# Patient Record
Sex: MALE | Race: WHITE | Employment: OTHER | URBAN - METROPOLITAN AREA
[De-identification: names, ages, dates, MRNs, and addresses within clinical notes are randomized per-mention and may not be internally consistent; named-entity substitution may affect disease eponyms.]

---

## 2017-01-04 ENCOUNTER — OFFICE VISIT (OUTPATIENT)
Dept: CARDIOLOGY CLINIC | Age: 82
End: 2017-01-04

## 2017-01-04 VITALS
HEIGHT: 69 IN | RESPIRATION RATE: 16 BRPM | SYSTOLIC BLOOD PRESSURE: 194 MMHG | HEART RATE: 68 BPM | OXYGEN SATURATION: 98 % | BODY MASS INDEX: 27.64 KG/M2 | DIASTOLIC BLOOD PRESSURE: 80 MMHG | WEIGHT: 186.6 LBS

## 2017-01-04 DIAGNOSIS — E11.51 TYPE 2 DIABETES MELLITUS WITH DIABETIC PERIPHERAL ANGIOPATHY WITHOUT GANGRENE, WITHOUT LONG-TERM CURRENT USE OF INSULIN (HCC): ICD-10-CM

## 2017-01-04 DIAGNOSIS — I25.10 CORONARY ARTERY DISEASE INVOLVING NATIVE CORONARY ARTERY OF NATIVE HEART WITHOUT ANGINA PECTORIS: ICD-10-CM

## 2017-01-04 DIAGNOSIS — I10 ESSENTIAL HYPERTENSION: ICD-10-CM

## 2017-01-04 DIAGNOSIS — I73.9 PVD (PERIPHERAL VASCULAR DISEASE) (HCC): Primary | ICD-10-CM

## 2017-01-04 DIAGNOSIS — I77.9 BILATERAL CAROTID ARTERY DISEASE (HCC): ICD-10-CM

## 2017-01-04 DIAGNOSIS — I73.9 CLAUDICATION IN PERIPHERAL VASCULAR DISEASE (HCC): ICD-10-CM

## 2017-01-04 NOTE — PROGRESS NOTES
HISTORY OF PRESENT ILLNESS  Roxanne Dawson is a [de-identified] y.o. male. He has diabetes, coronary and peripheral arterial disease, and chronic renal insufficiency. Years ago, he had intervention on both legs with marked improvement. More recently, he has had recurrence of angina and ankle-brachial index testing was abnormal - being 0.56 on the right and 0.62 on the left. He has heavily calcified arteries in his legs. His creatinine was 1.69 before the procedure. He had additional intravenous hydration before angiography done on 12/22/16. This showed 99% stenoses in the left popliteal and tibioperoneal trunk. Both were treated with orbital atherectomy and drug coated balloons. He has had some swelling in his left ankle since, but no more claudication. He admits to eating some new garlic butter that is probably loaded with salt, as well as soup that has salt in it. He will cut out both of these. He has some claudication in the right leg, although, it was not as severe clinically as the left, but the ankle-brachial index was worse. He was noted to have a short segment total occlusion in his distal superficial femoral artery on the right leg with heavy calcification. HPI  Patient Active Problem List   Diagnosis Code    Bradycardia R00.1    CAD (coronary artery disease) I25.10    Hypertension, essential, benign I10    Carotid arterial disease (HCC) I77.9    Hypercholesteremia E78.00    PVD (peripheral vascular disease) ([facility] I73.9    PVC's (premature ventricular contractions) I49.3    Type 2 diabetes mellitus with diabetic peripheral angiopathy without gangrene ([facility]) E11.51     Current Outpatient Prescriptions   Medication Sig Dispense Refill    metFORMIN (GLUCOPHAGE) 1,000 mg tablet Take 1 Tab by mouth two (2) times daily (with meals). 30 Tab 1    clopidogrel (PLAVIX) 75 mg tab Take 1 Tab by mouth daily for 360 days.  30 Tab 1    carvedilol (COREG) 6.25 mg tablet TAKE ONE TABLET BY MOUTH TWICE DAILY WITH MEALS 60 Tab 2    pioglitazone (ACTOS) 45 mg tablet Take 45 mg by mouth daily.  amLODIPine (NORVASC) 10 mg tablet Take  by mouth daily.  atorvastatin (LIPITOR) 20 mg tablet Take 1 Tab by mouth nightly. 30 Tab 3    cinnamon bark (CINNAMON) 500 mg cap Take 500 mg by mouth two (2) times a day.  hydrochlorothiazide (HYDRODIURIL) 25 mg tablet Take 25 mg by mouth daily.  fish oil-dha-epa (FISH OIL) 1,200-144-216 mg Cap Take  by mouth two (2) times a day.  aspirin 81 mg tablet Take  by mouth daily.  glipiZIDE (GLUCOTROL) 10 mg tablet Take 20 mg by mouth two (2) times a day. Past Medical History   Diagnosis Date    CAD (coronary artery disease)     Carotid arterial disease ([facility]     Diabetes mellitus, type 2 ([facility]     Hypercholesteremia     Hypertension     PVC's (premature ventricular contractions) 5/26/2014    PVD (peripheral vascular disease) ([facility]      Past Surgical History   Procedure Laterality Date    Hx heart catheterization      Hx coronary artery bypass graft         Review of Systems   Cardiovascular: Positive for claudication and leg swelling. All other systems reviewed and are negative. Visit Vitals    /80 (BP 1 Location: Left arm, BP Patient Position: Sitting)    Pulse 68    Resp 16    Ht 5' 9\" (1.753 m)    Wt 186 lb 9.6 oz (84.6 kg)    SpO2 98%    BMI 27.56 kg/m2       Physical Exam   Constitutional: He is oriented to person, place, and time. He appears well-nourished. HENT:   Head: Atraumatic. Eyes: Conjunctivae are normal.   Neck: Neck supple. Cardiovascular: Normal rate, regular rhythm and normal heart sounds. Exam reveals no gallop and no friction rub. No murmur heard. Pulmonary/Chest: Breath sounds normal. He has no wheezes. Abdominal: Bowel sounds are normal.   Musculoskeletal: He exhibits edema. Neurological: He is oriented to person, place, and time. Skin: Skin is dry.    Psychiatric: His behavior is normal.   Nursing note and vitals reviewed. ASSESSMENT and PLAN  His right leg is cooler than his left to examination. I suspect that the minimal swelling in the left leg may due to reperfusion and also his using new foodstuffs that have additional sodium. His blood pressure systolic is elevated today, but will not be addressed because of the dietary indiscretion. He would like to have the right leg done and this will be scheduled for next week. He will again arrive early for intravenous hydration, which actually was quite protective of his kidneys and improved his serum creatinine. He will probably need orbital atherectomy again and drug-eluting balloons. He will not need repeat blood work since it still will be current. He will stop his oral hypoglycemic medications prior to the procedure as he did before.

## 2017-01-04 NOTE — MR AVS SNAPSHOT
Visit Information Date & Time Provider Department Dept. Phone Encounter #  
 1/4/2017 10:00 AM Alana Mcfarland MD [facility] [phone] [id number] Your Appointments 4/24/2017 11:20 AM  
ESTABLISHED PATIENT with Jared Reynaga MD  
[facility] (ANAHI SCHEDULING) Appt Note: 4mo fu  
 [address] [place] [address]  
[address] [place] [address] [id number] Upcoming Health Maintenance Date Due  
 FOOT EXAM Q1 8/28/1945 EYE EXAM RETINAL OR DILATED Q1 8/28/1946 DTaP/Tdap/Td series (1 - Tdap) 8/28/1957 ZOSTER VACCINE AGE 60> 8/28/1996 GLAUCOMA SCREENING Q2Y 8/28/2001 Pneumococcal 65+ Low/Medium Risk (1 of 2 - PCV13) 8/28/2001 MEDICARE YEARLY EXAM 8/28/2001 HEMOGLOBIN A1C Q6M 4/25/2011 MICROALBUMIN Q1 8/3/2011 LIPID PANEL Q1 10/25/2011 INFLUENZA AGE 9 TO ADULT 8/1/2016 Allergies as of 1/4/2017  Review Complete On: 1/4/2017 By: Alana Mcfarland MD  
  
 Severity Noted Reaction Type Reactions Lisinopril  09/20/2016    Cough Current Immunizations  Never Reviewed No immunizations on file. Not reviewed this visit You Were Diagnosed With   
  
 Codes Comments PVD (peripheral vascular disease) ([facility]    -  Primary ICD-10-CM: I73.9 ICD-9-CM: 443.9 Claudication in peripheral vascular disease [facility]     ICD-10-CM: I73.9 ICD-9-CM: 443.9 Coronary artery disease involving native coronary artery of native heart without angina pectoris     ICD-10-CM: I25.10 ICD-9-CM: 414.01 Bilateral carotid artery disease ([facility]     ICD-10-CM: I77.9 ICD-9-CM: 447.9 Essential hypertension     ICD-10-CM: I10 
ICD-9-CM: 401.9 Type 2 diabetes mellitus with diabetic peripheral angiopathy without gangrene, without long-term current use of insulin (HCC)     ICD-10-CM: E11.51 
ICD-9-CM: 250.70, 443.81 Vitals BP Pulse Resp Height(growth percentile) Weight(growth percentile) SpO2  
 194/80 (BP 1 Location: Left arm, BP Patient Position: Sitting) 68 16 5' 9\" (1.753 m) 186 lb 9.6 oz (84.6 kg) 98% BMI Smoking Status 27.56 kg/m2 Former Smoker BMI and BSA Data Body Mass Index Body Surface Area  
 27.56 kg/m 2 2.03 m 2 Preferred Pharmacy Pharmacy Name Phone [facility] [address] [facility], [address] [place] [address],[address] [phone] Your Updated Medication List  
  
   
This list is accurate as of: 1/4/17 11:04 AM.  Always use your most recent med list. amLODIPine 10 mg tablet Commonly known as:  Wandra Tracie Take  by mouth daily. aspirin 81 mg tablet Take  by mouth daily. atorvastatin 20 mg tablet Commonly known as:  LIPITOR Take 1 Tab by mouth nightly. carvedilol 6.25 mg tablet Commonly known as:  COREG  
TAKE ONE TABLET BY MOUTH TWICE DAILY WITH MEALS  
  
 CINNAMON 500 mg Cap Generic drug:  cinnamon bark Take 500 mg by mouth two (2) times a day. clopidogrel 75 mg Tab Commonly known as:  PLAVIX Take 1 Tab by mouth daily for 360 days. FISH OIL 1,200-144-216 mg Cap Generic drug:  fish oil-dha-epa Take  by mouth two (2) times a day. glipiZIDE 10 mg tablet Commonly known as:  Brooksie Fillers Take 20 mg by mouth two (2) times a day. hydroCHLOROthiazide 25 mg tablet Commonly known as:  HYDRODIURIL Take 25 mg by mouth daily. metFORMIN 1,000 mg tablet Commonly known as:  GLUCOPHAGE Take 1 Tab by mouth two (2) times daily (with meals). pioglitazone 45 mg tablet Commonly known as:  ACTOS Take 45 mg by mouth daily. Patient Instructions Schedule for Perpherial angio on Jan 12 ,2017 at [facility] , to arrive 9am in the admissions office  for fluid loading.  Nothing to eat or drink after midnight, He is to hold Metformin the day before and hold Actos and Glipizide the morning of, he will need to have a  as well but come prepared to stay over night. Lab work current in chart. Patient Instructions History Introducing [facility]! [facility] introduces [employer] patient portal. Now you can access parts of your medical record, email your doctor's office, and request medication refills online. 1. In your internet browser, go to [email or web address] [email or web address] 2. Click on the First Time User? Click Here link in the Sign In box. You will see the New Member Sign Up page. 3. Enter your [employer] Access Code exactly as it appears below. You will not need to use this code after youve completed the sign-up process. If you do not sign up before the expiration date, you must request a new code. · [employer] Access Code: [email or web address] Expires: 3/8/2017  3:53 PM 
 
4. Enter the last four digits of your Social Security Number (xxxx) and Date of Birth (mm/dd/yyyy) as indicated and click Submit. You will be taken to the next sign-up page. 5. Create a [employer] ID. This will be your [employer] login ID and cannot be changed, so think of one that is secure and easy to remember. 6. Create a [employer] password. You can change your password at any time. 7. Enter your Password Reset Question and Answer. This can be used at a later time if you forget your password. 8. Enter your e-mail address. You will receive e-mail notification when new information is available in [address] 9. Click Sign Up. You can now view and download portions of your medical record. 10. Click the Download Summary menu link to download a portable copy of your medical information. If you have questions, please visit the Frequently Asked Questions section of the [employer] website. Remember, [employer] is NOT to be used for urgent needs. For medical emergencies, dial 911. Now available from your iPhone and Android! Please provide this summary of care documentation to your next provider. Your primary care clinician is listed as Germaine Rodriguez. If you have any questions after today's visit, please call [phone].

## 2017-01-04 NOTE — PATIENT INSTRUCTIONS
Schedule for Perpherial angio on Jan 12 ,2017 at [facility] , to arrive 9am in the admissions office  for fluid loading. Nothing to eat or drink after midnight, He is to hold Metformin the day before and hold Actos and Glipizide the morning of, he will need to have a  as well but come prepared to stay over night. Lab work current in chart.

## 2017-01-10 DIAGNOSIS — I73.9 PAD (PERIPHERAL ARTERY DISEASE) (HCC): Primary | ICD-10-CM

## 2017-01-10 RX ORDER — SODIUM CHLORIDE 9 MG/ML
150 INJECTION, SOLUTION INTRAVENOUS CONTINUOUS
Status: CANCELLED | OUTPATIENT
Start: 2017-01-19

## 2017-01-10 RX ORDER — DIPHENHYDRAMINE HYDROCHLORIDE 50 MG/ML
50 INJECTION, SOLUTION INTRAMUSCULAR; INTRAVENOUS
Status: CANCELLED | OUTPATIENT
Start: 2017-01-19

## 2017-01-17 DIAGNOSIS — I73.9 PAD (PERIPHERAL ARTERY DISEASE) (HCC): Primary | ICD-10-CM

## 2017-01-17 RX ORDER — SODIUM CHLORIDE 0.9 % (FLUSH) 0.9 %
5-10 SYRINGE (ML) INJECTION EVERY 8 HOURS
Status: CANCELLED | OUTPATIENT
Start: 2017-01-17

## 2017-01-17 RX ORDER — SODIUM CHLORIDE 0.9 % (FLUSH) 0.9 %
5-10 SYRINGE (ML) INJECTION AS NEEDED
Status: CANCELLED | OUTPATIENT
Start: 2017-01-17

## 2017-01-19 ENCOUNTER — HOSPITAL ENCOUNTER (OUTPATIENT)
Dept: CARDIAC CATH/INVASIVE PROCEDURES | Age: 82
Setting detail: OBSERVATION
Discharge: HOME | End: 2017-01-20
Attending: SPECIALIST | Admitting: SPECIALIST
Payer: MEDICARE

## 2017-01-19 DIAGNOSIS — I73.9 PAD (PERIPHERAL ARTERY DISEASE) (HCC): ICD-10-CM

## 2017-01-19 LAB
ANION GAP BLD CALC-SCNC: 7 MMOL/L (ref 5–15)
BUN SERPL-MCNC: 42 MG/DL (ref 6–20)
BUN/CREAT SERPL: 25 (ref 12–20)
CALCIUM SERPL-MCNC: 8.7 MG/DL (ref 8.5–10.1)
CHLORIDE SERPL-SCNC: 100 MMOL/L (ref 97–108)
CO2 SERPL-SCNC: 28 MMOL/L (ref 21–32)
CREAT SERPL-MCNC: 1.69 MG/DL (ref 0.7–1.3)
ERYTHROCYTE [DISTWIDTH] IN BLOOD BY AUTOMATED COUNT: 13.9 % (ref 11.5–14.5)
GLUCOSE BLD STRIP.AUTO-MCNC: 168 MG/DL (ref 65–100)
GLUCOSE BLD STRIP.AUTO-MCNC: 345 MG/DL (ref 65–100)
GLUCOSE SERPL-MCNC: 250 MG/DL (ref 65–100)
HCT VFR BLD AUTO: 34.7 % (ref 36.6–50.3)
HGB BLD-MCNC: 11.5 G/DL (ref 12.1–17)
MCH RBC QN AUTO: 28.9 PG (ref 26–34)
MCHC RBC AUTO-ENTMCNC: 33.1 G/DL (ref 30–36.5)
MCV RBC AUTO: 87.2 FL (ref 80–99)
PLATELET # BLD AUTO: 247 K/UL (ref 150–400)
POTASSIUM SERPL-SCNC: 4.5 MMOL/L (ref 3.5–5.1)
RBC # BLD AUTO: 3.98 M/UL (ref 4.1–5.7)
SERVICE CMNT-IMP: ABNORMAL
SERVICE CMNT-IMP: ABNORMAL
SODIUM SERPL-SCNC: 135 MMOL/L (ref 136–145)
WBC # BLD AUTO: 6 K/UL (ref 4.1–11.1)

## 2017-01-19 PROCEDURE — C1894 INTRO/SHEATH, NON-LASER: HCPCS

## 2017-01-19 PROCEDURE — C1769 GUIDE WIRE: HCPCS

## 2017-01-19 PROCEDURE — C1725 CATH, TRANSLUMIN NON-LASER: HCPCS

## 2017-01-19 PROCEDURE — 77030032660 HC CATH ANGI PRPH SUPP CXI COOK -C

## 2017-01-19 PROCEDURE — 77030013715 HC INFL SYS MRTM -B

## 2017-01-19 PROCEDURE — A9270 NON-COVERED ITEM OR SERVICE: HCPCS | Performed by: SPECIALIST

## 2017-01-19 PROCEDURE — 99218 HC RM OBSERVATION: CPT

## 2017-01-19 PROCEDURE — 74011636637 HC RX REV CODE- 636/637: Performed by: SPECIALIST

## 2017-01-19 PROCEDURE — 74011000250 HC RX REV CODE- 250: Performed by: SPECIALIST

## 2017-01-19 PROCEDURE — 74011250637 HC RX REV CODE- 250/637: Performed by: SPECIALIST

## 2017-01-19 PROCEDURE — 75710 ARTERY X-RAYS ARM/LEG: CPT

## 2017-01-19 PROCEDURE — C1760 CLOSURE DEV, VASC: HCPCS

## 2017-01-19 PROCEDURE — C1724 CATH, TRANS ATHEREC,ROTATION: HCPCS

## 2017-01-19 PROCEDURE — 74011250636 HC RX REV CODE- 250/636

## 2017-01-19 PROCEDURE — 36415 COLL VENOUS BLD VENIPUNCTURE: CPT | Performed by: SPECIALIST

## 2017-01-19 PROCEDURE — 74011636320 HC RX REV CODE- 636/320: Performed by: SPECIALIST

## 2017-01-19 PROCEDURE — 85027 COMPLETE CBC AUTOMATED: CPT | Performed by: SPECIALIST

## 2017-01-19 PROCEDURE — C1874 STENT, COATED/COV W/DEL SYS: HCPCS

## 2017-01-19 PROCEDURE — 82962 GLUCOSE BLOOD TEST: CPT

## 2017-01-19 PROCEDURE — 77030013744

## 2017-01-19 PROCEDURE — 80048 BASIC METABOLIC PNL TOTAL CA: CPT | Performed by: SPECIALIST

## 2017-01-19 PROCEDURE — 74011000258 HC RX REV CODE- 258: Performed by: SPECIALIST

## 2017-01-19 PROCEDURE — 74011250636 HC RX REV CODE- 250/636: Performed by: SPECIALIST

## 2017-01-19 RX ORDER — HYDRALAZINE HYDROCHLORIDE 50 MG/1
50 TABLET, FILM COATED ORAL
Status: COMPLETED | OUTPATIENT
Start: 2017-01-19 | End: 2017-01-19

## 2017-01-19 RX ORDER — SODIUM CHLORIDE 9 MG/ML
150 INJECTION, SOLUTION INTRAVENOUS CONTINUOUS
Status: DISCONTINUED | OUTPATIENT
Start: 2017-01-19 | End: 2017-01-19 | Stop reason: HOSPADM

## 2017-01-19 RX ORDER — MAGNESIUM SULFATE 100 %
4 CRYSTALS MISCELLANEOUS AS NEEDED
Status: DISCONTINUED | OUTPATIENT
Start: 2017-01-19 | End: 2017-01-20 | Stop reason: HOSPADM

## 2017-01-19 RX ORDER — ACETAMINOPHEN 325 MG/1
650 TABLET ORAL
Status: DISCONTINUED | OUTPATIENT
Start: 2017-01-19 | End: 2017-01-20 | Stop reason: HOSPADM

## 2017-01-19 RX ORDER — HYDROCODONE BITARTRATE AND ACETAMINOPHEN 7.5; 325 MG/1; MG/1
1 TABLET ORAL
Status: DISCONTINUED | OUTPATIENT
Start: 2017-01-19 | End: 2017-01-20 | Stop reason: HOSPADM

## 2017-01-19 RX ORDER — SODIUM CHLORIDE 0.9 % (FLUSH) 0.9 %
10 SYRINGE (ML) INJECTION AS NEEDED
Status: DISCONTINUED | OUTPATIENT
Start: 2017-01-19 | End: 2017-01-19

## 2017-01-19 RX ORDER — INSULIN LISPRO 100 [IU]/ML
INJECTION, SOLUTION INTRAVENOUS; SUBCUTANEOUS
Status: DISCONTINUED | OUTPATIENT
Start: 2017-01-19 | End: 2017-01-20 | Stop reason: HOSPADM

## 2017-01-19 RX ORDER — CLOPIDOGREL 300 MG/1
600 TABLET, FILM COATED ORAL AS NEEDED
Status: DISCONTINUED | OUTPATIENT
Start: 2017-01-19 | End: 2017-01-19

## 2017-01-19 RX ORDER — AMLODIPINE BESYLATE 5 MG/1
10 TABLET ORAL DAILY
Status: DISCONTINUED | OUTPATIENT
Start: 2017-01-20 | End: 2017-01-20 | Stop reason: HOSPADM

## 2017-01-19 RX ORDER — SODIUM CHLORIDE 0.9 % (FLUSH) 0.9 %
5-10 SYRINGE (ML) INJECTION AS NEEDED
Status: DISCONTINUED | OUTPATIENT
Start: 2017-01-19 | End: 2017-01-20 | Stop reason: HOSPADM

## 2017-01-19 RX ORDER — CLOPIDOGREL BISULFATE 75 MG/1
75 TABLET ORAL ONCE
Status: COMPLETED | OUTPATIENT
Start: 2017-01-19 | End: 2017-01-19

## 2017-01-19 RX ORDER — HEPARIN SODIUM 1000 [USP'U]/ML
1000-5000 INJECTION, SOLUTION INTRAVENOUS; SUBCUTANEOUS
Status: DISCONTINUED | OUTPATIENT
Start: 2017-01-19 | End: 2017-01-19

## 2017-01-19 RX ORDER — SODIUM CHLORIDE 0.9 % (FLUSH) 0.9 %
5-10 SYRINGE (ML) INJECTION EVERY 8 HOURS
Status: DISCONTINUED | OUTPATIENT
Start: 2017-01-19 | End: 2017-01-19

## 2017-01-19 RX ORDER — GUAIFENESIN 100 MG/5ML
81 LIQUID (ML) ORAL DAILY
Status: DISCONTINUED | OUTPATIENT
Start: 2017-01-20 | End: 2017-01-20 | Stop reason: HOSPADM

## 2017-01-19 RX ORDER — ATROPINE SULFATE 0.1 MG/ML
1 INJECTION INTRAVENOUS AS NEEDED
Status: DISCONTINUED | OUTPATIENT
Start: 2017-01-19 | End: 2017-01-19

## 2017-01-19 RX ORDER — DEXTROSE 50 % IN WATER (D50W) INTRAVENOUS SYRINGE
12.5-25 AS NEEDED
Status: DISCONTINUED | OUTPATIENT
Start: 2017-01-19 | End: 2017-01-20 | Stop reason: HOSPADM

## 2017-01-19 RX ORDER — SODIUM CHLORIDE 0.9 % (FLUSH) 0.9 %
5-10 SYRINGE (ML) INJECTION EVERY 8 HOURS
Status: DISCONTINUED | OUTPATIENT
Start: 2017-01-19 | End: 2017-01-20 | Stop reason: HOSPADM

## 2017-01-19 RX ORDER — SODIUM CHLORIDE 9 MG/ML
75 INJECTION, SOLUTION INTRAVENOUS CONTINUOUS
Status: DISPENSED | OUTPATIENT
Start: 2017-01-19 | End: 2017-01-20

## 2017-01-19 RX ORDER — ATORVASTATIN CALCIUM 20 MG/1
20 TABLET, FILM COATED ORAL
Status: DISCONTINUED | OUTPATIENT
Start: 2017-01-19 | End: 2017-01-20 | Stop reason: HOSPADM

## 2017-01-19 RX ORDER — HYDRALAZINE HYDROCHLORIDE 20 MG/ML
20 INJECTION INTRAMUSCULAR; INTRAVENOUS
Status: DISCONTINUED | OUTPATIENT
Start: 2017-01-19 | End: 2017-01-20 | Stop reason: HOSPADM

## 2017-01-19 RX ORDER — HYDROCHLOROTHIAZIDE 25 MG/1
25 TABLET ORAL DAILY
Status: DISCONTINUED | OUTPATIENT
Start: 2017-01-20 | End: 2017-01-20 | Stop reason: HOSPADM

## 2017-01-19 RX ORDER — FENTANYL CITRATE 50 UG/ML
25-200 INJECTION, SOLUTION INTRAMUSCULAR; INTRAVENOUS
Status: DISCONTINUED | OUTPATIENT
Start: 2017-01-19 | End: 2017-01-19

## 2017-01-19 RX ORDER — MIDAZOLAM HYDROCHLORIDE 1 MG/ML
.5-1 INJECTION, SOLUTION INTRAMUSCULAR; INTRAVENOUS
Status: DISCONTINUED | OUTPATIENT
Start: 2017-01-19 | End: 2017-01-19

## 2017-01-19 RX ORDER — DIPHENHYDRAMINE HYDROCHLORIDE 50 MG/ML
50 INJECTION, SOLUTION INTRAMUSCULAR; INTRAVENOUS
Status: DISCONTINUED | OUTPATIENT
Start: 2017-01-19 | End: 2017-01-19 | Stop reason: HOSPADM

## 2017-01-19 RX ORDER — CARVEDILOL 6.25 MG/1
6.25 TABLET ORAL 2 TIMES DAILY WITH MEALS
Status: DISCONTINUED | OUTPATIENT
Start: 2017-01-19 | End: 2017-01-20 | Stop reason: HOSPADM

## 2017-01-19 RX ORDER — HEPARIN SODIUM 200 [USP'U]/100ML
1000 INJECTION, SOLUTION INTRAVENOUS CONTINUOUS
Status: DISCONTINUED | OUTPATIENT
Start: 2017-01-19 | End: 2017-01-19

## 2017-01-19 RX ORDER — SODIUM CHLORIDE 0.9 % (FLUSH) 0.9 %
5-10 SYRINGE (ML) INJECTION AS NEEDED
Status: DISCONTINUED | OUTPATIENT
Start: 2017-01-19 | End: 2017-01-19

## 2017-01-19 RX ORDER — HYDRALAZINE HYDROCHLORIDE 20 MG/ML
10 INJECTION INTRAMUSCULAR; INTRAVENOUS ONCE
Status: COMPLETED | OUTPATIENT
Start: 2017-01-19 | End: 2017-01-19

## 2017-01-19 RX ORDER — ZOLPIDEM TARTRATE 5 MG/1
5 TABLET ORAL
Status: DISCONTINUED | OUTPATIENT
Start: 2017-01-19 | End: 2017-01-20 | Stop reason: HOSPADM

## 2017-01-19 RX ORDER — GLIPIZIDE 5 MG/1
20 TABLET ORAL
Status: DISCONTINUED | OUTPATIENT
Start: 2017-01-19 | End: 2017-01-20 | Stop reason: HOSPADM

## 2017-01-19 RX ORDER — HYDROMORPHONE HYDROCHLORIDE 1 MG/ML
1 INJECTION, SOLUTION INTRAMUSCULAR; INTRAVENOUS; SUBCUTANEOUS
Status: DISCONTINUED | OUTPATIENT
Start: 2017-01-19 | End: 2017-01-20 | Stop reason: HOSPADM

## 2017-01-19 RX ORDER — LIDOCAINE HYDROCHLORIDE 10 MG/ML
10-30 INJECTION INFILTRATION; PERINEURAL
Status: DISCONTINUED | OUTPATIENT
Start: 2017-01-19 | End: 2017-01-19

## 2017-01-19 RX ORDER — HYDRALAZINE HYDROCHLORIDE 20 MG/ML
INJECTION INTRAMUSCULAR; INTRAVENOUS
Status: COMPLETED
Start: 2017-01-19 | End: 2017-01-19

## 2017-01-19 RX ORDER — CLOPIDOGREL BISULFATE 75 MG/1
75 TABLET ORAL DAILY
Status: DISCONTINUED | OUTPATIENT
Start: 2017-01-20 | End: 2017-01-20 | Stop reason: HOSPADM

## 2017-01-19 RX ADMIN — HYDRALAZINE HYDROCHLORIDE 10 MG: 20 INJECTION INTRAMUSCULAR; INTRAVENOUS at 14:23

## 2017-01-19 RX ADMIN — HEPARIN SODIUM 2000 UNITS: 200 INJECTION, SOLUTION INTRAVENOUS at 13:44

## 2017-01-19 RX ADMIN — MIDAZOLAM HYDROCHLORIDE 2 MG: 1 INJECTION, SOLUTION INTRAMUSCULAR; INTRAVENOUS at 13:46

## 2017-01-19 RX ADMIN — SODIUM CHLORIDE 75 ML/HR: 900 INJECTION, SOLUTION INTRAVENOUS at 21:30

## 2017-01-19 RX ADMIN — INSULIN LISPRO 4 UNITS: 100 INJECTION, SOLUTION INTRAVENOUS; SUBCUTANEOUS at 21:26

## 2017-01-19 RX ADMIN — HYDRALAZINE HYDROCHLORIDE 50 MG: 50 TABLET, FILM COATED ORAL at 11:05

## 2017-01-19 RX ADMIN — IOPAMIDOL 226 ML: 612 INJECTION, SOLUTION INTRAVENOUS at 16:06

## 2017-01-19 RX ADMIN — BIVALIRUDIN 148.4 MG/HR: 250 INJECTION, POWDER, LYOPHILIZED, FOR SOLUTION INTRAVENOUS at 14:13

## 2017-01-19 RX ADMIN — FENTANYL CITRATE 25 MCG: 50 INJECTION, SOLUTION INTRAMUSCULAR; INTRAVENOUS at 14:41

## 2017-01-19 RX ADMIN — HYDRALAZINE HYDROCHLORIDE 20 MG: 20 INJECTION INTRAMUSCULAR; INTRAVENOUS at 17:06

## 2017-01-19 RX ADMIN — SODIUM CHLORIDE 75 ML/HR: 900 INJECTION, SOLUTION INTRAVENOUS at 18:16

## 2017-01-19 RX ADMIN — CLOPIDOGREL BISULFATE 75 MG: 75 TABLET, FILM COATED ORAL at 16:14

## 2017-01-19 RX ADMIN — MIDAZOLAM HYDROCHLORIDE 1 MG: 1 INJECTION, SOLUTION INTRAMUSCULAR; INTRAVENOUS at 13:56

## 2017-01-19 RX ADMIN — MIDAZOLAM HYDROCHLORIDE 1 MG: 1 INJECTION, SOLUTION INTRAMUSCULAR; INTRAVENOUS at 14:48

## 2017-01-19 RX ADMIN — IOPAMIDOL 50 ML: 755 INJECTION, SOLUTION INTRAVENOUS at 14:25

## 2017-01-19 RX ADMIN — MIDAZOLAM HYDROCHLORIDE 1 MG: 1 INJECTION, SOLUTION INTRAMUSCULAR; INTRAVENOUS at 14:41

## 2017-01-19 RX ADMIN — SODIUM CHLORIDE 150 ML/HR: 900 INJECTION, SOLUTION INTRAVENOUS at 09:40

## 2017-01-19 RX ADMIN — BIVALIRUDIN 148.4 MG/HR: 250 INJECTION, POWDER, LYOPHILIZED, FOR SOLUTION INTRAVENOUS at 15:12

## 2017-01-19 RX ADMIN — Medication 10 ML: at 21:28

## 2017-01-19 RX ADMIN — MIDAZOLAM HYDROCHLORIDE 2 MG: 1 INJECTION, SOLUTION INTRAMUSCULAR; INTRAVENOUS at 15:43

## 2017-01-19 RX ADMIN — HYDROCODONE BITARTRATE AND ACETAMINOPHEN 1 TABLET: 7.5; 325 TABLET ORAL at 17:42

## 2017-01-19 RX ADMIN — FENTANYL CITRATE 25 MCG: 50 INJECTION, SOLUTION INTRAMUSCULAR; INTRAVENOUS at 13:56

## 2017-01-19 RX ADMIN — CARVEDILOL 6.25 MG: 6.25 TABLET, FILM COATED ORAL at 17:26

## 2017-01-19 RX ADMIN — MIDAZOLAM HYDROCHLORIDE 2 MG: 1 INJECTION, SOLUTION INTRAMUSCULAR; INTRAVENOUS at 15:21

## 2017-01-19 RX ADMIN — MIDAZOLAM HYDROCHLORIDE 1 MG: 1 INJECTION, SOLUTION INTRAMUSCULAR; INTRAVENOUS at 15:51

## 2017-01-19 RX ADMIN — GLIPIZIDE 20 MG: 5 TABLET ORAL at 18:51

## 2017-01-19 RX ADMIN — FENTANYL CITRATE 50 MCG: 50 INJECTION, SOLUTION INTRAMUSCULAR; INTRAVENOUS at 13:46

## 2017-01-19 RX ADMIN — LIDOCAINE HYDROCHLORIDE 10 ML: 10 INJECTION, SOLUTION INFILTRATION; PERINEURAL at 13:54

## 2017-01-19 NOTE — IP AVS SNAPSHOT
Current Discharge Medication List  
  
Take these medications at their scheduled times Dose & Instructions Dispensing Information Comments Morning Noon Evening Bedtime  
 amLODIPine 10 mg tablet Commonly known as:  Tad Tamika Your next dose is: Today, Tomorrow Other:  ____________ Take  by mouth daily. Refills:  0  
     
   
   
   
  
 aspirin 81 mg tablet Your next dose is: Today, Tomorrow Other:  ____________ Take  by mouth daily. Refills:  0  
     
   
   
   
  
 atorvastatin 20 mg tablet Commonly known as:  LIPITOR Your next dose is: Today, Tomorrow Other:  ____________ Dose:  20 mg Take 1 Tab by mouth nightly. Quantity:  30 Tab Refills:  3 Simvastatin discontinued 9/20/16 CINNAMON 500 mg Cap Generic drug:  cinnamon bark Your next dose is: Today, Tomorrow Other:  ____________ Dose:  500 mg Take 500 mg by mouth two (2) times a day. Refills:  0  
     
   
   
   
  
 clopidogrel 75 mg Tab Commonly known as:  PLAVIX Your next dose is: Today, Tomorrow Other:  ____________ Dose:  75 mg Take 1 Tab by mouth daily for 360 days. Quantity:  30 Tab Refills:  1 FISH OIL 1,200-144-216 mg Cap Generic drug:  fish oil-dha-epa Your next dose is: Today, Tomorrow Other:  ____________ Take  by mouth two (2) times a day. Refills:  0  
     
   
   
   
  
 glipiZIDE 10 mg tablet Commonly known as:  Lauren Candi Your next dose is: Today, Tomorrow Other:  ____________ Dose:  20 mg Take 20 mg by mouth two (2) times a day. Refills:  0  
     
   
   
   
  
 hydroCHLOROthiazide 25 mg tablet Commonly known as:  HYDRODIURIL Your next dose is: Today, Tomorrow Other:  ____________ Dose:  25 mg Take 25 mg by mouth daily. Refills:  0 metFORMIN 1,000 mg tablet Commonly known as:  GLUCOPHAGE Start taking on:  1/21/2017 Your next dose is: Today, Tomorrow Other:  ____________ Dose:  1000 mg Take 1 Tab by mouth two (2) times daily (with meals). Quantity:  30 Tab Refills:  1 Restart 12/26 pioglitazone 45 mg tablet Commonly known as:  ACTOS Your next dose is: Today, Tomorrow Other:  ____________ Dose:  45 mg Take 45 mg by mouth daily. Refills:  0 Take these medications as directed Dose & Instructions Dispensing Information Comments Morning Noon Evening Bedtime  
 carvedilol 6.25 mg tablet Commonly known as:  Aruna Chappell Your next dose is: Today, Tomorrow Other:  ____________ TAKE ONE TABLET BY MOUTH TWICE DAILY WITH MEALS Quantity:  60 Tab Refills:  2 Where to Get Your Medications [id number], [address] [place] [address] Way Phone:  [phone]  
  metFORMIN 1,000 mg tablet

## 2017-01-19 NOTE — H&P
PAD with claudication, CKD creatinine 1.6, had extensive intervention on right leg using 200 cc of contrast, so far unable to urinate, will keep observation overnight. See office note for details.

## 2017-01-19 NOTE — PROGRESS NOTES
TRANSFER - OUT REPORT:    Verbal report given to Dori Chance RN(name) on [address]  being transferred to CVSU(unit) for routine progression of care       Report consisted of patients Situation, Background, Assessment and   Recommendations(SBAR). Information from the following report(s) SBAR, Procedure Summary, MAR, Recent Results and Cardiac Rhythm sinus bradycardia with 1st degree heart block was reviewed with the receiving nurse. Lines:   Peripheral IV 01/19/17 Left Arm (Active)   Site Assessment Clean, dry, & intact 1/19/2017  9:30 AM   Phlebitis Assessment 0 1/19/2017  9:30 AM   Infiltration Assessment 0 1/19/2017  9:30 AM   Dressing Status Clean, dry, & intact 1/19/2017  9:30 AM   Dressing Type Tape;Transparent 1/19/2017  9:30 AM   Hub Color/Line Status Pink; Infusing 1/19/2017  9:30 AM        Opportunity for questions and clarification was provided.       Patient transported with:   Registered Nurse

## 2017-01-19 NOTE — PROCEDURES
Cardiac Catheterization Procedure Note   Patient: Roxanne Dawson MRN: [id number]  SSN: [id number]   YOB: 1935 Age: [de-identified] y.o. Sex: male    Date of Procedure: 1/19/2017   Pre-procedure Diagnosis: PAD with Claudication  Post-procedure Diagnosis: PAD with Claudication  Procedure: Peripheral Angiogram and orbital atherectomy, DCB, coated stent to right SFA, popliteal  :  Dr. Ira Brennan MD    Assistant(s):  None  Anesthesia: Moderate Sedation   Estimated Blood Loss: Less than 10 mL   Specimens Removed: None  Findings: 100% occlusion right popliteal with heavy calcium, 70% mid SFA, 80-90% ostium right SFA with very heavy calcium. All lesions treated with orbital atherectomy, distal 2 lesions with drug-coated balloon, ostial SFA with 7 mm coated stent. Good result. Total contrast about 200 cc, so will admit for hydration overnight given CKD.   Complications: None   Implants:  None  Signed by:  Ira Brennan MD  1/19/2017  4:15 PM

## 2017-01-19 NOTE — PROGRESS NOTES
1712: TRANSFER - IN REPORT:    Verbal report received from johnson hernandez(name) on [address]  being received from cath lab(unit) for routine progression of care      Report consisted of patients Situation, Background, Assessment and   Recommendations(SBAR). Information from the following report(s) SBAR, Kardex, Procedure Summary, Intake/Output, MAR and Recent Results was reviewed with the receiving nurse. Opportunity for questions and clarification was provided. 1801: Pt arrived to unit tele placed and confirmed with monitor tech. 1930: Bedside and Verbal shift change report given to tyra ambriz rn (oncoming nurse) by juan manuel molina rn (offgoing nurse). Report included the following information SBAR, Kardex, Procedure Summary, Intake/Output, MAR and Recent Results.

## 2017-01-19 NOTE — PROGRESS NOTES
Transfer to [address] [place] [address] from Procedure Area    Verbal report given to Herminia Celeste on [address]. being transferred to Cardiac Cath Lab  for ordered procedure   Patient is post AO/Ro with intervention procedure. Patient stable upon transfer to . Report consisted of patients Situation, Background, Assessment and   Recommendations(SBAR). Information from the following report(s) SBAR, Kardex, Procedure Summary, Intake/Output, MAR and Recent Results was reviewed with the receiving nurse. Opportunity for questions and clarification was provided. Patient medicated during procedure with orders obtained and verified by Dr. Karen Mendoza. Refer to patient PROCEDURE REPORT for vital signs, assessment, status, and response during procedure.

## 2017-01-19 NOTE — PROGRESS NOTES
Dr. Dimitri Toscano at bedside and ordered for straight cath. MD aware of blood pressure and wants to see results of straight cath prior to administration of hydralazine for blood pressure.

## 2017-01-19 NOTE — PROGRESS NOTES
TRANSFER - IN REPORT:    Verbal report received from [place] [id number] [facility] on [address]  being received from procedure for routine progression of care. Report consisted of patients Situation, Background, Assessment and Recommendations(SBAR). Information from the following report(s) SBAR, Procedure Summary, MAR and Recent Results was reviewed with the receiving clinician. Opportunity for questions and clarification was provided. Assessment completed upon patients arrival to [address] [place] [address] and care assumed. Cardiac Cath Lab Recovery Arrival Note:    [address] arrived to [facility]. Patient procedure= right lower extremity angioplasty / and stent placement. Patient on cardiac monitor, non-invasive blood pressure, SPO2 monitor. On O2 @ 2 lpm via nasal cannula. IV  of 0.9% normal saline on pump at 125 ml/hr. Patient status doing well without problems. Patient is A&Ox 4. Patient reports pain in right leg / lower extremity 10/10. PROCEDURE SITE CHECK:    Procedure site:without any bleeding or hematoma, no pain/discomfort reported at procedure site, but pain in right lower extremity. No change in patient status. Continue to monitor patient and status.

## 2017-01-19 NOTE — PROGRESS NOTES
Cardiac Cath Lab Procedure Area Arrival Note:    Alea Whitlock. arrived to Cardiac Cath Lab, Procedure Area. Patient identifiers verified with NAME and DATE OF BIRTH. Procedure verified with patient. Consent forms verified. Allergies verified. Patient informed of procedure and plan of care. Questions answered with review. Patient voiced understanding of procedure and plan of care. Patient on cardiac monitor, non-invasive blood pressure, SPO2 monitor. On room air or O2 @ 2 lpm via nasal cannula. IV of normal saline on pump at 150 ml/hr. Patient status doing well without problems. Patient is A&Ox 4. Patient reports no pain. Patient medicated during procedure with orders obtained and verified by Dr. Lucas Starks. Refer to patients Cardiac Cath Lab PROCEDURE REPORT for vital signs, assessment, status, and response during procedure, printed at end of case. Printed report on chart or scanned into chart.

## 2017-01-19 NOTE — PROGRESS NOTES
Cardiac Cath Lab Recovery Arrival Note:      Dary Fitch  arrived to Cardiac Cath Lab, Recovery Area. Staff introduced to patient. Patient identifiers verified with NAME and DATE OF BIRTH. Procedure verified with patient. Consent forms reviewed and signed by patient or authorized representative and verified. Allergies verified. Patient and family oriented to department. Patient and family informed of procedure and plan of care. Questions answered with review. Patient prepped for procedure, per orders from physician, prior to arrival.    Patient on cardiac monitor, non-invasive blood pressure, SPO2 monitor. On room harrison. Patient is A&Ox 4. Patient reports no complaints. Patient in stretcher, in low position, with side rails up, call bell within reach, patient instructed to call if assistance as needed. Patient prep in: [address], [place] [address]. Family in: waiting room (wife).    Prep by: Julianna Kumar RN

## 2017-01-19 NOTE — IP AVS SNAPSHOT
2700 [facility] [address] [place] [address] 
[phone] Patient: Ken Hoover. MRN: [id number] YVA:[id number]/22/5842 You are allergic to the following Allergen Reactions Lisinopril Cough Recent Documentation Height Weight BMI Smoking Status 1.753 m 83.8 kg 27.28 kg/m2 Former Smoker Emergency Contacts Name Discharge Info Relation Home Work Mobile Thelma Montes  Spouse [3] [phone] About your hospitalization You were admitted on:  January 19, 2017 You last received care in the:  [facility] 4 CV SERVICES UNIT You were discharged on:  January 20, 2017 Unit phone number:  [phone] Why you were hospitalized Your primary diagnosis was:  Pvd (Peripheral Vascular Disease) (Hcc) Your diagnoses also included:  Urinary Retention, Ckd (Chronic Kidney Disease) Providers Seen During Your Hospitalizations Provider Role Specialty Primary office phone Karsten West MD Attending Provider Cardiology [phone] Your Primary Care Physician (PCP) Primary Care Physician Office Phone Office Fax [facility] [phone] Follow-up Information Follow up With Details Comments Contact Info Karsten West MD On 1/31/2017 at [address] [facility] 
[phone] Maira Fletcher MD On 4/24/2017 11:20AM [address] [facility] 
[phone] Hazel Fuentes MD   [address]  [address] [place] [address] 
[phone] Your Appointments Tuesday January 31, 2017  9:40 AM EST  
ESTABLISHED PATIENT with Karsten West MD  
[facility] ([facility] SCHEDULING) [address]  [address] [place]  
[phone] Current Discharge Medication List  
  
CONTINUE these medications which have NOT CHANGED Dose & Instructions Dispensing Information Comments Morning Noon Evening Bedtime  
 amLODIPine 10 mg tablet Commonly known as:  [place] Your next dose is: Today, Tomorrow Other:  _________ Take  by mouth daily. Refills:  0  
     
   
   
   
  
 aspirin 81 mg tablet Your next dose is: Today, Tomorrow Other:  _________ Take  by mouth daily. Refills:  0  
     
   
   
   
  
 atorvastatin 20 mg tablet Commonly known as:  LIPITOR Your next dose is: Today, Tomorrow Other:  _________ Dose:  20 mg Take 1 Tab by mouth nightly. Quantity:  30 Tab Refills:  3 Simvastatin discontinued 9/20/16  
    
   
   
   
  
 carvedilol 6.25 mg tablet Commonly known as:  [place] Your next dose is: Today, Tomorrow Other:  _________ TAKE ONE TABLET BY MOUTH TWICE DAILY WITH MEALS Quantity:  60 Tab Refills:  2 CINNAMON 500 mg Cap Generic drug:  cinnamon bark Your next dose is: Today, Tomorrow Other:  _________ Dose:  500 mg Take 500 mg by mouth two (2) times a day. Refills:  0  
     
   
   
   
  
 clopidogrel 75 mg Tab Commonly known as:  PLAVIX Your next dose is: Today, Tomorrow Other:  _________ Dose:  75 mg Take 1 Tab by mouth daily for 360 days. Quantity:  30 Tab Refills:  1 FISH OIL 1,200-144-216 mg Cap Generic drug:  fish oil-dha-epa Your next dose is: Today, Tomorrow Other:  _________ Take  by mouth two (2) times a day. Refills:  0  
     
   
   
   
  
 glipiZIDE 10 mg tablet Commonly known as:  [place]  Your next dose is: Today, Tomorrow Other:  _________ Dose:  20 mg Take 20 mg by mouth two (2) times a day. Refills:  0  
     
   
   
   
  
 hydroCHLOROthiazide 25 mg tablet Commonly known as:  HYDRODIURIL  
   
 Your next dose is: Today, Tomorrow Other:  _________ Dose:  25 mg Take 25 mg by mouth daily. Refills:  0  
     
   
   
   
  
 metFORMIN 1,000 mg tablet Commonly known as:  GLUCOPHAGE Start taking on:  1/21/2017 Your next dose is: Today, Tomorrow Other:  _________ Dose:  1000 mg Take 1 Tab by mouth two (2) times daily (with meals). Quantity:  30 Tab Refills:  1 Restart 12/26 pioglitazone 45 mg tablet Commonly known as:  ACTOS Your next dose is: Today, Tomorrow Other:  _________ Dose:  45 mg Take 45 mg by mouth daily. Refills:  0 Where to Get Your Medications These medications were sent to [id number] "[employer], [address] Phone:  [phone]  
  metFORMIN 1,000 mg tablet Discharge Instructions DO NOT RESUME METFORMIN UNTIL TOMORROW afternoon/evening dose. May remove dressing later today. No soaking in tub. No driving or lifting greater than 5 lbs today. Discharge Orders None eReceipts Announcement We are excited to announce that we are making your provider's discharge notes available to you in eReceipts. You will see these notes when they are completed and signed by the physician that discharged you from your recent hospital stay. If you have any questions or concerns about any information you see in eReceipts, please call the Health Information Department where you were seen or reach out to your Primary Care Provider for more information about your plan of care. Introducing [facility] & HEALTH SERVICES! [facility] introduces eReceipts patient portal. Now you can access parts of your medical record, email your doctor's office, and request medication refills online. 1. In your internet browser, go to [email or web address] 2. Click on the First Time User? Click Here link in the Sign In box. You will see the New Member Sign Up page. 3. Enter your [employer] Access Code exactly as it appears below. You will not need to use this code after youve completed the sign-up process. If you do not sign up before the expiration date, you must request a new code. · [employer] Access Code: [email or web address] Expires: 3/8/2017  3:53 PM 
 
4. Enter the last four digits of your Social Security Number (xxxx) and Date of Birth (mm/dd/yyyy) as indicated and click Submit. You will be taken to the next sign-up page. 5. Create a [employer] ID. This will be your [employer] login ID and cannot be changed, so think of one that is secure and easy to remember. 6. Create a [employer] password. You can change your password at any time. 7. Enter your Password Reset Question and Answer. This can be used at a later time if you forget your password. 8. Enter your e-mail address. You will receive e-mail notification when new information is available in [address] 9. Click Sign Up. You can now view and download portions of your medical record. 10. Click the Download Summary menu link to download a portable copy of your medical information. If you have questions, please visit the Frequently Asked Questions section of the [employer] website. Remember, [employer] is NOT to be used for urgent needs. For medical emergencies, dial 911. Now available from your iPhone and Android! General Information Please provide this summary of care documentation to your next provider. Patient Signature:  ____________________________________________________________ Date:  ____________________________________________________________  
  
Jacqlyn Newcomer Provider Signature:  ____________________________________________________________ Date:  ____________________________________________________________

## 2017-01-20 VITALS
SYSTOLIC BLOOD PRESSURE: 182 MMHG | HEART RATE: 64 BPM | BODY MASS INDEX: 27.36 KG/M2 | HEIGHT: 69 IN | DIASTOLIC BLOOD PRESSURE: 63 MMHG | WEIGHT: 184.75 LBS | OXYGEN SATURATION: 100 % | RESPIRATION RATE: 18 BRPM | TEMPERATURE: 97.9 F

## 2017-01-20 PROBLEM — R33.9 URINARY RETENTION: Status: RESOLVED | Noted: 2017-01-19 | Resolved: 2017-01-20

## 2017-01-20 PROBLEM — R33.9 URINARY RETENTION: Status: ACTIVE | Noted: 2017-01-19

## 2017-01-20 PROBLEM — N18.9 CKD (CHRONIC KIDNEY DISEASE): Status: ACTIVE | Noted: 2017-01-20

## 2017-01-20 LAB
ALBUMIN SERPL BCP-MCNC: 2.7 G/DL (ref 3.5–5)
ALBUMIN/GLOB SERPL: 0.9 {RATIO} (ref 1.1–2.2)
ALP SERPL-CCNC: 50 U/L (ref 45–117)
ALT SERPL-CCNC: 15 U/L (ref 12–78)
ANION GAP BLD CALC-SCNC: 7 MMOL/L (ref 5–15)
AST SERPL W P-5'-P-CCNC: 13 U/L (ref 15–37)
BILIRUB SERPL-MCNC: 0.4 MG/DL (ref 0.2–1)
BNP SERPL-MCNC: 225 PG/ML (ref 0–100)
BUN SERPL-MCNC: 35 MG/DL (ref 6–20)
BUN/CREAT SERPL: 21 (ref 12–20)
CALCIUM SERPL-MCNC: 8.4 MG/DL (ref 8.5–10.1)
CHLORIDE SERPL-SCNC: 101 MMOL/L (ref 97–108)
CO2 SERPL-SCNC: 26 MMOL/L (ref 21–32)
CREAT SERPL-MCNC: 1.65 MG/DL (ref 0.7–1.3)
ERYTHROCYTE [DISTWIDTH] IN BLOOD BY AUTOMATED COUNT: 14.2 % (ref 11.5–14.5)
GLOBULIN SER CALC-MCNC: 3.1 G/DL (ref 2–4)
GLUCOSE BLD STRIP.AUTO-MCNC: 243 MG/DL (ref 65–100)
GLUCOSE SERPL-MCNC: 240 MG/DL (ref 65–100)
HCT VFR BLD AUTO: 30 % (ref 36.6–50.3)
HGB BLD-MCNC: 10 G/DL (ref 12.1–17)
MCH RBC QN AUTO: 28.8 PG (ref 26–34)
MCHC RBC AUTO-ENTMCNC: 33.3 G/DL (ref 30–36.5)
MCV RBC AUTO: 86.5 FL (ref 80–99)
PLATELET # BLD AUTO: 206 K/UL (ref 150–400)
POTASSIUM SERPL-SCNC: 3.5 MMOL/L (ref 3.5–5.1)
PROT SERPL-MCNC: 5.8 G/DL (ref 6.4–8.2)
RBC # BLD AUTO: 3.47 M/UL (ref 4.1–5.7)
SERVICE CMNT-IMP: ABNORMAL
SODIUM SERPL-SCNC: 134 MMOL/L (ref 136–145)
WBC # BLD AUTO: 5.8 K/UL (ref 4.1–11.1)

## 2017-01-20 PROCEDURE — 74011250637 HC RX REV CODE- 250/637: Performed by: SPECIALIST

## 2017-01-20 PROCEDURE — 82962 GLUCOSE BLOOD TEST: CPT

## 2017-01-20 PROCEDURE — 74011250637 HC RX REV CODE- 250/637: Performed by: NURSE PRACTITIONER

## 2017-01-20 PROCEDURE — 36415 COLL VENOUS BLD VENIPUNCTURE: CPT | Performed by: SPECIALIST

## 2017-01-20 PROCEDURE — 80053 COMPREHEN METABOLIC PANEL: CPT | Performed by: SPECIALIST

## 2017-01-20 PROCEDURE — 85027 COMPLETE CBC AUTOMATED: CPT | Performed by: SPECIALIST

## 2017-01-20 PROCEDURE — 74011636637 HC RX REV CODE- 636/637: Performed by: SPECIALIST

## 2017-01-20 PROCEDURE — A9270 NON-COVERED ITEM OR SERVICE: HCPCS | Performed by: SPECIALIST

## 2017-01-20 PROCEDURE — 99218 HC RM OBSERVATION: CPT

## 2017-01-20 PROCEDURE — 83880 ASSAY OF NATRIURETIC PEPTIDE: CPT | Performed by: SPECIALIST

## 2017-01-20 RX ORDER — POTASSIUM CHLORIDE 750 MG/1
20 TABLET, FILM COATED, EXTENDED RELEASE ORAL ONCE
Status: COMPLETED | OUTPATIENT
Start: 2017-01-20 | End: 2017-01-20

## 2017-01-20 RX ORDER — METFORMIN HYDROCHLORIDE 1000 MG/1
1000 TABLET ORAL 2 TIMES DAILY WITH MEALS
Qty: 30 TAB | Refills: 1 | Status: SHIPPED | OUTPATIENT
Start: 2017-01-21 | End: 2017-06-25

## 2017-01-20 RX ADMIN — ASPIRIN 81 MG 81 MG: 81 TABLET ORAL at 08:51

## 2017-01-20 RX ADMIN — POTASSIUM CHLORIDE 20 MEQ: 750 TABLET, FILM COATED, EXTENDED RELEASE ORAL at 08:51

## 2017-01-20 RX ADMIN — GLIPIZIDE 20 MG: 5 TABLET ORAL at 06:29

## 2017-01-20 RX ADMIN — AMLODIPINE BESYLATE 10 MG: 5 TABLET ORAL at 08:51

## 2017-01-20 RX ADMIN — HYDROCHLOROTHIAZIDE 25 MG: 25 TABLET ORAL at 08:51

## 2017-01-20 RX ADMIN — INSULIN LISPRO 3 UNITS: 100 INJECTION, SOLUTION INTRAVENOUS; SUBCUTANEOUS at 06:30

## 2017-01-20 RX ADMIN — CLOPIDOGREL BISULFATE 75 MG: 75 TABLET ORAL at 08:51

## 2017-01-20 RX ADMIN — Medication 10 ML: at 06:25

## 2017-01-20 RX ADMIN — CARVEDILOL 6.25 MG: 6.25 TABLET, FILM COATED ORAL at 08:51

## 2017-01-20 NOTE — DISCHARGE SUMMARY
Cardiology Discharge Summary     Patient ID:  Cedrick Odom  [id number]  4 y.o.  1935    Admit Date: 1/19/2017    Discharge Date: 1/20/2017    Admitting Physician: Lilia Winston MD     Discharge Physician: Gissel Orozco MD    Admission Diagnoses:   cc  cc  cc  pad, ckd  cc    Discharge Diagnoses:   Principal Problem:    PVD (peripheral vascular disease) ([facility] [address] [facility] ()    Active Problems:    CKD (chronic kidney disease) (1/20/2017)        Discharge Condition: Good    Cardiology Procedures this Admission:  1/19/2017:Peripheral Angiogram and orbital atherectomy, DCB, coated stent to right SFA, popliteal     Consults: None    Hospital Course: Cedrick Wisdom is a [de-identified] y.o. male. He has PMH diabetes, coronary and peripheral arterial disease, and chronic renal insufficiency. Years ago, he had intervention on both legs with marked improvement. More recently, he has had recurrence of angina and ankle-brachial index testing was abnormal  being 0.56 on the right and 0.62 on the left. He has heavily calcified arteries in his legs. His creatinine was 1.69 before the procedure. He had additional intravenous hydration before angiography done on 12/22/16. This showed 99% stenoses in the left popliteal and tibioperoneal trunk. Both were treated with orbital atherectomy and drug coated balloons. He has had some swelling in his left ankle since, but no more claudication. He was seen in office with Dr. Charis Orozco on 1/4/2017 and admitted to eating some new garlic butter that is probably loaded with salt, as well as soup that has salt in it. He agreed to cut out both of these. He had some claudication in the right leg, although, it was not as severe clinically as the left, but the ankle-brachial index was worse. He was noted to have a short segment total occlusion in his distal superficial femoral artery on the right leg with heavy calcification.   He was admitted on 1/19/2017 after undergoing the above aforementioned procedure. Findings from the procedure included the followin% occlusion right popliteal with heavy calcium, 70% mid SFA, 80-90% ostium right SFA with very heavy calcium. All lesions treated with orbital atherectomy, distal 2 lesions with drug-coated balloon, ostial SFA with 7 mm coated stent. Good result. Total contrast about 200 cc, so will admit for hydration overnight given CKD. The patient tolerated the procedure well but experienced urinary retention post procedure and required straight catheterization. He was thus admitted overnight. By day of discharge, the patient denied any numbness or tingling in RLE, did complain of some mild pain at Rt calf site. He had sensation to RLE. He was ambulatory and mildly hypertensive. His creatinine post procedure was 1.69 and on day of discharge was 1.65. He was instructed to folow up with his primary care physician postoperatively. He will follow up with Dr. Sanjuana Howell on 17. Visit Vitals    /61 (BP 1 Location: Right arm, BP Patient Position: At rest)    Pulse 63    Temp 98.1 °F (36.7 °C)    Resp 18    Ht 5' 9\" (1.753 m)    Wt 83.8 kg (184 lb 11.9 oz)    SpO2 99%    BMI 27.28 kg/m2       Physical Exam  Abdomen: soft, non-tender. Bowel sounds present  Extremities:Rt groin dressing c/d/i- no hematoma noted. + sensation RLE. Rt foot warm to touch. Faint doppler signal Rt PT but intermittent and not biphasic. No DP pulse. Heart: regular rate and rhythm, no murmurs, clicks, rubs or gallops  Lungs: clear to auscultation bilaterally, no use of accessory muscles noted.    Neck: supple,  Neurologic: Grossly normal  Pulses: 2+ and symmetrical    Labs:   Recent Labs      17   0558  17   0945   WBC  5.8  6.0   HGB  10.0*  11.5*   HCT  30.0*  34.7*   PLT  206  247     Recent Labs      17   0558  17   0945   NA  134*  135*   K  3.5  4.5   CL  101  100   CO2  26  28   GLU  240*  250*   BUN  35*  42*   CREA  1.65*  1.69* CA  8.4*  8.7   ALB  2.7*   --    SGOT  13*   --    ALT  15   --        No results for input(s): TROIQ, CPK, CKMB in the last 72 hours. EKG: telemetry - NSR    Other Diagnostic Tests:     Disposition: home    Patient Instructions:   Current Discharge Medication List      CONTINUE these medications which have CHANGED    Details   metFORMIN (GLUCOPHAGE) 1,000 mg tablet Take 1 Tab by mouth two (2) times daily (with meals). Qty: 30 Tab, Refills: 1    Comments: Restart 12/26         CONTINUE these medications which have NOT CHANGED    Details   clopidogrel (PLAVIX) 75 mg tab Take 1 Tab by mouth daily for 360 days. Qty: 30 Tab, Refills: 1      carvedilol (COREG) 6.25 mg tablet TAKE ONE TABLET BY MOUTH TWICE DAILY WITH MEALS  Qty: 60 Tab, Refills: 2      pioglitazone (ACTOS) 45 mg tablet Take 45 mg by mouth daily. amLODIPine (NORVASC) 10 mg tablet Take  by mouth daily. atorvastatin (LIPITOR) 20 mg tablet Take 1 Tab by mouth nightly. Qty: 30 Tab, Refills: 3    Comments: Simvastatin discontinued 9/20/16      cinnamon bark (CINNAMON) 500 mg cap Take 500 mg by mouth two (2) times a day. hydrochlorothiazide (HYDRODIURIL) 25 mg tablet Take 25 mg by mouth daily. Associated Diagnoses: Hypertension      fish oil-dha-epa (FISH OIL) 1,200-144-216 mg Cap Take  by mouth two (2) times a day. Associated Diagnoses: Coronary atherosclerosis of native coronary artery      aspirin 81 mg tablet Take  by mouth daily. glipiZIDE (GLUCOTROL) 10 mg tablet Take 20 mg by mouth two (2) times a day. Reference discharge instructions provided by nursing for diet and activity. Cardiology Attending:Patient seen and examined. I agree with NP assessment and plans. Leg pain improved.     Adelina Mustafa MD 1/20/2017 10:23 AM       Follow-up with   Future Appointments  Date Time Provider Muna Arredondoi   1/31/2017 9:40 AM Adelina Mustafa  [address]   4/24/2017 11:20 AM Elvie Goldman MD [facility] SCH[facility]     Follow up with your primary care after discharge. Signed:  Kimani Bourne.  FÉLIX Johnson

## 2017-01-20 NOTE — DISCHARGE INSTRUCTIONS
DO NOT RESUME METFORMIN UNTIL TOMORROW afternoon/evening dose. May remove dressing later today. No soaking in tub. No driving or lifting greater than 5 lbs today.

## 2017-01-20 NOTE — PROGRESS NOTES
1930: Bedside and Verbal shift change report given to Price RN (oncoming nurse) by Radhika Ribeiro RN (offgoing nurse). Report included the following information SBAR, Kardex, Intake/Output, MAR and Recent Results. 0000: Bedside and Verbal shift change report given to Berto Arriaga RN (oncoming nurse) by Carole Akins RN (offgoing nurse). Report included the following information SBAR, Kardex, Intake/Output, MAR and Recent Results.

## 2017-01-20 NOTE — PROGRESS NOTES
0730: Bedside shift change report given to Rush Shipman (oncoming nurse) by Lennox Aris (offgoing nurse). Report included the following information SBAR, Kardex, MAR and Recent Results. 1025: I have reviewed discharge instructions with the patient and spouse. The patient and spouse verbalized understanding. Discharge medications reviewed with patient and spouse and appropriate educational materials and side effects teaching were provided.

## 2017-01-26 RX ORDER — CARVEDILOL 6.25 MG/1
TABLET ORAL
Qty: 60 TAB | Refills: 3 | Status: SHIPPED | OUTPATIENT
Start: 2017-01-26 | End: 2017-02-20 | Stop reason: SDUPTHER

## 2017-01-26 NOTE — TELEPHONE ENCOUNTER
Patient has 2 days of this medication left.      Pharmacy verified    30 day supply with refills     Thank you, Gifty Ware

## 2017-01-31 ENCOUNTER — OFFICE VISIT (OUTPATIENT)
Dept: CARDIOLOGY CLINIC | Age: 82
End: 2017-01-31

## 2017-01-31 VITALS
HEART RATE: 63 BPM | BODY MASS INDEX: 28.08 KG/M2 | SYSTOLIC BLOOD PRESSURE: 168 MMHG | DIASTOLIC BLOOD PRESSURE: 70 MMHG | HEIGHT: 69 IN | OXYGEN SATURATION: 97 % | WEIGHT: 189.6 LBS | RESPIRATION RATE: 16 BRPM

## 2017-01-31 DIAGNOSIS — I25.10 CORONARY ARTERY DISEASE INVOLVING NATIVE CORONARY ARTERY OF NATIVE HEART WITHOUT ANGINA PECTORIS: ICD-10-CM

## 2017-01-31 DIAGNOSIS — N18.3 CKD (CHRONIC KIDNEY DISEASE), STAGE 3 (MODERATE): ICD-10-CM

## 2017-01-31 DIAGNOSIS — E11.51 TYPE 2 DIABETES MELLITUS WITH DIABETIC PERIPHERAL ANGIOPATHY WITHOUT GANGRENE, WITHOUT LONG-TERM CURRENT USE OF INSULIN (HCC): ICD-10-CM

## 2017-01-31 DIAGNOSIS — I73.9 PVD (PERIPHERAL VASCULAR DISEASE) (HCC): Primary | ICD-10-CM

## 2017-01-31 RX ORDER — CLOPIDOGREL BISULFATE 75 MG/1
75 TABLET ORAL DAILY
Qty: 30 TAB | Refills: 3 | Status: SHIPPED | OUTPATIENT
Start: 2017-01-31 | End: 2017-06-15 | Stop reason: ALTCHOICE

## 2017-01-31 RX ORDER — VALSARTAN 320 MG/1
320 TABLET ORAL DAILY
Status: ON HOLD | COMMUNITY
End: 2017-03-18

## 2017-01-31 NOTE — PROGRESS NOTES
HISTORY OF PRESENT ILLNESS  Yelena Matta is a [de-identified] y.o. male. He has coronary and peripheral arterial disease, diabetes, hypertension and chronic kidney disease. He had interventional procedure several years ago on both legs, which were heavily calcified. He did well for at least three years, but had recurrence, and over the past several months has undergone repeat intervention with orbital atherectomy of both legs, followed by drug coated balloons and at least one drug eluting stent. Despite a creatinine of 1.69 his renal function has remained unchanged with hydration. He can now walk at least a quarter mile without any claudication at all, which is a marked improvement. He has been having swelling in his feet and his blood pressure has been elevated. He has been on Amlodipine 10 mg a day and only takes Hydrochlorothiazide despite the elevated creatinine. HPI  Patient Active Problem List   Diagnosis Code    Bradycardia R00.1    CAD (coronary artery disease) I25.10    Hypertension, essential, benign I10    Carotid arterial disease (HCC) I77.9    Hypercholesteremia E78.00    PVD (peripheral vascular disease) ([facility] [address] I73.9    PVC's (premature ventricular contractions) I49.3    Type 2 diabetes mellitus with diabetic peripheral angiopathy without gangrene ([facility]) E11.51    CKD (chronic kidney disease) N18.9     Current Outpatient Prescriptions   Medication Sig Dispense Refill    valsartan (DIOVAN) 320 mg tablet Take  by mouth daily.  clopidogrel (PLAVIX) 75 mg tab Take 1 Tab by mouth daily for 360 days. 30 Tab 3    carvedilol (COREG) 6.25 mg tablet TAKE ONE TABLET BY MOUTH TWICE DAILY WITH MEALS 60 Tab 3    metFORMIN (GLUCOPHAGE) 1,000 mg tablet Take 1 Tab by mouth two (2) times daily (with meals). 30 Tab 1    pioglitazone (ACTOS) 45 mg tablet Take 45 mg by mouth daily.  amLODIPine (NORVASC) 10 mg tablet Take  by mouth daily.       atorvastatin (LIPITOR) 20 mg tablet Take 1 Tab by mouth nightly. 30 Tab 3    cinnamon bark (CINNAMON) 500 mg cap Take 500 mg by mouth two (2) times a day.  hydrochlorothiazide (HYDRODIURIL) 25 mg tablet Take 25 mg by mouth daily.  fish oil-dha-epa (FISH OIL) 1,200-144-216 mg Cap Take  by mouth two (2) times a day.  aspirin 81 mg tablet Take  by mouth daily.  glipiZIDE (GLUCOTROL) 10 mg tablet Take 20 mg by mouth two (2) times a day. Past Medical History   Diagnosis Date    CAD (coronary artery disease)     [facility]ot[facility] arterial [facility] ([facility] [address] [facility]     Diabetes mellitus, type 2 ([facility]     Hypercholesteremia     Hypertension     PVC's (premature ventricular contractions) 5/26/2014    [facility]      Past Surgical History   Procedure Laterality Date    Hx heart catheterization      Hx coronary artery bypass graft         Review of Systems   Cardiovascular: Positive for leg swelling. All other systems reviewed and are negative. Visit Vitals    /70 (BP 1 Location: Left arm, BP Patient Position: Sitting)    Pulse 63    Resp 16    Ht 5' 9\" (1.753 m)    Wt 189 lb 9.6 oz (86 kg)    SpO2 97%    BMI 28 kg/m2       Physical Exam   Constitutional: He is oriented to person, place, and time. He appears well-nourished. HENT:   Head: Atraumatic. Eyes: EOM are normal.   Neck: Neck supple. Cardiovascular: Normal rate, regular rhythm and normal heart sounds. Exam reveals no gallop and no friction rub. No murmur heard. Pulmonary/Chest: Breath sounds normal. He has no wheezes. Abdominal: Bowel sounds are normal. There is no tenderness. Musculoskeletal: He exhibits edema. Neurological: He is oriented to person, place, and time. Skin: Skin is dry. Nursing note and vitals reviewed. ASSESSMENT and PLAN  His feet are warm to exam and his claudication is gone.   I have told him that he can continue the Plavix for a total of three months after last procedure for his drug eluting stents and then stop it, taking aspirin only. I will see him in the future on an as needed basis. I have encouraged him to follow up with his primary care physician or Dr. Karsten Reinoso or both regarding his edema and hypertension. I suspect that the edema is mostly due to the Amlodipine, but also due to his chronic kidney disease. He may benefit from a loop diuretic in place of the thiazide or from reducing the dose of the Amlodipine.

## 2017-01-31 NOTE — MR AVS SNAPSHOT
Visit Information Date & Time Provider Department Dept. Phone Encounter #  
 1/31/2017  9:40 AM Regan Mays MD [facility] Schmidt [phone] [id number] Your Appointments 4/24/2017 11:20 AM  
ESTABLISHED PATIENT with Teagan Richardson MD  
[facility] ([facility] SCHEDULING) Appt Note: 4mo fu  
 [address] [place] [address]  
[address] [address] Upcoming Health Maintenance Date Due  
 FOOT EXAM Q1 8/28/1945 EYE EXAM RETINAL OR DILATED Q1 8/28/1946 DTaP/Tdap/Td series (1 - Tdap) 8/28/1957 ZOSTER VACCINE AGE 60> 8/28/1996 GLAUCOMA SCREENING Q2Y 8/28/2001 Pneumococcal 65+ Low/Medium Risk (1 of 2 - PCV13) 8/28/2001 MEDICARE YEARLY EXAM 8/28/2001 HEMOGLOBIN A1C Q6M 4/25/2011 MICROALBUMIN Q1 8/3/2011 LIPID PANEL Q1 10/25/2011 Allergies as of 1/31/2017  Review Complete On: 1/31/2017 By: Conchita Sarabia RN Severity Noted Reaction Type Reactions Lisinopril  09/20/2016    Cough Current Immunizations  Reviewed on 1/20/2017 Name Date Influenza Vaccine 11/20/2016 Not reviewed this visit Vitals BP Pulse Resp Height(growth percentile) Weight(growth percentile) SpO2  
 168/70 (BP 1 Location: Left arm, BP Patient Position: Sitting) 63 16 5' 9\" (1.753 m) 189 lb 9.6 oz (86 kg) 97% BMI Smoking Status 28 kg/m2 Former Smoker BMI and BSA Data Body Mass Index Body Surface Area  
 28 kg/m 2 2.05 m 2 Preferred Pharmacy Pharmacy Name Phone [facility] [facility], [address] [place] [address],[facility] Floor [phone] Your Updated Medication List  
  
   
This list is accurate as of: 1/31/17 10:17 AM.  Always use your most recent med list. amLODIPine 10 mg tablet Commonly known as:  Fareed Armendariz Take  by mouth daily. aspirin 81 mg tablet Take  by mouth daily. atorvastatin 20 mg tablet Commonly known as:  LIPITOR Take 1 Tab by mouth nightly. carvedilol 6.25 mg tablet Commonly known as:  COREG  
TAKE ONE TABLET BY MOUTH TWICE DAILY WITH MEALS  
  
 CINNAMON 500 mg Cap Generic drug:  cinnamon bark Take 500 mg by mouth two (2) times a day. clopidogrel 75 mg Tab Commonly known as:  PLAVIX Take 1 Tab by mouth daily for 360 days. DIOVAN 320 mg tablet Generic drug:  valsartan Take  by mouth daily. FISH OIL 1,200-144-216 mg Cap Generic drug:  fish oil-dha-epa Take  by mouth two (2) times a day. glipiZIDE 10 mg tablet Commonly known as:  Lauren Candi Take 20 mg by mouth two (2) times a day. hydroCHLOROthiazide 25 mg tablet Commonly known as:  HYDRODIURIL Take 25 mg by mouth daily. metFORMIN 1,000 mg tablet Commonly known as:  GLUCOPHAGE Take 1 Tab by mouth two (2) times daily (with meals). pioglitazone 45 mg tablet Commonly known as:  ACTOS Take 45 mg by mouth daily. Introducing [facility] & HEALTH SERVICES! [facility] introduces Active [employer] patient portal. Now you can access parts of your medical record, email your doctor's office, and request medication refills online. 1. In your internet browser, go to [email or web address] 2. Click on the First Time User? Click Here link in the Sign In box. You will see the New Member Sign Up page. 3. Enter your Active [employer] Access Code exactly as it appears below. You will not need to use this code after youve completed the sign-up process. If you do not sign up before the expiration date, you must request a new code. · Active [employer] Access Code: [email or web address] Expires: 3/8/2017  3:53 PM 
 
4. Enter the last four digits of your Social Security Number (xxxx) and Date of Birth (mm/dd/yyyy) as indicated and click Submit. You will be taken to the next sign-up page. 5. Create a [employer] ID. This will be your [employer] login ID and cannot be changed, so think of one that is secure and easy to remember. 6. Create a [employer] password. You can change your password at any time. 7. Enter your Password Reset Question and Answer. This can be used at a later time if you forget your password. 8. Enter your e-mail address. You will receive e-mail notification when new information is available in [address] 9. Click Sign Up. You can now view and download portions of your medical record. 10. Click the Download Summary menu link to download a portable copy of your medical information. If you have questions, please visit the Frequently Asked Questions section of the [employer] website. Remember, [employer] is NOT to be used for urgent needs. For medical emergencies, dial 911. Now available from your iPhone and Android! Please provide this summary of care documentation to your next provider. Your primary care clinician is listed as Bette Russo. If you have any questions after today's visit, please call [phone].

## 2017-02-06 NOTE — PROGRESS NOTES
Thanks  Future Appointments  Date Time Provider Department Center   4/24/2017 11:20 AM Leno Officer,  [address]

## 2017-02-20 ENCOUNTER — TELEPHONE (OUTPATIENT)
Dept: CARDIOLOGY CLINIC | Age: 82
End: 2017-02-20

## 2017-02-20 RX ORDER — CARVEDILOL 6.25 MG/1
12.5 TABLET ORAL 2 TIMES DAILY WITH MEALS
Qty: 120 TAB | Refills: 0
Start: 2017-02-20 | End: 2017-03-16

## 2017-02-20 NOTE — TELEPHONE ENCOUNTER
Patient identified times 2. He reported that he had an operation on his leg in January, at that time his BP's were high. Went to Dr. Yamileth Carrasco 2 weeks ago and BP was has remained high. Dr. Yamileth Carrasco increased his Coreg to 12.5 mg twice daily. Despite the change per patient he is still running to high with a systolic range from 146-819 even after medication. Today his suystolic before medications was 215 and on recheck 93/89 with a pulse of 65. He states that he feels fine but knows the numbers are to high. He has follow up with PCP on 3/9/17. encouragaed him to go on and take his medications since he has not done so and continue to keep a blood pressure diary. Best contact is mobile number but permission given to leave a voicemail on home line.   Verbal order per Dr. Tammy Reyes

## 2017-02-21 NOTE — TELEPHONE ENCOUNTER
Does not make total sense to have such variable bp 90 to 220, so would not treat until we get more numbers  Have him keep diary and see PCP  Get labs , BMP and Hgb done too, this week

## 2017-03-01 RX ORDER — CLONIDINE HYDROCHLORIDE 0.1 MG/1
0.1 TABLET ORAL 2 TIMES DAILY
Qty: 60 TAB | Refills: 2 | Status: SHIPPED | OUTPATIENT
Start: 2017-03-01 | End: 2017-06-01 | Stop reason: SDUPTHER

## 2017-03-01 NOTE — TELEPHONE ENCOUNTER
Current Outpatient Prescriptions   Medication Sig    carvedilol (COREG) 6.25 mg tablet Take 2 Tabs by mouth two (2) times daily (with meals).  valsartan (DIOVAN) 320 mg tablet Take  by mouth daily.  clopidogrel (PLAVIX) 75 mg tab Take 1 Tab by mouth daily for 360 days.  metFORMIN (GLUCOPHAGE) 1,000 mg tablet Take 1 Tab by mouth two (2) times daily (with meals).  pioglitazone (ACTOS) 45 mg tablet Take 45 mg by mouth daily.  amLODIPine (NORVASC) 10 mg tablet Take  by mouth daily.  atorvastatin (LIPITOR) 20 mg tablet Take 1 Tab by mouth nightly.  cinnamon bark (CINNAMON) 500 mg cap Take 500 mg by mouth two (2) times a day.  hydrochlorothiazide (HYDRODIURIL) 25 mg tablet Take 25 mg by mouth daily.  fish oil-dha-epa (FISH OIL) 1,200-144-216 mg Cap Take  by mouth two (2) times a day.  aspirin 81 mg tablet Take  by mouth daily.  glipiZIDE (GLUCOTROL) 10 mg tablet Take 20 mg by mouth two (2) times a day. No current facility-administered medications for this visit.       Already on 4 meds   Lab Results   Component Value Date/Time    Creatinine 1.65 01/20/2017 05:58 AM    suspect related to renal dysfx  Start clonidine 0.1 mg bid and warn of dry mouth  Future Appointments  Date Time Provider Muna Delgado   4/24/2017 11:20 AM Yuriy Fajardo  [address]    see NP Chaz Law or PCP to fu BP in 3 weeks

## 2017-03-01 NOTE — TELEPHONE ENCOUNTER
Patient identified times 2. Informed of recommendations as listed. Will send RX to verified pharmacy on file. Understanding verbalized.   Verbal order per Dr. Dorian Jolley

## 2017-03-16 ENCOUNTER — APPOINTMENT (OUTPATIENT)
Dept: GENERAL RADIOLOGY | Age: 82
DRG: 683 | End: 2017-03-16
Attending: FAMILY MEDICINE
Payer: MEDICARE

## 2017-03-16 ENCOUNTER — APPOINTMENT (OUTPATIENT)
Dept: GENERAL RADIOLOGY | Age: 82
DRG: 683 | End: 2017-03-16
Attending: EMERGENCY MEDICINE
Payer: MEDICARE

## 2017-03-16 ENCOUNTER — HOSPITAL ENCOUNTER (INPATIENT)
Age: 82
LOS: 1 days | Discharge: HOME | DRG: 683 | End: 2017-03-18
Attending: EMERGENCY MEDICINE | Admitting: FAMILY MEDICINE
Payer: MEDICARE

## 2017-03-16 DIAGNOSIS — R65.10 SIRS (SYSTEMIC INFLAMMATORY RESPONSE SYNDROME) (HCC): ICD-10-CM

## 2017-03-16 DIAGNOSIS — R50.9 ACUTE FEBRILE ILLNESS: Primary | ICD-10-CM

## 2017-03-16 PROBLEM — E87.1 HYPONATREMIA: Status: ACTIVE | Noted: 2017-03-16

## 2017-03-16 PROBLEM — N17.9 ACUTE RENAL FAILURE (ARF) (HCC): Status: ACTIVE | Noted: 2017-03-16

## 2017-03-16 LAB
ALBUMIN SERPL BCP-MCNC: 3.1 G/DL (ref 3.5–5)
ALBUMIN/GLOB SERPL: 0.7 {RATIO} (ref 1.1–2.2)
ALP SERPL-CCNC: 58 U/L (ref 45–117)
ALT SERPL-CCNC: 28 U/L (ref 12–78)
ANION GAP BLD CALC-SCNC: 10 MMOL/L (ref 5–15)
APPEARANCE UR: CLEAR
APTT PPP: 27.5 SEC (ref 22.1–32.5)
AST SERPL W P-5'-P-CCNC: 25 U/L (ref 15–37)
ATRIAL RATE: 79 BPM
BACTERIA URNS QL MICRO: NEGATIVE /HPF
BASOPHILS # BLD AUTO: 0 K/UL (ref 0–0.1)
BASOPHILS # BLD: 0 % (ref 0–1)
BILIRUB SERPL-MCNC: 0.4 MG/DL (ref 0.2–1)
BILIRUB UR QL: NEGATIVE
BUN SERPL-MCNC: 38 MG/DL (ref 6–20)
BUN/CREAT SERPL: 14 (ref 12–20)
CALCIUM SERPL-MCNC: 8.7 MG/DL (ref 8.5–10.1)
CALCULATED P AXIS, ECG09: 50 DEGREES
CALCULATED R AXIS, ECG10: -51 DEGREES
CALCULATED T AXIS, ECG11: 20 DEGREES
CHLORIDE SERPL-SCNC: 94 MMOL/L (ref 97–108)
CO2 SERPL-SCNC: 24 MMOL/L (ref 21–32)
COLOR UR: ABNORMAL
CREAT SERPL-MCNC: 2.68 MG/DL (ref 0.7–1.3)
DIAGNOSIS, 93000: NORMAL
DIFFERENTIAL METHOD BLD: ABNORMAL
EOSINOPHIL # BLD: 0 K/UL (ref 0–0.4)
EOSINOPHIL NFR BLD: 0 % (ref 0–7)
EPITH CASTS URNS QL MICRO: ABNORMAL /LPF
ERYTHROCYTE [DISTWIDTH] IN BLOOD BY AUTOMATED COUNT: 14.5 % (ref 11.5–14.5)
FLUAV AG NPH QL IA: NEGATIVE
FLUBV AG NOSE QL IA: NEGATIVE
GLOBULIN SER CALC-MCNC: 4.2 G/DL (ref 2–4)
GLUCOSE BLD STRIP.AUTO-MCNC: 118 MG/DL (ref 65–100)
GLUCOSE BLD STRIP.AUTO-MCNC: 160 MG/DL (ref 65–100)
GLUCOSE BLD STRIP.AUTO-MCNC: 81 MG/DL (ref 65–100)
GLUCOSE SERPL-MCNC: 237 MG/DL (ref 65–100)
GLUCOSE UR STRIP.AUTO-MCNC: 500 MG/DL
HCT VFR BLD AUTO: 31.1 % (ref 36.6–50.3)
HGB BLD-MCNC: 10.4 G/DL (ref 12.1–17)
HGB UR QL STRIP: ABNORMAL
HYALINE CASTS URNS QL MICRO: ABNORMAL /LPF (ref 0–5)
INR PPP: 1.1 (ref 0.9–1.1)
KETONES UR QL STRIP.AUTO: NEGATIVE MG/DL
LACTATE SERPL-SCNC: 1.8 MMOL/L (ref 0.4–2)
LEUKOCYTE ESTERASE UR QL STRIP.AUTO: NEGATIVE
LYMPHOCYTES # BLD AUTO: 5 % (ref 12–49)
LYMPHOCYTES # BLD: 0.5 K/UL (ref 0.8–3.5)
MCH RBC QN AUTO: 28.3 PG (ref 26–34)
MCHC RBC AUTO-ENTMCNC: 33.4 G/DL (ref 30–36.5)
MCV RBC AUTO: 84.7 FL (ref 80–99)
METAMYELOCYTES NFR BLD MANUAL: 1 %
MONOCYTES # BLD: 0.6 K/UL (ref 0–1)
MONOCYTES NFR BLD AUTO: 7 % (ref 5–13)
NEUTS BAND NFR BLD MANUAL: 5 % (ref 0–6)
NEUTS SEG # BLD: 7.9 K/UL (ref 1.8–8)
NEUTS SEG NFR BLD AUTO: 82 % (ref 32–75)
NITRITE UR QL STRIP.AUTO: NEGATIVE
P-R INTERVAL, ECG05: 248 MS
PH UR STRIP: 5.5 [PH] (ref 5–8)
PLATELET # BLD AUTO: 251 K/UL (ref 150–400)
PLATELET COMMENTS,PCOM: ABNORMAL
POTASSIUM SERPL-SCNC: 3.8 MMOL/L (ref 3.5–5.1)
PROT SERPL-MCNC: 7.3 G/DL (ref 6.4–8.2)
PROT UR STRIP-MCNC: >300 MG/DL
PROTHROMBIN TIME: 11.1 SEC (ref 9–11.1)
Q-T INTERVAL, ECG07: 400 MS
QRS DURATION, ECG06: 90 MS
QTC CALCULATION (BEZET), ECG08: 458 MS
RBC # BLD AUTO: 3.67 M/UL (ref 4.1–5.7)
RBC #/AREA URNS HPF: ABNORMAL /HPF (ref 0–5)
RBC MORPH BLD: ABNORMAL
RBC MORPH BLD: ABNORMAL
SERVICE CMNT-IMP: ABNORMAL
SERVICE CMNT-IMP: ABNORMAL
SERVICE CMNT-IMP: NORMAL
SODIUM SERPL-SCNC: 128 MMOL/L (ref 136–145)
SP GR UR REFRACTOMETRY: 1.03 (ref 1–1.03)
THERAPEUTIC RANGE,PTTT: NORMAL SECS (ref 58–77)
TROPONIN I SERPL-MCNC: 0.05 NG/ML
UA: UC IF INDICATED,UAUC: ABNORMAL
UROBILINOGEN UR QL STRIP.AUTO: 0.2 EU/DL (ref 0.2–1)
VENTRICULAR RATE, ECG03: 79 BPM
WBC # BLD AUTO: 9.1 K/UL (ref 4.1–11.1)
WBC URNS QL MICRO: ABNORMAL /HPF (ref 0–4)
YEAST BUDDING URNS QL: PRESENT
YEAST URNS QL MICRO: PRESENT

## 2017-03-16 PROCEDURE — 74011250637 HC RX REV CODE- 250/637: Performed by: FAMILY MEDICINE

## 2017-03-16 PROCEDURE — 94761 N-INVAS EAR/PLS OXIMETRY MLT: CPT

## 2017-03-16 PROCEDURE — 36415 COLL VENOUS BLD VENIPUNCTURE: CPT | Performed by: EMERGENCY MEDICINE

## 2017-03-16 PROCEDURE — 97530 THERAPEUTIC ACTIVITIES: CPT

## 2017-03-16 PROCEDURE — 99285 EMERGENCY DEPT VISIT HI MDM: CPT

## 2017-03-16 PROCEDURE — G8979 MOBILITY GOAL STATUS: HCPCS

## 2017-03-16 PROCEDURE — 87040 BLOOD CULTURE FOR BACTERIA: CPT | Performed by: EMERGENCY MEDICINE

## 2017-03-16 PROCEDURE — 96368 THER/DIAG CONCURRENT INF: CPT

## 2017-03-16 PROCEDURE — 71020 XR CHEST PA LAT: CPT

## 2017-03-16 PROCEDURE — 96366 THER/PROPH/DIAG IV INF ADDON: CPT

## 2017-03-16 PROCEDURE — 97161 PT EVAL LOW COMPLEX 20 MIN: CPT

## 2017-03-16 PROCEDURE — 97602 WOUND(S) CARE NON-SELECTIVE: CPT

## 2017-03-16 PROCEDURE — 83605 ASSAY OF LACTIC ACID: CPT | Performed by: EMERGENCY MEDICINE

## 2017-03-16 PROCEDURE — 85730 THROMBOPLASTIN TIME PARTIAL: CPT | Performed by: EMERGENCY MEDICINE

## 2017-03-16 PROCEDURE — 96365 THER/PROPH/DIAG IV INF INIT: CPT

## 2017-03-16 PROCEDURE — 81001 URINALYSIS AUTO W/SCOPE: CPT | Performed by: EMERGENCY MEDICINE

## 2017-03-16 PROCEDURE — 87804 INFLUENZA ASSAY W/OPTIC: CPT | Performed by: EMERGENCY MEDICINE

## 2017-03-16 PROCEDURE — G8988 SELF CARE GOAL STATUS: HCPCS

## 2017-03-16 PROCEDURE — 74011250637 HC RX REV CODE- 250/637: Performed by: EMERGENCY MEDICINE

## 2017-03-16 PROCEDURE — 82962 GLUCOSE BLOOD TEST: CPT

## 2017-03-16 PROCEDURE — 99218 HC RM OBSERVATION: CPT

## 2017-03-16 PROCEDURE — 97165 OT EVAL LOW COMPLEX 30 MIN: CPT

## 2017-03-16 PROCEDURE — G8978 MOBILITY CURRENT STATUS: HCPCS

## 2017-03-16 PROCEDURE — 85610 PROTHROMBIN TIME: CPT | Performed by: EMERGENCY MEDICINE

## 2017-03-16 PROCEDURE — 97116 GAIT TRAINING THERAPY: CPT

## 2017-03-16 PROCEDURE — 85025 COMPLETE CBC W/AUTO DIFF WBC: CPT | Performed by: EMERGENCY MEDICINE

## 2017-03-16 PROCEDURE — G8987 SELF CARE CURRENT STATUS: HCPCS

## 2017-03-16 PROCEDURE — 74011000258 HC RX REV CODE- 258: Performed by: EMERGENCY MEDICINE

## 2017-03-16 PROCEDURE — 74000 XR ABD (KUB): CPT

## 2017-03-16 PROCEDURE — 80053 COMPREHEN METABOLIC PANEL: CPT | Performed by: EMERGENCY MEDICINE

## 2017-03-16 PROCEDURE — 84484 ASSAY OF TROPONIN QUANT: CPT | Performed by: EMERGENCY MEDICINE

## 2017-03-16 PROCEDURE — 93005 ELECTROCARDIOGRAM TRACING: CPT

## 2017-03-16 PROCEDURE — 87086 URINE CULTURE/COLONY COUNT: CPT | Performed by: EMERGENCY MEDICINE

## 2017-03-16 PROCEDURE — 74011250636 HC RX REV CODE- 250/636: Performed by: EMERGENCY MEDICINE

## 2017-03-16 PROCEDURE — 74011250636 HC RX REV CODE- 250/636: Performed by: FAMILY MEDICINE

## 2017-03-16 RX ORDER — ACETAMINOPHEN 500 MG
1000 TABLET ORAL
Status: COMPLETED | OUTPATIENT
Start: 2017-03-16 | End: 2017-03-16

## 2017-03-16 RX ORDER — HYDROMORPHONE HYDROCHLORIDE 2 MG/ML
2 INJECTION, SOLUTION INTRAMUSCULAR; INTRAVENOUS; SUBCUTANEOUS
Status: DISCONTINUED | OUTPATIENT
Start: 2017-03-16 | End: 2017-03-18 | Stop reason: HOSPADM

## 2017-03-16 RX ORDER — CLOPIDOGREL BISULFATE 75 MG/1
75 TABLET ORAL DAILY
Status: DISCONTINUED | OUTPATIENT
Start: 2017-03-17 | End: 2017-03-18 | Stop reason: HOSPADM

## 2017-03-16 RX ORDER — CARVEDILOL 12.5 MG/1
12.5 TABLET ORAL 2 TIMES DAILY
Status: DISCONTINUED | OUTPATIENT
Start: 2017-03-16 | End: 2017-03-17

## 2017-03-16 RX ORDER — ASPIRIN 81 MG/1
81 TABLET ORAL DAILY
Status: DISCONTINUED | OUTPATIENT
Start: 2017-03-17 | End: 2017-03-18 | Stop reason: HOSPADM

## 2017-03-16 RX ORDER — AMLODIPINE BESYLATE 5 MG/1
10 TABLET ORAL DAILY
Status: DISCONTINUED | OUTPATIENT
Start: 2017-03-17 | End: 2017-03-18 | Stop reason: HOSPADM

## 2017-03-16 RX ORDER — SODIUM CHLORIDE 9 MG/ML
2500 INJECTION, SOLUTION INTRAVENOUS ONCE
Status: COMPLETED | OUTPATIENT
Start: 2017-03-16 | End: 2017-03-16

## 2017-03-16 RX ORDER — NALOXONE HYDROCHLORIDE 0.4 MG/ML
0.4 INJECTION, SOLUTION INTRAMUSCULAR; INTRAVENOUS; SUBCUTANEOUS AS NEEDED
Status: DISCONTINUED | OUTPATIENT
Start: 2017-03-16 | End: 2017-03-18 | Stop reason: HOSPADM

## 2017-03-16 RX ORDER — SULFAMETHOXAZOLE AND TRIMETHOPRIM 800; 160 MG/1; MG/1
1 TABLET ORAL 2 TIMES DAILY
COMMUNITY
End: 2017-03-18

## 2017-03-16 RX ORDER — INSULIN LISPRO 100 [IU]/ML
INJECTION, SOLUTION INTRAVENOUS; SUBCUTANEOUS
Status: DISCONTINUED | OUTPATIENT
Start: 2017-03-16 | End: 2017-03-18 | Stop reason: HOSPADM

## 2017-03-16 RX ORDER — SIMVASTATIN 20 MG/1
20 TABLET, FILM COATED ORAL
COMMUNITY
End: 2019-09-20

## 2017-03-16 RX ORDER — DEXTROSE 50 % IN WATER (D50W) INTRAVENOUS SYRINGE
12.5-25 AS NEEDED
Status: DISCONTINUED | OUTPATIENT
Start: 2017-03-16 | End: 2017-03-18 | Stop reason: HOSPADM

## 2017-03-16 RX ORDER — ASPIRIN 81 MG/1
81 TABLET ORAL DAILY
COMMUNITY
End: 2017-10-23

## 2017-03-16 RX ORDER — ENOXAPARIN SODIUM 100 MG/ML
30 INJECTION SUBCUTANEOUS EVERY 24 HOURS
Status: DISCONTINUED | OUTPATIENT
Start: 2017-03-16 | End: 2017-03-18 | Stop reason: HOSPADM

## 2017-03-16 RX ORDER — AMOXICILLIN AND CLAVULANATE POTASSIUM 875; 125 MG/1; MG/1
1 TABLET, FILM COATED ORAL 2 TIMES DAILY
COMMUNITY
End: 2017-03-18

## 2017-03-16 RX ORDER — SODIUM CHLORIDE 0.9 % (FLUSH) 0.9 %
5-10 SYRINGE (ML) INJECTION AS NEEDED
Status: DISCONTINUED | OUTPATIENT
Start: 2017-03-16 | End: 2017-03-18 | Stop reason: HOSPADM

## 2017-03-16 RX ORDER — SIMVASTATIN 20 MG/1
20 TABLET, FILM COATED ORAL
Status: DISCONTINUED | OUTPATIENT
Start: 2017-03-16 | End: 2017-03-18 | Stop reason: HOSPADM

## 2017-03-16 RX ORDER — CARVEDILOL 12.5 MG/1
12.5 TABLET ORAL 2 TIMES DAILY
COMMUNITY
End: 2017-03-18

## 2017-03-16 RX ORDER — LEVOFLOXACIN 5 MG/ML
750 INJECTION, SOLUTION INTRAVENOUS
Status: COMPLETED | OUTPATIENT
Start: 2017-03-16 | End: 2017-03-16

## 2017-03-16 RX ORDER — SODIUM CHLORIDE 0.9 % (FLUSH) 0.9 %
5-10 SYRINGE (ML) INJECTION EVERY 8 HOURS
Status: DISCONTINUED | OUTPATIENT
Start: 2017-03-16 | End: 2017-03-18 | Stop reason: HOSPADM

## 2017-03-16 RX ORDER — ONDANSETRON 2 MG/ML
4 INJECTION INTRAMUSCULAR; INTRAVENOUS
Status: DISCONTINUED | OUTPATIENT
Start: 2017-03-16 | End: 2017-03-18 | Stop reason: HOSPADM

## 2017-03-16 RX ORDER — MUPIROCIN 20 MG/G
OINTMENT TOPICAL 2 TIMES DAILY
Status: DISCONTINUED | OUTPATIENT
Start: 2017-03-16 | End: 2017-03-18 | Stop reason: HOSPADM

## 2017-03-16 RX ORDER — SODIUM CHLORIDE 9 MG/ML
50 INJECTION, SOLUTION INTRAVENOUS CONTINUOUS
Status: DISCONTINUED | OUTPATIENT
Start: 2017-03-16 | End: 2017-03-18 | Stop reason: HOSPADM

## 2017-03-16 RX ORDER — CLONIDINE HYDROCHLORIDE 0.1 MG/1
0.1 TABLET ORAL 2 TIMES DAILY
Status: DISCONTINUED | OUTPATIENT
Start: 2017-03-16 | End: 2017-03-18 | Stop reason: HOSPADM

## 2017-03-16 RX ORDER — METRONIDAZOLE 250 MG/1
500 TABLET ORAL 3 TIMES DAILY
Status: DISCONTINUED | OUTPATIENT
Start: 2017-03-16 | End: 2017-03-17

## 2017-03-16 RX ORDER — MUPIROCIN 20 MG/G
OINTMENT TOPICAL 2 TIMES DAILY
COMMUNITY
End: 2017-06-01

## 2017-03-16 RX ORDER — ACETAMINOPHEN 325 MG/1
650 TABLET ORAL
Status: DISCONTINUED | OUTPATIENT
Start: 2017-03-16 | End: 2017-03-18 | Stop reason: HOSPADM

## 2017-03-16 RX ORDER — PROMETHAZINE HYDROCHLORIDE AND CODEINE PHOSPHATE 6.25; 1 MG/5ML; MG/5ML
5 SOLUTION ORAL
COMMUNITY
End: 2019-05-01

## 2017-03-16 RX ORDER — HYDRALAZINE HYDROCHLORIDE 20 MG/ML
10 INJECTION INTRAMUSCULAR; INTRAVENOUS
Status: DISCONTINUED | OUTPATIENT
Start: 2017-03-16 | End: 2017-03-18 | Stop reason: HOSPADM

## 2017-03-16 RX ORDER — MAGNESIUM SULFATE 100 %
4 CRYSTALS MISCELLANEOUS AS NEEDED
Status: DISCONTINUED | OUTPATIENT
Start: 2017-03-16 | End: 2017-03-18 | Stop reason: HOSPADM

## 2017-03-16 RX ORDER — HYDROCODONE BITARTRATE AND ACETAMINOPHEN 5; 325 MG/1; MG/1
1 TABLET ORAL
Status: DISCONTINUED | OUTPATIENT
Start: 2017-03-16 | End: 2017-03-18 | Stop reason: HOSPADM

## 2017-03-16 RX ADMIN — HYDROCODONE BITARTRATE AND ACETAMINOPHEN 1 TABLET: 5; 325 TABLET ORAL at 15:41

## 2017-03-16 RX ADMIN — Medication 10 ML: at 21:03

## 2017-03-16 RX ADMIN — SODIUM CHLORIDE 2500 ML: 900 INJECTION, SOLUTION INTRAVENOUS at 05:44

## 2017-03-16 RX ADMIN — Medication 10 ML: at 13:45

## 2017-03-16 RX ADMIN — LEVOFLOXACIN 750 MG: 5 INJECTION, SOLUTION INTRAVENOUS at 06:11

## 2017-03-16 RX ADMIN — METRONIDAZOLE 500 MG: 250 TABLET ORAL at 21:03

## 2017-03-16 RX ADMIN — SODIUM CHLORIDE 50 ML/HR: 900 INJECTION, SOLUTION INTRAVENOUS at 13:45

## 2017-03-16 RX ADMIN — VANCOMYCIN HYDROCHLORIDE 125 MG: 1 INJECTION, POWDER, LYOPHILIZED, FOR SOLUTION INTRAVENOUS at 18:30

## 2017-03-16 RX ADMIN — SIMVASTATIN 20 MG: 20 TABLET, FILM COATED ORAL at 21:03

## 2017-03-16 RX ADMIN — VANCOMYCIN HYDROCHLORIDE 125 MG: 1 INJECTION, POWDER, LYOPHILIZED, FOR SOLUTION INTRAVENOUS at 12:07

## 2017-03-16 RX ADMIN — MUPIROCIN: 20 OINTMENT TOPICAL at 18:36

## 2017-03-16 RX ADMIN — HYDROCODONE BITARTRATE AND ACETAMINOPHEN 1 TABLET: 5; 325 TABLET ORAL at 21:11

## 2017-03-16 RX ADMIN — CLONIDINE HYDROCHLORIDE 0.1 MG: 0.1 TABLET ORAL at 18:30

## 2017-03-16 RX ADMIN — PIPERACILLIN SODIUM AND TAZOBACTAM SODIUM 3.38 G: 3; .375 INJECTION, POWDER, LYOPHILIZED, FOR SOLUTION INTRAVENOUS at 07:43

## 2017-03-16 RX ADMIN — ACETAMINOPHEN 1000 MG: 500 TABLET, FILM COATED ORAL at 06:10

## 2017-03-16 RX ADMIN — ENOXAPARIN SODIUM 30 MG: 30 INJECTION SUBCUTANEOUS at 13:44

## 2017-03-16 RX ADMIN — INSULIN LISPRO 2 UNITS: 100 INJECTION, SOLUTION INTRAVENOUS; SUBCUTANEOUS at 13:43

## 2017-03-16 RX ADMIN — Medication 10 ML: at 15:42

## 2017-03-16 RX ADMIN — CARVEDILOL 12.5 MG: 12.5 TABLET, FILM COATED ORAL at 18:30

## 2017-03-16 RX ADMIN — METRONIDAZOLE 500 MG: 250 TABLET ORAL at 15:42

## 2017-03-16 NOTE — PROGRESS NOTES
Primary Nurse Rashmi Farah RN and Yuridia Guzman RN performed a dual skin assessment on this patient Impairment noted- see wound doc flow sheet  Fabio score is 18    Lip /chin wound(old abscess), mike knee abrasions-skin tear, generalized bruising on arms and legs, generalized dry skin. , old sacral rash turned into red blanchable discoloration.

## 2017-03-16 NOTE — PROGRESS NOTES
Problem: Self Care Deficits Care Plan (Adult)  Goal: *Acute Goals and Plan of Care (Insert Text)  Occupational Therapy Goals  Initiated 3/16/2017  1. Patient will perform lower body ADLs with independence within 7 day(s). 2. Patient will perform upper body ADLs standing 5 mins without fatigue or LOB with independence within 7 day(s). 3. Patient will perform toilet transfer with independence within 7 day(s). 4. Patient will perform all aspects of toileting with independence within 7 day(s). 5. Patient will participate in upper extremity therapeutic exercise/activities with independence for 10 minutes within 7 day(s). 6. Patient will utilize energy conservation techniques during functional activities without cues within 7 day(s). OCCUPATIONAL THERAPY EVALUATION  Patient: Jenn Umanzor Sr. [de-identified] y.o. male)  Date: 3/16/2017  Primary Diagnosis: Acute renal failure (ARF) ([facility])        Precautions:          ASSESSMENT :  Based on the objective data described below, the patient presents with overall CGA-Mod A x1 for functional mobility, up to Mod A for lower body ADLs, and independent for upper body ADLs s/p GLF this AM when attempting to get OOB>bathroom. Patient unable to get off the floor for 45 minutes with wife's assist until EMT arrived. Patient states they got him to the bathroom and he \"sat on the floor\" again because his \"legs were too weak to hold him up\". Patient is typically very active, works 2 days/week at [employer], drives, completes all ADLs and yard work. Patient with decreased functional transfers this AM, upon standing patient swaying and unsteady, requiring HHA x1 and holding onto footboard. Patient took few side steps towards [facility] and requiring trunk support and continued HHA x1 for balance. Patient does not use AD for ambulation at baseline. Patient able to reach down to feet but minimally SOB after attempting lower body ADLs. /82 HR 86 O2 saturation 96% on RA seated EOB.  Patient disposition TBD pending ability to progress mobility for safe return home with family vs inpatient rehab? Spoke with RN and CM. Will continue to follow. Patient will benefit from skilled intervention to address the above impairments. Patients rehabilitation potential is considered to be Good  Factors which may influence rehabilitation potential include:   [ ]             None noted  [ ]             Mental ability/status  [ ]             Medical condition  [ ]             Home/family situation and support systems  [ ]             Safety awareness  [ ]             Pain tolerance/management  [ ]             Other:        PLAN :  Recommendations and Planned Interventions:  [X]               Self Care Training                  [X]        Therapeutic Activities  [X]               Functional Mobility Training    [ ]        Cognitive Retraining  [X]               Therapeutic Exercises           [X]        Endurance Activities  [X]               Balance Training                   [ ]        Neuromuscular Re-Education  [ ]               Visual/Perceptual Training     [X]   Home Safety Training  [X]               Patient Education                 [X]        Family Training/Education  [ ]               Other (comment):     Frequency/Duration: Patient will be followed by occupational therapy 5 times a week to address goals. Discharge Recommendations: To Be Determined pending progression with mobility  Further Equipment Recommendations for Discharge: TBD       SUBJECTIVE:   Patient stated I feel better than this morning, but I'm not at my peak.       OBJECTIVE DATA SUMMARY:   HISTORY:   Past Medical History:   Diagnosis Date    CAD (coronary artery disease)      [facility] [address]      Diabetes mellitus, type 2 ([facility] [address]      Hypercholesteremia      Hypertension      PVC's (premature ventricular contractions) 5/26/2014    PVD (peripheral vascular disease) ([facility] [address]       Past Surgical History:   Procedure Laterality Date    HX CORONARY ARTERY BYPASS GRAFT        HX HEART CATHETERIZATION            Prior Level of Function/Home Situation: Per patient report, lives at home with wife. Granddaughter is an ICU nurse at [address] [place] [address]. Patient is very active, drives, works 2 days/week at [employer], independent with ADLs. One prior fall tripping over garden hose. Expanded or extensive additional review of patient history:      Home Situation  Home Environment: Private residence  # Steps to Enter: 0  One/Two Story Residence: One story  Living Alone: No  Support Systems: Family member(s)  Patient Expects to be Discharged to[de-identified] Private residence  Current DME Used/Available at Home: None  [ ]  Right hand dominant   [ ]  Left hand dominant     EXAMINATION OF PERFORMANCE DEFICITS:  Cognitive/Behavioral Status:  Neurologic State: Alert  Orientation Level: Oriented X4  Cognition: Appropriate for age attention/concentration; Follows commands     Perseveration: No perseveration noted  Safety/Judgement: Fall prevention; Awareness of environment  Skin: Appears intact  Edema: None noted in BUEs  Hearing: Auditory  Auditory Impairment: Hard of hearing, bilateral  Vision/Perceptual:                           Acuity: Within Defined Limits       Range of Motion:  AROM: Within functional limits  PROM: Within functional limits                    Strength:  Strength: Within functional limits              Coordination:  Coordination: Within functional limits  Fine Motor Skills-Upper: Left Intact; Right Intact    Gross Motor Skills-Upper: Left Intact; Right Intact  Tone & Sensation:  Tone: Normal  Sensation: Intact                       Balance:  Sitting: Intact  Standing: Impaired; Without support  Standing - Static: Fair  Standing - Dynamic : Poor     Functional Mobility and Transfers for ADLs:  Bed Mobility:  Supine to Sit: Minimum assistance;Assist x1;Additional time  Sit to Supine: Moderate assistance;Assist x2; Additional time  Scooting: Contact guard assistance     Transfers:  Sit to Stand: Minimum assistance;Assist x1 (HHA)  Stand to Sit: Minimum assistance;Assist x1 (HHA)  Toilet Transfer : Minimum assistance;Assist x1 Val Campbell HHA)     ADL Assessment:  Feeding: Independent     Oral Facial Hygiene/Grooming: Independent     Bathing: Minimum assistance     Upper Body Dressing: Independent     Lower Body Dressing: Minimum assistance     Toileting: Supervision                 ADL Intervention and task modifications:     Lower Body Dressing Assistance  Pants With Elastic Waist: Moderate assistance (Simulated)  Socks: Moderate assistance  Leg Crossed Method Used: No  Position Performed: Bending forward method;Seated edge of bed           Cognitive Retraining  Safety/Judgement: Fall prevention; Awareness of environment     Functional Measure:  Barthel Index:      Bathin  Bladder: 10  Bowels: 10  Groomin  Dressin  Feeding: 10  Mobility: 5  Stairs: 0  Toilet Use: 5  Transfer (Bed to Chair and Back): 5  Total: 55         Barthel and G-code impairment scale:  Percentage of impairment CH  0% CI  1-19% CJ  20-39% CK  40-59% CL  60-79% CM  80-99% CN  100%   Barthel Score 0-100 100 99-80 79-60 59-40 20-39 1-19    0   Barthel Score 0-20 20 17-19 13-16 9-12 5-8 1-4 0      The Barthel ADL Index: Guidelines  1. The index should be used as a record of what a patient does, not as a record of what a patient could do. 2. The main aim is to establish degree of independence from any help, physical or verbal, however minor and for whatever reason. 3. The need for supervision renders the patient not independent. 4. A patient's performance should be established using the best available evidence. Asking the patient, friends/relatives and nurses are the usual sources, but direct observation and common sense are also important. However direct testing is not needed.   5. Usually the patient's performance over the preceding 24-48 hours is important, but occasionally longer periods will be relevant. 6. Middle categories imply that the patient supplies over 50 per cent of the effort. 7. Use of aids to be independent is allowed. Link Heir., Barthel, D.W. (9101). Functional evaluation: the Barthel Index. [facility] (14)2. MAINE Patel, Miles Jiang., Beulah Odell., [place], [facility] (1999). Measuring the change indisability after inpatient rehabilitation; comparison of the responsiveness of the Barthel Index and Functional Branch Measure. Journal of Neurology, Neurosurgery, and Psychiatry, 66(4), 541-440. Yaima Aponte, NAlbinaJ.A, TAVARES Snyder, & Afsaneh Alaniz M.A. (2004.) Assessment of post-stroke quality of life in cost-effectiveness studies: The usefulness of the Barthel Index and the EuroQoL-5D. Quality of Life Research, 13, 648-31            G codes: In compliance with CMSs Claims Based Outcome Reporting, the following G-code set was chosen for this patient based on their primary functional limitation being treated: The outcome measure chosen to determine the severity of the functional limitation was the Barthel Index with a score of 55/100 which was correlated with the impairment scale. · Self Care:               - CURRENT STATUS:    CK - 40%-59% impaired, limited or restricted               - GOAL STATUS:           CJ - 20%-39% impaired, limited or restricted               - D/C STATUS:                       ---------------To be determined---------------      Occupational Therapy Evaluation Charge Determination   History Examination Decision-Making   LOW Complexity : Brief history review  LOW Complexity : 1-3 performance deficits relating to physical, cognitive , or psychosocial skils that result in activity limitations and / or participation restrictions  MEDIUM Complexity : Patient may present with comorbidities that affect occupational performnce.  Miniml to moderate modification of tasks or assistance (eg, physical or verbal ) with assesment(s) is necessary to enable patient to complete evaluation       Based on the above components, the patient evaluation is determined to be of the following complexity level: LOW   Pain:  Pain Scale 1: Numeric (0 - 10)  Pain Intensity 1: 0              Activity Tolerance:   Good. Please refer to the flowsheet for vital signs taken during this treatment. After treatment:   [ ] Patient left in no apparent distress sitting up in chair  [X] Patient left in no apparent distress in bed  [X] Call bell left within reach  [X] Nursing notified  [X] Caregiver present  [ ] Bed alarm activated      COMMUNICATION/EDUCATION:   The patients plan of care was discussed with: Registered Nurse and . [X] Home safety education was provided and the patient/caregiver indicated understanding. [X] Patient/family have participated as able in goal setting and plan of care. [ ] Patient/family agree to work toward stated goals and plan of care. [ ] Patient understands intent and goals of therapy, but is neutral about his/her participation. [ ] Patient is unable to participate in goal setting and plan of care. This patients plan of care is appropriate for delegation to [facility].      Thank you for this referral.  Sabina Elena OT  Time Calculation: 20 mins

## 2017-03-16 NOTE — ED PROVIDER NOTES
HPI Comments: The patient is an 41-year-old male with a past medical history significant for diabetes, hypercholesterolemia, hypertension, coronary disease, PVCs, peripheral vascular disease status post stenting of the right and left femoral vessels who presents to the ED with a complaint of general malaise with weakness and dizziness that began this evening and progressively worsened through the night. The patient states that he get out of bed and while attempting to go to the bathroom he became very weak and slowly laid himself to the ground. He could not get back up. EMS was notified and he was transported to the ED. The patient had been on several antibiotics for diverticulitis and facial cellulitis. He denies any headache, cough, congestion, sore throat, neck pain or stiffness, headache, chest pain and shortness of breath with exertion, nausea, vomiting, diarrhea, constipation, dysuria, hematuria, extremity weakness or numbness, sick contacts, skin rash, prior history of the same. Patient is a [de-identified] y.o. male presenting with weakness. Extremity Weakness           Past Medical History:   Diagnosis Date    CAD (coronary artery disease)     [facility] [address]     Diabetes mellitus, type 2 ([address]     Hypercholesteremia     Hypertension     PVC's (premature ventricular contractions) 5/26/2014    PVD (peripheral vascular disease) ([facility] [address]        Past Surgical History:   Procedure Laterality Date    HX CORONARY ARTERY BYPASS GRAFT      HX HEART CATHETERIZATION           History reviewed. No pertinent family history. Social History     Social History    Marital status:      Spouse name: N/A    Number of children: N/A    Years of education: N/A     Occupational History    Not on file.      Social History Main Topics    Smoking status: Former Smoker     Packs/day: 3.00     Years: 20.00     Types: Cigarettes     Quit date: 1/14/1985    Smokeless tobacco: Never Used    Alcohol use No Comment: occasional beer-2 in past year    Drug use: No    Sexual activity: Not on file     Other Topics Concern    Not on file     Social History Narrative         ALLERGIES: Lisinopril    Review of Systems   Neurological: Positive for weakness. All other systems reviewed and are negative. Vitals:    03/16/17 0517 03/16/17 0535   BP: 156/56    Pulse: 78    Resp: 19    Temp: 100.1 °F (37.8 °C)    SpO2: 94%    Weight: 81.6 kg (180 lb) 84.2 kg (185 lb 10 oz)   Height: 5' 9\" (1.753 m)             Physical Exam   Nursing note and vitals reviewed. CONSTITUTIONAL: Well-appearing; well-nourished; in no apparent distress  HEAD: Normocephalic; atraumatic  EYES: PERRL; EOM intact; conjunctiva and sclera are clear bilaterally. ENT: No rhinorrhea; normal pharynx with no tonsillar hypertrophy; mucous membranes pink/moist, no erythema, no exudate. NECK: Supple; non-tender; no cervical lymphadenopathy  CARD: Normal S1, S2; no murmurs, rubs, or gallops. Regular rate and rhythm. RESP: Normal respiratory effort; breath sounds clear and equal bilaterally; no wheezes, rhonchi, or rales. ABD: Normal bowel sounds; non-distended; non-tender; no palpable organomegaly, no masses, no bruits. Back Exam: Normal inspection; no vertebral point tenderness, no CVA tenderness. Normal range of motion. EXT: Normal ROM in all four extremities; non-tender to palpation; no swelling or deformity; distal pulses are normal, no edema. SKIN: Warm; dry; pale and decreased skin turgor. NEURO:Alert and oriented x 3, coherent, YAA-XII grossly intact, sensory and motor are non-focal.        MDM  Number of Diagnoses or Management Options  Diagnosis management comments: Assessment: Febrile illness with weakness and dehydration-rule out sepsis. Plan: EKG/ chest x-ray/ lab/ IV fluid/ antipyretic/ Broad-spectrum antibiotics/ consult hospitalist/ Monitor and Reevaluate.          Amount and/or Complexity of Data Reviewed  Clinical lab tests: ordered and reviewed  Tests in the radiology section of CPT®: ordered and reviewed  Tests in the medicine section of CPT®: reviewed and ordered  Discussion of test results with the performing providers: yes  Decide to obtain previous medical records or to obtain history from someone other than the patient: yes  Obtain history from someone other than the patient: yes  Review and summarize past medical records: yes  Discuss the patient with other providers: yes  Independent visualization of images, tracings, or specimens: yes    Risk of Complications, Morbidity, and/or Mortality  Presenting problems: moderate  Diagnostic procedures: moderate  Management options: moderate    Critical Care  Total time providing critical care: 30-74 minutes    Patient Progress  Patient progress: stable    ED Course       Procedures      ED EKG interpretation:  Rhythm: normal sinus rhythm; and regular . Rate (approx.): 79; Axis: left axis deviation; P wave: prolonged; QRS interval: prolonged; ST/T wave: non-specific changes; in  Lead: Diffusely; first degree AV block; Other findings: abnormal ekg. This EKG was interpreted by Josephine Gong MD,ED Provider. XRAY INTERPRETATION (ED MD)  Chest Xray  No acute process seen. Normal heart size. No bony abnormalities. No infiltrate. Josephine Gong MD 5:42 AM    PROGRESS NOTE:  Pt has been reexamined by Josephine Gong MD all available results have been reviewed with pt and any available family. Pt understands sx, dx, and tx in ED. Care plan has been outlined and questions have been answered. Pt and any available family understands and agrees to need for admission to hospital for further tx not available in ED. Pt is ready for admission. Written by Josephine Gong MD,  06:30 AM.    Raye Pallas NOTE:  oJsephine Gong MD spoke with Dr. Jacque Goodwin of the adult hospitalist team. Discussed patient's presentation, history, physical assessment, and available diagnostic results.  He will evaluate, write orders and admit the patient to the hospital. 7:44 AM        .

## 2017-03-16 NOTE — ED NOTES
TRANSFER - OUT REPORT:    Verbal [facility] for routine progression of care       Report consisted of patients Situation, Background, Assessment and   Recommendations(SBAR). Information from the following report(s) SBAR, ED Summary, [facility] and Recent Results was reviewed with the receiving nurse. Lines:   Peripheral IV 03/16/17 Left Arm (Active)   Site Assessment Clean, dry, & intact 3/16/2017  5:20 AM   Phlebitis Assessment 0 3/16/2017  5:20 AM   Infiltration Assessment 0 3/16/2017  5:20 AM   Dressing Status Clean, dry, & intact 3/16/2017  5:20 AM   Dressing Type Transparent 3/16/2017  5:20 AM       Peripheral IV 03/16/17 Right Arm (Active)   Site Assessment Clean, dry, & intact 3/16/2017  5:45 AM   Phlebitis Assessment 0 3/16/2017  5:45 AM   Infiltration Assessment 0 3/16/2017  5:45 AM   Dressing Status Clean, dry, & intact 3/16/2017  5:45 AM   Dressing Type Transparent 3/16/2017  5:45 AM        Opportunity for questions and clarification was provided.       Patient transported with:   [place]

## 2017-03-16 NOTE — ED TRIAGE NOTES
Pt brought by EMS from home. Pt states he got out of bed this morning, had weakness in legs and had to lower himself to floor.

## 2017-03-16 NOTE — H&P
Carlos Reece MD  Please call  and page for questions. Call physician on-call through the  7pm-7am      History & Physical    Primary Care Provider: Michele Gallegos MD  Source of Information: Patient, patient wife and medical record. History of Presenting Illness:   Jj Triana is a [de-identified] y.o. male who presents with weakness this AM. He has the PMH of diabetes, coronary and peripheral arterial disease, and chronic renal insufficiency. Patient was discharged to home on 01/20/2017 after procedure for his peripheral vascular disease. The patient tolerated the procedure well but experienced urinary retention post procedure and required straight catheterization. He was thus admitted overnight. Since his discharge form the hospital, patient has been on multiple rounds of antibiotic including Augmentin, ciprofloxacin, bactrim, flagyl for abdominal gastroenteritis and facial furuncle. This morning, when patient got up to use the restroom, patient felt dizzy and weak. He could not get out of the bed due to generalized weakness. Patient said his blood sugar and blood pressure have been high recently. The patient denies any fever, chills, chest pain, cough, congestion, recent illness, palpitations, or dysuria. He denied recent travel or any sick contact. He denied any abdominal pain, nausea, vomiting, and diarrhea or constipation. Review of Systems:  A comprehensive review of systems was negative except for that written in the History of Present Illness.      Past Medical History:   Diagnosis Date    CAD (coronary artery disease)     [facility] [address]     Diabetes mellitus, type 2 ([address]     Hypercholesteremia     Hypertension     PVC's (premature ventricular contractions) 5/26/2014    PVD (peripheral vascular disease) ([facility] [address]       Past Surgical History:   Procedure Laterality Date    HX CORONARY ARTERY BYPASS GRAFT      HX HEART CATHETERIZATION       Prior to Admission medications    Medication Sig Start Date End Date Taking? Authorizing Provider   aspirin delayed-release 81 mg tablet Take 81 mg by mouth daily. Yes Historical Provider   simvastatin (ZOCOR) 20 mg tablet Take 20 mg by mouth nightly. Yes Historical Provider   carvedilol (COREG) 12.5 mg tablet Take 12.5 mg by mouth two (2) times a day. Yes Historical Provider   omega 3-dha-epa-fish oil (FISH OIL) 100-160-1,000 mg cap Take 1 Cap by mouth two (2) times a day. Yes Historical Provider   trimethoprim-sulfamethoxazole (BACTRIM DS) 160-800 mg per tablet Take 1 Tab by mouth two (2) times a day. Indications: Skin and Skin Structure Infection   Yes Historical Provider   amoxicillin-clavulanate (AUGMENTIN) 875-125 mg per tablet Take 1 Tab by mouth two (2) times a day. Indications: Skin and Skin Structure Infection   Yes Historical Provider   mupirocin (BACTROBAN) 2 % ointment Apply  to affected area two (2) times a day. Indications: MINOR BACTERIAL SKIN INFECTIONS   Yes Historical Provider   promethazine-codeine (PHENERGAN WITH CODEINE) 6.25-10 mg/5 mL syrup Take 5 mL by mouth every six (6) hours as needed for Cough. Yes Historical Provider   cloNIDine HCl (CATAPRES) 0.1 mg tablet Take 1 Tab by mouth two (2) times a day. 3/1/17  Yes James Murguia MD   valsartan (DIOVAN) 320 mg tablet Take 320 mg by mouth daily. Yes Historical Provider   clopidogrel (PLAVIX) 75 mg tab Take 1 Tab by mouth daily for 360 days. 1/31/17 1/26/18 Yes Emmy Rodriguez MD   metFORMIN (GLUCOPHAGE) 1,000 mg tablet Take 1 Tab by mouth two (2) times daily (with meals). 1/21/17  Yes Zoila Johnson NP   pioglitazone (ACTOS) 45 mg tablet Take 45 mg by mouth daily. 9/5/16  Yes Historical Provider   amLODIPine (NORVASC) 10 mg tablet Take 10 mg by mouth daily. Yes Historical Provider   cinnamon bark (CINNAMON) 500 mg cap Take 500 mg by mouth two (2) times a day.    Yes Historical Provider   hydrochlorothiazide (HYDRODIURIL) 25 mg tablet Take 25 mg by mouth daily. Yes Historical Provider   glipiZIDE (GLUCOTROL) 10 mg tablet Take 20 mg by mouth two (2) times a day. Yes Historical Provider     Allergies   Allergen Reactions    Lisinopril Cough      History reviewed. No pertinent family history. SOCIAL HISTORY:  Patient resides:  Independently X   Assisted Living    SNF    With family care       Smoking history:   None X   Former    Chronic      Alcohol history:   None X   Social    Chronic      Ambulates:   Independently X   w/cane    w/walker    w/wc    CODE STATUS:  DNR    Full X   Other      Objective:     Physical Exam:     Visit Vitals    /54 (BP 1 Location: Right arm, BP Patient Position: At rest)    Pulse 81    Temp 98.8 °F (37.1 °C)    Resp 19    Ht 5' 9\" (1.753 m)    Wt 84.2 kg (185 lb 10 oz)    SpO2 97%    BMI 27.41 kg/m2      O2 Device: Room air    General:  Alert, cooperative, no distress, appears stated age. Healing furuncle on his left loser lip/cheeck. Head:  Normocephalic, without obvious abnormality, atraumatic. Eyes:  Conjunctivae/corneas clear. PERRL, EOMs intact. Neck: Supple, symmetrical, trachea midline, no adenopathy, thyroid: no enlargement/tenderness/nodules, no carotid bruit and no JVD. Back:   Symmetric, no curvature. ROM normal. No CVA tenderness. Lungs:   Clear to auscultation bilaterally. Heart:  Regular rate and rhythm, S1, S2 normal, no murmur, click, rub or gallop. Abdomen:   Soft, non-tender. Bowel sounds hyperactive. No masses,  No organomegaly. Extremities: Extremities normal, atraumatic, no cyanosis or edema. Pulses: 2+ and symmetric all extremities. Skin: Skin color, texture, turgor normal. No rashes or lesions   Neurologic: CNII-XII intact. EKG:  normal EKG, normal sinus rhythm.       Data Review:     Recent Days:  Recent Labs      03/16/17 0514   WBC  9.1   HGB  10.4*   HCT  31.1*   PLT  251     Recent Labs      03/16/17 0514 NA  128*   K  3.8   CL  94*   CO2  24   GLU  237*   BUN  38*   CREA  2.68*   CA  8.7   ALB  3.1*   SGOT  25   ALT  28   INR  1.1     No results for input(s): PH, PCO2, PO2, HCO3, FIO2 in the last 72 hours. 24 Hour Results:  Recent Results (from the past 24 hour(s))   CBC WITH AUTOMATED DIFF    Collection Time: 03/16/17  5:14 AM   Result Value Ref Range    WBC 9.1 4.1 - 11.1 K/uL    RBC 3.67 (L) 4.10 - 5.70 M/uL    HGB 10.4 (L) 12.1 - 17.0 g/dL    HCT 31.1 (L) 36.6 - 50.3 %    MCV 84.7 80.0 - 99.0 FL    MCH 28.3 26.0 - 34.0 PG    MCHC 33.4 30.0 - 36.5 g/dL    RDW 14.5 11.5 - 14.5 %    PLATELET 048 482 - 871 K/uL    NEUTROPHILS 82 (H) 32 - 75 %    BAND NEUTROPHILS 5 0 - 6 %    LYMPHOCYTES 5 (L) 12 - 49 %    MONOCYTES 7 5 - 13 %    EOSINOPHILS 0 0 - 7 %    BASOPHILS 0 0 - 1 %    METAMYELOCYTES 1 (H) 0 %    ABS. NEUTROPHILS 7.9 1.8 - 8.0 K/UL    ABS. LYMPHOCYTES 0.5 (L) 0.8 - 3.5 K/UL    ABS. MONOCYTES 0.6 0.0 - 1.0 K/UL    ABS. EOSINOPHILS 0.0 0.0 - 0.4 K/UL    ABS. BASOPHILS 0.0 0.0 - 0.1 K/UL    DF MANUAL      RBC COMMENTS ANISOCYTOSIS  1+        RBC COMMENTS OVALOCYTES  PRESENT        PLATELET COMMENTS LARGE PLATELETS     METABOLIC PANEL, COMPREHENSIVE    Collection Time: 03/16/17  5:14 AM   Result Value Ref Range    Sodium 128 (L) 136 - 145 mmol/L    Potassium 3.8 3.5 - 5.1 mmol/L    Chloride 94 (L) 97 - 108 mmol/L    CO2 24 21 - 32 mmol/L    Anion gap 10 5 - 15 mmol/L    Glucose 237 (H) 65 - 100 mg/dL    BUN 38 (H) 6 - 20 MG/DL    Creatinine 2.68 (H) 0.70 - 1.30 MG/DL    BUN/Creatinine ratio 14 12 - 20      GFR est AA 28 (L) >60 ml/min/1.73m2    GFR est non-AA 23 (L) >60 ml/min/1.73m2    Calcium 8.7 8.5 - 10.1 MG/DL    Bilirubin, total 0.4 0.2 - 1.0 MG/DL    ALT (SGPT) 28 12 - 78 U/L    AST (SGOT) 25 15 - 37 U/L    Alk.  phosphatase 58 45 - 117 U/L    Protein, total 7.3 6.4 - 8.2 g/dL    Albumin 3.1 (L) 3.5 - 5.0 g/dL    Globulin 4.2 (H) 2.0 - 4.0 g/dL    A-G Ratio 0.7 (L) 1.1 - 2.2     TROPONIN I Collection Time: 03/16/17  5:14 AM   Result Value Ref Range    Troponin-I, Qt. 0.05 (H) <0.05 ng/mL   PROTHROMBIN TIME + INR    Collection Time: 03/16/17  5:14 AM   Result Value Ref Range    INR 1.1 0.9 - 1.1      Prothrombin time 11.1 9.0 - 11.1 sec   PTT    Collection Time: 03/16/17  5:14 AM   Result Value Ref Range    aPTT 27.5 22.1 - 32.5 sec    aPTT, therapeutic range     58.0 - 77.0 SECS   EKG, 12 LEAD, INITIAL    Collection Time: 03/16/17  5:15 AM   Result Value Ref Range    Ventricular Rate 79 BPM    Atrial Rate 79 BPM    P-R Interval 248 ms    QRS Duration 90 ms    Q-T Interval 400 ms    QTC Calculation (Bezet) 458 ms    Calculated P Axis 50 degrees    Calculated R Axis -51 degrees    Calculated T Axis 20 degrees    Diagnosis       Sinus rhythm with 1st degree AV block with premature atrial complexes  Left axis deviation  Nonspecific ST abnormality  When compared with ECG of 28-MAR-2006 17:10,  Previous ECG has undetermined rhythm, needs review  Questionable change in QRS duration     LACTIC ACID, PLASMA    Collection Time: 03/16/17  5:40 AM   Result Value Ref Range    Lactic acid 1.8 0.4 - 2.0 MMOL/L   URINALYSIS W/ REFLEX CULTURE    Collection Time: 03/16/17  5:40 AM   Result Value Ref Range    Color YELLOW/STRAW      Appearance CLEAR CLEAR      Specific gravity 1.026 1.003 - 1.030      pH (UA) 5.5 5.0 - 8.0      Protein >300 (A) NEG mg/dL    Glucose 500 (A) NEG mg/dL    Ketone NEGATIVE  NEG mg/dL    Bilirubin NEGATIVE  NEG      Blood MODERATE (A) NEG      Urobilinogen 0.2 0.2 - 1.0 EU/dL    Nitrites NEGATIVE  NEG      Leukocyte Esterase NEGATIVE  NEG      WBC 0-4 0 - 4 /hpf    RBC 0-5 0 - 5 /hpf    Epithelial cells FEW FEW /lpf    Bacteria NEGATIVE  NEG /hpf    UA:UC IF INDICATED CULTURE NOT INDICATED BY UA RESULT CNI      Hyaline cast 0-2 0 - 5 /lpf    Yeast PRESENT (A) NEG      Budding Yeast PRESENT (A) NEG     INFLUENZA A & B AG (RAPID TEST)    Collection Time: 03/16/17  5:46 AM   Result Value Ref Range    Influenza A Antigen NEGATIVE  NEG      Influenza B Antigen NEGATIVE  NEG           Imaging:     Assessment and Plan:       Fever: Unknown source so far. Because of the recent multiple back to back antibiotic, I strongly suspect C diff colitis. Patient said he had the H/O feeling of incomplete evacuation once after the hospital discharge. He had the course of flagyl. Will do flagyl and vancomycin for now. Patient bowel sound is hyperactive. Will get C diff when he has BM, will get abdominal xray. Follow cultures. UA finding possibly due to antibiotic. Patient is asymptomatic urinary wise. Will observe him today. Hyponatremia: Possibly hypovolemia. Will continue NS IVF and and repeat. KATALINA on CKD: Probably due to hypovolemia. Follow up with IVF. Diabetes mellitus: Will hold his PO medications including metformin, Will do SSI for now. Hypertension: Hold losartan and continue other BP medications, add hydralazine for PRN. See orders for other plans:   VTE prophylaxis: Lovenox  Code status: Full  Discussed plan of care with Patient/Family and Nurse   Pre-admission lived at home:   Disposition: Observation today. I am suspicious that patient might need full admission. Discharge planning: pending.            Signed By: Amparo Mckee MD     March 16, 2017

## 2017-03-16 NOTE — PROGRESS NOTES
Problem: Mobility Impaired (Adult and Pediatric)  Goal: *Acute Goals and Plan of Care (Insert Text)  Physical Therapy Goals  Initiated 3/16/2017  1. Patient will move from supine to sit and sit to supine, scoot up and down and roll side to side in bed with independence within 7 day(s). 2. Patient will transfer from bed to chair and chair to bed with modified independence using the least restrictive device within 7 day(s). 3. Patient will perform sit to stand with modified independence within 7 day(s). 4. Patient will ambulate with modified independence for 250 feet with the least restrictive device within 7 day(s). PHYSICAL THERAPY EVALUATION  Patient: Vanessa Davila Sr. [de-identified] y.o. male)  Date: 3/16/2017  Primary Diagnosis: Acute renal failure (ARF) (HCC)     Precautions:   Fall, Contact 2      ASSESSMENT :  Based on the objective data described below, the patient presents with impaired functional mobility compared to baseline as he currently requires B UE support on IV pole for balance/safety. Pt also needs minimal assist for trunk elevation from bed's surface and contact guard assist for sit <> stand transfer from EOB. Pt with grossly 4/5 strength throughout B LEs. No dysmetria observed with heel-to-shin coordination test. Upon standing, pt swaying, staggering, and unsteady, requiring stabilization from this therapist plus unilateral UE support onto footboard. Pt ambulated x 50 ft total with IV pole support. Mild path deviations noted. Increased truncal sway and forward trunk flexion also noted. Forward trunk flexion slightly improved with constant cuing. No indication of orthostatic hypotension. O2 sats above 93% at all times on room air. 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 Pending progress in therapy, recommend home with family support + HHPT safety evaluation vs. IP rehab. Will continue to follow with acute PT services.       Patient will benefit from skilled intervention to address the above impairments. Patients rehabilitation potential is considered to be Fair  Factors which may influence rehabilitation potential include:   [X]         None noted  [ ]         Mental ability/status  [ ]         Medical condition  [ ]         Home/family situation and support systems  [ ]         Safety awareness  [ ]         Pain tolerance/management  [ ]         Other:        PLAN :  Recommendations and Planned Interventions:  [X]           Bed Mobility Training             [ ]    Neuromuscular Re-Education  [X]           Transfer Training                   [ ]    Orthotic/Prosthetic Training  [X]           Gait Training                         [ ]    Modalities  [X]           Therapeutic Exercises           [ ]    Edema Management/Control  [X]           Therapeutic Activities            [X]    Patient and Family Training/Education  [ ]           Other (comment):     Frequency/Duration: Patient will be followed by physical therapy  5 times a week to address goals. Discharge Recommendations: TBD - HHPT vs. IP Rehab   Further Equipment Recommendations for Discharge: TBD - Will address equipment needs tomorrow AM       SUBJECTIVE:   Patient stated I couldn't even stand earlier today.       OBJECTIVE DATA SUMMARY:   HISTORY:    Past Medical History:   Diagnosis Date    CAD (coronary artery disease)      [facility] [address]      Diabetes mellitus, type 2 ([facility] [address]      Hypercholesteremia      Hypertension      PVC's (premature ventricular contractions) 5/26/2014    PVD (peripheral vascular disease) ([facility] [address]       Past Surgical History:   Procedure Laterality Date    HX CORONARY ARTERY BYPASS GRAFT        HX HEART CATHETERIZATION         Prior Level of Function/Home Situation: Lives with wife. Wife with severe COPD. VERY active.    Personal factors and/or comorbidities impacting plan of care:      Home Situation  Home Environment: Private residence  # Steps to Enter: 0  Wheelchair Ramp: Yes  One/Two Story Residence: One story  Living Alone: No  Support Systems: Family member(s), Spouse/Significant Other/Partner  Patient Expects to be Discharged to[de-identified] Private residence  Current DME Used/Available at Home: None     EXAMINATION/PRESENTATION/DECISION MAKING:   Critical Behavior:  Neurologic State: Alert  Orientation Level: Oriented X4  Cognition: Appropriate decision making, Decreased attention/concentration, Follows commands  Safety/Judgement: Fall prevention, Insight into deficits, Awareness of environment  Hearing: Auditory  Auditory Impairment: Hard of hearing, bilateral  Skin:  Ecchymosis/scabs noted on B knee caps  Range Of Motion:  AROM: Within functional limits  PROM: Within functional limits  Strength:    Strength: Within functional limits  Tone & Sensation:   Tone: Normal  Sensation: Intact  Coordination:  Coordination: Within functional limits  Vision:   Acuity: Within Defined Limits  Functional Mobility:  Bed Mobility:  Supine to Sit: Minimum assistance;Assist x1  Sit to Supine: Moderate assistance;Assist x1  Scooting: Contact guard assistance  Transfers:  Sit to Stand: Assist x1;Contact guard assistance  Stand to Sit: Assist x1;Contact guard assistance  Balance:   Sitting: Intact  Standing: Impaired; Without support  Standing - Static: Fair  Standing - Dynamic : Poor  Ambulation/Gait Training:  Distance (ft): 50 Feet (ft)  Assistive Device: Gait belt (B UE support on IV pole)  Ambulation - Level of Assistance: Minimal assistance;Assist x1  Gait Description (WDL): Exceptions to WDL  Gait Abnormalities: Decreased step clearance; Path deviations; Shuffling gait  Base of Support: Widened  Speed/Karla: Slow  Step Length: Right shortened;Left shortened     Therapeutic Exercises:   Aspects of Tinetti:  1. Standing with feet together - Multidirectional sway  2.  Standing with eyes closed - Near posterior LOB  3. 360 degree turn L/R - Slow, 10 sec to complete both directions; intermittently reaching out for support      Functional Measure:  Barthel Index:      Bathin  Bladder: 10  Bowels: 10  Groomin  Dressin  Feeding: 10  Mobility: 5  Stairs: 0  Toilet Use: 5  Transfer (Bed to Chair and Back): 5  Total: 55         Barthel and G-code impairment scale:  Percentage of impairment CH  0% CI  1-19% CJ  20-39% CK  40-59% CL  60-79% CM  80-99% CN  100%   Barthel Score 0-100 100 99-80 79-60 59-40 20-39 1-19    0   Barthel Score 0-20 20 17-19 13-16 9-12 5-8 1-4 0      The Barthel ADL Index: Guidelines  1. The index should be used as a record of what a patient does, not as a record of what a patient could do. 2. The main aim is to establish degree of independence from any help, physical or verbal, however minor and for whatever reason. 3. The need for supervision renders the patient not independent. 4. A patient's performance should be established using the best available evidence. Asking the patient, friends/relatives and nurses are the usual sources, but direct observation and common sense are also important. However direct testing is not needed. 5. Usually the patient's performance over the preceding 24-48 hours is important, but occasionally longer periods will be relevant. 6. Middle categories imply that the patient supplies over 50 per cent of the effort. 7. Use of aids to be independent is allowed. Harlee Cowden., Barthel, D.W. ([id number]). Functional evaluation: the Barthel Index. [facility] (14)2. MAINE Roberto, Anselmo Flynn, Ace Crawford., [place], [address] [place] [address] (). Measuring the change indisability after inpatient rehabilitation; comparison of the responsiveness of the Barthel Index and Functional Dent Measure. Journal of Neurology, Neurosurgery, and Psychiatry, 66(4), 060-499. NAHID Silva.JOSE, TAVARES Snyder, & Jean Paul Park M.A. (2004.) Assessment of post-stroke quality of life in cost-effectiveness studies:  The usefulness of the Barthel Index and the EuroQoL-5D. Quality of Life Research, 13, 346-10      G codes: In compliance with CMSs Claims Based Outcome Reporting, the following G-code set was chosen for this patient based on their primary functional limitation being treated: The outcome measure chosen to determine the severity of the functional limitation was the Barthel Index with a score of 55//100 which was correlated with the impairment scale. · Mobility - Walking and Moving Around:               - CURRENT STATUS:    CK - 40%-59% impaired, limited or restricted               - GOAL STATUS:           CJ - 20%-39% impaired, limited or restricted               - D/C STATUS:                       ---------------To be determined---------------      Physical Therapy Evaluation Charge Determination   History Examination Presentation Decision-Making   HIGH Complexity :3+ comorbidities / personal factors will impact the outcome/ POC  MEDIUM Complexity : 3 Standardized tests and measures addressing body structure, function, activity limitation and / or participation in recreation  LOW Complexity : Stable, uncomplicated  Other outcome measures Barthel Index (55/100)  MEDIUM      Based on the above components, the patient evaluation is determined to be of the following complexity level: LOW      Pain:  Pain Scale 1: Numeric (0 - 10)  Pain Intensity 1: 6  Pain Location 1: Shoulder  Pain Orientation 1: Left;Right  Pain Description 1: Aching;Constant  Pain Intervention(s) 1: Medication (see MAR)      Activity Tolerance:   Please refer to the flowsheet for vital signs taken during this treatment.   After treatment:   [ ]         Patient left in no apparent distress sitting up in chair  [X]         Patient left in no apparent distress in bed  [X]         Call bell left within reach  [X]         Nursing notified  [ ]         Caregiver present  [ ]         Bed alarm activated      COMMUNICATION/EDUCATION:   The patients plan of care was discussed with: Registered Nurse.  [X]         Fall prevention education was provided and the patient/caregiver indicated understanding. [X]         Patient/family have participated as able in goal setting and plan of care. [X]         Patient/family agree to work toward stated goals and plan of care. [ ]         Patient understands intent and goals of therapy, but is neutral about his/her participation. [ ]         Patient is unable to participate in goal setting and plan of care.      Thank you for this referral.  Ulysses Milroy PT, DPT   Time Calculation: 19 mins

## 2017-03-16 NOTE — IP AVS SNAPSHOT
[address] [facility] [address] [place] [address] 
[phone] Patient: Alexandria Dobbs. MRN: [id number] DRV:[id number] You are allergic to the following Allergen Reactions Lisinopril Cough Recent Documentation Height Weight BMI Smoking Status 1.753 m 85.5 kg 27.85 kg/m2 Former Smoker Unresulted Labs Order Current Status CULTURE, BLOOD, PAIRED Preliminary result Emergency Contacts  (Rel.) Home Phone Work Phone Mobile Phone Thelma Montes (Spouse) [phone] -- [phone] About your hospitalization You were admitted on:  March 16, 2017 You last received care in the:  [facility] You were discharged on:  March 18, 2017 Why you were hospitalized Your primary diagnosis was:  Acute Renal Failure (Arf) (Hcc) Your diagnoses also included:  Fever, Hyponatremia Providers Seen During Your Hospitalizations Provider Role Specialty Primary office phone Last Au MD Attending Provider Emergency Medicine [phone] Saad Erickson MD Attending Provider [facility] [phone] Your Primary Care Physician (PCP) Primary Care Physician Office Phone Office Fax Marge Infanteas [phone] Follow-up Information Follow up With Details Comments Contact Info Luisa Shields MD   [address]  [address] [place] [address] 
[phone] Current Discharge Medication List  
  
CONTINUE these medications which have CHANGED Dose & Instructions Dispensing Information Comments Morning Noon Evening Bedtime  
 carvedilol 25 mg tablet Commonly known as:  Juana Locket What changed:   
- medication strength 
- how much to take Your last dose was: Your next dose is:    
   
   
 Dose:  25 mg Take 1 Tab by mouth two (2) times a day. Quantity:  60 Tab Refills:  0 valsartan 320 mg tablet Commonly known as:  DIOVAN What changed:  additional instructions Your last dose was: Your next dose is:    
   
   
 Dose:  320 mg Take 1 Tab by mouth daily. Please start taking after 5 days of discharge and blood test called [facility]. Quantity:  30 Tab Refills:  0 CONTINUE these medications which have NOT CHANGED Dose & Instructions Dispensing Information Comments Morning Noon Evening Bedtime  
 amLODIPine 10 mg tablet Commonly known as:  Fan Post Your last dose was: Your next dose is:    
   
   
 Dose:  10 mg Take 10 mg by mouth daily. Refills:  0  
     
   
   
   
  
 aspirin delayed-release 81 mg tablet Your last dose was: Your next dose is:    
   
   
 Dose:  81 mg Take 81 mg by mouth daily. Refills:  0 BACTROBAN 2 % ointment Generic drug:  mupirocin Your last dose was: Your next dose is:    
   
   
 Apply  to affected area two (2) times a day. Indications: MINOR BACTERIAL SKIN INFECTIONS Refills:  0 CINNAMON 500 mg Cap Generic drug:  cinnamon bark Your last dose was: Your next dose is:    
   
   
 Dose:  500 mg Take 500 mg by mouth two (2) times a day. Refills:  0  
     
   
   
   
  
 cloNIDine HCl 0.1 mg tablet Commonly known as:  CATAPRES Your last dose was: Your next dose is:    
   
   
 Dose:  0.1 mg Take 1 Tab by mouth two (2) times a day. Quantity:  60 Tab Refills:  2  
     
   
   
   
  
 clopidogrel 75 mg Tab Commonly known as:  PLAVIX Your last dose was: Your next dose is:    
   
   
 Dose:  75 mg Take 1 Tab by mouth daily for 360 days. Quantity:  30 Tab Refills:  3 FISH -160-1,000 mg Cap Generic drug:  omega 3-dha-epa-fish oil Your last dose was: Your next dose is: Dose:  1 Cap Take 1 Cap by mouth two (2) times a day. Refills:  0  
     
   
   
   
  
 glipiZIDE 10 mg tablet Commonly known as:  Lonnie Mejoyce Your last dose was: Your next dose is:    
   
   
 Dose:  20 mg Take 20 mg by mouth two (2) times a day. Refills:  0  
     
   
   
   
  
 hydroCHLOROthiazide 25 mg tablet Commonly known as:  HYDRODIURIL Your last dose was: Your next dose is:    
   
   
 Dose:  25 mg Take 25 mg by mouth daily. Refills:  0  
     
   
   
   
  
 metFORMIN 1,000 mg tablet Commonly known as:  GLUCOPHAGE Your last dose was: Your next dose is:    
   
   
 Dose:  1000 mg Take 1 Tab by mouth two (2) times daily (with meals). Quantity:  30 Tab Refills:  1 Restart 12/26 pioglitazone 45 mg tablet Commonly known as:  ACTOS Your last dose was: Your next dose is:    
   
   
 Dose:  45 mg Take 45 mg by mouth daily. Refills:  0  
     
   
   
   
  
 promethazine-codeine 6.25-10 mg/5 mL syrup Commonly known as:  PHENERGAN with CODEINE Your last dose was: Your next dose is:    
   
   
 Dose:  5 mL Take 5 mL by mouth every six (6) hours as needed for Cough. Refills:  0  
     
   
   
   
  
 simvastatin 20 mg tablet Commonly known as:  ZOCOR Your last dose was: Your next dose is:    
   
   
 Dose:  20 mg Take 20 mg by mouth nightly. Refills:  0 STOP taking these medications AUGMENTIN 875-125 mg per tablet Generic drug:  amoxicillin-clavulanate BACTRIM -800 mg per tablet Generic drug:  trimethoprim-sulfamethoxazole Where to Get Your Medications [facility] [address] Phone:  [phone]  
  valsartan 320 mg tablet Information on where to get these meds will be given to you by the nurse or doctor. ! Ask your nurse or doctor about these medications  
  carvedilol 25 mg tablet Discharge Instructions Discharge Instructions PATIENT ID: Coni Henriquez Sr. MRN: [id number] YOB: 1935 DATE OF ADMISSION: 3/16/2017  5:05 AM   
DATE OF DISCHARGE: 3/18/2017 PRIMARY CARE PROVIDER: Marco Antonio Holly MD  
 
 
DISCHARGING PHYSICIAN: Artie Santiago MD   
To contact this individual call [phone] and ask the  to page. If unavailable ask to be transferred the Adult Hospitalist Department. DISCHARGE DIAGNOSES :Acute on chronic renal insufficiency. CONSULTATIONS: None PROCEDURES/SURGERIES: * No surgery found * PENDING TEST RESULTS:  
At the time of discharge the following test results are still pending: Culture FOLLOW UP APPOINTMENTS:  
Follow-up Information Follow up With Details Comments Contact Info Marco Antonio Holly MD   14[address] [facility] [address] [place] [address] 
[phone] ADDITIONAL CARE RECOMMENDATIONS:  
Please follow up with your primary care provider in in 5 days. Please follow up with PCP for blood test called BMP in 5 days to check the renal status and resuming Losartan if possible. DIET: Cardiac Diet and Diabetic Diet ACTIVITY: Activity as tolerated DISCHARGE MEDICATIONS: 
 See Medication Reconciliation Form · It is important that you take the medication exactly as they are prescribed. · Keep your medication in the bottles provided by the pharmacist and keep a list of the medication names, dosages, and times to be taken in your wallet. · Do not take other medications without consulting your doctor. NOTIFY YOUR PHYSICIAN FOR ANY OF THE FOLLOWING:  
Fever over 101 degrees for 24 hours.   
Chest pain, shortness of breath, fever, chills, nausea, vomiting, diarrhea, change in mentation, falling, weakness, bleeding. Severe pain or pain not relieved by medications. Or, any other signs or symptoms that you may have questions about. DISPOSITION: 
 X Home With: 
X OT X PT  HH  RN  
  
 SNF/Inpatient Rehab/LTAC Independent/assisted living Hospice Other: CDMP Checked: Yes X Signed:  
Pravin Rogers MD 
3/18/2017 
12:17 PM 
 
Discharge Orders None [employer] Announcement We are excited to announce that we are making your provider's discharge notes available to you in [employer]. You will see these notes when they are completed and signed by the physician that discharged you from your recent hospital stay. If you have any questions or concerns about any information you see in [employer], please call the Health Information Department where you were seen or reach out to your Primary Care Provider for more information about your plan of care. Introducing [facility]! [employer] introduces [employer] patient portal. Now you can access parts of your medical record, email your doctor's office, and request medication refills online. 1. In your internet browser, go to [email or web address] 2. Click on the First Time User? Click Here link in the Sign In box. You will see the New Member Sign Up page. 3. Enter your [employer] Access Code exactly as it appears below. You will not need to use this code after youve completed the sign-up process. If you do not sign up before the expiration date, you must request a new code. · [employer] Access Code: [email or web address] Expires: 6/14/2017  5:51 AM 
 
4. Enter the last four digits of your Social Security Number (xxxx) and Date of Birth (mm/dd/yyyy) as indicated and click Submit. You will be taken to the next sign-up page. 5. Create a [employer] ID. This will be your [employer] login ID and cannot be changed, so think of one that is secure and easy to remember. 6. Create a [employer] password. You can change your password at any time. 7. Enter your Password Reset Question and Answer. This can be used at a later time if you forget your password. 8. Enter your e-mail address. You will receive e-mail notification when new information is available in [address] 9. Click Sign Up. You can now view and download portions of your medical record. 10. Click the Download Summary menu link to download a portable copy of your medical information. If you have questions, please visit the Frequently Asked Questions section of the [employer] website. Remember, [employer] is NOT to be used for urgent needs. For medical emergencies, dial 911. Now available from your iPhone and Android! General Information Please provide this summary of care documentation to your next provider. Patient Signature:  ____________________________________________________________ Date:  ____________________________________________________________  
  
Aloma Snellen Provider Signature:  ____________________________________________________________ Date:  ____________________________________________________________

## 2017-03-16 NOTE — PROGRESS NOTES
Admission Medication Reconciliation:    Information obtained from: patient and wife     Significant PMH/Disease States:   Past Medical History:   Diagnosis Date    CAD (coronary artery disease)     [facility]     Diabetes mellitus, type 2 [facility]     Hypercholesteremia     Hypertension     PVC's (premature ventricular contractions) 5/26/2014    PVD (peripheral vascular disease) [facility])        Chief Complaint for this Admission:  malaise, weakness, dizziness     Allergies:  Lisinopril    Prior to Admission Medications:   Prior to Admission Medications   Prescriptions Last Dose Informant Patient Reported? Taking? amLODIPine (NORVASC) 10 mg tablet 3/15/2017  Yes Yes   Sig: Take 10 mg by mouth daily. amoxicillin-clavulanate (AUGMENTIN) 875-125 mg per tablet 3/15/2017  Yes Yes   Sig: Take 1 Tab by mouth two (2) times a day. Indications: Skin and Skin Structure Infection   aspirin delayed-release 81 mg tablet 3/15/2017  Yes Yes   Sig: Take 81 mg by mouth daily. carvedilol (COREG) 12.5 mg tablet 3/15/2017  Yes Yes   Sig: Take 12.5 mg by mouth two (2) times a day. cinnamon bark (CINNAMON) 500 mg cap 3/15/2017  Yes Yes   Sig: Take 500 mg by mouth two (2) times a day. cloNIDine HCl (CATAPRES) 0.1 mg tablet 3/15/2017  No Yes   Sig: Take 1 Tab by mouth two (2) times a day. clopidogrel (PLAVIX) 75 mg tab 3/15/2017  No Yes   Sig: Take 1 Tab by mouth daily for 360 days. glipiZIDE (GLUCOTROL) 10 mg tablet 3/15/2017  Yes Yes   Sig: Take 20 mg by mouth two (2) times a day. hydrochlorothiazide (HYDRODIURIL) 25 mg tablet 3/15/2017  Yes Yes   Sig: Take 25 mg by mouth daily. metFORMIN (GLUCOPHAGE) 1,000 mg tablet 3/15/2017  No Yes   Sig: Take 1 Tab by mouth two (2) times daily (with meals). mupirocin (BACTROBAN) 2 % ointment   Yes Yes   Sig: Apply  to affected area two (2) times a day.  Indications: MINOR BACTERIAL SKIN INFECTIONS   omega 3-dha-epa-fish oil (FISH OIL) 100-160-1,000 mg cap 3/15/2017  Yes Yes   Sig: Take 1 Cap by mouth two (2) times a day. pioglitazone (ACTOS) 45 mg tablet 3/15/2017  Yes Yes   Sig: Take 45 mg by mouth daily. promethazine-codeine (PHENERGAN WITH CODEINE) 6.25-10 mg/5 mL syrup   Yes Yes   Sig: Take 5 mL by mouth every six (6) hours as needed for Cough. simvastatin (ZOCOR) 20 mg tablet 3/15/2017  Yes Yes   Sig: Take 20 mg by mouth nightly. trimethoprim-sulfamethoxazole (BACTRIM DS) 160-800 mg per tablet 3/15/2017  Yes Yes   Sig: Take 1 Tab by mouth two (2) times a day. Indications: Skin and Skin Structure Infection   valsartan (DIOVAN) 320 mg tablet 3/15/2017  Yes Yes   Sig: Take 320 mg by mouth daily. Facility-Administered Medications: None         Comments/Recommendations: This medication history was obtained from the patient and his wife; they appear to be fair historians. They gave a list of medications to EMS but it was not given to staff at [facility]. An RX Query is available for most of his medications. Inpatient orders were reviewed and no changes are needed. Medications added: Bactrim, Augmentin (facial cellulitis, tx through 3/22/17), promethazine-codeine, simvastatin  Medications deleted: atorvastatin    Thank you for allowing me to participate in the care of this patient. Please contact the pharmacy () or the medication reconciliation pharmacy () with any questions. Carlos Matthews Pharm. D., BCPS, BCPPS

## 2017-03-17 LAB
ALBUMIN SERPL BCP-MCNC: 2.3 G/DL (ref 3.5–5)
ALBUMIN/GLOB SERPL: 0.7 {RATIO} (ref 1.1–2.2)
ALP SERPL-CCNC: 42 U/L (ref 45–117)
ALT SERPL-CCNC: 28 U/L (ref 12–78)
ANION GAP BLD CALC-SCNC: 9 MMOL/L (ref 5–15)
AST SERPL W P-5'-P-CCNC: 38 U/L (ref 15–37)
BASOPHILS # BLD AUTO: 0 K/UL (ref 0–0.1)
BASOPHILS # BLD: 1 % (ref 0–1)
BILIRUB SERPL-MCNC: 0.3 MG/DL (ref 0.2–1)
BUN SERPL-MCNC: 33 MG/DL (ref 6–20)
BUN/CREAT SERPL: 16 (ref 12–20)
CALCIUM SERPL-MCNC: 7.8 MG/DL (ref 8.5–10.1)
CHLORIDE SERPL-SCNC: 103 MMOL/L (ref 97–108)
CO2 SERPL-SCNC: 24 MMOL/L (ref 21–32)
CREAT SERPL-MCNC: 2.02 MG/DL (ref 0.7–1.3)
EOSINOPHIL # BLD: 0.1 K/UL (ref 0–0.4)
EOSINOPHIL NFR BLD: 2 % (ref 0–7)
ERYTHROCYTE [DISTWIDTH] IN BLOOD BY AUTOMATED COUNT: 14.5 % (ref 11.5–14.5)
GLOBULIN SER CALC-MCNC: 3.5 G/DL (ref 2–4)
GLUCOSE BLD STRIP.AUTO-MCNC: 117 MG/DL (ref 65–100)
GLUCOSE BLD STRIP.AUTO-MCNC: 216 MG/DL (ref 65–100)
GLUCOSE BLD STRIP.AUTO-MCNC: 226 MG/DL (ref 65–100)
GLUCOSE BLD STRIP.AUTO-MCNC: 227 MG/DL (ref 65–100)
GLUCOSE SERPL-MCNC: 108 MG/DL (ref 65–100)
HCT VFR BLD AUTO: 27.7 % (ref 36.6–50.3)
HGB BLD-MCNC: 9.2 G/DL (ref 12.1–17)
LYMPHOCYTES # BLD AUTO: 23 % (ref 12–49)
LYMPHOCYTES # BLD: 1.4 K/UL (ref 0.8–3.5)
MCH RBC QN AUTO: 28.3 PG (ref 26–34)
MCHC RBC AUTO-ENTMCNC: 33.2 G/DL (ref 30–36.5)
MCV RBC AUTO: 85.2 FL (ref 80–99)
MONOCYTES # BLD: 0.8 K/UL (ref 0–1)
MONOCYTES NFR BLD AUTO: 14 % (ref 5–13)
NEUTS SEG # BLD: 3.6 K/UL (ref 1.8–8)
NEUTS SEG NFR BLD AUTO: 60 % (ref 32–75)
PLATELET # BLD AUTO: 204 K/UL (ref 150–400)
POTASSIUM SERPL-SCNC: 3.3 MMOL/L (ref 3.5–5.1)
PROT SERPL-MCNC: 5.8 G/DL (ref 6.4–8.2)
RBC # BLD AUTO: 3.25 M/UL (ref 4.1–5.7)
SERVICE CMNT-IMP: ABNORMAL
SODIUM SERPL-SCNC: 136 MMOL/L (ref 136–145)
WBC # BLD AUTO: 6 K/UL (ref 4.1–11.1)

## 2017-03-17 PROCEDURE — 97116 GAIT TRAINING THERAPY: CPT

## 2017-03-17 PROCEDURE — 74011250636 HC RX REV CODE- 250/636: Performed by: FAMILY MEDICINE

## 2017-03-17 PROCEDURE — 74011250637 HC RX REV CODE- 250/637: Performed by: FAMILY MEDICINE

## 2017-03-17 PROCEDURE — 36415 COLL VENOUS BLD VENIPUNCTURE: CPT | Performed by: FAMILY MEDICINE

## 2017-03-17 PROCEDURE — 80053 COMPREHEN METABOLIC PANEL: CPT | Performed by: FAMILY MEDICINE

## 2017-03-17 PROCEDURE — 65270000029 HC RM PRIVATE

## 2017-03-17 PROCEDURE — 85025 COMPLETE CBC W/AUTO DIFF WBC: CPT | Performed by: FAMILY MEDICINE

## 2017-03-17 PROCEDURE — 99218 HC RM OBSERVATION: CPT

## 2017-03-17 PROCEDURE — 82962 GLUCOSE BLOOD TEST: CPT

## 2017-03-17 RX ORDER — CARVEDILOL 12.5 MG/1
25 TABLET ORAL 2 TIMES DAILY
Status: DISCONTINUED | OUTPATIENT
Start: 2017-03-17 | End: 2017-03-18 | Stop reason: HOSPADM

## 2017-03-17 RX ORDER — POTASSIUM CHLORIDE 750 MG/1
40 TABLET, FILM COATED, EXTENDED RELEASE ORAL
Status: COMPLETED | OUTPATIENT
Start: 2017-03-17 | End: 2017-03-17

## 2017-03-17 RX ORDER — CARVEDILOL 12.5 MG/1
12.5 TABLET ORAL ONCE
Status: COMPLETED | OUTPATIENT
Start: 2017-03-17 | End: 2017-03-17

## 2017-03-17 RX ADMIN — ENOXAPARIN SODIUM 30 MG: 30 INJECTION SUBCUTANEOUS at 12:00

## 2017-03-17 RX ADMIN — Medication 10 ML: at 22:29

## 2017-03-17 RX ADMIN — CARVEDILOL 12.5 MG: 12.5 TABLET, FILM COATED ORAL at 09:19

## 2017-03-17 RX ADMIN — Medication 10 ML: at 06:40

## 2017-03-17 RX ADMIN — MUPIROCIN: 20 OINTMENT TOPICAL at 09:07

## 2017-03-17 RX ADMIN — CARVEDILOL 25 MG: 12.5 TABLET, FILM COATED ORAL at 17:21

## 2017-03-17 RX ADMIN — SODIUM CHLORIDE 50 ML/HR: 900 INJECTION, SOLUTION INTRAVENOUS at 06:47

## 2017-03-17 RX ADMIN — VANCOMYCIN HYDROCHLORIDE 125 MG: 1 INJECTION, POWDER, LYOPHILIZED, FOR SOLUTION INTRAVENOUS at 06:40

## 2017-03-17 RX ADMIN — CLOPIDOGREL BISULFATE 75 MG: 75 TABLET, FILM COATED ORAL at 08:39

## 2017-03-17 RX ADMIN — CARVEDILOL 12.5 MG: 12.5 TABLET, FILM COATED ORAL at 08:39

## 2017-03-17 RX ADMIN — VANCOMYCIN HYDROCHLORIDE 125 MG: 1 INJECTION, POWDER, LYOPHILIZED, FOR SOLUTION INTRAVENOUS at 00:28

## 2017-03-17 RX ADMIN — Medication 10 ML: at 14:00

## 2017-03-17 RX ADMIN — MUPIROCIN: 20 OINTMENT TOPICAL at 17:24

## 2017-03-17 RX ADMIN — CLONIDINE HYDROCHLORIDE 0.1 MG: 0.1 TABLET ORAL at 08:39

## 2017-03-17 RX ADMIN — METRONIDAZOLE 500 MG: 250 TABLET ORAL at 08:39

## 2017-03-17 RX ADMIN — SIMVASTATIN 20 MG: 20 TABLET, FILM COATED ORAL at 21:28

## 2017-03-17 RX ADMIN — AMLODIPINE BESYLATE 10 MG: 5 TABLET ORAL at 08:39

## 2017-03-17 RX ADMIN — POTASSIUM CHLORIDE 40 MEQ: 750 TABLET, FILM COATED, EXTENDED RELEASE ORAL at 09:06

## 2017-03-17 RX ADMIN — CLONIDINE HYDROCHLORIDE 0.1 MG: 0.1 TABLET ORAL at 17:21

## 2017-03-17 RX ADMIN — ASPIRIN 81 MG: 81 TABLET, COATED ORAL at 08:39

## 2017-03-17 RX ADMIN — HYDROCODONE BITARTRATE AND ACETAMINOPHEN 1 TABLET: 5; 325 TABLET ORAL at 22:39

## 2017-03-17 RX ADMIN — VANCOMYCIN HYDROCHLORIDE 125 MG: 1 INJECTION, POWDER, LYOPHILIZED, FOR SOLUTION INTRAVENOUS at 12:01

## 2017-03-17 RX ADMIN — HYDROCODONE BITARTRATE AND ACETAMINOPHEN 1 TABLET: 5; 325 TABLET ORAL at 12:04

## 2017-03-17 RX ADMIN — INSULIN LISPRO 3 UNITS: 100 INJECTION, SOLUTION INTRAVENOUS; SUBCUTANEOUS at 11:59

## 2017-03-17 RX ADMIN — INSULIN LISPRO 3 UNITS: 100 INJECTION, SOLUTION INTRAVENOUS; SUBCUTANEOUS at 17:22

## 2017-03-17 RX ADMIN — INSULIN LISPRO 2 UNITS: 100 INJECTION, SOLUTION INTRAVENOUS; SUBCUTANEOUS at 21:27

## 2017-03-17 NOTE — PROGRESS NOTES
Bedside shift change report given to [address] [place] (oncoming nurse) by Edu Villaseñor (offgoing nurse). Report included the following information SBAR, Kardex, Intake/Output, MAR and Recent Results.

## 2017-03-17 NOTE — CONSULTS
Justification for change of Observation status to Inpatient status    This patient was originally admitted as observation status. After discussion with Attending Physician, the patient now meets for Inpatient Admission in accordance with CMS regulation Section 43 .3. Specifically, patient's stay will be over two midnights. This stay is medically necessary because acute on chronic renal failure and fever up to 101. Etiology of fever to be determined. Edouard Singletary, [address] [place] [address].  Care Management

## 2017-03-17 NOTE — PROGRESS NOTES
Problem: Mobility Impaired (Adult and Pediatric)  Goal: *Acute Goals and Plan of Care (Insert Text)  Physical Therapy Goals  Initiated 3/16/2017  1. Patient will move from supine to sit and sit to supine, scoot up and down and roll side to side in bed with independence within 7 day(s). 2. Patient will transfer from bed to chair and chair to bed with modified independence using the least restrictive device within 7 day(s). 3. Patient will perform sit to stand with modified independence within 7 day(s). 4. Patient will ambulate with modified independence for 250 feet with the least restrictive device within 7 day(s). 5. Patient will complete full Tinetti Balance Assessment in order to determine fall risk within 7 day(s). PHYSICAL THERAPY TREATMENT  Patient: Joe Goldman Sr. [de-identified] y.o. male)  Date: 3/17/2017  Diagnosis: Acute renal failure (ARF) (HCC)  Acute renal failure (ARF) (HCC) Acute renal failure (ARF) (HCC)       Precautions: Fall, Contact      ASSESSMENT:  Patient eager to get OOB and walk. His gait was improved with a walker and he was quite receptive to using one as a temporary assistance. Ordered walker for home use. He walked 150' in the halls and was able to manage getting on and off the commode and managed his own hygiene without loss of balance. HHPT would also be beneficial since they anticipate possible D/C tomorrow. Progression toward goals:  [X]    Improving appropriately and progressing toward goals  [ ]    Improving slowly and progressing toward goals  [ ]    Not making progress toward goals and plan of care will be adjusted       PLAN:  Patient continues to benefit from skilled intervention to address the above impairments. Continue treatment per established plan of care. Discharge Recommendations:  Home Health  Further Equipment Recommendations for Discharge:  rolling walker       SUBJECTIVE:   Patient stated Yefri Morrow would love to get up, I like to move.       OBJECTIVE DATA SUMMARY: Critical Behavior:  Neurologic State: Alert  Orientation Level: Oriented X4  Cognition: Follows commands  Safety/Judgement: Fall prevention, Insight into deficits, Awareness of environment  Functional Mobility Training:  Bed Mobility:     Supine to Sit: Contact guard assistance  Sit to Supine: Contact guard assistance           Transfers:  Sit to Stand: Contact guard assistance  Stand to Sit: Contact guard assistance                             Balance:  Sitting: Intact  Standing: Intact; With support  Ambulation/Gait Training:  Distance (ft): 150 Feet (ft)  Assistive Device: Gait belt;Walker, rolling  Ambulation - Level of Assistance: Contact guard assistance; Additional time; Adaptive equipment        Gait Abnormalities: Decreased step clearance; Path deviations;Trunk sway increased        Base of Support: Widened     Speed/Karla: Slow  Step Length: Left shortened;Right shortened                 Pain:  Pain Scale 1: Numeric (0 - 10)  Pain Intensity 1: 0              Activity Tolerance:   Very good  Please refer to the flowsheet for vital signs taken during this treatment.   After treatment:   [X]    Patient left in no apparent distress sitting up in chair  [ ]    Patient left in no apparent distress in bed  [X]    Call bell left within reach  [X]    Nursing notified  [ ]    Caregiver present  [ ]    Bed alarm activated      COMMUNICATION/COLLABORATION:   The patients plan of care was discussed with: Registered Nurse and Physician     Guy Klinefelter, PT   Time Calculation: 32 mins

## 2017-03-17 NOTE — PROGRESS NOTES
Hospitalist Progress Note          Yara Murray MD  Please call  and page for questions. Call physician on-call through the  7pm-7am    Daily Progress Note: 3/17/2017    Primary care provider:Garrett Vasquez MD    Date of admission: 3/16/2017  5:05 AM    Admission summery and hospital course:  [de-identified] y.o. male who presents with weakness this AM. He has the PMH of diabetes, coronary and peripheral arterial disease, and chronic renal insufficiency. Patient was discharged to home on 01/20/2017 after procedure for his peripheral vascular disease. The patient tolerated the procedure well but experienced urinary retention post procedure and required straight catheterization. He was thus admitted overnight. Since his discharge form the hospital, patient has been on multiple rounds of antibiotic including Augmentin, ciprofloxacin, bactrim, flagyl for abdominal gastroenteritis and facial furuncle. This morning, when patient got up to use the restroom, patient felt dizzy and weak. He could not get out of the bed due to generalized weakness. Patient said his blood sugar and blood pressure have been high recently. Subjective: The patient is feeling better today but he still feels weak. Patient said his PO intake is not enough and not feeling well. Assessment/Plan:   KATALINA on CKD: Getting better. Probably due to hypovolemia. Continue IVF for now and avoid nephrotoxic medication. Fever:  Low grade fever since admission. WBC not elevated. Unknown source so far. Because of the recent multiple back to back antibiotic, suspected C diff colitis, but no diarrhea so far. So we will watch him off of the antibiotic now. Follow cultures. UA finding possibly due to antibiotic. Patient is asymptomatic urinary wise. Continue to observe him today. Hyponatremia/hypokalemai: Will continue NS IVF and and repeat CMP at AM. Replaced K. Hypertension: Increased Coreg, BP better now. Hold losartan and continue other BP medications, add hydralazine for PRN. Diabetes mellitus: Will hold his PO medications including metformin, Will do SSI for now. See orders for other plans:   VTE prophylaxis: Lovenox  Code status: Full  Discussed plan of care with Patient/Family and Nurse   Pre-admission lived at home:   Disposition: Probably to home with PT/OT in one or two days. Review of Systems:   No chest pain, palpitation or shortness of breath. Review of Systems:  Symptom  Y/N  Comments   Symptom  Y/N  Comments    Fever/Chills  n    Chest Pain  n    Poor Appetite  y    Edema  n     Cough  n   Abdominal Pain  n     Sputum  n   Joint Pain  n    SOB/AGUILAR  n   Pruritis/Rash      Nausea/vomit  n   Tolerating PT/OT      Diarrhea  n   Tolerating Diet      Constipation  n   Other      Could not obtain due to:         Objective:   Physical Exam:     Visit Vitals    /45 (BP 1 Location: Right arm, BP Patient Position: At rest)    Pulse 66    Temp 98.9 °F (37.2 °C)    Resp 18    Ht 5' 9\" (1.753 m)    Wt 85.4 kg (188 lb 4.4 oz)    SpO2 99%    BMI 27.8 kg/m2      O2 Device: Room air    Temp (24hrs), Av.2 °F (37.3 °C), Min:98.9 °F (37.2 °C), Max:99.5 °F (37.5 °C)     07 -  190  In: -   Out: 200 [Urine:200]   03/15 1901 -  0700  In: 929 [P.O.:200; I.V.:729]  Out: 1550 [CXEUX:2566]      General:  Alert, cooperative, no distress, appears stated age. Lungs:   Clear to auscultation bilaterally. Heart:  Regular rate and rhythm, S1, S2 normal, no murmur. Abdomen:   Soft, non-tender. Bowel sounds normal.    Extremities: Extremities normal, atraumatic, no cyanosis or edema. Pulses: 2+ and symmetric all extremities. Skin: Skin color, texture, turgor normal. No rashes or lesions   Neurologic: CNII-XII intact.       Data Review:       Recent Days:  Recent Labs      17   0327  17   0514   WBC  6.0  9.1   HGB  9.2*  10.4*   HCT  27.7*  31.1*   PLT  204  251 Recent Labs      03/17/17   0327  03/16/17   0514   NA  136  128*   K  3.3*  3.8   CL  103  94*   CO2  24  24   GLU  108*  237*   BUN  33*  38*   CREA  2.02*  2.68*   CA  7.8*  8.7   ALB  2.3*  3.1*   SGOT  38*  25   ALT  28  28   INR   --   1.1     No results for input(s): PH, PCO2, PO2, HCO3, FIO2 in the last 72 hours. 24 Hour Results:  Recent Results (from the past 24 hour(s))   GLUCOSE, POC    Collection Time: 03/16/17  4:31 PM   Result Value Ref Range    Glucose (POC) 81 65 - 100 mg/dL    Performed by Brad Etienne    GLUCOSE, POC    Collection Time: 03/16/17  9:10 PM   Result Value Ref Range    Glucose (POC) 118 (H) 65 - 100 mg/dL    Performed by [address] [place] [address], COMPREHENSIVE    Collection Time: 03/17/17  3:27 AM   Result Value Ref Range    Sodium 136 136 - 145 mmol/L    Potassium 3.3 (L) 3.5 - 5.1 mmol/L    Chloride 103 97 - 108 mmol/L    CO2 24 21 - 32 mmol/L    Anion gap 9 5 - 15 mmol/L    Glucose 108 (H) 65 - 100 mg/dL    BUN 33 (H) 6 - 20 MG/DL    Creatinine 2.02 (H) 0.70 - 1.30 MG/DL    BUN/Creatinine ratio 16 12 - 20      GFR est AA 39 (L) >60 ml/min/1.73m2    GFR est non-AA 32 (L) >60 ml/min/1.73m2    Calcium 7.8 (L) 8.5 - 10.1 MG/DL    Bilirubin, total 0.3 0.2 - 1.0 MG/DL    ALT (SGPT) 28 12 - 78 U/L    AST (SGOT) 38 (H) 15 - 37 U/L    Alk.  phosphatase 42 (L) 45 - 117 U/L    Protein, total 5.8 (L) 6.4 - 8.2 g/dL    Albumin 2.3 (L) 3.5 - 5.0 g/dL    Globulin 3.5 2.0 - 4.0 g/dL    A-G Ratio 0.7 (L) 1.1 - 2.2     CBC WITH AUTOMATED DIFF    Collection Time: 03/17/17  3:27 AM   Result Value Ref Range    WBC 6.0 4.1 - 11.1 K/uL    RBC 3.25 (L) 4.10 - 5.70 M/uL    HGB 9.2 (L) 12.1 - 17.0 g/dL    HCT 27.7 (L) 36.6 - 50.3 %    MCV 85.2 80.0 - 99.0 FL    MCH 28.3 26.0 - 34.0 PG    MCHC 33.2 30.0 - 36.5 g/dL    RDW 14.5 11.5 - 14.5 %    PLATELET 945 746 - 307 K/uL    NEUTROPHILS 60 32 - 75 %    LYMPHOCYTES 23 12 - 49 %    MONOCYTES 14 (H) 5 - 13 %    EOSINOPHILS 2 0 - 7 %    BASOPHILS 1 0 - 1 %    ABS. NEUTROPHILS 3.6 1.8 - 8.0 K/UL    ABS. LYMPHOCYTES 1.4 0.8 - 3.5 K/UL    ABS. MONOCYTES 0.8 0.0 - 1.0 K/UL    ABS. EOSINOPHILS 0.1 0.0 - 0.4 K/UL    ABS.  BASOPHILS 0.0 0.0 - 0.1 K/UL   GLUCOSE, POC    Collection Time: 03/17/17  6:39 AM   Result Value Ref Range    Glucose (POC) 117 (H) 65 - 100 mg/dL    Performed by Simona Mahoney, POC    Collection Time: 03/17/17 11:39 AM   Result Value Ref Range    Glucose (POC) 216 (H) 65 - 100 mg/dL    Performed by Mick Chan        Problem List:  Problem List as of 3/17/2017  Date Reviewed: 1/31/2017          Codes Class Noted - Resolved    Fever ICD-10-CM: R50.9  ICD-9-CM: 780.60  3/16/2017 - Present        Hyponatremia ICD-10-CM: E87.1  ICD-9-CM: 276.1  3/16/2017 - Present        * (Principal)Acute renal failure (ARF) ([facility] ICD-10-CM: N17.9  ICD-9-CM: 584.9  3/16/2017 - Present        CKD (chronic kidney disease) ICD-10-CM: N18.9  ICD-9-CM: 585.9  1/20/2017 - Present        Type 2 diabetes mellitus with diabetic peripheral angiopathy without gangrene ([facility] ICD-10-CM: E11.51  ICD-9-CM: 250.70, 443.81  9/27/2015 - Present        PVC's (premature ventricular contractions) ICD-10-CM: I49.3  ICD-9-CM: 427.69  5/26/2014 - Present        Carotid arterial disease ([facility] ICD-10-CM: I77.9  ICD-9-CM: 447.9  Unknown - Present        Hypercholesteremia ICD-10-CM: E78.00  ICD-9-CM: 272.0  Unknown - Present        PVD (peripheral vascular disease) ([facility] ICD-10-CM: I73.9  ICD-9-CM: 443.9  Unknown - Present        Bradycardia ICD-10-CM: R00.1  ICD-9-CM: 427.89  Unknown - Present        CAD (coronary artery disease) ICD-10-CM: I25.10  ICD-9-CM: 414.00  Unknown - Present        Hypertension, essential, benign ICD-10-CM: I10  ICD-9-CM: 401.1  Unknown - Present        RESOLVED: Urinary retention ICD-10-CM: R33.9  ICD-9-CM: 788.20  1/19/2017 - 1/20/2017        RESOLVED: SOB (shortness of breath) ICD-10-CM: R06.02  ICD-9-CM: 786.05  7/15/2014 - 9/20/2016        RESOLVED: Heart palpitations ICD-10-CM: R00.2  ICD-9-CM: 785.1  7/15/2014 - 9/20/2016        RESOLVED: Atherosclerosis of native artery of extremity with intermittent claudication (HCC) ICD-10-CM: T61.483  ICD-9-CM: 440.21  2/19/2014 - 9/20/2016        RESOLVED: Other dyspnea and respiratory abnormality ICD-10-CM: R06.09, R09.89  ICD-9-CM: 786.09  Unknown - 9/20/2016        RESOLVED: Diabetes mellitus, type 2 (HCC) ICD-10-CM: E11.9  ICD-9-CM: 250.00  Unknown - 9/20/2016              Medications reviewed  Current Facility-Administered Medications   Medication Dose Route Frequency    carvedilol (COREG) tablet 25 mg  25 mg Oral BID    sodium chloride (NS) flush 5-10 mL  5-10 mL IntraVENous PRN    amLODIPine (NORVASC) tablet 10 mg  10 mg Oral DAILY    aspirin delayed-release tablet 81 mg  81 mg Oral DAILY    cloNIDine HCl (CATAPRES) tablet 0.1 mg  0.1 mg Oral BID    clopidogrel (PLAVIX) tablet 75 mg  75 mg Oral DAILY    mupirocin (BACTROBAN) 2 % ointment   Topical BID    simvastatin (ZOCOR) tablet 20 mg  20 mg Oral QHS    hydrALAZINE (APRESOLINE) 20 mg/mL injection 10 mg  10 mg IntraVENous Q6H PRN    0.9% sodium chloride infusion  50 mL/hr IntraVENous CONTINUOUS    metroNIDAZOLE (FLAGYL) tablet 500 mg  500 mg Oral TID    vancomycin 50 mg/mL oral solution (compounded) 125 mg  125 mg Oral Q6H    sodium chloride (NS) flush 5-10 mL  5-10 mL IntraVENous Q8H    sodium chloride (NS) flush 5-10 mL  5-10 mL IntraVENous PRN    ondansetron (ZOFRAN) injection 4 mg  4 mg IntraVENous Q4H PRN    acetaminophen (TYLENOL) tablet 650 mg  650 mg Oral Q4H PRN    HYDROcodone-acetaminophen (NORCO) 5-325 mg per tablet 1 Tab  1 Tab Oral Q4H PRN    HYDROmorphone (PF) (DILAUDID) injection 2 mg  2 mg IntraVENous Q4H PRN    naloxone (NARCAN) injection 0.4 mg  0.4 mg IntraVENous PRN    enoxaparin (LOVENOX) injection 30 mg  30 mg SubCUTAneous Q24H    insulin lispro (HUMALOG) injection   SubCUTAneous AC&HS    glucose chewable tablet 16 g 4 Tab Oral PRN    dextrose (D50W) injection syrg 12.5-25 g  12.5-25 g IntraVENous PRN    glucagon (GLUCAGEN) injection 1 mg  1 mg IntraMUSCular PRN       Care Plan discussed with: Patient/Family and Nurse    Total time spent with patient: 30 minutes.     Judson Griffith MD

## 2017-03-18 VITALS
HEIGHT: 69 IN | RESPIRATION RATE: 18 BRPM | TEMPERATURE: 99 F | DIASTOLIC BLOOD PRESSURE: 75 MMHG | WEIGHT: 188.6 LBS | OXYGEN SATURATION: 96 % | HEART RATE: 66 BPM | BODY MASS INDEX: 27.93 KG/M2 | SYSTOLIC BLOOD PRESSURE: 173 MMHG

## 2017-03-18 PROBLEM — E87.1 HYPONATREMIA: Status: RESOLVED | Noted: 2017-03-16 | Resolved: 2017-03-18

## 2017-03-18 PROBLEM — R50.9 FEVER: Status: RESOLVED | Noted: 2017-03-16 | Resolved: 2017-03-18

## 2017-03-18 LAB
ALBUMIN SERPL BCP-MCNC: 2.4 G/DL (ref 3.5–5)
ALBUMIN/GLOB SERPL: 0.7 {RATIO} (ref 1.1–2.2)
ALP SERPL-CCNC: 45 U/L (ref 45–117)
ALT SERPL-CCNC: 34 U/L (ref 12–78)
ANION GAP BLD CALC-SCNC: 9 MMOL/L (ref 5–15)
AST SERPL W P-5'-P-CCNC: 41 U/L (ref 15–37)
BACTERIA SPEC CULT: ABNORMAL
BASOPHILS # BLD AUTO: 0 K/UL (ref 0–0.1)
BASOPHILS # BLD: 1 % (ref 0–1)
BILIRUB SERPL-MCNC: 0.3 MG/DL (ref 0.2–1)
BUN SERPL-MCNC: 32 MG/DL (ref 6–20)
BUN/CREAT SERPL: 17 (ref 12–20)
CALCIUM SERPL-MCNC: 7.9 MG/DL (ref 8.5–10.1)
CC UR VC: ABNORMAL
CHLORIDE SERPL-SCNC: 105 MMOL/L (ref 97–108)
CO2 SERPL-SCNC: 23 MMOL/L (ref 21–32)
CREAT SERPL-MCNC: 1.86 MG/DL (ref 0.7–1.3)
EOSINOPHIL # BLD: 0.3 K/UL (ref 0–0.4)
EOSINOPHIL NFR BLD: 5 % (ref 0–7)
ERYTHROCYTE [DISTWIDTH] IN BLOOD BY AUTOMATED COUNT: 14.4 % (ref 11.5–14.5)
GLOBULIN SER CALC-MCNC: 3.5 G/DL (ref 2–4)
GLUCOSE BLD STRIP.AUTO-MCNC: 225 MG/DL (ref 65–100)
GLUCOSE BLD STRIP.AUTO-MCNC: 240 MG/DL (ref 65–100)
GLUCOSE SERPL-MCNC: 217 MG/DL (ref 65–100)
HCT VFR BLD AUTO: 28.6 % (ref 36.6–50.3)
HGB BLD-MCNC: 9.3 G/DL (ref 12.1–17)
LYMPHOCYTES # BLD AUTO: 23 % (ref 12–49)
LYMPHOCYTES # BLD: 1.4 K/UL (ref 0.8–3.5)
MAGNESIUM SERPL-MCNC: 1.8 MG/DL (ref 1.6–2.4)
MCH RBC QN AUTO: 28.2 PG (ref 26–34)
MCHC RBC AUTO-ENTMCNC: 32.5 G/DL (ref 30–36.5)
MCV RBC AUTO: 86.7 FL (ref 80–99)
MONOCYTES # BLD: 0.7 K/UL (ref 0–1)
MONOCYTES NFR BLD AUTO: 11 % (ref 5–13)
NEUTS SEG # BLD: 3.8 K/UL (ref 1.8–8)
NEUTS SEG NFR BLD AUTO: 60 % (ref 32–75)
PHOSPHATE SERPL-MCNC: 3 MG/DL (ref 2.6–4.7)
PLATELET # BLD AUTO: 215 K/UL (ref 150–400)
POTASSIUM SERPL-SCNC: 3.8 MMOL/L (ref 3.5–5.1)
PROT SERPL-MCNC: 5.9 G/DL (ref 6.4–8.2)
RBC # BLD AUTO: 3.3 M/UL (ref 4.1–5.7)
SERVICE CMNT-IMP: ABNORMAL
SODIUM SERPL-SCNC: 137 MMOL/L (ref 136–145)
TSH SERPL DL<=0.05 MIU/L-ACNC: 3.14 UIU/ML (ref 0.36–3.74)
WBC # BLD AUTO: 6.2 K/UL (ref 4.1–11.1)

## 2017-03-18 PROCEDURE — 82962 GLUCOSE BLOOD TEST: CPT

## 2017-03-18 PROCEDURE — 84100 ASSAY OF PHOSPHORUS: CPT | Performed by: FAMILY MEDICINE

## 2017-03-18 PROCEDURE — 85025 COMPLETE CBC W/AUTO DIFF WBC: CPT | Performed by: FAMILY MEDICINE

## 2017-03-18 PROCEDURE — 83735 ASSAY OF MAGNESIUM: CPT | Performed by: FAMILY MEDICINE

## 2017-03-18 PROCEDURE — 74011250636 HC RX REV CODE- 250/636: Performed by: FAMILY MEDICINE

## 2017-03-18 PROCEDURE — 80053 COMPREHEN METABOLIC PANEL: CPT | Performed by: FAMILY MEDICINE

## 2017-03-18 PROCEDURE — 74011250637 HC RX REV CODE- 250/637: Performed by: FAMILY MEDICINE

## 2017-03-18 PROCEDURE — 36415 COLL VENOUS BLD VENIPUNCTURE: CPT | Performed by: FAMILY MEDICINE

## 2017-03-18 PROCEDURE — 84443 ASSAY THYROID STIM HORMONE: CPT | Performed by: FAMILY MEDICINE

## 2017-03-18 RX ORDER — VALSARTAN 320 MG/1
320 TABLET ORAL DAILY
Qty: 30 TAB | Refills: 0 | Status: SHIPPED | OUTPATIENT
Start: 2017-03-18 | End: 2017-06-01 | Stop reason: ALTCHOICE

## 2017-03-18 RX ORDER — CARVEDILOL 25 MG/1
25 TABLET ORAL 2 TIMES DAILY
Qty: 60 TAB | Refills: 0 | Status: SHIPPED | OUTPATIENT
Start: 2017-03-18 | End: 2017-03-18

## 2017-03-18 RX ORDER — CARVEDILOL 25 MG/1
25 TABLET ORAL 2 TIMES DAILY
Qty: 60 TAB | Refills: 0 | Status: SHIPPED | OUTPATIENT
Start: 2017-03-18 | End: 2019-05-12

## 2017-03-18 RX ADMIN — INSULIN LISPRO 3 UNITS: 100 INJECTION, SOLUTION INTRAVENOUS; SUBCUTANEOUS at 07:12

## 2017-03-18 RX ADMIN — ENOXAPARIN SODIUM 30 MG: 30 INJECTION SUBCUTANEOUS at 12:23

## 2017-03-18 RX ADMIN — Medication 10 ML: at 06:12

## 2017-03-18 RX ADMIN — CARVEDILOL 25 MG: 12.5 TABLET, FILM COATED ORAL at 09:11

## 2017-03-18 RX ADMIN — MUPIROCIN: 20 OINTMENT TOPICAL at 09:15

## 2017-03-18 RX ADMIN — CLOPIDOGREL BISULFATE 75 MG: 75 TABLET, FILM COATED ORAL at 09:11

## 2017-03-18 RX ADMIN — AMLODIPINE BESYLATE 10 MG: 5 TABLET ORAL at 09:11

## 2017-03-18 RX ADMIN — HYDROCODONE BITARTRATE AND ACETAMINOPHEN 1 TABLET: 5; 325 TABLET ORAL at 06:09

## 2017-03-18 RX ADMIN — ASPIRIN 81 MG: 81 TABLET, COATED ORAL at 09:11

## 2017-03-18 RX ADMIN — SODIUM CHLORIDE 50 ML/HR: 900 INJECTION, SOLUTION INTRAVENOUS at 01:50

## 2017-03-18 RX ADMIN — CLONIDINE HYDROCHLORIDE 0.1 MG: 0.1 TABLET ORAL at 09:11

## 2017-03-18 RX ADMIN — INSULIN LISPRO 3 UNITS: 100 INJECTION, SOLUTION INTRAVENOUS; SUBCUTANEOUS at 12:23

## 2017-03-18 NOTE — PROGRESS NOTES
Bedside shift change report given to Marlo (oncoming nurse) by Elizabeth Bedolla (offgoing nurse). Report included the following information SBAR and Kardex.

## 2017-03-18 NOTE — DISCHARGE INSTRUCTIONS
Discharge Instructions       PATIENT ID: Tucker Taylor MRN: [id number]   YOB: 1935    DATE OF ADMISSION: 3/16/2017  5:05 AM    DATE OF DISCHARGE: 3/18/2017    PRIMARY CARE PROVIDER: Pablo Stacy MD       DISCHARGING PHYSICIAN: Lilliam Lay MD    To contact this individual call [phone] and ask the  to page. If unavailable ask to be transferred the Adult Hospitalist Department. DISCHARGE DIAGNOSES :Acute on chronic renal insufficiency. CONSULTATIONS: None    PROCEDURES/SURGERIES: * No surgery found *    PENDING TEST RESULTS:   At the time of discharge the following test results are still pending: Culture    FOLLOW UP APPOINTMENTS:   Follow-up Information     Follow up With Details Comments Contact Info    Pablo tSacy MD   [address] [facility] [address] [phone]             ADDITIONAL CARE RECOMMENDATIONS:   Please follow up with your primary care provider in in 5 days. Please follow up with PCP for blood test called BMP in 5 days to check the renal status and resuming Losartan if possible. DIET: Cardiac Diet and Diabetic Diet    ACTIVITY: Activity as tolerated      DISCHARGE MEDICATIONS:   See Medication Reconciliation Form    · It is important that you take the medication exactly as they are prescribed. · Keep your medication in the bottles provided by the pharmacist and keep a list of the medication names, dosages, and times to be taken in your wallet. · Do not take other medications without consulting your doctor. NOTIFY YOUR PHYSICIAN FOR ANY OF THE FOLLOWING:   Fever over 101 degrees for 24 hours. Chest pain, shortness of breath, fever, chills, nausea, vomiting, diarrhea, change in mentation, falling, weakness, bleeding. Severe pain or pain not relieved by medications. Or, any other signs or symptoms that you may have questions about.       DISPOSITION:   X Home With:  X OT X PT  HH  RN       SNF/Inpatient Rehab/LTAC Independent/assisted living    Hospice    Other:     CDMP Checked:    Yes X       Signed:   rPavin Rogers MD  3/18/2017  12:17 PM

## 2017-03-18 NOTE — PROGRESS NOTES
Bedside shift change report given to Landon Elizabeth RN (oncoming nurse) by Lisset Loza RN (offgoing nurse). Report included the following information SBAR and MAR.

## 2017-03-18 NOTE — DISCHARGE SUMMARY
Discharge Summary       PATIENT ID: Samuel Landaverde MRN: [id number]   YOB: 1935    DATE OF ADMISSION: 3/16/2017  5:05 AM    DATE OF DISCHARGE:   PRIMARY CARE PROVIDER: Mikayla Lopez MD       DISCHARGING PHYSICIAN: Dorys Marmolejo MD    To contact this individual call [phone] and ask the  to page. If unavailable ask to be transferred the Adult Hospitalist Department. CONSULTATIONS: None    PROCEDURES/SURGERIES: * No surgery found *    ADMITTING DIAGNOSES & HOSPITAL COURSE:   [de-identified] y.o. male who presents with weakness this AM. He has the PMH of diabetes, coronary and peripheral arterial disease, and chronic renal insufficiency. Patient was discharged to home on 01/20/2017 after procedure for his peripheral vascular disease. The patient tolerated the procedure well but experienced urinary retention post procedure and required straight catheterization. He was thus admitted overnight. Since his discharge form the hospital, patient has been on multiple rounds of antibiotic including Augmentin, ciprofloxacin, bactrim, flagyl for abdominal gastroenteritis and facial furuncle. This morning, when patient got up to use the restroom, patient felt dizzy and weak. He could not get out of the bed due to generalized weakness. Patient said his blood sugar and blood pressure have been high recently. 03/18: Patient said he is doing better. He was able to walk in the hallway independently. Patient said he is eating better now and drinking fluid better now. His wife is at the bedside. They would like to go home today. DISCHARGE DIAGNOSES / PLAN:      KATALNIA on CKD: Getting better. Probably due to hypovolemia. Continued IVF . Will continue to hold Losartan and check BMP in 5 days before restarting again. Will resume metformin now with close follow up with BMP.       Fever:   No fever since admission. WBC not elevated. Unknown source so far.  Because of the recent multiple back to back antibiotic, suspected C diff colitis, but no diarrhea so far. So we will watch him off of the antibiotic now. Cultures are negative. UA finding of candida is possibly due to antibiotic and patient is not symptomatic at this time. Hyponatremia/hypokalemai: Resolve. With NS IVF and electrolytes replacement. Hypertension: Increased Coreg, BP better now. Hold losartan and continue other BP medications, add hydralazine for PRN. Diabetes mellitus:   Continue home medications including metformin and follow BMP at outpatient.         See orders for other plans:   VTE prophylaxis: Lovenox  Code status: Full  Discussed plan of care with Patient/Family and Nurse   Pre-admission lived at home:   Disposition: To home today with PT/OT. PENDING TEST RESULTS:   At the time of discharge the following test results are still pending: Cultures    FOLLOW UP APPOINTMENTS:    Follow-up Information     Follow up With Details Comments Contact Info    Dominic Hedrick MD   [address] [facility] [address] [phone]                   DISCHARGE MEDICATIONS:  Current Discharge Medication List      CONTINUE these medications which have CHANGED    Details   carvedilol (COREG) 25 mg tablet Take 1 Tab by mouth two (2) times a day. Qty: 60 Tab, Refills: 0      valsartan (DIOVAN) 320 mg tablet Take 1 Tab by mouth daily. Please start taking after 5 days of discharge and blood test called [facility]. Qty: 30 Tab, Refills: 0         CONTINUE these medications which have NOT CHANGED    Details   aspirin delayed-release 81 mg tablet Take 81 mg by mouth daily. simvastatin (ZOCOR) 20 mg tablet Take 20 mg by mouth nightly. omega 3-dha-epa-fish oil (FISH OIL) 100-160-1,000 mg cap Take 1 Cap by mouth two (2) times a day. mupirocin (BACTROBAN) 2 % ointment Apply  to affected area two (2) times a day.  Indications: MINOR BACTERIAL SKIN INFECTIONS      promethazine-codeine (PHENERGAN WITH CODEINE) 6.25-10 mg/5 mL syrup Take 5 mL by mouth every six (6) hours as needed for Cough. cloNIDine HCl (CATAPRES) 0.1 mg tablet Take 1 Tab by mouth two (2) times a day. Qty: 60 Tab, Refills: 2      clopidogrel (PLAVIX) 75 mg tab Take 1 Tab by mouth daily for 360 days. Qty: 30 Tab, Refills: 3      metFORMIN (GLUCOPHAGE) 1,000 mg tablet Take 1 Tab by mouth two (2) times daily (with meals). Qty: 30 Tab, Refills: 1    Comments: Restart 12/26      pioglitazone (ACTOS) 45 mg tablet Take 45 mg by mouth daily. amLODIPine (NORVASC) 10 mg tablet Take 10 mg by mouth daily. cinnamon bark (CINNAMON) 500 mg cap Take 500 mg by mouth two (2) times a day. hydrochlorothiazide (HYDRODIURIL) 25 mg tablet Take 25 mg by mouth daily. Associated Diagnoses: Hypertension      glipiZIDE (GLUCOTROL) 10 mg tablet Take 20 mg by mouth two (2) times a day. STOP taking these medications       trimethoprim-sulfamethoxazole (BACTRIM DS) 160-800 mg per tablet Comments:   Reason for Stopping:         amoxicillin-clavulanate (AUGMENTIN) 875-125 mg per tablet Comments:   Reason for Stopping:                 NOTIFY YOUR PHYSICIAN FOR ANY OF THE FOLLOWING:   Fever over 101 degrees for 24 hours. Chest pain, shortness of breath, fever, chills, nausea, vomiting, diarrhea, change in mentation, falling, weakness, bleeding. Severe pain or pain not relieved by medications. Or, any other signs or symptoms that you may have questions about. DISPOSITION:    Home With:   OT  PT  HH  RN       Long term SNF/Inpatient Rehab    Independent/assisted living    Hospice    Other:       PATIENT CONDITION AT DISCHARGE:     Functional status    Poor     Deconditioned     Independent      Cognition     Lucid     Forgetful     Dementia      Catheters/lines (plus indication)    Myers     PICC     PEG     None      Code status     Full code     DNR      PHYSICAL EXAMINATION AT DISCHARGE:     General:  Alert, cooperative, no distress, appears stated age.    Lungs:  Clear to auscultation bilaterally. Heart:  Regular rate and rhythm, S1, S2 normal, no murmur. Abdomen:  Soft, non-tender. Bowel sounds normal.    Extremities: Extremities normal, atraumatic, no cyanosis or edema. Pulses: 2+ and symmetric all extremities.    Skin: Skin color, texture, turgor normal. No rashes or lesions   Neurologic: CNII-XII intact.             CHRONIC MEDICAL DIAGNOSES:  Problem List as of 3/18/2017  Date Reviewed: 1/31/2017          Codes Class Noted - Resolved    * (Principal)Acute renal failure (ARF) ([facility] ICD-10-CM: N17.9  ICD-9-CM: 584.9  3/16/2017 - Present        CKD (chronic kidney disease) ICD-10-CM: N18.9  ICD-9-CM: 585.9  1/20/2017 - Present        Type 2 diabetes mellitus with diabetic peripheral angiopathy without gangrene ([facility] ICD-10-CM: E11.51  ICD-9-CM: 250.70, 443.81  9/27/2015 - Present        PVC's (premature ventricular contractions) ICD-10-CM: I49.3  ICD-9-CM: 427.69  5/26/2014 - Present        Carotid arterial disease (HCC) ICD-10-CM: I77.9  ICD-9-CM: 447.9  Unknown - Present        Hypercholesteremia ICD-10-CM: E78.00  ICD-9-CM: 272.0  Unknown - Present        PVD (peripheral vascular disease) ([facility]) ICD-10-CM: I73.9  ICD-9-CM: 443.9  Unknown - Present        Bradycardia ICD-10-CM: R00.1  ICD-9-CM: 427.89  Unknown - Present        CAD (coronary artery disease) ICD-10-CM: I25.10  ICD-9-CM: 414.00  Unknown - Present        Hypertension, essential, benign ICD-10-CM: I10  ICD-9-CM: 401.1  Unknown - Present        RESOLVED: Fever ICD-10-CM: R50.9  ICD-9-CM: 780.60  3/16/2017 - 3/18/2017        RESOLVED: Hyponatremia ICD-10-CM: E87.1  ICD-9-CM: 276.1  3/16/2017 - 3/18/2017        RESOLVED: Urinary retention ICD-10-CM: R33.9  ICD-9-CM: 788.20  1/19/2017 - 1/20/2017        RESOLVED: SOB (shortness of breath) ICD-10-CM: R06.02  ICD-9-CM: 786.05  7/15/2014 - 9/20/2016        RESOLVED: Heart palpitations ICD-10-CM: R00.2  ICD-9-CM: 785.1  7/15/2014 - 9/20/2016        RESOLVED: Atherosclerosis of native artery of extremity with intermittent claudication ([facility] ICD-10-CM: V35.752  ICD-9-CM: 440.21  2/19/2014 - 9/20/2016        RESOLVED: Other dyspnea and respiratory abnormality ICD-10-CM: R06.09, R09.89  ICD-9-CM: 786.09  Unknown - 9/20/2016        RESOLVED: Diabetes mellitus, type 2 ([facility] ICD-10-CM: E11.9  ICD-9-CM: 250.00  Unknown - 9/20/2016              Greater than 35  minutes were spent with the patient on counseling and coordination of care    Signed:   Erica Waters MD  3/18/2017  1:43 PM

## 2017-03-21 LAB
BACTERIA SPEC CULT: NORMAL
SERVICE CMNT-IMP: NORMAL

## 2017-04-24 ENCOUNTER — OFFICE VISIT (OUTPATIENT)
Dept: CARDIOLOGY CLINIC | Age: 82
End: 2017-04-24

## 2017-04-24 VITALS
WEIGHT: 180.4 LBS | RESPIRATION RATE: 16 BRPM | OXYGEN SATURATION: 99 % | HEIGHT: 69 IN | BODY MASS INDEX: 26.72 KG/M2 | SYSTOLIC BLOOD PRESSURE: 138 MMHG | HEART RATE: 57 BPM | DIASTOLIC BLOOD PRESSURE: 80 MMHG

## 2017-04-24 DIAGNOSIS — E78.00 HYPERCHOLESTEREMIA: ICD-10-CM

## 2017-04-24 DIAGNOSIS — R06.02 SOB (SHORTNESS OF BREATH): Primary | ICD-10-CM

## 2017-04-24 DIAGNOSIS — I25.10 CORONARY ARTERY DISEASE INVOLVING NATIVE CORONARY ARTERY OF NATIVE HEART WITHOUT ANGINA PECTORIS: ICD-10-CM

## 2017-04-24 DIAGNOSIS — I49.3 PVC'S (PREMATURE VENTRICULAR CONTRACTIONS): ICD-10-CM

## 2017-04-24 DIAGNOSIS — I10 HYPERTENSION, ESSENTIAL, BENIGN: ICD-10-CM

## 2017-04-24 DIAGNOSIS — N18.3 CKD (CHRONIC KIDNEY DISEASE), STAGE 3 (MODERATE): ICD-10-CM

## 2017-04-24 DIAGNOSIS — I77.9 BILATERAL CAROTID ARTERY DISEASE (HCC): ICD-10-CM

## 2017-04-24 DIAGNOSIS — I73.9 PVD (PERIPHERAL VASCULAR DISEASE) (HCC): ICD-10-CM

## 2017-04-24 NOTE — PROGRESS NOTES
25 [facility]     1935       David Grace MD, [facility] [place]  Date of Visit-4/24/2017   PCP is Tyler Scott MD   [facility] [place]  [facility]  HPI:  25 [facility]. is a [de-identified] y.o. male     Follow up of CAD. Had PAD testing December  with both arteries severely diseased with resting BERNARDO of 0.56 on the right and 0.62 on the left. notes walking short distance causes severe pain in legs. Prior bare metal leg stents 2011,2014    Dec 22nd had  Peripheral Angiogram and orbital atherectomy of left popliteal and tibioperoneal trunk, followed by drug-eluting balloon both lesions. 99% stenoses both of the above locations, treated with orbital atherectomy for heavy calcium, followed by NISHA good result. Noted to have short total occlusion right popliteal.  Then Jan 19th:100% occlusion right popliteal with heavy calcium, 70% mid SFA, 80-90% ostium right SFA with very heavy calcium. All lesions treated with orbital atherectomy, distal 2 lesions with drug-coated balloon, ostial SFA with 7 mm coated stent. Good result. Total contrast about 200 cc, so will admit for hydration overnight given CKD.    Has had variable bp  Then admitted 3/16/17 with weakness with KATALINA due to hypovolemia with Losartan helf  Cr peaked at 2.68 on 3-16 then down to 1.86 on 18th  Note new anemia Hgb in mid 9s    In talking to him and his wife, he says he got an bowel infection a in mid Feb and 2 courses of antibiotics then boil like skin infection at left cheek-furuncle  Then got weak and went to hospital after a fall 3-16  There studies showed no diarrhea and fever resolved  Since hospital stay he has seen Tyler Scott MD and had labs with improvement in kidney function  Still however feels tired, if he walks, he wants to grab a chair any chance he gets  Has no claudication at one mile  No chest pain, edema or heart palpitations    Assessment/Plan:      CAD  -native  -no angina   -continue BB, ASA, and statin    PVD-Plavix   -1 block claudication with BERNARDO abnormal-now post several procedures- back to no limitation      HTN-labile- variable 220-90 , keep home diary ,now normal at 138 range  CKD- and anemia likely due to CKD chornic  -on list on Diovan, Coreg, clonidine,amlodipine,HCT-5 drugs    DM-on metformin,Actos-followed/defer to Dr Alex Tompkins   -on high potency statin  Mild to mod Carotid dz -medical therapy    Recent infection, dehydration, anemia-improved but still not to baseline    Check echo for EF, Iron panel, Hgb and repeat Cr in 6 weeks with OV          1. SOB (shortness of breath)    2. Coronary artery disease involving native coronary artery of native heart without angina pectoris    3. PVC's (premature ventricular contractions)    4. Hypercholesteremia    5. Hypertension, essential, benign    6. PVD (peripheral vascular disease) ([facility]    7. CKD (chronic kidney disease), stage 3 (moderate)    8. Bilateral carotid artery disease (HCC)       Cardiac History:   Holter monitor4/29/14=showing rate of  with frequent PACs, rare periods of short RP interval SVT up to 122 and frequent PVCs. sinus rhythm with similar findings, frequent PVCs and short RP interval tachycardia. NUKE  2/11/14,  2 minutes, 20 seconds,  EF of 55% with no ischemia. PVD= Dr. Martha Dooley atherectomy to distal SFA and distal popliteal with angioplasty to both lesions and stent to the SFA by Dr. Martha Dooley on 3/14/14. Previously had AIBs of 0.67 on the left, 0.71 on the right with the right seemingly due to distal disease. CAD/CABG off pump x3 3/2008 . =post op anemia and renal failure  CATH March 7, 2008= severe three-vessel left main coronary artery disease, 40% disease of the left main and take off of the LAD and circumflex complex, 70% stenosis of the ostial circ and 85% of the LAD.  Between the first and second marginal, the circumflex had 70% stenosis and between the third marginal and PDA there was a 70% stenosis, LAD ostial lesion RCA proximal 60% tapering, EF 60%-70%, bilaterally patent renal arteries, mild atherosclerosis of the distal abdominal aorta. Mild carotid artery disease 3-7-08 then 12 with 10-49% left, less than 10% on right  Dyslipidemia 10-25-10  TG 91 HDL 40 LDL 48  SOCIAL: Drinks no alcohol, quit smoking several years ago, works as an . Lives with his wife and has four children. Enjoys gardening, fishing and music. FAMILY HISTORY: Mother  of cancer at 76, father  of Parkinson's at 70, one brother  of cancer of the liver at 76 and one  of an infection at 64. ROS-except as noted above. . A complete cardiac and respiratory are reviewed and negative except as above ; Resp-denies wheezing  or productive cough,. Const- No unusual weight loss or fever; Neuro-no recent seizure or CVA ; GI- No BRBPR, abdom pain, bloating ; - no  hematuria   see supplement sheet, initialed and to be scanned by staff  Past Medical History:   Diagnosis Date    CAD (coronary artery disease)     [facility] ([facility]     Diabetes mellitus, type 2 ([facility]     Hypercholesteremia     Hypertension     PVC's (premature ventricular contractions) 2014    PVD (peripheral vascular disease) ([facility]       Social Hx= reports that he quit smoking about 32 years ago. His smoking use included Cigarettes. He has a 60.00 pack-year smoking history. He has never used smokeless tobacco. He reports that he does not drink alcohol or use illicit drugs. Exam and Labs:    Visit Vitals    /80 (BP 1 Location: Left arm, BP Patient Position: Sitting)    Pulse (!) 57    Resp 16    Ht 5' 9\" (1.753 m)    Wt 180 lb 6.4 oz (81.8 kg)    SpO2 99%    BMI 26.64 kg/m2      Constitutional:  NAD, comfortable  Head: NC,AT. Eyes: No scleral icterus. Neck:  Neck supple. No JVD present. Throat: moist mucous membranes. Chest: Effort normal & normal respiratory excursion . Neurological: alert, conversant and oriented . Skin: Skin is not cold. No obvious systemic rash noted. Not diaphoretic. No erythema. Psychiatric:  Grossly normal mood and affect. Behavior appears normal. Extremities:  no clubbing or cyanosis. Abdomen: non distended    Lungs:breath sounds normal. No stridor. distress, wheezes or  Rales. Heart:    normal rate, regular rhythm, normal S1, S2, 1/6 murmur at RUSB, rubs, clicks or gallops , PMI non displaced. Edema: Edema is none. Wt Readings from Last 3 Encounters:   04/24/17 180 lb 6.4 oz (81.8 kg)   03/18/17 188 lb 9.6 oz (85.5 kg)   01/31/17 189 lb 9.6 oz (86 kg)      BP Readings from Last 3 Encounters:   04/24/17 138/80   03/18/17 173/75   01/31/17 168/70        Current Outpatient Prescriptions   Medication Sig    carvedilol (COREG) 25 mg tablet Take 1 Tab by mouth two (2) times a day.  aspirin delayed-release 81 mg tablet Take 81 mg by mouth daily.  simvastatin (ZOCOR) 20 mg tablet Take 20 mg by mouth nightly.  omega 3-dha-epa-fish oil (FISH OIL) 100-160-1,000 mg cap Take 1 Cap by mouth two (2) times a day.  cloNIDine HCl (CATAPRES) 0.1 mg tablet Take 1 Tab by mouth two (2) times a day.  metFORMIN (GLUCOPHAGE) 1,000 mg tablet Take 1 Tab by mouth two (2) times daily (with meals).  pioglitazone (ACTOS) 45 mg tablet Take 45 mg by mouth daily.  amLODIPine (NORVASC) 10 mg tablet Take 10 mg by mouth daily.  cinnamon bark (CINNAMON) 500 mg cap Take 500 mg by mouth two (2) times a day.  hydrochlorothiazide (HYDRODIURIL) 25 mg tablet Take 25 mg by mouth daily.  glipiZIDE (GLUCOTROL) 10 mg tablet Take 20 mg by mouth two (2) times a day.  valsartan (DIOVAN) 320 mg tablet Take 1 Tab by mouth daily. Please start taking after 5 days of discharge and blood test called BMP.  mupirocin (BACTROBAN) 2 % ointment Apply  to affected area two (2) times a day.  Indications: MINOR BACTERIAL SKIN INFECTIONS    promethazine-codeine (PHENERGAN WITH CODEINE) 6.25-10 mg/5 mL syrup Take 5 mL by mouth every six (6) hours as needed for Cough.  clopidogrel (PLAVIX) 75 mg tab Take 1 Tab by mouth daily for 360 days. No current facility-administered medications for this visit. Impression see above.

## 2017-04-24 NOTE — MR AVS SNAPSHOT
Visit Information Date & Time Provider Department Dept. Phone Encounter #  
 4/24/2017 11:20 AM Rich Connell MD [facility] [place] [phone] [id number] Upcoming Health Maintenance Date Due  
 FOOT EXAM Q1 8/28/1945 EYE EXAM RETINAL OR DILATED Q1 8/28/1946 DTaP/Tdap/Td series (1 - Tdap) 8/28/1957 ZOSTER VACCINE AGE 60> 8/28/1996 GLAUCOMA SCREENING Q2Y 8/28/2001 Pneumococcal 65+ High/Highest Risk (1 of 2 - PCV13) 8/28/2001 MEDICARE YEARLY EXAM 8/28/2001 HEMOGLOBIN A1C Q6M 4/25/2011 MICROALBUMIN Q1 8/3/2011 LIPID PANEL Q1 10/25/2011 Allergies as of 4/24/2017  Review Complete On: 4/24/2017 By: Rich Connell MD  
  
 Severity Noted Reaction Type Reactions Lisinopril  09/20/2016    Cough Current Immunizations  Reviewed on 1/20/2017 Name Date Influenza Vaccine 11/20/2016 Not reviewed this visit You Were Diagnosed With   
  
 Codes Comments SOB (shortness of breath)    -  Primary ICD-10-CM: R06.02 
ICD-9-CM: 786.05 Coronary artery disease involving native coronary artery of native heart without angina pectoris     ICD-10-CM: I25.10 ICD-9-CM: 414.01   
 PVC's (premature ventricular contractions)     ICD-10-CM: I49.3 ICD-9-CM: 427.69 Hypercholesteremia     ICD-10-CM: E78.00 ICD-9-CM: 272.0 Hypertension, essential, benign     ICD-10-CM: I10 
ICD-9-CM: 401.1 PVD (peripheral vascular disease) ([facility]     ICD-10-CM: I73.9 ICD-9-CM: 443.9 CKD (chronic kidney disease), stage 3 (moderate)     ICD-10-CM: N18.3 ICD-9-CM: 879. 3 Bilateral carotid artery disease ([address]     ICD-10-CM: I77.9 ICD-9-CM: 396. 9 Vitals BP Pulse Resp Height(growth percentile) Weight(growth percentile) SpO2  
 138/80 (BP 1 Location: Left arm, BP Patient Position: Sitting) (!) 57 16 5' 9\" (1.753 m) 180 lb 6.4 oz (81.8 kg) 99% BMI Smoking Status 26.64 kg/m2 Former Smoker Vitals History BMI and BSA Data Body Mass Index Body Surface Area  
 26.64 kg/m 2 2 m 2 Preferred Pharmacy Pharmacy Name Phone [facility] [address] [facility], [address] [place] [address],[facility] [phone] Your Updated Medication List  
  
   
This list is accurate as of: 4/24/17 12:00 PM.  Always use your most recent med list. amLODIPine 10 mg tablet Commonly known as:  Delphina Peat Take 10 mg by mouth daily. aspirin delayed-release 81 mg tablet Take 81 mg by mouth daily. BACTROBAN 2 % ointment Generic drug:  mupirocin Apply  to affected area two (2) times a day. Indications: MINOR BACTERIAL SKIN INFECTIONS  
  
 carvedilol 25 mg tablet Commonly known as:  Cori Werner Take 1 Tab by mouth two (2) times a day. CINNAMON 500 mg Cap Generic drug:  cinnamon bark Take 500 mg by mouth two (2) times a day. cloNIDine HCl 0.1 mg tablet Commonly known as:  CATAPRES Take 1 Tab by mouth two (2) times a day. clopidogrel 75 mg Tab Commonly known as:  PLAVIX Take 1 Tab by mouth daily for 360 days. FISH -160-1,000 mg Cap Generic drug:  omega 3-dha-epa-fish oil Take 1 Cap by mouth two (2) times a day. glipiZIDE 10 mg tablet Commonly known as:  Nasreen He Take 20 mg by mouth two (2) times a day. hydroCHLOROthiazide 25 mg tablet Commonly known as:  HYDRODIURIL Take 25 mg by mouth daily. metFORMIN 1,000 mg tablet Commonly known as:  GLUCOPHAGE Take 1 Tab by mouth two (2) times daily (with meals). pioglitazone 45 mg tablet Commonly known as:  ACTOS Take 45 mg by mouth daily. promethazine-codeine 6.25-10 mg/5 mL syrup Commonly known as:  PHENERGAN with CODEINE Take 5 mL by mouth every six (6) hours as needed for Cough. simvastatin 20 mg tablet Commonly known as:  ZOCOR Take 20 mg by mouth nightly. valsartan 320 mg tablet Commonly known as:  DIOVAN  
 Take 1 Tab by mouth daily. Please start taking after 5 days of discharge and blood test called BMP. We Performed the Following CBC W/O DIFF [63284 CPT(R)] IRON PROFILE [id number] CPT(R)] METABOLIC PANEL, BASIC [41979 CPT(R)] To-Do List   
 04/24/2017 ECHO:  2D ECHO LIMTED ADULT W OR WO CONTR Patient Instructions Follow up with Dr. Carol Parson in 6 weeks with a limited echocardiogram same day Have labs drawn a couple days prior to appointment Introducing [facility]! [employer] introduces [employer] patient portal. Now you can access parts of your medical record, email your doctor's office, and request medication refills online. 1. In your internet browser, go to [email or web address] 2. Click on the First Time User? Click Here link in the Sign In box. You will see the New Member Sign Up page. 3. Enter your [employer] Access Code exactly as it appears below. You will not need to use this code after youve completed the sign-up process. If you do not sign up before the expiration date, you must request a new code. · [employer] Access Code: [email or web address] Expires: 6/14/2017  5:51 AM 
 
4. Enter the last four digits of your Social Security Number (xxxx) and Date of Birth (mm/dd/yyyy) as indicated and click Submit. You will be taken to the next sign-up page. 5. Create a [employer] ID. This will be your [employer] login ID and cannot be changed, so think of one that is secure and easy to remember. 6. Create a [employer] password. You can change your password at any time. 7. Enter your Password Reset Question and Answer. This can be used at a later time if you forget your password. 8. Enter your e-mail address. You will receive e-mail notification when new information is available in [address] 9. Click Sign Up. You can now view and download portions of your medical record.  
10. Click the Download Summary menu link to download a portable copy of your medical information. If you have questions, please visit the Frequently Asked Questions section of the [employer] website. Remember, [employer] is NOT to be used for urgent needs. For medical emergencies, dial 911. Now available from your iPhone and Android! Please provide this summary of care documentation to your next provider. Your primary care clinician is listed as William Gresham. If you have any questions after today's visit, please call [phone].

## 2017-04-24 NOTE — PATIENT INSTRUCTIONS
Follow up with Dr. Lizzeth Man in 6 weeks with a limited echocardiogram same day    Have labs drawn a couple days prior to appointment

## 2017-05-30 ENCOUNTER — HOSPITAL ENCOUNTER (OUTPATIENT)
Dept: LAB | Age: 82
Discharge: HOME | End: 2017-05-30
Payer: MEDICARE

## 2017-05-30 PROCEDURE — 80048 BASIC METABOLIC PNL TOTAL CA: CPT

## 2017-05-30 PROCEDURE — 83550 IRON BINDING TEST: CPT

## 2017-05-30 PROCEDURE — 36415 COLL VENOUS BLD VENIPUNCTURE: CPT

## 2017-05-30 PROCEDURE — 85027 COMPLETE CBC AUTOMATED: CPT

## 2017-05-31 LAB
BUN SERPL-MCNC: 44 MG/DL (ref 8–27)
BUN/CREAT SERPL: 27 (ref 10–24)
CALCIUM SERPL-MCNC: 8.7 MG/DL (ref 8.6–10.2)
CHLORIDE SERPL-SCNC: 98 MMOL/L (ref 96–106)
CO2 SERPL-SCNC: 23 MMOL/L (ref 18–29)
CREAT SERPL-MCNC: 1.63 MG/DL (ref 0.76–1.27)
ERYTHROCYTE [DISTWIDTH] IN BLOOD BY AUTOMATED COUNT: 16.1 % (ref 12.3–15.4)
GLUCOSE SERPL-MCNC: 279 MG/DL (ref 65–99)
HCT VFR BLD AUTO: 33.5 % (ref 37.5–51)
HGB BLD-MCNC: 10.4 G/DL (ref 12.6–17.7)
INTERPRETATION: NORMAL
IRON SATN MFR SERPL: 21 % (ref 15–55)
IRON SERPL-MCNC: 55 UG/DL (ref 38–169)
MCH RBC QN AUTO: 28.7 PG (ref 26.6–33)
MCHC RBC AUTO-ENTMCNC: 31 G/DL (ref 31.5–35.7)
MCV RBC AUTO: 92 FL (ref 79–97)
PLATELET # BLD AUTO: 226 X10E3/UL (ref 150–379)
POTASSIUM SERPL-SCNC: 4.4 MMOL/L (ref 3.5–5.2)
RBC # BLD AUTO: 3.63 X10E6/UL (ref 4.14–5.8)
SODIUM SERPL-SCNC: 138 MMOL/L (ref 134–144)
TIBC SERPL-MCNC: 263 UG/DL (ref 250–450)
UIBC SERPL-MCNC: 208 UG/DL (ref 111–343)
WBC # BLD AUTO: 4.3 X10E3/UL (ref 3.4–10.8)

## 2017-06-01 ENCOUNTER — CLINICAL SUPPORT (OUTPATIENT)
Dept: CARDIOLOGY CLINIC | Age: 82
End: 2017-06-01

## 2017-06-01 ENCOUNTER — OFFICE VISIT (OUTPATIENT)
Dept: CARDIOLOGY CLINIC | Age: 82
End: 2017-06-01

## 2017-06-01 VITALS
HEART RATE: 50 BPM | OXYGEN SATURATION: 98 % | WEIGHT: 183 LBS | HEIGHT: 69 IN | SYSTOLIC BLOOD PRESSURE: 160 MMHG | BODY MASS INDEX: 27.11 KG/M2 | DIASTOLIC BLOOD PRESSURE: 70 MMHG | RESPIRATION RATE: 18 BRPM

## 2017-06-01 DIAGNOSIS — E11.51 TYPE 2 DIABETES MELLITUS WITH DIABETIC PERIPHERAL ANGIOPATHY WITHOUT GANGRENE, WITHOUT LONG-TERM CURRENT USE OF INSULIN (HCC): ICD-10-CM

## 2017-06-01 DIAGNOSIS — R06.02 SOB (SHORTNESS OF BREATH): ICD-10-CM

## 2017-06-01 DIAGNOSIS — N18.3 CKD (CHRONIC KIDNEY DISEASE), STAGE 3 (MODERATE): ICD-10-CM

## 2017-06-01 DIAGNOSIS — I10 HYPERTENSION, ESSENTIAL, BENIGN: ICD-10-CM

## 2017-06-01 DIAGNOSIS — I73.9 PVD (PERIPHERAL VASCULAR DISEASE) (HCC): ICD-10-CM

## 2017-06-01 DIAGNOSIS — E78.00 HYPERCHOLESTEREMIA: ICD-10-CM

## 2017-06-01 DIAGNOSIS — I77.9 BILATERAL CAROTID ARTERY DISEASE (HCC): ICD-10-CM

## 2017-06-01 DIAGNOSIS — I49.3 PVC'S (PREMATURE VENTRICULAR CONTRACTIONS): ICD-10-CM

## 2017-06-01 DIAGNOSIS — I25.10 CORONARY ARTERY DISEASE INVOLVING NATIVE CORONARY ARTERY OF NATIVE HEART WITHOUT ANGINA PECTORIS: Primary | ICD-10-CM

## 2017-06-01 RX ORDER — CLONIDINE HYDROCHLORIDE 0.1 MG/1
0.1 TABLET ORAL 2 TIMES DAILY
Qty: 60 TAB | Refills: 5 | Status: SHIPPED | OUTPATIENT
Start: 2017-06-01 | End: 2019-05-12

## 2017-06-01 NOTE — PROGRESS NOTES
[facility]     1935       David Tilley MD, [facility] [place]  Date of Visit-6/1/2017   PCP is Mckenna Davis MD   [facility] [place]  [facility]  HPI:  Keshav Chopra. is a [de-identified] y.o. male     Follow up and echo. He is doing well. He remains active working around the house and does note some shortness of breath with this. He continues to experience bilateral LE edema that improves overnight. He was taken off Diovan in March due to kidney function. Denies chest pain, syncope or shortness of breath at rest, has no tachycardia, palpitations or sense of arrhythmia. Assessment/Plan:   The primary encounter diagnosis was Coronary artery disease involving native coronary artery of native heart without angina pectoris. Diagnoses of Hypertension, essential, benign, PVC's (premature ventricular contractions), Hypercholesteremia, PVD (peripheral vascular disease) ([address] CKD (chronic kidney disease), stage 3 (moderate), Type 2 diabetes mellitus with diabetic peripheral angiopathy without gangrene, without long-term current use of insulin ([address] and Bilateral carotid artery disease ([address] were also pertinent to this visit.      EKG- NSR  msec with first degree AV block, inverted AL T waves, left axis deviation-similar to before       Fatigue and AGUILAR  -multi factorial   -probably related to his CKD  -blood studies show his iron level was normal   -his echo today shows normal LV function with only minimal hypokinesia seen in distal anterior septal wall  -the overall EF is normal on todays limited echo    Malignant HTN  -home -180 but he has not checked for a while  -already on multiple medications with the ARB held due to CKD    CAD  -no angina  -continue betablocker, ASA, and statin    Lipids  -on high potency statin   PVD- improved post PTA  Follow up in 4 months, check BP cuff with nurse in the next few weeks   Future Appointments  Date Time Provider Muna Delgado   2017 10:20 AM Tammy Sprague  [address]           Cardiac History:   Holter monitor14=showing rate of  with frequent PACs, rare periods of short RP interval SVT up to 122 and frequent PVCs. sinus rhythm with similar findings, frequent PVCs and short RP interval tachycardia. NUKE  14,  2 minutes, 20 seconds,  EF of 55% with no ischemia. PVD= Dr. Adeline Naidu atherectomy to distal SFA and distal popliteal with angioplasty to both lesions and stent to the SFA by Dr. Adeline Naidu on 3/14/14. Previously had AIBs of 0.67 on the left, 0.71 on the right with the right seemingly due to distal disease. CAD/CABG off pump x3 3/2008 . =post op anemia and renal failure  CATH 2008= severe three-vessel left main coronary artery disease, 40% disease of the left main and take off of the LAD and circumflex complex, 70% stenosis of the ostial circ and 85% of the LAD. Between the first and second marginal, the circumflex had 70% stenosis and between the third marginal and PDA there was a 70% stenosis, LAD ostial lesion RCA proximal 60% tapering, EF 60%-70%, bilaterally patent renal arteries, mild atherosclerosis of the distal abdominal aorta. Mild carotid artery disease 3-7-08 then 12 with 10-49% left, less than 10% on right  Dyslipidemia 10-25-10  TG 91 HDL 40 LDL 48  SOCIAL: Drinks no alcohol, quit smoking several years ago, works as an . Lives with his wife and has four children. Enjoys gardening, fishing and music. FAMILY HISTORY: Mother  of cancer at 76, father  of Parkinson's at 70, one brother  of cancer of the liver at 76 and one  of an infection at 64. ROS-except as noted above. . A complete cardiac and respiratory are reviewed and negative except as above ; Resp-denies wheezing  or productive cough,.  Const- No unusual weight loss or fever; Neuro-no recent seizure or CVA ; GI- No BRBPR, abdom pain, bloating ; - no  hematuria   see supplement sheet, initialed and to be scanned by staff  Past Medical History:   Diagnosis Date    CAD (coronary artery disease)     [facility]     Diabetes mellitus, type 2 ([facility]     Hypercholesteremia     Hypertension     PVC's (premature ventricular contractions) 5/26/2014    PVD (peripheral vascular disease) ([facility]       Social Hx= reports that he quit smoking about 32 years ago. His smoking use included Cigarettes. He has a 60.00 pack-year smoking history. He has never used smokeless tobacco. He reports that he does not drink alcohol or use illicit drugs. Exam and Labs:    Visit Vitals    /70 (BP 1 Location: Right arm, BP Patient Position: Sitting)    Pulse (!) 50    Resp 18    Ht 5' 9\" (1.753 m)    Wt 183 lb (83 kg)    SpO2 98%    BMI 27.02 kg/m2      Constitutional:  NAD, comfortable  Head: NC,AT. Eyes: No scleral icterus. Neck:  Neck supple. No JVD present. Throat: moist mucous membranes. Chest: Effort normal & normal respiratory excursion . Neurological: alert, conversant and oriented . Skin: Skin is not cold. No obvious systemic rash noted. Not diaphoretic. No erythema. Psychiatric:  Grossly normal mood and affect. Behavior appears normal. Extremities:  no clubbing or cyanosis. Abdomen: non distended    Lungs:breath sounds normal. No stridor. distress, wheezes or  Rales. Heart:    normal rate, regular rhythm, frequent ectopy, normal S1, S2, no murmurs, rubs, clicks or gallops , PMI non displaced. Edema: Edema is none.      Lab Results   Component Value Date/Time    Sodium 138 05/30/2017 07:54 AM    Potassium 4.4 05/30/2017 07:54 AM    Chloride 98 05/30/2017 07:54 AM    CO2 23 05/30/2017 07:54 AM    Anion gap 9 03/18/2017 01:53 AM    Glucose 279 05/30/2017 07:54 AM    BUN 44 05/30/2017 07:54 AM    Creatinine 1.63 05/30/2017 07:54 AM    BUN/Creatinine ratio 27 05/30/2017 07:54 AM    GFR est AA 45 05/30/2017 07:54 AM    GFR est non-AA 39 05/30/2017 07:54 AM    Calcium 8.7 05/30/2017 07:54 AM      Wt Readings from Last 3 Encounters:   06/01/17 183 lb (83 kg)   04/24/17 180 lb 6.4 oz (81.8 kg)   03/18/17 188 lb 9.6 oz (85.5 kg)      BP Readings from Last 3 Encounters:   06/01/17 160/70   04/24/17 138/80   03/18/17 173/75        Current Outpatient Prescriptions   Medication Sig    carvedilol (COREG) 25 mg tablet Take 1 Tab by mouth two (2) times a day.  aspirin delayed-release 81 mg tablet Take 81 mg by mouth daily.  simvastatin (ZOCOR) 20 mg tablet Take 20 mg by mouth nightly.  omega 3-dha-epa-fish oil (FISH OIL) 100-160-1,000 mg cap Take 1 Cap by mouth two (2) times a day.  cloNIDine HCl (CATAPRES) 0.1 mg tablet Take 1 Tab by mouth two (2) times a day.  metFORMIN (GLUCOPHAGE) 1,000 mg tablet Take 1 Tab by mouth two (2) times daily (with meals).  pioglitazone (ACTOS) 45 mg tablet Take 45 mg by mouth daily.  amLODIPine (NORVASC) 10 mg tablet Take 10 mg by mouth daily.  cinnamon bark (CINNAMON) 500 mg cap Take 500 mg by mouth two (2) times a day.  hydrochlorothiazide (HYDRODIURIL) 25 mg tablet Take 25 mg by mouth daily.  glipiZIDE (GLUCOTROL) 10 mg tablet Take 20 mg by mouth two (2) times a day.  promethazine-codeine (PHENERGAN WITH CODEINE) 6.25-10 mg/5 mL syrup Take 5 mL by mouth every six (6) hours as needed for Cough.  clopidogrel (PLAVIX) 75 mg tab Take 1 Tab by mouth daily for 360 days. No current facility-administered medications for this visit. Impression see above.     Written by Daina Sanchez, as dictated by Jason Alvarenga MD.

## 2017-06-01 NOTE — PATIENT INSTRUCTIONS
Follow up with Dr. Mariana Childs in 4 months. Bring home blood pressure cuff to office visit to compare. Patient to schedule nurse visit for BP check.

## 2017-06-01 NOTE — MR AVS SNAPSHOT
Visit Information Date & Time Provider Department Dept. Phone Encounter #  
 6/1/2017 10:00 AM Zach Prescott MD [facility] [phone] [id number] Upcoming Health Maintenance Date Due  
 FOOT EXAM Q1 8/28/1945 EYE EXAM RETINAL OR DILATED Q1 8/28/1946 DTaP/Tdap/Td series (1 - Tdap) 8/28/1957 ZOSTER VACCINE AGE 60> 8/28/1996 GLAUCOMA SCREENING Q2Y 8/28/2001 Pneumococcal 65+ High/Highest Risk (1 of 2 - PCV13) 8/28/2001 MEDICARE YEARLY EXAM 8/28/2001 HEMOGLOBIN A1C Q6M 4/25/2011 MICROALBUMIN Q1 8/3/2011 LIPID PANEL Q1 10/25/2011 INFLUENZA AGE 9 TO ADULT 8/1/2017 Allergies as of 6/1/2017  Review Complete On: 6/1/2017 By: Dulce Maria GARCIA Rash, LPN Severity Noted Reaction Type Reactions Lisinopril  09/20/2016    Cough Current Immunizations  Reviewed on 1/20/2017 Name Date Influenza Vaccine 11/20/2016 Not reviewed this visit You Were Diagnosed With   
  
 Codes Comments Coronary artery disease involving native coronary artery of native heart without angina pectoris    -  Primary ICD-10-CM: I25.10 ICD-9-CM: 414.01 Hypertension, essential, benign     ICD-10-CM: I10 
ICD-9-CM: 401.1 Vitals BP Pulse Resp Height(growth percentile) Weight(growth percentile) SpO2  
 160/70 (BP 1 Location: Right arm, BP Patient Position: Sitting) (!) 50 18 5' 9\" (1.753 m) 183 lb (83 kg) 98% BMI Smoking Status 27.02 kg/m2 Former Smoker Vitals History BMI and BSA Data Body Mass Index Body Surface Area  
 27.02 kg/m 2 2.01 m 2 Preferred Pharmacy Pharmacy Name Phone [facility] [address] [facility], [address] [facility] Floor [phone] Your Updated Medication List  
  
   
This list is accurate as of: 6/1/17 10:31 AM.  Always use your most recent med list. amLODIPine 10 mg tablet Commonly known as:  Cecilia Mason Take 10 mg by mouth daily. aspirin delayed-release 81 mg tablet Take 81 mg by mouth daily. carvedilol 25 mg tablet Commonly known as:  Leidy Bosworth Take 1 Tab by mouth two (2) times a day. CINNAMON 500 mg Cap Generic drug:  cinnamon bark Take 500 mg by mouth two (2) times a day. cloNIDine HCl 0.1 mg tablet Commonly known as:  CATAPRES Take 1 Tab by mouth two (2) times a day. clopidogrel 75 mg Tab Commonly known as:  PLAVIX Take 1 Tab by mouth daily for 360 days. FISH -160-1,000 mg Cap Generic drug:  omega 3-dha-epa-fish oil Take 1 Cap by mouth two (2) times a day. glipiZIDE 10 mg tablet Commonly known as:  Marysol Candie Take 20 mg by mouth two (2) times a day. hydroCHLOROthiazide 25 mg tablet Commonly known as:  HYDRODIURIL Take 25 mg by mouth daily. metFORMIN 1,000 mg tablet Commonly known as:  GLUCOPHAGE Take 1 Tab by mouth two (2) times daily (with meals). pioglitazone 45 mg tablet Commonly known as:  ACTOS Take 45 mg by mouth daily. promethazine-codeine 6.25-10 mg/5 mL syrup Commonly known as:  PHENERGAN with CODEINE Take 5 mL by mouth every six (6) hours as needed for Cough. simvastatin 20 mg tablet Commonly known as:  ZOCOR Take 20 mg by mouth nightly. We Performed the Following AMB POC EKG ROUTINE W/ 12 LEADS, INTER & REP [30453 CPT(R)] Patient Instructions Follow up with Dr. Jaclyn Blake in 4 months. Bring home blood pressure cuff to office visit to compare. Introducing [facility]! Julianna Mathis introduces [email or web address] patient portal. Now you can access parts of your medical record, email your doctor's office, and request medication refills online. 1. In your internet browser, go to [email or web address] 2. Click on the First Time User? Click Here link in the Sign In box. You will see the New Member Sign Up page. 3. Enter your [employer] Access Code exactly as it appears below. You will not need to use this code after youve completed the sign-up process. If you do not sign up before the expiration date, you must request a new code. · [employer] Access Code: [email or web address] Expires: 6/14/2017  5:51 AM 
 
4. Enter the last four digits of your Social Security Number (xxxx) and Date of Birth (mm/dd/yyyy) as indicated and click Submit. You will be taken to the next sign-up page. 5. Create a [employer] ID. This will be your [employer] login ID and cannot be changed, so think of one that is secure and easy to remember. 6. Create a [employer] password. You can change your password at any time. 7. Enter your Password Reset Question and Answer. This can be used at a later time if you forget your password. 8. Enter your e-mail address. You will receive e-mail notification when new information is available in [address] 9. Click Sign Up. You can now view and download portions of your medical record. 10. Click the Download Summary menu link to download a portable copy of your medical information. If you have questions, please visit the Frequently Asked Questions section of the [employer] website. Remember, [employer] is NOT to be used for urgent needs. For medical emergencies, dial 911. Now available from your iPhone and Android! Please provide this summary of care documentation to your next provider. Your primary care clinician is listed as [place]. If you have any questions after today's visit, please call [phone].

## 2017-06-01 NOTE — PROGRESS NOTES
Medication list updated.   Diaovan and bactroban ointment removed from list.  Verbal order per Dr. Roe Rizzo.

## 2017-06-02 ENCOUNTER — TELEPHONE (OUTPATIENT)
Dept: CARDIOLOGY CLINIC | Age: 82
End: 2017-06-02

## 2017-06-02 NOTE — TELEPHONE ENCOUNTER
Patient needs to schedule BP check (nurse only) in 2 weeks during [address]. Needs to bring home BP cuff with him to appointment.

## 2017-06-02 NOTE — TELEPHONE ENCOUNTER
Scheduled BP check only for 6-15-17. Patient to continue to monitor BP at home. To bring cuff with him to visit.

## 2017-06-15 ENCOUNTER — OFFICE VISIT (OUTPATIENT)
Dept: CARDIOLOGY CLINIC | Age: 82
End: 2017-06-15

## 2017-06-15 VITALS
BODY MASS INDEX: 26.96 KG/M2 | HEART RATE: 64 BPM | SYSTOLIC BLOOD PRESSURE: 158 MMHG | RESPIRATION RATE: 18 BRPM | WEIGHT: 182 LBS | HEIGHT: 69 IN | DIASTOLIC BLOOD PRESSURE: 64 MMHG | OXYGEN SATURATION: 98 %

## 2017-06-15 DIAGNOSIS — I10 HYPERTENSION, ESSENTIAL, BENIGN: ICD-10-CM

## 2017-06-15 DIAGNOSIS — N18.3 CKD (CHRONIC KIDNEY DISEASE), STAGE 3 (MODERATE): ICD-10-CM

## 2017-06-15 DIAGNOSIS — I25.10 CORONARY ARTERY DISEASE INVOLVING NATIVE CORONARY ARTERY OF NATIVE HEART WITHOUT ANGINA PECTORIS: Primary | ICD-10-CM

## 2017-06-15 DIAGNOSIS — I73.9 PVD (PERIPHERAL VASCULAR DISEASE) (HCC): ICD-10-CM

## 2017-06-15 DIAGNOSIS — I49.3 PVC'S (PREMATURE VENTRICULAR CONTRACTIONS): ICD-10-CM

## 2017-06-15 DIAGNOSIS — E78.00 HYPERCHOLESTEREMIA: ICD-10-CM

## 2017-06-15 NOTE — PROGRESS NOTES
Christine Hemphill is a [de-identified] y.o. male  Chief Complaint   Patient presents with    Coronary Artery Disease    Hypertension    Chronic Kidney Disease     1. Have you been to the ER, urgent care clinic since your last visit? Hospitalized since your last visit? No    2. Have you seen or consulted any other health care providers outside of the [address] [place] [address] since your last visit? Include any pap smears or colon screening.   No

## 2017-06-15 NOTE — PROGRESS NOTES
[address] [facility]     1935       David Robertson MD, [facility]  Date of Visit-6/15/2017   PCP is Radhika Simpson MD   [facility] [place]  [facility]  HPI:  Keshav Chopra. is a [de-identified] y.o. male   follow up of coronary artery disease and peripheral vascular disease   Seen 4-24 with improvement in one block claudication after PTA  Also with  Renal insufficiency  Got antibiotics for bowel infection  He is doing well. He denies any further chest pain or edema. He does note mild SOB that he attributes to his age. He had mild edema. Denies chest pain,  syncope or shortness of breath at rest, has no tachycardia, palpitations or sense of arrhythmia. Assessment/Plan:       Since last visit edema has resolved off Actos  -echo showed normal function  -blood work showed mild anemia and mild CKD III that have all improved  -iron studies did well   -he feels well today    CAD native  -stable  No angina on beta blocker aspirin and statin    PVD-improvement post PTA  HTN-elevated, but home bp lower, may need to adjust meds, had CKD so cautious, already on coreg, clonidine , HCT and CCB, no ACE due to CKD  ? THA , needs exercise and weight loss  Pt home bp monitor  CKD III    DM, lipids, carotids   -stable      Impression: The primary encounter diagnosis was Coronary artery disease involving native coronary artery of native heart without angina pectoris. Diagnoses of PVC's (premature ventricular contractions), PVD (peripheral vascular disease) ([address] Hypercholesteremia, Hypertension, essential, benign, and CKD (chronic kidney disease), stage 3 (moderate) were also pertinent to this visit. Cardiac History:   Holter monitor4/29/14=showing rate of  with frequent PACs, rare periods of short RP interval SVT up to 122 and frequent PVCs. sinus rhythm with similar findings, frequent PVCs and short RP interval tachycardia.    NUKE  2/11/14,  2 minutes, 20 seconds,  EF of 55% with no ischemia. PVD= Dr. Larry Gary atherectomy to distal SFA and distal popliteal with angioplasty to both lesions and stent to the SFA by Dr. Larry Gary on 3/14/14. Previously had AIBs of 0.67 on the left, 0.71 on the right with the right seemingly due to distal disease. CAD/CABG off pump x3 3/2008 . =post op anemia and renal failure  CATH 2008= severe three-vessel left main coronary artery disease, 40% disease of the left main and take off of the LAD and circumflex complex, 70% stenosis of the ostial circ and 85% of the LAD. Between the first and second marginal, the circumflex had 70% stenosis and between the third marginal and PDA there was a 70% stenosis, LAD ostial lesion RCA proximal 60% tapering, EF 60%-70%, bilaterally patent renal arteries, mild atherosclerosis of the distal abdominal aorta. Mild carotid artery disease 3-7-08 then 12 with 10-49% left, less than 10% on right  Dyslipidemia 10-25-10  TG 91 HDL 40 LDL 48  SOCIAL: Drinks no alcohol, quit smoking several years ago, works as an . Lives with his wife and has four children. Enjoys gardening, fishing and music. FAMILY HISTORY: Mother  of cancer at 76, father  of Parkinson's at 70, one brother  of cancer of the liver at 76 and one  of an infection at 64. ROS-except as noted above. . A complete cardiac and respiratory are reviewed and negative except as above ; Resp-denies wheezing  or productive cough,.  Const- No unusual weight loss or fever; Neuro-no recent seizure or CVA ; GI- No BRBPR, abdom pain, bloating ; - no  hematuria   see supplement sheet, initialed and to be scanned by staff  Past Medical History:   Diagnosis Date    CAD (coronary artery disease)     Carotid arterial disease [address]     Diabetes mellitus, type 2 ([address]     Hypercholesteremia     Hypertension     PVC's (premature ventricular contractions) 2014    PVD (peripheral vascular disease) ([facility] Social Hx= reports that he quit smoking about 32 years ago. His smoking use included Cigarettes. He has a 60.00 pack-year smoking history. He has never used smokeless tobacco. He reports that he does not drink alcohol or use illicit drugs. Exam and Labs:    Visit Vitals    /64 (BP 1 Location: Right arm, BP Patient Position: Sitting)    Pulse 64    Resp 18    Ht 5' 9\" (1.753 m)    Wt 182 lb (82.6 kg)    SpO2 98%    BMI 26.88 kg/m2      Constitutional:  NAD, comfortable  Head: NC,AT. Eyes: No scleral icterus. Neck:  Neck supple. No JVD present. Throat: moist mucous membranes. Chest: Effort normal & normal respiratory excursion . Neurological: alert, conversant and oriented . Skin: Skin is not cold. No obvious systemic rash noted. Not diaphoretic. No erythema. Psychiatric:  Grossly normal mood and affect. Behavior appears normal. Extremities:  no clubbing or cyanosis. Abdomen: non distended    Lungs:breath sounds normal. No stridor. distress, wheezes or  Rales. Heart:    normal rate, regular rhythm, normal S1, S2, no murmurs, rubs, clicks or gallops , PMI non displaced. Edema: Edema is none.      Lab Results   Component Value Date/Time    Sodium 138 05/30/2017 07:54 AM    Potassium 4.4 05/30/2017 07:54 AM    Chloride 98 05/30/2017 07:54 AM    CO2 23 05/30/2017 07:54 AM    Anion gap 9 03/18/2017 01:53 AM    Glucose 279 05/30/2017 07:54 AM    BUN 44 05/30/2017 07:54 AM    Creatinine 1.63 05/30/2017 07:54 AM    BUN/Creatinine ratio 27 05/30/2017 07:54 AM    GFR est AA 45 05/30/2017 07:54 AM    GFR est non-AA 39 05/30/2017 07:54 AM    Calcium 8.7 05/30/2017 07:54 AM      Wt Readings from Last 3 Encounters:   06/15/17 182 lb (82.6 kg)   06/01/17 183 lb (83 kg)   04/24/17 180 lb 6.4 oz (81.8 kg)      BP Readings from Last 3 Encounters:   06/15/17 158/64   06/01/17 160/70   04/24/17 138/80        Current Outpatient Prescriptions   Medication Sig    cloNIDine HCl (CATAPRES) 0.1 mg tablet Take 1 Tab by mouth two (2) times a day.  carvedilol (COREG) 25 mg tablet Take 1 Tab by mouth two (2) times a day.  aspirin delayed-release 81 mg tablet Take 81 mg by mouth daily.  simvastatin (ZOCOR) 20 mg tablet Take 20 mg by mouth nightly.  omega 3-dha-epa-fish oil (FISH OIL) 100-160-1,000 mg cap Take 1 Cap by mouth two (2) times a day.  promethazine-codeine (PHENERGAN WITH CODEINE) 6.25-10 mg/5 mL syrup Take 5 mL by mouth every six (6) hours as needed for Cough.  metFORMIN (GLUCOPHAGE) 1,000 mg tablet Take 1 Tab by mouth two (2) times daily (with meals).  pioglitazone (ACTOS) 45 mg tablet Take 45 mg by mouth daily.  amLODIPine (NORVASC) 10 mg tablet Take 10 mg by mouth daily.  cinnamon bark (CINNAMON) 500 mg cap Take 500 mg by mouth two (2) times a day.  hydrochlorothiazide (HYDRODIURIL) 25 mg tablet Take 25 mg by mouth daily.  glipiZIDE (GLUCOTROL) 10 mg tablet Take 20 mg by mouth two (2) times a day.  clopidogrel (PLAVIX) 75 mg tab Take 1 Tab by mouth daily for 360 days. No current facility-administered medications for this visit. Impression see above.     Written by Tarun Amaya, as dictated by Lindsey Benites MD.

## 2017-06-15 NOTE — MR AVS SNAPSHOT
Visit Information Date & Time Provider Department Dept. Phone Encounter #  
 6/15/2017  9:20 AM Sanaz French MD [facility] [phone] [id number] Your Appointments 10/9/2017  9:20 AM  
ESTABLISHED PATIENT with Sanaz French MD  
[facility] ([facility] SCHEDULING) Appt Note: 4 mnth f/u  
 [address] [place] [address]  
[address] [address] Upcoming Health Maintenance Date Due  
 FOOT EXAM Q1 8/28/1945 EYE EXAM RETINAL OR DILATED Q1 8/28/1946 DTaP/Tdap/Td series (1 - Tdap) 8/28/1957 ZOSTER VACCINE AGE 60> 8/28/1996 GLAUCOMA SCREENING Q2Y 8/28/2001 Pneumococcal 65+ High/Highest Risk (1 of 2 - PCV13) 8/28/2001 MEDICARE YEARLY EXAM 8/28/2001 HEMOGLOBIN A1C Q6M 4/25/2011 MICROALBUMIN Q1 8/3/2011 LIPID PANEL Q1 10/25/2011 INFLUENZA AGE 9 TO ADULT 8/1/2017 Allergies as of 6/15/2017  Review Complete On: 6/15/2017 By: Sanaz French MD  
  
 Severity Noted Reaction Type Reactions Lisinopril  09/20/2016    Cough Current Immunizations  Reviewed on 1/20/2017 Name Date Influenza Vaccine 11/20/2016 Not reviewed this visit Vitals BP Pulse Resp Height(growth percentile) Weight(growth percentile) SpO2  
 158/64 (BP 1 Location: Right arm, BP Patient Position: Sitting) 64 18 5' 9\" (1.753 m) 182 lb (82.6 kg) 98% BMI Smoking Status 26.88 kg/m2 Former Smoker Vitals History BMI and BSA Data Body Mass Index Body Surface Area  
 26.88 kg/m 2 2.01 m 2 Preferred Pharmacy Pharmacy Name Phone [facility] [address] [facility], [address] [facility] Floor [phone] Your Updated Medication List  
  
   
This list is accurate as of: 6/15/17  9:51 AM.  Always use your most recent med list. amLODIPine 10 mg tablet Commonly known as:  Marybeth Jose Take 10 mg by mouth daily. aspirin delayed-release 81 mg tablet Take 81 mg by mouth daily. carvedilol 25 mg tablet Commonly known as:  Merry Schimke Take 1 Tab by mouth two (2) times a day. CINNAMON 500 mg Cap Generic drug:  cinnamon bark Take 500 mg by mouth two (2) times a day. cloNIDine HCl 0.1 mg tablet Commonly known as:  CATAPRES Take 1 Tab by mouth two (2) times a day. clopidogrel 75 mg Tab Commonly known as:  PLAVIX Take 1 Tab by mouth daily for 360 days. FISH -160-1,000 mg Cap Generic drug:  omega 3-dha-epa-fish oil Take 1 Cap by mouth two (2) times a day. glipiZIDE 10 mg tablet Commonly known as:  Daphene Osei Take 20 mg by mouth two (2) times a day. hydroCHLOROthiazide 25 mg tablet Commonly known as:  HYDRODIURIL Take 25 mg by mouth daily. metFORMIN 1,000 mg tablet Commonly known as:  GLUCOPHAGE Take 1 Tab by mouth two (2) times daily (with meals). pioglitazone 45 mg tablet Commonly known as:  ACTOS Take 45 mg by mouth daily. promethazine-codeine 6.25-10 mg/5 mL syrup Commonly known as:  PHENERGAN with CODEINE Take 5 mL by mouth every six (6) hours as needed for Cough. simvastatin 20 mg tablet Commonly known as:  ZOCOR Take 20 mg by mouth nightly. Patient Instructions Follow up with Dr. Girish Lemon in 6 months. Introducing [facility]! Shahla Wu introduces [employer] patient portal. Now you can access parts of your medical record, email your doctor's office, and request medication refills online. 1. In your internet browser, go to [email or web address] 2. Click on the First Time User? Click Here link in the Sign In box. You will see the New Member Sign Up page. 3. Enter your [employer] Access Code exactly as it appears below. You will not need to use this code after youve completed the sign-up process.  If you do not sign up before the expiration date, you must request a new code. · [employer] Access Code: [email or web address] Expires: 9/13/2017  9:51 AM 
 
4. Enter the last four digits of your Social Security Number (xxxx) and Date of Birth (mm/dd/yyyy) as indicated and click Submit. You will be taken to the next sign-up page. 5. Create a [employer] ID. This will be your [employer] login ID and cannot be changed, so think of one that is secure and easy to remember. 6. Create a [employer] password. You can change your password at any time. 7. Enter your Password Reset Question and Answer. This can be used at a later time if you forget your password. 8. Enter your e-mail address. You will receive e-mail notification when new information is available in [address] 9. Click Sign Up. You can now view and download portions of your medical record. 10. Click the Download Summary menu link to download a portable copy of your medical information. If you have questions, please visit the Frequently Asked Questions section of the [employer] website. Remember, [employer] is NOT to be used for urgent needs. For medical emergencies, dial 911. Now available from your iPhone and Android! Please provide this summary of care documentation to your next provider. Your primary care clinician is listed as Marcel Joya. If you have any questions after today's visit, please call [phone].

## 2017-06-25 ENCOUNTER — APPOINTMENT (OUTPATIENT)
Dept: GENERAL RADIOLOGY | Age: 82
DRG: 683 | End: 2017-06-25
Attending: EMERGENCY MEDICINE
Payer: MEDICARE

## 2017-06-25 ENCOUNTER — HOSPITAL ENCOUNTER (INPATIENT)
Age: 82
LOS: 2 days | Discharge: HOME | DRG: 683 | End: 2017-06-27
Attending: EMERGENCY MEDICINE | Admitting: HOSPITALIST
Payer: MEDICARE

## 2017-06-25 DIAGNOSIS — R53.1 WEAKNESS: Primary | ICD-10-CM

## 2017-06-25 DIAGNOSIS — R79.89 AZOTEMIA: ICD-10-CM

## 2017-06-25 DIAGNOSIS — R77.8 ELEVATED TROPONIN: ICD-10-CM

## 2017-06-25 PROBLEM — N17.9 AKI (ACUTE KIDNEY INJURY) (HCC): Status: ACTIVE | Noted: 2017-06-25

## 2017-06-25 PROBLEM — E87.6 HYPOKALEMIA: Status: ACTIVE | Noted: 2017-06-25

## 2017-06-25 LAB
ALBUMIN SERPL BCP-MCNC: 3.2 G/DL (ref 3.5–5)
ALBUMIN/GLOB SERPL: 0.8 {RATIO} (ref 1.1–2.2)
ALP SERPL-CCNC: 48 U/L (ref 45–117)
ALT SERPL-CCNC: 23 U/L (ref 12–78)
ANION GAP BLD CALC-SCNC: 9 MMOL/L (ref 5–15)
APPEARANCE UR: CLEAR
AST SERPL W P-5'-P-CCNC: 25 U/L (ref 15–37)
ATRIAL RATE: 76 BPM
BACTERIA URNS QL MICRO: NEGATIVE /HPF
BASOPHILS # BLD AUTO: 0 K/UL (ref 0–0.1)
BASOPHILS # BLD: 0 % (ref 0–1)
BILIRUB SERPL-MCNC: 0.6 MG/DL (ref 0.2–1)
BILIRUB UR QL: NEGATIVE
BNP SERPL-MCNC: 415 PG/ML (ref 0–100)
BUN SERPL-MCNC: 60 MG/DL (ref 6–20)
BUN/CREAT SERPL: 21 (ref 12–20)
CALCIUM SERPL-MCNC: 8.7 MG/DL (ref 8.5–10.1)
CALCULATED P AXIS, ECG09: 55 DEGREES
CALCULATED R AXIS, ECG10: -57 DEGREES
CALCULATED T AXIS, ECG11: 100 DEGREES
CHLORIDE SERPL-SCNC: 95 MMOL/L (ref 97–108)
CK MB CFR SERPL CALC: NORMAL % (ref 0–2.5)
CK MB CFR SERPL CALC: NORMAL % (ref 0–2.5)
CK MB SERPL-MCNC: <1 NG/ML (ref 5–25)
CK MB SERPL-MCNC: <1 NG/ML (ref 5–25)
CK SERPL-CCNC: 110 U/L (ref 39–308)
CK SERPL-CCNC: 159 U/L (ref 39–308)
CK SERPL-CCNC: 162 U/L (ref 39–308)
CK SERPL-CCNC: 88 U/L (ref 39–308)
CO2 SERPL-SCNC: 28 MMOL/L (ref 21–32)
COLOR UR: ABNORMAL
CREAT SERPL-MCNC: 2.85 MG/DL (ref 0.7–1.3)
D DIMER PPP FEU-MCNC: 7.53 MG/L FEU (ref 0–0.65)
DIAGNOSIS, 93000: NORMAL
EOSINOPHIL # BLD: 0.1 K/UL (ref 0–0.4)
EOSINOPHIL NFR BLD: 1 % (ref 0–7)
EPITH CASTS URNS QL MICRO: ABNORMAL /LPF
ERYTHROCYTE [DISTWIDTH] IN BLOOD BY AUTOMATED COUNT: 14.4 % (ref 11.5–14.5)
EST. AVERAGE GLUCOSE BLD GHB EST-MCNC: 212 MG/DL
GLOBULIN SER CALC-MCNC: 4 G/DL (ref 2–4)
GLUCOSE BLD STRIP.AUTO-MCNC: 100 MG/DL (ref 65–100)
GLUCOSE BLD STRIP.AUTO-MCNC: 149 MG/DL (ref 65–100)
GLUCOSE BLD STRIP.AUTO-MCNC: 201 MG/DL (ref 65–100)
GLUCOSE BLD STRIP.AUTO-MCNC: 58 MG/DL (ref 65–100)
GLUCOSE BLD STRIP.AUTO-MCNC: 71 MG/DL (ref 65–100)
GLUCOSE SERPL-MCNC: 65 MG/DL (ref 65–100)
GLUCOSE UR STRIP.AUTO-MCNC: NEGATIVE MG/DL
HBA1C MFR BLD: 9 % (ref 4.2–6.3)
HCT VFR BLD AUTO: 35 % (ref 36.6–50.3)
HGB BLD-MCNC: 12 G/DL (ref 12.1–17)
HGB UR QL STRIP: ABNORMAL
KETONES UR QL STRIP.AUTO: NEGATIVE MG/DL
LEUKOCYTE ESTERASE UR QL STRIP.AUTO: NEGATIVE
LYMPHOCYTES # BLD AUTO: 15 % (ref 12–49)
LYMPHOCYTES # BLD: 1.1 K/UL (ref 0.8–3.5)
MCH RBC QN AUTO: 29.1 PG (ref 26–34)
MCHC RBC AUTO-ENTMCNC: 34.3 G/DL (ref 30–36.5)
MCV RBC AUTO: 85 FL (ref 80–99)
MONOCYTES # BLD: 0.4 K/UL (ref 0–1)
MONOCYTES NFR BLD AUTO: 5 % (ref 5–13)
NEUTS SEG # BLD: 5.8 K/UL (ref 1.8–8)
NEUTS SEG NFR BLD AUTO: 79 % (ref 32–75)
NITRITE UR QL STRIP.AUTO: NEGATIVE
P-R INTERVAL, ECG05: 256 MS
PH UR STRIP: 5 [PH] (ref 5–8)
PLATELET # BLD AUTO: 264 K/UL (ref 150–400)
POTASSIUM SERPL-SCNC: 3.1 MMOL/L (ref 3.5–5.1)
PROT SERPL-MCNC: 7.2 G/DL (ref 6.4–8.2)
PROT UR STRIP-MCNC: 300 MG/DL
Q-T INTERVAL, ECG07: 386 MS
QRS DURATION, ECG06: 94 MS
QTC CALCULATION (BEZET), ECG08: 434 MS
RBC # BLD AUTO: 4.12 M/UL (ref 4.1–5.7)
RBC #/AREA URNS HPF: ABNORMAL /HPF (ref 0–5)
SERVICE CMNT-IMP: ABNORMAL
SERVICE CMNT-IMP: NORMAL
SERVICE CMNT-IMP: NORMAL
SODIUM SERPL-SCNC: 132 MMOL/L (ref 136–145)
SP GR UR REFRACTOMETRY: 1.02 (ref 1–1.03)
TROPONIN I SERPL-MCNC: 0.07 NG/ML
TROPONIN I SERPL-MCNC: 0.09 NG/ML
TROPONIN I SERPL-MCNC: 0.1 NG/ML
UROBILINOGEN UR QL STRIP.AUTO: 0.2 EU/DL (ref 0.2–1)
VENTRICULAR RATE, ECG03: 76 BPM
WBC # BLD AUTO: 7.4 K/UL (ref 4.1–11.1)
WBC URNS QL MICRO: ABNORMAL /HPF (ref 0–4)

## 2017-06-25 PROCEDURE — 85025 COMPLETE CBC W/AUTO DIFF WBC: CPT | Performed by: EMERGENCY MEDICINE

## 2017-06-25 PROCEDURE — 81001 URINALYSIS AUTO W/SCOPE: CPT | Performed by: EMERGENCY MEDICINE

## 2017-06-25 PROCEDURE — 82550 ASSAY OF CK (CPK): CPT | Performed by: EMERGENCY MEDICINE

## 2017-06-25 PROCEDURE — 80053 COMPREHEN METABOLIC PANEL: CPT | Performed by: EMERGENCY MEDICINE

## 2017-06-25 PROCEDURE — 85379 FIBRIN DEGRADATION QUANT: CPT | Performed by: HOSPITALIST

## 2017-06-25 PROCEDURE — 36415 COLL VENOUS BLD VENIPUNCTURE: CPT | Performed by: HOSPITALIST

## 2017-06-25 PROCEDURE — 83036 HEMOGLOBIN GLYCOSYLATED A1C: CPT | Performed by: HOSPITALIST

## 2017-06-25 PROCEDURE — 74011250637 HC RX REV CODE- 250/637: Performed by: HOSPITALIST

## 2017-06-25 PROCEDURE — 65660000000 HC RM CCU STEPDOWN

## 2017-06-25 PROCEDURE — 82553 CREATINE MB FRACTION: CPT | Performed by: HOSPITALIST

## 2017-06-25 PROCEDURE — 93005 ELECTROCARDIOGRAM TRACING: CPT

## 2017-06-25 PROCEDURE — 74011250636 HC RX REV CODE- 250/636: Performed by: HOSPITALIST

## 2017-06-25 PROCEDURE — 71010 XR CHEST PORT: CPT

## 2017-06-25 PROCEDURE — 84484 ASSAY OF TROPONIN QUANT: CPT | Performed by: EMERGENCY MEDICINE

## 2017-06-25 PROCEDURE — 99285 EMERGENCY DEPT VISIT HI MDM: CPT

## 2017-06-25 PROCEDURE — 83880 ASSAY OF NATRIURETIC PEPTIDE: CPT | Performed by: HOSPITALIST

## 2017-06-25 PROCEDURE — 82962 GLUCOSE BLOOD TEST: CPT

## 2017-06-25 PROCEDURE — 74011250636 HC RX REV CODE- 250/636: Performed by: EMERGENCY MEDICINE

## 2017-06-25 RX ORDER — NITROGLYCERIN 0.4 MG/1
0.4 TABLET SUBLINGUAL
Status: DISCONTINUED | OUTPATIENT
Start: 2017-06-25 | End: 2017-06-27 | Stop reason: HOSPADM

## 2017-06-25 RX ORDER — CLONIDINE HYDROCHLORIDE 0.1 MG/1
0.1 TABLET ORAL 2 TIMES DAILY
Status: DISCONTINUED | OUTPATIENT
Start: 2017-06-25 | End: 2017-06-27 | Stop reason: HOSPADM

## 2017-06-25 RX ORDER — POTASSIUM CHLORIDE AND SODIUM CHLORIDE 900; 300 MG/100ML; MG/100ML
INJECTION, SOLUTION INTRAVENOUS CONTINUOUS
Status: DISCONTINUED | OUTPATIENT
Start: 2017-06-25 | End: 2017-06-27

## 2017-06-25 RX ORDER — CARVEDILOL 12.5 MG/1
25 TABLET ORAL 2 TIMES DAILY WITH MEALS
Status: DISCONTINUED | OUTPATIENT
Start: 2017-06-25 | End: 2017-06-27 | Stop reason: HOSPADM

## 2017-06-25 RX ORDER — METFORMIN HYDROCHLORIDE 1000 MG/1
1000 TABLET ORAL 2 TIMES DAILY WITH MEALS
COMMUNITY
End: 2017-06-27

## 2017-06-25 RX ORDER — HEPARIN SODIUM 5000 [USP'U]/ML
5000 INJECTION, SOLUTION INTRAVENOUS; SUBCUTANEOUS EVERY 12 HOURS
Status: DISCONTINUED | OUTPATIENT
Start: 2017-06-25 | End: 2017-06-25

## 2017-06-25 RX ORDER — AMLODIPINE BESYLATE 5 MG/1
10 TABLET ORAL DAILY
Status: DISCONTINUED | OUTPATIENT
Start: 2017-06-26 | End: 2017-06-27 | Stop reason: HOSPADM

## 2017-06-25 RX ORDER — AMOXICILLIN 500 MG/1
500 CAPSULE ORAL 3 TIMES DAILY
COMMUNITY
Start: 2017-06-19 | End: 2017-06-27

## 2017-06-25 RX ORDER — POTASSIUM CHLORIDE 750 MG/1
40 TABLET, FILM COATED, EXTENDED RELEASE ORAL 2 TIMES DAILY
Status: DISCONTINUED | OUTPATIENT
Start: 2017-06-25 | End: 2017-06-27

## 2017-06-25 RX ORDER — ACETAMINOPHEN 325 MG/1
650 TABLET ORAL
Status: DISCONTINUED | OUTPATIENT
Start: 2017-06-25 | End: 2017-06-27 | Stop reason: HOSPADM

## 2017-06-25 RX ORDER — ASPIRIN 81 MG/1
81 TABLET ORAL DAILY
Status: DISCONTINUED | OUTPATIENT
Start: 2017-06-26 | End: 2017-06-27 | Stop reason: HOSPADM

## 2017-06-25 RX ORDER — SODIUM CHLORIDE 0.9 % (FLUSH) 0.9 %
5-10 SYRINGE (ML) INJECTION EVERY 8 HOURS
Status: DISCONTINUED | OUTPATIENT
Start: 2017-06-25 | End: 2017-06-27 | Stop reason: HOSPADM

## 2017-06-25 RX ORDER — SODIUM CHLORIDE 0.9 % (FLUSH) 0.9 %
5-10 SYRINGE (ML) INJECTION AS NEEDED
Status: DISCONTINUED | OUTPATIENT
Start: 2017-06-25 | End: 2017-06-27 | Stop reason: HOSPADM

## 2017-06-25 RX ORDER — AMOXICILLIN 500 MG/1
500 CAPSULE ORAL 2 TIMES DAILY
Status: DISCONTINUED | OUTPATIENT
Start: 2017-06-25 | End: 2017-06-27 | Stop reason: ALTCHOICE

## 2017-06-25 RX ORDER — DEXTROSE 50 % IN WATER (D50W) INTRAVENOUS SYRINGE
12.5-25 AS NEEDED
Status: DISCONTINUED | OUTPATIENT
Start: 2017-06-25 | End: 2017-06-25 | Stop reason: SDUPTHER

## 2017-06-25 RX ORDER — MAGNESIUM SULFATE 100 %
4 CRYSTALS MISCELLANEOUS AS NEEDED
Status: DISCONTINUED | OUTPATIENT
Start: 2017-06-25 | End: 2017-06-27 | Stop reason: HOSPADM

## 2017-06-25 RX ORDER — HEPARIN SODIUM 10000 [USP'U]/100ML
18-36 INJECTION, SOLUTION INTRAVENOUS CONTINUOUS
Status: DISCONTINUED | OUTPATIENT
Start: 2017-06-25 | End: 2017-06-26

## 2017-06-25 RX ORDER — SIMVASTATIN 20 MG/1
20 TABLET, FILM COATED ORAL
Status: DISCONTINUED | OUTPATIENT
Start: 2017-06-25 | End: 2017-06-27 | Stop reason: HOSPADM

## 2017-06-25 RX ADMIN — Medication 10 ML: at 22:16

## 2017-06-25 RX ADMIN — CARVEDILOL 25 MG: 12.5 TABLET, FILM COATED ORAL at 17:21

## 2017-06-25 RX ADMIN — SODIUM CHLORIDE AND POTASSIUM CHLORIDE: 9; 2.98 INJECTION, SOLUTION INTRAVENOUS at 16:15

## 2017-06-25 RX ADMIN — CLONIDINE HYDROCHLORIDE 0.1 MG: 0.1 TABLET ORAL at 18:33

## 2017-06-25 RX ADMIN — HEPARIN SODIUM 5000 UNITS: 5000 INJECTION, SOLUTION INTRAVENOUS; SUBCUTANEOUS at 13:23

## 2017-06-25 RX ADMIN — SIMVASTATIN 20 MG: 20 TABLET, FILM COATED ORAL at 22:03

## 2017-06-25 RX ADMIN — SODIUM CHLORIDE 1000 ML: 900 INJECTION, SOLUTION INTRAVENOUS at 11:50

## 2017-06-25 RX ADMIN — AMOXICILLIN 500 MG: 500 CAPSULE ORAL at 19:01

## 2017-06-25 RX ADMIN — Medication 10 ML: at 15:27

## 2017-06-25 RX ADMIN — POTASSIUM CHLORIDE 40 MEQ: 750 TABLET, FILM COATED, EXTENDED RELEASE ORAL at 13:23

## 2017-06-25 RX ADMIN — HEPARIN SODIUM AND DEXTROSE 18 UNITS/KG/HR: 10000; 5 INJECTION INTRAVENOUS at 22:24

## 2017-06-25 RX ADMIN — Medication 1 CAPSULE: at 17:21

## 2017-06-25 RX ADMIN — POTASSIUM CHLORIDE 40 MEQ: 750 TABLET, FILM COATED, EXTENDED RELEASE ORAL at 17:21

## 2017-06-25 NOTE — IP AVS SNAPSHOT
[facility] [address] [place] [address] 
[phone] Patient: Tawana Roper MRN: [id number] JFL:[id number] You are allergic to the following Allergen Reactions Lisinopril Cough Recent Documentation Height Weight BMI Smoking Status 1.753 m 82.4 kg 26.82 kg/m2 Former Smoker Emergency Contacts Name Discharge Info Relation Home Work Mobile Thelma Montes  Spouse [3] [phone] About your hospitalization You were admitted on:  June 25, 2017 You last received care in the:  [facility] 6S NEURO-SCI TELE You were discharged on:  June 27, 2017 Unit phone number:  [phone] Why you were hospitalized Your primary diagnosis was:  Sob (Shortness Of Breath) Your diagnoses also included:  Ckd (Chronic Kidney Disease), Hypokalemia, Generalized Weakness, Elevated Troponin, Boy (Acute Kidney Injury) (Hcc) Providers Seen During Your Hospitalizations Provider Role Specialty Primary office phone Linda Gillette MD Attending Provider Emergency Medicine [phone] Atul Grant MD Attending Provider Internal Medicine [phone] Brianne Forman MD Attending Provider Internal Medicine [phone] Soni Perkins MD Attending Provider Internal Medicine [phone] Your Primary Care Physician (PCP) Primary Care Physician Office Phone Office Fax Estela Choi [phone] Follow-up Information Follow up With Details Comments Contact Info Altagracia Garcia MD In 7 days As needed, If symptoms worsen [address] [facility] [address] [place] [address] 
[phone] Current Discharge Medication List  
  
CONTINUE these medications which have NOT CHANGED Dose & Instructions Dispensing Information Comments Morning Noon Evening Bedtime  
 amLODIPine 10 mg tablet Commonly known as:  Sherald Burkitt Your last dose was: Your next dose is:    
   
   
 Dose:  10 mg Take 10 mg by mouth daily. Refills:  0  
     
   
   
   
  
 aspirin delayed-release 81 mg tablet Your last dose was: Your next dose is:    
   
   
 Dose:  81 mg Take 81 mg by mouth daily. Refills:  0  
     
   
   
   
  
 carvedilol 25 mg tablet Commonly known as:  Hemanth Silvestrer Your last dose was: Your next dose is:    
   
   
 Dose:  25 mg Take 1 Tab by mouth two (2) times a day. Quantity:  60 Tab Refills:  0 CINNAMON 500 mg Cap Generic drug:  cinnamon bark Your last dose was: Your next dose is:    
   
   
 Dose:  500 mg Take 500 mg by mouth two (2) times a day. Refills:  0  
     
   
   
   
  
 cloNIDine HCl 0.1 mg tablet Commonly known as:  CATAPRES Your last dose was: Your next dose is:    
   
   
 Dose:  0.1 mg Take 1 Tab by mouth two (2) times a day. Quantity:  60 Tab Refills:  5 FISH -160-1,000 mg Cap Generic drug:  omega 3-dha-epa-fish oil Your last dose was: Your next dose is:    
   
   
 Dose:  1 Cap Take 1 Cap by mouth two (2) times a day. Refills:  0  
     
   
   
   
  
 glipiZIDE 10 mg tablet Commonly known as:  Tomasz Bradippel Your last dose was: Your next dose is:    
   
   
 Dose:  20 mg Take 20 mg by mouth two (2) times a day. Refills:  0  
     
   
   
   
  
 hydroCHLOROthiazide 25 mg tablet Commonly known as:  HYDRODIURIL Your last dose was: Your next dose is:    
   
   
 Dose:  25 mg Take 25 mg by mouth daily. Refills:  0  
     
   
   
   
  
 pioglitazone 45 mg tablet Commonly known as:  ACTOS Your last dose was: Your next dose is:    
   
   
 Dose:  45 mg Take 45 mg by mouth daily. Refills:  0  
     
   
   
   
  
 promethazine-codeine 6.25-10 mg/5 mL syrup Commonly known as:  PHENERGAN with CODEINE Your last dose was: Your next dose is:    
   
   
 Dose:  5 mL Take 5 mL by mouth every six (6) hours as needed for Cough. Refills:  0  
     
   
   
   
  
 simvastatin 20 mg tablet Commonly known as:  ZOCOR Your last dose was: Your next dose is:    
   
   
 Dose:  20 mg Take 20 mg by mouth nightly. Refills:  0 STOP taking these medications   
 amoxicillin 500 mg capsule Commonly known as:  AMOXIL  
   
  
 metFORMIN 1,000 mg tablet Commonly known as:  GLUCOPHAGE Discharge Instructions Discharge Instructions PATIENT ID: Jaylan Herrera Sr. MRN: [id number] YOB: 1935 DATE OF ADMISSION: 6/25/2017  9:31 AM   
DATE OF DISCHARGE: 6/27/2017 PRIMARY CARE PROVIDER: Antonio Malagon MD  
 
ATTENDING PHYSICIAN: Frederick Lennon MD 
DISCHARGING PROVIDER: Debbie Tejeda NP To contact this individual call [phone] and ask the  to page. If unavailable ask to be transferred the Adult Hospitalist Department. DISCHARGE DIAGNOSES Acute Hypoxia Weakness/Debility CONSULTATIONS: IP CONSULT TO HOSPITALIST 
IP CONSULT TO CARDIOLOGY PROCEDURES/SURGERIES: * No surgery found * PENDING TEST RESULTS:  
At the time of discharge the following test results are still pending: none FOLLOW UP APPOINTMENTS:  
Follow-up Information Follow up With Details Comments Contact Info Antonio Malagon MD In 7 days As needed, If symptoms worsen [address] [facility]  [facility] 
[phone] ADDITIONAL CARE RECOMMENDATIONS:  
 
1. Stop taking MOTRIN and all other non steroidal antiinflammatory drugs ( EXCEPT YOUR ASPIRIN), taking things like motrin, ibuprofen, advil, aleve and celebrex can worsen your kidney function and also make you at a higher risk for a GI bleed. 2. We also recommend that you stop taking Metformin because of your kidney function. Metformin can worsen an already failing kidney. 3. Follow up with your primary care provider in one week to ensure that you are doing well post hospital discharge. 4. Stop all antibiotics, you have completed the course while here in the hospital.  
 
5. Eat plenty of calories, make sure you are taking in enough iron products and protein for your iron stores. DIET: Cardiac Diet ACTIVITY: Activity as tolerated DISCHARGE MEDICATIONS: 
 See Medication Reconciliation Form · It is important that you take the medication exactly as they are prescribed. · Keep your medication in the bottles provided by the pharmacist and keep a list of the medication names, dosages, and times to be taken in your wallet. · Do not take other medications without consulting your doctor. NOTIFY YOUR PHYSICIAN FOR ANY OF THE FOLLOWING:  
Fever over 101 degrees for 24 hours. Chest pain, shortness of breath, fever, chills, nausea, vomiting, diarrhea, change in mentation, falling, weakness, bleeding. Severe pain or pain not relieved by medications. Or, any other signs or symptoms that you may have questions about. DISPOSITION: 
X  Home With: none OT  PT  [facility]  RN  
  
 SNF/Inpatient Rehab/LTAC Independent/assisted living Hospice Other: CDMP Checked: Yes X PROBLEM LIST Updated: Yes X Signed:  
Sumeet Felix NP 
6/27/2017 
2:29 PM 
 
 
 
Discharge Orders None Introducing [facility]! Trish Montenegro introduces [facility] patient portal. Now you can access parts of your medical record, email your doctor's office, and request medication refills online. 1. In your internet browser, go to [email or web address][email or web address] 2. Click on the First Time User? Click Here link in the Sign In box. You will see the New Member Sign Up page. 3. Enter your [employer] Access Code exactly as it appears below. You will not need to use this code after youve completed the sign-up process. If you do not sign up before the expiration date, you must request a new code. · [employer] Access Code: [email or web address] Expires: 9/13/2017  9:51 AM 
 
4. Enter the last four digits of your Social Security Number (xxxx) and Date of Birth (mm/dd/yyyy) as indicated and click Submit. You will be taken to the next sign-up page. 5. Create a HandInScan ID. This will be your [employer] login ID and cannot be changed, so think of one that is secure and easy to remember. 6. Create a [employer] password. You can change your password at any time. 7. Enter your Password Reset Question and Answer. This can be used at a later time if you forget your password. 8. Enter your e-mail address. You will receive e-mail notification when new information is available in [address] 9. Click Sign Up. You can now view and download portions of your medical record. 10. Click the Download Summary menu link to download a portable copy of your medical information. If you have questions, please visit the Frequently Asked Questions section of the [employer] website. Remember, [employer]InScan is NOT to be used for urgent needs. For medical emergencies, dial 911. Now available from your iPhone and Android! General Information Please provide this summary of care documentation to your next provider. Patient Signature:  ____________________________________________________________ Date:  ____________________________________________________________  
  
Steve Sanchez Provider Signature:  ____________________________________________________________ Date:  ____________________________________________________________

## 2017-06-25 NOTE — PROGRESS NOTES
Problem: Falls - Risk of  Goal: *Absence of falls  Outcome: Progressing Towards Goal  Call bell within reach; patient and family instructed to use when needing assistance out of bed.

## 2017-06-25 NOTE — IP AVS SNAPSHOT
Current Discharge Medication List  
  
CONTINUE these medications which have NOT CHANGED Dose & Instructions Dispensing Information Comments Morning Noon Evening Bedtime  
 amLODIPine 10 mg tablet Commonly known as:  Daksha Medici Your last dose was: Your next dose is:    
   
   
 Dose:  10 mg Take 10 mg by mouth daily. Refills:  0  
     
   
   
   
  
 aspirin delayed-release 81 mg tablet Your last dose was: Your next dose is:    
   
   
 Dose:  81 mg Take 81 mg by mouth daily. Refills:  0  
     
   
   
   
  
 carvedilol 25 mg tablet Commonly known as:  Almanzar Brod Your last dose was: Your next dose is:    
   
   
 Dose:  25 mg Take 1 Tab by mouth two (2) times a day. Quantity:  60 Tab Refills:  0 CINNAMON 500 mg Cap Generic drug:  cinnamon bark Your last dose was: Your next dose is:    
   
   
 Dose:  500 mg Take 500 mg by mouth two (2) times a day. Refills:  0  
     
   
   
   
  
 cloNIDine HCl 0.1 mg tablet Commonly known as:  CATAPRES Your last dose was: Your next dose is:    
   
   
 Dose:  0.1 mg Take 1 Tab by mouth two (2) times a day. Quantity:  60 Tab Refills:  5 FISH -160-1,000 mg Cap Generic drug:  omega 3-dha-epa-fish oil Your last dose was: Your next dose is:    
   
   
 Dose:  1 Cap Take 1 Cap by mouth two (2) times a day. Refills:  0  
     
   
   
   
  
 glipiZIDE 10 mg tablet Commonly known as:  Duane Glen Your last dose was: Your next dose is:    
   
   
 Dose:  20 mg Take 20 mg by mouth two (2) times a day. Refills:  0  
     
   
   
   
  
 hydroCHLOROthiazide 25 mg tablet Commonly known as:  HYDRODIURIL Your last dose was: Your next dose is:    
   
   
 Dose:  25 mg Take 25 mg by mouth daily. Refills:  0 pioglitazone 45 mg tablet Commonly known as:  ACTOS Your last dose was: Your next dose is:    
   
   
 Dose:  45 mg Take 45 mg by mouth daily. Refills:  0  
     
   
   
   
  
 promethazine-codeine 6.25-10 mg/5 mL syrup Commonly known as:  PHENERGAN with CODEINE Your last dose was: Your next dose is:    
   
   
 Dose:  5 mL Take 5 mL by mouth every six (6) hours as needed for Cough. Refills:  0  
     
   
   
   
  
 simvastatin 20 mg tablet Commonly known as:  ZOCOR Your last dose was: Your next dose is:    
   
   
 Dose:  20 mg Take 20 mg by mouth nightly. Refills:  0 STOP taking these medications   
 amoxicillin 500 mg capsule Commonly known as:  AMOXIL  
   
  
 metFORMIN 1,000 mg tablet Commonly known as:  GLUCOPHAGE

## 2017-06-25 NOTE — ED TRIAGE NOTES
Pt presents with bilateral lower extremity weakness. Pt states she wass not able to get out of bed last night to use the bathroom. Pt denies fall or injury.

## 2017-06-25 NOTE — PROGRESS NOTES
Admission Medication Reconciliation:    Information obtained from: patient's wife     Significant PMH/Disease States:   Past Medical History:   Diagnosis Date    CAD (coronary artery disease)     [facility]     Diabetes mellitus, type 2 ([facility] [facility]     Hypercholesteremia     Hypertension     PVC's (premature ventricular contractions) 5/26/2014    PVD (peripheral vascular disease) ([facility] [address] [facility]        Chief Complaint for this Admission:  SOB     Allergies:  Lisinopril    Prior to Admission Medications:   Prior to Admission Medications   Prescriptions Last Dose Informant Patient Reported? Taking? amLODIPine (NORVASC) 10 mg tablet 6/24/2017  Yes Yes   Sig: Take 10 mg by mouth daily. amoxicillin (AMOXIL) 500 mg capsule 6/24/2017  Yes Yes   Sig: Take 500 mg by mouth three (3) times daily. aspirin delayed-release 81 mg tablet 6/24/2017  Yes Yes   Sig: Take 81 mg by mouth daily. carvedilol (COREG) 25 mg tablet 6/24/2017  No Yes   Sig: Take 1 Tab by mouth two (2) times a day. cinnamon bark (CINNAMON) 500 mg cap 6/24/2017  Yes Yes   Sig: Take 500 mg by mouth two (2) times a day. cloNIDine HCl (CATAPRES) 0.1 mg tablet 6/24/2017  No Yes   Sig: Take 1 Tab by mouth two (2) times a day. glipiZIDE (GLUCOTROL) 10 mg tablet 6/24/2017  Yes Yes   Sig: Take 20 mg by mouth two (2) times a day. hydrochlorothiazide (HYDRODIURIL) 25 mg tablet 6/24/2017  Yes Yes   Sig: Take 25 mg by mouth daily. metFORMIN (GLUCOPHAGE) 1,000 mg tablet 6/24/2017  Yes Yes   Sig: Take 1,000 mg by mouth two (2) times daily (with meals). omega 3-dha-epa-fish oil (FISH OIL) 100-160-1,000 mg cap 6/24/2017  Yes Yes   Sig: Take 1 Cap by mouth two (2) times a day. pioglitazone (ACTOS) 45 mg tablet 6/24/2017  Yes Yes   Sig: Take 45 mg by mouth daily. promethazine-codeine (PHENERGAN WITH CODEINE) 6.25-10 mg/5 mL syrup 6/24/2017  Yes Yes   Sig: Take 5 mL by mouth every six (6) hours as needed for Cough.    simvastatin (ZOCOR) 20 mg tablet 6/24/2017  Yes Yes   Sig: Take 20 mg by mouth nightly. Facility-Administered Medications: None         Comments/Recommendations: This medication history was obtained from the patient's wife; (s)he appears to be a good historian and brought a list of medications to the hospital.  An RX Query is available that confirms a refill history for everything except amlodipine. Inpatient orders were reviewed and no changes are needed. Thank you for allowing me to participate in the care of this patient. Please contact the pharmacy () or the medication reconciliation pharmacy () with any questions. Staci Hartman, Pharm. D., BCPS, BCPPS

## 2017-06-25 NOTE — PROGRESS NOTES
Pharmacy Renal Dosing Protocol  Amoxicillin PTA med  Indication:  CAP  Current regimen:  500 mg tid x10 days (started 17)    Recent Labs      17   0948   WBC  7.4   CREA  2.85*   BUN  60*     Est CrCl: 20 ml/min  Temp (24hrs), Av.8 °F (37.1 °C), Min:98.4 °F (36.9 °C), Max:99.2 °F (37.3 °C)      Plan: Change to 500 mg po bid for CrCl 10-29 ml/min

## 2017-06-25 NOTE — ED PROVIDER NOTES
HPI Comments: [de-identified] y.o. male with past medical history significant for CAD, HTN, DM, carotid arterial disease, PVD, and hypercholesteremia who presents from home via EMS with chief complaint of weakness. Per wife, the pt was too weak to get himself out of bed to go to the bathroom ~9 hours ago. She also reports new urinary incontinence recently. Wife states that the pt was previously given a walker which he used for 1 week, he has been ambulating on his own, but has been \"slow\" the past 3 days. She notes Pt drove his car yesterday. The patient's wife states that he was seen by his PCP 6 days ago for cough, SOB, and subjective fever. He was dx with bronchitis at that time and was prescribed a Z-pack and amoxicillin. Pt finished his Z-pack yesterday and is still taking Amoxicillin. Pt states his cough and SOB have improved since then. Pt's wife mentions that he was seen in the ED ~3 months ago for dehydration where he was given an antidiuretic for 2 days which caused his sodium levels to significantly decrease. Per wife, the pt also had 2 episodes of hypoglycemia during the night earlier this week but he does not remember his BG levels. Pt denies current chills and SOB. There are no other acute medical concerns at this time. Social hx: lives in house with wife, quit smoking 30 years ago    PCP: Emile De La Rosa MD    Note written by Maria Dolores King, as dictated by Douglas Jansen MD 10:21 AM    The history is provided by the patient and the spouse. No  was used.         Past Medical History:   Diagnosis Date    CAD (coronary artery disease)     Carotid arterial disease ([address]     Diabetes mellitus, type 2 ([address]     Hypercholesteremia     Hypertension     PVC's (premature ventricular contractions) 5/26/2014    PVD (peripheral vascular disease) ([address]        Past Surgical History:   Procedure Laterality Date    HX CORONARY ARTERY BYPASS GRAFT      HX HEART CATHETERIZATION History reviewed. No pertinent family history. Social History     Social History    Marital status:      Spouse name: N/A    Number of children: N/A    Years of education: N/A     Occupational History    Not on file. Social History Main Topics    Smoking status: Former Smoker     Packs/day: 3.00     Years: 20.00     Types: Cigarettes     Quit date: 1/14/1985    Smokeless tobacco: Never Used    Alcohol use No      Comment: occasional beer-2 in past year    Drug use: No    Sexual activity: Not on file     Other Topics Concern    Not on file     Social History Narrative         ALLERGIES: Lisinopril    Review of Systems   Constitutional: Negative for appetite change, chills and fever. HENT: Negative for rhinorrhea, sore throat and trouble swallowing. Eyes: Negative for photophobia. Respiratory: Negative for cough and shortness of breath. Cardiovascular: Negative for chest pain and palpitations. Gastrointestinal: Negative for abdominal pain, nausea and vomiting. Genitourinary: Positive for enuresis. Negative for dysuria, frequency and hematuria. Musculoskeletal: Negative for arthralgias. Neurological: Positive for weakness. Negative for dizziness and syncope. Psychiatric/Behavioral: Negative for behavioral problems. The patient is not nervous/anxious. All other systems reviewed and are negative. Vitals:    06/25/17 0937   BP: 167/72   Pulse: 84   Resp: 18   Temp: 98.4 °F (36.9 °C)   SpO2: 96%   Weight: 83.5 kg (184 lb)   Height: 5' 9\" (1.753 m)            Physical Exam   Constitutional: He appears well-developed and well-nourished. HENT:   Head: Normocephalic and atraumatic. Mouth/Throat: Oropharynx is clear and moist.   Eyes: EOM are normal. Pupils are equal, round, and reactive to light. Neck: Normal range of motion. Neck supple. Cardiovascular: Normal rate, regular rhythm and intact distal pulses. Exam reveals no gallop and no friction rub.     Murmur heard.   Systolic murmur is present with a grade of 2/6   Occasional ectopic beats. Pulmonary/Chest: Effort normal. No respiratory distress. He has no wheezes. He has no rales. Abdominal: Soft. There is no tenderness. There is no rebound. Musculoskeletal: Normal range of motion. He exhibits no tenderness. Neurological: He is alert. No cranial nerve deficit. Motor; symmetric   Skin: No erythema. Sternotomy scar. Psychiatric: He has a normal mood and affect. His behavior is normal.   Nursing note and vitals reviewed. Note written by Maria Dolores Horn, as dictated by Natalia Molina MD 10:21 AM    [facility]  ED Course       Procedures           ED EKG interpretation:  Rhythm: normal sinus rhythm and PAC's; and regular . Rate (approx.): 75; Axis: left axis deviation; P wave: prolonged; QRS interval: normal ; ST/T wave: non-specific changes; in  Lead: ; Other findings: . This EKG was interpreted by Natalia Molina MD,ED Provider. 9:58 AM        Note: The patient was driving a car earlier last week. The patient was unable to stand or walk at 1 AM today due to weakness. His primary care doctor treated him for bronchitis/cough with azithromycin which he completed and amoxicillin which he continues to take. Patient's BUN and creatinine are elevated compared to 3 weeks ago and creatinine clearance is decreased from 40 to 20. Troponin which has been relatively normal in the past is increased at 0.1. Plan is to give patient a liter of saline over 2 hours and asked the hospitalist to admit the patient for serial enzymes and further evaluation of his weakness. Natalia Molina MD  11:38 AM    CONSULT NOTE:  11:39 AM Natalia Molina MD spoke with Dr. Violetta Lares, Consult for Hospitalist.  Discussed available diagnostic tests and clinical findings. He is in agreement with care plans as outlined. Dr. Violetta Lares will admit Pt.

## 2017-06-25 NOTE — CONSULTS
Cardiology Consultation Note     Subjective:      Boni Iyer is a [de-identified] y.o. patient who is seen for evaluation of troponin elevation  His wife and niece are with him  They said he has been weak about 3 days and today he could not get up out of bed so they brought him in  He has no chest pain or dizziness  His shortness of breaths is worse  Leg claudication absent since he has not been walking much    Patient Active Problem List   Diagnosis Code    Bradycardia R00.1    CAD (coronary artery disease) I25.10    Hypertension, essential, benign I10    Carotid arterial disease (HCC) I77.9    Hypercholesteremia E78.00    PVD (peripheral vascular disease) ([facility] I73.9    PVC's (premature ventricular contractions) I49.3    SOB (shortness of breath) R06.02    Type 2 diabetes mellitus with diabetic peripheral angiopathy without gangrene ([facility] E11.51    CKD (chronic kidney disease) N18.9    Acute renal failure (ARF) ([facility] N17.9    Hypokalemia E87.6    Generalized weakness R53.1    Elevated troponin R74.8    KATALINA (acute kidney injury) ([facility] N17.9     Current Facility-Administered Medications   Medication Dose Route Frequency Provider Last Rate Last Dose    glucose chewable tablet 16 g  4 Tab Oral PRN Ferdinand Soulier, MD        glucagon (GLUCAGEN) injection 1 mg  1 mg IntraMUSCular PRN Ferdinand Soulier, MD        potassium chloride SR (KLOR-CON 10) tablet 40 mEq  40 mEq Oral BID Laverne Fritz MD   40 mEq at 06/25/17 1323    sodium chloride (NS) flush 5-10 mL  5-10 mL IntraVENous Q8H Laverne Fritz MD        sodium chloride (NS) flush 5-10 mL  5-10 mL IntraVENous PRN Laverne Fritz MD        acetaminophen (TYLENOL) tablet 650 mg  650 mg Oral Q4H PRN Laverne Fritz MD        heparin (porcine) injection 5,000 Units  5,000 Units SubCUTAneous Q12H Surinder Fritz MD   5,000 Units at 06/25/17 1323    amoxicillin (AMOXIL) capsule 500 mg  500 mg Oral TID ONFRNRL Alana Pederson MD        cloNIDine HCl (CATAPRES) tablet 0.1 mg  0.1 mg Oral BID Laverne Parker MD        [START ON 6/26/2017] aspirin delayed-release tablet 81 mg  81 mg Oral DAILY Laverne Fritz MD        carvedilol (COREG) tablet 25 mg  25 mg Oral BID WITH MEALS Lux Tobias MD        . PHARMACY TO SUBSTITUTE PER PROTOCOL    Per Protocol NORRISRV Carole Parker MD        simvastatin (ZOCOR) tablet 20 mg  20 mg Oral QHS Laverne Fritz MD        [START ON 6/26/2017] amLODIPine (NORVASC) tablet 10 mg  10 mg Oral DAILY Laverne Fritz MD        dextrose 10 % infusion 125-250 mL  125-250 mL IntraVENous PRN [place]  Carole Parker MD        0.9% sodium chloride with KCl 40 mEq/L infusion   IntraVENous CONTINUOUS Laverne Parker MD        nitroglycerin (NITROSTAT) tablet 0.4 mg  0.4 mg SubLINGual Q5MIN PRN HYSGXAT Carole Parker MD         Current Outpatient Prescriptions   Medication Sig Dispense Refill    metFORMIN (GLUCOPHAGE) 1,000 mg tablet Take 1,000 mg by mouth two (2) times daily (with meals).  amoxicillin (AMOXIL) 500 mg capsule Take 500 mg by mouth three (3) times daily.  cloNIDine HCl (CATAPRES) 0.1 mg tablet Take 1 Tab by mouth two (2) times a day. 60 Tab 5    carvedilol (COREG) 25 mg tablet Take 1 Tab by mouth two (2) times a day. 60 Tab 0    aspirin delayed-release 81 mg tablet Take 81 mg by mouth daily.  simvastatin (ZOCOR) 20 mg tablet Take 20 mg by mouth nightly.  omega 3-dha-epa-fish oil (FISH OIL) 100-160-1,000 mg cap Take 1 Cap by mouth two (2) times a day.  promethazine-codeine (PHENERGAN WITH CODEINE) 6.25-10 mg/5 mL syrup Take 5 mL by mouth every six (6) hours as needed for Cough.  pioglitazone (ACTOS) 45 mg tablet Take 45 mg by mouth daily.  amLODIPine (NORVASC) 10 mg tablet Take 10 mg by mouth daily.  cinnamon bark (CINNAMON) 500 mg cap Take 500 mg by mouth two (2) times a day.  hydrochlorothiazide (HYDRODIURIL) 25 mg tablet Take 25 mg by mouth daily.       glipiZIDE (GLUCOTROL) 10 mg tablet Take 20 mg by mouth two (2) times a day. Allergies   Allergen Reactions    Lisinopril Cough     Past Medical History:   Diagnosis Date    CAD (coronary artery disease)     [facility]ot[facility] arterial [facility] ([facility]     [facility]     Hypercholesteremia     Hypertension     PVC's (premature ventricular contractions) 5/26/2014    PVD (peripheral vascular disease) ([facility]      Past Surgical History:   Procedure Laterality Date    HX CORONARY ARTERY BYPASS GRAFT      HX HEART CATHETERIZATION       History reviewed. No pertinent family history. Social History   Substance Use Topics    Smoking status: Former Smoker     Packs/day: 3.00     Years: 20.00     Types: Cigarettes     Quit date: 1/14/1985    Smokeless tobacco: Never Used    Alcohol use No      Comment: occasional beer-2 in past year        Review of Systems:   Constitutional: Negative for fever, chills, weight loss, + malaise/fatigue. HEENT: Negative for nosebleeds, vision changes. Respiratory: Negative for cough, hemoptysis  Cardiovascular: Negative for chest pain, palpitations, orthopnea, claudication, leg swelling, syncope, and PND. +SOB  Gastrointestinal: Negative for nausea, vomiting, diarrhea, blood in stool and melena. Genitourinary: Negative for dysuria, and hematuria. Musculoskeletal: Negative for myalgias, arthralgia. Skin: Negative for rash. Heme: Does not bleed or bruise easily. Neurological: Negative for speech change      Objective:     Visit Vitals    /64    Pulse 75    Temp 99.2 °F (37.3 °C)    Resp 21    Ht 5' 9\" (1.753 m)    Wt 184 lb (83.5 kg)    SpO2 99%    BMI 27.17 kg/m2      Physical Exam:   Constitutional: well-developed and well-nourished. No respiratory distress. Head: Normocephalic and atraumatic. Eyes: Pupils are equal, round  ENT: hearing normal  Neck: supple. No JVD present. Cardiovascular: Normal rate, regular rhythm. Exam reveals no gallop and no friction rub.  No murmur heard. Pulmonary/Chest: Effort normal and breath sounds normal. No wheezes. Abdominal: Soft, no tenderness. Musculoskeletal: no edema. Neurological: alert,oriented. Skin: Skin is warm and dry  Psychiatric: normal mood and affect. Behavior is normal. Judgment and thought content normal.      EKG: normal sinus rhythm, PAC's noted, poor R wave progression left axis deviation. Assessment/Plan:   Fatigue/weakness  Acute on chronic renal failure  Hypoglycemia  CAD with hx of CABG in 3/2008  troponin elevation 0.10, GFR <30    He does not have angina or acute ischemia/injury on ECG  Troponin level is mildly elevated in presence of worsening renal failure  This is not indicative of acute NSTEMI  He appears hypovolumic, and no pulmonary edema    3/2014 stress test without ischemia, normal LVEF  Echo 1/2017 normal LVEF, mild LVH and mild MR    I recommend serial troponin measurement, hydration, hold HCTZ, avoid nephrotoxic meds  Dr Marie Anglin will see him tomorrow  Continue with statin, coreg, aspirin    Thank you for involving me in this patient's care and please call with further concerns or questions. Ruthie Fischer M.D.   Electrophysiology/Cardiology  [facility]  [address] , [facility] [address]  [address]  [phone]

## 2017-06-26 ENCOUNTER — APPOINTMENT (OUTPATIENT)
Dept: CT IMAGING | Age: 82
DRG: 683 | End: 2017-06-26
Attending: NURSE PRACTITIONER
Payer: MEDICARE

## 2017-06-26 ENCOUNTER — APPOINTMENT (OUTPATIENT)
Dept: NUCLEAR MEDICINE | Age: 82
DRG: 683 | End: 2017-06-26
Attending: HOSPITALIST
Payer: MEDICARE

## 2017-06-26 LAB
ALBUMIN SERPL BCP-MCNC: 2.4 G/DL (ref 3.5–5)
ALBUMIN/GLOB SERPL: 0.8 {RATIO} (ref 1.1–2.2)
ALP SERPL-CCNC: 34 U/L (ref 45–117)
ALT SERPL-CCNC: 19 U/L (ref 12–78)
ANION GAP BLD CALC-SCNC: 8 MMOL/L (ref 5–15)
APTT PPP: 47.8 SEC (ref 22.1–32.5)
APTT PPP: >130 SEC (ref 22.1–32.5)
AST SERPL W P-5'-P-CCNC: 18 U/L (ref 15–37)
BILIRUB SERPL-MCNC: 0.5 MG/DL (ref 0.2–1)
BUN SERPL-MCNC: 48 MG/DL (ref 6–20)
BUN/CREAT SERPL: 23 (ref 12–20)
CALCIUM SERPL-MCNC: 7.8 MG/DL (ref 8.5–10.1)
CHLORIDE SERPL-SCNC: 105 MMOL/L (ref 97–108)
CK MB CFR SERPL CALC: 1.8 % (ref 0–2.5)
CK MB SERPL-MCNC: 1.4 NG/ML (ref 5–25)
CK SERPL-CCNC: 80 U/L (ref 39–308)
CO2 SERPL-SCNC: 26 MMOL/L (ref 21–32)
CREAT SERPL-MCNC: 2.13 MG/DL (ref 0.7–1.3)
ERYTHROCYTE [DISTWIDTH] IN BLOOD BY AUTOMATED COUNT: 14.5 % (ref 11.5–14.5)
GLOBULIN SER CALC-MCNC: 3 G/DL (ref 2–4)
GLUCOSE BLD STRIP.AUTO-MCNC: 213 MG/DL (ref 65–100)
GLUCOSE BLD STRIP.AUTO-MCNC: 262 MG/DL (ref 65–100)
GLUCOSE SERPL-MCNC: 119 MG/DL (ref 65–100)
HCT VFR BLD AUTO: 29.1 % (ref 36.6–50.3)
HGB BLD-MCNC: 9.8 G/DL (ref 12.1–17)
INR PPP: 1.1 (ref 0.9–1.1)
MCH RBC QN AUTO: 28.8 PG (ref 26–34)
MCHC RBC AUTO-ENTMCNC: 33.7 G/DL (ref 30–36.5)
MCV RBC AUTO: 85.6 FL (ref 80–99)
PLATELET # BLD AUTO: 182 K/UL (ref 150–400)
POTASSIUM SERPL-SCNC: 3.6 MMOL/L (ref 3.5–5.1)
PROT SERPL-MCNC: 5.4 G/DL (ref 6.4–8.2)
PROTHROMBIN TIME: 11.3 SEC (ref 9–11.1)
RBC # BLD AUTO: 3.4 M/UL (ref 4.1–5.7)
SERVICE CMNT-IMP: ABNORMAL
SERVICE CMNT-IMP: ABNORMAL
SODIUM SERPL-SCNC: 139 MMOL/L (ref 136–145)
THERAPEUTIC RANGE,PTTT: ABNORMAL SECS (ref 58–77)
THERAPEUTIC RANGE,PTTT: ABNORMAL SECS (ref 58–77)
TROPONIN I SERPL-MCNC: 0.06 NG/ML
WBC # BLD AUTO: 4.7 K/UL (ref 4.1–11.1)

## 2017-06-26 PROCEDURE — 74011250637 HC RX REV CODE- 250/637: Performed by: HOSPITALIST

## 2017-06-26 PROCEDURE — 82962 GLUCOSE BLOOD TEST: CPT

## 2017-06-26 PROCEDURE — 93970 EXTREMITY STUDY: CPT

## 2017-06-26 PROCEDURE — 85730 THROMBOPLASTIN TIME PARTIAL: CPT | Performed by: HOSPITALIST

## 2017-06-26 PROCEDURE — 36415 COLL VENOUS BLD VENIPUNCTURE: CPT | Performed by: HOSPITALIST

## 2017-06-26 PROCEDURE — 74011250636 HC RX REV CODE- 250/636: Performed by: HOSPITALIST

## 2017-06-26 PROCEDURE — 65660000000 HC RM CCU STEPDOWN

## 2017-06-26 PROCEDURE — 93306 TTE W/DOPPLER COMPLETE: CPT

## 2017-06-26 PROCEDURE — 80053 COMPREHEN METABOLIC PANEL: CPT | Performed by: HOSPITALIST

## 2017-06-26 PROCEDURE — 84484 ASSAY OF TROPONIN QUANT: CPT | Performed by: HOSPITALIST

## 2017-06-26 PROCEDURE — 85027 COMPLETE CBC AUTOMATED: CPT | Performed by: HOSPITALIST

## 2017-06-26 PROCEDURE — 77010033678 HC OXYGEN DAILY

## 2017-06-26 PROCEDURE — 85610 PROTHROMBIN TIME: CPT | Performed by: HOSPITALIST

## 2017-06-26 PROCEDURE — 82550 ASSAY OF CK (CPK): CPT | Performed by: HOSPITALIST

## 2017-06-26 PROCEDURE — A9558 XE133 XENON 10MCI: HCPCS

## 2017-06-26 PROCEDURE — 71250 CT THORAX DX C-: CPT

## 2017-06-26 RX ORDER — GLIPIZIDE 5 MG/1
10 TABLET ORAL
Status: DISCONTINUED | OUTPATIENT
Start: 2017-06-27 | End: 2017-06-27 | Stop reason: HOSPADM

## 2017-06-26 RX ORDER — HEPARIN SODIUM 5000 [USP'U]/ML
3300 INJECTION, SOLUTION INTRAVENOUS; SUBCUTANEOUS ONCE
Status: COMPLETED | OUTPATIENT
Start: 2017-06-26 | End: 2017-06-26

## 2017-06-26 RX ADMIN — Medication 10 ML: at 14:48

## 2017-06-26 RX ADMIN — HEPARIN SODIUM 3300 UNITS: 5000 INJECTION, SOLUTION INTRAVENOUS; SUBCUTANEOUS at 05:50

## 2017-06-26 RX ADMIN — AMOXICILLIN 500 MG: 500 CAPSULE ORAL at 08:30

## 2017-06-26 RX ADMIN — CARVEDILOL 25 MG: 12.5 TABLET, FILM COATED ORAL at 18:25

## 2017-06-26 RX ADMIN — CLONIDINE HYDROCHLORIDE 0.1 MG: 0.1 TABLET ORAL at 08:25

## 2017-06-26 RX ADMIN — Medication 1 CAPSULE: at 18:25

## 2017-06-26 RX ADMIN — Medication 10 ML: at 21:22

## 2017-06-26 RX ADMIN — CLONIDINE HYDROCHLORIDE 0.1 MG: 0.1 TABLET ORAL at 18:25

## 2017-06-26 RX ADMIN — Medication 1 CAPSULE: at 08:25

## 2017-06-26 RX ADMIN — SIMVASTATIN 20 MG: 20 TABLET, FILM COATED ORAL at 21:22

## 2017-06-26 RX ADMIN — CARVEDILOL 25 MG: 12.5 TABLET, FILM COATED ORAL at 08:25

## 2017-06-26 RX ADMIN — POTASSIUM CHLORIDE 40 MEQ: 750 TABLET, FILM COATED, EXTENDED RELEASE ORAL at 08:25

## 2017-06-26 RX ADMIN — SODIUM CHLORIDE AND POTASSIUM CHLORIDE: 9; 2.98 INJECTION, SOLUTION INTRAVENOUS at 06:17

## 2017-06-26 RX ADMIN — ASPIRIN 81 MG: 81 TABLET, COATED ORAL at 08:25

## 2017-06-26 RX ADMIN — AMLODIPINE BESYLATE 10 MG: 5 TABLET ORAL at 08:25

## 2017-06-26 RX ADMIN — Medication 10 ML: at 05:51

## 2017-06-26 RX ADMIN — POTASSIUM CHLORIDE 40 MEQ: 750 TABLET, FILM COATED, EXTENDED RELEASE ORAL at 18:25

## 2017-06-26 NOTE — PROGRESS NOTES

## 2017-06-26 NOTE — INTERDISCIPLINARY ROUNDS
IDR/SLIDR Summary          Patient: Omi Cantrell. MRN: [id number]    Age: [de-identified] y.o. YOB: 1935 Room/Bed: [facility]   Admit Diagnosis: KATALINA (acute kidney injury) ([facility]  CKD (chronic kidney disease)  Elevated troponin  Generalized weakness  Hypokalemia  Principal Diagnosis: SOB (shortness of breath)   Goals: heparin drip  Readmission: NO  Quality Measure: Not applicable  VTE Prophylaxis: Mechanical  Influenza Vaccine screening completed? YES  Pneumococcal Vaccine screening completed? YES  Mobility needs: Yes   Nutrition plan:Yes  Consults: P. T and O.T. Financial concerns:No  Escalated to CM? NO  RRAT Score: 29   Interventions:  Testing due for pt today?  YES  LOS: 1 days Expected length of stay 2 days  Discharge plan: home   PCP: Deshawn Liu MD  Transportation needs: No    Days before discharge:two or more days before discharge   Discharge disposition: Home    Signed:     Juan Sweeney RN  6/26/2017  12:58 AM

## 2017-06-26 NOTE — H&P
[facility], [address]   HISTORY AND PHYSICAL       Name:  Nehemias Del Valle   MR#:  [id number]   :  1935   Account #:  [de-identified]        Date of Adm:  2017       CHIEF COMPLAINT: Shortness of breath, weakness, onset 3 days,   worsened overnight. SOURCE OF HISTORY: The patient, his wife, review of records. HISTORY OF PRESENT ILLNESS: This is an 60-year-old white male   who came from home predominantly for complaints of shortness of   breath and weakness. The patient and his wife stated that he has been   feeling weak, has gotten more and more short-winded while he is   getting up and moving about, but last night he was so weak he was   unable to get up to go to the bathroom. While his wife was trying to get   him a urinal, he could not make it, he wet himself several times. He   was out in [place] yesterday. He was short-winded, but he   managed to drive and his grandchildren helped him loading and   unloading. He came to the emergency room and was evaluated. The   patient's recent history is significant for cough and went to his primary   doctor last Tuesday and was diagnosed with acute   bronchitis/pneumonia. He was prescribed Z-TRAY and amoxicillin. Completed a Z-TRAY, started taking the amoxicillin. His cough had   improved even before initiation of antibiotics and currently significantly   better. He denied fever or chills. He denied chest pain. He denied   dyspnea, orthopnea or paroxysmal nocturnal dyspnea. No leg edema. No headache, diplopia or blurry vision. He has urinary incontinence   recently. The patient had at one point in time was described with pain,   but he was able to move about on his own until 3 days ago. In the ED,   his initial evaluation included vital signs which are quite unremarkable. No fever. His blood work included a complete blood count and was   quite unremarkable. Urine examination was negative for UTI. Serum   chemistry, however, showed low sodium at 132, potassium 3.1 and   creatinine 2.85. His BUN and creatinine were 44 and 1.63 respectively   25 days ago. His troponin was 0.1 and his EKG showed sinus rhythm   with a first-degree AV block with PVCs, left axis deviation, nonspecific   ST and T-wave abnormalities. He had an echo done at the beginning   of the month. EF was normal at 50% to 55%. Has mild concentric   hypertrophy, mitral valve mild regurgitation, left atrium and right atrium   were normal. No comment other chambers or valves of the heart in the   report. PAST MEDICAL HISTORY: The patient's medical history is significant   for coronary artery disease. He had CABG about 10 years ago. Peripheral arterial disease. He had stenting by Dr. Morena Diaz and   diabetes type 2. He is on triple anti-diabetic, hypertension,   hyperlipidemia. Recent diagnosis of acute bronchitis. CURRENT MEDICATIONS    1. Metformin 1000 mg twice a day. 2. Phenergan p.r.n.   3. Actos 45 mg daily. 4. Hydrochlorothiazide 25 mg daily. 5. Glucotrol 20 mg twice a day. 6. Amoxicillin 500 mg 3 times daily. 7. Clonidine 0.1 twice a day. 8. Carvedilol 25 mg twice a day. 9. Aspirin 81 mg daily. 10. Zocor 20 mg nightly. 11. Omega 3 fatty acids. 12. Amlodipine 10 mg daily. ALLERGIES: LISINOPRIL. SOCIAL HISTORY: , former smoker. Alcohol: Social.    FAMILY HISTORY: Family medical history is negative. REVIEW OF SYSTEMS   The positive include shortness of breath, more on exertion, generalized   weakness, urinary incontinence. The rest of the 10-point review of   systems is reviewed and negative. PHYSICAL EXAMINATION   GENERAL: The patient was noted to be awake, but was lethargic. VITAL SIGNS: Temperature 99.2, pulse 75, blood pressure 143/64,   respirations 21, oxygen saturation 99% on room air. HEENT: Head is atraumatic. No pallor or jaundice. LUNGS: Clear air entry bilaterally.  He has crepitations on the right   posterior lung fields. CARDIOVASCULAR: No JVD was appreciated. He has a grade 3   systolic murmur on the left lower sternal border. ABDOMEN: Normoactive, soft, nontender. GENITOURINARY: Notes costovertebral angle tenderness. No   suprapubic tenderness. EXTREMITIES: No peripheral edema. No joint swelling or deformity. No erythema of the skin of extremities. CENTRAL NERVOUS SYSTEM: The patient is alert, oriented, but   lethargic. Cranial nerves 2-12 are intact. Motor examination 5/5 in all 4   extremities. Light touch sensation is intact. Labs and imaging study reviewed and discussed in the HPI. ASSESSMENT AND PLAN: This is an 81-year-old gentleman with a   history of coronary artery disease status post coronary artery bypass   graft, coronary artery disease, diabetes, hyperlipidemia, who presented   with shortness of breath and generalized weakness. During my   evaluation, he was a little bit lethargic. 1. Dyspnea, mainly exertional. The patient had slightly elevated   troponin. No clear fluid overload. Lungs are clear. He is getting over   pneumonia. My primary concern is for angina. The patient will be   admitted to a cardiac monitored unit. His current cardiac medications   will be continued. We will cycle enzymes and EKG. He has a recent   echocardiogram. Will defer if he may need another echocardiogram for   cardiologist evaluation. We will obtain a D-dimer to rule out the   possibility of venous thromboembolism. If positive, may follow with   extremity Doppler and V/Q scan of the lungs to rule out pulmonary   embolism. Supplemental oxygen and daily weights. 2. Hypoglycemia. The patient was lethargic and earlier in his blood   chemistry, his blood sugar was 65. I requested a stat Accu-Chek that   showed a blood sugar of 58. The patient was treated with oral juices. Subsequently, his blood sugar was up to 71. His oral hypoglycemics   will be on hold. Accu-Cheks and hypoglycemic treatment protocol were   ordered. 3. Acute on chronic kidney disease. Creatinine jumped from 1.6 to 2.8. The patient received a bolus of 1000 mL of normal saline. I will   continue with maintenance fluids. Will monitor creatinine. Avoid   nephrotoxic agents and renal dose adjust medications. Avoid IV dye. If   needed before rule out pulmonary embolism, we will use a V/Q scan as   discussed above. 4. Hypokalemia. This could be due to limited oral intake, ongoing use   of diuretics. Potassium replacement ordered. 5. Mild hyponatremia. Again, this could be from dehydration as   reflected by worsening renal condition. This will correct with IV fluids. 6. Coronary artery disease status post coronary artery bypass graft,   now concern for possible angina. His Carvedilol will be continued, as   well as statin. Will add nitroglycerin p.r.n. Further management per   Cardiology. 7. Coronary artery disease. The patient on aspirin and statin. 8. Diabetes. The patient on triple oral hypoglycemics that includes   Actos, Glucotrol, and metformin, all of which are on hold. Will manage   with sliding scale Humalog. However, the patient has recurrent   hypoglycemia recently and today the hypoglycemic protocol entered. The patient is able to take orally. We will check hemoglobin A1c.   9. Current recent acute bronchitis. He has completed a Z-TRAY. Will   continue with his amoxicillin prophylaxis, deep venous thrombosis,   heparin. 10. Gastrointestinal. No indication. 11. CODE STATUS IS FULL. 12. Consultation with Cardiology. 13. Diet: Diabetic diet. 14. Disposition. Admit to a cardiac monitored unit. 15. Anticipated length of stay is 3-5 days.          MD CINDY Beth / JAYESH   D:  06/25/2017   13:56   T:  06/25/2017   20:04   Job #:  [id number]

## 2017-06-26 NOTE — PROGRESS NOTES
Bedside and Verbal shift change report given to Radha (oncoming nurse) by Marquita Ellis (offgoing nurse). Report included the following information SBAR, Kardex, Intake/Output, MAR and Recent Results.

## 2017-06-26 NOTE — PROGRESS NOTES
Primary Nurse Milena Salmeron RN and Miles Buck, RN performed a dual skin assessment on this patient No impairment noted  Fabio score is 20

## 2017-06-26 NOTE — DIABETES MGMT
DTC Progress Note    Recommendations/ Comments: Chart reviewed for hyperglycemia. Noted that Glipizide has been ordered. If appropriate, please consider adding Humalog for correction, high sensitivity scale. Patient is a [de-identified] y.o. male with a history of Type 2 Diabetes on oral agents (triple therapy): glipizide (generic), metformin (generic), pioglitazone (Actos) at home. A1c:   Lab Results   Component Value Date/Time    Hemoglobin A1c 9.0 06/25/2017 03:39 PM       Recent Glucose Results:   Lab Results   Component Value Date/Time     (H) 06/26/2017 04:16 AM    GLUCPOC 213 (H) 06/26/2017 07:46 AM    GLUCPOC 149 (H) 06/25/2017 09:28 PM    GLUCPOC 201 (H) 06/25/2017 04:53 PM        Lab Results   Component Value Date/Time    Creatinine 2.13 06/26/2017 04:16 AM     Estimated Creatinine Clearance: 27.7 mL/min (based on Cr of 2.13). Active Orders   Diet    DIET DIABETIC CONSISTENT CARB Regular        PO intake: No data found. Current hospital DM medication: Glipizide to start in am - 10 mg    Will continue to follow as needed.     Thank you  Ortega Egan, MS, RN, CDE

## 2017-06-26 NOTE — PROCEDURES
[address] [place] [address]  *** FINAL REPORT ***    Name: Mere Finn  MRN: [id number]    Inpatient  : 28 Aug 1935  HIS Order #: [id number]  [id number] [facility] Visit #: [id number]  Date: 2017    TYPE OF TEST: Peripheral Venous Testing    REASON FOR TEST  PE    Right Leg:-  Deep venous thrombosis:           No  Superficial venous thrombosis:    No  Deep venous insufficiency:        Not examined  Superficial venous insufficiency: Not examined    Left Leg:-  Deep venous thrombosis:           No  Superficial venous thrombosis:    No  Deep venous insufficiency:        Not examined  Superficial venous insufficiency: Not examined      INTERPRETATION/FINDINGS  PROCEDURE:  Color duplex ultrasound imaging of lower extremity veins. FINDINGS:       Right: The common femoral, deep femoral, femoral, popliteal,  posterior tibial, peroneal, and great saphenous are patent and without   evidence of thrombus;  each is fully compressible and there is no  narrowing of the flow channel on color Doppler imaging. Phasic flow  is observed in the common femoral vein. Left:   The common femoral, deep femoral, femoral, popliteal,  posterior tibial, peroneal, and great saphenous are patent and without   evidence of thrombus;  each is fully compressible and there is no  narrowing of the flow channel on color Doppler imaging. Phasic flow  is observed in the common femoral vein. IMPRESSION:  No evidence of right or left lower extremity vein  thrombosis. ADDITIONAL COMMENTS    I have personally reviewed the data relevant to the interpretation of  this  study.     TECHNOLOGIST: ANGELIQUE Roman, RCS  Signed: 2017 11:48 AM    PHYSICIAN: Leonie Skiff, MD  Signed: 2017 07:57 AM

## 2017-06-26 NOTE — PROGRESS NOTES
Bedside and Verbal shift change report given to Susan Lucio RN (oncoming nurse) by Kunal Parker RN (offgoing nurse). Report included the following information SBAR, Kardex, Intake/Output, MAR and Recent Results.

## 2017-06-26 NOTE — PROGRESS NOTES
Hospitalist Progress Note  Darlin De La Cruz NP  Office: [phone]  Cell: [phone] 7a-5p      Date of Service:  2017  NAME:  Jason Duncan  :  1935  MRN:  [id number]      Admission Summary:   [de-identified] yom who presented from home with shortness of breath and weakness. Patient notes shortness of breath and weakness with activities. He saw his pcp last week and was dx with acute bronchitis. He was prescribed a zpack and amoxicillin. Interval history / Subjective:     Patient reports continued feeling of generalized weakness and fatigue. He denies any current shortness of breath or chest pain.       Assessment & Plan:     Dyspnea on Exertion   -seems more chronic in nature, likely from CAD  -denies chest pain, troponin mildly elevated on admission, trending down now   -d-dimer elevated, VQ scan showed low probability of PE, Dopplers negative, stop heparin gtt  - echo showed ef 50-55%, will recheck limited echo to eval for pulmonary htn  -wean oxygen to keep sats >92%  -check CT chest to eval for COPD   -check 6 minute walk     Hypokalemia  -K 3.1 on admission  -now resolved  -monitor    Acute on Chronic Kidney Disease Stage 3   -likely combination of poor oral intake with diuretic and Metformin use at home  -IVF   -recheck labs in am   -hold Metformin and HCTZ    Type 2 DM   -with hypoglycemia on admission, now hypergylcemic  -on Metformin, Actos and glipizide at home, hold Metformin due to KATALINA, stop Actos as patient reports increase swelling in his legs after being started on the medication  -Resume home Glipizide  -a1c 9.0  -SSI, accuchecks    CAD s/p bypass graft  -continue home ASA and statin     Mild Hyponatremia-POA  -resolved   -monitor    Acute Bronchitis, recently dx  -patient recently dx with pcp on Thursday   -completed Zpack   -continue Amoxil   -possibly cause of the above     Generalized Debility  -suspect from recent hospitalizations   -consult PT    Code status: Full  DVT prophylaxis: SCDs    Care Plan discussed with: Patient/Family and Nurse Dr Haylee Reece  Disposition: Home w/Family, HH PT, OT, RN and TBD     Hospital Problems  Date Reviewed: 6/25/2017          Codes Class Noted POA    Hypokalemia ICD-10-CM: E87.6  ICD-9-CM: 276.8  6/25/2017 Unknown        Generalized weakness ICD-10-CM: R53.1  ICD-9-CM: 780.79  6/25/2017 Unknown        Elevated troponin ICD-10-CM: R74.8  ICD-9-CM: 790.6  6/25/2017 Unknown        KATALINA (acute kidney injury) ([facility] [address] ICD-10-CM: N17.9  ICD-9-CM: 584.9  6/25/2017 Unknown        CKD (chronic kidney disease) ICD-10-CM: N18.9  ICD-9-CM: 585.9  1/20/2017 Unknown        * (Principal)SOB (shortness of breath) ICD-10-CM: R06.02  ICD-9-CM: 786.05  7/15/2014 Yes                Review of Systems:   A comprehensive review of systems was negative except for: Constitutional: positive for fatigue and malaise  Respiratory: positive for cough or dyspnea on exertion       Vital Signs:    Last 24hrs VS reviewed since prior progress note. Most recent are:  Visit Vitals    /59 (BP 1 Location: Left arm, BP Patient Position: At rest)    Pulse 71    Temp 98.4 °F (36.9 °C)    Resp 17    Ht 5' 9\" (1.753 m)    Wt 84.1 kg (185 lb 6.4 oz)    SpO2 99%    BMI 27.38 kg/m2         Intake/Output Summary (Last 24 hours) at 06/26/17 1316  Last data filed at 06/26/17 1205   Gross per 24 hour   Intake                0 ml   Output              950 ml   Net             -950 ml        Physical Examination:             Constitutional:  Elderly male in No acute distress, cooperative, pleasant    ENT:  Oral mucous moist, oropharynx benign. Neck supple,    Resp:  CTA bilaterally. No wheezing/rhonchi/rales. No accessory muscle use   CV:  Regular rhythm, normal rate, no murmurs, gallops, rubs    GI:  Soft, non distended, non tender.  normoactive bowel sounds, no hepatosplenomegaly     Musculoskeletal:  No edema, warm, 2+ pulses throughout    Neurologic:  Moves all extremities. AAOx3, follows commands     Psych:  Good insight, Not anxious nor agitated. Data Review:    Review and/or order of clinical lab test      Labs:     Recent Labs      06/26/17   0416  06/25/17   0948   WBC  4.7  7.4   HGB  9.8*  12.0*   HCT  29.1*  35.0*   PLT  182  264     Recent Labs      06/26/17   0416  06/25/17   0948   NA  139  132*   K  3.6  3.1*   CL  105  95*   CO2  26  28   BUN  48*  60*   CREA  2.13*  2.85*   GLU  119*  65   CA  7.8*  8.7     Recent Labs      06/26/17   0416  06/25/17   0948   SGOT  18  25   ALT  19  23   AP  34*  48   TBILI  0.5  0.6   TP  5.4*  7.2   ALB  2.4*  3.2*   GLOB  3.0  4.0     Recent Labs      06/26/17   1154  06/26/17   0416   INR   --   1.1   PTP   --   11.3*   APTT  >130.0*  47.8*      No results for input(s): FE, TIBC, PSAT, FERR in the last 72 hours. No results found for: FOL, RBCF   No results for input(s): PH, PCO2, PO2 in the last 72 hours.   Recent Labs      06/26/17   0416  06/25/17   2206  06/25/17   1539   CPK  80  88  110   CKNDX  1.8  Cannot be calulated  Cannot be calulated   TROIQ  0.06*  0.07*  0.09*     No results found for: CHOL, CHOLX, CHLST, CHOLV, HDL, LDL, LDLC, DLDLP, TGLX, TRIGL, TRIGP, CHHD, CHHDX  Lab Results   Component Value Date/Time    Glucose (POC) 213 06/26/2017 07:46 AM    Glucose (POC) 149 06/25/2017 09:28 PM    Glucose (POC) 201 06/25/2017 04:53 PM    Glucose (POC) 100 06/25/2017 01:31 PM    Glucose (POC) 71 06/25/2017 01:00 PM     Lab Results   Component Value Date/Time    Color YELLOW/STRAW 06/25/2017 11:40 AM    Appearance CLEAR 06/25/2017 11:40 AM    Specific gravity 1.018 06/25/2017 11:40 AM    pH (UA) 5.0 06/25/2017 11:40 AM    Protein 300 06/25/2017 11:40 AM    Glucose NEGATIVE  06/25/2017 11:40 AM    Ketone NEGATIVE  06/25/2017 11:40 AM    Bilirubin NEGATIVE  06/25/2017 11:40 AM    Urobilinogen 0.2 06/25/2017 11:40 AM    Nitrites NEGATIVE  06/25/2017 11:40 AM    Leukocyte Esterase NEGATIVE  06/25/2017 11:40 AM    Epithelial cells FEW 06/25/2017 11:40 AM    Bacteria NEGATIVE  06/25/2017 11:40 AM    WBC 0-4 06/25/2017 11:40 AM    RBC 0-5 06/25/2017 11:40 AM         Medications Reviewed:     Current Facility-Administered Medications   Medication Dose Route Frequency    [START ON 6/27/2017] lactobac ac& pc-s.therm-b.anim (JUDE Q/RISAQUAD)  1 Cap Oral DAILY    glucose chewable tablet 16 g  4 Tab Oral PRN    glucagon (GLUCAGEN) injection 1 mg  1 mg IntraMUSCular PRN    potassium chloride SR (KLOR-CON 10) tablet 40 mEq  40 mEq Oral BID    sodium chloride (NS) flush 5-10 mL  5-10 mL IntraVENous Q8H    sodium chloride (NS) flush 5-10 mL  5-10 mL IntraVENous PRN    acetaminophen (TYLENOL) tablet 650 mg  650 mg Oral Q4H PRN    amoxicillin (AMOXIL) capsule 500 mg  500 mg Oral BID    cloNIDine HCl (CATAPRES) tablet 0.1 mg  0.1 mg Oral BID    aspirin delayed-release tablet 81 mg  81 mg Oral DAILY    carvedilol (COREG) tablet 25 mg  25 mg Oral BID WITH MEALS    fish oil-omega-3 fatty acids 340-1,000 mg capsule 1 Cap  1 Cap Oral BID    simvastatin (ZOCOR) tablet 20 mg  20 mg Oral QHS    amLODIPine (NORVASC) tablet 10 mg  10 mg Oral DAILY    dextrose 10 % infusion 125-250 mL  125-250 mL IntraVENous PRN    0.9% sodium chloride with KCl 40 mEq/L infusion   IntraVENous CONTINUOUS    nitroglycerin (NITROSTAT) tablet 0.4 mg  0.4 mg SubLINGual Q5MIN PRN     ______________________________________________________________________  EXPECTED LENGTH OF STAY: 3d 9h  ACTUAL LENGTH OF STAY:          [address] [facility], NP

## 2017-06-26 NOTE — PROGRESS NOTES
[facility]  -Progress Note     Anabel De Anda [phone]   6/26/2017      Kamar Angel M.D. , F.A.C.C.   --------PCP:-Garrett Camarena MD   -----Subjective:   . Heather [facility] is a [de-identified] y.o. male   Says he had weakness and moderate shortness of breath  Prev antibiotics 8 days ago with BELINDA steffen  Took 9 pills for Rx -Motrin  Cr up on admit  Patient Vitals for the past 12 hrs:   Temp Pulse Resp BP SpO2   06/26/17 1500 98 °F (36.7 °C) 75 18 139/56 99 %   06/26/17 1100 98.4 °F (36.9 °C) 71 17 155/59 99 %   06/26/17 0825 - 66 - 170/52 -   06/26/17 0659 98.2 °F (36.8 °C) 60 16 170/52 97 %        Discussion/Plans/Recs  Weakness ? Cause  --seems close back to baseline    CAD  Normal EF by echo on 1st  --repeat echo pending  --statin  --beta blocker     Anemia  --hgb 9.8    CKD 3-4-acute on chronic  Cr from 1.6 to 2.8 to 2.13  --Cr 2.13  --avoid NSAIDs as much as possible    PVD-multiple PTAs  HTN-coreg, clonidine    DM,lipids, carotids-stable  Troponins low, no MI         Cardiac Studies/Hx:  Holter monitor4/29/14=showing rate of  with frequent PACs, rare periods of short RP interval SVT up to 122 and frequent PVCs. sinus rhythm with similar findings, frequent PVCs and short RP interval tachycardia. NUKE  2/11/14,  2 minutes, 20 seconds,  EF of 55% with no ischemia. PVD= Dr. Clau Ghosh atherectomy to distal SFA and distal popliteal with angioplasty to both lesions and stent to the SFA by Dr. Clau Ghosh on 3/14/14. Previously had AIBs of 0.67 on the left, 0.71 on the right with the right seemingly due to distal disease. CAD/CABG off pump x3 3/2008 . =post op anemia and renal failure  CATH March 7, 2008= severe three-vessel left main coronary artery disease, 40% disease of the left main and take off of the LAD and circumflex complex, 70% stenosis of the ostial circ and 85% of the LAD.  Between the first and second marginal, the circumflex had 70% stenosis and between the third marginal and PDA there was a 70% stenosis, LAD ostial lesion RCA proximal 60% tapering, EF 60%-70%, bilaterally patent renal arteries, mild atherosclerosis of the distal abdominal aorta. Mild carotid artery disease 3-7-08 then 12 with 10-49% left, less than 10% on right  Dyslipidemia 10-25-10  TG 91 HDL 40 LDL 48  SOCIAL: Drinks no alcohol, quit smoking several years ago, works as an . Lives with his wife and has four children. Enjoys gardening, fishing and music. FAMILY HISTORY: Mother  of cancer at 76, father  of Parkinson's at 70, one brother  of cancer of the liver at 76 and one  of an infection at 64. Past Medical History:   Diagnosis Date    CAD (coronary artery disease)     Carotid arterial disease ([facility] [address]     Diabetes mellitus, type 2 ([facility] [address]     Hypercholesteremia     Hypertension     PVC's (premature ventricular contractions) 2014    PVD (peripheral vascular disease) ([facility]       ROS-pertinents  negative except as above  The pertinent portions of the medical history,physician and nursing notes, meds,vitals , labs and Ins/Outs,are reviewed in the electronic record.     Results for orders placed or performed during the hospital encounter of 17   EKG, 12 LEAD, INITIAL   Result Value Ref Range    Ventricular Rate 76 BPM    Atrial Rate 76 BPM    P-R Interval 256 ms    QRS Duration 94 ms    Q-T Interval 386 ms    QTC Calculation (Bezet) 434 ms    Calculated P Axis 55 degrees    Calculated R Axis -57 degrees    Calculated T Axis 100 degrees    Diagnosis       Sinus rhythm with 1st degree AV block with premature supraventricular   complexes  Left axis deviation  Nonspecific ST and T wave abnormality  Poor R-wave Progression  When compared with ECG of 16-MAR-2017 05:15,  No significant change was found  Confirmed by Teagan Parson MD, [employer] ([id number]) on 2017 5:34:51 PM     Results for orders placed or performed in visit on 11   AMB POC EKG ROUTINE W/ 12 LEADS, INTER & REP    Narrative    See scanned results        Vitals:    06/26/17 0659 06/26/17 0825 06/26/17 1100 06/26/17 1500   BP: 170/52 170/52 155/59 139/56   BP 1 Location: Left arm  Left arm Left arm   BP Patient Position: At rest  At rest At rest   Pulse: 60 66 71 75   Resp: 16  17 18   Temp: 98.2 °F (36.8 °C)  98.4 °F (36.9 °C) 98 °F (36.7 °C)   SpO2: 97%  99% 99%   Weight:       Height:           Objective:    Physical Exam:   Patient Vitals for the past 12 hrs:   Temp Pulse Resp BP SpO2   06/26/17 1500 98 °F (36.7 °C) 75 18 139/56 99 %   06/26/17 1100 98.4 °F (36.9 °C) 71 17 155/59 99 %   06/26/17 0825 - 66 - 170/52 -   06/26/17 0659 98.2 °F (36.8 °C) 60 16 170/52 97 %      General:  alert, cooperative, no distress, appears stated age   ENT, Neck:  no jvd   Chest Wall: inspection normal - no chest wall deformities or tenderness, respiratory effort normal   Lung: clear to auscultation bilaterally   Heart:  normal rate, regular rhythm, normal S1, S2, no murmurs, rubs, clicks or gallops   Abdomen: nondistended   Extremities: extremities normal, atraumatic, no cyanosis or edema     Last 24hr Input/Output:    Intake/Output Summary (Last 24 hours) at 06/26/17 1756  Last data filed at 06/26/17 1658   Gross per 24 hour   Intake                0 ml   Output             1550 ml   Net            -1550 ml        Data Review:   Recent Results (from the past 24 hour(s))   GLUCOSE, POC    Collection Time: 06/25/17  9:28 PM   Result Value Ref Range    Glucose (POC) 149 (H) 65 - 100 mg/dL    Performed by Reese Garcia    TROPONIN I    Collection Time: 06/25/17 10:06 PM   Result Value Ref Range    Troponin-I, Qt. 0.07 (H) <0.05 ng/mL   CK W/ CKMB & INDEX    Collection Time: 06/25/17 10:06 PM   Result Value Ref Range    CK 88 39 - 308 U/L    CK - MB <1.0 <3.6 NG/ML    CK-MB Index Cannot be calulated 0 - 2.5     TROPONIN I    Collection Time: 06/26/17  4:16 AM   Result Value Ref Range    Troponin-I, Qt. 0.06 (H) <0.05 ng/mL   CK W/ CKMB & INDEX    Collection Time: 06/26/17  4:16 AM   Result Value Ref Range    CK 80 39 - 308 U/L    CK - MB 1.4 <3.6 NG/ML    CK-MB Index 1.8 0 - 2.5     METABOLIC PANEL, COMPREHENSIVE    Collection Time: 06/26/17  4:16 AM   Result Value Ref Range    Sodium 139 136 - 145 mmol/L    Potassium 3.6 3.5 - 5.1 mmol/L    Chloride 105 97 - 108 mmol/L    CO2 26 21 - 32 mmol/L    Anion gap 8 5 - 15 mmol/L    Glucose 119 (H) 65 - 100 mg/dL    BUN 48 (H) 6 - 20 MG/DL    Creatinine 2.13 (H) 0.70 - 1.30 MG/DL    BUN/Creatinine ratio 23 (H) 12 - 20      GFR est AA 36 (L) >60 ml/min/1.73m2    GFR est non-AA 30 (L) >60 ml/min/1.73m2    Calcium 7.8 (L) 8.5 - 10.1 MG/DL    Bilirubin, total 0.5 0.2 - 1.0 MG/DL    ALT (SGPT) 19 12 - 78 U/L    AST (SGOT) 18 15 - 37 U/L    Alk.  phosphatase 34 (L) 45 - 117 U/L    Protein, total 5.4 (L) 6.4 - 8.2 g/dL    Albumin 2.4 (L) 3.5 - 5.0 g/dL    Globulin 3.0 2.0 - 4.0 g/dL    A-G Ratio 0.8 (L) 1.1 - 2.2     PTT    Collection Time: 06/26/17  4:16 AM   Result Value Ref Range    aPTT 47.8 (H) 22.1 - 32.5 sec    aPTT, therapeutic range     58.0 - 77.0 SECS   PROTHROMBIN TIME + INR    Collection Time: 06/26/17  4:16 AM   Result Value Ref Range    INR 1.1 0.9 - 1.1      Prothrombin time 11.3 (H) 9.0 - 11.1 sec   CBC W/O DIFF    Collection Time: 06/26/17  4:16 AM   Result Value Ref Range    WBC 4.7 4.1 - 11.1 K/uL    RBC 3.40 (L) 4.10 - 5.70 M/uL    HGB 9.8 (L) 12.1 - 17.0 g/dL    HCT 29.1 (L) 36.6 - 50.3 %    MCV 85.6 80.0 - 99.0 FL    MCH 28.8 26.0 - 34.0 PG    MCHC 33.7 30.0 - 36.5 g/dL    RDW 14.5 11.5 - 14.5 %    PLATELET 254 126 - 970 K/uL   GLUCOSE, POC    Collection Time: 06/26/17  7:46 AM   Result Value Ref Range    Glucose (POC) 213 (H) 65 - 100 mg/dL    Performed by Eric Caldera    PTT    Collection Time: 06/26/17 11:54 AM   Result Value Ref Range    aPTT >130.0 (HH) 22.1 - 32.5 sec    aPTT, therapeutic range     58.0 - 77.0 SECS   GLUCOSE, POC Collection Time: 06/26/17  4:50 PM   Result Value Ref Range    Glucose (POC) 262 (H) 65 - 100 mg/dL    Performed by Charbel Hernández MD 6/26/2017

## 2017-06-26 NOTE — PROGRESS NOTES
06/26/17 1440 06/26/17 1442 06/26/17 1444   RT Walking Oximetry   Stage Resting (Room Air) During Walk (Room Air) During Walk (Room Air)   SpO2 98 % 96 % 93 %   HR 62 bpm 71 bpm 76 bpm   Rate of Dyspnea 0 0 0   Symptoms (NONE) --  --    O2 Device None (Room air) None (Room air) None (Room air)   Walk/Assistive Device (Sitting on edge of bed) Ambulation Ambulation   Comments (Pre walk assessment on RA. No issues noted) (Pt shuffling during walk. Holding arm to steady pt. ) (Stopped to rest 1min. C/o tired legs. )       06/26/17 1446 06/26/17 1448 06/26/17 1452   RT Walking Oximetry   Stage During Walk (Room Air) During Walk (Room Air) After Walk   SpO2 96 % 99 % 100 %   HR 74 bpm 73 bpm 68 bpm   Rate of Dyspnea 0 0 0   Symptoms --  --  --    O2 Device None (Room air) None (Room air) None (Room air)   Walk/Assistive Device Ambulation Ambulation (Sitting on edge of bed)   Comments (Continued walk by choice) (Pt completed walk with 1 min. break to rest legs) (Pt states he \"feels fine\". \"Legs a little tired\")   Pt com[pleted 6 min. Oxymetry walk on RA with 1 min. Break to rest legs. Sats lowest at 93%. No dyspnea.    Gregg Segovia, RRT  Respiratory Care

## 2017-06-26 NOTE — PROGRESS NOTES
2021 Ismael Gallo. regarding missed intial PTT and INR. If nurse should draw a level at this time. Pharmacist said to draw next Ptt as per protocol after 6 hr from the start of heparin drip. Checked Patient PTA medication didn't see any other blood thinner except baby aspirin. 0430 Ptt draw per protocol. 0530   IV heparin rate has been adjusted based on the most recent PTT results. Lab Results   Component Value Date/Time    aPTT 47.8 06/26/2017 04:16 AM   Bolus of 3300 unit given and heparin drip increased to 20 unit/kg/hr. Next Ptt at 1145 am.  0800 Bedside and Verbal shift change report given to Alexandra Hinds  (oncoming nurse) by Bowen Barnes (offgoing nurse). Report included the following information SBAR, MAR and Recent Results.

## 2017-06-26 NOTE — PROGRESS NOTES
Added on d-dimer high. Presented with dyspnea. Low GFR. Start heparin gtt until PT/DVT ruled out. Ordered venous duplex and V/Q(cant do CTA lung with current GFR)

## 2017-06-27 VITALS
HEIGHT: 69 IN | SYSTOLIC BLOOD PRESSURE: 158 MMHG | TEMPERATURE: 98 F | HEART RATE: 91 BPM | DIASTOLIC BLOOD PRESSURE: 66 MMHG | WEIGHT: 181.6 LBS | RESPIRATION RATE: 16 BRPM | BODY MASS INDEX: 26.9 KG/M2 | OXYGEN SATURATION: 97 %

## 2017-06-27 LAB
ANION GAP BLD CALC-SCNC: 5 MMOL/L (ref 5–15)
BUN SERPL-MCNC: 41 MG/DL (ref 6–20)
BUN/CREAT SERPL: 24 (ref 12–20)
CALCIUM SERPL-MCNC: 8.4 MG/DL (ref 8.5–10.1)
CHLORIDE SERPL-SCNC: 105 MMOL/L (ref 97–108)
CO2 SERPL-SCNC: 26 MMOL/L (ref 21–32)
CREAT SERPL-MCNC: 1.72 MG/DL (ref 0.7–1.3)
ERYTHROCYTE [DISTWIDTH] IN BLOOD BY AUTOMATED COUNT: 14 % (ref 11.5–14.5)
GLUCOSE BLD STRIP.AUTO-MCNC: 262 MG/DL (ref 65–100)
GLUCOSE BLD STRIP.AUTO-MCNC: 297 MG/DL (ref 65–100)
GLUCOSE BLD STRIP.AUTO-MCNC: 297 MG/DL (ref 65–100)
GLUCOSE SERPL-MCNC: 195 MG/DL (ref 65–100)
HCT VFR BLD AUTO: 31.9 % (ref 36.6–50.3)
HGB BLD-MCNC: 10.7 G/DL (ref 12.1–17)
MCH RBC QN AUTO: 29.1 PG (ref 26–34)
MCHC RBC AUTO-ENTMCNC: 33.5 G/DL (ref 30–36.5)
MCV RBC AUTO: 86.7 FL (ref 80–99)
PLATELET # BLD AUTO: 206 K/UL (ref 150–400)
POTASSIUM SERPL-SCNC: 5.4 MMOL/L (ref 3.5–5.1)
RBC # BLD AUTO: 3.68 M/UL (ref 4.1–5.7)
SERVICE CMNT-IMP: ABNORMAL
SODIUM SERPL-SCNC: 136 MMOL/L (ref 136–145)
WBC # BLD AUTO: 5.6 K/UL (ref 4.1–11.1)

## 2017-06-27 PROCEDURE — 74011250636 HC RX REV CODE- 250/636: Performed by: HOSPITALIST

## 2017-06-27 PROCEDURE — 74011250636 HC RX REV CODE- 250/636: Performed by: NURSE PRACTITIONER

## 2017-06-27 PROCEDURE — 74011250637 HC RX REV CODE- 250/637: Performed by: NURSE PRACTITIONER

## 2017-06-27 PROCEDURE — 80048 BASIC METABOLIC PNL TOTAL CA: CPT | Performed by: NURSE PRACTITIONER

## 2017-06-27 PROCEDURE — 85027 COMPLETE CBC AUTOMATED: CPT | Performed by: PHYSICIAN ASSISTANT

## 2017-06-27 PROCEDURE — 97165 OT EVAL LOW COMPLEX 30 MIN: CPT

## 2017-06-27 PROCEDURE — 97116 GAIT TRAINING THERAPY: CPT

## 2017-06-27 PROCEDURE — 36415 COLL VENOUS BLD VENIPUNCTURE: CPT | Performed by: NURSE PRACTITIONER

## 2017-06-27 PROCEDURE — 74011250637 HC RX REV CODE- 250/637: Performed by: HOSPITALIST

## 2017-06-27 PROCEDURE — 97161 PT EVAL LOW COMPLEX 20 MIN: CPT

## 2017-06-27 RX ORDER — GLIPIZIDE 10 MG/1
10 TABLET ORAL
Qty: 30 TAB | Refills: 0 | Status: SHIPPED | OUTPATIENT
Start: 2017-06-27 | End: 2017-06-27

## 2017-06-27 RX ORDER — SODIUM CHLORIDE 9 MG/ML
75 INJECTION, SOLUTION INTRAVENOUS CONTINUOUS
Status: DISCONTINUED | OUTPATIENT
Start: 2017-06-27 | End: 2017-06-27 | Stop reason: HOSPADM

## 2017-06-27 RX ADMIN — Medication 10 ML: at 05:41

## 2017-06-27 RX ADMIN — ASPIRIN 81 MG: 81 TABLET, COATED ORAL at 08:57

## 2017-06-27 RX ADMIN — AMOXICILLIN 500 MG: 500 CAPSULE ORAL at 11:17

## 2017-06-27 RX ADMIN — AMLODIPINE BESYLATE 10 MG: 5 TABLET ORAL at 08:57

## 2017-06-27 RX ADMIN — Medication 1 CAPSULE: at 08:56

## 2017-06-27 RX ADMIN — CARVEDILOL 25 MG: 12.5 TABLET, FILM COATED ORAL at 08:57

## 2017-06-27 RX ADMIN — GLIPIZIDE 10 MG: 5 TABLET ORAL at 08:57

## 2017-06-27 RX ADMIN — SODIUM CHLORIDE AND POTASSIUM CHLORIDE: 9; 2.98 INJECTION, SOLUTION INTRAVENOUS at 04:28

## 2017-06-27 RX ADMIN — CLONIDINE HYDROCHLORIDE 0.1 MG: 0.1 TABLET ORAL at 08:57

## 2017-06-27 RX ADMIN — SODIUM CHLORIDE 75 ML/HR: 900 INJECTION, SOLUTION INTRAVENOUS at 06:31

## 2017-06-27 NOTE — PROGRESS NOTES
[facility]  -Progress Note     Tamme [phone]   6/27/2017      Mitchell Marlow M.D. , F.A.C.C.   --------PCP:-Garrett Tafoya MD   -----Subjective:   . Gevena Christina Gevena Christina Gevena Christina Gray Ryder is a [de-identified] y.o. male   Feels he is getting stronger. Walking with PT and feeling better  Cr down to 1.7 today. Denies CP, SOB or palpitations  K up to 5.4. Stopped Kdur  Patient Vitals for the past 12 hrs:   Temp Pulse Resp BP SpO2   06/27/17 0620 98.6 °F (37 °C) (!) 57 16 178/69 99 %   06/27/17 0344 98.4 °F (36.9 °C) 60 17 177/55 99 %   06/26/17 2243 98.1 °F (36.7 °C) (!) 56 18 143/57 100 %        Discussion/Plans/Recs  Weakness ? Cause  --seems close back to baseline    CAD  Normal EF by echo on 1st  --repeat echo EF 60%, NRWMA. Moderate concentric hypertrophy. Normal valves  --Lipitor 20 mg  --Coreg 25 mg    Anemia  --hgb 9.8 - from 12.0 -? 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 ?cause for weakness. Cr improving on IVF. Repeat H/H. Avoid nephrotoxins. Assessment/Plan/Discussion:Cardiology Attending:     Patient seen earlier today and examined  and agree with Advance Practice Provider (MADDIE, NP,PA)  assessment and plans. Gray Ryder is a [de-identified] y.o. male   Avoid NSAIdS, adjust Metformin  CV dz is stable  Will sign off    Julienne Sal MD 6/27/2017              Cardiac Studies/Hx:  Holter monitor4/29/14=showing rate of  with frequent PACs, rare periods of short RP interval SVT up to 122 and frequent PVCs. sinus rhythm with similar findings, frequent PVCs and short RP interval tachycardia. ANNETTE  2/11/14,  2 minutes, 20 seconds,  EF of 55% with no ischemia.    KETTYD= Dr. Buckley Combba atherectomy to distal SFA and distal popliteal with angioplasty to both lesions and stent to the SFA by Dr. César Garcia on 3/14/14. Previously had AIBs of 0.67 on the left, 0.71 on the right with the right seemingly due to distal disease. CAD/CABG off pump x3 3/2008 . =post op anemia and renal failure  CATH 2008= severe three-vessel left main coronary artery disease, 40% disease of the left main and take off of the LAD and circumflex complex, 70% stenosis of the ostial circ and 85% of the LAD. Between the first and second marginal, the circumflex had 70% stenosis and between the third marginal and PDA there was a 70% stenosis, LAD ostial lesion RCA proximal 60% tapering, EF 60%-70%, bilaterally patent renal arteries, mild atherosclerosis of the distal abdominal aorta. Mild carotid artery disease 3-7-08 then 12 with 10-49% left, less than 10% on right  Dyslipidemia 10-25-10  TG 91 HDL 40 LDL 48  SOCIAL: Drinks no alcohol, quit smoking several years ago, works as an . Lives with his wife and has four children. Enjoys gardening, fishing and music. FAMILY HISTORY: Mother  of cancer at 76, father  of Parkinson's at 70, one brother  of cancer of the liver at 76 and one  of an infection at 64. Past Medical History:   Diagnosis Date    CAD (coronary artery disease)     [facility]     Diabetes mellitus, type 2 ([facility]     Hypercholesteremia     Hypertension     PVC's (premature ventricular contractions) 2014    PVD (peripheral vascular disease) ([facility] [address] [facility]       ROS-pertinents  negative except as above  The pertinent portions of the medical history,physician and nursing notes, meds,vitals , labs and Ins/Outs,are reviewed in the electronic record.     Results for orders placed or performed during the hospital encounter of 17   EKG, 12 LEAD, INITIAL   Result Value Ref Range    Ventricular Rate 76 BPM    Atrial Rate 76 BPM    P-R Interval 256 ms    QRS Duration 94 ms    Q-T Interval 386 ms    QTC Calculation (Bezet) 434 ms    Calculated P Axis 55 degrees    Calculated R Axis -57 degrees    Calculated T Axis 100 degrees    Diagnosis       Sinus rhythm with 1st degree AV block with premature supraventricular   complexes  Left axis deviation  Nonspecific ST and T wave abnormality  Poor R-wave Progression  When compared with ECG of 16-MAR-2017 05:15,  No significant change was found  Confirmed by Chloé Caceres MD, [place] ([id number]) on 6/25/2017 5:34:51 PM     Results for orders placed or performed in visit on 11/28/11   AMB POC EKG ROUTINE W/ 12 LEADS, INTER & REP    Narrative    See scanned results        Vitals:    06/26/17 2243 06/27/17 0344 06/27/17 0400 06/27/17 0620   BP: 143/57 177/55  178/69   BP 1 Location: Left arm Left arm  Left arm   BP Patient Position: At rest At rest  At rest   Pulse: (!) 56 60  (!) 57   Resp: 18 17  16   Temp: 98.1 °F (36.7 °C) 98.4 °F (36.9 °C)  98.6 °F (37 °C)   SpO2: 100% 99%  99%   Weight:   82.4 kg (181 lb 9.6 oz)    Height:           Objective:    Physical Exam:   Patient Vitals for the past 12 hrs:   Temp Pulse Resp BP SpO2   06/27/17 0620 98.6 °F (37 °C) (!) 57 16 178/69 99 %   06/27/17 0344 98.4 °F (36.9 °C) 60 17 177/55 99 %   06/26/17 2243 98.1 °F (36.7 °C) (!) 56 18 143/57 100 %      General:  alert, cooperative, no distress, appears stated age   ENT, Neck:  no jvd   Chest Wall: inspection normal - no chest wall deformities or tenderness, respiratory effort normal   Lung: clear to auscultation bilaterally   Heart:  normal rate, regular rhythm, normal S1, S2, no murmurs, rubs, clicks or gallops   Abdomen: nondistended   Extremities: extremities normal, atraumatic, no cyanosis or edema     Last 24hr Input/Output:    Intake/Output Summary (Last 24 hours) at 06/27/17 0820  Last data filed at 06/27/17 0700   Gross per 24 hour   Intake              825 ml   Output             3200 ml   Net            -2375 ml        Data Review:   Recent Results (from the past 24 hour(s))   PTT    Collection Time: 06/26/17 11:54 AM   Result Value Ref Range    aPTT >130.0 (HH) 22.1 - 32.5 sec    aPTT, therapeutic range     58.0 - 77.0 SECS   GLUCOSE, POC    Collection Time: 06/26/17  4:50 PM   Result Value Ref Range    Glucose (POC) 262 (H) 65 - 100 mg/dL    Performed by Alberto Maldonado    METABOLIC PANEL, BASIC    Collection Time: 06/27/17  3:42 AM   Result Value Ref Range    Sodium 136 136 - 145 mmol/L    Potassium 5.4 (H) 3.5 - 5.1 mmol/L    Chloride 105 97 - 108 mmol/L    CO2 26 21 - 32 mmol/L    Anion gap 5 5 - 15 mmol/L    Glucose 195 (H) 65 - 100 mg/dL    BUN 41 (H) 6 - 20 MG/DL    Creatinine 1.72 (H) 0.70 - 1.30 MG/DL    BUN/Creatinine ratio 24 (H) 12 - 20      GFR est AA 47 (L) >60 ml/min/1.73m2    GFR est non-AA 38 (L) >60 ml/min/1.73m2    Calcium 8.4 (L) 8.5 - 10.1 MG/DL      Joe Barragan PA-C 6/27/2017

## 2017-06-27 NOTE — PROGRESS NOTES
CM met with pt to introduce him to the role of CM and transition of care. He verbalized understanding. He lives at home with his wife in [facility]. He stated that he has been independent with ADL's and IADL's, still driving. Per pt, he has never used home health or been to rehab. PT and OT are going to evaluate this pt and CM will wait for their recommendations. [address] Management Interventions  PCP Verified by CM: Yes  Palliative Care Consult (Criteria: CHF and RRAT>21): No  MyChart Signup: No  Discharge Durable Medical Equipment: No  Physical Therapy Consult: Yes  Occupational Therapy Consult: Yes  Speech Therapy Consult: No  Current Support Network: Lives with Spouse  Confirm Follow Up Transport: Family  Plan discussed with Pt/Family/Caregiver: Yes  Freedom of Choice Offered:  Yes

## 2017-06-27 NOTE — PROGRESS NOTES
Problem: Mobility Impaired (Adult and Pediatric)  Goal: *Acute Goals and Plan of Care (Insert Text)  Physical Therapy Goals  Initiated 6/27/2017  1. Patient will transfer from bed to chair and chair to bed with modified independence using the least restrictive device within 7 day(s). 2. Patient will perform sit to stand with modified independence within 7 day(s). 3. Patient will ambulate with modified independence for 500 feet with the least restrictive device within 7 day(s). 4. Patient will ascend/descend 8 stairs with 1 handrail(s) with modified independence within 7 day(s). PHYSICAL THERAPY EVALUATION  Patient: Griffin Felix Sr. [de-identified] y.o. male)  Date: 6/27/2017  Primary Diagnosis: KATALINA (acute kidney injury) ([facility] [address]  CKD (chronic kidney disease)  Elevated troponin  Generalized weakness  Hypokalemia        Precautions: FALL         ASSESSMENT :  Based on the objective data described below, the patient presents with complaints of weakness and unsteady feeling. He reports today that he feels much better but still not \"back to himself. \"  Ordinarily, he is able to mobilize without any assistance and does not use an assistive device but has one from a previous admission. On exam today, he demonstrates good strength overall and was able to ambulate with hand held and then no assistance. He was apprehensive and slow overall, and states he plans to use the walker for a while when he gets home. Wife was present for session, as well. At this point, he would benefit from continued progressive therapy while here, but he does not need to continue at home upon D/C. He is clear for D/C from a mobility standpoint, but we will follow up tomorrow if not D/C to progress gait and balance. Patient will benefit from skilled intervention to address the above impairments.   Patients rehabilitation potential is considered to be Good  Factors which may influence rehabilitation potential include:   [X]         None noted  [ ] Mental ability/status  [ ]         Medical condition  [ ]         Home/family situation and support systems  [ ]         Safety awareness  [ ]         Pain tolerance/management  [ ]         Other:        PLAN :  Recommendations and Planned Interventions:  [X]           Bed Mobility Training             [ ]    Neuromuscular Re-Education  [X]           Transfer Training                   [ ]    Orthotic/Prosthetic Training  [X]           Gait Training                         [ ]    Modalities  [X]           Therapeutic Exercises           [ ]    Edema Management/Control  [X]           Therapeutic Activities            [X]    Patient and Family Training/Education  [ ]           Other (comment):     Frequency/Duration: Patient will be followed by physical therapy  5 times a week to address goals. Discharge Recommendations: None  Further Equipment Recommendations for Discharge: none       SUBJECTIVE:   Patient stated I think I can go home if you say I can.       OBJECTIVE DATA SUMMARY:   HISTORY:    Past Medical History:   Diagnosis Date    CAD (coronary artery disease)      [facility] [address] [facility]      Diabetes mellitus, type 2 ([facility] [address] [facility]      Hypercholesteremia      Hypertension      PVC's (premature ventricular contractions) 5/26/2014    [facility]VD [facility]peripheral [facility]) [facility]       Past Surgical History:   Procedure Laterality Date    HX CORONARY ARTERY BYPASS GRAFT        HX HEART CATHETERIZATION         Prior Level of Function/Home Situation: independent, working the farmers' market weekly, driving,   Personal factors and/or comorbidities impacting plan of care: none     Home Situation  Home Environment: Private residence  One/Two Story Residence: One story  Living Alone: No  Support Systems: Friends \ neighbors, Family member(s)  Patient Expects to be Discharged to[de-identified] Private residence  Current DME Used/Available at Home: Leela Minor     EXAMINATION/PRESENTATION/DECISION MAKING:   Critical Behavior:  Neurologic State: Alert  Orientation Level: Oriented X4  Cognition: Follows commands     Hearing: Auditory  Auditory Impairment: Hard of hearing, bilateral  Skin:  intact  Edema: none noted in LEs  Range Of Motion:  AROM: Within functional limits                       Strength:    Strength: Generally decreased, functional                    Tone & Sensation:   Tone: Normal              Sensation: Intact               Coordination:  Coordination: Within functional limits  Vision:      Functional Mobility:  Bed Mobility:     Supine to Sit:  (patient was already up in chair via OT)        Transfers:  Sit to Stand: Modified independent; Additional time (tentative, reaches for support)  Stand to Sit: Modified independent                       Balance:   Sitting: Intact; Without support  Standing: Impaired; Without support  Standing - Static: Good  Standing - Dynamic : Fair  Ambulation/Gait Training:  Distance (ft): 300 Feet (ft)  Assistive Device: Gait belt  Ambulation - Level of Assistance: Minimal assistance;Assist x1;Additional time (initially hand held assist, then without)        Gait Abnormalities: Decreased step clearance;Trunk sway increased; Path deviations        Base of Support: Center of gravity altered (shifted posteriorly)     Speed/Karla: Pace decreased (<100 feet/min)  Step Length: Left shortened;Right shortened        Interventions: Safety awareness training; Tactile cues; Verbal cues                      Functional Measure:  Tinetti test:      Sitting Balance: 1  Arises: 1  Attempts to Rise: 1  Immediate Standing Balance: 0  Standing Balance: 1  Nudged: 1  Eyes Closed: 0  Turn 360 Degrees - Continuous/Discontinuous: 1  Turn 360 Degrees - Steady/Unsteady: 1  Sitting Down: 1  Balance Score: 8  Indication of Gait: 1  R Step Length/Height: 1  L Step Length/Height: 1  R Foot Clearance: 1  L Foot Clearance: 1  Step Symmetry: 1  Step Continuity: 1  Path: 1  Trunk: 1  Walking Time: 0  Gait Score: 9  Total Score: 17         Tinetti Test and G-code impairment scale:  Percentage of Impairment CH     0%    CI     1-19% CJ     20-39% CK     40-59% CL     60-79% CM     80-99% CN      100%   Tinetti  Score 0-28 28 23-27 17-22 12-16 6-11 1-5 0          Tinetti Tool Score Risk of Falls  <19 = High Fall Risk  19-24 = Moderate Fall Risk  25-28 = Low Fall Risk  Tinetti ME. Performance-Oriented Assessment of Mobility Problems in Elderly Patients. [facility]; [id number]. (Scoring Description: PT Bulletin Feb. 10, 1993)     Older adults: Alexandria Beltrán et al, 2009; n = 1000 [facility] elderly evaluated with ABC, DEBRA, ADL, and IADL)  · Mean DEBRA score for males aged 69-68 years = 26.21(3.40)  · Mean DEBRA score for females age 69-68 years = 25.16(4.30)  · Mean DEBRA score for males over 80 years = 23.29(6.02)  · Mean DEBRA score for females over 80 years = 17.20(8.32)         G codes: In compliance with CMSs Claims Based Outcome Reporting, the following G-code set was chosen for this patient based on their primary functional limitation being treated: The outcome measure chosen to determine the severity of the functional limitation was the Tinetti with a score of 17/28 which was correlated with the impairment scale.       · Mobility - Walking and Moving Around:               - CURRENT STATUS:    CK - 40%-59% impaired, limited or restricted               - GOAL STATUS:           CI - 1%-19% impaired, limited or restricted               - D/C STATUS:                       ---------------To be determined---------------      Physical Therapy Evaluation Charge Determination   History Examination Presentation Decision-Making   LOW Complexity : Zero comorbidities / personal factors that will impact the outcome / POC LOW Complexity : 1-2 Standardized tests and measures addressing body structure, function, activity limitation and / or participation in recreation  LOW Complexity : Stable, uncomplicated  LOW Complexity : FOTO score of       Based on the above components, the patient evaluation is determined to be of the following complexity level: LOW      Pain:  Pain Scale 1: Numeric (0 - 10)  Pain Intensity 1: 0              Activity Tolerance:   Good, stable VS after activity  Please refer to the flowsheet for vital signs taken during this treatment. After treatment:   [X]         Patient left in no apparent distress sitting up in chair  [ ]         Patient left in no apparent distress in bed  [X]         Call bell left within reach  [X]         Nursing notified  [X]         Caregiver present, wife  [ ]         Bed alarm activated      COMMUNICATION/EDUCATION:   The patients plan of care was discussed with: Registered Nurse.  [X]         Fall prevention education was provided and the patient/caregiver indicated understanding. [X]         Patient/family have participated as able in goal setting and plan of care. [X]         Patient/family agree to work toward stated goals and plan of care. [ ]         Patient understands intent and goals of therapy, but is neutral about his/her participation. [ ]         Patient is unable to participate in goal setting and plan of care.      Thank you for this referral.  Nuris Mills, PT   Time Calculation: 28 mins

## 2017-06-27 NOTE — PROGRESS NOTES
I have reviewed discharge instructions with the patient and spouse. The patient and spouse verbalized understanding. IV and telemetry discontinued. No other needs at this time. Transition RN to the Bedside to assist Primary RN with patient education, medication educations, discharge instructions, and stroke core measure compliance. Discharge concerns initiated and discussed with patient, including clarification on \"who\" assists the patient at their home and instructions for when the home going patient should call their provider after discharge. Opportunity for questions and clarification was provided. Patient receptive to education: YES  Patient stated:   Barriers to Education: none  Diagnosis Education given:  YES    Length of stay: 2  Expected Day of Discharge: today  Ask if they have \"Help at Home\" & add to white board?   YES    Education Day #: 1    Medication Education Given:  YES  M in the box Medication name: norvasc, coreg, zocor      Stroke Education documented in Patient Education: NO  Core Measures Documented in Connect [facility]:  Risk Factors: NO  Warning signs of stroke: NO  When to Activate 911: NO  Medication Education for Risk Factors: NO  Smoking cessation if applicable: NO  Written Education Given:  NO    Discharge NIH Completed: NO  Score: n/a    BRAINS: NO    Follow Up Appointment Made: NO  Date/Time if applicable: to be done by patient - PCP

## 2017-06-27 NOTE — PROGRESS NOTES
Problem: Pressure Injury - Risk of  Goal: *Prevention of pressure ulcer  Outcome: Progressing Towards Goal  Assisted with turns PRN. Skin intact, monitored. Problem: Falls - Risk of  Goal: *Absence of falls  Outcome: Progressing Towards Goal  Fall precautions in place. Call bell within reach. Educated patient on fall precautions and risk for injury with falls and to please call out for any assistance needed. Verbalized understanding. Comments:   No acute changes this shift. Morning K 5.4, MD Annie Singer notified with orders received to switch fluids from 1/2NS with 40K @ 75 to NS, see MAR.

## 2017-06-27 NOTE — PROGRESS NOTES
Bedside and Verbal shift change report given to Ila Palomares RN (oncoming nurse) by Vinayak Johnston RN (offgoing nurse).  Report included the following information SBAR, Kardex, Intake/Output, MAR and Recent Results

## 2017-06-27 NOTE — DISCHARGE INSTRUCTIONS
Discharge Instructions       PATIENT ID: Maximo Carroll MRN: [id number]   YOB: 1935    DATE OF ADMISSION: 6/25/2017  9:31 AM    DATE OF DISCHARGE: 6/27/2017    PRIMARY CARE PROVIDER: Rebekah Shane MD     ATTENDING PHYSICIAN: Shanon Hui MD  DISCHARGING PROVIDER: Piyush Montenegro NP    To contact this individual call [phone] and ask the  to page. If unavailable ask to be transferred the Adult Hospitalist Department. DISCHARGE DIAGNOSES     Acute Hypoxia   Weakness/Debility     CONSULTATIONS: IP CONSULT TO HOSPITALIST  IP CONSULT TO CARDIOLOGY    PROCEDURES/SURGERIES: * No surgery found *    PENDING TEST RESULTS:   At the time of discharge the following test results are still pending: none    FOLLOW UP APPOINTMENTS:   Follow-up Information     Follow up With Details Comments Contact Info    Rebekah Shane MD In 7 days As needed, If symptoms worsen [address] [facility] [address] [phone]             ADDITIONAL CARE RECOMMENDATIONS:     1. Stop taking MOTRIN and all other non steroidal antiinflammatory drugs ( EXCEPT YOUR ASPIRIN), taking things like motrin, ibuprofen, advil, aleve and celebrex can worsen your kidney function and also make you at a higher risk for a GI bleed. 2. We also recommend that you stop taking Metformin because of your kidney function. Metformin can worsen an already failing kidney. 3. Follow up with your primary care provider in one week to ensure that you are doing well post hospital discharge. 4. Stop all antibiotics, you have completed the course while here in the hospital.     5. Eat plenty of calories, make sure you are taking in enough iron products and protein for your iron stores. DIET: Cardiac Diet    ACTIVITY: Activity as tolerated      DISCHARGE MEDICATIONS:   See Medication Reconciliation Form    · It is important that you take the medication exactly as they are prescribed.    · Keep your medication in the bottles provided by the pharmacist and keep a list of the medication names, dosages, and times to be taken in your wallet. · Do not take other medications without consulting your doctor. NOTIFY YOUR PHYSICIAN FOR ANY OF THE FOLLOWING:   Fever over 101 degrees for 24 hours. Chest pain, shortness of breath, fever, chills, nausea, vomiting, diarrhea, change in mentation, falling, weakness, bleeding. Severe pain or pain not relieved by medications. Or, any other signs or symptoms that you may have questions about. DISPOSITION:  X  Home With: none   OT  PT  HH  RN       SNF/Inpatient Rehab/LTAC    Independent/assisted living    Hospice    Other:     CDMP Checked: Yes X     PROBLEM LIST Updated:   Yes X       Signed:   Wili Valdivia NP  6/27/2017  2:29 PM

## 2017-06-27 NOTE — PROGRESS NOTES
Problem: Self Care Deficits Care Plan (Adult)  Goal: *Acute Goals and Plan of Care (Insert Text)  Occupational Therapy Goals  Initiated 6/27/2017  1. Patient will perform lower body dressing with supervision/set-up within 7 day(s). 2. Patient will perform grooming in standing for 5 minutes with supervision/set-up within 7 day(s). 3. Patient will perform bathing with supervision/set-up within 7 day(s). 4. Patient will perform toilet transfers with supervision/set-up within 7 day(s). 5. Patient will perform all aspects of toileting with supervision/set-up within 7 day(s). OCCUPATIONAL THERAPY EVALUATION  Patient: Jony Rothman Sr. [de-identified] y.o. male)  Date: 6/27/2017  Primary Diagnosis: KATALINA (acute kidney injury) ([facility] [address]  CKD (chronic kidney disease)  Elevated troponin  Generalized weakness  Hypokalemia        Precautions: fall         ASSESSMENT :  Based on the objective data described below, the patient presents with decreased safety awareness, decreased insight into deficits, impaired dynamic standing balance, decreased BUE ROM, decreased BUE gross motor skills, and decreased activity tolerance preventing him from engaging in ADLs independently and safely. Pt received in bed and agreeable to OT. Pt is CGA-modA for functional transfers, requiring assistance for legs/trunk in supine>sit and reaching out for furniture during bed>chair transfer. Pt is setup/sup-Hola for ADLs. Recommend rehab upon d/c as pt is not safe to return home at his current functional level; however, do not anticipate pt will be agreeable to this as he states he would like to go home today. Patient will benefit from skilled intervention to address the above impairments.   Patients rehabilitation potential is considered to be Good  Factors which may influence rehabilitation potential include:   [ ]             None noted  [ ]             Mental ability/status  [X]             Medical condition  [ ]             Home/family situation and support systems  [ ]             Safety awareness  [ ]             Pain tolerance/management  [ ]             Other:        PLAN :  Recommendations and Planned Interventions:  [X]               Self Care Training                  [X]        Therapeutic Activities  [X]               Functional Mobility Training    [ ]        Cognitive Retraining  [X]               Therapeutic Exercises           [X]        Endurance Activities  [X]               Balance Training                   [ ]        Neuromuscular Re-Education  [ ]               Visual/Perceptual Training     [X]   Home Safety Training  [X]               Patient Education                 [X]        Family Training/Education  [ ]               Other (comment):     Frequency/Duration: Patient will be followed by occupational therapy 5 times a week to address goals. Discharge Recommendations: Rehab  Further Equipment Recommendations for Discharge: tbd at rehab       SUBJECTIVE:   Patient stated When out [address], do as the [address] people do.       OBJECTIVE DATA SUMMARY:   HISTORY:   Past Medical History:   Diagnosis Date    CAD (coronary artery disease)      Carotid arterial disease ([facility] [address]      Diabetes mellitus, type 2 ([facility] [address]      Hypercholesteremia      Hypertension      PVC's (premature ventricular contractions) 5/26/2014    PVD [facility]peripheral vascular disease) ([facility])       Past Surgical History:   Procedure Laterality Date    HX CORONARY ARTERY BYPASS GRAFT        HX HEART CATHETERIZATION            Prior Level of Function/Home Situation: independent at home with wife  Expanded or extensive additional review of patient history:      Home Situation  Home Environment: Private residence  One/Two Story Residence: One story  Living Alone: No  Support Systems: Spouse/Significant Other/Partner  Patient Expects to be Discharged to[de-identified] Private residence  Current DME Used/Available at Home: Walker, riley, [address] [employer] chair  Tub or Shower Type: Shower  [X]  Right hand dominant [ ]  Left hand dominant     EXAMINATION OF PERFORMANCE DEFICITS:  Cognitive/Behavioral Status:  Neurologic State: Alert; Appropriate for age  Orientation Level: Oriented X4  Cognition: Follows commands; Appropriate for age attention/concentration;Poor safety awareness  Perception: Appears intact  Perseveration: No perseveration noted  Safety/Judgement: Decreased insight into deficits; Decreased awareness of need for safety;Decreased awareness of need for assistance; Awareness of environment     Skin: appears intact     Edema: no UE edema noted     Hearing: Auditory  Auditory Impairment: Hard of hearing, bilateral     Vision/Perceptual:    Tracking: Able to track stimulus in all quadrants w/o difficulty                      Acuity: Within Defined Limits          Range of Motion:  AROM: Grossly decreased, non-functional  PROM: Grossly decreased, non-functional                       Strength:  Strength: Generally decreased, functional                 Coordination:  Coordination: Generally decreased, functional  Fine Motor Skills-Upper: Left Intact; Right Intact    Gross Motor Skills-Upper: Left Impaired;Right Impaired     Tone & Sensation:  Tone: Normal  Sensation: Intact                       Balance:  Sitting: Intact  Standing: Impaired; With support  Standing - Static: Fair  Standing - Dynamic : Poor     Functional Mobility and Transfers for ADLs:  Bed Mobility:  Supine to Sit: Moderate assistance;Assist x1;Additional time  Sit to Supine: Contact guard assistance  Scooting: Contact guard assistance     Transfers:  Sit to Stand: Modified independent; Additional time (tentative, reaches for support)  Stand to Sit: Contact guard assistance  Bed to Chair: Moderate assistance;Assist x1  Toilet Transfer : Minimum assistance     ADL Assessment:  Feeding: Supervision;Setup     Oral Facial Hygiene/Grooming: Supervision;Setup     Bathing: Minimum assistance     Upper Body Dressing: Supervision;Setup     Lower Body Dressing: Minimum assistance     Toileting: Minimum assistance                 ADL Intervention and task modifications:                                Lower Body Dressing Assistance  Socks: Supervision/set-up           Cognitive Retraining  Safety/Judgement: Decreased insight into deficits; Decreased awareness of need for safety;Decreased awareness of need for assistance; Awareness of environment     Functional Measure:  Barthel Index:      Bathin  Bladder: 10  Bowels: 10  Groomin  Dressin  Feeding: 10  Mobility: 10  Stairs: 0  Toilet Use: 5  Transfer (Bed to Chair and Back): 5  Total: 60         Barthel and G-code impairment scale:  Percentage of impairment CH  0% CI  1-19% CJ  20-39% CK  40-59% CL  60-79% CM  80-99% CN  100%   Barthel Score 0-100 100 99-80 79-60 59-40 20-39 1-19    0   Barthel Score 0-20 20 17-19 13-16 9-12 5-8 1-4 0      The Barthel ADL Index: Guidelines  1. The index should be used as a record of what a patient does, not as a record of what a patient could do. 2. The main aim is to establish degree of independence from any help, physical or verbal, however minor and for whatever reason. 3. The need for supervision renders the patient not independent. 4. A patient's performance should be established using the best available evidence. Asking the patient, friends/relatives and nurses are the usual sources, but direct observation and common sense are also important. However direct testing is not needed. 5. Usually the patient's performance over the preceding 24-48 hours is important, but occasionally longer periods will be relevant. 6. Middle categories imply that the patient supplies over 50 per cent of the effort. 7. Use of aids to be independent is allowed. Lubna High, Barthel, D.W. (3131). Functional evaluation: the Barthel Index. [facility] [address] [facility] (14)2. Guido Hayes chacorta MAINE Saldana, Mason Valdez, Diony Meyer., [place], [facility] ().  Measuring the change indisability after inpatient rehabilitation; comparison of the responsiveness of the Barthel Index and Functional [place] Measure. Journal of Neurology, Neurosurgery, and Psychiatry, 66(4), 001-991. CORIN Broderick, TAVARES Snyder, & Jenna Ruiz M.A. (2004.) Assessment of post-stroke quality of life in cost-effectiveness studies: The usefulness of the Barthel Index and the EuroQoL-5D. Quality of Life Research, 13, 007-36            G codes: In compliance with CMSs Claims Based Outcome Reporting, the following G-code set was chosen for this patient based on their primary functional limitation being treated: The outcome measure chosen to determine the severity of the functional limitation was the Barthel       with a score of 60/100 which was correlated with the impairment scale. · Self Care:               - CURRENT STATUS:    CJ - 20%-39% impaired, limited or restricted               - GOAL STATUS:           CI - 1%-19% impaired, limited or restricted               - D/C STATUS:                       ---------------To be determined---------------      Occupational Therapy Evaluation Charge Determination   History Examination Decision-Making   LOW Complexity : Brief history review  LOW Complexity : 1-3 performance deficits relating to physical, cognitive , or psychosocial skils that result in activity limitations and / or participation restrictions  LOW Complexity : No comorbidities that affect functional and no verbal or physical assistance needed to complete eval tasks       Based on the above components, the patient evaluation is determined to be of the following complexity level: LOW      Activity Tolerance:   VSS  Please refer to the flowsheet for vital signs taken during this treatment.   After treatment:   [X] Patient left in no apparent distress sitting up in chair  [ ] Patient left in no apparent distress in bed  [X] Call bell left within reach  [X] Nursing notified  [X] Caregiver present  [ ] Bed alarm activated      COMMUNICATION/EDUCATION:   The patients plan of care was discussed with: Physical Therapist and Registered Nurse.  [X] Home safety education was provided and the patient/caregiver indicated understanding. [X] Patient/family have participated as able in goal setting and plan of care. [X] Patient/family agree to work toward stated goals and plan of care. [ ] Patient understands intent and goals of therapy, but is neutral about his/her participation. [ ] Patient is unable to participate in goal setting and plan of care. This patients plan of care is appropriate for delegation to [facility]. Thank you for this referral.  Kim Richards      Regarding student involvement in patient care:  A student participated in this treatment session. Per CMS Medicare statements and AOTA guidelines I certify that the following was true:  1. I was present and directly observed the entire session. 2. I made all skilled judgments and clinical decisions regarding care. 3. I am the practitioner responsible for assessment, treatment, and documentation.      Kisha Doughertyelor, OT

## 2017-06-28 NOTE — DISCHARGE SUMMARY
Discharge Summary       PATIENT ID: Tee Austin MRN: [id number]   YOB: 1935    DATE OF ADMISSION: 6/25/2017  9:31 AM    DATE OF DISCHARGE: 6/27/2017   PRIMARY CARE PROVIDER: Stacey Pascual MD     ATTENDING PHYSICIAN: Mitchel Adam MD  DISCHARGING PROVIDER: Evon Womack MD    To contact this individual call [phone] and ask the  to page. If unavailable ask to be transferred the Adult Hospitalist Department. CONSULTATIONS: IP CONSULT TO CARDIOLOGY    PROCEDURES/SURGERIES: * No surgery found *    ADMITTING DIAGNOSES & HOSPITAL COURSE:   A [de-identified]years old male who presented from home with shortness of breath and weakness. Patient notes shortness of breath and weakness with activities. He saw his pcp last week and was dx with acute bronchitis. He was prescribed a zpack and amoxicillin.    Dyspnea on Exertion   -seems more chronic in nature, likely from CAD  -denies chest pain, troponin mildly elevated on admission, trending down now   -d-dimer elevated, VQ scan showed low probability of PE, Dopplers negative, stop heparin gtt  -6/1 echo showed ef 50-55%, will recheck limited echo to eval for pulmonary htn  -wean oxygen to keep sats >92%  -CT chest no acute process  -check 6 minute walk   Hypokalemia  -K 3.1 on admission  -now resolved  -monitor  Acute on Chronic Kidney Disease Stage 3   -likely combination of poor oral intake with diuretic and Metformin use at home  -IVF   -creatinine improved   -hold HCTZ   Type 2 DM   -with hypoglycemia on admission, now hypergylcemic  -on Metformin, Actos and glipizide at home, hold Metformin due to KATALINA, stop Actos as patient reports increase swelling in his legs after being started on the medication  -Resume home Glipizide  -a1c 9.0  -SSI, accuchecks  CAD s/p bypass graft  -continue home ASA and statin   Mild Hyponatremia-POA  -resolved   Acute Bronchitis, recently dx  -patient recently dx with pcp   -completed Zpack   -completed Amoxil   -possibly cause of the above   Generalized Debility  -suspect from recent hospitalizations   -consult PT     Code status: Full  DVT prophylaxis: SCDs    DISCHARGE DIAGNOSES / PLAN:      Dyspnea on Exertion  -seems more chronic in nature, likely from CAD  -continue  Hypokalemia  -now resolved  Acute on Chronic Kidney Disease Stage 3   -creatinine improved   -hold HCTZ   Type 2 DM   - hold Metformin due to KATALINA, stop Actos as patient reports increase swelling in his legs after being started on the medication  -Resume home Glipizide  -continue SSI, accuchecks  CAD s/p bypass graft  -continue home ASA and statin   Mild Hyponatremia-POA  -resolved   Acute Bronchitis, recently dx  -completed Amoxil   Generalized Debility  -suspect from recent hospitalizations   -continue PT    PENDING TEST RESULTS:   At the time of discharge the following test results are still pending: none    FOLLOW UP APPOINTMENTS:    Follow-up Information     Follow up With Details Comments Contact Lilibeth Quiñones MD In 7 days As needed, If symptoms worsen [address] [facility] [address] [phone]             ADDITIONAL CARE RECOMMENDATIONS:     DIET: Diabetic Diet    ACTIVITY: Activity as tolerated    WOUND CARE: none    EQUIPMENT needed: none      DISCHARGE MEDICATIONS:  Discharge Medication List as of 6/27/2017  3:15 PM      CONTINUE these medications which have NOT CHANGED    Details   cloNIDine HCl (CATAPRES) 0.1 mg tablet Take 1 Tab by mouth two (2) times a day., Normal, Disp-60 Tab, R-5      carvedilol (COREG) 25 mg tablet Take 1 Tab by mouth two (2) times a day., Print, Disp-60 Tab, R-0      aspirin delayed-release 81 mg tablet Take 81 mg by mouth daily. , Historical Med      simvastatin (ZOCOR) 20 mg tablet Take 20 mg by mouth nightly., Historical Med      omega 3-dha-epa-fish oil (FISH OIL) 100-160-1,000 mg cap Take 1 Cap by mouth two (2) times a day., Historical Med      promethazine-codeine (PHENERGAN WITH CODEINE) 6.25-10 mg/5 mL syrup Take 5 mL by mouth every six (6) hours as needed for Cough., Historical Med      pioglitazone (ACTOS) 45 mg tablet Take 45 mg by mouth daily. , Historical Med      amLODIPine (NORVASC) 10 mg tablet Take 10 mg by mouth daily. , Historical Med      cinnamon bark (CINNAMON) 500 mg cap Take 500 mg by mouth two (2) times a day., Historical Med      hydrochlorothiazide (HYDRODIURIL) 25 mg tablet Take 25 mg by mouth daily. Historical Med, 25 mg      glipiZIDE (GLUCOTROL) 10 mg tablet Take 20 mg by mouth two (2) times a day., Historical Med         STOP taking these medications       metFORMIN (GLUCOPHAGE) 1,000 mg tablet Comments:   Reason for Stopping:         amoxicillin (AMOXIL) 500 mg capsule Comments:   Reason for Stopping:                 NOTIFY YOUR PHYSICIAN FOR ANY OF THE FOLLOWING:   Fever over 101 degrees for 24 hours. Chest pain, shortness of breath, fever, chills, nausea, vomiting, diarrhea, change in mentation, falling, weakness, bleeding. Severe pain or pain not relieved by medications. Or, any other signs or symptoms that you may have questions about.     DISPOSITION:  x  Home With:   OT  PT  HH  RN       Long term SNF/Inpatient Rehab    Independent/assisted living    Hospice    Other:       PATIENT CONDITION AT DISCHARGE:     Functional status    Poor    x Deconditioned     Independent      Cognition   x  Lucid     Forgetful     Dementia      Catheters/lines (plus indication)    Myers     PICC     PEG    x None      Code status   x  Full code     DNR      PHYSICAL EXAMINATION AT DISCHARGE:   Refer to Progress Note      CHRONIC MEDICAL DIAGNOSES:  Problem List as of 6/27/2017  Date Reviewed: 6/27/2017          Codes Class Noted - Resolved    Hypokalemia ICD-10-CM: E87.6  ICD-9-CM: 276.8  6/25/2017 - Present        Generalized weakness ICD-10-CM: R53.1  ICD-9-CM: 780.79  6/25/2017 - Present        Elevated troponin ICD-10-CM: R74.8  ICD-9-CM: 790.6  6/25/2017 - Present KATALINA (acute kidney injury) ([facility] ICD-10-CM: N17.9  ICD-9-CM: 584.9  6/25/2017 - Present        Acute renal failure (ARF) (HCC) ICD-10-CM: N17.9  ICD-9-CM: 584.9  3/16/2017 - Present        CKD (chronic kidney disease) ICD-10-CM: N18.9  ICD-9-CM: 585.9  1/20/2017 - Present        Type 2 diabetes mellitus with diabetic peripheral angiopathy without gangrene ([facility] ICD-10-CM: E11.51  ICD-9-CM: 250.70, 443.81  9/27/2015 - Present        * (Principal)SOB (shortness of breath) ICD-10-CM: R06.02  ICD-9-CM: 786.05  7/15/2014 - Present        PVC's (premature ventricular contractions) ICD-10-CM: I49.3  ICD-9-CM: 427.69  5/26/2014 - Present        Carotid arterial disease ([facility]) ICD-10-CM: I77.9  ICD-9-CM: 447.9  Unknown - Present        Hypercholesteremia ICD-10-CM: E78.00  ICD-9-CM: 272.0  Unknown - Present        PVD (peripheral vascular disease) ([facility]) ICD-10-CM: I73.9  ICD-9-CM: 443.9  Unknown - Present        Bradycardia ICD-10-CM: R00.1  ICD-9-CM: 427.89  Unknown - Present        CAD (coronary artery disease) ICD-10-CM: I25.10  ICD-9-CM: 414.00  Unknown - Present        Hypertension, essential, benign ICD-10-CM: I10  ICD-9-CM: 401.1  Unknown - Present        RESOLVED: Fever ICD-10-CM: R50.9  ICD-9-CM: 780.60  3/16/2017 - 3/18/2017        RESOLVED: Hyponatremia ICD-10-CM: E87.1  ICD-9-CM: 276.1  3/16/2017 - 3/18/2017        RESOLVED: Urinary retention ICD-10-CM: R33.9  ICD-9-CM: 788.20  1/19/2017 - 1/20/2017        RESOLVED: Heart palpitations ICD-10-CM: R00.2  ICD-9-CM: 785.1  7/15/2014 - 9/20/2016        RESOLVED: Atherosclerosis of native artery of extremity with intermittent claudication ([facility] ICD-10-CM: I70.219  ICD-9-CM: 440.21  2/19/2014 - 9/20/2016        RESOLVED: Other dyspnea and respiratory abnormality ICD-10-CM: R06.09, R09.89  ICD-9-CM: 786.09  Unknown - 9/20/2016        RESOLVED: Diabetes mellitus, type 2 ([facility] ICD-10-CM: E11.9  ICD-9-CM: 250.00  Unknown - 9/20/2016              Greater than 35 minutes were spent with the patient on counseling and coordination of care    Signed:   Rachel Malone MD  6/27/2017  9:56 PM

## 2017-08-14 ENCOUNTER — OFFICE VISIT (OUTPATIENT)
Dept: CARDIOLOGY CLINIC | Age: 82
End: 2017-08-14

## 2017-08-14 VITALS
HEIGHT: 69 IN | SYSTOLIC BLOOD PRESSURE: 160 MMHG | BODY MASS INDEX: 27.25 KG/M2 | DIASTOLIC BLOOD PRESSURE: 60 MMHG | HEART RATE: 60 BPM | WEIGHT: 184 LBS | RESPIRATION RATE: 16 BRPM

## 2017-08-14 DIAGNOSIS — E78.00 HYPERCHOLESTEREMIA: ICD-10-CM

## 2017-08-14 DIAGNOSIS — I73.9 PVD (PERIPHERAL VASCULAR DISEASE) (HCC): ICD-10-CM

## 2017-08-14 DIAGNOSIS — I49.3 PVC'S (PREMATURE VENTRICULAR CONTRACTIONS): ICD-10-CM

## 2017-08-14 DIAGNOSIS — I25.10 CORONARY ARTERY DISEASE INVOLVING NATIVE CORONARY ARTERY OF NATIVE HEART WITHOUT ANGINA PECTORIS: ICD-10-CM

## 2017-08-14 DIAGNOSIS — I10 HYPERTENSION, ESSENTIAL, BENIGN: ICD-10-CM

## 2017-08-14 DIAGNOSIS — R06.02 SOB (SHORTNESS OF BREATH): ICD-10-CM

## 2017-08-14 DIAGNOSIS — R53.1 WEAKNESS: Primary | ICD-10-CM

## 2017-08-14 NOTE — PATIENT INSTRUCTIONS
Schedule nuclear stress test.  Keep follow up appointment for December. Hold coreg the night before and the day of testing.

## 2017-08-14 NOTE — PROGRESS NOTES
25 [facility]     1935       David Nieves MD, [facility]  Date of Visit-8/14/2017   PCP is Shikha Richardson MD   [facility] [place]  [facility]  HPI:  25 [facility]. is a [de-identified] y.o. male     Seen in [place] 15 in follow up of CAD and PVD stable at that time by echo. Noted to have mild anemia and CKD. Was then hospitalized June 26th. Unclear if bronchitis. Repeat echo again showed normal EF. Creatinine did rise from 1.6 to 2.8. Patient was to avoid NSAIDs. Seems to improve with hydration. He reports his shortness of breath has worsended recently. He continues to experience bilateral LE edema. He was taken off fluid pill due to kidney function. Denies chest pain, syncope, has no tachycardia, palpitations or sense of arrhythmia. 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 Weakness    2. SOB (shortness of breath)    3. Coronary artery disease involving native coronary artery of native heart without angina pectoris    4. Hypertension, essential, benign    5. PVC's (premature ventricular contractions)    6. Hypercholesteremia    7.  PVD (peripheral vascular disease) ([address]       Cardiac History:   Holter monitor4/29/14=showing rate of  with frequent PACs, rare periods of short RP interval SVT up to 122 and frequent PVCs. sinus rhythm with similar findings, frequent PVCs and short RP interval tachycardia. NUKE  14,  2 minutes, 20 seconds,  EF of 55% with no ischemia. PVD= Dr. Rgean Rodriguez atherectomy to distal SFA and distal popliteal with angioplasty to both lesions and stent to the SFA by Dr. Regan Rodriguez on 3/14/14. Previously had AIBs of 0.67 on the left, 0.71 on the right with the right seemingly due to distal disease. CAD/CABG off pump x3 3/2008 . =post op anemia and renal failure  CATH 2008= severe three-vessel left main coronary artery disease, 40% disease of the left main and take off of the LAD and circumflex complex, 70% stenosis of the ostial circ and 85% of the LAD. Between the first and second marginal, the circumflex had 70% stenosis and between the third marginal and PDA there was a 70% stenosis, LAD ostial lesion RCA proximal 60% tapering, EF 60%-70%, bilaterally patent renal arteries, mild atherosclerosis of the distal abdominal aorta. Mild carotid artery disease 3-7-08 then 12 with 10-49% left, less than 10% on right  Dyslipidemia 10-25-10  TG 91 HDL 40 LDL 48  SOCIAL: Drinks no alcohol, quit smoking several years ago, works as an . Lives with his wife and has four children. Enjoys gardening, fishing and music. FAMILY HISTORY: Mother  of cancer at 76, father  of Parkinson's at 70, one brother  of cancer of the liver at 76 and one  of an infection at 64. ROS-except as noted above. . A complete cardiac and respiratory are reviewed and negative except as above ; Resp-denies wheezing  or productive cough,.  Const- No unusual weight loss or fever; Neuro-no recent seizure or CVA ; GI- No BRBPR, abdom pain, bloating ; - no  hematuria   see supplement sheet, initialed and to be scanned by staff  Past Medical History:   Diagnosis Date    CAD (coronary artery disease)     [facility]     Diabetes mellitus, type 2 ([facility]     Hypercholesteremia     Hypertension     PVC's (premature ventricular contractions) 5/26/2014    PVD (peripheral vascular disease) ([facility]       Social Hx= reports that he quit smoking about 32 years ago. His smoking use included Cigarettes. He has a 60.00 pack-year smoking history. He has never used smokeless tobacco. He reports that he does not drink alcohol or use illicit drugs. Exam and Labs:    Visit Vitals    /60 (BP 1 Location: Right arm, BP Patient Position: Sitting)    Pulse 60    Resp 16    Ht 5' 9\" (1.753 m)    Wt 184 lb (83.5 kg)    BMI 27.17 kg/m2      Constitutional:  NAD, comfortable  Head: NC,AT. Eyes: No scleral icterus. Neck:  Neck supple. No JVD present. Throat: moist mucous membranes. Chest: Effort normal & normal respiratory excursion . Neurological: alert, conversant and oriented . Skin: Skin is not cold. No obvious systemic rash noted. Not diaphoretic. No erythema. Psychiatric:  Grossly normal mood and affect. Behavior appears normal. Extremities:  no clubbing or cyanosis. Abdomen: non distended    Lungs:breath sounds normal. No stridor. distress, wheezes or  Rales. Heart:    normal rate, regular rhythm, normal S1, S2, 2/6 systolic murmur, rubs, clicks or gallops , PMI non displaced. Edema: Edema is none.      Lab Results   Component Value Date/Time    Sodium 136 06/27/2017 03:42 AM    Potassium 5.4 06/27/2017 03:42 AM    Chloride 105 06/27/2017 03:42 AM    CO2 26 06/27/2017 03:42 AM    Anion gap 5 06/27/2017 03:42 AM    Glucose 195 06/27/2017 03:42 AM    BUN 41 06/27/2017 03:42 AM    Creatinine 1.72 06/27/2017 03:42 AM    BUN/Creatinine ratio 24 06/27/2017 03:42 AM    GFR est AA 47 06/27/2017 03:42 AM    GFR est non-AA 38 06/27/2017 03:42 AM    Calcium 8.4 06/27/2017 03:42 AM      Wt Readings from Last 3 Encounters:   08/14/17 184 lb (83.5 kg)   06/27/17 181 lb 9.6 oz (82.4 kg)   06/15/17 182 lb (82.6 kg)      BP Readings from Last 3 Encounters:   08/14/17 160/60   06/27/17 158/66   06/15/17 158/64        Current Outpatient Prescriptions   Medication Sig    cloNIDine HCl (CATAPRES) 0.1 mg tablet Take 1 Tab by mouth two (2) times a day.  carvedilol (COREG) 25 mg tablet Take 1 Tab by mouth two (2) times a day.  aspirin delayed-release 81 mg tablet Take 81 mg by mouth daily.  simvastatin (ZOCOR) 20 mg tablet Take 20 mg by mouth nightly.  omega 3-dha-epa-fish oil (FISH OIL) 100-160-1,000 mg cap Take 1 Cap by mouth two (2) times a day.  promethazine-codeine (PHENERGAN WITH CODEINE) 6.25-10 mg/5 mL syrup Take 5 mL by mouth every six (6) hours as needed for Cough.  pioglitazone (ACTOS) 45 mg tablet Take 45 mg by mouth daily.  amLODIPine (NORVASC) 10 mg tablet Take 10 mg by mouth daily.  cinnamon bark (CINNAMON) 500 mg cap Take 500 mg by mouth two (2) times a day.  hydrochlorothiazide (HYDRODIURIL) 25 mg tablet Take 25 mg by mouth daily.  glipiZIDE (GLUCOTROL) 10 mg tablet Take 20 mg by mouth two (2) times a day. No current facility-administered medications for this visit. Impression see above.     Written by Juan Greer, as dictated by Shannan Cosme MD.

## 2017-08-14 NOTE — MR AVS SNAPSHOT
Visit Information Date & Time Provider Department Dept. Phone Encounter #  
 8/14/2017 10:00 AM Leandro Salter MD [facility] [address] [place] [address] [phone] [id number] Your Appointments 12/14/2017 10:20 AM  
ESTABLISHED PATIENT with Leandro Salter MD  
[facility] ([facility] SCHEDULING) Appt Note: 6 month f/u  
 [address]  
[address] [address] Upcoming Health Maintenance Date Due  
 FOOT EXAM Q1 8/28/1945 EYE EXAM RETINAL OR DILATED Q1 8/28/1946 DTaP/Tdap/Td series (1 - Tdap) 8/28/1957 ZOSTER VACCINE AGE 60> 6/28/1996 GLAUCOMA SCREENING Q2Y 8/28/2001 Pneumococcal 65+ High/Highest Risk (1 of 2 - PCV13) 8/28/2001 MEDICARE YEARLY EXAM 8/28/2001 MICROALBUMIN Q1 8/3/2011 LIPID PANEL Q1 10/25/2011 INFLUENZA AGE 9 TO ADULT 8/1/2017 HEMOGLOBIN A1C Q6M 12/25/2017 Allergies as of 8/14/2017  Review Complete On: 8/14/2017 By: Leandro Salter MD  
  
 Severity Noted Reaction Type Reactions Lisinopril  09/20/2016    Cough Current Immunizations  Reviewed on 1/20/2017 Name Date Influenza Vaccine 11/20/2016 Not reviewed this visit Vitals BP Pulse Resp Height(growth percentile) Weight(growth percentile) BMI  
 160/60 (BP 1 Location: Right arm, BP Patient Position: Sitting) 60 16 5' 9\" (1.753 m) 184 lb (83.5 kg) 27.17 kg/m2 Smoking Status Former Smoker Vitals History BMI and BSA Data Body Mass Index Body Surface Area  
 27.17 kg/m 2 2.02 m 2 Preferred Pharmacy Pharmacy Name Phone [facility] [address] [facility], [address] [facility] Floor [phone] Your Updated Medication List  
  
   
This list is accurate as of: 8/14/17 11:06 AM.  Always use your most recent med list. amLODIPine 10 mg tablet Commonly known as:  Lilliam Prince Take 10 mg by mouth daily. aspirin delayed-release 81 mg tablet Take 81 mg by mouth daily. carvedilol 25 mg tablet Commonly known as:  [place]  Take 1 Tab by mouth two (2) times a day. CINNAMON 500 mg Cap Generic drug:  cinnamon bark Take 500 mg by mouth two (2) times a day. cloNIDine HCl 0.1 mg tablet Commonly known as:  CATAPRES Take 1 Tab by mouth two (2) times a day. FISH -160-1,000 mg Cap Generic drug:  omega 3-dha-epa-fish oil Take 1 Cap by mouth two (2) times a day. glipiZIDE 10 mg tablet Commonly known as:  Olman Fragmin Take 20 mg by mouth two (2) times a day. hydroCHLOROthiazide 25 mg tablet Commonly known as:  HYDRODIURIL Take 25 mg by mouth daily. pioglitazone 45 mg tablet Commonly known as:  ACTOS Take 45 mg by mouth daily. promethazine-codeine 6.25-10 mg/5 mL syrup Commonly known as:  PHENERGAN with CODEINE Take 5 mL by mouth every six (6) hours as needed for Cough. simvastatin 20 mg tablet Commonly known as:  ZOCOR Take 20 mg by mouth nightly. Patient Instructions Schedule nuclear stress test.  Keep follow up appointment for December. Hold coreg the night before and the day of testing. Introducing [facility]! Prema Staley introduces [employer] patient portal. Now you can access parts of your medical record, email your doctor's office, and request medication refills online. 1. In your internet browser, go to [email or web address] 2. Click on the First Time User? Click Here link in the Sign In box. You will see the New Member Sign Up page. 3. Enter your [employer] Access Code exactly as it appears below. You will not need to use this code after youve completed the sign-up process. If you do not sign up before the expiration date, you must request a new code. · [employer] Access Code: [email or web address] Expires: 9/13/2017  9:51 AM 
 4. Enter the last four digits of your Social Security Number (xxxx) and Date of Birth (mm/dd/yyyy) as indicated and click Submit. You will be taken to the next sign-up page. 5. Create a [employer] ID. This will be your [employer] login ID and cannot be changed, so think of one that is secure and easy to remember. 6. Create a [employer] password. You can change your password at any time. 7. Enter your Password Reset Question and Answer. This can be used at a later time if you forget your password. 8. Enter your e-mail address. You will receive e-mail notification when new information is available in [address] 9. Click Sign Up. You can now view and download portions of your medical record. 10. Click the Download Summary menu link to download a portable copy of your medical information. If you have questions, please visit the Frequently Asked Questions section of the [employer] website. Remember, [employer] is NOT to be used for urgent needs. For medical emergencies, dial 911. Now available from your iPhone and Android! Please provide this summary of care documentation to your next provider. Your primary care clinician is listed as Anatoly Mckay. If you have any questions after today's visit, please call [phone].

## 2017-08-15 LAB
CREATININE, EXTERNAL: 1.48
HBA1C MFR BLD HPLC: 8.4 %
MICROALBUMIN UR TEST STR-MCNC: 3876.2 MG/DL

## 2017-08-21 ENCOUNTER — TELEPHONE (OUTPATIENT)
Dept: CARDIOLOGY CLINIC | Age: 82
End: 2017-08-21

## 2017-08-21 ENCOUNTER — CLINICAL SUPPORT (OUTPATIENT)
Dept: CARDIOLOGY CLINIC | Age: 82
End: 2017-08-21

## 2017-08-21 DIAGNOSIS — R06.02 SHORTNESS OF BREATH: ICD-10-CM

## 2017-08-21 DIAGNOSIS — Z95.1 HX OF CABG: ICD-10-CM

## 2017-08-21 NOTE — PROGRESS NOTES
See scanned document. Ordering Doctor:Dr. Rianna Person  Reading Doctor:Dr. Rianna Person    Patient tolerated procedure well.

## 2017-08-23 NOTE — TELEPHONE ENCOUNTER
Nuclear with no ischemia, normal EF  With elevated Creat and no ischemia with no cp, I dont think we should do a cath to figure out his SOB unless all other possibilities explored  Called pt and spoke to him ID x2  He is still very sob and could not walk on treadmill  Is he anemic or is CKD worse??  Pt c/o severe AGUILAR       He has blood work with Dr Taylor Franco, please get that blood work and arrange for pulmonary to see as OP [facility]

## 2017-08-24 NOTE — TELEPHONE ENCOUNTER
The following Dr Abdiel Saldivar message given to patient:    Nuclear with no ischemia, normal EF  With elevated Creat and no ischemia with no cp, I dont think we should do a cath to figure out his SOB unless all other possibilities explored  Called pt and spoke to him ID x2  He is still very sob and could not walk on treadmill  Is he anemic or is CKD worse??  Pt c/o severe AGUILAR         He has blood work with Dr Dorrene Dakin, please get that blood work and arrange for pulmonary to see as OP , [facility]     Patient agrees to see pulmonary. Appointment has been made with Dr. Ronette L[facility] at [facility].   Monday 9/18/17 @ 10:15 arriving at 9:45 am.    Labs requested from PCP:

## 2017-08-25 NOTE — TELEPHONE ENCOUNTER
Returned patient's call, 2 pt identifiers used  The following message given:    Appointment has been made with Dr. Dyan Garces at [facility]. Monday 9/18/17 @ 10:15 arriving at 9:45 am.    Patient to expect an information packet from his office.

## 2017-08-25 NOTE — TELEPHONE ENCOUNTER
Patient states that he is returning a call to Dr Mack orellana. Was not sure who called him. Can be reached at [phone]. Thanks!

## 2017-09-18 NOTE — PROGRESS NOTES
Labs Dr Chely Odonnell  8-14-17  Creat 1.48  Hgb A1C 8.4%  Bacterial culture with staph aureus ?contaminant /Sputum?

## 2017-10-20 ENCOUNTER — APPOINTMENT (OUTPATIENT)
Dept: INTERVENTIONAL RADIOLOGY/VASCULAR | Age: 82
DRG: 378 | End: 2017-10-20
Attending: NURSE PRACTITIONER
Payer: MEDICARE

## 2017-10-20 ENCOUNTER — HOSPITAL ENCOUNTER (INPATIENT)
Age: 82
LOS: 3 days | Discharge: HOME | DRG: 378 | End: 2017-10-23
Attending: EMERGENCY MEDICINE | Admitting: FAMILY MEDICINE
Payer: MEDICARE

## 2017-10-20 ENCOUNTER — APPOINTMENT (OUTPATIENT)
Dept: NUCLEAR MEDICINE | Age: 82
DRG: 378 | End: 2017-10-20
Attending: EMERGENCY MEDICINE
Payer: MEDICARE

## 2017-10-20 ENCOUNTER — APPOINTMENT (OUTPATIENT)
Dept: CT IMAGING | Age: 82
DRG: 378 | End: 2017-10-20
Attending: EMERGENCY MEDICINE
Payer: MEDICARE

## 2017-10-20 DIAGNOSIS — K92.2 GASTROINTESTINAL HEMORRHAGE, UNSPECIFIED GASTROINTESTINAL HEMORRHAGE TYPE: Primary | ICD-10-CM

## 2017-10-20 PROBLEM — I48.91 NEW ONSET A-FIB (HCC): Status: ACTIVE | Noted: 2017-10-20

## 2017-10-20 PROBLEM — E11.65 HYPERGLYCEMIA DUE TO TYPE 2 DIABETES MELLITUS (HCC): Status: ACTIVE | Noted: 2017-10-20

## 2017-10-20 LAB
ALBUMIN SERPL-MCNC: 3 G/DL (ref 3.5–5)
ALBUMIN/GLOB SERPL: 0.7 {RATIO} (ref 1.1–2.2)
ALP SERPL-CCNC: 78 U/L (ref 45–117)
ALT SERPL-CCNC: 24 U/L (ref 12–78)
ANION GAP SERPL CALC-SCNC: 6 MMOL/L (ref 5–15)
ANION GAP SERPL CALC-SCNC: 7 MMOL/L (ref 5–15)
AST SERPL-CCNC: 16 U/L (ref 15–37)
BASOPHILS # BLD: 0 K/UL (ref 0–0.1)
BASOPHILS NFR BLD: 1 % (ref 0–1)
BILIRUB SERPL-MCNC: 0.3 MG/DL (ref 0.2–1)
BUN SERPL-MCNC: 36 MG/DL (ref 6–20)
BUN SERPL-MCNC: 39 MG/DL (ref 6–20)
BUN/CREAT SERPL: 17 (ref 12–20)
BUN/CREAT SERPL: 19 (ref 12–20)
CALCIUM SERPL-MCNC: 8.2 MG/DL (ref 8.5–10.1)
CALCIUM SERPL-MCNC: 8.2 MG/DL (ref 8.5–10.1)
CHLORIDE SERPL-SCNC: 100 MMOL/L (ref 97–108)
CHLORIDE SERPL-SCNC: 102 MMOL/L (ref 97–108)
CO2 SERPL-SCNC: 28 MMOL/L (ref 21–32)
CO2 SERPL-SCNC: 30 MMOL/L (ref 21–32)
CREAT SERPL-MCNC: 2.07 MG/DL (ref 0.7–1.3)
CREAT SERPL-MCNC: 2.15 MG/DL (ref 0.7–1.3)
EOSINOPHIL # BLD: 0.3 K/UL (ref 0–0.4)
EOSINOPHIL NFR BLD: 5 % (ref 0–7)
ERYTHROCYTE [DISTWIDTH] IN BLOOD BY AUTOMATED COUNT: 14.5 % (ref 11.5–14.5)
ERYTHROCYTE [DISTWIDTH] IN BLOOD BY AUTOMATED COUNT: 14.8 % (ref 11.5–14.5)
EST. AVERAGE GLUCOSE BLD GHB EST-MCNC: 235 MG/DL
GLOBULIN SER CALC-MCNC: 4.2 G/DL (ref 2–4)
GLUCOSE BLD STRIP.AUTO-MCNC: 285 MG/DL (ref 65–100)
GLUCOSE BLD STRIP.AUTO-MCNC: 346 MG/DL (ref 65–100)
GLUCOSE SERPL-MCNC: 302 MG/DL (ref 65–100)
GLUCOSE SERPL-MCNC: 309 MG/DL (ref 65–100)
HBA1C MFR BLD: 9.8 % (ref 4.2–6.3)
HCT VFR BLD AUTO: 23.7 % (ref 36.6–50.3)
HCT VFR BLD AUTO: 27.2 % (ref 36.6–50.3)
HCT VFR BLD AUTO: 33.1 % (ref 36.6–50.3)
HGB BLD-MCNC: 10.8 G/DL (ref 12.1–17)
HGB BLD-MCNC: 7.7 G/DL (ref 12.1–17)
HGB BLD-MCNC: 8.9 G/DL (ref 12.1–17)
INR PPP: 1 (ref 0.9–1.1)
INR PPP: 1.1 (ref 0.9–1.1)
LYMPHOCYTES # BLD: 1.4 K/UL (ref 0.8–3.5)
LYMPHOCYTES NFR BLD: 21 % (ref 12–49)
MAGNESIUM SERPL-MCNC: 1.7 MG/DL (ref 1.6–2.4)
MCH RBC QN AUTO: 28.1 PG (ref 26–34)
MCH RBC QN AUTO: 28.3 PG (ref 26–34)
MCHC RBC AUTO-ENTMCNC: 32.6 G/DL (ref 30–36.5)
MCHC RBC AUTO-ENTMCNC: 32.7 G/DL (ref 30–36.5)
MCV RBC AUTO: 86 FL (ref 80–99)
MCV RBC AUTO: 86.3 FL (ref 80–99)
MONOCYTES # BLD: 0.5 K/UL (ref 0–1)
MONOCYTES NFR BLD: 8 % (ref 5–13)
NEUTS SEG # BLD: 4.3 K/UL (ref 1.8–8)
NEUTS SEG NFR BLD: 65 % (ref 32–75)
PLATELET # BLD AUTO: 216 K/UL (ref 150–400)
PLATELET # BLD AUTO: 228 K/UL (ref 150–400)
POTASSIUM SERPL-SCNC: 4 MMOL/L (ref 3.5–5.1)
POTASSIUM SERPL-SCNC: 4.4 MMOL/L (ref 3.5–5.1)
PROT SERPL-MCNC: 7.2 G/DL (ref 6.4–8.2)
PROTHROMBIN TIME: 10.3 SEC (ref 9–11.1)
PROTHROMBIN TIME: 10.8 SEC (ref 9–11.1)
RBC # BLD AUTO: 3.15 M/UL (ref 4.1–5.7)
RBC # BLD AUTO: 3.85 M/UL (ref 4.1–5.7)
SERVICE CMNT-IMP: ABNORMAL
SERVICE CMNT-IMP: ABNORMAL
SODIUM SERPL-SCNC: 136 MMOL/L (ref 136–145)
SODIUM SERPL-SCNC: 137 MMOL/L (ref 136–145)
TSH SERPL DL<=0.05 MIU/L-ACNC: 2.38 UIU/ML (ref 0.36–3.74)
WBC # BLD AUTO: 5.7 K/UL (ref 4.1–11.1)
WBC # BLD AUTO: 6.6 K/UL (ref 4.1–11.1)

## 2017-10-20 PROCEDURE — 74011636637 HC RX REV CODE- 636/637: Performed by: FAMILY MEDICINE

## 2017-10-20 PROCEDURE — C1892 INTRO/SHEATH,FIXED,PEEL-AWAY: HCPCS

## 2017-10-20 PROCEDURE — P9016 RBC LEUKOCYTES REDUCED: HCPCS | Performed by: EMERGENCY MEDICINE

## 2017-10-20 PROCEDURE — 83735 ASSAY OF MAGNESIUM: CPT

## 2017-10-20 PROCEDURE — 85025 COMPLETE CBC W/AUTO DIFF WBC: CPT | Performed by: EMERGENCY MEDICINE

## 2017-10-20 PROCEDURE — B4151ZZ FLUOROSCOPY OF INFERIOR MESENTERIC ARTERY USING LOW OSMOLAR CONTRAST: ICD-10-PCS | Performed by: RADIOLOGY

## 2017-10-20 PROCEDURE — 86923 COMPATIBILITY TEST ELECTRIC: CPT | Performed by: EMERGENCY MEDICINE

## 2017-10-20 PROCEDURE — C1894 INTRO/SHEATH, NON-LASER: HCPCS

## 2017-10-20 PROCEDURE — 93005 ELECTROCARDIOGRAM TRACING: CPT

## 2017-10-20 PROCEDURE — 99284 EMERGENCY DEPT VISIT MOD MDM: CPT

## 2017-10-20 PROCEDURE — C1769 GUIDE WIRE: HCPCS

## 2017-10-20 PROCEDURE — 74011636320 HC RX REV CODE- 636/320: Performed by: NURSE PRACTITIONER

## 2017-10-20 PROCEDURE — 84443 ASSAY THYROID STIM HORMONE: CPT

## 2017-10-20 PROCEDURE — 74011250636 HC RX REV CODE- 250/636: Performed by: RADIOLOGY

## 2017-10-20 PROCEDURE — 30233N1 TRANSFUSION OF NONAUTOLOGOUS RED BLOOD CELLS INTO PERIPHERAL VEIN, PERCUTANEOUS APPROACH: ICD-10-PCS | Performed by: EMERGENCY MEDICINE

## 2017-10-20 PROCEDURE — 80053 COMPREHEN METABOLIC PANEL: CPT | Performed by: EMERGENCY MEDICINE

## 2017-10-20 PROCEDURE — 74011250637 HC RX REV CODE- 250/637: Performed by: PHYSICIAN ASSISTANT

## 2017-10-20 PROCEDURE — 96374 THER/PROPH/DIAG INJ IV PUSH: CPT

## 2017-10-20 PROCEDURE — 82962 GLUCOSE BLOOD TEST: CPT

## 2017-10-20 PROCEDURE — C9113 INJ PANTOPRAZOLE SODIUM, VIA: HCPCS | Performed by: EMERGENCY MEDICINE

## 2017-10-20 PROCEDURE — A9560 TC99M LABELED RBC: HCPCS

## 2017-10-20 PROCEDURE — 77030014021 HC SYR ANGI FIX LOK MRTM -A

## 2017-10-20 PROCEDURE — G0269 OCCLUSIVE DEVICE IN VEIN ART: HCPCS

## 2017-10-20 PROCEDURE — 85027 COMPLETE CBC AUTOMATED: CPT

## 2017-10-20 PROCEDURE — 74011000250 HC RX REV CODE- 250: Performed by: NURSE PRACTITIONER

## 2017-10-20 PROCEDURE — 83036 HEMOGLOBIN GLYCOSYLATED A1C: CPT

## 2017-10-20 PROCEDURE — 85018 HEMOGLOBIN: CPT | Performed by: INTERNAL MEDICINE

## 2017-10-20 PROCEDURE — 74011000250 HC RX REV CODE- 250: Performed by: EMERGENCY MEDICINE

## 2017-10-20 PROCEDURE — 77030016712 HC CATH ANGI DX SVU3 ANGI -B

## 2017-10-20 PROCEDURE — 74176 CT ABD & PELVIS W/O CONTRAST: CPT

## 2017-10-20 PROCEDURE — 77030019698 HC SYR ANGI MDLON MRTM -A

## 2017-10-20 PROCEDURE — 75726 ARTERY X-RAYS ABDOMEN: CPT

## 2017-10-20 PROCEDURE — 74011250636 HC RX REV CODE- 250/636: Performed by: EMERGENCY MEDICINE

## 2017-10-20 PROCEDURE — 36415 COLL VENOUS BLD VENIPUNCTURE: CPT

## 2017-10-20 PROCEDURE — 85610 PROTHROMBIN TIME: CPT | Performed by: EMERGENCY MEDICINE

## 2017-10-20 PROCEDURE — 74011250636 HC RX REV CODE- 250/636: Performed by: FAMILY MEDICINE

## 2017-10-20 PROCEDURE — 86900 BLOOD TYPING SEROLOGIC ABO: CPT | Performed by: EMERGENCY MEDICINE

## 2017-10-20 PROCEDURE — C1887 CATHETER, GUIDING: HCPCS

## 2017-10-20 PROCEDURE — 80048 BASIC METABOLIC PNL TOTAL CA: CPT

## 2017-10-20 PROCEDURE — 93306 TTE W/DOPPLER COMPLETE: CPT

## 2017-10-20 PROCEDURE — 36430 TRANSFUSION BLD/BLD COMPNT: CPT

## 2017-10-20 PROCEDURE — 65660000000 HC RM CCU STEPDOWN

## 2017-10-20 PROCEDURE — C1760 CLOSURE DEV, VASC: HCPCS

## 2017-10-20 RX ORDER — SODIUM CHLORIDE 0.9 % (FLUSH) 0.9 %
5-10 SYRINGE (ML) INJECTION AS NEEDED
Status: DISCONTINUED | OUTPATIENT
Start: 2017-10-20 | End: 2017-10-23 | Stop reason: HOSPADM

## 2017-10-20 RX ORDER — AMLODIPINE BESYLATE 5 MG/1
10 TABLET ORAL DAILY
Status: DISCONTINUED | OUTPATIENT
Start: 2017-10-20 | End: 2017-10-23 | Stop reason: HOSPADM

## 2017-10-20 RX ORDER — MAGNESIUM SULFATE 100 %
4 CRYSTALS MISCELLANEOUS AS NEEDED
Status: DISCONTINUED | OUTPATIENT
Start: 2017-10-20 | End: 2017-10-23 | Stop reason: HOSPADM

## 2017-10-20 RX ORDER — CARVEDILOL 12.5 MG/1
25 TABLET ORAL 2 TIMES DAILY
Status: DISCONTINUED | OUTPATIENT
Start: 2017-10-20 | End: 2017-10-23 | Stop reason: HOSPADM

## 2017-10-20 RX ORDER — SODIUM CHLORIDE 0.9 % (FLUSH) 0.9 %
5-10 SYRINGE (ML) INJECTION EVERY 8 HOURS
Status: DISCONTINUED | OUTPATIENT
Start: 2017-10-20 | End: 2017-10-23 | Stop reason: HOSPADM

## 2017-10-20 RX ORDER — SODIUM CHLORIDE 9 MG/ML
250 INJECTION, SOLUTION INTRAVENOUS AS NEEDED
Status: DISCONTINUED | OUTPATIENT
Start: 2017-10-20 | End: 2017-10-23 | Stop reason: HOSPADM

## 2017-10-20 RX ORDER — HEPARIN 100 UNIT/ML
40 SYRINGE INTRAVENOUS
Status: COMPLETED | OUTPATIENT
Start: 2017-10-20 | End: 2017-10-20

## 2017-10-20 RX ORDER — SIMVASTATIN 20 MG/1
20 TABLET, FILM COATED ORAL
Status: DISCONTINUED | OUTPATIENT
Start: 2017-10-20 | End: 2017-10-23 | Stop reason: HOSPADM

## 2017-10-20 RX ORDER — FENTANYL CITRATE 50 UG/ML
200 INJECTION, SOLUTION INTRAMUSCULAR; INTRAVENOUS
Status: DISCONTINUED | OUTPATIENT
Start: 2017-10-20 | End: 2017-10-20

## 2017-10-20 RX ORDER — SODIUM CHLORIDE 9 MG/ML
75 INJECTION, SOLUTION INTRAVENOUS CONTINUOUS
Status: DISCONTINUED | OUTPATIENT
Start: 2017-10-20 | End: 2017-10-21

## 2017-10-20 RX ORDER — BUMETANIDE 1 MG/1
1 TABLET ORAL 2 TIMES DAILY
COMMUNITY
End: 2017-10-23

## 2017-10-20 RX ORDER — CLONIDINE HYDROCHLORIDE 0.1 MG/1
0.1 TABLET ORAL 2 TIMES DAILY
Status: DISCONTINUED | OUTPATIENT
Start: 2017-10-20 | End: 2017-10-23 | Stop reason: HOSPADM

## 2017-10-20 RX ORDER — INSULIN LISPRO 100 [IU]/ML
INJECTION, SOLUTION INTRAVENOUS; SUBCUTANEOUS EVERY 6 HOURS
Status: DISCONTINUED | OUTPATIENT
Start: 2017-10-20 | End: 2017-10-22

## 2017-10-20 RX ORDER — DEXTROSE 50 % IN WATER (D50W) INTRAVENOUS SYRINGE
12.5-25 AS NEEDED
Status: DISCONTINUED | OUTPATIENT
Start: 2017-10-20 | End: 2017-10-23 | Stop reason: HOSPADM

## 2017-10-20 RX ORDER — SODIUM CHLORIDE 9 MG/ML
25 INJECTION, SOLUTION INTRAVENOUS CONTINUOUS
Status: DISCONTINUED | OUTPATIENT
Start: 2017-10-20 | End: 2017-10-20

## 2017-10-20 RX ORDER — MIDAZOLAM HYDROCHLORIDE 1 MG/ML
5 INJECTION, SOLUTION INTRAMUSCULAR; INTRAVENOUS
Status: DISCONTINUED | OUTPATIENT
Start: 2017-10-20 | End: 2017-10-20

## 2017-10-20 RX ORDER — LIDOCAINE HYDROCHLORIDE 20 MG/ML
20 INJECTION, SOLUTION INFILTRATION; PERINEURAL
Status: COMPLETED | OUTPATIENT
Start: 2017-10-20 | End: 2017-10-20

## 2017-10-20 RX ORDER — POTASSIUM CHLORIDE 750 MG/1
20 TABLET, FILM COATED, EXTENDED RELEASE ORAL 2 TIMES DAILY
COMMUNITY
End: 2017-10-23

## 2017-10-20 RX ADMIN — Medication 40 UNITS: at 09:30

## 2017-10-20 RX ADMIN — IOPAMIDOL 40 ML: 612 INJECTION, SOLUTION INTRAVENOUS at 14:11

## 2017-10-20 RX ADMIN — SIMVASTATIN 20 MG: 20 TABLET, FILM COATED ORAL at 22:25

## 2017-10-20 RX ADMIN — SODIUM CHLORIDE 25 ML/HR: 900 INJECTION, SOLUTION INTRAVENOUS at 13:26

## 2017-10-20 RX ADMIN — MIDAZOLAM HYDROCHLORIDE 0.5 MG: 1 INJECTION, SOLUTION INTRAMUSCULAR; INTRAVENOUS at 13:36

## 2017-10-20 RX ADMIN — MIDAZOLAM HYDROCHLORIDE 0.5 MG: 1 INJECTION, SOLUTION INTRAMUSCULAR; INTRAVENOUS at 13:26

## 2017-10-20 RX ADMIN — LIDOCAINE HYDROCHLORIDE 10 ML: 20 INJECTION, SOLUTION INFILTRATION; PERINEURAL at 13:43

## 2017-10-20 RX ADMIN — CARVEDILOL 25 MG: 12.5 TABLET, FILM COATED ORAL at 19:16

## 2017-10-20 RX ADMIN — FENTANYL CITRATE 25 MCG: 50 INJECTION, SOLUTION INTRAMUSCULAR; INTRAVENOUS at 13:36

## 2017-10-20 RX ADMIN — SODIUM CHLORIDE 40 MG: 9 INJECTION INTRAMUSCULAR; INTRAVENOUS; SUBCUTANEOUS at 04:29

## 2017-10-20 RX ADMIN — CLONIDINE HYDROCHLORIDE 0.1 MG: 0.1 TABLET ORAL at 20:53

## 2017-10-20 RX ADMIN — SODIUM CHLORIDE 75 ML/HR: 900 INJECTION, SOLUTION INTRAVENOUS at 07:16

## 2017-10-20 RX ADMIN — FENTANYL CITRATE 25 MCG: 50 INJECTION, SOLUTION INTRAMUSCULAR; INTRAVENOUS at 13:26

## 2017-10-20 RX ADMIN — INSULIN LISPRO 7 UNITS: 100 INJECTION, SOLUTION INTRAVENOUS; SUBCUTANEOUS at 19:16

## 2017-10-20 RX ADMIN — INSULIN LISPRO 5 UNITS: 100 INJECTION, SOLUTION INTRAVENOUS; SUBCUTANEOUS at 15:18

## 2017-10-20 NOTE — PROGRESS NOTES
1600: Report received from [place].     1 unit PRBC given. No further bowel movements. 1930: Bedside shift change report given to Benigno Purdy RN  (oncoming nurse) by Benitez Daniels RN  (offgoing nurse). Report included the following information SBAR, Kardex, ED Summary, Procedure Summary, Intake/Output, MAR, Recent Results and Cardiac Rhythm Aflutter.

## 2017-10-20 NOTE — ED NOTES
Patient called out and assisted to the [facility] again. He had a moderate to large sized stool, again bloody in nature with clots. Patient continues to deny any symptoms of pain or discomfort. Denies chest pain, dizziness at this time. VSS.

## 2017-10-20 NOTE — CONSULTS
[facility].  [address] [facility] 140 [facility], [address]  [phone]                     GI CONSULTATION NOTE  Juan Ramon Nabor, [facility]  Work Cell: [phone]      NAME:  Sadiq Marie   :   1935   MRN:   [id number]       Referring Provider: Dr. Cammie De Leon Date: 10/20/2017     Chief Complaint: Bloody stools     History of Present Illness:  Patient is a 81 y.o. who is seen in consultation at the request of Dr. Denny Cedeno for GI bleed. Mr. Helio Garcia has a PMH as detailed below. He presented to the hospital with rectal bleeding. Onset of this was around 2 am this morning. He awoke from his sleep and found blood in his briefs. Since this time, he has had multiple (5-6) bloody bowel movements. Stools are loose. Blood is bright red in color with clots present. Amount of blood is moderate to large. He denies any abdominal or rectal pain. He states over the past couple of weeks he has had to strain with defecation. He reports having prior history of similar symptoms many years ago at which time he was told it was secondary to diverticular disease. Last colonoscopy was many years ago. He takes a baby aspirin daily. He denies any NSAID use. Hgb 10.8. Non-contrast CT with diverticulosis and cholelithiasis. He has continued to have bloody bowel movements since admission. PMH:  Past Medical History:   Diagnosis Date    CAD (coronary artery disease)     [facility]     Diabetes mellitus, type 2 ([facility]     Hypercholesteremia     Hypertension     PVC's (premature ventricular contractions) 2014    PVD [facility]peripheral vascular disease) [facility]        PSH:  Past Surgical History:   Procedure Laterality Date    HX CORONARY ARTERY BYPASS GRAFT      HX HEART CATHETERIZATION         Allergies: Allergies   Allergen Reactions    Lisinopril Cough       Home Medications:  Prior to Admission Medications   Prescriptions Last Dose Informant Patient Reported? Taking? SITagliptin (JANUVIA) 50 mg tablet 10/19/2017 at Unknown time  Yes Yes   Sig: Take 50 mg by mouth daily. amLODIPine (NORVASC) 10 mg tablet Not Taking at Unknown time  Yes No   Sig: Take 10 mg by mouth daily. aspirin delayed-release 81 mg tablet 10/19/2017 at Unknown time  Yes Yes   Sig: Take 81 mg by mouth daily. bumetanide (BUMEX) 1 mg tablet   Yes Yes   Sig: Take 1 mg by mouth two (2) times a day. carvedilol (COREG) 25 mg tablet 10/19/2017 at Unknown time  No Yes   Sig: Take 1 Tab by mouth two (2) times a day. cinnamon bark (CINNAMON) 500 mg cap 10/19/2017 at Unknown time  Yes Yes   Sig: Take 500 mg by mouth two (2) times a day. cloNIDine HCl (CATAPRES) 0.1 mg tablet 10/19/2017 at Unknown time  No Yes   Sig: Take 1 Tab by mouth two (2) times a day. glipiZIDE (GLUCOTROL) 10 mg tablet 10/19/2017 at Unknown time  Yes Yes   Sig: Take 20 mg by mouth two (2) times a day. hydrochlorothiazide (HYDRODIURIL) 25 mg tablet Not Taking at Unknown time  Yes No   Sig: Take 25 mg by mouth daily. omega 3-dha-epa-fish oil (FISH OIL) 100-160-1,000 mg cap 10/19/2017 at Unknown time  Yes Yes   Sig: Take 1 Cap by mouth two (2) times a day. pioglitazone (ACTOS) 45 mg tablet 10/19/2017 at Unknown time  Yes Yes   Sig: Take 45 mg by mouth daily. potassium chloride SR (KLOR-CON 10) 10 mEq tablet 10/19/2017 at Unknown time  Yes Yes   Sig: Take 20 mEq by mouth two (2) times a day. promethazine-codeine (PHENERGAN WITH CODEINE) 6.25-10 mg/5 mL syrup Not Taking at Unknown time  Yes No   Sig: Take 5 mL by mouth every six (6) hours as needed for Cough. simvastatin (ZOCOR) 20 mg tablet 10/19/2017 at Unknown time  Yes Yes   Sig: Take 20 mg by mouth nightly.       Facility-Administered Medications: None       Hospital Medications:  Current Facility-Administered Medications   Medication Dose Route Frequency    sodium chloride (NS) flush 5-10 mL  5-10 mL IntraVENous Q8H    sodium chloride (NS) flush 5-10 mL  5-10 mL IntraVENous PRN    0.9% sodium chloride infusion  75 mL/hr IntraVENous CONTINUOUS    0.9% sodium chloride infusion 250 mL  250 mL IntraVENous PRN    glucose chewable tablet 16 g  4 Tab Oral PRN    dextrose (D50W) injection syrg 12.5-25 g  12.5-25 g IntraVENous PRN    glucagon (GLUCAGEN) injection 1 mg  1 mg IntraMUSCular PRN    insulin lispro (HUMALOG) injection   SubCUTAneous Q6H       Social History:  Social History   Substance Use Topics    Smoking status: Former Smoker     Packs/day: 3.00     Years: 20.00     Types: Cigarettes     Quit date: 1/14/1985    Smokeless tobacco: Never Used    Alcohol use No       Family History:  History reviewed. No pertinent family history. Review of Systems:    Constitutional: negative fever, negative chills, negative weight loss  Eyes:   negative visual changes  ENT:   negative sore throat, tongue or lip swelling  Respiratory:  negative cough, negative dyspnea  Cards:  negative for chest pain, palpitations, lower extremity edema  GI:   See HPI  :  negative for frequency, dysuria  Integument:  negative for rash and pruritus  Heme:  negative for easy bruising and gum/nose bleeding  Musculoskel: negative for myalgias, back pain and muscle weakness  Neuro: negative for headaches, dizziness, vertigo  Psych:  negative for feelings of anxiety, depression      Objective:   Patient Vitals for the past 8 hrs:   BP Temp Pulse Resp SpO2 Height Weight   10/20/17 0715 141/75 - 82 21 97 % - -   10/20/17 0706 137/47 97.6 °F (36.4 °C) 82 20 99 % - -   10/20/17 0602 145/84 - 82 27 98 % - -   10/20/17 0515 143/58 - 76 22 98 % - -   10/20/17 0430 155/73 - - - 98 % - -   10/20/17 0357 164/71 98.8 °F (37.1 °C) 73 18 96 % 5' 7\" (1.702 m) 74.5 kg (164 lb 3.2 oz)     10/20 0701 - 10/20 1900  In: 54 [I.V.:55]  Out: -          PHYSICAL EXAM:  General: WD, Elderly. Alert, cooperative, no acute distress.    HEENT: NC, Atraumatic. PERRLA, EOMI. Anicteric sclerae. Lungs:  CTA Bilaterally.  No Wheezing/Rhonchi/Rales. Heart:  Regular rate and rhythm, No murmur, No Rubs, No Gallops  Abdomen: Soft, non-distended, non-tender.  +Bowel sounds, no HSM  Extremities: No c/c/e  Neurologic:  Alert and oriented X 3. No acute neurological distress. Psych:   Good insight. Not anxious nor agitated. Data Review     Recent Labs      10/20/17   0409   WBC  6.6   HGB  10.8*   HCT  33.1*   PLT  228     Recent Labs      10/20/17   0409   NA  137   K  4.0   CL  100   CO2  30   BUN  36*   CREA  2.15*   GLU  309*   CA  8.2*     Recent Labs      10/20/17   0409   SGOT  16   AP  78   TP  7.2   ALB  3.0*   GLOB  4.2*     Recent Labs      10/20/17   0409   INR  1.0   PTP  10.3        Imaging studies reviewed      Assessment:   1. GI bleed - with bright red blood and clots per rectum. Suspect bleeding likely diverticular in origin; however cannot completely r/o polyps, hemorrhoids or neoplasia. 2. CAD  3.  Type 2 DM    Patient Active Problem List   Diagnosis Code    Bradycardia R00.1    CAD (coronary artery disease) I25.10    Hypertension, essential, benign I10    Carotid arterial disease (HCC) I77.9    Hypercholesteremia E78.00    PVD (peripheral vascular disease) ([facility] I73.9    PVC's (premature ventricular contractions) I49.3    SOB (shortness of breath) R06.02    Type 2 diabetes mellitus with diabetic peripheral angiopathy without gangrene ([facility]) E11.51    CKD (chronic kidney disease) N18.9    Acute renal failure (ARF) ([facility]) N17.9    Hypokalemia E87.6    Generalized weakness R53.1    Elevated troponin R74.8    KATALINA (acute kidney injury) ([address] N17.9    GI bleed K92.2    New onset a-fib ([facility]) I48.91    Hyperglycemia due to type 2 diabetes mellitus ([address] E11.65              Plan:   -Supportive management per primary team  -NM bleed scan   -If bleeding scan is positive - would consult IR for embolization   -Consider colonoscopy depending upon results of the above  -Monitor H&H and follow clinical course closely  -Transfuse as necessary   -Discussed with Dr. Ozzie Hammans  -Will follow along with you  -Thank you kindly for allowing us to participate in the care of this patient

## 2017-10-20 NOTE — PROGRESS NOTES
1310: pt to angio holding for mesenteric angiogram with possible embolization- primary RN Karlos Thomas at bedside. BLE pulses 1+ and marked and consent obtained from pt.  1335: procedure start  1410: angioseal to L groin  1415: procedure complete. L groin cdi. TRANSFER - OUT REPORT:    Verbal report given to Milady Toney on [address].  being transferred to CCU for routine progression of care       Report consisted of patients Situation, Background, Assessment and   Recommendations(SBAR). Information from the following report(s) SBAR, Procedure Summary and MAR was reviewed with the receiving nurse. Lines:   Peripheral IV 10/20/17 Left Antecubital (Active)   Site Assessment Clean, dry, & intact 10/20/2017  8:00 AM   Phlebitis Assessment 0 10/20/2017  8:00 AM   Infiltration Assessment 0 10/20/2017  8:00 AM   Dressing Status Clean, dry, & intact 10/20/2017  8:00 AM   Dressing Type Transparent 10/20/2017  8:00 AM   Hub Color/Line Status Pink;Capped 10/20/2017  8:00 AM   Action Taken Open ports on tubing capped 10/20/2017  8:00 AM   Alcohol Cap Used Yes 10/20/2017  8:00 AM        Opportunity for questions and clarification was provided.       Patient transported with:   Monitor  Registered Nurse

## 2017-10-20 NOTE — PROGRESS NOTES
NM bleeding scan demonstrated active bleeding in descending/sigmoid colon. However, during IR attempt at embolization bleeding site was unable to be identified. Per nursing, patient had recurrent bleeding after NM bleed scan. Hgb continuing to trend down. Odds are that bleeding will subside, but may need to consider surgical consultation in the event that it does not.      Jaskaran Zimmerman, FÉLIX  [facility]

## 2017-10-20 NOTE — ED PROVIDER NOTES
Patient is a 81 y.o. male presenting with anal bleeding. Rectal Bleeding    Associated symptoms include flatus. Pertinent negatives include no dysuria, no fever, no nausea and no vomiting. 81year old male with cad, dm, chol, htn, h/o diverticular bleed, presents with bright red blood per rectum x 2 this morning. Had several bowel movements yesterday that were normal. This morning he woke up and knew something was right. Went to the bathroom and had bloody stool. He had another episode and decided to come here. He denies abdominal pain, n/v, chest pain, shortness of breath over his baseline, fever. He recently saw his cardiologist and a pulmonologist for his SOB. His bumex was increased, he lost 20 pounds in water weight and feels much better. Pulmonologist told him this wasn't a primary lung problem. He had a similar episode of bloody stool in past, admitted to [facility]. Stopped on it's own, had colonoscopy which confirmed diverticular source. He is not on any blood thinners. Past Medical History:   Diagnosis Date    CAD (coronary artery disease)     [facility]     Diabetes mellitus, type 2 ([facility]     Hypercholesteremia     Hypertension     PVC's (premature ventricular contractions) 5/26/2014    PVD (peripheral vascular disease) ([facility]        Past Surgical History:   Procedure Laterality Date    HX CORONARY ARTERY BYPASS GRAFT      HX HEART CATHETERIZATION           History reviewed. No pertinent family history. Social History     Social History    Marital status:      Spouse name: N/A    Number of children: N/A    Years of education: N/A     Occupational History    Not on file.      Social History Main Topics    Smoking status: Former Smoker     Packs/day: 3.00     Years: 20.00     Types: Cigarettes     Quit date: 1/14/1985    Smokeless tobacco: Never Used    Alcohol use No    Drug use: No    Sexual activity: Not on file     Other Topics Concern    Not on file     Social History Narrative         ALLERGIES: Lisinopril    Review of Systems   Constitutional: Negative for fever. HENT: Negative for congestion. Eyes: Negative for visual disturbance. Respiratory: Positive for shortness of breath. Negative for cough. Cardiovascular: Negative for chest pain, palpitations and leg swelling. Gastrointestinal: Positive for anal bleeding, blood in stool and flatus. Negative for nausea and vomiting. Endocrine: Negative for polyuria. Genitourinary: Negative for dysuria. Musculoskeletal: Negative for gait problem. Skin: Negative for rash. Neurological: Negative for dizziness and light-headedness. Psychiatric/Behavioral: Negative for dysphoric mood. Vitals:    10/20/17 0357   BP: 164/71   Pulse: 73   Resp: 18   Temp: 98.8 °F (37.1 °C)   SpO2: 96%   Weight: 74.5 kg (164 lb 3.2 oz)   Height: 5' 7\" (1.702 m)            Physical Exam   Constitutional: He is oriented to person, place, and time. He appears well-developed and well-nourished. No distress. HENT:   Head: Normocephalic and atraumatic. Mouth/Throat: Oropharynx is clear and moist. No oropharyngeal exudate. Eyes: Conjunctivae and EOM are normal. Pupils are equal, round, and reactive to light. Right eye exhibits no discharge. Left eye exhibits no discharge. No scleral icterus. Neck: Normal range of motion. Neck supple. No JVD present. Cardiovascular: Normal rate, regular rhythm, normal heart sounds and intact distal pulses. Exam reveals no gallop and no friction rub. No murmur heard. Pulmonary/Chest: Effort normal and breath sounds normal. No stridor. No respiratory distress. He has no wheezes. He has no rales. He exhibits no tenderness. Abdominal: Soft. Bowel sounds are normal. He exhibits no distension and no mass. There is no tenderness. There is no rebound and no guarding. Genitourinary:   Genitourinary Comments: Bright red blood   Musculoskeletal: Normal range of motion. He exhibits no edema or tenderness. Neurological: He is alert and oriented to person, place, and time. He has normal reflexes. No cranial nerve deficit. He exhibits normal muscle tone. Coordination normal.   Skin: Skin is warm and dry. No rash noted. No erythema. Psychiatric: He has a normal mood and affect. His behavior is normal. Judgment and thought content normal.        [facility]  ED Course       Procedures    ED EKG interpretation:  Rhythm: atrial fib; and irregular. Rate (approx.): 74; Axis: left axis deviation; P wave: ; QRS interval: normal ; ST/T wave: non-specific changes; This EKG was interpreted by Camila Kirk MD,ED Provider. Vitals stable. Initial hemoglobin stable. Patient states this episode isn't as severe as 5 years ago. Will admit for further work up. No abdominal pain currently. Spoke to Dr. Herminia Rendon- requests nuclear med cell tag scan.

## 2017-10-20 NOTE — ED NOTES
Patient assisted to the [facility] at this time. Had another moderately sized bloody stool at this time.

## 2017-10-20 NOTE — DIABETES MGMT
DTC Consult Note    Recommendations/ Comments: Please consider the following: Adding Lantus 10 units every morning to aid in glucose control. Pt currently NPO and serum glucose of 302mg/dL @ 0811 today. DTC will continue to follow patient as needed. ____________________________                   [x]           Hospital Blood Glucose Management    Chart reviewed and initial evaluation complete on [facility] [address] .    Patient is a 81 y.o. male with known history of Type 2 Diabetes on Januvia 50mg daily, Actos 45mg daily, and Glipizide 10mg bid at home. A1c:   Lab Results   Component Value Date/Time    Hemoglobin A1c 9.8 10/20/2017 08:11 AM    Hemoglobin A1c, External 8.4 08/15/2017       Recent Glucose Results:   Lab Results   Component Value Date/Time     (H) 10/20/2017 08:11 AM     (H) 10/20/2017 04:09 AM        Lab Results   Component Value Date/Time    Creatinine 2.07 10/20/2017 08:11 AM       Active Orders   Diet    DIET NPO Except Meds        PO intake: No data found. Current hospital DM medication:  -Humalog normal sensitivity correction        Thank you.       Angie Emery RD, [address]

## 2017-10-20 NOTE — ROUTINE PROCESS
TRANSFER - OUT REPORT:    Verbal report given to Edwina Recinos RN(name) on [address]  being transferred to 422(unit) for routine progression of care       Report consisted of patients Situation, Background, Assessment and   Recommendations(SBAR). Information from the following report(s) SBAR, Kardex, ED Summary, [facility] and Recent Results was reviewed with the receiving nurse. Lines:   Peripheral IV 10/20/17 Left Antecubital (Active)   Site Assessment Clean, dry, & intact 10/20/2017  4:05 AM   Phlebitis Assessment 0 10/20/2017  4:05 AM   Infiltration Assessment 0 10/20/2017  4:05 AM   Dressing Status Clean, dry, & intact 10/20/2017  4:05 AM   Dressing Type Tape;Topical skin adhesive;Transparent 10/20/2017  4:05 AM   Hub Color/Line Status Green;Flushed;Patent 10/20/2017  4:05 AM   Action Taken Blood drawn 10/20/2017  4:05 AM        Opportunity for questions and clarification was provided.       Patient transported with:   Monitor  Tech

## 2017-10-20 NOTE — H&P
Radiology History and Physical    Patient: Bob Rivera. 81 y.o. male     Chief Complaint:   Chief Complaint   Patient presents with    Rectal Bleeding       History of Present Illness: GI bleed. Positive nuclear medicine study. History:    Past Medical History:   Diagnosis Date    CAD (coronary artery disease)     [facility]     Diabetes mellitus, type 2 ([facility]     Hypercholesteremia     Hypertension     PVC's (premature ventricular contractions) 5/26/2014    PVD (peripheral vascular disease) ([facility]      History reviewed. No pertinent family history. Social History     Social History    Marital status:      Spouse name: N/A    Number of children: N/A    Years of education: N/A     Occupational History    Not on file. Social History Main Topics    Smoking status: Former Smoker     Packs/day: 3.00     Years: 20.00     Types: Cigarettes     Quit date: 1/14/1985    Smokeless tobacco: Never Used    Alcohol use No    Drug use: No    Sexual activity: Not on file     Other Topics Concern    Not on file     Social History Narrative       Allergies:    Allergies   Allergen Reactions    Lisinopril Cough       Current Medications:  Current Facility-Administered Medications   Medication Dose Route Frequency    sodium chloride (NS) flush 5-10 mL  5-10 mL IntraVENous Q8H    sodium chloride (NS) flush 5-10 mL  5-10 mL IntraVENous PRN    0.9% sodium chloride infusion  75 mL/hr IntraVENous CONTINUOUS    0.9% sodium chloride infusion 250 mL  250 mL IntraVENous PRN    glucose chewable tablet 16 g  4 Tab Oral PRN    dextrose (D50W) injection syrg 12.5-25 g  12.5-25 g IntraVENous PRN    glucagon (GLUCAGEN) injection 1 mg  1 mg IntraMUSCular PRN    insulin lispro (HUMALOG) injection   SubCUTAneous Q6H    carvedilol (COREG) tablet 25 mg  25 mg Oral BID    simvastatin (ZOCOR) tablet 20 mg  20 mg Oral QHS    amLODIPine (NORVASC) tablet 10 mg  10 mg Oral DAILY    cloNIDine HCl (CATAPRES) tablet 0.1 mg  0.1 mg Oral BID    0.9% sodium chloride infusion 250 mL  250 mL IntraVENous PRN        Physical Exam:  Blood pressure 118/68, pulse 83, temperature 97.6 °F (36.4 °C), resp. rate 18, height 5' 7\" (1.702 m), weight 74.5 kg (164 lb 3.2 oz), SpO2 96 %. GENERAL: alert, cooperative, mild distress, appears stated age, LUNG: clear to auscultation bilaterally, HEART: regular rate and rhythm      Alerts:    Hospital Problems  Date Reviewed: 8/14/2017          Codes Class Noted POA    * (Principal)GI bleed ICD-10-CM: K92.2  ICD-9-CM: 578.9  10/20/2017 Yes        New onset a-fib [facility]) ICD-10-CM: I48.91  ICD-9-CM: 427.31  10/20/2017 Yes        Hyperglycemia due to type 2 diabetes mellitus ([facility] ICD-10-CM: E11.65  ICD-9-CM: 250.00  10/20/2017 Yes        CKD (chronic kidney disease) ICD-10-CM: N18.9  ICD-9-CM: 585.9  1/20/2017 Yes        Carotid arterial disease (HCC) ICD-10-CM: I77.9  ICD-9-CM: 447.9  Unknown Yes        PVD (peripheral vascular disease) (HCC) ICD-10-CM: I73.9  ICD-9-CM: 443.9  Unknown Yes        CAD (coronary artery disease) ICD-10-CM: I25.10  ICD-9-CM: 414.00  Unknown Yes        Hypertension, essential, benign ICD-10-CM: I10  ICD-9-CM: 401.1  Unknown Yes              Laboratory:      Recent Labs      10/20/17   1438  10/20/17   0811   HGB  7.7*  8.9*   HCT  23.7*  27.2*   WBC   --   5.7   PLT   --   216   INR   --   1.1   BUN   --   39*   CREA   --   2.07*   K   --   4.4         Plan of Care/Planned Procedure:  Risks, benefits, and alternatives reviewed with patient and he agrees to proceed with the procedure.

## 2017-10-20 NOTE — PROGRESS NOTES
Problem: Falls - Risk of  Goal: *Absence of Falls  Document Tricia Fall Risk and appropriate interventions in the flowsheet.   Outcome: Progressing Towards Goal  Fall Risk Interventions:

## 2017-10-20 NOTE — IP AVS SNAPSHOT
[address] 
[phone] Patient: Minda John. MRN: [id number] JMX:[id number] You are allergic to the following Allergen Reactions Lisinopril Cough Recent Documentation Height Weight BMI Smoking Status 1.702 m 82.5 kg 28.49 kg/m2 Former Smoker Emergency Contacts Name Discharge Info Relation Home Work Mobile [facility] DISCHARGE CAREGIVER [3] Spouse [3] [phone] About your hospitalization You were admitted on:  October 20, 2017 You last received care in the:  [facility] 4 SURG/BARIATRICS You were discharged on:  October 23, 2017 Unit phone number:  [phone] Why you were hospitalized Your primary diagnosis was:  Gi Bleed Your diagnoses also included:  New Onset A-Fib (Hcc), Hyperglycemia Due To Type 2 Diabetes Mellitus (Hcc), Cad (Coronary Artery Disease), Hypertension, Essential, Benign, Carotid Arterial Disease (Hcc), Ckd (Chronic Kidney Disease), Pvd (Peripheral Vascular Disease) (Hcc), Paroxysmal Atrial Fibrillation (Hcc), Ckd (Chronic Kidney Disease) Stage 3, Gfr 30-59 Ml/Min Providers Seen During Your Hospitalizations Provider Role Specialty Primary office phone Mendez Lake MD Attending Provider Emergency Medicine [phone] Romeo Dennis MD Attending Provider [facility] [phone] Ivanna Garcia MD Attending Provider Internal Medicine [phone] Your Primary Care Physician (PCP) Primary Care Physician Office Phone Office Fax Candis Mcgrathtom [phone] Follow-up Information Follow up With Details Comments Contact Info Jaimie Chaudhry MD   [address] [facility] [address] 
[phone] Ole Ba MD On 11/15/2017 Appointment scheduled for 10 am. [address] 
[phone] Current Discharge Medication List  
  
CONTINUE these medications which have CHANGED Dose & Instructions Dispensing Information Comments Morning Noon Evening Bedtime  
 glipiZIDE 5 mg tablet Commonly known as:  Antionette Benson What changed:   
- medication strength 
- how much to take - when to take this Your last dose was: Your next dose is:    
   
   
 Dose:  5 mg Take 1 Tab by mouth Before breakfast and dinner. Quantity:  60 Tab Refills:  0 CONTINUE these medications which have NOT CHANGED Dose & Instructions Dispensing Information Comments Morning Noon Evening Bedtime  
 amLODIPine 10 mg tablet Commonly known as:  Frannie Darting Start taking on:  10/24/2017 Your last dose was: Your next dose is:    
   
   
 Dose:  10 mg Take 1 Tab by mouth daily. Quantity:  30 Tab Refills:  0  
     
   
   
   
  
 carvedilol 25 mg tablet Commonly known as:  Ozie Bitter Your last dose was: Your next dose is:    
   
   
 Dose:  25 mg Take 1 Tab by mouth two (2) times a day. Quantity:  60 Tab Refills:  0 CINNAMON 500 mg Cap Generic drug:  cinnamon bark Your last dose was: Your next dose is:    
   
   
 Dose:  500 mg Take 500 mg by mouth two (2) times a day. Refills:  0  
     
   
   
   
  
 cloNIDine HCl 0.1 mg tablet Commonly known as:  CATAPRES Your last dose was: Your next dose is:    
   
   
 Dose:  0.1 mg Take 1 Tab by mouth two (2) times a day. Quantity:  60 Tab Refills:  5 FISH -160-1,000 mg Cap Generic drug:  omega 3-dha-epa-fish oil Your last dose was: Your next dose is:    
   
   
 Dose:  1 Cap Take 1 Cap by mouth two (2) times a day. Refills:  0 JANUVIA 50 mg tablet Generic drug:  SITagliptin Your last dose was: Your next dose is: Dose:  50 mg Take 50 mg by mouth daily. Refills:  0  
     
   
   
   
  
 promethazine-codeine 6.25-10 mg/5 mL syrup Commonly known as:  PHENERGAN with CODEINE Your last dose was: Your next dose is:    
   
   
 Dose:  5 mL Take 5 mL by mouth every six (6) hours as needed for Cough. Refills:  0  
     
   
   
   
  
 simvastatin 20 mg tablet Commonly known as:  ZOCOR Your last dose was: Your next dose is:    
   
   
 Dose:  20 mg Take 20 mg by mouth nightly. Refills:  0 STOP taking these medications   
 aspirin delayed-release 81 mg tablet  
   
  
 bumetanide 1 mg tablet Commonly known as:  BUMEX  
   
  
 potassium chloride SR 10 mEq tablet Commonly known as:  KLOR-CON 10 Where to Get Your Medications These medications were sent to [address] [place] [address],[address], [address] [place] [address] Phone:  [phone]  
  amLODIPine 10 mg tablet Information on where to get these meds will be given to you by the nurse or doctor. ! Ask your nurse or doctor about these medications  
  glipiZIDE 5 mg tablet Discharge Instructions Discharge Instructions PATIENT ID: Andree Diaz Sr. MRN: [id number] YOB: 1935 DATE OF ADMISSION: 10/20/2017  3:47 AM   
DATE OF DISCHARGE: 10/23/2017 PRIMARY CARE PROVIDER: Avery Quiroz MD  
 
ATTENDING PHYSICIAN: Lazara Fuentes MD 
DISCHARGING PROVIDER: Lazara Fuentes MD   
To contact this individual call [phone] and ask the  to page. If unavailable ask to be transferred the Adult Hospitalist Department. DISCHARGE DIAGNOSES see dc note CONSULTATIONS: IP CONSULT TO HOSPITALIST 
IP CONSULT TO GASTROENTEROLOGY 
IP CONSULT TO GASTROENTEROLOGY 
IP CONSULT TO INTERVENTIONAL RADIOLOGY 
IP CONSULT TO NEPHROLOGY 
IP CONSULT TO CARDIOLOGY PROCEDURES/SURGERIES: * No surgery found * PENDING TEST RESULTS:  
At the time of discharge the following test results are still pending: na 
 
FOLLOW UP APPOINTMENTS:  
Follow-up Information Follow up With Details Comments Contact Info Adi Guzman MD   [address] [place] [address] 
[phone] Dilip Harrington MD In 1 week or as directed, ; if not clear call his office when you get home to [address] appointment time/date [address] [place] [address] 
[phone] ADDITIONAL CARE RECOMMENDATIONS: na 
 
DIET: Diabetic Diet ACTIVITY: Activity as tolerated WOUND CARE: na 
 
EQUIPMENT needed: na 
 
 
  
 SNF/Inpatient Rehab/LTAC  
x Independent/assisted living Hospice Other:  
 
  
 
Signed:  
Gabriela Rojo MD 
10/23/2017 
9:08 AM 
 
Discharge Orders None [facility] Announcement We are excited to announce that we are making your provider's discharge notes available to you in [employer]. You will see these notes when they are completed and signed by the physician that discharged you from your recent hospital stay.   If you have any questions or concerns about any information you see in [employer], please call the Health Information Department where you were seen or reach out to your Primary Care Provider for more information about your plan of care. Introducing [facility] & HEALTH SERVICES! [address] introduces [employer] patient portal. Now you can access parts of your medical record, email your doctor's office, and request medication refills online. 1. In your internet browser, go to [email or web address] 2. Click on the First Time User? Click Here link in the Sign In box. You will see the New Member Sign Up page. 3. Enter your [employer] Access Code exactly as it appears below. You will not need to use this code after youve completed the sign-up process. If you do not sign up before the expiration date, you must request a new code. · [employer] Access Code: [email or web address] Expires: 1/21/2018  9:13 AM 
 
4. Enter the last four digits of your Social Security Number (xxxx) and Date of Birth (mm/dd/yyyy) as indicated and click Submit. You will be taken to the next sign-up page. 5. Create a [employer] ID. This will be your [employer] login ID and cannot be changed, so think of one that is secure and easy to remember. 6. Create a [employer] password. You can change your password at any time. 7. Enter your Password Reset Question and Answer. This can be used at a later time if you forget your password. 8. Enter your e-mail address. You will receive e-mail notification when new information is available in [address] 9. Click Sign Up. You can now view and download portions of your medical record. 10. Click the Download Summary menu link to download a portable copy of your medical information. If you have questions, please visit the Frequently Asked Questions section of the [employer] website. Remember, [employer] is NOT to be used for urgent needs. For medical emergencies, dial 911. Now available from your iPhone and Android! General Information Please provide this summary of care documentation to your next provider. Patient Signature:  ____________________________________________________________ Date:  ____________________________________________________________  
  
Josph Perfect Provider Signature:  ____________________________________________________________ Date:  ____________________________________________________________

## 2017-10-20 NOTE — PROCEDURES
PROCEDURE:CHRISS arteriogram.  INDICATION:GI bleed;positive nuclear medicine study. ANESTHESIA:local and sedation. COMPLICATION:NONE. SPECIMENS REMOVED:none. BLOOD LOSS:NONE. /ASSISTANT:SKYLER Bishop RECOMMENDATIONS:none. CONSENT OBTAINED:YES.  NOTES:negative study.

## 2017-10-20 NOTE — CONSULTS
NEPHROLOGY CONSULT NOTE     Patient: Christiano Cha Sr. MRN: [id number]  PCP: Opal Mauro MD   :     1935  Age:   81 y.o. Sex:  male      Referring physician: Alejandra Wright*  Reason for consultation: 81 y.o. 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  - CT scan showing atrophic kidney c/w chronic disease    HTN:  -  BP stable    Volume:  - appears stable  - cont with NS and hold bumex and thiazides    GI bleed:  - + bleeding scan  - for coil embolization today  - GI following    Afib:  - repeat ECHO pending  - cards following    CAD s/p CABG    DM2     Active Problems / Assessment AAActive  :   Principal Problem:    GI bleed (10/20/2017)    Active Problems:    CAD (coronary artery disease) ()      Hypertension, essential, benign ()      Carotid arterial disease (HCC) ()      PVD (peripheral vascular disease) ([facility] ()      CKD (chronic kidney disease) (2017)      New onset a-fib ([facility] (10/20/2017)      Hyperglycemia due to type 2 diabetes mellitus ([facility] (10/20/2017)         Subjective:   HPI: Rk Up is a 81 y.o.  male who has been admitted to the hospital for blood stool. He has a history of  CAD s/p CABG, DM2, HTN, carotid artery disease, PAD, diverticulosis with a prior GI bleed in the past.  He appears to have CKD III/IV with a baseline Cr around 1.8 to 2 in the past.  He does not follow with a nephrologist.  He woke up this AM with BRBPR. His hgb has dropped from 10.8 to 8.9.   His Cr was 2.2 on admission and is stable at 2.1 this AM.  Bleeding scan done today showed active GI bleeding in the descending/sigmoid colon.  There are plans to go to IR for coil embolization this afternoon. He is on  bumex and HCTZ at home. Past Medical Hx:   Past Medical History:   Diagnosis Date    CAD (coronary artery disease)     [facility]     Diabetes mellitus, type 2 ([facility]     Hypercholesteremia     Hypertension     PVC's (premature ventricular contractions) 5/26/2014    PVD (peripheral vascular disease) ([facility]         Past Surgical Hx:     Past Surgical History:   Procedure Laterality Date    HX CORONARY ARTERY BYPASS GRAFT      HX HEART CATHETERIZATION         Medications:  Prior to Admission medications    Medication Sig Start Date End Date Taking? Authorizing Provider   SITagliptin (JANUVIA) 50 mg tablet Take 50 mg by mouth daily. Yes Gilbert Patel MD   bumetanide (BUMEX) 1 mg tablet Take 1 mg by mouth two (2) times a day. Yes Gilbert Patel MD   potassium chloride SR (KLOR-CON 10) 10 mEq tablet Take 20 mEq by mouth two (2) times a day. Yes Gilbert Patel MD   cloNIDine HCl (CATAPRES) 0.1 mg tablet Take 1 Tab by mouth two (2) times a day. 6/1/17  Yes Nahid Mendez MD   carvedilol (COREG) 25 mg tablet Take 1 Tab by mouth two (2) times a day. 3/18/17  Yes Jodie He MD   aspirin delayed-release 81 mg tablet Take 81 mg by mouth daily. Yes Historical Provider   simvastatin (ZOCOR) 20 mg tablet Take 20 mg by mouth nightly. Yes Historical Provider   omega 3-dha-epa-fish oil (FISH OIL) 100-160-1,000 mg cap Take 1 Cap by mouth two (2) times a day. Yes Historical Provider   pioglitazone (ACTOS) 45 mg tablet Take 45 mg by mouth daily. 9/5/16  Yes Historical Provider   cinnamon bark (CINNAMON) 500 mg cap Take 500 mg by mouth two (2) times a day. Yes Historical Provider   glipiZIDE (GLUCOTROL) 10 mg tablet Take 20 mg by mouth two (2) times a day.    Yes Historical Provider   promethazine-codeine (PHENERGAN WITH CODEINE) 6.25-10 mg/5 mL syrup Take 5 mL by mouth every six (6) hours as needed for Cough.    Historical Provider   amLODIPine (NORVASC) 10 mg tablet Take 10 mg by mouth daily. Historical Provider   hydrochlorothiazide (HYDRODIURIL) 25 mg tablet Take 25 mg by mouth daily. Historical Provider       Allergies   Allergen Reactions    Lisinopril Cough       Social Hx:  reports that he quit smoking about 32 years ago. His smoking use included Cigarettes. He has a 60.00 pack-year smoking history. He has never used smokeless tobacco. He reports that he does not drink alcohol or use illicit drugs. History reviewed. No pertinent family history. Review of Systems:  A twelve point review of system was performed today. Pertinent positives and negatives are mentioned in the HPI. The reminder of the ROS is negative and noncontributory. Objective:    Vitals:    Vitals:    10/20/17 0706 10/20/17 0715 10/20/17 0745 10/20/17 1130   BP: 137/47 141/75 116/65 108/56   Pulse: 82 82 89 80   Resp: 20 21 25 20   Temp: 97.6 °F (36.4 °C)  97 °F (36.1 °C)    SpO2: 99% 97% 98%    Weight:       Height:         I&O's:     Visit Vitals    /56    Pulse 80    Temp 97 °F (36.1 °C)    Resp 20    Ht 5' 7\" (1.702 m)    Wt 74.5 kg (164 lb 3.2 oz)    SpO2 98%    BMI 25.72 kg/m2       Physical Exam:  General:Alert, No distress,   HEENT: Eyes are PERRL. Conjunctiva without pallor ,erythema. The sclerae without icterus. .   Neck:Supple,no mass palpable  Lungs : Clears to auscultation Bilaterally, Normal respiratory effort  CVS: RRR, S1 S2 normal, No rub,  trace LE edema  Abdomen: Soft, Non tender, No hepatosplenomegaly, bowel sounds present  Extremities: No cyanosis, No clubbing  Skin: No rash or lesions.   Lymph nodes: No palpable nodes  MS: No joint swelling, erythema, warmth  Neurologic: non focal, AAO x 3  Psych: normal affect    Laboratory Results:    Lab Results   Component Value Date    BUN 39 (H) 10/20/2017     10/20/2017    K 4.4 10/20/2017     10/20/2017    CO2 28 10/20/2017       Lab Results   Component Value Date    BUN 39 (H) 10/20/2017    BUN 36 (H) 10/20/2017    BUN 41 (H) 06/27/2017    BUN 48 (H) 06/26/2017    BUN 60 (H) 06/25/2017    K 4.4 10/20/2017    K 4.0 10/20/2017    K 5.4 (H) 06/27/2017    K 3.6 06/26/2017    K 3.1 (L) 06/25/2017       Lab Results   Component Value Date    WBC 5.7 10/20/2017    RBC 3.15 (L) 10/20/2017    HGB 8.9 (L) 10/20/2017    HCT 27.2 (L) 10/20/2017    MCV 86.3 10/20/2017    MCH 28.3 10/20/2017    RDW 14.8 (H) 10/20/2017     10/20/2017       Lab Results   Component Value Date    PHOS 3.0 03/18/2017       Urine dipstick:   Lab Results   Component Value Date/Time    Color YELLOW/STRAW 06/25/2017 11:40 AM    Appearance CLEAR 06/25/2017 11:40 AM    Specific gravity 1.018 06/25/2017 11:40 AM    pH (UA) 5.0 06/25/2017 11:40 AM    Protein 300 06/25/2017 11:40 AM    Glucose NEGATIVE  06/25/2017 11:40 AM    Ketone NEGATIVE  06/25/2017 11:40 AM    Bilirubin NEGATIVE  06/25/2017 11:40 AM    Urobilinogen 0.2 06/25/2017 11:40 AM    Nitrites NEGATIVE  06/25/2017 11:40 AM    Leukocyte Esterase NEGATIVE  06/25/2017 11:40 AM    Epithelial cells FEW 06/25/2017 11:40 AM    Bacteria NEGATIVE  06/25/2017 11:40 AM    WBC 0-4 06/25/2017 11:40 AM    RBC 0-5 06/25/2017 11:40 AM       I have reviewed the following: All pertinent labs, microbiology data, radiology imaging for my assessment       Thank you for allowing us to participate in the care of this patient. We will follow patient.  Please dont hesitate to call with any questions    Manuel Loza MD  10/20/2017    [address] [place] [address]

## 2017-10-20 NOTE — PROGRESS NOTES
0800:  Report received from [employer] Pt resting in bed. To go down for bleeding scan this morning. Primary Nurse Mj Davila, BUCK and Malcolm Quezada RN performed a dual skin assessment on this patient No impairment noted  Fabio score is 22    1030:  Bleeding scan positive. Will go to Angio when they are ready. 1430:  Pt arrived back to unit. Unable to identify bleeding areas. Rt groin angioseal c/d/i. Repeat HGB sent. 1500:  Pt hgb 7.7  Orders for PRBC received. 1600:  Bedside shift change report given to [address] [place] [address] (oncoming nurse) by Brannon Mancilla (offgoing nurse). Report included the following information SBAR, Kardex, MAR and Recent Results.

## 2017-10-20 NOTE — ED TRIAGE NOTES
Triage: Was sleeping and woke up and had the feeling of needing to go to the bathroom and noticed bright red blood. \" it was enough blood to get on my underwear and sheets\". Has had a similar episode about 5 years ago and was told it was Diverticulosis. Denies dizziness. + Shortness of breath but states that it is not new.

## 2017-10-20 NOTE — Clinical Note
Status[de-identified] Inpatient [101] Type of Bed: Medical [8] Inpatient Hospitalization Certified Necessary for the Following Reasons: 3. Patient receiving treatment that can only be provided in an inpatient setting (further clarification in H&P documentation) Admitting Diagnosis: GI bleed [826504] Admitting Physician: Dariel Howe Attending Physician: Dariel Howe Estimated Length of Stay: 2 Midnights Discharge Plan[de-identified] Home with Office Follow-up

## 2017-10-20 NOTE — ED NOTES
Patient assisted to bedside commode. He had 1 moderately sized stool with clara red blood in it. Also had blood clots. Patient denies any abdominal pain or discomfort at this time. Call bell within reach of patient.

## 2017-10-21 PROBLEM — I48.0 PAROXYSMAL ATRIAL FIBRILLATION (HCC): Status: ACTIVE | Noted: 2017-10-21

## 2017-10-21 PROBLEM — N18.30 CKD (CHRONIC KIDNEY DISEASE) STAGE 3, GFR 30-59 ML/MIN (HCC): Status: ACTIVE | Noted: 2017-10-21

## 2017-10-21 LAB
ABO + RH BLD: NORMAL
ANION GAP SERPL CALC-SCNC: 9 MMOL/L (ref 5–15)
BLD PROD TYP BPU: NORMAL
BLD PROD TYP BPU: NORMAL
BLOOD GROUP ANTIBODIES SERPL: NORMAL
BPU ID: NORMAL
BPU ID: NORMAL
BUN SERPL-MCNC: 36 MG/DL (ref 6–20)
BUN/CREAT SERPL: 22 (ref 12–20)
CALCIUM SERPL-MCNC: 7.8 MG/DL (ref 8.5–10.1)
CHLORIDE SERPL-SCNC: 109 MMOL/L (ref 97–108)
CO2 SERPL-SCNC: 22 MMOL/L (ref 21–32)
CREAT SERPL-MCNC: 1.62 MG/DL (ref 0.7–1.3)
CROSSMATCH RESULT,%XM: NORMAL
CROSSMATCH RESULT,%XM: NORMAL
ERYTHROCYTE [DISTWIDTH] IN BLOOD BY AUTOMATED COUNT: 15.5 % (ref 11.5–14.5)
GLUCOSE BLD STRIP.AUTO-MCNC: 182 MG/DL (ref 65–100)
GLUCOSE BLD STRIP.AUTO-MCNC: 188 MG/DL (ref 65–100)
GLUCOSE BLD STRIP.AUTO-MCNC: 205 MG/DL (ref 65–100)
GLUCOSE BLD STRIP.AUTO-MCNC: 206 MG/DL (ref 65–100)
GLUCOSE SERPL-MCNC: 163 MG/DL (ref 65–100)
HCT VFR BLD AUTO: 27 % (ref 36.6–50.3)
HCT VFR BLD AUTO: 27.8 % (ref 36.6–50.3)
HCT VFR BLD AUTO: 28.2 % (ref 36.6–50.3)
HCT VFR BLD AUTO: 30.1 % (ref 36.6–50.3)
HGB BLD-MCNC: 10.1 G/DL (ref 12.1–17)
HGB BLD-MCNC: 9.1 G/DL (ref 12.1–17)
HGB BLD-MCNC: 9.3 G/DL (ref 12.1–17)
HGB BLD-MCNC: 9.5 G/DL (ref 12.1–17)
MCH RBC QN AUTO: 28.1 PG (ref 26–34)
MCHC RBC AUTO-ENTMCNC: 33.6 G/DL (ref 30–36.5)
MCV RBC AUTO: 83.6 FL (ref 80–99)
PLATELET # BLD AUTO: 170 K/UL (ref 150–400)
POTASSIUM SERPL-SCNC: 4.5 MMOL/L (ref 3.5–5.1)
RBC # BLD AUTO: 3.6 M/UL (ref 4.1–5.7)
SERVICE CMNT-IMP: ABNORMAL
SODIUM SERPL-SCNC: 140 MMOL/L (ref 136–145)
SPECIMEN EXP DATE BLD: NORMAL
STATUS OF UNIT,%ST: NORMAL
STATUS OF UNIT,%ST: NORMAL
UNIT DIVISION, %UDIV: 0
UNIT DIVISION, %UDIV: 0
WBC # BLD AUTO: 6.3 K/UL (ref 4.1–11.1)

## 2017-10-21 PROCEDURE — 74011250637 HC RX REV CODE- 250/637: Performed by: PHYSICIAN ASSISTANT

## 2017-10-21 PROCEDURE — 85027 COMPLETE CBC AUTOMATED: CPT

## 2017-10-21 PROCEDURE — 74011636637 HC RX REV CODE- 636/637: Performed by: FAMILY MEDICINE

## 2017-10-21 PROCEDURE — 36415 COLL VENOUS BLD VENIPUNCTURE: CPT | Performed by: INTERNAL MEDICINE

## 2017-10-21 PROCEDURE — 82962 GLUCOSE BLOOD TEST: CPT

## 2017-10-21 PROCEDURE — 80048 BASIC METABOLIC PNL TOTAL CA: CPT

## 2017-10-21 PROCEDURE — 85018 HEMOGLOBIN: CPT | Performed by: INTERNAL MEDICINE

## 2017-10-21 PROCEDURE — 65660000000 HC RM CCU STEPDOWN

## 2017-10-21 PROCEDURE — 74011250637 HC RX REV CODE- 250/637: Performed by: INTERNAL MEDICINE

## 2017-10-21 RX ORDER — GLIPIZIDE 5 MG/1
5 TABLET ORAL
Status: DISCONTINUED | OUTPATIENT
Start: 2017-10-21 | End: 2017-10-22

## 2017-10-21 RX ADMIN — SIMVASTATIN 20 MG: 20 TABLET, FILM COATED ORAL at 21:38

## 2017-10-21 RX ADMIN — CARVEDILOL 25 MG: 12.5 TABLET, FILM COATED ORAL at 08:41

## 2017-10-21 RX ADMIN — CARVEDILOL 25 MG: 12.5 TABLET, FILM COATED ORAL at 16:53

## 2017-10-21 RX ADMIN — Medication 10 ML: at 14:19

## 2017-10-21 RX ADMIN — CLONIDINE HYDROCHLORIDE 0.1 MG: 0.1 TABLET ORAL at 21:38

## 2017-10-21 RX ADMIN — INSULIN LISPRO 2 UNITS: 100 INJECTION, SOLUTION INTRAVENOUS; SUBCUTANEOUS at 12:00

## 2017-10-21 RX ADMIN — INSULIN LISPRO 3 UNITS: 100 INJECTION, SOLUTION INTRAVENOUS; SUBCUTANEOUS at 16:53

## 2017-10-21 RX ADMIN — GLIPIZIDE 5 MG: 5 TABLET ORAL at 14:19

## 2017-10-21 RX ADMIN — CLONIDINE HYDROCHLORIDE 0.1 MG: 0.1 TABLET ORAL at 08:41

## 2017-10-21 RX ADMIN — Medication 10 ML: at 00:26

## 2017-10-21 RX ADMIN — INSULIN LISPRO 2 UNITS: 100 INJECTION, SOLUTION INTRAVENOUS; SUBCUTANEOUS at 07:21

## 2017-10-21 RX ADMIN — AMLODIPINE BESYLATE 10 MG: 5 TABLET ORAL at 08:41

## 2017-10-21 NOTE — PROGRESS NOTES
10/21/2017    Elvie Goldman MD   [place] [phone] PCP:-MD Paty Cheng [facility]  -Progress Note     . Elvis Kenney Record is a 81 y.o. male   Comfortable   No complaints  Denies chest pain, heart palpitations , increasing edema, pre-syncope or shortness of breath at rest   No problems overnight, rhythm and hemodynamics stable -see vitals below     Remains on tele in stable rate afib            New onset atrial fibrillation -has been PAF, in and out , now in afib and no complaints of cp,sob feels well  ---no OAC or warfarin for now with new recent GI bleed and anemia  The heart rate is stable  He is asymptomatic   EF is WNL    CAD  Coreg 25 mg BID                        - Zocor 20 mg                        - ASA held 2/2 bleeding    HTN            - Coreg, HCTZ, Catapres, Norvasc  --now stable    GI bleed/BRBPR + bleeding scan  Lab Results   Component Value Date/Time    HGB 9.3 10/21/2017 12:18 PM    will need close fu on Hgb with PCP or GI    DM2-  Lab Results   Component Value Date/Time    Hemoglobin A1c 9.8 10/20/2017 08:11 AM    Hemoglobin A1c, External 8.4 08/15/2017        PVD-statin, aspirin    KATALINA on CKD 3  Has been on lasix at home with improvement of SOB after starting the diuretics  Lab Results   Component Value Date/Time    Creatinine 1.62 10/21/2017 06:39 AM     Suspect cardiorenal syndrome    Overall cardiac issues are stable  Get back on usual meds except aspirin  Once Hgb stable then likely home   With close fu on Hgb  Stay off ASA and no OAC due to severe anemia  Has appt in Dec  I will move up to 2 weeks     Primary service to decide on dc plans based on risk for further bleeding, spoke to nurse in detail on cardiac assessment  reviewed prior notes in chart     Other co-morbids:    has a past medical history of CAD (coronary artery disease); Carotid arterial disease ([address] Diabetes mellitus, type 2 ([address] Hypercholesteremia;  Hypertension; PVC's (premature ventricular contractions) (2014); and PVD (peripheral vascular disease) ([facility] Cardiac Studies/Hx:  Holter monitor14=showing rate of  with frequent PACs, rare periods of short RP interval SVT up to 122 and frequent PVCs. sinus rhythm with similar findings, frequent PVCs and short RP interval tachycardia. NUKE  14,  2 minutes, 20 seconds,  EF of 55% with no ischemia. PVD= Dr. Ashlee See atherectomy to distal SFA and distal popliteal with angioplasty to both lesions and stent to the SFA by Dr. Ashlee See on 3/14/14. Previously had AIBs of 0.67 on the left, 0.71 on the right with the right seemingly due to distal disease. CAD/CABG off pump x3 3/2008 . =post op anemia and renal failure  CATH 2008= severe three-vessel left main coronary artery disease, 40% disease of the left main and take off of the LAD and circumflex complex, 70% stenosis of the ostial circ and 85% of the LAD. Between the first and second marginal, the circumflex had 70% stenosis and between the third marginal and PDA there was a 70% stenosis, LAD ostial lesion RCA proximal 60% tapering, EF 60%-70%, bilaterally patent renal arteries, mild atherosclerosis of the distal abdominal aorta. Mild carotid artery disease 3-7-08 then 12 with 10-49% left, less than 10% on right  Dyslipidemia 10-25-10  TG 91 HDL 40 LDL 48  SOCIAL: Drinks no alcohol, quit smoking several years ago, works as an . Lives with his wife and has four children. Enjoys gardening, fishing and music. FAMILY HISTORY: Mother  of cancer at 76, father  of Parkinson's at 70, one brother  of cancer of the liver at 76 and one  of an infection at 64.             Past Medical History:   Diagnosis Date    CAD (coronary artery disease)     Carotid arterial disease ([facility]     Diabetes mellitus, type 2 (HCC)     Hypercholesteremia     Hypertension     PVC's (premature ventricular contractions) 5/26/2014    PVD (peripheral vascular disease) ([facility]       ROS-pertinents  negative except as above  The pertinent portions of the medical history,physician and nursing notes, meds,vitals , labs and Ins/Outs,are reviewed in the electronic record. Results for orders placed or performed during the hospital encounter of 10/20/17   EKG, 12 LEAD, INITIAL   Result Value Ref Range    Ventricular Rate 74 BPM    Atrial Rate 79 BPM    QRS Duration 88 ms    Q-T Interval 420 ms    QTC Calculation (Bezet) 466 ms    Calculated R Axis -48 degrees    Calculated T Axis 131 degrees    Diagnosis       Atrial fibrillation  Left axis deviation  ST & T wave abnormality, consider lateral ischemia  Prolonged QT  When compared with ECG of 25-JUN-2017 09:47,  Atrial fibrillation has replaced Sinus rhythm  T wave amplitude has increased in Anterior leads  T wave inversion now evident in Lateral leads     Results for orders placed or performed in visit on 11/28/11   AMB POC EKG ROUTINE W/ 12 LEADS, INTER & REP    Narrative    See scanned results          Objective:    Physical Exam:   Vitals:    10/21/17 0400 10/21/17 0425 10/21/17 0759 10/21/17 1146   BP: 130/70 189/77 153/76 156/67   BP 1 Location:   Right arm Right arm   BP Patient Position:    Head of bed elevated (Comment degrees)  Comment: 30   Pulse: 75 81 82 73   Resp: 22 20  18   Temp: 98.7 °F (37.1 °C) 97.7 °F (36.5 °C) 98 °F (36.7 °C) 97.6 °F (36.4 °C)   SpO2: 98% 99% 99% 99%   Weight:       Height:         Patient Vitals for the past 12 hrs:   Temp Pulse Resp BP SpO2   10/21/17 1146 97.6 °F (36.4 °C) 73 18 156/67 99 %   10/21/17 0759 98 °F (36.7 °C) 82 - 153/76 99 %   10/21/17 0425 97.7 °F (36.5 °C) 81 20 189/77 99 %   10/21/17 0400 98.7 °F (37.1 °C) 75 22 130/70 98 %   10/21/17 0300 - 74 19 126/57 97 %   10/21/17 0200 - 72 23 121/52 98 %      Constitutional:  well-developed and well-nourished. No distress. HENT: Head: Normocephalic. Eyes: No scleral icterus.    Neck:  Neck supple. No JVD present. No tracheal deviation present. Pulmonary/Chest: Effort normal and breath sounds normal. No stridor. No respiratory distress, wheezes or rales. Cardiovascular: Irregularly irregular rhythm, , normal heart sounds . Exam reveals no gallop and no friction rub. No murmur heard. PMI non displaced. Extremities:  no edema. Abdominal:   no abnormal distension. Neurological:  alert and oriented. Coordination seems grossly normal.   Skin: Skin is not cold. No rash noted. Not diaphoretic. No erythema. Psychiatric:  Grossly normal mood and affect.   Behavior appears normal.    Last 24hr Input/Output:    Intake/Output Summary (Last 24 hours) at 10/21/17 1330  Last data filed at 10/21/17 1150   Gross per 24 hour   Intake           3677.1 ml   Output              625 ml   Net           3052.1 ml        Data Review:   Recent Results (from the past 24 hour(s))   HGB & HCT    Collection Time: 10/20/17  2:38 PM   Result Value Ref Range    HGB 7.7 (L) 12.1 - 17.0 g/dL    HCT 23.7 (L) 36.6 - 50.3 %   GLUCOSE, POC    Collection Time: 10/20/17  3:14 PM   Result Value Ref Range    Glucose (POC) 285 (H) 65 - 100 mg/dL    Performed by [id number] [place], POC    Collection Time: 10/20/17  7:09 PM   Result Value Ref Range    Glucose (POC) 346 (H) 65 - 100 mg/dL    Performed by [facility]    HGB & HCT    Collection Time: 10/21/17 12:25 AM   Result Value Ref Range    HGB 9.1 (L) 12.1 - 17.0 g/dL    HCT 27.0 (L) 36.6 - 26.2 %   METABOLIC PANEL, BASIC    Collection Time: 10/21/17  6:39 AM   Result Value Ref Range    Sodium 140 136 - 145 mmol/L    Potassium 4.5 3.5 - 5.1 mmol/L    Chloride 109 (H) 97 - 108 mmol/L    CO2 22 21 - 32 mmol/L    Anion gap 9 5 - 15 mmol/L    Glucose 163 (H) 65 - 100 mg/dL    BUN 36 (H) 6 - 20 MG/DL    Creatinine 1.62 (H) 0.70 - 1.30 MG/DL    BUN/Creatinine ratio 22 (H) 12 - 20      GFR est AA 50 (L) >60 ml/min/1.73m2    GFR est non-AA 41 (L) >60 ml/min/1.73m2 Calcium 7.8 (L) 8.5 - 10.1 MG/DL   CBC W/O DIFF    Collection Time: 10/21/17  6:39 AM   Result Value Ref Range    WBC 6.3 4.1 - 11.1 K/uL    RBC 3.60 (L) 4.10 - 5.70 M/uL    HGB 10.1 (L) 12.1 - 17.0 g/dL    HCT 30.1 (L) 36.6 - 50.3 %    MCV 83.6 80.0 - 99.0 FL    MCH 28.1 26.0 - 34.0 PG    MCHC 33.6 30.0 - 36.5 g/dL    RDW 15.5 (H) 11.5 - 14.5 %    PLATELET 759 845 - 223 K/uL   GLUCOSE, POC    Collection Time: 10/21/17  6:40 AM   Result Value Ref Range    Glucose (POC) 188 (H) 65 - 100 mg/dL    Performed by [address] [place] [address], POC    Collection Time: 10/21/17 11:49 AM   Result Value Ref Range    Glucose (POC) 182 (H) 65 - 100 mg/dL    Performed by Jody SINGH    HGB & HCT    Collection Time: 10/21/17 12:18 PM   Result Value Ref Range    HGB 9.3 (L) 12.1 - 17.0 g/dL    HCT 27.8 (L) 36.6 - 50.3 %    Ebony Hutchins MD 10/21/2017 _

## 2017-10-21 NOTE — PROGRESS NOTES
Hospitalist Progress Note  Maciel Tabares MD  Answering service: [phone] from in house phone         Date of Service:  10/21/2017  NAME:  Celio Feng  :  1935  MRN:  [id number]      Admission Summary:   Celio Feng is a 81 y.o. male who presents with rectal bleed onset 3 am today. He report bright red blood in his underwear and mixed with stool. He denies any abdominal pain or use of blood thinners. He however takes daily aspirin. In the ED he was hemodynamically stable. Negative for hematemesis. Hx of diverticulosis years ago . He report having a colonoscopy 6 years ago. Patient will be admitted for further treatment.        Interval history / Subjective:    no issues no bleeding, hgb up     Assessment & Plan:     GI bleed - Likely lower GI bleed. Hx of diverticulosis noted. Patient remains NPO under seen by GI. Monitor H&H every 8 hours if actively bleeding. Give IVF and consult GI  NM scan suggest bleed =active GI bleeding in the descending/sigmoid colon and IR could not successfully embolize,  Not hct is coming up to   Lab Results   Component Value Date/Time    HGB 10.1 10/21/2017 06:39 AM    , no furhter bleeding.      Hypertension - controlled . On coreg, clonidine , Norvasc  BP Readings from Last 1 Encounters:   10/21/17 153/76         DM - hyperglycemia . SSI   Lab Results   Component Value Date/Time    Glucose 163 10/21/2017 06:39 AM    Glucose (POC) 188 10/21/2017 06:40 AM         CKD - acute on chronic. Hold Bumex for now  Lab Results   Component Value Date/Time    Creatinine 1.62 10/21/2017 06:39 AM     Lab Results   Component Value Date/Time    Potassium 4.5 10/21/2017 06:39 AM         New onset Afib/SVR  - On tele. Check TSH, ECHO ordered. Recommend Cardiology consult   Fib/flutter on last strips 10/21/2017 .  Will need anticoag  ECHO; good ef no RWMA.      DVT Prophylaxis - SCD's     Code status: full   DVT prophylaxis: SCD    Care Plan discussed with: Patient/Family and Nurse  Disposition: Home w/Family and TBD     Hospital Problems  Date Reviewed: 8/14/2017          Codes Class Noted POA    * (Principal)GI bleed ICD-10-CM: K92.2  ICD-9-CM: 578.9  10/20/2017 Yes        New onset a-fib [facility]) ICD-10-CM: I48.91  ICD-9-CM: 427.31  10/20/2017 Yes        Hyperglycemia due to type 2 diabetes mellitus ([facility] [address] ICD-10-CM: E11.65  ICD-9-CM: 250.00  10/20/2017 Yes        CKD (chronic kidney disease) ICD-10-CM: N18.9  ICD-9-CM: 585.9  1/20/2017 Yes        Carotid arterial disease (HCC) ICD-10-CM: I77.9  ICD-9-CM: 447.9  Unknown Yes        PVD (peripheral vascular disease) (HCC) ICD-10-CM: I73.9  ICD-9-CM: 443.9  Unknown Yes        CAD (coronary artery disease) ICD-10-CM: I25.10  ICD-9-CM: 414.00  Unknown Yes        Hypertension, essential, benign ICD-10-CM: I10  ICD-9-CM: 401.1  Unknown Yes                Review of Systems:   no bleeding no n/v/d/f/chills, no cp no sob. anxious to be discharged. Vital Signs:    Last 24hrs VS reviewed since prior progress note. Most recent are:  Visit Vitals    /76 (BP 1 Location: Right arm)    Pulse 82    Temp 98 °F (36.7 °C)    Resp 20    Ht 5' 7\" (1.702 m)    Wt 74.5 kg (164 lb 3.2 oz)    SpO2 99%    BMI 25.72 kg/m2         Intake/Output Summary (Last 24 hours) at 10/21/17 0524  Last data filed at 10/21/17 0907   Gross per 24 hour   Intake           1289.6 ml   Output              450 ml   Net            839.6 ml        Physical Examination:             Constitutional:  No acute distress, cooperative, pleasant    ENT:  Oral mucous moist, oropharynx benign. Neck supple,    Resp:  CTA bilaterally. No wheezing/rhonchi/rales. No accessory muscle use   CV:  iRegular rhythm, normal rate, no murmurs, gallops, rubs    GI:  Soft, non distended, non tender.  normoactive bowel sounds, no hepatosplenomegaly     Musculoskeletal:  No edema, warm, 2+ pulses throughout    Neurologic:  Moves all extremities. AAOx3, CN II-XII reviewed     Psych:  Good insight, Not anxious nor agitated. Data Review:    Review and/or order of clinical lab test      Labs:     Recent Labs      10/21/17   0639  10/21/17   0025   10/20/17   0811   WBC  6.3   --    --   5.7   HGB  10.1*  9.1*   < >  8.9*   HCT  30.1*  27.0*   < >  27.2*   PLT  170   --    --   216    < > = values in this interval not displayed. Recent Labs      10/21/17   0639  10/20/17   0811  10/20/17   0409   NA  140  136  137   K  4.5  4.4  4.0   CL  109*  102  100   CO2  22  28  30   BUN  36*  39*  36*   CREA  1.62*  2.07*  2.15*   GLU  163*  302*  309*   CA  7.8*  8.2*  8.2*   MG   --   1.7   --      Recent Labs      10/20/17   0409   SGOT  16   ALT  24   AP  78   TBILI  0.3   TP  7.2   ALB  3.0*   GLOB  4.2*     Recent Labs      10/20/17   0811  10/20/17   0409   INR  1.1  1.0   PTP  10.8  10.3      No results for input(s): FE, TIBC, PSAT, FERR in the last 72 hours. No results found for: FOL, RBCF   No results for input(s): PH, PCO2, PO2 in the last 72 hours. No results for input(s): CPK, CKNDX, TROIQ in the last 72 hours.     No lab exists for component: CPKMB  No results found for: CHOL, CHOLX, CHLST, CHOLV, HDL, LDL, LDLC, DLDLP, TGLX, TRIGL, TRIGP, CHHD, CHHDX  Lab Results   Component Value Date/Time    Glucose (POC) 188 10/21/2017 06:40 AM    Glucose (POC) 346 10/20/2017 07:09 PM    Glucose (POC) 285 10/20/2017 03:14 PM    Glucose (POC) 262 06/27/2017 10:54 AM    Glucose (POC) 297 06/26/2017 09:36 PM    Glucose (POC) 297 06/26/2017 09:36 PM     Lab Results   Component Value Date/Time    Color YELLOW/STRAW 06/25/2017 11:40 AM    Appearance CLEAR 06/25/2017 11:40 AM    Specific gravity 1.018 06/25/2017 11:40 AM    pH (UA) 5.0 06/25/2017 11:40 AM    Protein 300 06/25/2017 11:40 AM    Glucose NEGATIVE  06/25/2017 11:40 AM    Ketone NEGATIVE  06/25/2017 11:40 AM    Bilirubin NEGATIVE  06/25/2017 11:40 AM    Urobilinogen 0.2 06/25/2017 11:40 AM    Nitrites NEGATIVE  06/25/2017 11:40 AM    Leukocyte Esterase NEGATIVE  06/25/2017 11:40 AM    Epithelial cells FEW 06/25/2017 11:40 AM    Bacteria NEGATIVE  06/25/2017 11:40 AM    WBC 0-4 06/25/2017 11:40 AM    RBC 0-5 06/25/2017 11:40 AM         Medications Reviewed:     Current Facility-Administered Medications   Medication Dose Route Frequency    sodium chloride (NS) flush 5-10 mL  5-10 mL IntraVENous Q8H    sodium chloride (NS) flush 5-10 mL  5-10 mL IntraVENous PRN    0.9% sodium chloride infusion  75 mL/hr IntraVENous CONTINUOUS    0.9% sodium chloride infusion 250 mL  250 mL IntraVENous PRN    glucose chewable tablet 16 g  4 Tab Oral PRN    dextrose (D50W) injection syrg 12.5-25 g  12.5-25 g IntraVENous PRN    glucagon (GLUCAGEN) injection 1 mg  1 mg IntraMUSCular PRN    insulin lispro (HUMALOG) injection   SubCUTAneous Q6H    carvedilol (COREG) tablet 25 mg  25 mg Oral BID    simvastatin (ZOCOR) tablet 20 mg  20 mg Oral QHS    amLODIPine (NORVASC) tablet 10 mg  10 mg Oral DAILY    cloNIDine HCl (CATAPRES) tablet 0.1 mg  0.1 mg Oral BID    0.9% sodium chloride infusion 250 mL  250 mL IntraVENous PRN     ______________________________________________________________________  EXPECTED LENGTH OF STAY: - - -  ACTUAL LENGTH OF STAY:          1                 Ivanna Garcia MD

## 2017-10-21 NOTE — INTERDISCIPLINARY ROUNDS
IDR/SLIDR Summary          Patient: Denisse Ann MRN: [id number]    Age: 81 y.o. YOB: 1935 Room/Bed: [address] [place] [address]   Admit Diagnosis: GI bleed  GI bleed  Principal Diagnosis: GI bleed   Goals: Address bleed  Readmission: NO  Quality Measure: Not applicable  VTE Prophylaxis: Mechanical  Influenza Vaccine screening completed? YES  Pneumococcal Vaccine screening completed? NO  Mobility needs: No   Nutrition plan:Yes  Consults:{Consult needed:[id number]}    Financial concerns:No  Escalated to CM? NO  RRAT Score: [employer] 9   Testing due for pt today? YES  LOS: 1 days Expected length of stay 2 days  Discharge plan:  To home   PCP: Mikayla Lopez MD  Transportation needs: Yes    Days before discharge:one day until discharge   Discharge disposition: Home    Signed:     Anabel Dinh RN  10/21/2017  2:20 AM

## 2017-10-21 NOTE — PROGRESS NOTES
Problem: Falls - Risk of  Goal: *Absence of Falls  Document Tricia Fall Risk and appropriate interventions in the flowsheet.    Outcome: Progressing Towards Goal  Fall Risk Interventions:            Medication Interventions: Patient to call before getting OOB

## 2017-10-21 NOTE — PROGRESS NOTES
1930 Bedside shift change report given to Randie Canavan (oncoming nurse) by Eligio Ocampo (offgoing nurse). Report included the following information SBAR, Kardex, Procedure Summary, Intake/Output, MAR, Recent Results and Cardiac Rhythm A flutter. 2115 Second unit of blood tranfusing  2330 Second unit complete    0405 TRANSFER - OUT REPORT:    Verbal report given to Olegario Edi (name) on Vinayak Cipro.  being transferred to Randie Canavan (unit) for routine progression of care       Report consisted of patients Situation, Background, Assessment and   Recommendations(SBAR). Information from the following report(s) SBAR, Kardex, Procedure Summary, Intake/Output, MAR, Recent Results and Cardiac Rhythm A flutter was reviewed with the receiving nurse. Lines:   Peripheral IV 10/20/17 Left Antecubital (Active)   Site Assessment Clean, dry, & intact 10/20/2017  8:00 PM   Phlebitis Assessment 0 10/20/2017  8:00 PM   Infiltration Assessment 0 10/20/2017  8:00 PM   Dressing Status Clean, dry, & intact 10/20/2017  8:00 PM   Dressing Type Transparent 10/20/2017  8:00 PM   Hub Color/Line Status Pink 10/20/2017  8:00 PM   Action Taken Open ports on tubing capped 10/20/2017  8:00 PM   Alcohol Cap Used Yes 10/20/2017  8:00 PM        Opportunity for questions and clarification was provided.       Patient transported with:   Registered Nurse

## 2017-10-21 NOTE — PROGRESS NOTES
Nephrology Progress Note  Eusebio Daniel Sr. 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  hgb stable. Transfused blood. Unable to find bleed yesterday in IR. BP stable, Cr stable. No cp, sob, n/v/d reported. Current Medications: all current  Medications have been eviewed in EPIC  Review of Systems: Pertinent items are noted in HPI. Objective:  Vitals:    Vitals:    10/21/17 0300 10/21/17 0400 10/21/17 0425 10/21/17 0759   BP: 126/57 130/70 189/77 153/76   Pulse: 74 75 81 82   Resp: 19 22 20    Temp:  98.7 °F (37.1 °C) 97.7 °F (36.5 °C) 98 °F (36.7 °C)   SpO2: 97% 98% 99% 99%   Weight:       Height:         Intake and Output:  10/21 0701 - 10/21 1900  In: -   Out: 450 [Urine:450]  10/19 1901 - 10/21 0700  In: 2168.4 [I.V.:1553.8]  Out: -     Physical Examination:  General: NAD,Conversant   Neck:  Supple, no mass  Resp:  Lungs CTA B/L, no wheezing , normal respiratory effort  CV:  RRR,  no murmur or rub,no LE edema  GI:  Soft, NT, + Bowel sounds, no hepatosplenomegaly  Neurologic:  Non focal  Psych:             AAO x 3 appropriate affect   Skin:  No Rash      []    High complexity decision making was performed  []    Patient is at high-risk of decompensation with multiple organ involvement    Lab Data Personally Reviewed: I have reviewed all the pertinent labs, microbiology data and radiology studies during assessment.     Recent Labs      10/21/17   0639  10/20/17   0811  10/20/17   0409   NA  140  136  137   K  4.5  4.4 4.0   CL  109*  102  100   CO2  22  28  30   GLU  163*  302*  309*   BUN  36*  39*  36*   CREA  1.62*  2.07*  2.15*   CA  7.8*  8.2*  8.2*   MG   --   1.7   --    ALB   --    --   3.0*   SGOT   --    --   16   ALT   --    --   24   INR   --   1.1  1.0     Recent Labs      10/21/17   0639  10/21/17   0025  10/20/17   1438  10/20/17   0811  10/20/17   0409   WBC  6.3   --    --   5.7  6.6   HGB  10.1*  9.1*  7.7*  8.9*  10.8*   HCT  30.1*  27.0*  23.7*  27.2*  33.1*   PLT  170   --    --   216  228     No results found for: [facility]  Lab Results   Component Value Date/Time    Culture result: GENEVA ALBICANS 03/16/2017 06:00 AM    Culture result: NO GROWTH 5 DAYS 03/16/2017 05:40 AM     Recent Results (from the past 24 hour(s))   HGB & HCT    Collection Time: 10/20/17  2:38 PM   Result Value Ref Range    HGB 7.7 (L) 12.1 - 17.0 g/dL    HCT 23.7 (L) 36.6 - 50.3 %   GLUCOSE, POC    Collection Time: 10/20/17  3:14 PM   Result Value Ref Range    Glucose (POC) 285 (H) 65 - 100 mg/dL    Performed by [id number] [place], POC    Collection Time: 10/20/17  7:09 PM   Result Value Ref Range    Glucose (POC) 346 (H) 65 - 100 mg/dL    Performed by Dempsey Castleman Betty Roch) Nicole    HGB & HCT    Collection Time: 10/21/17 12:25 AM   Result Value Ref Range    HGB 9.1 (L) 12.1 - 17.0 g/dL    HCT 27.0 (L) 36.6 - 99.1 %   METABOLIC PANEL, BASIC    Collection Time: 10/21/17  6:39 AM   Result Value Ref Range    Sodium 140 136 - 145 mmol/L    Potassium 4.5 3.5 - 5.1 mmol/L    Chloride 109 (H) 97 - 108 mmol/L    CO2 22 21 - 32 mmol/L    Anion gap 9 5 - 15 mmol/L    Glucose 163 (H) 65 - 100 mg/dL    BUN 36 (H) 6 - 20 MG/DL    Creatinine 1.62 (H) 0.70 - 1.30 MG/DL    BUN/Creatinine ratio 22 (H) 12 - 20      GFR est AA 50 (L) >60 ml/min/1.73m2    GFR est non-AA 41 (L) >60 ml/min/1.73m2    Calcium 7.8 (L) 8.5 - 10.1 MG/DL   CBC W/O DIFF    Collection Time: 10/21/17  6:39 AM   Result Value Ref Range    WBC 6.3 4.1 - 11.1 K/uL    RBC 3.60 (L) 4.10 - 5.70 M/uL    HGB 10.1 (L) 12.1 - 17.0 g/dL    HCT 30.1 (L) 36.6 - 50.3 %    MCV 83.6 80.0 - 99.0 FL    MCH 28.1 26.0 - 34.0 PG    MCHC 33.6 30.0 - 36.5 g/dL    RDW 15.5 (H) 11.5 - 14.5 %    PLATELET 240 734 - 682 K/uL   GLUCOSE, POC    Collection Time: 10/21/17  6:40 AM   Result Value Ref Range    Glucose (POC) 188 (H) 65 - 100 mg/dL    Performed by Cheyanne Rubio MD  [address] [place] [address]  Phone - [phone]   Fax - [phone]  [email or web address]

## 2017-10-21 NOTE — PROGRESS NOTES
[address] [address] [facility]    GI PROGRESS NOTE    NAME: Sawyer Dmaon.   :  1935   MRN:  [id number]       Subjective:     Feels well, no more GI bleed  Review of Systems    Constitutional: negative fever, negative chills, negative weight loss  Eyes:   negative visual changes  ENT:   negative sore throat, tongue or lip swelling  Respiratory:  negative cough, negative dyspnea  Cards:  negative for chest pain, palpitations, lower extremity edema  GI:   See HPI  :  negative for frequency, dysuria  Integument:  negative for rash and pruritus  Heme:  negative for easy bruising and gum/nose bleeding  Musculoskel: negative for myalgias,  back pain and muscle weakness  Neuro: negative for headaches, dizziness, vertigo  Psych:  negative for feelings of anxiety, depression         Objective:     VITALS:   Last 24hrs VS reviewed since prior progress note. Most recent are:  Visit Vitals    /67 (BP 1 Location: Right arm, BP Patient Position: Head of bed elevated (Comment degrees))    Pulse 73    Temp 97.6 °F (36.4 °C)    Resp 18    Ht 5' 7\" (1.702 m)    Wt 74.5 kg (164 lb 3.2 oz)    SpO2 99%    BMI 25.72 kg/m2       Intake/Output Summary (Last 24 hours) at 10/21/17 1420  Last data filed at 10/21/17 1150   Gross per 24 hour   Intake           3677.1 ml   Output              625 ml   Net           3052.1 ml     PHYSICAL EXAM:  General: WD, WN. Alert, cooperative, no acute distress    HEENT: NC, Atraumatic. PERRLA, EOMI. Anicteric sclerae. Lungs:  CTA Bilaterally. No Wheezing/Rhonchi/Rales. Heart:  Regular  rhythm,  No murmur (), No Rubs, No Gallops  Abdomen: Soft, Non distended, Non tender.  +Bowel sounds, no HSM  Extremities: No c/c/e  Neurologic:  CN 2-12 gi, Alert and oriented X 3. No acute neurological distress   Psych:   Good insight. Not anxious nor agitated.     Lab Data Reviewed:   Recent Labs      10/21/17   1218  10/21/17   7998   10/20/17 0811   WBC   --   6.3   --   5.7   HGB  9.3*  10.1*   < >  8.9*   HCT  27.8*  30.1*   < >  27.2*   PLT   --   170   --   216    < > = values in this interval not displayed.      Recent Labs      10/21/17   0639  10/20/17   0811   NA  140  136   K  4.5  4.4   CL  109*  102   CO2  22  28   BUN  36*  39*   CREA  1.62*  2.07*   GLU  163*  302*   CA  7.8*  8.2*     Recent Labs      10/20/17   0409   SGOT  16   AP  78   TP  7.2   ALB  3.0*   GLOB  4.2*       ________________________________________________________________________       Assessment:   · Rectal bleed, probably from diverticulosis     Patient Active Problem List   Diagnosis Code    Bradycardia R00.1    CAD (coronary artery disease) I25.10    Hypertension, essential, benign I10    Carotid arterial disease ([facility]) I77.9    Hypercholesteremia E78.00    PVD (peripheral vascular disease) ([facility]) I73.9    PVC's (premature ventricular contractions) I49.3    SOB (shortness of breath) R06.02    Type 2 diabetes mellitus with diabetic peripheral angiopathy without gangrene ([facility]) E11.51    CKD (chronic kidney disease) N18.9    Acute renal failure (ARF) ([facility]) N17.9    Hypokalemia E87.6    Generalized weakness R53.1    Elevated troponin R74.8    KATALINA (acute kidney injury) ([facility] [address] N17.9    GI bleed K92.2    New onset a-fib ([facility]) I48.91    Hyperglycemia due to type 2 diabetes mellitus ([facility]) E11.65    Paroxysmal atrial fibrillation ([facility]) I48.0    CKD (chronic kidney disease) stage 3, GFR 30-59 ml/min N18.3     Plan:   · Monitor H/H     Signed By: Hipolito Rubalcava MD     10/21/2017  2:20 PM

## 2017-10-22 LAB
ANION GAP SERPL CALC-SCNC: 7 MMOL/L (ref 5–15)
ATRIAL RATE: 79 BPM
BUN SERPL-MCNC: 32 MG/DL (ref 6–20)
BUN/CREAT SERPL: 20 (ref 12–20)
CALCIUM SERPL-MCNC: 8 MG/DL (ref 8.5–10.1)
CALCULATED R AXIS, ECG10: -48 DEGREES
CALCULATED T AXIS, ECG11: 131 DEGREES
CHLORIDE SERPL-SCNC: 107 MMOL/L (ref 97–108)
CO2 SERPL-SCNC: 25 MMOL/L (ref 21–32)
CREAT SERPL-MCNC: 1.62 MG/DL (ref 0.7–1.3)
DIAGNOSIS, 93000: NORMAL
ERYTHROCYTE [DISTWIDTH] IN BLOOD BY AUTOMATED COUNT: 15.2 % (ref 11.5–14.5)
GLUCOSE BLD STRIP.AUTO-MCNC: 150 MG/DL (ref 65–100)
GLUCOSE BLD STRIP.AUTO-MCNC: 160 MG/DL (ref 65–100)
GLUCOSE BLD STRIP.AUTO-MCNC: 182 MG/DL (ref 65–100)
GLUCOSE BLD STRIP.AUTO-MCNC: 279 MG/DL (ref 65–100)
GLUCOSE SERPL-MCNC: 174 MG/DL (ref 65–100)
HCT VFR BLD AUTO: 24.7 % (ref 36.6–50.3)
HCT VFR BLD AUTO: 26 % (ref 36.6–50.3)
HCT VFR BLD AUTO: 29.2 % (ref 36.6–50.3)
HGB BLD-MCNC: 8.4 G/DL (ref 12.1–17)
HGB BLD-MCNC: 8.7 G/DL (ref 12.1–17)
HGB BLD-MCNC: 9.7 G/DL (ref 12.1–17)
MCH RBC QN AUTO: 28.1 PG (ref 26–34)
MCHC RBC AUTO-ENTMCNC: 33.5 G/DL (ref 30–36.5)
MCV RBC AUTO: 83.9 FL (ref 80–99)
PLATELET # BLD AUTO: 164 K/UL (ref 150–400)
POTASSIUM SERPL-SCNC: 4.1 MMOL/L (ref 3.5–5.1)
Q-T INTERVAL, ECG07: 420 MS
QRS DURATION, ECG06: 88 MS
QTC CALCULATION (BEZET), ECG08: 466 MS
RBC # BLD AUTO: 3.1 M/UL (ref 4.1–5.7)
SERVICE CMNT-IMP: ABNORMAL
SODIUM SERPL-SCNC: 139 MMOL/L (ref 136–145)
VENTRICULAR RATE, ECG03: 74 BPM
WBC # BLD AUTO: 5.4 K/UL (ref 4.1–11.1)

## 2017-10-22 PROCEDURE — 36415 COLL VENOUS BLD VENIPUNCTURE: CPT | Performed by: INTERNAL MEDICINE

## 2017-10-22 PROCEDURE — 65660000000 HC RM CCU STEPDOWN

## 2017-10-22 PROCEDURE — 74011250637 HC RX REV CODE- 250/637: Performed by: INTERNAL MEDICINE

## 2017-10-22 PROCEDURE — 74011250637 HC RX REV CODE- 250/637: Performed by: PHYSICIAN ASSISTANT

## 2017-10-22 PROCEDURE — 82962 GLUCOSE BLOOD TEST: CPT

## 2017-10-22 PROCEDURE — 85027 COMPLETE CBC AUTOMATED: CPT

## 2017-10-22 PROCEDURE — 74011636637 HC RX REV CODE- 636/637: Performed by: FAMILY MEDICINE

## 2017-10-22 PROCEDURE — 85018 HEMOGLOBIN: CPT | Performed by: INTERNAL MEDICINE

## 2017-10-22 PROCEDURE — 74011636637 HC RX REV CODE- 636/637: Performed by: INTERNAL MEDICINE

## 2017-10-22 PROCEDURE — 80048 BASIC METABOLIC PNL TOTAL CA: CPT

## 2017-10-22 RX ORDER — GLIPIZIDE 5 MG/1
5 TABLET ORAL
Status: DISCONTINUED | OUTPATIENT
Start: 2017-10-22 | End: 2017-10-23 | Stop reason: HOSPADM

## 2017-10-22 RX ORDER — INSULIN LISPRO 100 [IU]/ML
INJECTION, SOLUTION INTRAVENOUS; SUBCUTANEOUS
Status: DISCONTINUED | OUTPATIENT
Start: 2017-10-22 | End: 2017-10-23 | Stop reason: HOSPADM

## 2017-10-22 RX ADMIN — Medication 10 ML: at 14:58

## 2017-10-22 RX ADMIN — CARVEDILOL 25 MG: 12.5 TABLET, FILM COATED ORAL at 08:43

## 2017-10-22 RX ADMIN — CARVEDILOL 25 MG: 12.5 TABLET, FILM COATED ORAL at 18:45

## 2017-10-22 RX ADMIN — CLONIDINE HYDROCHLORIDE 0.1 MG: 0.1 TABLET ORAL at 20:25

## 2017-10-22 RX ADMIN — GLIPIZIDE 5 MG: 5 TABLET ORAL at 16:44

## 2017-10-22 RX ADMIN — INSULIN LISPRO 5 UNITS: 100 INJECTION, SOLUTION INTRAVENOUS; SUBCUTANEOUS at 13:42

## 2017-10-22 RX ADMIN — SITAGLIPTIN 50 MG: 25 TABLET, FILM COATED ORAL at 08:43

## 2017-10-22 RX ADMIN — INSULIN LISPRO 2 UNITS: 100 INJECTION, SOLUTION INTRAVENOUS; SUBCUTANEOUS at 06:00

## 2017-10-22 RX ADMIN — AMLODIPINE BESYLATE 10 MG: 5 TABLET ORAL at 08:43

## 2017-10-22 RX ADMIN — INSULIN LISPRO 2 UNITS: 100 INJECTION, SOLUTION INTRAVENOUS; SUBCUTANEOUS at 16:44

## 2017-10-22 RX ADMIN — SIMVASTATIN 20 MG: 20 TABLET, FILM COATED ORAL at 22:38

## 2017-10-22 RX ADMIN — CLONIDINE HYDROCHLORIDE 0.1 MG: 0.1 TABLET ORAL at 08:43

## 2017-10-22 RX ADMIN — GLIPIZIDE 5 MG: 5 TABLET ORAL at 07:30

## 2017-10-22 NOTE — PROGRESS NOTES
Hospitalist Progress Note  Jerry Eric MD  Answering service: [phone] from in house phone         Date of Service:  10/22/2017  NAME:  Hector Carr  :  1935  MRN:  [id number]      Admission Summary:   Hector Carr is a 81 y.o. male who presents with rectal bleed onset 3 am today. He report bright red blood in his underwear and mixed with stool. He denies any abdominal pain or use of blood thinners. He however takes daily aspirin. In the ED he was hemodynamically stable. Negative for hematemesis. Hx of diverticulosis years ago . He report having a colonoscopy 6 years ago. Patient will be admitted for further treatment.        Interval history / Subjective:    no issues no bleeding, hgb up     Assessment & Plan:     GI bleed - Likely lower GI bleed. Hx of diverticulosis noted. Patient remains NPO under seen by GI. Monitor H&H every 8 hours if actively bleeding. Give IVF and consult GI  NM scan suggest bleed =active GI bleeding in the descending/sigmoid colon and IR could not successfully embolize,  Not hct is coming up to   Lab Results   Component Value Date/Time    HGB 8.7 10/22/2017 06:25 AM    , no furhter bleeding. Noted by pt  As Hgb cont to drop will cont to monitor in hospital but seems to be leveling out. Lab for this afternoon.      Hypertension - controlled . On coreg, clonidine , Norvasc  BP Readings from Last 1 Encounters:   10/22/17 151/54         DM - hyperglycemia . SSI   Lab Results   Component Value Date/Time    Glucose 174 10/22/2017 06:25 AM    Glucose (POC) 182 10/22/2017 06:03 AM         CKD - acute on chronic. Hold Bumex for now  Lab Results   Component Value Date/Time    Creatinine 1.62 10/22/2017 06:25 AM     Lab Results   Component Value Date/Time    Potassium 4.1 10/22/2017 06:25 AM         New onset Afib/SVR  - On tele. Check TSH, ECHO ordered.  Recommend Cardiology consult   Fib/flutter on last strips 10/22/2017 . Will need anticoag  ECHO; good ef no RWMA.      DVT Prophylaxis - SCD's     Code status: full   DVT prophylaxis: SCD    Care Plan discussed with: Patient/Family and Nurse  Disposition: Home w/Family and TBD     Hospital Problems  Date Reviewed: 8/14/2017          Codes Class Noted POA    Paroxysmal atrial fibrillation ([facility] ICD-10-CM: I48.0  ICD-9-CM: 427.31  10/21/2017 Yes    Overview Signed 10/21/2017  1:45 PM by Tarun Rodriguez MD     new onset afib with BRPR 10-19-17 admit             CKD (chronic kidney disease) stage 3, GFR 30-59 ml/min ICD-10-CM: N18.3  ICD-9-CM: 585.3  10/21/2017 Yes    Overview Signed 10/21/2017  1:47 PM by Tarun Rodriguez MD     hypertension and DM nephrosclerosis             * (Principal)GI bleed ICD-10-CM: K92.2  ICD-9-CM: 578.9  10/20/2017 Yes        New onset a-fib [facility]) ICD-10-CM: I48.91  ICD-9-CM: 427.31  10/20/2017 Yes        Hyperglycemia due to type 2 diabetes mellitus ([facility] ICD-10-CM: E11.65  ICD-9-CM: 250.00  10/20/2017 Yes        CKD (chronic kidney disease) ICD-10-CM: N18.9  ICD-9-CM: 585.9  1/20/2017 Yes        Carotid arterial disease ([facility] ICD-10-CM: I77.9  ICD-9-CM: 447.9  Unknown Yes        PVD (peripheral vascular disease) (HCC) ICD-10-CM: I73.9  ICD-9-CM: 443.9  Unknown Yes        CAD (coronary artery disease) ICD-10-CM: I25.10  ICD-9-CM: 414.00  Unknown Yes        Hypertension, essential, benign ICD-10-CM: I10  ICD-9-CM: 401.1  Unknown Yes                Review of Systems:   no bleeding no n/v/d/f/chills, no cp no sob. anxious to be discharged. no changes 10/22/2017       Vital Signs:    Last 24hrs VS reviewed since prior progress note.  Most recent are:  Visit Vitals    /54    Pulse 72    Temp 98.5 °F (36.9 °C)    Resp 18    Ht 5' 7\" (1.702 m)    Wt 83.1 kg (183 lb 3.2 oz)    SpO2 99%    BMI 28.69 kg/m2         Intake/Output Summary (Last 24 hours) at 10/22/17 0829  Last data filed at 10/22/17 0859   Gross per 24 hour   Intake 2043.75 ml   Output             1250 ml   Net           793.75 ml        Physical Examination:             Constitutional:  No acute distress, cooperative, pleasant    ENT:  Oral mucous moist, oropharynx benign. Neck supple,    Resp:  CTA bilaterally. No wheezing/rhonchi/rales. No accessory muscle use   CV:  iRegular rhythm, normal rate, no murmurs, gallops, rubs    GI:  Soft, non distended, non tender. normoactive bowel sounds, no hepatosplenomegaly     Musculoskeletal:  No edema, warm, 2+ pulses throughout    Neurologic:  Moves all extremities. AAOx3, CN II-XII reviewed     Psych:  Good insight, Not anxious nor agitated. Data Review:    Review and/or order of clinical lab test      Labs:     Recent Labs      10/22/17   0625  10/22/17   0029   10/21/17   0639   WBC  5.4   --    --   6.3   HGB  8.7*  8.4*   < >  10.1*   HCT  26.0*  24.7*   < >  30.1*   PLT  164   --    --   170    < > = values in this interval not displayed. Recent Labs      10/22/17   0625  10/21/17   0639  10/20/17   0811   NA  139  140  136   K  4.1  4.5  4.4   CL  107  109*  102   CO2  25  22  28   BUN  32*  36*  39*   CREA  1.62*  1.62*  2.07*   GLU  174*  163*  302*   CA  8.0*  7.8*  8.2*   MG   --    --   1.7     Recent Labs      10/20/17   0409   SGOT  16   ALT  24   AP  78   TBILI  0.3   TP  7.2   ALB  3.0*   GLOB  4.2*     Recent Labs      10/20/17   0811  10/20/17   0409   INR  1.1  1.0   PTP  10.8  10.3      No results for input(s): FE, TIBC, PSAT, FERR in the last 72 hours. No results found for: FOL, RBCF   No results for input(s): PH, PCO2, PO2 in the last 72 hours. No results for input(s): CPK, CKNDX, TROIQ in the last 72 hours.     No lab exists for component: CPKMB  No results found for: CHOL, CHOLX, CHLST, CHOLV, HDL, LDL, LDLC, DLDLP, TGLX, TRIGL, TRIGP, CHHD, CHHDX  Lab Results   Component Value Date/Time    Glucose (POC) 182 10/22/2017 06:03 AM    Glucose (POC) 205 10/21/2017 11:28 PM    Glucose (POC) 206 10/21/2017 04:39 PM    Glucose (POC) 182 10/21/2017 11:49 AM    Glucose (POC) 188 10/21/2017 06:40 AM     Lab Results   Component Value Date/Time    Color YELLOW/STRAW 06/25/2017 11:40 AM    Appearance CLEAR 06/25/2017 11:40 AM    Specific gravity 1.018 06/25/2017 11:40 AM    pH (UA) 5.0 06/25/2017 11:40 AM    Protein 300 06/25/2017 11:40 AM    Glucose NEGATIVE  06/25/2017 11:40 AM    Ketone NEGATIVE  06/25/2017 11:40 AM    Bilirubin NEGATIVE  06/25/2017 11:40 AM    Urobilinogen 0.2 06/25/2017 11:40 AM    Nitrites NEGATIVE  06/25/2017 11:40 AM    Leukocyte Esterase NEGATIVE  06/25/2017 11:40 AM    Epithelial cells FEW 06/25/2017 11:40 AM    Bacteria NEGATIVE  06/25/2017 11:40 AM    WBC 0-4 06/25/2017 11:40 AM    RBC 0-5 06/25/2017 11:40 AM         Medications Reviewed:     Current Facility-Administered Medications   Medication Dose Route Frequency    SITagliptin (JANUVIA) tablet tab 50 mg  50 mg Oral DAILY    glipiZIDE (GLUCOTROL) tablet 5 mg  5 mg Oral ACB    sodium chloride (NS) flush 5-10 mL  5-10 mL IntraVENous Q8H    sodium chloride (NS) flush 5-10 mL  5-10 mL IntraVENous PRN    0.9% sodium chloride infusion 250 mL  250 mL IntraVENous PRN    glucose chewable tablet 16 g  4 Tab Oral PRN    dextrose (D50W) injection syrg 12.5-25 g  12.5-25 g IntraVENous PRN    glucagon (GLUCAGEN) injection 1 mg  1 mg IntraMUSCular PRN    insulin lispro (HUMALOG) injection   SubCUTAneous Q6H    carvedilol (COREG) tablet 25 mg  25 mg Oral BID    simvastatin (ZOCOR) tablet 20 mg  20 mg Oral QHS    amLODIPine (NORVASC) tablet 10 mg  10 mg Oral DAILY    cloNIDine HCl (CATAPRES) tablet 0.1 mg  0.1 mg Oral BID    0.9% sodium chloride infusion 250 mL  250 mL IntraVENous PRN     ______________________________________________________________________  EXPECTED LENGTH OF STAY: - - -  ACTUAL LENGTH OF STAY:          2                 Elenita Foreman MD

## 2017-10-22 NOTE — PROGRESS NOTES
Problem: Diabetes Self-Management  Goal: *Disease process and treatment process  Define diabetes and identify own type of diabetes; list 3 options for treating diabetes. Outcome: Progressing Towards Goal  Blood sugar checks q6h. Results and tx discussed with patient. Glipizide restarted by MD    Problem: Pressure Injury - Risk of  Goal: *Prevention of pressure ulcer  Outcome: Progressing Towards Goal  Skin cdi with no apparent breakdown. Pt ambulatory and turns self in bed easily and frequently    Problem: Falls - Risk of  Goal: *Absence of Falls  Document Tricia Fall Risk and appropriate interventions in the flowsheet.    Outcome: Progressing Towards Goal  Fall Risk Interventions:            Medication Interventions: Teach patient to arise slowly                  Problem: Upper and Lower GI Bleed: Day 3  Goal: Diagnostic Test/Procedures  Outcome: Progressing Towards Goal  q6 h&h.  hgb stable  Goal: Treatments/Interventions/Procedures  Outcome: Progressing Towards Goal  vss

## 2017-10-22 NOTE — PROGRESS NOTES
1343. Second called to Dr. Domenica Rinne for as for increase in Glipzide to home dose and to clarify if he still plan on patient having iron infusion

## 2017-10-22 NOTE — PROGRESS NOTES
10/22/2017    Shweta Clemente MD   [place] [phone] PCP:-Richard Nancy Mohs, MD   [facility] [employer] [facility]  -Progress Note     . Dorota Clark Berenice is a 81 y.o. male   Comfortable in chair watching the [employer]  No complaints  Denies chest pain, heart palpitations , increasing edema, pre-syncope or shortness of breath at rest   No problems overnight, rhythm and hemodynamics stable -see vitals below     Remains on tele in stable rate of afib            New onset atrial fibrillation -has been PAF, in and out , now in afib and no complaints of cp,sob feels well  ---no OAC or warfarin for now with new recent GI bleed and anemia  The heart rate is stable  He is asymptomatic   EF is WNL    CAD  Coreg 25 mg BID                        - Zocor 20 mg                        - ASA held 2/2 bleeding    HTN            - Coreg, HCTZ, Catapres, Norvasc  --now stable    GI bleed/BRBPR + bleeding scan  Lab Results   Component Value Date/Time    HGB 8.7 10/22/2017 06:25 AM    will need close fu on Hgb with PCP or GI    DM2-  Lab Results   Component Value Date/Time    Hemoglobin A1c 9.8 10/20/2017 08:11 AM    Hemoglobin A1c, External 8.4 08/15/2017        PVD-statin, aspirin    KATALINA on CKD 3  Has been on lasix at home with improvement of SOB after starting the diuretics  Lab Results   Component Value Date/Time    Creatinine 1.62 10/22/2017 06:25 AM     Suspect cardiorenal syndrome    Overall cardiac issues are stable  Get back on usual meds except aspirin  Once Hgb stable then likely home   With close fu on Hgb  Stay off ASA and no OAC due to severe anemia  Has appt in Dec  I will move up to 2 weeks     Primary service to decide on dc plans based on risk for further bleeding, spoke to nurse in detail on cardiac assessment  reviewed prior notes in chart     Other co-morbids:    has a past medical history of CAD (coronary artery disease);  Carotid arterial disease (HCC); CKD (chronic kidney disease) stage 3, GFR 30-59 ml/min (10/21/2017); Diabetes mellitus, type 2 ([facility] Hypercholesteremia; Hypertension; Paroxysmal atrial fibrillation (HCC) (10/21/2017); PVC's (premature ventricular contractions) (2014); and PVD (peripheral vascular disease) ([facility] Cardiac Studies/Hx:  Holter monitor14=showing rate of  with frequent PACs, rare periods of short RP interval SVT up to 122 and frequent PVCs. sinus rhythm with similar findings, frequent PVCs and short RP interval tachycardia. NUKE  14,  2 minutes, 20 seconds,  EF of 55% with no ischemia. PVD= Dr. Dang Mariscal atherectomy to distal SFA and distal popliteal with angioplasty to both lesions and stent to the SFA by Dr. Dang Mariscal on 3/14/14. Previously had AIBs of 0.67 on the left, 0.71 on the right with the right seemingly due to distal disease. CAD/CABG off pump x3 3/2008 . =post op anemia and renal failure  CATH 2008= severe three-vessel left main coronary artery disease, 40% disease of the left main and take off of the LAD and circumflex complex, 70% stenosis of the ostial circ and 85% of the LAD. Between the first and second marginal, the circumflex had 70% stenosis and between the third marginal and PDA there was a 70% stenosis, LAD ostial lesion RCA proximal 60% tapering, EF 60%-70%, bilaterally patent renal arteries, mild atherosclerosis of the distal abdominal aorta. Mild carotid artery disease 3-7-08 then 12 with 10-49% left, less than 10% on right  Dyslipidemia 10-25-10  TG 91 HDL 40 LDL 48  SOCIAL: Drinks no alcohol, quit smoking several years ago, works as an . Lives with his wife and has four children. Enjoys gardening, fishing and music. FAMILY HISTORY: Mother  of cancer at 76, father  of Parkinson's at 70, one brother  of cancer of the liver at 76 and one  of an infection at 64.             Past Medical History:   Diagnosis Date    CAD (coronary artery disease)     s/p CABG   Carotid arterial disease (HCC)     CKD (chronic kidney disease) stage 3, GFR 30-59 ml/min 10/21/2017    hypertension and DM nephrosclerosis    Diabetes mellitus, type 2 ([facility]     Hypercholesteremia     Hypertension     Paroxysmal atrial fibrillation ([facility] 10/21/2017    new onset afib with BRPR 10-19-17 admit    PVC's (premature ventricular contractions) 5/26/2014    PVD (peripheral vascular disease) ([facility]       ROS-pertinents  negative except as above  The pertinent portions of the medical history,physician and nursing notes, meds,vitals , labs and Ins/Outs,are reviewed in the electronic record. Results for orders placed or performed during the hospital encounter of 10/20/17   EKG, 12 LEAD, INITIAL   Result Value Ref Range    Ventricular Rate 74 BPM    Atrial Rate 79 BPM    QRS Duration 88 ms    Q-T Interval 420 ms    QTC Calculation (Bezet) 466 ms    Calculated R Axis -48 degrees    Calculated T Axis 131 degrees    Diagnosis       Atrial fibrillation  Left axis deviation  ST & T wave abnormality, consider lateral ischemia  Prolonged QT  When compared with ECG of 25-JUN-2017 09:47,  Atrial fibrillation has replaced Sinus rhythm  T wave amplitude has increased in Anterior leads  T wave inversion now evident in Lateral leads     Results for orders placed or performed in visit on 11/28/11   AMB POC EKG ROUTINE W/ 12 LEADS, INTER & REP    Narrative    See scanned results          Objective:    Physical Exam:   Vitals:    10/21/17 2330 10/22/17 0427 10/22/17 0840 10/22/17 1111   BP: 140/66 151/54 175/51  Comment: pt voiced\"it might be up because I got a little peeved. \" 134/51   BP 1 Location:   Right arm Left arm   BP Patient Position:   Head of bed elevated (Comment degrees); At rest Post activity; Sitting   Pulse: 70 72 64 68   Resp: 18 18 18 18   Temp: 98 °F (36.7 °C) 98.5 °F (36.9 °C) 98.1 °F (36.7 °C) 98 °F (36.7 °C)   SpO2: 94% 99% 98% 97%   Weight:  183 lb 3.2 oz (83.1 kg)     Height:         Patient SAJAN Brady    METABOLIC PANEL, BASIC    Collection Time: 10/22/17  6:25 AM   Result Value Ref Range    Sodium 139 136 - 145 mmol/L    Potassium 4.1 3.5 - 5.1 mmol/L    Chloride 107 97 - 108 mmol/L    CO2 25 21 - 32 mmol/L    Anion gap 7 5 - 15 mmol/L    Glucose 174 (H) 65 - 100 mg/dL    BUN 32 (H) 6 - 20 MG/DL    Creatinine 1.62 (H) 0.70 - 1.30 MG/DL    BUN/Creatinine ratio 20 12 - 20      GFR est AA 50 (L) >60 ml/min/1.73m2    GFR est non-AA 41 (L) >60 ml/min/1.73m2    Calcium 8.0 (L) 8.5 - 10.1 MG/DL   CBC W/O DIFF    Collection Time: 10/22/17  6:25 AM   Result Value Ref Range    WBC 5.4 4.1 - 11.1 K/uL    RBC 3.10 (L) 4.10 - 5.70 M/uL    HGB 8.7 (L) 12.1 - 17.0 g/dL    HCT 26.0 (L) 36.6 - 50.3 %    MCV 83.9 80.0 - 99.0 FL    MCH 28.1 26.0 - 34.0 PG    MCHC 33.5 30.0 - 36.5 g/dL    RDW 15.2 (H) 11.5 - 14.5 %    PLATELET 006 500 - 767 K/uL   GLUCOSE, POC    Collection Time: 10/22/17 11:21 AM   Result Value Ref Range    Glucose (POC) 279 (H) 65 - 100 mg/dL    Performed by Janine Cervantes MD 10/22/2017 _

## 2017-10-22 NOTE — ROUTINE PROCESS
Bedside and Verbal shift change report given to Henny Dooley (oncoming nurse) by Mily Li (offgoing nurse). Report included the following information SBAR, Kardex, Intake/Output, MAR, Recent Results and Cardiac Rhythm A-Fib/Flutter.

## 2017-10-22 NOTE — PROGRESS NOTES
Labs reviewed  Cr stable  Cont current care    Will f/u in AM      Ivonne Hammans, MD  [facility]   [address] [address], [facility]  Phone - [phone]   Fax - [phone]  www. [facility]acation[facility]

## 2017-10-23 VITALS
HEIGHT: 67 IN | WEIGHT: 181.88 LBS | RESPIRATION RATE: 20 BRPM | TEMPERATURE: 98.6 F | HEART RATE: 67 BPM | DIASTOLIC BLOOD PRESSURE: 49 MMHG | BODY MASS INDEX: 28.55 KG/M2 | SYSTOLIC BLOOD PRESSURE: 153 MMHG | OXYGEN SATURATION: 98 %

## 2017-10-23 LAB
BASOPHILS # BLD: 0 K/UL (ref 0–0.1)
BASOPHILS NFR BLD: 0 % (ref 0–1)
EOSINOPHIL # BLD: 0.3 K/UL (ref 0–0.4)
EOSINOPHIL NFR BLD: 6 % (ref 0–7)
ERYTHROCYTE [DISTWIDTH] IN BLOOD BY AUTOMATED COUNT: 14.3 % (ref 11.5–14.5)
GLUCOSE BLD STRIP.AUTO-MCNC: 184 MG/DL (ref 65–100)
HCT VFR BLD AUTO: 23.7 % (ref 36.6–50.3)
HCT VFR BLD AUTO: 24.7 % (ref 36.6–50.3)
HGB BLD-MCNC: 8.1 G/DL (ref 12.1–17)
HGB BLD-MCNC: 8.4 G/DL (ref 12.1–17)
LYMPHOCYTES # BLD: 1.3 K/UL (ref 0.8–3.5)
LYMPHOCYTES NFR BLD: 24 % (ref 12–49)
MCH RBC QN AUTO: 28.6 PG (ref 26–34)
MCHC RBC AUTO-ENTMCNC: 34 G/DL (ref 30–36.5)
MCV RBC AUTO: 84 FL (ref 80–99)
MONOCYTES # BLD: 0.5 K/UL (ref 0–1)
MONOCYTES NFR BLD: 9 % (ref 5–13)
NEUTS SEG # BLD: 3.3 K/UL (ref 1.8–8)
NEUTS SEG NFR BLD: 61 % (ref 32–75)
PLATELET # BLD AUTO: 156 K/UL (ref 150–400)
RBC # BLD AUTO: 2.94 M/UL (ref 4.1–5.7)
SERVICE CMNT-IMP: ABNORMAL
WBC # BLD AUTO: 5.4 K/UL (ref 4.1–11.1)

## 2017-10-23 PROCEDURE — 74011250636 HC RX REV CODE- 250/636: Performed by: INTERNAL MEDICINE

## 2017-10-23 PROCEDURE — 82962 GLUCOSE BLOOD TEST: CPT

## 2017-10-23 PROCEDURE — 74011636637 HC RX REV CODE- 636/637: Performed by: INTERNAL MEDICINE

## 2017-10-23 PROCEDURE — 74011250637 HC RX REV CODE- 250/637: Performed by: INTERNAL MEDICINE

## 2017-10-23 PROCEDURE — 74011250637 HC RX REV CODE- 250/637: Performed by: PHYSICIAN ASSISTANT

## 2017-10-23 PROCEDURE — 36415 COLL VENOUS BLD VENIPUNCTURE: CPT | Performed by: INTERNAL MEDICINE

## 2017-10-23 PROCEDURE — 85025 COMPLETE CBC W/AUTO DIFF WBC: CPT | Performed by: INTERNAL MEDICINE

## 2017-10-23 PROCEDURE — 85018 HEMOGLOBIN: CPT | Performed by: INTERNAL MEDICINE

## 2017-10-23 RX ORDER — GLIPIZIDE 5 MG/1
5 TABLET ORAL
Qty: 60 TAB | Refills: 0 | Status: SHIPPED | OUTPATIENT
Start: 2017-10-23 | End: 2017-10-23

## 2017-10-23 RX ORDER — GLIPIZIDE 5 MG/1
5 TABLET ORAL
Qty: 60 TAB | Refills: 0 | Status: SHIPPED | OUTPATIENT
Start: 2017-10-23 | End: 2019-05-01

## 2017-10-23 RX ORDER — AMLODIPINE BESYLATE 10 MG/1
10 TABLET ORAL DAILY
Qty: 30 TAB | Refills: 0 | Status: SHIPPED | OUTPATIENT
Start: 2017-10-24 | End: 2019-08-05

## 2017-10-23 RX ADMIN — INSULIN LISPRO 2 UNITS: 100 INJECTION, SOLUTION INTRAVENOUS; SUBCUTANEOUS at 06:51

## 2017-10-23 RX ADMIN — CLONIDINE HYDROCHLORIDE 0.1 MG: 0.1 TABLET ORAL at 08:28

## 2017-10-23 RX ADMIN — AMLODIPINE BESYLATE 10 MG: 5 TABLET ORAL at 08:28

## 2017-10-23 RX ADMIN — GLIPIZIDE 5 MG: 5 TABLET ORAL at 06:48

## 2017-10-23 RX ADMIN — SITAGLIPTIN 50 MG: 25 TABLET, FILM COATED ORAL at 08:28

## 2017-10-23 RX ADMIN — CARVEDILOL 25 MG: 12.5 TABLET, FILM COATED ORAL at 08:28

## 2017-10-23 NOTE — ROUTINE PROCESS
Bedside and Verbal shift change report given to Bogardbianca Ashby [phone] (oncoming nurse) by Darek Chatman (offgoing nurse). Report included the following information SBAR, Kardex, ED Summary, Intake/Output, MAR, Recent Results and Cardiac Rhythm A-Fib.

## 2017-10-23 NOTE — DISCHARGE SUMMARY
Discharge Summary       PATIENT ID: You Bolden MRN: [id number]   YOB: 1935    DATE OF ADMISSION: 10/20/2017  3:47 AM    DATE OF DISCHARGE: 10/23/2017    PRIMARY CARE PROVIDER: Tushar Brody MD     ATTENDING PHYSICIAN: Bart Tejada MD   DISCHARGING PROVIDER: Bart Tejada MD    To contact this individual call [phone] and ask the  to page. If unavailable ask to be transferred the Adult Hospitalist Department. CONSULTATIONS: IP CONSULT TO HOSPITALIST  IP CONSULT TO GASTROENTEROLOGY  IP CONSULT TO GASTROENTEROLOGY  IP CONSULT TO INTERVENTIONAL RADIOLOGY  IP CONSULT TO NEPHROLOGY  IP CONSULT TO CARDIOLOGY    PROCEDURES/SURGERIES: * No surgery found *    ADMITTING [address] [place] [address] COURSE:     Lab Results   Component Value Date/Time    WBC 5.4 10/23/2017 08:18 AM    HGB 8.4 10/23/2017 08:18 AM    HCT 24.7 10/23/2017 08:18 AM    PLATELET 358 65/34/8982 08:18 AM    MCV 84.0 10/23/2017 08:18 AM     .Per card:       Adela Sharp MD    [facility]  -Progress Note     . Peggyann Gang Peggynevaeh Gang Peggyann Gang You [facility] is a 81 y.o. male   Comfortable in chair watching the [employer]  No complaints  Denies chest pain, heart palpitations , increasing edema, pre-syncope or shortness of breath at rest   No problems overnight, rhythm and hemodynamics stable -see vitals below      Remains on tele in stable rate of afib           New onset atrial fibrillation -has been PAF, in and out , now in afib and no complaints of cp,sob feels well  ---no OAC or warfarin for now with new recent GI bleed and anemia  The heart rate is stable  He is asymptomatic   EF is WNL     CAD  Coreg 25 mg BID                        - Zocor 20 mg                        - ASA held 2/2 bleeding     HTN            - Coreg, HCTZ, Catapres, Norvasc  --now stable     GI bleed/BRBPR + bleeding scan        Lab Results   Component Value Date/Time     HGB 8.7 10/22/2017 06:25 AM    will need close fu on Hgb with PCP or GI     DM2-        Lab Results   Component Value Date/Time     Hemoglobin A1c 9.8 10/20/2017 08:11 AM     Hemoglobin A1c, External 8.4 08/15/2017         PVD-statin, aspirin     KATALINA on CKD 3  Has been on lasix at home with improvement of SOB after starting the diuretics        Lab Results   Component Value Date/Time     Creatinine 1.62 10/22/2017 06:25 AM      Suspect cardiorenal syndrome     Overall cardiac issues are stable  Get back on usual meds except aspirin  Once Hgb stable then likely home   With close fu on Hgb  Stay off ASA and no OAC due to severe anemia  Has appt in Dec  I will move up to 2 weeks      Primary service to decide on dc plans based on risk for further bleeding, spoke to nurse in detail on cardiac assessment  reviewed prior notes in chart      Other co-morbids:    has a past medical history of CAD (coronary artery disease); Carotid arterial disease (HCC); CKD (chronic kidney disease) stage 3, GFR 30-59 ml/min (10/21/2017); Diabetes mellitus, type 2 ([facility] Hypercholesteremia; Hypertension; Paroxysmal atrial fibrillation (HCC) (10/21/2017); PVC's (premature ventricular contractions) (5/26/2014); and PVD (peripheral vascular disease) ([facility]    Cardiac Studies/Hx:  Holter monitor4/29/14=showing rate of  with frequent PACs, rare periods of short RP interval SVT up to 122 and frequent PVCs. sinus rhythm with similar findings, frequent PVCs and short RP interval tachycardia. NUKE  2/11/14,  2 minutes, 20 seconds,  EF of 55% with no ischemia. PVD= Dr. John Barrera atherectomy to distal SFA and distal popliteal with angioplasty to both lesions and stent to the SFA by Dr. John Barrera on 3/14/14. Previously had AIBs of 0.67 on the left, 0.71 on the right with the right seemingly due to distal disease.     CAD/CABG off pump x3 3/2008 .  =post op anemia and renal failure  CATH March 7, 2008= severe three-vessel left main coronary artery disease, 40% disease of the left main and take off of the LAD and circumflex complex, 70% stenosis of the ostial circ and 85% of the LAD. Between the first and second marginal, the circumflex had 70% stenosis and between the third marginal and PDA there was a 70% stenosis, LAD ostial lesion RCA proximal 60% tapering, EF 60%-70%, bilaterally patent renal arteries, mild atherosclerosis of the distal abdominal aorta. Mild carotid artery disease 3-7-08 then 12 with 10-49% left, less than 10% on right  Dyslipidemia 10-25-10  TG 91 HDL 40 LDL 48  SOCIAL: Drinks no alcohol, quit smoking several years ago, works as an . Lives with his wife and has four children. Enjoys gardening, fishing and music. FAMILY HISTORY: Mother  of cancer at 76, father  of Parkinson's at 70, one brother  of cancer of the liver at 76 and one  of an infection at 64. Jarad Choi recent PN:     Admission Summary:   Kevan Mary Sr. is a 81 y. o. male who presents with rectal bleed onset 3 am today. He report bright red blood in his underwear and mixed with stool. He denies any abdominal pain or use of blood thinners. He however takes daily aspirin. In the ED he was hemodynamically stable. Negative for hematemesis. Hx of diverticulosis years ago . He report having a colonoscopy 6 years ago. Patient will be admitted for further treatment.          Interval history / Subjective:    no issues no bleeding, hgb up      Assessment & Plan:      GI bleed - Likely lower GI bleed. Hx of diverticulosis noted.  Patient remains NPO under seen by GI. Monitor H&H every 8 hours if actively bleeding. Give IVF and consult GI  NM scan suggest bleed =active GI bleeding in the descending/sigmoid colon and IR could not successfully embolize,  Not hct is coming up to         Lab Results   Component Value Date/Time     HGB 8.7 10/22/2017 06:25 AM    , no furhter bleeding. Noted by pt  As Hgb cont to drop will cont to monitor in hospital but seems to be leveling out.  Lab for this afternoon. Dose venofer (  Iron sucrose ) iv 300 mg x 1 on discharge.      Hypertension - controlled . On coreg, clonidine , Norvasc      BP Readings from Last 1 Encounters:   10/22/17 151/54          DM - hyperglycemia . SSI         Lab Results   Component Value Date/Time     Glucose 174 10/22/2017 06:25 AM     Glucose (POC) 182 10/22/2017 06:03 AM          CKD - acute on chronic. Hold Bumex for now        Lab Results   Component Value Date/Time     Creatinine 1.62 10/22/2017 06:25 AM            Lab Results   Component Value Date/Time     Potassium 4.1 10/22/2017 06:25 AM          New onset Afib/SVR  - On tele. Check TSH, ECHO ordered. Recommend Cardiology consult   Fib/flutter on last strips 10/22/2017 . Will need anticoag  ECHO; good ef no RWMA. No oac, no asa 2n2 severe anemia and gi bleed.       DVT Prophylaxis - SCD's      Code status: full   DVT prophylaxis: SCD     Care Plan discussed with: Patient/Family and Nurse  Disposition: Home w/Family and TBD      Hospital Problems  Date Reviewed: 8/14/2017             Codes Class Noted POA     Paroxysmal atrial fibrillation ([facility] ICD-10-CM: I48.0  ICD-9-CM: 427.31   10/21/2017 Yes     Overview Signed 10/21/2017  1:45 PM by Tarun Rodriguez MD       new onset afib with BRPR 10-19-17 admit               CKD (chronic kidney disease) stage 3, GFR 30-59 ml/min ICD-10-CM: N18.3  ICD-9-CM: 591. 3   10/21/2017 Yes     Overview Signed 10/21/2017  1:47 PM by Tarun Rodriguez MD       hypertension and DM nephrosclerosis               * (Principal)GI bleed ICD-10-CM: K92.2  ICD-9-CM: 578. 9   10/20/2017 Yes           New onset a-fib [facility]) ICD-10-CM: I48.91  ICD-9-CM: 427.31   10/20/2017 Yes           Hyperglycemia due to type 2 diabetes mellitus ([facility] ICD-10-CM: E11.65  ICD-9-CM: 250.00   10/20/2017 Yes           CKD (chronic kidney disease) ICD-10-CM: N18.9  ICD-9-CM: 585. 9   1/20/2017 Yes           Carotid arterial disease (HCC) ICD-10-CM: I77.9  ICD-9-CM: 447.9   Unknown Yes           PVD (peripheral vascular disease) ([facility] ICD-10-CM: I73.9  ICD-9-CM: 443. 9   Unknown Yes           CAD (coronary artery disease) ICD-10-CM: I25.10  ICD-9-CM: 414.00   Unknown Yes           Hypertension, essential, benign ICD-10-CM: I10  ICD-9-CM: 401. 1   Unknown Yes                     Review of Systems:   no bleeding no n/v/d/f/chills, no cp no sob. anxious to be discharged. no changes 10/22/2017         Vital Signs:    Last 24hrs VS reviewed since prior progress note. Most recent are:  Visit Vitals    /54    Pulse 72    Temp 98.5 °F (36.9 °C)    Resp 18    Ht 5' 7\" (1.702 m)    Wt 83.1 kg (183 lb 3.2 oz)    SpO2 99%    BMI 28.69 kg/m2            Intake/Output Summary (Last 24 hours) at 10/22/17 0841  Last data filed at 10/22/17 0759    Gross per 24 hour   Intake          2043.75 ml   Output             1250 ml   Net           793.75 ml         Physical Examination:             Constitutional:  No acute distress, cooperative, pleasant    ENT:  Oral mucous moist, oropharynx benign. Neck supple,    Resp:  CTA bilaterally. No wheezing/rhonchi/rales. No accessory muscle use   CV:  iRegular rhythm, normal rate, no murmurs, gallops, rubs    GI:  Soft, non distended, non tender. normoactive bowel sounds, no hepatosplenomegaly     Musculoskeletal:  No edema, warm, 2+ pulses throughout    Neurologic:  Moves all extremities.   AAOx3, CN II-XII reviewed                                             Psych:  Good insight, Not anxious nor agitated.         Data Review:    Review and/or order of clinical lab test        Labs:             Recent Labs       10/22/17   0625  10/22/17   0029    10/21/17   0639   WBC  5.4   --    --   6.3   HGB  8.7*  8.4*   < >  10.1*   HCT  26.0*  24.7*   < >  30.1*   PLT  164   --    --   170    < > = values in this interval not displayed.            Recent Labs       10/22/17   0625  10/21/17   0639  10/20/17   0811   NA  139  140  136   K  4.1  4.5  4.4   CL  107  109* 102   CO2  25  22  28   BUN  32*  36*  39*   CREA  1.62*  1.62*  2.07*   GLU  174*  163*  302*   CA  8.0*  7.8*  8.2*   MG   --    --   1.7      Recent Labs       10/20/17   0409   SGOT  16   ALT  24   AP  78   TBILI  0.3   TP  7.2   ALB  3.0*   GLOB  4.2*           Recent Labs       10/20/17   0811  10/20/17   0409   INR  1.1  1.0   PTP  10.8  10.3      No results for input(s): FE, TIBC, PSAT, FERR in the last 72 hours. No results found for: FOL, RBCF   No results for input(s): PH, PCO2, PO2 in the last 72 hours.   No results for input(s): CPK, CKNDX, TROIQ in the last 72 hours.     No lab exists for component: CPKMB  No results found for: CHOL, CHOLX, CHLST, CHOLV, HDL, LDL, LDLC, DLDLP, TGLX, TRIGL, TRIGP, CHHD, CHHDX        Lab Results   Component Value Date/Time     Glucose (POC) 182 10/22/2017 06:03 AM     Glucose (POC) 205 10/21/2017 11:28 PM     Glucose (POC) 206 10/21/2017 04:39 PM     Glucose (POC) 182 10/21/2017 11:49 AM     Glucose (POC) 188 10/21/2017 06:40 AM            Lab Results   Component Value Date/Time     Color YELLOW/STRAW 06/25/2017 11:40 AM     Appearance CLEAR 06/25/2017 11:40 AM     Specific gravity 1.018 06/25/2017 11:40 AM     pH (UA) 5.0 06/25/2017 11:40 AM     Protein 300 06/25/2017 11:40 AM     Glucose NEGATIVE  06/25/2017 11:40 AM     Ketone NEGATIVE  06/25/2017 11:40 AM     Bilirubin NEGATIVE  06/25/2017 11:40 AM     Urobilinogen 0.2 06/25/2017 11:40 AM     Nitrites NEGATIVE  06/25/2017 11:40 AM     Leukocyte Esterase NEGATIVE  06/25/2017 11:40 AM     Epithelial cells FEW 06/25/2017 11:40 AM     Bacteria NEGATIVE  06/25/2017 11:40 AM     WBC 0-4 06/25/2017 11:40 AM     RBC 0-5 06/25/2017 11:40 AM            Medications Reviewed:             Current Facility-Administered Medications   Medication Dose Route Frequency    SITagliptin (JANUVIA) tablet tab 50 mg  50 mg Oral DAILY    glipiZIDE (GLUCOTROL) tablet 5 mg  5 mg Oral ACB    sodium chloride (NS) flush 5-10 mL  5-10 mL IntraVENous Q8H    sodium chloride (NS) flush 5-10 mL  5-10 mL IntraVENous PRN    0.9% sodium chloride infusion 250 mL  250 mL IntraVENous PRN    glucose chewable tablet 16 g  4 Tab Oral PRN    dextrose (D50W) injection syrg 12.5-25 g  12.5-25 g IntraVENous PRN    glucagon (GLUCAGEN) injection 1 mg  1 mg IntraMUSCular PRN    insulin lispro (HUMALOG) injection    SubCUTAneous Q6H    carvedilol (COREG) tablet 25 mg  25 mg Oral BID    simvastatin (ZOCOR) tablet 20 mg  20 mg Oral QHS    amLODIPine (NORVASC) tablet 10 mg  10 mg Oral DAILY    cloNIDine HCl (CATAPRES) tablet 0.1 mg  0.1 mg Oral BID    0.9% sodium chloride infusion 250 mL  250 mL IntraVENous PRN            DISCHARGE DIAGNOSES / PLAN:      1.  as above        PENDING TEST RESULTS:   At the time of discharge the following test results are still pending: na    FOLLOW UP APPOINTMENTS:    Follow-up Information     Follow up With Details Comments Contact Info    Marco Antonio Holly MD   [place] [address] Jose Reynaga MD In 1 week or as directed, ; if not clear call his office when you get home to [address] appointment time/date [place] [address]  [address] [facility] [phone]             ADDITIONAL CARE RECOMMENDATIONS: na    DIET: Diabetic Diet     ACTIVITY: Activity as tolerated    WOUND CARE: na    EQUIPMENT needed: na      DISCHARGE MEDICATIONS:  Current Discharge Medication List      CONTINUE these medications which have CHANGED    Details   glipiZIDE (GLUCOTROL) 5 mg tablet Take 1 Tab by mouth Before breakfast and dinner. Qty: 60 Tab, Refills: 0      amLODIPine (NORVASC) 10 mg tablet Take 1 Tab by mouth daily. Qty: 30 Tab, Refills: 0         CONTINUE these medications which have NOT CHANGED    Details   SITagliptin (JANUVIA) 50 mg tablet Take 50 mg by mouth daily. cloNIDine HCl (CATAPRES) 0.1 mg tablet Take 1 Tab by mouth two (2) times a day.   Qty: 60 Tab, Refills: 5      carvedilol (COREG) 25 mg tablet Take 1 Tab by mouth two (2) times a day. Qty: 60 Tab, Refills: 0      simvastatin (ZOCOR) 20 mg tablet Take 20 mg by mouth nightly. omega 3-dha-epa-fish oil (FISH OIL) 100-160-1,000 mg cap Take 1 Cap by mouth two (2) times a day. cinnamon bark (CINNAMON) 500 mg cap Take 500 mg by mouth two (2) times a day. promethazine-codeine (PHENERGAN WITH CODEINE) 6.25-10 mg/5 mL syrup Take 5 mL by mouth every six (6) hours as needed for Cough. STOP taking these medications       bumetanide (BUMEX) 1 mg tablet Comments:   Reason for Stopping:         potassium chloride SR (KLOR-CON 10) 10 mEq tablet Comments:   Reason for Stopping:         aspirin delayed-release 81 mg tablet Comments:   Reason for Stopping:                 NOTIFY YOUR PHYSICIAN FOR ANY OF THE FOLLOWING:   Fever over 101 degrees for 24 hours. Chest pain, shortness of breath, fever, chills, nausea, vomiting, diarrhea, change in mentation, falling, weakness, bleeding. Severe pain or pain not relieved by medications. Or, any other signs or symptoms that you may have questions about.     DISPOSITION:    Home With:   OT  PT  HH  RN       Long term SNF/Inpatient Rehab   x Independent/assisted living    Hospice    Other:       PATIENT CONDITION AT DISCHARGE:     Functional status    Poor     Deconditioned     Independent      Cognition   x  Lucid     Forgetful     Dementia      Catheters/lines (plus indication)    Myers     PICC     PEG    x None      Code status   x  Full code     DNR      PHYSICAL EXAMINATION AT DISCHARGE:   Refer to Progress Note  Pul; clear  CV: controled Afib by monitor this am,   Ext: supple no edema  Visit Vitals    /49 (BP 1 Location: Right arm, BP Patient Position: At rest;Lying right side)    Pulse 67    Temp 98.6 °F (37 °C)    Resp 20    Ht 5' 7\" (1.702 m)    Wt 82.5 kg (181 lb 14.1 oz)    SpO2 98%    BMI 28.49 kg/m2          CHRONIC MEDICAL DIAGNOSES:  Problem List as of 10/23/2017  Date Reviewed: 8/14/2017          Codes Class Noted - Resolved    Paroxysmal atrial fibrillation ([facility] ICD-10-CM: I48.0  ICD-9-CM: 427.31  10/21/2017 - Present    Overview Signed 10/21/2017  1:45 PM by James Murguia MD     new onset afib with BRPR 10-19-17 admit             CKD (chronic kidney disease) stage 3, GFR 30-59 ml/min ICD-10-CM: N18.3  ICD-9-CM: 585.3  10/21/2017 - Present    Overview Signed 10/21/2017  1:47 PM by James Murguia MD     hypertension and DM nephrosclerosis             * (Principal)GI bleed ICD-10-CM: K92.2  ICD-9-CM: 578.9  10/20/2017 - Present        New onset a-fib [facility]) ICD-10-CM: I48.91  ICD-9-CM: 427.31  10/20/2017 - Present        Hyperglycemia due to type 2 diabetes mellitus ([facility] ICD-10-CM: E11.65  ICD-9-CM: 250.00  10/20/2017 - Present        Hypokalemia ICD-10-CM: E87.6  ICD-9-CM: 276.8  6/25/2017 - Present        Generalized weakness ICD-10-CM: R53.1  ICD-9-CM: 780.79  6/25/2017 - Present        Elevated troponin ICD-10-CM: R74.8  ICD-9-CM: 790.6  6/25/2017 - Present        KATALINA (acute kidney injury) ([facility] ICD-10-CM: N17.9  ICD-9-CM: 584.9  6/25/2017 - Present        Acute renal failure (ARF) ([facility] ICD-10-CM: N17.9  ICD-9-CM: 584.9  3/16/2017 - Present        CKD (chronic kidney disease) ICD-10-CM: N18.9  ICD-9-CM: 585.9  1/20/2017 - Present        Type 2 diabetes mellitus with diabetic peripheral angiopathy without gangrene ([facility] ICD-10-CM: E11.51  ICD-9-CM: 250.70, 443.81  9/27/2015 - Present        SOB (shortness of breath) ICD-10-CM: R06.02  ICD-9-CM: 786.05  7/15/2014 - Present        PVC's (premature ventricular contractions) ICD-10-CM: I49.3  ICD-9-CM: 427.69  5/26/2014 - Present        Carotid arterial disease (HCC) ICD-10-CM: I77.9  ICD-9-CM: 447.9  Unknown - Present        Hypercholesteremia ICD-10-CM: E78.00  ICD-9-CM: 272.0  Unknown - Present        PVD (peripheral vascular disease) (HCC) ICD-10-CM: I73.9  ICD-9-CM: 443.9  Unknown - Present        Bradycardia ICD-10-CM: R00.1  ICD-9-CM: 427.89  Unknown - Present        CAD (coronary artery disease) ICD-10-CM: I25.10  ICD-9-CM: 414.00  Unknown - Present        Hypertension, essential, benign ICD-10-CM: I10  ICD-9-CM: 401.1  Unknown - Present        RESOLVED: Fever ICD-10-CM: R50.9  ICD-9-CM: 780.60  3/16/2017 - 3/18/2017        RESOLVED: Hyponatremia ICD-10-CM: E87.1  ICD-9-CM: 276.1  3/16/2017 - 3/18/2017        RESOLVED: Urinary retention ICD-10-CM: R33.9  ICD-9-CM: 788.20  1/19/2017 - 1/20/2017        RESOLVED: Heart palpitations ICD-10-CM: R00.2  ICD-9-CM: 785.1  7/15/2014 - 9/20/2016        RESOLVED: Atherosclerosis of native artery of extremity with intermittent claudication ([facility] ICD-10-CM: I70.219  ICD-9-CM: 440.21  2/19/2014 - 9/20/2016        RESOLVED: Other dyspnea and respiratory abnormality ICD-10-CM: R06.09, R09.89  ICD-9-CM: 786.09  Unknown - 9/20/2016        RESOLVED: Diabetes mellitus, type 2 ([facility] ICD-10-CM: E11.9  ICD-9-CM: 250.00  Unknown - 9/20/2016              Greater than 30 minutes were spent with the patient on counseling and coordination of care    Signed:   Radha Strickland MD  10/23/2017  9:09 AM

## 2017-10-23 NOTE — DIABETES MGMT
DTC Progress Note    Recommendations/ Comments:   Chart reviewed for hyperglycemia. Noted that Glipizide was increased to BID. DTC will continue to follow patient as needed. Chart reviewed and initial evaluation complete on Fidel Murcia . .    Patient is a 81 y.o. male with known history of Type 2 Diabetes on Januvia 50mg daily, Actos 45mg daily, and Glipizide 10mg bid at home. A1c:   Lab Results   Component Value Date/Time    Hemoglobin A1c 9.8 10/20/2017 08:11 AM    Hemoglobin A1c, External 8.4 08/15/2017       Recent Glucose Results:   Lab Results   Component Value Date/Time    GLUCPOC 184 (H) 10/23/2017 06:51 AM    GLUCPOC 150 (H) 10/22/2017 10:41 PM    GLUCPOC 160 (H) 10/22/2017 04:34 PM        Lab Results   Component Value Date/Time    Creatinine 1.62 10/22/2017 06:25 AM       Active Orders   Diet    DIET DIABETIC CONSISTENT CARB Regular        PO intake:   Patient Vitals for the past 72 hrs:   % Diet Eaten   10/22/17 1635 100 %   10/22/17 0759 100 %   10/21/17 1655 100 %   10/21/17 1146 90 %   10/21/17 0843 90 %       Current hospital DM medication:  -Humalog normal sensitivity correction  - Glipizide 5 mg BID        Thank you.   Angelina Gonsales, MS, RN, CDE

## 2017-10-23 NOTE — PROGRESS NOTES
Nephrology Progress Note  Roselia Young . 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  hgb stable. No cp, sob, n/v/d reported at this time. Current Medications: all current  Medications have been eviewed in EPIC  Review of Systems: Pertinent items are noted in HPI. Objective:  Vitals:    Vitals:    10/22/17 2329 10/23/17 0242 10/23/17 0341 10/23/17 0811   BP: 117/42  131/45 153/49   Pulse: 73  63 67   Resp: 18  20 20   Temp: 97.9 °F (36.6 °C)  98.2 °F (36.8 °C) 98.6 °F (37 °C)   SpO2: 94%  96% 98%   Weight:  82.5 kg (181 lb 14.1 oz)     Height:         Intake and Output:  10/23 0701 - 10/23 1900  In: 0   Out: 250 [Urine:250]  10/21 1901 - 10/23 0700  In: 600 [P.O.:600]  Out: 2075 [Urine:2075]    Physical Examination:  General: NAD,Conversant   Neck:  Supple, no mass  Resp:  Lungs CTA B/L, no wheezing , normal respiratory effort  CV:  RRR,  no murmur or rub,no LE edema  GI:  Soft, NT, + Bowel sounds, no hepatosplenomegaly  Neurologic:  Non focal  Psych:             AAO x 3 appropriate affect   Skin:  No Rash      []    High complexity decision making was performed  []    Patient is at high-risk of decompensation with multiple organ involvement    Lab Data Personally Reviewed: I have reviewed all the pertinent labs, microbiology data and radiology studies during assessment.     Recent Labs      10/22/17   0625  10/21/17   0639   NA  139  140   K  4.1  4.5   CL  107  109* CO2  25  22   GLU  174*  163*   BUN  32*  36*   CREA  1.62*  1.62*   CA  8.0*  7.8*     Recent Labs      10/23/17   0818  10/23/17   0237  10/22/17   1507  10/22/17   0625   10/21/17   0639   WBC  5.4   --    --   5.4   --   6.3   HGB  8.4*  8.1*  9.7*  8.7*   < >  10.1*   HCT  24.7*  23.7*  29.2*  26.0*   < >  30.1*   PLT  156   --    --   164   --   170    < > = values in this interval not displayed.      No results found for: SDES  Lab Results   Component Value Date/Time    Culture result: GENEVA ALBICANS 03/16/2017 06:00 AM    Culture result: NO GROWTH 5 DAYS 03/16/2017 05:40 AM     Recent Results (from the past 24 hour(s))   GLUCOSE, POC    Collection Time: 10/22/17 11:21 AM   Result Value Ref Range    Glucose (POC) 279 (H) 65 - 100 mg/dL    Performed by [facility]    HGB & HCT    Collection Time: 10/22/17  3:07 PM   Result Value Ref Range    HGB 9.7 (L) 12.1 - 17.0 g/dL    HCT 29.2 (L) 36.6 - 50.3 %   GLUCOSE, POC    Collection Time: 10/22/17  4:34 PM   Result Value Ref Range    Glucose (POC) 160 (H) 65 - 100 mg/dL    Performed by Rosmery Short, POC    Collection Time: 10/22/17 10:41 PM   Result Value Ref Range    Glucose (POC) 150 (H) 65 - 100 mg/dL    Performed by Blanca Simpson    HGB & HCT    Collection Time: 10/23/17  2:37 AM   Result Value Ref Range    HGB 8.1 (L) 12.1 - 17.0 g/dL    HCT 23.7 (L) 36.6 - 50.3 %   GLUCOSE, POC    Collection Time: 10/23/17  6:51 AM   Result Value Ref Range    Glucose (POC) 184 (H) 65 - 100 mg/dL    Performed by Blanca Simpson    CBC WITH AUTOMATED DIFF    Collection Time: 10/23/17  8:18 AM   Result Value Ref Range    WBC 5.4 4.1 - 11.1 K/uL    RBC 2.94 (L) 4.10 - 5.70 M/uL    HGB 8.4 (L) 12.1 - 17.0 g/dL    HCT 24.7 (L) 36.6 - 50.3 %    MCV 84.0 80.0 - 99.0 FL    MCH 28.6 26.0 - 34.0 PG    MCHC 34.0 30.0 - 36.5 g/dL    RDW 14.3 11.5 - 14.5 %    PLATELET 159 487 - 449 K/uL    NEUTROPHILS 61 32 - 75 %    LYMPHOCYTES 24 12 - 49 %    MONOCYTES 9 5 - 13 % EOSINOPHILS 6 0 - 7 %    BASOPHILS 0 0 - 1 %    ABS. NEUTROPHILS 3.3 1.8 - 8.0 K/UL    ABS. LYMPHOCYTES 1.3 0.8 - 3.5 K/UL    ABS. MONOCYTES 0.5 0.0 - 1.0 K/UL    ABS. EOSINOPHILS 0.3 0.0 - 0.4 K/UL    ABS. BASOPHILS 0.0 0.0 - 0.1 K/UL               Shelby Umanzor MD  [address]  Phone - [phone]   Fax - [phone]  [email or web address]

## 2017-10-23 NOTE — PROGRESS NOTES
CM reviewed chart. Patient was admitted from the ER with rectal bleed. Per patient,  he saw bright red blood mixed with stool in his underwear. Of note, patient takes Aspirin daily. Patient has a past medical history of CAD s/p CABG, DM2, HTN, carotid artery disease, PAD, diverticulosis with a prior GI bleed in the past. He has CKD III/IV with a baseline Cr around 1.8 to 2 in the past.    CM met with patient, explained role and discussed discharge planning. Patient alert, oriented and answered questions appropriately. He lives with his wife in their private residence. He is independent without any assistive devices and gainfully employed. Patient sees his PCP every 3 months and his next appointment is in December 2017. Patient's wife will provide transportation home and he did not voice any discharge barriers. Patient will be discharged home in care of his wife. CM will continue to follow as needed. Lanie Baxter MSA, RN, CRM. Care Management Interventions  PCP Verified by CM: Yes (Dr. Adi Guzman.)  Mode of Transport at Discharge:  Other (see comment) (Private car)  Transition of Care Consult (CM Consult): Discharge Planning  MyChart Signup: No  Discharge Durable Medical Equipment: No  Health Maintenance Reviewed: Yes  Physical Therapy Consult: No  Occupational Therapy Consult: No  Speech Therapy Consult: No  Current Support Network: Lives with Spouse  Confirm Follow Up Transport: Family  Plan discussed with Pt/Family/Caregiver: Yes  Discharge Location  Discharge Placement: Home with family assistance

## 2017-10-23 NOTE — DISCHARGE INSTRUCTIONS
Discharge Instructions       PATIENT ID: Amber Mcdaniel MRN: [id number]   YOB: 1935    DATE OF ADMISSION: 10/20/2017  3:47 AM    DATE OF DISCHARGE: 10/23/2017    PRIMARY CARE PROVIDER: Opal Mauro MD     ATTENDING PHYSICIAN: Taz Villar MD  DISCHARGING PROVIDER: Taz Villar MD    To contact this individual call [phone] and ask the  to page. If unavailable ask to be transferred the Adult Hospitalist Department. DISCHARGE DIAGNOSES see dc note     CONSULTATIONS: IP CONSULT TO HOSPITALIST  IP CONSULT TO GASTROENTEROLOGY  IP CONSULT TO GASTROENTEROLOGY  IP CONSULT TO INTERVENTIONAL RADIOLOGY  IP CONSULT TO NEPHROLOGY  IP CONSULT TO CARDIOLOGY    PROCEDURES/SURGERIES: * No surgery found *    PENDING TEST RESULTS:   At the time of discharge the following test results are still pending: na    FOLLOW UP APPOINTMENTS:   Follow-up Information     Follow up With Details Comments Contact Info    Opal Mauro MD   [place] [address] Jose Clemente MD In 1 week or as directed, ; if not clear call his office when you get home to [address] appointment time/date [place] [address]  [address] [place] [phone]             ADDITIONAL CARE RECOMMENDATIONS: na    DIET: Diabetic Diet     ACTIVITY: Activity as tolerated    WOUND CARE: na    EQUIPMENT needed: na      DISCHARGE MEDICATIONS:   See Medication Reconciliation Form    · It is important that you take the medication exactly as they are prescribed. · Keep your medication in the bottles provided by the pharmacist and keep a list of the medication names, dosages, and times to be taken in your wallet. · Do not take other medications without consulting your doctor. NOTIFY YOUR PHYSICIAN FOR ANY OF THE FOLLOWING:   Fever over 101 degrees for 24 hours. Chest pain, shortness of breath, fever, chills, nausea, vomiting, diarrhea, change in mentation, falling, weakness, bleeding. Severe pain or pain not relieved by medications. Or, any other signs or symptoms that you may have questions about.       DISPOSITION:    Home With:   OT  PT  HH  RN       SNF/Inpatient Rehab/LTAC   x Independent/assisted living    Hospice    Other:          Signed:   Antionette Aranda MD  10/23/2017  9:08 AM

## 2017-10-23 NOTE — PROGRESS NOTES
Problem: Diabetes Self-Management  Goal: *Monitoring blood glucose, interpreting and using results  Identify recommended blood glucose targets  and personal targets. Outcome: Progressing Towards Goal  Blood sugar monitored AC/HS. Results and treatment discussed with patient    Problem: Pressure Injury - Risk of  Goal: *Prevention of pressure ulcer  Outcome: Progressing Towards Goal  Skin on cdi with no apparent breakdown    Problem: Falls - Risk of  Goal: *Absence of Falls  Document Tricia Fall Risk and appropriate interventions in the flowsheet.    Outcome: Progressing Towards Goal  Fall Risk Interventions:            Medication Interventions: Teach patient to arise slowly                  Problem: Upper and Lower GI Bleed: Day 3  Goal: Diagnostic Test/Procedures  Outcome: Progressing Towards Goal  q8h h&h

## 2017-10-23 NOTE — CDMP QUERY
Please clarify if this patient is being treated/managed for:    =>Acute Blood Loss Anemia in the setting of \"active bleeding in the descending/sigmoid colon and IR not successfully embolize, requiring x2 units of PRBCS, close monitoring of H&H, Telemetry  . =>Other Explanation of clinical findings  =>Unable to Determine (no explanation of clinical findings)    The medical record reflects the following clinical findings, treatment, and risk factors:    Risk Factors: hx. of LVAD, on chronic anticoagulants, hx. of multiple GI bleeds  Clinical Indicators: Bright red blood per rectum, hgb dropping from 10.8 down to 8.7 during the admission  Treatment: Close monitoring of H&H, Received x2 units of PRBCS, Attempted Coil embolization. Please clarify and document your clinical opinion in the progress notes and discharge summary including the definitive and/or presumptive diagnosis, (suspected or probable), related to the above clinical findings. Please include clinical findings supporting your diagnosis.     Thank you,  Isha Crystal, BSN, RN, [address]    [phone]

## 2017-10-23 NOTE — PROGRESS NOTES
Bedside shift change report given to [employer] (oncoming nurse) by Conrado Armstrong (offgoing nurse). Report included the following information SBAR, Intake/Output, MAR and Cardiac Rhythm A fib.

## 2017-10-23 NOTE — PROGRESS NOTES
10/23/2017    MICHELLE Lujan-C   [address] [facility] PCP:-Richard Nancy Mohs, MD Arlon Boers [facility]  -Progress Note     . Dorota Rodriguez Amber List is a 81 y.o. male   Sitting EOB. Getting discharged today. Walked yesterday with NSG and no SOB. Assessment/Plan/Discussion:Cardiology Attending:     Patient seen earlier today and examined  and agree with Advance Practice Provider (MADDIE, NP,PA)  assessment and plans.   Pat List is a 81 y.o. male   Stable rhythm of afib rate ok  Less AGUILAR  hgb up and down  Stable CAD  Fu   Future Appointments  Date Time Provider Muna Delgado   11/15/2017 10:00 AM Shweta Clemente MD [facility] SCHED   12/14/2017 10:20 AM Shweta Clemente  [address]        Shweta Clemente MD 10/23/2017           Remains on tele in stable rate of afib this morning            New onset atrial fibrillation -has been PAF, in and out , now in afib and no complaints of cp,sob feels well  ---no OAC or warfarin for now with new recent GI bleed and anemia  The heart rate is stable  He is asymptomatic   EF is WNL    CAD  Coreg 25 mg BID                        - Zocor 20 mg                        - ASA held 2/2 bleeding    HTN            - Coreg, HCTZ, Catapres, Norvasc  --now stable    GI bleed/BRBPR + bleeding scan  Lab Results   Component Value Date/Time    HGB 8.4 10/23/2017 08:18 AM    will need close fu on Hgb with PCP or GI    DM2-  Lab Results   Component Value Date/Time    Hemoglobin A1c 9.8 10/20/2017 08:11 AM    Hemoglobin A1c, External 8.4 08/15/2017        PVD-statin, aspirin    KATALINA on CKD 3  Has been on lasix at home with improvement of SOB after starting the diuretics  Lab Results   Component Value Date/Time    Creatinine 1.62 10/22/2017 06:25 AM     Suspect cardiorenal syndrome    Overall cardiac issues are stable  Get back on usual meds except aspirin/NOAC  Once Hgb stable then likely home   With close fu on Hgb with PCP  Stay off ASA and no OAC due to severe anemia  Will see on November 15, at 10 am.         Other co-morbids:    has a past medical history of CAD (coronary artery disease); Carotid arterial disease (HCC); CKD (chronic kidney disease) stage 3, GFR 30-59 ml/min (10/21/2017); Diabetes mellitus, type 2 ([facility] Hypercholesteremia; Hypertension; Paroxysmal atrial fibrillation (HCC) (10/21/2017); PVC's (premature ventricular contractions) (2014); and PVD (peripheral vascular disease) ([facility].). Cardiac Studies/Hx:  Holter monitor14=showing rate of  with frequent PACs, rare periods of short RP interval SVT up to 122 and frequent PVCs. sinus rhythm with similar findings, frequent PVCs and short RP interval tachycardia. NUKE  14,  2 minutes, 20 seconds,  EF of 55% with no ischemia. PVD= Dr. Lucrecia Cee atherectomy to distal SFA and distal popliteal with angioplasty to both lesions and stent to the SFA by Dr. Lucrecia Cee on 3/14/14. Previously had AIBs of 0.67 on the left, 0.71 on the right with the right seemingly due to distal disease. CAD/CABG off pump x3 3/2008 . =post op anemia and renal failure  CATH 2008= severe three-vessel left main coronary artery disease, 40% disease of the left main and take off of the LAD and circumflex complex, 70% stenosis of the ostial circ and 85% of the LAD. Between the first and second marginal, the circumflex had 70% stenosis and between the third marginal and PDA there was a 70% stenosis, LAD ostial lesion RCA proximal 60% tapering, EF 60%-70%, bilaterally patent renal arteries, mild atherosclerosis of the distal abdominal aorta. Mild carotid artery disease 3-7-08 then 12 with 10-49% left, less than 10% on right  Dyslipidemia 10-25-10  TG 91 HDL 40 LDL 48  SOCIAL: Drinks no alcohol, quit smoking several years ago, works as an . Lives with his wife and has four children. Enjoys gardening, fishing and music.    FAMILY HISTORY: Mother  of cancer at 76, father  of Parkinson's at 70, one brother  of cancer of the liver at 76 and one  of an infection at 64. Past Medical History:   Diagnosis Date    CAD (coronary artery disease)     s/p CABG     Carotid arterial disease (HCC)     CKD (chronic kidney disease) stage 3, GFR 30-59 ml/min 10/21/2017    hypertension and DM nephrosclerosis    Diabetes mellitus, type 2 ([email or web address]     Hypercholesteremia     Hypertension     Paroxysmal atrial fibrillation ([email or web address] 10/21/2017    new onset afib with BRPR 10-19-17 admit    PVC's (premature ventricular contractions) 2014    PVD (peripheral vascular disease) ([facility]       ROS-pertinents  negative except as above  The pertinent portions of the medical history,physician and nursing notes, meds,vitals , labs and Ins/Outs,are reviewed in the electronic record.     Results for orders placed or performed during the hospital encounter of 10/20/17   EKG, 12 LEAD, INITIAL   Result Value Ref Range    Ventricular Rate 74 BPM    Atrial Rate 79 BPM    QRS Duration 88 ms    Q-T Interval 420 ms    QTC Calculation (Bezet) 466 ms    Calculated R Axis -48 degrees    Calculated T Axis 131 degrees    Diagnosis       Atrial fibrillation  Left axis deviation  ST & T wave abnormality, consider lateral ischemia  Prolonged QT  When compared with ECG of 2017 09:47,  Atrial fibrillation has replaced Sinus rhythm  T wave amplitude has increased in Anterior leads  T wave inversion now evident in Lateral leads  Confirmed by Kelli Tomlinson MD. ([id number]) on 10/22/2017 9:04:03 PM     Results for orders placed or performed in visit on 11   AMB POC EKG ROUTINE W/ 12 LEADS, INTER & REP    Narrative    See scanned results          Objective:    Physical Exam:   Vitals:    10/22/17 2329 10/23/17 0242 10/23/17 0341 10/23/17 0811   BP: 117/42  131/45 153/49   BP 1 Location:    Right arm   BP Patient Position:    At rest;Lying right side   Pulse: 73  63 67   Resp: 18 20 20   Temp: 97.9 °F (36.6 °C)  98.2 °F (36.8 °C) 98.6 °F (37 °C)   SpO2: 94%  96% 98%   Weight:  181 lb 14.1 oz (82.5 kg)     Height:         Patient Vitals for the past 12 hrs:   Temp Pulse Resp BP SpO2   10/23/17 0811 98.6 °F (37 °C) 67 20 153/49 98 %   10/23/17 0341 98.2 °F (36.8 °C) 63 20 131/45 96 %   10/22/17 2329 97.9 °F (36.6 °C) 73 18 117/42 94 %      Constitutional:  well-developed and well-nourished. No distress. HENT: Head: Normocephalic. Eyes: No scleral icterus. Neck:  Neck supple. No JVD present. No tracheal deviation present. Pulmonary/Chest: Effort normal and breath sounds normal. No stridor. No respiratory distress, wheezes or rales. Cardiovascular: Irregularly irregular rhythm, , normal heart sounds . Exam reveals no gallop and no friction rub. No murmur heard. PMI non displaced. Extremities:  no edema. Abdominal:   no abnormal distension. Neurological:  alert and oriented. Coordination seems grossly normal.   Skin: Skin is not cold. No rash noted. Not diaphoretic. No erythema. Psychiatric:  Grossly normal mood and affect.   Behavior appears normal.    Last 24hr Input/Output:    Intake/Output Summary (Last 24 hours) at 10/23/17 1028  Last data filed at 10/23/17 0828   Gross per 24 hour   Intake              360 ml   Output             1425 ml   Net            -1065 ml        Data Review:   Recent Results (from the past 24 hour(s))   GLUCOSE, POC    Collection Time: 10/22/17 11:21 AM   Result Value Ref Range    Glucose (POC) 279 (H) 65 - 100 mg/dL    Performed by RAVI MATHEWS    HGB & HCT    Collection Time: 10/22/17  3:07 PM   Result Value Ref Range    HGB 9.7 (L) 12.1 - 17.0 g/dL    HCT 29.2 (L) 36.6 - 50.3 %   GLUCOSE, POC    Collection Time: 10/22/17  4:34 PM   Result Value Ref Range    Glucose (POC) 160 (H) 65 - 100 mg/dL    Performed by [address], POC    Collection Time: 10/22/17 10:41 PM   Result Value Ref Range    Glucose (POC) 150 (H) 65 - 100 mg/dL Performed by Laila Ruiz    HGB & HCT    Collection Time: 10/23/17  2:37 AM   Result Value Ref Range    HGB 8.1 (L) 12.1 - 17.0 g/dL    HCT 23.7 (L) 36.6 - 50.3 %   GLUCOSE, POC    Collection Time: 10/23/17  6:51 AM   Result Value Ref Range    Glucose (POC) 184 (H) 65 - 100 mg/dL    Performed by Laila Ruiz    CBC WITH AUTOMATED DIFF    Collection Time: 10/23/17  8:18 AM   Result Value Ref Range    WBC 5.4 4.1 - 11.1 K/uL    RBC 2.94 (L) 4.10 - 5.70 M/uL    HGB 8.4 (L) 12.1 - 17.0 g/dL    HCT 24.7 (L) 36.6 - 50.3 %    MCV 84.0 80.0 - 99.0 FL    MCH 28.6 26.0 - 34.0 PG    MCHC 34.0 30.0 - 36.5 g/dL    RDW 14.3 11.5 - 14.5 %    PLATELET 644 912 - 205 K/uL    NEUTROPHILS 61 32 - 75 %    LYMPHOCYTES 24 12 - 49 %    MONOCYTES 9 5 - 13 %    EOSINOPHILS 6 0 - 7 %    BASOPHILS 0 0 - 1 %    ABS. NEUTROPHILS 3.3 1.8 - 8.0 K/UL    ABS. LYMPHOCYTES 1.3 0.8 - 3.5 K/UL    ABS. MONOCYTES 0.5 0.0 - 1.0 K/UL    ABS. EOSINOPHILS 0.3 0.0 - 0.4 K/UL    ABS.  BASOPHILS 0.0 0.0 - 0.1 K/UL    Babara Boeck, PA-C 10/23/2017 _

## 2017-10-24 ENCOUNTER — PATIENT OUTREACH (OUTPATIENT)
Dept: CARDIOLOGY CLINIC | Age: 82
End: 2017-10-24

## 2017-10-24 NOTE — PROGRESS NOTES
NN Note:    Alexandria Gongora is a 81 y.o. male admitted to [facility] 10/20-10/23 for GI bleed. This patient was received as a referral from inpatient admission   Inpatient RRAT Score: 34  Medication Management:  good adherence and good understanding    Summary of patients top three problems:     Problem 1: GIB/Diverticulosis-Patient stated he had a \"flare up\" of diverticulosis several years ago- patient denies having anymore blood in his stool since discharge. Discussed the importance of a high fiber diet to help avoid flare-ups in the future. Reviewed medications with patient and they are up-to-date. Encouraged follow-up with PCP and nephrology. Problem 2: Afib- patient denies any sob or chest pain presently. Patient is not on [address] because of recent GI bleed. Irma Ann will continue to follow- patient is scheduled to see him 11/15/17. Advance Care Planning:   Patient was offered the opportunity to discuss advance care planning:  yes     Does patient have an Advance Directive:  no   If no, did you provide information on Advance Care Planning? yes     Follow up appointments:  Dr. James Martin 11/15; Dr. Blanco(PCP)/hgb check-TBD; Dr. Eligio Singer (neph) TBD  Current Outpatient Prescriptions   Medication Sig    amLODIPine (NORVASC) 10 mg tablet Take 1 Tab by mouth daily.  glipiZIDE (GLUCOTROL) 5 mg tablet Take 1 Tab by mouth Before breakfast and dinner.  SITagliptin (JANUVIA) 50 mg tablet Take 50 mg by mouth daily.  cloNIDine HCl (CATAPRES) 0.1 mg tablet Take 1 Tab by mouth two (2) times a day.  carvedilol (COREG) 25 mg tablet Take 1 Tab by mouth two (2) times a day.  simvastatin (ZOCOR) 20 mg tablet Take 20 mg by mouth nightly.  omega 3-dha-epa-fish oil (FISH OIL) 100-160-1,000 mg cap Take 1 Cap by mouth two (2) times a day.  promethazine-codeine (PHENERGAN WITH CODEINE) 6.25-10 mg/5 mL syrup Take 5 mL by mouth every six (6) hours as needed for Cough.     cinnamon bark (CINNAMON) 500 mg cap Take 500 mg by mouth two (2) times a day. No current facility-administered medications for this visit.

## 2017-11-21 ENCOUNTER — HOSPITAL ENCOUNTER (OUTPATIENT)
Dept: INFUSION THERAPY | Age: 82
Discharge: HOME | End: 2017-11-21
Payer: MEDICARE

## 2017-11-21 VITALS
DIASTOLIC BLOOD PRESSURE: 69 MMHG | RESPIRATION RATE: 18 BRPM | SYSTOLIC BLOOD PRESSURE: 151 MMHG | TEMPERATURE: 98.6 F | HEART RATE: 71 BPM

## 2017-11-21 LAB
ALBUMIN SERPL-MCNC: 3.2 G/DL (ref 3.5–5)
ANION GAP SERPL CALC-SCNC: 11 MMOL/L (ref 5–15)
BASOPHILS # BLD: 0.1 K/UL (ref 0–0.1)
BASOPHILS NFR BLD: 1 % (ref 0–1)
BUN SERPL-MCNC: 36 MG/DL (ref 6–20)
BUN/CREAT SERPL: 17 (ref 12–20)
CALCIUM SERPL-MCNC: 8.9 MG/DL (ref 8.5–10.1)
CHLORIDE SERPL-SCNC: 95 MMOL/L (ref 97–108)
CO2 SERPL-SCNC: 25 MMOL/L (ref 21–32)
CREAT SERPL-MCNC: 2.17 MG/DL (ref 0.7–1.3)
DIFFERENTIAL METHOD BLD: ABNORMAL
EOSINOPHIL # BLD: 0.6 K/UL (ref 0–0.4)
EOSINOPHIL NFR BLD: 10 % (ref 0–7)
ERYTHROCYTE [DISTWIDTH] IN BLOOD BY AUTOMATED COUNT: 14.5 % (ref 11.5–14.5)
FERRITIN SERPL-MCNC: 165 NG/ML (ref 26–388)
GLUCOSE SERPL-MCNC: 451 MG/DL (ref 65–100)
HCT VFR BLD AUTO: 32.4 % (ref 36.6–50.3)
HGB BLD-MCNC: 10.8 G/DL (ref 12.1–17)
IRON SATN MFR SERPL: 19 % (ref 20–50)
IRON SERPL-MCNC: 48 UG/DL (ref 35–150)
LYMPHOCYTES # BLD: 0.7 K/UL (ref 0.8–3.5)
LYMPHOCYTES NFR BLD: 12 % (ref 12–49)
MCH RBC QN AUTO: 27.4 PG (ref 26–34)
MCHC RBC AUTO-ENTMCNC: 33.3 G/DL (ref 30–36.5)
MCV RBC AUTO: 82.2 FL (ref 80–99)
MONOCYTES # BLD: 0.6 K/UL (ref 0–1)
MONOCYTES NFR BLD: 10 % (ref 5–13)
NEUTS SEG # BLD: 4.2 K/UL (ref 1.8–8)
NEUTS SEG NFR BLD: 67 % (ref 32–75)
PHOSPHATE SERPL-MCNC: 4.2 MG/DL (ref 2.6–4.7)
PLATELET # BLD AUTO: 309 K/UL (ref 150–400)
POTASSIUM SERPL-SCNC: 4.4 MMOL/L (ref 3.5–5.1)
RBC # BLD AUTO: 3.94 M/UL (ref 4.1–5.7)
RBC MORPH BLD: ABNORMAL
SODIUM SERPL-SCNC: 131 MMOL/L (ref 136–145)
TIBC SERPL-MCNC: 250 UG/DL (ref 250–450)
WBC # BLD AUTO: 6.2 K/UL (ref 4.1–11.1)

## 2017-11-21 PROCEDURE — 74011000258 HC RX REV CODE- 258: Performed by: INTERNAL MEDICINE

## 2017-11-21 PROCEDURE — 96372 THER/PROPH/DIAG INJ SC/IM: CPT

## 2017-11-21 PROCEDURE — 85025 COMPLETE CBC W/AUTO DIFF WBC: CPT | Performed by: INTERNAL MEDICINE

## 2017-11-21 PROCEDURE — 80069 RENAL FUNCTION PANEL: CPT | Performed by: INTERNAL MEDICINE

## 2017-11-21 PROCEDURE — 74011250636 HC RX REV CODE- 250/636: Performed by: INTERNAL MEDICINE

## 2017-11-21 PROCEDURE — 36415 COLL VENOUS BLD VENIPUNCTURE: CPT | Performed by: INTERNAL MEDICINE

## 2017-11-21 PROCEDURE — 82728 ASSAY OF FERRITIN: CPT | Performed by: INTERNAL MEDICINE

## 2017-11-21 PROCEDURE — 96374 THER/PROPH/DIAG INJ IV PUSH: CPT

## 2017-11-21 PROCEDURE — 83540 ASSAY OF IRON: CPT | Performed by: INTERNAL MEDICINE

## 2017-11-21 RX ADMIN — FERUMOXYTOL 510 MG: 510 INJECTION INTRAVENOUS at 14:24

## 2017-11-21 RX ADMIN — ERYTHROPOIETIN 20000 UNITS: 20000 INJECTION, SOLUTION INTRAVENOUS; SUBCUTANEOUS at 14:41

## 2017-11-21 NOTE — PROGRESS NOTES
Outpatient Infusion Center Short Visit Progress Note    5343 Pt admit to [facility] for labs/Procrit/and Feraheme 1/2 ambulatory in stable condition. Assessment completed. No new concerns voiced. Peripheral IV established with positive blood return, labs drawn per order and sent for processing. NS infusing KVO. Patient Vitals for the past 12 hrs:   Temp Pulse Resp BP   11/21/17 1344 97.7 °F (36.5 °C) 63 18 171/64     Medications:  Procrit SQ right arm  Feraheme    1515 Pt tolerated treatment well. Pt monitored for 30 min following infusion, no signs of reaction. Peripheral IV removed at discharge. D/c home ambulatory in no distress. Pt aware of next appointment scheduled for 11/28/17. Recent Results (from the past 12 hour(s))   CBC WITH AUTOMATED DIFF    Collection Time: 11/21/17  1:49 PM   Result Value Ref Range    WBC 6.2 4.1 - 11.1 K/uL    RBC 3.94 (L) 4.10 - 5.70 M/uL    HGB 10.8 (L) 12.1 - 17.0 g/dL    HCT 32.4 (L) 36.6 - 50.3 %    MCV 82.2 80.0 - 99.0 FL    MCH 27.4 26.0 - 34.0 PG    MCHC 33.3 30.0 - 36.5 g/dL    RDW 14.5 11.5 - 14.5 %    PLATELET 168 908 - 932 K/uL    NEUTROPHILS 67 32 - 75 %    LYMPHOCYTES 12 12 - 49 %    MONOCYTES 10 5 - 13 %    EOSINOPHILS 10 (H) 0 - 7 %    BASOPHILS 1 0 - 1 %    ABS. NEUTROPHILS 4.2 1.8 - 8.0 K/UL    ABS. LYMPHOCYTES 0.7 (L) 0.8 - 3.5 K/UL    ABS. MONOCYTES 0.6 0.0 - 1.0 K/UL    ABS. EOSINOPHILS 0.6 (H) 0.0 - 0.4 K/UL    ABS.  BASOPHILS 0.1 0.0 - 0.1 K/UL    DF SMEAR SCANNED      RBC COMMENTS TEARDROP CELLS  PRESENT       RENAL FUNCTION PANEL    Collection Time: 11/21/17  1:49 PM   Result Value Ref Range    Sodium 131 (L) 136 - 145 mmol/L    Potassium 4.4 3.5 - 5.1 mmol/L    Chloride 95 (L) 97 - 108 mmol/L    CO2 25 21 - 32 mmol/L    Anion gap 11 5 - 15 mmol/L    Glucose 451 (H) 65 - 100 mg/dL    BUN 36 (H) 6 - 20 MG/DL    Creatinine 2.17 (H) 0.70 - 1.30 MG/DL    BUN/Creatinine ratio 17 12 - 20      GFR est AA 36 (L) >60 ml/min/1.73m2    GFR est non-AA 29 (L) >60 ml/min/1.73m2    Calcium 8.9 8.5 - 10.1 MG/DL    Phosphorus 4.2 2.6 - 4.7 MG/DL    Albumin 3.2 (L) 3.5 - 5.0 g/dL   FERRITIN    Collection Time: 11/21/17  1:49 PM   Result Value Ref Range    Ferritin 165 26 - 388 NG/ML   IRON PROFILE    Collection Time: 11/21/17  1:49 PM   Result Value Ref Range    Iron 48 35 - 150 ug/dL    TIBC 250 250 - 450 ug/dL    Iron % saturation 19 (L) 20 - 50 %

## 2017-11-21 NOTE — PROGRESS NOTES
Problem: Patient Education:  Go to Education Activity  Goal: Patient/Family Education  Outcome: Progressing Towards Goal  Feraheme

## 2017-11-27 ENCOUNTER — OFFICE VISIT (OUTPATIENT)
Dept: CARDIOLOGY CLINIC | Age: 82
End: 2017-11-27

## 2017-11-27 VITALS
DIASTOLIC BLOOD PRESSURE: 60 MMHG | OXYGEN SATURATION: 99 % | WEIGHT: 161.8 LBS | HEART RATE: 69 BPM | RESPIRATION RATE: 16 BRPM | BODY MASS INDEX: 25.39 KG/M2 | HEIGHT: 67 IN | SYSTOLIC BLOOD PRESSURE: 120 MMHG

## 2017-11-27 DIAGNOSIS — I49.3 PVC'S (PREMATURE VENTRICULAR CONTRACTIONS): ICD-10-CM

## 2017-11-27 DIAGNOSIS — R06.09 DOE (DYSPNEA ON EXERTION): Primary | ICD-10-CM

## 2017-11-27 DIAGNOSIS — I25.10 CORONARY ARTERY DISEASE INVOLVING NATIVE CORONARY ARTERY OF NATIVE HEART WITHOUT ANGINA PECTORIS: ICD-10-CM

## 2017-11-27 DIAGNOSIS — I10 HYPERTENSION, ESSENTIAL, BENIGN: ICD-10-CM

## 2017-11-27 DIAGNOSIS — N18.30 CKD (CHRONIC KIDNEY DISEASE) STAGE 3, GFR 30-59 ML/MIN (HCC): ICD-10-CM

## 2017-11-27 DIAGNOSIS — E78.00 HYPERCHOLESTEREMIA: ICD-10-CM

## 2017-11-27 DIAGNOSIS — I73.9 PVD (PERIPHERAL VASCULAR DISEASE) (HCC): ICD-10-CM

## 2017-11-27 DIAGNOSIS — I50.32 DIASTOLIC CHF, CHRONIC (HCC): ICD-10-CM

## 2017-11-27 DIAGNOSIS — I48.20 ATRIAL FIBRILLATION, CHRONIC (HCC): ICD-10-CM

## 2017-11-27 RX ORDER — SODIUM CHLORIDE 9 MG/ML
50 INJECTION, SOLUTION INTRAVENOUS AS NEEDED
Status: CANCELLED | OUTPATIENT
Start: 2017-11-27 | End: 2017-11-28

## 2017-11-27 RX ORDER — POTASSIUM CHLORIDE 750 MG/1
10 CAPSULE, EXTENDED RELEASE ORAL DAILY
COMMUNITY
Start: 2017-09-18 | End: 2018-07-19

## 2017-11-27 RX ORDER — BUMETANIDE 1 MG/1
1 TABLET ORAL 2 TIMES DAILY
COMMUNITY
Start: 2017-10-01 | End: 2019-05-16

## 2017-11-27 RX ORDER — SODIUM CHLORIDE 0.9 % (FLUSH) 0.9 %
10 SYRINGE (ML) INJECTION AS NEEDED
Status: CANCELLED | OUTPATIENT
Start: 2017-11-27 | End: 2017-11-28

## 2017-11-27 NOTE — MR AVS SNAPSHOT
Visit Information Date & Time Provider Department Dept. Phone Encounter #  
 11/27/2017 10:20 AM Teagan Richardson MD [facility] Juan Carlos Schmidt [phone] [id number] Your Appointments 12/14/2017 10:20 AM  
ESTABLISHED PATIENT with Teagan Richardson MD  
[facility] (ANAHI SCHEDULING) Appt Note: 6 month f/u  
 [address] [place] [address]  
[address] [address] Upcoming Health Maintenance Date Due  
 FOOT EXAM Q1 8/28/1945 EYE EXAM RETINAL OR DILATED Q1 8/28/1946 DTaP/Tdap/Td series (1 - Tdap) 8/28/1957 ZOSTER VACCINE AGE 60> 6/28/1996 GLAUCOMA SCREENING Q2Y 8/28/2001 Pneumococcal 65+ High/Highest Risk (1 of 2 - PCV13) 8/28/2001 MEDICARE YEARLY EXAM 8/28/2001 LIPID PANEL Q1 10/25/2011 Influenza Age 5 to Adult 8/1/2017 HEMOGLOBIN A1C Q6M 4/20/2018 MICROALBUMIN Q1 8/15/2018 Allergies as of 11/27/2017  Review Complete On: 11/27/2017 By: Teagan Richardson MD  
  
 Severity Noted Reaction Type Reactions Lisinopril  09/20/2016    Cough Current Immunizations  Reviewed on 11/21/2017 Name Date Influenza Vaccine 11/20/2016 Not reviewed this visit You Were Diagnosed With   
  
 Codes Comments Paroxysmal atrial fibrillation (HCC)    -  Primary ICD-10-CM: I48.0 ICD-9-CM: 427.31 Vitals BP Pulse Resp Height(growth percentile) Weight(growth percentile) SpO2  
 120/60 (BP 1 Location: Left arm, BP Patient Position: Sitting) 69 16 5' 7\" (1.702 m) 161 lb 12.8 oz (73.4 kg) 99% BMI Smoking Status 25.34 kg/m2 Former Smoker BMI and BSA Data Body Mass Index Body Surface Area  
 25.34 kg/m 2 1.86 m 2 Preferred Pharmacy Pharmacy Name Phone [facility] [address] [facility], [address] [place] [address],[facility] Floor [phone] Your Updated Medication List  
  
   
 This list is accurate as of: 11/27/17 11:32 AM.  Always use your most recent med list. amLODIPine 10 mg tablet Commonly known as:  Mimi Croon Take 1 Tab by mouth daily. bumetanide 1 mg tablet Commonly known as:  BUMEX  
  
 carvedilol 25 mg tablet Commonly known as:  Ally Charles Take 1 Tab by mouth two (2) times a day. CINNAMON 500 mg Cap Generic drug:  cinnamon bark Take 500 mg by mouth two (2) times a day. cloNIDine HCl 0.1 mg tablet Commonly known as:  CATAPRES Take 1 Tab by mouth two (2) times a day. FISH -160-1,000 mg Cap Generic drug:  omega 3-dha-epa-fish oil Take 1 Cap by mouth two (2) times a day. glipiZIDE 5 mg tablet Commonly known as:  Riddhi Delvin Take 1 Tab by mouth Before breakfast and dinner. JANUVIA 50 mg tablet Generic drug:  SITagliptin Take 50 mg by mouth daily. potassium chloride SA 10 mEq capsule Commonly known as:  MICRO-K  
  
 promethazine-codeine 6.25-10 mg/5 mL syrup Commonly known as:  PHENERGAN with CODEINE Take 5 mL by mouth every six (6) hours as needed for Cough. simvastatin 20 mg tablet Commonly known as:  ZOCOR Take 20 mg by mouth nightly. We Performed the Following AMB POC EKG ROUTINE W/ 12 LEADS, INTER & REP [46665 CPT(R)] To-Do List   
 11/28/2017  2:00 PM  
  Appointment with 40 [facility] ([phone])  
  
 12/19/2017 2:00 PM  
  Appointment with 40 [facility] ([phone]) Patient Instructions Please schedule a 3 month follow up appointment with Dr. Nemesio Kaminski! [facility] introduces You[employer] patient portal. Now you can access parts of your medical record, email your doctor's office, and request medication refills online. 1. In your internet browser, go to [email or web address] 2. Click on the First Time User? Click Here link in the Sign In box. You will see the New Member Sign Up page. 3. Enter your blabfeed Access Code exactly as it appears below. You will not need to use this code after youve completed the sign-up process. If you do not sign up before the expiration date, you must request a new code. · blabfeed Access Code: [email or web address] Expires: 1/21/2018  8:13 AM 
 
4. Enter the last four digits of your Social Security Number (xxxx) and Date of Birth (mm/dd/yyyy) as indicated and click Submit. You will be taken to the next sign-up page. 5. Create a blabfeed ID. This will be your blabfeed login ID and cannot be changed, so think of one that is secure and easy to remember. 6. Create a blabfeed password. You can change your password at any time. 7. Enter your Password Reset Question and Answer. This can be used at a later time if you forget your password. 8. Enter your e-mail address. You will receive e-mail notification when new information is available in [address] 9. Click Sign Up. You can now view and download portions of your medical record. 10. Click the Download Summary menu link to download a portable copy of your medical information. If you have questions, please visit the Frequently Asked Questions section of the blabfeed website. Remember, blabfeed is NOT to be used for urgent needs. For medical emergencies, dial 911. Now available from your iPhone and Android! Please provide this summary of care documentation to your next provider. Your primary care clinician is listed as Eduardo Al. If you have any questions after today's visit, please call [phone].

## 2017-11-27 NOTE — PROGRESS NOTES
25 [facility]     1935       David Dee MD, [facility]  Date of Visit-11/27/2017   PCP is Candido Day MD   [facility] [place]  [facility]  HPI:  25 Annabel Chopra. is a 81 y.o. male   Has a hx of CABG CAD and PVD this year he has had episodes of SOB. His cardiac workup has been 3 echos that have all shown normal EF and a lexiscan nuclear that showed a normal EF and no perfusion abnormality during this time his kidney function has been poor with a creatine ranging from 1.6 to 2.8. He was then admitted in late October with anemia and GI bleeding and taken off all blood thinners. His HGB was about 8. In the hospital he was seen by Dr. Ari Rhodes for CKD III with a creatine of 1.8 and a HGB of 8.7 on the 3rd of November. Since then he has been getting iron infusion and his most recent HGB is above 10 on the 21st. This year he developed Atrial fibrillation when admitted in October but is on no OAC due to GI bleeding. The pt reports that he is feeling better. He states that he is still getting short of breath when walking short distances. When walking short distances he also starts to get muscle cramps in his hips and legs. Since his blood count has gone up he has been feeling better. He notes that he does not think his shortness of breath has greatly improved with the increase of his blood count. The pt started taking his Bumex on October 24th as he was experiencing some swelling but this has gotten better. He has not experienced any further bleeding. The pt used to be a smoker and notes that he was tested for COPD and this was negative. Denies chest pain, edema, syncope, has no tachycardia, palpitations or sense of arrhythmia. EKG: Atrial fibrillation at 69 with non specific STT changes. Assessment/Plan:     1.  AGUILAR at half a block less anemia not in heart failure scan was low risk on his nuclear I suspect multi factorial does not appear volume overloaded. Wt Readings from Last 3 Encounters:   11/27/17 161 lb 12.8 oz (73.4 kg)   10/23/17 181 lb 14.1 oz (82.5 kg)   08/14/17 184 lb (83.5 kg)      2. CAD with CABG in 2008 again the scan was low risk he is not having angina I am hesitant to cath him since a PCI would require DAPT and he has already sanford bleeding this year. He has normal EF on several studies     3. chronic Afib has rate control with carvedilol   4. No blood thinners due to recent bleeding we may start ASA back in a few months   Lab Results   Component Value Date/Time    HGB 10.8 11/21/2017 01:49 PM         5. HTN/ HCVD with moderate LVH, has a good BP today on coreg norvasc and clonidine     6. Lipids on statin     7. CKD III/IV Dr. Zhane Watkins =Fe infusions for anemia  Lab Results   Component Value Date/Time    Creatinine 2.17 11/21/2017 01:49 PM      8. PVD no current claudication in the calf   Hip pain seems musculoskeletal      Plan  -- Follow up in 3 months no cardiac changes today. Future Appointments  Date Time Provider Muna Arredondoi   12/14/2017 10:20 AM Romana Drew,  [facility]   12/19/2017 2:00 PM  102[facility] - CHAIR [address]   2/26/2018 9:40 AM Romana Drew, MD CAVRE[facility]hart SCHED     Alonso CAD CHF Meds             bumetanide (BUMEX) 1 mg tablet  (Taking)     amLODIPine (NORVASC) 10 mg tablet  (Taking) Take 1 Tab by mouth daily. cloNIDine HCl (CATAPRES) 0.1 mg tablet  (Taking) Take 1 Tab by mouth two (2) times a day. carvedilol (COREG) 25 mg tablet  (Taking) Take 1 Tab by mouth two (2) times a day. Impression:   1. Paroxysmal atrial fibrillation (HCC)       Cardiac History:   Holter monitor4/29/14=showing rate of  with frequent PACs, rare periods of short RP interval SVT up to 122 and frequent PVCs. sinus rhythm with similar findings, frequent PVCs and short RP interval tachycardia. NUKE  2/11/14,  2 minutes, 20 seconds,  EF of 55% with no ischemia.    PVD= Dr. Sanjuana Howell atherectomy to distal SFA and distal popliteal with angioplasty to both lesions and stent to the SFA by Dr. Elsy Lin on 3/14/14. Previously had AIBs of 0.67 on the left, 0.71 on the right with the right seemingly due to distal disease. CAD/CABG off pump x3 3/2008 . =post op anemia and renal failure  CATH 2008= severe three-vessel left main coronary artery disease, 40% disease of the left main and take off of the LAD and circumflex complex, 70% stenosis of the ostial circ and 85% of the LAD. Between the first and second marginal, the circumflex had 70% stenosis and between the third marginal and PDA there was a 70% stenosis, LAD ostial lesion RCA proximal 60% tapering, EF 60%-70%, bilaterally patent renal arteries, mild atherosclerosis of the distal abdominal aorta. Mild carotid artery disease 3-7-08 then 12 with 10-49% left, less than 10% on right  Dyslipidemia 10-25-10  TG 91 HDL 40 LDL 48  SOCIAL: Drinks no alcohol, quit smoking several years ago, works as an . Lives with his wife and has four children. Enjoys gardening, fishing and music. FAMILY HISTORY: Mother  of cancer at 76, father  of Parkinson's at 70, one brother  of cancer of the liver at 76 and one  of an infection at 64. ROS-except as noted above. . A complete cardiac and respiratory are reviewed and negative except as above ; Resp-denies wheezing  or productive cough,.  Const- No unusual weight loss or fever; Neuro-no recent seizure or CVA ; GI- No BRBPR, abdom pain, bloating ; - no  hematuria   see supplement sheet, initialed and to be scanned by staff  Past Medical History:   Diagnosis Date    CAD (coronary artery disease)     s/p CABG     Carotid arterial disease ([facility]     CKD (chronic kidney disease) stage 3, GFR 30-59 ml/min 10/21/2017    hypertension and DM nephrosclerosis    Diabetes mellitus, type 2 (HCC)     Hypercholesteremia     Hypertension     Paroxysmal atrial fibrillation ([facility] 10/21/2017    new onset afib with BRPR 10-19-17 admit    PVC's (premature ventricular contractions) 5/26/2014    PVD (peripheral vascular disease) ([facility]       Social Hx= reports that he quit smoking about 32 years ago. His smoking use included Cigarettes. He has a 60.00 pack-year smoking history. He has never used smokeless tobacco. He reports that he does not drink alcohol or use illicit drugs. Exam and Labs:  /60 (BP 1 Location: Left arm, BP Patient Position: Sitting)  Pulse 69  Resp 16  Ht 5' 7\" (1.702 m)  Wt 161 lb 12.8 oz (73.4 kg)  SpO2 99%  BMI 25.34 kg/p3Gmlbwqtymdegkd:  NAD, comfortable  Head: NC,AT. Eyes: No scleral icterus. Neck:  Neck supple. No JVD present. Throat: moist mucous membranes. Chest: Effort normal & normal respiratory excursion . Neurological: alert, conversant and oriented . Skin: Skin is not cold. No obvious systemic rash noted. Not diaphoretic. No erythema. Psychiatric:  Grossly normal mood and affect. Behavior appears normal. Extremities:  no clubbing or cyanosis. Abdomen: non distended    Lungs:breath sounds normal. No stridor. distress, wheezes or  Rales. Heart: irregularly irregular without murmur rub or gallop  Edema: Edema is none.   No results found for: CHOL, CHOLX, CHLST, CHOLV, HDL, LDL, LDLC, DLDLP, TGLX, TRIGL, TRIGP, CHHD, CHHDX  Lab Results   Component Value Date/Time    Sodium 131 11/21/2017 01:49 PM    Potassium 4.4 11/21/2017 01:49 PM    Chloride 95 11/21/2017 01:49 PM    CO2 25 11/21/2017 01:49 PM    Anion gap 11 11/21/2017 01:49 PM    Glucose 451 11/21/2017 01:49 PM    BUN 36 11/21/2017 01:49 PM    Creatinine 2.17 11/21/2017 01:49 PM    BUN/Creatinine ratio 17 11/21/2017 01:49 PM    GFR est AA 36 11/21/2017 01:49 PM    GFR est non-AA 29 11/21/2017 01:49 PM    Calcium 8.9 11/21/2017 01:49 PM      Wt Readings from Last 3 Encounters:   11/27/17 161 lb 12.8 oz (73.4 kg)   10/23/17 181 lb 14.1 oz (82.5 kg)   08/14/17 184 lb (83.5 kg) BP Readings from Last 3 Encounters:   11/27/17 120/60   11/21/17 151/69   10/23/17 153/49      Current Outpatient Prescriptions   Medication Sig    bumetanide (BUMEX) 1 mg tablet     potassium chloride SA (MICRO-K) 10 mEq capsule     amLODIPine (NORVASC) 10 mg tablet Take 1 Tab by mouth daily.  glipiZIDE (GLUCOTROL) 5 mg tablet Take 1 Tab by mouth Before breakfast and dinner.  SITagliptin (JANUVIA) 50 mg tablet Take 50 mg by mouth daily.  cloNIDine HCl (CATAPRES) 0.1 mg tablet Take 1 Tab by mouth two (2) times a day.  carvedilol (COREG) 25 mg tablet Take 1 Tab by mouth two (2) times a day.  simvastatin (ZOCOR) 20 mg tablet Take 20 mg by mouth nightly.  omega 3-dha-epa-fish oil (FISH OIL) 100-160-1,000 mg cap Take 1 Cap by mouth two (2) times a day.  promethazine-codeine (PHENERGAN WITH CODEINE) 6.25-10 mg/5 mL syrup Take 5 mL by mouth every six (6) hours as needed for Cough.  cinnamon bark (CINNAMON) 500 mg cap Take 500 mg by mouth two (2) times a day. No current facility-administered medications for this visit. Facility-Administered Medications Ordered in Other Visits   Medication Dose Route Frequency    [START ON 11/28/2017] ferumoxytol (FERAHEME) 510 mg in 0.9% sodium chloride 100 mL IVPB  510 mg IntraVENous ONCE      Impression see above.       Written by Maxi Millan, as dictated by Chapo Blue MD.

## 2017-11-28 ENCOUNTER — HOSPITAL ENCOUNTER (OUTPATIENT)
Dept: INFUSION THERAPY | Age: 82
Discharge: HOME | End: 2017-11-28
Payer: MEDICARE

## 2017-11-28 VITALS
HEART RATE: 67 BPM | SYSTOLIC BLOOD PRESSURE: 136 MMHG | DIASTOLIC BLOOD PRESSURE: 74 MMHG | RESPIRATION RATE: 18 BRPM | TEMPERATURE: 97.6 F

## 2017-11-28 PROCEDURE — 74011250636 HC RX REV CODE- 250/636: Performed by: INTERNAL MEDICINE

## 2017-11-28 PROCEDURE — 96365 THER/PROPH/DIAG IV INF INIT: CPT

## 2017-11-28 PROCEDURE — 96374 THER/PROPH/DIAG INJ IV PUSH: CPT

## 2017-11-28 PROCEDURE — 74011000258 HC RX REV CODE- 258: Performed by: INTERNAL MEDICINE

## 2017-11-28 RX ADMIN — FERUMOXYTOL 510 MG: 510 INJECTION INTRAVENOUS at 14:59

## 2017-11-29 NOTE — PROGRESS NOTES
1400 Pt admit to [facility] for Feraheme ambulatory in stable condition. Assessment completed. No new concerns voiced. Peripheral IV established left antecubtial with positive blood return. Normal Saline started at [facility]. Visit Vitals    /61    Pulse 65    Temp 98 °F (36.7 °C)    Resp 18       Medications:  Normal Saline KVO  Feraheme    1530 Pt tolerated treatment well. Peripheral IV  maintained positive blood return throughout treatment, flushed with positive blood return and removed at conclusion. D/c home ambulatory in no distress. Pt aware of next appointment scheduled for 12/19/17.

## 2017-11-30 PROBLEM — I48.91 NEW ONSET A-FIB (HCC): Status: RESOLVED | Noted: 2017-10-20 | Resolved: 2017-11-30

## 2017-11-30 PROBLEM — I48.20 ATRIAL FIBRILLATION, CHRONIC (HCC): Status: ACTIVE | Noted: 2017-10-21

## 2017-12-19 ENCOUNTER — HOSPITAL ENCOUNTER (OUTPATIENT)
Dept: INFUSION THERAPY | Age: 82
Discharge: HOME | End: 2017-12-19
Payer: MEDICARE

## 2017-12-19 VITALS
TEMPERATURE: 96.9 F | DIASTOLIC BLOOD PRESSURE: 61 MMHG | SYSTOLIC BLOOD PRESSURE: 122 MMHG | RESPIRATION RATE: 18 BRPM | HEART RATE: 70 BPM

## 2017-12-19 LAB
ERYTHROCYTE [DISTWIDTH] IN BLOOD BY AUTOMATED COUNT: 15.9 % (ref 11.5–14.5)
HCT VFR BLD AUTO: 36.4 % (ref 36.6–50.3)
HGB BLD-MCNC: 11.9 G/DL (ref 12.1–17)
MCH RBC QN AUTO: 27.4 PG (ref 26–34)
MCHC RBC AUTO-ENTMCNC: 32.7 G/DL (ref 30–36.5)
MCV RBC AUTO: 83.9 FL (ref 80–99)
PLATELET # BLD AUTO: 197 K/UL (ref 150–400)
RBC # BLD AUTO: 4.34 M/UL (ref 4.1–5.7)
WBC # BLD AUTO: 6.7 K/UL (ref 4.1–11.1)

## 2017-12-19 PROCEDURE — 85027 COMPLETE CBC AUTOMATED: CPT | Performed by: INTERNAL MEDICINE

## 2017-12-19 PROCEDURE — 36415 COLL VENOUS BLD VENIPUNCTURE: CPT | Performed by: INTERNAL MEDICINE

## 2017-12-19 NOTE — PROGRESS NOTES
[facility] Short Visit Progress Note    6534 Pt admit to [facility] for labs and possible Procrit ambulatory in stable condition. Assessment completed. No new concerns voiced. Labs drawn peripherally per order and sent for processing. Patient Vitals for the past 12 hrs:   Temp Pulse Resp BP   12/19/17 1354 96.9 °F (36.1 °C) 70 18 122/61     Medications:  Procrit-HELD for Hgb 11.9    1430 Pt tolerated treatment well. D/c home ambulatory in no distress. Pt aware of next appointment scheduled for 1/16/18.     Recent Results (from the past 12 hour(s))   CBC W/O DIFF    Collection Time: 12/19/17  1:56 PM   Result Value Ref Range    WBC 6.7 4.1 - 11.1 K/uL    RBC 4.34 4.10 - 5.70 M/uL    HGB 11.9 (L) 12.1 - 17.0 g/dL    HCT 36.4 (L) 36.6 - 50.3 %    MCV 83.9 80.0 - 99.0 FL    MCH 27.4 26.0 - 34.0 PG    MCHC 32.7 30.0 - 36.5 g/dL    RDW 15.9 (H) 11.5 - 14.5 %    PLATELET 785 073 - 846 K/uL

## 2018-01-15 RX ORDER — SODIUM CHLORIDE 9 MG/ML
25 INJECTION, SOLUTION INTRAVENOUS AS NEEDED
Status: CANCELLED | OUTPATIENT
Start: 2018-01-16 | End: 2018-01-16

## 2018-01-16 ENCOUNTER — HOSPITAL ENCOUNTER (OUTPATIENT)
Dept: INFUSION THERAPY | Age: 83
Discharge: HOME | End: 2018-01-16
Payer: MEDICARE

## 2018-01-16 VITALS
DIASTOLIC BLOOD PRESSURE: 64 MMHG | TEMPERATURE: 97.3 F | HEART RATE: 62 BPM | RESPIRATION RATE: 16 BRPM | SYSTOLIC BLOOD PRESSURE: 120 MMHG

## 2018-01-16 LAB
ERYTHROCYTE [DISTWIDTH] IN BLOOD BY AUTOMATED COUNT: 16.6 % (ref 11.5–14.5)
HCT VFR BLD AUTO: 35.5 % (ref 36.6–50.3)
HGB BLD-MCNC: 11.6 G/DL (ref 12.1–17)
MCH RBC QN AUTO: 27.4 PG (ref 26–34)
MCHC RBC AUTO-ENTMCNC: 32.7 G/DL (ref 30–36.5)
MCV RBC AUTO: 83.7 FL (ref 80–99)
PLATELET # BLD AUTO: 216 K/UL (ref 150–400)
RBC # BLD AUTO: 4.24 M/UL (ref 4.1–5.7)
WBC # BLD AUTO: 5.8 K/UL (ref 4.1–11.1)

## 2018-01-16 PROCEDURE — 85027 COMPLETE CBC AUTOMATED: CPT | Performed by: INTERNAL MEDICINE

## 2018-01-16 PROCEDURE — 36415 COLL VENOUS BLD VENIPUNCTURE: CPT

## 2018-01-16 PROCEDURE — 36415 COLL VENOUS BLD VENIPUNCTURE: CPT | Performed by: INTERNAL MEDICINE

## 2018-01-16 RX ORDER — SODIUM CHLORIDE 0.9 % (FLUSH) 0.9 %
5-10 SYRINGE (ML) INJECTION AS NEEDED
Status: ACTIVE | OUTPATIENT
Start: 2018-01-16 | End: 2018-01-17

## 2018-02-13 ENCOUNTER — HOSPITAL ENCOUNTER (OUTPATIENT)
Dept: INFUSION THERAPY | Age: 83
Discharge: HOME | End: 2018-02-13

## 2018-03-13 ENCOUNTER — APPOINTMENT (OUTPATIENT)
Dept: INFUSION THERAPY | Age: 83
End: 2018-03-13

## 2018-03-26 ENCOUNTER — OFFICE VISIT (OUTPATIENT)
Dept: CARDIOLOGY CLINIC | Age: 83
End: 2018-03-26

## 2018-03-26 VITALS
HEART RATE: 57 BPM | SYSTOLIC BLOOD PRESSURE: 110 MMHG | OXYGEN SATURATION: 97 % | HEIGHT: 67 IN | WEIGHT: 166.2 LBS | DIASTOLIC BLOOD PRESSURE: 50 MMHG | BODY MASS INDEX: 26.09 KG/M2 | RESPIRATION RATE: 16 BRPM

## 2018-03-26 DIAGNOSIS — R06.09 DOE (DYSPNEA ON EXERTION): ICD-10-CM

## 2018-03-26 DIAGNOSIS — I49.3 PVC'S (PREMATURE VENTRICULAR CONTRACTIONS): ICD-10-CM

## 2018-03-26 DIAGNOSIS — I48.20 ATRIAL FIBRILLATION, CHRONIC (HCC): ICD-10-CM

## 2018-03-26 DIAGNOSIS — N18.30 CKD (CHRONIC KIDNEY DISEASE) STAGE 3, GFR 30-59 ML/MIN (HCC): ICD-10-CM

## 2018-03-26 DIAGNOSIS — I77.9 BILATERAL CAROTID ARTERY DISEASE (HCC): ICD-10-CM

## 2018-03-26 DIAGNOSIS — I10 HYPERTENSION, ESSENTIAL, BENIGN: ICD-10-CM

## 2018-03-26 DIAGNOSIS — E78.00 HYPERCHOLESTEREMIA: ICD-10-CM

## 2018-03-26 DIAGNOSIS — I25.10 CORONARY ARTERY DISEASE INVOLVING NATIVE CORONARY ARTERY OF NATIVE HEART WITHOUT ANGINA PECTORIS: Primary | ICD-10-CM

## 2018-03-26 RX ORDER — INSULIN GLARGINE 100 [IU]/ML
18 INJECTION, SOLUTION SUBCUTANEOUS DAILY
COMMUNITY
End: 2019-05-01

## 2018-03-26 NOTE — PATIENT INSTRUCTIONS
You will need to follow up in clinic with Dr. Yvan Martell in 6 months. Please schedule Carotid dopplers at time of office visit.

## 2018-03-26 NOTE — MR AVS SNAPSHOT
[address] [facility] [address] [facility] 
[phone] Patient: aDnielle Sheffield. MRN: [id number] ECM:6/99/[birth year] Visit Information Date & Time Provider Department Dept. Phone Encounter #  
 3/26/2018  9:40 AM Janine Sanchez MD [facility] Camila Velásquez [phone] [id number] Upcoming Health Maintenance Date Due  
 FOOT EXAM Q1 8/28/1945 EYE EXAM RETINAL OR DILATED Q1 8/28/1945 DTaP/Tdap/Td series (1 - Tdap) 8/28/1957 ZOSTER VACCINE AGE 60> 6/28/1996 GLAUCOMA SCREENING Q2Y 8/28/2001 Pneumococcal 65+ High/Highest Risk (1 of 2 - PCV13) 8/28/2001 LIPID PANEL Q1 10/25/2011 MEDICARE YEARLY EXAM 3/14/2018 HEMOGLOBIN A1C Q6M 4/20/2018 MICROALBUMIN Q1 8/15/2018 Allergies as of 3/26/2018  Review Complete On: 3/26/2018 By: Janine Sanchez MD  
  
 Severity Noted Reaction Type Reactions Lisinopril  09/20/2016    Cough Current Immunizations  Reviewed on 1/16/2018 Name Date Influenza Vaccine 11/15/2017, 11/20/2016 Not reviewed this visit You Were Diagnosed With   
  
 Codes Comments Atrial fibrillation, chronic (HCC)    -  Primary ICD-10-CM: E39.2 ICD-9-CM: 427.31 Hypercholesteremia     ICD-10-CM: E78.00 ICD-9-CM: 272.0 Coronary artery disease involving native coronary artery of native heart without angina pectoris     ICD-10-CM: I25.10 ICD-9-CM: 414.01 Hypertension, essential, benign     ICD-10-CM: I10 
ICD-9-CM: 401.1 Vitals BP Pulse Resp Height(growth percentile) Weight(growth percentile) SpO2  
 110/50 (BP 1 Location: Left arm, BP Patient Position: Sitting) (!) 57 16 5' 7\" (1.702 m) 166 lb 3.2 oz (75.4 kg) 97% BMI Smoking Status 26.03 kg/m2 Former Smoker BMI and BSA Data Body Mass Index Body Surface Area 26.03 kg/m 2 1.89 m 2 Preferred Pharmacy Pharmacy Name Phone [facility] [address] [facility], [address] [place] [address],[facility] Floor [phone] Your Updated Medication List  
  
   
This list is accurate as of 3/26/18 10:27 AM.  Always use your most recent med list. amLODIPine 10 mg tablet Commonly known as:  Tawana Prow Take 1 Tab by mouth daily. bumetanide 1 mg tablet Commonly known as:  Janette Cranker Take 1 mg by mouth two (2) times a day. carvedilol 25 mg tablet Commonly known as:  Mozelle Shaggy Take 1 Tab by mouth two (2) times a day. CINNAMON 500 mg Cap Generic drug:  cinnamon bark Take 1,000 mg by mouth two (2) times a day. cloNIDine HCl 0.1 mg tablet Commonly known as:  CATAPRES Take 1 Tab by mouth two (2) times a day. FISH -160-1,000 mg Cap Generic drug:  omega 3-dha-epa-fish oil Take 1 Cap by mouth two (2) times a day. glipiZIDE 5 mg tablet Commonly known as:  Latrelle Lose Take 1 Tab by mouth Before breakfast and dinner. LANTUS SOLOSTAR U-100 INSULIN 100 unit/mL (3 mL) Inpn Generic drug:  insulin glargine 12 Units by SubCUTAneous route daily. potassium chloride SA 10 mEq capsule Commonly known as:  Louellen Holden Take 10 mEq by mouth daily. promethazine-codeine 6.25-10 mg/5 mL syrup Commonly known as:  PHENERGAN with CODEINE Take 5 mL by mouth every six (6) hours as needed for Cough. simvastatin 20 mg tablet Commonly known as:  ZOCOR Take 20 mg by mouth nightly. We Performed the Following AMB POC EKG ROUTINE W/ 12 LEADS, INTER & REP [13445 CPT(R)] To-Do List   
 09/01/2018 Imaging:  DUPLEX CAROTID BILATERAL Patient Instructions You will need to follow up in clinic with Dr. Dianne Melendez in 6 months. Please schedule Carotid dopplers at time of office visit. Introducing [facility]!    
 [employer] introduces [employer] patient portal. Now you can access parts of your medical record, email your doctor's office, and request medication refills online. 1. In your internet browser, go to [email or web address] 2. Click on the First Time User? Click Here link in the Sign In box. You will see the New Member Sign Up page. 3. Enter your [employer] Access Code exactly as it appears below. You will not need to use this code after youve completed the sign-up process. If you do not sign up before the expiration date, you must request a new code. · [employer] Access Code: [email or web address] Expires: 6/21/2018  2:14 PM 
 
4. Enter the last four digits of your Social Security Number (xxxx) and Date of Birth (mm/dd/yyyy) as indicated and click Submit. You will be taken to the next sign-up page. 5. Create a [employer] ID. This will be your [employer] login ID and cannot be changed, so think of one that is secure and easy to remember. 6. Create a [employer] password. You can change your password at any time. 7. Enter your Password Reset Question and Answer. This can be used at a later time if you forget your password. 8. Enter your e-mail address. You will receive e-mail notification when new information is available in [address] 9. Click Sign Up. You can now view and download portions of your medical record. 10. Click the Download Summary menu link to download a portable copy of your medical information. If you have questions, please visit the Frequently Asked Questions section of the [employer] website. Remember, [employer] is NOT to be used for urgent needs. For medical emergencies, dial 911. Now available from your iPhone and Android! Please provide this summary of care documentation to your next provider. Your primary care clinician is listed as Anu Rivas. If you have any questions after today's visit, please call [phone].

## 2018-03-26 NOTE — PROGRESS NOTES
Per  VO to discontinue all medication not taken.      Carotid dopplers ordered with 6 month f/u OV  Verbal order per Dr. Margarita Catherine

## 2018-03-26 NOTE — PROGRESS NOTES
25 [facility]     1935       David Canales MD, [facility] [place]  Date of Visit-3/26/2018   PCP is Nancy Shipman MD   [facility] [place]  [facility]  HPI:  25 [facility]. is a 81 y.o. male   Has a hx of CABG CAD and PVD this year he has had episodes of SOB. His cardiac workup has been 3 echos that have all shown normal EF and a lexiscan nuclear that showed a normal EF and no perfusion abnormality during this time his kidney function has been poor with a creatine ranging from 1.6 to 2.8. He was then admitted in late October with anemia and GI bleeding and taken off all blood thinners. His HGB was about 8. In the hospital he was seen by Dr. Mishel Parish for CKD III with a creatine of 1.8 and a HGB of 8.7 on the 3rd of November. Since then he has been getting iron infusion This year he developed Atrial fibrillation when admitted in October but is on no OAC due to GI bleeding. Since last visit has been getting his Iron injections, weight stable  Feels well , able to walk and get around much better  Is on insulin  No complaints,here with wife  Denies chest pain, edema, syncope or shortness of breath at rest   Has no tachycardia , palpitations or sense of arrythmia      Assessment/Plan:     1. AGUILAR at half a block less due anemia not in heart failure scan was low risk on his nuclear I suspect multi factorial does not appear volume overloaded. It was the anemia, resolved  Wt Readings from Last 3 Encounters:   03/26/18 166 lb 3.2 oz (75.4 kg)   11/27/17 161 lb 12.8 oz (73.4 kg)   10/23/17 181 lb 14.1 oz (82.5 kg)      2. CAD with CABG in 2008   --- scan was low risk he is not having angina  ---normal EF    3. Chronic Afib has rate control with carvedilol   4. No blood thinners due to recent bleeding   Lab Results   Component Value Date/Time    HGB 11.6 (L) 01/16/2018 02:07 PM         5. HTN/ HCVD with moderate LVH, has a good BP today on current meds  6.  Lipids on high potency statin as appropriate for secondary prevention. 7. CKD III/IV Dr. Domo Lindsay =Fe infusions for anemia  Lab Results   Component Value Date/Time    Creatinine 2.17 (H) 11/21/2017 01:49 PM      8. PVD no current claudication in the calf     RTC 6 months  Repeat carotids for known Carotid artery dz      No future appointments. Key CAD CHF Meds             bumetanide (BUMEX) 1 mg tablet  (Taking) Take 1 mg by mouth two (2) times a day. amLODIPine (NORVASC) 10 mg tablet  (Taking) Take 1 Tab by mouth daily. cloNIDine HCl (CATAPRES) 0.1 mg tablet  (Taking) Take 1 Tab by mouth two (2) times a day. carvedilol (COREG) 25 mg tablet  (Taking) Take 1 Tab by mouth two (2) times a day. simvastatin (ZOCOR) 20 mg tablet  (Taking) Take 20 mg by mouth nightly. omega 3-dha-epa-fish oil (FISH OIL) 100-160-1,000 mg cap  (Taking) Take 1 Cap by mouth two (2) times a day. EKG- Atrial flutter-fibrillation   -Left axis -anterior fascicular block.    -Poor R-wave progression -nonspecific -consider old anterior infarct.    -Nonspecific ST depression   +   Nonspecific T-abnormality  -Nondiagnostic. Impression:   1. Coronary artery disease involving native coronary artery of native heart without angina pectoris    2. Atrial fibrillation, chronic ([address]    3. Hypercholesteremia    4. Hypertension, essential, benign    5. AGUILAR (dyspnea on exertion)    6. PVC's (premature ventricular contractions)    7. CKD (chronic kidney disease) stage 3, GFR 30-59 ml/min    8. Bilateral carotid artery disease (HCC)       Cardiac History:   Holter monitor4/29/14=showing rate of  with frequent PACs, rare periods of short RP interval SVT up to 122 and frequent PVCs. sinus rhythm with similar findings, frequent PVCs and short RP interval tachycardia. NUKE  2/11/14,  2 minutes, 20 seconds,  EF of 55% with no ischemia.    PVD= Dr. Erum Ramirez atherectomy to distal SFA and distal popliteal with angioplasty to both lesions and stent to the SFA by Dr. Godwin Gray on 3/14/14. Previously had AIBs of 0.67 on the left, 0.71 on the right with the right seemingly due to distal disease. CAD/CABG off pump x3 3/2008 . =post op anemia and renal failure  CATH 2008= severe three-vessel left main coronary artery disease, 40% disease of the left main and take off of the LAD and circumflex complex, 70% stenosis of the ostial circ and 85% of the LAD. Between the first and second marginal, the circumflex had 70% stenosis and between the third marginal and PDA there was a 70% stenosis, LAD ostial lesion RCA proximal 60% tapering, EF 60%-70%, bilaterally patent renal arteries, mild atherosclerosis of the distal abdominal aorta. Mild carotid artery disease 3-7-08 then 12 with 10-49% left, less than 10% on right  Dyslipidemia 10-25-10  TG 91 HDL 40 LDL 48  SOCIAL: Drinks no alcohol, quit smoking several years ago, works as an . Lives with his wife and has four children. Enjoys gardening, fishing and music. FAMILY HISTORY: Mother  of cancer at 76, father  of Parkinson's at 70, one brother  of cancer of the liver at 76 and one  of an infection at 64. ROS-except as noted above. . A complete cardiac and respiratory are reviewed and negative except as above ; Resp-denies wheezing  or productive cough,.  Const- No unusual weight loss or fever; Neuro-no recent seizure or CVA ; GI- No BRBPR, abdom pain, bloating ; - no  hematuria   see supplement sheet, initialed and to be scanned by staff  Past Medical History:   Diagnosis Date    CAD (coronary artery disease)     s/p CABG     [facility]     CKD (chronic kidney disease) stage 3, GFR 30-59 ml/min 10/21/2017    hypertension and DM nephrosclerosis    Diabetes mellitus, type 2 ([address]     Hypercholesteremia     Hypertension     Paroxysmal atrial fibrillation ([facility] 10/21/2017    new onset afib with BRPR 10-19-17 admit    PVC's (premature ventricular contractions) 5/26/2014    PVD (peripheral vascular disease) ([address]       Social Hx= reports that he quit smoking about 33 years ago. His smoking use included Cigarettes. He has a 60.00 pack-year smoking history. He has never used smokeless tobacco. He reports that he does not drink alcohol or use illicit drugs. Exam and Labs:  /50 (BP 1 Location: Left arm, BP Patient Position: Sitting)  Pulse (!) 57  Resp 16  Ht 5' 7\" (1.702 m)  Wt 166 lb 3.2 oz (75.4 kg)  SpO2 97%  BMI 26.03 kg/t5Nzttpcilelllzq:  NAD, comfortable  Head: NC,AT. Eyes: No scleral icterus. Neck:  Neck supple. No JVD present. Throat: moist mucous membranes. Chest: Effort normal & normal respiratory excursion . Neurological: alert, conversant and oriented . Skin: Skin is not cold. No obvious systemic rash noted. Not diaphoretic. No erythema. Psychiatric:  Grossly normal mood and affect. Behavior appears normal. Extremities:  no clubbing or cyanosis. Abdomen: non distended    Lungs:breath sounds normal. No stridor. distress, wheezes or  Rales. Heart: irregularly irregular without murmur rub or gallop  Edema: Edema is none.     Lab Results   Component Value Date/Time    Sodium 131 (L) 11/21/2017 01:49 PM    Potassium 4.4 11/21/2017 01:49 PM    Chloride 95 (L) 11/21/2017 01:49 PM    CO2 25 11/21/2017 01:49 PM    Anion gap 11 11/21/2017 01:49 PM    Glucose 451 (H) 11/21/2017 01:49 PM    BUN 36 (H) 11/21/2017 01:49 PM    Creatinine 2.17 (H) 11/21/2017 01:49 PM    BUN/Creatinine ratio 17 11/21/2017 01:49 PM    GFR est AA 36 (L) 11/21/2017 01:49 PM    GFR est non-AA 29 (L) 11/21/2017 01:49 PM    Calcium 8.9 11/21/2017 01:49 PM      Wt Readings from Last 3 Encounters:   03/26/18 166 lb 3.2 oz (75.4 kg)   11/27/17 161 lb 12.8 oz (73.4 kg)   10/23/17 181 lb 14.1 oz (82.5 kg)      BP Readings from Last 3 Encounters:   03/26/18 110/50   01/16/18 120/64   12/19/17 122/61      Current Outpatient Prescriptions   Medication Sig  insulin glargine (LANTUS SOLOSTAR U-100 INSULIN) 100 unit/mL (3 mL) inpn 12 Units by SubCUTAneous route daily.  bumetanide (BUMEX) 1 mg tablet Take 1 mg by mouth two (2) times a day.  potassium chloride SA (MICRO-K) 10 mEq capsule Take 10 mEq by mouth daily.  amLODIPine (NORVASC) 10 mg tablet Take 1 Tab by mouth daily.  glipiZIDE (GLUCOTROL) 5 mg tablet Take 1 Tab by mouth Before breakfast and dinner. (Patient taking differently: Take 10 mg by mouth Before breakfast and dinner.)    cloNIDine HCl (CATAPRES) 0.1 mg tablet Take 1 Tab by mouth two (2) times a day.  carvedilol (COREG) 25 mg tablet Take 1 Tab by mouth two (2) times a day.  simvastatin (ZOCOR) 20 mg tablet Take 20 mg by mouth nightly.  omega 3-dha-epa-fish oil (FISH OIL) 100-160-1,000 mg cap Take 1 Cap by mouth two (2) times a day.  promethazine-codeine (PHENERGAN WITH CODEINE) 6.25-10 mg/5 mL syrup Take 5 mL by mouth every six (6) hours as needed for Cough.  cinnamon bark (CINNAMON) 500 mg cap Take 1,000 mg by mouth two (2) times a day. No current facility-administered medications for this visit. Impression see above.

## 2018-03-26 NOTE — PROGRESS NOTES
Chief Complaint   Patient presents with    Coronary Artery Disease     3 month follow up. Denies chest pain/shortness of breath/swelling/dizziness. Cataract surgery scheduled on 3-29-18 and 4-12-18 with Dr. Prema Huang.

## 2018-04-10 ENCOUNTER — APPOINTMENT (OUTPATIENT)
Dept: INFUSION THERAPY | Age: 83
End: 2018-04-10

## 2018-05-08 ENCOUNTER — APPOINTMENT (OUTPATIENT)
Dept: INFUSION THERAPY | Age: 83
End: 2018-05-08

## 2018-07-19 ENCOUNTER — OFFICE VISIT (OUTPATIENT)
Dept: CARDIOLOGY CLINIC | Age: 83
End: 2018-07-19

## 2018-07-19 VITALS
WEIGHT: 172 LBS | OXYGEN SATURATION: 96 % | BODY MASS INDEX: 27 KG/M2 | HEIGHT: 67 IN | DIASTOLIC BLOOD PRESSURE: 70 MMHG | HEART RATE: 56 BPM | SYSTOLIC BLOOD PRESSURE: 130 MMHG | RESPIRATION RATE: 16 BRPM

## 2018-07-19 DIAGNOSIS — I77.9 BILATERAL CAROTID ARTERY DISEASE (HCC): ICD-10-CM

## 2018-07-19 DIAGNOSIS — N18.30 CKD (CHRONIC KIDNEY DISEASE) STAGE 3, GFR 30-59 ML/MIN (HCC): ICD-10-CM

## 2018-07-19 DIAGNOSIS — I25.10 CORONARY ARTERY DISEASE INVOLVING NATIVE CORONARY ARTERY OF NATIVE HEART WITHOUT ANGINA PECTORIS: ICD-10-CM

## 2018-07-19 DIAGNOSIS — I49.3 PVC'S (PREMATURE VENTRICULAR CONTRACTIONS): ICD-10-CM

## 2018-07-19 DIAGNOSIS — I48.20 ATRIAL FIBRILLATION, CHRONIC (HCC): ICD-10-CM

## 2018-07-19 DIAGNOSIS — I10 HYPERTENSION, ESSENTIAL, BENIGN: ICD-10-CM

## 2018-07-19 DIAGNOSIS — I73.9 PVD (PERIPHERAL VASCULAR DISEASE) (HCC): Primary | ICD-10-CM

## 2018-07-19 DIAGNOSIS — E78.00 HYPERCHOLESTEREMIA: ICD-10-CM

## 2018-07-19 RX ORDER — LOSARTAN POTASSIUM 25 MG/1
TABLET ORAL
COMMUNITY
Start: 2018-07-18 | End: 2019-05-01

## 2018-07-19 NOTE — MR AVS SNAPSHOT
[address] [place] 
[phone] Patient: Tucker Guillermo. MRN: [id number] Visit Information Date & Time Provider Department Dept. Phone Encounter #  
 7/19/2018  3:00 PM Benjamin Austin MD [facility] [phone] [id number] Your Appointments 9/24/2018  9:00 AM  
PROCEDURE with VASCULAR, AGUIAR [facility] ([facility] SCHEDULING) Appt Note: 6 month f/u with cartoid  see Dr Wilson Arias after  
 [address] [place] [address]  
[phone]  
  
   
 [address] [place]  [address]  
  
    
 9/24/2018  9:40 AM  
ESTABLISHED PATIENT with Benjamin Austin MD  
[facility] ([facility] SCHEDULING) Appt Note: 6 month f/u with cartoid  see Dr Wilson Arias after  
 [address] [place] [address]  
[address] [facility] [id number] Upcoming Health Maintenance Date Due  
 FOOT EXAM Q1 8/28/1945 EYE EXAM RETINAL OR DILATED Q1 8/28/1946 DTaP/Tdap/Td series (1 - Tdap) 8/28/1957 ZOSTER VACCINE AGE 60> 6/28/1996 GLAUCOMA SCREENING Q2Y 8/28/2001 Pneumococcal 65+ High/Highest Risk (1 of 2 - PCV13) 8/28/2001 LIPID PANEL Q1 10/25/2011 HEMOGLOBIN A1C Q6M 4/20/2018 MICROALBUMIN Q1 8/15/2018 Influenza Age 5 to Adult 8/1/2018 Allergies as of 7/19/2018  Review Complete On: 7/19/2018 By: Lele Adrian Severity Noted Reaction Type Reactions Lisinopril  09/20/2016    Cough Current Immunizations  Reviewed on 1/16/2018 Name Date Influenza Vaccine 11/15/2017, 11/20/2016 Not reviewed this visit Vitals BP Pulse Resp Height(growth percentile) Weight(growth percentile) SpO2  
 130/70 (BP 1 Location: Left arm, BP Patient Position: Sitting) (!) 56 16 5' 7\" (1.702 m) 172 lb (78 kg) 96% BMI Smoking Status 26.94 kg/m2 Former Smoker BMI and BSA Data Body Mass Index Body Surface Area  
 26.94 kg/m 2 1.92 m 2 Preferred Pharmacy Pharmacy Name Phone [facility] [address] [facility] [phone] Your Updated Medication List  
  
   
This list is accurate as of 7/19/18  3:59 PM.  Always use your most recent med list. amLODIPine 10 mg tablet Commonly known as:  Delvin Alstrom Take 1 Tab by mouth daily. bumetanide 1 mg tablet Commonly known as:  Merla Goods Take 1 mg by mouth two (2) times a day. carvedilol 25 mg tablet Commonly known as:  Jestine Pellet Take 1 Tab by mouth two (2) times a day. CINNAMON 500 mg Cap Generic drug:  cinnamon bark Take 1,000 mg by mouth two (2) times a day. cloNIDine HCl 0.1 mg tablet Commonly known as:  CATAPRES Take 1 Tab by mouth two (2) times a day. FISH -160-1,000 mg Cap Generic drug:  omega 3-dha-epa-fish oil Take 1 Cap by mouth two (2) times a day. glipiZIDE 5 mg tablet Commonly known as:  Seldon Iván Take 1 Tab by mouth Before breakfast and dinner. LANTUS SOLOSTAR U-100 INSULIN 100 unit/mL (3 mL) Inpn Generic drug:  insulin glargine 12 Units by SubCUTAneous route daily. losartan 25 mg tablet Commonly known as:  COZAAR  
  
 promethazine-codeine 6.25-10 mg/5 mL syrup Commonly known as:  PHENERGAN with CODEINE Take 5 mL by mouth every six (6) hours as needed for Cough. simvastatin 20 mg tablet Commonly known as:  ZOCOR Take 20 mg by mouth nightly. Patient Instructions BERNARDO and Carotid doppler and see  on same day Follow up with  in November Introducing [facility]! Beverly Flores introduces Harri patient portal. Now you can access parts of your medical record, email your doctor's office, and request medication refills online.    
 
1. In your internet browser, go to [email or web address] 2. Click on the First Time User? Click Here link in the Sign In box. You will see the New Member Sign Up page. 3. Enter your Retailo Access Code exactly as it appears below. You will not need to use this code after youve completed the sign-up process. If you do not sign up before the expiration date, you must request a new code. · [employer] Access Code: [email or web address] Expires: 10/15/2018  9:42 AM 
 
4. Enter the last four digits of your Social Security Number (xxxx) and Date of Birth (mm/dd/yyyy) as indicated and click Submit. You will be taken to the next sign-up page. 5. Create a Retailo ID. This will be your Retailo login ID and cannot be changed, so think of one that is secure and easy to remember. 6. Create a Retailo password. You can change your password at any time. 7. Enter your Password Reset Question and Answer. This can be used at a later time if you forget your password. 8. Enter your e-mail address. You will receive e-mail notification when new information is available in [address] 9. Click Sign Up. You can now view and download portions of your medical record. 10. Click the Download Summary menu link to download a portable copy of your medical information. If you have questions, please visit the Frequently Asked Questions section of the Retailo website. Remember, Retailo is NOT to be used for urgent needs. For medical emergencies, dial 911. Now available from your iPhone and Android! Please provide this summary of care documentation to your next provider. Your primary care clinician is listed as [employer]. If you have any questions after today's visit, please call [phone].

## 2018-07-19 NOTE — PROGRESS NOTES
25 [facility]     1935       David Acosta MD, [facility] [place]  Date of Visit-7/19/2018   PCP is Luis Alberto Torrez MD   [facility] [place]  [facility]  HPI:  25 [facility]. is a 81 y.o. male   Follow up of CABG and PVD. Has worsening CKD and anemia and AF but on no OAC due to GI bleeding. Recent echos and nuclear has been normal.     Overall the pt states he is doing well. He states that his Hgb is stable. The pt reports that he is getting right leg pain. He notes that this is similar to pain he had prior to seeing Dr. Lucas Starks. The pt states that this pain starts in his hip and then after walking half a block his leg feels tight and painful and he has to stop. This pain subsides 30 seconds after resting. He has been to an orthopedist for his hip pain and found that this was not the issue. He had an xray of hip hip and back and these did not reveal any abnormalities. The pt notes some mild shortness of breath although this is at his baseline. Denies chest pain, edema, syncope or shortness of breath at rest, has no tachycardia, palpitations or sense of arrhythmia. Assessment/Plan:     1. PVD, return of claudication at a relatively short distance of a half block. It is not acute but I think he will need BERNARDO's PVR's and an angiogram    2. CAD no current angina     3. Renal disease, I will need to get Dr. Lazarus Jonas last note and labs. This may complicate the dye use for an angiogram.  Lab Results   Component Value Date/Time    Creatinine 2.17 (H) 11/21/2017 01:49 PM      4. CABG in 2008    5. Chronic AF has rate control with coreg, no blood thinners due to bleeding     6. HTN HCVD  BP Readings from Last 3 Encounters:   07/19/18 130/70   03/26/18 110/50   01/16/18 120/64    Pt will see Dr. Lucas Starks with carotids, BERNARDO's and PVR's next available. I will see him in November        Impression:   1. PVD (peripheral vascular disease) ([address]    2.  Bilateral carotid artery disease ([facility]    3. Coronary artery disease involving native coronary artery of native heart without angina pectoris    4. Atrial fibrillation, chronic ([address]    5. Hypertension, essential, benign    6. Hypercholesteremia    7. PVC's (premature ventricular contractions)    8. CKD (chronic kidney disease) stage 3, GFR 30-59 ml/min       Cardiac History:   Holter monitor14=showing rate of  with frequent PACs, rare periods of short RP interval SVT up to 122 and frequent PVCs. sinus rhythm with similar findings, frequent PVCs and short RP interval tachycardia. NUKE  14,  2 minutes, 20 seconds,  EF of 55% with no ischemia. PVD= Dr. Hoang Caicedo atherectomy to distal SFA and distal popliteal with angioplasty to both lesions and stent to the SFA by Dr. Hoang Caicedo on 3/14/14. Previously had AIBs of 0.67 on the left, 0.71 on the right with the right seemingly due to distal disease. CAD/CABG off pump x3 3/2008 . =post op anemia and renal failure  CATH 2008= severe three-vessel left main coronary artery disease, 40% disease of the left main and take off of the LAD and circumflex complex, 70% stenosis of the ostial circ and 85% of the LAD. Between the first and second marginal, the circumflex had 70% stenosis and between the third marginal and PDA there was a 70% stenosis, LAD ostial lesion RCA proximal 60% tapering, EF 60%-70%, bilaterally patent renal arteries, mild atherosclerosis of the distal abdominal aorta. Mild carotid artery disease 3-7-08 then 12 with 10-49% left, less than 10% on right  Dyslipidemia 10-25-10  TG 91 HDL 40 LDL 48  SOCIAL: Drinks no alcohol, quit smoking several years ago, works as an . Lives with his wife and has four children. Enjoys gardening, fishing and music. FAMILY HISTORY: Mother  of cancer at 76, father  of Parkinson's at 70, one brother  of cancer of the liver at 76 and one  of an infection at 64.       ROS-except as noted above. . A complete cardiac and respiratory are reviewed and negative except as above ; Resp-denies wheezing  or productive cough,. Const- No unusual weight loss or fever; Neuro-no recent seizure or CVA ; GI- No BRBPR, abdom pain, bloating ; - no  hematuria   see supplement sheet, initialed and to be scanned by staff  Past Medical History:   Diagnosis Date    CAD (coronary artery disease)     s/p CABG 2008    [facility]ot[facility] arterial disease ([facility]     CKD (chronic kidney disease) stage 3, GFR 30-59 ml/min 10/21/2017    hypertension and DM nephrosclerosis    [facility], [facility]     Hypercholesteremia     Hypertension     Paroxysmal atrial fibrillation ([facility] 10/21/2017    new onset afib with BRPR 10-19-17 admit    PVC's (premature ventricular contractions) 5/26/2014    PVD (peripheral vascular disease) ([facility]       Social Hx= reports that he quit smoking about 33 years ago. His smoking use included Cigarettes. He has a 60.00 pack-year smoking history. He has never used smokeless tobacco. He reports that he does not drink alcohol or use illicit drugs. Exam and Labs:  /70 (BP 1 Location: Left arm, BP Patient Position: Sitting)  Pulse (!) 56  Resp 16  Ht 5' 7\" (1.702 m)  Wt 172 lb (78 kg)  SpO2 96%  BMI 26.94 kg/f2Vtfuvvuidmoyxu:  NAD, comfortable  Head: NC,AT. Eyes: No scleral icterus. Neck:  Neck supple. No JVD present. Throat: moist mucous membranes. Chest: Effort normal & normal respiratory excursion . Neurological: alert, conversant and oriented . Skin: Skin is not cold. No obvious systemic rash noted. Not diaphoretic. No erythema. Psychiatric:  Grossly normal mood and affect. Behavior appears normal. Extremities: right leg slightly cooler than left, no blueness  no clubbing or cyanosis. Abdomen: non distended    Lungs:breath sounds normal. No stridor. distress, wheezes or  Rales.   Heart: normal rate, regular rhythm, normal S1, S2, no murmurs, rubs, clicks or gallops , PMI non displaced. Edema: Edema is none. No results found for: CHOL, CHOLX, CHLST, CHOLV, HDL, LDL, LDLC, DLDLP, TGLX, TRIGL, TRIGP, CHHD, CHHDX  Lab Results   Component Value Date/Time    Sodium 131 (L) 11/21/2017 01:49 PM    Potassium 4.4 11/21/2017 01:49 PM    Chloride 95 (L) 11/21/2017 01:49 PM    CO2 25 11/21/2017 01:49 PM    Anion gap 11 11/21/2017 01:49 PM    Glucose 451 (H) 11/21/2017 01:49 PM    BUN 36 (H) 11/21/2017 01:49 PM    Creatinine 2.17 (H) 11/21/2017 01:49 PM    BUN/Creatinine ratio 17 11/21/2017 01:49 PM    GFR est AA 36 (L) 11/21/2017 01:49 PM    GFR est non-AA 29 (L) 11/21/2017 01:49 PM    Calcium 8.9 11/21/2017 01:49 PM      Wt Readings from Last 3 Encounters:   07/19/18 172 lb (78 kg)   03/26/18 166 lb 3.2 oz (75.4 kg)   11/27/17 161 lb 12.8 oz (73.4 kg)      BP Readings from Last 3 Encounters:   07/19/18 130/70   03/26/18 110/50   01/16/18 120/64      Current Outpatient Prescriptions   Medication Sig    losartan (COZAAR) 25 mg tablet     insulin glargine (LANTUS SOLOSTAR U-100 INSULIN) 100 unit/mL (3 mL) inpn 12 Units by SubCUTAneous route daily.  bumetanide (BUMEX) 1 mg tablet Take 1 mg by mouth two (2) times a day.  amLODIPine (NORVASC) 10 mg tablet Take 1 Tab by mouth daily.  glipiZIDE (GLUCOTROL) 5 mg tablet Take 1 Tab by mouth Before breakfast and dinner. (Patient taking differently: Take 10 mg by mouth Before breakfast and dinner.)    cloNIDine HCl (CATAPRES) 0.1 mg tablet Take 1 Tab by mouth two (2) times a day.  carvedilol (COREG) 25 mg tablet Take 1 Tab by mouth two (2) times a day.  simvastatin (ZOCOR) 20 mg tablet Take 20 mg by mouth nightly.  omega 3-dha-epa-fish oil (FISH OIL) 100-160-1,000 mg cap Take 1 Cap by mouth two (2) times a day.  promethazine-codeine (PHENERGAN WITH CODEINE) 6.25-10 mg/5 mL syrup Take 5 mL by mouth every six (6) hours as needed for Cough.  cinnamon bark (CINNAMON) 500 mg cap Take 1,000 mg by mouth two (2) times a day.      No current facility-administered medications for this visit. Impression see above.       Written by Abdirahman Harvey, as dictated by Melchor Hoover MD.

## 2018-07-26 PROBLEM — E11.21 TYPE 2 DIABETES WITH NEPHROPATHY (HCC): Status: ACTIVE | Noted: 2018-07-26

## 2018-08-20 ENCOUNTER — TELEPHONE (OUTPATIENT)
Dept: CARDIOLOGY CLINIC | Age: 83
End: 2018-08-20

## 2018-08-20 ENCOUNTER — CLINICAL SUPPORT (OUTPATIENT)
Dept: CARDIOLOGY CLINIC | Age: 83
End: 2018-08-20

## 2018-08-20 ENCOUNTER — OFFICE VISIT (OUTPATIENT)
Dept: CARDIOLOGY CLINIC | Age: 83
End: 2018-08-20

## 2018-08-20 VITALS
BODY MASS INDEX: 26.84 KG/M2 | DIASTOLIC BLOOD PRESSURE: 80 MMHG | OXYGEN SATURATION: 95 % | WEIGHT: 171 LBS | HEIGHT: 67 IN | HEART RATE: 64 BPM | SYSTOLIC BLOOD PRESSURE: 146 MMHG | RESPIRATION RATE: 18 BRPM

## 2018-08-20 DIAGNOSIS — I25.10 CORONARY ARTERY DISEASE INVOLVING NATIVE CORONARY ARTERY OF NATIVE HEART WITHOUT ANGINA PECTORIS: ICD-10-CM

## 2018-08-20 DIAGNOSIS — E11.51 TYPE 2 DIABETES MELLITUS WITH DIABETIC PERIPHERAL ANGIOPATHY WITHOUT GANGRENE, WITHOUT LONG-TERM CURRENT USE OF INSULIN (HCC): ICD-10-CM

## 2018-08-20 DIAGNOSIS — I73.9 CLAUDICATION OF RIGHT LOWER EXTREMITY (HCC): ICD-10-CM

## 2018-08-20 DIAGNOSIS — I48.20 ATRIAL FIBRILLATION, CHRONIC (HCC): ICD-10-CM

## 2018-08-20 DIAGNOSIS — I10 HYPERTENSION, ESSENTIAL, BENIGN: ICD-10-CM

## 2018-08-20 DIAGNOSIS — I73.9 PVD (PERIPHERAL VASCULAR DISEASE) (HCC): Primary | ICD-10-CM

## 2018-08-20 DIAGNOSIS — I73.9 PAD (PERIPHERAL ARTERY DISEASE) (HCC): ICD-10-CM

## 2018-08-20 DIAGNOSIS — Z01.812 PRE-PROCEDURE LAB EXAM: ICD-10-CM

## 2018-08-20 DIAGNOSIS — I77.9 BILATERAL CAROTID ARTERY DISEASE (HCC): ICD-10-CM

## 2018-08-20 DIAGNOSIS — I73.9 CLAUDICATION IN PERIPHERAL VASCULAR DISEASE (HCC): Primary | ICD-10-CM

## 2018-08-20 DIAGNOSIS — E78.00 HYPERCHOLESTEREMIA: ICD-10-CM

## 2018-08-20 DIAGNOSIS — N18.30 CKD (CHRONIC KIDNEY DISEASE) STAGE 3, GFR 30-59 ML/MIN (HCC): ICD-10-CM

## 2018-08-20 NOTE — TELEPHONE ENCOUNTER
Patient scheduled for lower extremity angio with runoffs with Dr. Francy Trejo on September 27th at 9:30am. Patient will arrive 2 hours early. Verbal and written instructions given, including medications (hold lantus and bumex day of, patient takes glipizide at night with dinner), npo after midnight, pre and post procedure instructions, etc. Patient will get labs drawn in a couple of weeks. Patient verbalizes understanding and denies further questions or concerns.

## 2018-08-20 NOTE — PROCEDURES
[facility]  *** FINAL REPORT ***    Name: Garcia Starks  MRN: [id number]       Outpatient  : 28 Aug 1935  HIS Order #: [id number]  [id number] [facility] Visit #: [id number]  Date: 20 Aug 2018    TYPE OF TEST: Peripheral Arterial Testing    REASON FOR TEST  Claudication (right side)    Right Leg  Segmentals: Abnormal                     mmHg  Brachial         162  High thigh  Low thigh  Calf  Posterior tibial  81  Dorsalis pedis    83  Peroneal  Metatarsal  Toe pressure      44  Doppler:    Abnormal  PVR:        Abnormal  Ankle/Brachial: 0.51    Left Leg  Segmentals: Abnormal                     mmHg  Brachial         156  High thigh  Low thigh  Calf  Posterior tibial 137  Dorsalis pedis   113  Peroneal  Metatarsal  Toe pressure      69  Doppler:    Abnormal  PVR:        Abnormal  Ankle/Brachial: 0.85    INTERPRETATION/FINDINGS  PROCEDURE:  Evaluation of lower extremity arteries with systolic blood   pressure measurement at the ankle and brachial level with calculation   of the ankle/brachial pressure index (BERNARDO). The exam may also  include pulse volume recording (PVR) plethysmography at the ankle  level. FINDINGS:  Abnormal monophasic Doppler waveforms are exhibited within  the distal posterior tibial and dorsalis pedis arteries bilaterally. PVR tracings at the ankle appear significantly damped, right > left. Resting ankle/brachial indices are severely reduced on the right at  0.5 and mildly reduced on the left at 0.8. The right great toe index  is severely reduced at 0.27 and the left moderately reduced at 0.43. IMPRESSION:  1)  There is evidence of significant peripheral vascular disease  bilaterally. 2)  Resting ankle/brachial indices are severely reduced on the right  at 0.51 and mildly reduced on the left at 0.85. 3)  Great toe indices are severely reduced on the right at 0.27 and  moderately reduced on the left at 0.43.   4)  In comparison with a study dated 2016,  the left BERNARDO has  improved. ABIs previously measured 0.56 on the right and 0.62 on the  left. ADDITIONAL COMMENTS    I have personally reviewed the data relevant to the interpretation of  this  study. TECHNOLOGIST: Behzad Marin RVT, RDMS, RDCS  Signed: 08/20/2018 02:18 PM    PHYSICIAN: Oneil Lopez.  Kiesha Mcwilliams MD  Signed: 08/20/2018 04:46 PM

## 2018-08-20 NOTE — PROGRESS NOTES
HISTORY OF PRESENT ILLNESS  Celeste Gusman is a 81 y.o. male. He has coronary and peripheral arterial disease and has had bypass surgery. In addition, he has had orbital atherectomy and drug coated balloons and stenting of his legs done several years ago. He now has pain in his right leg starting in his hip with ambulation. Noninvasive testing today suggested that his ankle brachial index is mildly reduced on the left but there is a significant stenosis within the common femoral artery on the right with a significant reduction in the ankle brachial index. His creatinine has been in the range of 1.6. HPI  Patient Active Problem List   Diagnosis Code    Bradycardia R00.1    CAD (coronary artery disease) I25.10    Hypertension, essential, benign I10    Carotid arterial disease (HCC) I77.9    Hypercholesteremia E78.00    PVD (peripheral vascular disease) ([facility] I73.9    PVC's (premature ventricular contractions) I49.3    SOB (shortness of breath) R06.02    Type 2 diabetes mellitus with diabetic peripheral angiopathy without gangrene ([facility]) E11.51    CKD (chronic kidney disease) N18.9    Acute renal failure (ARF) ([facility]) N17.9    Hypokalemia E87.6    Generalized weakness R53.1    Elevated troponin R74.8    KATALINA (acute kidney injury) ([facility] N17.9    GI bleed K92.2    Hyperglycemia due to type 2 diabetes mellitus ([facility] E11.65    Atrial fibrillation, chronic ([facility]) I48.2    CKD (chronic kidney disease) stage 3, GFR 30-59 ml/min N18.3    Type 2 diabetes with nephropathy ([facility]) E11.21     Current Outpatient Prescriptions   Medication Sig Dispense Refill    losartan (COZAAR) 25 mg tablet       insulin glargine (LANTUS SOLOSTAR U-100 INSULIN) 100 unit/mL (3 mL) inpn 12 Units by SubCUTAneous route daily.  bumetanide (BUMEX) 1 mg tablet Take 1 mg by mouth two (2) times a day.  amLODIPine (NORVASC) 10 mg tablet Take 1 Tab by mouth daily.  30 Tab 0    glipiZIDE (GLUCOTROL) 5 mg tablet Take 1 Tab by mouth Before breakfast and dinner. (Patient taking differently: Take 10 mg by mouth Before breakfast and dinner.) 60 Tab 0    cloNIDine HCl (CATAPRES) 0.1 mg tablet Take 1 Tab by mouth two (2) times a day. 60 Tab 5    carvedilol (COREG) 25 mg tablet Take 1 Tab by mouth two (2) times a day. 60 Tab 0    simvastatin (ZOCOR) 20 mg tablet Take 20 mg by mouth nightly.  omega 3-dha-epa-fish oil (FISH OIL) 100-160-1,000 mg cap Take 1 Cap by mouth two (2) times a day.  cinnamon bark (CINNAMON) 500 mg cap Take 1,000 mg by mouth two (2) times a day.  promethazine-codeine (PHENERGAN WITH CODEINE) 6.25-10 mg/5 mL syrup Take 5 mL by mouth every six (6) hours as needed for Cough. Past Medical History:   Diagnosis Date    CAD (coronary artery disease)     s/p CABG 2008    Carotid arterial disease (HCC)     CKD (chronic kidney disease) stage 3, GFR 30-59 ml/min 10/21/2017    hypertension and DM nephrosclerosis    Diabetes mellitus, type 2 ([facility]     Hypercholesteremia     Hypertension     Paroxysmal atrial fibrillation ([facility] 10/21/2017    new onset afib with BRPR 10-19-17 admit    PVC's (premature ventricular contractions) 5/26/2014    PVD (peripheral vascular disease) ([facility] [address] [facility]      Past Surgical History:   Procedure Laterality Date    HX CORONARY ARTERY BYPASS GRAFT      HX HEART CATHETERIZATION         Review of Systems   Cardiovascular: Positive for claudication. Musculoskeletal: Positive for joint pain. All other systems reviewed and are negative. Visit Vitals    /80 (BP 1 Location: Left arm, BP Patient Position: Sitting)    Pulse 64    Resp 18    Ht 5' 7\" (1.702 m)    Wt 171 lb (77.6 kg)    SpO2 95%    BMI 26.78 kg/m2       Physical Exam   Constitutional: He is oriented to person, place, and time. He appears well-nourished. HENT:   Head: Atraumatic. Eyes: Conjunctivae are normal.   Neck: Neck supple. Cardiovascular: Normal rate and regular rhythm.   Exam reveals no gallop and no friction rub. No murmur heard. Pulses:       Femoral pulses are 0 on the right side, and 3+ on the left side. Pulmonary/Chest: He has no wheezes. He has no rales. Abdominal: Bowel sounds are normal.   Musculoskeletal: He exhibits no edema. Neurological: He is oriented to person, place, and time. Skin: Skin is dry. ASSESSMENT and PLAN  He does not have a good femoral pulse on the right. It is unclear if anything can be done via a percutaneous approach or whether the common femoral needs endarterectomy. However, angiography needs to be done again and I spoke to him about doing this today. This is some risk from his kidneys, of course. He wishes to postpone this for about a month because of some personal issues. We will get him lab slips to get labs drawn several weeks before the procedure and schedule it to be done in about four weeks.

## 2018-08-20 NOTE — PROCEDURES
[facility]  *** FINAL REPORT ***    Name: Rubina Vickers  MRN: [id number]       Outpatient  : 28 Aug 1935  HIS Order #: [id number]  [id number] [facility] Visit #: [id number]  Date: 20 Aug 2018    TYPE OF TEST: Extremity Arterial Duplex    REASON FOR TEST  Claudication (right side), New onset claudication in right leg;  previous history of stent                            Right                     Left  Artery               PSV   Finding             PSV   Finding  ------------------  -----  ---------------    -----  ---------------  External iliac:  Common femoral:     508.0  >50% stenosis  Profunda femoris:  Proximal SFA:       153.0  Mid SFA:             28.0  Distal SFA:          28.0  Popliteal:  Anterior tibial:  Posterior tibial:    Pressures               Right     Left               -----     -----     Brachial:           DP:           PT:            BERNARDO:            Toe: INTERPRETATION/FINDINGS  PROCEDURE:   LIMITED ARTERIAL DUPLEX EXAM; Grayscale, color flow and  spectral Doppler analysis of the right femoral artery. This was  performed as an adjunct to a separately reported ankle/brachial index  exam.    FINDINGS:  The right common femoral artery is patent. It exhibits a  significant plaque formation associated with narrowing of the color  flow channel. Peak velocities rise to 508 cm/s. Another large plaque   formation is visualized at the level of the bifurcation, albeit with  no significant increase in velocity. An endovacular stent is noted to   be present within the proximal right femoral artery. The stent is  patent with no visual or Doppler evidence of stenosis. The mid and  distal segments of the right femoral artery remain patent to the level   of the abductor canal.    IMPRESSION:  1)  Significant (50-99%) stenosis within the right common femoral  artery. 2)  Patent right femoral artery stent without evidence of intrastent  stenosis.   3)  Ankle/brachial indices are reported under separate cover. ADDITIONAL COMMENTS    I have personally reviewed the data relevant to the interpretation of  this  study. TECHNOLOGIST: Armin Dooley RVT, RDMS, RDCS  Signed: 08/20/2018 02:11 PM    PHYSICIAN: Gio Hernandez.  Soni Oliver MD  Signed: 08/20/2018 04:46 PM

## 2018-08-20 NOTE — PROCEDURES
[facility]  *** FINAL REPORT ***    Name: Cathleen Carballo  MRN: [id number]       Outpatient  : 28 Aug 1935  HIS Order #: [id number]  [id number] [facility] Visit #: [id number]  Date: 20 Aug 2018    TYPE OF TEST: Cerebrovascular Duplex    REASON FOR TEST  Known carotid stenosis    Right Carotid:-             Proximal               Mid                 Distal  cm/s  Systolic  Diastolic  Systolic  Diastolic  Systolic  Diastolic  CCA:     06.7       7.0                            54.0      11.0  Bulb:  ECA:    131.0       8.0  ICA:     80.0      16.0       57.0      11.0       56.0      17.0  ICA/CCA:  1.5       1.5    ICA Stenosis: <50%    Right Vertebral:-  Finding: Antegrade  Sys:       57.0  Bel:       18.0    Right Subclavian:    Left Carotid:-            Proximal                Mid                 Distal  cm/s  Systolic  Diastolic  Systolic  Diastolic  Systolic  Diastolic  CCA:     62.9       9.0                            71.0      10.0  Bulb:  ECA:    142.0       0.0  ICA:     99.0      20.0       83.0      15.0       59.0      11.0  ICA/CCA:  1.4       2.0    ICA Stenosis: <50%    Left Vertebral:-  Finding: Retrograde  Sys:  Bel:    Left Subclavian:    INTERPRETATION/FINDINGS  PROCEDURE:  Evaluation of the extracranial cerebrovascular arteries  with ultrasound (Grayscale imaging, pulsed Doppler color Doppler). Includes the common carotid, internal carotid, external carotid and  vertebral arteries. FINDINGS:  Heterogeneous plaque is present within the common, internal   and external carotid arteries bilaterally. Color flow exhibits mild  filling defects and peak velocities remain within normal limits. IMPRESSION:  1)  10-49% stenosis of the right internal carotid artery. 2)  10-49% stenosis of the left internal carotid artery. 3)  The right vertebral artery exhibits normal antegrade flow. An  abnormal, low velocity flow pattern is noted in the left vertebral  artery.   Brachial blood pressures are symmetric. 4)  In comparison with a previous study dated 3/10/2016, there has  been no interval change. ADDITIONAL COMMENTS    I have personally reviewed the data relevant to the interpretation of  this  study. TECHNOLOGIST: Bee Hobbs RVT, RDMS, RDCS  Signed: 08/20/2018 02:33 PM    PHYSICIAN: Brionna Garvey.  Rl Marie MD  Signed: 08/20/2018 04:46 PM

## 2018-08-20 NOTE — MR AVS SNAPSHOT
[address] [facility] [facility] [address] [place] 
[phone] Patient: Isaac Mendoza. MRN: [id number] PXM:[id number] Visit Information Date & Time Provider Department Dept. Phone Encounter #  
 8/20/2018  3:00 PM Delilah Azevedo MD [facility] Jaci Brown [phone] [id number] Your Appointments 8/20/2018  3:00 PM  
ESTABLISHED PATIENT with Delilah Azevedo MD  
[facility] ([facility] SCHEDULING) Appt Note: 6 month f/u with cartoid  doppler dx CAD see Dr Gala Dee after; Dr Francisco Henriquez f/u with BERNARDO/ cartoid doppler dx CAD see Dr Francisco Henriquez after  
 [address] [facility] [address] [place]  
[address] [facility]  
  
    
 11/19/2018  9:20 AM  
ESTABLISHED PATIENT with Patrica Orozco MD  
[facility] ([facility] SCHEDULING) Appt Note: 6 month f/u with cartoid  doppler  dx CAD see Dr Gala Dee after; Dr Gala Dee 6 Month Follow up per B[facility]ven[facility] [address]  [address] [place]  
[address] [id number] Upcoming Health Maintenance Date Due  
 FOOT EXAM Q1 8/28/1945 EYE EXAM RETINAL OR DILATED Q1 8/28/1946 DTaP/Tdap/Td series (1 - Tdap) 8/28/1957 ZOSTER VACCINE AGE 60> 6/28/1996 GLAUCOMA SCREENING Q2Y 8/28/2001 Pneumococcal 65+ High/Highest Risk (1 of 2 - PCV13) 8/28/2001 LIPID PANEL Q1 10/25/2011 HEMOGLOBIN A1C Q6M 4/20/2018 MEDICARE YEARLY EXAM 7/19/2018 Influenza Age 5 to Adult 8/1/2018 MICROALBUMIN Q1 8/15/2018 Allergies as of 8/20/2018  Review Complete On: 7/26/2018 By: Patrica Orozco MD  
  
 Severity Noted Reaction Type Reactions Lisinopril  09/20/2016    Cough Current Immunizations  Reviewed on 1/16/2018 Name Date Influenza Vaccine 11/15/2017, 11/20/2016 Not reviewed this visit Vitals BP Pulse Resp Height(growth percentile) Weight(growth percentile) SpO2  
 146/80 (BP 1 Location: Left arm, BP Patient Position: Sitting) 64 18 5' 7\" (1.702 m) 171 lb (77.6 kg) 95% BMI Smoking Status 26.78 kg/m2 Former Smoker Vitals History BMI and BSA Data Body Mass Index Body Surface Area  
 26.78 kg/m 2 1.92 m 2 Preferred Pharmacy Pharmacy Name Phone [facility] [address] [facility] [facility] [phone] Your Updated Medication List  
  
   
This list is accurate as of 8/20/18  2:17 PM.  Always use your most recent med list. amLODIPine 10 mg tablet Commonly known as:  Delvin Alstrom Take 1 Tab by mouth daily. bumetanide 1 mg tablet Commonly known as:  Merla Goods Take 1 mg by mouth two (2) times a day. carvedilol 25 mg tablet Commonly known as:  Jestine Pellet Take 1 Tab by mouth two (2) times a day. CINNAMON 500 mg Cap Generic drug:  cinnamon bark Take 1,000 mg by mouth two (2) times a day. cloNIDine HCl 0.1 mg tablet Commonly known as:  CATAPRES Take 1 Tab by mouth two (2) times a day. FISH -160-1,000 mg Cap Generic drug:  omega 3-dha-epa-fish oil Take 1 Cap by mouth two (2) times a day. glipiZIDE 5 mg tablet Commonly known as:  Seldon Iván Take 1 Tab by mouth Before breakfast and dinner. LANTUS SOLOSTAR U-100 INSULIN 100 unit/mL (3 mL) Inpn Generic drug:  insulin glargine 12 Units by SubCUTAneous route daily. losartan 25 mg tablet Commonly known as:  COZAAR  
  
 promethazine-codeine 6.25-10 mg/5 mL syrup Commonly known as:  PHENERGAN with CODEINE Take 5 mL by mouth every six (6) hours as needed for Cough. simvastatin 20 mg tablet Commonly known as:  ZOCOR Take 20 mg by mouth nightly. Introducing [facility]!    
 Beverly Flores introduces [employer] patient portal. Now you can access parts of your medical record, email your doctor's office, and request medication refills online. 1. In your internet browser, go to [email or web address] [address] [employer] 2. Click on the First Time User? Click Here link in the Sign In box. You will see the New Member Sign Up page. 3. Enter your [email or web address] Access Code exactly as it appears below. You will not need to use this code after youve completed the sign-up process. If you do not sign up before the expiration date, you must request a new code. · [email or web address] Access Code: [email or web address] Expires: 10/15/2018  9:42 AM 
 
4. Enter the last four digits of your Social Security Number (xxxx) and Date of Birth (mm/dd/yyyy) as indicated and click Submit. You will be taken to the next sign-up page. 5. Create a [email or web address] ID. This will be your [email or web address] login ID and cannot be changed, so think of one that is secure and easy to remember. 6. Create a [email or web address] password. You can change your password at any time. 7. Enter your Password Reset Question and Answer. This can be used at a later time if you forget your password. 8. Enter your e-mail address. You will receive e-mail notification when new information is available in [address] 9. Click Sign Up. You can now view and download portions of your medical record. 10. Click the Download Summary menu link to download a portable copy of your medical information. If you have questions, please visit the Frequently Asked Questions section of the [email or web address] website. Remember, [email or web address] is NOT to be used for urgent needs. For medical emergencies, dial 911. Now available from your iPhone and Android! Please provide this summary of care documentation to your next provider. Your primary care clinician is listed as Cindi Altamirano. If you have any questions after today's visit, please call [phone].

## 2018-08-21 DIAGNOSIS — I73.9 CLAUDICATION (HCC): Primary | ICD-10-CM

## 2018-08-21 DIAGNOSIS — R68.89 ABNORMAL ANKLE BRACHIAL INDEX (ABI): ICD-10-CM

## 2018-08-21 RX ORDER — DIPHENHYDRAMINE HYDROCHLORIDE 50 MG/ML
50 INJECTION, SOLUTION INTRAMUSCULAR; INTRAVENOUS
Status: CANCELLED | OUTPATIENT
Start: 2018-09-27 | End: 2018-09-28

## 2018-08-21 RX ORDER — SODIUM CHLORIDE 9 MG/ML
100 INJECTION, SOLUTION INTRAVENOUS CONTINUOUS
Status: CANCELLED | OUTPATIENT
Start: 2018-09-27

## 2018-09-10 ENCOUNTER — HOSPITAL ENCOUNTER (OUTPATIENT)
Dept: LAB | Age: 83
Discharge: HOME | End: 2018-09-10
Payer: MEDICARE

## 2018-09-10 PROCEDURE — 85027 COMPLETE CBC AUTOMATED: CPT

## 2018-09-10 PROCEDURE — 80048 BASIC METABOLIC PNL TOTAL CA: CPT

## 2018-09-11 LAB
BUN SERPL-MCNC: 72 MG/DL (ref 8–27)
BUN/CREAT SERPL: 33 (ref 10–24)
CALCIUM SERPL-MCNC: 9.5 MG/DL (ref 8.6–10.2)
CHLORIDE SERPL-SCNC: 102 MMOL/L (ref 96–106)
CO2 SERPL-SCNC: 23 MMOL/L (ref 20–29)
CREAT SERPL-MCNC: 2.16 MG/DL (ref 0.76–1.27)
ERYTHROCYTE [DISTWIDTH] IN BLOOD BY AUTOMATED COUNT: 14.2 % (ref 12.3–15.4)
GLUCOSE SERPL-MCNC: 178 MG/DL (ref 65–99)
HCT VFR BLD AUTO: 37.5 % (ref 37.5–51)
HGB BLD-MCNC: 12.2 G/DL (ref 13–17.7)
INTERPRETATION: NORMAL
MCH RBC QN AUTO: 29.7 PG (ref 26.6–33)
MCHC RBC AUTO-ENTMCNC: 32.5 G/DL (ref 31.5–35.7)
MCV RBC AUTO: 91 FL (ref 79–97)
PLATELET # BLD AUTO: 193 X10E3/UL (ref 150–379)
POTASSIUM SERPL-SCNC: 5.4 MMOL/L (ref 3.5–5.2)
RBC # BLD AUTO: 4.11 X10E6/UL (ref 4.14–5.8)
SODIUM SERPL-SCNC: 142 MMOL/L (ref 134–144)
WBC # BLD AUTO: 5 X10E3/UL (ref 3.4–10.8)

## 2018-09-26 DIAGNOSIS — I73.9 CLAUDICATION (HCC): Primary | ICD-10-CM

## 2018-09-26 DIAGNOSIS — R68.89 ABNORMAL ANKLE BRACHIAL INDEX (ABI): ICD-10-CM

## 2018-09-26 DIAGNOSIS — I73.9 PAD (PERIPHERAL ARTERY DISEASE) (HCC): ICD-10-CM

## 2018-09-26 RX ORDER — DIPHENHYDRAMINE HYDROCHLORIDE 50 MG/ML
50 INJECTION, SOLUTION INTRAMUSCULAR; INTRAVENOUS
Status: CANCELLED | OUTPATIENT
Start: 2018-09-27 | End: 2018-09-28

## 2018-09-26 RX ORDER — SODIUM CHLORIDE 9 MG/ML
100 INJECTION, SOLUTION INTRAVENOUS CONTINUOUS
Status: CANCELLED | OUTPATIENT
Start: 2018-09-27

## 2018-09-27 ENCOUNTER — HOSPITAL ENCOUNTER (OUTPATIENT)
Dept: CARDIAC CATH/INVASIVE PROCEDURES | Age: 83
Discharge: HOME | End: 2018-09-27
Attending: SPECIALIST | Admitting: SPECIALIST
Payer: MEDICARE

## 2018-09-27 VITALS
SYSTOLIC BLOOD PRESSURE: 155 MMHG | TEMPERATURE: 98.5 F | WEIGHT: 171.4 LBS | RESPIRATION RATE: 16 BRPM | BODY MASS INDEX: 25.39 KG/M2 | HEIGHT: 69 IN | DIASTOLIC BLOOD PRESSURE: 74 MMHG | HEART RATE: 48 BPM | OXYGEN SATURATION: 98 %

## 2018-09-27 DIAGNOSIS — R68.89 ABNORMAL ANKLE BRACHIAL INDEX (ABI): ICD-10-CM

## 2018-09-27 DIAGNOSIS — I73.9 PAD (PERIPHERAL ARTERY DISEASE) (HCC): ICD-10-CM

## 2018-09-27 DIAGNOSIS — I73.9 CLAUDICATION (HCC): ICD-10-CM

## 2018-09-27 LAB
ANION GAP SERPL CALC-SCNC: 8 MMOL/L (ref 5–15)
BUN SERPL-MCNC: 52 MG/DL (ref 6–20)
BUN/CREAT SERPL: 26 (ref 12–20)
CALCIUM SERPL-MCNC: 8.2 MG/DL (ref 8.5–10.1)
CHLORIDE SERPL-SCNC: 109 MMOL/L (ref 97–108)
CO2 SERPL-SCNC: 27 MMOL/L (ref 21–32)
CREAT SERPL-MCNC: 1.99 MG/DL (ref 0.7–1.3)
GLUCOSE BLD STRIP.AUTO-MCNC: 110 MG/DL (ref 65–100)
GLUCOSE SERPL-MCNC: 110 MG/DL (ref 65–100)
POTASSIUM SERPL-SCNC: 3.6 MMOL/L (ref 3.5–5.1)
SERVICE CMNT-IMP: ABNORMAL
SODIUM SERPL-SCNC: 144 MMOL/L (ref 136–145)

## 2018-09-27 PROCEDURE — 77030013715 HC INFL SYS MRTM -B

## 2018-09-27 PROCEDURE — 74011636320 HC RX REV CODE- 636/320: Performed by: SPECIALIST

## 2018-09-27 PROCEDURE — 74011000258 HC RX REV CODE- 258: Performed by: SPECIALIST

## 2018-09-27 PROCEDURE — 77030021532 HC CATH ANGI DX IMPRS MRTM -B

## 2018-09-27 PROCEDURE — 80048 BASIC METABOLIC PNL TOTAL CA: CPT | Performed by: SPECIALIST

## 2018-09-27 PROCEDURE — C2623 CATH, TRANSLUMIN, DRUG-COAT: HCPCS

## 2018-09-27 PROCEDURE — 74011250637 HC RX REV CODE- 250/637: Performed by: SPECIALIST

## 2018-09-27 PROCEDURE — 77030006078 HC TAPE GLOW N TEL LEMV -B

## 2018-09-27 PROCEDURE — 77030004532 HC CATH ANGI DX IMP BSC -A

## 2018-09-27 PROCEDURE — C1894 INTRO/SHEATH, NON-LASER: HCPCS

## 2018-09-27 PROCEDURE — 77030013744

## 2018-09-27 PROCEDURE — 82962 GLUCOSE BLOOD TEST: CPT

## 2018-09-27 PROCEDURE — 75710 ARTERY X-RAYS ARM/LEG: CPT

## 2018-09-27 PROCEDURE — 36415 COLL VENOUS BLD VENIPUNCTURE: CPT | Performed by: SPECIALIST

## 2018-09-27 PROCEDURE — 74011250636 HC RX REV CODE- 250/636: Performed by: SPECIALIST

## 2018-09-27 PROCEDURE — C1725 CATH, TRANSLUMIN NON-LASER: HCPCS

## 2018-09-27 PROCEDURE — C1769 GUIDE WIRE: HCPCS

## 2018-09-27 RX ORDER — HEPARIN SODIUM 1000 [USP'U]/ML
5000 INJECTION, SOLUTION INTRAVENOUS; SUBCUTANEOUS
Status: DISCONTINUED | OUTPATIENT
Start: 2018-09-27 | End: 2018-09-27 | Stop reason: HOSPADM

## 2018-09-27 RX ORDER — FENTANYL CITRATE 50 UG/ML
25-200 INJECTION, SOLUTION INTRAMUSCULAR; INTRAVENOUS
Status: DISCONTINUED | OUTPATIENT
Start: 2018-09-27 | End: 2018-09-27 | Stop reason: HOSPADM

## 2018-09-27 RX ORDER — SODIUM CHLORIDE 0.9 % (FLUSH) 0.9 %
10 SYRINGE (ML) INJECTION AS NEEDED
Status: DISCONTINUED | OUTPATIENT
Start: 2018-09-27 | End: 2018-09-27 | Stop reason: HOSPADM

## 2018-09-27 RX ORDER — LIDOCAINE HYDROCHLORIDE 10 MG/ML
4-30 INJECTION INFILTRATION; PERINEURAL
Status: DISCONTINUED | OUTPATIENT
Start: 2018-09-27 | End: 2018-09-27 | Stop reason: HOSPADM

## 2018-09-27 RX ORDER — GUAIFENESIN 100 MG/5ML
LIQUID (ML) ORAL
Status: DISCONTINUED
Start: 2018-09-27 | End: 2018-09-27 | Stop reason: HOSPADM

## 2018-09-27 RX ORDER — SODIUM CHLORIDE 0.9 % (FLUSH) 0.9 %
5-10 SYRINGE (ML) INJECTION EVERY 8 HOURS
Status: CANCELLED | OUTPATIENT
Start: 2018-09-27

## 2018-09-27 RX ORDER — SODIUM CHLORIDE 0.9 % (FLUSH) 0.9 %
5-10 SYRINGE (ML) INJECTION AS NEEDED
Status: CANCELLED | OUTPATIENT
Start: 2018-09-27

## 2018-09-27 RX ORDER — CARVEDILOL 12.5 MG/1
25 TABLET ORAL
Status: COMPLETED | OUTPATIENT
Start: 2018-09-27 | End: 2018-09-27

## 2018-09-27 RX ORDER — SODIUM CHLORIDE 9 MG/ML
125 INJECTION, SOLUTION INTRAVENOUS CONTINUOUS
Status: DISCONTINUED | OUTPATIENT
Start: 2018-09-27 | End: 2018-09-27 | Stop reason: HOSPADM

## 2018-09-27 RX ORDER — DIPHENHYDRAMINE HYDROCHLORIDE 50 MG/ML
50 INJECTION, SOLUTION INTRAMUSCULAR; INTRAVENOUS
Status: DISCONTINUED | OUTPATIENT
Start: 2018-09-27 | End: 2018-09-27 | Stop reason: HOSPADM

## 2018-09-27 RX ORDER — CLOPIDOGREL 300 MG/1
600 TABLET, FILM COATED ORAL
Status: DISCONTINUED | OUTPATIENT
Start: 2018-09-27 | End: 2018-09-27 | Stop reason: HOSPADM

## 2018-09-27 RX ORDER — GUAIFENESIN 100 MG/5ML
81 LIQUID (ML) ORAL DAILY
Qty: 33 TAB | Refills: 11 | Status: SHIPPED | OUTPATIENT
Start: 2018-09-27 | End: 2019-12-17

## 2018-09-27 RX ORDER — GUAIFENESIN 100 MG/5ML
81-325 LIQUID (ML) ORAL AS NEEDED
Status: DISCONTINUED | OUTPATIENT
Start: 2018-09-27 | End: 2018-09-27 | Stop reason: HOSPADM

## 2018-09-27 RX ORDER — MIDAZOLAM HYDROCHLORIDE 1 MG/ML
1-10 INJECTION, SOLUTION INTRAMUSCULAR; INTRAVENOUS
Status: DISCONTINUED | OUTPATIENT
Start: 2018-09-27 | End: 2018-09-27 | Stop reason: HOSPADM

## 2018-09-27 RX ORDER — HEPARIN SODIUM 200 [USP'U]/100ML
1000 INJECTION, SOLUTION INTRAVENOUS
Status: DISCONTINUED | OUTPATIENT
Start: 2018-09-27 | End: 2018-09-27 | Stop reason: HOSPADM

## 2018-09-27 RX ORDER — SODIUM CHLORIDE 9 MG/ML
125 INJECTION, SOLUTION INTRAVENOUS CONTINUOUS
Status: CANCELLED | OUTPATIENT
Start: 2018-09-27 | End: 2018-09-27

## 2018-09-27 RX ORDER — LIDOCAINE HYDROCHLORIDE 10 MG/ML
INJECTION, SOLUTION EPIDURAL; INFILTRATION; INTRACAUDAL; PERINEURAL
Status: DISCONTINUED
Start: 2018-09-27 | End: 2018-09-27 | Stop reason: HOSPADM

## 2018-09-27 RX ORDER — HEPARIN SODIUM 1000 [USP'U]/ML
INJECTION, SOLUTION INTRAVENOUS; SUBCUTANEOUS
Status: DISCONTINUED
Start: 2018-09-27 | End: 2018-09-27 | Stop reason: HOSPADM

## 2018-09-27 RX ORDER — ATROPINE SULFATE 0.1 MG/ML
1 INJECTION INTRAVENOUS AS NEEDED
Status: DISCONTINUED | OUTPATIENT
Start: 2018-09-27 | End: 2018-09-27 | Stop reason: HOSPADM

## 2018-09-27 RX ORDER — CLONIDINE HYDROCHLORIDE 0.1 MG/1
0.1 TABLET ORAL
Status: COMPLETED | OUTPATIENT
Start: 2018-09-27 | End: 2018-09-27

## 2018-09-27 RX ORDER — AMLODIPINE BESYLATE 5 MG/1
10 TABLET ORAL
Status: COMPLETED | OUTPATIENT
Start: 2018-09-27 | End: 2018-09-27

## 2018-09-27 RX ADMIN — MIDAZOLAM HYDROCHLORIDE 1 MG: 1 INJECTION, SOLUTION INTRAMUSCULAR; INTRAVENOUS at 10:25

## 2018-09-27 RX ADMIN — MIDAZOLAM HYDROCHLORIDE 1 MG: 1 INJECTION, SOLUTION INTRAMUSCULAR; INTRAVENOUS at 09:40

## 2018-09-27 RX ADMIN — LIDOCAINE HYDROCHLORIDE 10 ML: 10 INJECTION, SOLUTION INFILTRATION; PERINEURAL at 09:41

## 2018-09-27 RX ADMIN — IOPAMIDOL 122 ML: 612 INJECTION, SOLUTION INTRAVENOUS at 10:38

## 2018-09-27 RX ADMIN — Medication 2000 UNITS: at 09:36

## 2018-09-27 RX ADMIN — CLONIDINE HYDROCHLORIDE 0.1 MG: 0.1 TABLET ORAL at 08:27

## 2018-09-27 RX ADMIN — FENTANYL CITRATE 25 MCG: 50 INJECTION, SOLUTION INTRAMUSCULAR; INTRAVENOUS at 09:40

## 2018-09-27 RX ADMIN — CARVEDILOL 25 MG: 12.5 TABLET, FILM COATED ORAL at 08:48

## 2018-09-27 RX ADMIN — SODIUM CHLORIDE 100 ML/HR: 900 INJECTION, SOLUTION INTRAVENOUS at 08:24

## 2018-09-27 RX ADMIN — Medication 10 ML: at 09:36

## 2018-09-27 RX ADMIN — BIVALIRUDIN 135.97 MG/HR: 250 INJECTION, POWDER, LYOPHILIZED, FOR SOLUTION INTRAVENOUS at 10:16

## 2018-09-27 RX ADMIN — ASPIRIN 81 MG CHEWABLE TABLET 162 MG: 81 TABLET CHEWABLE at 10:42

## 2018-09-27 RX ADMIN — MIDAZOLAM HYDROCHLORIDE 2 MG: 1 INJECTION, SOLUTION INTRAMUSCULAR; INTRAVENOUS at 09:29

## 2018-09-27 RX ADMIN — FENTANYL CITRATE 25 MCG: 50 INJECTION, SOLUTION INTRAMUSCULAR; INTRAVENOUS at 09:29

## 2018-09-27 RX ADMIN — AMLODIPINE BESYLATE 10 MG: 5 TABLET ORAL at 08:27

## 2018-09-27 RX ADMIN — IOPAMIDOL 50 ML: 755 INJECTION, SOLUTION INTRAVENOUS at 10:15

## 2018-09-27 RX ADMIN — CLOPIDOGREL BISULFATE 300 MG: 300 TABLET, FILM COATED ORAL at 10:41

## 2018-09-27 NOTE — PROGRESS NOTES
Cardiac Cath Lab Procedure Area Arrival Note: 
 
Connie Rodgers. arrived to Cardiac Cath Lab, Procedure Area. Patient identifiers verified with NAME and DATE OF BIRTH. Procedure verified with patient. Consent forms verified. Allergies verified. Patient informed of procedure and plan of care. Questions answered with review. Patient voiced understanding of procedure and plan of care. Patient on cardiac monitor, non-invasive blood pressure, SPO2 monitor. On O2 @ 2 lpm via NC.  IV of NS on pump at 125 ml/hr. Patient status doing well without problems. Patient is A&Ox 4. Patient reports no pain. Patient medicated during procedure with orders obtained and verified by Dr. Kiesha Mcwilliams. Refer to patients Cardiac Cath Lab PROCEDURE REPORT for vital signs, assessment, status, and response during procedure, printed at end of case. Printed report on chart or scanned into chart.

## 2018-09-27 NOTE — IP AVS SNAPSHOT
[address] 
[phone] Patient: Carri Santos. MRN: [id number] About your hospitalization You were admitted on:  September 27, 2018 You last received care in the:  [facility], [facility]/s  CATH LAB You were discharged on:  September 27, 2018 Why you were hospitalized Your primary diagnosis was:  Not on File Follow-up Information Follow up With Details Comments Contact Info Hemanth Kohler MD   [address] 
[phone] Bon Gomez MD   [address] [facility] 57 
[phone] Your Scheduled Appointments Monday November 19, 2018  9:20 AM EST  
ESTABLISHED PATIENT with Catrachito Herrera MD  
[facility] ([address]) [address]  [address] [facility] 57  
[phone] Discharge Orders None A check laly indicates which time of day the medication should be taken. My Medications START taking these medications Instructions Each Dose to Equal  
 Morning Noon Evening Bedtime  
 aspirin 81 mg chewable tablet Your last dose was: Your next dose is: Take 1 Tab by mouth daily. 81 mg CHANGE how you take these medications Instructions Each Dose to Equal  
 Morning Noon Evening Bedtime  
 glipiZIDE 5 mg tablet Commonly known as:  Isabel Wesley What changed:  how much to take Your last dose was: Your next dose is: Take 1 Tab by mouth Before breakfast and dinner. 5 mg CONTINUE taking these medications Instructions Each Dose to Equal  
 Morning Noon Evening Bedtime  
 amLODIPine 10 mg tablet Commonly known as:  Rubi Citron Your last dose was: Your next dose is: Take 1 Tab by mouth daily.   
 10 mg  
    
   
   
   
  
 bumetanide 1 mg tablet Commonly known as:  Marylen Baas Your last dose was: Your next dose is: Take 1 mg by mouth two (2) times a day. 1 mg  
    
   
   
   
  
 carvedilol 25 mg tablet Commonly known as:  Rodriguez Servant Your last dose was: Your next dose is: Take 1 Tab by mouth two (2) times a day. 25 mg  
    
   
   
   
  
 CINNAMON 500 mg Cap Generic drug:  cinnamon bark Your last dose was: Your next dose is: Take 1,000 mg by mouth two (2) times a day. 1000 mg  
    
   
   
   
  
 cloNIDine HCl 0.1 mg tablet Commonly known as:  CATAPRES Your last dose was: Your next dose is: Take 1 Tab by mouth two (2) times a day. 0.1 mg  
    
   
   
   
  
 FISH -160-1,000 mg Cap Generic drug:  omega 3-dha-epa-fish oil Your last dose was: Your next dose is: Take 1 Cap by mouth two (2) times a day. 1 Cap LANTUS SOLOSTAR U-100 INSULIN 100 unit/mL (3 mL) Inpn Generic drug:  insulin glargine Your last dose was: Your next dose is:    
   
   
 12 Units by SubCUTAneous route daily. 12 Units  
    
   
   
   
  
 losartan 25 mg tablet Commonly known as:  COZAAR Your last dose was: Your next dose is:    
   
   
      
   
   
   
  
 promethazine-codeine 6.25-10 mg/5 mL syrup Commonly known as:  PHENERGAN with CODEINE Your last dose was: Your next dose is: Take 5 mL by mouth every six (6) hours as needed for Cough. 5 mL  
    
   
   
   
  
 simvastatin 20 mg tablet Commonly known as:  ZOCOR Your last dose was: Your next dose is: Take 20 mg by mouth nightly. 20 mg Where to Get Your Medications These medications were sent to [address] [facility], [address] Phone:  [phone]  
  aspirin 81 mg chewable tablet Opioid Education Prescription Opioids: What You Need to Know: 
 
Prescription opioids can be used to help relieve moderate-to-severe pain and are often prescribed following a surgery or injury, or for certain health conditions. These medications can be an important part of treatment but also come with serious risks. Opioids are strong pain medicines. Examples include hydrocodone, oxycodone, fentanyl, and morphine. Heroin is an example of an illegal opioid. It is important to work with your health care provider to make sure you are getting the safest, most effective care. WHAT ARE THE RISKS AND SIDE EFFECTS OF OPIOID USE? Prescription opioids carry serious risks of addiction and overdose, especially with prolonged use. An opioid overdose, often marked by slow breathing, can cause sudden death. The use of prescription opioids can have a number of side effects as well, even when taken as directed. · Tolerance-meaning you might need to take more of a medication for the same pain relief · Physical dependence-meaning you have symptoms of withdrawal when the medication is stopped. Withdrawal symptoms can include nausea, sweating, chills, diarrhea, stomach cramps, and muscle aches. Withdrawal can last up to several weeks, depending on which drug you took and how long you took it. · Increased sensitivity to pain · Constipation · Nausea, vomiting, and dry mouth · Sleepiness and dizziness · Confusion · Depression · Low levels of testosterone that can result in lower sex drive, energy, and strength · Itching and sweating RISKS ARE GREATER WITH:      
· History of drug misuse, substance use disorder, or overdose · Mental health conditions (such as depression or anxiety) · Sleep apnea · Older age (72 years or older) · Pregnancy Avoid alcohol while taking prescription opioids. Also, unless specifically advised by your health care provider, medications to avoid include: · Benzodiazepines (such as Xanax or Valium) · Muscle relaxants (such as Soma or Flexeril) · Hypnotics (such as Ambien or Lunesta) · Other prescription opioids KNOW YOUR OPTIONS Talk to your health care provider about ways to manage your pain that don't involve prescription opioids. Some of these options may actually work better and have fewer risks and side effects. Consult your physician before adding or stopping any medications, treatments, or physical activity. Options may include: 
· Pain relievers such as acetaminophen, ibuprofen, and naproxen · Some medications that are also used for depression or seizures · Physical therapy and exercise · Counseling to help patients learn how to cope better with triggers of pain and stress. · Application of heat or cold compress · Massage therapy · Relaxation techniques Be Informed Make sure you know the name of your medication, how much and how often to take it, and its potential risks & side effects. IF YOU ARE PRESCRIBED OPIOIDS FOR PAIN: 
· Never take opioids in greater amounts or more often than prescribed. Remember the goal is not to be pain-free but to manage your pain at a tolerable level. · Follow up with your primary care provider to: · Work together to create a plan on how to manage your pain. · Talk about ways to help manage your pain that don't involve prescription opioids. · Talk about any and all concerns and side effects. · Help prevent misuse and abuse. · Never sell or share prescription opioids · Help prevent misuse and abuse. · Store prescription opioids in a secure place and out of reach of others (this may include visitors, children, friends, and family).  
· Safely dispose of unused/unwanted prescription opioids: Find your community drug take-back program or your pharmacy mail-back program, or flush them down the toilet, following guidance from the Food and Drug Administration (www.fda.gov/Drugs/ResourcesForYou). · Visit www.cdc.gov/drugoverdose to learn about the risks of opioid abuse and overdose. · If you believe you may be struggling with addiction, tell your health care provider and ask for guidance or call Mike Jarvam at 5-664-078-PHWO. Discharge Instructions Patient Discharge Instructions Oswaldo HigginbothamAlbina / [id number] : 1935 Admitted 2018 Discharged: 2018 DISCHARGE INSTRUCTIONS If possible, have someone stay with you for the first night. It is normal to feel tired for the first couple of days. Take it easy and follow your physicians instructions on activity. CHECK THE CATHETER INSERTION SITE DAILY: If bleeding at the cath site occurs, take a clean washcloth and apply direct pressure just above the puncture site for at least 15 minutes. Call 911 immediately if the bleeding is not controlled. Continue to apply direct pressure until an ambulance gets to your location. Do not try to drive yourself or have someone else drive you to the hospital.  Have the ambulance bring you to the emergency room. You may shower 24 hours after your procedure. Gently remove the bandage during showering. Wash with soap and water and pat dry. To prevent infection, keep the groin area/insertion site clean and dry. Do not apply powders, creams, lotions, or ointments to the site for 5 days. You may cover the site with a fresh Band-Aid each day until well healed. You may notice a small lump at the site. This is normal and may last up to 6 weeks. CALL THE PHYSICIAN: 
? If the site becomes red, swollen, or feels warm to the touch, or is healing poorly ? If you note any large/extending bruise, fever >101.0 or chills ? If your extremity has numbness, tingling, discoloration, abnormal swelling, tightness or pain ? If you have difficulty with urination or develop nausea, vomiting, or severe abdominal pain ACTIVITY for the first 24-48 hours, or as instructed by your physician: 
? No lifting, pushing or pulling over 10 pounds and no straining the insertion site. Do not lift grocery bags or the garbage can; do not run the vacuum  or  for 7 days. ? You may start walking short distances the day of your procedure. Gradually increase as tolerated each day. Current activity recommendations are 30 minutes of exercise at least 5 days a week. Work up to this as you recover. ? Avoid walking outside in extremes of heat or cold. Walk inside (at home, at the mall, or at a large store) when it is cold and windy or hot and humid. THINGS TO KEEP IN MIND:  
? Do not drive, operate any machinery, or sign any legal documents for 24 hours after your procedure, or as directed by your physician. You must have someone drive you home after your procedure. ? Drink plenty of fluids for 24-48 hours after your procedure to flush the contrast dye from your kidneys. ? Limit the number of times you go up and down the stairs ? Take rests and pace yourself with activity ? Be careful and do not strain with bowel movements MEDICATIONS: 
? Take all medications as prescribed ? Call your physician if you have any questions ? Keep an updated list of your medications with you at all times and give a list to your primary physician and pharmacist 
? You may use Tylenol 325mg 1-2 tablets every 6 hours as needed for pain or discomfort, unless otherwise instructed. If you have significant discomfort more than 48 hours after your procedure, please call your physicians office.  
 
SIGNS AND SYMPTOMS: 
 ? Notify your physician for new or recurrent symptoms of chest discomfort, unusual shortness of breath or fatigue. These could be signs of a problem and should be discussed with your physician. ? For significant chest pain or symptoms of angina not relieved with rest:  if you have been prescribed Nitroglycerin, take as directed (taken under the tongue, one at a time 5 minutes apart for a total of 3 doses, sitting down). If the discomfort is not relieved after the 3rd Nitroglycerin, call 911. If you have not been prescribed Nitroglycerin and your chest discomfort is significant, call 911. Take the ambulance, do not try to drive yourself or have someone else drive you to the hospital.  
 
AFTER CARE: 
? Follow up with your physician as instructed ? Follow a heart healthy diet with proper portion control, daily stress management, daily exercise, blood pressure and cholesterol control, and smoking cessation. The success of your stent, if you had one placed, and the prevention of future catheterizations heavily depends on your lifestyle changes you make now! ? You may start walking short distances the day of your procedure. Gradually increase as tolerated each day. Current activity recommendations are 30 minutes of exercise at least 5 days a week. Work up to this as you recover. Walk, ride a bike, or choose any other activity you enjoy to reach this activity goal.  
? Avoid walking outside in extremes of heat or cold. Walk inside (at home, at the mall, or at a large store) when it is cold and windy or hot and humid. ? If you had a stent placed, consider Cardiac Wellness as a resource to help you make the needed lifestyle changes to live a heart healthy lifestyle. Discuss your candidacy with your physician. If you have questions, call your physicians office or the Cardiac Cath Lab at [phone].   The Cath Lab is operational from 6:30 a.m. to 5:00 p.m., Monday through Friday. After hours, notify your physician. [address] [place] [address] can be reached at [phone]. Cardiac Wellness is operational Monday-Thursday 8:30 a.m. to 5:00 p.m. and Friday 8:30 a.m. to 12:00 p.m. Remember:  IN CASE OF BLEEDING: KEEP FIRM PRESSURE ON THE PROCEDURE SITE AND CALL 911 IF NOT CONTROLLED Introducing [facility] SERVICES! Karina Angel introduces Redeem patient portal. Now you can access parts of your medical record, email your doctor's office, and request medication refills online. 1. In your internet browser, go to [email or web address] 2. Click on the First Time User? Click Here link in the Sign In box. You will see the New Member Sign Up page. 3. Enter your Redeem Access Code exactly as it appears below. You will not need to use this code after youve completed the sign-up process. If you do not sign up before the expiration date, you must request a new code. · Redeem Access Code: [email or web address] Expires: 10/15/2018  9:42 AM 
 
4. Enter the last four digits of your Social Security Number (xxxx) and Date of Birth (mm/dd/yyyy) as indicated and click Submit. You will be taken to the next sign-up page. 5. Create a Redeem ID. This will be your Redeem login ID and cannot be changed, so think of one that is secure and easy to remember. 6. Create a Redeem password. You can change your password at any time. 7. Enter your Password Reset Question and Answer. This can be used at a later time if you forget your password. 8. Enter your e-mail address. You will receive e-mail notification when new information is available in [address] 9. Click Sign Up. You can now view and download portions of your medical record. 10. Click the Download Summary menu link to download a portable copy of your medical information.  
 
If you have questions, please visit the Frequently Asked Questions section of the ItsPlatonic. Remember, MyChart is NOT to be used for urgent needs. For medical emergencies, dial 911. Now available from your iPhone and Android! Introducing Srikanth Gordon As a [facility] patient, I wanted to make you aware of our electronic visit tool called Srikanth Gordon. [employer] allows you to connect within minutes with a medical provider 24 hours a day, seven days a week via a mobile device or tablet or logging into a secure website from your computer. You can access Srikanth Gordon from anywhere in the [place]. A virtual visit might be right for you when you have a simple condition and feel like you just dont want to get out of bed, or cant get away from work for an appointment, when your regular [facility] provider is not available (evenings, weekends or holidays), or when youre out of town and need minor care. Electronic visits cost only $49 and if the [employer] provider determines a prescription is needed to treat your condition, one can be electronically transmitted to a nearby pharmacy*. Please take a moment to enroll today if you have not already done so. The enrollment process is free and takes just a few minutes. To enroll, please download the Devtap kelsi to your tablet or phone, or visit [email or web address] to enroll on your computer. And, as an [address] [place] [address] patient with a NEWLINE SOFTWARE account, the results of your visits will be scanned into your electronic medical record and your primary care provider will be able to view the scanned results. We urge you to continue to see your regular [facility] provider for your ongoing medical care. And while your primary care provider may not be the one available when you seek a Srikanth Gordon virtual visit, the peace of mind you get from getting a real diagnosis real time can be priceless. For more information on Srikanth Gordon, view our Frequently Asked Questions (FAQs) at [email or web address]. Sincerely, 
 
Andres Gerber MD 
Chief Medical Officer [employer] *:  certain medications cannot be prescribed via Srikanth Gordon Providers Seen During Your Hospitalization Provider Specialty Primary office phone Delilah Azevedo MD Cardiology [phone] Your Primary Care Physician (PCP) Primary Care Physician Office Phone Office Fax Dena Freeman [phone] You are allergic to the following Allergen Reactions Lisinopril Cough Recent Documentation Height Weight BMI Smoking Status 1.753 m 77.7 kg 25.31 kg/m2 Former Smoker Emergency Contacts Name Discharge Info Relation Home Work Mobile [facility] DISCHARGE CAREGIVER [3] Spouse [3] [phone] Patient Belongings The following personal items are in your possession at time of discharge: 
     Visual Aid: None Please provide this summary of care documentation to your next provider. Signatures-by signing, you are acknowledging that this After Visit Summary has been reviewed with you and you have received a copy. Patient Signature:  ____________________________________________________________ Date:  ____________________________________________________________  
  
[employer] Provider Signature:  ____________________________________________________________ Date:  ____________________________________________________________

## 2018-09-27 NOTE — PROGRESS NOTES
Cardiac Cath Lab Recovery Arrival Note: 
 
 
Vanessa Davila  arrived to Cardiac Cath Lab, Recovery Area. Staff introduced to patient. Patient identifiers verified with NAME and DATE OF BIRTH. Procedure verified with patient. Consent forms reviewed and signed by patient or authorized representative and verified. Allergies verified. Patient informed of procedure and plan of care. Questions answered with review. Patient prepped for procedure, per orders from physician, prior to arrival. 
 
Patient on cardiac monitor, non-invasive blood pressure, SPO2 monitor. Patient is A&Ox 4. Patient reports no pain, no chest pain, no n/v. Patient in stretcher, in low position, with side rails up, call bell within reach, patient instructed to call of assistance as needed. Patient prep in: [facility] Recovery Area, Bed# 2. Family in: Wife: Ashwini Guillory [phone] Prep by: Malia Beckman RN

## 2018-09-27 NOTE — PROGRESS NOTES
25 [facility] ambulated @ 6725 (time) approximately 50 feet. Patient tolerated ambulation without adverse advents. Left groin (right/left, groin/arm)  without bleeding or hematoma noted.

## 2018-09-27 NOTE — PROCEDURES
Cardiac Catheterization Procedure Note Patient: Carri Santos. MRN: [id number]  SSN: [id number] YOB: 1935 Age: 81 y.o. Sex: male Date of Procedure: 9/27/2018 Pre-procedure Diagnosis: PAD with Claudication Post-procedure Diagnosis: PAD with Claudication Procedure: AO/RO, Peripheral Angiogram and PTA of distal SFA and proximal popiteal 
:  Dr. Bon Gomez MD 
 
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 Patent stent mid SFA, 100% occlusion within distal stent SFA/popiteal, at least 2 vessel 100% occlusion below knee. Distal 100% occlusion treated with coated balloon 5F good result, total contrast 125 cc. Hand hold left groin due to calcific disease CFA. Complications: None Implants:  None Signed by:  Bon Gomez MD  9/27/2018  10:52 AM

## 2018-09-27 NOTE — PROGRESS NOTES
I have reviewed discharge instructions with the patient, spouse and family. The patient, spouse and family verbalized understanding. Copy of discharge instructions given to patient.

## 2018-09-27 NOTE — PROGRESS NOTES
TRANSFER - IN REPORT: 
 
Verbal report received from Macarena Mott on [address] Procedure: AO/RO with angioplastyx2 , from the Cardiac Cath lab, for routine progression of care. Report consisted of patients Situation, Background, Assessment and Recommendations(SBAR). Information from the following report(s) Procedure Summary, MAR and Recent Results was reviewed with the receiving clinician. Opportunity for questions and clarification was provided. Assessment completed upon patients arrival to [address] [place] [address]. Cardiac Cath Lab Recovery Arrival Note: 
 
 [address] arrived to [address] [place] [address]. Patient procedure= AO/RO with angioplastyx2. Patient on cardiac monitor, non-invasive blood pressure, Patient status doing well without problems. Patient is Arousable&Ox 4. Patient reports no pain, no chest pain, no n/v. Procedure site without any bleeding and no hematoma.

## 2018-09-27 NOTE — PROGRESS NOTES
SHEATH PULL NOTE: 
 
Patient informed of procedure with questions answered with review. Sheath site prepped with Chloraprep swab. 6 fr sheath in right femoral artery pulled by Amador VASQUEZ RN. Hand hold and Clotting Pad, with manual compression to site. No bleeding, no hematoma, no pain at site. Hemostasis obtained with hand hold/manual compression at site. Patient tolerated well. No change in status. Handhold for 25 minutes. No change at site. Clotting Pad dressing applied to site. No bleeding, no hematoma, no pain/discomfort at site. Groin instructions provided with review. Continue to monitor procedure site and patient status. *Advised patient to keep head flat and extremity flat to decrease risk of bleeding. *Recommended that patient not drink for ONE HOUR post sheath pull completion. *Recommended that patient not eat for TWO HOURS post sheath pull completion. *Instructed patient on rationale for delay of PO products to decrease risk for aspiration and if additional treatment to procedure site is required. Patient verbalized understanding of instructions with review.

## 2018-09-27 NOTE — DISCHARGE INSTRUCTIONS
Patient Discharge Instructions    Kassandra Iniguez / [id number] : 1935    Admitted 2018 Discharged: 2018           DISCHARGE INSTRUCTIONS    If possible, have someone stay with you for the first night. It is normal to feel tired for the first couple of days. Take it easy and follow your physicians instructions on activity. CHECK THE CATHETER INSERTION SITE DAILY:    If bleeding at the cath site occurs, take a clean washcloth and apply direct pressure just above the puncture site for at least 15 minutes. Call 911 immediately if the bleeding is not controlled. Continue to apply direct pressure until an ambulance gets to your location. Do not try to drive yourself or have someone else drive you to the hospital.  Have the ambulance bring you to the emergency room. You may shower 24 hours after your procedure. Gently remove the bandage during showering. Wash with soap and water and pat dry. To prevent infection, keep the groin area/insertion site clean and dry. Do not apply powders, creams, lotions, or ointments to the site for 5 days. You may cover the site with a fresh Band-Aid each day until well healed. You may notice a small lump at the site. This is normal and may last up to 6 weeks. CALL THE PHYSICIAN:  ? If the site becomes red, swollen, or feels warm to the touch, or is healing poorly    ? If you note any large/extending bruise, fever >101.0 or chills  ? If your extremity has numbness, tingling, discoloration, abnormal swelling, tightness or pain   ? If you have difficulty with urination or develop nausea, vomiting, or severe abdominal pain    ACTIVITY for the first 24-48 hours, or as instructed by your physician:  ? No lifting, pushing or pulling over 10 pounds and no straining the insertion site. Do not lift grocery bags or the garbage can; do not run the vacuum  or  for 7 days. ? You may start walking short distances the day of your procedure. Gradually increase as tolerated each day. Current activity recommendations are 30 minutes of exercise at least 5 days a week. Work up to this as you recover. ? Avoid walking outside in extremes of heat or cold. Walk inside (at home, at the mall, or at a large store) when it is cold and windy or hot and humid. THINGS TO KEEP IN MIND:   ? Do not drive, operate any machinery, or sign any legal documents for 24 hours after your procedure, or as directed by your physician. You must have someone drive you home after your procedure. ? Drink plenty of fluids for 24-48 hours after your procedure to flush the contrast dye from your kidneys. ? Limit the number of times you go up and down the stairs  ? Take rests and pace yourself with activity  ? Be careful and do not strain with bowel movements    MEDICATIONS:  ? Take all medications as prescribed  ? Call your physician if you have any questions  ? Keep an updated list of your medications with you at all times and give a list to your primary physician and pharmacist  ? You may use Tylenol 325mg 1-2 tablets every 6 hours as needed for pain or discomfort, unless otherwise instructed. If you have significant discomfort more than 48 hours after your procedure, please call your physicians office. SIGNS AND SYMPTOMS:  ? Notify your physician for new or recurrent symptoms of chest discomfort, unusual shortness of breath or fatigue. These could be signs of a problem and should be discussed with your physician. ? For significant chest pain or symptoms of angina not relieved with rest:  if you have been prescribed Nitroglycerin, take as directed (taken under the tongue, one at a time 5 minutes apart for a total of 3 doses, sitting down). If the discomfort is not relieved after the 3rd Nitroglycerin, call 911. If you have not been prescribed Nitroglycerin and your chest discomfort is significant, call 911.  Take the ambulance, do not try to drive yourself or have someone else drive you to the hospital.     AFTER CARE:  ? Follow up with your physician as instructed  ? Follow a heart healthy diet with proper portion control, daily stress management, daily exercise, blood pressure and cholesterol control, and smoking cessation. The success of your stent, if you had one placed, and the prevention of future catheterizations heavily depends on your lifestyle changes you make now! ? You may start walking short distances the day of your procedure. Gradually increase as tolerated each day. Current activity recommendations are 30 minutes of exercise at least 5 days a week. Work up to this as you recover. Walk, ride a bike, or choose any other activity you enjoy to reach this activity goal.   ? Avoid walking outside in extremes of heat or cold. Walk inside (at home, at the mall, or at a large store) when it is cold and windy or hot and humid. ? If you had a stent placed, consider [facility] as a resource to help you make the needed lifestyle changes to live a heart healthy lifestyle. Discuss your candidacy with your physician. If you have questions, call your physicians office or the Cardiac Cath Lab at [phone]. The Cath Lab is operational from 6:30 a.m. to 5:00 p.m., Monday through Friday. After hours, notify your physician. [address] [place] [address] can be reached at [phone]. [facility] is operational Monday-Thursday 8:30 a.m. to 5:00 p.m. and Friday 8:30 a.m. to 12:00 p.m.       Remember:  IN CASE OF BLEEDING: KEEP FIRM PRESSURE ON THE PROCEDURE SITE AND CALL 911 IF NOT CONTROLLED

## 2018-09-27 NOTE — PROGRESS NOTES
Called MD Aby Andujar) to clarify whether the patient needs a home prescription for Plavix, he states not at this time. The loading dose of Plavix given in the cath lab was all that was needed per MD, the only new medication is Aspirin 81mg PO.

## 2018-09-27 NOTE — IP AVS SNAPSHOT
[address] 
[phone] Patient: Eva Nash. MRN: [id number] A check laly indicates which time of day the medication should be taken. My Medications START taking these medications Instructions Each Dose to Equal  
 Morning Noon Evening Bedtime  
 aspirin 81 mg chewable tablet Your last dose was: Your next dose is: Take 1 Tab by mouth daily. 81 mg CHANGE how you take these medications Instructions Each Dose to Equal  
 Morning Noon Evening Bedtime  
 glipiZIDE 5 mg tablet Commonly known as:  Caralykari Fortis What changed:  how much to take Your last dose was: Your next dose is: Take 1 Tab by mouth Before breakfast and dinner. 5 mg CONTINUE taking these medications Instructions Each Dose to Equal  
 Morning Noon Evening Bedtime  
 amLODIPine 10 mg tablet Commonly known as:  Librado Mend Your last dose was: Your next dose is: Take 1 Tab by mouth daily. 10 mg  
    
   
   
   
  
 bumetanide 1 mg tablet Commonly known as:  Malachy Bile Your last dose was: Your next dose is: Take 1 mg by mouth two (2) times a day. 1 mg  
    
   
   
   
  
 carvedilol 25 mg tablet Commonly known as:  Macel Ross Your last dose was: Your next dose is: Take 1 Tab by mouth two (2) times a day. 25 mg  
    
   
   
   
  
 CINNAMON 500 mg Cap Generic drug:  cinnamon bark Your last dose was: Your next dose is: Take 1,000 mg by mouth two (2) times a day. 1000 mg  
    
   
   
   
  
 cloNIDine HCl 0.1 mg tablet Commonly known as:  CATAPRES Your last dose was: Your next dose is: Take 1 Tab by mouth two (2) times a day.   
 0.1 mg  
    
   
   
   
  
 FISH -160-1,000 mg Cap Generic drug:  omega 3-dha-epa-fish oil Your last dose was: Your next dose is: Take 1 Cap by mouth two (2) times a day. 1 Cap LANTUS SOLOSTAR U-100 INSULIN 100 unit/mL (3 mL) Inpn Generic drug:  insulin glargine Your last dose was: Your next dose is:    
   
   
 12 Units by SubCUTAneous route daily. 12 Units  
    
   
   
   
  
 losartan 25 mg tablet Commonly known as:  COZAAR Your last dose was: Your next dose is:    
   
   
      
   
   
   
  
 promethazine-codeine 6.25-10 mg/5 mL syrup Commonly known as:  PHENERGAN with CODEINE Your last dose was: Your next dose is: Take 5 mL by mouth every six (6) hours as needed for Cough. 5 mL  
    
   
   
   
  
 simvastatin 20 mg tablet Commonly known as:  ZOCOR Your last dose was: Your next dose is: Take 20 mg by mouth nightly. 20 mg Where to Get Your Medications These medications were sent to [facility], [address] [facility], Brittany Perkins [address] Phone:  [phone]  
  aspirin 81 mg chewable tablet

## 2018-10-10 ENCOUNTER — CLINICAL SUPPORT (OUTPATIENT)
Dept: CARDIOLOGY CLINIC | Age: 83
End: 2018-10-10

## 2018-10-10 ENCOUNTER — OFFICE VISIT (OUTPATIENT)
Dept: CARDIOLOGY CLINIC | Age: 83
End: 2018-10-10

## 2018-10-10 VITALS
OXYGEN SATURATION: 98 % | BODY MASS INDEX: 25.95 KG/M2 | HEIGHT: 69 IN | WEIGHT: 175.2 LBS | DIASTOLIC BLOOD PRESSURE: 50 MMHG | SYSTOLIC BLOOD PRESSURE: 110 MMHG | RESPIRATION RATE: 16 BRPM | HEART RATE: 56 BPM

## 2018-10-10 DIAGNOSIS — I48.20 ATRIAL FIBRILLATION, CHRONIC (HCC): ICD-10-CM

## 2018-10-10 DIAGNOSIS — I73.9 CLAUDICATION (HCC): Primary | ICD-10-CM

## 2018-10-10 DIAGNOSIS — I73.9 PAD (PERIPHERAL ARTERY DISEASE) (HCC): ICD-10-CM

## 2018-10-10 DIAGNOSIS — I10 ESSENTIAL HYPERTENSION: ICD-10-CM

## 2018-10-10 DIAGNOSIS — I73.9 PERIPHERAL VASCULAR DISEASE (HCC): Primary | ICD-10-CM

## 2018-10-10 RX ORDER — INSULIN GLARGINE 100 [IU]/ML
INJECTION, SOLUTION SUBCUTANEOUS
COMMUNITY
End: 2018-10-10

## 2018-10-10 NOTE — MR AVS SNAPSHOT
[address] [facility] [address] [facility] 
[phone] Patient: Reny Castle MRN: [id number]:[id number]/[id number] Visit Information Date & Time Provider Department Dept. Phone Encounter #  
 10/10/2018  2:40 PM Danitza Angela MD [facility] [phone] [id number] Your Appointments 11/19/2018  9:20 AM  
ESTABLISHED PATIENT with Daya Albright MD  
[facility] (ANAHI SCHEDULING) Appt Note: 6 month f/u with cartoid  doppler  dx CAD see Dr Lauro Tompkins after; Dr Lauro Tompkins 6 Month Follow up per Pastor Iqbal [address] [place]  [address] [facility]  
[address] [id number] Upcoming Health Maintenance Date Due  
 FOOT EXAM Q1 8/28/1945 EYE EXAM RETINAL OR DILATED Q1 8/28/1946 DTaP/Tdap/Td series (1 - Tdap) 8/28/1957 Shingrix Vaccine Age 50> (1 of 2) 8/28/1986 GLAUCOMA SCREENING Q2Y 8/28/2001 Pneumococcal 65+ High/Highest Risk (1 of 2 - PCV13) 8/28/2001 LIPID PANEL Q1 10/25/2011 HEMOGLOBIN A1C Q6M 4/20/2018 Influenza Age 5 to Adult 8/1/2018 MICROALBUMIN Q1 8/15/2018 Allergies as of 10/10/2018  Review Complete On: 10/10/2018 By: Chester Lazcano Severity Noted Reaction Type Reactions Lisinopril  09/20/2016    Cough Current Immunizations  Reviewed on 1/16/2018 Name Date Influenza Vaccine 11/15/2017, 11/20/2016 Not reviewed this visit Vitals BP Pulse Resp Height(growth percentile) Weight(growth percentile) SpO2  
 110/50 (BP 1 Location: Left arm, BP Patient Position: Sitting) (!) 56 16 5' 9\" (1.753 m) 175 lb 3.2 oz (79.5 kg) 98% BMI Smoking Status 25.87 kg/m2 Former Smoker BMI and BSA Data Body Mass Index Body Surface Area  
 25.87 kg/m 2 1.97 m 2 Preferred Pharmacy Pharmacy Name Phone [facility] [place] [address] [facility], [address] [place] [address],[facility] Floor [phone] Your Updated Medication List  
  
   
This list is accurate as of 10/10/18  3:05 PM.  Always use your most recent med list. amLODIPine 10 mg tablet Commonly known as:  Roya [place] Take 1 Tab by mouth daily. aspirin 81 mg chewable tablet Take 1 Tab by mouth daily. bumetanide 1 mg tablet Commonly known as:  Olam Magyar Take 1 mg by mouth two (2) times a day. carvedilol 25 mg tablet Commonly known as:  Lorrayne Griffes Take 1 Tab by mouth two (2) times a day. CINNAMON 500 mg Cap Generic drug:  cinnamon bark Take 1,000 mg by mouth two (2) times a day. cloNIDine HCl 0.1 mg tablet Commonly known as:  CATAPRES Take 1 Tab by mouth two (2) times a day. FISH -160-1,000 mg Cap Generic drug:  omega 3-dha-epa-fish oil Take 1 Cap by mouth two (2) times a day. glipiZIDE 5 mg tablet Commonly known as:  Sivakumar Manger Take 1 Tab by mouth Before breakfast and dinner. LANTUS SOLOSTAR U-100 INSULIN 100 unit/mL (3 mL) Inpn Generic drug:  insulin glargine 12 Units by SubCUTAneous route daily. losartan 25 mg tablet Commonly known as:  COZAAR  
  
 promethazine-codeine 6.25-10 mg/5 mL syrup Commonly known as:  PHENERGAN with CODEINE Take 5 mL by mouth every six (6) hours as needed for Cough. simvastatin 20 mg tablet Commonly known as:  ZOCOR Take 20 mg by mouth nightly. Introducing [facility]! Roque Kendrick introduces [employer] patient portal. Now you can access parts of your medical record, email your doctor's office, and request medication refills online. 1. In your internet browser, go to [email or web address] 2. Click on the First Time User? Click Here link in the Sign In box. You will see the New Member Sign Up page. 3. Enter your [employer] Access Code exactly as it appears below.  You will not need to use this code after youve completed the sign-up process. If you do not sign up before the expiration date, you must request a new code. · [employer] Access Code: [email or web address] Expires: 10/15/2018  9:42 AM 
 
4. Enter the last four digits of your Social Security Number (xxxx) and Date of Birth (mm/dd/yyyy) as indicated and click Submit. You will be taken to the next sign-up page. 5. Create a [employer] ID. This will be your [employer] login ID and cannot be changed, so think of one that is secure and easy to remember. 6. Create a [employer] password. You can change your password at any time. 7. Enter your Password Reset Question and Answer. This can be used at a later time if you forget your password. 8. Enter your e-mail address. You will receive e-mail notification when new information is available in [address] 9. Click Sign Up. You can now view and download portions of your medical record. 10. Click the Download Summary menu link to download a portable copy of your medical information. If you have questions, please visit the Frequently Asked Questions section of the [employer] website. Remember, [employer] is NOT to be used for urgent needs. For medical emergencies, dial 911. Now available from your iPhone and Android! Please provide this summary of care documentation to your next provider. Your primary care clinician is listed as Paulino Krishnan. If you have any questions after today's visit, please call [phone].

## 2018-10-10 NOTE — PROGRESS NOTES
HISTORY OF PRESENT ILLNESS  Kirti Harmon is a 81 y.o. male. He has chronic atrial fibrillation and peripheral artery disease. He recently underwent angiography and was found to have total occlusion of a stent in his superficial femoral artery extending into the popliteal. This was opened and treated with a new drug eluting balloon. He also had fairly severe calcific disease in his common femoral artery which was not amenable to percutaneous intervention. His ankle brachial index prior to the procedure had been 0.51 and repeated today has come up to 0.72. His claudication is gone. HPI  Patient Active Problem List   Diagnosis Code    Bradycardia R00.1    CAD (coronary artery disease) I25.10    Hypertension, essential, benign I10    Carotid arterial disease (HCC) I77.9    Hypercholesteremia E78.00    PVD (peripheral vascular disease) ([facility] I73.9    PVC's (premature ventricular contractions) I49.3    SOB (shortness of breath) R06.02    Type 2 diabetes mellitus with diabetic peripheral angiopathy without gangrene ([facility]) E11.51    CKD (chronic kidney disease) N18.9    Acute renal failure (ARF) ([facility]) N17.9    Hypokalemia E87.6    Generalized weakness R53.1    Elevated troponin R74.8    KATALINA (acute kidney injury) ([facility] N17.9    GI bleed K92.2    Hyperglycemia due to type 2 diabetes mellitus ([facility]) E11.65    Atrial fibrillation, chronic ([facility]) I48.2    CKD (chronic kidney disease) stage 3, GFR 30-59 ml/min ([facility]) N18.3    Type 2 diabetes with nephropathy ([facility]) E11.21     Current Outpatient Prescriptions   Medication Sig Dispense Refill    aspirin 81 mg chewable tablet Take 1 Tab by mouth daily. 33 Tab 11    losartan (COZAAR) 25 mg tablet       insulin glargine (LANTUS SOLOSTAR U-100 INSULIN) 100 unit/mL (3 mL) inpn 12 Units by SubCUTAneous route daily.  bumetanide (BUMEX) 1 mg tablet Take 1 mg by mouth two (2) times a day.  amLODIPine (NORVASC) 10 mg tablet Take 1 Tab by mouth daily.  27 Tab 0    glipiZIDE (GLUCOTROL) 5 mg tablet Take 1 Tab by mouth Before breakfast and dinner. (Patient taking differently: Take 10 mg by mouth Before breakfast and dinner.) 60 Tab 0    cloNIDine HCl (CATAPRES) 0.1 mg tablet Take 1 Tab by mouth two (2) times a day. 60 Tab 5    carvedilol (COREG) 25 mg tablet Take 1 Tab by mouth two (2) times a day. 60 Tab 0    simvastatin (ZOCOR) 20 mg tablet Take 20 mg by mouth nightly.  omega 3-dha-epa-fish oil (FISH OIL) 100-160-1,000 mg cap Take 1 Cap by mouth two (2) times a day.  promethazine-codeine (PHENERGAN WITH CODEINE) 6.25-10 mg/5 mL syrup Take 5 mL by mouth every six (6) hours as needed for Cough.  cinnamon bark (CINNAMON) 500 mg cap Take 1,000 mg by mouth two (2) times a day. Past Medical History:   Diagnosis Date    CAD (coronary artery disease)     s/p CABG 2008    Carotid arterial disease (HCC)     CKD (chronic kidney disease) stage 3, GFR 30-59 ml/min ([facility] 10/21/2017    hypertension and DM nephrosclerosis    Diabetes mellitus, type 2 ([facility]     Hypercholesteremia     Hypertension     Paroxysmal atrial fibrillation ([facility] 10/21/2017    new onset afib with BRPR 10-19-17 admit    PVC's (premature ventricular contractions) 5/26/2014    PVD (peripheral vascular disease) ([facility]      Past Surgical History:   Procedure Laterality Date    HX CORONARY ARTERY BYPASS GRAFT      HX HEART CATHETERIZATION         Review of Systems   Cardiovascular: Positive for claudication. All other systems reviewed and are negative. Visit Vitals    /50 (BP 1 Location: Left arm, BP Patient Position: Sitting)    Pulse (!) 56    Resp 16    Ht 5' 9\" (1.753 m)    Wt 79.5 kg (175 lb 3.2 oz)    SpO2 98%    BMI 25.87 kg/m2       Physical Exam   Constitutional: He appears well-nourished. HENT:   Head: Atraumatic. Eyes: Conjunctivae are normal.   Neck: Neck supple. Cardiovascular: Normal rate and normal heart sounds.   An irregularly irregular rhythm present. Exam reveals no gallop and no friction rub. No murmur heard. Pulmonary/Chest: Breath sounds normal. He has no wheezes. Abdominal: Bowel sounds are normal.   Musculoskeletal: He exhibits no edema. Neurological: No cranial nerve deficit. Skin: No erythema. Nursing note and vitals reviewed. ASSESSMENT and PLAN  His ankle brachial index has improved by almost 50% and he no longer has claudication. He is happy with this result. In order to have total improvement, he would need to have femoral endarterectomy by a vascular surgeon and I do not think this is necessary at this time. He will follow up with Dr. Irene Fong and can always be referred to a vascular surgeon should it become necessary. I will see him in the future on an as needed basis. Also, his heart rate is 60 beats per minute unless in atrial fibrillation today and I will take the liberty of having him cut his carvedilol dose in half to 12.5 mg twice daily.

## 2018-10-10 NOTE — PROCEDURES
[facility] 
*** FINAL REPORT *** Name: Joie Simpson 
MRN: [id number]       Outpatient : 28 Aug 1935 [facility] Order #: [id number] [facility] Visit #: [id number] Date: 10 Oct 2018 TYPE OF TEST: Extremity Arterial Duplex REASON FOR TEST Evaluate right leg post revascularization Right                     Left Artery               PSV   Finding             PSV   Finding 
------------------  -----  ---------------    -----  --------------- External iliac: 
Common femoral:     473.0  >50% stenosis Profunda femoris:   161.0 Proximal SFA:        48.0 Mid SFA:             17.0 Distal SFA:          13.0 Popliteal:           26.0  Occluded Anterior tibial:     21.0 Posterior tibial:          Occluded Pressures Right     Left 
             -----     ----- Brachial:             156 
         DP: 
         PT:   112       152 BERNARDO:  0.72      0.97 Toe: INTERPRETATION/FINDINGS 
PROCEDURE:  Grayscale, color flow and spectral Doppler analysis of the 
 right lower extremity arteries. May include measurement of the 
ankle/brachial index if clinically indicated. FINDINGS:  Grayscale imaging reveals moderate plaque within right 
common femoral artery and mild, diffuse plaque formation within the 
femoral and popliteal arteries. An endovascular stent is visualized 
within the proximal femoral artery. Dopper interrogation detectes 
increased velocities in the common femoral artery. The femoral stent 
is patent without Doppler evidence of stenosis. Low peak systolic 
velocities are demonstrated throughout the right thigh. No flow could 
 be detected within the proximal popliteal artery. The vessel appears 
 to recanalize in its mid segment. The anterior tibial artery remains 
 patent to the ankle. No flow is appreciated within the posterior 
tibial artery.  
 
IMPRESSION: 
 1)  50-99% stenosis in the right common carotid artery by velocity 
criteria. 2)  Patent right femoral artery stent without evidence of stenosis. 3)  Possible short segmental occlusion within the proximal right 
popliteal artery. 4)  Probable total occlusion of the right posterior tibial artery. 5)  Patent right anterior tibial artery. 1. The right ankle/brachial index is 0.72. 
2. The left ankle/brachial index is 0.97. ADDITIONAL COMMENTS I have personally reviewed the data relevant to the interpretation of 
this 
study. TECHNOLOGIST: Megan Layne RVT, RDMS, RDCS Signed: 10/10/2018 04:18 PM 
 
PHYSICIAN: Sergey Hou.  Evelia Olsen MD 
Signed: 10/10/2018 04:41 PM

## 2018-12-17 ENCOUNTER — OFFICE VISIT (OUTPATIENT)
Dept: CARDIOLOGY CLINIC | Age: 83
End: 2018-12-17

## 2018-12-17 VITALS
SYSTOLIC BLOOD PRESSURE: 150 MMHG | HEIGHT: 69 IN | OXYGEN SATURATION: 96 % | RESPIRATION RATE: 16 BRPM | HEART RATE: 72 BPM | BODY MASS INDEX: 26.07 KG/M2 | WEIGHT: 176 LBS | DIASTOLIC BLOOD PRESSURE: 60 MMHG

## 2018-12-17 DIAGNOSIS — N18.30 CKD (CHRONIC KIDNEY DISEASE) STAGE 3, GFR 30-59 ML/MIN (HCC): ICD-10-CM

## 2018-12-17 DIAGNOSIS — I25.10 CORONARY ARTERY DISEASE INVOLVING NATIVE CORONARY ARTERY OF NATIVE HEART WITHOUT ANGINA PECTORIS: Primary | ICD-10-CM

## 2018-12-17 DIAGNOSIS — Z95.1 HX OF CABG: ICD-10-CM

## 2018-12-17 DIAGNOSIS — I73.9 PERIPHERAL VASCULAR DISEASE (HCC): ICD-10-CM

## 2018-12-17 DIAGNOSIS — E11.51 TYPE 2 DIABETES MELLITUS WITH DIABETIC PERIPHERAL ANGIOPATHY WITHOUT GANGRENE, WITHOUT LONG-TERM CURRENT USE OF INSULIN (HCC): ICD-10-CM

## 2018-12-17 DIAGNOSIS — E78.00 HYPERCHOLESTEREMIA: ICD-10-CM

## 2018-12-17 DIAGNOSIS — I48.20 ATRIAL FIBRILLATION, CHRONIC (HCC): ICD-10-CM

## 2018-12-17 DIAGNOSIS — I65.23 BILATERAL CAROTID ARTERY STENOSIS: ICD-10-CM

## 2018-12-17 DIAGNOSIS — I10 HYPERTENSION, ESSENTIAL, BENIGN: ICD-10-CM

## 2018-12-17 NOTE — PROGRESS NOTES
Visit Vitals  /60 (BP 1 Location: Left arm, BP Patient Position: Sitting)   Pulse 72   Resp 16   Ht 5' 9\" (1.753 m)   Wt 176 lb (79.8 kg)   SpO2 96%   BMI 25.99 kg/m²

## 2018-12-18 NOTE — PROGRESS NOTES
[address] [facility]     1935       David Richardson MD, [facility]  Date of Visit-12/17/2018   PCP is Sandra Carranza MD   [facility]  [facility]  HPI:  [address] [facility]. is a 81 y.o. male   I had sent him to Dr. Elio James, cardiology, for peripheral vascular disease. Angiography showed total occlusion of his stent in the superficial femoral artery, this was opened, and calcific disease in the common femoral artery. His BERNARDO in that leg had gone from 0.51 to 0.72 when he was seen on October 10th. He was no longer having claudication. I last saw him in July, we followed him for CABG, CAD, PVD and atrial fibrillation. He is not on [address] due to previous GI bleeding. He denies chest pain, shortness of breath, PND, orthopnea. He had blood work with Dr. Kenyon Milan, his PCP, in the past few months. Assessment/Plan:       1. CAD, prior CABG in 2008, no current angina. 2. Chronic atrial fibrillation, has rate control with Coreg. No blood thinners due to bleeding. 3. CKD 4. Sees Dr. Marleni Clarke. Reason is likely hypertensive. 4. Hypertensive renal disease. 5. PVD, as mentioned noted above. 6. Overall clinically stable for rate and arrhythmia with no exacerbation of CAD. Future Appointments   Date Time Provider Muna Delgado   6/11/2019 10:40 AM MD KARRIE Yin [facility]        Alonso CAD CHF Meds             aspirin 81 mg chewable tablet  (Taking) Take 1 Tab by mouth daily. losartan (COZAAR) 25 mg tablet  (Taking)     bumetanide (BUMEX) 1 mg tablet  (Taking) Take 1 mg by mouth two (2) times a day. amLODIPine (NORVASC) 10 mg tablet  (Taking) Take 1 Tab by mouth daily. cloNIDine HCl (CATAPRES) 0.1 mg tablet  (Taking) Take 1 Tab by mouth two (2) times a day. carvedilol (COREG) 25 mg tablet  (Taking) Take 1 Tab by mouth two (2) times a day. simvastatin (ZOCOR) 20 mg tablet  (Taking) Take 20 mg by mouth nightly.     omega 3-dha-epa-fish oil (FISH OIL) 100-160-1,000 mg cap  (Taking) Take 1 Cap by mouth two (2) times a day. Impression:   1. Coronary artery disease involving native coronary artery of native heart without angina pectoris    2. Atrial fibrillation, chronic ([facility]    3. Hypertension, essential, benign    4. Peripheral vascular disease ([facility]    5. Bilateral carotid artery stenosis    6. Hypercholesteremia    7. CKD (chronic kidney disease) stage 3, GFR 30-59 ml/min ([facility])    8. Type 2 diabetes mellitus with diabetic peripheral angiopathy without gangrene, without long-term current use of insulin ([facility]    9. Hx of CABG       Cardiac History:   Holter monitor4/29/14=showing rate of  with frequent PACs, rare periods of short RP interval SVT up to 122 and frequent PVCs. sinus rhythm with similar findings, frequent PVCs and short RP interval tachycardia. NUKE  2/11/14,  2 minutes, 20 seconds,  EF of 55% with no ischemia. PVD= Dr. Thien Sanchez atherectomy to distal SFA and distal popliteal with angioplasty to both lesions and stent to the SFA by Dr. Thien Sanchez on 3/14/14. Previously had AIBs of 0.67 on the left, 0.71 on the right with the right seemingly due to distal disease. CAD/CABG off pump x3 3/2008 . =post op anemia and renal failure  CATH March 7, 2008= severe three-vessel left main coronary artery disease, 40% disease of the left main and take off of the LAD and circumflex complex, 70% stenosis of the ostial circ and 85% of the LAD. Between the first and second marginal, the circumflex had 70% stenosis and between the third marginal and PDA there was a 70% stenosis, LAD ostial lesion RCA proximal 60% tapering, EF 60%-70%, bilaterally patent renal arteries, mild atherosclerosis of the distal abdominal aorta.    Mild carotid artery disease 3-7-08 then 1-4-12 with 10-49% left, less than 10% on right  Dyslipidemia 10-25-10  TG 91 HDL 40 LDL 48  SOCIAL: Drinks no alcohol, quit smoking several years ago, works as an aluminum ju marie. Lives with his wife and has four children. Enjoys gardening, fishing and music. FAMILY HISTORY: Mother  of cancer at 76, father  of Parkinson's at 70, one brother  of cancer of the liver at 76 and one  of an infection at 64. ROS-except as noted above. . A complete cardiac and respiratory are reviewed and negative except as above ; Resp-denies wheezing  or productive cough,. Const- No unusual weight loss or fever; Neuro-no recent seizure or CVA ; GI- No BRBPR, abdom pain, bloating ; - no  hematuria   see supplement sheet, initialed and to be scanned by staff  Past Medical History:   Diagnosis Date    CAD (coronary artery disease)     s/p CABG     [facility]ot[facility] ([facility]     CKD (chronic kidney disease) stage 3, GFR 30-59 ml/min ([facility] 10/21/2017    hypertension and DM nephrosclerosis    Diabetes mellitus, type 2 ([facility]     Hypercholesteremia     Hypertension     Paroxysmal atrial fibrillation ([facility] 10/21/2017    new onset afib with BRPR 10-19-17 admit    PVC's (premature ventricular contractions) 2014    [facility]       Social Hx= reports that he quit smoking about 33 years ago. His smoking use included cigarettes. He has a 60.00 pack-year smoking history. he has never used smokeless tobacco. He reports that he does not drink alcohol or use drugs. Exam and Labs:    Visit Vitals  /60 (BP 1 Location: Left arm, BP Patient Position: Sitting)   Pulse 72   Resp 16   Ht 5' 9\" (1.753 m)   Wt 176 lb (79.8 kg)   SpO2 96%   BMI 25.99 kg/m²    @Constitutional:  NAD, comfortable  Head: NC,AT. Eyes: No scleral icterus. Neck:  Neck supple. No JVD present. Throat: moist mucous membranes. Chest: Effort normal & normal respiratory excursion . Neurological: alert, conversant and oriented . Skin: Skin is not cold. No obvious systemic rash noted. Not diaphoretic. No erythema. Psychiatric:  Grossly normal mood and affect.   Behavior appears normal. Extremities:  no clubbing or cyanosis. Abdomen: non distended    Lungs:breath sounds normal. No stridor. distress, wheezes or  Rales. Heart:    normal rate, regular rhythm, normal S1, S2, no murmurs, rubs, clicks or gallops , PMI non displaced. Edema: Edema is none. No results found for: CHOL, CHOLX, CHLST, CHOLV, HDL, LDL, LDLC, DLDLP, TGLX, TRIGL, TRIGP, CHHD, CHHDX  Lab Results   Component Value Date/Time    Sodium 144 09/27/2018 08:32 AM    Potassium 3.6 09/27/2018 08:32 AM    Chloride 109 (H) 09/27/2018 08:32 AM    CO2 27 09/27/2018 08:32 AM    Anion gap 8 09/27/2018 08:32 AM    Glucose 110 (H) 09/27/2018 08:32 AM    BUN 52 (H) 09/27/2018 08:32 AM    Creatinine 1.99 (H) 09/27/2018 08:32 AM    BUN/Creatinine ratio 26 (H) 09/27/2018 08:32 AM    GFR est AA 39 (L) 09/27/2018 08:32 AM    GFR est non-AA 32 (L) 09/27/2018 08:32 AM    Calcium 8.2 (L) 09/27/2018 08:32 AM      Wt Readings from Last 3 Encounters:   12/17/18 176 lb (79.8 kg)   10/10/18 175 lb 3.2 oz (79.5 kg)   09/27/18 171 lb 6.4 oz (77.7 kg)      BP Readings from Last 3 Encounters:   12/17/18 150/60   10/10/18 110/50   09/27/18 155/74      Current Outpatient Medications   Medication Sig    aspirin 81 mg chewable tablet Take 1 Tab by mouth daily.  losartan (COZAAR) 25 mg tablet     insulin glargine (LANTUS SOLOSTAR U-100 INSULIN) 100 unit/mL (3 mL) inpn 18 Units by SubCUTAneous route daily.  bumetanide (BUMEX) 1 mg tablet Take 1 mg by mouth two (2) times a day.  amLODIPine (NORVASC) 10 mg tablet Take 1 Tab by mouth daily.  glipiZIDE (GLUCOTROL) 5 mg tablet Take 1 Tab by mouth Before breakfast and dinner. (Patient taking differently: Take 10 mg by mouth Before breakfast and dinner.)    cloNIDine HCl (CATAPRES) 0.1 mg tablet Take 1 Tab by mouth two (2) times a day.  carvedilol (COREG) 25 mg tablet Take 1 Tab by mouth two (2) times a day.  simvastatin (ZOCOR) 20 mg tablet Take 20 mg by mouth nightly.     omega 3-dha-epa-fish oil (FISH OIL) 100-160-1,000 mg cap Take 1 Cap by mouth two (2) times a day.  promethazine-codeine (PHENERGAN WITH CODEINE) 6.25-10 mg/5 mL syrup Take 5 mL by mouth every six (6) hours as needed for Cough.  cinnamon bark (CINNAMON) 500 mg cap Take 1,000 mg by mouth two (2) times a day. No current facility-administered medications for this visit. Impression see above.

## 2019-05-01 ENCOUNTER — APPOINTMENT (OUTPATIENT)
Dept: NON INVASIVE DIAGNOSTICS | Age: 84
DRG: 228 | End: 2019-05-01
Attending: PHYSICIAN ASSISTANT
Payer: MEDICARE

## 2019-05-01 ENCOUNTER — APPOINTMENT (OUTPATIENT)
Dept: GENERAL RADIOLOGY | Age: 84
DRG: 228 | End: 2019-05-01
Attending: EMERGENCY MEDICINE
Payer: MEDICARE

## 2019-05-01 ENCOUNTER — APPOINTMENT (OUTPATIENT)
Dept: CT IMAGING | Age: 84
DRG: 228 | End: 2019-05-01
Attending: HOSPITALIST
Payer: MEDICARE

## 2019-05-01 ENCOUNTER — HOSPITAL ENCOUNTER (INPATIENT)
Age: 84
LOS: 11 days | Discharge: HOME | DRG: 228 | End: 2019-05-12
Attending: EMERGENCY MEDICINE | Admitting: HOSPITALIST
Payer: MEDICARE

## 2019-05-01 DIAGNOSIS — I48.91 ATRIAL FIBRILLATION WITH SLOW VENTRICULAR RESPONSE (HCC): ICD-10-CM

## 2019-05-01 DIAGNOSIS — I50.9 CONGESTIVE HEART FAILURE, UNSPECIFIED HF CHRONICITY, UNSPECIFIED HEART FAILURE TYPE (HCC): ICD-10-CM

## 2019-05-01 DIAGNOSIS — R06.09 DYSPNEA ON EXERTION: Primary | ICD-10-CM

## 2019-05-01 DIAGNOSIS — I48.0 PAROXYSMAL ATRIAL FIBRILLATION (HCC): ICD-10-CM

## 2019-05-01 DIAGNOSIS — N17.9 AKI (ACUTE KIDNEY INJURY) (HCC): ICD-10-CM

## 2019-05-01 DIAGNOSIS — N17.9 ACUTE RENAL FAILURE, UNSPECIFIED ACUTE RENAL FAILURE TYPE (HCC): ICD-10-CM

## 2019-05-01 LAB
ALBUMIN SERPL-MCNC: 3 G/DL (ref 3.5–5)
ALBUMIN/GLOB SERPL: 0.8 {RATIO} (ref 1.1–2.2)
ALP SERPL-CCNC: 69 U/L (ref 45–117)
ALT SERPL-CCNC: 25 U/L (ref 12–78)
ANION GAP SERPL CALC-SCNC: 7 MMOL/L (ref 5–15)
AST SERPL-CCNC: 16 U/L (ref 15–37)
ATRIAL RATE: 340 BPM
BASOPHILS # BLD: 0.1 K/UL (ref 0–0.1)
BASOPHILS NFR BLD: 1 % (ref 0–1)
BILIRUB SERPL-MCNC: 0.4 MG/DL (ref 0.2–1)
BNP SERPL-MCNC: ABNORMAL PG/ML
BUN SERPL-MCNC: 67 MG/DL (ref 6–20)
BUN/CREAT SERPL: 22 (ref 12–20)
CALCIUM SERPL-MCNC: 8.9 MG/DL (ref 8.5–10.1)
CALCULATED R AXIS, ECG10: -7 DEGREES
CALCULATED T AXIS, ECG11: -68 DEGREES
CHLORIDE SERPL-SCNC: 107 MMOL/L (ref 97–108)
CO2 SERPL-SCNC: 24 MMOL/L (ref 21–32)
COMMENT, HOLDF: NORMAL
CREAT SERPL-MCNC: 3 MG/DL (ref 0.7–1.3)
CREAT UR-MCNC: 25.9 MG/DL
DIAGNOSIS, 93000: NORMAL
DIFFERENTIAL METHOD BLD: ABNORMAL
ECHO AV AREA PEAK VELOCITY: 1.6 CM2
ECHO AV PEAK GRADIENT: 13.4 MMHG
ECHO AV PEAK VELOCITY: 182.83 CM/S
ECHO EST RA PRESSURE: 5 MMHG
ECHO LA MAJOR AXIS: 6.02 CM
ECHO LV E' LATERAL VELOCITY: 7.85 CM/S
ECHO LV E' SEPTAL VELOCITY: 5.28 CM/S
ECHO LV INTERNAL DIMENSION DIASTOLIC: 5.17 CM (ref 4.2–5.9)
ECHO LV INTERNAL DIMENSION SYSTOLIC: 3.14 CM
ECHO LV IVSD: 1.14 CM (ref 0.6–1)
ECHO LV MASS 2D: 276.5 G (ref 88–224)
ECHO LV MASS INDEX 2D: 139.3 G/M2 (ref 49–115)
ECHO LV POSTERIOR WALL DIASTOLIC: 1.16 CM (ref 0.6–1)
ECHO LVOT DIAM: 1.95 CM
ECHO LVOT PEAK GRADIENT: 3.8 MMHG
ECHO LVOT PEAK VELOCITY: 97.47 CM/S
ECHO MV A VELOCITY: 51.74 CM/S
ECHO MV AREA PHT: 4.2 CM2
ECHO MV E DECELERATION TIME (DT): 178.8 MS
ECHO MV E VELOCITY: 163.94 CM/S
ECHO MV E/A RATIO: 3.17
ECHO MV E/E' LATERAL: 20.88
ECHO MV E/E' RATIO (AVERAGED): 25.97
ECHO MV E/E' SEPTAL: 31.05
ECHO MV PRESSURE HALF TIME (PHT): 51.9 MS
ECHO PULMONARY ARTERY SYSTOLIC PRESSURE (PASP): 71.7 MMHG
ECHO PV MAX VELOCITY: 128.58 CM/S
ECHO PV PEAK GRADIENT: 6.6 MMHG
ECHO RIGHT VENTRICULAR SYSTOLIC PRESSURE (RVSP): 71.7 MMHG
ECHO TV REGURGITANT MAX VELOCITY: 408.22 CM/S
ECHO TV REGURGITANT PEAK GRADIENT: 66.7 MMHG
EOSINOPHIL # BLD: 0.4 K/UL (ref 0–0.4)
EOSINOPHIL NFR BLD: 5 % (ref 0–7)
ERYTHROCYTE [DISTWIDTH] IN BLOOD BY AUTOMATED COUNT: 13.2 % (ref 11.5–14.5)
GLOBULIN SER CALC-MCNC: 3.8 G/DL (ref 2–4)
GLUCOSE BLD STRIP.AUTO-MCNC: 145 MG/DL (ref 65–100)
GLUCOSE BLD STRIP.AUTO-MCNC: 205 MG/DL (ref 65–100)
GLUCOSE SERPL-MCNC: 173 MG/DL (ref 65–100)
HCT VFR BLD AUTO: 33.9 % (ref 36.6–50.3)
HGB BLD-MCNC: 11 G/DL (ref 12.1–17)
IMM GRANULOCYTES # BLD AUTO: 0 K/UL (ref 0–0.04)
IMM GRANULOCYTES NFR BLD AUTO: 0 % (ref 0–0.5)
LIPASE SERPL-CCNC: 177 U/L (ref 73–393)
LYMPHOCYTES # BLD: 1.2 K/UL (ref 0.8–3.5)
LYMPHOCYTES NFR BLD: 15 % (ref 12–49)
MAGNESIUM SERPL-MCNC: 2.1 MG/DL (ref 1.6–2.4)
MCH RBC QN AUTO: 30.1 PG (ref 26–34)
MCHC RBC AUTO-ENTMCNC: 32.4 G/DL (ref 30–36.5)
MCV RBC AUTO: 92.6 FL (ref 80–99)
MONOCYTES # BLD: 0.7 K/UL (ref 0–1)
MONOCYTES NFR BLD: 9 % (ref 5–13)
NEUTS SEG # BLD: 5.4 K/UL (ref 1.8–8)
NEUTS SEG NFR BLD: 70 % (ref 32–75)
NRBC # BLD: 0 K/UL (ref 0–0.01)
NRBC BLD-RTO: 0 PER 100 WBC
PLATELET # BLD AUTO: 195 K/UL (ref 150–400)
PMV BLD AUTO: 11.6 FL (ref 8.9–12.9)
POTASSIUM SERPL-SCNC: 4.4 MMOL/L (ref 3.5–5.1)
PROT SERPL-MCNC: 6.8 G/DL (ref 6.4–8.2)
PROT UR-MCNC: 284 MG/DL (ref 0–11.9)
PROT/CREAT UR-RTO: 11
Q-T INTERVAL, ECG07: 496 MS
QRS DURATION, ECG06: 138 MS
QTC CALCULATION (BEZET), ECG08: 519 MS
RBC # BLD AUTO: 3.66 M/UL (ref 4.1–5.7)
SAMPLES BEING HELD,HOLD: NORMAL
SERVICE CMNT-IMP: ABNORMAL
SERVICE CMNT-IMP: ABNORMAL
SODIUM SERPL-SCNC: 138 MMOL/L (ref 136–145)
TROPONIN I SERPL-MCNC: <0.05 NG/ML
VENTRICULAR RATE, ECG03: 66 BPM
WBC # BLD AUTO: 7.7 K/UL (ref 4.1–11.1)

## 2019-05-01 PROCEDURE — 85025 COMPLETE CBC W/AUTO DIFF WBC: CPT

## 2019-05-01 PROCEDURE — 93005 ELECTROCARDIOGRAM TRACING: CPT

## 2019-05-01 PROCEDURE — 71046 X-RAY EXAM CHEST 2 VIEWS: CPT

## 2019-05-01 PROCEDURE — 80053 COMPREHEN METABOLIC PANEL: CPT

## 2019-05-01 PROCEDURE — 65660000000 HC RM CCU STEPDOWN

## 2019-05-01 PROCEDURE — 83690 ASSAY OF LIPASE: CPT

## 2019-05-01 PROCEDURE — 74011636637 HC RX REV CODE- 636/637: Performed by: HOSPITALIST

## 2019-05-01 PROCEDURE — 74011250636 HC RX REV CODE- 250/636: Performed by: INTERNAL MEDICINE

## 2019-05-01 PROCEDURE — 82962 GLUCOSE BLOOD TEST: CPT

## 2019-05-01 PROCEDURE — 84156 ASSAY OF PROTEIN URINE: CPT

## 2019-05-01 PROCEDURE — 99285 EMERGENCY DEPT VISIT HI MDM: CPT

## 2019-05-01 PROCEDURE — 93306 TTE W/DOPPLER COMPLETE: CPT

## 2019-05-01 PROCEDURE — 71250 CT THORAX DX C-: CPT

## 2019-05-01 PROCEDURE — 84484 ASSAY OF TROPONIN QUANT: CPT

## 2019-05-01 PROCEDURE — 96374 THER/PROPH/DIAG INJ IV PUSH: CPT

## 2019-05-01 PROCEDURE — 74011250637 HC RX REV CODE- 250/637: Performed by: INTERNAL MEDICINE

## 2019-05-01 PROCEDURE — 74011000250 HC RX REV CODE- 250: Performed by: EMERGENCY MEDICINE

## 2019-05-01 PROCEDURE — 83880 ASSAY OF NATRIURETIC PEPTIDE: CPT

## 2019-05-01 PROCEDURE — 36415 COLL VENOUS BLD VENIPUNCTURE: CPT

## 2019-05-01 PROCEDURE — 83735 ASSAY OF MAGNESIUM: CPT

## 2019-05-01 PROCEDURE — 74011250637 HC RX REV CODE- 250/637: Performed by: HOSPITALIST

## 2019-05-01 RX ORDER — GLIPIZIDE 10 MG/1
10 TABLET, FILM COATED, EXTENDED RELEASE ORAL EVERY EVENING
COMMUNITY
End: 2019-05-12

## 2019-05-01 RX ORDER — BUMETANIDE 0.25 MG/ML
1 INJECTION INTRAMUSCULAR; INTRAVENOUS
Status: COMPLETED | OUTPATIENT
Start: 2019-05-01 | End: 2019-05-01

## 2019-05-01 RX ORDER — DEXTROSE 50 % IN WATER (D50W) INTRAVENOUS SYRINGE
12.5-25 AS NEEDED
Status: DISCONTINUED | OUTPATIENT
Start: 2019-05-01 | End: 2019-05-01 | Stop reason: SDUPTHER

## 2019-05-01 RX ORDER — MAGNESIUM SULFATE 100 %
4 CRYSTALS MISCELLANEOUS AS NEEDED
Status: DISCONTINUED | OUTPATIENT
Start: 2019-05-01 | End: 2019-05-12 | Stop reason: HOSPADM

## 2019-05-01 RX ORDER — ACETAMINOPHEN 325 MG/1
650 TABLET ORAL
Status: DISCONTINUED | OUTPATIENT
Start: 2019-05-01 | End: 2019-05-12 | Stop reason: HOSPADM

## 2019-05-01 RX ORDER — CLONIDINE HYDROCHLORIDE 0.1 MG/1
0.1 TABLET ORAL 2 TIMES DAILY
Status: DISCONTINUED | OUTPATIENT
Start: 2019-05-01 | End: 2019-05-01

## 2019-05-01 RX ORDER — SODIUM CHLORIDE 0.9 % (FLUSH) 0.9 %
5-40 SYRINGE (ML) INJECTION EVERY 8 HOURS
Status: DISCONTINUED | OUTPATIENT
Start: 2019-05-01 | End: 2019-05-12 | Stop reason: HOSPADM

## 2019-05-01 RX ORDER — DEXTROSE 50 % IN WATER (D50W) INTRAVENOUS SYRINGE
12.5-25 AS NEEDED
Status: DISCONTINUED | OUTPATIENT
Start: 2019-05-01 | End: 2019-05-12 | Stop reason: HOSPADM

## 2019-05-01 RX ORDER — INSULIN LISPRO 100 [IU]/ML
INJECTION, SOLUTION INTRAVENOUS; SUBCUTANEOUS
Status: DISCONTINUED | OUTPATIENT
Start: 2019-05-01 | End: 2019-05-08

## 2019-05-01 RX ORDER — SODIUM CHLORIDE 0.9 % (FLUSH) 0.9 %
5-40 SYRINGE (ML) INJECTION AS NEEDED
Status: DISCONTINUED | OUTPATIENT
Start: 2019-05-01 | End: 2019-05-12 | Stop reason: HOSPADM

## 2019-05-01 RX ORDER — GUAIFENESIN 100 MG/5ML
81 LIQUID (ML) ORAL DAILY
Status: DISCONTINUED | OUTPATIENT
Start: 2019-05-02 | End: 2019-05-12 | Stop reason: HOSPADM

## 2019-05-01 RX ORDER — SODIUM CHLORIDE 9 MG/ML
50 INJECTION, SOLUTION INTRAVENOUS CONTINUOUS
Status: DISCONTINUED | OUTPATIENT
Start: 2019-05-01 | End: 2019-05-03

## 2019-05-01 RX ORDER — HYDRALAZINE HYDROCHLORIDE 20 MG/ML
20 INJECTION INTRAMUSCULAR; INTRAVENOUS
Status: DISCONTINUED | OUTPATIENT
Start: 2019-05-01 | End: 2019-05-12 | Stop reason: HOSPADM

## 2019-05-01 RX ORDER — MAGNESIUM SULFATE 100 %
4 CRYSTALS MISCELLANEOUS AS NEEDED
Status: DISCONTINUED | OUTPATIENT
Start: 2019-05-01 | End: 2019-05-01 | Stop reason: SDUPTHER

## 2019-05-01 RX ORDER — AMLODIPINE BESYLATE 5 MG/1
10 TABLET ORAL DAILY
Status: DISCONTINUED | OUTPATIENT
Start: 2019-05-01 | End: 2019-05-09

## 2019-05-01 RX ORDER — HYDRALAZINE HYDROCHLORIDE 25 MG/1
25 TABLET, FILM COATED ORAL 3 TIMES DAILY
Status: DISCONTINUED | OUTPATIENT
Start: 2019-05-01 | End: 2019-05-02

## 2019-05-01 RX ADMIN — HYDRALAZINE HYDROCHLORIDE 25 MG: 25 TABLET, FILM COATED ORAL at 21:36

## 2019-05-01 RX ADMIN — HYDRALAZINE HYDROCHLORIDE 25 MG: 25 TABLET, FILM COATED ORAL at 17:12

## 2019-05-01 RX ADMIN — INSULIN LISPRO 1 UNITS: 100 INJECTION, SOLUTION INTRAVENOUS; SUBCUTANEOUS at 22:46

## 2019-05-01 RX ADMIN — Medication 10 ML: at 21:36

## 2019-05-01 RX ADMIN — AMLODIPINE BESYLATE 10 MG: 5 TABLET ORAL at 14:41

## 2019-05-01 RX ADMIN — BUMETANIDE 1 MG: 0.25 INJECTION INTRAMUSCULAR; INTRAVENOUS at 11:44

## 2019-05-01 RX ADMIN — SODIUM CHLORIDE 50 ML/HR: 900 INJECTION, SOLUTION INTRAVENOUS at 17:54

## 2019-05-01 NOTE — Clinical Note
TRANSFER - OUT REPORT:  
 
Verbal report given to: Major . Report consisted of patient's Situation, Background, Assessment and  
Recommendations(SBAR). Opportunity for questions and clarification was provided. Patient transported with a Registered Nurse. Patient transported to: CCU 25.   
Verbal report given over the phone to [place]

## 2019-05-01 NOTE — PROGRESS NOTES
Admission Medication Reconciliation: 
Information obtained from:  patient and pt's wife interview/RxQuery Comments/Recommendations: Updated PTA meds/reviewed patient's allergies. 1)  Mr. Anju Schulte manages his own medications and knows his medication regimen. 2)  Medication changes (since last review): Added - Toujeo insulin pen (pt kristi with him, did not have this morning due to ) Adjusted - Glipizide changed from IR 5 mg BID to XR 10 mg daily Removed 
- duplicate insulin 
- losartan 
- phenergan with codeine Allergies:  Lisinopril Significant PMH/Disease States:  
Past Medical History:  
Diagnosis Date CAD (coronary artery disease) s/p CABG 2008 Carotid arterial disease ([facility] CKD (chronic kidney disease) stage 3, GFR 30-59 ml/min ([facility]) 10/21/2017  
 hypertension and DM nephrosclerosis Diabetes mellitus, type 2 ([facility] Hypercholesteremia Hypertension Paroxysmal atrial fibrillation ([facility] 10/21/2017  
 new onset afib with BRPR 10-19-17 admit PVC's (premature ventricular contractions) 5/26/2014 PVD (peripheral vascular disease) ([facility] Chief Complaint for this Admission: Chief Complaint Patient presents with Shortness of Breath Prior to Admission Medications:  
Prior to Admission Medications Prescriptions Last Dose Informant Patient Reported? Taking? amLODIPine (NORVASC) 10 mg tablet 4/30/2019 at am  No Yes Sig: Take 1 Tab by mouth daily. aspirin 81 mg chewable tablet 4/30/2019 at am  No Yes Sig: Take 1 Tab by mouth daily. bumetanide (BUMEX) 1 mg tablet 4/30/2019 at pm  Yes Yes Sig: Take 1 mg by mouth two (2) times a day. carvedilol (COREG) 25 mg tablet 4/30/2019 at pm  No Yes Sig: Take 1 Tab by mouth two (2) times a day. cinnamon bark (CINNAMON) 500 mg cap 4/30/2019 at pm  Yes Yes Sig: Take 1,000 mg by mouth two (2) times a day. cloNIDine HCl (CATAPRES) 0.1 mg tablet 4/30/2019 at pm  No Yes Sig: Take 1 Tab by mouth two (2) times a day. glipiZIDE SR (GLUCOTROL XL) 10 mg CR tablet 2019 at pm  Yes Yes Sig: Take 10 mg by mouth every evening. insulin glargine U-300 conc 300 unit/mL (1.5 mL) inpn 2019 at am  Yes Yes Si Units by SubCUTAneous route every morning. omega 3-dha-epa-fish oil (FISH OIL) 100-160-1,000 mg cap 2019 at pm  Yes Yes Sig: Take 1 Cap by mouth two (2) times a day. simvastatin (ZOCOR) 20 mg tablet 2019 at pm  Yes Yes Sig: Take 20 mg by mouth nightly. Facility-Administered Medications: None Thank you for allowing me to participate in the care of this patient. If there are any further questions, please contact the pharmacy at  or the medication reconciliation pharmacist at . Leslie Cuevas D., BCPS

## 2019-05-01 NOTE — Clinical Note
Patient transported with a Registered Nurse and [address] [place] [address] / [facility].

## 2019-05-01 NOTE — CONSULTS
NEPHROLOGY CONSULT NOTE Patient: Alea Whitlock. MRN: [id number]  PCP: Timmy Mckeon MD  
:     1935  Age:   81 y.o. Sex:  male Referring physician: Lokseh Pickett MD 
Reason for consultation: 81 y.o. male with KATALINA (acute kidney injury) ([facility] [N17.9] CHF (congestive heart failure) ([facility]) [V14.7] complicated by KATALINA Admission Date: 2019 10:15 AM  LOS: 0 days ASSESSMENT and PLAN :  
KATALINA on CKD: 
- may be 2/2 to diuretics 
- likely this is progression of his CKD 
- hold any further diuretics for now - would hydrate with NS gently overnight 
- repeat labs in AM 
- if a cath is planned, he is a moderate to high risk for KATALINA and moderate risk for requiring dialysis - explained to him and his wife and they voiced their understanding 
  
CKD IV: 
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 
- check PCR while here 
- no further imaging at this time as long as Cr is stable HTN: 
- cont norvasc 
- will add hydralazine - coreg d/c'd due to bradycardia Volume: 
- appears stable 
- gentle hydration as above 
- hold on further diuretics 
  
Exertional Dyspnea: 
- concern for progression of CAD 
- ECHO ordered 
- may need cath - he is high risk given his advanced CKD Chronic Anemia: 
- hx of GI bleed, now off AC 
- hgb stable at 11 Afib: 
- per cards 
  
CAD s/p CABG 
  
DM2: 
- on insulin Active Problems / Assessment AAActive  : Active Problems: 
  KATALINA (acute kidney injury) ([facility] (2017) CHF (congestive heart failure) ([facility] (2019) Subjective: HPI: Alea Barragan is a 81 y.o.  male who has been admitted to the hospital for SOB w/ exertion over several weeks. He is a poorly controlled diabetic with CKD IV, afib, CAD s/p CABG, w/ progressive CKD since December of this year. His last Cr was 2.6 in March of this year w/ his last visit to me.   He has nephrotic-range proteinuria, now off ARB to due rising Cr and hyperkalemia. He had a neg SPEP/UPEP. He was found to have an elevated BNP and CXR finds that were suggestive of volume overload. His labs showed a Cr of 3. He was given bumex IV x 1 in the ER. He has diuresed ok. His CT scan was neg for any acute process. He denies an PND, orthopnea, edema or SOB w/ rest.  No current cp or sob. Comfortable on RA now. Past Medical Hx:  
Past Medical History:  
Diagnosis Date  CAD (coronary artery disease) s/p CABG 2008  Carotid arterial disease ([facility]  CKD (chronic kidney disease) stage 3, GFR 30-59 ml/min ([facility]) 10/21/2017  
 hypertension and DM nephrosclerosis  [facility], [facility]  Hypercholesteremia  Hypertension  Paroxysmal atrial fibrillation ([facility] 10/21/2017  
 new onset afib with BRPR 10-19-17 admit  PVC's (premature ventricular contractions) 5/26/2014  PVD (peripheral vascular disease) ([facility] Past Surgical Hx: 
  
Past Surgical History:  
Procedure Laterality Date  HX CORONARY ARTERY BYPASS GRAFT    
 HX HEART CATHETERIZATION Medications: 
Prior to Admission medications Medication Sig Start Date End Date Taking? Authorizing Provider  
glipiZIDE SR (GLUCOTROL XL) 10 mg CR tablet Take 10 mg by mouth every evening. Yes Provider, Historical  
insulin glargine U-300 conc 300 unit/mL (1.5 mL) inpn 18 Units by SubCUTAneous route every morning. Yes Provider, Historical  
aspirin 81 mg chewable tablet Take 1 Tab by mouth daily. 9/27/18  Yes Benji Donaldson MD  
bumetanide (BUMEX) 1 mg tablet Take 1 mg by mouth two (2) times a day. 10/1/17  Yes Provider, Historical  
amLODIPine (NORVASC) 10 mg tablet Take 1 Tab by mouth daily. 10/24/17  Yes Isabelle Mcdonald MD  
cloNIDine HCl (CATAPRES) 0.1 mg tablet Take 1 Tab by mouth two (2) times a day. 6/1/17  Yes Cindy Howard MD  
carvedilol (COREG) 25 mg tablet Take 1 Tab by mouth two (2) times a day.  3/18/17  Yes Teresa Brumfield MD  
 simvastatin (ZOCOR) 20 mg tablet Take 20 mg by mouth nightly. Yes Provider, Historical  
omega 3-dha-epa-fish oil (FISH OIL) 100-160-1,000 mg cap Take 1 Cap by mouth two (2) times a day. Yes Provider, Historical  
cinnamon bark (CINNAMON) 500 mg cap Take 1,000 mg by mouth two (2) times a day. Yes Provider, Historical  
 
 
Allergies Allergen Reactions  Lisinopril Cough Social Hx:  reports that he quit smoking about 34 years ago. His smoking use included cigarettes. He has a 60.00 pack-year smoking history. He has never used smokeless tobacco. He reports that he does not drink alcohol or use drugs. History reviewed. No pertinent family history. Review of Systems: A twelve point review of system was performed today. Pertinent positives and negatives are mentioned in the HPI. The reminder of the ROS is negative and noncontributory. Objective:  
Vitals:   
Vitals:  
 05/01/19 1033 05/01/19 1100 05/01/19 1200 05/01/19 1303 BP:  170/57  162/57 Pulse:  (!) 53 (!) 54 (!) 45 Resp:  19 19 19 Temp:    98.1 °F (36.7 °C) SpO2: 95% 93% 95% 94% Weight:      
Height:      
 
I&O's:  No intake/output data recorded. Visit Vitals /57 Pulse (!) 45 Temp 98.1 °F (36.7 °C) Resp 19 Ht 5' 9\" (1.753 m) Wt 82.7 kg (182 lb 5.1 oz) SpO2 94% BMI 26.92 kg/m² Physical Exam: 
General:Alert, No distress, HEENT: Eyes are PERRL. Conjunctiva without pallor ,erythema. The sclerae without icterus. .  
Neck:Supple,no mass palpable Lungs : Clears to auscultation Bilaterally, Normal respiratory effort CVS: RRR, S1 S2 normal, No rub,  no LE edema Abdomen: Soft, Non tender, No hepatosplenomegaly, bowel sounds present Extremities: No cyanosis, No clubbing Skin: No rash or lesions. Lymph nodes: No palpable nodes MS: No joint swelling, erythema, warmth Neurologic: non focal, AAO x 3 Psych: normal affect Laboratory Results: 
 
Lab Results Component Value Date BUN 67 (H) 05/01/2019  05/01/2019  
 K 4.4 05/01/2019  05/01/2019 CO2 24 05/01/2019 Lab Results Component Value Date BUN 67 (H) 05/01/2019 BUN 52 (H) 09/27/2018 BUN 72 (H) 09/10/2018 BUN 36 (H) 11/21/2017 BUN 32 (H) 10/22/2017 K 4.4 05/01/2019  
 K 3.6 09/27/2018 K 5.4 (H) 09/10/2018 K 4.4 11/21/2017 K 4.1 10/22/2017 Lab Results Component Value Date WBC 7.7 05/01/2019 RBC 3.66 (L) 05/01/2019 HGB 11.0 (L) 05/01/2019 HCT 33.9 (L) 05/01/2019 MCV 92.6 05/01/2019 MCH 30.1 05/01/2019 RDW 13.2 05/01/2019  05/01/2019 Lab Results Component Value Date PHOS 4.2 11/21/2017 Urine dipstick:  
Lab Results Component Value Date/Time Color YELLOW/STRAW 06/25/2017 11:40 AM  
 Appearance CLEAR 06/25/2017 11:40 AM  
 Specific gravity 1.018 06/25/2017 11:40 AM  
 pH (UA) 5.0 06/25/2017 11:40 AM  
 Protein 300 (A) 06/25/2017 11:40 AM  
 Glucose NEGATIVE  06/25/2017 11:40 AM  
 Ketone NEGATIVE  06/25/2017 11:40 AM  
 Bilirubin NEGATIVE  06/25/2017 11:40 AM  
 Urobilinogen 0.2 06/25/2017 11:40 AM  
 Nitrites NEGATIVE  06/25/2017 11:40 AM  
 Leukocyte Esterase NEGATIVE  06/25/2017 11:40 AM  
 Epithelial cells FEW 06/25/2017 11:40 AM  
 Bacteria NEGATIVE  06/25/2017 11:40 AM  
 WBC 0-4 06/25/2017 11:40 AM  
 RBC 0-5 06/25/2017 11:40 AM  
 
 
I have reviewed the following: All pertinent labs, microbiology data, radiology imaging for my assessment Thank you for allowing us to participate in the care of this patient. We will follow patient. Please dont hesitate to call with any questions Davion Smallwood MD 
5/1/2019 [facility]  
[address] [address] [facility], [address] [facility] Phone - [phone] Fax - [phone] 
[email or web address]

## 2019-05-01 NOTE — H&P
[address]  HISTORY AND PHYSICAL Name:  Jenna Cruz 
MR#:  [id number] :  1935 ACCOUNT #:  [de-identified] ADMIT DATE:  2019 CHIEF COMPLAINT:  Dyspnea, ongoing for weeks, worse in the last 2 days. HISTORY OF PRESENT ILLNESS:  This is an 81-year-old gentleman with a significant medical history of coronary artery disease, had CABG in ; also has peripheral arterial disease, has undergone angioplasty and stent placement. He is hypertensive and hyperlipidemic. He also has type 2 diabetes and stage IV kidney disease, who has been followed by Cardiology and Nephrology. The patient presented today by EMS, accompanied by his wife for dyspnea. The dyspnea is triggered by mild activity . He also has shortness of breath,no paroxysmal nocturnal dyspnea. He denies any leg swelling, but he states he has gained about 8-10 pounds since [place]. He has a shallow cough productive of a small thick greenish sputum; however, he denied fever or chills or chest pain. Denied dysuria, urgency, or frequency. He has no vomiting or diarrhea, no abdominal pain. He is very compliant with his medications. He works at [employer] 5 days a week for 4 hours. Upon arrival in the emergency room, his initial recorded vital signs were temperature of 98, pulse of 67, blood pressure of 165/68, respirations of 15, oxygen saturation of 95% on room air. His workup included a complete blood count unremarkable except mild anemia with a hemoglobin of 11. His serum chemistry was significant for hyperglycemia, blood sugar of 173, BUN of 67, and creatinine 3. Cardiac enzymes negative. ProBNP was more than 11,000. PA and lateral chest x-ray was reported as showing mild congestive change. Per radiology, I have personally reviewed the chest x-ray.   The patient does not have any florid signs of CHF on the chest x-ray and EKG showed atrial fibrillation with controlled rate of 66 with a right bundle-branch block. In the ED, he was administered a dose of 1 mg of Bumex, and we are consulted for admission evaluation. Cardiology Service was consulted and already evaluating the patient. PAST MEDICAL HISTORY:  As listed above. CURRENT HOME MEDICATIONS:  Include: 1. Losartan 25 mg. 
2.  Lantus 18 units daily. 3.  Bumex 1 mg twice a day. 4.  Glucotrol 5 mg twice a day before breakfast and dinner. 5.  Carvedilol 25 mg twice a day. 6.  Zocor 20 mg at night. 7.  Omega-3 fatty acid. 8.  Phenergan. 9.  Aspirin 81 mg daily. 10.  Amlodipine 10 mg daily. 11.  Clonidine 0.1 mg twice daily. ALLERGIES:  LISINOPRIL. SOCIAL HISTORY:  He is , lives with his wife. He is active. He works. He is a former smoker, quit in [address] [place] [address]. FAMILY HISTORY:  Negative for cardiac illnesses. REVIEW OF SYSTEMS:  The patient came with dyspnea and cough productive of greenish sputum. Has gained about 8-10 pounds over 4-5 months. The rest of the review of systems is negative. PHYSICAL EXAMINATION: 
GENERAL:  The patient is not in distress, but he is tachypneic. He is lying on the exam table. Alert and oriented x4. MOST RECENT VITAL SIGNS:  Temperature 98. In the monitor, the heart rate was fluctuating in the low 30s up to low 40s. Blood pressure, currently is systolic in the 294M on the monitor. Oxygen saturation above 93% on room air. HEENT:  No pallor or jaundice. LUNGS:  Vesicular and clear air entry bilaterally. No rales, crackles, or crepitations with this were appreciated. CARDIOVASCULAR:  S1 and S2, irregular and bradycardic. No significant murmur. No JVD. ABDOMEN:  Obese, normoactive, soft and nontender. No apparent hepatosplenomegaly. EXTREMITIES:  He has no edema. CNS:  Alert and oriented x4.   Nonfocal exam. 
 
ASSESSMENT AND PLAN:  This is an 71-year-old gentleman with a significant cardiac history of coronary artery disease, status post coronary artery bypass grafting; peripheral arterial disease, status post angioplasty and stenting; type 2 diabetes; and hyperlipidemia, presented with weeks of dyspnea and workup so far showed rising creatinine, high proBNP. Chest x-ray showed mild congestive heart failure; however, clinically, the patient is not fluid overloaded. 1.  Dyspnea in the setting of significant cardiac illness that could be coronary artery disease. The patient is not overtly in congestive heart failure, but he gives history of cough with greenish sputum. Needs to rule out pneumonia  We have requested a chest CT without dye. The patient had received a dose of Lasix. We will hold from continued diuretics for now until we see his creatinine numbers tomorrow and his clinical status through the next 24 hours. Echocardiogram requested by [facility]. His blood pressure is significantly elevated. I have continued his amlodipine, added p.r.n. hydralazine; however, Coreg and clonidine are stopped due to bradycardia. 2.  Bradycardia. Heart rates were in the 30s occasionally. Has right bundle-branch block. He was on carvedilol and clonidine, both of which have been discontinued in sequelae of 10. Cardiology is following. Hopefully, heart rate bounces back with discontinuation of the above medications; if not, he may need pacemaker. 3.  Acute on chronic kidney failure with cardiorenal syndrome. The patient is not overtly fluid overloaded. With mention of some abnormal chest x-ray and elevated proBNP, we will be careful with fluids. I will hold off consideration for IV fluids until the patient is evaluated by nephrologist.  We will avoid any nephrotoxic agents and save much of the kidney function. The patient already has stage IV to stage III chronic kidney disease. 4.  Type 2 diabetes.   We will be holding his Lantus and oral hypoglycemics for now. His random blood sugar is 173. We will continue with Humalog sliding scale three hourly with a.c. and at bedtime Accu-Cheks and then determine resumption of the Lantus based on his blood sugar. 5.  Chronic atrial fibrillation. Currently, he is bradycardic. His carvedilol and clonidine were stopped. The patient is not on any coagulation reportedly due to history of gastrointestinal bleed. He follows with the cardiologist.  The aspirin will be continued. 6.  Prophylaxis. Gastrointestinal, no indication. Deep venous thrombosis, heparin. ADVANCE CARE PLANNING:  The patient wished to be full code. He says at least try once. He would like for his wife to make decision on his behalf if he is unable. FUNCTIONAL CAPACITY:  The patient is independent. He still walks and drives. Does not use any assistive devices. Sofia Barros MD 
 
 
[id number] 
D:  05/01/2019 12:46 
T:  05/01/2019 14:24 
JOB #:  [id number]

## 2019-05-01 NOTE — ED PROVIDER NOTES
81 y.o. male with past medical history significant for cad, htn, dm, hypercholesteremia, pvd, ckd, afib, who presents from home with chief complaint of SOB. Pt has 2 days onset of SOB that's exacerbated w/ exertion, but tolerable when laying still. Pt reports that his sx began worsening yesterday, and also experienced an episode of chest tightness last night for 30 seconds. Other accompanying sx include cough productive of grey sputum. Pt initially consulted his cardiologist's office today, but wasn't able to be seen until June and subsequently sought ED evaluation. Denies CP, fever, sore throat, rhinorrhea, and palpitations. Cardiac SGHx includes CABG and catheterization. No hx of MI. Last stress test was done in 2018, but pt failed to complete it. Currently taking ASA. There are no other acute medical concerns at this time. Cardiologist: Walter Sim. Brandee Toledo MD 
PCP: Kaylan Artis MD 
 
Note written by Maria Dolores Dixon, as dictated by Gena Bejarano, DO 10:10 AM  
 
The history is provided by the patient. No  was used. Past Medical History:  
Diagnosis Date  CAD (coronary artery disease) s/p CABG 2008  Carotid arterial disease [facility]  CKD (chronic kidney disease) stage 3, GFR 30-59 ml/min ([facility] 10/21/2017  
 hypertension and DM nephrosclerosis  Diabetes mellitus, type 2 ([address]  Hypercholesteremia  Hypertension  Paroxysmal atrial fibrillation ([facility] [address] 10/21/2017  
 new onset afib with BRPR 10-19-17 admit  PVC's (premature ventricular contractions) 5/26/2014  PVD (peripheral vascular disease) ([facility] [address] Past Surgical History:  
Procedure Laterality Date  HX CORONARY ARTERY BYPASS GRAFT    
 HX HEART CATHETERIZATION No family history on file. Social History Socioeconomic History  Marital status:  Spouse name: Not on file  Number of children: Not on file  Years of education: Not on file  Highest education level: Not on file Occupational History  Not on file Social Needs  Financial resource strain: Not on file  Food insecurity:  
  Worry: Not on file Inability: Not on file  Transportation needs:  
  Medical: Not on file Non-medical: Not on file Tobacco Use  Smoking status: Former Smoker Packs/day: 3.00 Years: 20.00 Pack years: 60.00 Types: Cigarettes Last attempt to quit: 1985 Years since quittin.3  Smokeless tobacco: Never Used Substance and Sexual Activity  Alcohol use: No  
 Drug use: No  
 Sexual activity: Not on file Lifestyle  Physical activity:  
  Days per week: Not on file Minutes per session: Not on file  Stress: Not on file Relationships  Social connections:  
  Talks on phone: Not on file Gets together: Not on file Attends Christianity service: Not on file Active member of club or organization: Not on file Attends meetings of clubs or organizations: Not on file Relationship status: Not on file  Intimate partner violence:  
  Fear of current or ex partner: Not on file Emotionally abused: Not on file Physically abused: Not on file Forced sexual activity: Not on file Other Topics Concern  Not on file Social History Narrative  Not on file ALLERGIES: Lisinopril Review of Systems Constitutional: Negative for activity change, appetite change, chills and fever. HENT: Negative for congestion, rhinorrhea, sinus pain, sneezing and sore throat. Eyes: Negative for photophobia and visual disturbance. Respiratory: Positive for cough, chest tightness and shortness of breath. Cardiovascular: Negative for chest pain and palpitations. Gastrointestinal: Negative for abdominal pain, blood in stool, constipation, diarrhea, nausea and vomiting.   
Genitourinary: Negative for difficulty urinating, dysuria, flank pain, hematuria, penile pain and testicular pain. Musculoskeletal: Negative for arthralgias, back pain, myalgias and neck pain. Skin: Negative for rash and wound. Neurological: Negative for syncope, weakness, light-headedness, numbness and headaches. Psychiatric/Behavioral: Negative for self-injury and suicidal ideas. All other systems reviewed and are negative. Vitals:  
 05/01/19 1030 05/01/19 1033 05/01/19 1100 05/01/19 1200 BP: 165/68  170/57 Pulse: (!) 59  (!) 53 (!) 54 Resp: 27 19 19 Temp:      
SpO2: 96% 95% 93% 95% Weight:      
Height:      
      
 
Physical Exam  
Constitutional: He is oriented to person, place, and time. He appears well-developed and well-nourished. No distress. pleasant HENT:  
Head: Normocephalic and atraumatic. Mouth/Throat: Oropharynx is clear and moist.  
Eyes: Pupils are equal, round, and reactive to light. Conjunctivae and EOM are normal.  
Neck: Neck supple. Cardiovascular: Normal rate and normal heart sounds. An irregularly irregular rhythm present. Pulmonary/Chest: Effort normal and breath sounds normal.  
Abdominal: Soft. He exhibits no distension. There is no tenderness. Musculoskeletal: He exhibits no edema or tenderness. No edema or tenderness in BLE Neurological: He is alert and oriented to person, place, and time. Skin: Skin is warm and dry. He is not diaphoretic.  
midline well healed sternotomy scar Nursing note and vitals reviewed. Note written by Maria Dolores Hou, as dictated by Madelaine Scheuermann,  10:10 AM 
MDM 
  81 y.o. male with hx of CABG presents with increased AGUILAR. Labs show elevated Cr at 3.0, BUN 67, BNP 29794, neg trop. Given dose of IV bumex, CXR shows mild congestive HF. Cardiology consulted and saw the patient at the bedside. Admit to hospitalist for further evaluation. Procedures ED EKG interpretation: 
Rhythm: atrial fib vs. flutter; Rate (approx.): 66; RBBB; TWI inferolaterally; no acute ST elevation/depression Note written by Maria Dolores Juarez, as dictated by Sanju Garcia DO 10:40 AM 
 
Hospitalist Ramseyext for Admission 11:40 AM 
 
ED Room Number: CF74/24 Patient Name and age:  Jordyn Garnica. 81 y.o.  male Working Diagnosis: 1. Dyspnea on exertion 2. Paroxysmal atrial fibrillation (HCC) 3. KATALINA (acute kidney injury) ([facility] [facility] 4. Congestive heart failure, unspecified HF chronicity, unspecified heart failure type ([facility] [address] Readmission: no 
Isolation Requirements:  no 
Recommended Level of Care:  telemetry Code Status:  full Other:  Cardiology consulted CONSULT NOTE: 
11:43 AM Sanju Garcia DO spoke with Dr. Xochitl Gresham, Consult for Hospitalist.  Discussed available diagnostic tests and clinical findings. Dr. Xochitl Gresham will admit pt.

## 2019-05-01 NOTE — PROGRESS NOTES
TRANSFER - IN REPORT: 
 
Verbal report received from denzel(name) on [employer]  being received from ed(unit) for routine progression of care Report consisted of patients Situation, Background, Assessment and  
Recommendations(SBAR). Information from the following report(s) Kardex, ED Summary, Procedure Summary, Intake/Output, MAR and Cardiac Rhythm afib was reviewed with the receiving nurse. Opportunity for questions and clarification was provided. Assessment completed upon patients arrival to unit and care assumed.

## 2019-05-01 NOTE — ROUTINE PROCESS
TRANSFER - OUT REPORT: 
 
Verbal report given to Berta Johnson RN(name) on [address]  being transferred to 3N(unit) for routine progression of care Report consisted of patients Situation, Background, Assessment and  
Recommendations(SBAR). Information from the following report(s) SBAR, ED Summary, MAR, Recent Results and Cardiac Rhythm Afib was reviewed with the receiving nurse. Lines:  
Peripheral IV 05/01/19 Left Antecubital (Active) Opportunity for questions and clarification was provided. Patient transported with: 
 [employer]

## 2019-05-01 NOTE — CONSULTS
Cardiology Consult Note Patient Name: Saeid Saunders  : 1935 MRN: [id number] Date: 2019  Time: 12:43 PM 
 
Admit Diagnosis: KATALINA (acute kidney injury) ([facility] [N17.9] CHF (congestive heart failure) ([facility] [I50.9] Primary Cardiologist: Hieu Negro. Oni Joel M.D. Consulting Cardiologist: Alfonso Coreas. Echo Abbott Gens for Consult: Shortness of Breath Requesting MD: Stanley Dbobs MD 
 
HPI: 
Saeid Saunders. is a 81 y.o. male admitted on 2019  for KATALINA (acute kidney injury) ([facility] [N17.9] CHF (congestive heart failure) ([facility] [I50.9]. has a past medical history of CAD (coronary artery disease), Carotid arterial disease ([facility] CKD (chronic kidney disease) stage 3, GFR 30-59 ml/min ([facility] (10/21/2017), Diabetes mellitus, type 2 ([facility] Hypercholesteremia, Hypertension, Paroxysmal atrial fibrillation ([facility] (10/21/2017), PVC's (premature ventricular contractions) (2014), and PVD (peripheral vascular disease) ([facility] Cardiac History:  
Holter monitor14=showing rate of  with frequent PACs, rare periods of short RP interval SVT up to 122 and frequent PVCs. sinus rhythm with similar findings, frequent PVCs and short RP interval tachycardia. NUKE  14,  2 minutes, 20 seconds,  EF of 55% with no ischemia. PVD= Dr. Saskia Herrera atherectomy to distal SFA and distal popliteal with angioplasty to both lesions and stent to the SFA by Dr. Saskia Herrera on 3/14/14. Previously had AIBs of 0.67 on the left, 0.71 on the right with the right seemingly due to distal disease. 
  
CAD/CABG off pump x3 3/2008 . =post op anemia and renal failure CATH 2008= severe three-vessel left main coronary artery disease, 40% disease of the left main and take off of the LAD and circumflex complex, 70% stenosis of the ostial circ and 85% of the LAD.  Between the first and second marginal, the circumflex had 70% stenosis and between the third marginal and PDA there was a 70% stenosis, LAD ostial lesion RCA proximal 60% tapering, EF 60%-70%, bilaterally patent renal arteries, mild atherosclerosis of the distal abdominal aorta. Mild carotid artery disease 3-7-08 then 1-4-12 with 10-49% left, less than 10% on right Dyslipidemia 10-25-10  TG 91 HDL 40 LDL 48 Pt presents to ED after worsening SOB x2 days. At rest he is asymptomatic. He confirms AGUILAR x3 weeks while performing daily activities. He currently works, but notes that his job does not have a high activity level. He reports that he occasionally will cough up \"grayish green\" sputum x3 weeks. Confirms one episode of \"tightness\" across the left side of his chest with spurious onset and resolved spontaneously. He also notes that he has \"cramps\" in his calf muscles bilaterally over the past 3 weeks, and states that this pain is different from the claudication symptoms he experienced in the past. He states he takes his medications as prescribed. Denies any dizziness, palpitations, syncope, lower extremity edema, early satiety. CXR shows bilateral pulmonary infiltrates. Troponin <.05. BNP 11,184. ECG without any ACS chagnes. Subjective: 
Pt received sitting upright in bed, alert and in no apparent distress, accompanied by wife. He denies any chest pain or SOB. Bradycardia in the 30-40s and Afib noted on telemetry. Assessment and Plan 1. Shortness of Breath-possibly cardiac in origin, ? CHF vs CAD 
 - ECG without ACS changes - BNP elevated at 11,184 
 - CXR with mild pulmonary infiltrates bilaterally 
 - TTE ordered - Chest CT ordered 2. Bradycardia 
 - d/c Coreg  
 - monitor 3. Hypertensive Urgency - 195/103 at current presentation - Norvasc 10mg PO every day - Hydralazine 20mg IV q6hrs PRN SBP >170 - Bumex 1mg on board - will monitor 4. Atrial Fibrillation - chronic - CHADSVASC 5 
 - on Norvasc, Coreg d/c for bradycardia - will monitor rate and consider further rate control for HR >110 
 - Not candidate for [address] due to fall risk, previous GI bleed 5. KATALINA on CKD - Cr 3.00 
 - Per primary team 
 - recommend Nephrology consult 6. DMT2 
 - Per Primary team 
7. Dyslipidemia 
 - simvastatin on hold - ASA 81mg PO every day Agree with PA-S A&P. Patient with progressive SOB. Elevated proBNP, but no leg edema, mild increase in pulm markings on CXR and no crackles on exam. Uses Bumex 2x per day. H/o CAD, CABG and PAD. Start with echo; suspect CAD progression. Terence to mid 35s, Coreg stopped. ? Tachy terence syndrome. Follow monitor. Cr elevated as is BP. Recommend Renal consult. Dr. Elizabeth Kate to follow tomorrow. Trena Navarro PA-C Saw and evaluated pt and agree with above assessment and plan. Presents with CHF exacerbation, unstable angina without acute ischemia. Echo pending. Holding BB with terence. Dr. Elizabeth Kate to follow and decide on ischemic work up stress vs. Cath once renal function stabilizes. Not a cath candidate right now given renal dysfunction. Nephrology consulted Alli Mendoza MD 
 
 
Patient Active Problem List  
Diagnosis Code  Bradycardia R00.1  CAD (coronary artery disease) I25.10  Hypertension, essential, benign I10  
 Carotid arterial disease ([facility]) I77.9  Hypercholesteremia E78.00  
 PVD (peripheral vascular disease) ([facility]) I73.9  PVC's (premature ventricular contractions) I49.3  
 SOB (shortness of breath) R06.02  
 Type 2 diabetes mellitus with diabetic peripheral angiopathy without gangrene ([facility]) E11.51  
 CKD (chronic kidney disease) N18.9  Acute renal failure (ARF) ([facility]) N17.9  Hypokalemia E87.6  Generalized weakness R53.1  Elevated troponin R74.8  KATALINA (acute kidney injury) ([facility] N17.9  GI bleed K92.2  Hyperglycemia due to type 2 diabetes mellitus ([facility] E11.65  Atrial fibrillation, chronic ([facility]) I48.2  CKD (chronic kidney disease) stage 3, GFR 30-59 ml/min ([facility]) N18.3  Type 2 diabetes with nephropathy ([facility]) E11.21  
 CHF (congestive heart failure) ([facility]) I50.9 Holter monitor14=showing rate of  with frequent PACs, rare periods of short RP interval SVT up to 122 and frequent PVCs. sinus rhythm with similar findings, frequent PVCs and short RP interval tachycardia. NUKE  14,  2 minutes, 20 seconds,  EF of 55% with no ischemia. PVD= Dr. Elizabeth Reynoso atherectomy to distal SFA and distal popliteal with angioplasty to both lesions and stent to the SFA by Dr. Elizabeth Reynoso on 3/14/14. Previously had AIBs of 0.67 on the left, 0.71 on the right with the right seemingly due to distal disease. CAD/CABG off pump x3 3/2008 . =post op anemia and renal failure CATH 2008= severe three-vessel left main coronary artery disease, 40% disease of the left main and take off of the LAD and circumflex complex, 70% stenosis of the ostial circ and 85% of the LAD. Between the first and second marginal, the circumflex had 70% stenosis and between the third marginal and PDA there was a 70% stenosis, LAD ostial lesion RCA proximal 60% tapering, EF 60%-70%, bilaterally patent renal arteries, mild atherosclerosis of the distal abdominal aorta. Mild carotid artery disease 3-7-08 then 12 with 10-49% left, less than 10% on right Dyslipidemia 10-25-10  TG 91 HDL 40 LDL 48 SOCIAL: Drinks no alcohol, quit smoking several years ago, works as an . Lives with his wife and has four children. Enjoys gardening, fishing and music. FAMILY HISTORY: Mother  of cancer at 76, father  of Parkinson's at 70, one brother  of cancer of the liver at 76 and one  of an infection at 64. Review of Systems: 
 
 GENERAL Recent weight loss - no Fever -----------------   no 
 Chills -----------------   no 
 
 EYES, VISION  Visual Changes - no 
 
 EARS, NOSE, THROAT 
 Hearing loss ----------- no 
 Swallowing difficulties - no CARDIOVASCULAR Chest pain/pressure ---- no 
 Arrhythmia/palpitations - no RESPIRATORY Cough ------------------ no Shortness of breath - no Wheezing -------------- no  GASTROINTESTINAL Abdominal pain - no Heartburn -------- no 
 Bloody stool ----- no 
 
 GENITOURINARY Frequent urination - no Urgency -------------- no 
 MUSCULOSKELETAL Joint pain/swelling ---- no 
 Musculoskeletal pain - no 
 
 SKIN & INTEGUMENTARY Rashes - no Sores --- no 
 
 
   NEUROLOGICAL Numbness/tingling - no Sensation loss ------ no 
 
 PSYCHIATRIC Nervousness/anxiety - no Depression -------------- no 
 
 ENDOCRINE Heat/cold intolerance - no Excessive thirst -------- no 
 
 HEMATOLOGIC/LYMPHATIC Abnormal bleeding - no ALL/IMMUN Allergic reaction ------ no 
 Recurrent infections - no Previous treatment/evaluation includes Coronary Artery Bypass Graft, echocardiogram, exercise treadmill test and cardiac catheterization . Cardiac risk factors: smoking/ tobacco exposure, dyslipidemia, diabetes mellitus, obesity, sedentary life style, male gender, hypertension. Past Medical History:  
Diagnosis Date  CAD (coronary artery disease) s/p CABG 2008  Carot[facility] disease ([facility]  CKD (chronic kidney disease) stage 3, GFR 30-59 ml/min ([facility]) 10/21/2017  
 hypertension and DM nephrosclerosis  Diabetes mellitus, type 2 ([facility]  Hypercholesteremia  Hypertension  Paroxysmal atrial fibrillation ([facility] 10/21/2017  
 new onset afib with BRPR 10-19-17 admit  PVC's (premature ventricular contractions) 5/26/2014  PVD (peripheral vascular disease) ([facility] Past Surgical History:  
Procedure Laterality Date  HX CORONARY ARTERY BYPASS GRAFT    
 HX HEART CATHETERIZATION Current Facility-Administered Medications Medication Dose Route Frequency  sodium chloride (NS) flush 5-40 mL  5-40 mL IntraVENous Q8H  
 sodium chloride (NS) flush 5-40 mL  5-40 mL IntraVENous PRN  
 acetaminophen (TYLENOL) tablet 650 mg  650 mg Oral Q4H PRN  
 amLODIPine (NORVASC) tablet 10 mg  10 mg Oral DAILY  [START ON 5/2/2019] aspirin chewable tablet 81 mg  81 mg Oral DAILY  insulin lispro (HUMALOG) injection   SubCUTAneous AC&HS  
 glucose chewable tablet 16 g  4 Tab Oral PRN  
 dextrose (D50W) injection syrg 12.5-25 g  12.5-25 g IntraVENous PRN  
 glucagon (GLUCAGEN) injection 1 mg  1 mg IntraMUSCular PRN  
 hydrALAZINE (APRESOLINE) 20 mg/mL injection 20 mg  20 mg IntraVENous Q6H PRN Current Outpatient Medications Medication Sig  
 aspirin 81 mg chewable tablet Take 1 Tab by mouth daily.  losartan (COZAAR) 25 mg tablet  insulin glargine (LANTUS SOLOSTAR U-100 INSULIN) 100 unit/mL (3 mL) inpn 18 Units by SubCUTAneous route daily.  bumetanide (BUMEX) 1 mg tablet Take 1 mg by mouth two (2) times a day.  amLODIPine (NORVASC) 10 mg tablet Take 1 Tab by mouth daily.  cloNIDine HCl (CATAPRES) 0.1 mg tablet Take 1 Tab by mouth two (2) times a day.  carvedilol (COREG) 25 mg tablet Take 1 Tab by mouth two (2) times a day.  simvastatin (ZOCOR) 20 mg tablet Take 20 mg by mouth nightly.  omega 3-dha-epa-fish oil (FISH OIL) 100-160-1,000 mg cap Take 1 Cap by mouth two (2) times a day.  promethazine-codeine (PHENERGAN WITH CODEINE) 6.25-10 mg/5 mL syrup Take 5 mL by mouth every six (6) hours as needed for Cough.  cinnamon bark (CINNAMON) 500 mg cap Take 1,000 mg by mouth two (2) times a day. Allergies Allergen Reactions  Lisinopril Cough History reviewed. No pertinent family history. Social History Socioeconomic History  Marital status:  Spouse name: Not on file  Number of children: Not on file  Years of education: Not on file  Highest education level: Not on file Tobacco Use  
  Smoking status: Former Smoker Packs/day: 3.00 Years: 20.00 Pack years: 60.00 Types: Cigarettes Last attempt to quit: 1985 Years since quittin.3  Smokeless tobacco: Never Used Substance and Sexual Activity  Alcohol use: No  
 Drug use: No  
 
 
Objective: 
  Physical Exam 
 
Vitals:  
Vitals:  
 19 1030 19 1033 19 1100 19 1200 BP: 165/68  170/57 (!) 195/103 Pulse: (!) 59  (!) 53 (!) 54 Resp:  Temp:      
SpO2: 96% 95% 93% 95% Weight:      
Height:      
 
 
General:    Alert, cooperative, no distress, appears stated age. Neck:   Supple, no carotid bruit and no JVD. Back:     Symmetric, normal curvature. Lungs:     Coarse breath sounds bilaterally. No crackles noted. Heart[de-identified]    bradycardic and irregular rhythm, S1, S2 normal, no murmur, click, rub or gallop. Abdomen:     Soft, non-tender. Bowel sounds normal.   
Extremities:   Extremities normal, atraumatic, no cyanosis or edema. Vascular:   Pulses - 2+ bilaterally Skin:   Skin color normal. No rashes or lesions Neurologic:   CN II-XII grossly intact. Telemetry: AFIB 
 
ECG:  
EKG Results Procedure 720 Value Units Date/Time EKG 12 LEAD INITIAL [[id number]] Collected:  19 1016 Order Status:  Completed Updated:  19 1056 Ventricular Rate 66 BPM   
  Atrial Rate 340 BPM   
  QRS Duration 138 ms Q-T Interval 496 ms QTC Calculation (Bezet) 519 ms Calculated R Axis -7 degrees Calculated T Axis -68 degrees Diagnosis --  
  Probably Atrial fibrillation Right bundle branch block T wave abnormality, consider inferolateral ischemia When compared with ECG of 20-OCT-2017 04:23, Right bundle branch block is now present Confirmed by Rafael Ramos M.D., Amilcar Parks ([id number]) on 2019 10:56:20 AM 
  
  
 
normal EKG, normal sinus rhythm, unchanged from previous tracings Data Review:  
 
Radiology: XR Results (most recent): 
Results from Hospital Encounter encounter on 05/01/19 XR CHEST PA LAT Narrative Indication shortness of breath 2 views of the chest demonstrate borderline heart size. There is mild cephalization of pulmonary vessels suggesting early congestive 
change. Lungs are grossly clear. Impression IMPRESSION: Mild congestive change. Otherwise negative Recent Labs 05/01/19 
1018 TROIQ <0.05 Recent Labs 05/01/19 
1018   
K 4.4  
 CO2 24 BUN 67* CREA 3.00* * CA 8.9 Recent Labs 05/01/19 
1018 WBC 7.7 HGB 11.0*  
HCT 33.9*  
 Recent Labs 05/01/19 
1018 SGOT 16  
AP 69 No results for input(s): CHOL, LDLC in the last 72 hours. No lab exists for component: TGL, HDLC,  HBA1C No results for input(s): CRP, TSH, TSHEXT in the last 72 hours. No lab exists for component: ESR LOUIS Swanson. MARGARITA Leon. Sabra Ahumada M.D. [facility], [address] [place] 
   [phone] CC: Antoinette Mccoy MD

## 2019-05-01 NOTE — ED TRIAGE NOTES
Pt arrives with c/o SOB approx 2 weeks and has been worsening since. Denies CP. Hx of cardiac surgery and stents

## 2019-05-01 NOTE — Clinical Note
Device pre-deployment testing conducted. The device was tested and analyzed for deployment. N/V and pain c/o N/V frequency of urination generalized pain pt with hep and the liver enzymes are increasing inpart to medication and drug use pt with hep and the liver enzymes are increasing inpart to medication,  drug use, and hepatitis b and c

## 2019-05-02 ENCOUNTER — PATIENT OUTREACH (OUTPATIENT)
Dept: CASE MANAGEMENT | Age: 84
End: 2019-05-02

## 2019-05-02 ENCOUNTER — APPOINTMENT (OUTPATIENT)
Dept: ULTRASOUND IMAGING | Age: 84
DRG: 228 | End: 2019-05-02
Attending: HOSPITALIST
Payer: MEDICARE

## 2019-05-02 LAB
ANION GAP SERPL CALC-SCNC: 7 MMOL/L (ref 5–15)
BUN SERPL-MCNC: 70 MG/DL (ref 6–20)
BUN/CREAT SERPL: 21 (ref 12–20)
CALCIUM SERPL-MCNC: 8.3 MG/DL (ref 8.5–10.1)
CHLORIDE SERPL-SCNC: 111 MMOL/L (ref 97–108)
CHLORIDE UR-SCNC: 14 MMOL/L
CO2 SERPL-SCNC: 21 MMOL/L (ref 21–32)
CREAT SERPL-MCNC: 3.37 MG/DL (ref 0.7–1.3)
CREAT UR-MCNC: 184 MG/DL
GLUCOSE BLD STRIP.AUTO-MCNC: 168 MG/DL (ref 65–100)
GLUCOSE BLD STRIP.AUTO-MCNC: 217 MG/DL (ref 65–100)
GLUCOSE BLD STRIP.AUTO-MCNC: 221 MG/DL (ref 65–100)
GLUCOSE BLD STRIP.AUTO-MCNC: 226 MG/DL (ref 65–100)
GLUCOSE SERPL-MCNC: 127 MG/DL (ref 65–100)
POTASSIUM SERPL-SCNC: 3.9 MMOL/L (ref 3.5–5.1)
SERVICE CMNT-IMP: ABNORMAL
SODIUM SERPL-SCNC: 139 MMOL/L (ref 136–145)
SODIUM UR-SCNC: 24 MMOL/L
URATE SERPL-MCNC: 8.6 MG/DL (ref 3.5–7.2)
URATE UR-MCNC: 36.1 MG/DL
UUN UR-MCNC: 829 MG/DL

## 2019-05-02 PROCEDURE — 82436 ASSAY OF URINE CHLORIDE: CPT

## 2019-05-02 PROCEDURE — 4A023N6 MEASUREMENT OF CARDIAC SAMPLING AND PRESSURE, RIGHT HEART, PERCUTANEOUS APPROACH: ICD-10-PCS | Performed by: SPECIALIST

## 2019-05-02 PROCEDURE — 74011636637 HC RX REV CODE- 636/637: Performed by: HOSPITALIST

## 2019-05-02 PROCEDURE — C1894 INTRO/SHEATH, NON-LASER: HCPCS | Performed by: SPECIALIST

## 2019-05-02 PROCEDURE — 65210000002 HC RM PRIVATE GYN

## 2019-05-02 PROCEDURE — 74011250637 HC RX REV CODE- 250/637: Performed by: HOSPITALIST

## 2019-05-02 PROCEDURE — 74011250636 HC RX REV CODE- 250/636: Performed by: HOSPITALIST

## 2019-05-02 PROCEDURE — 74011250637 HC RX REV CODE- 250/637: Performed by: INTERNAL MEDICINE

## 2019-05-02 PROCEDURE — 99152 MOD SED SAME PHYS/QHP 5/>YRS: CPT | Performed by: SPECIALIST

## 2019-05-02 PROCEDURE — 77030013744: Performed by: SPECIALIST

## 2019-05-02 PROCEDURE — 84550 ASSAY OF BLOOD/URIC ACID: CPT

## 2019-05-02 PROCEDURE — 65660000000 HC RM CCU STEPDOWN

## 2019-05-02 PROCEDURE — C1751 CATH, INF, PER/CENT/MIDLINE: HCPCS | Performed by: SPECIALIST

## 2019-05-02 PROCEDURE — 76705 ECHO EXAM OF ABDOMEN: CPT

## 2019-05-02 PROCEDURE — 84300 ASSAY OF URINE SODIUM: CPT

## 2019-05-02 PROCEDURE — 84560 ASSAY OF URINE/URIC ACID: CPT

## 2019-05-02 PROCEDURE — 36415 COLL VENOUS BLD VENIPUNCTURE: CPT

## 2019-05-02 PROCEDURE — 82962 GLUCOSE BLOOD TEST: CPT

## 2019-05-02 PROCEDURE — 74011250636 HC RX REV CODE- 250/636: Performed by: SPECIALIST

## 2019-05-02 PROCEDURE — 80048 BASIC METABOLIC PNL TOTAL CA: CPT

## 2019-05-02 PROCEDURE — 74011250636 HC RX REV CODE- 250/636

## 2019-05-02 PROCEDURE — 84540 ASSAY OF URINE/UREA-N: CPT

## 2019-05-02 PROCEDURE — 82570 ASSAY OF URINE CREATININE: CPT

## 2019-05-02 PROCEDURE — 93451 RIGHT HEART CATH: CPT | Performed by: SPECIALIST

## 2019-05-02 RX ORDER — LIDOCAINE HYDROCHLORIDE 10 MG/ML
INJECTION INFILTRATION; PERINEURAL AS NEEDED
Status: DISCONTINUED | OUTPATIENT
Start: 2019-05-02 | End: 2019-05-02 | Stop reason: HOSPADM

## 2019-05-02 RX ORDER — FENTANYL CITRATE 50 UG/ML
INJECTION, SOLUTION INTRAMUSCULAR; INTRAVENOUS AS NEEDED
Status: DISCONTINUED | OUTPATIENT
Start: 2019-05-02 | End: 2019-05-02 | Stop reason: HOSPADM

## 2019-05-02 RX ORDER — INSULIN GLARGINE 100 [IU]/ML
16 INJECTION, SOLUTION SUBCUTANEOUS DAILY
Status: DISCONTINUED | OUTPATIENT
Start: 2019-05-02 | End: 2019-05-12 | Stop reason: HOSPADM

## 2019-05-02 RX ORDER — CLONIDINE HYDROCHLORIDE 0.1 MG/1
0.1 TABLET ORAL 2 TIMES DAILY
Status: DISCONTINUED | OUTPATIENT
Start: 2019-05-02 | End: 2019-05-04

## 2019-05-02 RX ORDER — HYDRALAZINE HYDROCHLORIDE 50 MG/1
100 TABLET, FILM COATED ORAL 3 TIMES DAILY
Status: DISCONTINUED | OUTPATIENT
Start: 2019-05-02 | End: 2019-05-05

## 2019-05-02 RX ORDER — HEPARIN SODIUM 5000 [USP'U]/ML
5000 INJECTION, SOLUTION INTRAVENOUS; SUBCUTANEOUS EVERY 8 HOURS
Status: DISCONTINUED | OUTPATIENT
Start: 2019-05-02 | End: 2019-05-08

## 2019-05-02 RX ORDER — ONDANSETRON 4 MG/1
4 TABLET, ORALLY DISINTEGRATING ORAL
Status: DISCONTINUED | OUTPATIENT
Start: 2019-05-02 | End: 2019-05-12 | Stop reason: HOSPADM

## 2019-05-02 RX ORDER — FAMOTIDINE 20 MG/1
20 TABLET, FILM COATED ORAL EVERY 24 HOURS
Status: DISCONTINUED | OUTPATIENT
Start: 2019-05-02 | End: 2019-05-12 | Stop reason: HOSPADM

## 2019-05-02 RX ORDER — SODIUM CHLORIDE 0.9 % (FLUSH) 0.9 %
5-40 SYRINGE (ML) INJECTION AS NEEDED
Status: DISCONTINUED | OUTPATIENT
Start: 2019-05-02 | End: 2019-05-12 | Stop reason: HOSPADM

## 2019-05-02 RX ORDER — MIDAZOLAM HYDROCHLORIDE 1 MG/ML
INJECTION, SOLUTION INTRAMUSCULAR; INTRAVENOUS AS NEEDED
Status: DISCONTINUED | OUTPATIENT
Start: 2019-05-02 | End: 2019-05-02 | Stop reason: HOSPADM

## 2019-05-02 RX ORDER — SODIUM CHLORIDE 0.9 % (FLUSH) 0.9 %
5-40 SYRINGE (ML) INJECTION EVERY 8 HOURS
Status: DISCONTINUED | OUTPATIENT
Start: 2019-05-02 | End: 2019-05-12 | Stop reason: HOSPADM

## 2019-05-02 RX ORDER — IPRATROPIUM BROMIDE AND ALBUTEROL SULFATE 2.5; .5 MG/3ML; MG/3ML
3 SOLUTION RESPIRATORY (INHALATION)
Status: DISCONTINUED | OUTPATIENT
Start: 2019-05-02 | End: 2019-05-12 | Stop reason: HOSPADM

## 2019-05-02 RX ADMIN — HEPARIN SODIUM 5000 UNITS: 5000 INJECTION INTRAVENOUS; SUBCUTANEOUS at 22:31

## 2019-05-02 RX ADMIN — INSULIN GLARGINE 16 UNITS: 100 INJECTION, SOLUTION SUBCUTANEOUS at 10:08

## 2019-05-02 RX ADMIN — CLONIDINE HYDROCHLORIDE 0.1 MG: 0.1 TABLET ORAL at 17:33

## 2019-05-02 RX ADMIN — Medication 10 ML: at 22:32

## 2019-05-02 RX ADMIN — HYDRALAZINE HYDROCHLORIDE 20 MG: 20 INJECTION INTRAMUSCULAR; INTRAVENOUS at 04:09

## 2019-05-02 RX ADMIN — INSULIN LISPRO 2 UNITS: 100 INJECTION, SOLUTION INTRAVENOUS; SUBCUTANEOUS at 07:07

## 2019-05-02 RX ADMIN — HYDRALAZINE HYDROCHLORIDE 25 MG: 25 TABLET, FILM COATED ORAL at 08:28

## 2019-05-02 RX ADMIN — HEPARIN SODIUM 5000 UNITS: 5000 INJECTION INTRAVENOUS; SUBCUTANEOUS at 10:07

## 2019-05-02 RX ADMIN — AMLODIPINE BESYLATE 10 MG: 5 TABLET ORAL at 08:28

## 2019-05-02 RX ADMIN — HYDRALAZINE HYDROCHLORIDE 100 MG: 50 TABLET, FILM COATED ORAL at 16:10

## 2019-05-02 RX ADMIN — CLONIDINE HYDROCHLORIDE 0.1 MG: 0.1 TABLET ORAL at 10:07

## 2019-05-02 RX ADMIN — ASPIRIN 81 MG 81 MG: 81 TABLET ORAL at 08:28

## 2019-05-02 RX ADMIN — HEPARIN SODIUM 5000 UNITS: 5000 INJECTION INTRAVENOUS; SUBCUTANEOUS at 16:10

## 2019-05-02 RX ADMIN — INSULIN LISPRO 2 UNITS: 100 INJECTION, SOLUTION INTRAVENOUS; SUBCUTANEOUS at 11:36

## 2019-05-02 RX ADMIN — INSULIN LISPRO 2 UNITS: 100 INJECTION, SOLUTION INTRAVENOUS; SUBCUTANEOUS at 17:33

## 2019-05-02 RX ADMIN — FAMOTIDINE 20 MG: 20 TABLET ORAL at 10:08

## 2019-05-02 RX ADMIN — Medication 10 ML: at 07:08

## 2019-05-02 RX ADMIN — HYDRALAZINE HYDROCHLORIDE 100 MG: 50 TABLET, FILM COATED ORAL at 22:31

## 2019-05-02 NOTE — PROGRESS NOTES
Order for physical therapy received and appreciated. Chart reviewed. Nursing informed patient is off the floor for test.  Will check back later today as able to complete evaluation. Thank you.  
 
9:59 AM - Patient arrived back on the floor. Nsg cleared for activity. Patient voiced feeling SOB after just getting back into bed from transport and requesting to hold off. O2 sat 94%; HR 44 bpm.   Nsg informed. Will check back later today as able.

## 2019-05-02 NOTE — PROGRESS NOTES
[facility] 
 [address] [facility], [facility] [address] [facility] Phone: [phone]   [phone]   
  
Nephrology Progress Note Roxanne Gauthier.     1935     [id number] Date of Admission : 5/1/2019 05/02/19 CC:  Follow up forAKI Assessment and Plan KATALINA on CKD  
- pre renal from over diuresis vs progression of underlying CKD 
- ordered urine chemistries  
- he will benefit from RHC to differentiate pre and post capillary Pulm HTN  
 
CKD IV: 
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 
-- UPCR showed 11 gm of Protein : ordered 24 hr urine studies, Gammopathy screen - Baseline Cr ~ 2.6mg/dl Dyspnea : 
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  
- CT chest showing mild diffuse interstitial changes -- Pulm edema vs ILD  
- consulted Pulmonary and cards following as well HTN: 
- cont norvasc 
- increased Hydralazine and added clonidine - coreg d/c'd due to bradycardia 
  
Chronic Anemia: 
- hx of GI bleed, now off AC 
- hgb stable at 11 
  
Afib: 
- per cards 
  
CAD s/p CABG 
  
DM2: 
- on insulin Interval History: 
Seen and examined BP very high overnight Needing nasal O2 now Echo findings d/w pt Review of Systems: A comprehensive review of systems was negative. Current Medications:  
Current Facility-Administered Medications Medication Dose Route Frequency  albuterol-ipratropium (DUO-NEB) 2.5 MG-0.5 MG/3 ML  3 mL Nebulization Q6H PRN  
 insulin glargine (LANTUS) injection 16 Units  16 Units SubCUTAneous DAILY  heparin (porcine) injection 5,000 Units  5,000 Units SubCUTAneous Q8H  
 famotidine (PEPCID) tablet 20 mg  20 mg Oral Q24H  
 ondansetron (ZOFRAN ODT) tablet 4 mg  4 mg Oral Q8H PRN  
 sodium chloride (NS) flush 5-40 mL  5-40 mL IntraVENous Q8H  
 sodium chloride (NS) flush 5-40 mL  5-40 mL IntraVENous PRN  
 acetaminophen (TYLENOL) tablet 650 mg  650 mg Oral Q4H PRN  
 amLODIPine (NORVASC) tablet 10 mg  10 mg Oral DAILY  aspirin chewable tablet 81 mg  81 mg Oral DAILY  insulin lispro (HUMALOG) injection   SubCUTAneous AC&HS  
 glucose chewable tablet 16 g  4 Tab Oral PRN  
 dextrose (D50W) injection syrg 12.5-25 g  12.5-25 g IntraVENous PRN  
 glucagon (GLUCAGEN) injection 1 mg  1 mg IntraMUSCular PRN  
 hydrALAZINE (APRESOLINE) 20 mg/mL injection 20 mg  20 mg IntraVENous Q6H PRN  
 0.9% sodium chloride infusion  50 mL/hr IntraVENous CONTINUOUS  
 hydrALAZINE (APRESOLINE) tablet 25 mg  25 mg Oral TID Allergies Allergen Reactions  Lisinopril Cough Objective: 
Vitals:   
Vitals:  
 05/01/19 2028 05/01/19 2351 05/02/19 0403 05/02/19 8732 BP: 185/76 147/70 175/81 164/57 Pulse: (!) 42 (!) 48 (!) 45 (!) 43 Resp: 20 20 18 18 Temp: 98.1 °F (36.7 °C) 99 °F (37.2 °C) 98.3 °F (36.8 °C) 98.3 °F (36.8 °C) SpO2: 94% 90% 93% 93% Weight:   79.4 kg (175 lb) Height:      
 
Intake and Output: 
No intake/output data recorded. 04/30 1901 - 05/02 0700 In: 240 [P.O.:240] Out: 300 [Urine:300] Physical Examination: 
General: NAD,Conversant Neck:  Supple, no mass Resp:  B/l rales + CV:  RRR,  no murmur or rub,no LE edema GI:  Soft, NT, + Bowel sounds, no hepatosplenomegaly Neurologic:  Non focal 
Psych:             AAO x 3 appropriate affect Skin:  No Rash :  No ferrell []    High complexity decision making was performed 
[]    Patient is at high-risk of decompensation with multiple organ involvement Lab Data Personally Reviewed: I have reviewed all the pertinent labs, microbiology data and radiology studies during assessment. Recent Labs 05/02/19 
0412 05/01/19 
1018  138  
K 3.9 4.4  
* 107 CO2 21 24 * 173* BUN 70* 67* CREA 3.37* 3.00* CA 8.3* 8.9 MG  --  2.1 ALB  --  3.0*  
SGOT  --  16 ALT  --  25 Recent Labs 05/01/19 
1018 WBC 7.7 HGB 11.0*  
HCT 33.9*  
 No results found for: SDES Lab Results Component Value Date/Time Culture result: CANDIDA ALBICANS (A) 03/16/2017 06:00 AM  
 Culture result: NO GROWTH 5 DAYS 03/16/2017 05:40 AM  
 
Recent Results (from the past 24 hour(s)) EKG, 12 LEAD, INITIAL Collection Time: 05/01/19 10:16 AM  
Result Value Ref Range Ventricular Rate 66 BPM  
 Atrial Rate 340 BPM  
 QRS Duration 138 ms Q-T Interval 496 ms QTC Calculation (Bezet) 519 ms Calculated R Axis -7 degrees Calculated T Axis -68 degrees Diagnosis Probably Atrial fibrillation Right bundle branch block T wave abnormality, consider inferolateral ischemia When compared with ECG of 20-OCT-2017 04:23, Right bundle branch block is now present Confirmed by Evelina Wren M.D., Damarisxuan Gandara ([id number]) on 5/1/2019 10:56:20 AM 
  
SAMPLES BEING HELD Collection Time: 05/01/19 10:18 AM  
Result Value Ref Range SAMPLES BEING HELD RD,LV,BL,GRN   
 COMMENT Add-on orders for these samples will be processed based on acceptable specimen integrity and analyte stability, which may vary by analyte. CBC WITH AUTOMATED DIFF Collection Time: 05/01/19 10:18 AM  
Result Value Ref Range WBC 7.7 4.1 - 11.1 K/uL  
 RBC 3.66 (L) 4.10 - 5.70 M/uL  
 HGB 11.0 (L) 12.1 - 17.0 g/dL HCT 33.9 (L) 36.6 - 50.3 % MCV 92.6 80.0 - 99.0 FL  
 MCH 30.1 26.0 - 34.0 PG  
 MCHC 32.4 30.0 - 36.5 g/dL  
 RDW 13.2 11.5 - 14.5 % PLATELET 893 596 - 740 K/uL MPV 11.6 8.9 - 12.9 FL  
 NRBC 0.0 0  WBC ABSOLUTE NRBC 0.00 0.00 - 0.01 K/uL NEUTROPHILS 70 32 - 75 % LYMPHOCYTES 15 12 - 49 % MONOCYTES 9 5 - 13 % EOSINOPHILS 5 0 - 7 % BASOPHILS 1 0 - 1 % IMMATURE GRANULOCYTES 0 0.0 - 0.5 % ABS. NEUTROPHILS 5.4 1.8 - 8.0 K/UL  
 ABS. LYMPHOCYTES 1.2 0.8 - 3.5 K/UL  
 ABS. MONOCYTES 0.7 0.0 - 1.0 K/UL  
 ABS. EOSINOPHILS 0.4 0.0 - 0.4 K/UL  
 ABS. BASOPHILS 0.1 0.0 - 0.1 K/UL  
 ABS. IMM. GRANS. 0.0 0.00 - 0.04 K/UL  
 DF AUTOMATED METABOLIC PANEL, COMPREHENSIVE  Collection Time: 05/01/19 10:18 AM  
 Result Value Ref Range Sodium 138 136 - 145 mmol/L Potassium 4.4 3.5 - 5.1 mmol/L Chloride 107 97 - 108 mmol/L  
 CO2 24 21 - 32 mmol/L Anion gap 7 5 - 15 mmol/L Glucose 173 (H) 65 - 100 mg/dL BUN 67 (H) 6 - 20 MG/DL Creatinine 3.00 (H) 0.70 - 1.30 MG/DL  
 BUN/Creatinine ratio 22 (H) 12 - 20 GFR est AA 24 (L) >60 ml/min/1.73m2 GFR est non-AA 20 (L) >60 ml/min/1.73m2 Calcium 8.9 8.5 - 10.1 MG/DL Bilirubin, total 0.4 0.2 - 1.0 MG/DL  
 ALT (SGPT) 25 12 - 78 U/L  
 AST (SGOT) 16 15 - 37 U/L Alk. phosphatase 69 45 - 117 U/L Protein, total 6.8 6.4 - 8.2 g/dL Albumin 3.0 (L) 3.5 - 5.0 g/dL Globulin 3.8 2.0 - 4.0 g/dL A-G Ratio 0.8 (L) 1.1 - 2.2 LIPASE Collection Time: 05/01/19 10:18 AM  
Result Value Ref Range Lipase 177 73 - 393 U/L MAGNESIUM Collection Time: 05/01/19 10:18 AM  
Result Value Ref Range Magnesium 2.1 1.6 - 2.4 mg/dL NT-PRO BNP Collection Time: 05/01/19 10:18 AM  
Result Value Ref Range NT pro-BNP 11,184 (H) <450 PG/ML  
TROPONIN I Collection Time: 05/01/19 10:18 AM  
Result Value Ref Range Troponin-I, Qt. <0.05 <0.05 ng/mL PROTEIN/CREATININE RATIO, URINE Collection Time: 05/01/19  3:03 PM  
Result Value Ref Range Protein, urine random 284 (H) 0.0 - 11.9 mg/dL Creatinine, urine 25.90 mg/dL Protein/Creat. urine Ratio 11.0 ECHO ADULT COMPLETE Collection Time: 05/01/19  4:17 PM  
Result Value Ref Range LV E' Lateral Velocity 7.85 cm/s LV E' Septal Velocity 5.28 cm/s Aortic Valve Systolic Peak Velocity 229.03 cm/s Aortic Valve Area by Continuity of Peak Velocity 1.6 cm2 AoV PG 13.4 mmHg LVIDd 5.17 4.2 - 5.9 cm  
 LVPWd 1.16 (A) 0.6 - 1.0 cm LVIDs 3.14 cm IVSd 1.14 (A) 0.6 - 1.0 cm  
 LVOT d 1.95 cm  
 LVOT Peak Velocity 97.47 cm/s LVOT Peak Gradient 3.8 mmHg MVA (PHT) 4.2 cm2  
 MV A Steve 51.74 cm/s  
 MV E Steve 163.94 cm/s  
 MV E/A 3.17 LV Mass .5 (A) 88 - 224 g LV Mass AL Index 139.3 49 - 115 g/m2 E/E' lateral 20.88   
 E/E' septal 31.05   
 RVSP 71.7 mmHg E/E' ratio (averaged) 25.97 Est. RA Pressure 5.0 mmHg Mitral Valve E Wave Deceleration Time 178.8 ms Mitral Valve Pressure Half-time 51.9 ms Left Atrium Major Axis 6.02 cm Triscuspid Valve Regurgitation Peak Gradient 66.7 mmHg Pulmonic Valve Max Velocity 128.58 cm/s  
 TR Max Velocity 408.22 cm/s PASP 71.7 mmHg PV peak gradient 6.6 mmHg GLUCOSE, POC Collection Time: 05/01/19  5:12 PM  
Result Value Ref Range Glucose (POC) 145 (H) 65 - 100 mg/dL Performed by Unkown  GLUCOSE, POC Collection Time: 05/01/19  9:59 PM  
Result Value Ref Range Glucose (POC) 205 (H) 65 - 100 mg/dL Performed by Dinesh Ly METABOLIC PANEL, BASIC Collection Time: 05/02/19  4:12 AM  
Result Value Ref Range Sodium 139 136 - 145 mmol/L Potassium 3.9 3.5 - 5.1 mmol/L Chloride 111 (H) 97 - 108 mmol/L  
 CO2 21 21 - 32 mmol/L Anion gap 7 5 - 15 mmol/L Glucose 127 (H) 65 - 100 mg/dL BUN 70 (H) 6 - 20 MG/DL Creatinine 3.37 (H) 0.70 - 1.30 MG/DL  
 BUN/Creatinine ratio 21 (H) 12 - 20 GFR est AA 21 (L) >60 ml/min/1.73m2 GFR est non-AA 18 (L) >60 ml/min/1.73m2 Calcium 8.3 (L) 8.5 - 10.1 MG/DL  
GLUCOSE, POC Collection Time: 05/02/19  6:37 AM  
Result Value Ref Range Glucose (POC) 226 (H) 65 - 100 mg/dL Performed by Dinesh Ly Total time spent with patient:  xxx   min. Care Plan discussed with: 
Patient Family RN Consulting Physician [facility],      
 
I have reviewed the flowsheets. Chart and Pertinent Notes have been reviewed. No change in PMH ,family and social history from Consult note.  
 
 
Geovany Palacio MD

## 2019-05-02 NOTE — PROGRESS NOTES
Reason for Admission:   KATALINA, CHF, Dyspnea (SOB). Medical hx includes CHF, CAD, CABG 2008, angioplasty and stent placement, diabetes,diabetic nephropathy   and hypertension. PCP is Dr Kamran Milan. RRAT Score:     43 Resources/supports as identified by patient/family:   Family and friends Top Challenges facing patient (as identified by patient/family and CM): Finances/Medication cost?    [employer]  No concerns in securing medications Transportation? Self and family Support system or lack thereof? Good support from family and friends Living arrangements? Lives with wife, Forrest Golden care and independent Self-care/ADLs/Cognition? Alert and oriented. .  Drives and does not use any assistive devices. No 02 at home Current Advanced Directive/Advance Care Plan:  Declines to complete-- 
                       
Plan for utilizing home health:    Not indicated at this time  Therapy will evaluate and make recommendations Likelihood of readmission:   High due to medical concerns Transition of Care Plan:     Home with medical follow up CM met with patient in his room to introduce self and explain role. Patient was alert and oriented. He confirmed demographics, insurance Medicare and BCBS and PCP Dr Kamran Milan  Office visits every few months. Followed by Dr Shelly Giron cardiology. He does not use home 02. Patient lives in one level home with his wife, Juan Roberts  [phone]. Patient and wife were born and raised in [place]. Patient and wife had 4 children--One son and one daughter are . . Two sons Bakari Rowell (lives 5 miles from patient) and son Fer Moreno is in [place] area. He has 4 granddaughters who are adults and supportive.  
 
Patient has had several jobs-- worked for a 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 He works usually Tuesday, Thursday, Friday, and Sunday. He has no p[lans to retire. He drives himself to work. Patient plans to return home when discharged. Family will provide transportation He is scheduled for cardiac cath today. Therapy to evaluate and make recommendations. Cm will continue to follow to assist with any needs that may arise. Will offer H2H if appropriate when ready for discharge. Will arrange home 02 if still needed when discharged. Care Management Interventions PCP Verified by CM: Yes Mode of Transport at Discharge: (car with family) Transition of Care Consult (CM Consult): Discharge Planning Discharge Durable Medical Equipment: No 
Physical Therapy Consult: Yes Occupational Therapy Consult: No 
Speech Therapy Consult: No 
Current Support Network: Lives with Spouse(lives with wife in one level home-- good family support  No AMD in chart. Patient works part time at [employer].   ) Confirm Follow Up Transport: Self(family if needed.) Plan discussed with Pt/Family/Caregiver: Yes Freedom of Choice Offered: Yes Discharge Location Discharge Placement: Home

## 2019-05-02 NOTE — NURSE NAVIGATOR
Chart reviewed by Heart Failure Nurse Navigator. Heart Failure database completed. EF:  56/60% ACEi/ARB/ARNi: not currently indicated BB: coreg 25 mg twice daily Aldosterone Antagonist: not currently indicated Obstructive Sleep Apnea Screening: STOP-BANG score: 
 Referred to Sleep Medicine: CRT not currently indicated NYHA Functional Class documentation requested. Heart Failure Teach Back in Patient Education. Heart Failure Avoiding Triggers on Discharge Instructions. Cardiologist: Dr. Morris Loges ([facility]) Post discharge follow up phone call to be made within 48-72 hours of discharge.    
 
Potential HF Bundle ACTIVE

## 2019-05-02 NOTE — PROGRESS NOTES
Spiritual Care Partner Volunteer visited patient in Rm 360 on 5/2/19. Documented by: Chaplain Muse MDiv, MACE 
[phone] PRAY ([phone])

## 2019-05-02 NOTE — PROCEDURES
Cardiac Catheterization Procedure Note Patient: Tootie Nails MRN: [id number]  SSN: [id number] YOB: 1935 Age: 81 y.o. Sex: male Date of Procedure: 5/2/2019 Pre-procedure Diagnosis: Congestive Heart Failure Post-procedure Diagnosis: Congestive Heart Failure Procedure: Right Heart Cath :  Dr. Mo Ramos MD 
 
Assistant(s):  None Anesthesia: Moderate Sedation Estimated Blood Loss: Less than 10 mL Specimens Removed: None Findings: Output 4.1, PA 75/28, PCW 28, RV 75/18, RA 18. Complications: None Implants:  None Signed by:  Mo Ramos MD  5/2/2019  2:25 PM

## 2019-05-02 NOTE — PROGRESS NOTES
Cardiac Cath Lab Recovery Arrival Note: 
 
 
Aileen Varghese Sr. arrived to Cardiac Cath Lab, Recovery Area. Staff introduced to patient. Patient identifiers verified with NAME and DATE OF BIRTH. Procedure verified with patient. Consent forms reviewed and signed by patient or authorized representative and verified. Allergies verified. Patient and family oriented to department. Patient and family informed of procedure and plan of care. Questions answered with review. Patient prepped for procedure, per orders from physician, prior to arrival. 
 
Patient on cardiac monitor, non-invasive blood pressure, SPO2 monitor. On 2LNC. Patient is A&Ox 4. Patient reports no c/o's. Patient in stretcher, in low position, with side rails up, call bell within reach, patient instructed to call if assistance as needed. Patient prep in: [address], [place]. Patient family has pager # [phone]/a Family in: hospital.  
Prep by: Eduar Knox RN

## 2019-05-02 NOTE — PROGRESS NOTES
TRANSFER - OUT REPORT: 
 
Verbal report given to Brittany(name) on Katharine Torres Sr.  being transferred to Cath lab(unit) for routine progression of care Report consisted of patients Situation, Background, Assessment and  
Recommendations(SBAR). Information from the following report(s) SBAR, Recent Results and Cardiac Rhythm Sinus Pio Sinks was reviewed with the receiving nurse. Lines:  
Peripheral IV 05/01/19 Left Antecubital (Active) Site Assessment Clean, dry, & intact 5/2/2019  8:14 AM  
Phlebitis Assessment 0 5/2/2019  8:14 AM  
Infiltration Assessment 0 5/2/2019  8:14 AM  
Dressing Status Clean, dry, & intact 5/2/2019  8:14 AM  
Dressing Type Tape;Transparent 5/2/2019  8:14 AM  
Hub Color/Line Status Capped 5/2/2019  8:14 AM  
Alcohol Cap Used Yes 5/2/2019  8:14 AM  
  
 
Opportunity for questions and clarification was provided. Patient transported with: 
 [employer]

## 2019-05-02 NOTE — PROGRESS NOTES
PCCM 
 
Seen and examined- full consult note to follow Pulmonary hypertension secondary to volume overload (better), decompensated left sided HFpEF (known cardiac disease with CAD, a fib, bradycardia, dilated LA, LVH, hypertensive heart disease and uncontrolled systemic HTN)-- noted plans for RHC: please measure oxygen saturations in wedged position and consider LVEDP measurement if wedge waveforms or sats are not accurate. If baseline wedge pressure is normal (he has been aggressively diuresed), saline challenge (500 ml in 10 mins) will also give an idea of diastolic function of LV. No suggestion of limiting chronic lung disease (CT and PFTs in late 2017 were normal). VQ scan in 2017 was low prob for PE. He does not have WHO Group 1 PAH.  
 
Isabell Esposito MD

## 2019-05-02 NOTE — DIABETES MGMT
DTC Progress Note Recommendations/ Comments: Chart reviewed due to hyperglycemia. Noted Lantus, 16 units given this morning. Will continue to follow. Current hospital DM medication: Lantus, 16 units daily Lispro high sensitivity scale correction Chart reviewed on [address] Patient is a 81 y.o. male with known DM on 18 units of Lantus and 10 mg Glipizide SR at home. A1c:  
Lab Results Component Value Date/Time Hemoglobin A1c 9.8 (H) 10/20/2017 08:11 AM  
 Hemoglobin A1c 9.0 (H) 06/25/2017 03:39 PM  
 Hemoglobin A1c, External 8.4 08/15/2017 Recent Glucose Results:  
Lab Results Component Value Date/Time  (H) 05/02/2019 04:12 AM  
 GLUCPOC 221 (H) 05/02/2019 11:24 AM  
 GLUCPOC 226 (H) 05/02/2019 06:37 AM  
 GLUCPOC 205 (H) 05/01/2019 09:59 PM  
  
 
Lab Results Component Value Date/Time Creatinine 3.37 (H) 05/02/2019 04:12 AM  
 
Estimated Creatinine Clearance: 16.9 mL/min (A) (based on SCr of 3.37 mg/dL (H)). Active Orders Diet DIET DIABETIC CONSISTENT CARB Regular; 2 GM NA (House Low NA) PO intake:  
Patient Vitals for the past 72 hrs: 
 % Diet Eaten 05/02/19 0814 90 % 05/01/19 1416 75 % Will continue to follow as needed. Thank you Geovanni Calvillo RD Time spent: 4 minutes

## 2019-05-02 NOTE — CONSULTS
PULMONARY/CRITICAL CARE/[facility]ICINE[facility] Consultation Note Name: Elba Zhong  
: 1935 MRN: [id number] Date: 2019 Subjective:  
Consult Note: 2019 Requesting Physician: Dr. Vashti Daniel Reason for consult: Pulmonary hypertension Medical records and data reviewed. Patient is a 81 y.o. male who presented to the hospital with subacute dyspnea with exertion, orthopnea, PND and pedal edema which have all improved with diuresis. He reports residual exertional breathless but is comfortable at rest on room air. He has undergone diuresis and has developed KATALINA. Echo had shown pulmonary hypertension. Patient has significant organic cardiac disease (CAD, CABG, A fib, bradycardia, LAE and LVH by echo) and has poorly controlled systemic hypertension. Previous evaluation by Dr. Carly Baugh with PFT, CT chest, VQ scan had not identified limiting pulmonary disease. CT of the chest showed interstitial edema and mild subpleural reticular changes He underwent right heart cath this morning and clinical suspicion of predominantly postcapillary PH was confirmed. Coronary angiograms were not done because of renal failure. Cardiology and Nephrology following. 1 PM 
Seen and examined- full consult note to follow 
  
Pulmonary hypertension secondary to volume overload (better), decompensated left sided HFpEF (known cardiac disease with CAD, a fib, bradycardia, dilated LA, LVH, hypertensive heart disease and uncontrolled systemic HTN)-- noted plans for RHC: please measure oxygen saturations in wedged position and consider LVEDP measurement if wedge waveforms or sats are not accurate.  If baseline wedge pressure is normal (he has been aggressively diuresed), saline challenge (500 ml in 10 mins) will also give an idea of diastolic function of LV. 
  
No suggestion of limiting chronic lung disease (CT and PFTs in late  were normal). VQ scan in 2017 was low prob for PE. He does not have WHO Group 1 PAH. Review of Systems: A comprehensive 12 system review of systems was negative except for as documented in HPI Assessment:  
 
Predominantly post capillary pulmonary hypertension with mild pre-capillary component (PVR 3.65 FRANCIS) Organic cardiac disease with decompensated HFpEF Acute Interstitial infiltrates- likely edema Normal spirometry and lung volumes in 10/2017, previous CT chest has not shown ILD Low prob VQ scan in 10/2017 Recommendations:  
 
Diuresis Pulmonary hypertension should improve with treatment of underlying left heart disease Exercise oximetry after volume status is optimized We could consider evaluation for sleep apnea as an outpatient We will be happy to see him again as needed D/W patient Active Problem List:  
 
Problem List  Date Reviewed: 12/17/2018 Codes Class CHF (congestive heart failure) (HCC) ICD-10-CM: I50.9 ICD-9-CM: 428.0 Type 2 diabetes with nephropathy ([facility]) ICD-10-CM: E11.21 
ICD-9-CM: 250.40, 583.81 Atrial fibrillation, chronic ([facility]) ICD-10-CM: I48.2 ICD-9-CM: 427.31 Overview Signed 10/21/2017  1:45 PM by Kirti Gillespie MD  
  new onset afib with BRPR 10-19-17 admit CKD (chronic kidney disease) stage 3, GFR 30-59 ml/min ([facility]) ICD-10-CM: N18.3 ICD-9-CM: 585.3 Overview Signed 10/21/2017  1:47 PM by Kirti Gillespie MD  
  hypertension and DM nephrosclerosis GI bleed ICD-10-CM: K92.2 ICD-9-CM: 578.9 Hyperglycemia due to type 2 diabetes mellitus ([facility] ICD-10-CM: E11.65 ICD-9-CM: 250.00 Hypokalemia ICD-10-CM: E87.6 ICD-9-CM: 276.8 Generalized weakness ICD-10-CM: R53.1 ICD-9-CM: 780.79 Elevated troponin ICD-10-CM: R74.8 ICD-9-CM: 790.6 KATALINA (acute kidney injury) ([facility] ICD-10-CM: N17.9 ICD-9-CM: 368. 9 Acute renal failure (ARF) ([facility]) ICD-10-CM: N17.9 ICD-9-CM: 584.9 CKD (chronic kidney disease) ICD-10-CM: N18.9 ICD-9-CM: 383. 9 Type 2 diabetes mellitus with diabetic peripheral angiopathy without gangrene ([facility] ICD-10-CM: E11.51 
ICD-9-CM: 250.70, 443.81   
   
 * (Principal) Dyspnea ICD-10-CM: R06.00 
ICD-9-CM: 786.09   
   
 PVC's (premature ventricular contractions) ICD-10-CM: I49.3 ICD-9-CM: 427.69 Carotid arterial disease (HCC) ICD-10-CM: I77.9 ICD-9-CM: 447.9 Hypercholesteremia ICD-10-CM: E78.00 ICD-9-CM: 272.0   
   
 PVD (peripheral vascular disease) ([facility] ICD-10-CM: I73.9 ICD-9-CM: 443.9 Bradycardia ICD-10-CM: R00.1 ICD-9-CM: 427.89 CAD (coronary artery disease) ICD-10-CM: I25.10 ICD-9-CM: 414.00 Hypertension, essential, benign ICD-10-CM: I10 
ICD-9-CM: 401.1 Past Medical History:  
 
 has a past medical history of CAD (coronary artery disease), Carotid arterial disease ([facility] CKD (chronic kidney disease) stage 3, GFR 30-59 ml/min ([facility] (10/21/2017), Diabetes mellitus, type 2 ([facility] Hypercholesteremia, Hypertension, Paroxysmal atrial fibrillation ([facility] (10/21/2017), PVC's (premature ventricular contractions) (5/26/2014), and PVD (peripheral vascular disease) ([facility] Past Surgical History:  
 
 has a past surgical history that includes hx heart catheterization and hx coronary artery bypass graft. Home Medications:  
 
Prior to Admission medications Medication Sig Start Date End Date Taking? Authorizing Provider  
glipiZIDE SR (GLUCOTROL XL) 10 mg CR tablet Take 10 mg by mouth every evening. Yes Provider, Historical  
insulin glargine U-300 conc 300 unit/mL (1.5 mL) inpn 18 Units by SubCUTAneous route every morning. Yes Provider, Historical  
aspirin 81 mg chewable tablet Take 1 Tab by mouth daily. 9/27/18  Yes Ferdinand Wei MD  
bumetanide (BUMEX) 1 mg tablet Take 1 mg by mouth two (2) times a day.  10/1/17  Yes Provider, Historical  
 amLODIPine (NORVASC) 10 mg tablet Take 1 Tab by mouth daily. 10/24/17  Yes Carol Layton MD  
cloNIDine HCl (CATAPRES) 0.1 mg tablet Take 1 Tab by mouth two (2) times a day. 17  Yes Daija Hightower MD  
carvedilol (COREG) 25 mg tablet Take 1 Tab by mouth two (2) times a day. 3/18/17  Yes Daisy Herbert MD  
simvastatin (ZOCOR) 20 mg tablet Take 20 mg by mouth nightly. Yes Provider, Historical  
omega 3-dha-epa-fish oil (FISH OIL) 100-160-1,000 mg cap Take 1 Cap by mouth two (2) times a day. Yes Provider, Historical  
cinnamon bark (CINNAMON) 500 mg cap Take 1,000 mg by mouth two (2) times a day. Yes Provider, Historical  
 
 
Allergies/Social/Family History: Allergies Allergen Reactions  Lisinopril Cough Social History Tobacco Use  Smoking status: Former Smoker Packs/day: 3.00 Years: 20.00 Pack years: 60.00 Types: Cigarettes Last attempt to quit: 1985 Years since quittin.3  Smokeless tobacco: Never Used Substance Use Topics  Alcohol use: No  
  
History reviewed. No pertinent family history. Objective:  
Vital Signs: 
Visit Vitals /72 (BP 1 Location: Right arm, BP Patient Position: At rest) Pulse (!) 49 Temp 98.1 °F (36.7 °C) Resp 16 Ht 5' 9\" (1.753 m) Wt 79.4 kg (175 lb) SpO2 96% BMI 25.84 kg/m² O2 Flow Rate (L/min): 2 l/min O2 Device: Nasal cannula Temp (24hrs), Av.2 °F (36.8 °C), Min:97.5 °F (36.4 °C), Max:99 °F (37.2 °C) Intake/Output:  
 
Intake/Output Summary (Last 24 hours) at 2019 Last data filed at 2019 7832 Gross per 24 hour Intake 1825 ml Output 850 ml Net 975 ml Physical Exam:  
General:  Alert, cooperative, no distress, appears stated age. Head:  Normocephalic, without obvious abnormality, atraumatic. Eyes:  Conjunctivae/corneas clear. PERRL Neck: Supple, symmetrical, no adenopathy, no carotid bruit and no JVD. Lungs:   Clear to auscultation bilaterally. Chest wall:  No tenderness or deformity. Heart:  Bradycardia, IRR, S1-S2 normal, 2/6 apical systolic murmur, no click, rub or gallop. Abdomen:   Soft, non-tender. Bowel sounds present. No masses,  No organomegaly. Extremities: Atraumatic, no cyanosis or edema. Pulses: Palpable Skin: No rashes or lesions Neurologic: Grossly nonfocal  
 
  
LABS AND  DATA: Personally reviewed Recent Labs 05/01/19 
1018 WBC 7.7 HGB 11.0*  
HCT 33.9*  
 Recent Labs 05/02/19 
0412 05/01/19 
1018  138  
K 3.9 4.4  
* 107 CO2 21 24 BUN 70* 67* CREA 3.37* 3.00* * 173* CA 8.3* 8.9 MG  --  2.1 Recent Labs 05/01/19 
1018 SGOT 16  
AP 69  
TP 6.8 ALB 3.0*  
GLOB 3.8 LPSE 177 No results for input(s): INR, PTP, APTT in the last 72 hours. No lab exists for component: INREXT No results for input(s): PHI, PCO2I, PO2I, FIO2I in the last 72 hours. Recent Labs 05/01/19 
1018 TROIQ <0.05  
 
 
MEDS: Reviewed Chest Imaging: personally reviewed and report checked Tele- reviewed Medical decision making:  
I have reviewed the flowsheet and previous day's notes Patient has acute or chronic illness that poses a threat to life or bodily function Review and order of Clinical lab tests Review and Order of Radiology tests Independent visualization of Image Thank you for allowing me to participate in this patient's care. Mayuri Cespedes MD 
 
 
[facility]

## 2019-05-02 NOTE — PROGRESS NOTES
TRANSFER - IN REPORT: 
 
Verbal report received from Radha Mcnulty on [address]  being received from procedural area for routine progression of care. Report consisted of patients Situation, Background, Assessment and Recommendations(SBAR). Information from the following report(s) Procedure Summary, MAR and Recent Results was reviewed with the receiving clinician. Opportunity for questions and clarification was provided. Assessment completed upon patients arrival to [address] [place] [address] and care assumed. Cardiac Cath Lab Recovery Arrival Note: 
 
[address] arrived to [facility]. Patient procedure= RHC. Patient on cardiac monitor, non-invasive blood pressure, SPO2 monitor. On  or O2 @ 2 lpm via NC.  IV  of NS on pump at 25 ml/hr. Patient status doing well without problems. Patient is A&Ox 3. Patient reports no c/o's. PROCEDURE SITE CHECK: 
 
Procedure site:without any bleeding and hematoma, no pain/discomfort reported at procedure site. No change in patient status. Continue to monitor patient and status.

## 2019-05-02 NOTE — PROGRESS NOTES
[facility] met with patient today to provide an introduction to self, the [address] at [facility]. Bundled Payment Care Program: 
 
Patient was provided a copy of the [facility] letter. Patient states that they have a functioning scale at home. Patient was not provided a scale during this visit. Reinforced the importance of checking their weight at the same time daily and calling the cardiologist if their weight is up 3 lbs. in a day or 5 lbs. in a week (or per MD guidelines). Patient  has received a \"Living with Heart Failure\" patient education book. Hug At Home Program: 
Demonstration of Hug at [facility] program deferred as patient is scheduled for cardiac procedure later today. All questions answered at this time. Patient verbalized understanding of all information discussed.

## 2019-05-02 NOTE — PROGRESS NOTES
Cardiac Cath Lab Procedure Area Arrival Note: 
 
Yemi Martinez. arrived to Cardiac Cath Lab, Procedure Area. Patient identifiers verified with NAME and DATE OF BIRTH. Procedure verified with patient. Consent forms verified. Allergies verified. Patient informed of procedure and plan of care. Questions answered with review. Patient voiced understanding of procedure and plan of care. Patient on cardiac monitor, non-invasive blood pressure, SPO2 monitor. On 2L/min via NC.  IV of NSS on pump at 25 ml/hr. Patient status doing well without problems. Patient is A&Ox 4. Patient reports no complaints of pain but experiences shortness of breath with exertion. Patient medicated during procedure with orders obtained and verified by Dr. Sandor Weir. Refer to patients Cardiac Cath Lab PROCEDURE REPORT for vital signs, assessment, status, and response during procedure, printed at end of case. Printed report on chart or scanned into chart. 5176 Transfer to [facility] from Procedure Area Verbal report given to CHRISTINA Trammell on Yemi Martinez. being transferred to Cardiac Cath Lab  for routine progression of care   Patient is post RHC procedure. Patient stable upon transfer to . Report consisted of patients Situation, Background, Assessment and  
Recommendations(SBAR). Information from the following report(s) SBAR, Procedure Summary and MAR was reviewed with the receiving nurse. Opportunity for questions and clarification was provided. Patient medicated during procedure with orders obtained and verified by Dr. Sandor Weir. Refer to patient PROCEDURE REPORT for vital signs, assessment, status, and response during procedure.

## 2019-05-02 NOTE — PROGRESS NOTES
Attempted to see patient again for physical therapy evaluation. Patient off the floor for cardiac cath. Therapy will check back tomorrow to complete evaluation. Thank you.

## 2019-05-02 NOTE — PROGRESS NOTES
Cardiology Progress Note Admit Date: 5/1/2019 Admit Diagnosis: KATALINA (acute kidney injury) ([facility] [N17.9] CHF (congestive heart failure) ([facility] [I50.9] Date: 5/2/2019     Time: 11:25 AM 
Assessment/Plan/Discussion:Cardiology Attending:  
 
Patient seen on the day of progress note and examined  and agree with Advance Practice Provider (MADDIE, NP,PA)  assessment and plans. Samuel Landaverde is a 81 y.o. male Progressive shortness of breath that was mild but acute worse in past few days Hx of CAD Now with renal failure BNP likely not helpful with KATALINA 
dont hear dry crackles or rales on lungs though CT suggested Hemal [place] Has chronic AF and HTN 
RHC done wedge high so he is not dry, PA pressures high LVEF is WNL, only mild diastolic dysfunction by echo criteria Has mld AS, echo confirms PA HTN with only a bit of TR At age 81 not candidate for cath with this level of KATALINA 
following Eric Lin MD   
 
 
 
Subjective: 
 
Pt received sitting in bed, alert and in no distress. Condition improved since yesterday. Pt denies chest pain or SOB currently. He states that his breathing worsened overnight after receiving \"a shot for blood pressure,\" which resolved. Assessment and Plan 1. Shortness of Breath -Dyspnea 
            - Echo with Severe PHTN - PAS 72 mmHg 
            - BNP elevated at 11,184 
            - CXR with mild pulmonary infiltrates bilaterally 
            - TTE on 5/1/2019 showed EF 56-60%, NRWMA. Mild reduction in RV systolic function. 
            - Chest CT significant for mild worsening of diffuse interstitial parenchymal change 
 - Consult Pulmonology 2. Bradycardia 
            - d/c Coreg  
            - monitor 3. Hypertension - 164/57 at current presentation - Norvasc 10mg PO every day - Catapres . 1mg PO BID 
 - Hydralazine 100mg PO TID - Hydralazine 20mg IV q6hrs PRN SBP >170 
            - d/c Bumex  
            - will monitor 4. Atrial Fibrillation - chronic - CHADSVASC 5 
            - on Norvasc, Coreg d/c for bradycardia 
            - will monitor rate and consider further rate control for HR >110 
            - Not candidate for [address] due to fall risk, previous GI bleed 5. KATALINA on CKD - Cr 3.37 
            - Per Nephrology 6. DMT2 
            - Per Primary team 
7. Dyslipidemia 
            - simvastatin on hold - ASA 81mg PO every day Agree with PA-S A&P. Dyspnea improved. Mostly with the addition of NC oxygen. Echo showing severe PHTN, PAS 72.  CT of chest with mild worsening of diffuse interstitial parenchymal change. ? ILD - Pulm consulted. KATALINA on CKD. Nephrology following. Agree with need for RHC to sort out PHTN/volume status. H/o CAD, but suspect this is not an issue at this time. Angelica Sommers, MARGARITA 
 
 
ROS: 
 
 GENERAL Recent weight loss - no Fever -----------------   no 
 Chills -----------------   no 
 
 EYES, VISION Visual Changes - no 
 
 EARS, NOSE, THROAT Hearing loss ----------- no 
 Swallowing difficulties - no CARDIOVASCULAR Chest pain/pressure ---- no 
 Arrhythmia/palpitations - no RESPIRATORY Cough ------------------ no Shortness of breath - no Wheezing -------------- no  GASTROINTESTINAL Abdominal pain - no Heartburn -------- no 
 Bloody stool ----- no 
 
 GENITOURINARY Frequent urination - no Urgency -------------- no 
 MUSCULOSKELETAL Joint pain/swelling ---- no 
 Musculoskeletal pain - no 
 
 SKIN & INTEGUMENTARY Rashes - no Sores --- no 
 
 
   NEUROLOGICAL Numbness/tingling - no Sensation loss ------ no 
 
 PSYCHIATRIC Nervousness/anxiety - no Depression -------------- no 
 
 ENDOCRINE Heat/cold intolerance - no Excessive thirst -------- no 
 
 HEMATOLOGIC/LYMPHATIC  Abnormal bleeding - no 
 
 ALL/IMMUN Allergic reaction ------ no 
 Recurrent infections - no Objective: 
 
 Physical Exam: 
             
Visit Vitals /57 (BP 1 Location: Right arm, BP Patient Position: At rest) Pulse (!) 43 Temp 98.3 °F (36.8 °C) Resp 18 Ht 5' 9\" (1.753 m) Wt 79.4 kg (175 lb) SpO2 93% BMI 25.84 kg/m² General Appearance:   Well developed, well nourished,alert and oriented x 3, and 
 individual in no acute distress. Ears/Nose/Mouth/Throat:    Hearing grossly normal. 
  
    Neck:  Supple. Chest:    Lungs with trace wheezes to auscultation bilaterally. Cardiovascular:   Regular rate and rhythm, S1, S2 normal, no murmur. Abdomen:    Soft, non-tender, bowel sounds are active. Extremities:  No edema bilaterally. Skin:  Warm and dry. Telemetry: AFIB Data Review:  
 Labs:   
Recent Results (from the past 24 hour(s)) PROTEIN/CREATININE RATIO, URINE Collection Time: 05/01/19  3:03 PM  
Result Value Ref Range Protein, urine random 284 (H) 0.0 - 11.9 mg/dL Creatinine, urine 25.90 mg/dL Protein/Creat. urine Ratio 11.0 ECHO ADULT COMPLETE Collection Time: 05/01/19  4:17 PM  
Result Value Ref Range LV E' Lateral Velocity 7.85 cm/s LV E' Septal Velocity 5.28 cm/s Aortic Valve Systolic Peak Velocity 047.05 cm/s Aortic Valve Area by Continuity of Peak Velocity 1.6 cm2 AoV PG 13.4 mmHg LVIDd 5.17 4.2 - 5.9 cm  
 LVPWd 1.16 (A) 0.6 - 1.0 cm LVIDs 3.14 cm IVSd 1.14 (A) 0.6 - 1.0 cm  
 LVOT d 1.95 cm  
 LVOT Peak Velocity 97.47 cm/s LVOT Peak Gradient 3.8 mmHg MVA (PHT) 4.2 cm2  
 MV A Steve 51.74 cm/s  
 MV E Steve 163.94 cm/s  
 MV E/A 3.17 LV Mass .5 (A) 88 - 224 g LV Mass AL Index 139.3 49 - 115 g/m2 E/E' lateral 20.88   
 E/E' septal 31.05   
 RVSP 71.7 mmHg E/E' ratio (averaged) 25.97 Est. RA Pressure 5.0 mmHg Mitral Valve E Wave Deceleration Time 178.8 ms  Mitral Valve Pressure Half-time 51.9 ms  
 Left Atrium Major Axis 6.02 cm Triscuspid Valve Regurgitation Peak Gradient 66.7 mmHg Pulmonic Valve Max Velocity 128.58 cm/s  
 TR Max Velocity 408.22 cm/s PASP 71.7 mmHg PV peak gradient 6.6 mmHg GLUCOSE, POC Collection Time: 05/01/19  5:12 PM  
Result Value Ref Range Glucose (POC) 145 (H) 65 - 100 mg/dL Performed by Unkown  GLUCOSE, POC Collection Time: 05/01/19  9:59 PM  
Result Value Ref Range Glucose (POC) 205 (H) 65 - 100 mg/dL Performed by [facility] METABOLIC PANEL, BASIC Collection Time: 05/02/19  4:12 AM  
Result Value Ref Range Sodium 139 136 - 145 mmol/L Potassium 3.9 3.5 - 5.1 mmol/L Chloride 111 (H) 97 - 108 mmol/L  
 CO2 21 21 - 32 mmol/L Anion gap 7 5 - 15 mmol/L Glucose 127 (H) 65 - 100 mg/dL BUN 70 (H) 6 - 20 MG/DL Creatinine 3.37 (H) 0.70 - 1.30 MG/DL  
 BUN/Creatinine ratio 21 (H) 12 - 20 GFR est AA 21 (L) >60 ml/min/1.73m2 GFR est non-AA 18 (L) >60 ml/min/1.73m2 Calcium 8.3 (L) 8.5 - 10.1 MG/DL  
GLUCOSE, POC Collection Time: 05/02/19  6:37 AM  
Result Value Ref Range Glucose (POC) 226 (H) 65 - 100 mg/dL Performed by [facility] Radiology:  
 
  
Current Facility-Administered Medications Medication Dose Route Frequency  albuterol-ipratropium (DUO-NEB) 2.5 MG-0.5 MG/3 ML  3 mL Nebulization Q6H PRN  
 insulin glargine (LANTUS) injection 16 Units  16 Units SubCUTAneous DAILY  heparin (porcine) injection 5,000 Units  5,000 Units SubCUTAneous Q8H  
 famotidine (PEPCID) tablet 20 mg  20 mg Oral Q24H  
 ondansetron (ZOFRAN ODT) tablet 4 mg  4 mg Oral Q8H PRN  
 hydrALAZINE (APRESOLINE) tablet 100 mg  100 mg Oral TID  cloNIDine HCl (CATAPRES) tablet 0.1 mg  0.1 mg Oral BID  sodium chloride (NS) flush 5-40 mL  5-40 mL IntraVENous Q8H  
 sodium chloride (NS) flush 5-40 mL  5-40 mL IntraVENous PRN  
 acetaminophen (TYLENOL) tablet 650 mg  650 mg Oral Q4H PRN  
  amLODIPine (NORVASC) tablet 10 mg  10 mg Oral DAILY  aspirin chewable tablet 81 mg  81 mg Oral DAILY  insulin lispro (HUMALOG) injection   SubCUTAneous AC&HS  
 glucose chewable tablet 16 g  4 Tab Oral PRN  
 dextrose (D50W) injection syrg 12.5-25 g  12.5-25 g IntraVENous PRN  
 glucagon (GLUCAGEN) injection 1 mg  1 mg IntraMUSCular PRN  
 hydrALAZINE (APRESOLINE) 20 mg/mL injection 20 mg  20 mg IntraVENous Q6H PRN  
 0.9% sodium chloride infusion  50 mL/hr IntraVENous CONTINUOUS Anurag Ping Lilliam Men. MARGARITA Maddox M.D. [facility] [address] [place] [address], [address] [facility] 
 [phone]

## 2019-05-02 NOTE — PROGRESS NOTES
Bedside and Verbal shift change report given to [facility] (oncoming nurse) by Sergio Phillips (offgoing nurse). Report included the following information SBAR, ED Summary and MAR.

## 2019-05-02 NOTE — PROGRESS NOTES
Verbal report given to Olayinka(name) on NAME  being transferred to 3N(unit) for routine progression of care Report consisted of patients Situation, Background, Assessment and  
Recommendations(SBAR). Information from the following report(s) Procedure Summary, MAR and Recent Results was reviewed with the receiving nurse. Lines:    
 
Opportunity for questions and clarification was provided. Patient transported with: 
 Monitor Tech

## 2019-05-02 NOTE — PROGRESS NOTES
Bedside and Verbal shift change report given to Philippe Bender (oncoming nurse) by Cailin Knight (offgoing nurse). Report included the following information SBAR, Kardex, Accordion, Cardiac Rhythm Afib, Alarm Parameters  and Quality Measures.

## 2019-05-02 NOTE — PROGRESS NOTES
Problem: Falls - Risk of 
Goal: *Absence of Falls Description Document [place] Fall Risk and appropriate interventions in the flowsheet. Outcome: Progressing Towards Goal 
  
Problem: Patient Education: Go to Patient Education Activity Goal: Patient/Family Education Outcome: Progressing Towards Goal 
  
Problem: Patient Education: Go to Patient Education Activity Goal: Patient/Family Education Outcome: Progressing Towards Goal 
  
Problem: Heart Failure: Day 1 Goal: Off Pathway (Use only if patient is Off Pathway) Outcome: Progressing Towards Goal 
Goal: Activity/Safety Outcome: Progressing Towards Goal 
Goal: Consults, if ordered Outcome: Progressing Towards Goal 
Goal: Diagnostic Test/Procedures Outcome: Progressing Towards Goal 
Goal: Nutrition/Diet Outcome: Progressing Towards Goal 
Goal: Discharge Planning Outcome: Progressing Towards Goal 
Goal: Medications Outcome: Progressing Towards Goal 
Goal: Respiratory Outcome: Progressing Towards Goal 
Goal: Treatments/Interventions/Procedures Outcome: Progressing Towards Goal 
Goal: Psychosocial 
Outcome: Progressing Towards Goal 
Goal: *Optimal pain control at patient's stated goal 
Outcome: Progressing Towards Goal 
Goal: *Anxiety reduced or absent Outcome: Progressing Towards Goal

## 2019-05-02 NOTE — PROGRESS NOTES
Hospitalist Progress Note Doug Nolasco MD 
Answering service: [phone] OR [phone] from in house phone Date of Service:  2019 NAME:  Yue Mckinney Sr.                                                         :  1935                                               MRN:  [id number] Brief admission summary This is an 81-year-old gentleman with a significant cardiac history of coronary artery disease, status post coronary artery bypass grafting; peripheral arterial disease, status post angioplasty and stenting; type 2 diabetes; and hyperlipidemia, presented with weeks of dyspnea Subjective He has episode of vomiting this morning after IV injection for elevated BP. He had iv hydralazine around 4 am,he is on hydralazine at home Assessment and plan Exertional dyspnea which has progressively worsened over weeks. He reports 8-10 pounds weight gain since [place]. No over sign of CHF,pneumonia. Patient feels its his heart. He needs ischemic work up. Cardiology on board. 
-Non con CT chest showed bibasilar atelectasis, diffuse patchy interstitial change, stable to slightly worsened, with no focal infiltrate or effusion. 
-Echocardiogram showed: Normal EF,severe PAH Bradycardia,HR as low as 30s in the ED. -Medications  vs SSS. Coreg and clonidine stopped. HR in the 40 s today. Monitor,then if bradycardia does not resolve after medication wash out,he may need EP eval for pacemaker. Acute on chronic kidney failure with cardiorenal syndrome 
-Creatinine up today suspect due to diuresis. Diuretic on hold. IV fluids per renal 
 
Type 2 diabetes with hyperglycemia 
-Resume Lantus 16 units. HOld oral hypoglycemics. Accucheks and Humalog correction AC&HS Chronic atrial fibrillation. Currently, he is bradycardic. His carvedilol and clonidine were stopped. The patient is not on any coagulation reportedly due to history of gastrointestinal bleed.   He follows with the cardiologist.  The aspirin will be continued. Nausea and vomiting this morning. CT has reported cholelith.RUQ US to look into the biliary and GB Body mass index is 25.84 kg/m². DVT prophylaxis: heparin Code status : full code Care plan discussed with patient,his wife and nurse. Current facility administered and prior to admit medications reviewed. x Review of Systems: A comprehensive review of systems was negative except for that written in the HPI. PHYSICAL EXAM: 
O: 
Visit Vitals /57 (BP 1 Location: Right arm, BP Patient Position: At rest) Pulse (!) 43 Temp 98.3 °F (36.8 °C) Resp 18 Ht 5' 9\" (1.753 m) Wt 79.4 kg (175 lb) SpO2 93% BMI 25.84 kg/m² General Appears comfortable,not in distress. HEENT Head atraumatic. Unicteric sclera. Moist buccal mucosa. PERRLA CVS RRR, no MRG, no JVD, no peripheral edema Chest No deformity No accessory muscle use Vesicular air entry symmetrically No wheezing,ronchi or crepitations Abdomen &Pelvis Not distended. Normoactive. Soft. Non tender. No hepatomegally Genitourinary  No CVA or suprapubic tenderness Musculoskeletal No edema, deformity of extremities,back Skin No erythema,rash,depigmentation Neurology Mental status: alert and oriented x4 Cranial nerves: CN 2-12 intact. Motor 5/5 through out Psychiatry Intake/Output Summary (Last 24 hours) at 5/2/2019 0825 Last data filed at 5/2/2019 5858 Gross per 24 hour Intake 240 ml Output 300 ml Net -60 ml Recent labs & imaging reviewed: 
 
Problem List as of 5/2/2019 Date Reviewed: 12/17/2018 Codes Class Noted - Resolved CHF (congestive heart failure) (HCC) ICD-10-CM: I50.9 ICD-9-CM: 428.0  5/1/2019 - Present Type 2 diabetes with nephropathy ([facility] ICD-10-CM: E11.21 
ICD-9-CM: 250.40, 583.81  7/26/2018 - Present Atrial fibrillation, chronic (HCC) ICD-10-CM: I48.2 ICD-9-CM: 427.31  10/21/2017 - Present Overview Signed 10/21/2017  1:45 PM by Emelyn Sheikh MD  
  new onset afib with BRPR 10-19-17 admit CKD (chronic kidney disease) stage 3, GFR 30-59 ml/min (HCC) ICD-10-CM: N18.3 ICD-9-CM: 585.3  10/21/2017 - Present Overview Signed 10/21/2017  1:47 PM by Emelyn Sheikh MD  
  hypertension and DM nephrosclerosis GI bleed ICD-10-CM: K92.2 ICD-9-CM: 578.9  10/20/2017 - Present Hyperglycemia due to type 2 diabetes mellitus ([facility] ICD-10-CM: E11.65 ICD-9-CM: 250.00  10/20/2017 - Present Hypokalemia ICD-10-CM: E87.6 ICD-9-CM: 276.8  6/25/2017 - Present Generalized weakness ICD-10-CM: R53.1 ICD-9-CM: 780.79  6/25/2017 - Present Elevated troponin ICD-10-CM: R74.8 ICD-9-CM: 790.6  6/25/2017 - Present KATALINA (acute kidney injury) ([facility] ICD-10-CM: N17.9 ICD-9-CM: 584.9  6/25/2017 - Present Acute renal failure (ARF) (HCC) ICD-10-CM: N17.9 ICD-9-CM: 584.9  3/16/2017 - Present CKD (chronic kidney disease) ICD-10-CM: N18.9 ICD-9-CM: 585.9  1/20/2017 - Present Type 2 diabetes mellitus with diabetic peripheral angiopathy without gangrene [facility]) ICD-10-CM: E11.51 
ICD-9-CM: 250.70, 443.81  9/27/2015 - Present * (Principal) Dyspnea ICD-10-CM: R06.00 
ICD-9-CM: 786.09  7/15/2014 - Present PVC's (premature ventricular contractions) ICD-10-CM: I49.3 ICD-9-CM: 427.69  5/26/2014 - Present Carotid arterial disease (HCC) ICD-10-CM: I77.9 ICD-9-CM: 447.9  Unknown - Present Hypercholesteremia ICD-10-CM: E78.00 ICD-9-CM: 272.0  Unknown - Present PVD (peripheral vascular disease) ([facility] ICD-10-CM: I73.9 ICD-9-CM: 443.9  Unknown - Present Bradycardia ICD-10-CM: R00.1 ICD-9-CM: 427.89  Unknown - Present CAD (coronary artery disease) ICD-10-CM: I25.10 ICD-9-CM: 414.00  Unknown - Present Hypertension, essential, benign ICD-10-CM: I10 
ICD-9-CM: 401.1  Unknown - Present RESOLVED: New onset a-fib [facility]) ICD-10-CM: I48.91 
ICD-9-CM: 427.31  10/20/2017 - 11/30/2017 RESOLVED: Fever ICD-10-CM: R50.9 ICD-9-CM: 780.60  3/16/2017 - 3/18/2017 RESOLVED: Hyponatremia ICD-10-CM: E87.1 ICD-9-CM: 276.1  3/16/2017 - 3/18/2017 RESOLVED: Urinary retention ICD-10-CM: R33.9 ICD-9-CM: 788.20  1/19/2017 - 1/20/2017 RESOLVED: Heart palpitations ICD-10-CM: R00.2 ICD-9-CM: 785.1  7/15/2014 - 9/20/2016 RESOLVED: Atherosclerosis of native artery of extremity with intermittent claudication ([facility] ICD-10-CM: V29.461 ICD-9-CM: 440.21  2/19/2014 - 9/20/2016 RESOLVED: Other dyspnea and respiratory abnormality ICD-10-CM: R06.09, R09.89 ICD-9-CM: 786.09  Unknown - 9/20/2016 RESOLVED: Diabetes mellitus, type 2 (HCC) ICD-10-CM: E11.9 ICD-9-CM: 250.00  Unknown - 9/20/2016 Vernell Abernathy MD 
Internal Medicine Date of Service: 5/2/2019

## 2019-05-02 NOTE — PROGRESS NOTES
Problem: Heart Failure: Day 1 Goal: Respiratory Outcome: Progressing Towards Goal 
Goal: *Oxygen saturation within defined limits Outcome: Progressing Towards Goal

## 2019-05-03 LAB
ANION GAP SERPL CALC-SCNC: 10 MMOL/L (ref 5–15)
BUN SERPL-MCNC: 75 MG/DL (ref 6–20)
BUN/CREAT SERPL: 22 (ref 12–20)
CALCIUM SERPL-MCNC: 8.1 MG/DL (ref 8.5–10.1)
CHLORIDE SERPL-SCNC: 110 MMOL/L (ref 97–108)
CO2 SERPL-SCNC: 19 MMOL/L (ref 21–32)
CREAT SERPL-MCNC: 3.35 MG/DL (ref 0.7–1.3)
GLUCOSE BLD STRIP.AUTO-MCNC: 152 MG/DL (ref 65–100)
GLUCOSE BLD STRIP.AUTO-MCNC: 179 MG/DL (ref 65–100)
GLUCOSE BLD STRIP.AUTO-MCNC: 209 MG/DL (ref 65–100)
GLUCOSE BLD STRIP.AUTO-MCNC: 328 MG/DL (ref 65–100)
GLUCOSE SERPL-MCNC: 165 MG/DL (ref 65–100)
POTASSIUM SERPL-SCNC: 4 MMOL/L (ref 3.5–5.1)
SERVICE CMNT-IMP: ABNORMAL
SODIUM SERPL-SCNC: 139 MMOL/L (ref 136–145)

## 2019-05-03 PROCEDURE — 82962 GLUCOSE BLOOD TEST: CPT

## 2019-05-03 PROCEDURE — 36415 COLL VENOUS BLD VENIPUNCTURE: CPT

## 2019-05-03 PROCEDURE — 65210000002 HC RM PRIVATE GYN

## 2019-05-03 PROCEDURE — 74011250637 HC RX REV CODE- 250/637: Performed by: INTERNAL MEDICINE

## 2019-05-03 PROCEDURE — 80048 BASIC METABOLIC PNL TOTAL CA: CPT

## 2019-05-03 PROCEDURE — 74011636637 HC RX REV CODE- 636/637: Performed by: HOSPITALIST

## 2019-05-03 PROCEDURE — 74011250637 HC RX REV CODE- 250/637: Performed by: HOSPITALIST

## 2019-05-03 PROCEDURE — 97530 THERAPEUTIC ACTIVITIES: CPT

## 2019-05-03 PROCEDURE — 65660000000 HC RM CCU STEPDOWN

## 2019-05-03 PROCEDURE — 97161 PT EVAL LOW COMPLEX 20 MIN: CPT

## 2019-05-03 PROCEDURE — 97116 GAIT TRAINING THERAPY: CPT

## 2019-05-03 PROCEDURE — 74011250636 HC RX REV CODE- 250/636: Performed by: HOSPITALIST

## 2019-05-03 RX ORDER — SODIUM BICARBONATE 650 MG/1
650 TABLET ORAL 2 TIMES DAILY
Status: DISCONTINUED | OUTPATIENT
Start: 2019-05-03 | End: 2019-05-05

## 2019-05-03 RX ADMIN — Medication 10 ML: at 22:40

## 2019-05-03 RX ADMIN — INSULIN GLARGINE 16 UNITS: 100 INJECTION, SOLUTION SUBCUTANEOUS at 09:49

## 2019-05-03 RX ADMIN — HYDRALAZINE HYDROCHLORIDE 100 MG: 50 TABLET, FILM COATED ORAL at 22:40

## 2019-05-03 RX ADMIN — SODIUM BICARBONATE 650 MG: 650 TABLET ORAL at 14:17

## 2019-05-03 RX ADMIN — ASPIRIN 81 MG 81 MG: 81 TABLET ORAL at 08:53

## 2019-05-03 RX ADMIN — HYDRALAZINE HYDROCHLORIDE 100 MG: 50 TABLET, FILM COATED ORAL at 08:53

## 2019-05-03 RX ADMIN — INSULIN LISPRO 1 UNITS: 100 INJECTION, SOLUTION INTRAVENOUS; SUBCUTANEOUS at 22:40

## 2019-05-03 RX ADMIN — HEPARIN SODIUM 5000 UNITS: 5000 INJECTION INTRAVENOUS; SUBCUTANEOUS at 22:40

## 2019-05-03 RX ADMIN — HYDRALAZINE HYDROCHLORIDE 100 MG: 50 TABLET, FILM COATED ORAL at 15:27

## 2019-05-03 RX ADMIN — CLONIDINE HYDROCHLORIDE 0.1 MG: 0.1 TABLET ORAL at 17:53

## 2019-05-03 RX ADMIN — AMLODIPINE BESYLATE 10 MG: 5 TABLET ORAL at 08:53

## 2019-05-03 RX ADMIN — HEPARIN SODIUM 5000 UNITS: 5000 INJECTION INTRAVENOUS; SUBCUTANEOUS at 14:17

## 2019-05-03 RX ADMIN — CLONIDINE HYDROCHLORIDE 0.1 MG: 0.1 TABLET ORAL at 08:53

## 2019-05-03 RX ADMIN — SODIUM BICARBONATE 650 MG: 650 TABLET ORAL at 17:53

## 2019-05-03 RX ADMIN — HEPARIN SODIUM 5000 UNITS: 5000 INJECTION INTRAVENOUS; SUBCUTANEOUS at 06:48

## 2019-05-03 RX ADMIN — FAMOTIDINE 20 MG: 20 TABLET ORAL at 08:53

## 2019-05-03 RX ADMIN — INSULIN LISPRO 4 UNITS: 100 INJECTION, SOLUTION INTRAVENOUS; SUBCUTANEOUS at 12:03

## 2019-05-03 NOTE — PROGRESS NOTES
Cardiology Progress Note Admit Date: 5/1/2019 Admit Diagnosis: [facility]acute kidney [facility]) [facility] [N17.9] CHF (congestive heart failure) ([facility] [I50.9] Date: 5/3/2019     Time: 11:25 AM 
Assessment/Plan/Discussion:Cardiology Attending:  
 
Patient seen on the day of progress note and examined  and agree with Advance Practice Provider (MADDIE, NP,PA)  assessment and plans. Saeid Saunders is a 81 y.o. male No cp Remains short of breath with exertion Asks about RHC, possible hemodialysis at later time, volume overload all discussed No angina, or objective evidence for ischemia Has chronic afib Off diuretics Will see back Monday Adrian Watson MD   
 
 
 
Subjective: 
Pt received laying bed. Wife and daughters in room. No c/o CP or SOB. Wonders about SOB with activities Assessment and Plan 1. Shortness of Breath -Dyspnea 
            - Echo with Severe PHTN - PAS 72 mmHg 
            - BNP elevated at 11,184 
            - CXR with mild pulmonary infiltrates bilaterally 
            - TTE on 5/1/2019 showed EF 56-60%, NRWMA. Mild reduction in RV systolic function. 
            - Chest CT significant for mild worsening of diffuse interstitial parenchymal change 
 - RHC. PCW 28, RV 75.  
2. Bradycardia 
            - d/c Coreg  
            - monitor 3. Hypertension - 164/57 at current presentation - Norvasc 10mg PO every day - Catapres . 1mg PO BID 
 - Hydralazine 100mg PO TID  
            - Hydralazine 20mg IV q6hrs PRN SBP >170 
            - d/c Bumex  
            - will monitor 4. Atrial Fibrillation - chronic - CHADSVASC 5 
            - on Norvasc, Coreg d/c for bradycardia 
            - will monitor rate and consider further rate control for HR >110 
            - Not candidate for Fetchnotes due to fall risk, previous GI bleed 5. KATALINA on CKD - Cr 3.35 
            - Per Nephrology 6. DMT2 
            - Per Primary team 
7. Dyslipidemia 
            - simvastatin on hold - ASA 81mg PO every day He is feeling better. Has not ambulated much. ?AGUILAR. Working with PT. NILSON and still with volume. RHC suggests renal disease progression. Stable from Cardiology standpoint. Will continue to treat As currently. Nephrology to weigh in on CKD. ROS: 
 
 GENERAL Recent weight loss - no Fever -----------------   no 
 Chills -----------------   no 
 
 EYES, VISION Visual Changes - no 
 
 EARS, NOSE, THROAT Hearing loss ----------- no 
 Swallowing difficulties - no CARDIOVASCULAR Chest pain/pressure ---- no 
 Arrhythmia/palpitations - no RESPIRATORY Cough ------------------ no Shortness of breath - no Wheezing -------------- no GASTROINTESTINAL Abdominal pain - no Heartburn -------- no 
 Bloody stool ----- no 
 
 GENITOURINARY Frequent urination - no Urgency -------------- no 
 MUSCULOSKELETAL Joint pain/swelling ---- no 
 Musculoskeletal pain - no 
 
 SKIN & INTEGUMENTARY Rashes - no Sores --- no 
 
 
   NEUROLOGICAL Numbness/tingling - no Sensation loss ------ no 
 
 PSYCHIATRIC Nervousness/anxiety - no Depression -------------- no 
 
 ENDOCRINE Heat/cold intolerance - no Excessive thirst -------- no 
 
 HEMATOLOGIC/LYMPHATIC Abnormal bleeding - no ALL/IMMUN Allergic reaction ------ no 
 Recurrent infections - no Objective: 
 
 Physical Exam: 
             
Visit Vitals /67 (BP 1 Location: Right arm, BP Patient Position: Sitting;Post activity) Pulse 70 Temp 97.9 °F (36.6 °C) Resp 18 Ht 5' 9\" (1.753 m) Wt 178 lb 12.7 oz (81.1 kg) SpO2 94% BMI 26.40 kg/m² General Appearance:   Well developed, well nourished,alert and oriented x 3, and 
 individual in no acute distress.   
Ears/Nose/Mouth/Throat:    Hearing grossly normal. 
  
 Neck:  Supple. Chest:    Lungs with trace wheezes to auscultation bilaterally. Cardiovascular:   Regular rate and rhythm, S1, S2 normal, no murmur. Abdomen:    Soft, non-tender, bowel sounds are active. Extremities:  No edema bilaterally. Skin:  Warm and dry. Telemetry: AFIB Data Review:  
 Labs:   
Recent Results (from the past 24 hour(s)) GLUCOSE, POC Collection Time: 05/02/19  4:41 PM  
Result Value Ref Range Glucose (POC) 217 (H) 65 - 100 mg/dL Performed by Raza Rodriguez GLUCOSE, POC Collection Time: 05/02/19 10:27 PM  
Result Value Ref Range Glucose (POC) 168 (H) 65 - 100 mg/dL Performed by Joao Johnson METABOLIC PANEL, BASIC Collection Time: 05/03/19  4:31 AM  
Result Value Ref Range Sodium 139 136 - 145 mmol/L Potassium 4.0 3.5 - 5.1 mmol/L Chloride 110 (H) 97 - 108 mmol/L  
 CO2 19 (L) 21 - 32 mmol/L Anion gap 10 5 - 15 mmol/L Glucose 165 (H) 65 - 100 mg/dL BUN 75 (H) 6 - 20 MG/DL Creatinine 3.35 (H) 0.70 - 1.30 MG/DL  
 BUN/Creatinine ratio 22 (H) 12 - 20 GFR est AA 21 (L) >60 ml/min/1.73m2 GFR est non-AA 18 (L) >60 ml/min/1.73m2 Calcium 8.1 (L) 8.5 - 10.1 MG/DL  
GLUCOSE, POC Collection Time: 05/03/19  6:47 AM  
Result Value Ref Range Glucose (POC) 179 (H) 65 - 100 mg/dL Performed by Joao JonesQuotefish GLUCOSE, POC Collection Time: 05/03/19 11:37 AM  
Result Value Ref Range Glucose (POC) 328 (H) 65 - 100 mg/dL Performed by Unkown  Radiology:  
 
  
Current Facility-Administered Medications Medication Dose Route Frequency  albuterol-ipratropium (DUO-NEB) 2.5 MG-0.5 MG/3 ML  3 mL Nebulization Q6H PRN  
 insulin glargine (LANTUS) injection 16 Units  16 Units SubCUTAneous DAILY  heparin (porcine) injection 5,000 Units  5,000 Units SubCUTAneous Q8H  
 famotidine (PEPCID) tablet 20 mg  20 mg Oral Q24H  
 ondansetron (ZOFRAN ODT) tablet 4 mg  4 mg Oral Q8H PRN  
  hydrALAZINE (APRESOLINE) tablet 100 mg  100 mg Oral TID  cloNIDine HCl (CATAPRES) tablet 0.1 mg  0.1 mg Oral BID  sodium chloride (NS) flush 5-40 mL  5-40 mL IntraVENous Q8H  
 sodium chloride (NS) flush 5-40 mL  5-40 mL IntraVENous PRN  
 sodium chloride (NS) flush 5-40 mL  5-40 mL IntraVENous Q8H  
 sodium chloride (NS) flush 5-40 mL  5-40 mL IntraVENous PRN  
 acetaminophen (TYLENOL) tablet 650 mg  650 mg Oral Q4H PRN  
 amLODIPine (NORVASC) tablet 10 mg  10 mg Oral DAILY  aspirin chewable tablet 81 mg  81 mg Oral DAILY  insulin lispro (HUMALOG) injection   SubCUTAneous AC&HS  
 glucose chewable tablet 16 g  4 Tab Oral PRN  
 dextrose (D50W) injection syrg 12.5-25 g  12.5-25 g IntraVENous PRN  
 glucagon (GLUCAGEN) injection 1 mg  1 mg IntraMUSCular PRN  
 hydrALAZINE (APRESOLINE) 20 mg/mL injection 20 mg  20 mg IntraVENous Q6H PRN  
 0.9% sodium chloride infusion  50 mL/hr IntraVENous CONTINUOUS Barbie Quiroz. MARGARITA Medley M.D. [facility] [address] [place] [address], [address] Citlalli Jones 
 [phone]

## 2019-05-03 NOTE — PROGRESS NOTES
[facility] 
 [address] [facility], [address] [facility] Phone: [phone]   [phone]   
  
Nephrology Progress Note Bethany Cunningham.     1935     [id number] Date of Admission : 5/1/2019 05/03/19 CC:  Follow up forAKI Assessment and Plan KATALINA on CKD  
- pre renal from over diuresis vs progression of underlying CKD 
- FeNa is very low c/w pre-renal azotemia  
- he will benefit from RHC to differentiate pre and post capillary Pulm HTN  
- Bun/creat up to 75/3.4 on 5/3, but we may be hitting a plateau: not much change from 5/2. CKD IV: 
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 
-- UPCR showed 11 gm of Protein : ordered 24 hr urine studies, Gammopathy screen - Baseline Cr ~ 2.6mg/dl Mild metabolic acidosis - will start sodium bicarbonate Dyspnea : 
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  
- CT chest showing mild diffuse interstitial changes -- Pulm edema vs ILD  
- consulted Pulmonary and cards following as well HTN: 
- cont norvasc 
- increased Hydralazine and added clonidine - coreg d/c'd due to bradycardia 
  
Chronic Anemia: 
- hx of GI bleed, now off AC 
- hgb stable at 11 
  
Afib: 
- per cards 
  
CAD s/p CABG 
  
DM2: 
- on insulin Above d/w the patient and his wife Interval History: On 5/2: BP very high overnight Needing nasal O2 now Echo findings d/w pt On 5/3:  BP's much better. Pt on nasal O2 but says he has been ambulating in the hallway with just mild AGUILAR. He says that overall he is feeling very much better. No chest pain. No abd pain. No N/V. No joint pain. No skin rashes or lesions No HEENT complaints. No fever/chills. Review of Systems: A comprehensive review of systems was negative except for that written in the HPI. Current Medications:  
Current Facility-Administered Medications Medication Dose Route Frequency  albuterol-ipratropium (DUO-NEB) 2.5 MG-0.5 MG/3 ML  3 mL Nebulization Q6H PRN  
  insulin glargine (LANTUS) injection 16 Units  16 Units SubCUTAneous DAILY  heparin (porcine) injection 5,000 Units  5,000 Units SubCUTAneous Q8H  
 famotidine (PEPCID) tablet 20 mg  20 mg Oral Q24H  
 ondansetron (ZOFRAN ODT) tablet 4 mg  4 mg Oral Q8H PRN  
 hydrALAZINE (APRESOLINE) tablet 100 mg  100 mg Oral TID  cloNIDine HCl (CATAPRES) tablet 0.1 mg  0.1 mg Oral BID  sodium chloride (NS) flush 5-40 mL  5-40 mL IntraVENous Q8H  
 sodium chloride (NS) flush 5-40 mL  5-40 mL IntraVENous PRN  
 sodium chloride (NS) flush 5-40 mL  5-40 mL IntraVENous Q8H  
 sodium chloride (NS) flush 5-40 mL  5-40 mL IntraVENous PRN  
 acetaminophen (TYLENOL) tablet 650 mg  650 mg Oral Q4H PRN  
 amLODIPine (NORVASC) tablet 10 mg  10 mg Oral DAILY  aspirin chewable tablet 81 mg  81 mg Oral DAILY  insulin lispro (HUMALOG) injection   SubCUTAneous AC&HS  
 glucose chewable tablet 16 g  4 Tab Oral PRN  
 dextrose (D50W) injection syrg 12.5-25 g  12.5-25 g IntraVENous PRN  
 glucagon (GLUCAGEN) injection 1 mg  1 mg IntraMUSCular PRN  
 hydrALAZINE (APRESOLINE) 20 mg/mL injection 20 mg  20 mg IntraVENous Q6H PRN  
 0.9% sodium chloride infusion  50 mL/hr IntraVENous CONTINUOUS Allergies Allergen Reactions  Lisinopril Cough Objective: 
Vitals:   
Vitals:  
 05/03/19 1122 05/03/19 1128 05/03/19 1132 05/03/19 1145 BP: 136/75 112/74 135/66 138/67 Pulse: (!) 54 64 63 70 Resp:      
Temp:      
SpO2: 93% 98% 97% 94% Weight:      
Height:      
 
Intake and Output: 
No intake/output data recorded. 05/01 1901 - 05/03 0700 In: 1281 [P.O.:680; I.V.:1145] Out: 1250 [[id number]] Physical Examination: 
General: Elderly man in no distress Neck:  Supple, no mass Resp:  Very diminished breath sounds in the bases and some slight rales; normal resp effort CV:  RRR,  no murmur or rub,no LE edema GI:  Soft and non-tender no hepatosplenomegaly Neurologic:  Non focal; alert and appropriate; OX 3; normal speech Psych:             Cheerful/ normal affect and mood Skin:  No Rash :  No ferrell []    High complexity decision making was performed 
[]    Patient is at high-risk of decompensation with multiple organ involvement Lab Data Personally Reviewed: I have reviewed all the pertinent labs, microbiology data and radiology studies during assessment. Recent Labs 05/03/19 
0431 05/02/19 
0412 05/01/19 
1018  139 138  
K 4.0 3.9 4.4  
* 111* 107 CO2 19* 21 24 * 127* 173* BUN 75* 70* 67* CREA 3.35* 3.37* 3.00* CA 8.1* 8.3* 8.9 MG  --   --  2.1 ALB  --   --  3.0*  
SGOT  --   --  16 ALT  --   --  25 Recent Labs 05/01/19 
1018 WBC 7.7 HGB 11.0*  
HCT 33.9*  
 No results found for: SDES Lab Results Component Value Date/Time Culture result: CANDIDA ALBICANS (A) 03/16/2017 06:00 AM  
 Culture result: NO GROWTH 5 DAYS 03/16/2017 05:40 AM  
 
Recent Results (from the past 24 hour(s)) GLUCOSE, POC Collection Time: 05/02/19  4:41 PM  
Result Value Ref Range Glucose (POC) 217 (H) 65 - 100 mg/dL Performed by Sarah Fulton GLUCOSE, POC Collection Time: 05/02/19 10:27 PM  
Result Value Ref Range Glucose (POC) 168 (H) 65 - 100 mg/dL Performed by Malcolm Monreal METABOLIC PANEL, BASIC Collection Time: 05/03/19  4:31 AM  
Result Value Ref Range Sodium 139 136 - 145 mmol/L Potassium 4.0 3.5 - 5.1 mmol/L Chloride 110 (H) 97 - 108 mmol/L  
 CO2 19 (L) 21 - 32 mmol/L Anion gap 10 5 - 15 mmol/L Glucose 165 (H) 65 - 100 mg/dL BUN 75 (H) 6 - 20 MG/DL Creatinine 3.35 (H) 0.70 - 1.30 MG/DL  
 BUN/Creatinine ratio 22 (H) 12 - 20 GFR est AA 21 (L) >60 ml/min/1.73m2 GFR est non-AA 18 (L) >60 ml/min/1.73m2 Calcium 8.1 (L) 8.5 - 10.1 MG/DL  
GLUCOSE, POC Collection Time: 05/03/19  6:47 AM  
Result Value Ref Range Glucose (POC) 179 (H) 65 - 100 mg/dL Performed by Seamus Holly GLUCOSE, POC Collection Time: 05/03/19 11:37 AM  
Result Value Ref Range Glucose (POC) 328 (H) 65 - 100 mg/dL Performed by Unkown  Total time spent with patient:  xxx   min. Care Plan discussed with: 
Patient Family RN Consulting Physician [id number] [facility],      
 
I have reviewed the flowsheets. Chart and Pertinent Notes have been reviewed. No change in PMH ,family and social history from Consult note.  
 
 
Marlene Mancilla MD

## 2019-05-03 NOTE — PROGRESS NOTES
Orders received, chart reviewed and patient evaluated by physical therapy. Recommend patient to discharge to home. Recommend with nursing patient to complete as able in order to maintain strength, endurance and independence: OOB to chair 3x/day with assist X 1 and ambulating with assist X 1. Thank you for your assistance. Full evaluation to follow. Hemanth Ewing, PT Vitals:  
 05/03/19 1122 05/03/19 1128 05/03/19 1132 05/03/19 1145 BP: 136/75 112/74 135/66 138/67 BP 1 Location: Right arm Right arm Right arm Right arm BP Patient Position: Supine;Pre-activity Sitting Standing Sitting;Post activity Pulse: (!) 54 64 63 70 Resp:      
Temp:      
SpO2: 93% 98% 97% 94%

## 2019-05-03 NOTE — PROGRESS NOTES
Problem: Falls - Risk of 
Goal: *Absence of Falls Description Document [place] Fall Risk and appropriate interventions in the flowsheet. Outcome: Progressing Towards Goal 
  
Problem: Patient Education: Go to Patient Education Activity Goal: Patient/Family Education Outcome: Progressing Towards Goal 
  
Problem: Heart Failure: Day 2 Goal: Off Pathway (Use only if patient is Off Pathway) Outcome: Progressing Towards Goal 
Goal: Activity/Safety Outcome: Progressing Towards Goal 
Goal: Consults, if ordered Outcome: Progressing Towards Goal 
Goal: Diagnostic Test/Procedures Outcome: Progressing Towards Goal 
Goal: Nutrition/Diet Outcome: Progressing Towards Goal 
Goal: Discharge Planning Outcome: Progressing Towards Goal 
Goal: Medications Outcome: Progressing Towards Goal 
Goal: Respiratory Outcome: Progressing Towards Goal 
Goal: Treatments/Interventions/Procedures Outcome: Progressing Towards Goal 
Goal: Psychosocial 
Outcome: Progressing Towards Goal 
Goal: *Oxygen saturation within defined limits Outcome: Progressing Towards Goal 
Goal: *Hemodynamically stable Outcome: Progressing Towards Goal 
Goal: *Optimal pain control at patient's stated goal 
Outcome: Progressing Towards Goal 
Goal: *Anxiety reduced or absent Outcome: Progressing Towards Goal 
Goal: *Demonstrates progressive activity Outcome: Progressing Towards Goal 
  
Problem: Diabetes Maintenance:Admission Goal: Activity/Safety Outcome: Progressing Towards Goal 
Goal: Diagnostic Tests/Procedures Outcome: Progressing Towards Goal 
  
Problem: Diabetes Maintenance:Ongoing Goal: Activity/Safety Outcome: Progressing Towards Goal

## 2019-05-03 NOTE — PROGRESS NOTES
Specialty appointment has been scheduled with Dr. Teagan Richardson for Tuesday, 5/7/19 at 10:40 a.m. Pending patient discharge.   Wicho Mckeon, Care Management Specialist.

## 2019-05-03 NOTE — DIABETES MGMT
DTC Progress Note Recommendations/ Comments: Chart reviewed due to hyperglycemia. FBG this am was 179 mg/dl after 16 units of Lantus given yesterday. However, pre-prandial BG at lunch at 328 mg/dl. If appropriate, please consider: 
Increase Lantus to 18 units Change correction scale to normal sensitivity Please add no concentrated sweets to diet order to reduce spikes between meals Current hospital DM medication: Lantus, 16 units daily Lispro high sensitivity scale correction Chart reviewed on [address] Patient is a 81 y.o. male with known DM on 18 units of Lantus and 10 mg Glipizide SR at home. A1c:  
Lab Results Component Value Date/Time Hemoglobin A1c 9.8 (H) 10/20/2017 08:11 AM  
 Hemoglobin A1c 9.0 (H) 06/25/2017 03:39 PM  
 Hemoglobin A1c, External 8.4 08/15/2017 Recent Glucose Results:  
Lab Results Component Value Date/Time  (H) 05/03/2019 04:31 AM  
 GLUCPOC 328 (H) 05/03/2019 11:37 AM  
 GLUCPOC 179 (H) 05/03/2019 06:47 AM  
 GLUCPOC 168 (H) 05/02/2019 10:27 PM  
  
 
Lab Results Component Value Date/Time Creatinine 3.35 (H) 05/03/2019 04:31 AM  
 
Estimated Creatinine Clearance: 17 mL/min (A) (based on SCr of 3.35 mg/dL (H)). Active Orders Diet DIET CARDIAC Regular PO intake:  
Patient Vitals for the past 72 hrs: 
 % Diet Eaten 05/02/19 1648 90 % 05/02/19 1158 90 % 05/02/19 0814 90 % 05/01/19 1416 75 % Will continue to follow as needed. Thank you Mehul Gibson RD Time spent: 4 minutes

## 2019-05-03 NOTE — PROGRESS NOTES
Hospitalist Progress Note Lara French MD 
Answering service: [phone] OR [phone] from in house phone Date of Service:  5/3/2019 NAME:  Nessa Marie Sr.                                                         :  1935                                               MRN:  [id number] Brief admission summary This is an 16-year-old gentleman with a significant cardiac history of coronary artery disease, status post coronary artery bypass grafting; peripheral arterial disease, status post angioplasty and stenting; type 2 diabetes; and hyperlipidemia, presented with weeks of dyspnea Subjective Patient did 6 minute walking with much less dyspnea,spo2 lowest 90% He says he feels much better Patient consumes a lot of salt. He is advised to minimize salt,to go NO SALT ADDED and to pay attention to foods with sodium load. Assessment and plan Exertional dyspnea which has progressively worsened over weeks. He reports 8-10 pounds weight gain since [place]. No over sign of CHF,pneumonia. Patient feels its his heart. He needs ischemic work up. Cardiology on board. 
-Non con CT chest showed bibasilar atelectasis, diffuse patchy interstitial change, stable to slightly worsened, with no focal infiltrate or effusion. 
-Echocardiogram showed: Normal EF,severe PAH 
-RHC on  PA 66/18/31 PCW 20/26/20 RV 69/7/18 RA 15/16/13 Bradycardia,HR as low as 30s in the ED. -Medications  vs SSS. Coreg and clonidine were stopped initially,renal resumed clonidine. HR in the 60-70s today Acute on chronic kidney failure with cardiorenal syndrome 
-Creatinine up today suspect due to diuresis. Diuretic on hold. d/c IV fluids Type 2 diabetes with hyperglycemia 
-Resume Lantus 16 units. Hold oral hypoglycemics. Accucheks and Humalog correction AC&HS Chronic atrial fibrillation. Currently, he is bradycardic.   His carvedilol and clonidine were stopped. The patient is not on any coagulation reportedly due to history of gastrointestinal bleed. He follows with the cardiologist.  The aspirin will be continued. Nausea and vomiting this morning. CT has reported cholelith.RUQ US:Cholecystolithiasis and gallbladder sludge with mild gallbladder distention. No biliary ductal dilatation. Body mass index is 26.4 kg/m². DVT prophylaxis: heparin Code status : full code Care plan discussed with patient,his wife and nurse,children. Current facility administered and prior to admit medications reviewed. x Review of Systems: A comprehensive review of systems was negative except for that written in the HPI. PHYSICAL EXAM: 
O: 
Visit Vitals /77 (BP 1 Location: Left arm, BP Patient Position: At rest) Pulse 67 Temp 98 °F (36.7 °C) Resp 20 Ht 5' 9\" (1.753 m) Wt 81.1 kg (178 lb 12.7 oz) SpO2 94% BMI 26.40 kg/m² General Appears comfortable,not in distress. HEENT Head atraumatic. Unicteric sclera. Moist buccal mucosa. PERRLA CVS RRR, no MRG, no JVD, no peripheral edema Chest No deformity No accessory muscle use Vesicular air entry symmetrically No wheezing,ronchi or crepitations Abdomen &Pelvis Not distended. Normoactive. Soft. Non tender. No hepatomegally Genitourinary  No CVA or suprapubic tenderness Musculoskeletal No edema, deformity of extremities,back Skin No erythema,rash,depigmentation Neurology Mental status: alert and oriented x4 Cranial nerves: CN 2-12 intact. Motor 5/5 through out Psychiatry Intake/Output Summary (Last 24 hours) at 5/3/2019 1649 Last data filed at 5/3/2019 5880 Gross per 24 hour Intake  Output 400 ml Net -400 ml Recent labs & imaging reviewed: 
 
Problem List as of 5/3/2019 Date Reviewed: 12/17/2018 Codes Class Noted - Resolved CHF (congestive heart failure) ([facility]) ICD-10-CM: I50.9 ICD-9-CM: 428.0  5/1/2019 - Present Type 2 diabetes with nephropathy ([facility] ICD-10-CM: E11.21 
ICD-9-CM: 250.40, 583.81  7/26/2018 - Present Atrial fibrillation, chronic ([facility]) ICD-10-CM: I48.2 ICD-9-CM: 427.31  10/21/2017 - Present Overview Signed 10/21/2017  1:45 PM by Charline Gupta MD  
  new onset afib with BRPR 10-19-17 admit CKD (chronic kidney disease) stage 3, GFR 30-59 ml/min ([facility]) ICD-10-CM: N18.3 ICD-9-CM: 585.3  10/21/2017 - Present Overview Signed 10/21/2017  1:47 PM by Charline Gupta MD  
  hypertension and DM nephrosclerosis GI bleed ICD-10-CM: K92.2 ICD-9-CM: 578.9  10/20/2017 - Present Hyperglycemia due to type 2 diabetes mellitus ([facility] ICD-10-CM: E11.65 ICD-9-CM: 250.00  10/20/2017 - Present Hypokalemia ICD-10-CM: E87.6 ICD-9-CM: 276.8  6/25/2017 - Present Generalized weakness ICD-10-CM: R53.1 ICD-9-CM: 780.79  6/25/2017 - Present Elevated troponin ICD-10-CM: R74.8 ICD-9-CM: 790.6  6/25/2017 - Present KATALINA (acute kidney injury) ([facility] ICD-10-CM: N17.9 ICD-9-CM: 584.9  6/25/2017 - Present Acute renal failure (ARF) ([facility]) ICD-10-CM: N17.9 ICD-9-CM: 584.9  3/16/2017 - Present CKD (chronic kidney disease) ICD-10-CM: N18.9 ICD-9-CM: 585.9  1/20/2017 - Present Type 2 diabetes mellitus with diabetic peripheral angiopathy without gangrene [facility]) ICD-10-CM: E11.51 
ICD-9-CM: 250.70, 443.81  9/27/2015 - Present * (Principal) Dyspnea ICD-10-CM: R06.00 
ICD-9-CM: 786.09  7/15/2014 - Present PVC's (premature ventricular contractions) ICD-10-CM: I49.3 ICD-9-CM: 427.69  5/26/2014 - Present Carotid arterial disease (HCC) ICD-10-CM: I77.9 ICD-9-CM: 447.9  Unknown - Present Hypercholesteremia ICD-10-CM: E78.00 ICD-9-CM: 272.0  Unknown - Present PVD (peripheral vascular disease) ([facility] ICD-10-CM: I73.9 ICD-9-CM: 443.9  Unknown - Present Bradycardia ICD-10-CM: R00.1 ICD-9-CM: 427.89  Unknown - Present CAD (coronary artery disease) ICD-10-CM: I25.10 ICD-9-CM: 414.00  Unknown - Present Hypertension, essential, benign ICD-10-CM: I10 
ICD-9-CM: 401.1  Unknown - Present RESOLVED: New onset a-fib [facility]) ICD-10-CM: I48.91 
ICD-9-CM: 427.31  10/20/2017 - 11/30/2017 RESOLVED: Fever ICD-10-CM: R50.9 ICD-9-CM: 780.60  3/16/2017 - 3/18/2017 RESOLVED: Hyponatremia ICD-10-CM: E87.1 ICD-9-CM: 276.1  3/16/2017 - 3/18/2017 RESOLVED: Urinary retention ICD-10-CM: R33.9 ICD-9-CM: 788.20  1/19/2017 - 1/20/2017 RESOLVED: Heart palpitations ICD-10-CM: R00.2 ICD-9-CM: 785.1  7/15/2014 - 9/20/2016 RESOLVED: Atherosclerosis of native artery of extremity with intermittent claudication ([facility] ICD-10-CM: C48.346 ICD-9-CM: 440.21  2/19/2014 - 9/20/2016 RESOLVED: Other dyspnea and respiratory abnormality ICD-10-CM: R06.09, R09.89 ICD-9-CM: 786.09  Unknown - 9/20/2016 RESOLVED: Diabetes mellitus, type 2 (HCC) ICD-10-CM: E11.9 ICD-9-CM: 250.00  Unknown - 9/20/2016 Erick Leos MD 
Internal Medicine Date of Service: 5/3/2019

## 2019-05-03 NOTE — PROGRESS NOTES
physical Therapy EVALUATION/discharge including 6 minute walk test results Patient: Celeste Gusman (81 y.o. male) Date: 5/3/2019 Primary Diagnosis: KATALINA (acute kidney injury) ([facility] [N17.9] CHF (congestive heart failure) ([facility] [I50.9] Procedure(s) (LRB): 
RIGHT HEART CATH (N/A) 1 Day Post-Op Precautions: low fall risk ASSESSMENT : 
Based on the objective data described below, the patient presents with no need for any physical assist for any of mobility, did provide up to contact guard. Pt participated in a six minute walk test, see results below. He was on room air for the test and his lowest 02 sat was 90% but mostly his 02 sats ranged 93-94%. PT can clear him for discharge to home when he is deemed medically ready to do so. If additional assessment is to be done for home 02 needs (did NOT qualify today during our session), then that can be done by respiratory therapist or nursing, pt's Tricia score is a 0. Vitals:  
  05/03/19 1122 05/03/19 1128 05/03/19 1132 05/03/19 1145 BP: 136/75 112/74 135/66 138/67 BP 1 Location: Right arm Right arm Right arm Right arm BP Patient Position: Supine;Pre-activity Sitting Standing Sitting;Post activity Pulse: (!) 54 64 63 70 Resp:          
Temp:          
SpO2: 93% 98% 97% 94%  
           
           
  
 
  
  
 
 
 
 
 
 
Skilled physical therapy is not indicated at this time. PLAN : 
Discharge Recommendations: None Further Equipment Recommendations for Discharge: none SUBJECTIVE:  
Patient stated sure, I'll walk with you.  OBJECTIVE DATA SUMMARY:  
Consult received, chart reviewed, pt cleared by nursing HISTORY:   
Past Medical History:  
Diagnosis Date  CAD (coronary artery disease) s/p CABG 2008  Carotid arterial disease ([facility]  CKD (chronic kidney disease) stage 3, GFR 30-59 ml/min ([facility]) 10/21/2017  
 hypertension and DM nephrosclerosis  Diabetes mellitus, type 2 ([facility]  Hypercholesteremia  Hypertension  Paroxysmal atrial fibrillation ([facility] 10/21/2017  
 new onset afib with BRPR 10-19-17 admit  PVC's (premature ventricular contractions) 5/26/2014  PVD (peripheral vascular disease) ([facility] Past Surgical History:  
Procedure Laterality Date  HX CORONARY ARTERY BYPASS GRAFT    
 HX HEART CATHETERIZATION Prior Level of Function/Home Situation: independent Personal factors and/or comorbidities impacting plan of care: see medical history Home Situation Home Environment: Private residence # Steps to Enter: 6 Wheelchair Ramp: Yes One/Two Story Residence: One story Living Alone: No 
Support Systems: Child(shena) Patient Expects to be Discharged to[de-identified] Private residence Current DME Used/Available at Home: Walker, rolling, Cane, straight EXAMINATION/PRESENTATION/DECISION MAKING: Critical Behavior: 
  
Orientation Level: Oriented X4 Hearing: Auditory Auditory Impairment: Hard of hearing, bilateral 
Skin:  Refer to MD and nursing notes Edema: refer to MD and nursing notes Range Of Motion: 
AROM: Generally decreased, functional 
  
  
  
  
  
  
  
Strength:   
Strength: Generally decreased, functional 
  
  
  
  
  
  
Tone & Sensation:  
  
  
  
  
  
Sensation: Intact Coordination: 
  
Vision:  
  
Functional Mobility: 
Bed Mobility: 
  
  
 supine to sit independent Transfers: 
Sit to Stand: Independent Stand to Sit: Independent Balance:  
Sitting: Intact; Without support Standing: Intact; Without support Ambulation/Gait Training: 
Distance (ft): 470 Feet (ft) Assistive Device: Gait belt Ambulation - Level of Assistance: Contact guard assistance Gait Abnormalities: Decreased step clearance Base of Support: Narrowed Speed/Karla: Fluctuations Step Length: Left shortened;Right shortened 6 MWT results: on room air with two standing rest breaks Distance Walked in Feet (ft): 34 33 96 ft.   
 Pre Heart Rate: 63   
Pre O2 Saturation: 94   
   
Post Heart Rate: 65   
Post O2 Saturation: 92   
Assistive device used: Assistive Device: Gait belt Normative data: 30-57 years old = 0 feet; Men 39-80 years old = 1889 feet; Women 3680 years fct=4244 feet Modified 10 point Yohan RPE scale utilized where 0 = no breathlessness at all; 10 = maximum exertion Please refer to the flowsheet for any additional vital signs taken during this treatment. Therapeutic Exercises:  
Pursed lip breathing Functional Measure: 
Timed up and go: 
 
Extrapolated 12 seconds < than 10 seconds=Normal  Greater then 13.5 seconds (in elderly)=Increased fall risk Yessi CASAS Predicting the probability for falls in community dwelling older adults using the Timed Up and Go Test. Phys Ther. 2000;80:896-903. Physical Therapy Evaluation Charge Determination History Examination Presentation Decision-Making HIGH Complexity :3+ comorbidities / personal factors will impact the outcome/ POC  LOW Complexity : 1-2 Standardized tests and measures addressing body structure, function, activity limitation and / or participation in recreation  LOW Complexity : Stable, uncomplicated  LOW Complexity : FOTO score of  Based on the above components, the patient evaluation is determined to be of the following complexity level: LOW Pain: 
Pain Scale 1: Numeric (0 - 10) Pain Intensity 1: 0 Activity Tolerance:  
See assessment above Please refer to the flowsheet for vital signs taken during this treatment. After treatment:  
[x]         Patient left in no apparent distress sitting up in chair 
[]         Patient left in no apparent distress in bed 
[x]         Call bell left within reach [x]         Nursing notified 
[x]         Caregiver present 
[]         Bed alarm activated COMMUNICATION/EDUCATION:  
Communication/Collaboration: [x]   Fall prevention education was provided and the patient/caregiver indicated understanding. [x]   Patient/family have participated as able and agree with findings and recommendations. []   Patient is unable to participate in plan of care at this time. Findings and recommendations were discussed with: Registered Nurse Thank you for this referral. 
Josh Tavarez Time Calculation: 51 mins

## 2019-05-03 NOTE — PROGRESS NOTES
Bedside and Verbal shift change report given to Ronnell Valenzuela (oncoming nurse) by Caryl Yoo (offgoing nurse). Report included the following information SBAR, Kardex, Recent Results, Cardiac Rhythm Afib, Alarm Parameters  and Quality Measures.

## 2019-05-04 LAB
ANION GAP SERPL CALC-SCNC: 9 MMOL/L (ref 5–15)
BUN SERPL-MCNC: 85 MG/DL (ref 6–20)
BUN/CREAT SERPL: 27 (ref 12–20)
CALCIUM SERPL-MCNC: 8.1 MG/DL (ref 8.5–10.1)
CHLORIDE SERPL-SCNC: 111 MMOL/L (ref 97–108)
CO2 SERPL-SCNC: 19 MMOL/L (ref 21–32)
CREAT SERPL-MCNC: 3.17 MG/DL (ref 0.7–1.3)
GLUCOSE BLD STRIP.AUTO-MCNC: 139 MG/DL (ref 65–100)
GLUCOSE BLD STRIP.AUTO-MCNC: 204 MG/DL (ref 65–100)
GLUCOSE BLD STRIP.AUTO-MCNC: 236 MG/DL (ref 65–100)
GLUCOSE BLD STRIP.AUTO-MCNC: 83 MG/DL (ref 65–100)
GLUCOSE SERPL-MCNC: 128 MG/DL (ref 65–100)
POTASSIUM SERPL-SCNC: 4.1 MMOL/L (ref 3.5–5.1)
SERVICE CMNT-IMP: ABNORMAL
SERVICE CMNT-IMP: NORMAL
SODIUM SERPL-SCNC: 139 MMOL/L (ref 136–145)

## 2019-05-04 PROCEDURE — 74011250637 HC RX REV CODE- 250/637: Performed by: INTERNAL MEDICINE

## 2019-05-04 PROCEDURE — 74011250637 HC RX REV CODE- 250/637: Performed by: HOSPITALIST

## 2019-05-04 PROCEDURE — 74011636637 HC RX REV CODE- 636/637: Performed by: HOSPITALIST

## 2019-05-04 PROCEDURE — 80048 BASIC METABOLIC PNL TOTAL CA: CPT

## 2019-05-04 PROCEDURE — 65660000000 HC RM CCU STEPDOWN

## 2019-05-04 PROCEDURE — 74011250636 HC RX REV CODE- 250/636: Performed by: HOSPITALIST

## 2019-05-04 PROCEDURE — 82962 GLUCOSE BLOOD TEST: CPT

## 2019-05-04 PROCEDURE — 36415 COLL VENOUS BLD VENIPUNCTURE: CPT

## 2019-05-04 RX ADMIN — HEPARIN SODIUM 5000 UNITS: 5000 INJECTION INTRAVENOUS; SUBCUTANEOUS at 07:09

## 2019-05-04 RX ADMIN — Medication 10 ML: at 06:00

## 2019-05-04 RX ADMIN — HEPARIN SODIUM 5000 UNITS: 5000 INJECTION INTRAVENOUS; SUBCUTANEOUS at 21:24

## 2019-05-04 RX ADMIN — HYDRALAZINE HYDROCHLORIDE 100 MG: 50 TABLET, FILM COATED ORAL at 09:22

## 2019-05-04 RX ADMIN — INSULIN LISPRO 1 UNITS: 100 INJECTION, SOLUTION INTRAVENOUS; SUBCUTANEOUS at 21:34

## 2019-05-04 RX ADMIN — HYDRALAZINE HYDROCHLORIDE 100 MG: 50 TABLET, FILM COATED ORAL at 21:25

## 2019-05-04 RX ADMIN — INSULIN GLARGINE 16 UNITS: 100 INJECTION, SOLUTION SUBCUTANEOUS at 09:50

## 2019-05-04 RX ADMIN — Medication 10 ML: at 15:01

## 2019-05-04 RX ADMIN — FAMOTIDINE 20 MG: 20 TABLET ORAL at 09:22

## 2019-05-04 RX ADMIN — SODIUM BICARBONATE 650 MG: 650 TABLET ORAL at 18:22

## 2019-05-04 RX ADMIN — Medication 10 ML: at 21:27

## 2019-05-04 RX ADMIN — INSULIN LISPRO 2 UNITS: 100 INJECTION, SOLUTION INTRAVENOUS; SUBCUTANEOUS at 12:08

## 2019-05-04 RX ADMIN — AMLODIPINE BESYLATE 10 MG: 5 TABLET ORAL at 09:22

## 2019-05-04 RX ADMIN — HYDRALAZINE HYDROCHLORIDE 100 MG: 50 TABLET, FILM COATED ORAL at 16:30

## 2019-05-04 RX ADMIN — HEPARIN SODIUM 5000 UNITS: 5000 INJECTION INTRAVENOUS; SUBCUTANEOUS at 15:01

## 2019-05-04 RX ADMIN — ASPIRIN 81 MG 81 MG: 81 TABLET ORAL at 09:22

## 2019-05-04 RX ADMIN — SODIUM BICARBONATE 650 MG: 650 TABLET ORAL at 09:22

## 2019-05-04 RX ADMIN — HYDRALAZINE HYDROCHLORIDE 20 MG: 20 INJECTION INTRAMUSCULAR; INTRAVENOUS at 20:05

## 2019-05-04 NOTE — PROGRESS NOTES
Ambulated in hallway, walked 360 ft, stopping every 120 ft to catch his breath before continuing. Stated that he feels short of breath \"but not too bad,\" says about what it's like at home. Stated that his legs are stiff but moving around feels good. Returned to room and sitting up in chair 1200- sitting in chair eating lunch 
1240- Napping in chair 1340- Ambulated in hallway. Went 400 feet this time, said he wanted to go a little further than he did last time. Was able to walk 150 feet before stopping for a rest. Returned to room and wanted to lay in bed. Visitors came in just after settling into bed.

## 2019-05-04 NOTE — PROGRESS NOTES
Hospitalist Progress Note Chuyita Juarez MD 
Answering service: [phone] from in house phone Date of Service:  2019 NAME:  Gordy Mcmullen Sr.                                                         :  1935                                               MRN:  [id number] Brief admission summary This is an 25-year-old gentleman with a significant cardiac history of coronary artery disease, status post coronary artery bypass grafting; peripheral arterial disease, status post angioplasty and stenting; type 2 diabetes; and hyperlipidemia, presented with weeks of dyspnea Subjective Patient did 6 minute walking with much less dyspnea,spo2 lowest 90% He says he feels [facility] [place] [facility] Patient consumes a lot of salt. He is advised to minimize salt,to go NO SALT ADDED and to pay attention to foods with sodium load. Assessment and plan Exertional dyspnea which has progressively worsened over weeks. He reports 8-10 pounds weight gain since [place]. No over sign of CHF,pneumonia. Patient feels its his heart. He needs ischemic work up. Cardiology on board. 
-Non con CT chest showed bibasilar atelectasis, diffuse patchy interstitial change, stable to slightly worsened, with no focal infiltrate or effusion. 
-Echocardiogram showed: Normal EF,severe PAH 
-RHC on  PA 66/18/31 PCW /20 RV 69//18 RA 15// Bradycardia,HR as low as 30s in the ED. -Medications  vs SSS. Coreg and clonidine were stopped initially,renal resumed clonidine. HR in the 60-70s today. Stop Clonidine as remains bradycardiac in a flutter. Acute on chronic kidney failure with cardiorenal syndrome 
-Creatinine stable Nephrology on case. Diuresing well. Type 2 diabetes with hyperglycemia 
-Resume Lantus 16 units. Hold oral hypoglycemics. Accucheks and Humalog correction AC&HS Chronic atrial fibrillation. Currently, he is bradycardic.   His carvedilol and clonidine were stopped. The patient is not on any coagulation reportedly due to history of gastrointestinal bleed. He follows with the cardiologist.  The aspirin will be continued. Nausea and vomiting this morning. CT has reported cholelith.RUQ US:Cholecystolithiasis and gallbladder sludge with mild gallbladder distention. No biliary ductal dilatation. Body mass index is 25.93 kg/m². DVT prophylaxis: heparin Code status : full code Care plan discussed with patient,his wife and nurse,children. Current facility administered and prior to admit medications reviewed. x Review of Systems: A comprehensive review of systems was negative except for that written in the HPI. PHYSICAL EXAM: 
O: 
Visit Vitals /63 (BP 1 Location: Left arm, BP Patient Position: At rest) Pulse 64 Temp 98.1 °F (36.7 °C) Resp 18 Ht 5' 9\" (1.753 m) Wt 79.7 kg (175 lb 9.6 oz) SpO2 99% BMI 25.93 kg/m² General Appears comfortable,not in distress. HEENT Head atraumatic. Unicteric sclera. Moist buccal mucosa. PERRLA CVS RRR, no MRG, no JVD, no peripheral edema Chest No deformity No accessory muscle use Vesicular air entry symmetrically No wheezing,ronchi or crepitations Abdomen &Pelvis Not distended. Normoactive. Soft. Non tender. No hepatomegally Genitourinary  No CVA or suprapubic tenderness Musculoskeletal No edema, deformity of extremities,back Skin No erythema,rash,depigmentation Neurology Mental status: alert and oriented x4 Cranial nerves: CN 2-12 intact. Motor 5/5 through out Psychiatry Intake/Output Summary (Last 24 hours) at 5/4/2019 0824 Last data filed at 5/4/2019 8024 Gross per 24 hour Intake  Output 350 ml Net -350 ml Recent labs & imaging reviewed: 
 
Problem List as of 5/4/2019 Date Reviewed: 12/17/2018 Codes Class Noted - Resolved CHF (congestive heart failure) ([facility]) ICD-10-CM: I50.9 ICD-9-CM: 428.0  5/1/2019 - Present Type 2 diabetes with nephropathy ([facility] ICD-10-CM: E11.21 
ICD-9-CM: 250.40, 583.81  7/26/2018 - Present Atrial fibrillation, chronic ([facility]) ICD-10-CM: I48.2 ICD-9-CM: 427.31  10/21/2017 - Present Overview Signed 10/21/2017  1:45 PM by Axel Kaminski MD  
  new onset afib with BRPR 10-19-17 admit CKD (chronic kidney disease) stage 3, GFR 30-59 ml/min ([facility]) ICD-10-CM: N18.3 ICD-9-CM: 585.3  10/21/2017 - Present Overview Signed 10/21/2017  1:47 PM by Axel Kaminski MD  
  hypertension and DM nephrosclerosis GI bleed ICD-10-CM: K92.2 ICD-9-CM: 578.9  10/20/2017 - Present Hyperglycemia due to type 2 diabetes mellitus ([facility] ICD-10-CM: E11.65 ICD-9-CM: 250.00  10/20/2017 - Present Hypokalemia ICD-10-CM: E87.6 ICD-9-CM: 276.8  6/25/2017 - Present Generalized weakness ICD-10-CM: R53.1 ICD-9-CM: 780.79  6/25/2017 - Present Elevated troponin ICD-10-CM: R74.8 ICD-9-CM: 790.6  6/25/2017 - Present KATALINA (acute kidney injury) ([facility] ICD-10-CM: N17.9 ICD-9-CM: 584.9  6/25/2017 - Present Acute renal failure (ARF) ([facility]) ICD-10-CM: N17.9 ICD-9-CM: 584.9  3/16/2017 - Present CKD (chronic kidney disease) ICD-10-CM: N18.9 ICD-9-CM: 585.9  1/20/2017 - Present Type 2 diabetes mellitus with diabetic peripheral angiopathy without gangrene [facility]) ICD-10-CM: E11.51 
ICD-9-CM: 250.70, 443.81  9/27/2015 - Present * (Principal) Dyspnea ICD-10-CM: R06.00 
ICD-9-CM: 786.09  7/15/2014 - Present PVC's (premature ventricular contractions) ICD-10-CM: I49.3 ICD-9-CM: 427.69  5/26/2014 - Present Carotid arterial disease (HCC) ICD-10-CM: I77.9 ICD-9-CM: 447.9  Unknown - Present Hypercholesteremia ICD-10-CM: E78.00 ICD-9-CM: 272.0  Unknown - Present PVD (peripheral vascular disease) ([facility] ICD-10-CM: I73.9 ICD-9-CM: 443.9  Unknown - Present Bradycardia ICD-10-CM: R00.1 ICD-9-CM: 427.89  Unknown - Present CAD (coronary artery disease) ICD-10-CM: I25.10 ICD-9-CM: 414.00  Unknown - Present Hypertension, essential, benign ICD-10-CM: I10 
ICD-9-CM: 401.1  Unknown - Present RESOLVED: New onset a-fib [facility]) ICD-10-CM: I48.91 
ICD-9-CM: 427.31  10/20/2017 - 11/30/2017 RESOLVED: Fever ICD-10-CM: R50.9 ICD-9-CM: 780.60  3/16/2017 - 3/18/2017 RESOLVED: Hyponatremia ICD-10-CM: E87.1 ICD-9-CM: 276.1  3/16/2017 - 3/18/2017 RESOLVED: Urinary retention ICD-10-CM: R33.9 ICD-9-CM: 788.20  1/19/2017 - 1/20/2017 RESOLVED: Heart palpitations ICD-10-CM: R00.2 ICD-9-CM: 785.1  7/15/2014 - 9/20/2016 RESOLVED: Atherosclerosis of native artery of extremity with intermittent claudication ([facility] ICD-10-CM: J34.258 ICD-9-CM: 440.21  2/19/2014 - 9/20/2016 RESOLVED: Other dyspnea and respiratory abnormality ICD-10-CM: R06.09, R09.89 ICD-9-CM: 786.09  Unknown - 9/20/2016 RESOLVED: Diabetes mellitus, type 2 (HCC) ICD-10-CM: E11.9 ICD-9-CM: 250.00  Unknown - 9/20/2016 Benji Torres MD 
Internal Medicine Date of Service: 5/4/2019

## 2019-05-04 NOTE — PROGRESS NOTES
Patient is resting in bed with eyes closed no complaint of SOB or chest pain at this time. Will continue to monitor patient for changes in his condition. 0500 patient resting in bed no complaints at this time. Blood drawn and sent to lab. 
 
0730 Bedside and Verbal shift change report given to BUCK Dailey (oncoming nurse) by [facility] RN  (offgoing nurse). Report included the following information SBAR, MAR, Recent Results and Med Rec Status.

## 2019-05-04 NOTE — PROGRESS NOTES
Cardiology Progress Note Admit Date: 5/1/2019 Admit Diagnosis: KATALINA (acute kidney injury) ([facility] [N17.9] CHF (congestive heart failure) ([facility] [I50.9] Date: 5/4/2019     Time: 11:25 AM 
Assessment/Plan/Discussion:Cardiology Attending:  
 
Isaac Mendoza. is a 81 y.o. male No cp Remains short of breath with exertion Followup today on HR after stopping coreg. Has still had bradycardia but only brief, without pauses over 2.5 seconds, and not sustained. This am in the 62s. No fast HR either. Will continue current medical regimen. Follow-up Monday with Dr. Didi Hobbs.  
No angina, or objective evidence for ischemia Has chronic afib Off diuretics Sunni Wilson MD   
 
 
 
Subjective: 
Pt received laying bed. Good spirits today. No c/o CP or SOB. Still dyspnea with exertion, but feels fine at rest and somewhat better than yesterday. Assessment and Plan 1. Shortness of Breath -Dyspnea 
            - Echo with Severe PHTN - PAS 72 mmHg 
            - BNP elevated at 11,184 
            - CXR with mild pulmonary infiltrates bilaterally 
            - TTE on 5/1/2019 showed EF 56-60%, NRWMA. Mild reduction in RV systolic function. 
            - Chest CT significant for mild worsening of diffuse interstitial parenchymal change 
 - RHC. PCW 28, RV 75.  
2. Bradycardia 
            - d/c Coreg  
            - monitor 3. Hypertension - 164/57 at current presentation - Norvasc 10mg PO every day - Catapres . 1mg PO BID 
 - Hydralazine 100mg PO TID  
            - Hydralazine 20mg IV q6hrs PRN SBP >170 
            - d/c Bumex  
            - will monitor 4. Atrial Fibrillation - chronic - CHADSVASC 5 
            - on Norvasc, Coreg d/c for bradycardia 
            - will monitor rate and consider further rate control for HR >110 - [place] [facility] [place] risk, previous GI bleed 5. KATALINA on CKD - Cr 3.35 
            - Per Nephrology 6. DMT2 
            - Per Primary team 
7. Dyslipidemia 
            - simvastatin on hold - ASA 81mg PO every day He is feeling better. Has not ambulated much. ?AGUILAR. Working with PT. AIMEETN and still with volume. RHC suggests renal disease progression. Stable from Cardiology standpoint. Will continue to treat As currently. Nephrology to weigh in on CKD. ROS: 
 
 GENERAL Recent weight loss - no Fever -----------------   no 
 Chills -----------------   no 
 
 EYES, VISION Visual Changes - no 
 
 EARS, NOSE, THROAT Hearing loss ----------- no 
 Swallowing difficulties - no CARDIOVASCULAR Chest pain/pressure ---- no 
 Arrhythmia/palpitations - no RESPIRATORY Cough ------------------ no Shortness of breath - no Wheezing -------------- no GASTROINTESTINAL Abdominal pain - no Heartburn -------- no 
 Bloody stool ----- no 
 
 GENITOURINARY Frequent urination - no Urgency -------------- no 
 MUSCULOSKELETAL Joint pain/swelling ---- no 
 Musculoskeletal pain - no 
 
 SKIN & INTEGUMENTARY Rashes - no Sores --- no 
 
 
   NEUROLOGICAL Numbness/tingling - no Sensation loss ------ no 
 
 PSYCHIATRIC Nervousness/anxiety - no Depression -------------- no 
 
 ENDOCRINE Heat/cold intolerance - no Excessive thirst -------- no 
 
 HEMATOLOGIC/LYMPHATIC Abnormal bleeding - no ALL/IMMUN Allergic reaction ------ no 
 Recurrent infections - no Objective: 
 
 Physical Exam: 
             
Visit Vitals /76 (BP 1 Location: Left arm, BP Patient Position: At rest) Pulse (!) 59 Temp 98.4 °F (36.9 °C) Resp 18 Ht 5' 9\" (1.753 m) Wt 175 lb 9.6 oz (79.7 kg) SpO2 97% BMI 25.93 kg/m²  General Appearance:   Well developed, well nourished,alert and oriented x 3, and 
 individual in no acute distress. Ears/Nose/Mouth/Throat:    Hearing grossly normal. 
  
    Neck:  Supple. Chest:    Lungs with trace wheezes to auscultation bilaterally. Cardiovascular:   Regular rate and rhythm, S1, S2 normal, no murmur. Abdomen:    Soft, non-tender, bowel sounds are active. Extremities:  No edema bilaterally. Skin:  Warm and dry. Telemetry: AFIB Data Review:  
 Labs:   
Recent Results (from the past 24 hour(s)) GLUCOSE, POC Collection Time: 05/03/19 11:37 AM  
Result Value Ref Range Glucose (POC) 328 (H) 65 - 100 mg/dL Performed by Unkown  GLUCOSE, POC Collection Time: 05/03/19  4:38 PM  
Result Value Ref Range Glucose (POC) 152 (H) 65 - 100 mg/dL Performed by Gillian Ordonez GLUCOSE, POC Collection Time: 05/03/19  9:39 PM  
Result Value Ref Range Glucose (POC) 209 (H) 65 - 100 mg/dL Performed by [employer] [facility] [place] METABOLIC PANEL, BASIC Collection Time: 05/04/19  5:37 AM  
Result Value Ref Range Sodium 139 136 - 145 mmol/L Potassium 4.1 3.5 - 5.1 mmol/L Chloride 111 (H) 97 - 108 mmol/L  
 CO2 19 (L) 21 - 32 mmol/L Anion gap 9 5 - 15 mmol/L Glucose 128 (H) 65 - 100 mg/dL BUN 85 (H) 6 - 20 MG/DL Creatinine 3.17 (H) 0.70 - 1.30 MG/DL  
 BUN/Creatinine ratio 27 (H) 12 - 20 GFR est AA 23 (L) >60 ml/min/1.73m2 GFR est non-AA 19 (L) >60 ml/min/1.73m2 Calcium 8.1 (L) 8.5 - 10.1 MG/DL  
GLUCOSE, POC Collection Time: 05/04/19  6:50 AM  
Result Value Ref Range Glucose (POC) 139 (H) 65 - 100 mg/dL Performed by [employer] PCT Radiology:  
 
  
Current Facility-Administered Medications Medication Dose Route Frequency  sodium bicarbonate tablet 650 mg  650 mg Oral BID  albuterol-ipratropium (DUO-NEB) 2.5 MG-0.5 MG/3 ML  3 mL Nebulization Q6H PRN  
 insulin glargine (LANTUS) injection 16 Units  16 Units SubCUTAneous DAILY  heparin (porcine) injection 5,000 Units  5,000 Units SubCUTAneous Q8H  
 famotidine (PEPCID) tablet 20 mg  20 mg Oral Q24H  
 ondansetron (ZOFRAN ODT) tablet 4 mg  4 mg Oral Q8H PRN  
 hydrALAZINE (APRESOLINE) tablet 100 mg  100 mg Oral TID  sodium chloride (NS) flush 5-40 mL  5-40 mL IntraVENous Q8H  
 sodium chloride (NS) flush 5-40 mL  5-40 mL IntraVENous PRN  
 sodium chloride (NS) flush 5-40 mL  5-40 mL IntraVENous Q8H  
 sodium chloride (NS) flush 5-40 mL  5-40 mL IntraVENous PRN  
 acetaminophen (TYLENOL) tablet 650 mg  650 mg Oral Q4H PRN  
 amLODIPine (NORVASC) tablet 10 mg  10 mg Oral DAILY  aspirin chewable tablet 81 mg  81 mg Oral DAILY  insulin lispro (HUMALOG) injection   SubCUTAneous AC&HS  
 glucose chewable tablet 16 g  4 Tab Oral PRN  
 dextrose (D50W) injection syrg 12.5-25 g  12.5-25 g IntraVENous PRN  
 glucagon (GLUCAGEN) injection 1 mg  1 mg IntraMUSCular PRN  
 hydrALAZINE (APRESOLINE) 20 mg/mL injection 20 mg  20 mg IntraVENous Q6H PRN Fatimah Anaya. MARGARITA Romero M.D. [facility] [address] [place] [address], [id number] [facility] 
 [phone]

## 2019-05-05 ENCOUNTER — APPOINTMENT (OUTPATIENT)
Dept: GENERAL RADIOLOGY | Age: 84
DRG: 228 | End: 2019-05-05
Attending: HOSPITALIST
Payer: MEDICARE

## 2019-05-05 LAB
ANION GAP SERPL CALC-SCNC: 11 MMOL/L (ref 5–15)
BUN SERPL-MCNC: 82 MG/DL (ref 6–20)
BUN/CREAT SERPL: 26 (ref 12–20)
CALCIUM SERPL-MCNC: 8.4 MG/DL (ref 8.5–10.1)
CHLORIDE SERPL-SCNC: 109 MMOL/L (ref 97–108)
CO2 SERPL-SCNC: 17 MMOL/L (ref 21–32)
CREAT SERPL-MCNC: 3.17 MG/DL (ref 0.7–1.3)
ERYTHROCYTE [DISTWIDTH] IN BLOOD BY AUTOMATED COUNT: 13.8 % (ref 11.5–14.5)
GLUCOSE BLD STRIP.AUTO-MCNC: 134 MG/DL (ref 65–100)
GLUCOSE BLD STRIP.AUTO-MCNC: 154 MG/DL (ref 65–100)
GLUCOSE BLD STRIP.AUTO-MCNC: 167 MG/DL (ref 65–100)
GLUCOSE BLD STRIP.AUTO-MCNC: 199 MG/DL (ref 65–100)
GLUCOSE SERPL-MCNC: 148 MG/DL (ref 65–100)
HCT VFR BLD AUTO: 30.5 % (ref 36.6–50.3)
HGB BLD-MCNC: 9.9 G/DL (ref 12.1–17)
MCH RBC QN AUTO: 30 PG (ref 26–34)
MCHC RBC AUTO-ENTMCNC: 32.5 G/DL (ref 30–36.5)
MCV RBC AUTO: 92.4 FL (ref 80–99)
NRBC # BLD: 0 K/UL (ref 0–0.01)
NRBC BLD-RTO: 0 PER 100 WBC
PLATELET # BLD AUTO: 176 K/UL (ref 150–400)
PMV BLD AUTO: 11.8 FL (ref 8.9–12.9)
POTASSIUM SERPL-SCNC: 4.3 MMOL/L (ref 3.5–5.1)
RBC # BLD AUTO: 3.3 M/UL (ref 4.1–5.7)
SERVICE CMNT-IMP: ABNORMAL
SODIUM SERPL-SCNC: 137 MMOL/L (ref 136–145)
TSH SERPL DL<=0.05 MIU/L-ACNC: 3.09 UIU/ML (ref 0.36–3.74)
WBC # BLD AUTO: 6.3 K/UL (ref 4.1–11.1)

## 2019-05-05 PROCEDURE — 74011250636 HC RX REV CODE- 250/636: Performed by: HOSPITALIST

## 2019-05-05 PROCEDURE — 82962 GLUCOSE BLOOD TEST: CPT

## 2019-05-05 PROCEDURE — 77010033678 HC OXYGEN DAILY

## 2019-05-05 PROCEDURE — 74011636637 HC RX REV CODE- 636/637: Performed by: HOSPITALIST

## 2019-05-05 PROCEDURE — 36415 COLL VENOUS BLD VENIPUNCTURE: CPT

## 2019-05-05 PROCEDURE — 74011250637 HC RX REV CODE- 250/637: Performed by: INTERNAL MEDICINE

## 2019-05-05 PROCEDURE — 65660000000 HC RM CCU STEPDOWN

## 2019-05-05 PROCEDURE — 74011250637 HC RX REV CODE- 250/637: Performed by: HOSPITALIST

## 2019-05-05 PROCEDURE — 85027 COMPLETE CBC AUTOMATED: CPT

## 2019-05-05 PROCEDURE — 71046 X-RAY EXAM CHEST 2 VIEWS: CPT

## 2019-05-05 PROCEDURE — 84443 ASSAY THYROID STIM HORMONE: CPT

## 2019-05-05 PROCEDURE — 80048 BASIC METABOLIC PNL TOTAL CA: CPT

## 2019-05-05 RX ORDER — SODIUM BICARBONATE 650 MG/1
1300 TABLET ORAL 2 TIMES DAILY
Status: DISCONTINUED | OUTPATIENT
Start: 2019-05-05 | End: 2019-05-06

## 2019-05-05 RX ORDER — HYDRALAZINE HYDROCHLORIDE 50 MG/1
150 TABLET, FILM COATED ORAL 3 TIMES DAILY
Status: DISCONTINUED | OUTPATIENT
Start: 2019-05-05 | End: 2019-05-12 | Stop reason: HOSPADM

## 2019-05-05 RX ADMIN — HEPARIN SODIUM 5000 UNITS: 5000 INJECTION INTRAVENOUS; SUBCUTANEOUS at 14:46

## 2019-05-05 RX ADMIN — AMLODIPINE BESYLATE 10 MG: 5 TABLET ORAL at 09:01

## 2019-05-05 RX ADMIN — Medication 10 ML: at 05:35

## 2019-05-05 RX ADMIN — INSULIN GLARGINE 16 UNITS: 100 INJECTION, SOLUTION SUBCUTANEOUS at 09:07

## 2019-05-05 RX ADMIN — HYDRALAZINE HYDROCHLORIDE 100 MG: 50 TABLET, FILM COATED ORAL at 09:02

## 2019-05-05 RX ADMIN — SODIUM BICARBONATE 650 MG: 650 TABLET ORAL at 09:02

## 2019-05-05 RX ADMIN — HYDRALAZINE HYDROCHLORIDE 20 MG: 20 INJECTION INTRAMUSCULAR; INTRAVENOUS at 05:35

## 2019-05-05 RX ADMIN — HYDRALAZINE HYDROCHLORIDE 125 MG: 50 TABLET, FILM COATED ORAL at 16:58

## 2019-05-05 RX ADMIN — HEPARIN SODIUM 5000 UNITS: 5000 INJECTION INTRAVENOUS; SUBCUTANEOUS at 22:38

## 2019-05-05 RX ADMIN — HEPARIN SODIUM 5000 UNITS: 5000 INJECTION INTRAVENOUS; SUBCUTANEOUS at 05:35

## 2019-05-05 RX ADMIN — FAMOTIDINE 20 MG: 20 TABLET ORAL at 09:02

## 2019-05-05 RX ADMIN — Medication 5 ML: at 22:00

## 2019-05-05 RX ADMIN — Medication 10 ML: at 14:49

## 2019-05-05 RX ADMIN — SODIUM BICARBONATE 1300 MG: 650 TABLET ORAL at 18:07

## 2019-05-05 RX ADMIN — ASPIRIN 81 MG 81 MG: 81 TABLET ORAL at 09:02

## 2019-05-05 RX ADMIN — HYDRALAZINE HYDROCHLORIDE 150 MG: 50 TABLET, FILM COATED ORAL at 22:39

## 2019-05-05 NOTE — PROGRESS NOTES
Hospitalist Progress Note Diana Krishna MD 
Answering service: [phone] from in house phone Date of Service:  2019 NAME:  Christiano Cha Sr.                                                         :  1935                                               MRN:  [id number] Brief admission summary This is an 36-year-old gentleman with a significant cardiac history of coronary artery disease, status post coronary artery bypass grafting; peripheral arterial disease, status post angioplasty and stenting; type 2 diabetes; and hyperlipidemia, presented with weeks of dyspnea Subjective Patient feels ok Patient consumes a lot of salt. He is advised to minimize salt,to go NO SALT ADDED and to pay attention to foods with sodium load. Assessment and plan : 
Feels ok still has to use Oxygen . He has severe Pulmonary hypertension. Bradycardia is slightly better now off Clonidine but heart rate still some times drop. Amlodipine should not cause Bradycardia but there are some case reports so will hold Amlodipine and see if that helps He will need home Oxygen. I adjusted dose of Hydralzine as Bp still some what high. Renal function stable. Exertional dyspnea which has progressively worsened over weeks. He reports 8-10 pounds weight gain since [place]. No over sign of CHF,pneumonia. Patient feels its his heart. He needs ischemic work up. Cardiology on board. 
-Non con CT chest showed bibasilar atelectasis, diffuse patchy interstitial change, stable to slightly worsened, with no focal infiltrate or effusion. 
-Echocardiogram showed: Normal EF,severe PAH 
-RHC on  PA 66/18/31 PCW 20/26/20 RV 69/7/18 RA 15/16/13 Bradycardia,HR as low as 30s in the ED. -Medications  vs SSS. Coreg and clonidine were stopped initially,renal resumed clonidine. HR in the 60-70s today. Stop Clonidine as remains bradycardiac in a flutter. Acute on chronic kidney failure with cardiorenal syndrome 
-Creatinine stable Nephrology on case. Diuresing well. Type 2 diabetes with hyperglycemia 
-Resume Lantus 16 units. Hold oral hypoglycemics. Accucheks and Humalog correction AC&HS Chronic atrial fibrillation. Currently, he is bradycardic. His carvedilol and clonidine were stopped. The patient is not on any coagulation reportedly due to history of gastrointestinal bleed. He follows with the cardiologist.  The aspirin will be continued. Nausea and vomiting this morning. CT has reported cholelith.RUQ US:Cholecystolithiasis and gallbladder sludge with mild gallbladder distention. No biliary ductal dilatation. Resolved. Body mass index is 26.49 kg/m². DVT prophylaxis: heparin Code status : full code Care plan discussed with patient,his wife and nurse,children. Current facility administered and prior to admit medications reviewed. x Review of Systems: A comprehensive review of systems was negative except for that written in the HPI. PHYSICAL EXAM: 
O: 
Visit Vitals /65 Pulse 60 Temp 97.7 °F (36.5 °C) Resp 18 Ht 5' 9\" (1.753 m) Wt 81.4 kg (179 lb 6.4 oz) SpO2 97% BMI 26.49 kg/m² General Appears comfortable,not in distress. HEENT Head atraumatic. Unicteric sclera. Moist buccal mucosa. PERRLA CVS RRR, no MRG, no JVD, no peripheral edema Chest No deformity No accessory muscle use Vesicular air entry symmetrically No wheezing,ronchi or crepitations Abdomen &Pelvis Not distended. Normoactive. Soft. Non tender. No hepatomegally Genitourinary  No CVA or suprapubic tenderness Musculoskeletal No edema, deformity of extremities,back Skin No erythema,rash,depigmentation Neurology Mental status: alert and oriented x4 Cranial nerves: CN 2-12 intact. Motor 5/5 through out Psychiatry Intake/Output Summary (Last 24 hours) at 5/5/2019 1224 Last data filed at 5/5/2019 0510 Gross per 24 hour Intake  Output 450 ml Net -450 ml Recent labs & imaging reviewed: 
 
Problem List as of 5/5/2019 Date Reviewed: 12/17/2018 Codes Class Noted - Resolved CHF (congestive heart failure) ([facility]) ICD-10-CM: I50.9 ICD-9-CM: 428.0  5/1/2019 - Present Type 2 diabetes with nephropathy ([facility] ICD-10-CM: E11.21 
ICD-9-CM: 250.40, 583.81  7/26/2018 - Present Atrial fibrillation, chronic (HCC) ICD-10-CM: I48.2 ICD-9-CM: 427.31  10/21/2017 - Present Overview Signed 10/21/2017  1:45 PM by Charline Gupta MD  
  new onset afib with BRPR 10-19-17 admit CKD (chronic kidney disease) stage 3, GFR 30-59 ml/min ([facility]) ICD-10-CM: N18.3 ICD-9-CM: 585.3  10/21/2017 - Present Overview Signed 10/21/2017  1:47 PM by Charline Gupta MD  
  hypertension and DM nephrosclerosis GI bleed ICD-10-CM: K92.2 ICD-9-CM: 578.9  10/20/2017 - Present Hyperglycemia due to type 2 diabetes mellitus ([facility] ICD-10-CM: E11.65 ICD-9-CM: 250.00  10/20/2017 - Present Hypokalemia ICD-10-CM: E87.6 ICD-9-CM: 276.8  6/25/2017 - Present Generalized weakness ICD-10-CM: R53.1 ICD-9-CM: 780.79  6/25/2017 - Present Elevated troponin ICD-10-CM: R74.8 ICD-9-CM: 790.6  6/25/2017 - Present KATALINA (acute kidney injury) ([facility] ICD-10-CM: N17.9 ICD-9-CM: 584.9  6/25/2017 - Present Acute renal failure (ARF) ([facility]) ICD-10-CM: N17.9 ICD-9-CM: 584.9  3/16/2017 - Present CKD (chronic kidney disease) ICD-10-CM: N18.9 ICD-9-CM: 585.9  1/20/2017 - Present Type 2 diabetes mellitus with diabetic peripheral angiopathy without gangrene [facility]) ICD-10-CM: E11.51 
ICD-9-CM: 250.70, 443.81  9/27/2015 - Present * (Principal) Dyspnea ICD-10-CM: R06.00 
ICD-9-CM: 786.09  7/15/2014 - Present PVC's (premature ventricular contractions) ICD-10-CM: I49.3 ICD-9-CM: 427.69  5/26/2014 - Present Carotid arterial disease (HCC) ICD-10-CM: I77.9 ICD-9-CM: 447.9  Unknown - Present Hypercholesteremia ICD-10-CM: E78.00 ICD-9-CM: 272.0  Unknown - Present PVD (peripheral vascular disease) ([facility] ICD-10-CM: I73.9 ICD-9-CM: 443.9  Unknown - Present Bradycardia ICD-10-CM: R00.1 ICD-9-CM: 427.89  Unknown - Present CAD (coronary artery disease) ICD-10-CM: I25.10 ICD-9-CM: 414.00  Unknown - Present Hypertension, essential, benign ICD-10-CM: I10 
ICD-9-CM: 401.1  Unknown - Present RESOLVED: New onset a-fib [facility]) ICD-10-CM: I48.91 
ICD-9-CM: 427.31  10/20/2017 - 11/30/2017 RESOLVED: Fever ICD-10-CM: R50.9 ICD-9-CM: 780.60  3/16/2017 - 3/18/2017 RESOLVED: Hyponatremia ICD-10-CM: E87.1 ICD-9-CM: 276.1  3/16/2017 - 3/18/2017 RESOLVED: Urinary retention ICD-10-CM: R33.9 ICD-9-CM: 788.20  1/19/2017 - 1/20/2017 RESOLVED: Heart palpitations ICD-10-CM: R00.2 ICD-9-CM: 785.1  7/15/2014 - 9/20/2016 RESOLVED: Atherosclerosis of native artery of extremity with intermittent claudication ([facility] ICD-10-CM: P07.710 ICD-9-CM: 440.21  2/19/2014 - 9/20/2016 RESOLVED: Other dyspnea and respiratory abnormality ICD-10-CM: R06.09, R09.89 ICD-9-CM: 786.09  Unknown - 9/20/2016 RESOLVED: Diabetes mellitus, type 2 (HCC) ICD-10-CM: E11.9 ICD-9-CM: 250.00  Unknown - 9/20/2016 Sergio Small MD 
Internal Medicine Date of Service: 5/5/2019

## 2019-05-05 NOTE — ROUTINE PROCESS
Bedside shift change report given to Omar Weinstein (oncoming nurse) by Ophelia Rodriguez (offgoing nurse). Report included the following information SBAR, Kardex, Intake/Output, MAR, Accordion, Recent Results, Med Rec Status and Cardiac Rhythm A-fib, bradycardia.

## 2019-05-05 NOTE — PROGRESS NOTES
200- Patient sitting up eating breakfast. Stated he had a difficult time breathing last night. O2 sats were constantly in the 90s, just felt short of breathe. Feeling better this morning, wanting to get up and walk around again today to build strength. 1045- Ambulating in hallway, walked 480 feet, with periodic rests. Pulse ox when stopping for break was 93%.   
 
1315- Patient sleeping in bed, heart rate dropped to 41 bpm, when awake it returned to 62 bpm

## 2019-05-05 NOTE — PROGRESS NOTES
Assessment at beginning of shift. SBP > 170. Hydralazine IV (see MAR) give, BP rechecked in one hour at 142/56 
 
1140 Assessment. Leads replaced. /54 O2 @ 95 %, heart rate fluctuates from 46 to 60. Patient is stable, no SOB or chest pain. Afib on the monitor, brief bradycardia not sustained. Will continue to monitor. 0510. Assessment. BP @ 177/77. Hydralazine given (see MAR) BP rechecked in one hour @ 164/71 Bedside and Verbal shift change report given to Jean) by [facility] RN  (offgoing nurse). Report included the following information SBAR, Kardex, , Procedure Summary, Intake/Output, MAR, Accordion, Recent Results, Med Rec Status, Cardiac Rhythm a fib

## 2019-05-06 ENCOUNTER — DOCUMENTATION ONLY (OUTPATIENT)
Dept: CASE MANAGEMENT | Age: 84
End: 2019-05-06

## 2019-05-06 LAB
GLUCOSE BLD STRIP.AUTO-MCNC: 111 MG/DL (ref 65–100)
GLUCOSE BLD STRIP.AUTO-MCNC: 121 MG/DL (ref 65–100)
GLUCOSE BLD STRIP.AUTO-MCNC: 201 MG/DL (ref 65–100)
GLUCOSE BLD STRIP.AUTO-MCNC: 79 MG/DL (ref 65–100)
GLUCOSE BLD STRIP.AUTO-MCNC: 99 MG/DL (ref 65–100)
SERVICE CMNT-IMP: ABNORMAL
SERVICE CMNT-IMP: NORMAL
SERVICE CMNT-IMP: NORMAL

## 2019-05-06 PROCEDURE — 74011000250 HC RX REV CODE- 250: Performed by: INTERNAL MEDICINE

## 2019-05-06 PROCEDURE — 74011250636 HC RX REV CODE- 250/636: Performed by: HOSPITALIST

## 2019-05-06 PROCEDURE — 74011250637 HC RX REV CODE- 250/637: Performed by: HOSPITALIST

## 2019-05-06 PROCEDURE — 65660000000 HC RM CCU STEPDOWN

## 2019-05-06 PROCEDURE — 74011250637 HC RX REV CODE- 250/637: Performed by: INTERNAL MEDICINE

## 2019-05-06 PROCEDURE — 82962 GLUCOSE BLOOD TEST: CPT

## 2019-05-06 PROCEDURE — 74011636637 HC RX REV CODE- 636/637: Performed by: HOSPITALIST

## 2019-05-06 RX ORDER — BUMETANIDE 0.25 MG/ML
2 INJECTION INTRAMUSCULAR; INTRAVENOUS EVERY 12 HOURS
Status: DISCONTINUED | OUTPATIENT
Start: 2019-05-06 | End: 2019-05-06

## 2019-05-06 RX ORDER — BUMETANIDE 0.25 MG/ML
2 INJECTION INTRAMUSCULAR; INTRAVENOUS EVERY 8 HOURS
Status: DISCONTINUED | OUTPATIENT
Start: 2019-05-06 | End: 2019-05-07

## 2019-05-06 RX ORDER — SODIUM BICARBONATE 650 MG/1
1300 TABLET ORAL 3 TIMES DAILY
Status: DISCONTINUED | OUTPATIENT
Start: 2019-05-06 | End: 2019-05-12 | Stop reason: HOSPADM

## 2019-05-06 RX ADMIN — Medication 10 ML: at 14:10

## 2019-05-06 RX ADMIN — HEPARIN SODIUM 5000 UNITS: 5000 INJECTION INTRAVENOUS; SUBCUTANEOUS at 22:47

## 2019-05-06 RX ADMIN — INSULIN LISPRO 2 UNITS: 100 INJECTION, SOLUTION INTRAVENOUS; SUBCUTANEOUS at 11:47

## 2019-05-06 RX ADMIN — BUMETANIDE 2 MG: 0.25 INJECTION INTRAMUSCULAR; INTRAVENOUS at 09:23

## 2019-05-06 RX ADMIN — HYDRALAZINE HYDROCHLORIDE 150 MG: 50 TABLET, FILM COATED ORAL at 22:47

## 2019-05-06 RX ADMIN — Medication 10 ML: at 22:53

## 2019-05-06 RX ADMIN — FAMOTIDINE 20 MG: 20 TABLET ORAL at 09:13

## 2019-05-06 RX ADMIN — Medication 5 ML: at 06:00

## 2019-05-06 RX ADMIN — SODIUM BICARBONATE 1300 MG: 650 TABLET ORAL at 10:35

## 2019-05-06 RX ADMIN — HEPARIN SODIUM 5000 UNITS: 5000 INJECTION INTRAVENOUS; SUBCUTANEOUS at 07:02

## 2019-05-06 RX ADMIN — INSULIN GLARGINE 16 UNITS: 100 INJECTION, SOLUTION SUBCUTANEOUS at 09:21

## 2019-05-06 RX ADMIN — HYDRALAZINE HYDROCHLORIDE 150 MG: 50 TABLET, FILM COATED ORAL at 15:49

## 2019-05-06 RX ADMIN — BUMETANIDE 2 MG: 0.25 INJECTION INTRAMUSCULAR; INTRAVENOUS at 19:53

## 2019-05-06 RX ADMIN — HYDRALAZINE HYDROCHLORIDE 150 MG: 50 TABLET, FILM COATED ORAL at 09:13

## 2019-05-06 RX ADMIN — HEPARIN SODIUM 5000 UNITS: 5000 INJECTION INTRAVENOUS; SUBCUTANEOUS at 14:10

## 2019-05-06 RX ADMIN — ASPIRIN 81 MG 81 MG: 81 TABLET ORAL at 09:13

## 2019-05-06 RX ADMIN — SODIUM BICARBONATE 1300 MG: 650 TABLET ORAL at 15:49

## 2019-05-06 RX ADMIN — SODIUM BICARBONATE 1300 MG: 650 TABLET ORAL at 22:46

## 2019-05-06 NOTE — PROGRESS NOTES
PULMONARY/CRITICAL CARE/SLEEP MEDICINEInitial Physician Consultation Note Name: Bernadine Johnson  
: 1935 MRN: [id number] Date: 2019 Subjective: Interval History 19: Reports that his breathing is doing well. States he just completed the 6 minute walk test and \"I passed\". Denies new cough, dyspnea, or chest pain. Medical records and data reviewed. Initial HPI:  Patient is a 81 y.o. male who presented to the hospital with subacute dyspnea with exertion, orthopnea, PND and pedal edema which have all improved with diuresis. He reports residual exertional breathless but is comfortable at rest on room air. He has undergone diuresis and has developed KATALINA. Echo had shown pulmonary hypertension. Patient has significant organic cardiac disease (CAD, CABG, A fib, bradycardia, LAE and LVH by echo) and has poorly controlled systemic hypertension. Previous evaluation by Dr. Carol Milan with PFT, CT chest, VQ scan had not identified limiting pulmonary disease. CT of the chest showed interstitial edema and mild subpleural reticular changes He underwent right heart cath this morning and clinical suspicion of predominantly postcapillary PH was confirmed. Coronary angiograms were not done because of renal failure. Cardiology and Nephrology following. No suggestion of limiting chronic lung disease (CT and PFTs in late 2017 were normal). VQ scan in 2017 was low prob for PE. He does not have WHO Group 1 PAH. Review of Systems: A comprehensive 12 system review of systems was negative except for as documented in HPI Assessment:  
 
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   CT chest here with \"increased diffuse interstitial changes\" -- wide differential but volume overload can have this appearance. Low prob VQ scan in 10/2017 Recommendations:  
 
Diuresis -- agree with re-start Pulmonary hypertension should improve with treatment of underlying left heart disease Exercise oximetry after volume status is optimized We could consider evaluation for sleep apnea as an outpatient Encouraged outpatient followup Discharge planning -- PAR will sign off, are available as needed D/W patient Active Problem List:  
 
Problem List  Date Reviewed: 12/17/2018 Codes Class CHF (congestive heart failure) ([facility]) ICD-10-CM: I50.9 ICD-9-CM: 428.0 Type 2 diabetes with nephropathy ([facility]) ICD-10-CM: E11.21 
ICD-9-CM: 250.40, 583.81 Atrial fibrillation, chronic ([facility]) ICD-10-CM: I48.2 ICD-9-CM: 427.31 Overview Signed 10/21/2017  1:45 PM by Daija Hightower MD  
  new onset afib with BRPR 10-19-17 admit CKD (chronic kidney disease) stage 3, GFR 30-59 ml/min ([facility]) ICD-10-CM: N18.3 ICD-9-CM: 585.3 Overview Signed 10/21/2017  1:47 PM by Daija Hightower MD  
  hypertension and DM nephrosclerosis GI bleed ICD-10-CM: K92.2 ICD-9-CM: 578.9 Hyperglycemia due to type 2 diabetes mellitus ([facility] ICD-10-CM: E11.65 ICD-9-CM: 250.00 Hypokalemia ICD-10-CM: E87.6 ICD-9-CM: 276.8 Generalized weakness ICD-10-CM: R53.1 ICD-9-CM: 780.79 Elevated troponin ICD-10-CM: R74.8 ICD-9-CM: 790.6 KATALINA (acute kidney injury) ([facility] ICD-10-CM: N17.9 ICD-9-CM: 117. 9 Acute renal failure (ARF) ([facility]) ICD-10-CM: N17.9 ICD-9-CM: 584.9 CKD (chronic kidney disease) ICD-10-CM: N18.9 ICD-9-CM: 911. 9 Type 2 diabetes mellitus with diabetic peripheral angiopathy without gangrene ([facility] ICD-10-CM: E11.51 
ICD-9-CM: 250.70, 443.81   
   
 * (Principal) Dyspnea ICD-10-CM: R06.00 
ICD-9-CM: 786.09   
   
 PVC's (premature ventricular contractions) ICD-10-CM: I49.3 ICD-9-CM: 427.69 Carotid arterial disease (HCC) ICD-10-CM: I77.9 ICD-9-CM: 447.9 Hypercholesteremia ICD-10-CM: E78.00 ICD-9-CM: 272.0   
   
 PVD (peripheral vascular disease) ([facility] ICD-10-CM: I73.9 ICD-9-CM: 443.9 Bradycardia ICD-10-CM: R00.1 ICD-9-CM: 427.89 CAD (coronary artery disease) ICD-10-CM: I25.10 ICD-9-CM: 414.00 Hypertension, essential, benign ICD-10-CM: I10 
ICD-9-CM: 401.1 Past Medical History:  
 
 has a past medical history of CAD (coronary artery disease), Carotid arterial disease ([facility] CKD (chronic kidney disease) stage 3, GFR 30-59 ml/min ([place] [address] (10/21/2017), Diabetes mellitus, type 2 ([place] [address] Hypercholesteremia, Hypertension, Paroxysmal atrial fibrillation ([facility] (10/21/2017), PVC's (premature ventricular contractions) (5/26/2014), and PVD (peripheral vascular disease) ([place] [address] Past Surgical History:  
 
 has a past surgical history that includes hx heart catheterization and hx coronary artery bypass graft. Home Medications:  
 
Prior to Admission medications Medication Sig Start Date End Date Taking? Authorizing Provider  
glipiZIDE SR (GLUCOTROL XL) 10 mg CR tablet Take 10 mg by mouth every evening. Yes Provider, Historical  
insulin glargine U-300 conc 300 unit/mL (1.5 mL) inpn 18 Units by SubCUTAneous route every morning. Yes Provider, Historical  
aspirin 81 mg chewable tablet Take 1 Tab by mouth daily. 9/27/18  Yes Sandra Jones MD  
bumetanide (BUMEX) 1 mg tablet Take 1 mg by mouth two (2) times a day. 10/1/17  Yes Provider, Historical  
amLODIPine (NORVASC) 10 mg tablet Take 1 Tab by mouth daily. 10/24/17  Yes Santa Hoyos MD  
cloNIDine HCl (CATAPRES) 0.1 mg tablet Take 1 Tab by mouth two (2) times a day. 6/1/17  Yes Jed Palumbo MD  
carvedilol (COREG) 25 mg tablet Take 1 Tab by mouth two (2) times a day. 3/18/17  Yes Summer Herbert MD  
simvastatin (ZOCOR) 20 mg tablet Take 20 mg by mouth nightly.    Yes Provider, Historical  
omega 3-dha-epa-fish oil (FISH OIL) 100-160-1,000 mg cap Take 1 Cap by mouth two (2) times a day. Yes Provider, Historical  
cinnamon bark (CINNAMON) 500 mg cap Take 1,000 mg by mouth two (2) times a day. Yes Provider, Historical  
 
 
Allergies/Social/Family History: Allergies Allergen Reactions  Lisinopril Cough Social History Tobacco Use  Smoking status: Former Smoker Packs/day: 3.00 Years: 20.00 Pack years: 60.00 Types: Cigarettes Last attempt to quit: 1985 Years since quittin.3  Smokeless tobacco: Never Used Substance Use Topics  Alcohol use: No  
  
History reviewed. No pertinent family history. Objective:  
Vital Signs: 
Visit Vitals /76 (BP 1 Location: Right arm, BP Patient Position: At rest) Pulse 73 Temp 97.9 °F (36.6 °C) Resp 20 Ht 5' 9\" (1.753 m) Wt 82.6 kg (182 lb) SpO2 95% BMI 26.88 kg/m² O2 Flow Rate (L/min): 2 l/min O2 Device: Room air Temp (24hrs), Av.6 °F (36.4 °C), Min:97.3 °F (36.3 °C), Max:97.9 °F (36.6 °C) Intake/Output:  
 
Intake/Output Summary (Last 24 hours) at 2019 1227 Last data filed at 2019 1128 Gross per 24 hour Intake  Output 400 ml Net -400 ml Physical Exam:  
General:  Alert, cooperative, no distress, appears stated age. Head:  Normocephalic, without obvious abnormality, atraumatic. Eyes:  Conjunctivae/corneas clear. PERRL Neck: Supple, symmetrical, no adenopathy, no carotid bruit and no JVD. Lungs:   Clear to auscultation bilaterally. Chest wall:  No tenderness or deformity. Heart:  Bradycardia, IRR, S1-S2 normal, 2/6 apical systolic murmur, no click, rub or gallop. Abdomen:   Soft, non-tender. Bowel sounds present. No masses,  No organomegaly. Extremities: Atraumatic, no cyanosis or edema. Pulses: Palpable Skin: No rashes or lesions Neurologic: Grossly nonfocal  
 
  
 LABS AND  DATA: Personally reviewed Recent Labs 05/05/19 0515 WBC 6.3 HGB 9.9*  
HCT 30.5*  Recent Labs 05/05/19 0515 05/04/19 
8754  139  
K 4.3 4.1 * 111* CO2 17* 19* BUN 82* 85* CREA 3.17* 3.17* * 128* CA 8.4* 8.1* No results for input(s): SGOT, GPT, AP, TBIL, TP, ALB, GLOB, AML, LPSE in the last 72 hours. No lab exists for component: AMYP No results for input(s): INR, PTP, APTT in the last 72 hours. No lab exists for component: INREXT, INREXT No results for input(s): PHI, PCO2I, PO2I, FIO2I in the last 72 hours. No results for input(s): CPK, CKMB, TROIQ, BNPP in the last 72 hours. MEDS: Reviewed Chest Imaging: personally reviewed and report checked Tele- reviewed Medical decision making:  
I have reviewed the flowsheet and previous day's notes Patient has acute or chronic illness that poses a threat to life or bodily function Review and order of Clinical lab tests Review and Order of Radiology tests Independent visualization of Image Thank you for allowing me to participate in this patient's care. Clem Hansen PA 
 
 
[facility]

## 2019-05-06 NOTE — DIABETES MGMT
DTC Progress Note Recommendations/ Comments: Chart reviewed due to hyperglycemia. BG improving but spikes still noted at times. If appropriate, please consider: 
Please add no concentrated sweets to diet order to reduce spikes between meals Change lispro correction from high to normal sensitivity scale Current hospital DM medication: Lantus, 16 units daily Lispro high sensitivity scale correction Chart reviewed on [address] Patient is a 81 y.o. male with known DM on 18 units of Lantus and 10 mg Glipizide SR at home. A1c:  
Lab Results Component Value Date/Time Hemoglobin A1c 9.8 (H) 10/20/2017 08:11 AM  
 Hemoglobin A1c 9.0 (H) 06/25/2017 03:39 PM  
 Hemoglobin A1c, External 8.4 08/15/2017 Recent Glucose Results:  
Lab Results Component Value Date/Time GLUCPOC 201 (H) 05/06/2019 11:19 AM  
 GLUCPOC 121 (H) 05/06/2019 06:33 AM  
 GLUCPOC 134 (H) 05/05/2019 10:37 PM  
  
 
Lab Results Component Value Date/Time Creatinine 3.17 (H) 05/05/2019 05:15 AM  
 
Estimated Creatinine Clearance: 18 mL/min (A) (based on SCr of 3.17 mg/dL (H)). Active Orders Diet DIET CARDIAC Regular PO intake:  
Patient Vitals for the past 72 hrs: 
 % Diet Eaten 05/04/19 1218 50 % Will continue to follow as needed. Thank you Abebe Gordon RD Time spent: 3 minutes

## 2019-05-06 NOTE — PROGRESS NOTES
Transitional Care Team: Initial G Note    Date of Assessment: 05/06/19  Time of Assessment:  11:37 AM    Assessment & Plan   Has declined to complete AMD per CM notes--ask if I can leave a sample copy with him; enlist his PCP in this effort after D/C  Large dietary salt consumption by self-report--reinforce recommended restriction  Oxygen requirement--has done well today on RA  Bradycardia--per notes, Coreg and Clonidine were stopped  Diuretic therapy--restarted today: Bumex 2 mg TID per nephro to see effect on kidney function prior to D/C     Primary Diagnosis:   Exertional dyspnea which has progressively worsened over weeks  Severe pulmonary HTN   Bradycardia,HR as low as 30s  Acute on chronic kidney failure with cardiorenal syndrome  Type 2 diabetes with hyperglycemia  Chronic atrial fibrillation. Advance Directive: not on file; patient has declined completion when others have asked this admission. Admission medication reconciliation was performed by pharmacist.      Discharge Needs: reports medications are expensive for he and his wife, especially her inhalers for COPD; this is why he is working at [employer] at his age. 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 Has ramp at home for wife who had difficulty walking very far and has W/C to use prn     [facility] NP will return with [facility] calender/follow up appointments/ Ambulatory Nurse Navigation information when patient ready for discharge. Anticipating he will D/C on Tuesday 5/7. [facility] information not given to patient--lives outside of service area. Continue to follow  documentation. Alvaro education focused on readmission zones as described as: The Red Zone: High risk for readmission, days 1-21                          The Yellow Zone:  Moderate risk for readmission, days 22-29                          The Green Zone: Lower risk for readmission, days 30 and after    Jim Juarez. is a 81 y.o. male inpatient at [facility] admitted with dyspnea on  5/1/19. Chart reviewed by Carrie Liao NP and [facility] Understanding of Goals) program introduced to patient/family. The Transitional Care Team bridges the gaps in care and education surrounding discharge from the acute care facility. The objective is to empower the patient and family in taking a proactive role in the task of preventing readmission within the first thirty days after discharge from the acute care setting. The team is also involved in the efforts to reduce readmission to the acute care setting after stabilization and discharge from the acute care environment either to the skilled nursing facilities or community. The TC Team will follow patient from a distance while inpatient as well as be available for further transition disposition as needed. The MARTINEZ TEAM will continue to offer support during the 30- 90 day discharge from acute care setting. Will notify Ambulatory [facility] Nurse Navigator Coordinator, Marleny Wyatt RN, when patient is preparing to discharge.     Past Medical History:   Diagnosis Date    CAD (coronary artery disease)     s/p CABG 2008    Carotid arterial disease (HCC)     CKD (chronic kidney disease) stage 3, GFR 30-59 ml/min ([facility] 10/21/2017    hypertension and DM nephrosclerosis    Diabetes mellitus, type 2 ([facility]     Hypercholesteremia     Hypertension     Paroxysmal atrial fibrillation ([facility] 10/21/2017    new onset afib with BRPR 10-19-17 admit    PVC's (premature ventricular contractions) 5/26/2014    PVD (peripheral vascular disease) [facility])        Advance Care Planning 5/2/2019   Patient's Healthcare Decision Maker is: -   Primary Decision Maker Name -   Primary Decision Maker Relationship to Patient -   Confirm Advance Directive Yes, not on file   Patient Would Like to Complete Advance Directive -

## 2019-05-06 NOTE — PROGRESS NOTES
[facility] 
 [address] [address], [address] [place] Phone: [phone]   [phone]   
  
Nephrology Progress Note Rk Cisse.     1935     [id number] Date of Admission : 5/1/2019 05/06/19 CC:  Follow up forAKI Assessment and Plan KATALINA on CKD  
- 2/2  progression of underlying CKD 
- RHC showed PCWP of 28 
- Increased Bumex to 2 mg TID 
- if he does not tolerate esclation of diuretics , he will be needing dialysis soon. He understands the risks. Wife not at bedside to discuss . CKD IV: 
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 
-- UPCR showed 11 gm of Protein Mild metabolic acidosis 
-increased oral bicarbonate Dyspnea : 
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  
- RHC w/ PCW 28 
- Intensify diuretics and see how he does HTN: 
- continue current meds Bradycardia : 
- per cards  
  
Chronic Anemia: 
- hx of GI bleed, now off AC 
- hgb stable at 11 
  
Afib: 
- per cards 
  
CAD s/p CABG 
  
DM2: 
- on insulin Interval History: 
Cr down a little after holding diuretics over the weekend RHC done on Thursday but covering providers did not acknowledge the findings He was off diuretics for 3 days Review of Systems: A comprehensive review of systems was negative except for that written in the HPI. Current Medications:  
Current Facility-Administered Medications Medication Dose Route Frequency  sodium bicarbonate tablet 1,300 mg  1,300 mg Oral TID  bumetanide (BUMEX) injection 2 mg  2 mg IntraVENous Q8H  
 hydrALAZINE (APRESOLINE) tablet 150 mg  150 mg Oral TID  albuterol-ipratropium (DUO-NEB) 2.5 MG-0.5 MG/3 ML  3 mL Nebulization Q6H PRN  
 insulin glargine (LANTUS) injection 16 Units  16 Units SubCUTAneous DAILY  heparin (porcine) injection 5,000 Units  5,000 Units SubCUTAneous Q8H  
 famotidine (PEPCID) tablet 20 mg  20 mg Oral Q24H  
 ondansetron (ZOFRAN ODT) tablet 4 mg  4 mg Oral Q8H PRN  
  sodium chloride (NS) flush 5-40 mL  5-40 mL IntraVENous Q8H  
 sodium chloride (NS) flush 5-40 mL  5-40 mL IntraVENous PRN  
 sodium chloride (NS) flush 5-40 mL  5-40 mL IntraVENous Q8H  
 sodium chloride (NS) flush 5-40 mL  5-40 mL IntraVENous PRN  
 acetaminophen (TYLENOL) tablet 650 mg  650 mg Oral Q4H PRN  
 [Held by provider] amLODIPine (NORVASC) tablet 10 mg  10 mg Oral DAILY  aspirin chewable tablet 81 mg  81 mg Oral DAILY  insulin lispro (HUMALOG) injection   SubCUTAneous AC&HS  
 glucose chewable tablet 16 g  4 Tab Oral PRN  
 dextrose (D50W) injection syrg 12.5-25 g  12.5-25 g IntraVENous PRN  
 glucagon (GLUCAGEN) injection 1 mg  1 mg IntraMUSCular PRN  
 hydrALAZINE (APRESOLINE) 20 mg/mL injection 20 mg  20 mg IntraVENous Q6H PRN Allergies Allergen Reactions  Lisinopril Cough Objective: 
Vitals:   
Vitals:  
 05/06/19 3394 05/06/19 0449 05/06/19 0857 05/06/19 1122 BP: 150/45 136/46 162/70 157/76 Pulse: (!) 40 73 76 73 Resp: 18 20 18 20 Temp: 97.5 °F (36.4 °C) 97.3 °F (36.3 °C) 97.6 °F (36.4 °C) 97.9 °F (36.6 °C) SpO2: 90% 93% 94% 95% Weight:  82.6 kg (182 lb) Height:      
 
Intake and Output: 
05/06 0701 - 05/06 1900 In: -  
Out: 200 [Urine:200] 05/04 1901 - 05/06 0700 In: -  
Out: 400 [Urine:400] Physical Examination: 
General: Elderly man in no distress Neck:  Supple, no mass Resp:  Very diminished breath sounds in the bases and some slight rales; normal resp effort CV:  RRR,  no murmur or rub,no LE edema GI:  Soft and non-tender no hepatosplenomegaly Neurologic:  Non focal; alert and appropriate; OX 3; normal speech Psych:             Cheerful/ normal affect and mood Skin:  No Rash :  No ferrell []    High complexity decision making was performed 
[]    Patient is at high-risk of decompensation with multiple organ involvement Lab Data Personally Reviewed: I have reviewed all the pertinent labs, microbiology data and radiology studies during assessment. Recent Labs 05/05/19 
0515 05/04/19 
0215  139  
K 4.3 4.1 * 111* CO2 17* 19* * 128* BUN 82* 85* CREA 3.17* 3.17* CA 8.4* 8.1* Recent Labs 05/05/19 
0515 WBC 6.3 HGB 9.9*  
HCT 30.5*  No results found for: SDES Lab Results Component Value Date/Time Culture result: CANDIDA ALBICANS (A) 03/16/2017 06:00 AM  
 Culture result: NO GROWTH 5 DAYS 03/16/2017 05:40 AM  
 
Recent Results (from the past 24 hour(s)) GLUCOSE, POC Collection Time: 05/05/19 12:00 PM  
Result Value Ref Range Glucose (POC) 199 (H) 65 - 100 mg/dL Performed by Daniel Cleaning GLUCOSE, POC Collection Time: 05/05/19  4:25 PM  
Result Value Ref Range Glucose (POC) 167 (H) 65 - 100 mg/dL Performed by Gigi Shah TSH 3RD GENERATION Collection Time: 05/05/19  5:48 PM  
Result Value Ref Range TSH 3.09 0.36 - 3.74 uIU/mL GLUCOSE, POC Collection Time: 05/05/19 10:37 PM  
Result Value Ref Range Glucose (POC) 134 (H) 65 - 100 mg/dL Performed by Tere Alonso GLUCOSE, POC Collection Time: 05/06/19  6:33 AM  
Result Value Ref Range Glucose (POC) 121 (H) 65 - 100 mg/dL Performed by Emmanuel Bradley GLUCOSE, POC Collection Time: 05/06/19 11:19 AM  
Result Value Ref Range Glucose (POC) 201 (H) 65 - 100 mg/dL Performed by Bang Araya Total time spent with patient:  xxx   min. Care Plan discussed with: 
Patient Family RN Consulting Physician [address] [facility],      
 
I have reviewed the flowsheets. Chart and Pertinent Notes have been reviewed. No change in PMH ,family and social history from Consult note.  
 
 
Candance Kanaris, MD

## 2019-05-06 NOTE — PROGRESS NOTES
Hospitalist Progress Note Martha Eid NP Answering service: [phone] OR [phone] from in house phone Cell: [phone] Date of Service:  2019 NAME:  Jovana Castillo Sr. 
:  1935 MRN:  [id number] Admission Summary:  
 
 This is an 52-year-old gentleman with a significant cardiac history of coronary artery disease, status post coronary artery bypass grafting; peripheral arterial disease, status post angioplasty and stenting; type 2 diabetes; and hyperlipidemia, presented with weeks of dyspnea. Interval history / Subjective:  
   
Patient states he is feeling somewhat better, understands that with his severe pHTN he likely will never feel \"great\". Bradycardia has improved after holding norvasc. Nephrology is restarting diuretics today and needs to see how his kidney function responds before discharging him. He is close to needing dialysis per their notes. Assessment & Plan:  
 
Exertional Dyspnea in the setting of Severe Pulmonary Hypertension: 
- ECHO with severe PHTN - PAS 72 mmHg - Chest CT significant for mild worsening of diffuse interstitial parenchymal change 
 - RHC. PCW 28, RV 75. Per Pulmonary note : 
Diuresis -- agree with re-start Pulmonary hypertension should improve with treatment of underlying left heart disease Exercise oximetry after volume status is optimized We could consider evaluation for sleep apnea as an outpatient Encouraged outpatient followup Bradycardia,HR as low as 30s in the ED. - pulse much improved today after holding norvasc  
-Medications  vs SSS. Coreg and clonidine were stopped initially,renal resumed clonidine. HR in the 60-70s today. Stop Clonidine as remains bradycardiac in a flutter. - norvasc on hold as well, as this can rarely contribute to bradycardia  
  
Acute on chronic kidney failure (CKD IV) with cardiorenal syndrome 
- secondary to diabetic nephropathy -nephrology following, restarted diuretics today  
- increased bumex to 2 mg TID  
- RHC showed PCWP of 28 Metabolic Acidosis:  
- taking oral bicarbonate supplements  
- nephrology managing Type 2 diabetes with hyperglycemia 
-Resume Lantus 16 units 
- continue to Hold oral hypoglycemics - Accucheks and Humalog correction AC&HS 
  
Chronic atrial fibrillation. Currently, he is bradycardic. His carvedilol and clonidine were stopped. The patient is not on any coagulation reportedly due to history of gastrointestinal bleed. He follows with the cardiologist.  The aspirin will be continued. Code status: Full DVT prophylaxis: Heparin Care Plan discussed with: Patient/Family and Nurse Disposition: Home w/Family- planning for DC tomorrow Hospital Problems  Date Reviewed: 12/17/2018 Codes Class Noted POA  
 CHF (congestive heart failure) ([facility].) ICD-10-CM: I50.9 ICD-9-CM: 428.0  5/1/2019 Unknown KATALINA (acute kidney injury) ([facility] ICD-10-CM: N17.9 ICD-9-CM: 584.9  6/25/2017 Unknown * (Principal) Dyspnea ICD-10-CM: R06.00 
ICD-9-CM: 786.09  7/15/2014 Unknown Review of Systems: A comprehensive review of systems was negative except for that written in the HPI. Vital Signs:  
 Last 24hrs VS reviewed since prior progress note. Most recent are: 
Visit Vitals /76 (BP 1 Location: Right arm, BP Patient Position: At rest) Pulse 73 Temp 97.9 °F (36.6 °C) Resp 20 Ht 5' 9\" (1.753 m) Wt 182 lb (82.6 kg) SpO2 95% BMI 26.88 kg/m² Intake/Output Summary (Last 24 hours) at 5/6/2019 1510 Last data filed at 5/6/2019 1128 Gross per 24 hour Intake  Output 400 ml Net -400 ml Physical Examination:  
 
 
     
Constitutional:  No acute distress, cooperative, pleasant   
ENT:  Oral mucous moist, oropharynx benign. Neck supple, Resp:  Lung sounds diminished throughout. No wheezing/rhonchi/rales. No accessory muscle use CV:  Regular rhythm, normal rate, no murmurs, gallops, rubs GI:  Soft, non distended, non tender. normoactive bowel sounds, no hepatosplenomegaly Musculoskeletal:  No edema, warm, 2+ pulses throughout Neurologic:  Moves all extremities. AAOx3 Psych:  Good insight, Not anxious nor agitated. Data Review:  
 Review and/or order of clinical lab test 
Review and/or order of tests in the radiology section of CPT Review and/or order of tests in the medicine section of CPT Labs:  
 
Recent Labs 05/05/19 0515 WBC 6.3 HGB 9.9*  
HCT 30.5*  Recent Labs 05/05/19 0515 05/04/19 
8254  139  
K 4.3 4.1 * 111* CO2 17* 19* BUN 82* 85* CREA 3.17* 3.17* * 128* CA 8.4* 8.1* No results for input(s): SGOT, GPT, ALT, AP, TBIL, TBILI, TP, ALB, GLOB, GGT, AML, LPSE in the last 72 hours. No lab exists for component: AMYP, HLPSE No results for input(s): INR, PTP, APTT in the last 72 hours. No lab exists for component: INREXT No results for input(s): FE, TIBC, PSAT, FERR in the last 72 hours. No results found for: FOL, RBCF No results for input(s): PH, PCO2, PO2 in the last 72 hours. No results for input(s): CPK, CKNDX, TROIQ in the last 72 hours. No lab exists for component: CPKMB No results found for: CHOL, CHOLX, CHLST, CHOLV, HDL, LDL, LDLC, DLDLP, TGLX, TRIGL, TRIGP, CHHD, CHHDX Lab Results Component Value Date/Time Glucose (POC) 201 (H) 05/06/2019 11:19 AM  
 Glucose (POC) 121 (H) 05/06/2019 06:33 AM  
 Glucose (POC) 134 (H) 05/05/2019 10:37 PM  
 Glucose (POC) 167 (H) 05/05/2019 04:25 PM  
 Glucose (POC) 199 (H) 05/05/2019 12:00 PM  
 
Lab Results Component Value Date/Time  Color YELLOW/STRAW 06/25/2017 11:40 AM  
 Appearance CLEAR 06/25/2017 11:40 AM  
 Specific gravity 1.018 06/25/2017 11:40 AM  
 pH (UA) 5.0 06/25/2017 11:40 AM  
 Protein 300 (A) 06/25/2017 11:40 AM  
 Glucose NEGATIVE  06/25/2017 11:40 AM  
 Ketone NEGATIVE  06/25/2017 11:40 AM  
 Bilirubin NEGATIVE  06/25/2017 11:40 AM  
 Urobilinogen 0.2 06/25/2017 11:40 AM  
 Nitrites NEGATIVE  06/25/2017 11:40 AM  
 Leukocyte Esterase NEGATIVE  06/25/2017 11:40 AM  
 Epithelial cells FEW 06/25/2017 11:40 AM  
 Bacteria NEGATIVE  06/25/2017 11:40 AM  
 WBC 0-4 06/25/2017 11:40 AM  
 RBC 0-5 06/25/2017 11:40 AM  
 
 
 
Medications Reviewed:  
 
Current Facility-Administered Medications Medication Dose Route Frequency  sodium bicarbonate tablet 1,300 mg  1,300 mg Oral TID  bumetanide (BUMEX) injection 2 mg  2 mg IntraVENous Q8H  
 hydrALAZINE (APRESOLINE) tablet 150 mg  150 mg Oral TID  albuterol-ipratropium (DUO-NEB) 2.5 MG-0.5 MG/3 ML  3 mL Nebulization Q6H PRN  
 insulin glargine (LANTUS) injection 16 Units  16 Units SubCUTAneous DAILY  heparin (porcine) injection 5,000 Units  5,000 Units SubCUTAneous Q8H  
 famotidine (PEPCID) tablet 20 mg  20 mg Oral Q24H  
 ondansetron (ZOFRAN ODT) tablet 4 mg  4 mg Oral Q8H PRN  
 sodium chloride (NS) flush 5-40 mL  5-40 mL IntraVENous Q8H  
 sodium chloride (NS) flush 5-40 mL  5-40 mL IntraVENous PRN  
 sodium chloride (NS) flush 5-40 mL  5-40 mL IntraVENous Q8H  
 sodium chloride (NS) flush 5-40 mL  5-40 mL IntraVENous PRN  
 acetaminophen (TYLENOL) tablet 650 mg  650 mg Oral Q4H PRN  
 [Held by provider] amLODIPine (NORVASC) tablet 10 mg  10 mg Oral DAILY  aspirin chewable tablet 81 mg  81 mg Oral DAILY  insulin lispro (HUMALOG) injection   SubCUTAneous AC&HS  
 glucose chewable tablet 16 g  4 Tab Oral PRN  
 dextrose (D50W) injection syrg 12.5-25 g  12.5-25 g IntraVENous PRN  
 glucagon (GLUCAGEN) injection 1 mg  1 mg IntraMUSCular PRN  
 hydrALAZINE (APRESOLINE) 20 mg/mL injection 20 mg  20 mg IntraVENous Q6H PRN  
 
______________________________________________________________________ EXPECTED LENGTH OF STAY: 2d 9h 
ACTUAL LENGTH OF STAY:          5 
 
            
 Niecy Corley, NP

## 2019-05-06 NOTE — PROGRESS NOTES
Cardiology Progress Note Admit Date: 5/1/2019 Admit Diagnosis: K[facility]ALINA [facility]acute kidney [facility]) ([facility] [N17.9] CHF (congestive heart failure) ([facility] [I50.9] Date: 5/6/2019     Time: 10:43 AM 
Assessment/Plan/Discussion:Cardiology Attending:  
 
Patient seen on the day of progress note and examined  and agree with Advance Practice Provider (MADDIE, NP,PA)  assessment and plans. Gayatri Arthur is a 81 y.o. male Off oxygen Still SOB with exertion Spoke with Renal; 
grady will need HD at some point Plan for now is IV diuretic and assess in am 
Ariella Chandler MD   
 
 
 
Subjective: 
 
Pt received in bed, asleep but arousable and in no distress. Pt confirms continued SOB while walking, but has increased his activity level while maintaining sats of 93%. Denies any SOB or CP currently. Telemetry shows Afib with controlled rate Assessment and Plan 1. Shortness of Breath -Dyspnea 
            - Echo with Severe PHTN - PAS 72 mmHg 
            - BNP elevated at 11,184 
            - CXR with mild pulmonary infiltrates bilaterally             - TTE on 5/1/2019 showed EF 56-60%, NRWMA. Mild reduction in RV systolic function. 
            - Chest CT significant for mild worsening of diffuse interstitial parenchymal change 
            - RHC. PCW 28, RV 75.  
2. Bradycardia 
            - d/c Coreg, hold norvasc 
            - monitor 3. Hypertension 
            - 162/70 at current presentation 
            - Hold Norvasc 10mg PO every day for bradycardia - D/C Catapres . 1mg PO BID 
            - Hydralazine 150mg PO TID  
            - Hydralazine 20mg IV q6hrs PRN SBP >170 
            - Bumex 2mg IV q6hrs 
            - will monitor 4. Atrial Fibrillation - chronic 
            - CHADSVASC 5 
            - rate controlled without medication             - will monitor rate and consider further rate control for HR >110 
            - Not candidate for OAC due to fall risk, previous GI bleed 5. KATALINA on CKD 
            - Cr 3.17 
            - Per Nephrology 6. DMT2 
            - Per Primary team 
7. Dyslipidemia             - simvastatin on hold 
            - ASA 81mg PO every day Agree with PAPinkyS A&P. Patient doing well. Looking to go home. Stable from a Cardiology standpoint. D/w  this am.  He started Bumex for increased wedge pressure. Wants to check on his labs on Bumex. Cr. Stable, most likely will need dialysis, but not at this time. Trena Navarro PA-C 
 
 
  
ROS: 
 
 GENERAL Recent weight loss - no Fever -----------------   no 
 Chills -----------------   no 
 
 EYES, VISION Visual Changes - no 
 
 EARS, NOSE, THROAT Hearing loss ----------- no 
 Swallowing difficulties - no CARDIOVASCULAR Chest pain/pressure ---- no 
 Arrhythmia/palpitations - no RESPIRATORY Cough ------------------ no Shortness of breath - no Wheezing -------------- no  GASTROINTESTINAL Abdominal pain - no Heartburn -------- no 
 Bloody stool ----- no 
 
 GENITOURINARY Frequent urination - no Urgency -------------- no 
 MUSCULOSKELETAL Joint pain/swelling ---- no 
 Musculoskeletal pain - no 
 
 SKIN & INTEGUMENTARY Rashes - no Sores --- no 
 
 
   NEUROLOGICAL Numbness/tingling - no Sensation loss ------ no 
 
 PSYCHIATRIC Nervousness/anxiety - no Depression -------------- no 
 
 ENDOCRINE Heat/cold intolerance - no Excessive thirst -------- no 
 
 HEMATOLOGIC/LYMPHATIC Abnormal bleeding - no ALL/IMMUN Allergic reaction ------ no 
 Recurrent infections - no Objective: 
 
 Physical Exam: 
             
Visit Vitals /70 (BP 1 Location: Right arm, BP Patient Position: At rest) Pulse 76 Temp 97.6 °F (36.4 °C) Resp 18 Ht 5' 9\" (1.753 m) Wt 82.6 kg (182 lb) SpO2 94% BMI 26.88 kg/m² General Appearance:   Well developed, well nourished,alert and oriented x 3, and 
 individual in no acute distress. Ears/Nose/Mouth/Throat:    Hearing grossly normal. 
  
    Neck:  Supple. Chest:    Lungs clear to auscultation bilaterally. Cardiovascular:   Regular rate, irregular rhythm, S1, S2 normal, no murmur. Abdomen:    Soft, non-tender, bowel sounds are active. Extremities:  No edema bilaterally. Skin:  Warm and dry. Telemetry: AFIB Data Review:  
 Labs:   
Recent Results (from the past 24 hour(s)) GLUCOSE, POC Collection Time: 05/05/19 12:00 PM  
Result Value Ref Range Glucose (POC) 199 (H) 65 - 100 mg/dL Performed by Gary Hoffman GLUCOSE, POC Collection Time: 05/05/19  4:25 PM  
Result Value Ref Range Glucose (POC) 167 (H) 65 - 100 mg/dL Performed by Uyen Has TSH 3RD GENERATION Collection Time: 05/05/19  5:48 PM  
Result Value Ref Range TSH 3.09 0.36 - 3.74 uIU/mL GLUCOSE, POC Collection Time: 05/05/19 10:37 PM  
Result Value Ref Range Glucose (POC) 134 (H) 65 - 100 mg/dL Performed by Ivette Cameron GLUCOSE, POC Collection Time: 05/06/19  6:33 AM  
Result Value Ref Range Glucose (POC) 121 (H) 65 - 100 mg/dL Performed by Stacy Sarabia Radiology:  
 
  
Current Facility-Administered Medications Medication Dose Route Frequency  bumetanide (BUMEX) injection 2 mg  2 mg IntraVENous Q12H  
 sodium bicarbonate tablet 1,300 mg  1,300 mg Oral TID  hydrALAZINE (APRESOLINE) tablet 150 mg  150 mg Oral TID  albuterol-ipratropium (DUO-NEB) 2.5 MG-0.5 MG/3 ML  3 mL Nebulization Q6H PRN  
 insulin glargine (LANTUS) injection 16 Units  16 Units SubCUTAneous DAILY  heparin (porcine) injection 5,000 Units  5,000 Units SubCUTAneous Q8H  
 famotidine (PEPCID) tablet 20 mg  20 mg Oral Q24H  
 ondansetron (ZOFRAN ODT) tablet 4 mg  4 mg Oral Q8H PRN  
  sodium chloride (NS) flush 5-40 mL  5-40 mL IntraVENous Q8H  
 sodium chloride (NS) flush 5-40 mL  5-40 mL IntraVENous PRN  
 sodium chloride (NS) flush 5-40 mL  5-40 mL IntraVENous Q8H  
 sodium chloride (NS) flush 5-40 mL  5-40 mL IntraVENous PRN  
 acetaminophen (TYLENOL) tablet 650 mg  650 mg Oral Q4H PRN  
 [Held by provider] amLODIPine (NORVASC) tablet 10 mg  10 mg Oral DAILY  aspirin chewable tablet 81 mg  81 mg Oral DAILY  insulin lispro (HUMALOG) injection   SubCUTAneous AC&HS  
 glucose chewable tablet 16 g  4 Tab Oral PRN  
 dextrose (D50W) injection syrg 12.5-25 g  12.5-25 g IntraVENous PRN  
 glucagon (GLUCAGEN) injection 1 mg  1 mg IntraMUSCular PRN  
 hydrALAZINE (APRESOLINE) 20 mg/mL injection 20 mg  20 mg IntraVENous Q6H PRN LOUIS Churchill. [address] MARGARITA Ribeiro M.D. [facility] [address] [place] [address], [address] Mamta Jones 
 [phone]

## 2019-05-06 NOTE — PROGRESS NOTES
Hospital follow-up PCP transitional care appointment has been scheduled with Dr. Jaimie Chaudhry for Monday, 5/13/19 at 11:15 a.m. Pending patient discharge.   Letha Kyle, Care Management Specialist.

## 2019-05-07 ENCOUNTER — DOCUMENTATION ONLY (OUTPATIENT)
Dept: CASE MANAGEMENT | Age: 84
End: 2019-05-07

## 2019-05-07 LAB
ALBUMIN SERPL-MCNC: 2.8 G/DL (ref 3.5–5)
ANION GAP SERPL CALC-SCNC: 12 MMOL/L (ref 5–15)
BUN SERPL-MCNC: 82 MG/DL (ref 6–20)
BUN/CREAT SERPL: 23 (ref 12–20)
CALCIUM SERPL-MCNC: 8.5 MG/DL (ref 8.5–10.1)
CHLORIDE SERPL-SCNC: 108 MMOL/L (ref 97–108)
CO2 SERPL-SCNC: 18 MMOL/L (ref 21–32)
CREAT SERPL-MCNC: 3.58 MG/DL (ref 0.7–1.3)
ERYTHROCYTE [DISTWIDTH] IN BLOOD BY AUTOMATED COUNT: 14.2 % (ref 11.5–14.5)
GLUCOSE BLD STRIP.AUTO-MCNC: 116 MG/DL (ref 65–100)
GLUCOSE BLD STRIP.AUTO-MCNC: 160 MG/DL (ref 65–100)
GLUCOSE BLD STRIP.AUTO-MCNC: 181 MG/DL (ref 65–100)
GLUCOSE BLD STRIP.AUTO-MCNC: 212 MG/DL (ref 65–100)
GLUCOSE BLD STRIP.AUTO-MCNC: 237 MG/DL (ref 65–100)
GLUCOSE SERPL-MCNC: 115 MG/DL (ref 65–100)
HCT VFR BLD AUTO: 28.7 % (ref 36.6–50.3)
HGB BLD-MCNC: 9.4 G/DL (ref 12.1–17)
MCH RBC QN AUTO: 30.4 PG (ref 26–34)
MCHC RBC AUTO-ENTMCNC: 32.8 G/DL (ref 30–36.5)
MCV RBC AUTO: 92.9 FL (ref 80–99)
NRBC # BLD: 0 K/UL (ref 0–0.01)
NRBC BLD-RTO: 0 PER 100 WBC
PHOSPHATE SERPL-MCNC: 4.6 MG/DL (ref 2.6–4.7)
PLATELET # BLD AUTO: 171 K/UL (ref 150–400)
PMV BLD AUTO: 12.1 FL (ref 8.9–12.9)
POTASSIUM SERPL-SCNC: 4 MMOL/L (ref 3.5–5.1)
RBC # BLD AUTO: 3.09 M/UL (ref 4.1–5.7)
SERVICE CMNT-IMP: ABNORMAL
SODIUM SERPL-SCNC: 138 MMOL/L (ref 136–145)
WBC # BLD AUTO: 6 K/UL (ref 4.1–11.1)

## 2019-05-07 PROCEDURE — 80069 RENAL FUNCTION PANEL: CPT

## 2019-05-07 PROCEDURE — 85027 COMPLETE CBC AUTOMATED: CPT

## 2019-05-07 PROCEDURE — 74011636637 HC RX REV CODE- 636/637: Performed by: HOSPITALIST

## 2019-05-07 PROCEDURE — 74011250637 HC RX REV CODE- 250/637: Performed by: HOSPITALIST

## 2019-05-07 PROCEDURE — 65660000000 HC RM CCU STEPDOWN

## 2019-05-07 PROCEDURE — 74011250636 HC RX REV CODE- 250/636: Performed by: HOSPITALIST

## 2019-05-07 PROCEDURE — 74011000250 HC RX REV CODE- 250: Performed by: INTERNAL MEDICINE

## 2019-05-07 PROCEDURE — 36415 COLL VENOUS BLD VENIPUNCTURE: CPT

## 2019-05-07 PROCEDURE — 82962 GLUCOSE BLOOD TEST: CPT

## 2019-05-07 PROCEDURE — 74011250637 HC RX REV CODE- 250/637: Performed by: INTERNAL MEDICINE

## 2019-05-07 RX ORDER — BUMETANIDE 1 MG/1
2 TABLET ORAL 2 TIMES DAILY
Status: DISCONTINUED | OUTPATIENT
Start: 2019-05-07 | End: 2019-05-08

## 2019-05-07 RX ADMIN — HEPARIN SODIUM 5000 UNITS: 5000 INJECTION INTRAVENOUS; SUBCUTANEOUS at 22:39

## 2019-05-07 RX ADMIN — Medication 10 ML: at 05:20

## 2019-05-07 RX ADMIN — SODIUM BICARBONATE 1300 MG: 650 TABLET ORAL at 22:38

## 2019-05-07 RX ADMIN — HYDRALAZINE HYDROCHLORIDE 20 MG: 20 INJECTION INTRAMUSCULAR; INTRAVENOUS at 05:18

## 2019-05-07 RX ADMIN — FAMOTIDINE 20 MG: 20 TABLET ORAL at 08:46

## 2019-05-07 RX ADMIN — HEPARIN SODIUM 5000 UNITS: 5000 INJECTION INTRAVENOUS; SUBCUTANEOUS at 05:18

## 2019-05-07 RX ADMIN — BUMETANIDE 2 MG: 0.25 INJECTION INTRAMUSCULAR; INTRAVENOUS at 09:22

## 2019-05-07 RX ADMIN — HYDRALAZINE HYDROCHLORIDE 150 MG: 50 TABLET, FILM COATED ORAL at 22:40

## 2019-05-07 RX ADMIN — BUMETANIDE 2 MG: 0.25 INJECTION INTRAMUSCULAR; INTRAVENOUS at 03:19

## 2019-05-07 RX ADMIN — ASPIRIN 81 MG 81 MG: 81 TABLET ORAL at 08:45

## 2019-05-07 RX ADMIN — SODIUM BICARBONATE 1300 MG: 650 TABLET ORAL at 08:45

## 2019-05-07 RX ADMIN — HYDRALAZINE HYDROCHLORIDE 150 MG: 50 TABLET, FILM COATED ORAL at 18:02

## 2019-05-07 RX ADMIN — HYDRALAZINE HYDROCHLORIDE 150 MG: 50 TABLET, FILM COATED ORAL at 08:46

## 2019-05-07 RX ADMIN — INSULIN LISPRO 1 UNITS: 100 INJECTION, SOLUTION INTRAVENOUS; SUBCUTANEOUS at 22:57

## 2019-05-07 RX ADMIN — INSULIN GLARGINE 16 UNITS: 100 INJECTION, SOLUTION SUBCUTANEOUS at 09:21

## 2019-05-07 RX ADMIN — SODIUM BICARBONATE 1300 MG: 650 TABLET ORAL at 18:02

## 2019-05-07 RX ADMIN — Medication 10 ML: at 22:00

## 2019-05-07 RX ADMIN — BUMETANIDE 2 MG: 1 TABLET ORAL at 18:49

## 2019-05-07 NOTE — PROGRESS NOTES
Cardiology Progress Note Admit Date: 5/1/2019 Admit Diagnosis: KATALINA (acute kidney injury) ([facility] [N17.9] CHF (congestive heart failure) ([facility] [I50.9] Date: 5/7/2019     Time: 10:43 AM 
 
Assessment/Plan/Discussion:Cardiology Attending:  
 
Patient seen on the day of progress note and examined  and agree with Advance Practice Provider (MADDIE, NP,PA)  assessment and plans. [address] is a 81 y.o. male No shortness of breath at rest 
No chest pain Clear lungs Vitals stable Hgb is 9.4 Cr xander to 3.58 Needs to walk, to see if tolerates 
chronic AFib off Plays.IO due bleed risk-taking aspirin Continues on hydralazine Was on Coreg at home but now HR is low and it is held Bradycardia not symptomatic Jo Gray MD   
 
 
 
Subjective: No complaints this am.  SOB at baseline. Noted lower HR at night. Assessment and Plan 1. Shortness of Breath -Dyspnea 
            - Echo with Severe PHTN - PAS 72 mmHg 
            - BNP elevated at 11,184 
            - CXR with mild pulmonary infiltrates bilaterally             - TTE on 5/1/2019 showed EF 56-60%, NRWMA. Mild reduction in RV systolic function. 
            - Chest CT significant for mild worsening of diffuse interstitial parenchymal change 
            - RHC. PCW 28, RV 75.  
2. Bradycardia 
            - Still with Hr to mid 40s at night. Elevates upon waking or minimal activities - Asymptomatic. No indication for PPM  
3. Hypertension 
            - SBP 160s             - Hold Norvasc for bradycardia - D/C Catapres . 1mg PO BID 
            - Hydralazine 150mg PO TID  
            - Bumex 2mg PO BID 4. Atrial Fibrillation - chronic 
            - CHADSVASC 5 
            - rate controlled without medication 
            - Not candidate for Plays.IO due to fall risk, previous GI bleed 5.  KATALINA on CKD 
            - Cr 3.58 
             - Per Nephrology - if he does not tolerate higher Dose Bumex, will need HD sooner. 6. DMT2 
            - Per Primary team 
7. Dyslipidemia             - simvastatin on hold 
            - ASA 81mg PO every day Stable from Cardiology standpoint. HR lower at night with sleep, but elevates normally when waking or simple activities. Bumex per Nephrology. Stable for discharge when ready. ROS: 
 
 GENERAL Recent weight loss - no Fever -----------------   no 
 Chills -----------------   no 
 
 EYES, VISION Visual Changes - no 
 
 EARS, NOSE, THROAT Hearing loss ----------- no 
 Swallowing difficulties - no CARDIOVASCULAR Chest pain/pressure ---- no 
 Arrhythmia/palpitations - no RESPIRATORY Cough ------------------ no Shortness of breath - no Wheezing -------------- no GASTROINTESTINAL Abdominal pain - no Heartburn -------- no 
 Bloody stool ----- no 
 
 GENITOURINARY Frequent urination - no Urgency -------------- no 
 MUSCULOSKELETAL Joint pain/swelling ---- no 
 Musculoskeletal pain - no 
 
 SKIN & INTEGUMENTARY Rashes - no Sores --- no 
 
 
   NEUROLOGICAL Numbness/tingling - no Sensation loss ------ no 
 
 PSYCHIATRIC Nervousness/anxiety - no Depression -------------- no 
 
 ENDOCRINE Heat/cold intolerance - no Excessive thirst -------- no 
 
 HEMATOLOGIC/LYMPHATIC Abnormal bleeding - no ALL/IMMUN Allergic reaction ------ no 
 Recurrent infections - no Objective: 
 
 Physical Exam: 
             
Visit Vitals /59 (BP 1 Location: Right arm, BP Patient Position: At rest) Pulse 61 Temp 98.3 °F (36.8 °C) Resp 20 Ht 5' 9\" (1.753 m) Wt 181 lb 12.8 oz (82.5 kg) SpO2 95% BMI 26.85 kg/m² General Appearance:   Well developed, well nourished,alert and oriented x 3, and 
 individual in no acute distress. Ears/Nose/Mouth/Throat:    Hearing grossly normal. 
  
    Neck:  Supple. Chest:    Lungs clear to auscultation bilaterally. Cardiovascular:   Regular rate, irregular rhythm, S1, S2 normal, no murmur. Abdomen:    Soft, non-tender, bowel sounds are active. Extremities:  No edema bilaterally. Skin:  Warm and dry. Telemetry: AFIB Data Review:  
 Labs:   
Recent Results (from the past 24 hour(s)) GLUCOSE, POC Collection Time: 05/06/19  4:40 PM  
Result Value Ref Range Glucose (POC) 99 65 - 100 mg/dL Performed by Alessandro SHEIKH   
GLUCOSE, POC Collection Time: 05/06/19 10:09 PM  
Result Value Ref Range Glucose (POC) 79 65 - 100 mg/dL Performed by Latrice Johnson, POC Collection Time: 05/06/19 10:25 PM  
Result Value Ref Range Glucose (POC) 111 (H) 65 - 100 mg/dL Performed by Latrice Johnson, POC Collection Time: 05/07/19  7:14 AM  
Result Value Ref Range Glucose (POC) 116 (H) 65 - 100 mg/dL Performed by Nina Rowell   
RENAL FUNCTION PANEL Collection Time: 05/07/19  8:05 AM  
Result Value Ref Range Sodium 138 136 - 145 mmol/L Potassium 4.0 3.5 - 5.1 mmol/L Chloride 108 97 - 108 mmol/L  
 CO2 18 (L) 21 - 32 mmol/L Anion gap 12 5 - 15 mmol/L Glucose 115 (H) 65 - 100 mg/dL BUN 82 (H) 6 - 20 MG/DL Creatinine 3.58 (H) 0.70 - 1.30 MG/DL  
 BUN/Creatinine ratio 23 (H) 12 - 20 GFR est AA 20 (L) >60 ml/min/1.73m2 GFR est non-AA 16 (L) >60 ml/min/1.73m2 Calcium 8.5 8.5 - 10.1 MG/DL Phosphorus 4.6 2.6 - 4.7 MG/DL Albumin 2.8 (L) 3.5 - 5.0 g/dL CBC W/O DIFF Collection Time: 05/07/19  8:05 AM  
Result Value Ref Range WBC 6.0 4.1 - 11.1 K/uL  
 RBC 3.09 (L) 4.10 - 5.70 M/uL HGB 9.4 (L) 12.1 - 17.0 g/dL HCT 28.7 (L) 36.6 - 50.3 % MCV 92.9 80.0 - 99.0 FL  
 MCH 30.4 26.0 - 34.0 PG  
 MCHC 32.8 30.0 - 36.5 g/dL  
 RDW 14.2 11.5 - 14.5 % PLATELET 457 418 - 465 K/uL MPV 12.1 8.9 - 12.9 FL  
 NRBC 0.0 0  WBC ABSOLUTE NRBC 0.00 0.00 - 0.01 K/uL GLUCOSE, POC Collection Time: 05/07/19 11:22 AM  
Result Value Ref Range Glucose (POC) 160 (H) 65 - 100 mg/dL Performed by Unkown  Radiology:  
 
  
Current Facility-Administered Medications Medication Dose Route Frequency  bumetanide (BUMEX) tablet 2 mg  2 mg Oral BID  sodium bicarbonate tablet 1,300 mg  1,300 mg Oral TID  hydrALAZINE (APRESOLINE) tablet 150 mg  150 mg Oral TID  albuterol-ipratropium (DUO-NEB) 2.5 MG-0.5 MG/3 ML  3 mL Nebulization Q6H PRN  
 insulin glargine (LANTUS) injection 16 Units  16 Units SubCUTAneous DAILY  heparin (porcine) injection 5,000 Units  5,000 Units SubCUTAneous Q8H  
 famotidine (PEPCID) tablet 20 mg  20 mg Oral Q24H  
 ondansetron (ZOFRAN ODT) tablet 4 mg  4 mg Oral Q8H PRN  
 sodium chloride (NS) flush 5-40 mL  5-40 mL IntraVENous Q8H  
 sodium chloride (NS) flush 5-40 mL  5-40 mL IntraVENous PRN  
 sodium chloride (NS) flush 5-40 mL  5-40 mL IntraVENous Q8H  
 sodium chloride (NS) flush 5-40 mL  5-40 mL IntraVENous PRN  
 acetaminophen (TYLENOL) tablet 650 mg  650 mg Oral Q4H PRN  
 [Held by provider] amLODIPine (NORVASC) tablet 10 mg  10 mg Oral DAILY  aspirin chewable tablet 81 mg  81 mg Oral DAILY  insulin lispro (HUMALOG) injection   SubCUTAneous AC&HS  
 glucose chewable tablet 16 g  4 Tab Oral PRN  
 dextrose (D50W) injection syrg 12.5-25 g  12.5-25 g IntraVENous PRN  
 glucagon (GLUCAGEN) injection 1 mg  1 mg IntraMUSCular PRN  
 hydrALAZINE (APRESOLINE) 20 mg/mL injection 20 mg  20 mg IntraVENous Q6H PRN Cherl Asiya. MARGARITA Mcclendon Mai, M.D. [facility] [address] [facility], [address] [place], [facility] 
 [phone]

## 2019-05-07 NOTE — PROGRESS NOTES
[facility] 
 [address] [address], [address] [facility] Phone: [phone]   [phone]   
  
Nephrology Progress Note Salvador Estes.     1935     [id number] Date of Admission : 5/1/2019 05/07/19 CC:  Follow up forAKI Assessment and Plan KATALINA on CKD  
- 2/2  progression of underlying CKD 
- RHC showed PCWP of 28 
- can go home on Bumex 2 mg BID  
- labs on Monday and f/u w/ Dr Sabino Merchant  
- he understands the possibility of needing dialysis in the near future CKD IV: 
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 
-- UPCR showed 11 gm of Protein Mild metabolic acidosis 
-increased oral bicarbonate Dyspnea : 
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  
- RHC w/ PCW 28 
- Intensify diuretics and see how he does HTN: 
- continue current meds Bradycardia : 
- per cards  
  
Chronic Anemia: 
- hx of GI bleed, now off AC 
- hgb stable at 11 
  
Afib: 
- per cards 
  
CAD s/p CABG 
  
DM2: 
- on insulin Interval History: 
Cr up from IV diuretICS  
HE IS ON ROOM AIR AND dyspnea better Review of Systems: A comprehensive review of systems was negative except for that written in the HPI. Current Medications:  
Current Facility-Administered Medications Medication Dose Route Frequency  bumetanide (BUMEX) tablet 2 mg  2 mg Oral BID  sodium bicarbonate tablet 1,300 mg  1,300 mg Oral TID  hydrALAZINE (APRESOLINE) tablet 150 mg  150 mg Oral TID  albuterol-ipratropium (DUO-NEB) 2.5 MG-0.5 MG/3 ML  3 mL Nebulization Q6H PRN  
 insulin glargine (LANTUS) injection 16 Units  16 Units SubCUTAneous DAILY  heparin (porcine) injection 5,000 Units  5,000 Units SubCUTAneous Q8H  
 famotidine (PEPCID) tablet 20 mg  20 mg Oral Q24H  
 ondansetron (ZOFRAN ODT) tablet 4 mg  4 mg Oral Q8H PRN  
 sodium chloride (NS) flush 5-40 mL  5-40 mL IntraVENous Q8H  
 sodium chloride (NS) flush 5-40 mL  5-40 mL IntraVENous PRN  
  sodium chloride (NS) flush 5-40 mL  5-40 mL IntraVENous Q8H  
 sodium chloride (NS) flush 5-40 mL  5-40 mL IntraVENous PRN  
 acetaminophen (TYLENOL) tablet 650 mg  650 mg Oral Q4H PRN  
 [Held by provider] amLODIPine (NORVASC) tablet 10 mg  10 mg Oral DAILY  aspirin chewable tablet 81 mg  81 mg Oral DAILY  insulin lispro (HUMALOG) injection   SubCUTAneous AC&HS  
 glucose chewable tablet 16 g  4 Tab Oral PRN  
 dextrose (D50W) injection syrg 12.5-25 g  12.5-25 g IntraVENous PRN  
 glucagon (GLUCAGEN) injection 1 mg  1 mg IntraMUSCular PRN  
 hydrALAZINE (APRESOLINE) 20 mg/mL injection 20 mg  20 mg IntraVENous Q6H PRN Allergies Allergen Reactions  Lisinopril Cough Objective: 
Vitals:   
Vitals:  
 05/07/19 0319 05/07/19 0500 05/07/19 0752 05/07/19 1136 BP: 158/65 173/66 161/66 162/59 Pulse: 61 (!) 48 (!) 41 61 Resp:  18 16 20 Temp:  98.1 °F (36.7 °C) 98.3 °F (36.8 °C) 98.3 °F (36.8 °C) SpO2:  96% 96% 95% Weight:  82.5 kg (181 lb 12.8 oz) Height:      
 
Intake and Output: 
No intake/output data recorded. 05/05 1901 - 05/07 0700 In: -  
Out: 1216 [[id number]] Physical Examination: 
General: Elderly man in no distress Neck:  Supple, no mass Resp:  Very diminished breath sounds in the bases and some slight rales; normal resp effort CV:  RRR,  no murmur or rub,no LE edema GI:  Soft and non-tender no hepatosplenomegaly Neurologic:  Non focal; alert and appropriate; OX 3; normal speech Psych:             Cheerful/ normal affect and mood Skin:  No Rash :  No ferrell []    High complexity decision making was performed 
[]    Patient is at high-risk of decompensation with multiple organ involvement Lab Data Personally Reviewed: I have reviewed all the pertinent labs, microbiology data and radiology studies during assessment. Recent Labs 05/07/19 
0805 05/05/19 
3751  137  
K 4.0 4.3  109* CO2 18* 17* * 148* BUN 82* 82* CREA 3.58* 3.17* CA 8.5 8.4* PHOS 4.6  --   
ALB 2.8*  --   
 
Recent Labs 05/07/19 
0805 05/05/19 
0220 WBC 6.0 6.3 HGB 9.4* 9.9*  
HCT 28.7* 30.5*  176 No results found for: SDES Lab Results Component Value Date/Time Culture result: CANDIDA ALBICANS (A) 03/16/2017 06:00 AM  
 Culture result: NO GROWTH 5 DAYS 03/16/2017 05:40 AM  
 
Recent Results (from the past 24 hour(s)) GLUCOSE, POC Collection Time: 05/06/19  4:40 PM  
Result Value Ref Range Glucose (POC) 99 65 - 100 mg/dL Performed by Alessandro SHEIKH   
GLUCOSE, POC Collection Time: 05/06/19 10:09 PM  
Result Value Ref Range Glucose (POC) 79 65 - 100 mg/dL Performed by Enrique Gonzalez, POC Collection Time: 05/06/19 10:25 PM  
Result Value Ref Range Glucose (POC) 111 (H) 65 - 100 mg/dL Performed by Enrique Gonzalez, POC Collection Time: 05/07/19  7:14 AM  
Result Value Ref Range Glucose (POC) 116 (H) 65 - 100 mg/dL Performed by Cecy Chance   
RENAL FUNCTION PANEL Collection Time: 05/07/19  8:05 AM  
Result Value Ref Range Sodium 138 136 - 145 mmol/L Potassium 4.0 3.5 - 5.1 mmol/L Chloride 108 97 - 108 mmol/L  
 CO2 18 (L) 21 - 32 mmol/L Anion gap 12 5 - 15 mmol/L Glucose 115 (H) 65 - 100 mg/dL BUN 82 (H) 6 - 20 MG/DL Creatinine 3.58 (H) 0.70 - 1.30 MG/DL  
 BUN/Creatinine ratio 23 (H) 12 - 20 GFR est AA 20 (L) >60 ml/min/1.73m2 GFR est non-AA 16 (L) >60 ml/min/1.73m2 Calcium 8.5 8.5 - 10.1 MG/DL Phosphorus 4.6 2.6 - 4.7 MG/DL Albumin 2.8 (L) 3.5 - 5.0 g/dL CBC W/O DIFF Collection Time: 05/07/19  8:05 AM  
Result Value Ref Range WBC 6.0 4.1 - 11.1 K/uL  
 RBC 3.09 (L) 4.10 - 5.70 M/uL HGB 9.4 (L) 12.1 - 17.0 g/dL HCT 28.7 (L) 36.6 - 50.3 % MCV 92.9 80.0 - 99.0 FL  
 MCH 30.4 26.0 - 34.0 PG  
 MCHC 32.8 30.0 - 36.5 g/dL  
 RDW 14.2 11.5 - 14.5 % PLATELET 751 477 - 386 K/uL  MPV 12.1 8.9 - 12.9 FL  
 NRBC 0.0 0  WBC ABSOLUTE NRBC 0.00 0.00 - 0.01 K/uL GLUCOSE, POC Collection Time: 05/07/19 11:22 AM  
Result Value Ref Range Glucose (POC) 160 (H) 65 - 100 mg/dL Performed by Unkown  Total time spent with patient:  xxx   min. Care Plan discussed with: 
Patient Family RN Consulting Physician [facility]     
 
I have reviewed the flowsheets. Chart and Pertinent Notes have been reviewed. No change in PMH ,family and social history from Consult note.  
 
 
Joyce Huang MD

## 2019-05-07 NOTE — PROGRESS NOTES
Hospitalist Progress Note [address] [facility],  Answering service: [phone] OR [phone] from in house phone Date of Service:  2019 NAME:  Maurice Delgadillo Sr. 
:  1935 MRN:  [id number] Admission Summary:  
 
 This is an 50-year-old gentleman with a significant cardiac history of coronary artery disease, status post coronary artery bypass grafting; peripheral arterial disease, status post angioplasty and stenting; type 2 diabetes; and hyperlipidemia, presented with weeks of dyspnea. Interval history / Subjective:  
   
Patient seen and examined. Overall improved, bumex BID, creatinine increased. Appreciate nephrology input. Assessment & Plan:  
 
Exertional Dyspnea in the setting of Severe Pulmonary Hypertension: 
- ECHO with severe PHTN - PAS 72 mmHg - Chest CT significant for mild worsening of diffuse interstitial parenchymal change 
 - RHC. PCW 28, RV 75. Per Pulmonary note : 
Diuresis -- agree with re-start Pulmonary hypertension should improve with treatment of underlying left heart disease Exercise oximetry after volume status is optimized We could consider evaluation for sleep apnea as an outpatient Encouraged outpatient followup Bradycardia,HR as low as 30s in the ED. 
-somewhat improved  
-Medications  vs SSS. Antihypertensives held  
  
Acute on chronic kidney failure (CKD IV) with cardiorenal syndrome 
- secondary to diabetic nephropathy  
- bumex BID, outpatient follow up 
- RHC showed PCWP of 28 Metabolic Acidosis:  
- oral bicarbonate supplements  
- nephrology managing Type 2 diabetes with hyperglycemia 
-Resume Lantus 16 units 
- continue to Hold oral hypoglycemics - Accucheks and Humalog correction AC&HS 
  
Chronic atrial fibrillation. Currently, he is bradycardic. His carvedilol and clonidine were stopped.   The patient is not on any coagulation reportedly due to history of gastrointestinal bleed. He follows with the cardiologist.  The aspirin will be continued. Code status: Full DVT prophylaxis: Heparin Care Plan discussed with: Patient/Family and Nurse Disposition: Home w/Family- planning for DC tomorrow Hospital Problems  Date Reviewed: 12/17/2018 Codes Class Noted POA  
 CHF (congestive heart failure) ([facility].) ICD-10-CM: I50.9 ICD-9-CM: 428.0  5/1/2019 Unknown KATALINA (acute kidney injury) ([facility] 75.) ICD-10-CM: N17.9 ICD-9-CM: 584.9  6/25/2017 Unknown * (Principal) Dyspnea ICD-10-CM: R06.00 
ICD-9-CM: 786.09  7/15/2014 Unknown Review of Systems:  
Negative unless stated above Vital Signs:  
 Last 24hrs VS reviewed since prior progress note. Most recent are: 
Visit Vitals /74 Pulse 79 Temp 97.8 °F (36.6 °C) Resp 18 Ht 5' 9\" (1.753 m) Wt 82.5 kg (181 lb 12.8 oz) SpO2 94% BMI 26.85 kg/m² Intake/Output Summary (Last 24 hours) at 5/7/2019 1914 Last data filed at 5/7/2019 0500 Gross per 24 hour Intake  Output 700 ml Net -700 ml Physical Examination:  
 
 
     
Constitutional:  No acute distress, cooperative, pleasant   
ENT:  Oral mucous moist, oropharynx benign. Neck supple, Resp:  Lung sounds diminished throughout. No wheezing/rhonchi/rales. No accessory muscle use CV:  Regular rhythm, normal rate, no murmurs, gallops, rubs GI:  Soft, non distended, non tender. normoactive bowel sounds, no hepatosplenomegaly Musculoskeletal:  No edema, warm, 2+ pulses throughout Neurologic:  Moves all extremities. AAOx3 Psych:  Good insight, Not anxious nor agitated. Data Review:  
 Review and/or order of clinical lab test 
Review and/or order of tests in the radiology section of CPT Review and/or order of tests in the medicine section of CPT Labs:  
 
Recent Labs 05/07/19 
0805 05/05/19 
0462 WBC 6.0 6.3 HGB 9.4* 9.9*  
 HCT 28.7* 30.5*  176 Recent Labs 05/07/19 
0805 05/05/19 
0925  137  
K 4.0 4.3  109* CO2 18* 17* BUN 82* 82* CREA 3.58* 3.17* * 148* CA 8.5 8.4* PHOS 4.6  --   
 
Recent Labs 05/07/19 
0805 ALB 2.8* No results for input(s): INR, PTP, APTT in the last 72 hours. No lab exists for component: INREXT, INREXT No results for input(s): FE, TIBC, PSAT, FERR in the last 72 hours. No results found for: FOL, RBCF No results for input(s): PH, PCO2, PO2 in the last 72 hours. No results for input(s): CPK, CKNDX, TROIQ in the last 72 hours. No lab exists for component: CPKMB No results found for: CHOL, CHOLX, CHLST, CHOLV, HDL, LDL, LDLC, DLDLP, TGLX, TRIGL, TRIGP, CHHD, CHHDX Lab Results Component Value Date/Time Glucose (POC) 181 (H) 05/07/2019 04:40 PM  
 Glucose (POC) 160 (H) 05/07/2019 11:22 AM  
 Glucose (POC) 116 (H) 05/07/2019 07:14 AM  
 Glucose (POC) 111 (H) 05/06/2019 10:25 PM  
 Glucose (POC) 79 05/06/2019 10:09 PM  
 
Lab Results Component Value Date/Time Color YELLOW/STRAW 06/25/2017 11:40 AM  
 Appearance CLEAR 06/25/2017 11:40 AM  
 Specific gravity 1.018 06/25/2017 11:40 AM  
 pH (UA) 5.0 06/25/2017 11:40 AM  
 Protein 300 (A) 06/25/2017 11:40 AM  
 Glucose NEGATIVE  06/25/2017 11:40 AM  
 Ketone NEGATIVE  06/25/2017 11:40 AM  
 Bilirubin NEGATIVE  06/25/2017 11:40 AM  
 Urobilinogen 0.2 06/25/2017 11:40 AM  
 Nitrites NEGATIVE  06/25/2017 11:40 AM  
 Leukocyte Esterase NEGATIVE  06/25/2017 11:40 AM  
 Epithelial cells FEW 06/25/2017 11:40 AM  
 Bacteria NEGATIVE  06/25/2017 11:40 AM  
 WBC 0-4 06/25/2017 11:40 AM  
 RBC 0-5 06/25/2017 11:40 AM  
 
 
 
Medications Reviewed:  
 
Current Facility-Administered Medications Medication Dose Route Frequency  bumetanide (BUMEX) tablet 2 mg  2 mg Oral BID  sodium bicarbonate tablet 1,300 mg  1,300 mg Oral TID  hydrALAZINE (APRESOLINE) tablet 150 mg  150 mg Oral TID  albuterol-ipratropium (DUO-NEB) 2.5 MG-0.5 MG/3 ML  3 mL Nebulization Q6H PRN  
 insulin glargine (LANTUS) injection 16 Units  16 Units SubCUTAneous DAILY  heparin (porcine) injection 5,000 Units  5,000 Units SubCUTAneous Q8H  
 famotidine (PEPCID) tablet 20 mg  20 mg Oral Q24H  
 ondansetron (ZOFRAN ODT) tablet 4 mg  4 mg Oral Q8H PRN  
 sodium chloride (NS) flush 5-40 mL  5-40 mL IntraVENous Q8H  
 sodium chloride (NS) flush 5-40 mL  5-40 mL IntraVENous PRN  
 sodium chloride (NS) flush 5-40 mL  5-40 mL IntraVENous Q8H  
 sodium chloride (NS) flush 5-40 mL  5-40 mL IntraVENous PRN  
 acetaminophen (TYLENOL) tablet 650 mg  650 mg Oral Q4H PRN  
 [Held by provider] amLODIPine (NORVASC) tablet 10 mg  10 mg Oral DAILY  aspirin chewable tablet 81 mg  81 mg Oral DAILY  insulin lispro (HUMALOG) injection   SubCUTAneous AC&HS  
 glucose chewable tablet 16 g  4 Tab Oral PRN  
 dextrose (D50W) injection syrg 12.5-25 g  12.5-25 g IntraVENous PRN  
 glucagon (GLUCAGEN) injection 1 mg  1 mg IntraMUSCular PRN  
 hydrALAZINE (APRESOLINE) 20 mg/mL injection 20 mg  20 mg IntraVENous Q6H PRN  
 
______________________________________________________________________ EXPECTED LENGTH OF STAY: 2d 9h 
ACTUAL LENGTH OF STAY:          6 [address] [facility]

## 2019-05-07 NOTE — PROGRESS NOTES
Met with pt at bedside. He is anticipating discharge today. CM coordinated with CHELITA RN and plans are clear with pt for follow up and documented. No barriers or concern noted by nurse or pt or CM. Pt will go home possibly today with outpatient follow up. 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  
 
CLINTON Estrada

## 2019-05-07 NOTE — ACP (ADVANCE CARE PLANNING)
Non-Provider Advance Care Planning (ACP) Note Date of ACP Conversation: 5/7/2019 Persons included in Conversation: patient Length of ACP Conversation in minutes: <16 minutes (Non-Billable) General ACP for ALL Patients with Decision Making Capacity: 
 
Review of Existing Advance Directive: (Select questions covered) Asked Mr. Cast Begin if he has completed an AMD.  He states that he and his wife had done so. I requested that he take a copy to his appt with Dr. Debora Garciakeeper so it can be scanned into our EMR. He agrees to this. I also recommended that  and Mrs. Cast Begin review it every couple of years to make sure the information is still current, especially the agents' contact information. Advised that it is a good idea also to periodically review end-of-life wishes as a person's health status changes. He agrees to this but for now wants to remain a full code to be given a chance of being resuscitated.

## 2019-05-07 NOTE — PROGRESS NOTES
Transitional Care Team: Initial HUG Note    Date of Assessment: 05/07/19  Time of Assessment:  11:37 AM    Assessment & Plan   States that he has completed at [facility]--asked if he will bring a copy of it to his appt with Dr. Leodan Fishman to be scanned into our EMR  Diuretic therapy--looks like Bumex 2 IV was given TID yesterday as nephrology ordered--serum Cr is increased to 3.58 today  Anticipating D/C today but waiting on physicians to see--G team working on appt scheduling and other home needs  Persistent bradycardia with symptom of fatigue--is there a role for PPM?  Financial concerns--discussed prescription drug coverage with him--has this through BC/BS supplemental and states he doesn't go into the donut hole so far; advised if anything becomes unaffordable to work through his physician offices, with his pharmacist and his NN     Primary Diagnosis:   Exertional dyspnea which has progressively worsened over weeks  Severe pulmonary HTN   Bradycardia,HR as low as 30s  Acute on chronic kidney failure with cardiorenal syndrome  Type 2 diabetes with hyperglycemia  Chronic atrial fibrillation. Advance Directive: not on file, but reports he has completed one. See ACP note from 5/7/19. Admission medication reconciliation was performed by pharmacist. Waiting on final decision on discharge today and for AVS to be finalized in order to perform discharge med rec. Discharge Needs: reports medications are expensive for he and his wife, especially her inhalers for COPD; this is why he is working at [employer] at his age. 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 Has ramp at home for wife who had difficulty walking very far and has W/C to use prn     CHELITA NP left patient with CHELITA calender/follow up appointments/ Ambulatory Nurse Navigation information when patient ready for discharge. He is aware that he needs to schedule appt to see his nephrologist next Tuesday 5/14.   He also needs to F/U with pulmonologist and plans to establish care in the same [facility] office where his wife is seen.

## 2019-05-07 NOTE — PROGRESS NOTES
Bedside shift change report given to Radha Levine 1313 (oncoming nurse) by [facility] (offgoing nurse).  Report included the following information SBAR, Kardex, Procedure Summary, Intake/Output, MAR, Accordion, Recent Results, Med Rec Status and Cardiac Rhythm a fib

## 2019-05-08 PROBLEM — I48.91 ATRIAL FIBRILLATION WITH SLOW VENTRICULAR RESPONSE (HCC): Status: ACTIVE | Noted: 2019-05-01

## 2019-05-08 LAB
ANION GAP SERPL CALC-SCNC: 14 MMOL/L (ref 5–15)
BUN SERPL-MCNC: 84 MG/DL (ref 6–20)
BUN/CREAT SERPL: 22 (ref 12–20)
CALCIUM SERPL-MCNC: 8.7 MG/DL (ref 8.5–10.1)
CHLORIDE SERPL-SCNC: 104 MMOL/L (ref 97–108)
CO2 SERPL-SCNC: 20 MMOL/L (ref 21–32)
CREAT SERPL-MCNC: 3.8 MG/DL (ref 0.7–1.3)
GLUCOSE BLD STRIP.AUTO-MCNC: 138 MG/DL (ref 65–100)
GLUCOSE BLD STRIP.AUTO-MCNC: 174 MG/DL (ref 65–100)
GLUCOSE BLD STRIP.AUTO-MCNC: 180 MG/DL (ref 65–100)
GLUCOSE BLD STRIP.AUTO-MCNC: 222 MG/DL (ref 65–100)
GLUCOSE SERPL-MCNC: 151 MG/DL (ref 65–100)
POTASSIUM SERPL-SCNC: 4.2 MMOL/L (ref 3.5–5.1)
SERVICE CMNT-IMP: ABNORMAL
SODIUM SERPL-SCNC: 138 MMOL/L (ref 136–145)

## 2019-05-08 PROCEDURE — 74011250636 HC RX REV CODE- 250/636: Performed by: HOSPITALIST

## 2019-05-08 PROCEDURE — 36415 COLL VENOUS BLD VENIPUNCTURE: CPT

## 2019-05-08 PROCEDURE — 74011250637 HC RX REV CODE- 250/637: Performed by: HOSPITALIST

## 2019-05-08 PROCEDURE — 74011636637 HC RX REV CODE- 636/637: Performed by: HOSPITALIST

## 2019-05-08 PROCEDURE — 82962 GLUCOSE BLOOD TEST: CPT

## 2019-05-08 PROCEDURE — 65660000000 HC RM CCU STEPDOWN

## 2019-05-08 PROCEDURE — 74011250637 HC RX REV CODE- 250/637: Performed by: INTERNAL MEDICINE

## 2019-05-08 PROCEDURE — 80048 BASIC METABOLIC PNL TOTAL CA: CPT

## 2019-05-08 RX ORDER — INSULIN LISPRO 100 [IU]/ML
INJECTION, SOLUTION INTRAVENOUS; SUBCUTANEOUS
Status: DISCONTINUED | OUTPATIENT
Start: 2019-05-08 | End: 2019-05-12 | Stop reason: HOSPADM

## 2019-05-08 RX ORDER — BUMETANIDE 1 MG/1
1 TABLET ORAL 2 TIMES DAILY
Status: DISCONTINUED | OUTPATIENT
Start: 2019-05-08 | End: 2019-05-12 | Stop reason: HOSPADM

## 2019-05-08 RX ADMIN — SODIUM BICARBONATE 1300 MG: 650 TABLET ORAL at 17:51

## 2019-05-08 RX ADMIN — BUMETANIDE 2 MG: 1 TABLET ORAL at 09:11

## 2019-05-08 RX ADMIN — Medication 10 ML: at 06:00

## 2019-05-08 RX ADMIN — HYDRALAZINE HYDROCHLORIDE 150 MG: 50 TABLET, FILM COATED ORAL at 09:11

## 2019-05-08 RX ADMIN — HYDRALAZINE HYDROCHLORIDE 150 MG: 50 TABLET, FILM COATED ORAL at 22:16

## 2019-05-08 RX ADMIN — HYDRALAZINE HYDROCHLORIDE 150 MG: 50 TABLET, FILM COATED ORAL at 17:51

## 2019-05-08 RX ADMIN — Medication 10 ML: at 22:16

## 2019-05-08 RX ADMIN — ASPIRIN 81 MG 81 MG: 81 TABLET ORAL at 09:11

## 2019-05-08 RX ADMIN — BUMETANIDE 1 MG: 1 TABLET ORAL at 17:51

## 2019-05-08 RX ADMIN — INSULIN GLARGINE 16 UNITS: 100 INJECTION, SOLUTION SUBCUTANEOUS at 09:11

## 2019-05-08 RX ADMIN — HEPARIN SODIUM 5000 UNITS: 5000 INJECTION INTRAVENOUS; SUBCUTANEOUS at 22:15

## 2019-05-08 RX ADMIN — SODIUM BICARBONATE 1300 MG: 650 TABLET ORAL at 09:11

## 2019-05-08 RX ADMIN — FAMOTIDINE 20 MG: 20 TABLET ORAL at 09:11

## 2019-05-08 RX ADMIN — SODIUM BICARBONATE 1300 MG: 650 TABLET ORAL at 22:16

## 2019-05-08 RX ADMIN — HEPARIN SODIUM 5000 UNITS: 5000 INJECTION INTRAVENOUS; SUBCUTANEOUS at 07:08

## 2019-05-08 RX ADMIN — INSULIN LISPRO 2 UNITS: 100 INJECTION, SOLUTION INTRAVENOUS; SUBCUTANEOUS at 11:58

## 2019-05-08 NOTE — PROGRESS NOTES
[facility] 
 [address] [facility] [address] [facility] Phone: [phone]   [phone]   
  
Nephrology Progress Note Connie Rodgers.     1935     [id number] Date of Admission : 5/1/2019 05/08/19 CC:  Follow up forAKI Assessment and Plan KATALINA on CKD  
- 2/2  progression of underlying CKD 
- worsening Cr w/ bumex 
- reduce back to 1mg po BID 
- discussed impending dialysis and he is agreeable to start once needed CKD IV: 
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 
-- UPCR showed 11 gm of Protein Metablolic Acidosis: 
- cont oral bicarb Dyspnea : 
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  
- RHC w/ PCW 28 
- cont diuretics as above HTN: 
- continue current meds Bradycardia : 
- will need PPM 
  
Chronic Anemia: 
- hx of GI bleed, now off AC 
- hgb stable at 11 
  
Afib: 
- per cards 
  
CAD s/p CABG 
  
DM2: 
- on insulin Interval History: 
Seen and examined. Cr up. UOP stable. No changes in wt w/ increase in diuretics. For PPM eval today. Review of Systems: A comprehensive review of systems was negative except for that written in the HPI. Current Medications:  
Current Facility-Administered Medications Medication Dose Route Frequency  bumetanide (BUMEX) tablet 2 mg  2 mg Oral BID  sodium bicarbonate tablet 1,300 mg  1,300 mg Oral TID  hydrALAZINE (APRESOLINE) tablet 150 mg  150 mg Oral TID  albuterol-ipratropium (DUO-NEB) 2.5 MG-0.5 MG/3 ML  3 mL Nebulization Q6H PRN  
 insulin glargine (LANTUS) injection 16 Units  16 Units SubCUTAneous DAILY  heparin (porcine) injection 5,000 Units  5,000 Units SubCUTAneous Q8H  
 famotidine (PEPCID) tablet 20 mg  20 mg Oral Q24H  
 ondansetron (ZOFRAN ODT) tablet 4 mg  4 mg Oral Q8H PRN  
 sodium chloride (NS) flush 5-40 mL  5-40 mL IntraVENous Q8H  
 sodium chloride (NS) flush 5-40 mL  5-40 mL IntraVENous PRN  
  sodium chloride (NS) flush 5-40 mL  5-40 mL IntraVENous Q8H  
 sodium chloride (NS) flush 5-40 mL  5-40 mL IntraVENous PRN  
 acetaminophen (TYLENOL) tablet 650 mg  650 mg Oral Q4H PRN  
 [Held by provider] amLODIPine (NORVASC) tablet 10 mg  10 mg Oral DAILY  aspirin chewable tablet 81 mg  81 mg Oral DAILY  insulin lispro (HUMALOG) injection   SubCUTAneous AC&HS  
 glucose chewable tablet 16 g  4 Tab Oral PRN  
 dextrose (D50W) injection syrg 12.5-25 g  12.5-25 g IntraVENous PRN  
 glucagon (GLUCAGEN) injection 1 mg  1 mg IntraMUSCular PRN  
 hydrALAZINE (APRESOLINE) 20 mg/mL injection 20 mg  20 mg IntraVENous Q6H PRN Allergies Allergen Reactions  Lisinopril Cough Objective: 
Vitals:   
Vitals:  
 05/07/19 2240 05/08/19 1216 05/08/19 9596 05/08/19 1132 BP: 166/63 160/73 175/69 174/59 Pulse: (!) 45 (!) 53 67 (!) 50 Resp: 16 16 16 16 Temp: 98.7 °F (37.1 °C) 98.1 °F (36.7 °C) 97.4 °F (36.3 °C) 97.6 °F (36.4 °C) SpO2: 96% 98% 94% 97% Weight:  82.4 kg (181 lb 9.6 oz) Height:      
 
Intake and Output: 
No intake/output data recorded. 05/06 1901 - 05/08 0700 In: -  
Out: 700 [Urine:700] Physical Examination: 
General: Elderly man in no distress Neck:  Supple, no mass Resp:  Very diminished breath sounds in the bases and some slight rales; normal resp effort CV:  RRR,  no murmur or rub,no LE edema GI:  Soft and non-tender no hepatosplenomegaly Neurologic:  Non focal; alert and appropriate; OX 3; normal speech Psych:             Cheerful/ normal affect and mood Skin:  No Rash :  No ferrell []    High complexity decision making was performed 
[]    Patient is at high-risk of decompensation with multiple organ involvement Lab Data Personally Reviewed: I have reviewed all the pertinent labs, microbiology data and radiology studies during assessment. Recent Labs 05/08/19 
0444 05/07/19 
0805  138  
K 4.2 4.0  
 108 CO2 20* 18* * 115* BUN 84* 82* CREA 3.80* 3.58* CA 8.7 8.5 PHOS  --  4.6 ALB  --  2.8* Recent Labs 05/07/19 
0805 WBC 6.0 HGB 9.4* HCT 28.7*  
 No results found for: SDES Lab Results Component Value Date/Time Culture result: CANDIDA ALBICANS (A) 03/16/2017 06:00 AM  
 Culture result: NO GROWTH 5 DAYS 03/16/2017 05:40 AM  
 
Recent Results (from the past 24 hour(s)) GLUCOSE, POC Collection Time: 05/07/19  4:40 PM  
Result Value Ref Range Glucose (POC) 181 (H) 65 - 100 mg/dL Performed by Destinee Horne GLUCOSE, POC Collection Time: 05/07/19 10:23 PM  
Result Value Ref Range Glucose (POC) 212 (H) 65 - 100 mg/dL Performed by Katherine Cervantes GLUCOSE, POC Collection Time: 05/07/19 10:24 PM  
Result Value Ref Range Glucose (POC) 237 (H) 65 - 100 mg/dL Performed by Katherine Cervantes METABOLIC PANEL, BASIC Collection Time: 05/08/19  4:44 AM  
Result Value Ref Range Sodium 138 136 - 145 mmol/L Potassium 4.2 3.5 - 5.1 mmol/L Chloride 104 97 - 108 mmol/L  
 CO2 20 (L) 21 - 32 mmol/L Anion gap 14 5 - 15 mmol/L Glucose 151 (H) 65 - 100 mg/dL BUN 84 (H) 6 - 20 MG/DL Creatinine 3.80 (H) 0.70 - 1.30 MG/DL  
 BUN/Creatinine ratio 22 (H) 12 - 20 GFR est AA 19 (L) >60 ml/min/1.73m2 GFR est non-AA 15 (L) >60 ml/min/1.73m2 Calcium 8.7 8.5 - 10.1 MG/DL  
GLUCOSE, POC Collection Time: 05/08/19  7:07 AM  
Result Value Ref Range Glucose (POC) 174 (H) 65 - 100 mg/dL Performed by Veena Mancini GLUCOSE, POC Collection Time: 05/08/19 11:34 AM  
Result Value Ref Range Glucose (POC) 222 (H) 65 - 100 mg/dL Performed by Jorge Valle Total time spent with patient:  xxx   min. Care Plan discussed with: 
Patient Family RN Consulting Physician [address] [place] [address]     
 
I have reviewed the flowsheets. Chart and Pertinent Notes have been reviewed. No change in PMH ,family and social history from Consult note.  
 
 
Brad Meza MD

## 2019-05-08 NOTE — DIABETES MGMT
DTC Progress Note Recommendations/ Comments: Review of POC glucose shows trend upward; Fasting was 174 mg/dl. Continues 16 units of Lantus and is on  high sensitivity correction scale. Pt may benefit from a changing to normal sensitivity correction scale Janel Means Current hospital DM medication: lispro insulin correction scale-high sensitivity; Lantus 16 units daily Chart reviewed on [address] Patient is a 81 y.o. male with known  Type 2 Diabetes on oral agent (monotherapy): glipizide 10 mg pm (Glucotrol) 
insulin injections: Insulin glargine U-300 18 units daily at home. A1c:  
Lab Results Component Value Date/Time Hemoglobin A1c 9.8 (H) 10/20/2017 08:11 AM  
 Hemoglobin A1c 9.0 (H) 06/25/2017 03:39 PM  
 Hemoglobin A1c, External 8.4 08/15/2017 Recent Glucose Results:  
Lab Results Component Value Date/Time  (H) 05/08/2019 04:44 AM  
 GLUCPOC 222 (H) 05/08/2019 11:34 AM  
 GLUCPOC 174 (H) 05/08/2019 07:07 AM  
 GLUCPOC 237 (H) 05/07/2019 10:24 PM  
  
 
Lab Results Component Value Date/Time Creatinine 3.80 (H) 05/08/2019 04:44 AM  
 
Estimated Creatinine Clearance: 15 mL/min (A) (based on SCr of 3.8 mg/dL (H)). Active Orders Diet DIET CARDIAC Regular; Consistent Carb 2000kcal; No Conc. Sweets PO intake:  
Patient Vitals for the past 72 hrs: 
 % Diet Eaten 05/08/19 1037 75 % Will continue to follow as needed. Thank you Fany Hodges RD, CDE Time spent: 8 min

## 2019-05-08 NOTE — PROGRESS NOTES
Call bell in reach room clutter free patient educated on rise up slowly take a few minutes to get self together before changing positions. He verbalizes understanding

## 2019-05-08 NOTE — PROGRESS NOTES
Hospitalist Progress Note [address] [facility], [place] Answering service: [phone] OR [phone] from in house phone Date of Service:  2019 NAME:  Nessa Marie Sr. 
:  1935 MRN:  [id number] Admission Summary:  
 
 This is an 81-year-old gentleman with a significant cardiac history of coronary artery disease, status post coronary artery bypass grafting; peripheral arterial disease, status post angioplasty and stenting; type 2 diabetes; and hyperlipidemia, presented with weeks of dyspnea. Interval history / Subjective:  
   
Patient seen and examined. Breathing improved. EP consulted for possible pace maker, will work with PT today. Bumex decreased per nephrology. Will follow creatinine. Assessment & Plan:  
 
 
Bradycardia, HR as low as 30s in the ED. 
-somewhat improved  
-Medications  vs SSS. Antihypertensives held  
-EP consulted, patient to work with PT Acute on chronic kidney failure (CKD IV) with cardiorenal syndrome 
-secondary to diabetic nephropathy  
-bumex BID, decreased , will continue to monitor creatinine 
-possible need dialysis in future  
-RHC showed PCWP of 28 Exertional Dyspnea in the setting of Severe Pulmonary Hypertension: 
-ECHO with severe PHTN - PAS 72 mmHg 
-Chest CT significant for mild worsening of diffuse interstitial parenchymal change 
-RHC. PCW 28, RV 75. Metabolic Acidosis:  
-oral bicarbonate supplements  
-nephrology managing Type 2 diabetes with hyperglycemia 
-Resume Lantus 16 units 
-continue to Hold oral hypoglycemics -Accucheks and Humalog correction AC&HS 
  
Chronic atrial fibrillation: 
-no anticoagulation due to history of GI bleed Code status: Full DVT prophylaxis: Heparin Care Plan discussed with: Patient/Family and Nurse Disposition: Home w/Family Hospital Problems  Date Reviewed: 2018 Codes Class Noted POA CHF (congestive heart failure) (HCC) ICD-10-CM: I50.9 ICD-9-CM: 428.0  5/1/2019 Unknown KATALINA (acute kidney [facility]) ([facility] ICD-10-CM: N17.9 ICD-9-CM: 584.9  6/25/2017 Unknown * (Principal) Dyspnea ICD-10-CM: R06.00 
ICD-9-CM: 786.09  7/15/2014 Unknown Review of Systems:  
Negative unless stated above Vital Signs:  
 Last 24hrs VS reviewed since prior progress note. Most recent are: 
Visit Vitals /70 (BP 1 Location: Right arm, BP Patient Position: At rest;Sitting) Pulse (!) 56 Temp 98.3 °F (36.8 °C) Resp 16 Ht 5' 9\" (1.753 m) Wt 82.4 kg (181 lb 9.6 oz) SpO2 94% BMI 26.82 kg/m² Intake/Output Summary (Last 24 hours) at 5/8/2019 1816 Last data filed at 5/8/2019 1614 Gross per 24 hour Intake  Output 500 ml Net -500 ml Physical Examination:  
 
 
     
Constitutional:  No acute distress, cooperative, pleasant   
ENT:  Oral mucous moist, oropharynx benign. Neck supple, Resp:  Lung sounds diminished throughout. No wheezing/rhonchi/rales. No accessory muscle use CV:  irregular, no murmurs, gallops, rubs GI:  Soft, non distended, non tender. normoactive bowel sounds, no hepatosplenomegaly Musculoskeletal:  No edema, warm, 2+ pulses throughout Neurologic:  Moves all extremities Psych:  Good insight, Not anxious nor agitated. Data Review:  
 Review and/or order of clinical lab test 
Review and/or order of tests in the radiology section of CPT Review and/or order of tests in the medicine section of CPT Labs:  
 
Recent Labs 05/07/19 
0805 WBC 6.0 HGB 9.4* HCT 28.7*  
 Recent Labs 05/08/19 
0444 05/07/19 
0805  138  
K 4.2 4.0  
 108 CO2 20* 18* BUN 84* 82* CREA 3.80* 3.58* * 115* CA 8.7 8.5 PHOS  --  4.6 Recent Labs 05/07/19 
0805 ALB 2.8* No results for input(s): INR, PTP, APTT in the last 72 hours.  
 
No lab exists for component: INREXT, INREXT  
 No results for input(s): FE, TIBC, PSAT, FERR in the last 72 hours. No results found for: FOL, RBCF No results for input(s): PH, PCO2, PO2 in the last 72 hours. No results for input(s): CPK, CKNDX, TROIQ in the last 72 hours. No lab exists for component: CPKMB No results found for: CHOL, CHOLX, CHLST, CHOLV, HDL, LDL, LDLC, DLDLP, TGLX, TRIGL, TRIGP, CHHD, CHHDX Lab Results Component Value Date/Time Glucose (POC) 180 (H) 05/08/2019 04:13 PM  
 Glucose (POC) 222 (H) 05/08/2019 11:34 AM  
 Glucose (POC) 174 (H) 05/08/2019 07:07 AM  
 Glucose (POC) 237 (H) 05/07/2019 10:24 PM  
 Glucose (POC) 212 (H) 05/07/2019 10:23 PM  
 
Lab Results Component Value Date/Time Color YELLOW/STRAW 06/25/2017 11:40 AM  
 Appearance CLEAR 06/25/2017 11:40 AM  
 Specific gravity 1.018 06/25/2017 11:40 AM  
 pH (UA) 5.0 06/25/2017 11:40 AM  
 Protein 300 (A) 06/25/2017 11:40 AM  
 Glucose NEGATIVE  06/25/2017 11:40 AM  
 Ketone NEGATIVE  06/25/2017 11:40 AM  
 Bilirubin NEGATIVE  06/25/2017 11:40 AM  
 Urobilinogen 0.2 06/25/2017 11:40 AM  
 Nitrites NEGATIVE  06/25/2017 11:40 AM  
 Leukocyte Esterase NEGATIVE  06/25/2017 11:40 AM  
 Epithelial cells FEW 06/25/2017 11:40 AM  
 Bacteria NEGATIVE  06/25/2017 11:40 AM  
 WBC 0-4 06/25/2017 11:40 AM  
 RBC 0-5 06/25/2017 11:40 AM  
 
 
 
Medications Reviewed:  
 
Current Facility-Administered Medications Medication Dose Route Frequency  bumetanide (BUMEX) tablet 1 mg  1 mg Oral BID  insulin lispro (HUMALOG) injection   SubCUTAneous AC&HS  sodium bicarbonate tablet 1,300 mg  1,300 mg Oral TID  hydrALAZINE (APRESOLINE) tablet 150 mg  150 mg Oral TID  albuterol-ipratropium (DUO-NEB) 2.5 MG-0.5 MG/3 ML  3 mL Nebulization Q6H PRN  
 insulin glargine (LANTUS) injection 16 Units  16 Units SubCUTAneous DAILY  heparin (porcine) injection 5,000 Units  5,000 Units SubCUTAneous Q8H  
 famotidine (PEPCID) tablet 20 mg  20 mg Oral Q24H  ondansetron (ZOFRAN ODT) tablet 4 mg  4 mg Oral Q8H PRN  
 sodium chloride (NS) flush 5-40 mL  5-40 mL IntraVENous Q8H  
 sodium chloride (NS) flush 5-40 mL  5-40 mL IntraVENous PRN  
 sodium chloride (NS) flush 5-40 mL  5-40 mL IntraVENous Q8H  
 sodium chloride (NS) flush 5-40 mL  5-40 mL IntraVENous PRN  
 acetaminophen (TYLENOL) tablet 650 mg  650 mg Oral Q4H PRN  
 [Held by provider] amLODIPine (NORVASC) tablet 10 mg  10 mg Oral DAILY  aspirin chewable tablet 81 mg  81 mg Oral DAILY  glucose chewable tablet 16 g  4 Tab Oral PRN  
 dextrose (D50W) injection syrg 12.5-25 g  12.5-25 g IntraVENous PRN  
 glucagon (GLUCAGEN) injection 1 mg  1 mg IntraMUSCular PRN  
 hydrALAZINE (APRESOLINE) 20 mg/mL injection 20 mg  20 mg IntraVENous Q6H PRN  
 
______________________________________________________________________ EXPECTED LENGTH OF STAY: 2d 9h 
ACTUAL LENGTH OF STAY:          [id number] [facility]

## 2019-05-08 NOTE — ROUTINE PROCESS
Bedside shift change report given to Aurora Randhawa (oncoming nurse) by Ashlie (offgoing nurse). Report included the following information SBAR, Kardex, Intake/Output, MAR, Accordion, Recent Results, Med Rec Status and Cardiac Rhythm A-fib, bradycardia.

## 2019-05-08 NOTE — PROGRESS NOTES
Patient is resting in bed watching TV no complaints of pain, SOB, or discomfort at this time. Will continue to monitor patient for changes in his condition. 2240 patient sitting on side of the bed CHG bath was given gown changed patient resting in bed watching TV no complaints of pain or discomfort at this time. 7728 patient resting in bed with eyes closed no complaint at this time blood drawn and sent to lab. 
 
0730 Bedside and Verbal shift change report given to Dexter Rivers RN  (oncoming nurse) by Wilton Thao RN  (offgoing nurse). Report included the following information SBAR, MAR, Recent Results and Med Rec Status.

## 2019-05-08 NOTE — CONSULTS
Cardiac Electrophysiology Hospital Consultation Note Subjective:  
  
Navin Sher is a 81 y.o. patient who is seen for evaluation of bradycardia. He was admitted on 05/01/2019 for SOB & chest tightness. CXR showed bilateral pulmonary infiltrates. Troponin negative. BNP elevated. Hypertensive upon admission. Bradycardic, carvedilol held. KATALINA noted, not candidate for [facility] due to age/renal function. AF, bradycardia 40s-50s noted on monitor. No SOB at rest, but patient has not been up & around much. Hypertensive, last 174/59. RHC (05/02/2019): CO 4.1, PA 75/28, PCW 28, RV 75/18, RA 18. 
 
Echo (05/01/2019): LVEF 21-05%, grade 1 diastolic dysfunction, mild LVH. RV with mildly reduced systolic function. LA mod dilated. RA mildly dilated. MAC, mild MR. Aortic sclerosis with reduced excursion, mild AS. Mild to mod TR. Severe pulmonary HTN. CT chest (05/01/2019): Mild worsening of diffuse interstitial parenchymal change. Cholelithiasis, right medial flank cystic mass, diverticulosis stable. Previous:  
History of chronic AF, rate controlled with carvedilol 25 mg po bid. [id number] 5. Not candidate for [facility] due to fall risk, previous GI bleed. Holter (4/29/14): rate  with frequent PACs, rare periods of short RP SVT up to 122  
 
PVD, Dr. Vince Buck atherectomy to distal SFA and distal popliteal with angioplasty to both lesions and stent to the SFA by Dr. Vince Buck on 3/14/14.  
  
CAD/CABG x 3 off pump 03/2008 . Post op anemia and renal failure Problem List  Date Reviewed: 12/17/2018 Codes Class Noted CHF (congestive heart failure) (HCC) ICD-10-CM: I50.9 ICD-9-CM: 428.0  5/1/2019 Type 2 diabetes with nephropathy ([facility] ICD-10-CM: E11.21 
ICD-9-CM: 250.40, 583.81  7/26/2018 Atrial fibrillation, chronic (HCC) ICD-10-CM: I48.2 ICD-9-CM: 427.31  10/21/2017  Overview Signed 10/21/2017  1:45 PM by Yevgeniy Hernández MD  
 new onset afib with BRPR 10-19-17 admit CKD (chronic kidney disease) stage 3, GFR 30-59 ml/min (HCC) ICD-10-CM: N18.3 ICD-9-CM: 585.3  10/21/2017 Overview Signed 10/21/2017  1:47 PM by Kirti Gillespie MD  
  hypertension and DM nephrosclerosis GI bleed ICD-10-CM: K92.2 ICD-9-CM: 578.9  10/20/2017 Hyperglycemia due to type 2 diabetes mellitus ([facility] ICD-10-CM: E11.65 ICD-9-CM: 250.00  10/20/2017 Hypokalemia ICD-10-CM: E87.6 ICD-9-CM: 276.8  6/25/2017 Generalized weakness ICD-10-CM: R53.1 ICD-9-CM: 780.79  6/25/2017 Elevated troponin ICD-10-CM: R74.8 ICD-9-CM: 790.6  6/25/2017 KATALINA (acute kidney injury) ([facility] ICD-10-CM: N17.9 ICD-9-CM: 584.9  6/25/2017 Acute renal failure (ARF) (HCC) ICD-10-CM: N17.9 ICD-9-CM: 584.9  3/16/2017 CKD (chronic kidney disease) ICD-10-CM: N18.9 ICD-9-CM: 585.9  1/20/2017 Type 2 diabetes mellitus with diabetic peripheral angiopathy without gangrene [facility]) ICD-10-CM: E11.51 
ICD-9-CM: 250.70, 443.81  9/27/2015 * (Principal) Dyspnea ICD-10-CM: R06.00 
ICD-9-CM: 786.09  7/15/2014 PVC's (premature ventricular contractions) ICD-10-CM: I49.3 ICD-9-CM: 427.69  5/26/2014 Carotid arterial disease (HCC) ICD-10-CM: I77.9 ICD-9-CM: 447.9  Unknown Hypercholesteremia ICD-10-CM: E78.00 ICD-9-CM: 272.0  Unknown PVD (peripheral vascular disease) ([facility] ICD-10-CM: I73.9 ICD-9-CM: 443.9  Unknown Bradycardia ICD-10-CM: R00.1 ICD-9-CM: 427.89  Unknown CAD (coronary artery disease) ICD-10-CM: I25.10 ICD-9-CM: 414.00  Unknown Hypertension, essential, benign ICD-10-CM: I10 
ICD-9-CM: 401.1  Unknown No current facility-administered medications on file prior to encounter. Current Outpatient Medications on File Prior to Encounter Medication Sig Dispense Refill  glipiZIDE SR (GLUCOTROL XL) 10 mg CR tablet Take 10 mg by mouth every evening.  insulin glargine U-300 conc 300 unit/mL (1.5 mL) inpn 18 Units by SubCUTAneous route every morning.  aspirin 81 mg chewable tablet Take 1 Tab by mouth daily. 33 Tab 11  
 bumetanide (BUMEX) 1 mg tablet Take 1 mg by mouth two (2) times a day.  amLODIPine (NORVASC) 10 mg tablet Take 1 Tab by mouth daily. 30 Tab 0  cloNIDine HCl (CATAPRES) 0.1 mg tablet Take 1 Tab by mouth two (2) times a day. 60 Tab 5  carvedilol (COREG) 25 mg tablet Take 1 Tab by mouth two (2) times a day. 60 Tab 0  
 simvastatin (ZOCOR) 20 mg tablet Take 20 mg by mouth nightly.  omega 3-dha-epa-fish oil (FISH OIL) 100-160-1,000 mg cap Take 1 Cap by mouth two (2) times a day.  cinnamon bark (CINNAMON) 500 mg cap Take 1,000 mg by mouth two (2) times a day. Current Facility-Administered Medications Medication Dose Route Frequency Provider Last Rate Last Dose  bumetanide (BUMEX) tablet 1 mg  1 mg Oral BID Ashanti Mesa MD      
 sodium bicarbonate tablet 1,300 mg  1,300 mg Oral TID Mar Gilmore MD   1,300 mg at 05/08/19 0034  hydrALAZINE (APRESOLINE) tablet 150 mg  150 mg Oral TID Roderick Buck MD   150 mg at 05/08/19 0911  
 albuterol-ipratropium (DUO-NEB) 2.5 MG-0.5 MG/3 ML  3 mL Nebulization Q6H PRN Laverne Fritz MD      
 insulin glargine (LANTUS) injection 16 Units  16 Units SubCUTAneous DAILY HANS Fritz MD   16 Units at 05/08/19 0911  
 heparin (porcine) injection 5,000 Units  5,000 Units SubCUTAneous OLINDA Tucker MD   5,000 Units at 05/08/19 0708  famotidine (PEPCID) tablet 20 mg  20 mg Oral Q24H Laverne Fritz MD   20 mg at 05/08/19 0911  
 ondansetron (ZOFRAN ODT) tablet 4 mg  4 mg Oral Q8H PRN Laverne Fritz MD      
 sodium chloride (NS) flush 5-40 mL  5-40 mL IntraVENous Q8H Neo Hurtado MD   10 mL at 05/08/19 0600  
 sodium chloride (NS) flush 5-40 mL  5-40 mL IntraVENous PRN Neo Hurtado MD      
  sodium chloride (NS) flush 5-40 mL  5-40 mL IntraVENous Q8H Lam PWEOXJOSE LAZO MD   10 mL at 05/08/19 0600  
 sodium chloride (NS) flush 5-40 mL  5-40 mL IntraVENous PRN Lam EHPCBOL MD CLIFTON      
 acetaminophen (TYLENOL) tablet 650 mg  650 mg Oral Q4H PRN RJ Fritz MD      
 [Held by provider] amLODIPine (NORVASC) tablet 10 mg  10 mg Oral DAILY Lam WXJUFELICIA LAZO MD   10 mg at 05/05/19 0901  aspirin chewable tablet 81 mg  81 mg Oral DAILY Laverne Fritz MD   81 mg at 05/08/19 0911  
 insulin lispro (HUMALOG) injection   SubCUTAneous AC&HS Devora Ovalle MD   2 Units at 05/08/19 1158  glucose chewable tablet 16 g  4 Tab Oral PRN RADHA Fritz MD      
 dextrose (D50W) injection syrg 12.5-25 g  12.5-25 g IntraVENous PRN QAMAR Fritz MD      
 glucagon (GLUCAGEN) injection 1 mg  1 mg IntraMUSCular PRN Lam OPRDFANEUDY LAZO MD      
 hydrALAZINE (APRESOLINE) 20 mg/mL injection 20 mg  20 mg IntraVENous Q6H PRN Young Fritz MD   20 mg at 05/07/19 0518 Allergies Allergen Reactions  Lisinopril Cough Past Medical History:  
Diagnosis Date  CAD (coronary artery disease) s/p CABG 2008  Carotid arterial disease ([facility]  CKD (chronic kidney disease) stage 3, GFR 30-59 ml/min ([facility]) 10/21/2017  
 hypertension and DM nephrosclerosis  Diabetes mellitus, [facility] ([facility]  Hypercholesteremia  Hypertension  Paroxysmal atrial fibrillation ([facility] 10/21/2017  
 new onset afib with BRPR 10-19-17 admit  PVC's (premature ventricular contractions) 5/26/2014  PVD (peripheral vascular disease) ([facility] Past Surgical History:  
Procedure Laterality Date  HX CORONARY ARTERY BYPASS GRAFT    
 HX HEART CATHETERIZATION History reviewed. No pacemaker in family history. Social History Tobacco Use  Smoking status: Former Smoker Packs/day: 3.00 Years: 20.00 Pack years: 60.00 Types: Cigarettes Last attempt to quit: 1/14/1985 Years since quittin.3  Smokeless tobacco: Never Used Substance Use Topics  Alcohol use: No  
  
 
Review of Systems:  
Constitutional: Negative for fever, chills, weight loss, + malaise/fatigue. HEENT: Negative for nosebleeds, vision changes. Respiratory: Negative for cough, hemoptysis Cardiovascular: Negative for chest pain, palpitations, + recent orthopnea, claudication, + leg swelling, syncope, and + recent PND. + AGUILAR Gastrointestinal: Negative for nausea, vomiting, diarrhea, blood in stool and melena. Genitourinary: Negative for dysuria, and hematuria. Musculoskeletal: Negative for myalgias, arthralgia. Skin: Negative for rash. Heme: Does not bleed or bruise easily. Neurological: Negative for speech change and focal weakness Objective:  
 
Visit Vitals /59 (BP 1 Location: Right arm, BP Patient Position: At rest) Pulse (!) 50 Temp 97.6 °F (36.4 °C) Resp 16 Ht 5' 9\" (1.753 m) Wt 181 lb 9.6 oz (82.4 kg) SpO2 97% BMI 26.82 kg/m² Physical Exam:  
Constitutional: well-developed and well-nourished. No respiratory distress. Head: Normocephalic and atraumatic. Eyes: Pupils are equal, round ENT: hearing normal 
Neck: supple. No JVD present. Cardiovascular: irregular rhythm. 2/6 systolic murmur heard. Pulmonary/Chest: Effort normal and breath sounds normal. No wheezes. Abdominal: Soft, no tenderness. Musculoskeletal: no edema. Neurological: alert,oriented. Skin: Skin is warm and dry Psychiatric: normal mood and affect. Behavior is normal. Judgment and thought content normal.   
 
EKG (2019): AF with ventricular rate 66 bpm, RBBB. Assessment/Plan:  
Mr. Terrance Villalpando was admitted with HFpEF, has responded somewhat to diuresis. Tachy daisy syndrome. Chronic AF, bradycardic during admission despite stopping carvedilol. No other AV kyara blockers on board.   Reports SOB with exertion, fatigue. With his chronic AF, he will have need for rate control at some point in the future. Unable to use meds for rate control currently due to bradycardia. Encouraged patient to walk in the hallway with PT or nurse Risks/benefits of pacemaker implant discussed with patient, leadless vs transvenous. He said he would be ok with either [id number] 5. No OAC due to fall risk & previous GI bleed. NIR [place] 9 [facility] 05/08/19 Addendum from EP attending: 
 I have seen, examined patient, and discussed with nurse practitioner, registered nurse, reviewed, updated note and agree with the assessment and plan I have talked to him this am. He has not been up and walking with PT, nurse Vital signs as above Exam shows irregular rhythm No edema Assessment and Plan: I recommend single lead pacemaker or leadless pacemaker and resume coreg to rate control for occasional RVR AFIB and CAD He agrees to proceed but will try to walk with PT and see how he feels Thank you for involving me in this patient's care and please call with further concerns or questions. Mary Buck M.D. Electrophysiology/Cardiology [facility] [address] [place] [address] 
[phone]

## 2019-05-08 NOTE — PROGRESS NOTES
Cardiology Progress Note Admit Date: 5/1/2019 Admit Diagnosis: KATALINA (acute kidney injury) ([facility].) [N17.9] CHF (congestive heart failure) ([facility].) [I50.9] Date: 5/8/2019     Time: 10:39 AM 
Assessment/Plan/Discussion:Cardiology Attending:  
 
Patient seen on the day of progress note and examined  and agree with Advance Practice Provider (MADDIE, NP,PA)  assessment and plans. Keo Rodriguez is a 81 y.o. male Doing fair clinically- but HR down to 30s-40s at times, no coarseness but seems fatigued and walked in anthony but had to stop Modest edema Kidney numbers worse Now more bradycardia, unable to restart Coreg We have consulted EP today Heading for hemodialysis and pacer James Murguia MD   
 
 
 
Subjective: 
Pt received in bed, alert and in no distress. He denies any chest pain or SOB. Afib with rate around 60-69 currently. Assessment and Plan 1. Shortness of Breath -Dyspnea 
            - Echo with Severe PHTN - PAS 72 mmHg 
            - BNP elevated at 11,184 on admission 
            - CXR with mild pulmonary infiltrates bilaterally             - TTE on 5/1/2019 showed EF 56-60%, NRWMA.  Mild reduction in RV systolic function. 
            - Chest CT significant for mild worsening of diffuse interstitial parenchymal change 
            - RHC. PCW 28, RV 75.  
2. Bradycardia 
            - Still with Hr to mid 40s at night. Elevates upon waking or minimal activities - consult for Electrophysiology 3. Hypertension 
            - SBP 160s             - Hold Norvasc for bradycardia 
            - D/C Catapres . 1mg PO BID 
            - Hydralazine 150mg PO TID  
            - Bumex 2mg PO BID 4. Atrial Fibrillation - chronic 
            - CHADSVASC 5 
            - rate controlled without medication 
            - Not candidate for [employer] due to fall risk, previous GI bleed 5. KATALINA on CKD             - Cr 3.80, continues to rise             - Per Nephrology -will likely need HD. 6. DMT2 
            - Per Primary team 
7. Dyslipidemia             - simvastatin on hold 
            - ASA 81mg PO every day Agree with PA-S A&P. Patient is doing well. Walking laps around unit. Cr worse with Bumex. D/w Dr. Olayinka Umanzor, will need to move towards dialysis. Still with bradycardia, asymptomatic. But also off all possible AV nodals, including BP and GDTM. Dr. Lao He consulted. Recommending PPM placement for tachy/daisy as patient off AV nodals, but he needs GDT for HF. Patient agreeable. PPM insertion per EP. Trena Navarro, MARGARITA 
 
 
 
 
  
ROS: 
 
 GENERAL Recent weight loss - no Fever -----------------   no 
 Chills -----------------   no 
 
 EYES, VISION Visual Changes - no 
 
 EARS, NOSE, THROAT Hearing loss ----------- no 
 Swallowing difficulties - no CARDIOVASCULAR Chest pain/pressure ---- no 
 Arrhythmia/palpitations - no RESPIRATORY Cough ------------------ no Shortness of breath - no Wheezing -------------- no  GASTROINTESTINAL Abdominal pain - no Heartburn -------- no 
 Bloody stool ----- no 
 
 GENITOURINARY Frequent urination - no Urgency -------------- no 
 MUSCULOSKELETAL Joint pain/swelling ---- no 
 Musculoskeletal pain - no 
 
 SKIN & INTEGUMENTARY Rashes - no Sores --- no 
 
 
   NEUROLOGICAL Numbness/tingling - no Sensation loss ------ no 
 
 PSYCHIATRIC Nervousness/anxiety - no Depression -------------- no 
 
 ENDOCRINE Heat/cold intolerance - no Excessive thirst -------- no 
 
 HEMATOLOGIC/LYMPHATIC Abnormal bleeding - no ALL/IMMUN Allergic reaction ------ no 
 Recurrent infections - no Objective: 
 
 Physical Exam: 
             
Visit Vitals /69 (BP 1 Location: Right arm, BP Patient Position: At rest) Pulse 67 Temp 97.4 °F (36.3 °C) Resp 16 Ht 5' 9\" (1.753 m) Wt 82.4 kg (181 lb 9.6 oz) SpO2 94% BMI 26.82 kg/m² General Appearance:   Well developed, well nourished,alert and oriented x 3, and 
 individual in no acute distress. Ears/Nose/Mouth/Throat:    Hearing grossly normal. 
  
    Neck:  Supple. Chest:    Lungs clear to auscultation bilaterally. Cardiovascular:    Regular rate and irregular rhythm, S1, S2 normal, no murmur. Abdomen:    Soft, non-tender, bowel sounds are active. Extremities:  No edema bilaterally. Skin:  Warm and dry. Telemetry: AFIB Data Review:  
 Labs:   
Recent Results (from the past 24 hour(s)) GLUCOSE, POC Collection Time: 05/07/19 11:22 AM  
Result Value Ref Range Glucose (POC) 160 (H) 65 - 100 mg/dL Performed by Robsonwkari Morrow GLUCOSE, POC Collection Time: 05/07/19  4:40 PM  
Result Value Ref Range Glucose (POC) 181 (H) 65 - 100 mg/dL Performed by Tri Garcia GLUCOSE, POC Collection Time: 05/07/19 10:23 PM  
Result Value Ref Range Glucose (POC) 212 (H) 65 - 100 mg/dL Performed by Araceli Magaña GLUCOSE, POC Collection Time: 05/07/19 10:24 PM  
Result Value Ref Range Glucose (POC) 237 (H) 65 - 100 mg/dL Performed by Araceli [employer] METABOLIC PANEL, BASIC Collection Time: 05/08/19  4:44 AM  
Result Value Ref Range Sodium 138 136 - 145 mmol/L Potassium 4.2 3.5 - 5.1 mmol/L Chloride 104 97 - 108 mmol/L  
 CO2 20 (L) 21 - 32 mmol/L Anion gap 14 5 - 15 mmol/L Glucose 151 (H) 65 - 100 mg/dL BUN 84 (H) 6 - 20 MG/DL Creatinine 3.80 (H) 0.70 - 1.30 MG/DL  
 BUN/Creatinine ratio 22 (H) 12 - 20 GFR est AA 19 (L) >60 ml/min/1.73m2 GFR est non-AA 15 (L) >60 ml/min/1.73m2 Calcium 8.7 8.5 - 10.1 MG/DL  
GLUCOSE, POC Collection Time: 05/08/19  7:07 AM  
Result Value Ref Range Glucose (POC) 174 (H) 65 - 100 mg/dL Performed by Bonnielee Grumet Radiology:  
 
  
Current Facility-Administered Medications Medication Dose Route Frequency  bumetanide (BUMEX) tablet 2 mg  2 mg Oral BID  sodium bicarbonate tablet 1,300 mg  1,300 mg Oral TID  hydrALAZINE (APRESOLINE) tablet 150 mg  150 mg Oral TID  albuterol-ipratropium (DUO-NEB) 2.5 MG-0.5 MG/3 ML  3 mL Nebulization Q6H PRN  
 insulin glargine (LANTUS) injection 16 Units  16 Units SubCUTAneous DAILY  heparin (porcine) injection 5,000 Units  5,000 Units SubCUTAneous Q8H  
 famotidine (PEPCID) tablet 20 mg  20 mg Oral Q24H  
 ondansetron (ZOFRAN ODT) tablet 4 mg  4 mg Oral Q8H PRN  
 sodium chloride (NS) flush 5-40 mL  5-40 mL IntraVENous Q8H  
 sodium chloride (NS) flush 5-40 mL  5-40 mL IntraVENous PRN  
 sodium chloride (NS) flush 5-40 mL  5-40 mL IntraVENous Q8H  
 sodium chloride (NS) flush 5-40 mL  5-40 mL IntraVENous PRN  
 acetaminophen (TYLENOL) tablet 650 mg  650 mg Oral Q4H PRN  
 [Held by provider] amLODIPine (NORVASC) tablet 10 mg  10 mg Oral DAILY  aspirin chewable tablet 81 mg  81 mg Oral DAILY  insulin lispro (HUMALOG) injection   SubCUTAneous AC&HS  
 glucose chewable tablet 16 g  4 Tab Oral PRN  
 dextrose (D50W) injection syrg 12.5-25 g  12.5-25 g IntraVENous PRN  
 glucagon (GLUCAGEN) injection 1 mg  1 mg IntraMUSCular PRN  
 hydrALAZINE (APRESOLINE) 20 mg/mL injection 20 mg  20 mg IntraVENous Q6H PRN LOUIS Rodarte. MARGARITA Shane M.D. [facility] [address] [place] [address], [address] Mamta Jones 
 [phone]

## 2019-05-09 ENCOUNTER — ANESTHESIA EVENT (OUTPATIENT)
Dept: CARDIAC CATH/INVASIVE PROCEDURES | Age: 84
DRG: 228 | End: 2019-05-09
Payer: MEDICARE

## 2019-05-09 ENCOUNTER — DOCUMENTATION ONLY (OUTPATIENT)
Dept: FAMILY MEDICINE CLINIC | Age: 84
End: 2019-05-09

## 2019-05-09 ENCOUNTER — ANESTHESIA (OUTPATIENT)
Dept: CARDIAC CATH/INVASIVE PROCEDURES | Age: 84
DRG: 228 | End: 2019-05-09
Payer: MEDICARE

## 2019-05-09 PROBLEM — Z95.0 PACEMAKER: Status: ACTIVE | Noted: 2019-05-09

## 2019-05-09 LAB
ANION GAP SERPL CALC-SCNC: 11 MMOL/L (ref 5–15)
BUN SERPL-MCNC: 81 MG/DL (ref 6–20)
BUN/CREAT SERPL: 21 (ref 12–20)
CALCIUM SERPL-MCNC: 8.7 MG/DL (ref 8.5–10.1)
CHLORIDE SERPL-SCNC: 104 MMOL/L (ref 97–108)
CO2 SERPL-SCNC: 23 MMOL/L (ref 21–32)
CREAT SERPL-MCNC: 3.88 MG/DL (ref 0.7–1.3)
GLUCOSE BLD STRIP.AUTO-MCNC: 117 MG/DL (ref 65–100)
GLUCOSE BLD STRIP.AUTO-MCNC: 137 MG/DL (ref 65–100)
GLUCOSE BLD STRIP.AUTO-MCNC: 140 MG/DL (ref 65–100)
GLUCOSE BLD STRIP.AUTO-MCNC: 240 MG/DL (ref 65–100)
GLUCOSE BLD STRIP.AUTO-MCNC: 92 MG/DL (ref 65–100)
GLUCOSE SERPL-MCNC: 115 MG/DL (ref 65–100)
POTASSIUM SERPL-SCNC: 4.2 MMOL/L (ref 3.5–5.1)
SERVICE CMNT-IMP: ABNORMAL
SERVICE CMNT-IMP: NORMAL
SODIUM SERPL-SCNC: 138 MMOL/L (ref 136–145)

## 2019-05-09 PROCEDURE — C1894 INTRO/SHEATH, NON-LASER: HCPCS | Performed by: INTERNAL MEDICINE

## 2019-05-09 PROCEDURE — 36415 COLL VENOUS BLD VENIPUNCTURE: CPT

## 2019-05-09 PROCEDURE — 74011250637 HC RX REV CODE- 250/637: Performed by: HOSPITALIST

## 2019-05-09 PROCEDURE — C1786 PMKR, SINGLE, RATE-RESP: HCPCS | Performed by: INTERNAL MEDICINE

## 2019-05-09 PROCEDURE — 33274 TCAT INSJ/RPL PERM LDLS PM: CPT | Performed by: INTERNAL MEDICINE

## 2019-05-09 PROCEDURE — 77030012929 HC DIL URET COOK -C: Performed by: INTERNAL MEDICINE

## 2019-05-09 PROCEDURE — 77030013797 HC KT TRNSDUC PRSSR EDWD -A: Performed by: INTERNAL MEDICINE

## 2019-05-09 PROCEDURE — 74011636320 HC RX REV CODE- 636/320: Performed by: INTERNAL MEDICINE

## 2019-05-09 PROCEDURE — 80048 BASIC METABOLIC PNL TOTAL CA: CPT

## 2019-05-09 PROCEDURE — 74011250636 HC RX REV CODE- 250/636

## 2019-05-09 PROCEDURE — 77030005320 HC CATH PACE TEMP STJU -B: Performed by: INTERNAL MEDICINE

## 2019-05-09 PROCEDURE — 76060000034 HC ANESTHESIA 1.5 TO 2 HR: Performed by: INTERNAL MEDICINE

## 2019-05-09 PROCEDURE — C1769 GUIDE WIRE: HCPCS | Performed by: INTERNAL MEDICINE

## 2019-05-09 PROCEDURE — 82962 GLUCOSE BLOOD TEST: CPT

## 2019-05-09 PROCEDURE — 74011636637 HC RX REV CODE- 636/637: Performed by: HOSPITALIST

## 2019-05-09 PROCEDURE — 74011250637 HC RX REV CODE- 250/637: Performed by: PHYSICIAN ASSISTANT

## 2019-05-09 PROCEDURE — 74011250636 HC RX REV CODE- 250/636: Performed by: INTERNAL MEDICINE

## 2019-05-09 PROCEDURE — 65620000000 HC RM CCU GENERAL

## 2019-05-09 PROCEDURE — 74011000250 HC RX REV CODE- 250

## 2019-05-09 PROCEDURE — 02HK3NZ INSERTION OF INTRACARDIAC PACEMAKER INTO RIGHT VENTRICLE, PERCUTANEOUS APPROACH: ICD-10-PCS | Performed by: INTERNAL MEDICINE

## 2019-05-09 PROCEDURE — 74011250637 HC RX REV CODE- 250/637: Performed by: INTERNAL MEDICINE

## 2019-05-09 PROCEDURE — 74011636637 HC RX REV CODE- 636/637: Performed by: INTERNAL MEDICINE

## 2019-05-09 PROCEDURE — 77030010509 HC AIRWY LMA MSK TELE -A: Performed by: ANESTHESIOLOGY

## 2019-05-09 PROCEDURE — 77030002986 HC SUT PROL J&J -A: Performed by: INTERNAL MEDICINE

## 2019-05-09 PROCEDURE — 77030003390 HC NDL ANGI MRTM -A: Performed by: INTERNAL MEDICINE

## 2019-05-09 DEVICE — SYSTEM PACEMKR 0.8ML L25.9MM DIA6.7MM TRANSCATHETER INTRO: Type: IMPLANTABLE DEVICE | Site: HEART | Status: FUNCTIONAL

## 2019-05-09 RX ORDER — DEXMEDETOMIDINE HYDROCHLORIDE 4 UG/ML
INJECTION, SOLUTION INTRAVENOUS AS NEEDED
Status: DISCONTINUED | OUTPATIENT
Start: 2019-05-09 | End: 2019-05-09

## 2019-05-09 RX ORDER — SODIUM CHLORIDE 9 MG/ML
INJECTION, SOLUTION INTRAVENOUS
Status: DISCONTINUED | OUTPATIENT
Start: 2019-05-09 | End: 2019-05-09 | Stop reason: HOSPADM

## 2019-05-09 RX ORDER — SODIUM CHLORIDE 0.9 % (FLUSH) 0.9 %
5-40 SYRINGE (ML) INJECTION EVERY 8 HOURS
Status: DISCONTINUED | OUTPATIENT
Start: 2019-05-09 | End: 2019-05-12 | Stop reason: HOSPADM

## 2019-05-09 RX ORDER — ONDANSETRON 2 MG/ML
INJECTION INTRAMUSCULAR; INTRAVENOUS AS NEEDED
Status: DISCONTINUED | OUTPATIENT
Start: 2019-05-09 | End: 2019-05-09 | Stop reason: HOSPADM

## 2019-05-09 RX ORDER — PROPOFOL 10 MG/ML
INJECTION, EMULSION INTRAVENOUS
Status: DISCONTINUED | OUTPATIENT
Start: 2019-05-09 | End: 2019-05-09 | Stop reason: HOSPADM

## 2019-05-09 RX ORDER — CEFAZOLIN SODIUM 1 G/3ML
INJECTION, POWDER, FOR SOLUTION INTRAMUSCULAR; INTRAVENOUS AS NEEDED
Status: DISCONTINUED | OUTPATIENT
Start: 2019-05-09 | End: 2019-05-09 | Stop reason: HOSPADM

## 2019-05-09 RX ORDER — SODIUM CHLORIDE 0.9 % (FLUSH) 0.9 %
5-40 SYRINGE (ML) INJECTION AS NEEDED
Status: DISCONTINUED | OUTPATIENT
Start: 2019-05-09 | End: 2019-05-12 | Stop reason: HOSPADM

## 2019-05-09 RX ORDER — DEXAMETHASONE SODIUM PHOSPHATE 4 MG/ML
INJECTION, SOLUTION INTRA-ARTICULAR; INTRALESIONAL; INTRAMUSCULAR; INTRAVENOUS; SOFT TISSUE AS NEEDED
Status: DISCONTINUED | OUTPATIENT
Start: 2019-05-09 | End: 2019-05-09 | Stop reason: HOSPADM

## 2019-05-09 RX ORDER — CEFAZOLIN SODIUM/WATER 2 G/20 ML
2 SYRINGE (ML) INTRAVENOUS ONCE
Status: COMPLETED | OUTPATIENT
Start: 2019-05-09 | End: 2019-05-09

## 2019-05-09 RX ORDER — SODIUM CHLORIDE 0.9 % (FLUSH) 0.9 %
5-40 SYRINGE (ML) INJECTION AS NEEDED
Status: DISCONTINUED | OUTPATIENT
Start: 2019-05-09 | End: 2019-05-09 | Stop reason: HOSPADM

## 2019-05-09 RX ORDER — AMLODIPINE BESYLATE 5 MG/1
10 TABLET ORAL DAILY
Status: DISCONTINUED | OUTPATIENT
Start: 2019-05-09 | End: 2019-05-12 | Stop reason: HOSPADM

## 2019-05-09 RX ORDER — HEPARIN SODIUM 1000 [USP'U]/ML
INJECTION, SOLUTION INTRAVENOUS; SUBCUTANEOUS AS NEEDED
Status: DISCONTINUED | OUTPATIENT
Start: 2019-05-09 | End: 2019-05-09 | Stop reason: HOSPADM

## 2019-05-09 RX ORDER — LIDOCAINE HYDROCHLORIDE 10 MG/ML
INJECTION INFILTRATION; PERINEURAL AS NEEDED
Status: DISCONTINUED | OUTPATIENT
Start: 2019-05-09 | End: 2019-05-09 | Stop reason: HOSPADM

## 2019-05-09 RX ORDER — SODIUM CHLORIDE 0.9 % (FLUSH) 0.9 %
5-40 SYRINGE (ML) INJECTION EVERY 8 HOURS
Status: DISCONTINUED | OUTPATIENT
Start: 2019-05-09 | End: 2019-05-09 | Stop reason: HOSPADM

## 2019-05-09 RX ORDER — DEXMEDETOMIDINE HYDROCHLORIDE 4 UG/ML
INJECTION, SOLUTION INTRAVENOUS
Status: DISCONTINUED | OUTPATIENT
Start: 2019-05-09 | End: 2019-05-09 | Stop reason: HOSPADM

## 2019-05-09 RX ORDER — EPINEPHRINE 0.1 MG/ML
INJECTION INTRACARDIAC; INTRAVENOUS AS NEEDED
Status: DISCONTINUED | OUTPATIENT
Start: 2019-05-09 | End: 2019-05-09 | Stop reason: HOSPADM

## 2019-05-09 RX ORDER — PROPOFOL 10 MG/ML
INJECTION, EMULSION INTRAVENOUS AS NEEDED
Status: DISCONTINUED | OUTPATIENT
Start: 2019-05-09 | End: 2019-05-09 | Stop reason: HOSPADM

## 2019-05-09 RX ORDER — HEPARIN SODIUM 200 [USP'U]/100ML
INJECTION, SOLUTION INTRAVENOUS
Status: COMPLETED | OUTPATIENT
Start: 2019-05-09 | End: 2019-05-09

## 2019-05-09 RX ORDER — GLYCOPYRROLATE 0.2 MG/ML
INJECTION INTRAMUSCULAR; INTRAVENOUS AS NEEDED
Status: DISCONTINUED | OUTPATIENT
Start: 2019-05-09 | End: 2019-05-09 | Stop reason: HOSPADM

## 2019-05-09 RX ADMIN — SODIUM BICARBONATE 1300 MG: 650 TABLET ORAL at 23:23

## 2019-05-09 RX ADMIN — CEFAZOLIN SODIUM 2 G: 1 INJECTION, POWDER, FOR SOLUTION INTRAMUSCULAR; INTRAVENOUS at 16:49

## 2019-05-09 RX ADMIN — DEXMEDETOMIDINE HYDROCHLORIDE 0.2 MCG/KG/HR: 4 INJECTION, SOLUTION INTRAVENOUS at 16:50

## 2019-05-09 RX ADMIN — PROPOFOL 10 MG: 10 INJECTION, EMULSION INTRAVENOUS at 16:59

## 2019-05-09 RX ADMIN — INSULIN GLARGINE 16 UNITS: 100 INJECTION, SOLUTION SUBCUTANEOUS at 08:58

## 2019-05-09 RX ADMIN — Medication 10 ML: at 14:39

## 2019-05-09 RX ADMIN — EPINEPHRINE 0.2 MG: 0.1 INJECTION INTRACARDIAC; INTRAVENOUS at 17:32

## 2019-05-09 RX ADMIN — Medication 10 ML: at 14:38

## 2019-05-09 RX ADMIN — Medication 10 ML: at 23:23

## 2019-05-09 RX ADMIN — EPINEPHRINE 0.1 MG: 0.1 INJECTION INTRACARDIAC; INTRAVENOUS at 17:40

## 2019-05-09 RX ADMIN — BUMETANIDE 1 MG: 1 TABLET ORAL at 08:52

## 2019-05-09 RX ADMIN — DEXAMETHASONE SODIUM PHOSPHATE 4 MG: 4 INJECTION, SOLUTION INTRA-ARTICULAR; INTRALESIONAL; INTRAMUSCULAR; INTRAVENOUS; SOFT TISSUE at 17:08

## 2019-05-09 RX ADMIN — ONDANSETRON 4 MG: 2 INJECTION INTRAMUSCULAR; INTRAVENOUS at 17:08

## 2019-05-09 RX ADMIN — Medication 10 ML: at 23:24

## 2019-05-09 RX ADMIN — PROPOFOL 40 MG: 10 INJECTION, EMULSION INTRAVENOUS at 17:08

## 2019-05-09 RX ADMIN — GLYCOPYRROLATE 0.2 MG: 0.2 INJECTION INTRAMUSCULAR; INTRAVENOUS at 17:15

## 2019-05-09 RX ADMIN — HYDRALAZINE HYDROCHLORIDE 150 MG: 50 TABLET, FILM COATED ORAL at 08:52

## 2019-05-09 RX ADMIN — EPINEPHRINE 0.2 MG: 0.1 INJECTION INTRACARDIAC; INTRAVENOUS at 17:25

## 2019-05-09 RX ADMIN — PROPOFOL 20 MG: 10 INJECTION, EMULSION INTRAVENOUS at 16:52

## 2019-05-09 RX ADMIN — PROPOFOL 50 MCG/KG/MIN: 10 INJECTION, EMULSION INTRAVENOUS at 16:45

## 2019-05-09 RX ADMIN — FAMOTIDINE 20 MG: 20 TABLET ORAL at 08:52

## 2019-05-09 RX ADMIN — SODIUM CHLORIDE: 9 INJECTION, SOLUTION INTRAVENOUS at 17:24

## 2019-05-09 RX ADMIN — HEPARIN SODIUM 5000 UNITS: 1000 INJECTION, SOLUTION INTRAVENOUS; SUBCUTANEOUS at 17:18

## 2019-05-09 RX ADMIN — SODIUM BICARBONATE 1300 MG: 650 TABLET ORAL at 08:52

## 2019-05-09 RX ADMIN — ASPIRIN 81 MG 81 MG: 81 TABLET ORAL at 08:52

## 2019-05-09 RX ADMIN — SODIUM CHLORIDE: 9 INJECTION, SOLUTION INTRAVENOUS at 17:13

## 2019-05-09 RX ADMIN — INSULIN LISPRO 3 UNITS: 100 INJECTION, SOLUTION INTRAVENOUS; SUBCUTANEOUS at 11:20

## 2019-05-09 RX ADMIN — HYDRALAZINE HYDROCHLORIDE 150 MG: 50 TABLET, FILM COATED ORAL at 23:23

## 2019-05-09 RX ADMIN — AMLODIPINE BESYLATE 10 MG: 5 TABLET ORAL at 10:00

## 2019-05-09 RX ADMIN — Medication 2 G: at 17:00

## 2019-05-09 NOTE — PROGRESS NOTES
GAL attended 's today. Patient is having leadless pacemaker implant this afternoon. Plan remains for patient to return home with is wife, Juan Roberts [phone], and medical follow up. See GAL assessment 5/2/19

## 2019-05-09 NOTE — PROGRESS NOTES
Problem: Falls - Risk of 
Goal: *Absence of Falls Description Document Easter Getting Fall Risk and appropriate interventions in the flowsheet. Outcome: Progressing Towards Goal 
  
Problem: Patient Education: Go to Patient Education Activity Goal: Patient/Family Education Outcome: Progressing Towards Goal 
  
Problem: Patient Education: Go to Patient Education Activity Goal: Patient/Family Education Outcome: Progressing Towards Goal 
  
Problem: Heart Failure: Day 4 Goal: Off Pathway (Use only if patient is Off Pathway) Outcome: Progressing Towards Goal 
Goal: Activity/Safety Outcome: Progressing Towards Goal 
Goal: Discharge Planning Outcome: Progressing Towards Goal 
Goal: Medications Outcome: Progressing Towards Goal 
Goal: Respiratory Outcome: Progressing Towards Goal 
Goal: *Oxygen saturation within defined limits Outcome: Progressing Towards Goal 
Goal: *Hemodynamically stable Outcome: Progressing Towards Goal 
Goal: *Demonstrates progressive activity Outcome: Progressing Towards Goal

## 2019-05-09 NOTE — PROCEDURES
Cardiac Procedure Note Patient: Vinayak Fuller MRN: [id number]  SSN: [id number] YOB: 1935 Age: 81 y.o. Sex: male Date of Procedure: 5/9/2019 Pre-procedure Diagnosis: symptomatic atrial fibrillation with slow ventricular rate Post-procedure Diagnosis: same Procedure: 1. Temporary pacing catheter placement 2. Leadless pacemaker implant 3. Bedside 2 D echo performed by me :  Dr. Jerry Nogueira MD 
 
Assistant(s):  None Anesthesia: Moderate Sedation but need gas, propofol and LMA per anesthesia Estimated Blood Loss: Less than 10 mL Specimens Removed: None Findings: 
Slow rate to 30 bpm and BP did not tolerate sedation so temporary pacing catheter was placed BP got better but need elle and epi 2 D echo did not show effusion Complications: None Implants: Leadless Micra pacemaker Signed by:  Jerry Nogueira MD  5/9/2019  6:22 PM

## 2019-05-09 NOTE — PROGRESS NOTES
Cardiology Progress Note Admit Date: 5/1/2019 Admit Diagnosis: KATALINA (acute kidney injury) ([facility] [N17.9] CHF (congestive heart failure) ([facility] [I50.9] Date: 5/9/2019     Time: 10:36 AM 
Assessment/Plan/Discussion:Cardiology Attending:  
 
Patient seen on the day of progress note and examined  and agree with Advance Practice Provider (MADDIE, NP,PA)  assessment and plans. Bereket Arellano is a 81 y.o. male Feeling fine at rest but tired and SOB with activity · HR low, cant use Coreg yet, so pacer leadless today at 345pm, good idea leadless, less infection risk · SOB , due to renal failure, Cr high, will see once we get pacer in how he does on meds and whether hemodialysis needs to start-renal following · chronic afib -no OAC due to bleed risk, anemia 
· HTN back up, start back on Norvasc Romana Drew, MD   
 
 
 
Subjective: Loyd Arce Pt received in bed, alert and in no distress. He reports he will be getting implantable pacemaker today. Denies any chest pain or SOB. Afib on telemetry. Bradycardic Assessment and Plan 1. Shortness of Breath -Dyspnea 
            - Echo with Severe PHTN - PAS 72 mmHg 
            - BNP elevated at 11,184 on admission 
            - CXR with mild pulmonary infiltrates bilaterally             - TTE on 5/1/2019 showed EF 56-60%, NRWMA.  Mild reduction in RV systolic function. 
            - Chest CT significant for mild worsening of diffuse interstitial parenchymal change 
            - RHC. PCW 28, RV 75.  
2. Bradycardia 
            - Still with Hr to mid 40s at night.  Elevates upon waking or minimal activities - Asymptomatic 
            - Getting pacemaker implanted today 3. Hypertension 
            - SBP 160s             - Restarting Norvasc 10mg PO every day  
            - Hydralazine 150mg PO TID  
 - consider adding beta blocker after pacemaker implanted             - Bumex 2mg PO BID 4. Atrial Fibrillation - chronic 
            - CHADSVASC 5 
            - rate controlled without medication - Getting pacemaker implanted today 
            - Not candidate for [address] due to fall risk, previous GI bleed 5. KATALINA on CKD 
            - Cr 3.88, continues to rise             - Per Nephrology -will likely need HD. 6. DMT2 
            - Per Primary team 
7. Dyslipidemia             - simvastatin on hold 
            - ASA 81mg PO every day Agree with PA-S A&P. Off all AV kyara blockers,  HR as low as 42. Seen by Dr. Krystle Rodgers. Plan for leadless PPM today. Most likely moving towards dialysis. Otherwise, stable from cardiac standpoint. Alicia Cobian PA-C 
 
 
  
ROS: 
 
 GENERAL Recent weight loss - no Fever -----------------   no 
 Chills -----------------   no 
 
 EYES, VISION Visual Changes - no 
 
 EARS, NOSE, THROAT Hearing loss ----------- no 
 Swallowing difficulties - no CARDIOVASCULAR Chest pain/pressure ---- no 
 Arrhythmia/palpitations - no RESPIRATORY Cough ------------------ no Shortness of breath - no Wheezing -------------- no  GASTROINTESTINAL Abdominal pain - no Heartburn -------- no 
 Bloody stool ----- no 
 
 GENITOURINARY Frequent urination - no Urgency -------------- no 
 MUSCULOSKELETAL Joint pain/swelling ---- no 
 Musculoskeletal pain - no 
 
 SKIN & INTEGUMENTARY Rashes - no Sores --- no 
 
 
   NEUROLOGICAL Numbness/tingling - no Sensation loss ------ no 
 
 PSYCHIATRIC Nervousness/anxiety - no Depression -------------- no 
 
 ENDOCRINE Heat/cold intolerance - no Excessive thirst -------- no 
 
 HEMATOLOGIC/LYMPHATIC Abnormal bleeding - no ALL/IMMUN Allergic reaction ------ no 
 Recurrent infections - no Objective: 
 
 Physical Exam: 
             
Visit Vitals /61 (BP 1 Location: Right arm, BP Patient Position: At rest) Pulse (!) 42 Temp 97.3 °F (36.3 °C) Resp 18 Ht 5' 9\" (1.753 m) Wt 82.4 kg (181 lb 9.6 oz) SpO2 98% BMI 26.82 kg/m² General Appearance:   Well developed, well nourished,alert and oriented x 3, and 
 individual in no acute distress. Ears/Nose/Mouth/Throat:    Hearing grossly normal. 
  
    Neck:  Supple. Chest:    Lungs clear to auscultation bilaterally. Cardiovascular:  bradycardic and irregular rhythm, S1, S2 normal, no murmur. Abdomen:    Soft, non-tender, bowel sounds are active. Extremities:  No edema bilaterally. Skin:  Warm and dry. Telemetry: AFIB Data Review:  
 Labs:   
Recent Results (from the past 24 hour(s)) GLUCOSE, POC Collection Time: 05/08/19 11:34 AM  
Result Value Ref Range Glucose (POC) 222 (H) 65 - 100 mg/dL Performed by Teresa Roe, POC Collection Time: 05/08/19  4:13 PM  
Result Value Ref Range Glucose (POC) 180 (H) 65 - 100 mg/dL Performed by Philip Merchant GLUCOSE, POC Collection Time: 05/08/19 10:02 PM  
Result Value Ref Range Glucose (POC) 138 (H) 65 - 100 mg/dL Performed by Judeen Tuan   PCT GLUCOSE, POC Collection Time: 05/09/19 12:53 AM  
Result Value Ref Range Glucose (POC) 137 (H) 65 - 100 mg/dL Performed by Judeen Tuan   PCT METABOLIC PANEL, BASIC Collection Time: 05/09/19  3:41 AM  
Result Value Ref Range Sodium 138 136 - 145 mmol/L Potassium 4.2 3.5 - 5.1 mmol/L Chloride 104 97 - 108 mmol/L  
 CO2 23 21 - 32 mmol/L Anion gap 11 5 - 15 mmol/L Glucose 115 (H) 65 - 100 mg/dL BUN 81 (H) 6 - 20 MG/DL Creatinine 3.88 (H) 0.70 - 1.30 MG/DL  
 BUN/Creatinine ratio 21 (H) 12 - 20 GFR est AA 18 (L) >60 ml/min/1.73m2 GFR est non-AA 15 (L) >60 ml/min/1.73m2 Calcium 8.7 8.5 - 10.1 MG/DL  
GLUCOSE, POC Collection Time: 05/09/19  6:54 AM  
Result Value Ref Range Glucose (POC) 117 (H) 65 - 100 mg/dL Performed by Giovani Dickerson   PCT Radiology: Current Facility-Administered Medications Medication Dose Route Frequency  amLODIPine (NORVASC) tablet 10 mg  10 mg Oral DAILY  bumetanide (BUMEX) tablet 1 mg  1 mg Oral BID  insulin lispro (HUMALOG) injection   SubCUTAneous AC&HS  sodium bicarbonate tablet 1,300 mg  1,300 mg Oral TID  hydrALAZINE (APRESOLINE) tablet 150 mg  150 mg Oral TID  albuterol-ipratropium (DUO-NEB) 2.5 MG-0.5 MG/3 ML  3 mL Nebulization Q6H PRN  
 insulin glargine (LANTUS) injection 16 Units  16 Units SubCUTAneous DAILY  famotidine (PEPCID) tablet 20 mg  20 mg Oral Q24H  
 ondansetron (ZOFRAN ODT) tablet 4 mg  4 mg Oral Q8H PRN  
 sodium chloride (NS) flush 5-40 mL  5-40 mL IntraVENous Q8H  
 sodium chloride (NS) flush 5-40 mL  5-40 mL IntraVENous PRN  
 sodium chloride (NS) flush 5-40 mL  5-40 mL IntraVENous Q8H  
 sodium chloride (NS) flush 5-40 mL  5-40 mL IntraVENous PRN  
 acetaminophen (TYLENOL) tablet 650 mg  650 mg Oral Q4H PRN  
 aspirin chewable tablet 81 mg  81 mg Oral DAILY  glucose chewable tablet 16 g  4 Tab Oral PRN  
 dextrose (D50W) injection syrg 12.5-25 g  12.5-25 g IntraVENous PRN  
 glucagon (GLUCAGEN) injection 1 mg  1 mg IntraMUSCular PRN  
 hydrALAZINE (APRESOLINE) 20 mg/mL injection 20 mg  20 mg IntraVENous Q6H PRN LOUIS Ramírez. MARGARITA Haskins M.D. [facility] [address] [place] [address], [address] [facility] 
 [phone]

## 2019-05-09 NOTE — PROGRESS NOTES
Bedside shift change report given to Farhana Nieves RN (oncoming nurse) by Verena Ring RN (offgoing nurse). Report included the following information SBAR, Kardex, Intake/Output, MAR, Recent Results and Cardiac Rhythm Afib.

## 2019-05-09 NOTE — ROUTINE PROCESS
Cardiac Cath Lab Recovery Arrival Note: 
 
 
Gordy Mcmullen Sr. arrived to Cardiac Cath Lab, Recovery Area. Staff introduced to patient. Patient identifiers verified with NAME and DATE OF BIRTH. Procedure verified with patient. Consent forms reviewed and signed by patient or authorized representative and verified. Allergies verified. Patient and family oriented to department. Patient and family informed of procedure and plan of care. Questions answered with review. Patient prepped for procedure, per orders from physician, prior to arrival. 
 
Patient on cardiac monitor, non-invasive blood pressure, SPO2 monitor. On RA. Patient is A&Ox 4. Patient reports no pain. Patient in stretcher, in low position, with side rails up, call bell within reach, patient instructed to call if assistance as needed. Patient prep in: [address], [place]. Patient family has pager # Family in: . Prep by: FELIPE Rene RN

## 2019-05-09 NOTE — PROGRESS NOTES
Bedside and Verbal shift change report given to Jessica Ramsey (oncoming nurse) by [facility] (offgoing nurse). Report included the following information SBAR, Kardex, Intake/Output, Cardiac Rhythm Afib and Quality Measures.

## 2019-05-09 NOTE — DIABETES MGMT
DTC Progress Note Recommendations/ Comments:  Chart reviewed on [address] for hyperglycemia. If appropriate and once patient is no longer NPO, please consider adding Lispro pre-meal, 2 units tid ac. Current hospital DM medication: lispro insulin correction scale-high sensitivity; Lantus 16 units daily Patient is a 81 y.o. male with known  Type 2 Diabetes on oral agent (monotherapy): glipizide 10 mg pm (Glucotrol) 
insulin injections: Insulin glargine U-300 18 units daily at home. A1c:  
Lab Results Component Value Date/Time Hemoglobin A1c 9.8 (H) 10/20/2017 08:11 AM  
 Hemoglobin A1c 9.0 (H) 06/25/2017 03:39 PM  
 Hemoglobin A1c, External 8.4 08/15/2017 Recent Glucose Results:  
Lab Results Component Value Date/Time  (H) 05/09/2019 03:41 AM  
 GLUCPOC 240 (H) 05/09/2019 11:08 AM  
 GLUCPOC 117 (H) 05/09/2019 06:54 AM  
 GLUCPOC 137 (H) 05/09/2019 12:53 AM  
  
 
Lab Results Component Value Date/Time Creatinine 3.88 (H) 05/09/2019 03:41 AM  
 
Estimated Creatinine Clearance: 14.7 mL/min (A) (based on SCr of 3.88 mg/dL (H)). Active Orders Diet DIET NPO With Sips of Clear Fluids PO intake:  
Patient Vitals for the past 72 hrs: 
 % Diet Eaten 05/08/19 1037 75 % Will continue to follow as needed. Thank you Casandra Ugalde, Certified Diabetes Educator, CDE Time spent: 8 min

## 2019-05-09 NOTE — PROGRESS NOTES
Assumed care of pt this am. Pt is alert and oriented resting in bed at this time.  Pt to get Pacemaker placed today, NPO post breakfast.

## 2019-05-09 NOTE — PROGRESS NOTES
TRANSFER - OUT REPORT: 
 
Verbal report given to Temo Queen on [address] being transferred to CCU from the cath lab (report also given to [facility] in cath lab around 1530 pre-op) for routine post - op. Report consisted of patients Situation, Background, Assessment and  
Recommendations(SBAR). Information from the following report(s) SBAR, Kardex, MAR, Recent Results and Cardiac Rhythm Afib-Sinus Bradycardia was reviewed with the receiving nurse. Lines:  
Peripheral IV 05/06/19 Left;Upper Antecubital (Active) Site Assessment Clean, dry, & intact 5/9/2019 11:13 AM  
Phlebitis Assessment 0 5/9/2019 11:13 AM  
Infiltration Assessment 0 5/9/2019 11:13 AM  
Dressing Status Clean, dry, & intact 5/9/2019 11:13 AM  
Dressing Type Transparent;Tape 5/9/2019 11:13 AM  
Hub Color/Line Status Blue 5/9/2019 12:04 AM  
Action Taken Open ports on tubing capped 5/9/2019 12:04 AM  
Alcohol Cap Used Yes 5/9/2019 12:04 AM  
   
Peripheral IV 05/09/19 Right Antecubital (Active) Site Assessment Clean, dry, & intact 5/9/2019 11:13 AM  
Phlebitis Assessment 0 5/9/2019 11:13 AM  
Infiltration Assessment 0 5/9/2019 11:13 AM  
Dressing Status Clean, dry, & intact 5/9/2019 11:13 AM  
  
 
Opportunity for questions and clarification was provided.

## 2019-05-09 NOTE — PROGRESS NOTES
[facility] 
 [address] [facility], [address] [facility], [facility] Phone: [phone]   [phone]   
  
Nephrology Progress Note Adán Delong.     1935     [id number] Date of Admission : 5/1/2019 05/09/19 CC:  Follow up for KATALINA Assessment and Plan KATALINA  on CKD  
- 2/2  progression of underlying CKD 
- worsening Cr w/ bumex 
-NO RRT indicated today 
- follow bmp CKD IV: 
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 
-- UPCR showed 11 gm of Protein Metablolic Acidosis: 
- cont PO BICARB Dyspnea : 
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  
- RHC w/ PCW 28 HTN: 
- continue current meds Bradycardia : 
 
 
Chronic Anemia: 
- hx of GI bleed, now off AC 
- hgb stable at 11 
  
Afib: 
- per cards 
  
CAD s/p CABG 
  
DM2: 
- on insulin Interval History: 
Seen and examined. Cr. Increased No uremic symptoms Sob improved Review of Systems: A comprehensive review of systems was negative except for that written in the HPI. Current Medications:  
Current Facility-Administered Medications Medication Dose Route Frequency  bumetanide (BUMEX) tablet 1 mg  1 mg Oral BID  insulin lispro (HUMALOG) injection   SubCUTAneous AC&HS  sodium bicarbonate tablet 1,300 mg  1,300 mg Oral TID  hydrALAZINE (APRESOLINE) tablet 150 mg  150 mg Oral TID  albuterol-ipratropium (DUO-NEB) 2.5 MG-0.5 MG/3 ML  3 mL Nebulization Q6H PRN  
 insulin glargine (LANTUS) injection 16 Units  16 Units SubCUTAneous DAILY  famotidine (PEPCID) tablet 20 mg  20 mg Oral Q24H  
 ondansetron (ZOFRAN ODT) tablet 4 mg  4 mg Oral Q8H PRN  
 sodium chloride (NS) flush 5-40 mL  5-40 mL IntraVENous Q8H  
 sodium chloride (NS) flush 5-40 mL  5-40 mL IntraVENous PRN  
 sodium chloride (NS) flush 5-40 mL  5-40 mL IntraVENous Q8H  
 sodium chloride (NS) flush 5-40 mL  5-40 mL IntraVENous PRN  
 acetaminophen (TYLENOL) tablet 650 mg  650 mg Oral Q4H PRN  
  [Held by provider] amLODIPine (NORVASC) tablet 10 mg  10 mg Oral DAILY  aspirin chewable tablet 81 mg  81 mg Oral DAILY  glucose chewable tablet 16 g  4 Tab Oral PRN  
 dextrose (D50W) injection syrg 12.5-25 g  12.5-25 g IntraVENous PRN  
 glucagon (GLUCAGEN) injection 1 mg  1 mg IntraMUSCular PRN  
 hydrALAZINE (APRESOLINE) 20 mg/mL injection 20 mg  20 mg IntraVENous Q6H PRN Allergies Allergen Reactions  Lisinopril Cough Objective: 
Vitals:   
Vitals:  
 05/08/19 2342 05/09/19 0105 05/09/19 0330 05/09/19 0830 BP: 166/57  164/70 148/61 Pulse: (!) 50  (!) 45 (!) 42 Resp: 16  16 18 Temp: 98 °F (36.7 °C)  98.3 °F (36.8 °C) 97.3 °F (36.3 °C) SpO2: 96%  98% 98% Weight:  82.4 kg (181 lb 9.6 oz) Height:      
 
Intake and Output: 
No intake/output data recorded. 05/07 1901 - 05/09 0700 In: -  
Out: 500 [Urine:500] Physical Examination: 
General: nad Neck:  Supple, no mass Resp:  Decreased BS, clear b./l, no wheezing CV:  RRR,  no murmur or rub,no LE edema GI:  Soft, non tender Neurologic:   non focal, normal speech :  No ferrell []    High complexity decision making was performed 
[]    Patient is at high-risk of decompensation with multiple organ involvement Lab Data Personally Reviewed: I have reviewed all the pertinent labs, microbiology data and radiology studies during assessment. Recent Labs 05/09/19 
3772 05/08/19 
0444 05/07/19 
0223  138 138  
K 4.2 4.2 4.0  
 104 108 CO2 23 20* 18* * 151* 115* BUN 81* 84* 82* CREA 3.88* 3.80* 3.58* CA 8.7 8.7 8.5 PHOS  --   --  4.6 ALB  --   --  2.8* Recent Labs 05/07/19 
0805 WBC 6.0 HGB 9.4* HCT 28.7*  
 No results found for: SDES Lab Results Component Value Date/Time Culture result: CANDIDA ALBICANS (A) 03/16/2017 06:00 AM  
 Culture result: NO GROWTH 5 DAYS 03/16/2017 05:40 AM  
 
Recent Results (from the past 24 hour(s)) GLUCOSE, POC  
 Collection Time: 05/08/19 11:34 AM  
Result Value Ref Range Glucose (POC) 222 (H) 65 - 100 mg/dL Performed by Itzel Mahmood, POC Collection Time: 05/08/19  4:13 PM  
Result Value Ref Range Glucose (POC) 180 (H) 65 - 100 mg/dL Performed by Stephanie Nunez GLUCOSE, POC Collection Time: 05/08/19 10:02 PM  
Result Value Ref Range Glucose (POC) 138 (H) 65 - 100 mg/dL Performed by [facility]   PCT GLUCOSE, POC Collection Time: 05/09/19 12:53 AM  
Result Value Ref Range Glucose (POC) 137 (H) 65 - 100 mg/dL Performed by [email or web address] [facility]   PCT METABOLIC PANEL, BASIC Collection Time: 05/09/19  3:41 AM  
Result Value Ref Range Sodium 138 136 - 145 mmol/L Potassium 4.2 3.5 - 5.1 mmol/L Chloride 104 97 - 108 mmol/L  
 CO2 23 21 - 32 mmol/L Anion gap 11 5 - 15 mmol/L Glucose 115 (H) 65 - 100 mg/dL BUN 81 (H) 6 - 20 MG/DL Creatinine 3.88 (H) 0.70 - 1.30 MG/DL  
 BUN/Creatinine ratio 21 (H) 12 - 20 GFR est AA 18 (L) >60 ml/min/1.73m2 GFR est non-AA 15 (L) >60 ml/min/1.73m2 Calcium 8.7 8.5 - 10.1 MG/DL  
GLUCOSE, POC Collection Time: 05/09/19  6:54 AM  
Result Value Ref Range Glucose (POC) 117 (H) 65 - 100 mg/dL Performed by [employer] [facility]   PCT Total time spent with patient:  xxx   min. Care Plan discussed with: 
Patient Family RN Consulting Physician [facility],      
 
I have reviewed the flowsheets. Chart and Pertinent Notes have been reviewed. No change in PMH ,family and social history from Consult note.  
 
 
Gina Moran MD

## 2019-05-09 NOTE — PROGRESS NOTES
Cardiac Electrophysiology Hospital Progress Note Subjective:  
  
Matt Hutchins is a 81 y.o. patient who is seen for evaluation/management of bradycardia. He was admitted on 05/01/2019 for SOB & chest tightness. CXR showed bilateral pulmonary infiltrates. Troponin negative. BNP elevated. Hypertensive upon admission. Bradycardic, carvedilol held. KATALINA noted, not candidate for [facility] due to age/renal function. AF, bradycardia 40s-50s noted on monitor. No SOB at rest, but patient has not been up & around much. BP improved today, last 148/61. RHC (05/02/2019): CO 4.1, PA 75/28, PCW 28, RV 75/18, RA 18. 
 
Echo (05/01/2019): LVEF 35-40%, grade 1 diastolic dysfunction, mild LVH. RV with mildly reduced systolic function. LA mod dilated. RA mildly dilated. MAC, mild MR. Aortic sclerosis with reduced excursion, mild AS. Mild to mod TR. Severe pulmonary HTN. CT chest (05/01/2019): Mild worsening of diffuse interstitial parenchymal change. Cholelithiasis, right medial flank cystic mass, diverticulosis stable. Previous:  
History of chronic AF, rate controlled with carvedilol 25 mg po bid. [id number] 5. Not candidate for [address] due to fall risk, previous GI bleed. Holter (4/29/14): rate  with frequent PACs, rare periods of short RP SVT up to 122  
 
PVD, Dr. Ruben Tim atherectomy to distal SFA and distal popliteal with angioplasty to both lesions and stent to the SFA by Dr. Ruben Tim on 3/14/14.  
  
CAD/CABG x 3 off pump 03/2008 . Post op anemia and renal failure Problem List  Date Reviewed: 12/17/2018 Codes Class Noted CHF (congestive heart failure) (HCC) ICD-10-CM: I50.9 ICD-9-CM: 428.0  5/1/2019 Atrial fibrillation with slow ventricular response ([facility] ICD-10-CM: I48.91 
ICD-9-CM: 427.31  5/1/2019 Overview Signed 5/8/2019 11:24 PM by Nidhi Johns MD  
  Added automatically from request for surgery [id number] Type 2 diabetes with nephropathy ([facility] ICD-10-CM: E11.21 
ICD-9-CM: 250.40, 583.81  7/26/2018 Atrial fibrillation, chronic (HCC) ICD-10-CM: I48.2 ICD-9-CM: 427.31  10/21/2017 Overview Signed 10/21/2017  1:45 PM by Tita Velazquez MD  
  new onset afib with BRPR 10-19-17 admit CKD (chronic kidney disease) stage 3, GFR 30-59 ml/min (HCC) ICD-10-CM: N18.3 ICD-9-CM: 585.3  10/21/2017 Overview Signed 10/21/2017  1:47 PM by Tita Velazquez MD  
  hypertension and DM nephrosclerosis GI bleed ICD-10-CM: K92.2 ICD-9-CM: 578.9  10/20/2017 Hyperglycemia due to type 2 diabetes mellitus ([facility] ICD-10-CM: E11.65 ICD-9-CM: 250.00  10/20/2017 Hypokalemia ICD-10-CM: E87.6 ICD-9-CM: 276.8  6/25/2017 Generalized weakness ICD-10-CM: R53.1 ICD-9-CM: 780.79  6/25/2017 Elevated troponin ICD-10-CM: R74.8 ICD-9-CM: 790.6  6/25/2017 KATALINA (acute kidney injury) ([facility] ICD-10-CM: N17.9 ICD-9-CM: 584.9  6/25/2017 Acute renal failure (ARF) (HCC) ICD-10-CM: N17.9 ICD-9-CM: 584.9  3/16/2017 CKD (chronic kidney disease) ICD-10-CM: N18.9 ICD-9-CM: 585.9  1/20/2017 Type 2 diabetes mellitus with diabetic peripheral angiopathy without gangrene [facility]) ICD-10-CM: E11.51 
ICD-9-CM: 250.70, 443.81  9/27/2015 * (Principal) Dyspnea ICD-10-CM: R06.00 
ICD-9-CM: 786.09  7/15/2014 PVC's (premature ventricular contractions) ICD-10-CM: I49.3 ICD-9-CM: 427.69  5/26/2014 Carotid arterial disease (HCC) ICD-10-CM: I77.9 ICD-9-CM: 447.9  Unknown Hypercholesteremia ICD-10-CM: E78.00 ICD-9-CM: 272.0  Unknown PVD (peripheral vascular disease) ([facility] ICD-10-CM: I73.9 ICD-9-CM: 443.9  Unknown Bradycardia ICD-10-CM: R00.1 ICD-9-CM: 427.89  Unknown CAD (coronary artery disease) ICD-10-CM: I25.10 ICD-9-CM: 414.00  Unknown Hypertension, essential, benign ICD-10-CM: I10 
ICD-9-CM: 401.1  Unknown No current facility-administered medications on file prior to encounter. Current Outpatient Medications on File Prior to Encounter Medication Sig Dispense Refill  glipiZIDE SR (GLUCOTROL XL) 10 mg CR tablet Take 10 mg by mouth every evening.  insulin glargine U-300 conc 300 unit/mL (1.5 mL) inpn 18 Units by SubCUTAneous route every morning.  aspirin 81 mg chewable tablet Take 1 Tab by mouth daily. 33 Tab 11  
 bumetanide (BUMEX) 1 mg tablet Take 1 mg by mouth two (2) times a day.  amLODIPine (NORVASC) 10 mg tablet Take 1 Tab by mouth daily. 30 Tab 0  cloNIDine HCl (CATAPRES) 0.1 mg tablet Take 1 Tab by mouth two (2) times a day. 60 Tab 5  carvedilol (COREG) 25 mg tablet Take 1 Tab by mouth two (2) times a day. 60 Tab 0  
 simvastatin (ZOCOR) 20 mg tablet Take 20 mg by mouth nightly.  omega 3-dha-epa-fish oil (FISH OIL) 100-160-1,000 mg cap Take 1 Cap by mouth two (2) times a day.  cinnamon bark (CINNAMON) 500 mg cap Take 1,000 mg by mouth two (2) times a day. Current Facility-Administered Medications Medication Dose Route Frequency Provider Last Rate Last Dose  sodium chloride (NS) flush 5-40 mL  5-40 mL IntraVENous Q8H Chato Barrow MD      
 sodium chloride (NS) flush 5-40 mL  5-40 mL IntraVENous PRN Chato Barrow MD      
 ceFAZolin (ANCEF) 2 g/20 mL in sterile water IV syringe  2 g IntraVENous Aurelia Miranda MD      
 amLODIPine (NORVASC) tablet 10 mg  10 mg Oral DAILY Parminder Perez PA-C      
 bumetanide Debria Netter) tablet 1 mg  1 mg Oral BID Bubba Snow MD   1 mg at 05/09/19 3352  insulin lispro (HUMALOG) injection   SubCUTAneous AC&HS Poncho HERMOSILLO DO   Stopped at 05/08/19 2200  
 sodium bicarbonate tablet 1,300 mg  1,300 mg Oral TID Tae Adair MD   1,300 mg at 05/09/19 5830  hydrALAZINE (APRESOLINE) tablet 150 mg  150 mg Oral TID Emelia Carrion MD   150 mg at 05/09/19 2310  albuterol-ipratropium (DUO-NEB) 2.5 MG-0.5 MG/3 ML  3 mL Nebulization Q6H PRN ARISTEO Fritz MD      
 insulin glargine (LANTUS) injection 16 Units  16 Units SubCUTAneous DAILY Marko Fritz MD   16 Units at 05/09/19 7718  famotidine (PEPCID) tablet 20 mg  20 mg Oral Q24H DINAH Fritz MD   20 mg at 05/09/19 0020  ondansetron (ZOFRAN ODT) tablet 4 mg  4 mg Oral Q8H PRN Lam WLYQODDAVID LAZO MD      
 sodium chloride (NS) flush 5-40 mL  5-40 mL IntraVENous Q8H Jodi Raymond MD   10 mL at 05/08/19 0600  
 sodium chloride (NS) flush 5-40 mL  5-40 mL IntraVENous PRN Jodi Raymond MD      
 sodium chloride (NS) flush 5-40 mL  5-40 mL IntraVENous Q8H Lam OTLEXII LAZO MD   10 mL at 05/08/19 2216  sodium chloride (NS) flush 5-40 mL  5-40 mL IntraVENous PRN Lam PYTREVER LAZO MD      
 acetaminophen (TYLENOL) tablet 650 mg  650 mg Oral Q4H PRN Lam TPJAMESON LAZO MD      
 aspirin chewable tablet 81 mg  81 mg Oral DAILY aLm QRSRENÉE LAZO MD   81 mg at 05/09/19 1817  glucose chewable tablet 16 g  4 Tab Oral PRN Lam HDKPSANNELISEL MD CLIFTON      
 dextrose (D50W) injection syrg 12.5-25 g  12.5-25 g IntraVENous PRN Lam WOPGHANNELISER MD [place]ON      
 glucagon (GLUCAGEN) injection 1 mg  1 mg IntraMUSCular PRN Lam QINQKMF MD CLIFTON      
 hydrALAZINE (APRESOLINE) 20 mg/mL injection 20 mg  20 mg IntraVENous Q6H PRN Marko Fritz MD   20 mg at 05/07/19 0518 Allergies Allergen Reactions  Lisinopril Cough Past Medical History:  
Diagnosis Date  CAD (coronary artery disease) s/p CABG 2008  Carotid arterial disease ([facility]  CKD (chronic kidney disease) stage 3, GFR 30-59 ml/min ([facility]) 10/21/2017  
 hypertension and DM nephrosclerosis  Diabetes mellitus, type 2 ([facility]  Hypercholesteremia  Hypertension  Paroxysmal atrial fibrillation ([facility] 10/21/2017  
 new onset afib with BRPR 10-19-17 admit  PVC's (premature ventricular contractions) 5/26/2014  PVD (peripheral vascular disease) ([facility] Past Surgical History:  
Procedure Laterality Date  HX CORONARY ARTERY BYPASS GRAFT    
 HX HEART CATHETERIZATION History reviewed. No pacemaker in family history. Social History Tobacco Use  Smoking status: Former Smoker Packs/day: 3.00 Years: 20.00 Pack years: 60.00 Types: Cigarettes Last attempt to quit: 1985 Years since quittin.3  Smokeless tobacco: Never Used Substance Use Topics  Alcohol use: No  
  
 
Review of Systems:  
Constitutional: Negative for fever, chills, weight loss, + malaise/fatigue. HEENT: Negative for nosebleeds, vision changes. Respiratory: Negative for cough, hemoptysis Cardiovascular: Negative for chest pain, palpitations, + recent orthopnea, claudication, + leg swelling, syncope, and + recent PND. + AGUILAR Gastrointestinal: Negative for nausea, vomiting, diarrhea, blood in stool and melena. Genitourinary: Negative for dysuria, and hematuria. Musculoskeletal: Negative for myalgias, arthralgia. Skin: Negative for rash. Heme: Does not bleed or bruise easily. Neurological: Negative for speech change and focal weakness Objective:  
 
Visit Vitals /61 (BP 1 Location: Right arm, BP Patient Position: At rest) Pulse (!) 42 Temp 97.3 °F (36.3 °C) Resp 18 Ht 5' 9\" (1.753 m) Wt 181 lb 9.6 oz (82.4 kg) SpO2 98% BMI 26.82 kg/m² Physical Exam:  
Constitutional: well-developed and well-nourished. No respiratory distress. Head: Normocephalic and atraumatic. Eyes: Pupils are equal, round ENT: hearing normal 
Neck: supple. No JVD present. Cardiovascular: irregular rhythm. 2/6 systolic murmur heard. Pulmonary/Chest: Effort normal and breath sounds normal. No wheezes. Abdominal: Soft, no tenderness. Musculoskeletal: no edema. Neurological: alert,oriented. Skin: Skin is warm and dry Psychiatric: normal mood and affect. Behavior is normal. Judgment and thought content normal.   
 
EKG (05/01/2019): AF with ventricular rate 66 bpm, RBBB. Assessment/Plan:  
Mr. Godfrey Riddle was admitted with HFpEF, has responded somewhat to diuresis. Tachy daisy syndrome. Chronic AF, bradycardic during admission despite stopping carvedilol. No other AV kyara blockers on board. Reports SOB with exertion, fatigue. With his chronic AF, he will have need for rate control at some point in the future. Unable to use meds for rate control currently due to bradycardia. Risks/benefits of pacemaker implant discussed with patient, leadless vs transvenous. He said he would be ok with either. Recommend leadless pacemaker due to lower infection risk. He does not need atrial pacing lead, as he has chronic atrial fibrillation. Will plan leadless pacemaker implant later this afternoon. Recommend resuming carvedilol for rate control of occasional RVR after pacemaker is implanted. [id number] 5. No OAC due to fall risk & previous GI bleed. [facility] [address] [facility] 05/09/19 Addendum from EP attending: 
 I have seen, examined patient, and discussed with nurse practitioner, registered nurse, reviewed, updated note and agree with the assessment and plan I have talked to him this am. He was fatigued with walking Vital signs as above Exam shows irregular rhythm and slow rate Assessment and Plan: 
Continue to proceed with leadless pacemaker implant for symptomatic permanent atrial fibrillation with slow ventricular rate He has been in atrial fibrillation all the times at least since 2017 He agrees to proceed Transvenous system is higher risk of infection and uses up his venous flow for possible dialysis in the near future Thank you for involving me in this patient's care and please call with further concerns or questions.  
 
 
Jazlyn Ziegler M.D. 
 Electrophysiology/Cardiology [address] [facility] [place] [address] [place] [address] 
[phone]

## 2019-05-09 NOTE — PROGRESS NOTES
TRANSFER - OUT REPORT: 
 
Verbal report given to Kecia Cordon on [address]. being transferred to CCU for routine progression of care Report consisted of patients Situation, Background, Assessment and  
Recommendations(SBAR). Information from the following report(s) SBAR, Procedure Summary and MAR was reviewed with the receiving nurse. Opportunity for questions and clarification was provided.

## 2019-05-09 NOTE — PROGRESS NOTES
1815 TRANSFER - IN REPORT: 
 
Verbal report received from Susan(name) on [facility] .  being received from Cath Lab(unit) for routine progression of care Report consisted of patients Situation, Background, Assessment and  
Recommendations(SBAR). Information from the following report(s) SBAR, Kardex, Procedure Summary, Intake/Output, MAR, Accordion and Recent Results was reviewed with the receiving nurse. Opportunity for questions and clarification was provided. Assessment completed upon patients arrival to unit and care assumed. 1820 Received report from Jeremiah from [address] Pt arrived to unit, R groin site clean, dry, intact. No bleeding or hematoma. 1930 Bedside and Verbal shift change report given to Jeremiah (oncoming nurse) by Real Angelo (offgoing nurse). Report included the following information SBAR, Kardex, ED Summary, Procedure Summary, Intake/Output, MAR, Accordion and Recent Results.

## 2019-05-09 NOTE — PROGRESS NOTES
Cardiac Cath Lab Procedure Area Arrival Note: 
 
Yelena Hills. arrived to Cardiac Cath Lab, Procedure Area. Patient identifiers verified with NAME and DATE OF BIRTH. Procedure verified with patient. Consent forms verified. Allergies verified. Patient informed of procedure and plan of care. Questions answered with review. Patient voiced understanding of procedure and plan of care. Patient on cardiac monitor, non-invasive blood pressure, SPO2 monitor. On  O2 @ 3 lpm via nc. IV of ns on pump at 25 ml/hr. Patient status doing well without problems. Patient is A&Ox 3. Patient reports no pain. Patient medicated during procedure with orders obtained and verified by Dr. Kirstin Jose. CRNA here for sedation and airway management. Refer to patients Cardiac Cath Lab PROCEDURE REPORT for vital signs, assessment, status, and response during procedure, printed at end of case. Printed report on chart or scanned into chart.

## 2019-05-09 NOTE — ANESTHESIA PREPROCEDURE EVALUATION
Relevant Problems No relevant active problems Anesthetic History No history of anesthetic complications Review of Systems / Medical History Patient summary reviewed, nursing notes reviewed and pertinent labs reviewed Pulmonary Within defined limits Neuro/Psych Within defined limits Cardiovascular Hypertension Dysrhythmias : atrial fibrillation CAD, PAD, cardiac stents and CABG 
 
 
  
GI/Hepatic/Renal 
  
 
 
Renal disease: CRI Endo/Other Diabetes: using insulin Other Findings Physical Exam 
 
Airway Mallampati: II 
TM Distance: > 6 cm Neck ROM: normal range of motion Mouth opening: Normal 
 
 Cardiovascular Regular rate and rhythm,  S1 and S2 normal,  no murmur, click, rub, or gallop Dental 
No notable dental hx Pulmonary Breath sounds clear to auscultation Abdominal 
GI exam deferred Other Findings Anesthetic Plan ASA: 4 Anesthesia type: MAC Induction: Intravenous Anesthetic plan and risks discussed with: Patient

## 2019-05-10 LAB
ANION GAP SERPL CALC-SCNC: 11 MMOL/L (ref 5–15)
BUN SERPL-MCNC: 81 MG/DL (ref 6–20)
BUN/CREAT SERPL: 21 (ref 12–20)
CALCIUM SERPL-MCNC: 8 MG/DL (ref 8.5–10.1)
CHLORIDE SERPL-SCNC: 103 MMOL/L (ref 97–108)
CO2 SERPL-SCNC: 23 MMOL/L (ref 21–32)
CREAT SERPL-MCNC: 3.82 MG/DL (ref 0.7–1.3)
ERYTHROCYTE [DISTWIDTH] IN BLOOD BY AUTOMATED COUNT: 14.3 % (ref 11.5–14.5)
GLUCOSE BLD STRIP.AUTO-MCNC: 168 MG/DL (ref 65–100)
GLUCOSE BLD STRIP.AUTO-MCNC: 173 MG/DL (ref 65–100)
GLUCOSE BLD STRIP.AUTO-MCNC: 235 MG/DL (ref 65–100)
GLUCOSE BLD STRIP.AUTO-MCNC: 287 MG/DL (ref 65–100)
GLUCOSE SERPL-MCNC: 172 MG/DL (ref 65–100)
HCT VFR BLD AUTO: 26.3 % (ref 36.6–50.3)
HGB BLD-MCNC: 8.4 G/DL (ref 12.1–17)
MCH RBC QN AUTO: 30.3 PG (ref 26–34)
MCHC RBC AUTO-ENTMCNC: 31.9 G/DL (ref 30–36.5)
MCV RBC AUTO: 94.9 FL (ref 80–99)
NRBC # BLD: 0 K/UL (ref 0–0.01)
NRBC BLD-RTO: 0 PER 100 WBC
PLATELET # BLD AUTO: 161 K/UL (ref 150–400)
PMV BLD AUTO: 11.5 FL (ref 8.9–12.9)
POTASSIUM SERPL-SCNC: 4.6 MMOL/L (ref 3.5–5.1)
RBC # BLD AUTO: 2.77 M/UL (ref 4.1–5.7)
SERVICE CMNT-IMP: ABNORMAL
SODIUM SERPL-SCNC: 137 MMOL/L (ref 136–145)
WBC # BLD AUTO: 7 K/UL (ref 4.1–11.1)

## 2019-05-10 PROCEDURE — 85027 COMPLETE CBC AUTOMATED: CPT

## 2019-05-10 PROCEDURE — 77030011943

## 2019-05-10 PROCEDURE — 36415 COLL VENOUS BLD VENIPUNCTURE: CPT

## 2019-05-10 PROCEDURE — 74011250637 HC RX REV CODE- 250/637: Performed by: INTERNAL MEDICINE

## 2019-05-10 PROCEDURE — 74011250637 HC RX REV CODE- 250/637: Performed by: HOSPITALIST

## 2019-05-10 PROCEDURE — 65660000001 HC RM ICU INTERMED STEPDOWN

## 2019-05-10 PROCEDURE — 74011250637 HC RX REV CODE- 250/637: Performed by: NURSE PRACTITIONER

## 2019-05-10 PROCEDURE — 80048 BASIC METABOLIC PNL TOTAL CA: CPT

## 2019-05-10 PROCEDURE — 74011636637 HC RX REV CODE- 636/637: Performed by: HOSPITALIST

## 2019-05-10 PROCEDURE — 51798 US URINE CAPACITY MEASURE: CPT

## 2019-05-10 PROCEDURE — 82962 GLUCOSE BLOOD TEST: CPT

## 2019-05-10 PROCEDURE — 74011636637 HC RX REV CODE- 636/637: Performed by: INTERNAL MEDICINE

## 2019-05-10 PROCEDURE — 74011250637 HC RX REV CODE- 250/637: Performed by: PHYSICIAN ASSISTANT

## 2019-05-10 RX ORDER — CEPHALEXIN 250 MG/1
250 CAPSULE ORAL 3 TIMES DAILY
Status: DISCONTINUED | OUTPATIENT
Start: 2019-05-10 | End: 2019-05-12 | Stop reason: HOSPADM

## 2019-05-10 RX ORDER — TAMSULOSIN HYDROCHLORIDE 0.4 MG/1
0.4 CAPSULE ORAL DAILY
Status: DISCONTINUED | OUTPATIENT
Start: 2019-05-10 | End: 2019-05-12 | Stop reason: HOSPADM

## 2019-05-10 RX ORDER — CARVEDILOL 12.5 MG/1
12.5 TABLET ORAL 2 TIMES DAILY WITH MEALS
Status: DISCONTINUED | OUTPATIENT
Start: 2019-05-10 | End: 2019-05-12 | Stop reason: HOSPADM

## 2019-05-10 RX ADMIN — CEPHALEXIN 250 MG: 250 CAPSULE ORAL at 16:03

## 2019-05-10 RX ADMIN — ASPIRIN 81 MG 81 MG: 81 TABLET ORAL at 08:11

## 2019-05-10 RX ADMIN — FAMOTIDINE 20 MG: 20 TABLET ORAL at 08:10

## 2019-05-10 RX ADMIN — Medication 10 ML: at 21:49

## 2019-05-10 RX ADMIN — INSULIN LISPRO 2 UNITS: 100 INJECTION, SOLUTION INTRAVENOUS; SUBCUTANEOUS at 07:13

## 2019-05-10 RX ADMIN — Medication 10 ML: at 14:15

## 2019-05-10 RX ADMIN — BUMETANIDE 1 MG: 1 TABLET ORAL at 17:12

## 2019-05-10 RX ADMIN — SODIUM BICARBONATE 1300 MG: 650 TABLET ORAL at 21:47

## 2019-05-10 RX ADMIN — AMLODIPINE BESYLATE 10 MG: 5 TABLET ORAL at 08:11

## 2019-05-10 RX ADMIN — HYDRALAZINE HYDROCHLORIDE 150 MG: 50 TABLET, FILM COATED ORAL at 08:10

## 2019-05-10 RX ADMIN — INSULIN LISPRO 3 UNITS: 100 INJECTION, SOLUTION INTRAVENOUS; SUBCUTANEOUS at 17:12

## 2019-05-10 RX ADMIN — Medication 10 ML: at 14:16

## 2019-05-10 RX ADMIN — CARVEDILOL 12.5 MG: 12.5 TABLET, FILM COATED ORAL at 17:12

## 2019-05-10 RX ADMIN — BUMETANIDE 1 MG: 1 TABLET ORAL at 08:10

## 2019-05-10 RX ADMIN — INSULIN LISPRO 5 UNITS: 100 INJECTION, SOLUTION INTRAVENOUS; SUBCUTANEOUS at 11:10

## 2019-05-10 RX ADMIN — TAMSULOSIN HYDROCHLORIDE 0.4 MG: 0.4 CAPSULE ORAL at 10:00

## 2019-05-10 RX ADMIN — HYDRALAZINE HYDROCHLORIDE 150 MG: 50 TABLET, FILM COATED ORAL at 21:46

## 2019-05-10 RX ADMIN — INSULIN GLARGINE 16 UNITS: 100 INJECTION, SOLUTION SUBCUTANEOUS at 08:11

## 2019-05-10 RX ADMIN — SODIUM BICARBONATE 1300 MG: 650 TABLET ORAL at 16:03

## 2019-05-10 RX ADMIN — HYDRALAZINE HYDROCHLORIDE 150 MG: 50 TABLET, FILM COATED ORAL at 16:03

## 2019-05-10 RX ADMIN — SODIUM BICARBONATE 1300 MG: 650 TABLET ORAL at 08:10

## 2019-05-10 RX ADMIN — CEPHALEXIN 250 MG: 250 CAPSULE ORAL at 21:47

## 2019-05-10 NOTE — PROGRESS NOTES
Hospitalist Progress Note [address] [facility],  Answering service: [phone] OR [phone] from in house phone Date of Service:  5/10/2019 NAME:  Dary Fitch Sr. 
:  1935 MRN:  [id number] Admission Summary:  
 
 This is an 60-year-old gentleman with a significant cardiac history of coronary artery disease, status post coronary artery bypass grafting; peripheral arterial disease, status post angioplasty and stenting; type 2 diabetes; and hyperlipidemia, presented with weeks of dyspnea. Interval history / Subjective:  
   
Patient seen and examined. Patient underwent pacemaker insertion yesterday. Bradycardic and hypotensive prior to procedure. Required temporary pacemaker with then leadless pacemaker insertion. Patient doing well this AM. Does have urinary retention, requiring straight cath. Will transfer out of ICU. Discussed with cardiology. Plans for BB reinitiated. Assessment & Plan: SSS with Bradycardia, HR recorded low 30s -leadless pacemaker . Initially bradycardic and hypotensive prior to procedure, requiring temp pacemaker and pressors Acute on chronic kidney failure (CKD IV) with cardiorenal syndrome 
-secondary to diabetic nephropathy  
-bumex BID, decreased , will continue to monitor creatinine 
-possible need dialysis in future, will follow up outpatient 
-RHC showed PCWP of 28 Urinary retention:  
-straight cath as needed, monitor Exertional Dyspnea in the setting of Severe Pulmonary Hypertension: 
-ECHO with severe PHTN - PAS 72 mmHg 
-Chest CT significant for mild worsening of diffuse interstitial parenchymal change 
-RHC. PCW 28, RV 75. Metabolic Acidosis:  
-oral bicarbonate supplements  
-nephrology managing Type 2 diabetes with hyperglycemia 
-Lantus 16 units 
-continue to Hold oral hypoglycemics -Accucheks and Humalog correction AC&HS 
  
Chronic atrial fibrillation: -no anticoagulation due to history of GI bleed Code status: Full DVT prophylaxis: Heparin Care Plan discussed with: Patient/Family and Nurse Disposition: Home w/Family Hospital Problems  Date Reviewed: 12/17/2018 Codes Class Noted POA Pacemaker ICD-10-CM: Z95.0 ICD-9-CM: V45.01  5/9/2019 Unknown Overview Signed 5/9/2019  6:22 PM by Kentrell Echols MD  
  5/9/2019 leadless pacer implant CHF (congestive heart failure) ([facility]) ICD-10-CM: I50.9 ICD-9-CM: 428.0  5/1/2019 Unknown Atrial fibrillation with slow ventricular response ([facility] ICD-10-CM: I48.91 
ICD-9-CM: 427.31  5/1/2019 Overview Signed 5/8/2019 11:24 PM by Kentrell Echols MD  
  Added automatically from request for surgery [id number] KATALINA (acute kidney injury) ([email or web address] ICD-10-CM: N17.9 ICD-9-CM: 584.9  6/25/2017 Unknown * (Principal) Dyspnea ICD-10-CM: R06.00 
ICD-9-CM: 786.09  7/15/2014 Unknown Review of Systems:  
Negative unless stated above Vital Signs:  
 Last 24hrs VS reviewed since prior progress note. Most recent are: 
Visit Vitals /67 Pulse 83 Temp 98.6 °F (37 °C) Resp 28 Ht 5' 9\" (1.753 m) Wt 85.1 kg (187 lb 9.8 oz) SpO2 96% BMI 27.71 kg/m² Intake/Output Summary (Last 24 hours) at 5/10/2019 1200 Last data filed at 5/10/2019 0930 Gross per 24 hour Intake 500 ml Output 875 ml Net -375 ml Physical Examination:  
 
 
     
Constitutional:  No acute distress, cooperative, pleasant   
ENT:  Oral mucous moist, oropharynx benign. Neck supple, Resp:  Lung sounds diminished throughout. No wheezing/rhonchi/rales. No accessory muscle use CV:  regular, no murmurs, gallops, rubs GI:  Soft, non distended, non tender. normoactive bowel sounds, no hepatosplenomegaly Musculoskeletal:  No edema, warm, 2+ pulses throughout Neurologic:  Moves all extremities Psych:  Good insight, Not anxious nor agitated. Data Review: Review and/or order of clinical lab test 
Review and/or order of tests in the radiology section of CPT Review and/or order of tests in the medicine section of CPT Labs:  
 
Recent Labs 05/10/19 
0158 WBC 7.0 HGB 8.4* HCT 26.3*  
 Recent Labs 05/10/19 
0158 05/09/19 
0341 05/08/19 
0256  138 138  
K 4.6 4.2 4.2  104 104 CO2 23 23 20* BUN 81* 81* 84* CREA 3.82* 3.88* 3.80* * 115* 151* CA 8.0* 8.7 8.7 No results for input(s): SGOT, GPT, ALT, AP, TBIL, TBILI, TP, ALB, GLOB, GGT, AML, LPSE in the last 72 hours. No lab exists for component: AMYP, HLPSE No results for input(s): INR, PTP, APTT in the last 72 hours. No lab exists for component: INREXT, INREXT No results for input(s): FE, TIBC, PSAT, FERR in the last 72 hours. No results found for: FOL, RBCF No results for input(s): PH, PCO2, PO2 in the last 72 hours. No results for input(s): CPK, CKNDX, TROIQ in the last 72 hours. No lab exists for component: CPKMB No results found for: CHOL, CHOLX, CHLST, CHOLV, HDL, LDL, LDLC, DLDLP, TGLX, TRIGL, TRIGP, CHHD, CHHDX Lab Results Component Value Date/Time Glucose (POC) 287 (H) 05/10/2019 11:07 AM  
 Glucose (POC) 173 (H) 05/10/2019 07:02 AM  
 Glucose (POC) 140 (H) 05/09/2019 10:48 PM  
 Glucose (POC) 92 05/09/2019 06:48 PM  
 Glucose (POC) 240 (H) 05/09/2019 11:08 AM  
 
Lab Results Component Value Date/Time  Color YELLOW/STRAW 06/25/2017 11:40 AM  
 Appearance CLEAR 06/25/2017 11:40 AM  
 Specific gravity 1.018 06/25/2017 11:40 AM  
 pH (UA) 5.0 06/25/2017 11:40 AM  
 Protein 300 (A) 06/25/2017 11:40 AM  
 Glucose NEGATIVE  06/25/2017 11:40 AM  
 Ketone NEGATIVE  06/25/2017 11:40 AM  
 Bilirubin NEGATIVE  06/25/2017 11:40 AM  
 Urobilinogen 0.2 06/25/2017 11:40 AM  
 Nitrites NEGATIVE  06/25/2017 11:40 AM  
 Leukocyte Esterase NEGATIVE  06/25/2017 11:40 AM  
 Epithelial cells FEW 06/25/2017 11:40 AM  
 Bacteria NEGATIVE  06/25/2017 11:40 AM  
 WBC 0-4 06/25/2017 11:40 AM  
 RBC 0-5 06/25/2017 11:40 AM  
 
 
 
Medications Reviewed:  
 
Current Facility-Administered Medications Medication Dose Route Frequency  tamsulosin (FLOMAX) capsule 0.4 mg  0.4 mg Oral DAILY  amLODIPine (NORVASC) tablet 10 mg  10 mg Oral DAILY  sodium chloride (NS) flush 5-40 mL  5-40 mL IntraVENous Q8H  
 sodium chloride (NS) flush 5-40 mL  5-40 mL IntraVENous PRN  
 bumetanide (BUMEX) tablet 1 mg  1 mg Oral BID  insulin lispro (HUMALOG) injection   SubCUTAneous AC&HS  sodium bicarbonate tablet 1,300 mg  1,300 mg Oral TID  hydrALAZINE (APRESOLINE) tablet 150 mg  150 mg Oral TID  albuterol-ipratropium (DUO-NEB) 2.5 MG-0.5 MG/3 ML  3 mL Nebulization Q6H PRN  
 insulin glargine (LANTUS) injection 16 Units  16 Units SubCUTAneous DAILY  famotidine (PEPCID) tablet 20 mg  20 mg Oral Q24H  
 ondansetron (ZOFRAN ODT) tablet 4 mg  4 mg Oral Q8H PRN  
 sodium chloride (NS) flush 5-40 mL  5-40 mL IntraVENous Q8H  
 sodium chloride (NS) flush 5-40 mL  5-40 mL IntraVENous PRN  
 sodium chloride (NS) flush 5-40 mL  5-40 mL IntraVENous Q8H  
 sodium chloride (NS) flush 5-40 mL  5-40 mL IntraVENous PRN  
 acetaminophen (TYLENOL) tablet 650 mg  650 mg Oral Q4H PRN  
 aspirin chewable tablet 81 mg  81 mg Oral DAILY  glucose chewable tablet 16 g  4 Tab Oral PRN  
 dextrose (D50W) injection syrg 12.5-25 g  12.5-25 g IntraVENous PRN  
 glucagon (GLUCAGEN) injection 1 mg  1 mg IntraMUSCular PRN  
 hydrALAZINE (APRESOLINE) 20 mg/mL injection 20 mg  20 mg IntraVENous Q6H PRN  
 
______________________________________________________________________ EXPECTED LENGTH OF STAY: 2d 9h 
ACTUAL LENGTH OF STAY:          9 50[address] [facility],

## 2019-05-10 NOTE — PROGRESS NOTES
Problem: Pacer/ICD: Post-Procedure Goal: Activity/Safety Outcome: Progressing Towards Goal 
Goal: Respiratory Outcome: Progressing Towards Goal 
Goal: *Optimal pain control at patient's stated goal 
Outcome: Progressing Towards Goal 
Goal: *Absence of signs and symptoms of infection or wound complication Outcome: Progressing Towards Goal 
 Pt received from CCU without complaints, pt requesting to ambulate in hallway. Pt used walker with no other assistance and ambulated in hallway on room air without difficulty. Pt states, \"I was short of breath walking this far prior to my pacemaker, but I am not short of breath anymore. \" Will continue to monitor and educate. Bedside shift change report given to Susana Don RN (oncoming nurse) by Marisa Blake (offgoing nurse). Report included the following information SBAR, Kardex, ED Summary, Procedure Summary, Intake/Output, MAR, Recent Results, Med Rec Status, Cardiac Rhythm Paced, Alarm Parameters  and Quality Measures.

## 2019-05-10 NOTE — PROGRESS NOTES
Bedside shift change report given to Nura Calderon by Riley Crews. Report included the following information SBAR, Kardex, ED Summary, Procedure Summary, Recent Results and Cardiac Rhythm Paced.

## 2019-05-10 NOTE — PROGRESS NOTES
As a member of the [facility] [place], I saw Mr. Montes this morning prior to his pacermaker implantation. We discussed factors that may influence transition. He shared with me that prior to hospitalization he was independent in all ADLs and working part time at [employer] in the [facility], which he enjoyed. We reviewed his medication list. He reports being comfortable with BS management and insulin administration. He has renal insufficiency and this will likely impact medications at discharge, including diuretic dosing. Will continue to follow along as he prepares for discharge.

## 2019-05-10 NOTE — PROGRESS NOTES
Cardiology Progress Note Admit Date: 5/1/2019 Admit Diagnosis: [facility] [N17.9] CHF (congestive heart failure) ([facility] [I50.9] Date: 5/10/2019     Time: 10:36 AM 
Assessment/Plan/Discussion:Cardiology Attending:  
 
Patient seen on the day of progress note and examined  and agree with Advance Practice Provider (MADDIE, NP,PA)  assessment and plans. Kassandra Iniguez is a 81 y.o. male Moved to unit s/p leadless pacer due to hypotension, mild shock with pressors Now weaning off Clear lungs, IRIR, no cp Seems more to baseline in terms of energy Severe PA HTN See if we can add back rate control meds now with pacer for chronic afib Acute on chronic diastolic CHF  -due to KATALINA on CKD , improving edema, still likely to need hemodialysis in next 6 months 
Leno Officer, MD   
 
 
 
Subjective: In CCU following leadless PPM placement for hypotension and need for pressor support with procedure. Off pressors, BP elevated. Patient received OOB to chair eating breakfast.  He is doing very well. Good coloring and feeling better. Assessment and Plan 1. Shortness of Breath -Dyspnea 
            - Echo with Severe PHTN - PAS 72 mmHg 
            - BNP elevated at 11,184 on admission 
            - CXR with mild pulmonary infiltrates bilaterally             - TTE on 5/1/2019 showed EF 56-60%, NRWMA.  Mild reduction in RV systolic function. 
            - Chest CT significant for mild worsening of diffuse interstitial parenchymal change 
            - RHC. PCW 28, RV 75.  
2. Bradycardia 
            - Resolved - s/p leadless PPM 
 - HR set to 70 bpm 
3. Hypertension 
            - SBP 160s             - Norvasc 10mg PO every day  
            - Hydralazine 150mg PO TID  
 - restarting Coreg at 12.5 mg BID 
            - Bumex 1mg PO BID 4.  Atrial Fibrillation - chronic 
            - CHADSVASC 5 
             - rate controlled without medication - s/p PPM 
            - Not candidate for [address] [employer] due to fall risk, previous GI bleed 5. KATALINA on CKD 
            - Cr 3.82, perhaps higher HR will improve Cr             - Per Nephrology -will likely need HD, but not at this time 6. DMT2 
            - Per Primary team 
7. Dyslipidemia             - simvastatin on hold 
            - ASA 81mg PO every day He is doing well. Coloring looks better and he feels better. BP issues resolved. Will transfer to step down and restart Coreg 12.5 mg BID for his BP. Cr down just a touch, but hopefully better HR will improve perfusion. Will need to walk and hopefully discharge soon. Follow up as below. Future Appointments Date Time Provider Muna Delgado 5/16/2019  2:20 PM MD juliette Alatorre ANAHI KAREN  
5/17/2019 10:45 AM PACEMAKER3, [address] [facility]  
5/17/2019 11:00 AM HOLTER, [address] [facility]  
5/22/2019  4:00 PM Thao Tucker MD [address]: 
 
 GENERAL Recent weight loss - no Fever -----------------   no 
 Chills -----------------   no 
 
 EYES, VISION Visual Changes - no 
 
 EARS, NOSE, THROAT Hearing loss ----------- no 
 Swallowing difficulties - no CARDIOVASCULAR Chest pain/pressure ---- no 
 Arrhythmia/palpitations - no RESPIRATORY Cough ------------------ no Shortness of breath - no Wheezing -------------- no GASTROINTESTINAL Abdominal pain - no Heartburn -------- no 
 Bloody stool ----- no 
 
 GENITOURINARY Frequent urination - no Urgency -------------- no 
 MUSCULOSKELETAL Joint pain/swelling ---- no 
 Musculoskeletal pain - no 
 
 SKIN & INTEGUMENTARY Rashes - no Sores --- no 
 
 
   NEUROLOGICAL Numbness/tingling - no Sensation loss ------ no 
 
 PSYCHIATRIC Nervousness/anxiety - no Depression -------------- no 
 
 ENDOCRINE Heat/cold intolerance - no  Excessive thirst -------- no 
 
 HEMATOLOGIC/LYMPHATIC Abnormal bleeding - no ALL/IMMUN Allergic reaction ------ no 
 Recurrent infections - no Objective: 
 
 Physical Exam: 
             
Visit Vitals /77 (BP 1 Location: Left arm, BP Patient Position: At rest) Pulse 72 Temp 98.6 °F (37 °C) Resp 19 Ht 5' 9\" (1.753 m) Wt 187 lb 9.8 oz (85.1 kg) SpO2 94% BMI 27.71 kg/m² General Appearance:   Well developed, well nourished,alert and oriented x 3, and 
 individual in no acute distress. Ears/Nose/Mouth/Throat:    Hearing grossly normal. 
  
    Neck:  Supple. Chest:    Lungs clear to auscultation bilaterally. Cardiovascular:   Paced rhythm, S1, S2 normal, no murmur. Abdomen:    Soft, non-tender, bowel sounds are active. Extremities:  No edema bilaterally. Skin:  Warm and dry. Telemetry: AFIB Data Review:  
 Labs:   
Recent Results (from the past 24 hour(s)) GLUCOSE, POC Collection Time: 05/09/19 11:08 AM  
Result Value Ref Range Glucose (POC) 240 (H) 65 - 100 mg/dL Performed by Mary Power GLUCOSE, POC Collection Time: 05/09/19  6:48 PM  
Result Value Ref Range Glucose (POC) 92 65 - 100 mg/dL Performed by Riya Heaton, POC Collection Time: 05/09/19 10:48 PM  
Result Value Ref Range Glucose (POC) 140 (H) 65 - 100 mg/dL Performed by Kahlil Smith, BASIC Collection Time: 05/10/19  1:58 AM  
Result Value Ref Range Sodium 137 136 - 145 mmol/L Potassium 4.6 3.5 - 5.1 mmol/L Chloride 103 97 - 108 mmol/L  
 CO2 23 21 - 32 mmol/L Anion gap 11 5 - 15 mmol/L Glucose 172 (H) 65 - 100 mg/dL BUN 81 (H) 6 - 20 MG/DL Creatinine 3.82 (H) 0.70 - 1.30 MG/DL  
 BUN/Creatinine ratio 21 (H) 12 - 20 GFR est AA 18 (L) >60 ml/min/1.73m2 GFR est non-AA 15 (L) >60 ml/min/1.73m2 Calcium 8.0 (L) 8.5 - 10.1 MG/DL  
CBC W/O DIFF Collection Time: 05/10/19  1:58 AM  
Result Value Ref Range WBC 7.0 4.1 - 11.1 K/uL RBC 2.77 (L) 4.10 - 5.70 M/uL HGB 8.4 (L) 12.1 - 17.0 g/dL HCT 26.3 (L) 36.6 - 50.3 % MCV 94.9 80.0 - 99.0 FL  
 MCH 30.3 26.0 - 34.0 PG  
 MCHC 31.9 30.0 - 36.5 g/dL  
 RDW 14.3 11.5 - 14.5 % PLATELET 015 394 - 890 K/uL MPV 11.5 8.9 - 12.9 FL  
 NRBC 0.0 0  WBC ABSOLUTE NRBC 0.00 0.00 - 0.01 K/uL GLUCOSE, POC Collection Time: 05/10/19  7:02 AM  
Result Value Ref Range Glucose (POC) 173 (H) 65 - 100 mg/dL Performed by Beverley Barbozams Radiology:  
 
  
Current Facility-Administered Medications Medication Dose Route Frequency  tamsulosin (FLOMAX) capsule 0.4 mg  0.4 mg Oral DAILY  amLODIPine (NORVASC) tablet 10 mg  10 mg Oral DAILY  sodium chloride (NS) flush 5-40 mL  5-40 mL IntraVENous Q8H  
 sodium chloride (NS) flush 5-40 mL  5-40 mL IntraVENous PRN  
 bumetanide (BUMEX) tablet 1 mg  1 mg Oral BID  insulin lispro (HUMALOG) injection   SubCUTAneous AC&HS  sodium bicarbonate tablet 1,300 mg  1,300 mg Oral TID  hydrALAZINE (APRESOLINE) tablet 150 mg  150 mg Oral TID  albuterol-ipratropium (DUO-NEB) 2.5 MG-0.5 MG/3 ML  3 mL Nebulization Q6H PRN  
 insulin glargine (LANTUS) injection 16 Units  16 Units SubCUTAneous DAILY  famotidine (PEPCID) tablet 20 mg  20 mg Oral Q24H  
 ondansetron (ZOFRAN ODT) tablet 4 mg  4 mg Oral Q8H PRN  
 sodium chloride (NS) flush 5-40 mL  5-40 mL IntraVENous Q8H  
 sodium chloride (NS) flush 5-40 mL  5-40 mL IntraVENous PRN  
 sodium chloride (NS) flush 5-40 mL  5-40 mL IntraVENous Q8H  
 sodium chloride (NS) flush 5-40 mL  5-40 mL IntraVENous PRN  
 acetaminophen (TYLENOL) tablet 650 mg  650 mg Oral Q4H PRN  
 aspirin chewable tablet 81 mg  81 mg Oral DAILY  glucose chewable tablet 16 g  4 Tab Oral PRN  
 dextrose (D50W) injection syrg 12.5-25 g  12.5-25 g IntraVENous PRN  
 glucagon (GLUCAGEN) injection 1 mg  1 mg IntraMUSCular PRN  
  hydrALAZINE (APRESOLINE) 20 mg/mL injection 20 mg  20 mg IntraVENous Q6H PRN Yuliet Bourne. MARGARITA Mckeon M.D. [facility] [address] [place] [address], [address] Mamta Jones 
 [phone]

## 2019-05-10 NOTE — PROGRESS NOTES
[facility] 
 [address] [facility] [address] [facility] Phone: [phone]   [phone]   
  
Nephrology Progress Note Vinayak Dos Santos.     1935     [id number] Date of Admission : 5/1/2019 
05/10/19 CC:  Follow up for KATALINA Assessment and Plan KATALINA  on CKD  
- 2/2  progression of underlying CKD - Cr stable, possible urinary retention now 
- bladder scan and SC if needed 
- no need for RRT at this time 
- cont bumex  
- daily labs and I/Os CKD IV: 
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 
-- UPCR showed 11 gm of Protein Metablolic Acidosis: 
- cont PO BICARB Dyspnea : 
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  
- RHC w/ PCW 28 HTN: 
- continue current meds Bradycardia : 
- s/p PPM on 5/9 Anemia of CKD: 
- hgb stable 
  
Afib: 
- per cards 
  
CAD s/p CABG 
  
DM2: 
- on insulin Interval History: 
Seen and examined. Cr stable. C/o he cannot void. BP stable s/p PPM.  HR stable. No cp or sob reported. Review of Systems: A comprehensive review of systems was negative except for that written in the HPI. Current Medications:  
Current Facility-Administered Medications Medication Dose Route Frequency  amLODIPine (NORVASC) tablet 10 mg  10 mg Oral DAILY  sodium chloride (NS) flush 5-40 mL  5-40 mL IntraVENous Q8H  
 sodium chloride (NS) flush 5-40 mL  5-40 mL IntraVENous PRN  
 bumetanide (BUMEX) tablet 1 mg  1 mg Oral BID  insulin lispro (HUMALOG) injection   SubCUTAneous AC&HS  sodium bicarbonate tablet 1,300 mg  1,300 mg Oral TID  hydrALAZINE (APRESOLINE) tablet 150 mg  150 mg Oral TID  albuterol-ipratropium (DUO-NEB) 2.5 MG-0.5 MG/3 ML  3 mL Nebulization Q6H PRN  
 insulin glargine (LANTUS) injection 16 Units  16 Units SubCUTAneous DAILY  famotidine (PEPCID) tablet 20 mg  20 mg Oral Q24H  
 ondansetron (ZOFRAN ODT) tablet 4 mg  4 mg Oral Q8H PRN  
  sodium chloride (NS) flush 5-40 mL  5-40 mL IntraVENous Q8H  
 sodium chloride (NS) flush 5-40 mL  5-40 mL IntraVENous PRN  
 sodium chloride (NS) flush 5-40 mL  5-40 mL IntraVENous Q8H  
 sodium chloride (NS) flush 5-40 mL  5-40 mL IntraVENous PRN  
 acetaminophen (TYLENOL) tablet 650 mg  650 mg Oral Q4H PRN  
 aspirin chewable tablet 81 mg  81 mg Oral DAILY  glucose chewable tablet 16 g  4 Tab Oral PRN  
 dextrose (D50W) injection syrg 12.5-25 g  12.5-25 g IntraVENous PRN  
 glucagon (GLUCAGEN) injection 1 mg  1 mg IntraMUSCular PRN  
 hydrALAZINE (APRESOLINE) 20 mg/mL injection 20 mg  20 mg IntraVENous Q6H PRN Allergies Allergen Reactions  Lisinopril Cough Objective: 
Vitals:   
Vitals:  
 05/10/19 0300 05/10/19 0400 05/10/19 0500 05/10/19 0600 BP: 135/71 143/61 131/59 142/57 Pulse: 80 82 81 80 Resp: 19 23 18 19 Temp:  98.8 °F (37.1 °C) SpO2: 94%  98% 99% Weight:    85.1 kg (187 lb 9.8 oz) Height:      
 
Intake and Output: 
No intake/output data recorded. 05/08 1901 - 05/10 0700 In: 500 [I.V.:500] Out: - Physical Examination: 
General: nad Neck:  Supple, no mass Resp:  Decreased BS, clear b./l, no wheezing CV:  RRR,  no murmur or rub,no LE edema GI:  Soft, non tender Neurologic:   non focal, normal speech :  No ferrell []    High complexity decision making was performed 
[]    Patient is at high-risk of decompensation with multiple organ involvement Lab Data Personally Reviewed: I have reviewed all the pertinent labs, microbiology data and radiology studies during assessment. Recent Labs 05/10/19 
0158 05/09/19 
9444 05/08/19 
0444 05/07/19 
0805  138 138 138  
K 4.6 4.2 4.2 4.0  
 104 104 108 CO2 23 23 20* 18* * 115* 151* 115* BUN 81* 81* 84* 82* CREA 3.82* 3.88* 3.80* 3.58* CA 8.0* 8.7 8.7 8.5 PHOS  --   --   --  4.6 ALB  --   --   --  2.8* Recent Labs 05/10/19 
0158 05/07/19 
5888 WBC 7.0 6.0 HGB 8.4* 9.4* HCT 26.3* 28.7*  
 171 No results found for: SDES Lab Results Component Value Date/Time Culture result: CANDIDA ALBICANS (A) 03/16/2017 06:00 AM  
 Culture result: NO GROWTH 5 DAYS 03/16/2017 05:40 AM  
 
Recent Results (from the past 24 hour(s)) GLUCOSE, POC Collection Time: 05/09/19 11:08 AM  
Result Value Ref Range Glucose (POC) 240 (H) 65 - 100 mg/dL Performed by Jesse Woodall GLUCOSE, POC Collection Time: 05/09/19  6:48 PM  
Result Value Ref Range Glucose (POC) 92 65 - 100 mg/dL Performed by Kael Carballo, POC Collection Time: 05/09/19 10:48 PM  
Result Value Ref Range Glucose (POC) 140 (H) 65 - 100 mg/dL Performed by Kahlil Smith, BASIC Collection Time: 05/10/19  1:58 AM  
Result Value Ref Range Sodium 137 136 - 145 mmol/L Potassium 4.6 3.5 - 5.1 mmol/L Chloride 103 97 - 108 mmol/L  
 CO2 23 21 - 32 mmol/L Anion gap 11 5 - 15 mmol/L Glucose 172 (H) 65 - 100 mg/dL BUN 81 (H) 6 - 20 MG/DL Creatinine 3.82 (H) 0.70 - 1.30 MG/DL  
 BUN/Creatinine ratio 21 (H) 12 - 20 GFR est AA 18 (L) >60 ml/min/1.73m2 GFR est non-AA 15 (L) >60 ml/min/1.73m2 Calcium 8.0 (L) 8.5 - 10.1 MG/DL  
CBC W/O DIFF Collection Time: 05/10/19  1:58 AM  
Result Value Ref Range WBC 7.0 4.1 - 11.1 K/uL  
 RBC 2.77 (L) 4.10 - 5.70 M/uL HGB 8.4 (L) 12.1 - 17.0 g/dL HCT 26.3 (L) 36.6 - 50.3 % MCV 94.9 80.0 - 99.0 FL  
 MCH 30.3 26.0 - 34.0 PG  
 MCHC 31.9 30.0 - 36.5 g/dL  
 RDW 14.3 11.5 - 14.5 % PLATELET 100 764 - 521 K/uL MPV 11.5 8.9 - 12.9 FL  
 NRBC 0.0 0  WBC ABSOLUTE NRBC 0.00 0.00 - 0.01 K/uL GLUCOSE, POC Collection Time: 05/10/19  7:02 AM  
Result Value Ref Range Glucose (POC) 173 (H) 65 - 100 mg/dL Performed by Walter Quinteros Total time spent with patient:  xxx   min. Care Plan discussed with: 
Patient Family RN     
 Consulting Physician [facility]     
 
I have reviewed the flowsheets. Chart and Pertinent Notes have been reviewed. No change in PMH ,family and social history from Consult note.  
 
 
Kimmy Adams MD

## 2019-05-10 NOTE — PROGRESS NOTES
Cardiac Electrophysiology Wound Check Note Wound Check Patient is seen for wound check, is s/p Medtronic leadless pacemaker implant on 05/09/2019. Hypotensive intraprocedure, went to CCU thereafter for further monitoring. BP stable today. No fever or drainage noted. Physical Exam  
Constitutional: well-developed and well-nourished. Skin: Right femoral access site without drainage or hematoma. Suture & dressing removed without difficulty. ASSESSMENT and PLAN Mr. 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 Device check shows proper function,  89%. Access site without drainage or hematoma. Keflex 250 mg po tid x 5 days. Reviewed lifting restrictions, to apply light pressure with hand to access site with coughing for the next 1-2 days. Follow up in EP clinic as noted below for wound & device check. Future Appointments Date Time Provider Muna Delgado 5/17/2019 10:45 AM PACEMAKER3, [address]  
5/17/2019 11:00 AM Wound check, COSME CHRISTIAN [facility]  
5/22/2019  4:00 PM Michell Tiwari  [address]  Mark Gleason, FNP-C 9 [facility] 05/10/19 Addendum from EP attending: 
I have seen, examined patient, and discussed with nurse practitioner, registered nurse, reviewed, updated note and agree with the assessment and plan I have talked to him this am. He is feeling better BP stable overnight Vital signs are stable Exam shows regular rhythm Groin no bleeding Assessment and Plan: 
Continue to monitor pacer in clinic 
appt next week Jerman Roche M.D. Electrophysiology/Cardiology [facility] [address], [place] [address], [facility] [address] [place], [address] [place] [address] 
[phone]

## 2019-05-10 NOTE — PROGRESS NOTES
NUTRITION 
   
       
RECOMMENDATIONS:  
Continue with current cardiac diet and will add DM restrictions as well. SUBJECTIVE/OBJECTIVE:  
Chart reviewed and spoke with patient. PMH:  coronary artery disease, status post coronary artery bypass grafting; peripheral arterial disease, status post angioplasty and stenting; type 2 diabetes; and hyperlipidemia, presented with weeks of dyspnea. Pt underwent  pacemaker insertion yesterday. Consumed a \"good\" lunch today. Having some difficulty eating tough foods with multiple missing teeth and poor dentition. POC: 639-104. Will add DM restrictions to diet. Diet:  Cardiac Supplements: none Intake: [x]           Good     [x]           Fair      []           Poor Patient Vitals for the past 100 hrs: 
 % Diet Eaten 05/08/19 1037 75 % Weight Changes: Wt Readings from Last 10 Encounters:  
05/10/19 85.1 kg (187 lb 9.8 oz) 12/17/18 79.8 kg (176 lb) 10/10/18 79.5 kg (175 lb 3.2 oz)  
09/27/18 77.7 kg (171 lb 6.4 oz) 08/20/18 77.6 kg (171 lb)  
07/19/18 78 kg (172 lb)  
03/26/18 75.4 kg (166 lb 3.2 oz)  
11/27/17 73.4 kg (161 lb 12.8 oz) 10/23/17 82.5 kg (181 lb 14.1 oz) 08/14/17 83.5 kg (184 lb) Nutrition Problems Identified: 
[x]      Difficulty chewing at times d/t poor dentition PLAN:  
[x]           Obtained/adjusted food preferences/tolerances [x]          Continue current diet Abebe Trevino RD

## 2019-05-10 NOTE — ANESTHESIA POSTPROCEDURE EVALUATION
Post-Anesthesia Evaluation and Assessment Patient: Gilberto Kruger MRN: [id number]  SSN: [id number] YOB: 1935  Age: 81 y.o. Sex: male I have evaluated the patient and they are stable and ready for discharge from the PACU. Cardiovascular Function/Vital Signs Visit Vitals /57 (BP 1 Location: Left arm, BP Patient Position: At rest) Pulse 80 Temp 37.1 °C (98.8 °F) Resp 19 Ht 5' 9\" (1.753 m) Wt 85.1 kg (187 lb 9.8 oz) SpO2 99% BMI 27.71 kg/m² Patient is status post General anesthesia for Procedure(s): 
INSERT OR REPLACE TRANSCATH PPM LEADLESS. Nausea/Vomiting: None Postoperative hydration reviewed and adequate. Pain: 
Pain Scale 1: Numeric (0 - 10) (05/10/19 0400) Pain Intensity 1: 0 (05/10/19 0400) Managed Neurological Status:  
Neuro Neurologic State: Alert (05/10/19 0400) Orientation Level: Oriented X4 (05/10/19 0400) Cognition: Appropriate for age attention/concentration (05/10/19 0400) Speech: Appropriate for age (05/10/19 0400) Assessment L Pupil: Brisk (05/10/19 0400) Size L Pupil (mm): 3 (05/10/19 0400) Assessment R Pupil: Brisk (05/10/19 0400) Size R Pupil (mm): 3 (05/10/19 0400) LUE Motor Response: Purposeful (05/10/19 0400) LLE Motor Response: Purposeful (05/10/19 0400) RUE Motor Response: Purposeful (05/10/19 0400) RLE Motor Response: Purposeful (05/10/19 0400) At baseline Mental Status, Level of Consciousness: Alert and  oriented to person, place, and time Pulmonary Status:  
O2 Device: Nasal cannula (05/10/19 0400) Adequate oxygenation and airway patent Complications related to anesthesia: None Post-anesthesia assessment completed. No concerns Signed By: Michaelle Onofre MD   
 May 10, 2019 Procedure(s): 
INSERT OR REPLACE TRANSCATH PPM LEADLESS. general 
 
<BSHSIANPOST> Vitals Value Taken Time BP Temp Pulse 80 5/10/2019  6:55 AM  
Resp 16 5/10/2019  6:55 AM  
 SpO2 97 % 5/10/2019  6:55 AM  
Vitals shown include unvalidated device data.

## 2019-05-10 NOTE — PROGRESS NOTES
0730 Bedside shift change report given to Keyla Nicholas (oncoming nurse) by Awilda Vazquez (offgoing nurse). Report included the following information SBAR, Intake/Output, MAR, Recent Results and Cardiac Rhythm Paced. 0800 Pt up to chair with 1-person assist. Pt bladder scanned. 567 mL urine present Dariel Edwards PA-C with Cardiology at bedside. Plan to downgrade patient to IMCU 
 
0930 Pt straight cathed. 750 mL urine out. 1345 TRANSFER - OUT REPORT: 
 
Verbal report given to La(name) on [facility]  being transferred to [facility]) for routine progression of care Report consisted of patients Situation, Background, Assessment and  
Recommendations(SBAR). Information from the following report(s) SBAR, Intake/Output, MAR, Recent Results and Cardiac Rhythm Paced was reviewed with the receiving nurse. Lines:  
Peripheral IV 05/06/19 Left;Upper Antecubital (Active) Site Assessment Clean, dry, & intact 5/10/2019 12:00 PM  
Phlebitis Assessment 0 5/10/2019 12:00 PM  
Infiltration Assessment 0 5/10/2019 12:00 PM  
Dressing Status Clean, dry, & intact 5/10/2019 12:00 PM  
Dressing Type Transparent 5/10/2019 12:00 PM  
Hub Color/Line Status Blue 5/10/2019 12:00 PM  
Action Taken Open ports on tubing capped 5/10/2019 12:00 PM  
Alcohol Cap Used Yes 5/10/2019 12:00 PM  
   
Peripheral IV 05/09/19 Right Antecubital (Active) Site Assessment Clean, dry, & intact 5/10/2019 12:00 PM  
Phlebitis Assessment 0 5/10/2019 12:00 PM  
Infiltration Assessment 0 5/10/2019 12:00 PM  
Dressing Status Clean, dry, & intact 5/10/2019 12:00 PM  
Dressing Type Transparent 5/10/2019 12:00 PM  
Hub Color/Line Status Pink 5/10/2019 12:00 PM  
Action Taken Open ports on tubing capped 5/10/2019 12:00 PM  
Alcohol Cap Used Yes 5/10/2019 12:00 PM  
  
 
Opportunity for questions and clarification was provided. Patient transported with: 
 Monitor Registered Nurse Tech

## 2019-05-11 LAB
ANION GAP SERPL CALC-SCNC: 9 MMOL/L (ref 5–15)
BUN SERPL-MCNC: 85 MG/DL (ref 6–20)
BUN/CREAT SERPL: 21 (ref 12–20)
CALCIUM SERPL-MCNC: 8 MG/DL (ref 8.5–10.1)
CHLORIDE SERPL-SCNC: 99 MMOL/L (ref 97–108)
CO2 SERPL-SCNC: 25 MMOL/L (ref 21–32)
CREAT SERPL-MCNC: 4.03 MG/DL (ref 0.7–1.3)
ERYTHROCYTE [DISTWIDTH] IN BLOOD BY AUTOMATED COUNT: 14.2 % (ref 11.5–14.5)
EST. AVERAGE GLUCOSE BLD GHB EST-MCNC: 214 MG/DL
GLUCOSE BLD STRIP.AUTO-MCNC: 142 MG/DL (ref 65–100)
GLUCOSE BLD STRIP.AUTO-MCNC: 171 MG/DL (ref 65–100)
GLUCOSE BLD STRIP.AUTO-MCNC: 83 MG/DL (ref 65–100)
GLUCOSE BLD STRIP.AUTO-MCNC: 98 MG/DL (ref 65–100)
GLUCOSE SERPL-MCNC: 123 MG/DL (ref 65–100)
HBA1C MFR BLD: 9.1 % (ref 4.2–6.3)
HCT VFR BLD AUTO: 26.8 % (ref 36.6–50.3)
HGB BLD-MCNC: 8.4 G/DL (ref 12.1–17)
MCH RBC QN AUTO: 29.4 PG (ref 26–34)
MCHC RBC AUTO-ENTMCNC: 31.3 G/DL (ref 30–36.5)
MCV RBC AUTO: 93.7 FL (ref 80–99)
NRBC # BLD: 0 K/UL (ref 0–0.01)
NRBC BLD-RTO: 0 PER 100 WBC
PLATELET # BLD AUTO: 156 K/UL (ref 150–400)
PMV BLD AUTO: 11.8 FL (ref 8.9–12.9)
POTASSIUM SERPL-SCNC: 4.7 MMOL/L (ref 3.5–5.1)
RBC # BLD AUTO: 2.86 M/UL (ref 4.1–5.7)
SERVICE CMNT-IMP: ABNORMAL
SERVICE CMNT-IMP: ABNORMAL
SERVICE CMNT-IMP: NORMAL
SERVICE CMNT-IMP: NORMAL
SODIUM SERPL-SCNC: 133 MMOL/L (ref 136–145)
WBC # BLD AUTO: 5.9 K/UL (ref 4.1–11.1)

## 2019-05-11 PROCEDURE — 74011250637 HC RX REV CODE- 250/637: Performed by: PHYSICIAN ASSISTANT

## 2019-05-11 PROCEDURE — 82962 GLUCOSE BLOOD TEST: CPT

## 2019-05-11 PROCEDURE — 74011636637 HC RX REV CODE- 636/637: Performed by: INTERNAL MEDICINE

## 2019-05-11 PROCEDURE — 74011250637 HC RX REV CODE- 250/637: Performed by: INTERNAL MEDICINE

## 2019-05-11 PROCEDURE — 74011250637 HC RX REV CODE- 250/637: Performed by: HOSPITALIST

## 2019-05-11 PROCEDURE — 74011250637 HC RX REV CODE- 250/637: Performed by: NURSE PRACTITIONER

## 2019-05-11 PROCEDURE — 36415 COLL VENOUS BLD VENIPUNCTURE: CPT

## 2019-05-11 PROCEDURE — 65660000001 HC RM ICU INTERMED STEPDOWN

## 2019-05-11 PROCEDURE — 80048 BASIC METABOLIC PNL TOTAL CA: CPT

## 2019-05-11 PROCEDURE — 85027 COMPLETE CBC AUTOMATED: CPT

## 2019-05-11 PROCEDURE — 83036 HEMOGLOBIN GLYCOSYLATED A1C: CPT

## 2019-05-11 PROCEDURE — 74011636637 HC RX REV CODE- 636/637: Performed by: HOSPITALIST

## 2019-05-11 RX ORDER — GLIPIZIDE 5 MG/1
10 TABLET, FILM COATED, EXTENDED RELEASE ORAL
Status: DISCONTINUED | OUTPATIENT
Start: 2019-05-11 | End: 2019-05-11

## 2019-05-11 RX ADMIN — CEPHALEXIN 250 MG: 250 CAPSULE ORAL at 08:18

## 2019-05-11 RX ADMIN — CEPHALEXIN 250 MG: 250 CAPSULE ORAL at 21:08

## 2019-05-11 RX ADMIN — Medication 10 ML: at 08:22

## 2019-05-11 RX ADMIN — Medication 10 ML: at 13:59

## 2019-05-11 RX ADMIN — HYDRALAZINE HYDROCHLORIDE 150 MG: 50 TABLET, FILM COATED ORAL at 15:45

## 2019-05-11 RX ADMIN — HYDRALAZINE HYDROCHLORIDE 150 MG: 50 TABLET, FILM COATED ORAL at 08:18

## 2019-05-11 RX ADMIN — TAMSULOSIN HYDROCHLORIDE 0.4 MG: 0.4 CAPSULE ORAL at 08:18

## 2019-05-11 RX ADMIN — INSULIN GLARGINE 16 UNITS: 100 INJECTION, SOLUTION SUBCUTANEOUS at 08:17

## 2019-05-11 RX ADMIN — SODIUM BICARBONATE 1300 MG: 650 TABLET ORAL at 15:45

## 2019-05-11 RX ADMIN — BUMETANIDE 1 MG: 1 TABLET ORAL at 08:18

## 2019-05-11 RX ADMIN — BUMETANIDE 1 MG: 1 TABLET ORAL at 17:33

## 2019-05-11 RX ADMIN — CEPHALEXIN 250 MG: 250 CAPSULE ORAL at 15:45

## 2019-05-11 RX ADMIN — Medication 10 ML: at 21:12

## 2019-05-11 RX ADMIN — CARVEDILOL 12.5 MG: 12.5 TABLET, FILM COATED ORAL at 17:33

## 2019-05-11 RX ADMIN — Medication 10 ML: at 06:00

## 2019-05-11 RX ADMIN — CARVEDILOL 12.5 MG: 12.5 TABLET, FILM COATED ORAL at 08:18

## 2019-05-11 RX ADMIN — HYDRALAZINE HYDROCHLORIDE 150 MG: 50 TABLET, FILM COATED ORAL at 21:08

## 2019-05-11 RX ADMIN — INSULIN LISPRO 2 UNITS: 100 INJECTION, SOLUTION INTRAVENOUS; SUBCUTANEOUS at 12:23

## 2019-05-11 RX ADMIN — Medication 10 ML: at 13:58

## 2019-05-11 RX ADMIN — FAMOTIDINE 20 MG: 20 TABLET ORAL at 08:18

## 2019-05-11 RX ADMIN — INSULIN LISPRO 2 UNITS: 100 INJECTION, SOLUTION INTRAVENOUS; SUBCUTANEOUS at 17:33

## 2019-05-11 RX ADMIN — ASPIRIN 81 MG 81 MG: 81 TABLET ORAL at 08:18

## 2019-05-11 RX ADMIN — SODIUM BICARBONATE 1300 MG: 650 TABLET ORAL at 21:08

## 2019-05-11 RX ADMIN — AMLODIPINE BESYLATE 10 MG: 5 TABLET ORAL at 08:18

## 2019-05-11 RX ADMIN — SODIUM BICARBONATE 1300 MG: 650 TABLET ORAL at 08:18

## 2019-05-11 NOTE — PROGRESS NOTES
Problem: Falls - Risk of 
Goal: *Absence of Falls Description Document Alan Bran Fall Risk and appropriate interventions in the flowsheet. Outcome: Progressing Towards Goal 
Note:  
Fall Risk Interventions: 
Mobility Interventions: Assess mobility with egress test, Communicate number of staff needed for ambulation/transfer, Patient to call before getting OOB, Utilize walker, cane, or other assistive device, Utilize gait belt for transfers/ambulation Medication Interventions: Assess postural VS orthostatic hypotension, Evaluate medications/consider consulting pharmacy, Patient to call before getting OOB, Teach patient to arise slowly, Utilize gait belt for transfers/ambulation Elimination Interventions: Call light in reach, Elevated toilet seat, Patient to call for help with toileting needs, Stay With Me (per policy), Toileting schedule/hourly rounds, Toilet paper/wipes in reach, Urinal in reach Problem: Heart Failure: Day 4 Goal: *Oxygen saturation within defined limits Outcome: Progressing Towards Goal 
Note:  
Pt on RA ambulating the halls and within the room without distress. Non productive cough, incentive spirometer at the bedside. Problem: Diabetes Maintenance:Admission Goal: Medications Outcome: Progressing Towards Goal 
Note:  
Pt receiving sliding scale and lantus, will continue to monitor blood glucose. Problem: Pacer/ICD: Post-Procedure Goal: Activity/Safety Outcome: Progressing Towards Goal 
Note:  
Pt ambulated in the anthony way and within the room without distress. Bedside shift change report given to Kelsey JANE (oncoming nurse) by Iván Arevalo RN (offgoing nurse). Report included the following information SBAR, Kardex, ED Summary, Intake/Output, MAR, Recent Results, Med Rec Status, Cardiac Rhythm VPaced, Alarm Parameters  and Quality Measures.

## 2019-05-11 NOTE — PROGRESS NOTES
[facility] 
 [address] [facility], [facility] [address] [facility] Phone: [phone]   [phone]   
  
Nephrology Progress Note Elba Zhong.     1935     [id number] Date of Admission : 5/1/2019 05/11/19 CC:  Follow up for KATALINA Assessment and Plan KATALINA  on CKD  
- 2/2  progression of underlying CKD - Cr stable 
- cont current diuretics 
- no need for RRT at this time CKD IV: 
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 
-- UPCR showed 11 gm of Protein Metablolic Acidosis: 
- cont PO BICARB Dyspnea : 
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  
- RHC w/ PCW 28 HTN: 
- continue current meds Bradycardia : 
- s/p PPM on 5/9 Anemia of CKD: 
- hgb stable 
  
Afib: 
- per cards 
  
CAD s/p CABG 
  
DM2: 
- on insulin Interval History: 
Seen and examined. Cr at 4, stable for him. Resting. Voiding ok now. BP stable s/p PPM.  HR stable. No cp or sob reported. Review of Systems: A comprehensive review of systems was negative except for that written in the HPI. Current Medications:  
Current Facility-Administered Medications Medication Dose Route Frequency  tamsulosin (FLOMAX) capsule 0.4 mg  0.4 mg Oral DAILY  carvedilol (COREG) tablet 12.5 mg  12.5 mg Oral BID WITH MEALS  cephALEXin (KEFLEX) capsule 250 mg  250 mg Oral TID  amLODIPine (NORVASC) tablet 10 mg  10 mg Oral DAILY  sodium chloride (NS) flush 5-40 mL  5-40 mL IntraVENous Q8H  
 sodium chloride (NS) flush 5-40 mL  5-40 mL IntraVENous PRN  
 bumetanide (BUMEX) tablet 1 mg  1 mg Oral BID  insulin lispro (HUMALOG) injection   SubCUTAneous AC&HS  sodium bicarbonate tablet 1,300 mg  1,300 mg Oral TID  hydrALAZINE (APRESOLINE) tablet 150 mg  150 mg Oral TID  albuterol-ipratropium (DUO-NEB) 2.5 MG-0.5 MG/3 ML  3 mL Nebulization Q6H PRN  
 insulin glargine (LANTUS) injection 16 Units  16 Units SubCUTAneous DAILY  famotidine (PEPCID) tablet 20 mg  20 mg Oral Q24H  
 ondansetron (ZOFRAN ODT) tablet 4 mg  4 mg Oral Q8H PRN  
 sodium chloride (NS) flush 5-40 mL  5-40 mL IntraVENous Q8H  
 sodium chloride (NS) flush 5-40 mL  5-40 mL IntraVENous PRN  
 sodium chloride (NS) flush 5-40 mL  5-40 mL IntraVENous Q8H  
 sodium chloride (NS) flush 5-40 mL  5-40 mL IntraVENous PRN  
 acetaminophen (TYLENOL) tablet 650 mg  650 mg Oral Q4H PRN  
 aspirin chewable tablet 81 mg  81 mg Oral DAILY  glucose chewable tablet 16 g  4 Tab Oral PRN  
 dextrose (D50W) injection syrg 12.5-25 g  12.5-25 g IntraVENous PRN  
 glucagon (GLUCAGEN) injection 1 mg  1 mg IntraMUSCular PRN  
 hydrALAZINE (APRESOLINE) 20 mg/mL injection 20 mg  20 mg IntraVENous Q6H PRN Allergies Allergen Reactions  Lisinopril Cough Objective: 
Vitals:   
Vitals:  
 05/10/19 2354 05/11/19 1596 05/11/19 0773 05/11/19 5024 BP: 149/61 161/68  163/64 Pulse: 71 70  75 Resp: 18 20  19 Temp: 98.3 °F (36.8 °C) 98.2 °F (36.8 °C)  97.4 °F (36.3 °C) SpO2: 100% 94%  98% Weight:   86 kg (189 lb 9.5 oz) Height:      
 
Intake and Output: 
05/11 0701 - 05/11 1900 In: -  
Out: 100 [Urine:100] 05/09 1901 - 05/11 0700 In: 240 [P.O.:240] Out: 1905 [[id number]] Physical Examination: 
 
General: nad Neck:  Supple, no mass Resp:  Decreased BS, clear b./l, no wheezing CV:  RRR,  no murmur or rub,no LE edema GI:  Soft, non tender Neurologic:   non focal, normal speech :  No ferrell []    High complexity decision making was performed 
[]    Patient is at high-risk of decompensation with multiple organ involvement Lab Data Personally Reviewed: I have reviewed all the pertinent labs, microbiology data and radiology studies during assessment. Recent Labs 05/11/19 
0939 05/10/19 
0158 05/09/19 
6316 * 137 138  
K 4.7 4.6 4.2 CL 99 103 104 CO2 25 23 23 * 172* 115* BUN 85* 81* 81* CREA 4.03* 3.82* 3.88* CA 8.0* 8.0* 8.7 Recent Labs 05/11/19 
3705 05/10/19 
0158 WBC 5.9 7.0 HGB 8.4* 8.4* HCT 26.8* 26.3*  
 161 No results found for: SDES Lab Results Component Value Date/Time Culture result: CANDIDA ALBICANS (A) 03/16/2017 06:00 AM  
 Culture result: NO GROWTH 5 DAYS 03/16/2017 05:40 AM  
 
Recent Results (from the past 24 hour(s)) GLUCOSE, POC Collection Time: 05/10/19 11:07 AM  
Result Value Ref Range Glucose (POC) 287 (H) 65 - 100 mg/dL Performed by Selvin Hartman GLUCOSE, POC Collection Time: 05/10/19  4:29 PM  
Result Value Ref Range Glucose (POC) 235 (H) 65 - 100 mg/dL Performed by Juanjose Sevilla GLUCOSE, POC Collection Time: 05/10/19  9:37 PM  
Result Value Ref Range Glucose (POC) 168 (H) 65 - 100 mg/dL Performed by [facility] METABOLIC PANEL, BASIC Collection Time: 05/11/19  3:37 AM  
Result Value Ref Range Sodium 133 (L) 136 - 145 mmol/L Potassium 4.7 3.5 - 5.1 mmol/L Chloride 99 97 - 108 mmol/L  
 CO2 25 21 - 32 mmol/L Anion gap 9 5 - 15 mmol/L Glucose 123 (H) 65 - 100 mg/dL BUN 85 (H) 6 - 20 MG/DL Creatinine 4.03 (H) 0.70 - 1.30 MG/DL  
 BUN/Creatinine ratio 21 (H) 12 - 20 GFR est AA 17 (L) >60 ml/min/1.73m2 GFR est non-AA 14 (L) >60 ml/min/1.73m2 Calcium 8.0 (L) 8.5 - 10.1 MG/DL  
CBC W/O DIFF Collection Time: 05/11/19  3:37 AM  
Result Value Ref Range WBC 5.9 4.1 - 11.1 K/uL  
 RBC 2.86 (L) 4.10 - 5.70 M/uL HGB 8.4 (L) 12.1 - 17.0 g/dL HCT 26.8 (L) 36.6 - 50.3 % MCV 93.7 80.0 - 99.0 FL  
 MCH 29.4 26.0 - 34.0 PG  
 MCHC 31.3 30.0 - 36.5 g/dL  
 RDW 14.2 11.5 - 14.5 % PLATELET 181 698 - 190 K/uL MPV 11.8 8.9 - 12.9 FL  
 NRBC 0.0 0  WBC ABSOLUTE NRBC 0.00 0.00 - 0.01 K/uL HEMOGLOBIN A1C WITH EAG Collection Time: 05/11/19  3:37 AM  
Result Value Ref Range Hemoglobin A1c 9.1 (H) 4.2 - 6.3 % Est. average glucose 214 mg/dL GLUCOSE, POC  
 Collection Time: 05/11/19  6:46 AM  
Result Value Ref Range Glucose (POC) 98 65 - 100 mg/dL Performed by Zulma Bautista Total time spent with patient:  xxx   min. Care Plan discussed with: 
Patient Family RN Consulting Physician [facility],      
 
I have reviewed the flowsheets. Chart and Pertinent Notes have been reviewed. No change in PMH ,family and social history from Consult note.  
 
 
Kelli Zarate MD

## 2019-05-11 NOTE — PROGRESS NOTES
[facility] Cardiology Progress Note       NAME:  Basilio Valiente Sr.  
:   1935 MRN:   [id number] Assessment/Plan: he feels 100% better he tells me 
 wondering if he can go home 
 no cp or sob or dizziness reported Mr. 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 1. Shortness of Breath -Dyspnea 
            - Echo with Severe PHTN - PAS 72 mmHg 
            - BNP elevated at 11,184 on admission 
            - CXR with mild pulmonary edema 
            - TTE on 2019 showed EF 56-60%, NRWMA.  Mild reduction in RV systolic function. 
            - Chest CT significant for mild worsening of diffuse interstitial parenchymal change 
            - RHC. PCW 28, RV 75.   
            - clinically he seems compensated at this time and sob at baseline he tells me 2. Bradycardia 
            - Resolved - s/p leadless PPM 
            - HR set to 70 bpm rate is controlled 3. Hypertension 
            - SBP 160s             - Norvasc 10mg PO every day  
            - Hydralazine 150mg PO TID  
            - restarted Coreg at 12.5 mg BID 
            - Bumex 1mg PO BID  
            - will need close outpatient follow up 
 no changes for now given recent hypotensive episode requiring pressors 4. Atrial Fibrillation - chronic 
            - CHADSVASC 5 
            - rate controlled , back on coreg - s/p PPM 
            - Not candidate for [address] [place] [address] [employer] due to fall risk, previous GI bleed 5. KATALINA on CKD 
            - Cr 4.03 slightly increased cpt yesterday  defer to nephrology - Per Nephrology -will likely need HD, but not at this time 6. DMT2 
            - Per Primary team 
7. Dyslipidemia             - simvastatin on hold 
            - ASA 81mg PO every day 8. Anemia: HH stable 19 ECHO ADULT COMPLETE 2019 Narrative · Left Ventricle: Mild concentric hypertrophy. Estimated left ventricular  
ejection fraction is 56 - 60%. No regional wall motion abnormality noted. Mild (grade 1) left ventricular diastolic dysfunction. · Left Atrium: Moderately dilated left atrium. · Aortic Valve: Probably trileaflet aortic valve. Aortic valve sclerosis  
with reduced excursion. Aortic valve area is 1.6 cm2. Mild aortic valve  
stenosis is present. · Mitral Valve: Mitral valve thickening. Mitral annular calcification. Mild mitral valve regurgitation. · Tricuspid Valve: Mild to moderate tricuspid valve regurgitation is  
present. · Pulmonary Artery: Severe pulmonary hypertension. · IVC/Hepatic Veins: Moderately elevated central venous pressure (10-15  
mmHg); IVC diameter is larger than 21 mm and collapses more than 50% with  
respiration. · Right Ventricle: Mildly reduced systolic function. · Right Atrium: Mildly dilated right atrium. Signed by: Addie Haley MD  
 
 
 
Subjective:  
Cardiac ROS: Patient denies any exertional chest pain, dyspnea, palpitations, syncope, orthopnea, edema or paroxysmal nocturnal dyspnea. Review of Systems:  
Pertinent items are noted in the History of Present Illness. Objective:  
 
 
701 - 1900 In: -  
Out: 100 [Urine:100] 1901 - 700 In: 240 [P.O.:240] Out: 1905 [ZNKG] Telemetry: AFIB Physical Exam: 
Visit Vitals /64 (BP 1 Location: Right arm, BP Patient Position: At rest) Pulse 75 Temp 97.4 °F (36.3 °C) Resp 19 Ht 5' 9\" (1.753 m) Wt 189 lb 9.5 oz (86 kg) SpO2 98% BMI 28.00 kg/m² Neck: no JVD Heart: regularly irregular rhythm Lungs: diminished breath sounds R base, L base Abdomen: soft, non-tender. Bowel sounds normal. No masses,  no organomegaly Extremities: edema 1+ Additional comments: None Care Plan discussed with: 
  Comments Patient Family RN Care Manager Consultant:     
 
Data Review: No results for input(s): TNIPOC in the last 72 hours. No lab exists for component: ITNL No results for input(s): CPK, CKMB, TROIQ in the last 72 hours. Recent Labs 05/11/19 
8533 05/10/19 
0158 05/09/19 
4027 * 137 138  
K 4.7 4.6 4.2 CL 99 103 104 CO2 25 23 23 BUN 85* 81* 81* CREA 4.03* 3.82* 3.88* * 172* 115* WBC 5.9 7.0  --   
HGB 8.4* 8.4*  --   
HCT 26.8* 26.3*  --   
 161  -- No results for input(s): INR, PTP, APTT in the last 72 hours. No lab exists for component: INREXT Medications reviewed Current Facility-Administered Medications Medication Dose Route Frequency  tamsulosin (FLOMAX) capsule 0.4 mg  0.4 mg Oral DAILY  carvedilol (COREG) tablet 12.5 mg  12.5 mg Oral BID WITH MEALS  cephALEXin (KEFLEX) capsule 250 mg  250 mg Oral TID  amLODIPine (NORVASC) tablet 10 mg  10 mg Oral DAILY  sodium chloride (NS) flush 5-40 mL  5-40 mL IntraVENous Q8H  
 sodium chloride (NS) flush 5-40 mL  5-40 mL IntraVENous PRN  
 bumetanide (BUMEX) tablet 1 mg  1 mg Oral BID  insulin lispro (HUMALOG) injection   SubCUTAneous AC&HS  sodium bicarbonate tablet 1,300 mg  1,300 mg Oral TID  hydrALAZINE (APRESOLINE) tablet 150 mg  150 mg Oral TID  albuterol-ipratropium (DUO-NEB) 2.5 MG-0.5 MG/3 ML  3 mL Nebulization Q6H PRN  
 insulin glargine (LANTUS) injection 16 Units  16 Units SubCUTAneous DAILY  famotidine (PEPCID) tablet 20 mg  20 mg Oral Q24H  
 ondansetron (ZOFRAN ODT) tablet 4 mg  4 mg Oral Q8H PRN  
 sodium chloride (NS) flush 5-40 mL  5-40 mL IntraVENous Q8H  
 sodium chloride (NS) flush 5-40 mL  5-40 mL IntraVENous PRN  
 sodium chloride (NS) flush 5-40 mL  5-40 mL IntraVENous Q8H  
 sodium chloride (NS) flush 5-40 mL  5-40 mL IntraVENous PRN  
 acetaminophen (TYLENOL) tablet 650 mg  650 mg Oral Q4H PRN  
 aspirin chewable tablet 81 mg  81 mg Oral DAILY  glucose chewable tablet 16 g  4 Tab Oral PRN  
 dextrose (D50W) injection syrg 12.5-25 g  12.5-25 g IntraVENous PRN  
 glucagon (GLUCAGEN) injection 1 mg  1 mg IntraMUSCular PRN  
 hydrALAZINE (APRESOLINE) 20 mg/mL injection 20 mg  20 mg IntraVENous Q6H PRN Data Reviewed: current meds, labs,recent radiology, intake/output/weight and problem list reviewed Yolanda Hidalgo MD

## 2019-05-11 NOTE — PROGRESS NOTES
Bedside and Verbal shift change report given to Marin Courtney RN (oncoming nurse) by Dominic Hutton RN (offgoing nurse). Report included the following information SBAR, Kardex, ED Summary, Procedure Summary, Intake/Output, MAR, Recent Results, Med Rec Status and Cardiac Rhythm paced. Hourly rounding performed. Patient Vitals for the past 12 hrs: 
 Temp Pulse Resp BP SpO2  
05/11/19 0328 98.2 °F (36.8 °C) 70 20 161/68 94 % 05/10/19 2354 98.3 °F (36.8 °C) 71 18 149/61 100 % 05/10/19 2146  70  164/56   
05/10/19 1945 98.1 °F (36.7 °C) 75 18 135/56 100 % Problem: Falls - Risk of 
Goal: *Absence of Falls Description Document Redgie Curet Fall Risk and appropriate interventions in the flowsheet. Outcome: Progressing Towards Goal 
  
Problem: Heart Failure: Day 4 Goal: Medications Outcome: Progressing Towards Goal 
Goal: Respiratory Outcome: Progressing Towards Goal 
Goal: *Oxygen saturation within defined limits Outcome: Progressing Towards Goal 
Goal: *Hemodynamically stable Outcome: Progressing Towards Goal 
Goal: *Demonstrates progressive activity Outcome: Progressing Towards Goal 
  
Problem: Pacer/ICD: Post-Procedure Goal: Respiratory Outcome: Progressing Towards Goal 
Goal: *Hemodynamically stable Outcome: Progressing Towards Goal

## 2019-05-11 NOTE — PROGRESS NOTES
Hospitalist Progress Note 0060 [facility],  Answering service: [phone] OR [phone] from in house phone Date of Service:  2019 NAME:  Karolyn Webster Sr. 
:  1935 MRN:  [id number] Admission Summary:  
 
 This is an 78-year-old gentleman with a significant cardiac history of coronary artery disease, status post coronary artery bypass grafting; peripheral arterial disease, status post angioplasty and stenting; type 2 diabetes; and hyperlipidemia, presented with weeks of dyspnea. Interval history / Subjective:  
   
Patient seen and examined. Continues to feel better. Initiated on carvedilol 5/10. Blood pressure stable. Dyspnea improved. Creatinine stable. Assessment & Plan: SSS with Bradycardia, HR recorded low 30s -leadless pacemaker . Initially bradycardic and hypotensive prior to procedure, requiring temp pacemaker and pressors Acute on chronic kidney failure (CKD IV) with cardiorenal syndrome 
-secondary to diabetic nephropathy  
-bumex 1 mg BID, will continue to monitor creatinine 
-possible need dialysis in future, will follow up outpatient 
-RHC showed PCWP of 28 Urinary retention:  
-straight cath as needed, monitor Exertional Dyspnea in the setting of Severe Pulmonary Hypertension: 
-ECHO with severe PHTN - PAS 72 mmHg 
-Chest CT significant for mild worsening of diffuse interstitial parenchymal change 
-RHC. PCW 28, RV 75. Metabolic Acidosis:  
-oral bicarbonate supplements  
-nephrology managing Type 2 diabetes with hyperglycemia 
-Lantus 16 units 
-continue to Hold oral hypoglycemics -Accucheks and Humalog correction AC&HS 
  
Chronic atrial fibrillation: 
-no anticoagulation due to history of GI bleed  
-continue carvedilol Code status: Full DVT prophylaxis: Heparin Care Plan discussed with: Patient/Family and Nurse Disposition: Home w/Family Hospital Problems  Date Reviewed: 12/17/2018 Codes Class Noted POA Pacemaker ICD-10-CM: Z95.0 ICD-9-CM: V45.01  5/9/2019 Unknown Overview Signed 5/9/2019  6:22 PM by Celi Guerra MD  
  5/9/2019 leadless pacer implant CHF (congestive heart failure) (HCC) ICD-10-CM: I50.9 ICD-9-CM: 428.0  5/1/2019 Unknown Atrial fibrillation with slow ventricular response ([facility] ICD-10-CM: I48.91 
ICD-9-CM: 427.31  5/1/2019 Overview Signed 5/8/2019 11:24 PM by Celi Guerra MD  
  Added automatically from request for surgery [id number] KATALINA (acute kidney injury) ([facility].) ICD-10-CM: N17.9 ICD-9-CM: 584.9  6/25/2017 Unknown * (Principal) Dyspnea ICD-10-CM: R06.00 
ICD-9-CM: 786.09  7/15/2014 Unknown Review of Systems:  
Negative unless stated above Vital Signs:  
 Last 24hrs VS reviewed since prior progress note. Most recent are: 
Visit Vitals /53 (BP 1 Location: Right arm, BP Patient Position: At rest) Pulse 70 Temp 98.3 °F (36.8 °C) Resp 18 Ht 5' 9\" (1.753 m) Wt 86 kg (189 lb 9.5 oz) SpO2 95% BMI 28.00 kg/m² Intake/Output Summary (Last 24 hours) at 5/11/2019 1226 Last data filed at 5/11/2019 0031 Gross per 24 hour Intake 720 ml Output 1130 ml Net -410 ml Physical Examination:  
 
 
     
Constitutional:  No acute distress, cooperative, pleasant   
ENT:  Oral mucous moist, oropharynx benign. Neck supple, Resp:  Lung sounds diminished throughout. No wheezing/rhonchi/rales. No accessory muscle use CV:  regular, no murmurs, gallops, rubs GI:  Soft, non distended, non tender. normoactive bowel sounds, no hepatosplenomegaly Musculoskeletal:  No edema, warm, 2+ pulses throughout Neurologic:  Moves all extremities Psych:  Good insight, Not anxious nor agitated. Data Review:  
 Review and/or order of clinical lab test 
Review and/or order of tests in the radiology section of CPT Review and/or order of tests in the medicine section of [facility] Labs:  
 
Recent Labs 05/11/19 
0049 05/10/19 
0158 WBC 5.9 7.0 HGB 8.4* 8.4* HCT 26.8* 26.3*  
 161 Recent Labs 05/11/19 
5383 05/10/19 
0158 05/09/19 
3430 * 137 138  
K 4.7 4.6 4.2 CL 99 103 104 CO2 25 23 23 BUN 85* 81* 81* CREA 4.03* 3.82* 3.88* * 172* 115* CA 8.0* 8.0* 8.7 No results for input(s): SGOT, GPT, ALT, AP, TBIL, TBILI, TP, ALB, GLOB, GGT, AML, LPSE in the last 72 hours. No lab exists for component: AMYP, HLPSE No results for input(s): INR, PTP, APTT in the last 72 hours. No lab exists for component: INREXT, INREXT No results for input(s): FE, TIBC, PSAT, FERR in the last 72 hours. No results found for: FOL, RBCF No results for input(s): PH, PCO2, PO2 in the last 72 hours. No results for input(s): CPK, CKNDX, TROIQ in the last 72 hours. No lab exists for component: CPKMB No results found for: CHOL, CHOLX, CHLST, CHOLV, HDL, LDL, LDLC, DLDLP, TGLX, TRIGL, TRIGP, CHHD, CHHDX Lab Results Component Value Date/Time Glucose (POC) 171 (H) 05/11/2019 11:58 AM  
 Glucose (POC) 98 05/11/2019 06:46 AM  
 Glucose (POC) 168 (H) 05/10/2019 09:37 PM  
 Glucose (POC) 235 (H) 05/10/2019 04:29 PM  
 Glucose (POC) 287 (H) 05/10/2019 11:07 AM  
 
Lab Results Component Value Date/Time  Color YELLOW/STRAW 06/25/2017 11:40 AM  
 Appearance CLEAR 06/25/2017 11:40 AM  
 Specific gravity 1.018 06/25/2017 11:40 AM  
 pH (UA) 5.0 06/25/2017 11:40 AM  
 Protein 300 (A) 06/25/2017 11:40 AM  
 Glucose NEGATIVE  06/25/2017 11:40 AM  
 Ketone NEGATIVE  06/25/2017 11:40 AM  
 Bilirubin NEGATIVE  06/25/2017 11:40 AM  
 Urobilinogen 0.2 06/25/2017 11:40 AM  
 Nitrites NEGATIVE  06/25/2017 11:40 AM  
 Leukocyte Esterase NEGATIVE  06/25/2017 11:40 AM  
 Epithelial cells FEW 06/25/2017 11:40 AM  
 Bacteria NEGATIVE  06/25/2017 11:40 AM  
 WBC 0-4 06/25/2017 11:40 AM  
 RBC 0-5 06/25/2017 11:40 AM  
 
 
 
Medications Reviewed:  
 
Current Facility-Administered Medications Medication Dose Route Frequency  tamsulosin (FLOMAX) capsule 0.4 mg  0.4 mg Oral DAILY  carvedilol (COREG) tablet 12.5 mg  12.5 mg Oral BID WITH MEALS  cephALEXin (KEFLEX) capsule 250 mg  250 mg Oral TID  amLODIPine (NORVASC) tablet 10 mg  10 mg Oral DAILY  sodium chloride (NS) flush 5-40 mL  5-40 mL IntraVENous Q8H  
 sodium chloride (NS) flush 5-40 mL  5-40 mL IntraVENous PRN  
 bumetanide (BUMEX) tablet 1 mg  1 mg Oral BID  insulin lispro (HUMALOG) injection   SubCUTAneous AC&HS  sodium bicarbonate tablet 1,300 mg  1,300 mg Oral TID  hydrALAZINE (APRESOLINE) tablet 150 mg  150 mg Oral TID  albuterol-ipratropium (DUO-NEB) 2.5 MG-0.5 MG/3 ML  3 mL Nebulization Q6H PRN  
 insulin glargine (LANTUS) injection 16 Units  16 Units SubCUTAneous DAILY  famotidine (PEPCID) tablet 20 mg  20 mg Oral Q24H  
 ondansetron (ZOFRAN ODT) tablet 4 mg  4 mg Oral Q8H PRN  
 sodium chloride (NS) flush 5-40 mL  5-40 mL IntraVENous Q8H  
 sodium chloride (NS) flush 5-40 mL  5-40 mL IntraVENous PRN  
 sodium chloride (NS) flush 5-40 mL  5-40 mL IntraVENous Q8H  
 sodium chloride (NS) flush 5-40 mL  5-40 mL IntraVENous PRN  
 acetaminophen (TYLENOL) tablet 650 mg  650 mg Oral Q4H PRN  
 aspirin chewable tablet 81 mg  81 mg Oral DAILY  glucose chewable tablet 16 g  4 Tab Oral PRN  
 dextrose (D50W) injection syrg 12.5-25 g  12.5-25 g IntraVENous PRN  
 glucagon (GLUCAGEN) injection 1 mg  1 mg IntraMUSCular PRN  
 hydrALAZINE (APRESOLINE) 20 mg/mL injection 20 mg  20 mg IntraVENous Q6H PRN  
 
______________________________________________________________________ EXPECTED LENGTH OF STAY: 2d 9h 
ACTUAL LENGTH OF STAY:          10 [address]00 [facility], DO

## 2019-05-12 ENCOUNTER — HOME HEALTH ADMISSION (OUTPATIENT)
Dept: HOME HEALTH SERVICES | Facility: HOME HEALTH | Age: 84
End: 2019-05-12

## 2019-05-12 VITALS
WEIGHT: 190.4 LBS | SYSTOLIC BLOOD PRESSURE: 165 MMHG | DIASTOLIC BLOOD PRESSURE: 63 MMHG | BODY MASS INDEX: 28.2 KG/M2 | OXYGEN SATURATION: 98 % | HEIGHT: 69 IN | HEART RATE: 75 BPM | TEMPERATURE: 97.9 F | RESPIRATION RATE: 16 BRPM

## 2019-05-12 LAB
ANION GAP SERPL CALC-SCNC: 10 MMOL/L (ref 5–15)
BUN SERPL-MCNC: 86 MG/DL (ref 6–20)
BUN/CREAT SERPL: 22 (ref 12–20)
CALCIUM SERPL-MCNC: 8 MG/DL (ref 8.5–10.1)
CHLORIDE SERPL-SCNC: 100 MMOL/L (ref 97–108)
CO2 SERPL-SCNC: 25 MMOL/L (ref 21–32)
CREAT SERPL-MCNC: 3.97 MG/DL (ref 0.7–1.3)
GLUCOSE BLD STRIP.AUTO-MCNC: 120 MG/DL (ref 65–100)
GLUCOSE SERPL-MCNC: 97 MG/DL (ref 65–100)
POTASSIUM SERPL-SCNC: 4.5 MMOL/L (ref 3.5–5.1)
SERVICE CMNT-IMP: ABNORMAL
SODIUM SERPL-SCNC: 135 MMOL/L (ref 136–145)

## 2019-05-12 PROCEDURE — 74011250637 HC RX REV CODE- 250/637: Performed by: PHYSICIAN ASSISTANT

## 2019-05-12 PROCEDURE — 74011250637 HC RX REV CODE- 250/637: Performed by: HOSPITALIST

## 2019-05-12 PROCEDURE — 82962 GLUCOSE BLOOD TEST: CPT

## 2019-05-12 PROCEDURE — 80048 BASIC METABOLIC PNL TOTAL CA: CPT

## 2019-05-12 PROCEDURE — 74011250637 HC RX REV CODE- 250/637: Performed by: INTERNAL MEDICINE

## 2019-05-12 PROCEDURE — 74011636637 HC RX REV CODE- 636/637: Performed by: HOSPITALIST

## 2019-05-12 PROCEDURE — 36415 COLL VENOUS BLD VENIPUNCTURE: CPT

## 2019-05-12 PROCEDURE — 74011250637 HC RX REV CODE- 250/637: Performed by: NURSE PRACTITIONER

## 2019-05-12 RX ORDER — CARVEDILOL 12.5 MG/1
12.5 TABLET ORAL 2 TIMES DAILY WITH MEALS
Qty: 60 TAB | Refills: 0 | Status: SHIPPED | OUTPATIENT
Start: 2019-05-12 | End: 2019-08-20

## 2019-05-12 RX ORDER — CEPHALEXIN 250 MG/1
250 CAPSULE ORAL 3 TIMES DAILY
Qty: 8 CAP | Refills: 0 | Status: SHIPPED | OUTPATIENT
Start: 2019-05-12 | End: 2019-05-16 | Stop reason: ALTCHOICE

## 2019-05-12 RX ORDER — SODIUM BICARBONATE 650 MG/1
1300 TABLET ORAL 3 TIMES DAILY
Qty: 90 TAB | Refills: 0 | Status: SHIPPED | OUTPATIENT
Start: 2019-05-12 | End: 2019-12-10

## 2019-05-12 RX ORDER — TAMSULOSIN HYDROCHLORIDE 0.4 MG/1
0.4 CAPSULE ORAL DAILY
Qty: 30 CAP | Refills: 0 | Status: SHIPPED | OUTPATIENT
Start: 2019-05-13

## 2019-05-12 RX ORDER — HYDRALAZINE HYDROCHLORIDE 50 MG/1
150 TABLET, FILM COATED ORAL 3 TIMES DAILY
Qty: 90 TAB | Refills: 0 | Status: SHIPPED | OUTPATIENT
Start: 2019-05-12 | End: 2019-08-20

## 2019-05-12 RX ADMIN — Medication 10 ML: at 06:00

## 2019-05-12 RX ADMIN — BUMETANIDE 1 MG: 1 TABLET ORAL at 08:08

## 2019-05-12 RX ADMIN — HYDRALAZINE HYDROCHLORIDE 150 MG: 50 TABLET, FILM COATED ORAL at 08:07

## 2019-05-12 RX ADMIN — CEPHALEXIN 250 MG: 250 CAPSULE ORAL at 08:07

## 2019-05-12 RX ADMIN — TAMSULOSIN HYDROCHLORIDE 0.4 MG: 0.4 CAPSULE ORAL at 08:07

## 2019-05-12 RX ADMIN — AMLODIPINE BESYLATE 10 MG: 5 TABLET ORAL at 08:07

## 2019-05-12 RX ADMIN — FAMOTIDINE 20 MG: 20 TABLET ORAL at 08:07

## 2019-05-12 RX ADMIN — CARVEDILOL 12.5 MG: 12.5 TABLET, FILM COATED ORAL at 08:08

## 2019-05-12 RX ADMIN — ASPIRIN 81 MG 81 MG: 81 TABLET ORAL at 08:08

## 2019-05-12 RX ADMIN — INSULIN GLARGINE 16 UNITS: 100 INJECTION, SOLUTION SUBCUTANEOUS at 08:07

## 2019-05-12 RX ADMIN — SODIUM BICARBONATE 1300 MG: 650 TABLET ORAL at 08:08

## 2019-05-12 NOTE — PROGRESS NOTES
Gisele Milligan with [employer]  accepted [facility] but then stated the patient is out of the service area. Patient resides in [address] [place] [address]. Cm informed the staff nurse the patient will not be receiving [facility] services.

## 2019-05-12 NOTE — PROGRESS NOTES
I have reviewed discharge instructions with the patient. The patient verbalized understanding. Follow up appts and new medication education went over with the patient, pt given time to ask questions and for clarification. Pt e-signed discharge summary. Problem: Falls - Risk of 
Goal: *Absence of Falls Description Document [facility] Fall Risk and appropriate interventions in the flowsheet. Outcome: Progressing Towards Goal 
Note:  
Fall Risk Interventions: 
Mobility Interventions: Assess mobility with egress test, Communicate number of staff needed for ambulation/transfer, Mechanical lift, OT consult for ADLs, Patient to call before getting OOB, Strengthening exercises (ROM-active/passive), Utilize walker, cane, or other assistive device, Utilize gait belt for transfers/ambulation Medication Interventions: Assess postural VS orthostatic hypotension, Evaluate medications/consider consulting pharmacy, Patient to call before getting OOB, Teach patient to arise slowly, Utilize gait belt for transfers/ambulation Elimination Interventions: Call light in reach, Elevated toilet seat, Patient to call for help with toileting needs, Toilet paper/wipes in reach, Toileting schedule/hourly rounds, Urinal in reach, Stay With Me (per policy) Problem: Heart Failure: Day 4 Goal: *Demonstrates progressive activity Outcome: Progressing Towards Goal 
Note: Pt willing ambulates within the room and occasionally in the halls with walker. Problem: Heart Failure: Discharge Outcomes Goal: *Describes/verbalizes understanding of follow-up/return appt Description 
(eg: to physicians, diabetes treatment coordinator, and other resources Outcome: Progressing Towards Goal 
Note:  
Pt verbalizes understanding of keeping his follow up appointments and when to call his physician. Problem: Pacer/ICD: Post-Procedure Goal: Discharge Planning Outcome: Progressing Towards Goal 
Note: Pt and wife instructed on pace maker equipment and instructed to bring it with them to follow up appointments.

## 2019-05-12 NOTE — PROGRESS NOTES
Received a page from the staff nurse that the patient will need a [facility] visit for heart failure. Cm sent a referral to [employer]. Cm also entered the information on the patients discharge instructions. No other discharge needs identified at this time.

## 2019-05-12 NOTE — DISCHARGE INSTRUCTIONS
PATIENT INSTRUCTIONS POST-LEADLESS PACEMAKER IMPLANT    No heavy lifting 1 week  Call if dizzy, chest pain or severe shortness of breaths      Future Appointments   Date Time Provider Muna Delgado   5/17/2019 10:45 AM PACEMAKER3, [address]   5/17/2019 Groin check visit  [address]   5/22/2019  4:00 PM MD Salvador Orellana M.D. [facility]  Electrophysiology/Cardiology  [address] [facility] [place]  [address], [place] [address], [address]  [address], [address]                             [address] [facility]  [phone]           Discharge Instructions       PATIENT ID: Viola Staley Sr. MRN: [id number]   YOB: 1935    DATE OF ADMISSION: 5/1/2019 10:15 AM    DATE OF DISCHARGE: 5/12/2019    PRIMARY CARE PROVIDER: Moris Mo MD     ATTENDING PHYSICIAN: Bryan Berman DO  DISCHARGING PROVIDER: Ellie Calixto DO    To contact this individual call [phone] and ask the  to page. If unavailable ask to be transferred the Adult Hospitalist Department.     DISCHARGE DIAGNOSES     Bradycardia, requiring pacemaker  Chronic kidney disease  Urinary retention    CONSULTATIONS: IP CONSULT TO CARDIOLOGY  IP CONSULT TO NEPHROLOGY  IP CONSULT TO ELECTROPHYSIOLOGY    PROCEDURES/SURGERIES: Procedure(s):  INSERT OR REPLACE TRANSCATH PPM LEADLESS    PENDING TEST RESULTS:   At the time of discharge the following test results are still pending: none    FOLLOW UP APPOINTMENTS:   Follow-up Information     Follow up With Specialties Details Why Contact Info    Moris Mo MD [facility] On 5/13/2019 Hospital f/u PCP appointment Monday, 5/13/19 @ 11:15 a.m.  [address]  [phone]      Charline Gupta MD  Go on 5/16/2019 For Cardiology follow up appointment at 2:20 pm Kevin Morrow [facility], [address] [facility]  [place]           ADDITIONAL CARE RECOMMENDATIONS:     Continue home amlodipine, aspirin, bumex    New medications:  -carvedilol (blood pressure, atrial fibrillation): decrease to 12.5 mg twice a day (from 25 mg twice a day)  -keflex (antibiotic): take three times daily until prescription complete, for pacemaker  -hydralazine (blood pressure): three times daily  -Sodium bicarb (kidney medication): three times daily  -Flomax (urinary retention): once daily    Please follow up with primary care physician, cardiology, and nephrology. Please schedule appointment with nephrology. Return to the hospital for any new or worsening symptoms. DISCHARGE MEDICATIONS:   See Medication Reconciliation Form    · It is important that you take the medication exactly as they are prescribed. · Keep your medication in the bottles provided by the pharmacist and keep a list of the medication names, dosages, and times to be taken in your wallet. · Do not take other medications without consulting your doctor. NOTIFY YOUR PHYSICIAN FOR ANY OF THE FOLLOWING:   Fever over 101 degrees for 24 hours. Chest pain, shortness of breath, fever, chills, nausea, vomiting, diarrhea, change in mentation, falling, weakness, bleeding. Severe pain or pain not relieved by medications. Or, any other signs or symptoms that you may have questions about.           Signed:   Duyen Burns DO  5/12/2019  10:24 AM

## 2019-05-12 NOTE — PROGRESS NOTES
[facility] 
 [address] [facility] [address] [facility] Phone: [phone]   [phone]   
  
Nephrology Progress Note Oswaldo Perdomo     1935     [id number] Date of Admission : 5/1/2019 05/12/19 CC:  Follow up for KATALINA Assessment and Plan KATALINA  on CKD  
- 2/2  progression of underlying CKD - Cr stable 
- cont current diuretics 
- ok for d/c 
- will see in clinic next week CKD IV: 
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 
-- UPCR showed 11 gm of Protein Metablolic Acidosis: 
- cont PO BICARB Dyspnea : 
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  
- RHC w/ PCW 28 HTN: 
- continue current meds Bradycardia : 
- s/p PPM on 5/9 Anemia of CKD: 
- hgb stable 
  
Afib: 
- per cards 
  
CAD s/p CABG 
  
DM2: 
- on insulin Interval History: 
Seen and examined. Cr stable. No cp, sob, n/v/d. For d/c today. Review of Systems: A comprehensive review of systems was negative except for that written in the HPI. Current Medications:  
Current Facility-Administered Medications Medication Dose Route Frequency  tamsulosin (FLOMAX) capsule 0.4 mg  0.4 mg Oral DAILY  carvedilol (COREG) tablet 12.5 mg  12.5 mg Oral BID WITH MEALS  cephALEXin (KEFLEX) capsule 250 mg  250 mg Oral TID  amLODIPine (NORVASC) tablet 10 mg  10 mg Oral DAILY  sodium chloride (NS) flush 5-40 mL  5-40 mL IntraVENous Q8H  
 sodium chloride (NS) flush 5-40 mL  5-40 mL IntraVENous PRN  
 bumetanide (BUMEX) tablet 1 mg  1 mg Oral BID  insulin lispro (HUMALOG) injection   SubCUTAneous AC&HS  sodium bicarbonate tablet 1,300 mg  1,300 mg Oral TID  hydrALAZINE (APRESOLINE) tablet 150 mg  150 mg Oral TID  albuterol-ipratropium (DUO-NEB) 2.5 MG-0.5 MG/3 ML  3 mL Nebulization Q6H PRN  
 insulin glargine (LANTUS) injection 16 Units  16 Units SubCUTAneous DAILY  famotidine (PEPCID) tablet 20 mg  20 mg Oral Q24H  ondansetron (ZOFRAN ODT) tablet 4 mg  4 mg Oral Q8H PRN  
 sodium chloride (NS) flush 5-40 mL  5-40 mL IntraVENous Q8H  
 sodium chloride (NS) flush 5-40 mL  5-40 mL IntraVENous PRN  
 sodium chloride (NS) flush 5-40 mL  5-40 mL IntraVENous Q8H  
 sodium chloride (NS) flush 5-40 mL  5-40 mL IntraVENous PRN  
 acetaminophen (TYLENOL) tablet 650 mg  650 mg Oral Q4H PRN  
 aspirin chewable tablet 81 mg  81 mg Oral DAILY  glucose chewable tablet 16 g  4 Tab Oral PRN  
 dextrose (D50W) injection syrg 12.5-25 g  12.5-25 g IntraVENous PRN  
 glucagon (GLUCAGEN) injection 1 mg  1 mg IntraMUSCular PRN  
 hydrALAZINE (APRESOLINE) 20 mg/mL injection 20 mg  20 mg IntraVENous Q6H PRN Allergies Allergen Reactions  Lisinopril Cough Objective: 
Vitals:   
Vitals:  
 05/11/19 2108 05/11/19 2319 05/12/19 9420 05/12/19 9552 BP: 171/67 153/59 162/57 165/63 Pulse: 70 76 71 75 Resp:  16 20 16 Temp:  97.3 °F (36.3 °C) 98.1 °F (36.7 °C) 97.9 °F (36.6 °C) SpO2:  95% 97% 98% Weight:   86.4 kg (190 lb 6.4 oz) Height:      
 
Intake and Output: 
05/12 0701 - 05/12 1900 In: 236 [P.O.:236] Out: 400 [Urine:400] 05/10 1901 - 05/12 0700 In: 1080 [P.O.:1080] Out: Orvan Dural [[id number]] Physical Examination: 
General: nad Neck:  Supple, no mass Resp:  Decreased BS, clear b./l, no wheezing CV:  RRR,  no murmur or rub,no LE edema GI:  Soft, non tender Neurologic:   non focal, normal speech :  No ferrell []    High complexity decision making was performed 
[]    Patient is at high-risk of decompensation with multiple organ involvement Lab Data Personally Reviewed: I have reviewed all the pertinent labs, microbiology data and radiology studies during assessment. Recent Labs 05/12/19 2034 05/11/19 
7295 05/10/19 
0158 * 133* 137  
K 4.5 4.7 4.6  99 103 CO2 25 25 23 GLU 97 123* 172* BUN 86* 85* 81* CREA 3.97* 4.03* 3.82* CA 8.0* 8.0* 8.0* Recent Labs 05/11/19 2674 05/10/19 
0158 WBC 5.9 7.0 HGB 8.4* 8.4* HCT 26.8* 26.3*  
 161 No results found for: SDES Lab Results Component Value Date/Time Culture result: CANDIDA ALBICANS (A) 03/16/2017 06:00 AM  
 Culture result: NO GROWTH 5 DAYS 03/16/2017 05:40 AM  
 
Recent Results (from the past 24 hour(s)) GLUCOSE, POC Collection Time: 05/11/19 11:58 AM  
Result Value Ref Range Glucose (POC) 171 (H) 65 - 100 mg/dL Performed by Nasreen Seo GLUCOSE, POC Collection Time: 05/11/19  4:14 PM  
Result Value Ref Range Glucose (POC) 142 (H) 65 - 100 mg/dL Performed by Nasreen Seo GLUCOSE, POC Collection Time: 05/11/19  8:59 PM  
Result Value Ref Range Glucose (POC) 83 65 - 100 mg/dL Performed by Alexandro Celeste METABOLIC PANEL, BASIC Collection Time: 05/12/19  2:13 AM  
Result Value Ref Range Sodium 135 (L) 136 - 145 mmol/L Potassium 4.5 3.5 - 5.1 mmol/L Chloride 100 97 - 108 mmol/L  
 CO2 25 21 - 32 mmol/L Anion gap 10 5 - 15 mmol/L Glucose 97 65 - 100 mg/dL BUN 86 (H) 6 - 20 MG/DL Creatinine 3.97 (H) 0.70 - 1.30 MG/DL  
 BUN/Creatinine ratio 22 (H) 12 - 20 GFR est AA 18 (L) >60 ml/min/1.73m2 GFR est non-AA 15 (L) >60 ml/min/1.73m2 Calcium 8.0 (L) 8.5 - 10.1 MG/DL  
GLUCOSE, POC Collection Time: 05/12/19  6:49 AM  
Result Value Ref Range Glucose (POC) 120 (H) 65 - 100 mg/dL Performed by Alexandro Celeste Total time spent with patient:  xxx   min. Care Plan discussed with: 
Patient Family RN Consulting Physician [facility],      
 
I have reviewed the flowsheets. Chart and Pertinent Notes have been reviewed. No change in PMH ,family and social history from Consult note.  
 
 
Franc Alonso MD

## 2019-05-12 NOTE — PROGRESS NOTES
Bedside and Verbal shift change report given to Dustin Marie RN (oncoming nurse) by Lyudmila Huber RN (offgoing nurse). Report included the following information SBAR, Kardex, ED Summary, Procedure Summary, Intake/Output, MAR, Recent Results, Med Rec Status and Cardiac Rhythm paced. Hourly rounding performed. Pt very restless throughout the night, \"unable to get a good night sleep\" per pt. Patient Vitals for the past 12 hrs: 
 Temp Pulse Resp BP SpO2  
05/12/19 0711 97.9 °F (36.6 °C) 75 16 165/63 98 % 05/12/19 0219 98.1 °F (36.7 °C) 71 20 162/57 97 % 05/11/19 2319 97.3 °F (36.3 °C) 76 16 153/59 95 % 05/11/19 2108  70  171/67   
05/11/19 1919 96.5 °F (35.8 °C) 70 18 119/42 93 % Last 3 Recorded Weights in this Encounter 05/10/19 0600 05/11/19 3573 05/12/19 6861 Weight: 85.1 kg (187 lb 9.8 oz) 86 kg (189 lb 9.5 oz) 86.4 kg (190 lb 6.4 oz) Pt weighed on standing scale. Will address continuous weight increase over the last few days with day shift RN. Problem: Heart Failure: Day 5 Goal: Discharge Planning Outcome: Progressing Towards Goal 
  
Problem: Heart Failure: Discharge Outcomes Goal: *Demonstrates ability to perform prescribed activity without shortness of breath or discomfort Outcome: Progressing Towards Goal 
Goal: *Verbalizes understanding/describes prescribed medications Outcome: Progressing Towards Goal 
  
Problem: Diabetes Maintenance:Admission Goal: Medications Outcome: Progressing Towards Goal 
Goal: Treatments/Interventions/Procedures Outcome: Progressing Towards Goal

## 2019-05-13 ENCOUNTER — PATIENT OUTREACH (OUTPATIENT)
Dept: FAMILY MEDICINE CLINIC | Age: 84
End: 2019-05-13

## 2019-05-14 ENCOUNTER — APPOINTMENT (OUTPATIENT)
Dept: GENERAL RADIOLOGY | Age: 84
DRG: 291 | End: 2019-05-14
Attending: EMERGENCY MEDICINE
Payer: MEDICARE

## 2019-05-14 ENCOUNTER — HOSPITAL ENCOUNTER (EMERGENCY)
Age: 84
Discharge: HOME | DRG: 291 | End: 2019-05-14
Attending: EMERGENCY MEDICINE
Payer: MEDICARE

## 2019-05-14 VITALS
DIASTOLIC BLOOD PRESSURE: 55 MMHG | TEMPERATURE: 97.9 F | RESPIRATION RATE: 23 BRPM | OXYGEN SATURATION: 94 % | SYSTOLIC BLOOD PRESSURE: 127 MMHG | HEART RATE: 72 BPM

## 2019-05-14 DIAGNOSIS — R77.8 ELEVATED TROPONIN: ICD-10-CM

## 2019-05-14 DIAGNOSIS — R06.02 SOB (SHORTNESS OF BREATH): Primary | ICD-10-CM

## 2019-05-14 LAB
ALBUMIN SERPL-MCNC: 3 G/DL (ref 3.5–5)
ALBUMIN SERPL-MCNC: 3.1 G/DL (ref 3.5–5)
ALBUMIN/GLOB SERPL: 0.9 {RATIO} (ref 1.1–2.2)
ALBUMIN/GLOB SERPL: 1 {RATIO} (ref 1.1–2.2)
ALP SERPL-CCNC: 145 U/L (ref 45–117)
ALP SERPL-CCNC: 147 U/L (ref 45–117)
ALT SERPL-CCNC: 32 U/L (ref 12–78)
ALT SERPL-CCNC: 33 U/L (ref 12–78)
ANION GAP SERPL CALC-SCNC: 10 MMOL/L (ref 5–15)
ANION GAP SERPL CALC-SCNC: 9 MMOL/L (ref 5–15)
AST SERPL-CCNC: 37 U/L (ref 15–37)
AST SERPL-CCNC: 38 U/L (ref 15–37)
ATRIAL RATE: 92 BPM
BASOPHILS # BLD: 0.1 K/UL (ref 0–0.1)
BASOPHILS NFR BLD: 1 % (ref 0–1)
BILIRUB SERPL-MCNC: 0.6 MG/DL (ref 0.2–1)
BILIRUB SERPL-MCNC: 0.6 MG/DL (ref 0.2–1)
BNP SERPL-MCNC: ABNORMAL PG/ML
BUN SERPL-MCNC: 86 MG/DL (ref 6–20)
BUN SERPL-MCNC: 89 MG/DL (ref 6–20)
BUN/CREAT SERPL: 23 (ref 12–20)
BUN/CREAT SERPL: 24 (ref 12–20)
CALCIUM SERPL-MCNC: 7.9 MG/DL (ref 8.5–10.1)
CALCIUM SERPL-MCNC: 8 MG/DL (ref 8.5–10.1)
CALCULATED R AXIS, ECG10: 175 DEGREES
CALCULATED T AXIS, ECG11: -16 DEGREES
CHLORIDE SERPL-SCNC: 100 MMOL/L (ref 97–108)
CHLORIDE SERPL-SCNC: 100 MMOL/L (ref 97–108)
CO2 SERPL-SCNC: 28 MMOL/L (ref 21–32)
CO2 SERPL-SCNC: 28 MMOL/L (ref 21–32)
COMMENT, HOLDF: NORMAL
CREAT SERPL-MCNC: 3.71 MG/DL (ref 0.7–1.3)
CREAT SERPL-MCNC: 3.72 MG/DL (ref 0.7–1.3)
DIAGNOSIS, 93000: NORMAL
DIFFERENTIAL METHOD BLD: ABNORMAL
EOSINOPHIL # BLD: 0.2 K/UL (ref 0–0.4)
EOSINOPHIL NFR BLD: 4 % (ref 0–7)
ERYTHROCYTE [DISTWIDTH] IN BLOOD BY AUTOMATED COUNT: 14.3 % (ref 11.5–14.5)
GLOBULIN SER CALC-MCNC: 3 G/DL (ref 2–4)
GLOBULIN SER CALC-MCNC: 3.3 G/DL (ref 2–4)
GLUCOSE SERPL-MCNC: 126 MG/DL (ref 65–100)
GLUCOSE SERPL-MCNC: 133 MG/DL (ref 65–100)
HCT VFR BLD AUTO: 27.6 % (ref 36.6–50.3)
HGB BLD-MCNC: 8.9 G/DL (ref 12.1–17)
IMM GRANULOCYTES # BLD AUTO: 0 K/UL (ref 0–0.04)
IMM GRANULOCYTES NFR BLD AUTO: 0 % (ref 0–0.5)
LYMPHOCYTES # BLD: 0.7 K/UL (ref 0.8–3.5)
LYMPHOCYTES NFR BLD: 12 % (ref 12–49)
MAGNESIUM SERPL-MCNC: 2.4 MG/DL (ref 1.6–2.4)
MCH RBC QN AUTO: 30 PG (ref 26–34)
MCHC RBC AUTO-ENTMCNC: 32.2 G/DL (ref 30–36.5)
MCV RBC AUTO: 92.9 FL (ref 80–99)
MONOCYTES # BLD: 0.9 K/UL (ref 0–1)
MONOCYTES NFR BLD: 14 % (ref 5–13)
NEUTS SEG # BLD: 4.2 K/UL (ref 1.8–8)
NEUTS SEG NFR BLD: 69 % (ref 32–75)
NRBC # BLD: 0 K/UL (ref 0–0.01)
NRBC BLD-RTO: 0 PER 100 WBC
PLATELET # BLD AUTO: 180 K/UL (ref 150–400)
PMV BLD AUTO: 10.9 FL (ref 8.9–12.9)
POTASSIUM SERPL-SCNC: 4.7 MMOL/L (ref 3.5–5.1)
POTASSIUM SERPL-SCNC: 4.8 MMOL/L (ref 3.5–5.1)
PROT SERPL-MCNC: 6.1 G/DL (ref 6.4–8.2)
PROT SERPL-MCNC: 6.3 G/DL (ref 6.4–8.2)
Q-T INTERVAL, ECG07: 504 MS
QRS DURATION, ECG06: 166 MS
QTC CALCULATION (BEZET), ECG08: 547 MS
RBC # BLD AUTO: 2.97 M/UL (ref 4.1–5.7)
RBC MORPH BLD: ABNORMAL
SAMPLES BEING HELD,HOLD: NORMAL
SODIUM SERPL-SCNC: 137 MMOL/L (ref 136–145)
SODIUM SERPL-SCNC: 138 MMOL/L (ref 136–145)
TROPONIN I SERPL-MCNC: 0.12 NG/ML
VENTRICULAR RATE, ECG03: 71 BPM
WBC # BLD AUTO: 6.1 K/UL (ref 4.1–11.1)

## 2019-05-14 PROCEDURE — 74011250637 HC RX REV CODE- 250/637: Performed by: NURSE PRACTITIONER

## 2019-05-14 PROCEDURE — 99285 EMERGENCY DEPT VISIT HI MDM: CPT

## 2019-05-14 PROCEDURE — 83735 ASSAY OF MAGNESIUM: CPT

## 2019-05-14 PROCEDURE — 36415 COLL VENOUS BLD VENIPUNCTURE: CPT

## 2019-05-14 PROCEDURE — 84484 ASSAY OF TROPONIN QUANT: CPT

## 2019-05-14 PROCEDURE — 85025 COMPLETE CBC W/AUTO DIFF WBC: CPT

## 2019-05-14 PROCEDURE — 94664 DEMO&/EVAL PT USE INHALER: CPT

## 2019-05-14 PROCEDURE — 83880 ASSAY OF NATRIURETIC PEPTIDE: CPT

## 2019-05-14 PROCEDURE — 93005 ELECTROCARDIOGRAM TRACING: CPT

## 2019-05-14 PROCEDURE — 77030029684 HC NEB SM VOL KT MONA -A

## 2019-05-14 PROCEDURE — 94640 AIRWAY INHALATION TREATMENT: CPT

## 2019-05-14 PROCEDURE — 71046 X-RAY EXAM CHEST 2 VIEWS: CPT

## 2019-05-14 PROCEDURE — 80053 COMPREHEN METABOLIC PANEL: CPT

## 2019-05-14 PROCEDURE — 74011000250 HC RX REV CODE- 250: Performed by: EMERGENCY MEDICINE

## 2019-05-14 RX ORDER — HYDRALAZINE HYDROCHLORIDE 50 MG/1
150 TABLET, FILM COATED ORAL ONCE
Status: COMPLETED | OUTPATIENT
Start: 2019-05-14 | End: 2019-05-14

## 2019-05-14 RX ORDER — CARVEDILOL 12.5 MG/1
12.5 TABLET ORAL ONCE
Status: COMPLETED | OUTPATIENT
Start: 2019-05-14 | End: 2019-05-14

## 2019-05-14 RX ORDER — ALBUTEROL SULFATE 0.83 MG/ML
2.5 SOLUTION RESPIRATORY (INHALATION)
Status: COMPLETED | OUTPATIENT
Start: 2019-05-14 | End: 2019-05-14

## 2019-05-14 RX ORDER — ALBUTEROL SULFATE 0.83 MG/ML
2.5 SOLUTION RESPIRATORY (INHALATION)
Qty: 1 PACKAGE | Refills: 0 | Status: SHIPPED | OUTPATIENT
Start: 2019-05-14

## 2019-05-14 RX ADMIN — CARVEDILOL 12.5 MG: 12.5 TABLET, FILM COATED ORAL at 15:28

## 2019-05-14 RX ADMIN — HYDRALAZINE HYDROCHLORIDE 150 MG: 50 TABLET, FILM COATED ORAL at 15:28

## 2019-05-14 RX ADMIN — ALBUTEROL SULFATE 2.5 MG: 2.5 SOLUTION RESPIRATORY (INHALATION) at 13:08

## 2019-05-14 NOTE — CONSULTS
CARDIOLOGY CONSULT NOTE [facility] [address], [address] [address],[address], [address] [facility] [place]  
[phone] fax [phone] Name: Toni Ness. 
[id number] 5/14/2019 2:52 PM 
 
OK for discharge to home today on albuterol per Dr. Citlaly Lawrence No changes from a cardiovascular perspective at this time He will follow up with Dr. Elizabeth Kate on 5/16/19 Assessment/Plan:  
  
1. Shortness of Breath - pro BNP > 35,000, CXR shows mild interstitial edema, troponin 0.12 
- TTE on 5/1/2019 showed EF 56-60%, NRWMA.  Mild reduction in RV systolic function. 
- RHC 5/2/19 - PA Sat = 55 %, AO Sat = 96 %, Cathy CO = 4.1 L/min. CATHY CO 4.14 CATHY CI 2.12,  PA 66/18/31, PCW 20/26/20, RV 69/7/18, RA 15/16/13 
- Chest CT 5/1/19 showed mild worsening of diffuse interstitial parenchymal change 
- troponin elevation non-specific in the setting of significant CKD, ECG Vpaced without ischemic changes 
- shortness of breath improved after albuterol nebulizer, would agree with plan to discharge to home on albuterol nebulizer 2. 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 Hypertension - well controlled on Norvasc 10mg PO every day, Hydralazine 150mg PO TID, Coreg 12.5 mg BID, Bumex 1mg PO BID - will give now dose of hydralazine and coreg 4. Atrial Fibrillation - chronic, rate controlled on coreg and s/p PPM placement, CHADSVASC=5 but not a candidate for [facility] [address] due to fall risk, previous GI bleed 5. KATALINA on CKD - creatinine 3.7, seen by nephrology during recent admission, will likely need HD, but not at this time 6. DM type 2 - A1C 9.1%, followed by PCP 7. Dyslipidemia - on simvastatin 20mg daily 8. Anemia: Hgb 8.9 and stable 9. PVD - s/p right popliteal artery stenting with Dr. Shafer Person 9/27/18, on ASA and statin 10.   Frequent PVCs noted on holter monitor in the past  
 11.  CAD s/p 3 vessel CABG in 3/2008 - no ischemia on nuclear stress test in 2017, on ASA and statin Holter monitor14=showing rate of  with frequent PACs, rare periods of short RP interval SVT up to 122 and frequent PVCs. sinus rhythm with similar findings, frequent PVCs and short RP interval tachycardia. NUKE  14,  2 minutes, 20 seconds,  EF of 55% with no ischemia. PVD= Dr. Colonel Elaine atherectomy to distal SFA and distal popliteal with angioplasty to both lesions and stent to the SFA by Dr. Colonel Elaine on 3/14/14. Previously had AIBs of 0.67 on the left, 0.71 on the right with the right seemingly due to distal disease. CAD/CABG off pump x3 3/2008 . =post op anemia and renal failure CATH 2008= severe three-vessel left main coronary artery disease, 40% disease of the left main and take off of the LAD and circumflex complex, 70% stenosis of the ostial circ and 85% of the LAD. Between the first and second marginal, the circumflex had 70% stenosis and between the third marginal and PDA there was a 70% stenosis, LAD ostial lesion RCA proximal 60% tapering, EF 60%-70%, bilaterally patent renal arteries, mild atherosclerosis of the distal abdominal aorta. Mild carotid artery disease 3-7-08 then 1--12 with 10-49% left, less than 10% on right Dyslipidemia 10-25-10  TG 91 HDL 40 LDL 48 SOCIAL: Drinks no alcohol, quit smoking several years ago, works as an . Lives with his wife and has four children. Enjoys gardening, fishing and music. FAMILY HISTORY: Mother  of cancer at 76, father  of Parkinson's at 70, one brother  of cancer of the liver at 76 and one  of an infection at 64.  
  
Admit Date: 2019 Admit Diagnosis: No admission diagnoses are documented for this encounter. Primary Care Juanis Guillen MD    
Attending Provider: No att. providers found Primary Cardiologist: Dr. Maira Fletcher 
 Consulting Cardiologist: Dr. Christo Rivera REASON FOR CONSULT: dyspnea Requesting Physician: Dr. Carlitos Gandara Subjective: 
 
 Celio Feng is a 81 y.o. male with extensive cardiac history as above who was discharged from [facility] on 5/12/19 after admission for dyspnea. Reviewed records from previous admission. Patient states that he was short of breath on discharge on 5/12/19. He hasn't slept well the last two nights and he went to see pulmonary today and was advised to trial his wife's albuterol nebulizer for his dyspnea to see if that would help but he felt uneasy about this and came to ER. He reports having significant dyspena at rest, no wheezing or cough. He reports planning to do a sleep study to assess for THA in July 2019. He denies any chest pain, palpitations, dizziness or syncope. He denies any fevers or chills. Plans to see Dr. Charity Avila on Thursday. Does not look fluid overloaded and feels better after the nebulizer was given. Review of Symptoms: A comprehensive review of systems was negative except for that written in the HPI. Previous treatment/evaluation includes Coronary Artery Bypass Graft, echocardiogram, stress thallium and cardiac catheterization . Cardiac risk factors: smoking/ tobacco exposure, dyslipidemia, diabetes mellitus, obesity, sedentary life style, male gender, hypertension, stress. Past Medical History:  
Diagnosis Date  CAD (coronary artery disease) s/p CABG 2008  Carotid arterial disease ([address]  CKD (chronic kidney disease) stage 3, GFR 30-59 ml/min ([facility]) 10/21/2017  
 hypertension and DM nephrosclerosis  Diabetes mellitus, type 2 ([address]  Hypercholesteremia  Hypertension  Paroxysmal atrial fibrillation ([address] 10/21/2017  
 new onset afib with BRPR 10-19-17 admit  PVC's (premature ventricular contractions) 5/26/2014  PVD (peripheral vascular disease) ([address] Past Surgical History:  
Procedure Laterality Date  HX CORONARY ARTERY BYPASS GRAFT    
 HX HEART CATHETERIZATION    
 [place] TCAT INSJ/RPL PERM LEADLESS PACEMAKER RV W/IMG N/A 5/9/2019 INSERT OR REPLACE TRANSCATH PPM LEADLESS performed by Keo Longoria MD at [facility] CATH LAB No current facility-administered medications for this encounter. Current Outpatient Medications Medication Sig  
 albuterol (PROVENTIL VENTOLIN) 2.5 mg /3 mL (0.083 %) nebulizer solution 3 mL by Nebulization route every four (4) hours as needed for Wheezing.  carvedilol (COREG) 12.5 mg tablet Take 1 Tab by mouth two (2) times daily (with meals).  cephALEXin (KEFLEX) 250 mg capsule Take 1 Cap by mouth three (3) times daily. Indications: surgical prophylaxis  hydrALAZINE (APRESOLINE) 50 mg tablet Take 3 Tabs by mouth three (3) times daily.  sodium bicarbonate 650 mg tablet Take 2 Tabs by mouth three (3) times daily.  tamsulosin (FLOMAX) 0.4 mg capsule Take 1 Cap by mouth daily.  insulin glargine U-300 conc 300 unit/mL (1.5 mL) inpn 18 Units by SubCUTAneous route every morning.  aspirin 81 mg chewable tablet Take 1 Tab by mouth daily.  bumetanide (BUMEX) 1 mg tablet Take 1 mg by mouth two (2) times a day.  amLODIPine (NORVASC) 10 mg tablet Take 1 Tab by mouth daily.  simvastatin (ZOCOR) 20 mg tablet Take 20 mg by mouth nightly.  omega 3-dha-epa-fish oil (FISH OIL) 100-160-1,000 mg cap Take 1 Cap by mouth two (2) times a day.  cinnamon bark (CINNAMON) 500 mg cap Take 1,000 mg by mouth two (2) times a day. Allergies Allergen Reactions  Lisinopril Cough History reviewed. No pertinent family history. Social History Socioeconomic History  Marital status:  Spouse name: Not on file  Number of children: Not on file  Years of education: Not on file  Highest education level: Not on file Tobacco Use  Smoking status: Former Smoker Packs/day: 3.00 Years: 20.00 Pack years: 60.00 Types: Cigarettes Last attempt to quit: 1985 Years since quittin.3  Smokeless tobacco: Never Used Substance and Sexual Activity  Alcohol use: No  
 Drug use: No  
 
 
 
 Objective:  
  
Physical Exam 
Vitals:  
 19 1500 19 1515 19 1528 19 1545 BP: 139/59 148/56 142/56 127/55 Pulse: 70 74 71 72 Resp: 22 21  23 Temp:      
SpO2: 92% 94%  94% General:  Alert, cooperative, no distress, appears stated age. Eyes:  Conjunctivae/corneas clear. Ears:  Normal external ear canals both ears. Nose: Nares normal.    
Mouth/Throat: Moist mucous membranes. Neck: Supple, symmetrical, trachea midline, no carotid bruit and no JVD. Back:   Symmetric, no curvature. ROM normal.   
Lungs:   Diminished breath sounds, few scattered crackles Heart:  Regular rate and rhythm, S1, S2 normal, distant heart sounds, no murmur auscultated. Abdomen:   Soft, non-tender. Bowel sounds normal.   
Extremities: Extremities normal, atraumatic, no cyanosis or edema. Vascular: 2+ and symmetric all extremities. Skin: Skin color normal. No rashes or lesions Lymph nodes: Not assessed Neurologic: CNII-XII intact. Normal strength throughout. Telemetry: Vpaced ECG: unchanged from previous tracings, Vpaced Data Review:  
 
Recent Labs 19 
1209 TROIQ 0.12* Recent Labs 19 
1209 19 
7901   138 135* K 4.8  4.7 4.5  
  100 100 CO2 28  28 25 BUN 86*  89* 86* CREA 3.72*  3.71* 3.97* *  133* 97  
CA 8.0*  7.9* 8.0* Recent Labs 19 
1209 WBC 6.1 HGB 8.9* HCT 27.6*  
 Recent Labs 19 
1209 SGOT 38*  37 *  145* No results for input(s): CHOL, LDLC in the last 72 hours. No lab exists for component: TGL, HDLC,  HBA1C No results for input(s): CRP, TSH, TSHEXT, TSHEXT in the last 72 hours.  
 
No lab exists for component: ESR 
 Thank you very much for this referral. I appreciate the opportunity to participate in this patient's care. I will follow along with above stated plan. Radha Linder MD 
[facility] [address] [place] [address], [address] [facility], [facility] 
[phone] CC: Rajni Kendall MD

## 2019-05-14 NOTE — ED PROVIDER NOTES
81 y.o. male with past medical history significant for CAD, hypertension, DM type II, CKD, and paroxysmal atrial firillation who presents from home via a private vehicle with chief complaint of dyspnea. Pt reports he was admitted here for 13 days this month for dyspnea and feels that his breathing is now worse than when he was discharged home. Pt reports that his breathing improved during his hospitalization, but reports that it was still problematic when he was discharged. Wife reports the pt was kept on supplemental oxygen until 2 days prior to discharge. Upon chart review, pt was admitted here from 5/1/19-5/12/19. Wife reports the pt's symptoms had been ongoing for about two weeks prior to admission. Pt notes that he was placed on a diuretic by a nephrologist while he was admitted. Pt reports he followed up with a pulmonologist, Dr. Narinder Tobar, this morning. Pt denies any history of COPD or emphysema. There are no other acute medical concerns at this time. Old chart review: Pt had an echo on 5/1/19 that showed: mild aortic stenosis, mild mitral regurgitation, dilated right and left atriums, mild concentric hypertrophy of left ventricle with ejection fraction of 56-60%, and severe pulmonary hypertension. Right heart catheterization on 5/2/19 showed moderate to severe elevation in right and left heart pressures. Social hx - Tobacco use: former smoker (quit 40 years ago), Alcohol Use: none PCP: Chastity Carrillo MD 
Cardiologists: Dr. Tatum Hills, Dr. Ruben Tim (did stents), and Dr. Antonella Mccray (inserted pacemaker) Note written by Maria Dolores Bedoya, as dictated by Libbie Litten, MD 11:58 AM. The history is provided by the patient and the spouse. No  was used. Past Medical History:  
Diagnosis Date  CAD (coronary artery disease) s/p CABG 2008  [facility]  CKD (chronic kidney disease) stage 3, GFR 30-59 ml/min ([facility]) 10/21/2017 hypertension and DM nephrosclerosis  Diabetes mellitus, type 2 [facility]  Hypercholesteremia  Hypertension  Paroxysmal atrial fibrillation ([facility] 10/21/2017  
 new onset afib with BRPR 10-19-17 admit  PVC's (premature ventricular contractions) 2014  PVD (peripheral vascular disease) ([facility] Past Surgical History:  
Procedure Laterality Date  HX CORONARY ARTERY BYPASS GRAFT    
 HX HEART CATHETERIZATION    
 [place] TCAT INSJ/RPL PERM LEADLESS PACEMAKER RV W/IMG N/A 2019 INSERT OR REPLACE TRANSCATH PPM LEADLESS performed by Zahira Livingston MD at [address] CATH LAB No family history on file. Social History Socioeconomic History  Marital status:  Spouse name: Not on file  Number of children: Not on file  Years of education: Not on file  Highest education level: Not on file Occupational History  Not on file Social Needs  Financial resource strain: Not on file  Food insecurity:  
  Worry: Not on file Inability: Not on file  Transportation needs:  
  Medical: Not on file Non-medical: Not on file Tobacco Use  Smoking status: Former Smoker Packs/day: 3.00 Years: 20.00 Pack years: 60.00 Types: Cigarettes Last attempt to quit: 1985 Years since quittin.3  Smokeless tobacco: Never Used Substance and Sexual Activity  Alcohol use: No  
 Drug use: No  
 Sexual activity: Not on file Lifestyle  Physical activity:  
  Days per week: Not on file Minutes per session: Not on file  Stress: Not on file Relationships  Social connections:  
  Talks on phone: Not on file Gets together: Not on file Attends Protestant service: Not on file Active member of club or organization: Not on file Attends meetings of clubs or organizations: Not on file Relationship status: Not on file  Intimate partner violence:  
  Fear of current or ex partner: Not on file Emotionally abused: Not on file Physically abused: Not on file Forced sexual activity: Not on file Other Topics Concern  Not on file Social History Narrative  Not on file ALLERGIES: Lisinopril Review of Systems Constitutional: Negative for chills and fever. Respiratory: Positive for shortness of breath. Cardiovascular: Negative for chest pain. Gastrointestinal: Negative for abdominal pain, diarrhea, nausea and vomiting. Skin: Negative for rash. All other systems reviewed and are negative. Vitals:  
 05/14/19 1150 Pulse: 73 SpO2: 91% Physical Exam  
Constitutional: He appears well-developed and well-nourished. HENT:  
Head: Normocephalic and atraumatic. Mouth/Throat: Oropharynx is clear and moist.  
Eyes: Pupils are equal, round, and reactive to light. EOM are normal.  
Neck: Normal range of motion. Neck supple. Cardiovascular: Normal rate, regular rhythm, normal heart sounds and intact distal pulses. Exam reveals no gallop and no friction rub. No murmur heard. Pulmonary/Chest: Effort normal. No respiratory distress. He has no wheezes. He has rales. Sternotomy scar noted. Mildly short of breath at rest. Basilar rales heard. Abdominal: Soft. There is no tenderness. There is no rebound. Musculoskeletal: Normal range of motion. He exhibits edema. He exhibits no tenderness. Pt has 1/4 bilateral pedal edema. Neurological: He is alert. No cranial nerve deficit. Motor; symmetric Skin: No erythema. Psychiatric: He has a normal mood and affect. His behavior is normal.  
Nursing note and vitals reviewed. Note written by Maria Dolores Tse, as dictated by Jacobo Roa MD 11:58 AM. MDM Procedures ED EKG interpretation: 
Rhythm: paced; and regular . Rate (approx.): 70;  Axis: left axis deviation; P wave: ; QRS interval: prolonged; ST/T wave: T wave inverted; in  Lead: III  and aVF ; Other findings: . This EKG was interpreted by Dilip Wright MD,ED Provider. 2:22 PM 
 
PROGRESS NOTE: 
2:40 PM 
Pt states he feels way better after the nebulizer treatment and would like to go home. Will arrange for the pt to get his own nebulizer and will write him a prescription for albuterol.

## 2019-05-14 NOTE — DISCHARGE INSTRUCTIONS

## 2019-05-14 NOTE — DISCHARGE SUMMARY
Discharge Summary PATIENT ID: Roselia Young Sr. MRN: [id number] YOB: 1935 DATE OF ADMISSION: 5/1/2019 10:15 AM   
DATE OF DISCHARGE: 5/12/2019 PRIMARY CARE PROVIDER: Chastity Carrillo MD  
 
ATTENDING PHYSICIAN: Amilcar Heller DO  
DISCHARGING PROVIDER: Amilcar Heller DO To contact this individual call [phone] and ask the  to page. If unavailable ask to be transferred the Adult Hospitalist Department. CONSULTATIONS: IP CONSULT TO CARDIOLOGY 
IP CONSULT TO NEPHROLOGY 
IP CONSULT TO ELECTROPHYSIOLOGY PROCEDURES/SURGERIES: Procedure(s): 
INSERT OR REPLACE TRANSCATH PPM LEADLESS 
 
ADMITTING DIAGNOSES & HOSPITAL COURSE:  
 
This is an 81-year-old gentleman with a significant cardiac history of coronary artery disease, status post coronary artery bypass grafting; peripheral arterial disease, status post angioplasty and stenting; type 2 diabetes; and hyperlipidemia, presented with weeks of dyspnea. Patient was admitted for further work-up. Echo with severe pulmonary hypertension. He has atrial fibrillation with SSS. He underwent pace-maker insertion. Prior to procedure, patient hypotensive and bradycardic, requiring temporary pacer and pressors. He was transitioned to leadless pacemaker. Overall, dyspnea improved. He also had acute on chronic kidney failure, stage IV. Nephrology consulted. Stable on bumex and NaBicarb. He will need close outpatient follow up and suspect progression to ESRD in the future. Echo:  
· Left Ventricle: Mild concentric hypertrophy. Estimated left ventricular ejection fraction is 56 - 60%. No regional wall motion abnormality noted. Mild (grade 1) left ventricular diastolic dysfunction. · Left Atrium: Moderately dilated left atrium. · Aortic Valve: Probably trileaflet aortic valve. Aortic valve sclerosis with reduced excursion. Aortic valve area is 1.6 cm2. Mild aortic valve stenosis is present. · Mitral Valve: Mitral valve thickening. Mitral annular calcification. Mild mitral valve regurgitation. · Tricuspid Valve: Mild to moderate tricuspid valve regurgitation is present. · Pulmonary Artery: Severe pulmonary hypertension. · IVC/Hepatic Veins: Moderately elevated central venous pressure (10-15 mmHg); IVC diameter is larger than 21 mm and collapses more than 50% with respiration. · Right Ventricle: Mildly reduced systolic function. · Right Atrium: Mildly dilated right atrium. DISCHARGE DIAGNOSES / PLAN:   
 
SSS with Bradycardia, HR recorded low 30s -leadless pacemaker 5/9. Initially bradycardic and hypotensive prior to procedure, requiring temp pacemaker and pressors 
  
Acute on chronic kidney failure (CKD IV) with cardiorenal syndrome 
-secondary to diabetic nephropathy  
-bumex 1 mg BID 
-possible need dialysis in future, will follow up outpatient 
-RHC showed PCWP of 28  
  
Urinary retention:  
-straight cath as needed, monitor  
  
Exertional Dyspnea in the setting of Severe Pulmonary Hypertension: 
-ECHO with severe PHTN - PAS 72 mmHg 
-Chest CT significant for mild worsening of diffuse interstitial parenchymal change 
-RHC. PCW 28, RV 75. Metabolic Acidosis:  
-oral bicarbonate supplements  
  
Type 2 diabetes with hyperglycemia 
-home insulin regimen  
  
Chronic atrial fibrillation: 
-no anticoagulation due to history of GI bleed  
-continue carvedilol ADDITIONAL CARE RECOMMENDATIONS:  
Continue home amlodipine, aspirin, bumex New medications: 
-carvedilol (blood pressure, atrial fibrillation): decrease to 12.5 mg twice a day (from 25 mg twice a day) 
-keflex (antibiotic): take three times daily until prescription complete, for pacemaker 
-hydralazine (blood pressure): three times daily 
-Sodium bicarb (kidney medication): three times daily 
-Flomax (urinary retention): once daily Please follow up with primary care physician, cardiology, and nephrology. Please schedule appointment with nephrology. Return to the hospital for any new or worsening symptoms. PENDING TEST RESULTS:  
At the time of discharge the following test results are still pending: none FOLLOW UP APPOINTMENTS:   
Follow-up Information Follow up With Specialties Details Why Contact Info Roberto Krause MD [facility] On 5/13/2019 Hospital f/u PCP appointment Monday, 5/13/19 @ 11:15 a.m.  [address] [facility] [address] [facility] 
[phone] Tita Velazquez MD  Go on 5/16/2019 For Cardiology follow up appointment at 2:20 pm [address] [facility] [address] [place] [address] [facility] [address] [address] [facility] [employer] [address] 
[phone] DISCHARGE MEDICATIONS: 
Discharge Medication List as of 5/12/2019 10:43 AM  
  
START taking these medications Details  
cephALEXin (KEFLEX) 250 mg capsule Take 1 Cap by mouth three (3) times daily. Indications: surgical prophylaxis, Print, Disp-8 Cap, R-0  
  
hydrALAZINE (APRESOLINE) 50 mg tablet Take 3 Tabs by mouth three (3) times daily. , Print, Disp-90 Tab, R-0  
  
sodium bicarbonate 650 mg tablet Take 2 Tabs by mouth three (3) times daily. , Print, Disp-90 Tab, R-0  
  
tamsulosin (FLOMAX) 0.4 mg capsule Take 1 Cap by mouth daily. , Print, Disp-30 Cap, R-0  
  
  
CONTINUE these medications which have CHANGED Details  
carvedilol (COREG) 12.5 mg tablet Take 1 Tab by mouth two (2) times daily (with meals). , Print, Disp-60 Tab, R-0  
  
  
CONTINUE these medications which have NOT CHANGED Details  
insulin glargine U-300 conc 300 unit/mL (1.5 mL) inpn 18 Units by SubCUTAneous route every morning., Historical Med  
  
aspirin 81 mg chewable tablet Take 1 Tab by mouth daily. , Normal, Disp-33 Tab, R-11  
  
bumetanide (BUMEX) 1 mg tablet Take 1 mg by mouth two (2) times a day., Historical Med  
  
amLODIPine (NORVASC) 10 mg tablet Take 1 Tab by mouth daily. , Normal, Disp-30 Tab, R-0  
  
simvastatin (ZOCOR) 20 mg tablet Take 20 mg by mouth nightly., Historical Med  
  
omega 3-dha-epa-fish oil (FISH OIL) 100-160-1,000 mg cap Take 1 Cap by mouth two (2) times a day., Historical Med  
  
cinnamon bark (CINNAMON) 500 mg cap Take 1,000 mg by mouth two (2) times a day., Historical Med  
  
  
STOP taking these medications  
  
 glipiZIDE SR (GLUCOTROL XL) 10 mg CR tablet Comments:  
Reason for Stopping:   
   
 cloNIDine HCl (CATAPRES) 0.1 mg tablet Comments:  
Reason for Stopping:   
   
  
 
 
 
NOTIFY YOUR PHYSICIAN FOR ANY OF THE FOLLOWING:  
Fever over 101 degrees for 24 hours. Chest pain, shortness of breath, fever, chills, nausea, vomiting, diarrhea, change in mentation, falling, weakness, bleeding. Severe pain or pain not relieved by medications. Or, any other signs or symptoms that you may have questions about. DISPOSITION: 
x  Home With: 
 OT  PT  HH  RN  
  
 Long term SNF/Inpatient Rehab Independent/assisted living Hospice Other:  
 
 
PATIENT CONDITION AT DISCHARGE:  
 
Functional status Poor Deconditioned   
x Independent Cognition  
x  Lucid Forgetful Dementia Catheters/lines (plus indication) Myers PICC   
 PEG   
x None PHYSICAL EXAMINATION AT DISCHARGE: 
Constitutional:  No acute distress, cooperative, pleasant   
ENT:  Oral mucous moist, oropharynx benign. Neck supple, Resp:  Lung sounds diminished throughout. No wheezing/rhonchi/rales. No accessory muscle use CV:  regular, no murmurs, gallops, rubs GI:  Soft, non distended, non tender. normoactive bowel sounds, no hepatosplenomegaly Musculoskeletal:  No edema, warm, 2+ pulses throughout Neurologic:  Moves all extremities Psych:  Good insight, Not anxious nor agitated. CHRONIC MEDICAL DIAGNOSES: 
Problem List as of 5/12/2019 Date Reviewed: 12/17/2018 Codes Class Noted - Resolved Pacemaker ICD-10-CM: Z95.0 ICD-9-CM: V45.01  5/9/2019 - Present Overview Signed 5/9/2019  6:22 PM by Jose Velarde MD  
  5/9/2019 leadless pacer implant CHF (congestive heart failure) (HCC) ICD-10-CM: I50.9 ICD-9-CM: 428.0  5/1/2019 - Present Atrial fibrillation with slow ventricular response (HCC) ICD-10-CM: I48.91 
ICD-9-CM: 427.31  5/1/2019 - Present Overview Signed 5/8/2019 11:24 PM by Jose Velarde MD  
  Added automatically from request for surgery [id number] Type 2 diabetes with nephropathy ([facility] ICD-10-CM: E11.21 
ICD-9-CM: 250.40, 583.81  7/26/2018 - Present Atrial fibrillation, chronic (HCC) ICD-10-CM: I48.2 ICD-9-CM: 427.31  10/21/2017 - Present Overview Signed 10/21/2017  1:45 PM by Anu Duncan MD  
  new onset afib with BRPR 10-19-17 admit CKD (chronic kidney disease) stage 3, GFR 30-59 ml/min ([facility]) ICD-10-CM: N18.3 ICD-9-CM: 585.3  10/21/2017 - Present Overview Signed 10/21/2017  1:47 PM by Anu Duncan MD  
  hypertension and DM nephrosclerosis GI bleed ICD-10-CM: K92.2 ICD-9-CM: 578.9  10/20/2017 - Present Hyperglycemia due to type 2 diabetes mellitus ([facility] ICD-10-CM: E11.65 ICD-9-CM: 250.00  10/20/2017 - Present Hypokalemia ICD-10-CM: E87.6 ICD-9-CM: 276.8  6/25/2017 - Present Generalized weakness ICD-10-CM: R53.1 ICD-9-CM: 780.79  6/25/2017 - Present Elevated troponin ICD-10-CM: R74.8 ICD-9-CM: 790.6  6/25/2017 - Present KATALINA (acute kidney injury) ([facility] ICD-10-CM: N17.9 ICD-9-CM: 584.9  6/25/2017 - Present Acute renal failure (ARF) (HCC) ICD-10-CM: N17.9 ICD-9-CM: 584.9  3/16/2017 - Present CKD (chronic kidney disease) ICD-10-CM: N18.9 ICD-9-CM: 585.9  1/20/2017 - Present Type 2 diabetes mellitus with diabetic peripheral angiopathy without gangrene [facility]) ICD-10-CM: E11.51 
ICD-9-CM: 250.70, 443.81  9/27/2015 - Present * (Principal) Dyspnea ICD-10-CM: R06.00 
ICD-9-CM: 786.09  7/15/2014 - Present PVC's (premature ventricular contractions) ICD-10-CM: I49.3 ICD-9-CM: 427.69  5/26/2014 - Present Carotid arterial disease (HCC) ICD-10-CM: I77.9 ICD-9-CM: 447.9  Unknown - Present Hypercholesteremia ICD-10-CM: E78.00 ICD-9-CM: 272.0  Unknown - Present PVD (peripheral vascular disease) ([facility] ICD-10-CM: I73.9 ICD-9-CM: 443.9  Unknown - Present Bradycardia ICD-10-CM: R00.1 ICD-9-CM: 427.89  Unknown - Present CAD (coronary artery disease) ICD-10-CM: I25.10 ICD-9-CM: 414.00  Unknown - Present Hypertension, essential, benign ICD-10-CM: I10 
ICD-9-CM: 401.1  Unknown - Present RESOLVED: New onset a-fib [facility]) ICD-10-CM: I48.91 
ICD-9-CM: 427.31  10/20/2017 - 11/30/2017 RESOLVED: Fever ICD-10-CM: R50.9 ICD-9-CM: 780.60  3/16/2017 - 3/18/2017 RESOLVED: Hyponatremia ICD-10-CM: E87.1 ICD-9-CM: 276.1  3/16/2017 - 3/18/2017 RESOLVED: Urinary retention ICD-10-CM: R33.9 ICD-9-CM: 788.20  1/19/2017 - 1/20/2017 RESOLVED: Heart palpitations ICD-10-CM: R00.2 ICD-9-CM: 785.1  7/15/2014 - 9/20/2016 RESOLVED: Atherosclerosis of native artery of extremity with intermittent claudication ([facility] ICD-10-CM: S12.766 ICD-9-CM: 440.21  2/19/2014 - 9/20/2016 RESOLVED: Other dyspnea and respiratory abnormality ICD-10-CM: R06.09, R09.89 ICD-9-CM: 786.09  Unknown - 9/20/2016 RESOLVED: Diabetes mellitus, type 2 (HCC) ICD-10-CM: E11.9 ICD-9-CM: 250.00  Unknown - 9/20/2016 Greater than 30 minutes were spent with the patient on counseling and coordination of care Signed:  
[address] [facility] ER  
5/14/2019 
9:11 AM

## 2019-05-16 ENCOUNTER — OFFICE VISIT (OUTPATIENT)
Dept: CARDIOLOGY CLINIC | Age: 84
End: 2019-05-16

## 2019-05-16 VITALS
RESPIRATION RATE: 24 BRPM | SYSTOLIC BLOOD PRESSURE: 147 MMHG | HEIGHT: 69 IN | HEART RATE: 71 BPM | DIASTOLIC BLOOD PRESSURE: 66 MMHG | OXYGEN SATURATION: 94 % | BODY MASS INDEX: 27.99 KG/M2 | WEIGHT: 189 LBS

## 2019-05-16 DIAGNOSIS — R06.09 DOE (DYSPNEA ON EXERTION): ICD-10-CM

## 2019-05-16 DIAGNOSIS — R53.1 WEAKNESS: ICD-10-CM

## 2019-05-16 DIAGNOSIS — E78.00 HYPERCHOLESTEREMIA: ICD-10-CM

## 2019-05-16 DIAGNOSIS — I73.9 PERIPHERAL VASCULAR DISEASE (HCC): ICD-10-CM

## 2019-05-16 DIAGNOSIS — I10 HYPERTENSION, ESSENTIAL, BENIGN: ICD-10-CM

## 2019-05-16 DIAGNOSIS — I25.10 CORONARY ARTERY DISEASE INVOLVING NATIVE CORONARY ARTERY OF NATIVE HEART WITHOUT ANGINA PECTORIS: Primary | ICD-10-CM

## 2019-05-16 DIAGNOSIS — I48.20 ATRIAL FIBRILLATION, CHRONIC (HCC): ICD-10-CM

## 2019-05-16 DIAGNOSIS — I50.32 DIASTOLIC CHF, CHRONIC (HCC): ICD-10-CM

## 2019-05-16 RX ORDER — BUMETANIDE 1 MG/1
TABLET ORAL
Qty: 270 TAB | Refills: 2 | Status: SHIPPED | OUTPATIENT
Start: 2019-05-16 | End: 2019-08-05

## 2019-05-16 NOTE — PROGRESS NOTES
Chief Complaint   Patient presents with    Follow-up     recent hospitalizationx 2. Occassional chest tightness/dizziness/swelling. Continue shortness of breath. Visit Vitals  /66 (BP 1 Location: Right arm, BP Patient Position: Sitting)   Pulse 71   Resp 20   Ht 5' 9\" (1.753 m)   Wt 189 lb (85.7 kg)   SpO2 94%   BMI 27.91 kg/m²       Recently had pacemaker implant. Seen by NP at [facility]. Recent diagnose of sleep apnea. Unable to complete test to determine COPD.

## 2019-05-16 NOTE — PATIENT INSTRUCTIONS
Please increase your Bumex to 3mg in the morning (still 1mg in the evening). Please seek emergency medical attention if you have increased shortness of breath.

## 2019-05-16 NOTE — PROGRESS NOTES
[address] [facility]     1935       David Kate MD, [facility]  Date of Visit-5/16/2019   PCP is Winston Lopez MD   [facility] [place]  [facility]  HPI:  [address] [place] [address]. is a 81 y.o. male   + AGUILAR  Lisinopril causing cough  Pt with CAD, chronic afib, and kidney disease and   he was admitted 5/1/19 to 5/12/19 with worsening renal failure, pulmonary HTN   his EF was normal and he had mild AS.     stop his meds due to kidney worsening and fluid retention. He got some Bumex  right heart cath wedge pressure of 28 mmHg and high PA pressure   got leadless pacemaker on 5/9/19 and was able to restart on some of his meds. His clonidine was stopped and his Coreg was adjusted and he was restarted on hydralazine. His last creatinine was from the ER on the 14th and was 3.7. He came back ER on that day with SOB. He got a nebulizer and went home. He's anemic at Hemoglobin of 8.9. His ProBMP at ER was greater than 72409. Chest Xray showed interstitial edema. Pt is present with his wife and he's still having some SOB and edema. Pt will see Dr. Shilo Michele for pacemaker check tomorrow. Pt went to the ER with albuterol. Remains fatigued and AGUILAR for moderate activity, does not feel well and not better than when dc from hospital  Does not note change yet with diuretics or pacer  To see Dr Olayinka Umanzor on Monday  Denies chest pain, syncope, has no tachycardia, palpitations or sense of arrhythmia. 05/01/19   ECHO ADULT COMPLETE 05/01/2019 5/1/2019    Narrative · Left Ventricle: Mild concentric hypertrophy. Estimated left ventricular   ejection fraction is 56 - 60%. No regional wall motion abnormality noted. Mild (grade 1) left ventricular diastolic dysfunction. · Left Atrium: Moderately dilated left atrium. · Aortic Valve: Probably trileaflet aortic valve. Aortic valve sclerosis   with reduced excursion. Aortic valve area is 1.6 cm2.  Mild aortic valve   stenosis is present. · Mitral Valve: Mitral valve thickening. Mitral annular calcification. Mild mitral valve regurgitation. · Tricuspid Valve: Mild to moderate tricuspid valve regurgitation is   present. · Pulmonary Artery: Severe pulmonary hypertension. · IVC/Hepatic Veins: Moderately elevated central venous pressure (10-15   mmHg); IVC diameter is larger than 21 mm and collapses more than 50% with   respiration. · Right Ventricle: Mildly reduced systolic function. · Right Atrium: Mildly dilated right atrium. Signed by: Rosie Mtz MD      Assessment/Plan:     1. Severe SOB.    ---Suspect pulmonary HTN and kidney failure that would contribute to his diastolic dysfunction  ----will increase bumex to 3 mg daily will continue inhalers and will see Dr. Glenna Martinez on Monday.    -albuterol  For HTN on BB, hydralazine,amlodipine  2. CKD IV. Creatinine elevated. Will likely go further with bumex but needs to improve with SOB. Heading it seems for hemodialysis     3. CAD. CABG 2008. No current angina. No cath due to age and renal failure. Extensive prior dz  --Coreg 12.5 mg bid  --ASA 81 mg /d   -simva 20mg/ d  --omega 3 fish oil  XOL- No results found for: LDL, LDLC, DLDLP   4. Afib. With controlled rate. chronic   No OAC due to bleed risk, severe anemia  Lab Results   Component Value Date/Time    HGB 8.8 (L) 05/19/2019 05:27 AM        5. HTN-suboptimal with renal failure    Diabetes. Main cardiovascular disease risk factors   Lab Results   Component Value Date/Time    Hemoglobin A1c 9.1 (H) 05/11/2019 03:37 AM    Hemoglobin A1c, External 8.4 08/15/2017        6. PVD. Prior athertomy, no current claudication, not as active due to SOB       Impression:   1. Coronary artery disease involving native coronary artery of native heart without angina pectoris    2. AGUILAR (dyspnea on exertion)    3. Diastolic CHF, chronic ([address]    4. Atrial fibrillation, chronic ([address]    5. Peripheral vascular disease ([address]    6. Hypercholesteremia    7. Hypertension, essential, benign    8. Weakness       Cardiac History:   Holter monitor14=showing rate of  with frequent PACs, rare periods of short RP interval SVT up to 122 and frequent PVCs. sinus rhythm with similar findings, frequent PVCs and short RP interval tachycardia. NUKE  14,  2 minutes, 20 seconds,  EF of 55% with no ischemia. PVD= Dr. Rl Marie atherectomy to distal SFA and distal popliteal with angioplasty to both lesions and stent to the SFA by Dr. Rl Marie on 3/14/14. Previously had AIBs of 0.67 on the left, 0.71 on the right with the right seemingly due to distal disease. CAD/CABG off pump x3 3/2008 . =post op anemia and renal failure  CATH 2008= severe three-vessel left main coronary artery disease, 40% disease of the left main and take off of the LAD and circumflex complex, 70% stenosis of the ostial circ and 85% of the LAD. Between the first and second marginal, the circumflex had 70% stenosis and between the third marginal and PDA there was a 70% stenosis, LAD ostial lesion RCA proximal 60% tapering, EF 60%-70%, bilaterally patent renal arteries, mild atherosclerosis of the distal abdominal aorta. Mild carotid artery disease 3-7-08 then 12 with 10-49% left, less than 10% on right  Dyslipidemia 10-25-10  TG 91 HDL 40 LDL 48  SOCIAL: Drinks no alcohol, quit smoking several years ago, works as an . Lives with his wife and has four children. Enjoys gardening, fishing and music. FAMILY HISTORY: Mother  of cancer at 76, father  of Parkinson's at 70, one brother  of cancer of the liver at 76 and one  of an infection at 64. ROS-except as noted above. . A complete cardiac and respiratory are reviewed and negative except as above ; Resp-denies wheezing  or productive cough,.  Const- No unusual weight loss or fever; Neuro-no recent seizure or CVA ; GI- No BRBPR, abdom pain, bloating ; - no hematuria   see supplement sheet, initialed and to be scanned by staff  Past Medical History:   Diagnosis Date    CAD (coronary artery disease)     s/p CABG 2008    Carotid arterial disease ([facility]     CKD (chronic kidney disease) stage 3, GFR 30-59 ml/min ([facility] 10/21/2017    hypertension and DM nephrosclerosis    Diabetes mellitus, type 2 ([facility]     Hypercholesteremia     Hypertension     Paroxysmal atrial fibrillation ([facility] 10/21/2017    new onset afib with BRPR 10-19-17 admit    PVC's (premature ventricular contractions) 5/26/2014    PVD (peripheral vascular disease) ([facility]       Social Hx= reports that he quit smoking about 34 years ago. His smoking use included cigarettes. He has a 60.00 pack-year smoking history. He has never used smokeless tobacco. He reports that he does not drink alcohol or use drugs. Exam and Labs:  /66 (BP 1 Location: Right arm, BP Patient Position: Sitting)   Pulse 71   Resp 24   Ht 5' 9\" (1.753 m)   Wt 189 lb (85.7 kg)   SpO2 94%   BMI 27.91 kg/m² Constitutional:  NAD, comfortable  Head: NC,AT. Eyes: No scleral icterus. Neck:  Neck supple. No JVD present. Throat: moist mucous membranes. Chest: Effort normal & normal respiratory excursion . Neurological: alert, conversant and oriented . Skin: Skin is not cold. No obvious systemic rash noted. Not diaphoretic. No erythema. Psychiatric:  Grossly normal mood and affect. Behavior appears normal. Extremities:  no clubbing or cyanosis. Abdomen: non distended    Lungs:breath sounds normal. No stridor. distress, wheezes or  Rales. Heart:2-3/6 MAMI normal rate, regular rhythm, normal S1, S2, no rubs, clicks or gallops , PMI non displaced.     Edema: Edema is 2+ to the knees bilateral.    Lab Results   Component Value Date/Time    Sodium 138 05/14/2019 12:09 PM    Sodium 137 05/14/2019 12:09 PM    Potassium 4.7 05/14/2019 12:09 PM    Potassium 4.8 05/14/2019 12:09 PM    Chloride 100 05/14/2019 12:09 PM    Chloride 100 05/14/2019 12:09 PM    CO2 28 05/14/2019 12:09 PM    CO2 28 05/14/2019 12:09 PM    Anion gap 10 05/14/2019 12:09 PM    Anion gap 9 05/14/2019 12:09 PM    Glucose 133 (H) 05/14/2019 12:09 PM    Glucose 126 (H) 05/14/2019 12:09 PM    BUN 89 (H) 05/14/2019 12:09 PM    BUN 86 (H) 05/14/2019 12:09 PM    Creatinine 3.71 (H) 05/14/2019 12:09 PM    Creatinine 3.72 (H) 05/14/2019 12:09 PM    BUN/Creatinine ratio 24 (H) 05/14/2019 12:09 PM    BUN/Creatinine ratio 23 (H) 05/14/2019 12:09 PM    GFR est AA 19 (L) 05/14/2019 12:09 PM    GFR est AA 19 (L) 05/14/2019 12:09 PM    GFR est non-AA 16 (L) 05/14/2019 12:09 PM    GFR est non-AA 16 (L) 05/14/2019 12:09 PM    Calcium 7.9 (L) 05/14/2019 12:09 PM    Calcium 8.0 (L) 05/14/2019 12:09 PM      Wt Readings from Last 3 Encounters:   05/16/19 189 lb (85.7 kg)   05/12/19 190 lb 6.4 oz (86.4 kg)   12/17/18 176 lb (79.8 kg)      BP Readings from Last 3 Encounters:   05/16/19 147/66   05/14/19 127/55   05/12/19 165/63      Current Outpatient Medications   Medication Sig    albuterol (PROVENTIL VENTOLIN) 2.5 mg /3 mL (0.083 %) nebulizer solution 3 mL by Nebulization route every four (4) hours as needed for Wheezing.  carvedilol (COREG) 12.5 mg tablet Take 1 Tab by mouth two (2) times daily (with meals).  hydrALAZINE (APRESOLINE) 50 mg tablet Take 3 Tabs by mouth three (3) times daily.  sodium bicarbonate 650 mg tablet Take 2 Tabs by mouth three (3) times daily.  tamsulosin (FLOMAX) 0.4 mg capsule Take 1 Cap by mouth daily.  insulin glargine U-300 conc 300 unit/mL (1.5 mL) inpn 18 Units by SubCUTAneous route every morning.  aspirin 81 mg chewable tablet Take 1 Tab by mouth daily.  bumetanide (BUMEX) 1 mg tablet Take 1 mg by mouth two (2) times a day.  amLODIPine (NORVASC) 10 mg tablet Take 1 Tab by mouth daily.  simvastatin (ZOCOR) 20 mg tablet Take 20 mg by mouth nightly.     omega 3-dha-epa-fish oil (FISH OIL) 100-160-1,000 mg cap Take 1 Cap by mouth two (2) times a day.    cinnamon bark (CINNAMON) 500 mg cap Take 1,000 mg by mouth two (2) times a day. No current facility-administered medications for this visit. Impression see above.       Written by Juan Antonio Ibarra, as dictated by Davi Macedo MD.

## 2019-05-17 ENCOUNTER — APPOINTMENT (OUTPATIENT)
Dept: GENERAL RADIOLOGY | Age: 84
DRG: 291 | End: 2019-05-17
Attending: EMERGENCY MEDICINE
Payer: MEDICARE

## 2019-05-17 ENCOUNTER — HOSPITAL ENCOUNTER (INPATIENT)
Age: 84
LOS: 1 days | Discharge: HOME | DRG: 291 | End: 2019-05-19
Attending: EMERGENCY MEDICINE | Admitting: INTERNAL MEDICINE
Payer: MEDICARE

## 2019-05-17 ENCOUNTER — CLINICAL SUPPORT (OUTPATIENT)
Dept: CARDIOLOGY CLINIC | Age: 84
End: 2019-05-17

## 2019-05-17 DIAGNOSIS — Z95.0 CARDIAC PACEMAKER IN SITU: Primary | ICD-10-CM

## 2019-05-17 DIAGNOSIS — J98.01 ACUTE BRONCHOSPASM: Primary | ICD-10-CM

## 2019-05-17 DIAGNOSIS — I50.9 CONGESTIVE HEART FAILURE, UNSPECIFIED HF CHRONICITY, UNSPECIFIED HEART FAILURE TYPE (HCC): ICD-10-CM

## 2019-05-17 LAB
ALBUMIN SERPL-MCNC: 3 G/DL (ref 3.5–5)
ALBUMIN/GLOB SERPL: 0.8 {RATIO} (ref 1.1–2.2)
ALP SERPL-CCNC: 109 U/L (ref 45–117)
ALT SERPL-CCNC: 28 U/L (ref 12–78)
ANION GAP SERPL CALC-SCNC: 7 MMOL/L (ref 5–15)
AST SERPL-CCNC: 25 U/L (ref 15–37)
BASOPHILS # BLD: 0 K/UL (ref 0–0.1)
BASOPHILS NFR BLD: 0 % (ref 0–1)
BILIRUB SERPL-MCNC: 0.7 MG/DL (ref 0.2–1)
BNP SERPL-MCNC: ABNORMAL PG/ML
BUN SERPL-MCNC: 62 MG/DL (ref 6–20)
BUN/CREAT SERPL: 19 (ref 12–20)
CALCIUM SERPL-MCNC: 8.4 MG/DL (ref 8.5–10.1)
CHLORIDE SERPL-SCNC: 99 MMOL/L (ref 97–108)
CO2 SERPL-SCNC: 30 MMOL/L (ref 21–32)
COMMENT, HOLDF: NORMAL
CREAT SERPL-MCNC: 3.29 MG/DL (ref 0.7–1.3)
DIFFERENTIAL METHOD BLD: ABNORMAL
EOSINOPHIL # BLD: 0.2 K/UL (ref 0–0.4)
EOSINOPHIL NFR BLD: 3 % (ref 0–7)
ERYTHROCYTE [DISTWIDTH] IN BLOOD BY AUTOMATED COUNT: 14.3 % (ref 11.5–14.5)
GLOBULIN SER CALC-MCNC: 3.7 G/DL (ref 2–4)
GLUCOSE SERPL-MCNC: 168 MG/DL (ref 65–100)
HCT VFR BLD AUTO: 28.4 % (ref 36.6–50.3)
HGB BLD-MCNC: 8.9 G/DL (ref 12.1–17)
IMM GRANULOCYTES # BLD AUTO: 0 K/UL (ref 0–0.04)
IMM GRANULOCYTES NFR BLD AUTO: 0 % (ref 0–0.5)
LYMPHOCYTES # BLD: 0.6 K/UL (ref 0.8–3.5)
LYMPHOCYTES NFR BLD: 9 % (ref 12–49)
MCH RBC QN AUTO: 29.6 PG (ref 26–34)
MCHC RBC AUTO-ENTMCNC: 31.3 G/DL (ref 30–36.5)
MCV RBC AUTO: 94.4 FL (ref 80–99)
MONOCYTES # BLD: 1.1 K/UL (ref 0–1)
MONOCYTES NFR BLD: 16 % (ref 5–13)
NEUTS SEG # BLD: 4.9 K/UL (ref 1.8–8)
NEUTS SEG NFR BLD: 72 % (ref 32–75)
NRBC # BLD: 0 K/UL (ref 0–0.01)
NRBC BLD-RTO: 0 PER 100 WBC
PLATELET # BLD AUTO: 187 K/UL (ref 150–400)
PMV BLD AUTO: 11.2 FL (ref 8.9–12.9)
POTASSIUM SERPL-SCNC: 4.2 MMOL/L (ref 3.5–5.1)
PROT SERPL-MCNC: 6.7 G/DL (ref 6.4–8.2)
RBC # BLD AUTO: 3.01 M/UL (ref 4.1–5.7)
RBC MORPH BLD: ABNORMAL
SAMPLES BEING HELD,HOLD: NORMAL
SODIUM SERPL-SCNC: 136 MMOL/L (ref 136–145)
TROPONIN I SERPL-MCNC: 0.05 NG/ML
WBC # BLD AUTO: 6.8 K/UL (ref 4.1–11.1)

## 2019-05-17 PROCEDURE — 85025 COMPLETE CBC W/AUTO DIFF WBC: CPT

## 2019-05-17 PROCEDURE — 93005 ELECTROCARDIOGRAM TRACING: CPT

## 2019-05-17 PROCEDURE — 94664 DEMO&/EVAL PT USE INHALER: CPT

## 2019-05-17 PROCEDURE — 94640 AIRWAY INHALATION TREATMENT: CPT

## 2019-05-17 PROCEDURE — 83880 ASSAY OF NATRIURETIC PEPTIDE: CPT

## 2019-05-17 PROCEDURE — 99284 EMERGENCY DEPT VISIT MOD MDM: CPT

## 2019-05-17 PROCEDURE — 36415 COLL VENOUS BLD VENIPUNCTURE: CPT

## 2019-05-17 PROCEDURE — 84484 ASSAY OF TROPONIN QUANT: CPT

## 2019-05-17 PROCEDURE — 74011000250 HC RX REV CODE- 250: Performed by: EMERGENCY MEDICINE

## 2019-05-17 PROCEDURE — 77030029684 HC NEB SM VOL KT MONA -A

## 2019-05-17 PROCEDURE — 80053 COMPREHEN METABOLIC PANEL: CPT

## 2019-05-17 PROCEDURE — 71046 X-RAY EXAM CHEST 2 VIEWS: CPT

## 2019-05-17 RX ADMIN — ALBUTEROL SULFATE 1 DOSE: 2.5 SOLUTION RESPIRATORY (INHALATION) at 21:57

## 2019-05-17 RX ADMIN — ALBUTEROL SULFATE 1 DOSE: 2.5 SOLUTION RESPIRATORY (INHALATION) at 23:42

## 2019-05-17 NOTE — PROGRESS NOTES
Cardiac Electrophysiology Wound Check Note      Wound Check   Patient is here for wound check s/p leadless ppm.   No fever, drainage   Physical Exam   Constitutional: well-developed and well-nourished. Skin: Right side incision is healing without redness, drainage, hematoma. .       ASSESSMENT and PLAN   The incision is healing without redness, drainage, hematoma. The patient has finished their anti-biotic.   Current treatment plan is effective, no change in therapy   Device check shows proper lead and generator functions    Follow-up Disposition:  Return 3 months I will check via device clinic or remote monitoring in the future

## 2019-05-18 PROBLEM — J98.01 ACUTE BRONCHOSPASM: Status: ACTIVE | Noted: 2019-05-18

## 2019-05-18 PROBLEM — I50.9 CHF EXACERBATION (HCC): Status: ACTIVE | Noted: 2019-05-18

## 2019-05-18 LAB
ANION GAP SERPL CALC-SCNC: 5 MMOL/L (ref 5–15)
ATRIAL RATE: 63 BPM
ATRIAL RATE: 66 BPM
BUN SERPL-MCNC: 60 MG/DL (ref 6–20)
BUN/CREAT SERPL: 18 (ref 12–20)
CALCIUM SERPL-MCNC: 7.7 MG/DL (ref 8.5–10.1)
CALCULATED R AXIS, ECG10: 124 DEGREES
CALCULATED R AXIS, ECG10: 17 DEGREES
CALCULATED T AXIS, ECG11: -66 DEGREES
CALCULATED T AXIS, ECG11: -66 DEGREES
CHLORIDE SERPL-SCNC: 100 MMOL/L (ref 97–108)
CO2 SERPL-SCNC: 31 MMOL/L (ref 21–32)
CREAT SERPL-MCNC: 3.27 MG/DL (ref 0.7–1.3)
DIAGNOSIS, 93000: NORMAL
DIAGNOSIS, 93000: NORMAL
GLUCOSE BLD STRIP.AUTO-MCNC: 139 MG/DL (ref 65–100)
GLUCOSE BLD STRIP.AUTO-MCNC: 214 MG/DL (ref 65–100)
GLUCOSE BLD STRIP.AUTO-MCNC: 78 MG/DL (ref 65–100)
GLUCOSE BLD STRIP.AUTO-MCNC: 88 MG/DL (ref 65–100)
GLUCOSE BLD STRIP.AUTO-MCNC: 93 MG/DL (ref 65–100)
GLUCOSE SERPL-MCNC: 196 MG/DL (ref 65–100)
POTASSIUM SERPL-SCNC: 3.9 MMOL/L (ref 3.5–5.1)
Q-T INTERVAL, ECG07: 552 MS
Q-T INTERVAL, ECG07: 564 MS
QRS DURATION, ECG06: 132 MS
QRS DURATION, ECG06: 148 MS
QTC CALCULATION (BEZET), ECG08: 596 MS
QTC CALCULATION (BEZET), ECG08: 599 MS
SERVICE CMNT-IMP: ABNORMAL
SERVICE CMNT-IMP: ABNORMAL
SERVICE CMNT-IMP: NORMAL
SODIUM SERPL-SCNC: 136 MMOL/L (ref 136–145)
TROPONIN I SERPL-MCNC: 0.06 NG/ML
TROPONIN I SERPL-MCNC: 0.06 NG/ML
VENTRICULAR RATE, ECG03: 68 BPM
VENTRICULAR RATE, ECG03: 70 BPM

## 2019-05-18 PROCEDURE — 36415 COLL VENOUS BLD VENIPUNCTURE: CPT

## 2019-05-18 PROCEDURE — 82962 GLUCOSE BLOOD TEST: CPT

## 2019-05-18 PROCEDURE — 65660000000 HC RM CCU STEPDOWN

## 2019-05-18 PROCEDURE — 74011250637 HC RX REV CODE- 250/637: Performed by: NURSE PRACTITIONER

## 2019-05-18 PROCEDURE — 74011636637 HC RX REV CODE- 636/637: Performed by: INTERNAL MEDICINE

## 2019-05-18 PROCEDURE — 94640 AIRWAY INHALATION TREATMENT: CPT

## 2019-05-18 PROCEDURE — 77030029684 HC NEB SM VOL KT MONA -A

## 2019-05-18 PROCEDURE — 74011250637 HC RX REV CODE- 250/637: Performed by: INTERNAL MEDICINE

## 2019-05-18 PROCEDURE — 74011000250 HC RX REV CODE- 250

## 2019-05-18 PROCEDURE — 93005 ELECTROCARDIOGRAM TRACING: CPT

## 2019-05-18 PROCEDURE — 74011250636 HC RX REV CODE- 250/636: Performed by: INTERNAL MEDICINE

## 2019-05-18 PROCEDURE — 84484 ASSAY OF TROPONIN QUANT: CPT

## 2019-05-18 PROCEDURE — 74011000250 HC RX REV CODE- 250: Performed by: INTERNAL MEDICINE

## 2019-05-18 PROCEDURE — 80048 BASIC METABOLIC PNL TOTAL CA: CPT

## 2019-05-18 RX ORDER — DEXTROSE 50 % IN WATER (D50W) INTRAVENOUS SYRINGE
25-50 AS NEEDED
Status: DISCONTINUED | OUTPATIENT
Start: 2019-05-18 | End: 2019-05-19 | Stop reason: HOSPADM

## 2019-05-18 RX ORDER — IPRATROPIUM BROMIDE AND ALBUTEROL SULFATE 2.5; .5 MG/3ML; MG/3ML
3 SOLUTION RESPIRATORY (INHALATION)
Status: DISCONTINUED | OUTPATIENT
Start: 2019-05-18 | End: 2019-05-19 | Stop reason: HOSPADM

## 2019-05-18 RX ORDER — BUMETANIDE 1 MG/1
2 TABLET ORAL 2 TIMES DAILY
Status: DISCONTINUED | OUTPATIENT
Start: 2019-05-19 | End: 2019-05-19 | Stop reason: HOSPADM

## 2019-05-18 RX ORDER — ONDANSETRON 2 MG/ML
4 INJECTION INTRAMUSCULAR; INTRAVENOUS
Status: DISCONTINUED | OUTPATIENT
Start: 2019-05-18 | End: 2019-05-19 | Stop reason: HOSPADM

## 2019-05-18 RX ORDER — CARVEDILOL 12.5 MG/1
12.5 TABLET ORAL 2 TIMES DAILY WITH MEALS
Status: DISCONTINUED | OUTPATIENT
Start: 2019-05-18 | End: 2019-05-19 | Stop reason: HOSPADM

## 2019-05-18 RX ORDER — BUMETANIDE 0.25 MG/ML
INJECTION INTRAMUSCULAR; INTRAVENOUS
Status: COMPLETED
Start: 2019-05-18 | End: 2019-05-18

## 2019-05-18 RX ORDER — SIMVASTATIN 20 MG/1
20 TABLET, FILM COATED ORAL
Status: DISCONTINUED | OUTPATIENT
Start: 2019-05-18 | End: 2019-05-18

## 2019-05-18 RX ORDER — ALBUTEROL SULFATE 0.83 MG/ML
2.5 SOLUTION RESPIRATORY (INHALATION)
Status: DISCONTINUED | OUTPATIENT
Start: 2019-05-18 | End: 2019-05-18 | Stop reason: SDUPTHER

## 2019-05-18 RX ORDER — DIPHENHYDRAMINE HCL 25 MG
25 CAPSULE ORAL ONCE
Status: COMPLETED | OUTPATIENT
Start: 2019-05-18 | End: 2019-05-18

## 2019-05-18 RX ORDER — TAMSULOSIN HYDROCHLORIDE 0.4 MG/1
0.4 CAPSULE ORAL DAILY
Status: DISCONTINUED | OUTPATIENT
Start: 2019-05-18 | End: 2019-05-19 | Stop reason: HOSPADM

## 2019-05-18 RX ORDER — ACETAMINOPHEN 325 MG/1
650 TABLET ORAL
Status: DISCONTINUED | OUTPATIENT
Start: 2019-05-18 | End: 2019-05-19 | Stop reason: HOSPADM

## 2019-05-18 RX ORDER — HEPARIN SODIUM 5000 [USP'U]/ML
5000 INJECTION, SOLUTION INTRAVENOUS; SUBCUTANEOUS EVERY 8 HOURS
Status: DISCONTINUED | OUTPATIENT
Start: 2019-05-18 | End: 2019-05-18

## 2019-05-18 RX ORDER — BUMETANIDE 0.25 MG/ML
1 INJECTION INTRAMUSCULAR; INTRAVENOUS EVERY 12 HOURS
Status: COMPLETED | OUTPATIENT
Start: 2019-05-18 | End: 2019-05-18

## 2019-05-18 RX ORDER — AMLODIPINE BESYLATE 5 MG/1
10 TABLET ORAL DAILY
Status: DISCONTINUED | OUTPATIENT
Start: 2019-05-18 | End: 2019-05-19 | Stop reason: HOSPADM

## 2019-05-18 RX ORDER — SODIUM BICARBONATE 650 MG/1
1300 TABLET ORAL 3 TIMES DAILY
Status: DISCONTINUED | OUTPATIENT
Start: 2019-05-18 | End: 2019-05-19 | Stop reason: HOSPADM

## 2019-05-18 RX ORDER — SODIUM CHLORIDE 0.9 % (FLUSH) 0.9 %
SYRINGE (ML) INJECTION
Status: COMPLETED
Start: 2019-05-18 | End: 2019-05-18

## 2019-05-18 RX ORDER — SIMVASTATIN 20 MG/1
20 TABLET, FILM COATED ORAL DAILY
Status: DISCONTINUED | OUTPATIENT
Start: 2019-05-18 | End: 2019-05-19 | Stop reason: HOSPADM

## 2019-05-18 RX ORDER — HYDRALAZINE HYDROCHLORIDE 50 MG/1
100 TABLET, FILM COATED ORAL 3 TIMES DAILY
Status: DISCONTINUED | OUTPATIENT
Start: 2019-05-18 | End: 2019-05-19 | Stop reason: HOSPADM

## 2019-05-18 RX ORDER — INSULIN GLARGINE 100 [IU]/ML
14 INJECTION, SOLUTION SUBCUTANEOUS DAILY
Status: DISCONTINUED | OUTPATIENT
Start: 2019-05-18 | End: 2019-05-19 | Stop reason: HOSPADM

## 2019-05-18 RX ORDER — MAGNESIUM SULFATE 100 %
4 CRYSTALS MISCELLANEOUS AS NEEDED
Status: DISCONTINUED | OUTPATIENT
Start: 2019-05-18 | End: 2019-05-19 | Stop reason: HOSPADM

## 2019-05-18 RX ORDER — GUAIFENESIN 100 MG/5ML
81 LIQUID (ML) ORAL DAILY
Status: DISCONTINUED | OUTPATIENT
Start: 2019-05-18 | End: 2019-05-19 | Stop reason: HOSPADM

## 2019-05-18 RX ORDER — INSULIN LISPRO 100 [IU]/ML
INJECTION, SOLUTION INTRAVENOUS; SUBCUTANEOUS
Status: DISCONTINUED | OUTPATIENT
Start: 2019-05-18 | End: 2019-05-19 | Stop reason: HOSPADM

## 2019-05-18 RX ADMIN — IPRATROPIUM BROMIDE AND ALBUTEROL SULFATE 3 ML: .5; 3 SOLUTION RESPIRATORY (INHALATION) at 16:53

## 2019-05-18 RX ADMIN — TAMSULOSIN HYDROCHLORIDE 0.4 MG: 0.4 CAPSULE ORAL at 08:12

## 2019-05-18 RX ADMIN — BUMETANIDE 1 MG: 0.25 INJECTION INTRAMUSCULAR; INTRAVENOUS at 02:26

## 2019-05-18 RX ADMIN — ACETAMINOPHEN 650 MG: 325 TABLET ORAL at 22:41

## 2019-05-18 RX ADMIN — SIMVASTATIN 20 MG: 20 TABLET, FILM COATED ORAL at 08:12

## 2019-05-18 RX ADMIN — SODIUM BICARBONATE 1300 MG: 650 TABLET ORAL at 08:12

## 2019-05-18 RX ADMIN — ASPIRIN 81 MG 81 MG: 81 TABLET ORAL at 08:12

## 2019-05-18 RX ADMIN — CARVEDILOL 12.5 MG: 12.5 TABLET, FILM COATED ORAL at 16:50

## 2019-05-18 RX ADMIN — HYDRALAZINE HYDROCHLORIDE 100 MG: 50 TABLET, FILM COATED ORAL at 08:12

## 2019-05-18 RX ADMIN — INSULIN GLARGINE 14 UNITS: 100 INJECTION, SOLUTION SUBCUTANEOUS at 08:13

## 2019-05-18 RX ADMIN — IPRATROPIUM BROMIDE AND ALBUTEROL SULFATE 3 ML: .5; 3 SOLUTION RESPIRATORY (INHALATION) at 11:43

## 2019-05-18 RX ADMIN — HYDRALAZINE HYDROCHLORIDE 100 MG: 50 TABLET, FILM COATED ORAL at 21:20

## 2019-05-18 RX ADMIN — HEPARIN SODIUM 5000 UNITS: 5000 INJECTION INTRAVENOUS; SUBCUTANEOUS at 02:27

## 2019-05-18 RX ADMIN — AMLODIPINE BESYLATE 10 MG: 5 TABLET ORAL at 08:12

## 2019-05-18 RX ADMIN — DIPHENHYDRAMINE HYDROCHLORIDE 25 MG: 25 CAPSULE ORAL at 22:41

## 2019-05-18 RX ADMIN — Medication 10 ML: at 02:27

## 2019-05-18 RX ADMIN — CARVEDILOL 12.5 MG: 12.5 TABLET, FILM COATED ORAL at 08:12

## 2019-05-18 RX ADMIN — Medication 1 CAPSULE: at 08:13

## 2019-05-18 RX ADMIN — BUMETANIDE 1 MG: 0.25 INJECTION INTRAMUSCULAR; INTRAVENOUS at 21:20

## 2019-05-18 RX ADMIN — SODIUM BICARBONATE 1300 MG: 650 TABLET ORAL at 21:20

## 2019-05-18 RX ADMIN — SODIUM BICARBONATE 1300 MG: 650 TABLET ORAL at 16:50

## 2019-05-18 RX ADMIN — HYDRALAZINE HYDROCHLORIDE 100 MG: 50 TABLET, FILM COATED ORAL at 16:50

## 2019-05-18 RX ADMIN — BUMETANIDE 1 MG: 0.25 INJECTION INTRAMUSCULAR; INTRAVENOUS at 08:13

## 2019-05-18 RX ADMIN — INSULIN LISPRO 2 UNITS: 100 INJECTION, SOLUTION INTRAVENOUS; SUBCUTANEOUS at 12:02

## 2019-05-18 NOTE — CONSULTS
Cardiovascular Consult Patient: Isaac Mendoza. MRN: [id number]  SSN: [id number] YOB: 1935  Age: 81 y.o. Sex: male Subjective:  
  
Date of  Admission: 5/17/2019 Primary Care Provider: Shannon Reynoso MD 
Admission type: Chief Complaint Patient presents with  Shortness of Breath Isaac Mendoza is a 81 y.o. male  admitted shortness of breath. This consultation was requested by Dr. Bob Zarate The patient has a history of COPD, CRF, CAD s/p CABG, HFpEF, DM, HTN and atrial fibrillation. He recently underwent placement of a leadless pacemaker by Dr. Fabio Gasca on 5/9/19 for slow heart rate with symptoms. He has presented to medical attention multiple times in the past week for similar complaints, and the decision was made for admission. He has no chest pain currently, and reports feeling and breathing better after each pulmonary nebulizer treatment. He does not use inhalers at home. Past Medical History:  
Diagnosis Date  CAD (coronary artery disease) s/p CABG 2008  Carotid arterial disease ([facility]  CKD (chronic kidney disease) stage 3, GFR 30-59 ml/min ([facility]) 10/21/2017  
 hypertension and DM nephrosclerosis  Diabetes mellitus, type 2 ([address]  Hypercholesteremia  Hypertension  Paroxysmal atrial fibrillation ([address] 10/21/2017  
 new onset afib with BRPR 10-19-17 admit  PVC's (premature ventricular contractions) 5/26/2014  PVD (peripheral vascular disease) ([facility] [address] Past Surgical History:  
Procedure Laterality Date  HX CORONARY ARTERY BYPASS GRAFT    
 HX HEART CATHETERIZATION    
 [place] TCAT INSJ/RPL PERM LEADLESS PACEMAKER RV W/IMG N/A 5/9/2019 INSERT OR REPLACE TRANSCATH PPM LEADLESS performed by Virgil Mcguire MD at [place] [address], [facility] [address] CATH LAB History reviewed. No pertinent family history. Social History Tobacco Use  Smoking status: Former Smoker Packs/day: 3.00 Years: 20.00 Pack years: 60.00 Types: Cigarettes Last attempt to quit: 1985 Years since quittin.3  Smokeless tobacco: Never Used Substance Use Topics  Alcohol use: No  
  
Current Facility-Administered Medications Medication Dose Route Frequency  amLODIPine (NORVASC) tablet 10 mg  10 mg Oral DAILY  aspirin chewable tablet 81 mg  81 mg Oral DAILY  carvedilol (COREG) tablet 12.5 mg  12.5 mg Oral BID WITH MEALS  
 hydrALAZINE (APRESOLINE) tablet 100 mg  100 mg Oral TID  insulin glargine (LANTUS) injection 14 Units  14 Units SubCUTAneous DAILY  fish oil-omega-3 fatty acids 340-1,000 mg capsule 1 Cap  1 Cap Oral DAILY  sodium bicarbonate tablet 1,300 mg  1,300 mg Oral TID  tamsulosin (FLOMAX) capsule 0.4 mg  0.4 mg Oral DAILY  ondansetron (ZOFRAN) injection 4 mg  4 mg IntraVENous Q6H PRN  
 bumetanide (BUMEX) injection 1 mg  1 mg IntraVENous Q12H  
 albuterol-ipratropium (DUO-NEB) 2.5 MG-0.5 MG/3 ML  3 mL Nebulization Q4H PRN  
 simvastatin (ZOCOR) tablet 20 mg  20 mg Oral DAILY  [START ON 2019] bumetanide (BUMEX) tablet 2 mg  2 mg Oral BID  
 glucose chewable tablet 16 g  4 Tab Oral PRN  
 dextrose (D50W) injection syrg 12.5-25 g  25-50 mL IntraVENous PRN  
 glucagon (GLUCAGEN) injection 1 mg  1 mg IntraMUSCular PRN  
 insulin lispro (HUMALOG) injection   SubCUTAneous TIDAC Allergies Allergen Reactions  Lisinopril Cough Review of Symptoms: 
Constitutional: No fevers or chills. HEET: No trauma. No visual changes. No hearing loss. No sore throat. Neck: No adenopathy or swelling. Heart: No chest pain or palpitations. Lungs: No shortness of breath. No cough. Abdomen: No pain. No nausea of vomiting. No BRBPR or melena. No diarrhea. Urology: No dysuria or hematuria. Ext: No edema. Skin: No acute rashes or ulcers. Endocrine: No heat or cold intolerance. Neuro: No transient neurologic deficits. Subjective:  
 
Visit Vitals /69 (BP 1 Location: Left arm, BP Patient Position: At rest) Pulse 71 Temp 97.6 °F (36.4 °C) Resp 22 Ht 5' 9\" (1.753 m) Wt 82.7 kg (182 lb 6.4 oz) SpO2 95% BMI 26.94 kg/m² Physical Exam: Elderly WM appears stated age. NAD Head: Normocephalic, atraumatic. Eyes: Pupils equal, round, reactive to light and accomodation. , Extra ocular muscles intact. Sclera anicteric. Ears: Grossly responsive to sound. Neck: No adenopathy. No bruits. Throat: No sores or erythema. Heart: Irregular rate and rhythm. No murmurs. Lungs: Diminished bilaterally. No wheezing. Abdomen: Soft, non-tender. No guarding or rebound. No hepatosplenomegaly. Bowel sounds active. Ext: trivial edema. No ulceration. Skin: Normal coloration. Warm and dry. No rash. Neuro: Cranial nerves II through XII intact. Motor and sensory grossly intact. Affect: Appropriate. Alert and interactive. Cardiographics: 
Telemetry: AFIB 
ECG: atrial fibrillation with RBBB and intermittent ventricular pacing, rate 70 
 
5/9/2019 The pacemaker is a N(i)Â² Micra MRI, model # ZG4TS85, serial # [id number] Echo 5/1/2019: Interpretation Summary · Left Ventricle: Mild concentric hypertrophy. Estimated left ventricular ejection fraction is 56 - 60%. No regional wall motion abnormality noted. Mild (grade 1) left ventricular diastolic dysfunction. · Left Atrium: Moderately dilated left atrium. · Aortic Valve: Probably trileaflet aortic valve. Aortic valve sclerosis with reduced excursion. Aortic valve area is 1.6 cm2. Mild aortic valve stenosis is present. · Mitral Valve: Mitral valve thickening. Mitral annular calcification. Mild mitral valve regurgitation. · Tricuspid Valve: Mild to moderate tricuspid valve regurgitation is present. · Pulmonary Artery: Severe pulmonary hypertension.  
· IVC/Hepatic Veins: Moderately elevated central venous pressure (10-15 mmHg); IVC diameter is larger than 21 mm and collapses more than 50% with respiration. · Right Ventricle: Mildly reduced systolic function. · Right Atrium: Mildly dilated right atrium. CXR 5/17/19: 
Result Information Status: Final result (Exam End: 5/17/2019 22:21) Provider Status: Open Study Result History: Shortness of breath, possible pneumonia. 2 views of the chest demonstrate the patient is status post median sternotomy. There is cardiomegaly. There is parenchymal scarring bilaterally. There are no 
effusions and the osseous structures appear unremarkable. IMPRESSION: No acute findings. Signed Date/Time  Phone Pager Georgette Apodaca Right Heart Cath 5/2/2019: Romi Zamora Right Heart Cath [place] catheter inserted via right femoral vein. PA Sat = 55 %. AO Sat = 96 %. Cathy CO = 4.1 L/min. CATHY CO 4.14 
CATHY CI 2.12 PA 66/18/31 PCW 20/26/20 RV 69/7/18 RA 15/16/13 Data Reviewed: CMP:  
Lab Results Component Value Date/Time  05/17/2019 08:33 PM  
 K 4.2 05/17/2019 08:33 PM  
 CL 99 05/17/2019 08:33 PM  
 CO2 30 05/17/2019 08:33 PM  
 AGAP 7 05/17/2019 08:33 PM  
  (H) 05/17/2019 08:33 PM  
 BUN 62 (H) 05/17/2019 08:33 PM  
 CREA 3.29 (H) 05/17/2019 08:33 PM  
 GFRAA 22 (L) 05/17/2019 08:33 PM  
 GFRNA 18 (L) 05/17/2019 08:33 PM  
 CA 8.4 (L) 05/17/2019 08:33 PM  
 ALB 3.0 (L) 05/17/2019 08:33 PM  
 TP 6.7 05/17/2019 08:33 PM  
 GLOB 3.7 05/17/2019 08:33 PM  
 AGRAT 0.8 (L) 05/17/2019 08:33 PM  
 SGOT 25 05/17/2019 08:33 PM  
 ALT 28 05/17/2019 08:33 PM  
 
CBC:  
Lab Results Component Value Date/Time WBC 6.8 05/17/2019 08:33 PM  
 HGB 8.9 (L) 05/17/2019 08:33 PM  
 HCT 28.4 (L) 05/17/2019 08:33 PM  
  05/17/2019 08:33 PM  
 
All Cardiac Markers in the last 24 hours:  
Lab Results Component Value Date/Time TROIQ 0.06 (H) 05/18/2019 05:20 AM  
 TROIQ 0.06 (H) 05/17/2019 11:44 PM  
 TROIQ 0.05 (H) 05/17/2019 08:33 PM  
 
 
 
 Assessment:  
  
  
Hospital Problems  Date Reviewed: 5/18/2019 Codes Class Noted POA * (Principal) Acute bronchospasm ICD-10-CM: J98.01 
ICD-9-CM: 519.11  5/18/2019 Unknown CHF exacerbation (HCC) ICD-10-CM: I50.9 ICD-9-CM: 428.0  5/18/2019 Unknown Plan:  
 
Mr. Attila Marcano is an 80 yo WM with recurrent shortness of breath. He has no signs or symptoms of acute coronary insufficiency. Recent echocardiogram reveals preserved LV systolic function. I do not see volume overload in lungs, on CXR, or in periphery. BNP is elevated, however, possibly suggesting diastolic heart failure exacerbated by atrial fibrillation, chronic renal failure, pulmonary HTN, and anemia. I agree with a trial of diuretic. The patient report significant improvement in symptoms with nebulizer treatments, suggesting a significant component is COPD. No signs of acute infection; however, this elderly gentleman with multiple medical problems should be considered immunocompromised, and bronchitis/pneumonia considered. Thank you for asking us to see Mr. Attila Marcano. We will be happy to follow with you. Please call with any questions. Tangela Coleman MD 
5/18/2019, 11:51 AM 
 
[facility] Office:   [facility] Office: 
[address]     [address] [facility] [address]     [address] [place] [address] P: [id number]    P: [phone] F: [phone]    F: [phone]

## 2019-05-18 NOTE — PROGRESS NOTES
Bedside and Verbal shift change report given to [address] [place] [address] (oncoming nurse) by Perez Virk (offgoing nurse). Report included the following information SBAR, MAR and Med Rec Status.

## 2019-05-18 NOTE — ED PROVIDER NOTES
81 y.o. male with past medical history significant for CAD, HTN, DM type 2, hypercholesterolemia, PVD, PVC's, a-fib, and CKD who presents from home via private vehicle with chief complaint of shortness of breath. Patient c/o shortness of breath and leg swelling today. He has taken 4 albuterol neb treatments today with no relief. He also notes occasional cough with \"green mucus\". He recently had pacemaker placed by Dr. Julien Medina and had f/u with Cardiology yesterday, who increased his diuretics dose. Patient is also taking Keflex. Of note, patient has been seen in ED 3 times this week for same. Denies history of emphysema. Specifically denies fever and any other pain or symptoms. There are no other acute medical concerns at this time. Social hx: Former tobacco smoker (Quit [id number]); Denies EtOH use; Denies illicit drug use PCP: Timmy Mckeon MD 
 
Note written by Maria Dolores Barclay, as dictated by Katharine Vargas MD 9:38 PM 
 
The history is provided by the patient. No  was used. Past Medical History:  
Diagnosis Date  CAD (coronary artery disease) s/p CABG 2008  Carotid arterial disease ([facility]  CKD (chronic kidney disease) stage 3, GFR 30-59 ml/min ([facility]) 10/21/2017  
 hypertension and DM nephrosclerosis  Diabetes mellitus, type 2 ([facility] [address]  Hypercholesteremia  Hypertension  Paroxysmal atrial fibrillation ([facility] [address] 10/21/2017  
 new onset afib with BRPR 10-19-17 admit  PVC's (premature ventricular contractions) 5/26/2014  PVD (peripheral vascular disease) ([facility] [address] Past Surgical History:  
Procedure Laterality Date  HX CORONARY ARTERY BYPASS GRAFT    
 HX HEART CATHETERIZATION    
 AL TCAT INSJ/RPL PERM LEADLESS PACEMAKER RV W/IMG N/A 5/9/2019 INSERT OR REPLACE TRANSCATH PPM LEADLESS performed by Celi Guerra MD at [place] [address], [facility]/s  CATH LAB History reviewed. No pertinent family history. Social History Socioeconomic History  Marital status:  Spouse name: Not on file  Number of children: Not on file  Years of education: Not on file  Highest education level: Not on file Occupational History  Not on file Social Needs  Financial resource strain: Not on file  Food insecurity:  
  Worry: Not on file Inability: Not on file  Transportation needs:  
  Medical: Not on file Non-medical: Not on file Tobacco Use  Smoking status: Former Smoker Packs/day: 3.00 Years: 20.00 Pack years: 60.00 Types: Cigarettes Last attempt to quit: 1985 Years since quittin.3  Smokeless tobacco: Never Used Substance and Sexual Activity  Alcohol use: No  
 Drug use: No  
 Sexual activity: Not on file Lifestyle  Physical activity:  
  Days per week: Not on file Minutes per session: Not on file  Stress: Not on file Relationships  Social connections:  
  Talks on phone: Not on file Gets together: Not on file Attends Sikh service: Not on file Active member of club or organization: Not on file Attends meetings of clubs or organizations: Not on file Relationship status: Not on file  Intimate partner violence:  
  Fear of current or ex partner: Not on file Emotionally abused: Not on file Physically abused: Not on file Forced sexual activity: Not on file Other Topics Concern  Not on file Social History Narrative  Not on file ALLERGIES: Lisinopril Review of Systems Constitutional: Negative for activity change, appetite change, fatigue and fever. HENT: Negative for ear pain, facial swelling, sore throat and trouble swallowing. Eyes: Negative for pain, discharge and visual disturbance. Respiratory: Positive for cough and shortness of breath. Negative for chest tightness and wheezing. Cardiovascular: Positive for leg swelling. Negative for chest pain and palpitations. Gastrointestinal: Negative for abdominal pain, blood in stool, nausea and vomiting. Genitourinary: Negative for difficulty urinating, flank pain and hematuria. Musculoskeletal: Negative for arthralgias, joint swelling, myalgias and neck pain. Skin: Negative for color change and rash. Neurological: Negative for dizziness, weakness, numbness and headaches. Hematological: Negative for adenopathy. Does not bruise/bleed easily. Psychiatric/Behavioral: Negative for behavioral problems, confusion and sleep disturbance. All other systems reviewed and are negative. Vitals:  
 05/17/19 2005 05/17/19 2028 BP:  114/66 Pulse: 70 68 Resp:  16 Temp:  98.9 °F (37.2 °C) SpO2: 96% 93% Height:  5' 9\" (1.753 m) Physical Exam  
Constitutional: He is oriented to person, place, and time. He appears well-developed and well-nourished. No distress. HENT:  
Head: Normocephalic and atraumatic. Nose: Nose normal.  
Mouth/Throat: Oropharynx is clear and moist.  
Eyes: Pupils are equal, round, and reactive to light. Conjunctivae and EOM are normal. No scleral icterus. Neck: Normal range of motion. Neck supple. No JVD present. No tracheal deviation present. No thyromegaly present. No carotid bruits noted. Cardiovascular: Normal rate, regular rhythm, normal heart sounds and intact distal pulses. Exam reveals no gallop and no friction rub. No murmur heard. Pulmonary/Chest: Effort normal. No respiratory distress. He has wheezes. He has no rales. He exhibits no tenderness. Has slight expiratory wheezes bilaterally. Abdominal: Soft. Bowel sounds are normal. He exhibits distension. He exhibits no mass. There is no tenderness. There is no rebound and no guarding. Abdomen is protuberant but soft. Musculoskeletal: Normal range of motion. He exhibits edema. He exhibits no tenderness. 1-2+ pitting edema Lymphadenopathy:  
  He has no cervical adenopathy. Neurological: He is alert and oriented to person, place, and time. He has normal reflexes. No cranial nerve deficit. Coordination normal.  
Skin: Skin is warm and dry. No rash noted. No erythema. Psychiatric: He has a normal mood and affect. His behavior is normal. Judgment and thought content normal.  
Nursing note and vitals reviewed. Note written by Sueellen Kocher, Scribe, as dictated by Rogelio Cedillo MD 9:38 PM 
 
MDM Number of Diagnoses or Management Options Dyspnea, unspecified type: new and requires workup Amount and/or Complexity of Data Reviewed Clinical lab tests: ordered and reviewed Tests in the radiology section of CPT®: ordered and reviewed Decide to obtain previous medical records or to obtain history from someone other than the patient: yes Review and summarize past medical records: yes Discuss the patient with other providers: yes Independent visualization of images, tracings, or specimens: yes Risk of Complications, Morbidity, and/or Mortality Presenting problems: high Diagnostic procedures: high Management options: high Patient Progress Patient progress: stable Procedures Discussed all labs and findings with the patient. The xr is clear and renal functions appear stable. The patient states he is feeling better after the duoneb treatment. He does not have the atrovent in his treatment at home. Will provide another treatment and have him likely followup with the cardiologist on Monday. 11:24 PM 
Patient sats still 90-88 % RA after treatment. Will ambulate and if desats will admit. ED MD EKG interpretation: NSR with rate 68 BPM. RBBB. ST T changes lat precordium different from 5/14. T waves are now flipped in V4-6 Consult placed to cardiology. Discussed with Dr. Fabian Rao. Discussed all findings. Third visit in past week for sob. BNP >35,000 on 14th and same tonight. Patient better with albuterol and atrovent.  Enzymes normal. XR clear. SAts at 88-90% RA. Better with treatments. Renal function stable. Asks Hospitalist to admit and he will see in am.  Continue with duonebs and diuretics. Hospitalist Lorenzo for Admission 11:52 PM 
 
ED Room Number: IT65/17 Patient Name and age:  Vinayak Dos Santos. 81 y.o.  male Working Diagnosis: 1. Acute bronchospasm 2. Congestive heart failure, unspecified HF chronicity, unspecified heart failure type ([facility] Readmission: no 
Isolation Requirements:  no 
Recommended Level of Care:  telemetry Code Status:  Full

## 2019-05-18 NOTE — ED TRIAGE NOTES
QUICK LOOK: Pt to ED for c/o continued SOB. Pt recently admitted for same and states it has worsened recently. Used albuterol several times today with relief lasting only a few minutes after each treatment. Pt reports cough with yellow sputum.

## 2019-05-18 NOTE — PROGRESS NOTES
Hospitalist Progress Note Steven Shields MD 
Answering service: [phone] OR [phone] from in house phone Date of Service:  2019 NAME:  Perlita Palacio Sr. 
:  1935 MRN:  [id number] PCP: Roberto Krause MD 
 
Chief Complaint:  
Chief Complaint Patient presents with  Shortness of Breath Admission Summary:  
 
[facility]. is a 81 y.o. male who presented with SOB Interval history / Subjective:  
Patient seen for Follow up of CHF exacerbation First encounter Patient seen and examined by the bedside Labs, images and notes reviewed Patient is comfortable, feels much better, no NVD, edema has improved, no cough, no abdominal pain Denies any chest pain No fevers or chills No nausea or vomiting Discussed with nursing staff, no acute issues overnight, orders reviewed. Assessment & Plan:  
 
- acute on chronic HFpEF, NYHA class III on admission Improving Diuresing well Cont Bumex IV today, change to PO in am 
Repeat BMP in am 
Last echo : LVEF WNL 
I/O Daily weights 
 
- CKD 3 Stable, avoid nephrotoxins, renal dosing of meds - Severe pulmonary HTN 
RHC PCWP 28, RV 75 recently Cont Bumex - IDDM2 with hyperglycemia Cont lantus, SSI, diabetic diet, accu-checks tid ac 
 
-chronic afib 
CHR, not on OAC due to hx of GIB Cont Coreg 
 
-HTN, controlled Cont Hydralazine, Norvasc Code status: Full Code DVT prophylaxis: Low risk, patient to ambulate Care Plan discussed with: Patient/Family and Nurse Disposition: TBD Hospital Problems  Date Reviewed: 2019 Codes Class Noted POA * (Principal) Acute bronchospasm ICD-10-CM: J98.01 
ICD-9-CM: 519.11  2019 Unknown CHF exacerbation (HCC) ICD-10-CM: I50.9 ICD-9-CM: 428.0  2019 Unknown Review of Systems: A comprehensive review of systems was negative. Physical Examination: General appearance: alert, cooperative, no distress, appears stated age Throat: Lips, mucosa, and tongue normal. Teeth and gums normal 
Lungs: clear to auscultation bilaterally Heart: irregularly irregular rhythm Abdomen: soft, non-tender. Bowel sounds normal. No masses,  no organomegaly Extremities: no edema Pulses: 2+ and symmetric Neurologic: Alert and oriented X 3, normal strength and tone. Vital Signs:  
 Last 24hrs VS reviewed since prior progress note. Most recent are: 
 
Visit Vitals /69 (BP 1 Location: Left arm, BP Patient Position: At rest) Pulse 71 Temp 97.6 °F (36.4 °C) Resp 22 Ht 5' 9\" (1.753 m) Wt 82.7 kg (182 lb 6.4 oz) SpO2 95% BMI 26.94 kg/m² Intake/Output Summary (Last 24 hours) at 2019 1117 Last data filed at 2019 5130 Gross per 24 hour Intake 100 ml Output 400 ml Net -300 ml Tmax:  Temp (24hrs), Av.4 °F (36.9 °C), Min:97.6 °F (36.4 °C), Max:98.9 °F (37.2 °C) Data Review: Xr Chest Pa Lat Result Date: 2019 History: Shortness of breath, possible pneumonia. 2 views of the chest demonstrate the patient is status post median sternotomy. There is cardiomegaly. There is parenchymal scarring bilaterally. There are no effusions and the osseous structures appear unremarkable. IMPRESSION: No acute findings. Xr Chest Pa Lat Result Date: 2019 Clinical indication: Shortness of breath. Frontal and lateral views of the chest obtained, comparison made shift. The heart size remains prominent with minimal interstitial prominence. No focal consolidation, prior sternotomy. IMPRESSION: Mild interstitial edema and cardiomegaly remains. Xr Chest Pa Lat Result Date: 2019 INDICATION: Previous abnormal chest radiograph COMPARISON: 2019 FINDINGS: PA and lateral views of the chest demonstrate unchanged cardiomegaly. The patient is status post median sternotomy.  Several of the sternotomy wires are disrupted, unchanged from the prior study. There is persistent mild pulmonary edema. No new airspace disease or pleural effusion is evident. There is no pneumothorax. The visualized osseous structures are unremarkable. IMPRESSION: Unchanged mild pulmonary edema pattern. Xr Chest Pa Lat Result Date: 5/1/2019 Indication shortness of breath 2 views of the chest demonstrate borderline heart size. There is mild cephalization of pulmonary vessels suggesting early congestive change. Lungs are grossly clear. IMPRESSION: Mild congestive change. Otherwise negative Ct Chest Wo Cont Result Date: 5/1/2019 INDICATION: Cough and shortness of breath for 2 weeks. COMPARISON: 6/26/2017 CONTRAST: None. TECHNIQUE:  5 mm axial images were obtained through the chest. Coronal and sagittal reconstructions were generated. CT dose reduction was achieved through use of a standardized protocol tailored for this examination and automatic exposure control for dose modulation. The absence of intravenous contrast reduces the sensitivity for evaluation of the mediastinum and upper abdominal organs. FINDINGS: THYROID: No nodule. MEDIASTINUM: 1.4 cm short axis diameter of a subcarinal lymph node, relatively stable. Other normal and indeterminate sized lymph nodes in the mediastinum also stable. LUCAS: No mass or lymphadenopathy. THORACIC AORTA: No aneurysm. MAIN PULMONARY ARTERY: Normal in caliber. TRACHEA/BRONCHI: Patent. ESOPHAGUS: No wall thickening or dilatation. HEART: Normal in size. PLEURA: No effusion or pneumothorax. LUNGS: Bibasilar atelectasis, diffuse patchy interstitial change, stable to slightly worsened, with no focal infiltrate or effusion. INCIDENTALLY IMAGED UPPER ABDOMEN: Gallstones without associated inflammation.  Well-circumscribed oval cystic mass medial to the right kidney is stable, etiology uncertain, as it does not arise with certainty from the right kidney itself. Diverticula without associated acute inflammatory change. BONES: No destructive bone lesion. Mild compression deformity of a lower thoracic vertebral body, stable. IMPRESSION: 1. No definite acute change in the chest, without contrast, except for mild worsening of diffuse interstitial parenchymal change. 2. Cholelithiasis, right medial flank cystic mass, diverticulosis, all stable. 3. Stable compression deformity in the lower thoracic spine. Other incidentals. [facility] Result Date: 5/2/2019 ULTRASOUND OF THE RIGHT UPPER QUADRANT INDICATION: look for cholecystitis COMPARISON: 10/20/2017 abdominal CT, 5/1/2019 CT chest. TECHNIQUE: Sonography of the right upper quadrant was performed. FINDINGS: GALLBLADDER: The gallbladder is distended with intraluminal calculi, showing mobility on changes in body positioning. The largest measures 7 mm. There is also a fluid fluid level in the gallbladder suggesting additional echogenic sludge. No pericholecystic fluid collection or focal tenderness is reported. Sharif Alves COMMON DUCT: 0.5 cm in diameter. The duct is normal caliber. LIVER: Upper normal echogenicity. No focal hepatic mass or intrahepatic biliary dilatation is shown. PANCREAS: The visualized portions of the pancreas are normal. RIGHT KIDNEY: 10.8 cm in length, with numerous simple cortical cysts. Medial to the right upper renal pole, there is a hypoechoic structure measuring 2.7 x 3.2 x 1.3 cm, which may correspond with the well-circumscribed cystic appearing mass on CT scanning. There is a tiny amount of perinephric fluid suggested. IMPRESSION: 1. Cholecystolithiasis and gallbladder sludge with mild gallbladder distention. No biliary ductal dilatation. 2.. Multiple right renal cortical cysts. Probably cystic area medial to the right upper renal pole corresponding to a stable cystic mass on CT. No results found for: SDES Lab Results Component Value Date/Time Culture result: CANDIDA ALBICANS (A) 03/16/2017 06:00 AM  
 Culture result: NO GROWTH 5 DAYS 03/16/2017 05:40 AM  
 
All Micro Results None Labs:  
 
Recent Labs 05/17/19 2033 WBC 6.8 HGB 8.9* HCT 28.4*  
 Recent Labs 05/17/19 2033   
K 4.2 CL 99  
CO2 30 BUN 62* CREA 3.29* * CA 8.4* Recent Labs 05/17/19 2033 SGOT 25 ALT 28  TBILI 0.7 TP 6.7 ALB 3.0*  
GLOB 3.7 No results for input(s): INR, PTP, APTT in the last 72 hours. No lab exists for component: INREXT, INREXT No results for input(s): FE, TIBC, PSAT, FERR in the last 72 hours. No results found for: FOL, RBCF No results for input(s): PH, PCO2, PO2 in the last 72 hours. Recent Labs 05/18/19 
0520 05/17/19 
2344 05/17/19 2033 TROIQ 0.06* 0.06* 0.05* No results found for: CHOL, CHOLX, CHLST, CHOLV, HDL, LDL, LDLC, DLDLP, TGLX, TRIGL, TRIGP, CHHD, CHHDX Lab Results Component Value Date/Time Glucose (POC) 139 (H) 05/18/2019 07:08 AM  
 Glucose (POC) 120 (H) 05/12/2019 06:49 AM  
 Glucose (POC) 83 05/11/2019 08:59 PM  
 Glucose (POC) 142 (H) 05/11/2019 04:14 PM  
 Glucose (POC) 171 (H) 05/11/2019 11:58 AM  
 
Lab Results Component Value Date/Time Color YELLOW/STRAW 06/25/2017 11:40 AM  
 Appearance CLEAR 06/25/2017 11:40 AM  
 Specific gravity 1.018 06/25/2017 11:40 AM  
 pH (UA) 5.0 06/25/2017 11:40 AM  
 Protein 300 (A) 06/25/2017 11:40 AM  
 Glucose NEGATIVE  06/25/2017 11:40 AM  
 Ketone NEGATIVE  06/25/2017 11:40 AM  
 Bilirubin NEGATIVE  06/25/2017 11:40 AM  
 Urobilinogen 0.2 06/25/2017 11:40 AM  
 Nitrites NEGATIVE  06/25/2017 11:40 AM  
 Leukocyte Esterase NEGATIVE  06/25/2017 11:40 AM  
 Epithelial cells FEW 06/25/2017 11:40 AM  
 Bacteria NEGATIVE  06/25/2017 11:40 AM  
 WBC 0-4 06/25/2017 11:40 AM  
 RBC 0-5 06/25/2017 11:40 AM  
 
 
 
Medications Reviewed:  
 
Current Facility-Administered Medications Medication Dose Route Frequency  amLODIPine (NORVASC) tablet 10 mg  10 mg Oral DAILY  aspirin chewable tablet 81 mg  81 mg Oral DAILY  carvedilol (COREG) tablet 12.5 mg  12.5 mg Oral BID WITH MEALS  
 hydrALAZINE (APRESOLINE) tablet 100 mg  100 mg Oral TID  insulin glargine (LANTUS) injection 14 Units  14 Units SubCUTAneous DAILY  fish oil-omega-3 fatty acids 340-1,000 mg capsule 1 Cap  1 Cap Oral DAILY  sodium bicarbonate tablet 1,300 mg  1,300 mg Oral TID  tamsulosin (FLOMAX) capsule 0.4 mg  0.4 mg Oral DAILY  ondansetron (ZOFRAN) injection 4 mg  4 mg IntraVENous Q6H PRN  
 bumetanide (BUMEX) injection 1 mg  1 mg IntraVENous Q12H  
 albuterol-ipratropium (DUO-NEB) 2.5 MG-0.5 MG/3 ML  3 mL Nebulization Q4H PRN  
 heparin (porcine) injection 5,000 Units  5,000 Units SubCUTAneous Q8H  
 simvastatin (ZOCOR) tablet 20 mg  20 mg Oral DAILY  
 
______________________________________________________________________ EXPECTED LENGTH OF STAY: - - - 
ACTUAL LENGTH OF STAY:          0 Fuad Hedrick MD

## 2019-05-18 NOTE — ROUTINE PROCESS
TRANSFER - OUT REPORT: 
 
Verbal report given to BUCK Kemp(name) on Bhupinder Mazariegos Sr.  being transferred to [facility](unit) for routine progression of care Report consisted of patients Situation, Background, Assessment and  
Recommendations(SBAR). Information from the following report(s) SBAR, ED Summary, MAR, Recent Results and Cardiac Rhythm paced was reviewed with the receiving nurse. Lines:  
Peripheral IV 05/17/19 Left Antecubital (Active) Opportunity for questions and clarification was provided.    
 
Patient transported with: 
 
 
Joe Davison RN

## 2019-05-18 NOTE — H&P
[address] [facility] HISTORY AND PHYSICAL Name:  Giulia Zarate 
MR#:  [id number] :  1935 ACCOUNT #:  [de-identified] ADMIT DATE:  2019 PRIMARY CARE PHYSICIAN:  Dominic Hedrick MD. 
 
PRESENTING COMPLAINT:  Shortness of breath for more than one week. HISTORY OF PRESENTING COMPLAINT:  The patient is an 79-year-old male with past medical history of diabetes, hypertension, coronary artery disease, hypercholesterolemia, peripheral vascular disease, AFib, and CKD, who presented to the emergency room via private vehicle with chief complaint of shortness of breath. His shortness of breath has been persistent for more than one week and subsequently has worsened since yesterday. There is associated bilateral leg swelling since yesterday. The patient has been taking four albuterol nebulizer treatments with no relief. The patient also reports cough with occasional greenish sputum. He recently had pacemaker placed by Dr. Xin Rollins and had followed up with Cardiology yesterday who increased his diuretic dose. The patient has been seen in the emergency room for shortness of breath three times this week. He denies any fever, chills, or rigors. He also denies palpitations. The patient reports orthopnea and PND. There is no sore throat, epistaxis, or rhinorrhea. He has no nausea, vomiting, abdominal pain, constipation, or diarrhea. He denies headache, lightheadedness, dizziness, blurred vision, double vision, syncopal episode, or seizures. There is no neck pain, neck stiffness, or contusion. The patient has no dysuria, frequency, hematuria, or urethral discharge. He does not report of any back pain or flank pain. The patient denies trauma or fall. He reports lower extremity edema, but denies lower extremity pain. The patient reports of feeling better after he was given treatment in the emergency room. PAST MEDICAL HISTORY: 
1. Coronary artery disease, status post CABG in . 2. Carotid artery disease. 3.  CKD. 4.  Hypertension. 5.  Diabetes mellitus type 2. 
6.  Hypercholesterolemia. 7.  Paroxysmal AFib. 8.  Peripheral vascular disease. PAST SURGICAL HISTORY: 
1.  CABG. 2.  Cardiac catheterization. 3.  Leadless pacemaker. MEDICATIONS: 
1. Albuterol nebulizer every 4 hours as needed for wheezing. 2.  Amlodipine 10 mg daily. 3.  Aspirin 81 mg daily. 4.  Bumex 3 mg in the morning and 1 mg in the evening. 5.  Carvedilol 12.5 mg three times daily with meals. 6.  Cinnamon bark 1000 mg two times daily. 7.  Hydralazine 50 mg take three tablets three times daily. 8.  Insulin glargine 18 units subcu every morning. 9.  Omega-3 fatty acids one capsule two times daily. 10.  Simvastatin 20 mg tablet at night. 11.  Sodium bicarbonate 650 take two tablets three times daily. 12.  Tamsulosin 0.4 mg daily. ALLERGIES:  LISINOPRIL. SOCIAL HISTORY:  The patient is . Lives with spouse. He is a nonsmoker. He quit in 1985. Denies alcohol or recreational drug use. FAMILY HISTORY:  Reviewed by me, but nonpertinent as reported by patient. REVIEW OF SYSTEMS:  More than 12 systems reviewed and pertinent positives are in the history of present illness. All others are negative. PHYSICAL EXAMINATION: 
GENERAL:  Patient is seen lying in bed on oxygen via nasal cannula. VITAL SIGNS:  Blood pressure 114/66, pulse 68, temperature 98.9, pulse ox 93%, respirations 16. HEAD, EYES, EARS, NOSE, AND THROAT:  Atraumatic, normocephalic. Anicteric. No pallor. No jaundice. Extraocular muscles intact. Pupils bilaterally reactive, equal, and round. NECK:  Supple. No JVD. No carotid bruits. CARDIAC:  Heart sounds 1 and 2, regular rate and rhythm. No gallop, no friction, no rub. RESPIRATORY:  Mild expiratory wheezes noted bilaterally. Bilateral basal rales noted. ABDOMEN:  Soft, nontender. Bowel sounds normoactive. NEUROLOGIC:  Alert and oriented x3. Cranial nerves II through XII intact. SKIN:  Warm and dry. Normal skin color. Normal skin turgor. PSYCH:  Normal mood. Normal affect. BACK:  No CVA tenderness. EXTREMITIES:  1 + pedal edema bilaterally. No cyanosis. Moves bilateral lower extremities. DIAGNOSTIC TESTS:  EKG showed wide QRS with frequent ventricular paced complex with 65 beats per minute, right bundle branch block, Q-wave abnormality. WBC 6.8, hemoglobin 8.9, hematocrit 28.4, platelets 768,729. Sodium 136, potassium 4.2, chloride 99, carbon dioxide 30, glucose 168, BUN 63, creatinine 3.29. Calcium 8.4. LFT 20, AST 25, alk phos 109, troponin is 0.05. BNP 35,000. Chest x-ray showed no acute findings. Echocardiogram was done on the 05/01/2019 and this showed mild concentric left ventricular hypertrophy. Estimated left ventricular ejection fraction is 56% to 60%. No regional wall motion abnormality. Mild (grade 1) left ventricular diastolic dysfunction. Left atrium moderately dilated. ASSESSMENT: 
1. Worsening shortness of breath. 2.  Acute bronchospasm. 3.  Congestive heart failure exacerbation, most likely diastolic. 4.  Diabetes mellitus. 5.  Hypertension. 6.  Coronary artery disease, status post coronary artery bypass grafting. 7.  Hypertension. 8.  Hypercholesterolemia. 9.  Peripheral vascular disease. 10.  Chronic kidney disease. PLAN: 
1. Admit the patient to telemetry under hospitalist service. 2.  Parenteral diuresis. 3.  DuoNeb nebulizer treatment. 4.  Oxygen therapy. 5.  Continuous pulse ox. 6.  Daily weights. 7.  Serial cardiac enzymes. Cardiology consult. 8.  Monitor electrolytes with diuretics. 9.  Monitor kidney function with diuresis. 10.  Monitor vital signs including blood pressure as per unit protocol. 11.  Restart appropriate home medications. 12.  Extensive patient education was done at bedside. 13.  Deep venous thrombosis prophylaxis, heparin. 15.  Fall precautions, skin care precautions, out of bed to chair with assistance. 15.  I discussed advance directive with the patient. The spouse is next of kin. Code is full code. 16.  The patient had a recent echocardiogram with EF of 56% to 60%. 17.  Further management will depend on the patient's clinical progression, further evaluation by attending physician, result of tests, and recommendation by Cardiology. Diego Hawkins MD 
 
 
[id number] 
D:  05/18/2019 1:25 T:  05/18/2019 7:20 
JOB #:  [id number]

## 2019-05-19 VITALS
HEIGHT: 69 IN | DIASTOLIC BLOOD PRESSURE: 57 MMHG | BODY MASS INDEX: 26.36 KG/M2 | OXYGEN SATURATION: 92 % | RESPIRATION RATE: 17 BRPM | HEART RATE: 71 BPM | WEIGHT: 178 LBS | TEMPERATURE: 97.9 F | SYSTOLIC BLOOD PRESSURE: 143 MMHG

## 2019-05-19 PROBLEM — J98.01 ACUTE BRONCHOSPASM: Status: RESOLVED | Noted: 2019-05-18 | Resolved: 2019-05-19

## 2019-05-19 PROBLEM — I50.9 CHF EXACERBATION (HCC): Status: RESOLVED | Noted: 2019-05-18 | Resolved: 2019-05-19

## 2019-05-19 LAB
ANION GAP SERPL CALC-SCNC: 10 MMOL/L (ref 5–15)
BASOPHILS # BLD: 0 K/UL (ref 0–0.1)
BASOPHILS NFR BLD: 1 % (ref 0–1)
BUN SERPL-MCNC: 56 MG/DL (ref 6–20)
BUN/CREAT SERPL: 18 (ref 12–20)
CALCIUM SERPL-MCNC: 8.2 MG/DL (ref 8.5–10.1)
CHLORIDE SERPL-SCNC: 101 MMOL/L (ref 97–108)
CO2 SERPL-SCNC: 28 MMOL/L (ref 21–32)
CREAT SERPL-MCNC: 3.1 MG/DL (ref 0.7–1.3)
DIFFERENTIAL METHOD BLD: ABNORMAL
EOSINOPHIL # BLD: 0.3 K/UL (ref 0–0.4)
EOSINOPHIL NFR BLD: 6 % (ref 0–7)
ERYTHROCYTE [DISTWIDTH] IN BLOOD BY AUTOMATED COUNT: 13.9 % (ref 11.5–14.5)
GLUCOSE BLD STRIP.AUTO-MCNC: 73 MG/DL (ref 65–100)
GLUCOSE BLD STRIP.AUTO-MCNC: 74 MG/DL (ref 65–100)
GLUCOSE BLD STRIP.AUTO-MCNC: 94 MG/DL (ref 65–100)
GLUCOSE SERPL-MCNC: 68 MG/DL (ref 65–100)
HCT VFR BLD AUTO: 27.7 % (ref 36.6–50.3)
HGB BLD-MCNC: 8.8 G/DL (ref 12.1–17)
IMM GRANULOCYTES # BLD AUTO: 0 K/UL (ref 0–0.04)
IMM GRANULOCYTES NFR BLD AUTO: 0 % (ref 0–0.5)
LYMPHOCYTES # BLD: 0.5 K/UL (ref 0.8–3.5)
LYMPHOCYTES NFR BLD: 10 % (ref 12–49)
MCH RBC QN AUTO: 29.8 PG (ref 26–34)
MCHC RBC AUTO-ENTMCNC: 31.8 G/DL (ref 30–36.5)
MCV RBC AUTO: 93.9 FL (ref 80–99)
MONOCYTES # BLD: 0.9 K/UL (ref 0–1)
MONOCYTES NFR BLD: 16 % (ref 5–13)
NEUTS SEG # BLD: 3.9 K/UL (ref 1.8–8)
NEUTS SEG NFR BLD: 68 % (ref 32–75)
NRBC # BLD: 0 K/UL (ref 0–0.01)
NRBC BLD-RTO: 0 PER 100 WBC
PLATELET # BLD AUTO: 174 K/UL (ref 150–400)
PMV BLD AUTO: 11 FL (ref 8.9–12.9)
POTASSIUM SERPL-SCNC: 3.7 MMOL/L (ref 3.5–5.1)
RBC # BLD AUTO: 2.95 M/UL (ref 4.1–5.7)
SERVICE CMNT-IMP: NORMAL
SODIUM SERPL-SCNC: 139 MMOL/L (ref 136–145)
WBC # BLD AUTO: 5.7 K/UL (ref 4.1–11.1)

## 2019-05-19 PROCEDURE — 36415 COLL VENOUS BLD VENIPUNCTURE: CPT

## 2019-05-19 PROCEDURE — 85025 COMPLETE CBC W/AUTO DIFF WBC: CPT

## 2019-05-19 PROCEDURE — 82962 GLUCOSE BLOOD TEST: CPT

## 2019-05-19 PROCEDURE — 80048 BASIC METABOLIC PNL TOTAL CA: CPT

## 2019-05-19 PROCEDURE — 74011250637 HC RX REV CODE- 250/637: Performed by: INTERNAL MEDICINE

## 2019-05-19 PROCEDURE — 74011636637 HC RX REV CODE- 636/637: Performed by: INTERNAL MEDICINE

## 2019-05-19 RX ADMIN — BUMETANIDE 2 MG: 1 TABLET ORAL at 08:05

## 2019-05-19 RX ADMIN — INSULIN GLARGINE 14 UNITS: 100 INJECTION, SOLUTION SUBCUTANEOUS at 08:06

## 2019-05-19 RX ADMIN — SIMVASTATIN 20 MG: 20 TABLET, FILM COATED ORAL at 08:06

## 2019-05-19 RX ADMIN — ASPIRIN 81 MG 81 MG: 81 TABLET ORAL at 08:05

## 2019-05-19 RX ADMIN — Medication 1 CAPSULE: at 08:06

## 2019-05-19 RX ADMIN — SODIUM BICARBONATE 1300 MG: 650 TABLET ORAL at 08:05

## 2019-05-19 RX ADMIN — HYDRALAZINE HYDROCHLORIDE 100 MG: 50 TABLET, FILM COATED ORAL at 08:05

## 2019-05-19 RX ADMIN — TAMSULOSIN HYDROCHLORIDE 0.4 MG: 0.4 CAPSULE ORAL at 08:06

## 2019-05-19 RX ADMIN — AMLODIPINE BESYLATE 10 MG: 5 TABLET ORAL at 08:06

## 2019-05-19 RX ADMIN — CARVEDILOL 12.5 MG: 12.5 TABLET, FILM COATED ORAL at 08:05

## 2019-05-19 NOTE — DISCHARGE INSTRUCTIONS
DISCHARGE SUMMARY from Nurse    PATIENT INSTRUCTIONS:    After general anesthesia or intravenous sedation, for 24 hours or while taking prescription Narcotics:  · Limit your activities  · Do not drive and operate hazardous machinery  · Do not make important personal or business decisions  · Do  not drink alcoholic beverages  · If you have not urinated within 8 hours after discharge, please contact your surgeon on call. Report the following to your surgeon:  · Excessive pain, swelling, redness or odor of or around the surgical area  · Temperature over 100.5  · Nausea and vomiting lasting longer than 4 hours or if unable to take medications  · Any signs of decreased circulation or nerve impairment to extremity: change in color, persistent  numbness, tingling, coldness or increase pain  · Any questions    What to do at Home:  Recommended activity: Activity as tolerated and Ambulate in house,     If you experience any of the following symptoms , please follow up with . *  Please give a list of your current medications to your Primary Care Provider. *  Please update this list whenever your medications are discontinued, doses are      changed, or new medications (including over-the-counter products) are added. *  Please carry medication information at all times in case of emergency situations. These are general instructions for a healthy lifestyle:    No smoking/ No tobacco products/ Avoid exposure to second hand smoke  Surgeon General's Warning:  Quitting smoking now greatly reduces serious risk to your health.     Obesity, smoking, and sedentary lifestyle greatly increases your risk for illness    A healthy diet, regular physical exercise & weight monitoring are important for maintaining a healthy lifestyle    You may be retaining fluid if you have a history of heart failure or if you experience any of the following symptoms:  Weight gain of 3 pounds or more overnight or 5 pounds in a week, increased swelling in our hands or feet or shortness of breath while lying flat in bed. Please call your doctor as soon as you notice any of these symptoms; do not wait until your next office visit. Recognize signs and symptoms of STROKE:    F-face looks uneven    A-arms unable to move or move unevenly    S-speech slurred or non-existent    T-time-call 911 as soon as signs and symptoms begin-DO NOT go       Back to bed or wait to see if you get better-TIME IS BRAIN. Warning Signs of HEART ATTACK     Call 911 if you have these symptoms:   Chest discomfort. Most heart attacks involve discomfort in the center of the chest that lasts more than a few minutes, or that goes away and comes back. It can feel like uncomfortable pressure, squeezing, fullness, or pain.  Discomfort in other areas of the upper body. Symptoms can include pain or discomfort in one or both arms, the back, neck, jaw, or stomach.  Shortness of breath with or without chest discomfort.  Other signs may include breaking out in a cold sweat, nausea, or lightheadedness. Don't wait more than five minutes to call 911 - MINUTES MATTER! Fast action can save your life. Calling 911 is almost always the fastest way to get lifesaving treatment. Emergency Medical Services staff can begin treatment when they arrive -- up to an hour sooner than if someone gets to the hospital by car. The discharge information has been reviewed with the patient. The patient verbalized understanding. Discharge medications reviewed with the patient and appropriate educational materials and side effects teaching were provided. ___________________________________________________________________________________________________________________________________   Discharge Instructions       PATIENT ID: Ken Hoover.   MRN: [id number]   YOB: 1935    DATE OF ADMISSION: 5/17/2019  8:54 PM    DATE OF DISCHARGE: 5/19/2019    PRIMARY CARE PROVIDER: Jose Berry MD ATTENDING PHYSICIAN: Lucie Lantigua MD  DISCHARGING PROVIDER: Francisco J Schwartz MD    To contact this individual call [phone] and ask the  to page. If unavailable ask to be transferred the Adult Hospitalist Department. DISCHARGE DIAGNOSES   Acute COPD and mild CHF exacerbation: resolved    CONSULTATIONS: IP CONSULT TO CARDIOLOGY    PROCEDURES/SURGERIES: * No surgery found *    PENDING TEST RESULTS:   At the time of discharge the following test results are still pending: n/a    FOLLOW UP APPOINTMENTS:   Follow-up Information     Follow up With Specialties Details Why Contact Info    Angelo Rodgers MD [facility] Schedule an appointment as soon as possible for a visit for post-hospitalization follow up, with in 7 days [address] [address] Jose Gomez MD Cardiology Schedule an appointment as soon as possible for a visit as recommended [address]  [phone]             ADDITIONAL CARE RECOMMENDATIONS:   · It is important that you take the medication exactly as they are prescribed. · Keep your medication in the bottles provided by the pharmacist and keep a list of the medication names, dosages, and times to be taken in your wallet. · Do not take other medications without consulting your doctor. · No drinking alcohol or driving car or operating machinery if you are on narcotic pain medications. Donot take sedating mediations if you are sleepy or confused. · Fall Precautions  · Keep Well Hydrated  · Report to your medical provider if you feel you have  developed allergies to medications  · Follow up with your PCP or Consultant for medication adjustments and refills  · Monitor for signs of fevers,chills,bleeding,chest pain and seek medical attention if you do so. · Tell your doctor all the prescription, herbal, or over-the-counter medicines you take.  Do not take any new ones unless you talk to your doctor first.  · Do not take anti-inflammatory medicines. These include ibuprofen (Advil, Motrin) and naproxen (Aleve). You can use acetaminophen (Tylenol) for pain. · Do not take two or more pain medicines at the same time unless the doctor told you to. Many pain medicines have acetaminophen, which is Tylenol. Too much acetaminophen (Tylenol) can be harmful. · Tell all doctors and others who work with your health care that you have kidney disease. · Wear medical alert jewelry that lists your health problem. You can buy this at most [employer]. DIET: DIET DIABETIC CONSISTENT CARB    ACTIVITY: Ambulate in house    WOUND CARE: n/a    EQUIPMENT needed: n/a      DISCHARGE MEDICATIONS:   See Medication Reconciliation Form    · It is important that you take the medication exactly as they are prescribed. · Keep your medication in the bottles provided by the pharmacist and keep a list of the medication names, dosages, and times to be taken in your wallet. · Do not take other medications without consulting your doctor. NOTIFY YOUR PHYSICIAN FOR ANY OF THE FOLLOWING:   Fever over 101 degrees for 24 hours. Chest pain, shortness of breath, fever, chills, nausea, vomiting, diarrhea, change in mentation, falling, weakness, bleeding. Severe pain or pain not relieved by medications. Or, any other signs or symptoms that you may have questions about.       DISPOSITION:   x Home With:   OT  PT  [place]  RN       SNF/Inpatient Rehab/LTAC    Independent/assisted living    Hospice    Other:       Signed:   Steven Shields MD  5/19/2019  8:22 AM

## 2019-05-19 NOTE — PROGRESS NOTES
Reason for Admission:   SOB   Medical hx  CHF, CAD, CABG, 2008, stent placement, diabetic nephropathy, diabetes, hypertension and hypertension. PCP is Dr Mustapha Patel   No AMD 
            
RRAT Score:    37 Resources/supports as identified by patient/family:   Friends and family Top Challenges facing patient (as identified by patient/family and CM): Finances/Medication cost?      [employer]   No concerns in securing medications Transportation? Self and family Support system or lack thereof? Family and freinds Living arrangements? Lives with wife, Parveen Ellis-- self care and independent Self-care/ADLs/Cognition? Alert and oriented-- Drives when able and uses walking stick and walker if needed   No 02 at home Current Advanced Directive/Advance Care Plan:  Declines to complete at this time Plan for utilizing home health:    Not indicated-- Out of area for [facility] Likelihood of readmission:  High due to medicalconcerns Transition of Care Plan:        Audrey with wife and medical follow up. CM met with patient in his room --Patient is known to CM from previous admission. Patient was alert and oriented and confirmed demographics, insurance--Medicare and BCBS. Followed by Dr Mustapha Patel and Dr Karsten Reinoso cardiology. He does not use home 02. Patient lives in one level home wit his wife, Parveen Rank [phone]. Patient and wife wre born and raised in [address] [place] [address]. Patient and wife had 4 children-- one son and one daughter are . Two sons Tayler Carbajal (lives 5 miles from patient) and son Savannah Huang in the [address] [place] [address] area. The couple have 4 granddaughters who are adults and supportive. 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 He said he has not returned to work since last hospitalization  earlier this month. He said he and wife do depend on the income from his part time job-- He is not sure when he will be able to return to work due to recent medical concerns-- [place]   Wife applied for Medicaid on line with [facility] worker. He said if he and wife were to be found eligible for any part of Medicaid (even just to pay the medicare monthly premium) would help [facility] does not service patient's living area so [address] [place] [address] is not an option. Susannah Lopez He does have a walker and cane for mobility when needed. Wife to transport home today. No transition of care needs a this time Care Management Interventions PCP Verified by CM: Yes Mode of Transport at Discharge: (car with family) Transition of Care Consult (CM Consult): Discharge Planning Discharge Durable Medical Equipment: No 
Physical Therapy Consult: No 
Occupational Therapy Consult: No 
Current Support Network: Lives with Spouse(lives in one level home with wife-- self care and indpependent  prior to admission. No AMD in chart    ) Confirm Follow Up Transport: Family(self) Plan discussed with Pt/Family/Caregiver: Yes Freedom of Choice Offered: Yes Discharge Location Discharge Placement: Home

## 2019-05-19 NOTE — DISCHARGE SUMMARY
Inpatient hospitalist discharge summary Brief Overview PATIENT ID: Aileen Varghese Sr. MRN: [id number] YOB: 1935 Admitting Provider: Jacqueline Kenney MD 
 
Discharging Provider: Sandra Owen MD  
To contact this individual call [phone] and ask the  to page. If unavailable ask to be transferred the Adult Hospitalist Department. PCP at discharge: Winston Lopez, [place] [phone] [address] / [place] [id number] Admission date: 5/17/2019  Date of Discharge: 05/19/19 Chief complaint:  
Chief Complaint Patient presents with  Shortness of Breath Patient Active Problem List  
Diagnosis Code  Bradycardia R00.1  CAD (coronary artery disease) I25.10  Hypertension, essential, benign I10  
 Carotid arterial disease ([facility]) I77.9  Hypercholesteremia E78.00  
 PVD (peripheral vascular disease) ([facility]) I73.9  PVC's (premature ventricular contractions) I49.3  Dyspnea R06.00  Type 2 diabetes mellitus with diabetic peripheral angiopathy without gangrene ([facility]) E11.51  
 CKD (chronic kidney disease) N18.9  Acute renal failure (ARF) ([facility]) N17.9  Hypokalemia E87.6  Generalized weakness R53.1  Elevated troponin R74.8  KATALINA (acute kidney injury) ([facility] [facility] N17.9  GI bleed K92.2  Hyperglycemia due to type 2 diabetes mellitus ([facility] [address] E11.65  Atrial fibrillation, chronic ([facility]) I48.2  CKD (chronic kidney disease) stage 3, GFR 30-59 ml/min ([facility]) N18.3  Type 2 diabetes with nephropathy ([facility]) E11.21  
 CHF (congestive heart failure) ([facility]) I50.9  Atrial fibrillation with slow ventricular response ([facility]) I48.91  
 Pacemaker Z95.0 Discharge diagnosis, hospital course/plan: 
 
- acute on chronic HFpEF, NYHA class III on admission and Class II on discharge Cont Bumex PO 
  
- CKD 3 Stable, avoid nephrotoxins, renal dosing of meds 
  
- Severe pulmonary HTN 
RHC PCWP 28, RV 75 recently Cont Bumex 
  
- IDDM2 with hyperglycemia 
  
-chronic afib 
CHR, not on OAC due to hx of GIB Cont Coreg 
  
-HTN, controlled Cont Hydralazine, Norvasc On the date of discharge, diagnostic face to face encounter was performed. Patient was hemodynamically stable, offering no new complaints. Denies any shortness of breath at rest, no fevers or chills, no diarrhea or constipation. Patient is agreeable for discharge. Patient understood and verbalized the understanding of the discharge plan. Patient was advised to seek medical help/ care or return to ED, if symptoms recur, worsen or new symptoms develop. Discharge Disposition: 
Home or Self Care Discharge activity: 
Ambulate in house Code status at discharge: 
Full Code Active issues requiring follow up: 
CHF, PULM HTN Outpatient follow up: 
 
 
Future appointments- 
Future Appointments Date Time Provider Muna Delgado 5/22/2019  4:00 PM Sanju Hayens  [address]  
8/27/2019 11:00 AM PACEMAKER3, [address]  
8/27/2019 11:20 AM Zahira Livingston  [address] Follow-up Information Follow up With Specialties Details Why Contact Info Mino Diaz MD [facility] Schedule an appointment as soon as possible for a visit for post-hospitalization follow up, with in 7 days [address] 
[phone] Tammie Santos MD Cardiology Schedule an appointment as soon as possible for a visit as recommended [address] [facility] [address] 
[phone] Test results pending upon discharge: 
n/a Operative procedures performed: 
 
 
Treatments: cardiac meds: Bumex IV Consults: Cardiology Procedures: none Diet:  DIET DIABETIC CONSISTENT CARB Pertinent test results: 
Xr Chest Pa Lat Result Date: 5/17/2019 History: Shortness of breath, possible pneumonia.  2 views of the chest demonstrate the patient is status post median sternotomy. There is cardiomegaly. There is parenchymal scarring bilaterally. There are no effusions and the osseous structures appear unremarkable. IMPRESSION: No acute findings. Xr Chest Pa Lat Result Date: 5/14/2019 Clinical indication: Shortness of breath. Frontal and lateral views of the chest obtained, comparison made shift. The heart size remains prominent with minimal interstitial prominence. No focal consolidation, prior sternotomy. IMPRESSION: Mild interstitial edema and cardiomegaly remains. Xr Chest Pa Lat Result Date: 5/5/2019 INDICATION: Previous abnormal chest radiograph COMPARISON: 5/1/2019 FINDINGS: PA and lateral views of the chest demonstrate unchanged cardiomegaly. The patient is status post median sternotomy. Several of the sternotomy wires are disrupted, unchanged from the prior study. There is persistent mild pulmonary edema. No new airspace disease or pleural effusion is evident. There is no pneumothorax. The visualized osseous structures are unremarkable. IMPRESSION: Unchanged mild pulmonary edema pattern. Xr Chest Pa Lat Result Date: 5/1/2019 Indication shortness of breath 2 views of the chest demonstrate borderline heart size. There is mild cephalization of pulmonary vessels suggesting early congestive change. Lungs are grossly clear. IMPRESSION: Mild congestive change. Otherwise negative Ct Chest Wo Cont Result Date: 5/1/2019 INDICATION: Cough and shortness of breath for 2 weeks. COMPARISON: 6/26/2017 CONTRAST: None. TECHNIQUE:  5 mm axial images were obtained through the chest. Coronal and sagittal reconstructions were generated. CT dose reduction was achieved through use of a standardized protocol tailored for this examination and automatic exposure control for dose modulation.  The absence of intravenous contrast reduces the sensitivity for evaluation of the mediastinum and upper abdominal organs. FINDINGS: THYROID: No nodule. MEDIASTINUM: 1.4 cm short axis diameter of a subcarinal lymph node, relatively stable. Other normal and indeterminate sized lymph nodes in the mediastinum also stable. LUCAS: No mass or lymphadenopathy. THORACIC AORTA: No aneurysm. MAIN PULMONARY ARTERY: Normal in caliber. TRACHEA/BRONCHI: Patent. ESOPHAGUS: No wall thickening or dilatation. HEART: Normal in size. PLEURA: No effusion or pneumothorax. LUNGS: Bibasilar atelectasis, diffuse patchy interstitial change, stable to slightly worsened, with no focal infiltrate or effusion. INCIDENTALLY IMAGED UPPER ABDOMEN: Gallstones without associated inflammation. Well-circumscribed oval cystic mass medial to the right kidney is stable, etiology uncertain, as it does not arise with certainty from the right kidney itself. Diverticula without associated acute inflammatory change. BONES: No destructive bone lesion. Mild compression deformity of a lower thoracic vertebral body, stable. IMPRESSION: 1. No definite acute change in the chest, without contrast, except for mild worsening of diffuse interstitial parenchymal change. 2. Cholelithiasis, right medial flank cystic mass, diverticulosis, all stable. 3. Stable compression deformity in the lower thoracic spine. Other incidentals. [address] [facility] Result Date: 5/2/2019 ULTRASOUND OF THE RIGHT UPPER QUADRANT INDICATION: look for cholecystitis COMPARISON: 10/20/2017 abdominal CT, 5/1/2019 CT chest. TECHNIQUE: Sonography of the right upper quadrant was performed. FINDINGS: GALLBLADDER: The gallbladder is distended with intraluminal calculi, showing mobility on changes in body positioning. The largest measures 7 mm. There is also a fluid fluid level in the gallbladder suggesting additional echogenic sludge. No pericholecystic fluid collection or focal tenderness is reported. Scooby Chua COMMON DUCT: 0.5 cm in diameter.  The duct is normal caliber. LIVER: Upper normal echogenicity. No focal hepatic mass or intrahepatic biliary dilatation is shown. PANCREAS: The visualized portions of the pancreas are normal. RIGHT KIDNEY: 10.8 cm in length, with numerous simple cortical cysts. Medial to the right upper renal pole, there is a hypoechoic structure measuring 2.7 x 3.2 x 1.3 cm, which may correspond with the well-circumscribed cystic appearing mass on CT scanning. There is a tiny amount of perinephric fluid suggested. IMPRESSION: 1. Cholecystolithiasis and gallbladder sludge with mild gallbladder distention. No biliary ductal dilatation. 2.. Multiple right renal cortical cysts. Probably cystic area medial to the right upper renal pole corresponding to a stable cystic mass on CT. Recent Results (from the past 336 hour(s)) GLUCOSE, POC Collection Time: 05/05/19 12:00 PM  
Result Value Ref Range Glucose (POC) 199 (H) 65 - 100 mg/dL Performed by Atul Siegel GLUCOSE, POC Collection Time: 05/05/19  4:25 PM  
Result Value Ref Range Glucose (POC) 167 (H) 65 - 100 mg/dL Performed by Patrice Home TSH 3RD GENERATION Collection Time: 05/05/19  5:48 PM  
Result Value Ref Range TSH 3.09 0.36 - 3.74 uIU/mL GLUCOSE, POC Collection Time: 05/05/19 10:37 PM  
Result Value Ref Range Glucose (POC) 134 (H) 65 - 100 mg/dL Performed by Reji Vickers GLUCOSE, POC Collection Time: 05/06/19  6:33 AM  
Result Value Ref Range Glucose (POC) 121 (H) 65 - 100 mg/dL Performed by Caitlin Sepulveda GLUCOSE, POC Collection Time: 05/06/19 11:19 AM  
Result Value Ref Range Glucose (POC) 201 (H) 65 - 100 mg/dL Performed by Alisha Pulido GLUCOSE, POC Collection Time: 05/06/19  4:40 PM  
Result Value Ref Range Glucose (POC) 99 65 - 100 mg/dL Performed by Alessandro SHEIKH   
GLUCOSE, POC Collection Time: 05/06/19 10:09 PM  
Result Value Ref Range Glucose (POC) 79 65 - 100 mg/dL Performed by Brendan Cheng, POC Collection Time: 05/06/19 10:25 PM  
Result Value Ref Range Glucose (POC) 111 (H) 65 - 100 mg/dL Performed by Brendan Cheng, POC Collection Time: 05/07/19  7:14 AM  
Result Value Ref Range Glucose (POC) 116 (H) 65 - 100 mg/dL Performed by Celeste Huang   
RENAL FUNCTION PANEL Collection Time: 05/07/19  8:05 AM  
Result Value Ref Range Sodium 138 136 - 145 mmol/L Potassium 4.0 3.5 - 5.1 mmol/L Chloride 108 97 - 108 mmol/L  
 CO2 18 (L) 21 - 32 mmol/L Anion gap 12 5 - 15 mmol/L Glucose 115 (H) 65 - 100 mg/dL BUN 82 (H) 6 - 20 MG/DL Creatinine 3.58 (H) 0.70 - 1.30 MG/DL  
 BUN/Creatinine ratio 23 (H) 12 - 20 GFR est AA 20 (L) >60 ml/min/1.73m2 GFR est non-AA 16 (L) >60 ml/min/1.73m2 Calcium 8.5 8.5 - 10.1 MG/DL Phosphorus 4.6 2.6 - 4.7 MG/DL Albumin 2.8 (L) 3.5 - 5.0 g/dL CBC W/O DIFF Collection Time: 05/07/19  8:05 AM  
Result Value Ref Range WBC 6.0 4.1 - 11.1 K/uL  
 RBC 3.09 (L) 4.10 - 5.70 M/uL HGB 9.4 (L) 12.1 - 17.0 g/dL HCT 28.7 (L) 36.6 - 50.3 % MCV 92.9 80.0 - 99.0 FL  
 MCH 30.4 26.0 - 34.0 PG  
 MCHC 32.8 30.0 - 36.5 g/dL  
 RDW 14.2 11.5 - 14.5 % PLATELET 958 909 - 091 K/uL MPV 12.1 8.9 - 12.9 FL  
 NRBC 0.0 0  WBC ABSOLUTE NRBC 0.00 0.00 - 0.01 K/uL GLUCOSE, POC Collection Time: 05/07/19 11:22 AM  
Result Value Ref Range Glucose (POC) 160 (H) 65 - 100 mg/dL Performed by Unkown  GLUCOSE, POC Collection Time: 05/07/19  4:40 PM  
Result Value Ref Range Glucose (POC) 181 (H) 65 - 100 mg/dL Performed by Rl Morfin GLUCOSE, POC Collection Time: 05/07/19 10:23 PM  
Result Value Ref Range Glucose (POC) 212 (H) 65 - 100 mg/dL Performed by Russ Antony GLUCOSE, POC Collection Time: 05/07/19 10:24 PM  
Result Value Ref Range Glucose (POC) 237 (H) 65 - 100 mg/dL Performed by Russ Antony METABOLIC PANEL, BASIC  
 Collection Time: 05/08/19  4:44 AM  
Result Value Ref Range Sodium 138 136 - 145 mmol/L Potassium 4.2 3.5 - 5.1 mmol/L Chloride 104 97 - 108 mmol/L  
 CO2 20 (L) 21 - 32 mmol/L Anion gap 14 5 - 15 mmol/L Glucose 151 (H) 65 - 100 mg/dL BUN 84 (H) 6 - 20 MG/DL Creatinine 3.80 (H) 0.70 - 1.30 MG/DL  
 BUN/Creatinine ratio 22 (H) 12 - 20 GFR est AA 19 (L) >60 ml/min/1.73m2 GFR est non-AA 15 (L) >60 ml/min/1.73m2 Calcium 8.7 8.5 - 10.1 MG/DL  
GLUCOSE, POC Collection Time: 05/08/19  7:07 AM  
Result Value Ref Range Glucose (POC) 174 (H) 65 - 100 mg/dL Performed by Alex Florence GLUCOSE, POC Collection Time: 05/08/19 11:34 AM  
Result Value Ref Range Glucose (POC) 222 (H) 65 - 100 mg/dL Performed by Quique Camacho, POC Collection Time: 05/08/19  4:13 PM  
Result Value Ref Range Glucose (POC) 180 (H) 65 - 100 mg/dL Performed by Hali Baxter GLUCOSE, POC Collection Time: 05/08/19 10:02 PM  
Result Value Ref Range Glucose (POC) 138 (H) 65 - 100 mg/dL Performed by Lenora Arreola   PCT GLUCOSE, POC Collection Time: 05/09/19 12:53 AM  
Result Value Ref Range Glucose (POC) 137 (H) 65 - 100 mg/dL Performed by Lenora Arreola   PCT METABOLIC PANEL, BASIC Collection Time: 05/09/19  3:41 AM  
Result Value Ref Range Sodium 138 136 - 145 mmol/L Potassium 4.2 3.5 - 5.1 mmol/L Chloride 104 97 - 108 mmol/L  
 CO2 23 21 - 32 mmol/L Anion gap 11 5 - 15 mmol/L Glucose 115 (H) 65 - 100 mg/dL BUN 81 (H) 6 - 20 MG/DL Creatinine 3.88 (H) 0.70 - 1.30 MG/DL  
 BUN/Creatinine ratio 21 (H) 12 - 20 GFR est AA 18 (L) >60 ml/min/1.73m2 GFR est non-AA 15 (L) >60 ml/min/1.73m2 Calcium 8.7 8.5 - 10.1 MG/DL  
GLUCOSE, POC Collection Time: 05/09/19  6:54 AM  
Result Value Ref Range Glucose (POC) 117 (H) 65 - 100 mg/dL Performed by Lenora Arreola   PCT GLUCOSE, POC  Collection Time: 05/09/19 11:08 AM  
 Result Value Ref Range Glucose (POC) 240 (H) 65 - 100 mg/dL Performed by Jessie Grubbs GLUCOSE, POC Collection Time: 05/09/19  6:48 PM  
Result Value Ref Range Glucose (POC) 92 65 - 100 mg/dL Performed by Emily Patricio, POC Collection Time: 05/09/19 10:48 PM  
Result Value Ref Range Glucose (POC) 140 (H) 65 - 100 mg/dL Performed by Kahlil Smith, BASIC Collection Time: 05/10/19  1:58 AM  
Result Value Ref Range Sodium 137 136 - 145 mmol/L Potassium 4.6 3.5 - 5.1 mmol/L Chloride 103 97 - 108 mmol/L  
 CO2 23 21 - 32 mmol/L Anion gap 11 5 - 15 mmol/L Glucose 172 (H) 65 - 100 mg/dL BUN 81 (H) 6 - 20 MG/DL Creatinine 3.82 (H) 0.70 - 1.30 MG/DL  
 BUN/Creatinine ratio 21 (H) 12 - 20 GFR est AA 18 (L) >60 ml/min/1.73m2 GFR est non-AA 15 (L) >60 ml/min/1.73m2 Calcium 8.0 (L) 8.5 - 10.1 MG/DL  
CBC W/O DIFF Collection Time: 05/10/19  1:58 AM  
Result Value Ref Range WBC 7.0 4.1 - 11.1 K/uL  
 RBC 2.77 (L) 4.10 - 5.70 M/uL HGB 8.4 (L) 12.1 - 17.0 g/dL HCT 26.3 (L) 36.6 - 50.3 % MCV 94.9 80.0 - 99.0 FL  
 MCH 30.3 26.0 - 34.0 PG  
 MCHC 31.9 30.0 - 36.5 g/dL  
 RDW 14.3 11.5 - 14.5 % PLATELET 472 994 - 801 K/uL MPV 11.5 8.9 - 12.9 FL  
 NRBC 0.0 0  WBC ABSOLUTE NRBC 0.00 0.00 - 0.01 K/uL GLUCOSE, POC Collection Time: 05/10/19  7:02 AM  
Result Value Ref Range Glucose (POC) 173 (H) 65 - 100 mg/dL Performed by Shoshana Parra GLUCOSE, POC Collection Time: 05/10/19 11:07 AM  
Result Value Ref Range Glucose (POC) 287 (H) 65 - 100 mg/dL Performed by Charlotte Shipman GLUCOSE, POC Collection Time: 05/10/19  4:29 PM  
Result Value Ref Range Glucose (POC) 235 (H) 65 - 100 mg/dL Performed by Marybeth Monday GLUCOSE, POC Collection Time: 05/10/19  9:37 PM  
Result Value Ref Range Glucose (POC) 168 (H) 65 - 100 mg/dL Performed by [facility] METABOLIC PANEL, BASIC Collection Time: 05/11/19  3:37 AM  
Result Value Ref Range Sodium 133 (L) 136 - 145 mmol/L Potassium 4.7 3.5 - 5.1 mmol/L Chloride 99 97 - 108 mmol/L  
 CO2 25 21 - 32 mmol/L Anion gap 9 5 - 15 mmol/L Glucose 123 (H) 65 - 100 mg/dL BUN 85 (H) 6 - 20 MG/DL Creatinine 4.03 (H) 0.70 - 1.30 MG/DL  
 BUN/Creatinine ratio 21 (H) 12 - 20 GFR est AA 17 (L) >60 ml/min/1.73m2 GFR est non-AA 14 (L) >60 ml/min/1.73m2 Calcium 8.0 (L) 8.5 - 10.1 MG/DL  
CBC W/O DIFF Collection Time: 05/11/19  3:37 AM  
Result Value Ref Range WBC 5.9 4.1 - 11.1 K/uL  
 RBC 2.86 (L) 4.10 - 5.70 M/uL HGB 8.4 (L) 12.1 - 17.0 g/dL HCT 26.8 (L) 36.6 - 50.3 % MCV 93.7 80.0 - 99.0 FL  
 MCH 29.4 26.0 - 34.0 PG  
 MCHC 31.3 30.0 - 36.5 g/dL  
 RDW 14.2 11.5 - 14.5 % PLATELET 759 920 - 934 K/uL MPV 11.8 8.9 - 12.9 FL  
 NRBC 0.0 0  WBC ABSOLUTE NRBC 0.00 0.00 - 0.01 K/uL HEMOGLOBIN A1C WITH EAG Collection Time: 05/11/19  3:37 AM  
Result Value Ref Range Hemoglobin A1c 9.1 (H) 4.2 - 6.3 % Est. average glucose 214 mg/dL GLUCOSE, POC Collection Time: 05/11/19  6:46 AM  
Result Value Ref Range Glucose (POC) 98 65 - 100 mg/dL Performed by Terrell Ferro   
GLUCOSE, POC Collection Time: 05/11/19 11:58 AM  
Result Value Ref Range Glucose (POC) 171 (H) 65 - 100 mg/dL Performed by John Medrano GLUCOSE, POC Collection Time: 05/11/19  4:14 PM  
Result Value Ref Range Glucose (POC) 142 (H) 65 - 100 mg/dL Performed by John Medrano GLUCOSE, POC Collection Time: 05/11/19  8:59 PM  
Result Value Ref Range Glucose (POC) 83 65 - 100 mg/dL Performed by Octaviano Doherty METABOLIC PANEL, BASIC Collection Time: 05/12/19  2:13 AM  
Result Value Ref Range Sodium 135 (L) 136 - 145 mmol/L Potassium 4.5 3.5 - 5.1 mmol/L Chloride 100 97 - 108 mmol/L  
 CO2 25 21 - 32 mmol/L  Anion gap 10 5 - 15 mmol/L  
 Glucose 97 65 - 100 mg/dL BUN 86 (H) 6 - 20 MG/DL Creatinine 3.97 (H) 0.70 - 1.30 MG/DL  
 BUN/Creatinine ratio 22 (H) 12 - 20 GFR est AA 18 (L) >60 ml/min/1.73m2 GFR est non-AA 15 (L) >60 ml/min/1.73m2 Calcium 8.0 (L) 8.5 - 10.1 MG/DL  
GLUCOSE, POC Collection Time: 05/12/19  6:49 AM  
Result Value Ref Range Glucose (POC) 120 (H) 65 - 100 mg/dL Performed by Alexandro Celeste EKG, 12 LEAD, INITIAL Collection Time: 05/14/19 12:02 PM  
Result Value Ref Range Ventricular Rate 71 BPM  
 Atrial Rate 92 BPM  
 QRS Duration 166 ms  
 Q-T Interval 504 ms QTC Calculation (Bezet) 547 ms Calculated R Axis 175 degrees Calculated T Axis -16 degrees Diagnosis Ventricular-paced rhythm When compared with ECG of 01-MAY-2019 10:16, 
Electronic ventricular pacemaker has replaced Atrial fibrillation Confirmed by Gracie Ferreira M.D., Fleurette Lass ([id number]) on 5/14/2019 12:15:47 PM 
  
CBC WITH AUTOMATED DIFF Collection Time: 05/14/19 12:09 PM  
Result Value Ref Range WBC 6.1 4.1 - 11.1 K/uL  
 RBC 2.97 (L) 4.10 - 5.70 M/uL HGB 8.9 (L) 12.1 - 17.0 g/dL HCT 27.6 (L) 36.6 - 50.3 % MCV 92.9 80.0 - 99.0 FL  
 MCH 30.0 26.0 - 34.0 PG  
 MCHC 32.2 30.0 - 36.5 g/dL  
 RDW 14.3 11.5 - 14.5 % PLATELET 226 347 - 116 K/uL MPV 10.9 8.9 - 12.9 FL  
 NRBC 0.0 0  WBC ABSOLUTE NRBC 0.00 0.00 - 0.01 K/uL NEUTROPHILS 69 32 - 75 % LYMPHOCYTES 12 12 - 49 % MONOCYTES 14 (H) 5 - 13 % EOSINOPHILS 4 0 - 7 % BASOPHILS 1 0 - 1 % IMMATURE GRANULOCYTES 0 0.0 - 0.5 % ABS. NEUTROPHILS 4.2 1.8 - 8.0 K/UL  
 ABS. LYMPHOCYTES 0.7 (L) 0.8 - 3.5 K/UL  
 ABS. MONOCYTES 0.9 0.0 - 1.0 K/UL  
 ABS. EOSINOPHILS 0.2 0.0 - 0.4 K/UL  
 ABS. BASOPHILS 0.1 0.0 - 0.1 K/UL  
 ABS. IMM. GRANS. 0.0 0.00 - 0.04 K/UL  
 DF SMEAR SCANNED    
 RBC COMMENTS ANISOCYTOSIS 
1+ METABOLIC PANEL, COMPREHENSIVE Collection Time: 05/14/19 12:09 PM  
Result Value Ref Range  Sodium 138 136 - 145 mmol/L  
 Potassium 4.7 3.5 - 5.1 mmol/L Chloride 100 97 - 108 mmol/L  
 CO2 28 21 - 32 mmol/L Anion gap 10 5 - 15 mmol/L Glucose 133 (H) 65 - 100 mg/dL BUN 89 (H) 6 - 20 MG/DL Creatinine 3.71 (H) 0.70 - 1.30 MG/DL  
 BUN/Creatinine ratio 24 (H) 12 - 20 GFR est AA 19 (L) >60 ml/min/1.73m2 GFR est non-AA 16 (L) >60 ml/min/1.73m2 Calcium 7.9 (L) 8.5 - 10.1 MG/DL Bilirubin, total 0.6 0.2 - 1.0 MG/DL  
 ALT (SGPT) 32 12 - 78 U/L  
 AST (SGOT) 37 15 - 37 U/L Alk. phosphatase 145 (H) 45 - 117 U/L Protein, total 6.3 (L) 6.4 - 8.2 g/dL Albumin 3.0 (L) 3.5 - 5.0 g/dL Globulin 3.3 2.0 - 4.0 g/dL A-G Ratio 0.9 (L) 1.1 - 2.2    
TROPONIN I Collection Time: 05/14/19 12:09 PM  
Result Value Ref Range Troponin-I, Qt. 0.12 (H) <0.05 ng/mL SAMPLES BEING HELD Collection Time: 05/14/19 12:09 PM  
Result Value Ref Range SAMPLES BEING HELD 1RED,1BLUE   
 COMMENT Add-on orders for these samples will be processed based on acceptable specimen integrity and analyte stability, which may vary by analyte. METABOLIC PANEL, COMPREHENSIVE Collection Time: 05/14/19 12:09 PM  
Result Value Ref Range Sodium 137 136 - 145 mmol/L Potassium 4.8 3.5 - 5.1 mmol/L Chloride 100 97 - 108 mmol/L  
 CO2 28 21 - 32 mmol/L Anion gap 9 5 - 15 mmol/L Glucose 126 (H) 65 - 100 mg/dL BUN 86 (H) 6 - 20 MG/DL Creatinine 3.72 (H) 0.70 - 1.30 MG/DL  
 BUN/Creatinine ratio 23 (H) 12 - 20 GFR est AA 19 (L) >60 ml/min/1.73m2 GFR est non-AA 16 (L) >60 ml/min/1.73m2 Calcium 8.0 (L) 8.5 - 10.1 MG/DL Bilirubin, total 0.6 0.2 - 1.0 MG/DL  
 ALT (SGPT) 33 12 - 78 U/L  
 AST (SGOT) 38 (H) 15 - 37 U/L Alk. phosphatase 147 (H) 45 - 117 U/L Protein, total 6.1 (L) 6.4 - 8.2 g/dL Albumin 3.1 (L) 3.5 - 5.0 g/dL Globulin 3.0 2.0 - 4.0 g/dL A-G Ratio 1.0 (L) 1.1 - 2.2 MAGNESIUM Collection Time: 05/14/19 12:09 PM  
Result Value Ref Range Magnesium 2.4 1.6 - 2.4 mg/dL NT-PRO BNP Collection Time: 05/14/19 12:09 PM  
Result Value Ref Range NT pro-BNP >35,000 (H) <450 PG/ML  
SAMPLES BEING HELD Collection Time: 05/17/19  8:33 PM  
Result Value Ref Range SAMPLES BEING HELD 1RED 1BLUE   
 COMMENT Add-on orders for these samples will be processed based on acceptable specimen integrity and analyte stability, which may vary by analyte. CBC WITH AUTOMATED DIFF Collection Time: 05/17/19  8:33 PM  
Result Value Ref Range WBC 6.8 4.1 - 11.1 K/uL  
 RBC 3.01 (L) 4.10 - 5.70 M/uL HGB 8.9 (L) 12.1 - 17.0 g/dL HCT 28.4 (L) 36.6 - 50.3 % MCV 94.4 80.0 - 99.0 FL  
 MCH 29.6 26.0 - 34.0 PG  
 MCHC 31.3 30.0 - 36.5 g/dL  
 RDW 14.3 11.5 - 14.5 % PLATELET 578 477 - 438 K/uL MPV 11.2 8.9 - 12.9 FL  
 NRBC 0.0 0  WBC ABSOLUTE NRBC 0.00 0.00 - 0.01 K/uL NEUTROPHILS 72 32 - 75 % LYMPHOCYTES 9 (L) 12 - 49 % MONOCYTES 16 (H) 5 - 13 % EOSINOPHILS 3 0 - 7 % BASOPHILS 0 0 - 1 % IMMATURE GRANULOCYTES 0 0.0 - 0.5 % ABS. NEUTROPHILS 4.9 1.8 - 8.0 K/UL  
 ABS. LYMPHOCYTES 0.6 (L) 0.8 - 3.5 K/UL  
 ABS. MONOCYTES 1.1 (H) 0.0 - 1.0 K/UL  
 ABS. EOSINOPHILS 0.2 0.0 - 0.4 K/UL  
 ABS. BASOPHILS 0.0 0.0 - 0.1 K/UL  
 ABS. IMM. GRANS. 0.0 0.00 - 0.04 K/UL  
 DF SMEAR SCANNED    
 RBC COMMENTS NORMOCYTIC, NORMOCHROMIC METABOLIC PANEL, COMPREHENSIVE Collection Time: 05/17/19  8:33 PM  
Result Value Ref Range Sodium 136 136 - 145 mmol/L Potassium 4.2 3.5 - 5.1 mmol/L Chloride 99 97 - 108 mmol/L  
 CO2 30 21 - 32 mmol/L Anion gap 7 5 - 15 mmol/L Glucose 168 (H) 65 - 100 mg/dL BUN 62 (H) 6 - 20 MG/DL Creatinine 3.29 (H) 0.70 - 1.30 MG/DL  
 BUN/Creatinine ratio 19 12 - 20 GFR est AA 22 (L) >60 ml/min/1.73m2 GFR est non-AA 18 (L) >60 ml/min/1.73m2 Calcium 8.4 (L) 8.5 - 10.1 MG/DL Bilirubin, total 0.7 0.2 - 1.0 MG/DL  
 ALT (SGPT) 28 12 - 78 U/L  
 AST (SGOT) 25 15 - 37 U/L Alk.  phosphatase 109 45 - 117 U/L  
 Protein, total 6.7 6.4 - 8.2 g/dL Albumin 3.0 (L) 3.5 - 5.0 g/dL Globulin 3.7 2.0 - 4.0 g/dL A-G Ratio 0.8 (L) 1.1 - 2.2    
TROPONIN I Collection Time: 05/17/19  8:33 PM  
Result Value Ref Range Troponin-I, Qt. 0.05 (H) <0.05 ng/mL NT-PRO BNP Collection Time: 05/17/19  8:33 PM  
Result Value Ref Range NT pro-BNP >35,000 (H) <450 PG/ML  
EKG, 12 LEAD, INITIAL Collection Time: 05/17/19  8:33 PM  
Result Value Ref Range Ventricular Rate 68 BPM  
 Atrial Rate 66 BPM  
 QRS Duration 132 ms Q-T Interval 564 ms QTC Calculation (Bezet) 599 ms Calculated R Axis 17 degrees Calculated T Axis -66 degrees Diagnosis Atrial fibrillation 
with occasional Ventricular-paced rhythm Marked T wave abnormality, consider lateral ischemia When compared with ECG of 14-MAY-2019 12:02, 
Vent. rate has decreased BY   3 BPM 
Confirmed by Shweta Espinoza MD, Allyn Zhang ([id number]) on 5/18/2019 11:34:23 PM 
  
TROPONIN I Collection Time: 05/17/19 11:44 PM  
Result Value Ref Range Troponin-I, Qt. 0.06 (H) <0.05 ng/mL TROPONIN I Collection Time: 05/18/19  5:20 AM  
Result Value Ref Range Troponin-I, Qt. 0.06 (H) <0.05 ng/mL EKG, 12 LEAD, INITIAL Collection Time: 05/18/19  5:56 AM  
Result Value Ref Range Ventricular Rate 70 BPM  
 Atrial Rate 63 BPM  
 QRS Duration 148 ms Q-T Interval 552 ms QTC Calculation (Bezet) 596 ms Calculated R Axis 124 degrees Calculated T Axis -66 degrees Diagnosis Atrial fibrillation with frequent ventricular-paced complexes Right bundle branch block when ventricular sensing occured Confirmed by Shweta Espinoza MD, Allyn Zhang ([id number]) on 5/18/2019 11:53:42 PM 
  
GLUCOSE, POC Collection Time: 05/18/19  7:08 AM  
Result Value Ref Range Glucose (POC) 139 (H) 65 - 100 mg/dL Performed by Delmon Fujita PCT   
GLUCOSE, POC Collection Time: 05/18/19 11:50 AM  
Result Value Ref Range Glucose (POC) 214 (H) 65 - 100 mg/dL Performed by Bob Armendariz METABOLIC PANEL, BASIC Collection Time: 05/18/19 11:53 AM  
Result Value Ref Range Sodium 136 136 - 145 mmol/L Potassium 3.9 3.5 - 5.1 mmol/L Chloride 100 97 - 108 mmol/L  
 CO2 31 21 - 32 mmol/L Anion gap 5 5 - 15 mmol/L Glucose 196 (H) 65 - 100 mg/dL BUN 60 (H) 6 - 20 MG/DL Creatinine 3.27 (H) 0.70 - 1.30 MG/DL  
 BUN/Creatinine ratio 18 12 - 20 GFR est AA 22 (L) >60 ml/min/1.73m2 GFR est non-AA 18 (L) >60 ml/min/1.73m2 Calcium 7.7 (L) 8.5 - 10.1 MG/DL  
GLUCOSE, POC Collection Time: 05/18/19  4:19 PM  
Result Value Ref Range Glucose (POC) 78 65 - 100 mg/dL Performed by Yordy Hew GLUCOSE, POC Collection Time: 05/18/19  4:37 PM  
Result Value Ref Range Glucose (POC) 93 65 - 100 mg/dL Performed by Yordy Rynew GLUCOSE, POC Collection Time: 05/18/19 10:21 PM  
Result Value Ref Range Glucose (POC) 88 65 - 100 mg/dL Performed by Melissa Shields CBC WITH AUTOMATED DIFF Collection Time: 05/19/19  5:27 AM  
Result Value Ref Range WBC 5.7 4.1 - 11.1 K/uL  
 RBC 2.95 (L) 4.10 - 5.70 M/uL HGB 8.8 (L) 12.1 - 17.0 g/dL HCT 27.7 (L) 36.6 - 50.3 % MCV 93.9 80.0 - 99.0 FL  
 MCH 29.8 26.0 - 34.0 PG  
 MCHC 31.8 30.0 - 36.5 g/dL  
 RDW 13.9 11.5 - 14.5 % PLATELET 059 453 - 435 K/uL MPV 11.0 8.9 - 12.9 FL  
 NRBC 0.0 0  WBC ABSOLUTE NRBC 0.00 0.00 - 0.01 K/uL NEUTROPHILS 68 32 - 75 % LYMPHOCYTES 10 (L) 12 - 49 % MONOCYTES 16 (H) 5 - 13 % EOSINOPHILS 6 0 - 7 % BASOPHILS 1 0 - 1 % IMMATURE GRANULOCYTES 0 0.0 - 0.5 % ABS. NEUTROPHILS 3.9 1.8 - 8.0 K/UL  
 ABS. LYMPHOCYTES 0.5 (L) 0.8 - 3.5 K/UL  
 ABS. MONOCYTES 0.9 0.0 - 1.0 K/UL  
 ABS. EOSINOPHILS 0.3 0.0 - 0.4 K/UL  
 ABS. BASOPHILS 0.0 0.0 - 0.1 K/UL  
 ABS. IMM. GRANS. 0.0 0.00 - 0.04 K/UL  
 DF AUTOMATED METABOLIC PANEL, BASIC Collection Time: 05/19/19  5:27 AM  
Result Value Ref Range Sodium 139 136 - 145 mmol/L Potassium 3.7 3.5 - 5.1 mmol/L Chloride 101 97 - 108 mmol/L  
 CO2 28 21 - 32 mmol/L Anion gap 10 5 - 15 mmol/L Glucose 68 65 - 100 mg/dL BUN 56 (H) 6 - 20 MG/DL Creatinine 3.10 (H) 0.70 - 1.30 MG/DL  
 BUN/Creatinine ratio 18 12 - 20 GFR est AA 24 (L) >60 ml/min/1.73m2 GFR est non-AA 19 (L) >60 ml/min/1.73m2 Calcium 8.2 (L) 8.5 - 10.1 MG/DL  
GLUCOSE, POC Collection Time: 05/19/19  6:19 AM  
Result Value Ref Range Glucose (POC) 73 65 - 100 mg/dL Performed by Giiv GLUCOSE, POC Collection Time: 05/19/19  6:20 AM  
Result Value Ref Range Glucose (POC) 74 65 - 100 mg/dL Performed by Giiv GLUCOSE, POC Collection Time: 05/19/19  7:25 AM  
Result Value Ref Range Glucose (POC) 94 65 - 100 mg/dL Performed by Alyssa Soliman Physical Exam on Discharge: 
 
Discharge condition: good Vital signs:  
Patient Vitals for the past 12 hrs: 
 Temp Pulse Resp BP SpO2  
05/19/19 0811 97.9 °F (36.6 °C) 71 17 143/57 92 % 05/19/19 0527 97.5 °F (36.4 °C) 70 16 155/66 90 % 05/19/19 0118 97.6 °F (36.4 °C) 69 18 144/60 94 % 05/18/19 2125 97.5 °F (36.4 °C) 69 16 162/65 94 % Lungs: clear to auscultation bilaterally Extremities: extremities normal, atraumatic, no cyanosis or edema, no edema Current Discharge Medication List  
  
CONTINUE these medications which have NOT CHANGED Details  
bumetanide (BUMEX) 1 mg tablet Please take 3mg in the morning and 1mg in the evening. Qty: 270 Tab, Refills: 2 Associated Diagnoses: Coronary artery disease involving native coronary artery of native heart without angina pectoris; AGUILAR (dyspnea on exertion); Diastolic CHF, chronic (HCC)  
  
albuterol (PROVENTIL VENTOLIN) 2.5 mg /3 mL (0.083 %) nebulizer solution 3 mL by Nebulization route every four (4) hours as needed for Wheezing. Qty: 1 Package, Refills: 0 carvedilol (COREG) 12.5 mg tablet Take 1 Tab by mouth two (2) times daily (with meals). Qty: 60 Tab, Refills: 0  
  
hydrALAZINE (APRESOLINE) 50 mg tablet Take 3 Tabs by mouth three (3) times daily. Qty: 90 Tab, Refills: 0  
  
sodium bicarbonate 650 mg tablet Take 2 Tabs by mouth three (3) times daily. Qty: 90 Tab, Refills: 0  
  
tamsulosin (FLOMAX) 0.4 mg capsule Take 1 Cap by mouth daily. Qty: 30 Cap, Refills: 0  
  
insulin glargine U-300 conc 300 unit/mL (1.5 mL) inpn 18 Units by SubCUTAneous route every morning. aspirin 81 mg chewable tablet Take 1 Tab by mouth daily. Qty: 33 Tab, Refills: 11 amLODIPine (NORVASC) 10 mg tablet Take 1 Tab by mouth daily. Qty: 30 Tab, Refills: 0  
  
simvastatin (ZOCOR) 20 mg tablet Take 20 mg by mouth nightly. omega 3-dha-epa-fish oil (FISH OIL) 100-160-1,000 mg cap Take 1 Cap by mouth two (2) times a day. cinnamon bark (CINNAMON) 500 mg cap Take 1,000 mg by mouth two (2) times a day. Total time spent on discharge planning, counseling and co-ordination of care:  
21 minutes Pato Brody MD 
05/19/19 
8:25 AM

## 2019-05-21 ENCOUNTER — PATIENT OUTREACH (OUTPATIENT)
Dept: CARDIOLOGY CLINIC | Age: 84
End: 2019-05-21

## 2019-05-21 NOTE — PROGRESS NOTES
Hospital Discharge Follow-Up Date/Time:  2019 10:51 AM 
 
Patient was admitted to [facility] on 19 and discharged on 19 for SOB. The physician discharge summary was available at the time of outreach. Patient was contacted within 2 business days of discharge. Top Challenges reviewed with the provider No ACP on file Patient BUN/Cr elevated at discharge 56/3.10---pt has chronic kidney failure. Per pt, he is aware that he will have to go on dialysis soon and has been asked by Dr Josemanuel Myles to set up an appointment for an informational session about dialysis. No PCP or pulmonology appt made at discharge Method of communication with provider :phone Inpatient RRAT score: 43 Was this a readmission? no  
 
 
Nurse Navigator (NN) contacted the patient by telephone to perform post hospital discharge assessment. Verified name and  with patient as identifiers. Provided introduction to self, and explanation of the Nurse Navigator role. Reviewed discharge instructions and red flags with patient who verbalized understanding. Patient given an opportunity to ask questions and does not have any further questions or concerns at this time. The patient agrees to contact the PCP office for questions related to their healthcare. NN provided contact information for future reference. Disease Specific:   CHF Heart Failure Note Do you have a Scale:    yes How often do you weigh:  daily per pt Daily Weight (document daily weights in flowsheets): Increase Amount:  2lbs--last hospital [id number] lbs/pt reports 180 lbs on his home scale Provider Notified:   No  
 
Zone:(Pt Reported)  yellow EF: 56-60%(result) on 19 (date) Type of HF:   HFpEF Cardiac Device present: pacemaker Heart Failure Medications: Betablocker, Diuretic Summary of patient's top problems: 
1. CHF---pt was admitted for SOB.  History includes pulmonary htn, afib, HTN, CAD, s/p CABG, s/p micra pacemaker. CXR Result Date: 5/17/2019 History: Shortness of breath, possible pneumonia. 2 views of the chest demonstrate the patient is status post median sternotomy. There is cardiomegaly. There is parenchymal scarring bilaterally. There are no effusions and the osseous structures appear unremarkable. IMPRESSION: No acute findings. Labs on admit of concern: RBC 3.01  Low   4.10 - 5.70 M/uL Final  
HGB 8.9  Low   12.1 - 17.0 g/dL Final  
HCT 28.4 BUN 62  High   6 - 20 MG/DL Final  
Creatinine 3.29 NT pro-BNP >35,000 Troponins . 05, .06, .06 Patient had seen cardiology on the day prior to admission and had been asked to increase bumex. Pt also reports seeing pulmonology just prior to admission as well--was instructed to use nebulizer treatments every 4 hours. Patient has just recently had pacemaker placed earlier this month. Home Health orders at discharge: [facility] not ordered, pt lives out of range for [facility], none Durable Medical Equipment ordered/company: na 
Durable Medical Equipment received: na 
 
Barriers to care? financial, lack of knowledge about disease Advance Care Planning:  
Does patient have an Advance Directive:  not on file; education provided Medication(s):  
New Medications at Discharge: na Changed Medications at Discharge: na 
Discontinued Medications at Discharge: na 
 
Medication reconciliation was performed with patient, who verbalizes understanding of administration of home medications. There were no barriers to obtaining medications identified at this time. Referral to Pharm D needed: yes Current Outpatient Medications Medication Sig  bumetanide (BUMEX) 1 mg tablet Please take 3mg in the morning and 1mg in the evening.  albuterol (PROVENTIL VENTOLIN) 2.5 mg /3 mL (0.083 %) nebulizer solution 3 mL by Nebulization route every four (4) hours as needed for Wheezing.  carvedilol (COREG) 12.5 mg tablet Take 1 Tab by mouth two (2) times daily (with meals).  hydrALAZINE (APRESOLINE) 50 mg tablet Take 3 Tabs by mouth three (3) times daily.  sodium bicarbonate 650 mg tablet Take 2 Tabs by mouth three (3) times daily.  tamsulosin (FLOMAX) 0.4 mg capsule Take 1 Cap by mouth daily.  insulin glargine U-300 conc 300 unit/mL (1.5 mL) inpn 18 Units by SubCUTAneous route every morning.  aspirin 81 mg chewable tablet Take 1 Tab by mouth daily.  amLODIPine (NORVASC) 10 mg tablet Take 1 Tab by mouth daily.  simvastatin (ZOCOR) 20 mg tablet Take 20 mg by mouth nightly.  omega 3-dha-epa-fish oil (FISH OIL) 100-160-1,000 mg cap Take 1 Cap by mouth two (2) times a day.  cinnamon bark (CINNAMON) 500 mg cap Take 1,000 mg by mouth two (2) times a day. No current facility-administered medications for this visit. There are no discontinued medications. [facility] follow up appointment(s):  
Future Appointments Date Time Provider Muna Arredondoi 5/22/2019  4:00 PM Tita Velazquez  [address]  
8/27/2019 11:00 AM PACEMAKER3, [address]  
8/27/2019 11:20 AM Kishan Srinivasan  [address]  Non-[facility] follow up appointment(s): NN will follow up with pt regarding PCP appt. [facility]:  out of service area Goals  Attends follow-up appointments as directed. 05/21/19 Spoke to patient regarding follow up appts. And discussed follow up with other providers that were not listed in discharge instructions. Patient reports he saw Dr Donna Hough yesterday---nephrology. Pt reports MD wants pt and wife to attend a class to educate them on dialysis---pt states he will be going on dialysis sometime soon. He has not made appointment yet to attend informational session yet. NN will follow up with pt on this.  
 
Patient reports he saw a NP for Dr Suki Armas at pulmonology office right before he was readmitted. He states they could not perform any tests on him since he was feeling so poorly. He also states they instructed him to use nebulizer treatments to see if that would help and scheduled him to have tests performed either in June or July--he could not remember exactly during this call. NN will follow up with pt at later date to confirm appt. Pt does have an appt tomorrow to see Dr Nerissa Hoyos, cardiology. NN will see pt during this apt to provide educational material on diet, ACP, and additional HF information. NN will ask pt about PCP follow up tomorrow---there ws no appt scheduled at Discharge. Pt lives out of range for [facility] and [facility].---mkrw  Maintains daily weight. 05/21/19 NN spoke to pt today---he reports that he weighs daily. Today's weight 180 lbs, stable. NN discussed with pt when to call Dr Nerissa Hoyos --pt verbalizes when weight goes up 3 lbs/day. NN also educated him on weight increase 5 lbs/week. NN confirmed that pt is taking bumex 3 mg in AM and 1 mg in PM. NN suggested to pt to take evening bumex around 3-4pm so he doesn't wake up urinating all night. Pt verbalizes understanding. NN encouraged pt to continue daily weights--will follow up next week for updates--mkrw  Understands and adheres to diet. 05/21/19 NN spoke to patient regarding low sodium diet. Patient reports his wife prepares all meals using mostly fresh items and they do not buy frozen dinners. He reports \"I try to stay away from salty food. \" Patient denies using salt on any of his food and states he is using Mrs. Holland for seasoning. NN asked pt if they read labels on food items to keep under 2000 mg/day---pt states that they do. NN will provide [employer] low sodium pamphlets when pt comes to cards appt tomorrow and review with pt.---mkrw

## 2019-05-22 ENCOUNTER — OFFICE VISIT (OUTPATIENT)
Dept: CARDIOLOGY CLINIC | Age: 84
End: 2019-05-22

## 2019-05-22 VITALS
WEIGHT: 185 LBS | OXYGEN SATURATION: 95 % | BODY MASS INDEX: 27.4 KG/M2 | RESPIRATION RATE: 16 BRPM | HEIGHT: 69 IN | SYSTOLIC BLOOD PRESSURE: 130 MMHG | DIASTOLIC BLOOD PRESSURE: 60 MMHG | HEART RATE: 76 BPM

## 2019-05-22 DIAGNOSIS — E78.00 HYPERCHOLESTEREMIA: ICD-10-CM

## 2019-05-22 DIAGNOSIS — I10 HYPERTENSION, ESSENTIAL, BENIGN: ICD-10-CM

## 2019-05-22 DIAGNOSIS — I25.10 CORONARY ARTERY DISEASE INVOLVING NATIVE CORONARY ARTERY OF NATIVE HEART WITHOUT ANGINA PECTORIS: ICD-10-CM

## 2019-05-22 DIAGNOSIS — I50.32 DIASTOLIC CHF, CHRONIC (HCC): ICD-10-CM

## 2019-05-22 DIAGNOSIS — I73.9 PERIPHERAL VASCULAR DISEASE (HCC): ICD-10-CM

## 2019-05-22 DIAGNOSIS — R06.09 DOE (DYSPNEA ON EXERTION): Primary | ICD-10-CM

## 2019-05-22 DIAGNOSIS — I48.20 ATRIAL FIBRILLATION, CHRONIC (HCC): ICD-10-CM

## 2019-05-22 NOTE — PROGRESS NOTES
Visit Vitals  /60 (BP 1 Location: Left arm, BP Patient Position: Sitting)   Pulse 76   Resp 16   Ht 5' 9\" (1.753 m)   Wt 185 lb (83.9 kg)   SpO2 95%   BMI 27.32 kg/m²

## 2019-05-22 NOTE — PROGRESS NOTES
[address] [facility]     1935       David Acosta MD, [facility]  Date of Visit-5/22/2019   PCP is Timmy Mckeon MD   [facility]  [facility]  HPI:  [address] [facility]. is a 81 y.o. male   Pt returns in f/u. He has CHF and kidney disease. Hospitalized recently, noticed creatinine is worsened. Saw Dr. Corey Madison. Plans to undergo dialysis in about a month. Pt states he is feeling SOB today walking from exam room to office. Nebulizer treatments help with SOB. Pt reports feeling a slight decrease in fluid after taking fluid pill prescribed last week. Although pt weighs 7 lbs more today than last week in office, home weights remain stable at 180 lbs. Pt asked about possibility of at-home dialysis treatments. Denies chest pain, edema, syncope or shortness of breath at rest, has no tachycardia, palpitations or sense of arrhythmia. Assessment/Plan:     1. Severe SOB with severe pulm HTN and kidney failure. Dialysis will probably help, but I suspect some of this HTN is fixed. He needs to cont to see pulmonary. 2. CKD 4. Likely dialysis. Dr. Corey Madison is sending him to dialysis classes. 3. CAD CABG 2008. No angina. No cath due to renal failure. 4. Afib with controlled rate. 5. PVD. Stable. 6. Diabetes. 7. F/u in 2 months. Future Appointments   Date Time Provider Muna Delgado   7/24/2019  2:00 PM Dorothea Roa  [address]   8/27/2019 11:00 AM PACEMAKER3, [address]   8/27/2019 11:20 AM Celi Guerra  [address]      Patient Instructions   You will be scheduled for a mid-late July follow up. Key CAD CHF Meds             bumetanide (BUMEX) 1 mg tablet (Taking) Please take 3mg in the morning and 1mg in the evening. carvedilol (COREG) 12.5 mg tablet (Taking) Take 1 Tab by mouth two (2) times daily (with meals).     hydrALAZINE (APRESOLINE) 50 mg tablet (Taking) Take 3 Tabs by mouth three (3) times daily. aspirin 81 mg chewable tablet (Taking) Take 1 Tab by mouth daily. amLODIPine (NORVASC) 10 mg tablet (Taking) Take 1 Tab by mouth daily. simvastatin (ZOCOR) 20 mg tablet (Taking) Take 20 mg by mouth nightly. omega 3-dha-epa-fish oil (FISH OIL) 100-160-1,000 mg cap (Taking) Take 1 Cap by mouth two (2) times a day. Impression:   1. AGUILAR (dyspnea on exertion)    2. Coronary artery disease involving native coronary artery of native heart without angina pectoris    3. Diastolic CHF, chronic ([address]    4. Atrial fibrillation, chronic ([address]    5. Peripheral vascular disease ([facility]    6. Hypertension, essential, benign    7. Hypercholesteremia       Cardiac History:   Holter monitor4/29/14=showing rate of  with frequent PACs, rare periods of short RP interval SVT up to 122 and frequent PVCs. sinus rhythm with similar findings, frequent PVCs and short RP interval tachycardia. NUKE  2/11/14,  2 minutes, 20 seconds,  EF of 55% with no ischemia. PVD= Dr. Effie Barcenas atherectomy to distal SFA and distal popliteal with angioplasty to both lesions and stent to the SFA by Dr. Effie Barcenas on 3/14/14. Previously had AIBs of 0.67 on the left, 0.71 on the right with the right seemingly due to distal disease. CAD/CABG off pump x3 3/2008 . =post op anemia and renal failure  CATH March 7, 2008= severe three-vessel left main coronary artery disease, 40% disease of the left main and take off of the LAD and circumflex complex, 70% stenosis of the ostial circ and 85% of the LAD. Between the first and second marginal, the circumflex had 70% stenosis and between the third marginal and PDA there was a 70% stenosis, LAD ostial lesion RCA proximal 60% tapering, EF 60%-70%, bilaterally patent renal arteries, mild atherosclerosis of the distal abdominal aorta.    Mild carotid artery disease 3-7-08 then 1-4-12 with 10-49% left, less than 10% on right  Dyslipidemia 10-25-10  TG 91 HDL 40 LDL 48  SOCIAL: Drinks no alcohol, quit smoking several years ago, works as an . Lives with his wife and has four children. Enjoys gardening, fishing and music. FAMILY HISTORY: Mother  of cancer at 76, father  of Parkinson's at 70, one brother  of cancer of the liver at 76 and one  of an infection at 64. ROS-except as noted above. . A complete cardiac and respiratory are reviewed and negative except as above ; Resp-denies wheezing  or productive cough,. Const- No unusual weight loss or fever; Neuro-no recent seizure or CVA ; GI- No BRBPR, abdom pain, bloating ; - no  hematuria   see supplement sheet, initialed and to be scanned by staff  Past Medical History:   Diagnosis Date    CAD (coronary artery disease)     s/p CABG     [facility]     CKD (chronic kidney disease) stage 3, GFR 30-59 ml/min ([facility] 10/21/2017    hypertension and DM nephrosclerosis    Diabetes mellitus, type 2 ([facility]     Hypercholesteremia     Hypertension     Paroxysmal atrial fibrillation ([facility] 10/21/2017    new onset afib with BRPR 10-19-17 admit    PVC's (premature ventricular contractions) 2014    PVD (peripheral vascular disease) ([facility]       Social Hx= reports that he quit smoking about 34 years ago. His smoking use included cigarettes. He has a 60.00 pack-year smoking history. He has never used smokeless tobacco. He reports that he does not drink alcohol or use drugs. Exam and Labs:  /60 (BP 1 Location: Left arm, BP Patient Position: Sitting)   Pulse 76   Resp 16   Ht 5' 9\" (1.753 m)   Wt 185 lb (83.9 kg)   SpO2 95%   BMI 27.32 kg/m² Constitutional:  NAD, comfortable  Head: NC,AT. Eyes: No scleral icterus. Neck:  Neck supple. No JVD present. Throat: moist mucous membranes. Chest: Effort normal & normal respiratory excursion . Neurological: alert, conversant and oriented . Skin: Skin is not cold. No obvious systemic rash noted. Not diaphoretic. No erythema.  Psychiatric: Grossly normal mood and affect. Behavior appears normal. Extremities:  no clubbing or cyanosis. Abdomen: non distended    Lungs:breath sounds normal. No stridor. distress, wheezes or  Rales. Heart: RRR with 3/6 MAMI throughout, normal rate, regular rhythm, normal S1, S2, no rubs, clicks or gallops , PMI non displaced. Edema: Edema is none. No results found for: CHOL, CHOLX, CHLST, CHOLV, HDL, LDL, LDLC, DLDLP, TGLX, TRIGL, TRIGP, CHHD, CHHDX  Lab Results   Component Value Date/Time    Sodium 139 05/19/2019 05:27 AM    Potassium 3.7 05/19/2019 05:27 AM    Chloride 101 05/19/2019 05:27 AM    CO2 28 05/19/2019 05:27 AM    Anion gap 10 05/19/2019 05:27 AM    Glucose 68 05/19/2019 05:27 AM    BUN 56 (H) 05/19/2019 05:27 AM    Creatinine 3.10 (H) 05/19/2019 05:27 AM    BUN/Creatinine ratio 18 05/19/2019 05:27 AM    GFR est AA 24 (L) 05/19/2019 05:27 AM    GFR est non-AA 19 (L) 05/19/2019 05:27 AM    Calcium 8.2 (L) 05/19/2019 05:27 AM      Wt Readings from Last 3 Encounters:   05/22/19 185 lb (83.9 kg)   05/19/19 178 lb (80.7 kg)   05/16/19 189 lb (85.7 kg)      BP Readings from Last 3 Encounters:   05/22/19 130/60   05/19/19 143/57   05/16/19 147/66      Current Outpatient Medications   Medication Sig    bumetanide (BUMEX) 1 mg tablet Please take 3mg in the morning and 1mg in the evening.  albuterol (PROVENTIL VENTOLIN) 2.5 mg /3 mL (0.083 %) nebulizer solution 3 mL by Nebulization route every four (4) hours as needed for Wheezing.  carvedilol (COREG) 12.5 mg tablet Take 1 Tab by mouth two (2) times daily (with meals).  hydrALAZINE (APRESOLINE) 50 mg tablet Take 3 Tabs by mouth three (3) times daily.  sodium bicarbonate 650 mg tablet Take 2 Tabs by mouth three (3) times daily.  tamsulosin (FLOMAX) 0.4 mg capsule Take 1 Cap by mouth daily.  insulin glargine U-300 conc 300 unit/mL (1.5 mL) inpn 18 Units by SubCUTAneous route every morning.  aspirin 81 mg chewable tablet Take 1 Tab by mouth daily.  amLODIPine (NORVASC) 10 mg tablet Take 1 Tab by mouth daily.  simvastatin (ZOCOR) 20 mg tablet Take 20 mg by mouth nightly.  omega 3-dha-epa-fish oil (FISH OIL) 100-160-1,000 mg cap Take 1 Cap by mouth two (2) times a day.  cinnamon bark (CINNAMON) 500 mg cap Take 1,000 mg by mouth two (2) times a day. No current facility-administered medications for this visit. Impression see above.       Written by Pelon Melendez, as dictated by Chapo Blue MD.

## 2019-05-29 ENCOUNTER — PATIENT OUTREACH (OUTPATIENT)
Dept: CARDIOLOGY CLINIC | Age: 84
End: 2019-05-29

## 2019-05-29 NOTE — PROGRESS NOTES
Goals  Attends follow-up appointments as directed. 05/21/19 Spoke to patient regarding follow up appts. And discussed follow up with other providers that were not listed in discharge instructions. Patient reports he saw Dr Jessica Monique yesterday---nephrology. Pt reports MD wants pt and wife to attend a class to educate them on dialysis---pt states he will be going on dialysis sometime soon. He has not made appointment yet to attend informational session yet. NN will follow up with pt on this. Patient reports he saw a NP for Dr Gala Vazquez at pulmonology office right before he was readmitted. He states they could not perform any tests on him since he was feeling so poorly. He also states they instructed him to use nebulizer treatments to see if that would help and scheduled him to have tests performed either in June or July--he could not remember exactly during this call. NN will follow up with pt at later date to confirm appt. Pt does have an appt tomorrow to see Dr Kennedy Alejandro, cardiology. NN will see pt during this apt to provide educational material on diet, ACP, and additional HF information. NN will ask pt about PCP follow up tomorrow---there ws no appt scheduled at Discharge. Pt lives out of range for [facility] and [facility].---mkrw 05/29/19 NN unable to reach pt by phone--ARGELIA on . NN attempted to contact PCP office to see if patient scheduled MARTINEZ appointment, ARGELIA on MD office VM. NN will follow up next week---mkrw  Maintains daily weight. 05/21/19 NN spoke to pt today---he reports that he weighs daily. Today's weight 180 lbs, stable. NN discussed with pt when to call Dr Kennedy Alejandro --pt verbalizes when weight goes up 3 lbs/day. NN also educated him on weight increase 5 lbs/week. NN confirmed that pt is taking bumex 3 mg in AM and 1 mg in PM. NN suggested to pt to take evening bumex around 3-4pm so he doesn't wake up urinating all night. Pt verbalizes understanding. NN encouraged pt to continue daily weights--will follow up next week for updates--xena 05/29/19 Unable to reach pt by phone, NN LM on VM. NN will follow up with patient next week--mkrw  Understands and adheres to diet. 05/21/19 NN spoke to patient regarding low sodium diet. Patient reports his wife prepares all meals using mostly fresh items and they do not buy frozen dinners. He reports \"I try to stay away from salty food. \" Patient denies using salt on any of his food and states he is using Mrs. Holland for seasoning. NN asked pt if they read labels on food items to keep under 2000 mg/day---pt states that they do. NN will provide Rafaela low sodium pamphlets when pt comes to cards appt tomorrow and review with pt.---xena

## 2019-06-05 ENCOUNTER — PATIENT OUTREACH (OUTPATIENT)
Dept: CARDIOLOGY CLINIC | Age: 84
End: 2019-06-05

## 2019-06-05 NOTE — PROGRESS NOTES
Goals      Attends follow-up appointments as directed. 05/21/19    Spoke to patient regarding follow up appts. And discussed follow up with other providers that were not listed in discharge instructions. Patient reports he saw Dr Olayinka Umanzor yesterday---nephrology. Pt reports MD wants pt and wife to attend a class to educate them on dialysis---pt states he will be going on dialysis sometime soon. He has not made appointment yet to attend informational session yet. NN will follow up with pt on this. Patient reports he saw a NP for Dr Paige Simon at pulmonology office right before he was readmitted. He states they could not perform any tests on him since he was feeling so poorly. He also states they instructed him to use nebulizer treatments to see if that would help and scheduled him to have tests performed either in June or July--he could not remember exactly during this call. NN will follow up with pt at later date to confirm appt. Pt does have an appt tomorrow to see Dr Elizabeth Kate, cardiology. NN will see pt during this apt to provide educational material on diet, ACP, and additional HF information. NN will ask pt about PCP follow up tomorrow---there ws no appt scheduled at [facility]. Pt lives out of range for [facility] and [facility].---mkrw    05/29/19  NN unable to reach pt by phone--ARGELIA on . NN attempted to contact PCP office to see if patient scheduled MATRINEZ appointment, ARGELIA on MD office VM. NN will follow up next week---mkmarie    06/05/19  Spoke to patient 's wife. She reports that they has an appt today with PCP, however the office cancelled the appt and r/s for 6/11/19. She reports pt had labwork drawn on 6/3 for this appt. Patient has a follow up for COPD exacerbation on 6/10/19. Wife also reports patient has an appt to have labs drawn for Dr Olayinka Umanzor, nephrology on the 14th and then on June 24th will start their dialysis training.   Wife also reports that a dietician is coming to their home on Friday 6/7/19 to prepare them for dialysis. NN will follow up with patient for updates next week---mkrw         Maintains daily weight. 05/21/19  NN spoke to pt today---he reports that he weighs daily. Today's weight 180 lbs, stable. NN discussed with pt when to call Dr Fuentes Mckay --pt verbalizes when weight goes up 3 lbs/day. NN also educated him on weight increase 5 lbs/week. NN confirmed that pt is taking bumex 3 mg in AM and 1 mg in PM. NN suggested to pt to take evening bumex around 3-4pm so he doesn't wake up urinating all night. Pt verbalizes understanding. NN encouraged pt to continue daily weights--will follow up next week for updates--mkrw    05/29/19  Unable to reach pt by phone, NN LM on . NN will follow up with patient next week--mkrw    06/05/19  Spoke to patient's wife today. She reports they are weighing daily ---today's weight is 178 lbs, down 2 lbs from 5/21/19. She reports that pt has a small amount of edema in his feet, but it has improved. She reports his bumex has been increased to taking 3 mg bumex in AM and 3 mg in PM.   Patient was struggling last week with his breathing---[facility] Last Wednesday, ordered prednisone taper. Patient reports he has 2 more doses to take. He was also on a Zpack which is almost completed. Wife reports patient is doing better than he was last week prior to the prednisone. NN will follow up next week for updates--mkrw       Understands and adheres to diet. 05/21/19  NN spoke to patient regarding low sodium diet. Patient reports his wife prepares all meals using mostly fresh items and they do not buy frozen dinners. He reports \"I try to stay away from salty food. \" Patient denies using salt on any of his food and states he is using Mrs. Holland for seasoning. NN asked pt if they read labels on food items to keep under 2000 mg/day---pt states that they do.  NN will provide [employer] low sodium pamphlets when pt comes to cards appt tomorrow and review with pt.---xena    06/05/19  Patient's wife states she has been trying to keep pt on low sodium and low carb diet but it is difficult to think of ideas. NN will mail information and recipes today. Kalyan Sidhu will also have a dietician come to pt's home this Friday to prepare them for dialysis. NN discussed with wife the importance of keeping pt on low sodium diet at this time due to increased risk of fluid overload and how this relates to his reanl failure and breathing. Wife verbalizes understanding of disease management with respects to diet.   NN will follow up at the end of the week to see if patient has questions about mailing---tosinw

## 2019-06-10 ENCOUNTER — PATIENT OUTREACH (OUTPATIENT)
Dept: CARDIOLOGY CLINIC | Age: 84
End: 2019-06-10

## 2019-06-10 NOTE — PROGRESS NOTES
Goals Addressed This Visit's Progress  Attends follow-up appointments as directed. 05/21/19 Spoke to patient regarding follow up appts. And discussed follow up with other providers that were not listed in discharge instructions. Patient reports he saw Dr Sunitha Mcleod yesterday---nephrology. Pt reports MD wants pt and wife to attend a class to educate them on dialysis---pt states he will be going on dialysis sometime soon. He has not made appointment yet to attend informational session yet. NN will follow up with pt on this. Patient reports he saw a NP for Dr Nitin Rojas at pulmonology office right before he was readmitted. He states they could not perform any tests on him since he was feeling so poorly. He also states they instructed him to use nebulizer treatments to see if that would help and scheduled him to have tests performed either in June or July--he could not remember exactly during this call. NN will follow up with pt at later date to confirm appt. Pt does have an appt tomorrow to see Dr Leodan Fishman, cardiology. NN will see pt during this apt to provide educational material on diet, ACP, and additional HF information. NN will ask pt about PCP follow up tomorrow---there ws no appt scheduled at [facility]. Pt lives out of range for [facility] and [facility].---mkrw 05/29/19 NN unable to reach pt by phone--ARGELIA on VM. NN attempted to contact PCP office to see if patient scheduled MARTINEZ appointment, ARGELIA on MD office VM. NN will follow up next week---xena 06/05/19 Spoke to patient 's wife. She reports that they has an appt today with PCP, however the office cancelled the appt and r/s for 6/11/19. She reports pt had labwork drawn on 6/3 for this appt. Patient has a follow up for COPD exacerbation on 6/10/19. Wife also reports patient has an appt to have labs drawn for Dr Sunitha Mcleod, nephrology on the 14th and then on June 24th will start their dialysis training. Wife also reports that a dietician is coming to their home on Friday 6/7/19 to prepare them for dialysis. NN will follow up with patient for updates next week---xena 06/10/19 NN unable to reach patient---ARGELIA on VM. Patient is supposed to have appt with pulmonary today. NN will follow up later in the week if no return call received---xena

## 2019-06-20 ENCOUNTER — PATIENT OUTREACH (OUTPATIENT)
Dept: CARDIOLOGY CLINIC | Age: 84
End: 2019-06-20

## 2019-06-26 ENCOUNTER — PATIENT OUTREACH (OUTPATIENT)
Dept: CARDIOLOGY CLINIC | Age: 84
End: 2019-06-26

## 2019-06-26 NOTE — PROGRESS NOTES
Goals  Attends follow-up appointments as directed. 05/21/19 Spoke to patient regarding follow up appts. And discussed follow up with other providers that were not listed in discharge instructions. Patient reports he saw Dr Marge Wilkerson yesterday---nephrology. Pt reports MD wants pt and wife to attend a class to educate them on dialysis---pt states he will be going on dialysis sometime soon. He has not made appointment yet to attend informational session yet. NN will follow up with pt on this. Patient reports he saw a NP for Dr Claude Mckinnon at pulmonology office right before he was readmitted. He states they could not perform any tests on him since he was feeling so poorly. He also states they instructed him to use nebulizer treatments to see if that would help and scheduled him to have tests performed either in June or July--he could not remember exactly during this call. NN will follow up with pt at later date to confirm appt. Pt does have an appt tomorrow to see Dr Teddy Burnham, cardiology. NN will see pt during this apt to provide educational material on diet, ACP, and additional HF information. NN will ask pt about PCP follow up tomorrow---there ws no appt scheduled at [facility]. Pt lives out of range for [facility] and [facility].---rw 05/29/19 NN unable to reach pt by phone--ARGELIA on . NN attempted to contact PCP office to see if patient scheduled MARTINEZ appointment, ARGELIA on MD office VM. NN will follow up next week---mkrrj 06/05/19 Spoke to patient 's wife. She reports that they has an appt today with PCP, however the office cancelled the appt and r/s for 6/11/19. She reports pt had labwork drawn on 6/3 for this appt. Patient has a follow up for COPD exacerbation on 6/10/19. Wife also reports patient has an appt to have labs drawn for Dr Marge Wilkerson, nephrology on the 14th and then on June 24th will start their dialysis training.  
Wife also reports that a dietician is coming to their home on Friday 6/7/19 to prepare them for dialysis. NN will follow up with patient for updates next week---mkrw 06/10/19 NN unable to reach patient---LM on VM. Patient is supposed to have appt with pulmonary today. NN will follow up later in the week if no return call received---mkrw 06/26/19 Spoke to pt's wife today---she reports that they have a very busy month in July with the following appts: 
 
7/1/19   [facility] for [facility]'s and other studies. They will return again on 7/15/19  To see MD regarding pulmonary HTN and results of PFT. 
 
7/16/19   They will attend dialysis class for PD. Although Dr Dylan Hoover has put PD plan on hold for now, he want pt to still attend class in the event that pt will have to go on PD. Note: Per wife, they saw Dr Dylan Hoover on Monday 6/24/19. 
 
7/24/19  Pt sees cardiology Dr Bran Bustillo. 
 
NN will follow up in 7-10 days for updates---mkrw  Maintains daily weight. 05/21/19 NN spoke to pt today---he reports that he weighs daily. Today's weight 180 lbs, stable. NN discussed with pt when to call Dr Bran Bustillo --pt verbalizes when weight goes up 3 lbs/day. NN also educated him on weight increase 5 lbs/week. NN confirmed that pt is taking bumex 3 mg in AM and 1 mg in PM. NN suggested to pt to take evening bumex around 3-4pm so he doesn't wake up urinating all night. Pt verbalizes understanding. NN encouraged pt to continue daily weights--will follow up next week for updates--mkrw 05/29/19 Unable to reach pt by phone, NN LM on . NN will follow up with patient next week--mkrw 06/05/19 Spoke to patient's wife today. She reports they are weighing daily ---today's weight is 178 lbs, down 2 lbs from 5/21/19. She reports that pt has a small amount of edema in his feet, but it has improved.  She reports his bumex has been increased to taking 3 mg bumex in AM and 3 mg in PM.  
Patient was struggling last week with his breathing---they saw Pulmonary . Last Wednesday, ordered prednisone taper. Patient reports he has 2 more doses to take. He was also on a Zpack which is almost completed. Wife reports patient is doing better than he was last week prior to the prednisone. NN will follow up next week for updates--rw 06/20/19 NN unable to reach pt by phone, LM on VM . NN will follow up next week for updates---mkrw 06/26/19 Spoke to pt's wife as pt was having a breathing treatment at time of call. She reports pt weight is currently 175 lbs--down 3 lbs from last report on 6/5/19. She reports that pt has been feeling better since completing abx , reports no increased SOB (she states pt always has a little SOB due to COPD), no coughing, no edema. She also reports that dialysis has been put on hold by Dr Sabino Merchant for now---states his kidney function has stabilized and pt's fluid status is stable. She states that this may change but for now MD is satified. NN asked wife to weigh pt daily and to call MD if weight increases by 3 lbs/day or 5 lbs/week, she verbalizes understanding. NN will follow up in one week---mkrw  Understands and adheres to diet. 05/21/19 NN spoke to patient regarding low sodium diet. Patient reports his wife prepares all meals using mostly fresh items and they do not buy frozen dinners. He reports \"I try to stay away from salty food. \" Patient denies using salt on any of his food and states he is using Mrs. Holland for seasoning. NN asked pt if they read labels on food items to keep under 2000 mg/day---pt states that they do. NN will provide [employer] low sodium pamphlets when pt comes to cards appt tomorrow and review with pt.---rw 06/05/19 Patient's wife states she has been trying to keep pt on low sodium and low carb diet but it is difficult to think of ideas. NN will mail information and recipes today. Dane Wilder will also have a dietician come to pt's home this Friday to prepare them for dialysis. NN discussed with wife the importance of keeping pt on low sodium diet at this time due to increased risk of fluid overload and how this relates to his reanl failure and breathing. Wife verbalizes understanding of disease management with respects to diet. NN will follow up at the end of the week to see if patient has questions about mailing---mkrw

## 2019-07-09 ENCOUNTER — PATIENT OUTREACH (OUTPATIENT)
Dept: CARDIOLOGY CLINIC | Age: 84
End: 2019-07-09

## 2019-07-09 NOTE — PROGRESS NOTES
Goals Addressed This Visit's Progress  Maintains daily weight. 05/21/19 NN spoke to pt today---he reports that he weighs daily. Today's weight 180 lbs, stable. NN discussed with pt when to call Dr Richard Esquivel --pt verbalizes when weight goes up 3 lbs/day. NN also educated him on weight increase 5 lbs/week. NN confirmed that pt is taking bumex 3 mg in AM and 1 mg in PM. NN suggested to pt to take evening bumex around 3-4pm so he doesn't wake up urinating all night. Pt verbalizes understanding. NN encouraged pt to continue daily weights--will follow up next week for updates--rw 05/29/19 Unable to reach pt by phone, XANDER STOUT on . NN will follow up with patient next week--rw 06/05/19 Spoke to patient's wife today. She reports they are weighing daily ---today's weight is 178 lbs, down 2 lbs from 5/21/19. She reports that pt has a small amount of edema in his feet, but it has improved. She reports his bumex has been increased to taking 3 mg bumex in AM and 3 mg in PM.  
Patient was struggling last week with his breathing---[facility]. Last Wednesday, ordered prednisone taper. Patient reports he has 2 more doses to take. He was also on a Zpack which is almost completed. Wife reports patient is doing better than he was last week prior to the prednisone. NN will follow up next week for updates--rw 06/20/19 NN unable to reach pt by phone, ARGELIA on  . NN will follow up next week for updates---rw 06/26/19 Spoke to pt's wife as pt was having a breathing treatment at time of call. She reports pt weight is currently 175 lbs--down 3 lbs from last report on 6/5/19. She reports that pt has been feeling better since completing abx , reports no increased SOB (she states pt always has a little SOB due to COPD), no coughing, no edema.  She also reports that dialysis has been put on hold by Dr Valarie Neri for now---states his kidney function has stabilized and pt's fluid status is stable. She states that this may change but for now MD is satified. NN asked wife to weigh pt daily and to call MD if weight increases by 3 lbs/day or 5 lbs/week, she verbalizes understanding. NN will follow up in one week---xena 07/09/19 Unable to reach pt by phone---ARGELIA on  requesting return call, CTN will follow up next week---tosinw

## 2019-07-23 ENCOUNTER — PATIENT OUTREACH (OUTPATIENT)
Dept: CARDIOLOGY CLINIC | Age: 84
End: 2019-07-23

## 2019-07-23 NOTE — PROGRESS NOTES
Goals Addressed                 This Visit's Progress     Attends follow-up appointments as directed. 05/21/19    Spoke to patient regarding follow up appts. And discussed follow up with other providers that were not listed in discharge instructions. Patient reports he saw Dr Usha Mendez yesterday---nephrology. Pt reports MD wants pt and wife to attend a class to educate them on dialysis---pt states he will be going on dialysis sometime soon. He has not made appointment yet to attend informational session yet. NN will follow up with pt on this. Patient reports he saw a NP for Dr Lorenza Rosas at pulmonology office right before he was readmitted. He states they could not perform any tests on him since he was feeling so poorly. He also states they instructed him to use nebulizer treatments to see if that would help and scheduled him to have tests performed either in June or July--he could not remember exactly during this call. NN will follow up with pt at later date to confirm appt. Pt does have an appt tomorrow to see Dr Qasim Chatman, cardiology. NN will see pt during this apt to provide educational material on diet, ACP, and additional HF information. NN will ask pt about PCP follow up tomorrow---there ws no appt scheduled at [facility]. Pt lives out of range for [facility] and [facility].---mkrw    05/29/19  NN unable to reach pt by phone--ARGELIA on VM. NN attempted to contact PCP office to see if patient scheduled MARTINEZ appointment, ARGELIA on MD office VM. NN will follow up next week---xena    06/05/19  Spoke to patient 's wife. She reports that they has an appt today with PCP, however the office cancelled the appt and r/s for 6/11/19. She reports pt had labwork drawn on 6/3 for this appt. Patient has a follow up for COPD exacerbation on 6/10/19. Wife also reports patient has an appt to have labs drawn for Dr Usha Mendez, nephrology on the 14th and then on June 24th will start their dialysis training.   Wife also reports that a dietician is coming to their home on Friday 6/7/19 to prepare them for dialysis. NN will follow up with patient for updates next week---mkrw    06/10/19  NN unable to reach patient---LM on VM. Patient is supposed to have appt with pulmonary today. NN will follow up later in the week if no return call received---mkrw    06/26/19  Spoke to pt's wife today---she reports that they have a very busy month in July with the following appts:    7/1/19   [facility] for PFT's and other studies. They will return again on 7/15/19  To see MD regarding pulmonary HTN and results of PFT.    7/16/19   They will attend dialysis class for PD. Although Dr Juanita Cain has put PD plan on hold for now, he want pt to still attend class in the event that pt will have to go on PD. Note: Per wife, they saw Dr Juanita Cain on Monday 6/24/19.    7/24/19  Pt sees cardiology Dr Uyen Parker.    NN will follow up in 7-10 days for updates---mkrw    07/23/19  Spoke with patient's wife. She reports Patietn saw [facility] on 7/22/19. She had questions about why patient did not have a test performed for pulmonary HTN and also wants to know results of an overnight sleep study that was performed by Eugenia Lucas at home back in June --she states that it was to get pt home 02 at night. She also states that pt will be having a sleep apnea study on 7/22. [facility]--spoke with LONNY Duncan. She took detailed message to send to MA to return call. Mrs Colorado Stage reports they went to Dialysis class on 7/15/19. She states it was very informative and that they also got more dietary resources from the nutritionist.  They also had labs drawn at this appt---will see Dr Juanita Cain on 7/25 to see if pt remains stable. Tomorrow they have an appt to see Dr Uyen Parker at 2:00. CTN will follow up with [facility].  If no return call received---[facility]

## 2019-07-24 ENCOUNTER — PATIENT OUTREACH (OUTPATIENT)
Dept: CARDIOLOGY CLINIC | Age: 84
End: 2019-07-24

## 2019-07-24 ENCOUNTER — OFFICE VISIT (OUTPATIENT)
Dept: CARDIOLOGY CLINIC | Age: 84
End: 2019-07-24

## 2019-07-24 VITALS
HEART RATE: 78 BPM | HEIGHT: 69 IN | BODY MASS INDEX: 25.18 KG/M2 | OXYGEN SATURATION: 98 % | WEIGHT: 170 LBS | DIASTOLIC BLOOD PRESSURE: 60 MMHG | RESPIRATION RATE: 17 BRPM | SYSTOLIC BLOOD PRESSURE: 108 MMHG

## 2019-07-24 DIAGNOSIS — I10 HYPERTENSION, ESSENTIAL, BENIGN: ICD-10-CM

## 2019-07-24 DIAGNOSIS — E78.00 HYPERCHOLESTEREMIA: ICD-10-CM

## 2019-07-24 DIAGNOSIS — I65.23 BILATERAL CAROTID ARTERY STENOSIS: ICD-10-CM

## 2019-07-24 DIAGNOSIS — N18.30 CKD (CHRONIC KIDNEY DISEASE) STAGE 3, GFR 30-59 ML/MIN (HCC): ICD-10-CM

## 2019-07-24 DIAGNOSIS — I48.20 ATRIAL FIBRILLATION, CHRONIC (HCC): ICD-10-CM

## 2019-07-24 DIAGNOSIS — I25.10 CORONARY ARTERY DISEASE INVOLVING NATIVE CORONARY ARTERY OF NATIVE HEART WITHOUT ANGINA PECTORIS: Primary | ICD-10-CM

## 2019-07-24 DIAGNOSIS — I50.32 DIASTOLIC CHF, CHRONIC (HCC): ICD-10-CM

## 2019-07-24 DIAGNOSIS — I73.9 PERIPHERAL VASCULAR DISEASE (HCC): ICD-10-CM

## 2019-07-24 NOTE — PROGRESS NOTES
Visit Vitals  /60 (BP 1 Location: Left arm, BP Patient Position: Sitting)   Pulse 78   Resp 17   Ht 5' 9\" (1.753 m)   Wt 170 lb (77.1 kg)   SpO2 98%   BMI 25.10 kg/m²

## 2019-07-24 NOTE — Clinical Note
7/24/19 Patient: [address]. YOB: 1935 Date of Visit: 7/24/2019 Roxanne Corley MD 
[address] [place] [id number] VIA Facsimile: [phone] Dear Roxanne Corley MD, Thank you for referring Mr. Alex Ozuna to [facility] for evaluation. My notes for this consultation are attached. If you have questions, please do not hesitate to call me. I look forward to following your patient along with you.  
 
 
Sincerely, 
 
Houston Churchill MD

## 2019-07-24 NOTE — PROGRESS NOTES
[facility]     1935       David Hines MD, [facility]  Date of Visit-7/24/2019   PCP is Renetta Contreras MD   [facility] [place]  [facility]  HPI:  [facility]. is a 81 y.o. male   2 month f/u for CHF and kidney disease. Hospitalized this year with KATALINA. Last creatinine in May was 3.1. Overall the pt states he is doing well. Pt was able to walk up the stairs to get to this appointment. Pt has not been having any dizziness. Pt states that when he walks a few blocks he will feel pain in his calves. Pt has not had dialysis. Denies chest pain, edema, syncope or shortness of breath at rest, has no tachycardia, palpitations or sense of arrhythmia. Feels best he has in some time since the KATALINA started  Pt bp diary and Pulmonary note for PA HTN from 7-15-19 are reviewed as well [facility] May 2, 2019 =PA 66/18 W 20 PA sat 55% CO 4.1 CI 2.12  Echo May 1 ,2019 EF 55-60% mod LAE, Mild CASSIE, Mild AS BELLE 1.6 cm2 mild MR & TR, RV fx mildly reduced  PVD-9/27/18- RLE- mid CRA 70, Pop 99-PTA on right pop,  LLE LCFA 40%         - 1-19-17 PTA RCFA 90, JAS to RSFA         -12/22/16 PTA Left Pop, PTA  Left tib-per        --3-13-14 PTA Left op PTA RSFA       ---1/25/11 stent RSFA   JOCELYN 8-21-17 Normal perfusion  Assessment/Plan:       1. Coronary artery disease involving native coronary artery of native heart without angina pectoris  Stable. CAD/CABG off pump x3 3/2008 . =post op anemia and renal failure  CATH March 7, 2008= severe three-vessel left main coronary artery disease, 40% disease of the left main and take off of the LAD and circumflex complex, 70% stenosis of the ostial circ and 85% of the LAD.  Between the first and second marginal, the circumflex had 70% stenosis and between the third marginal and PDA there was a 70% stenosis, LAD ostial lesion RCA proximal 60% tapering, EF 60%-70%, bilaterally patent renal arteries, mild atherosclerosis of the distal abdominal aorta. 2. CKD (chronic kidney disease) stage 3, GFR 30-59 ml/min ([facility])  Has f/u with Dr. Juanjo Barrientos. Seems to have responded to steroids. Lab Results   Component Value Date/Time    Creatinine 3.10 (H) 05/19/2019 05:27 AM      3. Diastolic CHF, chronic ([facility])  Compensated. I am surprised at how much better he is doing with volume, especially after his long hospitalizations. His weight is stable. He will continue Bumex and BP control. 05/01/19   ECHO ADULT COMPLETE 05/01/2019 5/1/2019    Narrative · Left Ventricle: Mild concentric hypertrophy. Estimated left ventricular   ejection fraction is 56 - 60%. No regional wall motion abnormality noted. Mild (grade 1) left ventricular diastolic dysfunction. · Left Atrium: Moderately dilated left atrium. · Aortic Valve: Probably trileaflet aortic valve. Aortic valve sclerosis   with reduced excursion. Aortic valve area is 1.6 cm2. Mild aortic valve   stenosis is present. · Mitral Valve: Mitral valve thickening. Mitral annular calcification. Mild mitral valve regurgitation. · Tricuspid Valve: Mild to moderate tricuspid valve regurgitation is   present. · Pulmonary Artery: Severe pulmonary hypertension. · IVC/Hepatic Veins: Moderately elevated central venous pressure (10-15   mmHg); IVC diameter is larger than 21 mm and collapses more than 50% with   respiration. · Right Ventricle: Mildly reduced systolic function. · Right Atrium: Mildly dilated right atrium. Signed by: Serena Markham MD      4. Atrial fibrillation, chronic ([facility]) with pacemaker  Holter monitor4/29/14=showing rate of  with frequent PACs, rare periods of short RP interval SVT up to 122 and frequent PVCs. sinus rhythm with similar findings, frequent PVCs and short RP interval tachycardia. Now chronic       5.  Peripheral vascular disease ([facility])  PVD= Dr. Tina Schroeder atherectomy to distal SFA and distal popliteal with angioplasty to both lesions and stent to the SFA by Dr. Tomasz Boone on 3/14/14. Previously had AIBs of 0.67 on the left, 0.71 on the right with the right seemingly due to distal disease. 6. Hypertension, essential, benign  Extensive bp diary is reviewed and plan for continued current meds  Key CAD CHF Meds             bumetanide (BUMEX) 1 mg tablet (Taking) Please take 3mg in the morning and 1mg in the evening. carvedilol (COREG) 12.5 mg tablet (Taking) Take 1 Tab by mouth two (2) times daily (with meals). hydrALAZINE (APRESOLINE) 50 mg tablet (Taking) Take 3 Tabs by mouth three (3) times daily. aspirin 81 mg chewable tablet (Taking) Take 1 Tab by mouth daily. amLODIPine (NORVASC) 10 mg tablet (Taking) Take 1 Tab by mouth daily. simvastatin (ZOCOR) 20 mg tablet (Taking) Take 20 mg by mouth nightly. omega 3-dha-epa-fish oil (FISH OIL) 100-160-1,000 mg cap (Taking) Take 1 Cap by mouth two (2) times a day. 7. Hypercholesteremia  On statin  No results found for: LDL, LDLC, DLDLP     8. Bilateral carotid artery stenosis  Mild carotid artery disease 3-7-08 then 1-4-12 with 10-49% left, less than 10% on right  9. PA HTN- DR Oscar Yap note reviewed as post capillary PA [facility] May 2, 2019 PA 66/18 W 20 PA sat 55%  F/u 4 months     Impression:   1. Coronary artery disease involving native coronary artery of native heart without angina pectoris    2. CKD (chronic kidney disease) stage 3, GFR 30-59 ml/min ([facility])    3. Diastolic CHF, chronic ([address]    4. Atrial fibrillation, chronic ([address]    5. Peripheral vascular disease ([address]    6. Hypertension, essential, benign    7. Hypercholesteremia    8. Bilateral carotid artery stenosis       Cardiac History:   NUKE  2/11/14,  2 minutes, 20 seconds,  EF of 55% with no ischemia. Dyslipidemia 10-25-10  TG 91 HDL 40 LDL 48  SOCIAL: Drinks no alcohol, quit smoking several years ago, works as an . Lives with his wife and has four children. Enjoys gardening, fishing and music.    FAMILY HISTORY: Mother  of cancer at 76, father  of Parkinson's at 70, one brother  of cancer of the liver at 76 and one  of an infection at 64. ROS-except as noted above. . A complete cardiac and respiratory are reviewed and negative except as above ; Resp-denies wheezing  or productive cough,. Const- No unusual weight loss or fever; Neuro-no recent seizure or CVA ; GI- No BRBPR, abdom pain, bloating ; - no  hematuria   see supplement sheet, initialed and to be scanned by staff  Past Medical History:   Diagnosis Date    CAD (coronary artery disease)     s/p CABG     Carotid arterial disease ([facility]     CKD (chronic kidney disease) stage 3, GFR 30-59 ml/min ([facility] 10/21/2017    hypertension and DM nephrosclerosis    Diabetes mellitus, type 2 ([facility]     Hypercholesteremia     Hypertension     Paroxysmal atrial fibrillation ([facility] 10/21/2017    new onset afib with BRPR 10-19-17 admit    PVC's (premature ventricular contractions) 2014    PVD (peripheral vascular disease) ([facility]       Social Hx= reports that he quit smoking about 34 years ago. His smoking use included cigarettes. He has a 60.00 pack-year smoking history. He has never used smokeless tobacco. He reports that he does not drink alcohol or use drugs. Exam and Labs:  /60 (BP 1 Location: Left arm, BP Patient Position: Sitting)   Pulse 78   Resp 17   Ht 5' 9\" (1.753 m)   Wt 170 lb (77.1 kg)   SpO2 98%   BMI 25.10 kg/m² Constitutional:  NAD, comfortable  Head: NC,AT. Eyes: No scleral icterus. Neck:  Neck supple. No JVD present. Throat: moist mucous membranes. Chest: Effort normal & normal respiratory excursion . Neurological: alert, conversant and oriented . Skin: Skin is not cold. No obvious systemic rash noted. Not diaphoretic. No erythema. Psychiatric:  Grossly normal mood and affect. Behavior appears normal. Extremities:  no clubbing or cyanosis. Abdomen: non distended    Lungs:breath sounds normal. No stridor. distress, wheezes or  Rales. Heart:2/6 MAMI normal rate, regular rhythm, normal S1, S2, no rubs, clicks or gallops , PMI non displaced. Edema: Edema is minimal sock-line edema. No results found for: CHOL, CHOLX, CHLST, CHOLV, HDL, LDL, LDLC, DLDLP, TGLX, TRIGL, TRIGP, CHHD, CHHDX  Lab Results   Component Value Date/Time    Sodium 139 05/19/2019 05:27 AM    Potassium 3.7 05/19/2019 05:27 AM    Chloride 101 05/19/2019 05:27 AM    CO2 28 05/19/2019 05:27 AM    Anion gap 10 05/19/2019 05:27 AM    Glucose 68 05/19/2019 05:27 AM    BUN 56 (H) 05/19/2019 05:27 AM    Creatinine 3.10 (H) 05/19/2019 05:27 AM    BUN/Creatinine ratio 18 05/19/2019 05:27 AM    GFR est AA 24 (L) 05/19/2019 05:27 AM    GFR est non-AA 19 (L) 05/19/2019 05:27 AM    Calcium 8.2 (L) 05/19/2019 05:27 AM      Wt Readings from Last 3 Encounters:   07/24/19 170 lb (77.1 kg)   05/22/19 185 lb (83.9 kg)   05/19/19 178 lb (80.7 kg)      BP Readings from Last 3 Encounters:   07/24/19 108/60   05/22/19 130/60   05/19/19 143/57      Current Outpatient Medications   Medication Sig    bumetanide (BUMEX) 1 mg tablet Please take 3mg in the morning and 1mg in the evening.  albuterol (PROVENTIL VENTOLIN) 2.5 mg /3 mL (0.083 %) nebulizer solution 3 mL by Nebulization route every four (4) hours as needed for Wheezing.  carvedilol (COREG) 12.5 mg tablet Take 1 Tab by mouth two (2) times daily (with meals).  hydrALAZINE (APRESOLINE) 50 mg tablet Take 3 Tabs by mouth three (3) times daily.  sodium bicarbonate 650 mg tablet Take 2 Tabs by mouth three (3) times daily.  tamsulosin (FLOMAX) 0.4 mg capsule Take 1 Cap by mouth daily.  insulin glargine U-300 conc 300 unit/mL (1.5 mL) inpn 18 Units by SubCUTAneous route every morning.  aspirin 81 mg chewable tablet Take 1 Tab by mouth daily.  amLODIPine (NORVASC) 10 mg tablet Take 1 Tab by mouth daily.  simvastatin (ZOCOR) 20 mg tablet Take 20 mg by mouth nightly.     omega 3-dha-epa-fish oil (FISH OIL) 100-160-1,000 mg cap Take 1 Cap by mouth two (2) times a day.  cinnamon bark (CINNAMON) 500 mg cap Take 1,000 mg by mouth two (2) times a day. No current facility-administered medications for this visit. Impression see above.       Written by Melissa Anne, as dictated by America Elliott MD.

## 2019-07-24 NOTE — PROGRESS NOTES
Goals Addressed                 This Visit's Progress     Attends follow-up appointments as directed. 05/21/19    Spoke to patient regarding follow up appts. And discussed follow up with other providers that were not listed in discharge instructions. Patient reports he saw Dr Radha Camarena yesterday---nephrology. Pt reports MD wants pt and wife to attend a class to educate them on dialysis---pt states he will be going on dialysis sometime soon. He has not made appointment yet to attend informational session yet. NN will follow up with pt on this. Patient reports he saw a NP for Dr González Estrada at pulmonology office right before he was readmitted. He states they could not perform any tests on him since he was feeling so poorly. He also states they instructed him to use nebulizer treatments to see if that would help and scheduled him to have tests performed either in June or July--he could not remember exactly during this call. NN will follow up with pt at later date to confirm appt. Pt does have an appt tomorrow to see Dr Madeline Perea, cardiology. NN will see pt during this apt to provide educational material on diet, ACP, and additional HF information. NN will ask pt about PCP follow up tomorrow---there ws no appt scheduled at Discharge. Pt lives out of range for [address]  and [facility].---xena    05/29/19  NN unable to reach pt by phone--ARGELIA on . NN attempted to contact PCP office to see if patient scheduled MARTINEZ appointment, ARGELIA on MD office . NN will follow up next week---xena    06/05/19  Spoke to patient 's wife. She reports that they has an appt today with PCP, however the office cancelled the appt and r/s for 6/11/19. She reports pt had labwork drawn on 6/3 for this appt. Patient has a follow up for COPD exacerbation on 6/10/19. Wife also reports patient has an appt to have labs drawn for Dr Radha Camarena, nephrology on the 14th and then on June 24th will start their dialysis training.   Wife also reports that a dietician is coming to their home on Friday 6/7/19 to prepare them for dialysis. NN will follow up with patient for updates next week---rw    06/10/19  NN unable to reach patient---LM on VM. Patient is supposed to have appt with pulmonary today. NN will follow up later in the week if no return call received---rw    06/26/19  Spoke to pt's wife today---she reports that they have a very busy month in July with the following appts:    7/1/19   [facility] for P[facility]'s and other studies. They will return again on 7/15/19  To see MD regarding pulmonary HTN and results of PFT.    7/16/19   They will attend dialysis class for PD. Although Dr John Dupree has put PD plan on hold for now, he want pt to still attend class in the event that pt will have to go on PD. Note: Per wife, they saw Dr John Dupree on Monday 6/24/19.    7/24/19  Pt sees cardiology Dr Sidra Quinteros.    NN will follow up in 7-10 days for updates---rw    07/23/19  Spoke with patient's wife. She reports [facility] saw [facility] on 7/15/19. She had questions about why patient did not have a test performed for pulmonary HTN and also wants to know results of an overnight sleep study that was performed by [facility] at home back in June --she states that it was to get pt home 02 at night. She also states that pt will be having a sleep apnea study on 7/22. [facility] called [facility]--spoke with LONNY Duncan. She took detailed message to send to MA to return call. Mrs Romy Madrigal reports they went to Dialysis class on 7/15/19. She states it was very informative and that they also got more dietary resources from the nutritionist.  They also had labs drawn at this appt---will see Dr John Dupree on 7/25 to see if pt remains stable. Tomorrow they have an appt to see Dr Sidra Quinteros at 2:00. CTN will follow up with [facility]. If no return call received---[facility]    07/24/19  Received call from [address] [place] [address] at [facility].  She reports that she put order in to [facility] on 7/17/19 for nocturnal 02 at 3l/NC--pt should be hearing from [facility] in a few days to set up delivery. She also reports that the only testing for PH to be done by Dr Mihai Sheth is the sleep apnea eval and study. The eval is scheduled for 8/22 at 56 at [facility] location. She states pt is not a candidate of vasodilative medications so they are trying other options such as 02 and CPAP. CTN called pt's home to relay information, LM on VM asking pt/wife to call back. ---xena

## 2019-07-25 ENCOUNTER — PATIENT OUTREACH (OUTPATIENT)
Dept: CARDIOLOGY CLINIC | Age: 84
End: 2019-07-25

## 2019-07-25 NOTE — PROGRESS NOTES
Goals Addressed                 This Visit's Progress     Attends follow-up appointments as directed. 05/21/19    Spoke to patient regarding follow up appts. And discussed follow up with other providers that were not listed in discharge instructions. Patient reports he saw Dr Yonatan Hubbard yesterday---nephrology. Pt reports MD wants pt and wife to attend a class to educate them on dialysis---pt states he will be going on dialysis sometime soon. He has not made appointment yet to attend informational session yet. NN will follow up with pt on this. Patient reports he saw a NP for Dr Genet Jj at pulmonology office right before he was readmitted. He states they could not perform any tests on him since he was feeling so poorly. He also states they instructed him to use nebulizer treatments to see if that would help and scheduled him to have tests performed either in June or July--he could not remember exactly during this call. NN will follow up with pt at later date to confirm appt. Pt does have an appt tomorrow to see Dr Hendricks Epley, cardiology. NN will see pt during this apt to provide educational material on diet, ACP, and additional HF information. NN will ask pt about PCP follow up tomorrow---there ws no appt scheduled at Discharge. Pt lives out of range for [facility] and [facility].---mkrw    05/29/19  NN unable to reach pt by phone--ARGELIA on VM. NN attempted to contact PCP office to see if patient scheduled MARTINEZ appointment, ARGELIA on MD office VM. NN will follow up next week---xena    06/05/19  Spoke to patient 's wife. She reports that they has an appt today with PCP, however the office cancelled the appt and r/s for 6/11/19. She reports pt had labwork drawn on 6/3 for this appt. Patient has a follow up for COPD exacerbation on 6/10/19. Wife also reports patient has an appt to have labs drawn for Dr Yonatan Hubbard, nephrology on the 14th and then on June 24th will start their dialysis training.   Wife also reports that a dietician is coming to their home on Friday 6/7/19 to prepare them for dialysis. NN will follow up with patient for updates next week---rw    06/10/19  NN unable to reach patient---LM on VM. Patient is supposed to have appt with pulmonary today. NN will follow up later in the week if no return call received---[facility]    06/26/19  Spoke to pt's wife today---she reports that they have a very busy month in July with the following appts:    7/1/19   [facility] for PFT's and other studies. They will return again on 7/15/19  To see MD regarding pulmonary HTN and results of PFT.    7/16/19   They will attend dialysis class for PD. Although Dr Micaela Forrester has put PD plan on hold for now, he want pt to still attend class in the event that pt will have to go on PD. Note: Per wife, they saw Dr Micaela Forrester on Monday 6/24/19.    7/24/19  Pt sees cardiology Dr Raoul Rene.    NN will follow up in 7-10 days for updates---[facility]    07/23/19  Spoke with patient's wife. She reports Patient saw [facility] on 7/15/19. She had questions about why patient did not have a test performed for pulmonary HTN and also wants to know results of an overnight sleep study that was performed by [facility] at home back in June --she states that it was to get pt home 02 at night. She also states that pt will be having a sleep apnea study on 7/22. CTN called [facility]--spoke with LONNY Duncan. She took detailed message to send to MA to return call. Mrs Bianca Hernández reports they went to Dialysis class on 7/15/19. She states it was very informative and that they also got more dietary resources from the nutritionist.  They also had labs drawn at this appt---will see Dr Micaela Forrester on 7/25 to see if pt remains stable. Tomorrow they have an appt to see Dr Raoul Rene at 2:00. CTN will follow up with [facility]. If no return call received---[facility]    07/24/19  Received call from [address] [place] [address] at [facility].  She reports that she put order in [address] on 7/17/19 for nocturnal 02 at 3l/NC--pt should be hearing from [address] in a few days to set up delivery. She also reports that the only testing for PH to be done by Dr Brenda Quigley is the sleep apnea eval and study. The eval is scheduled for 8/22 at  at [facility] location. She states pt is not a candidate of vasodilative medications so they are trying other options such as 02 and CPAP. CTN called pt's home to relay information, LM on VM asking pt/wife to call back. ---mkrw    07/25/19  Pt's wife returned CTN call this morning. CTN gave her information from [facility] about the home 02 and that [address] should be contacting her before the end of the week. CTN will follow up with [address] if pt has not heard from them by end of week. Pt's wife reports they saw Dr Qasim Chatman yesterday. Reports everything is stable--no changes made. CTN will follow up next week---mkrw         Maintains daily weight. 05/21/19  NN spoke to pt today---he reports that he weighs daily. Today's weight 180 lbs, stable. NN discussed with pt when to call Dr Qasim Chatman --pt verbalizes when weight goes up 3 lbs/day. NN also educated him on weight increase 5 lbs/week. NN confirmed that pt is taking bumex 3 mg in AM and 1 mg in PM. NN suggested to pt to take evening bumex around 3-4pm so he doesn't wake up urinating all night. Pt verbalizes understanding. NN encouraged pt to continue daily weights--will follow up next week for updates--mkrw    05/29/19  Unable to reach pt by phone, XANDER LM on VM. NN will follow up with patient next week--mkrw    06/05/19  Spoke to patient's wife today. She reports they are weighing daily ---today's weight is 178 lbs, down 2 lbs from 5/21/19. She reports that pt has a small amount of edema in his feet, but it has improved.  She reports his bumex has been increased to taking 3 mg bumex in AM and 3 mg in PM.   Patient was struggling last week with his breathing---they saw [facility]. Last Wednesday, ordered prednisone taper. Patient reports he has 2 more doses to take. He was also on a Zpack which is almost completed. Wife reports patient is doing better than he was last week prior to the prednisone. NN will follow up next week for updates--rw    06/20/19  NN unable to reach pt by phone, ARGELIA on  . NN will follow up next week for updates---rw    06/26/19  Spoke to pt's wife as pt was having a breathing treatment at time of call. She reports pt weight is currently 175 lbs--down 3 lbs from last report on 6/5/19. She reports that pt has been feeling better since completing abx , reports no increased SOB (she states pt always has a little SOB due to COPD), no coughing, no edema. She also reports that dialysis has been put on hold by Dr Juanita Cain for now---states his kidney function has stabilized and pt's fluid status is stable. She states that this may change but for now MD is satified. NN asked wife to weigh pt daily and to call MD if weight increases by 3 lbs/day or 5 lbs/week, she verbalizes understanding. NN will follow up in one week---[facility]    07/09/19  Unable to reach pt by phone---ARGELIA on  requesting return call, CTN will follow up next week---[facility]    07/25/19   Patient saw Dr Uyen Parker yesterday. Reports the following: /60 (BP 1 Location: Left arm, BP Patient Position: Sitting)    Pulse 78    Resp 17    Ht 5' 9\" (1.753 m)    Wt 170 lb (77.1 kg)   SpO2 98%   Weight down 3 lbs from last pt report.  CTN will follow up with pt next week---[facility]

## 2019-08-05 ENCOUNTER — HOSPITAL ENCOUNTER (INPATIENT)
Age: 84
LOS: 15 days | Discharge: HOME | DRG: 189 | End: 2019-08-20
Attending: EMERGENCY MEDICINE | Admitting: FAMILY MEDICINE
Payer: MEDICARE

## 2019-08-05 ENCOUNTER — APPOINTMENT (OUTPATIENT)
Dept: GENERAL RADIOLOGY | Age: 84
DRG: 189 | End: 2019-08-05
Attending: EMERGENCY MEDICINE
Payer: MEDICARE

## 2019-08-05 DIAGNOSIS — I48.20 ATRIAL FIBRILLATION, CHRONIC (HCC): ICD-10-CM

## 2019-08-05 DIAGNOSIS — J44.1 COPD EXACERBATION (HCC): Primary | ICD-10-CM

## 2019-08-05 DIAGNOSIS — R00.1 BRADYCARDIA: ICD-10-CM

## 2019-08-05 DIAGNOSIS — R77.8 ELEVATED TROPONIN: ICD-10-CM

## 2019-08-05 DIAGNOSIS — I25.10 CORONARY ARTERY DISEASE INVOLVING NATIVE CORONARY ARTERY OF NATIVE HEART WITHOUT ANGINA PECTORIS: ICD-10-CM

## 2019-08-05 DIAGNOSIS — I50.9 OTHER CONGESTIVE HEART FAILURE (HCC): ICD-10-CM

## 2019-08-05 DIAGNOSIS — I48.91 ATRIAL FIBRILLATION WITH SLOW VENTRICULAR RESPONSE (HCC): ICD-10-CM

## 2019-08-05 DIAGNOSIS — R09.02 HYPOXIA: ICD-10-CM

## 2019-08-05 DIAGNOSIS — J96.01 ACUTE HYPOXEMIC RESPIRATORY FAILURE (HCC): ICD-10-CM

## 2019-08-05 DIAGNOSIS — R53.1 GENERALIZED WEAKNESS: ICD-10-CM

## 2019-08-05 DIAGNOSIS — I50.41 ACUTE COMBINED SYSTOLIC AND DIASTOLIC ACC/AHA STAGE C CONGESTIVE HEART FAILURE (HCC): ICD-10-CM

## 2019-08-05 DIAGNOSIS — J18.9 COMMUNITY ACQUIRED PNEUMONIA OF RIGHT MIDDLE LOBE OF LUNG: ICD-10-CM

## 2019-08-05 DIAGNOSIS — N18.30 CKD (CHRONIC KIDNEY DISEASE) STAGE 3, GFR 30-59 ML/MIN (HCC): ICD-10-CM

## 2019-08-05 LAB
ALBUMIN SERPL-MCNC: 3.2 G/DL (ref 3.5–5)
ALBUMIN/GLOB SERPL: 0.7 {RATIO} (ref 1.1–2.2)
ALP SERPL-CCNC: 130 U/L (ref 45–117)
ALT SERPL-CCNC: 54 U/L (ref 12–78)
ANION GAP SERPL CALC-SCNC: 11 MMOL/L (ref 5–15)
ARTERIAL PATENCY WRIST A: YES
AST SERPL-CCNC: 76 U/L (ref 15–37)
ATRIAL RATE: 300 BPM
ATRIAL RATE: 357 BPM
BASE EXCESS BLD CALC-SCNC: 2 MMOL/L
BASOPHILS # BLD: 0 K/UL (ref 0–0.1)
BASOPHILS NFR BLD: 0 % (ref 0–1)
BDY SITE: ABNORMAL
BILIRUB SERPL-MCNC: 0.8 MG/DL (ref 0.2–1)
BNP SERPL-MCNC: ABNORMAL PG/ML
BUN SERPL-MCNC: 65 MG/DL (ref 6–20)
BUN/CREAT SERPL: 17 (ref 12–20)
CALCIUM SERPL-MCNC: 9.2 MG/DL (ref 8.5–10.1)
CALCULATED R AXIS, ECG10: -22 DEGREES
CALCULATED R AXIS, ECG10: -44 DEGREES
CALCULATED T AXIS, ECG11: -54 DEGREES
CALCULATED T AXIS, ECG11: -55 DEGREES
CHLORIDE SERPL-SCNC: 90 MMOL/L (ref 97–108)
CO2 SERPL-SCNC: 30 MMOL/L (ref 21–32)
COMMENT, HOLDF: NORMAL
CREAT SERPL-MCNC: 3.85 MG/DL (ref 0.7–1.3)
DIAGNOSIS, 93000: NORMAL
DIAGNOSIS, 93000: NORMAL
DIFFERENTIAL METHOD BLD: ABNORMAL
EOSINOPHIL # BLD: 0.1 K/UL (ref 0–0.4)
EOSINOPHIL NFR BLD: 1 % (ref 0–7)
ERYTHROCYTE [DISTWIDTH] IN BLOOD BY AUTOMATED COUNT: 15 % (ref 11.5–14.5)
GAS FLOW.O2 O2 DELIVERY SYS: ABNORMAL L/MIN
GAS FLOW.O2 SETTING OXYMISER: 4 L/M
GLOBULIN SER CALC-MCNC: 4.3 G/DL (ref 2–4)
GLUCOSE BLD STRIP.AUTO-MCNC: 478 MG/DL (ref 65–100)
GLUCOSE BLD STRIP.AUTO-MCNC: 536 MG/DL (ref 65–100)
GLUCOSE SERPL-MCNC: 204 MG/DL (ref 65–100)
HCO3 BLD-SCNC: 26.3 MMOL/L (ref 22–26)
HCT VFR BLD AUTO: 28.1 % (ref 36.6–50.3)
HGB BLD-MCNC: 9.2 G/DL (ref 12.1–17)
IMM GRANULOCYTES # BLD AUTO: 0.1 K/UL (ref 0–0.04)
IMM GRANULOCYTES NFR BLD AUTO: 1 % (ref 0–0.5)
LACTATE BLD-SCNC: 0.79 MMOL/L (ref 0.4–2)
LYMPHOCYTES # BLD: 0.5 K/UL (ref 0.8–3.5)
LYMPHOCYTES NFR BLD: 5 % (ref 12–49)
MCH RBC QN AUTO: 28.4 PG (ref 26–34)
MCHC RBC AUTO-ENTMCNC: 32.7 G/DL (ref 30–36.5)
MCV RBC AUTO: 86.7 FL (ref 80–99)
MONOCYTES # BLD: 0.8 K/UL (ref 0–1)
MONOCYTES NFR BLD: 7 % (ref 5–13)
NEUTS SEG # BLD: 9.4 K/UL (ref 1.8–8)
NEUTS SEG NFR BLD: 86 % (ref 32–75)
NRBC # BLD: 0 K/UL (ref 0–0.01)
NRBC BLD-RTO: 0 PER 100 WBC
PCO2 BLD: 38.2 MMHG (ref 35–45)
PH BLD: 7.45 [PH] (ref 7.35–7.45)
PLATELET # BLD AUTO: 211 K/UL (ref 150–400)
PMV BLD AUTO: 10.4 FL (ref 8.9–12.9)
PO2 BLD: 82 MMHG (ref 80–100)
POTASSIUM SERPL-SCNC: 3.2 MMOL/L (ref 3.5–5.1)
PROT SERPL-MCNC: 7.5 G/DL (ref 6.4–8.2)
Q-T INTERVAL, ECG07: 468 MS
Q-T INTERVAL, ECG07: 532 MS
QRS DURATION, ECG06: 152 MS
QRS DURATION, ECG06: 156 MS
QTC CALCULATION (BEZET), ECG08: 533 MS
QTC CALCULATION (BEZET), ECG08: 544 MS
RBC # BLD AUTO: 3.24 M/UL (ref 4.1–5.7)
RBC MORPH BLD: ABNORMAL
SAMPLES BEING HELD,HOLD: NORMAL
SAO2 % BLD: 97 % (ref 92–97)
SERVICE CMNT-IMP: ABNORMAL
SERVICE CMNT-IMP: ABNORMAL
SODIUM SERPL-SCNC: 131 MMOL/L (ref 136–145)
SPECIMEN TYPE: ABNORMAL
TROPONIN I SERPL-MCNC: 0.42 NG/ML
TROPONIN I SERPL-MCNC: 0.45 NG/ML
VENTRICULAR RATE, ECG03: 63 BPM
VENTRICULAR RATE, ECG03: 78 BPM
WBC # BLD AUTO: 10.9 K/UL (ref 4.1–11.1)

## 2019-08-05 PROCEDURE — 36415 COLL VENOUS BLD VENIPUNCTURE: CPT

## 2019-08-05 PROCEDURE — 94640 AIRWAY INHALATION TREATMENT: CPT

## 2019-08-05 PROCEDURE — 94664 DEMO&/EVAL PT USE INHALER: CPT

## 2019-08-05 PROCEDURE — 84484 ASSAY OF TROPONIN QUANT: CPT

## 2019-08-05 PROCEDURE — 65660000000 HC RM CCU STEPDOWN

## 2019-08-05 PROCEDURE — 71046 X-RAY EXAM CHEST 2 VIEWS: CPT

## 2019-08-05 PROCEDURE — 74011000250 HC RX REV CODE- 250: Performed by: EMERGENCY MEDICINE

## 2019-08-05 PROCEDURE — 82803 BLOOD GASES ANY COMBINATION: CPT

## 2019-08-05 PROCEDURE — 74011250636 HC RX REV CODE- 250/636: Performed by: FAMILY MEDICINE

## 2019-08-05 PROCEDURE — 74011000258 HC RX REV CODE- 258: Performed by: EMERGENCY MEDICINE

## 2019-08-05 PROCEDURE — 80053 COMPREHEN METABOLIC PANEL: CPT

## 2019-08-05 PROCEDURE — 77010033678 HC OXYGEN DAILY

## 2019-08-05 PROCEDURE — 99285 EMERGENCY DEPT VISIT HI MDM: CPT

## 2019-08-05 PROCEDURE — 74011000250 HC RX REV CODE- 250: Performed by: FAMILY MEDICINE

## 2019-08-05 PROCEDURE — 74011250637 HC RX REV CODE- 250/637: Performed by: EMERGENCY MEDICINE

## 2019-08-05 PROCEDURE — 93005 ELECTROCARDIOGRAM TRACING: CPT

## 2019-08-05 PROCEDURE — 82962 GLUCOSE BLOOD TEST: CPT

## 2019-08-05 PROCEDURE — 74011250637 HC RX REV CODE- 250/637: Performed by: FAMILY MEDICINE

## 2019-08-05 PROCEDURE — 74011250636 HC RX REV CODE- 250/636: Performed by: INTERNAL MEDICINE

## 2019-08-05 PROCEDURE — 83880 ASSAY OF NATRIURETIC PEPTIDE: CPT

## 2019-08-05 PROCEDURE — 36600 WITHDRAWAL OF ARTERIAL BLOOD: CPT

## 2019-08-05 PROCEDURE — 87040 BLOOD CULTURE FOR BACTERIA: CPT

## 2019-08-05 PROCEDURE — 85025 COMPLETE CBC W/AUTO DIFF WBC: CPT

## 2019-08-05 PROCEDURE — 74011636637 HC RX REV CODE- 636/637: Performed by: EMERGENCY MEDICINE

## 2019-08-05 PROCEDURE — 83605 ASSAY OF LACTIC ACID: CPT

## 2019-08-05 PROCEDURE — 74011250636 HC RX REV CODE- 250/636: Performed by: EMERGENCY MEDICINE

## 2019-08-05 RX ORDER — HEPARIN SODIUM 5000 [USP'U]/ML
5000 INJECTION, SOLUTION INTRAVENOUS; SUBCUTANEOUS EVERY 12 HOURS
Status: DISCONTINUED | OUTPATIENT
Start: 2019-08-05 | End: 2019-08-20 | Stop reason: HOSPADM

## 2019-08-05 RX ORDER — ACETAMINOPHEN 325 MG/1
650 TABLET ORAL
Status: DISCONTINUED | OUTPATIENT
Start: 2019-08-05 | End: 2019-08-20 | Stop reason: HOSPADM

## 2019-08-05 RX ORDER — AZITHROMYCIN 250 MG/1
250 TABLET, FILM COATED ORAL DAILY
Status: DISCONTINUED | OUTPATIENT
Start: 2019-08-06 | End: 2019-08-06

## 2019-08-05 RX ORDER — GUAIFENESIN 100 MG/5ML
162 LIQUID (ML) ORAL
Status: COMPLETED | OUTPATIENT
Start: 2019-08-05 | End: 2019-08-05

## 2019-08-05 RX ORDER — BUMETANIDE 1 MG/1
3 TABLET ORAL 2 TIMES DAILY
COMMUNITY
End: 2019-08-20

## 2019-08-05 RX ORDER — SODIUM CHLORIDE 0.9 % (FLUSH) 0.9 %
5-40 SYRINGE (ML) INJECTION AS NEEDED
Status: DISCONTINUED | OUTPATIENT
Start: 2019-08-05 | End: 2019-08-20 | Stop reason: HOSPADM

## 2019-08-05 RX ORDER — GUAIFENESIN 100 MG/5ML
81 LIQUID (ML) ORAL DAILY
Status: DISCONTINUED | OUTPATIENT
Start: 2019-08-06 | End: 2019-08-20 | Stop reason: HOSPADM

## 2019-08-05 RX ORDER — SODIUM CHLORIDE 0.9 % (FLUSH) 0.9 %
5-40 SYRINGE (ML) INJECTION EVERY 8 HOURS
Status: DISCONTINUED | OUTPATIENT
Start: 2019-08-05 | End: 2019-08-20 | Stop reason: HOSPADM

## 2019-08-05 RX ORDER — TAMSULOSIN HYDROCHLORIDE 0.4 MG/1
0.4 CAPSULE ORAL DAILY
Status: DISCONTINUED | OUTPATIENT
Start: 2019-08-06 | End: 2019-08-20 | Stop reason: HOSPADM

## 2019-08-05 RX ORDER — SIMVASTATIN 20 MG/1
20 TABLET, FILM COATED ORAL
Status: DISCONTINUED | OUTPATIENT
Start: 2019-08-05 | End: 2019-08-20 | Stop reason: HOSPADM

## 2019-08-05 RX ORDER — IPRATROPIUM BROMIDE AND ALBUTEROL SULFATE 2.5; .5 MG/3ML; MG/3ML
3 SOLUTION RESPIRATORY (INHALATION)
Status: DISCONTINUED | OUTPATIENT
Start: 2019-08-05 | End: 2019-08-06

## 2019-08-05 RX ORDER — PREDNISONE 20 MG/1
60 TABLET ORAL
Status: COMPLETED | OUTPATIENT
Start: 2019-08-05 | End: 2019-08-05

## 2019-08-05 RX ORDER — HYDROCODONE BITARTRATE AND ACETAMINOPHEN 5; 325 MG/1; MG/1
1 TABLET ORAL
Status: DISCONTINUED | OUTPATIENT
Start: 2019-08-05 | End: 2019-08-20 | Stop reason: HOSPADM

## 2019-08-05 RX ORDER — BUMETANIDE 1 MG/1
3 TABLET ORAL 2 TIMES DAILY
Status: DISCONTINUED | OUTPATIENT
Start: 2019-08-05 | End: 2019-08-07

## 2019-08-05 RX ORDER — MORPHINE SULFATE 2 MG/ML
1 INJECTION, SOLUTION INTRAMUSCULAR; INTRAVENOUS
Status: DISCONTINUED | OUTPATIENT
Start: 2019-08-05 | End: 2019-08-20 | Stop reason: HOSPADM

## 2019-08-05 RX ORDER — PREDNISONE 20 MG/1
20 TABLET ORAL
Status: DISCONTINUED | OUTPATIENT
Start: 2019-08-06 | End: 2019-08-07

## 2019-08-05 RX ORDER — SODIUM BICARBONATE 650 MG/1
1300 TABLET ORAL 3 TIMES DAILY
Status: DISCONTINUED | OUTPATIENT
Start: 2019-08-05 | End: 2019-08-20 | Stop reason: HOSPADM

## 2019-08-05 RX ORDER — CARVEDILOL 12.5 MG/1
12.5 TABLET ORAL 2 TIMES DAILY WITH MEALS
Status: DISCONTINUED | OUTPATIENT
Start: 2019-08-05 | End: 2019-08-14

## 2019-08-05 RX ADMIN — SODIUM BICARBONATE 1300 MG: 650 TABLET ORAL at 23:22

## 2019-08-05 RX ADMIN — ASPIRIN 81 MG 162 MG: 81 TABLET ORAL at 10:58

## 2019-08-05 RX ADMIN — IPRATROPIUM BROMIDE AND ALBUTEROL SULFATE 3 ML: .5; 3 SOLUTION RESPIRATORY (INHALATION) at 20:03

## 2019-08-05 RX ADMIN — IPRATROPIUM BROMIDE AND ALBUTEROL SULFATE 3 ML: .5; 3 SOLUTION RESPIRATORY (INHALATION) at 13:15

## 2019-08-05 RX ADMIN — Medication 10 ML: at 15:42

## 2019-08-05 RX ADMIN — Medication 10 ML: at 11:06

## 2019-08-05 RX ADMIN — PREDNISONE 60 MG: 20 TABLET ORAL at 10:58

## 2019-08-05 RX ADMIN — AZITHROMYCIN MONOHYDRATE 500 MG: 500 INJECTION, POWDER, LYOPHILIZED, FOR SOLUTION INTRAVENOUS at 11:19

## 2019-08-05 RX ADMIN — SIMVASTATIN 20 MG: 20 TABLET, FILM COATED ORAL at 23:22

## 2019-08-05 RX ADMIN — CEFTRIAXONE SODIUM 2 G: 2 INJECTION, POWDER, FOR SOLUTION INTRAMUSCULAR; INTRAVENOUS at 11:03

## 2019-08-05 RX ADMIN — Medication 10 ML: at 23:22

## 2019-08-05 RX ADMIN — EPOETIN ALFA-EPBX 10000 UNITS: 10000 INJECTION, SOLUTION INTRAVENOUS; SUBCUTANEOUS at 19:24

## 2019-08-05 RX ADMIN — HEPARIN SODIUM 5000 UNITS: 5000 INJECTION INTRAVENOUS; SUBCUTANEOUS at 10:59

## 2019-08-05 RX ADMIN — ALBUTEROL SULFATE 1 DOSE: 2.5 SOLUTION RESPIRATORY (INHALATION) at 09:09

## 2019-08-05 RX ADMIN — CARVEDILOL 12.5 MG: 12.5 TABLET, FILM COATED ORAL at 17:01

## 2019-08-05 RX ADMIN — SODIUM BICARBONATE 1300 MG: 650 TABLET ORAL at 17:01

## 2019-08-05 RX ADMIN — HEPARIN SODIUM 5000 UNITS: 5000 INJECTION INTRAVENOUS; SUBCUTANEOUS at 23:22

## 2019-08-05 RX ADMIN — ALBUTEROL SULFATE 1 DOSE: 2.5 SOLUTION RESPIRATORY (INHALATION) at 09:00

## 2019-08-05 NOTE — CONSULTS
Cardiology Consult Note Patient Name: Ortiz Chappell  : 1935 MRN: [id number] Date: 2019  Time: 12:19 PM 
 
Admit Diagnosis: CHF exacerbation ([facility] [I50.9] Acute hypoxemic respiratory failure ([facility] [J96.01] Primary Cardiologist: Hermilo Retana. Mino Shelton M.D. Consulting Cardiologist: Chapo Luis MD 
 
Reason for Consult: CHF Requesting MD: Regina Lundberg MD 
 
HPI: 
Ortiz Chappell. is a 81 y.o. male admitted on 2019  for CHF exacerbation ([facility] [I50.9] Acute hypoxemic respiratory failure ([facility] [J96.01]. Presents to the ED for SOB. Noticed fairly acute onset SOB beginning yesterday afternoon. SOB progressed to the point were ambulating short distances caused AGUILAR. No reported CP, but he has been having a cough productive of greenish sputum. No reported fevers. Cardiac h/o CAD, s/p CABG, HFpEF, PAD, HTN, HLD and DM. Also h/o PAF, s/p leadless PPM for daisy. CKD, follows up with Dr. America Ochoa with ongoing discussions for progressing toward HD. He most recently finished steroid taper for COPD exacerbation. Subjective: 
No SOB at rest.  AGUILAR with short distances. No CP or palpitations. Assessment and Plan 1. AGUILAR 
 - with Ambulation - CXR with mild edema, more significant for PNA pattern - IV Abx started - No edema of ankles or abdomen. Focal crackles in left lower base. - Hypoxia - Sats 81-93% on 5L NC 
2. Diastolic heart failure - Chronic    
 - NYHA class III 
 - EF - 55-60%, Primary team repeating echo - Bumex 3 mg PO BID PTA - resumed 3. COPD 
 - stable, Primary team resumed steroids and home meds 4. CAD 
 - h/o off pump CABG -  - Trop 0.42 - mixed demand with fixed CAD 
 - Continue home meds 5. CKD 
 - stage IV 
 - Dr. America Ochoa to see 6. DM 
 - SSI 7. HLD 
 - Home statin resumed AGUILAR, hypoxia with O2 per NC. Suspect less a picture of volume overload, as related to HFpEF. More a picture of beginning lung infiltrates.   No edema of legs or abdomen on exam.  Scant crackles in bases, but rhonchi noted. Continue pulmonary support, follow up echo results and Nephrology to see for CKD. Troponin with mild elevation, secondary repolarization changes on the ECG related to underlying conduction delay. Suspect CAD but given advanced renal dysfunction cath will not be reasonable unless patient is hemodynamically unstable or reports protracted chest pain. Given his overall normal EF on recent echocardiogram conservative medical therapy may be reasonable. Will follow troponin, but remain with medical management only at this time. If significant troponin rise is noted we may consider adding Plavix and using short-term anticoagulation with unfractionated heparin. Investigations: 
ECG: Bifascicular block nonspecific ST-T changes Echocardiogram May 2019: Normal LV systolic function of 46-41. Grade 2 diastolic dysfunction. Palliative care consult?- does patient wish for consult? Advance Care Planning 5/18/2019 Patient's Healthcare Decision Maker is: Legal Next of Kin Primary Decision Maker Name -  
Primary Decision Maker Relationship to Patient -  
Confirm Advance Directive Yes, not on file Patient Would Like to Complete Advance Directive -  
 
- will need functional assessment before discharge  
- H2H ordered and will need heart failure follow up. Patient Active Problem List  
Diagnosis Code  Bradycardia R00.1  CAD (coronary artery disease) I25.10  Hypertension, essential, benign I10  
 Carotid arterial disease ([facility]) I77.9  Hypercholesteremia E78.00  
 PVD (peripheral vascular disease) ([facility]) I73.9  PVC's (premature ventricular contractions) I49.3  Dyspnea R06.00  Type 2 diabetes mellitus with diabetic peripheral angiopathy without gangrene ([facility]) E11.51  
 CKD (chronic kidney disease) N18.9  Acute renal failure (ARF) ([facility]) N17.9  Hypokalemia E87.6  Generalized weakness R53.1  Elevated troponin R74.8  KATALINA (acute kidney injury) ([facility] N17.9  GI bleed K92.2  Hyperglycemia due to type 2 diabetes mellitus ([facility] E11.65  Atrial fibrillation, chronic (HCC) I48.2  CKD (chronic kidney disease) stage 3, GFR 30-59 ml/min ([facility]) N18.3  Type 2 diabetes with nephropathy ([facility]) E11.21  
 CHF (congestive heart failure) ([facility]) I50.9  Atrial fibrillation with slow ventricular response ([facility]) I48.91  
 Pacemaker Z95.0  
 CHF exacerbation ([facility]) I50.9  Acute hypoxemic respiratory failure ([facility]) J96.01  
 
CAD/CABG  
off pump x3 3/2008 . =post op anemia and renal failure CATH 2008= LM -bifurcation dz 40% ;LAD 85% ; Circ ost 70% mid 70% RCA proximal 60% tapering, EF 60%-70%, bilaterally patent renal arteries, mild atherosclerosis of the distal abdominal aorta. [address] May 2, 2019 =PA 66/18 W 20 PA sat 55% CO 4.1 CI 2.12 Echo May 1 ,2019 EF 55-60% mod LAE, Mild CASSIE, Mild AS BELLE 1.6 cm2 mild MR & TR, RV fx mildly reduced PVD-18- RLE- mid CRA 70, Pop 99-PTA on right pop, LLE LCFA 40% - 17 PTA RCFA 90, JAS to RSFA  
-16 PTA Left Pop, PTA Left tib-per 
--3-13-14 PTA Left op PTA RSFA 
---11 stent RSFA  
JOCELYN 17 Normal perfusion Mild carotid artery disease 3-7-08 then 12 with 10-49% left, less than 10% on right SOCIAL: Drinks no alcohol, quit smoking several years ago, worked as an . Lives with his wife and has four children. Enjoys gardening, fishing and music. FAMILY HISTORY: Mother  of cancer at 76, father  of Parkinson's at 70, one brother  of cancer of the liver at 76 and one  of an infection at 64. Review of Systems: 
 
 GENERAL Recent weight loss - no Fever -----------------   no 
 Chills -----------------   no 
 
 EYES, VISION Visual Changes - no 
 
 EARS, NOSE, THROAT Hearing loss ----------- no 
 Swallowing difficulties - no CARDIOVASCULAR  Chest pain/pressure ---- no 
 Arrhythmia/palpitations - no RESPIRATORY Cough ------------------ no Shortness of breath - no Wheezing -------------- no   GASTROINTESTINAL Abdominal pain - no Heartburn -------- no 
 Bloody stool ----- no 
 
 GENITOURINARY Frequent urination - no Urgency -------------- no 
 
 MUSCULOSKELETAL Joint pain/swelling ---- no 
 Musculoskeletal pain - no 
 
 SKIN & INTEGUMENTARY Rashes - no Sores --- no 
 
 
   NEUROLOGICAL Numbness/tingling - no Sensation loss ------ no 
 
 PSYCHIATRIC Nervousness/anxiety - no Depression -------------- no 
 
 ENDOCRINE Heat/cold intolerance - no Excessive thirst -------- no 
 
 HEMATOLOGIC/LYMPHATIC Abnormal bleeding - no ALL/IMMUN Allergic reaction ------ no 
 Recurrent infections - no Previous treatment/evaluation includes Coronary Artery Bypass Graft and cardiac catheterization . Cardiac risk factors: dyslipidemia, diabetes mellitus, sedentary life style, male gender, hypertension. Past Medical History:  
Diagnosis Date  CAD (coronary artery disease) s/p CABG 2008  Carotid arterial disease ([facility]  CKD (chronic kidney disease) stage 3, GFR 30-59 ml/min ([facility]) 10/21/2017  
 hypertension and DM nephrosclerosis  Diabetes mellitus, type 2 [facility]  Hypercholesteremia  Hypertension  Paroxysmal atrial fibrillation [facility] 10/21/2017  
 new onset afib with BRPR 10-19-17 admit  PVC's (premature ventricular contractions) 5/26/2014  PVD (peripheral vascular disease) ([facility] Past Surgical History:  
Procedure Laterality Date  HX CORONARY ARTERY BYPASS GRAFT    
 HX HEART CATHETERIZATION    
 [place] TCAT INSJ/RPL PERM LEADLESS PACEMAKER RV W/IMG N/A 5/9/2019 INSERT OR REPLACE TRANSCATH PPM LEADLESS performed by Isaac Gomez MD at [place] [address], [address] CATH LAB Current Facility-Administered Medications Medication Dose Route Frequency  sodium chloride (NS) flush 5-40 mL  5-40 mL IntraVENous Q8H  
  sodium chloride (NS) flush 5-40 mL  5-40 mL IntraVENous PRN  
 sodium chloride (NS) flush 5-40 mL  5-40 mL IntraVENous Q8H  
 sodium chloride (NS) flush 5-40 mL  5-40 mL IntraVENous PRN  
 acetaminophen (TYLENOL) tablet 650 mg  650 mg Oral Q4H PRN  
 HYDROcodone-acetaminophen (NORCO) 5-325 mg per tablet 1 Tab  1 Tab Oral Q4H PRN  
 morphine injection 1 mg  1 mg IntraVENous Q4H PRN  
 heparin (porcine) injection 5,000 Units  5,000 Units SubCUTAneous Q12H  
 [START ON 8/6/2019] aspirin chewable tablet 81 mg  81 mg Oral DAILY  [Held by provider] bumetanide (BUMEX) tablet 3 mg  3 mg Oral BID  carvedilol (COREG) tablet 12.5 mg  12.5 mg Oral BID WITH MEALS  simvastatin (ZOCOR) tablet 20 mg  20 mg Oral QHS  sodium bicarbonate tablet 1,300 mg  1,300 mg Oral TID  [START ON 8/6/2019] tamsulosin (FLOMAX) capsule 0.4 mg  0.4 mg Oral DAILY  albuterol-ipratropium (DUO-NEB) 2.5 MG-0.5 MG/3 ML  3 mL Nebulization QID RT  
 [START ON 8/6/2019] predniSONE (DELTASONE) tablet 20 mg  20 mg Oral DAILY WITH BREAKFAST  [START ON 8/6/2019] azithromycin (ZITHROMAX) tablet 250 mg  250 mg Oral DAILY Current Outpatient Medications Medication Sig  bumetanide (BUMEX) 1 mg tablet Take 3 mg by mouth two (2) times a day.  albuterol (PROVENTIL VENTOLIN) 2.5 mg /3 mL (0.083 %) nebulizer solution 3 mL by Nebulization route every four (4) hours as needed for Wheezing.  carvedilol (COREG) 12.5 mg tablet Take 1 Tab by mouth two (2) times daily (with meals). (Patient taking differently: Take 12.5 mg by mouth daily.)  hydrALAZINE (APRESOLINE) 50 mg tablet Take 3 Tabs by mouth three (3) times daily.  sodium bicarbonate 650 mg tablet Take 2 Tabs by mouth three (3) times daily.  tamsulosin (FLOMAX) 0.4 mg capsule Take 1 Cap by mouth daily.  insulin glargine U-300 conc 300 unit/mL (1.5 mL) inpn 18 Units by SubCUTAneous route every morning.  aspirin 81 mg chewable tablet Take 1 Tab by mouth daily.  simvastatin (ZOCOR) 20 mg tablet Take 20 mg by mouth nightly.  omega 3-dha-epa-fish oil (FISH OIL) 100-160-1,000 mg cap Take 1 Cap by mouth two (2) times a day.  cinnamon bark (CINNAMON) 500 mg cap Take 1,000 mg by mouth two (2) times a day. Allergies Allergen Reactions  Lisinopril Cough History reviewed. No pertinent family history. Social History Socioeconomic History  Marital status:  Spouse name: Not on file  Number of children: Not on file  Years of education: Not on file  Highest education level: Not on file Tobacco Use  Smoking status: Former Smoker Packs/day: 3.00 Years: 20.00 Pack years: 60.00 Types: Cigarettes Last attempt to quit: 1985 Years since quittin.5  Smokeless tobacco: Never Used Substance and Sexual Activity  Alcohol use: No  
 Drug use: No  
 
 
Objective: 
  Physical Exam 
 
Vitals:  
Vitals:  
 19 0832 19 0834 19 1103 19 1200 BP: 115/44  114/88 (!) 127/98 Pulse: (!) 22  77 77 Resp: 24  18 23 Temp: 98.7 °F (37.1 °C) SpO2: (!) 81% (!) 88% 90% 93% Height: 5' 9\" (1.753 m) General:    Alert, cooperative, no distress, appears stated age. Neck:   Supple, no carotid bruit and no JVD. Lungs:     Scant crackles in bases, Rhonchi to auscultation bilaterally. Heart[de-identified]    Regular rate and rhythm, S1, S2 normal,   click, rub 
  or gallop. 2/6 SE murmur, Abdomen:     Soft, non-tender. Bowel sounds normal. .  
Extremities:   Extremities normal, atraumatic, no cyanosis or edema. Vascular:   Pulses - 2+ radials Skin:   Skin color normal. No rashes or lesions Neurologic:   CN II-XII grossly intact. Telemetry: paced ECG:  
EKG Results Procedure 720 Value Units Date/Time EKG 12 LEAD INITIAL [[id number]] Collected:  19 9808 Order Status:  Completed Updated:  19 9017   Ventricular Rate 78 BPM   
  Atrial Rate 357 BPM   
 QRS Duration 152 ms Q-T Interval 468 ms QTC Calculation (Bezet) 533 ms Calculated R Axis -22 degrees Calculated T Axis -55 degrees Diagnosis -- Atrial fibrillation Right bundle branch block T wave abnormality, consider lateral ischemia When compared with ECG of 18-MAY-2019 05:57, Atrial fibrillation has replaced Electronic ventricular pacemaker Data Review:  
 
Radiology: XR Results (most recent): 
Results from Hospital Encounter encounter on 08/05/19 XR CHEST PA LAT Narrative Exam:  2 view chest 
 
Indication: Dyspnea shortness of breath started last night. COMPARISON: 5/17/2019 5/14/2019 and 5/5/2019 PA and lateral views demonstrate persistent mild cardiomegaly which is 
unchanged. Patient status post median sternotomy. The lower 3 sternal wires are 
fractured. This is unchanged. There is persistent mild pulmonary vascular congestion and mild edema-like 
pattern. There are new focal opacities in the right midlung zone that could reflect 
developing pneumonia these are relatively mild. No pleural effusions. No adenopathy. Impression IMPRESSION: 
1. Mild pulmonary vascular congestion and mild pulmonary edema is again noted. 2. New mild focal opacities in the right mid zone may reflect developing 
pneumonia. Follow-up to resolution is suggested. Recent Labs 08/05/19 
3545 TROIQ 0.42* Recent Labs 08/05/19 
3814 *  
K 3.2*  
CL 90* CO2 30 BUN 65* CREA 3.85* * CA 9.2 Recent Labs 08/05/19 
1613 WBC 10.9 HGB 9.2* HCT 28.1*  
 Recent Labs 08/05/19 
2020 SGOT 76* * No results for input(s): CHOL, LDLC in the last 72 hours. No lab exists for component: TGL, HDLC,  HBA1C No results for input(s): CRP, TSH, TSHEXT in the last 72 hours. No lab exists for component: ESR Cindia Handler. MARGARITA Jean MD 
 
 
   [facility] [address] [place] [address], [address] [place]alli Joens 
   [phone] CC: Elvin Siegel MD

## 2019-08-05 NOTE — PROGRESS NOTES
Bedside shift change report given to Arturo Santo (oncoming nurse) by Surekha Barakat (offgoing nurse). Report included the following information SBAR, Kardex, Procedure Summary, MAR and Recent Results.   
Agree with charting of Viridiana Peterson RN

## 2019-08-05 NOTE — PROGRESS NOTES
Date of previous inpatient admission/ ED visit? 5/17/19-5/19/19 Inpatient Acute Bronchospasm What brought the patient back to ED? Patient presents to the ED w/ chief compliant of SOB Did patient decline recommended services during last admission/ ED visit (if yes, what)? No 
 
Has patient seen a provider since their last inpatient admission/ED visit (if yes, when)? PCP is Donald Vargas last seen on 7/31/19 CM Interventions: 
From previous inpatient admission/ED visit: Assessment From current inpatient admission/ED visit: Assessment CM consult received and appreciated. EMR reviewed. History significant for CAD, CABG, HTN, DMT2, PVD, PAF, CKD, and COPD. Met patient and spouse -introduced to role of CM.  states in the ED for SOB. Patient is independent w/ ADLs and drives. Patient lives in 1 story home w/ spouse. Support received from 2 children ( in [place]/ and [place]) acknowledged 2 other children passed. PCP is Donald Vargas last in office Wednesday.  endorses being diabetic for years however on insulin for 1 year and went to office secondary to elevated BS and needed education regarding administration of insulin. No transitional care needs verbalized at this time. No AMD on file. Case Management will follow for needs. [facility] Medicare A/B and [employer] are insurance providers. Care Management Interventions PCP Verified by CM: Yes Last Visit to PCP: 07/31/19 Palliative Care Criteria Met (RRAT>21 & CHF Dx)?: Yes 
Palliative Consult Recommended?: No(MD Communication ) Transition of Care Consult (CM Consult): Other(CHF) MyChart Signup: No 
Discharge Durable Medical Equipment: No 
Health Maintenance Reviewed: Yes Physical Therapy Consult: No 
Occupational Therapy Consult: No 
Speech Therapy Consult: No 
Current Support Network: Lives with Spouse, Own Home Plan discussed with Pt/Family/Caregiver: Yes [employer] Information Provided?: No 
 Discharge Location Discharge Placement: (TBD) Noted RRAT 47.  MD communication request placed for Palliative Care referral.

## 2019-08-05 NOTE — ED TRIAGE NOTES
Triage Note: Patient is coming in with shortness of breath that started last night. Patient denies any chest pain. Patient has recently come off of Prednisone. Hard time talking in complete sentences.

## 2019-08-05 NOTE — PROGRESS NOTES
Report received from Brian RN in the ED. Called Dr. Eduardo Osei, pt very lethargic, Falling asleep in the middle of conversation. Orders placed for ABG. Called respiratory to the bedside pH: 7.445 CO2: 38.2 PaO2: 82 HCO3: 26.3. Dr. Eduardo Osei notified of results. Will continue to monitor and educate. Bedside shift change report given to [address] [place], [place] [address]garo (oncoming nurse) by Katelynn Lopez RN (offgoing nurse). Report included the following information SBAR, Kardex, MAR, Accordion, Recent Results, Med Rec Status and Cardiac Rhythm NSR.

## 2019-08-05 NOTE — PROGRESS NOTES
Primary Nurse Nestor Wright RN and Hugh De Leon RN performed a dual skin assessment on this patient Impairment noted- see wound doc flow sheet Pt has left ear bruising, bilateral arm bruises, scaliness, and dryness. Sacrum area has abrasion, scratches, and redness. Wound care consult done.

## 2019-08-05 NOTE — ROUTINE PROCESS
TRANSFER - OUT REPORT: 
 
Verbal report given to [address] (name) on [address].  being transferred to [address] [place] [address] Rd (unit) for routine progression of care Report consisted of patients Situation, Background, Assessment and  
Recommendations(SBAR). Information from the following report(s) SBAR, ED Summary, [facility] and Recent Results was reviewed with the receiving nurse. Lines:  
Peripheral IV 08/05/19 Left Antecubital (Active) Site Assessment Clean, dry, & intact 8/5/2019  8:44 AM  
Phlebitis Assessment 0 8/5/2019  8:44 AM  
Infiltration Assessment 0 8/5/2019  8:44 AM  
Dressing Status Clean, dry, & intact 8/5/2019  8:44 AM  
Dressing Type Transparent 8/5/2019  8:44 AM  
Hub Color/Line Status Pink 8/5/2019  8:44 AM  
Action Taken Blood drawn 8/5/2019  8:44 AM  
   
Peripheral IV 08/05/19 Right Wrist (Active) Site Assessment Clean, dry, & intact 8/5/2019 10:46 AM  
Phlebitis Assessment 0 8/5/2019 10:46 AM  
Infiltration Assessment 0 8/5/2019 10:46 AM  
Dressing Status Clean, dry, & intact 8/5/2019 10:46 AM  
Dressing Type Transparent 8/5/2019 10:46 AM  
Hub Color/Line Status Pink 8/5/2019 10:46 AM  
Action Taken Blood drawn 8/5/2019 10:46 AM  
  
 
Opportunity for questions and clarification was provided. Patient transported with: 
 Transport

## 2019-08-05 NOTE — CARDIO/PULMONARY
Cardiac Rehab: Consult received and chart reviewed. Patient is not a candidate for cardiac rehab, at this time. ? EF < 35% at time of enrollment ? Stable class 2-4 NYHA heart failure ? Optimal medical therapy for > 6 weeks ? No major hospitalizations within the last 6 weeks ? No major hospitalizations within the next 6 weeks ? Will follow for echo results but previous echo with DHF only. Vincenzo Peng RN 
?

## 2019-08-05 NOTE — PROGRESS NOTES
Ananda Ian 8/5/2019 10:03 Admission Medication Reconciliation: 
  
Information obtained from: This medication history was obtained from patient; (s)he appears to be a good historian. An RX Query was available. Summary:  
  
Medications added: None Medications deleted: Amlodipine (was discontinued, patient has not taken since last Thursday) Dose changes: Patient has been taking carvedilol once daily, was counseled on taking twice daily as prescribed. Inpatient orders were reviewed and no changes made. Chief Complaint for this Admission:  SOB Significant PMH/Disease States:  
    
Past Medical History:  
Diagnosis Date CAD (coronary artery disease) s/p CABG 2008 [facility] CKD (chronic kidney disease) stage 3, GFR 30-59 ml/min ([facility] 10/21/2017  
  hypertension and DM nephrosclerosis Diabetes mellitus, type 2 ([facility] Hypercholesteremia Hypertension Paroxysmal atrial fibrillation ([facility] 10/21/2017  
  new onset afib with BRPR 10-19-17 admit PVC's (premature ventricular contractions) 5/26/2014 PVD (peripheral vascular disease) ([facility] Allergies:  Lisinopril Prior to Admission Medications:  
Prior to Admission Medications Prescriptions Last Dose Informant Patient Reported? Taking? albuterol (PROVENTIL VENTOLIN) 2.5 mg /3 mL (0.083 %) nebulizer solution 8/4/2019 at Unknown time   No Yes Sig: 3 mL by Nebulization route every four (4) hours as needed for Wheezing. aspirin 81 mg chewable tablet 8/4/2019 at Unknown time   No Yes Sig: Take 1 Tab by mouth daily. bumetanide (BUMEX) 1 mg tablet 8/4/2019 at Unknown time   Yes Yes Sig: Take 3 mg by mouth two (2) times a day. carvedilol (COREG) 12.5 mg tablet 8/4/2019 at Unknown time   No Yes Sig: Take 1 Tab by mouth two (2) times daily (with meals). Patient taking differently: Take 12.5 mg by mouth daily. cinnamon bark (CINNAMON) 500 mg cap 2019 at Unknown time   Yes Yes Sig: Take 1,000 mg by mouth two (2) times a day. hydrALAZINE (APRESOLINE) 50 mg tablet 2019 at Unknown time   No Yes Sig: Take 3 Tabs by mouth three (3) times daily. insulin glargine U-300 conc 300 unit/mL (1.5 mL) inpn 2019 at Unknown time   Yes Yes Si Units by SubCUTAneous route every morning. omega 3-dha-epa-fish oil (FISH OIL) 100-160-1,000 mg cap 2019 at Unknown time   Yes Yes Sig: Take 1 Cap by mouth two (2) times a day. simvastatin (ZOCOR) 20 mg tablet 2019 at Unknown time   Yes Yes Sig: Take 20 mg by mouth nightly.  
sodium bicarbonate 650 mg tablet 2019 at Unknown time   No Yes Sig: Take 2 Tabs by mouth three (3) times daily. tamsulosin (FLOMAX) 0.4 mg capsule 2019 at Unknown time   No Yes Sig: Take 1 Cap by mouth daily. Facility-Administered Medications: None Thank you for allowing me to participate in the care of this patient. Please contact the pharmacy () or the medication reconciliation pharmacist () with any questions. Charlie English, PharmD Candidate Class of [id number]

## 2019-08-05 NOTE — PROGRESS NOTES
Problem: Falls - Risk of 
Goal: *Absence of Falls Description Document Margrett Bernheim Fall Risk and appropriate interventions in the flowsheet. Outcome: Progressing Towards Goal 
Note:  
Fall Risk Interventions: 
Mobility Interventions: Communicate number of staff needed for ambulation/transfer, OT consult for ADLs, PT Consult for mobility concerns, Strengthening exercises (ROM-active/passive), Utilize walker, cane, or other assistive device, Patient to call before getting OOB Medication Interventions: Assess postural VS orthostatic hypotension, Evaluate medications/consider consulting pharmacy, Patient to call before getting OOB, Teach patient to arise slowly Elimination Interventions: Call light in reach, Patient to call for help with toileting needs, Urinal in reach, Toileting schedule/hourly rounds

## 2019-08-05 NOTE — ED PROVIDER NOTES
81 y.o. male with past medical history significant for CAD, s/p CABG, HTN, DMT2, PVD, PAF, CKD, and COPD who presents from home via private vehicle with chief complaint of SOB. Patient reports starting yesterday around noon with SOB. He states his SOB is provoked by exertion and is somewhat relieved at rest. He reports h/o similar sx, reports being admitted \"about 3 months ago\" for his breathing, then was recently seen again for SOB and treated with prednisone. He reports finishing his last course of steroids 4 days ago. Patient reports a h/o COPD and CHF. Patient denies any home O2 use. Patient denies any fever, chest pain, cough, abdominal pain, nausea, vomiting, or leg swelling. There are no other acute medical concerns at this time. Old Chart Review: Patient was admitted 5/17/19 for 2 nights for SOB with acute on chronic CHF. He was treated with PO Bumex. Social hx: Former smoker ([id number]); No EtOH use PCP: Serena Call MD 
 
Note written by Maria Dolores Eisenberg, as dictated by Jackie Nunez MD 8:33 AM 
 
The history is provided by the patient and the spouse. No  was used. Past Medical History:  
Diagnosis Date  CAD (coronary artery disease) s/p CABG 2008  Carotid arterial [facility]  CKD (chronic kidney disease) stage 3, GFR 30-59 ml/min ([facility]) 10/21/2017  
 hypertension and DM nephrosclerosis  Diabetes mellitus, type 2 ([address]  Hypercholesteremia  Hypertension  Paroxysmal atrial fibrillation ([address] 10/21/2017  
 new onset afib with BRPR 10-19-17 admit  PVC's (premature ventricular contractions) 5/26/2014  PVD (peripheral vascular disease) ([facility] [address] Past Surgical History:  
Procedure Laterality Date  HX CORONARY ARTERY BYPASS GRAFT    
 HX HEART CATHETERIZATION    
 [place] TCAT INSJ/RPL PERM LEADLESS PACEMAKER RV W/IMG N/A 5/9/2019  INSERT OR REPLACE TRANSCATH PPM LEADLESS performed by Inge Emery MD at [facility] CARDIAC CATH LAB History reviewed. No pertinent family history. Social History Socioeconomic History  Marital status:  Spouse name: Not on file  Number of children: Not on file  Years of education: Not on file  Highest education level: Not on file Occupational History  Not on file Social Needs  Financial resource strain: Not on file  Food insecurity:  
  Worry: Not on file Inability: Not on file  Transportation needs:  
  Medical: Not on file Non-medical: Not on file Tobacco Use  Smoking status: Former Smoker Packs/day: 3.00 Years: 20.00 Pack years: 60.00 Types: Cigarettes Last attempt to quit: 1985 Years since quittin.5  Smokeless tobacco: Never Used Substance and Sexual Activity  Alcohol use: No  
 Drug use: No  
 Sexual activity: Not on file Lifestyle  Physical activity:  
  Days per week: Not on file Minutes per session: Not on file  Stress: Not on file Relationships  Social connections:  
  Talks on phone: Not on file Gets together: Not on file Attends Muslim service: Not on file Active member of club or organization: Not on file Attends meetings of clubs or organizations: Not on file Relationship status: Not on file  Intimate partner violence:  
  Fear of current or ex partner: Not on file Emotionally abused: Not on file Physically abused: Not on file Forced sexual activity: Not on file Other Topics Concern  Not on file Social History Narrative  Not on file ALLERGIES: Lisinopril Review of Systems Constitutional: Negative for fever. Respiratory: Positive for shortness of breath. Negative for cough. Cardiovascular: Negative for chest pain and leg swelling. Gastrointestinal: Negative for abdominal pain, nausea and vomiting. All other systems reviewed and are negative. Vitals: 08/05/19 0827 08/05/19 5049 08/05/19 2553 BP:  115/44 Pulse: 90 (!) 22 Resp:  24 Temp:  98.7 °F (37.1 °C) SpO2: (!) 85% (!) 81% (!) 88% Height:  5' 9\" (1.753 m) Physical Exam  
Constitutional: He is oriented to person, place, and time. He appears well-developed and well-nourished. He appears ill. Moderately ill appearing. HENT:  
Head: Normocephalic and atraumatic. Eyes: Conjunctivae are normal. No scleral icterus. Neck: Neck supple. No tracheal deviation present. Cardiovascular: Normal rate, regular rhythm, normal heart sounds and intact distal pulses. Exam reveals no gallop and no friction rub. No murmur heard. Pulmonary/Chest: Effort normal. Tachypnea noted. He has decreased breath sounds. He has no wheezes. He has no rales. Decreased breath sounds diffusely. Mild tachypnea. Abdominal: Soft. He exhibits no distension. There is no tenderness. There is no rebound and no guarding. Musculoskeletal: He exhibits no edema. Neurological: He is alert and oriented to person, place, and time. Skin: Skin is warm and dry. No rash noted. Psychiatric: He has a normal mood and affect. Nursing note and vitals reviewed. Note written by Maria Dolores Davis, as dictated by Ghazala Lomas MD 8:33 AM 
 
MDM Procedures ED EKG interpretation: 
Rhythm: atrial fib; and irregular. Rate (approx.): 78 bpm; RBBB; Lateral TWI. Note written by Maria Dolores Davis, as dictated by Ghazala Lomas MD 8:47 AM 
 
 
Hospitalist TigerText for Admission 9:48 AM 
 
ED Room Number: LU18/07 Patient Name and age:  Jelly Mcneil. 81 y.o.  male Working Diagnosis: dyspnea/hypoxia/COPD exac Readmission: no 
Isolation Requirements:  no 
Recommended Level of Care:  telemetry Code Status:  Partial Code - states  Has living will but when asked about CPR/intubation - stated he wanted to give it (ressuscitation) a try if it would help. Department:[facility] Adult ED - [phone] Other: Presents with increasing dyspnea for 1 day with a history of COPD; suspect COPD exacerbation; hypoxic into the mid 80s; on O2 in ED (not on home O2); chest x-ray shows areas suspicious for an infiltrate; troponin and proBNP elevated but denies chest pain; I have consult to cardiology; nebs and steroids in ED; also Rocephin/Zithromax. CONSULT NOTE: 
10:20 AM Darek Salas MD communicated with Valentina Marin NP, Consult for Hospitalist via [facility] Text. Discussed available diagnostic tests and clinical findings. Dr. Alma Perkins will see and admit the patient. Total critical care time spent exclusive of procedures:  35 min.   Garrett Valencia MD 
10:45 AM

## 2019-08-05 NOTE — ED NOTES
Verbal shift change report given to Ashanti Feng RN (oncoming nurse) by Emily Ocampo (offgoing nurse). Report included the following information SBAR, ED Summary, MAR and Recent Results.

## 2019-08-05 NOTE — H&P
History and Physical 
Primary Care Provider: Emelia Aguayo MD  
Cardiology- Ailyn 
Pulmonary- Gregoria Benoit NP Renal- Alexi LIAO 
 
Subjective:  
 
81 y.o. male with past medical history significant for CAD, s/p CABG, HTN, DMT2, PVD, PAF, CKD, and COPD who presents from home via private vehicle with chief complaint of SOB. Patient reports starting yesterday around noon with SOB. He states his SOB is provoked by exertion and is somewhat relieved at rest. He reports 3 recent med changes and low BP for 1 week- his wife takes his BP every morning and it has been in the 70/45 range- his PCP recently took him off amlodipine which did not help the low BP, He apparently has been taking coreg only once daily, and just finished 2 separate- courses of steroid tapers- the last of which he finished about 4 days ago. He denies fevers or chills, productive sputum which is not really new. He reports h/o similar sx, reports being admitted \"about 3 months ago\" for his breathing, then was recently seen again for SOB and treated with prednisone. Patient reports a h/o COPD and CHF. Patient denies any home O2 use but does use a nebulizer machine 3-4 times daily. Patient denies chest pain, abdominal pain, or leg swelling. He does admit to having a single bout of nausea with vomiting yesterday. He denies any bladder or urinary symptoms- but admits to nocturia 3-4 times. There are no other acute medical concerns at this time. Review of Systems: A comprehensive review of systems was negative except for that written in the History of Present Illness. Past Medical History:  
Diagnosis Date  CAD (coronary artery disease) s/p CABG 2008  Carotid arterial disease ([facility] [address]  CKD (chronic kidney disease) stage 3, GFR 30-59 ml/min ([facility]) 10/21/2017  
 hypertension and DM nephrosclerosis  Diabetes mellitus, type 2 ([facility] [address]  Hypercholesteremia  Hypertension  Paroxysmal atrial fibrillation ([facility] [address] 10/21/2017 new onset afib with BRPR 10-19-17 admit  PVC's (premature ventricular contractions) 5/26/2014  PVD (peripheral vascular disease) ([facility] [address] Past Surgical History:  
Procedure Laterality Date  HX CORONARY ARTERY BYPASS GRAFT    
 HX HEART CATHETERIZATION    
 [place] TCAT INSJ/RPL PERM LEADLESS PACEMAKER RV W/IMG N/A 5/9/2019 INSERT OR REPLACE TRANSCATH PPM LEADLESS [place] [address], [facility] CATH LAB Prior to Admission medications Medication Sig Start Date End Date Taking? Authorizing Provider  
bumetanide (BUMEX) 1 mg tablet Take 3 mg by mouth two (2) times a day. Yes Provider, Historical  
albuterol (PROVENTIL VENTOLIN) 2.5 mg /3 mL (0.083 %) nebulizer solution 3 mL by Nebulization route every four (4) hours as needed for Wheezing. 5/14/19  Yes Boby Milton MD  
carvedilol (COREG) 12.5 mg tablet Take 1 Tab by mouth two (2) times daily (with meals). Patient taking differently: Take 12.5 mg by mouth daily. 5/12/19  Yes Keya Sandoval, DO  
hydrALAZINE (APRESOLINE) 50 mg tablet Take 3 Tabs by mouth three (3) times daily. 5/12/19  Yes Keya Sandoval, DO  
sodium bicarbonate 650 mg tablet Take 2 Tabs by mouth three (3) times daily. 5/12/19  Yes Keya Sandoval, DO  
tamsulosin (FLOMAX) 0.4 mg capsule Take 1 Cap by mouth daily. 5/13/19  Yes Keya Sandoval, DO  
insulin glargine U-300 conc 300 unit/mL (1.5 mL) inpn 18 Units by SubCUTAneous route every morning. Yes Provider, Historical  
aspirin 81 mg chewable tablet Take 1 Tab by mouth daily. 9/27/18  Yes Terri Baptiste MD  
simvastatin (ZOCOR) 20 mg tablet Take 20 mg by mouth nightly. Yes Provider, Historical  
omega 3-dha-epa-fish oil (FISH OIL) 100-160-1,000 mg cap Take 1 Cap by mouth two (2) times a day. Yes Provider, Historical  
cinnamon bark (CINNAMON) 500 mg cap Take 1,000 mg by mouth two (2) times a day. Yes Provider, Historical  
 
Allergies Allergen Reactions  Lisinopril Cough Family History- negative for any cardiac issues SOCIAL HISTORY: 
Patient resides at Home with wife- 4 children/ 2 still living- they are out of the home Patient ambulates normally independently- but yesterday needed a walker for assistance. Smoking history: past heavy use but quit 30 years ago Alcohol history: none Objective:  
 
 
Physical Exam:  
Visit Vitals /88 Pulse 77 Temp 98.7 °F (37.1 °C) Resp 18 Ht 5' 9\" (1.753 m) SpO2 90% BMI 25.10 kg/m² Patient Vitals for the past 12 hrs: 
 Temp Pulse Resp BP SpO2  
08/05/19 1103  77 18 114/88 90 % 08/05/19 0834     (!) 88 % 08/05/19 0832 98.7 °F (37.1 °C) (!) 22 24 115/44 (!) 81 % 08/05/19 0827  90   (!) 85 % General:  Alert, cooperative, no distress, appears stated age. Head:  Normocephalic, without obvious abnormality, atraumatic. Eyes:  Conjunctivae/corneas clear. PERRL, EOMs intact. Fundi benign Throat: Lips, mucosa, and tongue normal. Poor dentition Neck: Supple, symmetrical, trachea midline, no adenopathy, thyroid: no enlargement/tenderness/nodules, no carotid bruit and no JVD. Back:   Symmetric, no curvature. ROM normal. No CVA tenderness. Lungs:   Decreased breath sounds and crackles in bases-   
Chest wall:  No tenderness or deformity. Heart:  Regular rate and rhythm, S1, S2 normal, no murmur, Abdomen:   Soft, non-tender. Bowel sounds normal. No masses,  No organomegaly. Extremities: Extremities normal, atraumatic, no cyanosis or edema. Pulses: Weak in periphery and symmetric all extremities. Skin: Skin color, texture, turgor normal. No rashes or lesions Neurologic: CNII-XII intact. Normal strength, sensation and reflexes throughout. Data Review: All diagnostic labs and studies have been reviewed. Recent Results (from the past 24 hour(s)) EKG, 12 LEAD, INITIAL Collection Time: 08/05/19  8:37 AM  
Result Value Ref Range  Ventricular Rate 78 BPM  
 Atrial Rate 357 BPM  
 QRS Duration 152 ms Q-T Interval 468 ms QTC Calculation (Bezet) 533 ms Calculated R Axis -22 degrees Calculated T Axis -55 degrees Diagnosis Atrial fibrillation Right bundle branch block T wave abnormality, consider lateral ischemia When compared with ECG of 18-MAY-2019 05:57, Atrial fibrillation has replaced Electronic ventricular pacemaker SAMPLES BEING HELD Collection Time: 08/05/19  8:41 AM  
Result Value Ref Range SAMPLES BEING HELD 1 blue 1 red COMMENT Add-on orders for these samples will be processed based on acceptable specimen integrity and analyte stability, which may vary by analyte. CBC WITH AUTOMATED DIFF Collection Time: 08/05/19  8:41 AM  
Result Value Ref Range WBC 10.9 4.1 - 11.1 K/uL  
 RBC 3.24 (L) 4.10 - 5.70 M/uL HGB 9.2 (L) 12.1 - 17.0 g/dL HCT 28.1 (L) 36.6 - 50.3 % MCV 86.7 80.0 - 99.0 FL  
 MCH 28.4 26.0 - 34.0 PG  
 MCHC 32.7 30.0 - 36.5 g/dL  
 RDW 15.0 (H) 11.5 - 14.5 % PLATELET 032 635 - 387 K/uL MPV 10.4 8.9 - 12.9 FL  
 NRBC 0.0 0  WBC ABSOLUTE NRBC 0.00 0.00 - 0.01 K/uL NEUTROPHILS 86 (H) 32 - 75 % LYMPHOCYTES 5 (L) 12 - 49 % MONOCYTES 7 5 - 13 % EOSINOPHILS 1 0 - 7 % BASOPHILS 0 0 - 1 % IMMATURE GRANULOCYTES 1 (H) 0.0 - 0.5 % ABS. NEUTROPHILS 9.4 (H) 1.8 - 8.0 K/UL  
 ABS. LYMPHOCYTES 0.5 (L) 0.8 - 3.5 K/UL  
 ABS. MONOCYTES 0.8 0.0 - 1.0 K/UL  
 ABS. EOSINOPHILS 0.1 0.0 - 0.4 K/UL  
 ABS. BASOPHILS 0.0 0.0 - 0.1 K/UL  
 ABS. IMM. GRANS. 0.1 (H) 0.00 - 0.04 K/UL  
 DF SMEAR SCANNED    
 RBC COMMENTS ANISOCYTOSIS 1+ 
    
 RBC COMMENTS HYPOCHROMIA 1+ 
    
 RBC COMMENTS OVALOCYTES PRESENT 
    
METABOLIC PANEL, COMPREHENSIVE Collection Time: 08/05/19  8:41 AM  
Result Value Ref Range Sodium 131 (L) 136 - 145 mmol/L Potassium 3.2 (L) 3.5 - 5.1 mmol/L Chloride 90 (L) 97 - 108 mmol/L  
 CO2 30 21 - 32 mmol/L Anion gap 11 5 - 15 mmol/L Glucose 204 (H) 65 - 100 mg/dL BUN 65 (H) 6 - 20 MG/DL Creatinine 3.85 (H) 0.70 - 1.30 MG/DL  
 BUN/Creatinine ratio 17 12 - 20 GFR est AA 18 (L) >60 ml/min/1.73m2 GFR est non-AA 15 (L) >60 ml/min/1.73m2 Calcium 9.2 8.5 - 10.1 MG/DL Bilirubin, total 0.8 0.2 - 1.0 MG/DL  
 ALT (SGPT) 54 12 - 78 U/L  
 AST (SGOT) 76 (H) 15 - 37 U/L Alk. phosphatase 130 (H) 45 - 117 U/L Protein, total 7.5 6.4 - 8.2 g/dL Albumin 3.2 (L) 3.5 - 5.0 g/dL Globulin 4.3 (H) 2.0 - 4.0 g/dL A-G Ratio 0.7 (L) 1.1 - 2.2    
TROPONIN I Collection Time: 08/05/19  8:41 AM  
Result Value Ref Range Troponin-I, Qt. 0.42 (H) <0.05 ng/mL NT-PRO BNP Collection Time: 08/05/19  8:41 AM  
Result Value Ref Range NT pro-BNP >35,000 (H) <450 PG/ML  
POC LACTIC ACID Collection Time: 08/05/19 10:48 AM  
Result Value Ref Range Lactic Acid (POC) 0.79 0.40 - 2.00 mmol/L CXR from 8/5/19; IMPRESSION: 
1. Mild pulmonary vascular congestion and mild pulmonary edema is again noted. 2. New mild focal opacities in the right mid zone may reflect developing 
pneumonia. Follow-up to resolution is suggested. Assessment:  
 
Active Problems: CHF exacerbation ([email or web address] (8/5/2019) Acute hypoxemic respiratory failure ([email or web address] (8/5/2019) Plan: 1. Shortness of breath- acute hypoxemic resp failure- due to volume overload- improved with IV bumex given in ED 
- consult cardiology for further management, fluid restrict, diureitcs,  
 
2. CV- hypotension, afib w pacemaker, CAD, severe pulm HTN 3. PULM- suspected COPD from past tobacco use- resume steroids and home meds 4. KATALINA on CKD stage 4- baseline creatinine 3.1-3.3; was 3.8 on admission- likely due to diureitcs and hypotension Consult nephrology for further management 5. PVD- s/p stented in both LE, cont aspirin 6. DM type 2- resume lantus w accuchecks and ss insulin 7. HLD- resume statin meds 8. BPH- resume flomax Full Code Surroge decision maker= his wife FUNCTIONAL STATUS PRIOR TO HOSPITALIZATION - independent Signed By: Gumaro Sykes MD   
 August 5, 2019

## 2019-08-05 NOTE — CONSULTS
[facility] 
 [address] [facility], [address] [facility] Phone: [phone] NOTE Patient: Paola Chávez MRN: [id number]  PCP: Acacia Harvey MD  
:     1935  Age:   81 y.o. Sex:  male Referring physician: Julianna Waddell MD 
Reason for consultation: 81 y.o. male with CHF exacerbation ([address] [I50.9] Acute hypoxemic respiratory failure (HCC) [E10.38] complicated by KATALINA Admission Date: 2019  8:26 AM  LOS: 0 days ASSESSMENT and PLAN :  
KATALINA  on CKD  
- suspect he is intravascular dry from diuretics  
- he also has progressive diabetic Nephropathy - I would further diuresis for today and check labs again in am  
 
Dyspnea : 
- RML infiltrate suggesting PNA  
- Minimal pulm vascular congestion and appears chronic  
- abx per primary team 
  
CKD IV: 
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 
- baseline Cr at best : 2.7- 3 mg/dl - UPCR showed 11 gm of Protein  
- he wants to do PD if he becomes ESRD, but he is not a good candidate for long term dialysis  
  
Metablolic Acidosis: 
- cont PO BICARB 
  
Dyspnea : 
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  
- RHC w/ PCW 28 
  
HTN: 
- continue current meds 
  
Bradycardia : 
- s/p PPM on  
  
Anemia of CKD: 
- hgb stable - weekly MICHAEL while in hospital  
  
Afib: 
- per cards 
  
CAD s/p CABG 
  
DM2: 
- on insulin Care Plan discussed with: pt Thank you for consulting [facility] in the care of your patient. Subjective: HPI: Paola Chávez is a 81 y.o.  male who has been admitted to the hospital for worsening SOB since Saturday. Since arrival in ER, he had IV diuretics and he had improvement in Sx. He had CXR that showed RML infiltrate concerning fro PNA . His Creatinine ius higher than his baseline and we were asked to see him He was last seen by Dr Cassidy Starks in the office on  and his Creatinine was 2.7 and he looked euvolemic In ER his Creatinine is 3.7 He denies any fevers/ chills. Reports non productive cough No urinary sx He has been on Bumex 3 mg BID Past Medical Hx:  
Past Medical History:  
Diagnosis Date  CAD (coronary artery disease) s/p CABG 2008  Carotid arterial disease ([facility]  CKD (chronic kidney disease) stage 3, GFR 30-59 ml/min ([facility]) 10/21/2017  
 hypertension and DM nephrosclerosis  Diabetes mellitus, type 2 ([facility]  Hypercholesteremia  Hypertension  Paroxysmal atrial fibrillation ([facility] 10/21/2017  
 new onset afib with BRPR 10-19-17 admit  PVC's (premature ventricular contractions) 5/26/2014  PVD (peripheral vascular disease) ([facility] Past Surgical Hx: 
  
Past Surgical History:  
Procedure Laterality Date  HX CORONARY ARTERY BYPASS GRAFT    
 HX HEART CATHETERIZATION    
 CO TCAT INSJ/RPL PERM LEADLESS PACEMAKER RV W/IMG N/A 5/9/2019 INSERT OR REPLACE TRANSCATH PPM LEADLESS performed [place] [address], [facility] CATH LAB Allergies Allergen Reactions  Lisinopril Cough Social Hx:  reports that he quit smoking about 34 years ago. His smoking use included cigarettes. He has a 60.00 pack-year smoking history. He has never used smokeless tobacco. He reports that he does not drink alcohol or use drugs. History reviewed. No pertinent family history. Review of Systems: A thorough twelve point review of system was performed today. Pertinent positives and negatives are mentioned in the HPI. The reminder of the ROS is negative and noncontributory. Objective:   
Vitals:   
Vitals:  
 08/05/19 1315 08/05/19 1400 08/05/19 1454 08/05/19 1528 BP:  135/58 141/55 117/41 Pulse:  71 72 67 Resp:  16 14 18 Temp:   97.6 °F (36.4 °C) 97.7 °F (36.5 °C) SpO2: 94% 96% 99% 97% Weight:   72.9 kg (160 lb 11.5 oz) Height:      
 
I&O's:  No intake/output data recorded. Visit Vitals /41 (BP 1 Location: Right arm, BP Patient Position: At rest) Pulse 67 Temp 97.7 °F (36.5 °C) Resp 18 Ht 5' 9\" (1.753 m) Wt 72.9 kg (160 lb 11.5 oz) SpO2 97% BMI 23.73 kg/m² Physical Exam: 
General:  No apparent Distress HEENT: PERRL,  + Pallor , No Icterus Neck: Supple,no mass palpable Lungs : CTA 
CVS: RRR, S1 S2 normal, No murmur Abdomen: Soft, NT, BS + Extremities: Edema Skin: No rash or lesions. MS: No joint swelling, erythema, warmth Neurologic: non focal, AAO x 3 Psych: normal affect Laboratory Results: 
 
Recent Labs 08/05/19 
9364 *  
K 3.2*  
CL 90* CO2 30  
* BUN 65* CREA 3.85* CA 9.2 ALB 3.2* SGOT 76* ALT 54 Recent Labs 08/05/19 
3233 WBC 10.9 HGB 9.2* HCT 28.1*  
 No results found for: SDES Lab Results Component Value Date/Time Culture result: CANDIDA ALBICANS (A) 03/16/2017 06:00 AM  
 Culture result: NO GROWTH 5 DAYS 03/16/2017 05:40 AM  
 
Recent Results (from the past 24 hour(s)) EKG, 12 LEAD, INITIAL Collection Time: 08/05/19  8:37 AM  
Result Value Ref Range Ventricular Rate 78 BPM  
 Atrial Rate 357 BPM  
 QRS Duration 152 ms Q-T Interval 468 ms QTC Calculation (Bezet) 533 ms Calculated R Axis -22 degrees Calculated T Axis -55 degrees Diagnosis Atrial fibrillation Right bundle branch block T wave abnormality, consider lateral ischemia When compared with ECG of 18-MAY-2019 05:57, Atrial fibrillation has replaced Electronic ventricular pacemaker SAMPLES BEING HELD Collection Time: 08/05/19  8:41 AM  
Result Value Ref Range SAMPLES BEING HELD 1 blue 1 red COMMENT Add-on orders for these samples will be processed based on acceptable specimen integrity and analyte stability, which may vary by analyte. CBC WITH AUTOMATED DIFF Collection Time: 08/05/19  8:41 AM  
Result Value Ref Range WBC 10.9 4.1 - 11.1 K/uL RBC 3.24 (L) 4.10 - 5.70 M/uL HGB 9.2 (L) 12.1 - 17.0 g/dL HCT 28.1 (L) 36.6 - 50.3 % MCV 86.7 80.0 - 99.0 FL  
 MCH 28.4 26.0 - 34.0 PG  
 MCHC 32.7 30.0 - 36.5 g/dL  
 RDW 15.0 (H) 11.5 - 14.5 % PLATELET 304 805 - 649 K/uL MPV 10.4 8.9 - 12.9 FL  
 NRBC 0.0 0  WBC ABSOLUTE NRBC 0.00 0.00 - 0.01 K/uL NEUTROPHILS 86 (H) 32 - 75 % LYMPHOCYTES 5 (L) 12 - 49 % MONOCYTES 7 5 - 13 % EOSINOPHILS 1 0 - 7 % BASOPHILS 0 0 - 1 % IMMATURE GRANULOCYTES 1 (H) 0.0 - 0.5 % ABS. NEUTROPHILS 9.4 (H) 1.8 - 8.0 K/UL  
 ABS. LYMPHOCYTES 0.5 (L) 0.8 - 3.5 K/UL  
 ABS. MONOCYTES 0.8 0.0 - 1.0 K/UL  
 ABS. EOSINOPHILS 0.1 0.0 - 0.4 K/UL  
 ABS. BASOPHILS 0.0 0.0 - 0.1 K/UL  
 ABS. IMM. GRANS. 0.1 (H) 0.00 - 0.04 K/UL  
 DF SMEAR SCANNED    
 RBC COMMENTS ANISOCYTOSIS 1+ 
    
 RBC COMMENTS HYPOCHROMIA 1+ 
    
 RBC COMMENTS OVALOCYTES PRESENT 
    
METABOLIC PANEL, COMPREHENSIVE Collection Time: 08/05/19  8:41 AM  
Result Value Ref Range Sodium 131 (L) 136 - 145 mmol/L Potassium 3.2 (L) 3.5 - 5.1 mmol/L Chloride 90 (L) 97 - 108 mmol/L  
 CO2 30 21 - 32 mmol/L Anion gap 11 5 - 15 mmol/L Glucose 204 (H) 65 - 100 mg/dL BUN 65 (H) 6 - 20 MG/DL Creatinine 3.85 (H) 0.70 - 1.30 MG/DL  
 BUN/Creatinine ratio 17 12 - 20 GFR est AA 18 (L) >60 ml/min/1.73m2 GFR est non-AA 15 (L) >60 ml/min/1.73m2 Calcium 9.2 8.5 - 10.1 MG/DL Bilirubin, total 0.8 0.2 - 1.0 MG/DL  
 ALT (SGPT) 54 12 - 78 U/L  
 AST (SGOT) 76 (H) 15 - 37 U/L Alk. phosphatase 130 (H) 45 - 117 U/L Protein, total 7.5 6.4 - 8.2 g/dL Albumin 3.2 (L) 3.5 - 5.0 g/dL Globulin 4.3 (H) 2.0 - 4.0 g/dL A-G Ratio 0.7 (L) 1.1 - 2.2    
TROPONIN I Collection Time: 08/05/19  8:41 AM  
Result Value Ref Range Troponin-I, Qt. 0.42 (H) <0.05 ng/mL NT-PRO BNP Collection Time: 08/05/19  8:41 AM  
Result Value Ref Range  NT pro-BNP >35,000 (H) <450 PG/ML  
POC LACTIC ACID  
 Collection Time: 08/05/19 10:48 AM  
Result Value Ref Range Lactic Acid (POC) 0.79 0.40 - 2.00 mmol/L  
POC G3 - PUL Collection Time: 08/05/19  3:19 PM  
Result Value Ref Range pH (POC) 7.445 7.35 - 7.45    
 pCO2 (POC) 38.2 35.0 - 45.0 MMHG  
 pO2 (POC) 82 80 - 100 MMHG  
 HCO3 (POC) 26.3 (H) 22 - 26 MMOL/L  
 sO2 (POC) 97 92 - 97 % Base excess (POC) 2 mmol/L Site LEFT RADIAL Device: NASAL CANNULA Flow rate (POC) 4 L/M Allens test (POC) YES Specimen type (POC) ARTERIAL Urine dipstick:  
Lab Results Component Value Date/Time Color YELLOW/STRAW 06/25/2017 11:40 AM  
 Appearance CLEAR 06/25/2017 11:40 AM  
 Specific gravity 1.018 06/25/2017 11:40 AM  
 pH (UA) 5.0 06/25/2017 11:40 AM  
 Protein 300 (A) 06/25/2017 11:40 AM  
 Glucose NEGATIVE  06/25/2017 11:40 AM  
 Ketone NEGATIVE  06/25/2017 11:40 AM  
 Bilirubin NEGATIVE  06/25/2017 11:40 AM  
 Urobilinogen 0.2 06/25/2017 11:40 AM  
 Nitrites NEGATIVE  06/25/2017 11:40 AM  
 Leukocyte Esterase NEGATIVE  06/25/2017 11:40 AM  
 Epithelial cells FEW 06/25/2017 11:40 AM  
 Bacteria NEGATIVE  06/25/2017 11:40 AM  
 WBC 0-4 06/25/2017 11:40 AM  
 RBC 0-5 06/25/2017 11:40 AM  
 
 
I have reviewed the following: All pertinent labs, microbiology data, radiology imaging for my assessment Medications list Personally Reviewed   [x]      Yes     []               No    
 
Medications: 
Prior to Admission medications Medication Sig Start Date End Date Taking? Authorizing Provider  
bumetanide (BUMEX) 1 mg tablet Take 3 mg by mouth two (2) times a day. Yes Provider, Historical  
albuterol (PROVENTIL VENTOLIN) 2.5 mg /3 mL (0.083 %) nebulizer solution 3 mL by Nebulization route every four (4) hours as needed for Wheezing. 5/14/19  Yes Fiona Lebron MD  
carvedilol (COREG) 12.5 mg tablet Take 1 Tab by mouth two (2) times daily (with meals). Patient taking differently: Take 12.5 mg by mouth daily.  5/12/19  Yes TAE Kirk M, DO  
hydrALAZINE (APRESOLINE) 50 mg tablet Take 3 Tabs by mouth three (3) times daily. 5/12/19  Yes Keya Sandoval, DO  
sodium bicarbonate 650 mg tablet Take 2 Tabs by mouth three (3) times daily. 5/12/19  Yes Keya Sandoval, DO  
tamsulosin (FLOMAX) 0.4 mg capsule Take 1 Cap by mouth daily. 5/13/19  Yes Keya Sandoval, DO  
insulin glargine U-300 conc 300 unit/mL (1.5 mL) inpn 18 Units by SubCUTAneous route every morning. Yes Provider, Historical  
aspirin 81 mg chewable tablet Take 1 Tab by mouth daily. 9/27/18  Yes Arpan Ordonez MD  
simvastatin (ZOCOR) 20 mg tablet Take 20 mg by mouth nightly. Yes Provider, Historical  
omega 3-dha-epa-fish oil (FISH OIL) 100-160-1,000 mg cap Take 1 Cap by mouth two (2) times a day. Yes Provider, Historical  
cinnamon bark (CINNAMON) 500 mg cap Take 1,000 mg by mouth two (2) times a day. Yes Provider, Historical  
  
 
Thank you for allowing us to participate in the care of this patient. We will follow patient. Please dont hesitate to call with any questions [facility] [address] [facility], [address] [facility] Phone - [phone] Fax - [phone] 
[email or web address]

## 2019-08-06 ENCOUNTER — APPOINTMENT (OUTPATIENT)
Dept: NON INVASIVE DIAGNOSTICS | Age: 84
DRG: 189 | End: 2019-08-06
Attending: FAMILY MEDICINE
Payer: MEDICARE

## 2019-08-06 LAB
ANION GAP SERPL CALC-SCNC: 10 MMOL/L (ref 5–15)
ANION GAP SERPL CALC-SCNC: 12 MMOL/L (ref 5–15)
BNP SERPL-MCNC: ABNORMAL PG/ML
BUN SERPL-MCNC: 71 MG/DL (ref 6–20)
BUN SERPL-MCNC: 71 MG/DL (ref 6–20)
BUN/CREAT SERPL: 20 (ref 12–20)
BUN/CREAT SERPL: 21 (ref 12–20)
CALCIUM SERPL-MCNC: 8.6 MG/DL (ref 8.5–10.1)
CALCIUM SERPL-MCNC: 9.2 MG/DL (ref 8.5–10.1)
CHLORIDE SERPL-SCNC: 91 MMOL/L (ref 97–108)
CHLORIDE SERPL-SCNC: 93 MMOL/L (ref 97–108)
CO2 SERPL-SCNC: 31 MMOL/L (ref 21–32)
CO2 SERPL-SCNC: 31 MMOL/L (ref 21–32)
CREAT SERPL-MCNC: 3.35 MG/DL (ref 0.7–1.3)
CREAT SERPL-MCNC: 3.64 MG/DL (ref 0.7–1.3)
ECHO AO ROOT DIAM: 3.36 CM
ECHO AV PEAK GRADIENT: 18.1 MMHG
ECHO AV PEAK VELOCITY: 212.58 CM/S
ECHO LA MAJOR AXIS: 6.25 CM
ECHO LA TO AORTIC ROOT RATIO: 1.86
ECHO LV INTERNAL DIMENSION DIASTOLIC: 4.76 CM (ref 4.2–5.9)
ECHO LV INTERNAL DIMENSION SYSTOLIC: 2.84 CM
ECHO LV IVSD: 1.29 CM (ref 0.6–1)
ECHO LV MASS 2D: 280.8 G (ref 88–224)
ECHO LV MASS INDEX 2D: 150.1 G/M2 (ref 49–115)
ECHO LV POSTERIOR WALL DIASTOLIC: 1.26 CM (ref 0.6–1)
ECHO MV A VELOCITY: 59.43 CM/S
ECHO MV AREA PHT: 3.7 CM2
ECHO MV E DECELERATION TIME (DT): 156.9 MS
ECHO MV E VELOCITY: 162.86 CM/S
ECHO MV E/A RATIO: 2.74
ECHO MV MAX VELOCITY: 164.82 CM/S
ECHO MV MEAN GRADIENT: 3.3 MMHG
ECHO MV PEAK GRADIENT: 10.9 MMHG
ECHO MV PRESSURE HALF TIME (PHT): 59.5 MS
ECHO MV VTI: 34.58 CM
ECHO PULMONARY ARTERY SYSTOLIC PRESSURE (PASP): 50 MMHG
ECHO PV MAX VELOCITY: 83.31 CM/S
ECHO PV PEAK GRADIENT: 2.8 MMHG
ECHO RV INTERNAL DIMENSION: 4.41 CM
ECHO RV TAPSE: 0.95 CM (ref 1.5–2)
ECHO TV REGURGITANT MAX VELOCITY: 341.87 CM/S
ECHO TV REGURGITANT PEAK GRADIENT: 46.8 MMHG
ERYTHROCYTE [DISTWIDTH] IN BLOOD BY AUTOMATED COUNT: 14.7 % (ref 11.5–14.5)
ERYTHROCYTE [DISTWIDTH] IN BLOOD BY AUTOMATED COUNT: 14.7 % (ref 11.5–14.5)
FERRITIN SERPL-MCNC: 1049 NG/ML (ref 26–388)
GLUCOSE BLD STRIP.AUTO-MCNC: 159 MG/DL (ref 65–100)
GLUCOSE BLD STRIP.AUTO-MCNC: 261 MG/DL (ref 65–100)
GLUCOSE BLD STRIP.AUTO-MCNC: 265 MG/DL (ref 65–100)
GLUCOSE BLD STRIP.AUTO-MCNC: 276 MG/DL (ref 65–100)
GLUCOSE SERPL-MCNC: 136 MG/DL (ref 65–100)
GLUCOSE SERPL-MCNC: 267 MG/DL (ref 65–100)
HCT VFR BLD AUTO: 23.6 % (ref 36.6–50.3)
HCT VFR BLD AUTO: 26.5 % (ref 36.6–50.3)
HGB BLD-MCNC: 7.8 G/DL (ref 12.1–17)
HGB BLD-MCNC: 8.7 G/DL (ref 12.1–17)
INR PPP: 1.1 (ref 0.9–1.1)
MCH RBC QN AUTO: 28 PG (ref 26–34)
MCH RBC QN AUTO: 28.3 PG (ref 26–34)
MCHC RBC AUTO-ENTMCNC: 32.8 G/DL (ref 30–36.5)
MCHC RBC AUTO-ENTMCNC: 33.1 G/DL (ref 30–36.5)
MCV RBC AUTO: 85.2 FL (ref 80–99)
MCV RBC AUTO: 85.5 FL (ref 80–99)
NRBC # BLD: 0 K/UL (ref 0–0.01)
NRBC # BLD: 0 K/UL (ref 0–0.01)
NRBC BLD-RTO: 0 PER 100 WBC
NRBC BLD-RTO: 0 PER 100 WBC
PLATELET # BLD AUTO: 196 K/UL (ref 150–400)
PLATELET # BLD AUTO: 208 K/UL (ref 150–400)
PMV BLD AUTO: 10.6 FL (ref 8.9–12.9)
PMV BLD AUTO: 10.9 FL (ref 8.9–12.9)
POTASSIUM SERPL-SCNC: 3.1 MMOL/L (ref 3.5–5.1)
POTASSIUM SERPL-SCNC: 5.1 MMOL/L (ref 3.5–5.1)
PROCALCITONIN SERPL-MCNC: 1.2 NG/ML
PROTHROMBIN TIME: 10.9 SEC (ref 9–11.1)
RBC # BLD AUTO: 2.76 M/UL (ref 4.1–5.7)
RBC # BLD AUTO: 3.11 M/UL (ref 4.1–5.7)
SERVICE CMNT-IMP: ABNORMAL
SODIUM SERPL-SCNC: 134 MMOL/L (ref 136–145)
SODIUM SERPL-SCNC: 134 MMOL/L (ref 136–145)
URATE SERPL-MCNC: 13.1 MG/DL (ref 3.5–7.2)
WBC # BLD AUTO: 11.3 K/UL (ref 4.1–11.1)
WBC # BLD AUTO: 8.8 K/UL (ref 4.1–11.1)

## 2019-08-06 PROCEDURE — 84145 PROCALCITONIN (PCT): CPT

## 2019-08-06 PROCEDURE — 74011636637 HC RX REV CODE- 636/637: Performed by: FAMILY MEDICINE

## 2019-08-06 PROCEDURE — 74011250636 HC RX REV CODE- 250/636: Performed by: FAMILY MEDICINE

## 2019-08-06 PROCEDURE — 82728 ASSAY OF FERRITIN: CPT

## 2019-08-06 PROCEDURE — 85610 PROTHROMBIN TIME: CPT

## 2019-08-06 PROCEDURE — 74011636637 HC RX REV CODE- 636/637: Performed by: NURSE PRACTITIONER

## 2019-08-06 PROCEDURE — 74011000250 HC RX REV CODE- 250: Performed by: FAMILY MEDICINE

## 2019-08-06 PROCEDURE — 74011000258 HC RX REV CODE- 258: Performed by: INTERNAL MEDICINE

## 2019-08-06 PROCEDURE — 65660000000 HC RM CCU STEPDOWN

## 2019-08-06 PROCEDURE — 74011250636 HC RX REV CODE- 250/636: Performed by: INTERNAL MEDICINE

## 2019-08-06 PROCEDURE — 99222 1ST HOSP IP/OBS MODERATE 55: CPT | Performed by: INTERNAL MEDICINE

## 2019-08-06 PROCEDURE — 74011250637 HC RX REV CODE- 250/637: Performed by: INTERNAL MEDICINE

## 2019-08-06 PROCEDURE — 85027 COMPLETE CBC AUTOMATED: CPT

## 2019-08-06 PROCEDURE — 94640 AIRWAY INHALATION TREATMENT: CPT

## 2019-08-06 PROCEDURE — 82962 GLUCOSE BLOOD TEST: CPT

## 2019-08-06 PROCEDURE — 84550 ASSAY OF BLOOD/URIC ACID: CPT

## 2019-08-06 PROCEDURE — 80048 BASIC METABOLIC PNL TOTAL CA: CPT

## 2019-08-06 PROCEDURE — 82784 ASSAY IGA/IGD/IGG/IGM EACH: CPT

## 2019-08-06 PROCEDURE — 36415 COLL VENOUS BLD VENIPUNCTURE: CPT

## 2019-08-06 PROCEDURE — 93306 TTE W/DOPPLER COMPLETE: CPT

## 2019-08-06 PROCEDURE — 77010033678 HC OXYGEN DAILY

## 2019-08-06 PROCEDURE — 74011250637 HC RX REV CODE- 250/637: Performed by: FAMILY MEDICINE

## 2019-08-06 PROCEDURE — 83880 ASSAY OF NATRIURETIC PEPTIDE: CPT

## 2019-08-06 RX ORDER — IPRATROPIUM BROMIDE AND ALBUTEROL SULFATE 2.5; .5 MG/3ML; MG/3ML
3 SOLUTION RESPIRATORY (INHALATION)
Status: DISCONTINUED | OUTPATIENT
Start: 2019-08-06 | End: 2019-08-07

## 2019-08-06 RX ORDER — POTASSIUM CHLORIDE 750 MG/1
40 TABLET, FILM COATED, EXTENDED RELEASE ORAL 2 TIMES DAILY
Status: COMPLETED | OUTPATIENT
Start: 2019-08-06 | End: 2019-08-06

## 2019-08-06 RX ORDER — POTASSIUM CHLORIDE 750 MG/1
40 TABLET, FILM COATED, EXTENDED RELEASE ORAL 2 TIMES DAILY
Status: DISCONTINUED | OUTPATIENT
Start: 2019-08-06 | End: 2019-08-06

## 2019-08-06 RX ORDER — MAGNESIUM SULFATE 100 %
4 CRYSTALS MISCELLANEOUS AS NEEDED
Status: DISCONTINUED | OUTPATIENT
Start: 2019-08-06 | End: 2019-08-20 | Stop reason: HOSPADM

## 2019-08-06 RX ORDER — AZITHROMYCIN 250 MG/1
500 TABLET, FILM COATED ORAL DAILY
Status: DISCONTINUED | OUTPATIENT
Start: 2019-08-07 | End: 2019-08-07

## 2019-08-06 RX ORDER — INSULIN LISPRO 100 [IU]/ML
10 INJECTION, SOLUTION INTRAVENOUS; SUBCUTANEOUS ONCE
Status: COMPLETED | OUTPATIENT
Start: 2019-08-06 | End: 2019-08-06

## 2019-08-06 RX ORDER — INSULIN LISPRO 100 [IU]/ML
INJECTION, SOLUTION INTRAVENOUS; SUBCUTANEOUS
Status: DISCONTINUED | OUTPATIENT
Start: 2019-08-06 | End: 2019-08-20 | Stop reason: HOSPADM

## 2019-08-06 RX ORDER — AZITHROMYCIN 250 MG/1
250 TABLET, FILM COATED ORAL
Status: COMPLETED | OUTPATIENT
Start: 2019-08-06 | End: 2019-08-06

## 2019-08-06 RX ORDER — INSULIN GLARGINE 100 [IU]/ML
18 INJECTION, SOLUTION SUBCUTANEOUS
Status: DISCONTINUED | OUTPATIENT
Start: 2019-08-06 | End: 2019-08-08

## 2019-08-06 RX ORDER — BALSAM PERU/CASTOR OIL
OINTMENT (GRAM) TOPICAL 3 TIMES DAILY
Status: DISCONTINUED | OUTPATIENT
Start: 2019-08-06 | End: 2019-08-20 | Stop reason: HOSPADM

## 2019-08-06 RX ORDER — DEXTROSE 50 % IN WATER (D50W) INTRAVENOUS SYRINGE
12.5-25 AS NEEDED
Status: DISCONTINUED | OUTPATIENT
Start: 2019-08-06 | End: 2019-08-08 | Stop reason: CLARIF

## 2019-08-06 RX ADMIN — INSULIN LISPRO 3 UNITS: 100 INJECTION, SOLUTION INTRAVENOUS; SUBCUTANEOUS at 22:17

## 2019-08-06 RX ADMIN — INSULIN GLARGINE 18 UNITS: 100 INJECTION, SOLUTION SUBCUTANEOUS at 01:01

## 2019-08-06 RX ADMIN — Medication 10 ML: at 14:00

## 2019-08-06 RX ADMIN — INSULIN LISPRO 2 UNITS: 100 INJECTION, SOLUTION INTRAVENOUS; SUBCUTANEOUS at 12:11

## 2019-08-06 RX ADMIN — TAMSULOSIN HYDROCHLORIDE 0.4 MG: 0.4 CAPSULE ORAL at 09:06

## 2019-08-06 RX ADMIN — CARVEDILOL 12.5 MG: 12.5 TABLET, FILM COATED ORAL at 09:11

## 2019-08-06 RX ADMIN — POTASSIUM CHLORIDE 40 MEQ: 750 TABLET, EXTENDED RELEASE ORAL at 09:05

## 2019-08-06 RX ADMIN — Medication 10 ML: at 22:18

## 2019-08-06 RX ADMIN — POTASSIUM CHLORIDE 40 MEQ: 750 TABLET, EXTENDED RELEASE ORAL at 17:00

## 2019-08-06 RX ADMIN — HEPARIN SODIUM 5000 UNITS: 5000 INJECTION INTRAVENOUS; SUBCUTANEOUS at 22:16

## 2019-08-06 RX ADMIN — AZITHROMYCIN 250 MG: 250 TABLET, FILM COATED ORAL at 14:09

## 2019-08-06 RX ADMIN — PREDNISONE 20 MG: 20 TABLET ORAL at 09:06

## 2019-08-06 RX ADMIN — Medication 10 ML: at 22:19

## 2019-08-06 RX ADMIN — INSULIN LISPRO 5 UNITS: 100 INJECTION, SOLUTION INTRAVENOUS; SUBCUTANEOUS at 07:47

## 2019-08-06 RX ADMIN — CEFTRIAXONE 1 G: 1 INJECTION, POWDER, FOR SOLUTION INTRAMUSCULAR; INTRAVENOUS at 14:09

## 2019-08-06 RX ADMIN — SODIUM BICARBONATE 1300 MG: 650 TABLET ORAL at 16:56

## 2019-08-06 RX ADMIN — Medication 10 ML: at 07:43

## 2019-08-06 RX ADMIN — ASPIRIN 81 MG CHEWABLE TABLET 81 MG: 81 TABLET CHEWABLE at 09:05

## 2019-08-06 RX ADMIN — INSULIN GLARGINE 18 UNITS: 100 INJECTION, SOLUTION SUBCUTANEOUS at 22:17

## 2019-08-06 RX ADMIN — IPRATROPIUM BROMIDE AND ALBUTEROL SULFATE 3 ML: .5; 3 SOLUTION RESPIRATORY (INHALATION) at 08:15

## 2019-08-06 RX ADMIN — AZITHROMYCIN MONOHYDRATE 250 MG: 250 TABLET ORAL at 09:06

## 2019-08-06 RX ADMIN — SODIUM BICARBONATE 1300 MG: 650 TABLET ORAL at 22:16

## 2019-08-06 RX ADMIN — Medication: at 22:18

## 2019-08-06 RX ADMIN — SODIUM BICARBONATE 1300 MG: 650 TABLET ORAL at 09:06

## 2019-08-06 RX ADMIN — HEPARIN SODIUM 5000 UNITS: 5000 INJECTION INTRAVENOUS; SUBCUTANEOUS at 12:11

## 2019-08-06 RX ADMIN — INSULIN LISPRO 5 UNITS: 100 INJECTION, SOLUTION INTRAVENOUS; SUBCUTANEOUS at 16:56

## 2019-08-06 RX ADMIN — SIMVASTATIN 20 MG: 20 TABLET, FILM COATED ORAL at 22:16

## 2019-08-06 RX ADMIN — CARVEDILOL 12.5 MG: 12.5 TABLET, FILM COATED ORAL at 16:56

## 2019-08-06 RX ADMIN — INSULIN LISPRO 10 UNITS: 100 INJECTION, SOLUTION INTRAVENOUS; SUBCUTANEOUS at 00:58

## 2019-08-06 RX ADMIN — IPRATROPIUM BROMIDE AND ALBUTEROL SULFATE 3 ML: .5; 3 SOLUTION RESPIRATORY (INHALATION) at 20:19

## 2019-08-06 NOTE — ROUTINE PROCESS
Bedside and Verbal shift change report given to BUCK Vega(oncoming nurse) by Juanita Solomon RN (offgoing nurse). Report included the following information SBAR, Kardex, Procedure Summary, Intake/Output, MAR, Recent Results and Cardiac Rhythm Paced Rhythm with underlying Atrial Fibrillation. Alma Agustin

## 2019-08-06 NOTE — PROGRESS NOTES
[facility] 
 [address] [facility], [address] [facility], [facility] Phone: [phone]   [phone]   
  
Nephrology Progress Note Nikkie Toth.     1935     [id number] Date of Admission : 8/5/2019 08/06/19 CC:  Follow up for  KATALINA Assessment and Plan KATALINA  on CKD  
- 2/2 pre renal azotemia from diuretics, infection  
- hold Bumex for another day - No need for IVF  
- Cr trending down  
  
Dyspnea : 
- RML infiltrate suggesting PNA  
- Minimal pulm vascular congestion and appears chronic  
- abx per primary team 
  
CKD IV: 
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 
- baseline Cr at best : 2.7- 3 mg/dl - UPCR showed 11 gm of Protein  
- he wants to do PD if he becomes ESRD, but he is not a good candidate for long term dialysis  
  
Metablolic Acidosis: 
- cont PO BICARB 
  
Dyspnea : 
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  
- RHC w/ PCW 28 
  
HTN: 
- continue current meds 
  
Bradycardia : 
- s/p PPM on 5/9 
  
Anemia of CKD: 
- hgb stable - weekly MICHAEL while in hospital  
  
Afib: 
- per cards 
  
CAD s/p CABG 
  
DM2: 
- on insulin 
  
Care Plan discussed with: pt Interval History: 
Seen andd examined Feels significantly better Cr down UOP - non oliguric Review of Systems: Pertinent items are noted in HPI. Current Medications:  
Current Facility-Administered Medications Medication Dose Route Frequency  insulin glargine (LANTUS) injection 18 Units  18 Units SubCUTAneous QHS  insulin lispro (HUMALOG) injection   SubCUTAneous AC&HS  
 glucose chewable tablet 16 g  4 Tab Oral PRN  
 dextrose (D50W) injection syrg 12.5-25 g  12.5-25 g IntraVENous PRN  
 glucagon (GLUCAGEN) injection 1 mg  1 mg IntraMUSCular PRN  potassium chloride SR (KLOR-CON 10) tablet 40 mEq  40 mEq Oral BID  albuterol-ipratropium (DUO-NEB) 2.5 MG-0.5 MG/3 ML  3 mL Nebulization BID RT  
 sodium chloride (NS) flush 5-40 mL  5-40 mL IntraVENous Q8H  
  sodium chloride (NS) flush 5-40 mL  5-40 mL IntraVENous PRN  
 sodium chloride (NS) flush 5-40 mL  5-40 mL IntraVENous Q8H  
 sodium chloride (NS) flush 5-40 mL  5-40 mL IntraVENous PRN  
 acetaminophen (TYLENOL) tablet 650 mg  650 mg Oral Q4H PRN  
 HYDROcodone-acetaminophen (NORCO) 5-325 mg per tablet 1 Tab  1 Tab Oral Q4H PRN  
 morphine injection 1 mg  1 mg IntraVENous Q4H PRN  
 heparin (porcine) injection 5,000 Units  5,000 Units SubCUTAneous Q12H  aspirin chewable tablet 81 mg  81 mg Oral DAILY  [Held by provider] bumetanide (BUMEX) tablet 3 mg  3 mg Oral BID  carvedilol (COREG) tablet 12.5 mg  12.5 mg Oral BID WITH MEALS  simvastatin (ZOCOR) tablet 20 mg  20 mg Oral QHS  sodium bicarbonate tablet 1,300 mg  1,300 mg Oral TID  tamsulosin (FLOMAX) capsule 0.4 mg  0.4 mg Oral DAILY  predniSONE (DELTASONE) tablet 20 mg  20 mg Oral DAILY WITH BREAKFAST  azithromycin (ZITHROMAX) tablet 250 mg  250 mg Oral DAILY  epoetin dm-epbx (RETACRIT) injection 10,000 Units  10,000 Units SubCUTAneous Q7D Allergies Allergen Reactions  Lisinopril Cough Objective: 
Vitals:   
Vitals:  
 08/06/19 7852 08/06/19 8214 08/06/19 0815 08/06/19 9034 BP: 155/70 149/55  149/55 Pulse: 75 75 Resp: 18 23 Temp: 97.9 °F (36.6 °C) 97.9 °F (36.6 °C) SpO2: 98% 97% 98% Weight: 71.8 kg (158 lb 3.2 oz)   71.8 kg (158 lb 4.6 oz) Height:    5' 9\" (1.753 m) Intake and Output: 
08/06 0701 - 08/06 1900 In: -  
Out: 225 [Urine:225] 08/04 1901 - 08/06 0700 In: -  
Out: 420 [Urine:420] Physical Examination: 
 
General: NAD,Conversant Neck:  Supple, no mass Resp:  Lungs CTA B/L, no wheezing , normal respiratory effort CV:  RRR,  no murmur or rub,no LE edema GI:  Soft, NT, + Bowel sounds, no hepatosplenomegaly Neurologic:  Non focal 
Psych:             AAO x 3 appropriate affect  
 
 
[]    High complexity decision making was performed []    Patient is at high-risk of decompensation with multiple organ involvement Lab Data Personally Reviewed: I have reviewed all the pertinent labs, microbiology data and radiology studies during assessment. Recent Labs 08/06/19 
1335 08/05/19 
2498 * 131*  
K 3.1* 3.2*  
CL 91* 90* CO2 31 30 * 204* BUN 71* 65* CREA 3.64* 3.85* CA 8.6 9.2 ALB  --  3.2* SGOT  --  76* ALT  --  54 Recent Labs 08/06/19 
1869 08/05/19 
2121 WBC 8.8 10.9 HGB 7.8* 9.2* HCT 23.6* 28.1*  
 211 No results found for: SDES Lab Results Component Value Date/Time Culture result: NO GROWTH AFTER 17 HOURS 08/05/2019 10:46 AM  
 Culture result: CANDIDA ALBICANS (A) 03/16/2017 06:00 AM  
 Culture result: NO GROWTH 5 DAYS 03/16/2017 05:40 AM  
 
Recent Results (from the past 24 hour(s)) CULTURE, BLOOD, PAIRED Collection Time: 08/05/19 10:46 AM  
Result Value Ref Range Special Requests: NO SPECIAL REQUESTS Culture result: NO GROWTH AFTER 17 HOURS    
POC LACTIC ACID Collection Time: 08/05/19 10:48 AM  
Result Value Ref Range Lactic Acid (POC) 0.79 0.40 - 2.00 mmol/L  
POC G3 - PUL Collection Time: 08/05/19  3:19 PM  
Result Value Ref Range pH (POC) 7.445 7.35 - 7.45    
 pCO2 (POC) 38.2 35.0 - 45.0 MMHG  
 pO2 (POC) 82 80 - 100 MMHG  
 HCO3 (POC) 26.3 (H) 22 - 26 MMOL/L  
 sO2 (POC) 97 92 - 97 % Base excess (POC) 2 mmol/L Site LEFT RADIAL Device: NASAL CANNULA Flow rate (POC) 4 L/M Allens test (POC) YES Specimen type (POC) ARTERIAL    
TROPONIN I Collection Time: 08/05/19  3:51 PM  
Result Value Ref Range Troponin-I, Qt. 0.45 (H) <0.05 ng/mL EKG, 12 LEAD, INITIAL Collection Time: 08/05/19  3:58 PM  
Result Value Ref Range Ventricular Rate 63 BPM  
 Atrial Rate 300 BPM  
 QRS Duration 156 ms  
 Q-T Interval 532 ms QTC Calculation (Bezet) 544 ms Calculated R Axis -44 degrees Calculated T Axis -54 degrees Diagnosis Demand pacemaker, interpretation is based on intrinsic rhythm Atrial fibrillation with premature ventricular or aberrantly conducted  
complexes Left axis deviation Right bundle branch block T wave abnormality, consider inferolateral ischemia or digitalis effect When compared with ECG of 05-AUG-2019 08:37, 
Electronic demand pacing is now present Confirmed by Harvin Favre, MD ([id number]) on 8/5/2019 5:08:14 PM 
  
GLUCOSE, POC Collection Time: 08/05/19 11:44 PM  
Result Value Ref Range Glucose (POC) 478 (H) 65 - 100 mg/dL Performed by Jarod Conway GLUCOSE, POC Collection Time: 08/05/19 11:45 PM  
Result Value Ref Range Glucose (POC) 536 (H) 65 - 100 mg/dL Performed by Jarod Conway CBC W/O DIFF Collection Time: 08/06/19  4:45 AM  
Result Value Ref Range WBC 8.8 4.1 - 11.1 K/uL  
 RBC 2.76 (L) 4.10 - 5.70 M/uL HGB 7.8 (L) 12.1 - 17.0 g/dL HCT 23.6 (L) 36.6 - 50.3 % MCV 85.5 80.0 - 99.0 FL  
 MCH 28.3 26.0 - 34.0 PG  
 MCHC 33.1 30.0 - 36.5 g/dL  
 RDW 14.7 (H) 11.5 - 14.5 % PLATELET 650 488 - 439 K/uL MPV 10.9 8.9 - 12.9 FL  
 NRBC 0.0 0  WBC ABSOLUTE NRBC 0.00 0.00 - 0.01 K/uL METABOLIC PANEL, BASIC Collection Time: 08/06/19  4:45 AM  
Result Value Ref Range Sodium 134 (L) 136 - 145 mmol/L Potassium 3.1 (L) 3.5 - 5.1 mmol/L Chloride 91 (L) 97 - 108 mmol/L  
 CO2 31 21 - 32 mmol/L Anion gap 12 5 - 15 mmol/L Glucose 267 (H) 65 - 100 mg/dL BUN 71 (H) 6 - 20 MG/DL Creatinine 3.64 (H) 0.70 - 1.30 MG/DL  
 BUN/Creatinine ratio 20 12 - 20 GFR est AA 20 (L) >60 ml/min/1.73m2 GFR est non-AA 16 (L) >60 ml/min/1.73m2 Calcium 8.6 8.5 - 10.1 MG/DL  
GLUCOSE, POC Collection Time: 08/06/19  6:27 AM  
Result Value Ref Range Glucose (POC) 265 (H) 65 - 100 mg/dL Performed by Yumi Hill   
ECHO ADULT COMPLETE Collection Time: 08/06/19  9:50 AM  
Result Value Ref Range Aortic Valve Systolic Peak Velocity 261.76 cm/s AoV PG 18.1 mmHg LVIDd 4.76 4.2 - 5.9 cm  
 LVPWd 1.26 (A) 0.6 - 1.0 cm LVIDs 2.84 cm IVSd 1.29 (A) 0.6 - 1.0 cm  
 MVA (PHT) 3.7 cm2  
 MV A Steve 59.43 cm/s  
 MV E Steve 162.86 cm/s  
 MV E/A 2.74   
 MV Mean Gradient 3.3 mmHg Mitral Valve Annulus Velocity Time Integral 34.58 cm Left Atrium to Aortic Root Ratio 1.86   
 RVIDd 4.41 cm  
 LV Mass .8 (A) 88 - 224 g LV Mass AL Index 150.1 49 - 115 g/m2 MV Peak Gradient 10.9 mmHg Mitral Valve E Wave Deceleration Time 156.9 ms  
 Mitral Valve Pressure Half-time 59.5 ms Left Atrium Major Axis 6.25 cm Tapse 0.95 (A) 1.5 - 2.0 cm Triscuspid Valve Regurgitation Peak Gradient 46.8 mmHg Pulmonic Valve Max Velocity 83.31 cm/s Mitral Valve Max Velocity 164.82 cm/s  
 TR Max Velocity 341.87 cm/s Ao Root D 3.36 cm  
 PV peak gradient 2.8 mmHg Total time spent with patient:  xxx   min. Care Plan discussed with: 
Patient Family RN Consulting Physician [facility],      
 
I have reviewed the flowsheets. Chart and Pertinent Notes have been reviewed. No change in PMH ,family and social history from Consult note.  
 
 
Zeb Pretty MD

## 2019-08-06 NOTE — PROGRESS NOTES
Spiritual Care Partner Volunteer visited patient in room 425 on 8.06.19. Documented by: : Rev. Simona Osei. Octavia Reid; [facility], to contact [phone] [address] call: [phone]

## 2019-08-06 NOTE — NURSE NAVIGATOR
Chart reviewed by Heart Failure Nurse Navigator. Heart Failure database completed. EF:  Prior ef 5/2019 56/60; repeat echo pending ACEi/ARB/ARNi: not currently indicated BB: coreg 12.5 mg twice daily Aldosterone Antagonist: not currently indicated Obstructive Sleep Apnea Screening: STOP-BANG score: 
 Referred to Sleep Medicine: CRT not currently indicated NYHA Functional Class III on admission. Heart Failure Teach Back in Patient Education. Heart Failure Avoiding Triggers on Discharge Instructions. Cardiologist: MICHELLE Thakur Dr. Post discharge follow up phone call to be made within 48-72 hours of discharge.    
 
MEDICARE HF BUNDLE

## 2019-08-06 NOTE — PROGRESS NOTES
Problem: Falls - Risk of 
Goal: *Absence of Falls Description Document Ana Payment Fall Risk and appropriate interventions in the flowsheet. Outcome: Progressing Towards Goal 
Note:  
Fall Risk Interventions: 
Mobility Interventions: Mechanical lift, OT consult for ADLs, Patient to call before getting OOB, Communicate number of staff needed for ambulation/transfer Medication Interventions: Assess postural VS orthostatic hypotension, Evaluate medications/consider consulting pharmacy, Patient to call before getting OOB Elimination Interventions: Call light in reach, Elevated toilet seat, Stay With Me (per policy), Toilet paper/wipes in reach Problem: Patient Education: Go to Patient Education Activity Goal: Patient/Family Education Outcome: Progressing Towards Goal 
  
Problem: Heart Failure: Day 2 Goal: Off Pathway (Use only if patient is Off Pathway) Outcome: Progressing Towards Goal 
Goal: Activity/Safety Outcome: Progressing Towards Goal 
Note:  
Pt. Walked in the room & to bathroom Goal: Consults, if ordered Outcome: Progressing Towards Goal 
Note:  
Cardiology following Goal: Diagnostic Test/Procedures Outcome: Progressing Towards Goal 
Note:  
Echo has been done Goal: Nutrition/Diet Outcome: Progressing Towards Goal 
Goal: Discharge Planning Outcome: Progressing Towards Goal 
  
Problem: Pressure Injury - Risk of 
Goal: *Prevention of pressure injury Description Document Fabio Scale and appropriate interventions in the flowsheet. Outcome: Progressing Towards Goal 
Note:  
Pressure Injury Interventions: Activity Interventions: Increase time out of bed, PT/OT evaluation, Assess need for specialty bed Mobility Interventions: Assess need for specialty bed, Float heels, Pressure redistribution bed/mattress (bed type), PT/OT evaluation, Turn and reposition approx. every two hours(pillow and wedges) Nutrition Interventions: Document food/fluid/supplement intake Friction and Shear Interventions: Apply protective barrier, creams and emollients, Foam dressings/transparent film/skin sealants, Lift sheet, Lift team/patient mobility team, Minimize layers Problem: Patient Education: Go to Patient Education Activity Goal: Patient/Family Education Outcome: Progressing Towards Goal 
  
Bedside and Verbal shift change report given to BUCK Walton (oncoming nurse) by Devante Modi RN (offgoing nurse). Report included the following information SBAR, Kardex, MAR, Recent Results and Cardiac Rhythm AFIB. Patient Vitals for the past 12 hrs: 
 Temp Pulse Resp BP SpO2  
08/06/19 1656  76  165/76   
08/06/19 1507 97.4 °F (36.3 °C) 74 20 153/60 97 % 08/06/19 1203 97.9 °F (36.6 °C) 72 18 145/58 96 % 08/06/19 0927    149/55   
08/06/19 0815     98 % 08/06/19 0808 97.9 °F (36.6 °C) 75 23 149/55 97 % 08/06/19 0800     99 %

## 2019-08-06 NOTE — PROGRESS NOTES
Hospitalist Progress Note Mayda Patel MD 
Answering service: [phone] OR [phone] from in house phone Cell:   
  
Date of Service:  2019 NAME:  Eitan Forman Sr. 
:  1935 MRN:  [id number] Admission Summary:  
The patient is an 71-year-old male with past medical history of diabetes, hypertension, coronary artery disease, hypercholesterolemia, peripheral vascular disease, AFib, and CKD, who presented to the emergency room via private vehicle with chief complaint of shortness of breath. His shortness of breath has been persistent for more than one week and subsequently has worsened since yesterday. There is associated bilateral leg swelling since yesterday. The patient has been taking four albuterol nebulizer treatments with no relief. The patient also reports cough with occasional greenish sputum. He recently had pacemaker placed by Dr. Kailyn Gill and had followed up with Cardiology yesterday who increased his diuretic dose. The patient has been seen in the emergency room for shortness of breath three times this week. Interval history / Subjective:  
   
 
PATIENT SEEN THIS AM  
During rounds He felt very good this am and had no complaints No CP or SOB Assessment & Plan:  
 
 acute hypoxemic resp failure-  
More likely from PNA than due to volume overload- got  IV bumex given in ED Almost looks dry today His bumex is held by nephrology  
  
 severe pulm HTN 
RHC w/ PCW 28 ECHO 50/60% with PAP 50 CAP poa On xray Started on ceftriaxone jimmy ddition to azithromycin Improving  
  
 suspected COPD from past tobacco use-  
resume steroids and home meds 
  
. KATALINA on CKD stage 4- 
 baseline creatinine 3.1-3.3; was 3.8 on admission- 
 likely due to diureitcs and hypotension 
nephro held the diuretic improving Metabolic acidosis On po bicarb  
  
.  PVD-  
s/p stented in both LE, cont aspirin 
  
 DM type 2-  
 resume lantus w accuchecks and ss insulin 
  
. HLD-  
resume statin meds 
  
. BPH 
- resume flomax Anemia of CKD  
 
CAD s/p CABG Bradycardia with afib w pacemaker, Code status: DVT prophylaxis:  
 
Care Plan discussed with: Patient/Family Disposition: TBD Hospital Problems  Date Reviewed: 7/28/2019 Codes Class Noted POA  
 CHF exacerbation ([facility] ICD-10-CM: I50.9 ICD-9-CM: 428.0  8/5/2019 Unknown [facility] ICD-10-CM: J96.01 
ICD-9-CM: 518.81  8/5/2019 Unknown Review of Systems: A comprehensive review of systems was negative except for that written in the HPI. Vital Signs:  
 Last 24hrs VS reviewed since prior progress note. Most recent are: 
Visit Vitals /58 (BP 1 Location: Left arm, BP Patient Position: At rest;Sitting) Pulse 72 Temp 97.9 °F (36.6 °C) Resp 18 Ht 5' 9\" (1.753 m) Wt 71.8 kg (158 lb 4.6 oz) SpO2 96% BMI 23.38 kg/m² Intake/Output Summary (Last 24 hours) at 8/6/2019 1244 Last data filed at 8/6/2019 9705 Gross per 24 hour Intake  Output 645 ml Net -645 ml Physical Examination:  
 
 
     
Constitutional:  No acute distress, cooperative, pleasant   
ENT:  Oral mucous moist, oropharynx benign. Neck supple, Resp:  CTA bilaterally. No wheezing/rhonchi/rales. No accessory muscle use CV:  Regular rhythm, normal rate, no murmurs, gallops, rubs GI:  Soft, non distended, non tender. normoactive bowel sounds, no hepatosplenomegaly Musculoskeletal:  No edema, warm, 2+ pulses throughout Neurologic:  Moves all extremities. AAOx3, CN II-XII reviewed Data Review:  
 Review and/or order of clinical lab test 
 
 
Labs:  
 
Recent Labs 08/06/19 
1361 08/05/19 
7080 WBC 8.8 10.9 HGB 7.8* 9.2* HCT 23.6* 28.1*  
 211 Recent Labs 08/06/19 
2697 08/05/19 
4007 * 131*  
K 3.1* 3.2*  
CL 91* 90* CO2 31 30 BUN 71* 65* CREA 3.64* 3.85* * 204* CA 8.6 9.2 Recent Labs 08/05/19 
7554 SGOT 76* ALT 54 * TBILI 0.8 TP 7.5 ALB 3.2*  
GLOB 4.3* No results for input(s): INR, PTP, APTT in the last 72 hours. No lab exists for component: INREXT No results for input(s): FE, TIBC, PSAT, FERR in the last 72 hours. No results found for: FOL, RBCF No results for input(s): PH, PCO2, PO2 in the last 72 hours. Recent Labs 08/05/19 
1551 08/05/19 
3131 TROIQ 0.45* 0.42* No results found for: CHOL, CHOLX, CHLST, CHOLV, HDL, LDL, LDLC, DLDLP, TGLX, TRIGL, TRIGP, CHHD, CHHDX Lab Results Component Value Date/Time Glucose (POC) 159 (H) 08/06/2019 12:07 PM  
 Glucose (POC) 265 (H) 08/06/2019 06:27 AM  
 Glucose (POC) 536 (H) 08/05/2019 11:45 PM  
 Glucose (POC) 478 (H) 08/05/2019 11:44 PM  
 Glucose (POC) 94 05/19/2019 07:25 AM  
 
Lab Results Component Value Date/Time Color YELLOW/STRAW 06/25/2017 11:40 AM  
 Appearance CLEAR 06/25/2017 11:40 AM  
 Specific gravity 1.018 06/25/2017 11:40 AM  
 pH (UA) 5.0 06/25/2017 11:40 AM  
 Protein 300 (A) 06/25/2017 11:40 AM  
 Glucose NEGATIVE  06/25/2017 11:40 AM  
 Ketone NEGATIVE  06/25/2017 11:40 AM  
 Bilirubin NEGATIVE  06/25/2017 11:40 AM  
 Urobilinogen 0.2 06/25/2017 11:40 AM  
 Nitrites NEGATIVE  06/25/2017 11:40 AM  
 Leukocyte Esterase NEGATIVE  06/25/2017 11:40 AM  
 Epithelial cells FEW 06/25/2017 11:40 AM  
 Bacteria NEGATIVE  06/25/2017 11:40 AM  
 WBC 0-4 06/25/2017 11:40 AM  
 RBC 0-5 06/25/2017 11:40 AM  
 
 
 
Medications Reviewed:  
 
Current Facility-Administered Medications Medication Dose Route Frequency  insulin glargine (LANTUS) injection 18 Units  18 Units SubCUTAneous QHS  insulin lispro (HUMALOG) injection   SubCUTAneous AC&HS  
 glucose chewable tablet 16 g  4 Tab Oral PRN  
 dextrose (D50W) injection syrg 12.5-25 g  12.5-25 g IntraVENous PRN  
 glucagon (GLUCAGEN) injection 1 mg  1 mg IntraMUSCular PRN  
  potassium chloride SR (KLOR-CON 10) tablet 40 mEq  40 mEq Oral BID  albuterol-ipratropium (DUO-NEB) 2.5 MG-0.5 MG/3 ML  3 mL Nebulization BID RT  
 cefTRIAXone (ROCEPHIN) 1 g in 0.9% sodium chloride (MBP/ADV) 50 mL  1 g IntraVENous Q24H  
 [START ON 8/7/2019] azithromycin (ZITHROMAX) tablet 500 mg  500 mg Oral DAILY  azithromycin (ZITHROMAX) tablet 250 mg  250 mg Oral NOW  sodium chloride (NS) flush 5-40 mL  5-40 mL IntraVENous Q8H  
 sodium chloride (NS) flush 5-40 mL  5-40 mL IntraVENous PRN  
 sodium chloride (NS) flush 5-40 mL  5-40 mL IntraVENous Q8H  
 sodium chloride (NS) flush 5-40 mL  5-40 mL IntraVENous PRN  
 acetaminophen (TYLENOL) tablet 650 mg  650 mg Oral Q4H PRN  
 HYDROcodone-acetaminophen (NORCO) 5-325 mg per tablet 1 Tab  1 Tab Oral Q4H PRN  
 morphine injection 1 mg  1 mg IntraVENous Q4H PRN  
 heparin (porcine) injection 5,000 Units  5,000 Units SubCUTAneous Q12H  aspirin chewable tablet 81 mg  81 mg Oral DAILY  [Held by provider] bumetanide (BUMEX) tablet 3 mg  3 mg Oral BID  carvedilol (COREG) tablet 12.5 mg  12.5 mg Oral BID WITH MEALS  simvastatin (ZOCOR) tablet 20 mg  20 mg Oral QHS  sodium bicarbonate tablet 1,300 mg  1,300 mg Oral TID  tamsulosin (FLOMAX) capsule 0.4 mg  0.4 mg Oral DAILY  predniSONE (DELTASONE) tablet 20 mg  20 mg Oral DAILY WITH BREAKFAST  epoetin dm-epbx (RETACRIT) injection 10,000 Units  10,000 Units SubCUTAneous Q7D  
 
______________________________________________________________________ EXPECTED LENGTH OF STAY: 3d 19h ACTUAL LENGTH OF STAY:          1 Oscar Nesbitt MD

## 2019-08-06 NOTE — DIABETES MGMT
[facility] Progress Note Recommendations/ Comments: Chart review for extreme hyperglycemia - BG at bedtime 478 mg/dL, rechecked 536 mg/dL  mg/dL today Steroids - Noted received one time dose Prednisone 60 mg yesterday. Now on Prednisone 20 mg each AM 
Home Lantus 18 units begun today last night If appropriate pleas consider: 
Increase Lantus to 22 units at bedtime Document po intake to evaluate need for prandial medication Current hospital DM medication:  
Lantus 18 units at bedtime Lispro normal sensitivity correction scael Chart reviewed on Sabiha King. Jen Oneal Patient is a 81 y.o. male with known DM on Lantus U-300 18 units daily at home A1c:  
Lab Results Component Value Date/Time Hemoglobin A1c 9.1 (H) 05/11/2019 03:37 AM  
 Hemoglobin A1c 9.8 (H) 10/20/2017 08:11 AM  
 Hemoglobin A1c, External 8.4 08/15/2017 Recent Glucose Results:  
Lab Results Component Value Date/Time  (H) 08/06/2019 12:46 PM  
  (H) 08/06/2019 04:45 AM  
 GLUCPOC 159 (H) 08/06/2019 12:07 PM  
 GLUCPOC 265 (H) 08/06/2019 06:27 AM  
 GLUCPOC 536 (H) 08/05/2019 11:45 PM  
  
 
Lab Results Component Value Date/Time Creatinine 3.35 (H) 08/06/2019 12:46 PM  
 
Estimated Creatinine Clearance: 17 mL/min (A) (based on SCr of 3.35 mg/dL (H)). Active Orders Diet DIET CARDIAC Regular PO intake: No data found. Will continue to follow as needed. Thank you Venkata Yanez RN, CDE Time spent: 6 min

## 2019-08-06 NOTE — WOUND CARE
WOCN Note:  
 
New consult placed for sacral assessment. Chart reviewed. Admitted DX:  CHF exacerbation Past Medical History: dm2, cabg, htn,  
 
Assessment:  
Patient is A&O x 3, communicative, continent and mobile. Bed: advance care Patient reports no pain. Sacrum, heels and buttocks intact. Sacrum with scattered areas blanching red erythema. Recommendations:   
Sacrum:  Venelex TID Skin Care & Pressure Prevention: 
Minimize layers of linen/pads under patient to optimize support surface. Turn/reposition approximately every 2 hours and offload heels. Discussed above plan with patient and Harry JANE. Transition of Care: Plan to follow as needed while admitted to hospital. 
 
JOAN Antonio RN [facility] Wound Care Office [phone] Pager [phone]

## 2019-08-06 NOTE — PROGRESS NOTES
Cardiology Progress Note Admit Date: 8/5/2019 Admit Diagnosis: CHF exacerbation ([email or web address] [I50.9] Acute hypoxemic respiratory failure ([email or web address] [J96.01] Date: 8/6/2019     Time: 9:37 AM 
 
Subjective: 
Feeling a bit better today. SOB less. Cough with some congestion. Headed for echo lab this am. 
 
 Assessment and Plan 1. AGUILAR 
            - with Ambulation - CXR with mild edema, more significant for PNA pattern - IV Abx started - No edema of ankles or abdomen. Focal crackles in left lower base. - Hypoxia - Sats 81-93% on 5L NC on admission - 4L NC today. Sats 94% 2. Diastolic heart failure - Chronic    
            - NYHA class III 
            - EF - 55-60%, Primary team repeating echo - Bumex 3 mg PO BID PTA - Held for intravascular dryness. 3. COPD 
            - stable, Primary team resumed steroids and home meds 4. CAD 
            - h/o off pump CABG - 2008 - Trop 0.42 - mixed demand with fixed CAD. Second trop 0.45. No CP reported - Continue home meds 5. CKD 
            - stage IV 
            - Dr. Valarie Neri to see 6. DM 
            - SSI 7. HLD 
            - Home statin resumed 
  
SOB better. Feeling some better. Diuretic held for intravascular dryness. Continue current therapies. Troponin elevated, but flat. Continue on current cardiac treatments. Known CAD, but with current renal impairment, comorbids and current stability, will continue with medical management. Consider more aggressive measures if this changes. Past Medical History:  
Diagnosis Date  CAD (coronary artery disease) s/p CABG 2008  Carotid arterial disease ([email or web address]  CKD (chronic kidney disease) stage 3, GFR 30-59 ml/min ([facility]) 10/21/2017  
 hypertension and DM nephrosclerosis  Diabetes mellitus, type 2 ([email or web address]  Hypercholesteremia  Hypertension  Paroxysmal atrial fibrillation ([facility] 10/21/2017  
 new onset afib with BRPR 10-19-17 admit  PVC's (premature ventricular contractions) 2014  PVD (peripheral vascular disease) ([place] [address].) Social History Tobacco Use  Smoking status: Former Smoker Packs/day: 3.00 Years: 20.00 Pack years: 60.00 Types: Cigarettes Last attempt to quit: 1985 Years since quittin.5  Smokeless tobacco: Never Used Substance Use Topics  Alcohol use: No  
 Drug use: No  
  
  
ROS: 
 
 GENERAL Recent weight loss - no Fever -----------------   no 
 Chills -----------------   no 
 
 EYES, VISION Visual Changes - no 
 
 EARS, NOSE, THROAT Hearing loss ----------- no 
 Swallowing difficulties - no CARDIOVASCULAR Chest pain/pressure ---- no 
 Arrhythmia/palpitations - no RESPIRATORY Cough ------------------ no Shortness of breath - yes Wheezing -------------- no   GASTROINTESTINAL Abdominal pain - no Heartburn -------- no 
 Bloody stool ----- no 
 
 GENITOURINARY Frequent urination - no Urgency -------------- no 
 
 MUSCULOSKELETAL Joint pain/swelling ---- no 
 Musculoskeletal pain - no 
 
 SKIN & INTEGUMENTARY Rashes - no Sores --- no 
 
 
   NEUROLOGICAL Numbness/tingling - no Sensation loss ------ no 
 
 PSYCHIATRIC Nervousness/anxiety - no Depression -------------- no 
 
 ENDOCRINE Heat/cold intolerance - no Excessive thirst -------- no 
 
 HEMATOLOGIC/LYMPHATIC Abnormal bleeding - no ALL/IMMUN Allergic reaction ------ no 
 Recurrent infections - no Objective: 
 
 Physical Exam: 
             
Visit Vitals /55 Pulse 75 Temp 97.9 °F (36.6 °C) Resp 23 Ht 5' 9\" (1.753 m) Wt 158 lb 4.6 oz (71.8 kg) SpO2 98% BMI 23.38 kg/m² General Appearance:   Well developed, well nourished,alert and oriented x 3, and 
 individual in no acute distress. Ears/Nose/Mouth/Throat:    Hearing grossly normal. 
  
    Neck:  Supple. Chest:    Lungs mild crackles to auscultation bilaterally. Cardiovascular:   Regular rate and rhythm, S1, S2 normal, no murmur. Abdomen:    Soft, non-tender, bowel sounds are active. Extremities:  No edema bilaterally. Skin:  Warm and dry. Telemetry: normal sinus rhythm Data Review:  
 Labs:   
Recent Results (from the past 24 hour(s)) CULTURE, BLOOD, PAIRED Collection Time: 08/05/19 10:46 AM  
Result Value Ref Range Special Requests: NO SPECIAL REQUESTS Culture result: NO GROWTH AFTER 17 HOURS    
POC LACTIC ACID Collection Time: 08/05/19 10:48 AM  
Result Value Ref Range Lactic Acid (POC) 0.79 0.40 - 2.00 mmol/L  
POC G3 - PUL Collection Time: 08/05/19  3:19 PM  
Result Value Ref Range pH (POC) 7.445 7.35 - 7.45    
 pCO2 (POC) 38.2 35.0 - 45.0 MMHG  
 pO2 (POC) 82 80 - 100 MMHG  
 HCO3 (POC) 26.3 (H) 22 - 26 MMOL/L  
 sO2 (POC) 97 92 - 97 % Base excess (POC) 2 mmol/L Site LEFT RADIAL Device: NASAL CANNULA Flow rate (POC) 4 L/M Allens test (POC) YES Specimen type (POC) ARTERIAL    
TROPONIN I Collection Time: 08/05/19  3:51 PM  
Result Value Ref Range Troponin-I, Qt. 0.45 (H) <0.05 ng/mL EKG, 12 LEAD, INITIAL Collection Time: 08/05/19  3:58 PM  
Result Value Ref Range Ventricular Rate 63 BPM  
 Atrial Rate 300 BPM  
 QRS Duration 156 ms  
 Q-T Interval 532 ms QTC Calculation (Bezet) 544 ms Calculated R Axis -44 degrees Calculated T Axis -54 degrees Diagnosis Demand pacemaker, interpretation is based on intrinsic rhythm Atrial fibrillation with premature ventricular or aberrantly conducted  
complexes Left axis deviation Right bundle branch block T wave abnormality, consider inferolateral ischemia or digitalis effect When compared with ECG of 05-AUG-2019 08:37, 
Electronic demand pacing is now present Conf[place] ([id number]) on 8/5/2019 5:08:14 PM 
  
GLUCOSE, POC Collection Time: 08/05/19 11:44 PM  
Result Value Ref Range Glucose (POC) 478 (H) 65 - 100 mg/dL Performed by Cinda Sarmiento GLUCOSE, POC Collection Time: 08/05/19 11:45 PM  
Result Value Ref Range Glucose (POC) 536 (H) 65 - 100 mg/dL Performed by Cinda Sarmiento CBC W/O DIFF Collection Time: 08/06/19  4:45 AM  
Result Value Ref Range WBC 8.8 4.1 - 11.1 K/uL  
 RBC 2.76 (L) 4.10 - 5.70 M/uL HGB 7.8 (L) 12.1 - 17.0 g/dL HCT 23.6 (L) 36.6 - 50.3 % MCV 85.5 80.0 - 99.0 FL  
 MCH 28.3 26.0 - 34.0 PG  
 MCHC 33.1 30.0 - 36.5 g/dL  
 RDW 14.7 (H) 11.5 - 14.5 % PLATELET 998 059 - 133 K/uL MPV 10.9 8.9 - 12.9 FL  
 NRBC 0.0 0  WBC ABSOLUTE NRBC 0.00 0.00 - 0.01 K/uL METABOLIC PANEL, BASIC Collection Time: 08/06/19  4:45 AM  
Result Value Ref Range Sodium 134 (L) 136 - 145 mmol/L Potassium 3.1 (L) 3.5 - 5.1 mmol/L Chloride 91 (L) 97 - 108 mmol/L  
 CO2 31 21 - 32 mmol/L Anion gap 12 5 - 15 mmol/L Glucose 267 (H) 65 - 100 mg/dL BUN 71 (H) 6 - 20 MG/DL Creatinine 3.64 (H) 0.70 - 1.30 MG/DL  
 BUN/Creatinine ratio 20 12 - 20 GFR est AA 20 (L) >60 ml/min/1.73m2 GFR est non-AA 16 (L) >60 ml/min/1.73m2 Calcium 8.6 8.5 - 10.1 MG/DL  
GLUCOSE, POC Collection Time: 08/06/19  6:27 AM  
Result Value Ref Range Glucose (POC) 265 (H) 65 - 100 mg/dL Performed by Akash Calderon Radiology:  
 
  
Current Facility-Administered Medications Medication Dose Route Frequency  insulin glargine (LANTUS) injection 18 Units  18 Units SubCUTAneous QHS  insulin lispro (HUMALOG) injection   SubCUTAneous AC&HS  
 glucose chewable tablet 16 g  4 Tab Oral PRN  
 dextrose (D50W) injection syrg 12.5-25 g  12.5-25 g IntraVENous PRN  
 glucagon (GLUCAGEN) injection 1 mg  1 mg IntraMUSCular PRN  
  potassium chloride SR (KLOR-CON 10) tablet 40 mEq  40 mEq Oral BID  albuterol-ipratropium (DUO-NEB) 2.5 MG-0.5 MG/3 ML  3 mL Nebulization BID RT  
 sodium chloride (NS) flush 5-40 mL  5-40 mL IntraVENous Q8H  
 sodium chloride (NS) flush 5-40 mL  5-40 mL IntraVENous PRN  
 sodium chloride (NS) flush 5-40 mL  5-40 mL IntraVENous Q8H  
 sodium chloride (NS) flush 5-40 mL  5-40 mL IntraVENous PRN  
 acetaminophen (TYLENOL) tablet 650 mg  650 mg Oral Q4H PRN  
 HYDROcodone-acetaminophen (NORCO) 5-325 mg per tablet 1 Tab  1 Tab Oral Q4H PRN  
 morphine injection 1 mg  1 mg IntraVENous Q4H PRN  
 heparin (porcine) injection 5,000 Units  5,000 Units SubCUTAneous Q12H  aspirin chewable tablet 81 mg  81 mg Oral DAILY  [Held by provider] bumetanide (BUMEX) tablet 3 mg  3 mg Oral BID  carvedilol (COREG) tablet 12.5 mg  12.5 mg Oral BID WITH MEALS  simvastatin (ZOCOR) tablet 20 mg  20 mg Oral QHS  sodium bicarbonate tablet 1,300 mg  1,300 mg Oral TID  tamsulosin (FLOMAX) capsule 0.4 mg  0.4 mg Oral DAILY  predniSONE (DELTASONE) tablet 20 mg  20 mg Oral DAILY WITH BREAKFAST  azithromycin (ZITHROMAX) tablet 250 mg  250 mg Oral DAILY  epoetin dm-epbx (RETACRIT) injection 10,000 Units  10,000 Units SubCUTAneous Q7D Delmis Guzman. MARGARITA Elias MD 
 
 [facility] [address], [address] 
 [phone]

## 2019-08-06 NOTE — CONSULTS
[facility] Consult Note DOS:   8/6/2019 NAME:  Pete Barragan Sr. MRN:   [id number] REFERRING PROVIDER:  Malinda Hobbs MD 
PRIMARY CARE PHYSICIAN: Tran Kelly MD 
PRIMARY CARDIOLOGIST: Juan A Espinosa MD 
 
 
Chief Complaint:  
Chief Complaint Patient presents with  Shortness of Breath HPI: 81y.o. year old male with a history of CAD, s/p CABG, s/p leadless pacemaker, HTN, DMT2, PVD, PAF, CKD 3 , and COPD who presented to the ED on  8/5/19 for increased SOB and hypotension. He has recently finished 2 separate prednisone tapers for SOB and similar symptoms. He reports he doesn't currently wear O2 at [facility], it had been previously ordered. Most recent ECHO on (8/6/19), LVEF 66-70%, mild AS, mild MR, ,mild-mod pulmonary HTN with PAS of 50 mmHG. [facility] was consulted for assistance in managing his heart failure. Impression / Plan:  
Heart Failure Status: NYHA Class II HFpEF  ICM LVEF 66-70% coreg 12.5mg po BID Intolerant of ACE/ARB/ARNi/AA due to CKD Recommend RHC to verify accurate volume status/ pulmonary status ECHO (8/6/19), LVEF 66-70% Daily weights Low Na+ diet FR 1500ml Check gammopathy, iron panel/ferritin, uric acid, procalicitonin, PNBP. INR, CBC Nutrition consult DTC consult COPD Continue steroids Requiring 4LNC  
 
DM2 Insulin, SSI Consider consulting DTC CKD 3 Avoid nephrotoxic agents Renal following Trend BNP Possible adrenal insuffiencey Hypotension when off steroids at home Might need to consider low dose steroids Consider ACTH test 
 
 
Remainder of care per primary team 
 
History: 
Past Medical History:  
Diagnosis Date  CAD (coronary artery disease) s/p CABG 2008  Carotid arterial disease ([facility]  CKD (chronic kidney disease) stage 3, GFR 30-59 ml/min ([facility]) 10/21/2017  
 hypertension and DM nephrosclerosis  Diabetes mellitus, type 2 ([facility]  Hypercholesteremia  Hypertension  Paroxysmal atrial fibrillation ([facility] 10/21/2017  
 new onset afib with BRPR 10-19-17 admit  PVC's (premature ventricular contractions) 2014  PVD (peripheral vascular disease) ([facility] Past Surgical History:  
Procedure Laterality Date  HX CORONARY ARTERY BYPASS GRAFT    
 HX HEART CATHETERIZATION    
 MS TCAT INSJ/RPL PERM LEADLESS PACEMAKER RV W/IMG N/A 2019 INSERT OR REPLACE TRANSCATH PPM LEADLESS performed by Analisa Snow MD at [address] CATH LAB Social History Socioeconomic History  Marital status:  Spouse name: Not on file  Number of children: Not on file  Years of education: Not on file  Highest education level: Not on file Occupational History  Not on file Social Needs  Financial resource strain: Not on file  Food insecurity:  
  Worry: Not on file Inability: Not on file  Transportation needs:  
  Medical: Not on file Non-medical: Not on file Tobacco Use  Smoking status: Former Smoker Packs/day: 3.00 Years: 20.00 Pack years: 60.00 Types: Cigarettes Last attempt to quit: 1985 Years since quittin.5  Smokeless tobacco: Never Used Substance and Sexual Activity  Alcohol use: No  
 Drug use: No  
 Sexual activity: Not on file Lifestyle  Physical activity:  
  Days per week: Not on file Minutes per session: Not on file  Stress: Not on file Relationships  Social connections:  
  Talks on phone: Not on file Gets together: Not on file Attends Jew service: Not on file Active member of club or organization: Not on file Attends meetings of clubs or organizations: Not on file Relationship status: Not on file  Intimate partner violence:  
  Fear of current or ex partner: Not on file Emotionally abused: Not on file Physically abused: Not on file Forced sexual activity: Not on file Other Topics Concern  Not on file Social History Narrative  Not on file History reviewed. No pertinent family history. Current Medications:  
Current Facility-Administered Medications Medication Dose Route Frequency Provider Last Rate Last Dose  insulin glargine (LANTUS) injection 18 Units  18 Units SubCUTAneous QHS Emily Gerber NP   18 Units at 08/06/19 0101  
 insulin lispro (HUMALOG) injection   SubCUTAneous AC&HS Emily Gerber NP   5 Units at 08/06/19 1656  glucose chewable tablet 16 g  4 Tab Oral PRN Emily Gerber NP      
 dextrose (D50W) injection syrg 12.5-25 g  12.5-25 g IntraVENous PRN Emily Gerber NP      
 glucagon (GLUCAGEN) injection 1 mg  1 mg IntraMUSCular PRN Emily Gerber NP      
 albuterol-ipratropium (DUO-NEB) 2.5 MG-0.5 MG/3 ML  3 mL Nebulization BID RT Julianna Waddell MD      
 cefTRIAXone (ROCEPHIN) 1 g in 0.9% sodium chloride (MBP/ADV) 50 mL  1 g IntraVENous Q24H Awilda Donis  mL/hr at 08/06/19 1409 1 g at 08/06/19 1409  [START ON 8/7/2019] azithromycin (ZITHROMAX) tablet 500 mg  500 mg Oral DAILY Awilda Donis MD      
 sodium chloride (NS) flush 5-40 mL  5-40 mL IntraVENous Q8H Naomy Milligan MD   10 mL at 08/06/19 1400  
 sodium chloride (NS) flush 5-40 mL  5-40 mL IntraVENous PRN Naomy Milligan MD      
 sodium chloride (NS) flush 5-40 mL  5-40 mL IntraVENous Q8H Julianna Waddell MD   10 mL at 08/06/19 1400  
 sodium chloride (NS) flush 5-40 mL  5-40 mL IntraVENous PRN Julianna Waddell MD      
 acetaminophen (TYLENOL) tablet 650 mg  650 mg Oral Q4H PRN Julianna Waddell MD      
 HYDROcodone-acetaminophen [facility]) 5-325 mg per tablet 1 Tab  1 Tab Oral Q4H PRN Julianna Waddell MD      
 morphine injection 1 mg  1 mg IntraVENous Q4H PRN Julianna Waddell MD      
 heparin (porcine) injection 5,000 Units  5,000 Units SubCUTAneous Q12H Julianna Waddell MD   5,000 Units at 08/06/19 1213  aspirin chewable tablet 81 mg  81 mg Oral DAILY Breana Pantoja MD   81 mg at 08/06/19 9311  [Held by provider] bumetanide (BUMEX) tablet 3 mg  3 mg Oral BID Breana Pantoja MD      
 carvedilol (COREG) tablet 12.5 mg  12.5 mg Oral BID WITH MEALS Breana Pantoja MD   12.5 mg at 08/06/19 1656  simvastatin (ZOCOR) tablet 20 mg  20 mg Oral QHS Breana Pantoja MD   20 mg at 08/05/19 2322  sodium bicarbonate tablet 1,300 mg  1,300 mg Oral TID Breana Pantoja MD   1,300 mg at 08/06/19 1656  tamsulosin (FLOMAX) capsule 0.4 mg  0.4 mg Oral DAILY Breana Pantoja MD   0.4 mg at 08/06/19 5878  predniSONE (DELTASONE) tablet 20 mg  20 mg Oral DAILY WITH BREAKFAST Breana Pantoja MD   20 mg at 08/06/19 5842  epoetin dm-epbx (RETACRIT) injection 10,000 Units  10,000 Units SubCUTAneous Q7D India Cazares MD   10,000 Units at 08/05/19 1924 Allergies: Allergies Allergen Reactions  Lisinopril Cough ROS:   
Review of Systems Constitutional: Positive for malaise/fatigue. Negative for chills and fever. HENT: Negative. Eyes: Negative. Respiratory: Positive for cough, shortness of breath and wheezing. Cardiovascular: Negative for chest pain, palpitations, orthopnea and leg swelling. Gastrointestinal: Negative. Genitourinary: Negative. Musculoskeletal: Negative. Skin: Negative. Neurological: Positive for weakness. Endo/Heme/Allergies: Negative. Psychiatric/Behavioral: Negative. Physical Exam:  
Physical Exam  
Constitutional: He is oriented to person, place, and time. He appears well-developed. Neck: No JVD present. Cardiovascular: Normal rate. Exam reveals no gallop and no friction rub. No murmur heard. Pulmonary/Chest: Effort normal and breath sounds normal. He has no wheezes. Abdominal: Soft. Bowel sounds are normal.  
Musculoskeletal: He exhibits no edema. Neurological: He is alert and oriented to person, place, and time. Skin: Skin is warm and dry. Bruising noted. Psychiatric: He has a normal mood and affect. His behavior is normal. Judgment and thought content normal.  
Nursing note and vitals reviewed. Vitals:  
Visit Vitals /76 Pulse 76 Temp 97.4 °F (36.3 °C) Resp 20 Ht 5' 9\" (1.753 m) Wt 158 lb 4.6 oz (71.8 kg) SpO2 97% BMI 23.38 kg/m² Temp (24hrs), Av.9 °F (36.6 °C), Min:97.4 °F (36.3 °C), Max:98.3 °F (36.8 °C) Admission Weight: Last Weight Weight: 160 lb 11.5 oz (72.9 kg) Weight: 158 lb 4.6 oz (71.8 kg) Oxygen Therapy: 
Oxygen Therapy O2 Sat (%): 97 % (19 1507) Pulse via Oximetry: 84 beats per minute (19 0815) O2 Device: Nasal cannula (19 1600) O2 Flow Rate (L/min): 3 l/min (19 1600) Recent Labs:  
Labs Latest Ref Rng & Units 2019 WBC 4.1 - 11.1 K/uL 11. 3(H) 8.8 10.9 RBC 4.10 - 5.70 M/uL 3.11(L) 2.76(L) 3.24(L) Hemoglobin 12.1 - 17.0 g/dL 8.7(L) 7. 8(L) 9.2(L) Hematocrit 36.6 - 50.3 % 26. 5(L) 23. 6(L) 28. 1(L) MCV 80.0 - 99.0 FL 85.2 85.5 86.7 Platelets 001 - 715 K/uL 208 196 211 Lymphocytes 12 - 49 % - - 5(L) Monocytes 5 - 13 % - - 7 Eosinophils 0 - 7 % - - 1 Basophils 0 - 1 % - - 0 Albumin 3.5 - 5.0 g/dL - - 3. 2(L) Calcium 8.5 - 10.1 MG/DL 9.2 8.6 9.2 SGOT 15 - 37 U/L - - 76(H) Glucose 65 - 100 mg/dL 136(H) 267(H) 204(H) BUN 6 - 20 MG/DL 71(H) 71(H) 65(H) Creatinine 0.70 - 1.30 MG/DL 3.35(H) 3.64(H) 3.85(H) Sodium 136 - 145 mmol/L 134(L) 134(L) 131(L) Potassium 3.5 - 5.1 mmol/L 5.1 3.1(L) 3. 2(L) Some recent data might be hidden EKG:  
EKG Results Procedure 720 Value Units Date/Time EKG, 12 LEAD, INITIAL [[id number]] Collected:  19 1558 Order Status:  Completed Updated:  19 1708 Ventricular Rate 63 BPM   
  Atrial Rate 300 BPM   
  QRS Duration 156 ms   
  Q-T Interval 532 ms QTC Calculation (Bezet) 544 ms Calculated R Axis -44 degrees Calculated T Axis -54 degrees Diagnosis --  
  Demand pacemaker, interpretation is based on intrinsic rhythm Atrial fibrillation with premature ventricular or aberrantly conducted  
complexes Left axis deviation Right bundle branch block T wave abnormality, consider inferolateral ischemia or digitalis effect When compared with ECG of 05-AUG-2019 08:37, 
Electronic demand pacing is now present Confirmed by Ching Bourne MD ([id number]) on 8/5/2019 5:08:14 PM 
  
 EKG 12 LEAD INITIAL [[id number]] Collected:  08/05/19 2778 Order Status:  Completed Updated:  08/05/19 1657 Ventricular Rate 78 BPM   
  Atrial Rate 357 BPM   
  QRS Duration 152 ms Q-T Interval 468 ms QTC Calculation (Bezet) 533 ms Calculated R Axis -22 degrees Calculated T Axis -55 degrees Diagnosis -- Atrial fibrillation Right bundle branch block When compared with ECG of 18-MAY-2019 05:57, Atrial fibrillation has replaced Electronic ventricular pacemaker Confirmed by Ching Bourne MD ([id number]) on 8/5/2019 4:57:04 PM 
  
  
 
Echocardiogram: 
ECHO (8/6/19), LVEF 66-70%, mild AS, mild MR, ,mild-mod pulmonary HTN with PAS of 50 mmHG. FÉLIX Lawrence [address], [address] [facility], [address] [place] [address] Office [phone] Fax [phone] 
 
24 hour VAD/HF Pager: [phone] AHF ATTENDING ADDENDUM Patient was seen and examined in person. Data and notes were reviewed. I have discussed and agree with the plan as noted in the NP note above without further additions. Constantin Cutler MD PhD 
Radha Pisano 2022 9 [facility]

## 2019-08-07 ENCOUNTER — APPOINTMENT (OUTPATIENT)
Dept: CT IMAGING | Age: 84
DRG: 189 | End: 2019-08-07
Attending: INTERNAL MEDICINE
Payer: MEDICARE

## 2019-08-07 ENCOUNTER — DOCUMENTATION ONLY (OUTPATIENT)
Dept: CASE MANAGEMENT | Age: 84
End: 2019-08-07

## 2019-08-07 LAB
ANION GAP SERPL CALC-SCNC: 10 MMOL/L (ref 5–15)
B PERT DNA SPEC QL NAA+PROBE: NOT DETECTED
BNP SERPL-MCNC: ABNORMAL PG/ML
BUN SERPL-MCNC: 71 MG/DL (ref 6–20)
BUN/CREAT SERPL: 24 (ref 12–20)
C PNEUM DNA SPEC QL NAA+PROBE: NOT DETECTED
CALCIUM SERPL-MCNC: 8.8 MG/DL (ref 8.5–10.1)
CHLORIDE SERPL-SCNC: 95 MMOL/L (ref 97–108)
CO2 SERPL-SCNC: 31 MMOL/L (ref 21–32)
CREAT SERPL-MCNC: 2.93 MG/DL (ref 0.7–1.3)
ERYTHROCYTE [DISTWIDTH] IN BLOOD BY AUTOMATED COUNT: 14.8 % (ref 11.5–14.5)
FLUAV H1 2009 PAND RNA SPEC QL NAA+PROBE: NOT DETECTED
FLUAV H1 RNA SPEC QL NAA+PROBE: NOT DETECTED
FLUAV H3 RNA SPEC QL NAA+PROBE: NOT DETECTED
FLUAV SUBTYP SPEC NAA+PROBE: NOT DETECTED
FLUBV RNA SPEC QL NAA+PROBE: NOT DETECTED
GLUCOSE BLD STRIP.AUTO-MCNC: 148 MG/DL (ref 65–100)
GLUCOSE BLD STRIP.AUTO-MCNC: 156 MG/DL (ref 65–100)
GLUCOSE BLD STRIP.AUTO-MCNC: 178 MG/DL (ref 65–100)
GLUCOSE BLD STRIP.AUTO-MCNC: 318 MG/DL (ref 65–100)
GLUCOSE SERPL-MCNC: 156 MG/DL (ref 65–100)
HADV DNA SPEC QL NAA+PROBE: NOT DETECTED
HCOV 229E RNA SPEC QL NAA+PROBE: NOT DETECTED
HCOV HKU1 RNA SPEC QL NAA+PROBE: NOT DETECTED
HCOV NL63 RNA SPEC QL NAA+PROBE: NOT DETECTED
HCOV OC43 RNA SPEC QL NAA+PROBE: NOT DETECTED
HCT VFR BLD AUTO: 25.5 % (ref 36.6–50.3)
HGB BLD-MCNC: 8.3 G/DL (ref 12.1–17)
HMPV RNA SPEC QL NAA+PROBE: NOT DETECTED
HPIV1 RNA SPEC QL NAA+PROBE: NOT DETECTED
HPIV2 RNA SPEC QL NAA+PROBE: NOT DETECTED
HPIV3 RNA SPEC QL NAA+PROBE: NOT DETECTED
HPIV4 RNA SPEC QL NAA+PROBE: NOT DETECTED
M PNEUMO DNA SPEC QL NAA+PROBE: NOT DETECTED
MCH RBC QN AUTO: 28.2 PG (ref 26–34)
MCHC RBC AUTO-ENTMCNC: 32.5 G/DL (ref 30–36.5)
MCV RBC AUTO: 86.7 FL (ref 80–99)
NRBC # BLD: 0 K/UL (ref 0–0.01)
NRBC BLD-RTO: 0 PER 100 WBC
PLATELET # BLD AUTO: 204 K/UL (ref 150–400)
PMV BLD AUTO: 10.5 FL (ref 8.9–12.9)
POTASSIUM SERPL-SCNC: 3.7 MMOL/L (ref 3.5–5.1)
RBC # BLD AUTO: 2.94 M/UL (ref 4.1–5.7)
RSV RNA SPEC QL NAA+PROBE: NOT DETECTED
RV+EV RNA SPEC QL NAA+PROBE: NOT DETECTED
SERVICE CMNT-IMP: ABNORMAL
SODIUM SERPL-SCNC: 136 MMOL/L (ref 136–145)
T3FREE SERPL-MCNC: 2.6 PG/ML (ref 2.2–4)
T4 FREE SERPL-MCNC: 1.8 NG/DL (ref 0.8–1.5)
TSH SERPL DL<=0.05 MIU/L-ACNC: 1.37 UIU/ML (ref 0.36–3.74)
WBC # BLD AUTO: 11.5 K/UL (ref 4.1–11.1)

## 2019-08-07 PROCEDURE — 83880 ASSAY OF NATRIURETIC PEPTIDE: CPT

## 2019-08-07 PROCEDURE — 74011250636 HC RX REV CODE- 250/636: Performed by: FAMILY MEDICINE

## 2019-08-07 PROCEDURE — 99232 SBSQ HOSP IP/OBS MODERATE 35: CPT | Performed by: INTERNAL MEDICINE

## 2019-08-07 PROCEDURE — 74011250637 HC RX REV CODE- 250/637: Performed by: INTERNAL MEDICINE

## 2019-08-07 PROCEDURE — 74011000250 HC RX REV CODE- 250: Performed by: NURSE PRACTITIONER

## 2019-08-07 PROCEDURE — 87899 AGENT NOS ASSAY W/OPTIC: CPT

## 2019-08-07 PROCEDURE — 74011000250 HC RX REV CODE- 250: Performed by: FAMILY MEDICINE

## 2019-08-07 PROCEDURE — 87449 NOS EACH ORGANISM AG IA: CPT

## 2019-08-07 PROCEDURE — 74011000258 HC RX REV CODE- 258: Performed by: INTERNAL MEDICINE

## 2019-08-07 PROCEDURE — 74011636637 HC RX REV CODE- 636/637: Performed by: NURSE PRACTITIONER

## 2019-08-07 PROCEDURE — 74011250636 HC RX REV CODE- 250/636: Performed by: INTERNAL MEDICINE

## 2019-08-07 PROCEDURE — 94664 DEMO&/EVAL PT USE INHALER: CPT

## 2019-08-07 PROCEDURE — 94640 AIRWAY INHALATION TREATMENT: CPT

## 2019-08-07 PROCEDURE — 65660000000 HC RM CCU STEPDOWN

## 2019-08-07 PROCEDURE — 77010033678 HC OXYGEN DAILY

## 2019-08-07 PROCEDURE — 71250 CT THORAX DX C-: CPT

## 2019-08-07 PROCEDURE — 74011636637 HC RX REV CODE- 636/637: Performed by: INTERNAL MEDICINE

## 2019-08-07 PROCEDURE — 82962 GLUCOSE BLOOD TEST: CPT

## 2019-08-07 PROCEDURE — 84481 FREE ASSAY (FT-3): CPT

## 2019-08-07 PROCEDURE — 0099U RESPIRATORY PANEL,PCR,NASOPHARYNGEAL: CPT

## 2019-08-07 PROCEDURE — 84443 ASSAY THYROID STIM HORMONE: CPT

## 2019-08-07 PROCEDURE — 84439 ASSAY OF FREE THYROXINE: CPT

## 2019-08-07 PROCEDURE — 85027 COMPLETE CBC AUTOMATED: CPT

## 2019-08-07 PROCEDURE — 74011000250 HC RX REV CODE- 250: Performed by: INTERNAL MEDICINE

## 2019-08-07 PROCEDURE — 80048 BASIC METABOLIC PNL TOTAL CA: CPT

## 2019-08-07 PROCEDURE — 74011250637 HC RX REV CODE- 250/637: Performed by: FAMILY MEDICINE

## 2019-08-07 PROCEDURE — 36415 COLL VENOUS BLD VENIPUNCTURE: CPT

## 2019-08-07 RX ORDER — BUMETANIDE 1 MG/1
2 TABLET ORAL DAILY
Status: DISCONTINUED | OUTPATIENT
Start: 2019-08-07 | End: 2019-08-07

## 2019-08-07 RX ORDER — BUMETANIDE 0.25 MG/ML
4 INJECTION INTRAMUSCULAR; INTRAVENOUS ONCE
Status: COMPLETED | OUTPATIENT
Start: 2019-08-07 | End: 2019-08-07

## 2019-08-07 RX ORDER — IPRATROPIUM BROMIDE AND ALBUTEROL SULFATE 2.5; .5 MG/3ML; MG/3ML
3 SOLUTION RESPIRATORY (INHALATION)
Status: DISCONTINUED | OUTPATIENT
Start: 2019-08-07 | End: 2019-08-11

## 2019-08-07 RX ORDER — BUMETANIDE 0.25 MG/ML
2 INJECTION INTRAMUSCULAR; INTRAVENOUS EVERY 12 HOURS
Status: DISCONTINUED | OUTPATIENT
Start: 2019-08-07 | End: 2019-08-07

## 2019-08-07 RX ORDER — PREDNISONE 20 MG/1
40 TABLET ORAL
Status: DISCONTINUED | OUTPATIENT
Start: 2019-08-08 | End: 2019-08-07

## 2019-08-07 RX ORDER — PREDNISONE 20 MG/1
20 TABLET ORAL ONCE
Status: COMPLETED | OUTPATIENT
Start: 2019-08-07 | End: 2019-08-07

## 2019-08-07 RX ADMIN — IPRATROPIUM BROMIDE AND ALBUTEROL SULFATE 3 ML: .5; 3 SOLUTION RESPIRATORY (INHALATION) at 08:11

## 2019-08-07 RX ADMIN — METHYLPREDNISOLONE SODIUM SUCCINATE 40 MG: 40 INJECTION, POWDER, FOR SOLUTION INTRAMUSCULAR; INTRAVENOUS at 21:33

## 2019-08-07 RX ADMIN — SIMVASTATIN 20 MG: 20 TABLET, FILM COATED ORAL at 21:34

## 2019-08-07 RX ADMIN — PREDNISONE 20 MG: 20 TABLET ORAL at 09:37

## 2019-08-07 RX ADMIN — Medication 10 ML: at 06:38

## 2019-08-07 RX ADMIN — AZITHROMYCIN MONOHYDRATE 500 MG: 250 TABLET ORAL at 09:38

## 2019-08-07 RX ADMIN — Medication 10 ML: at 21:33

## 2019-08-07 RX ADMIN — BUMETANIDE 2 MG: 1 TABLET ORAL at 09:37

## 2019-08-07 RX ADMIN — Medication: at 09:40

## 2019-08-07 RX ADMIN — Medication 10 ML: at 21:34

## 2019-08-07 RX ADMIN — TAMSULOSIN HYDROCHLORIDE 0.4 MG: 0.4 CAPSULE ORAL at 09:38

## 2019-08-07 RX ADMIN — INSULIN GLARGINE 18 UNITS: 100 INJECTION, SOLUTION SUBCUTANEOUS at 21:32

## 2019-08-07 RX ADMIN — HEPARIN SODIUM 5000 UNITS: 5000 INJECTION INTRAVENOUS; SUBCUTANEOUS at 21:33

## 2019-08-07 RX ADMIN — SODIUM BICARBONATE 1300 MG: 650 TABLET ORAL at 09:37

## 2019-08-07 RX ADMIN — HEPARIN SODIUM 5000 UNITS: 5000 INJECTION INTRAVENOUS; SUBCUTANEOUS at 13:01

## 2019-08-07 RX ADMIN — Medication: at 21:34

## 2019-08-07 RX ADMIN — CEFTRIAXONE 1 G: 1 INJECTION, POWDER, FOR SOLUTION INTRAMUSCULAR; INTRAVENOUS at 13:01

## 2019-08-07 RX ADMIN — INSULIN LISPRO 7 UNITS: 100 INJECTION, SOLUTION INTRAVENOUS; SUBCUTANEOUS at 17:01

## 2019-08-07 RX ADMIN — INSULIN LISPRO 2 UNITS: 100 INJECTION, SOLUTION INTRAVENOUS; SUBCUTANEOUS at 13:01

## 2019-08-07 RX ADMIN — METHYLPREDNISOLONE SODIUM SUCCINATE 40 MG: 40 INJECTION, POWDER, FOR SOLUTION INTRAMUSCULAR; INTRAVENOUS at 13:01

## 2019-08-07 RX ADMIN — Medication: at 16:05

## 2019-08-07 RX ADMIN — SODIUM BICARBONATE 1300 MG: 650 TABLET ORAL at 16:05

## 2019-08-07 RX ADMIN — INSULIN LISPRO 2 UNITS: 100 INJECTION, SOLUTION INTRAVENOUS; SUBCUTANEOUS at 06:37

## 2019-08-07 RX ADMIN — Medication 10 ML: at 13:04

## 2019-08-07 RX ADMIN — SODIUM BICARBONATE 1300 MG: 650 TABLET ORAL at 21:33

## 2019-08-07 RX ADMIN — ASPIRIN 81 MG CHEWABLE TABLET 81 MG: 81 TABLET CHEWABLE at 09:38

## 2019-08-07 RX ADMIN — CARVEDILOL 12.5 MG: 12.5 TABLET, FILM COATED ORAL at 16:05

## 2019-08-07 RX ADMIN — BUMETANIDE 4 MG: 0.25 INJECTION INTRAMUSCULAR; INTRAVENOUS at 16:05

## 2019-08-07 RX ADMIN — IPRATROPIUM BROMIDE AND ALBUTEROL SULFATE 3 ML: .5; 3 SOLUTION RESPIRATORY (INHALATION) at 20:17

## 2019-08-07 RX ADMIN — CARVEDILOL 12.5 MG: 12.5 TABLET, FILM COATED ORAL at 09:37

## 2019-08-07 NOTE — PROGRESS NOTES
1930- Bedside shift change report given to Tawana Langley RN by Christina Castleman, RN. Report included the following information SBAR, Kardex, ED Summary, Intake/Output, MAR, Recent Results and Cardiac Rhythm Afib. Problem: Heart Failure: Day 3 Goal: Activity/Safety Outcome: Progressing Towards Goal 
Note:  
Patient able to ambulate to the bathroom with 1 person assistance and 6L of oxygen. Patient educated to call out for assistance prior to getting up. Patient verbalized understanding and calls out appropriately. Goal: Respiratory Outcome: Progressing Towards Goal 
Note:  
Oxygen saturations are within defined normal limits on 5L nasal cannula. Will try to wean patient as tolerated. Will continue to monitor oxygen saturations. Goal: *Optimal pain control at patient's stated goal 
Outcome: Progressing Towards Goal 
Note:  
Patient has no reports of pain. Will continue to monitor. Problem: Heart Failure: Discharge Outcomes Goal: *Demonstrates ability to perform prescribed activity without shortness of breath or discomfort Outcome: Progressing Towards Goal 
Note:  
Patient able to ambulate to the bathroom with 1 person assistance on 6L nasal cannula. Patient has no complaints of shortness of breath while ambulating to the bathroom. Will continue to monitor.

## 2019-08-07 NOTE — PROGRESS NOTES
Cardiology Progress Note Admit Date: 8/5/2019 Admit Diagnosis: CHF exacerbation ([facility] [I50.9] Acute hypoxemic respiratory failure ([facility] [J96.01] Date: 8/7/2019     Time: 9:37 AM 
 
Subjective: 
Feeling a bit poor. Not getting much sleep. Coughing up green \"stuff\". No CP or edema. Assessment and Plan 1. AGUILAR 
            - with Ambulation - CXR with mild edema, more significant for PNA pattern - IV Abx started - No edema of ankles or abdomen. Focal crackles in left lower base. - Hypoxia - Sats 81-93% on 5L NC on admission - 4L NC today. Sats 94% 2. Diastolic heart failure - Chronic    
            - NYHA class II 
            - EF - 65-70% on echo. Normal systolic and diastolic function. [facility] - Bumex 2 mg daily 3. COPD 
            - stable, Primary team resumed steroids and home meds 4. CAD 
            - h/o off pump CABG - 2008 - Trop 0.42 - mixed demand with fixed CAD. Second trop 0.45. No CP reported - Continue home meds 5. CKD 
            - stage IV 
            - Dr. Lito Cabrera to see 6. DM 
            - SSI 7. HLD 
            - Home statin resumed 8. PHTN 
 - mild to moderate on echo. PAS 50 
  
Productive cough with green sputum. Echo essentially normal.  Little evidence to support HF at this time. Suspect his SOB is related to lung etiology. Continue pulm toileting, ABx. Diuretics per Nephrology. Needs no further cardiac testing at this time. May consider additional pulmonary imaging. Past Medical History:  
Diagnosis Date  CAD (coronary artery disease) s/p CABG 2008  Carotid arterial disease ([facility]  CKD (chronic kidney disease) stage 3, GFR 30-59 ml/min ([facility] 10/21/2017  
 hypertension and DM nephrosclerosis  Diabetes mellitus, type 2 ([facility]  Hypercholesteremia  Hypertension  Paroxysmal atrial fibrillation ([facility] 10/21/2017  
 new onset afib with BRPR 10-19-17 admit  PVC's (premature ventricular contractions) 2014  PVD (peripheral vascular disease) ([place] [address] Social History Tobacco Use  Smoking status: Former Smoker Packs/day: 3.00 Years: 20.00 Pack years: 60.00 Types: Cigarettes Last attempt to quit: 1985 Years since quittin.5  Smokeless tobacco: Never Used Substance Use Topics  Alcohol use: No  
 Drug use: No  
  
  
ROS: 
 
 GENERAL Recent weight loss - no Fever -----------------   no 
 Chills -----------------   no 
 
 EYES, VISION Visual Changes - no 
 
 EARS, NOSE, THROAT Hearing loss ----------- no 
 Swallowing difficulties - no CARDIOVASCULAR Chest pain/pressure ---- no 
 Arrhythmia/palpitations - no RESPIRATORY Cough ------------------ yes Shortness of breath - yes Wheezing -------------- no   GASTROINTESTINAL Abdominal pain - no Heartburn -------- no 
 Bloody stool ----- no 
 
 GENITOURINARY Frequent urination - no Urgency -------------- no 
 
 MUSCULOSKELETAL Joint pain/swelling ---- no 
 Musculoskeletal pain - no 
 
 SKIN & INTEGUMENTARY Rashes - no Sores --- no 
 
 
   NEUROLOGICAL Numbness/tingling - no Sensation loss ------ no 
 
 PSYCHIATRIC Nervousness/anxiety - no Depression -------------- no 
 
 ENDOCRINE Heat/cold intolerance - no Excessive thirst -------- no 
 
 HEMATOLOGIC/LYMPHATIC Abnormal bleeding - no ALL/IMMUN Allergic reaction ------ no 
 Recurrent infections - no Objective: 
 
 Physical Exam: 
             
Visit Vitals /46 (BP 1 Location: Left arm, BP Patient Position: At rest) Pulse 69 Temp 98.4 °F (36.9 °C) Resp 26 Ht 5' 9\" (1.753 m) Wt 159 lb 14.4 oz (72.5 kg) SpO2 95% BMI 23.61 kg/m² General Appearance:   Well developed, well nourished,alert and oriented x 3, and 
 individual in no acute distress. Ears/Nose/Mouth/Throat:    Hearing grossly normal. 
  
    Neck:  Supple. Chest:    Lungs fine crackles, rales to auscultation bilaterally. Cardiovascular:   Regular rate and rhythm, S1, S2 normal, no murmur. Abdomen:    Soft, non-tender, bowel sounds are active. Extremities:  No edema bilaterally. Skin:  Warm and dry. Telemetry: normal sinus rhythm Data Review:  
 Labs:   
Recent Results (from the past 24 hour(s)) GLUCOSE, POC Collection Time: 08/06/19 12:07 PM  
Result Value Ref Range Glucose (POC) 159 (H) 65 - 100 mg/dL Performed by Alessandro SHEIKH   
URIC ACID Collection Time: 08/06/19 12:46 PM  
Result Value Ref Range Uric acid 13.1 (H) 3.5 - 7.2 MG/DL  
NT-PRO BNP Collection Time: 08/06/19 12:46 PM  
Result Value Ref Range NT pro-BNP >35,000 (H) <499 PG/ML  
METABOLIC PANEL, BASIC Collection Time: 08/06/19 12:46 PM  
Result Value Ref Range Sodium 134 (L) 136 - 145 mmol/L Potassium 5.1 3.5 - 5.1 mmol/L Chloride 93 (L) 97 - 108 mmol/L  
 CO2 31 21 - 32 mmol/L Anion gap 10 5 - 15 mmol/L Glucose 136 (H) 65 - 100 mg/dL BUN 71 (H) 6 - 20 MG/DL Creatinine 3.35 (H) 0.70 - 1.30 MG/DL  
 BUN/Creatinine ratio 21 (H) 12 - 20 GFR est AA 21 (L) >60 ml/min/1.73m2 GFR est non-AA 18 (L) >60 ml/min/1.73m2 Calcium 9.2 8.5 - 10.1 MG/DL  
CBC W/O DIFF Collection Time: 08/06/19 12:46 PM  
Result Value Ref Range WBC 11.3 (H) 4.1 - 11.1 K/uL  
 RBC 3.11 (L) 4.10 - 5.70 M/uL HGB 8.7 (L) 12.1 - 17.0 g/dL HCT 26.5 (L) 36.6 - 50.3 % MCV 85.2 80.0 - 99.0 FL  
 MCH 28.0 26.0 - 34.0 PG  
 MCHC 32.8 30.0 - 36.5 g/dL  
 RDW 14.7 (H) 11.5 - 14.5 % PLATELET 788 091 - 025 K/uL MPV 10.6 8.9 - 12.9 FL  
 NRBC 0.0 0  WBC ABSOLUTE NRBC 0.00 0.00 - 0.01 K/uL FERRITIN Collection Time: 08/06/19 12:46 PM  
Result Value Ref Range Ferritin 1,049 (H) 26 - 388 NG/ML  
PROCALCITONIN  Collection Time: 08/06/19 12:46 PM  
 Result Value Ref Range Procalcitonin 1.2 ng/mL PROTHROMBIN TIME + INR Collection Time: 08/06/19 12:46 PM  
Result Value Ref Range INR 1.1 0.9 - 1.1 Prothrombin time 10.9 9.0 - 11.1 sec GLUCOSE, POC Collection Time: 08/06/19  4:33 PM  
Result Value Ref Range Glucose (POC) 276 (H) 65 - 100 mg/dL Performed by Alessandro SHEIKH   
GLUCOSE, POC Collection Time: 08/06/19  9:38 PM  
Result Value Ref Range Glucose (POC) 261 (H) 65 - 100 mg/dL Performed by Pineda Ashby METABOLIC PANEL, BASIC Collection Time: 08/07/19  2:38 AM  
Result Value Ref Range Sodium 136 136 - 145 mmol/L Potassium 3.7 3.5 - 5.1 mmol/L Chloride 95 (L) 97 - 108 mmol/L  
 CO2 31 21 - 32 mmol/L Anion gap 10 5 - 15 mmol/L Glucose 156 (H) 65 - 100 mg/dL BUN 71 (H) 6 - 20 MG/DL Creatinine 2.93 (H) 0.70 - 1.30 MG/DL  
 BUN/Creatinine ratio 24 (H) 12 - 20 GFR est AA 25 (L) >60 ml/min/1.73m2 GFR est non-AA 21 (L) >60 ml/min/1.73m2 Calcium 8.8 8.5 - 10.1 MG/DL  
GLUCOSE, POC Collection Time: 08/07/19  6:19 AM  
Result Value Ref Range Glucose (POC) 156 (H) 65 - 100 mg/dL Performed by Pineda Ashby CBC W/O DIFF Collection Time: 08/07/19  9:56 AM  
Result Value Ref Range WBC 11.5 (H) 4.1 - 11.1 K/uL  
 RBC 2.94 (L) 4.10 - 5.70 M/uL HGB 8.3 (L) 12.1 - 17.0 g/dL HCT 25.5 (L) 36.6 - 50.3 % MCV 86.7 80.0 - 99.0 FL  
 MCH 28.2 26.0 - 34.0 PG  
 MCHC 32.5 30.0 - 36.5 g/dL  
 RDW 14.8 (H) 11.5 - 14.5 % PLATELET 532 678 - 290 K/uL MPV 10.5 8.9 - 12.9 FL  
 NRBC 0.0 0  WBC ABSOLUTE NRBC 0.00 0.00 - 0.01 K/uL Radiology:  
 
  
Current Facility-Administered Medications Medication Dose Route Frequency  bumetanide (BUMEX) tablet 2 mg  2 mg Oral DAILY  methylPREDNISolone (PF) (SOLU-MEDROL) injection 40 mg  40 mg IntraVENous Q12H  
 insulin glargine (LANTUS) injection 18 Units  18 Units SubCUTAneous QHS  insulin lispro (HUMALOG) injection   SubCUTAneous AC&HS  
 glucose chewable tablet 16 g  4 Tab Oral PRN  
 dextrose (D50W) injection syrg 12.5-25 g  12.5-25 g IntraVENous PRN  
 glucagon (GLUCAGEN) injection 1 mg  1 mg IntraMUSCular PRN  
 albuterol-ipratropium (DUO-NEB) 2.5 MG-0.5 MG/3 ML  3 mL Nebulization BID RT  
 cefTRIAXone (ROCEPHIN) 1 g in 0.9% sodium chloride (MBP/ADV) 50 mL  1 g IntraVENous Q24H  
 azithromycin (ZITHROMAX) tablet 500 mg  500 mg Oral DAILY  balsam peru-castor oil (VENELEX) ointment   Topical TID  sodium chloride (NS) flush 5-40 mL  5-40 mL IntraVENous Q8H  
 sodium chloride (NS) flush 5-40 mL  5-40 mL IntraVENous PRN  
 sodium chloride (NS) flush 5-40 mL  5-40 mL IntraVENous Q8H  
 sodium chloride (NS) flush 5-40 mL  5-40 mL IntraVENous PRN  
 acetaminophen (TYLENOL) tablet 650 mg  650 mg Oral Q4H PRN  
 HYDROcodone-acetaminophen (NORCO) 5-325 mg per tablet 1 Tab  1 Tab Oral Q4H PRN  
 morphine injection 1 mg  1 mg IntraVENous Q4H PRN  
 heparin (porcine) injection 5,000 Units  5,000 Units SubCUTAneous Q12H  aspirin chewable tablet 81 mg  81 mg Oral DAILY  carvedilol (COREG) tablet 12.5 mg  12.5 mg Oral BID WITH MEALS  simvastatin (ZOCOR) tablet 20 mg  20 mg Oral QHS  sodium bicarbonate tablet 1,300 mg  1,300 mg Oral TID  tamsulosin (FLOMAX) capsule 0.4 mg  0.4 mg Oral DAILY  epoetin dm-epbx (RETACRIT) injection 10,000 Units  10,000 Units SubCUTAneous Q7D Orelia Gambles. Atlee Cluster, PA-C Doretta Schwab, MD 
 
 [facility] [address] [place] [address], [address] [facility] 
 [phone]

## 2019-08-07 NOTE — PROGRESS NOTES
Bedside and Verbal shift change report given to 1710 Mat Andres (oncoming nurse) by Stacie Palma (offgoing nurse). Report included the following information SBAR, Kardex, Intake/Output, MAR, Accordion, Recent Results and Cardiac Rhythm AFIB. Last 3 Recorded Weights in this Encounter 08/06/19 8472 08/06/19 2923 08/07/19 0327 Weight: 71.8 kg (158 lb 3.2 oz) 71.8 kg (158 lb 4.6 oz) 72.5 kg (159 lb 14.4 oz) Problem: Falls - Risk of 
Goal: *Absence of Falls Description Document Tr Alejo Fall Risk and appropriate interventions in the flowsheet. Outcome: Progressing Towards Goal 
Note:  
Fall Risk Interventions: 
Mobility Interventions: Communicate number of staff needed for ambulation/transfer, Patient to call before getting OOB, PT Consult for mobility concerns, Strengthening exercises (ROM-active/passive) Medication Interventions: Evaluate medications/consider consulting pharmacy, Patient to call before getting OOB, Teach patient to arise slowly Elimination Interventions: Call light in reach, Patient to call for help with toileting needs, Stay With Me (per policy), Toilet paper/wipes in reach, Toileting schedule/hourly rounds, Urinal in reach Problem: Patient Education: Go to Patient Education Activity Goal: Patient/Family Education Outcome: Progressing Towards Goal 
  
Problem: Heart Failure: Day 2 Goal: Psychosocial 
Outcome: Progressing Towards Goal 
Goal: *Oxygen saturation within defined limits Outcome: Progressing Towards Goal 
Goal: *Optimal pain control at patient's stated goal 
Outcome: Progressing Towards Goal 
Goal: *Anxiety reduced or absent Outcome: Progressing Towards Goal 
  
Problem: Pressure Injury - Risk of 
Goal: *Prevention of pressure injury Description Document Fabio Scale and appropriate interventions in the flowsheet. Outcome: Progressing Towards Goal 
Note:  
Pressure Injury Interventions: Moisture Interventions: Absorbent underpads, Check for incontinence Q2 hours and as needed, Offer toileting Q_hr Activity Interventions: Increase time out of bed, Pressure redistribution bed/mattress(bed type), PT/OT evaluation Mobility Interventions: HOB 30 degrees or less, PT/OT evaluation, Turn and reposition approx. every two hours(pillow and wedges) Nutrition Interventions: Document food/fluid/supplement intake, Discuss nutritional consult with provider, Offer support with meals,snacks and hydration Friction and Shear Interventions: Apply protective barrier, creams and emollients, HOB 30 degrees or less, Lift sheet, Minimize layers Problem: Patient Education: Go to Patient Education Activity Goal: Patient/Family Education Outcome: Progressing Towards Goal

## 2019-08-07 NOTE — ACP (ADVANCE CARE PLANNING)
Advance Care Planning Note Name: Ortiz Chappell YOB: 1935 MRN: [id number] Admission Date: 8/5/2019  8:26 AM 
 
Date of discussion: 8/7/2019 Active Diagnoses: 
 
Hospital Problems  Date Reviewed: 7/28/2019 Codes Class Noted POA  
 CHF exacerbation ([facility] ICD-10-CM: I50.9 ICD-9-CM: 428.0  8/5/2019 Unknown Acute hypoxemic respiratory failure ([facility] ICD-10-CM: J96.01 
ICD-9-CM: 518.81  8/5/2019 Unknown These active diagnoses are of sufficient risk that focused discussion on advance care planning is indicated in order to allow the patient to thoughtfully consider personal goals of care, and if situations arise that prevent the ability to personally give input, to ensure appropriate representation of their personal desires for different levels and aggressiveness of care. Discussion:  
 
Persons present and participating in discussion: Ortiz Chappell., Velma Gonzalez MD, Discussion: discussed  his clinical condition and severe pulm hypertension, CKD  and multiple other co morbidities He decided to be full code and wanted resuscitation efforts at least once Time Spent:  
 
Total time spent face-to-face in education and discussion:  15 minutes.   
 
Velma Gonzalez MD 
8/7/2019 
10:11 AM

## 2019-08-07 NOTE — PROGRESS NOTES
[facility] 
 [address] [address], [address] [facility] Phone: [phone]   [phone]   
  
Nephrology Progress Note Greta Valderrama.     1935     [id number] Date of Admission : 8/5/2019 08/07/19 CC:  Follow up for  KATALINA Assessment and Plan KATALINA  on CKD  
- 2/2 pre renal azotemia from diuretics, infection ==> resolved   
- he had elevated Right and left heart pressures noted on RHC on 5/2 and has improved w/ aggressive diuresis. He is at risk for decompensation by holding diuretics for too long  
-  resumed Bumex 2 mg daily to keep him net negative  
- strict I. O  
- daily labs  
  
COPD flare, RML PNA: 
- Mx per primary team 
- could consider CT chest  
  
CKD IV: 
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 
- baseline Cr at best : 2.7- 3 mg/dl - UPCR showed 11 gm of Protein  
- he wants to do PD if he becomes ESRD, but he does not appears to be a good candidate for long term dialysis  
  
Metablolic Acidosis: 
- cont PO BICARB 
  
Dyspnea : 
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  
- RHC w/ PCW 28 
  
HTN: 
- continue current meds 
  
Bradycardia : 
- s/p PPM on 5/9 
  
Anemia of CKD: 
- hgb stable - weekly MICHAEL while in hospital  
  
Afib: 
- per cards 
  
CAD s/p CABG 
  
DM2: 
- on insulin 
  
Care Plan discussed with: pt Interval History: SOB and xcough worsened overnight Cr better Denies any N/V/CP/abdo pain Review of Systems: Pertinent items are noted in HPI. Current Medications:  
Current Facility-Administered Medications Medication Dose Route Frequency  bumetanide (BUMEX) tablet 2 mg  2 mg Oral DAILY  methylPREDNISolone (PF) (SOLU-MEDROL) injection 40 mg  40 mg IntraVENous Q12H  
 insulin glargine (LANTUS) injection 18 Units  18 Units SubCUTAneous QHS  insulin lispro (HUMALOG) injection   SubCUTAneous AC&HS  
 glucose chewable tablet 16 g  4 Tab Oral PRN  
  dextrose (D50W) injection syrg 12.5-25 g  12.5-25 g IntraVENous PRN  
 glucagon (GLUCAGEN) injection 1 mg  1 mg IntraMUSCular PRN  
 albuterol-ipratropium (DUO-NEB) 2.5 MG-0.5 MG/3 ML  3 mL Nebulization BID RT  
 cefTRIAXone (ROCEPHIN) 1 g in 0.9% sodium chloride (MBP/ADV) 50 mL  1 g IntraVENous Q24H  
 azithromycin (ZITHROMAX) tablet 500 mg  500 mg Oral DAILY  balsam peru-castor oil (VENELEX) ointment   Topical TID  sodium chloride (NS) flush 5-40 mL  5-40 mL IntraVENous Q8H  
 sodium chloride (NS) flush 5-40 mL  5-40 mL IntraVENous PRN  
 sodium chloride (NS) flush 5-40 mL  5-40 mL IntraVENous Q8H  
 sodium chloride (NS) flush 5-40 mL  5-40 mL IntraVENous PRN  
 acetaminophen (TYLENOL) tablet 650 mg  650 mg Oral Q4H PRN  
 HYDROcodone-acetaminophen (NORCO) 5-325 mg per tablet 1 Tab  1 Tab Oral Q4H PRN  
 morphine injection 1 mg  1 mg IntraVENous Q4H PRN  
 heparin (porcine) injection 5,000 Units  5,000 Units SubCUTAneous Q12H  aspirin chewable tablet 81 mg  81 mg Oral DAILY  carvedilol (COREG) tablet 12.5 mg  12.5 mg Oral BID WITH MEALS  simvastatin (ZOCOR) tablet 20 mg  20 mg Oral QHS  sodium bicarbonate tablet 1,300 mg  1,300 mg Oral TID  tamsulosin (FLOMAX) capsule 0.4 mg  0.4 mg Oral DAILY  epoetin dm-epbx (RETACRIT) injection 10,000 Units  10,000 Units SubCUTAneous Q7D Allergies Allergen Reactions  Lisinopril Cough Objective: 
Vitals:   
Vitals:  
 08/06/19 2322 08/07/19 0327 08/07/19 5688 08/07/19 9258 BP: 150/59 126/67 132/46 Pulse: 74 77 69 Resp: 19 18 26 Temp: 98.1 °F (36.7 °C) 98.3 °F (36.8 °C) 98.4 °F (36.9 °C) SpO2: 98% 95% 94% 95% Weight:  72.5 kg (159 lb 14.4 oz) Height:      
 
Intake and Output: 
08/07 0701 - 08/07 1900 In: 120 [P.O.:120] Out: -  
08/05 1901 - 08/07 0700 In: 238 [P.O.:238] Out: 1045 [[id number]:[id number]] Physical Examination: 
 
General: NAD,Conversant Neck:  Supple, no mass Resp:  Diffuse rhonchi + CV:  RRR,  no murmur or rub,no LE edema GI:  Soft, NT, + Bowel sounds, no hepatosplenomegaly Neurologic:  Non focal 
Psych:             AAO x 3 appropriate affect  
 
 
[]    High complexity decision making was performed 
[]    Patient is at high-risk of decompensation with multiple organ involvement Lab Data Personally Reviewed: I have reviewed all the pertinent labs, microbiology data and radiology studies during assessment. Recent Labs 08/07/19 
2199 08/06/19 
1246 08/06/19 
0445 08/05/19 
0568  134* 134* 131*  
K 3.7 5.1 3.1* 3.2*  
CL 95* 93* 91* 90* CO2 31 31 31 30 * 136* 267* 204* BUN 71* 71* 71* 65* CREA 2.93* 3.35* 3.64* 3.85* CA 8.8 9.2 8.6 9.2 ALB  --   --   --  3.2* SGOT  --   --   --  76* ALT  --   --   --  54 INR  --  1.1  --   --   
 
Recent Labs 08/07/19 
7886 08/06/19 
1246 08/06/19 0445 08/05/19 
1249 WBC 11.5* 11.3* 8.8 10.9 HGB 8.3* 8.7* 7.8* 9.2* HCT 25.5* 26.5* 23.6* 28.1*  
 208 196 211 No results found for: SDES Lab Results Component Value Date/Time Culture result: NO GROWTH 2 DAYS 08/05/2019 10:46 AM  
 Culture result: CANDIDA ALBICANS (A) 03/16/2017 06:00 AM  
 Culture result: NO GROWTH 5 DAYS 03/16/2017 05:40 AM  
 
Recent Results (from the past 24 hour(s)) GLUCOSE, POC Collection Time: 08/06/19 12:07 PM  
Result Value Ref Range Glucose (POC) 159 (H) 65 - 100 mg/dL Performed by Alessandro SHEIKH   
URIC ACID Collection Time: 08/06/19 12:46 PM  
Result Value Ref Range Uric acid 13.1 (H) 3.5 - 7.2 MG/DL  
NT-PRO BNP Collection Time: 08/06/19 12:46 PM  
Result Value Ref Range NT pro-BNP >35,000 (H) <311 PG/ML  
METABOLIC PANEL, BASIC Collection Time: 08/06/19 12:46 PM  
Result Value Ref Range Sodium 134 (L) 136 - 145 mmol/L Potassium 5.1 3.5 - 5.1 mmol/L Chloride 93 (L) 97 - 108 mmol/L  
 CO2 31 21 - 32 mmol/L Anion gap 10 5 - 15 mmol/L Glucose 136 (H) 65 - 100 mg/dL BUN 71 (H) 6 - 20 MG/DL Creatinine 3.35 (H) 0.70 - 1.30 MG/DL  
 BUN/Creatinine ratio 21 (H) 12 - 20 GFR est AA 21 (L) >60 ml/min/1.73m2 GFR est non-AA 18 (L) >60 ml/min/1.73m2 Calcium 9.2 8.5 - 10.1 MG/DL  
CBC W/O DIFF Collection Time: 08/06/19 12:46 PM  
Result Value Ref Range WBC 11.3 (H) 4.1 - 11.1 K/uL  
 RBC 3.11 (L) 4.10 - 5.70 M/uL HGB 8.7 (L) 12.1 - 17.0 g/dL HCT 26.5 (L) 36.6 - 50.3 % MCV 85.2 80.0 - 99.0 FL  
 MCH 28.0 26.0 - 34.0 PG  
 MCHC 32.8 30.0 - 36.5 g/dL  
 RDW 14.7 (H) 11.5 - 14.5 % PLATELET 164 436 - 862 K/uL MPV 10.6 8.9 - 12.9 FL  
 NRBC 0.0 0  WBC ABSOLUTE NRBC 0.00 0.00 - 0.01 K/uL FERRITIN Collection Time: 08/06/19 12:46 PM  
Result Value Ref Range Ferritin 1,049 (H) 26 - 388 NG/ML  
PROCALCITONIN Collection Time: 08/06/19 12:46 PM  
Result Value Ref Range Procalcitonin 1.2 ng/mL PROTHROMBIN TIME + INR Collection Time: 08/06/19 12:46 PM  
Result Value Ref Range INR 1.1 0.9 - 1.1 Prothrombin time 10.9 9.0 - 11.1 sec GLUCOSE, POC Collection Time: 08/06/19  4:33 PM  
Result Value Ref Range Glucose (POC) 276 (H) 65 - 100 mg/dL Performed by Alessandro SHEIKH   
GLUCOSE, POC Collection Time: 08/06/19  9:38 PM  
Result Value Ref Range Glucose (POC) 261 (H) 65 - 100 mg/dL Performed by Donell French METABOLIC PANEL, BASIC Collection Time: 08/07/19  2:38 AM  
Result Value Ref Range Sodium 136 136 - 145 mmol/L Potassium 3.7 3.5 - 5.1 mmol/L Chloride 95 (L) 97 - 108 mmol/L  
 CO2 31 21 - 32 mmol/L Anion gap 10 5 - 15 mmol/L Glucose 156 (H) 65 - 100 mg/dL BUN 71 (H) 6 - 20 MG/DL Creatinine 2.93 (H) 0.70 - 1.30 MG/DL  
 BUN/Creatinine ratio 24 (H) 12 - 20 GFR est AA 25 (L) >60 ml/min/1.73m2 GFR est non-AA 21 (L) >60 ml/min/1.73m2 Calcium 8.8 8.5 - 10.1 MG/DL  
NT-PRO BNP Collection Time: 08/07/19  2:48 AM  
Result Value Ref Range NT pro-BNP >35,000 (H) <450 PG/ML  
GLUCOSE, POC Collection Time: 08/07/19  6:19 AM  
Result Value Ref Range Glucose (POC) 156 (H) 65 - 100 mg/dL Performed by Kenan Head CBC W/O DIFF Collection Time: 08/07/19  9:56 AM  
Result Value Ref Range WBC 11.5 (H) 4.1 - 11.1 K/uL  
 RBC 2.94 (L) 4.10 - 5.70 M/uL HGB 8.3 (L) 12.1 - 17.0 g/dL HCT 25.5 (L) 36.6 - 50.3 % MCV 86.7 80.0 - 99.0 FL  
 MCH 28.2 26.0 - 34.0 PG  
 MCHC 32.5 30.0 - 36.5 g/dL  
 RDW 14.8 (H) 11.5 - 14.5 % PLATELET 736 255 - 702 K/uL MPV 10.5 8.9 - 12.9 FL  
 NRBC 0.0 0  WBC ABSOLUTE NRBC 0.00 0.00 - 0.01 K/uL Total time spent with patient:  xxx   min. Care Plan discussed with: 
Patient Family RN Consulting Physician [facility],      
 
I have reviewed the flowsheets. Chart and Pertinent Notes have been reviewed. No change in PMH ,family and social history from Consult note.  
 
 
Audrey Oconnor MD

## 2019-08-07 NOTE — PROGRESS NOTES
1130: Bedside shift change report given to Piyush Cuevas RN (oncoming nurse) by Viridiana Peterson RN (offgoing nurse). Report included the following information SBAR, Kardex, Intake/Output, MAR, Recent Results and Cardiac Rhythm Paced w/ Afib/flutter.

## 2019-08-07 NOTE — PROGRESS NOTES
[facility] Consult Note DOS:   8/7/2019 NAME:  Tamiko Guzman Sr. MRN:   [id number] REFERRING PROVIDER:  Denisse Nayak MD 
PRIMARY CARE PHYSICIAN: Alex Dakin, MD 
PRIMARY CARDIOLOGIST: Conchita Quevedo MD 
 
 
Chief Complaint:  
Chief Complaint Patient presents with  Shortness of Breath HPI: 81y.o. year old male with a history of CAD, s/p CABG, s/p leadless pacemaker, HTN, DMT2, PVD, PAF, CKD 3 , and COPD who presented to the ED on  8/5/19 for increased SOB and hypotension. He has recently finished 2 separate prednisone tapers for SOB and similar symptoms. He reports he doesn't currently wear O2 at [facility], it had been previously ordered. Most recent ECHO on (8/6/19), LVEF 66-70%, mild AS, mild MR, ,mild-mod pulmonary HTN with PAS of 50 mmHG. The [facility] was consulted for assistance in managing his heart failure. Impression / Plan:  
Heart Failure Status: NYHA Class II HFpEF  ICM LVEF 66-70% coreg 12.5mg po BID Agree with Renal to Resume diuretics - Bumex 2 mg daily to keep him net negative Intolerant of ACE/ARB/ARNi/AA due to CKD Recommend RHC to verify accurate volume status/ pulmonary status ECHO (8/6/19), LVEF 66-70% Daily weights Low Na+ diet FR 1500ml Check gammopathy, iron panel/ferritin, uric acid, procalicitonin, PNBP. INR, CBC Nutrition consult - recs appreciated COPD 
nebs 4x per day Continue steroids Requiring Levindale Sinhala Consider pulmonary consult Management per primary team 
Recommend PFTs DM2 Insulin, SSI Consider consulting DTC CKD 3 Avoid nephrotoxic agents Renal following Trend BNP Possible adrenal insuffiencey Hypotension when off steroids at home Might need to consider low dose steroids Consider adrenal evaluation Remainder of care per primary team, the [facility] will sign off for now, we will be available if needed. History: 
Past Medical History:  
Diagnosis Date  CAD (coronary artery disease) s/p CABG   Carotid arterial disease ([facility]  CKD (chronic kidney disease) stage 3, GFR 30-59 ml/min ([facility]) 10/21/2017  
 hypertension and DM nephrosclerosis  Diabetes mellitus, type 2 ([facility]  Hypercholesteremia  Hypertension  Paroxysmal atrial fibrillation ([facility] 10/21/2017  
 new onset afib with BRPR 10-19-17 admit  PVC's (premature ventricular contractions) 2014  PVD (peripheral vascular disease) ([facility] Past Surgical History:  
Procedure Laterality Date  HX CORONARY ARTERY BYPASS GRAFT    
 HX HEART CATHETERIZATION    
 MD TCAT INSJ/RPL PERM LEADLESS PACEMAKER RV W/IMG N/A 2019 INSERT OR REPLACE TRANSCATH PPM LEADLESS performed by Kiran Santacruz MD at [address] CATH LAB Social History Socioeconomic History  Marital status:  Spouse name: Not on file  Number of children: Not on file  Years of education: Not on file  Highest education level: Not on file Occupational History  Not on file Social Needs  Financial resource strain: Not on file  Food insecurity:  
  Worry: Not on file Inability: Not on file  Transportation needs:  
  Medical: Not on file Non-medical: Not on file Tobacco Use  Smoking status: Former Smoker Packs/day: 3.00 Years: 20.00 Pack years: 60.00 Types: Cigarettes Last attempt to quit: 1985 Years since quittin.5  Smokeless tobacco: Never Used Substance and Sexual Activity  Alcohol use: No  
 Drug use: No  
 Sexual activity: Not on file Lifestyle  Physical activity:  
  Days per week: Not on file Minutes per session: Not on file  Stress: Not on file Relationships  Social connections:  
  Talks on phone: Not on file Gets together: Not on file Attends L[employer]-day service: Not on file Active member of club or organization: Not on file Attends meetings of clubs or organizations: Not on file Relationship status: Not on file  Intimate partner violence:  
  Fear of current or ex partner: Not on file Emotionally abused: Not on file Physically abused: Not on file Forced sexual activity: Not on file Other Topics Concern  Not on file Social History Narrative  Not on file History reviewed. No pertinent family history. Current Medications:  
Current Facility-Administered Medications Medication Dose Route Frequency Provider Last Rate Last Dose  methylPREDNISolone (PF) (SOLU-MEDROL) injection 40 mg  40 mg IntraVENous Q12H Em Vazquez MD   40 mg at 08/07/19 1301  [START ON 8/8/2019] azithromycin (ZITHROMAX) 500 mg in 0.9% sodium chloride (MBP/ADV) 250 mL  500 mg IntraVENous Q24H Em Vazquez MD      
 insulin glargine (LANTUS) injection 18 Units  18 Units SubCUTAneous QHS Octavia Copping, NP   18 Units at 08/06/19 2217  insulin lispro (HUMALOG) injection   SubCUTAneous AC&HS Octavia Copping, NP   7 Units at 08/07/19 1701  
 glucose chewable tablet 16 g  4 Tab Oral PRN Octavia Copping, NP      
 dextrose (D50W) injection syrg 12.5-25 g  12.5-25 g IntraVENous PRN Octavia Copping, NP      
 glucagon (GLUCAGEN) injection 1 mg  1 mg IntraMUSCular PRN Octavia Copping, NP      
 albuterol-ipratropium (DUO-NEB) 2.5 MG-0.5 MG/3 ML  3 mL Nebulization BID RT Delia Lizama MD   3 mL at 08/07/19 7877  cefTRIAXone (ROCEPHIN) 1 g in 0.9% sodium chloride (MBP/ADV) 50 mL  1 g IntraVENous Q24H Em Vazquez  mL/hr at 08/07/19 1301 1 g at 08/07/19 1301  balsam peru-castor oil (VENELEX) ointment   Topical TID Em Vazquez MD      
 sodium chloride (NS) flush 5-40 mL  5-40 mL IntraVENous Q8H Lisette Harp MD   10 mL at 08/07/19 1304  sodium chloride (NS) flush 5-40 mL  5-40 mL IntraVENous PRN Lisette Harp MD      
 sodium chloride (NS) flush 5-40 mL  5-40 mL IntraVENous Warnell Sings, Imer Mccann MD   10 mL at 08/07/19 1304  sodium chloride (NS) flush 5-40 mL  5-40 mL IntraVENous PRN Malinda Hobbs MD      
 acetaminophen (TYLENOL) tablet 650 mg  650 mg Oral Q4H PRN Malinda Hobbs MD      
 HYDROcodone-acetaminophen [facility]) 5-325 mg per tablet 1 Tab  1 Tab Oral Q4H PRN Malinda Hobbs MD      
 morphine injection 1 mg  1 mg IntraVENous Q4H PRN Malinda Hobbs MD      
 heparin (porcine) injection 5,000 Units  5,000 Units SubCUTAneous Q12H Malinda Hobbs MD   5,000 Units at 08/07/19 1301  aspirin chewable tablet 81 mg  81 mg Oral DAILY Malinda Hobbs MD   81 mg at 08/07/19 4667  carvedilol (COREG) tablet 12.5 mg  12.5 mg Oral BID WITH MEALS Malinda Hobbs MD   12.5 mg at 08/07/19 1605  simvastatin (ZOCOR) tablet 20 mg  20 mg Oral QHS Malinda Hobbs MD   20 mg at 08/06/19 2216  sodium bicarbonate tablet 1,300 mg  1,300 mg Oral TID Malinda Hobbs MD   1,300 mg at 08/07/19 1605  tamsulosin (FLOMAX) capsule 0.4 mg  0.4 mg Oral DAILY Malinda Hobbs MD   0.4 mg at 08/07/19 2978  epoetin dm-epbx (RETACRIT) injection 10,000 Units  10,000 Units SubCUTAneous Q7D Samaria Knight MD   10,000 Units at 08/05/19 1924 Allergies: Allergies Allergen Reactions  Lisinopril Cough ROS:   
Review of Systems Constitutional: Positive for malaise/fatigue. Negative for chills and fever. HENT: Negative. Eyes: Negative. Respiratory: Positive for cough, shortness of breath and wheezing. Cardiovascular: Negative for chest pain, palpitations, orthopnea and leg swelling. Gastrointestinal: Negative. Genitourinary: Negative. Musculoskeletal: Negative. Skin: Negative. Neurological: Positive for weakness. Endo/Heme/Allergies: Negative. Psychiatric/Behavioral: Negative. Physical Exam:  
Physical Exam  
Constitutional: He is oriented to person, place, and time. He appears well-developed. Neck: No JVD present. Cardiovascular: Normal rate. Exam reveals no gallop and no friction rub. No murmur heard. Pulmonary/Chest: Effort normal and breath sounds normal. He has no wheezes. Abdominal: Soft. Bowel sounds are normal.  
Musculoskeletal: He exhibits no edema. Neurological: He is alert and oriented to person, place, and time. Skin: Skin is warm and dry. Bruising noted. Psychiatric: He has a normal mood and affect. His behavior is normal. Judgment and thought content normal.  
Nursing note and vitals reviewed. Vitals:  
Visit Vitals /73 Pulse 73 Temp 98.1 °F (36.7 °C) Resp 19 Ht 5' 9\" (1.753 m) Wt 159 lb 14.4 oz (72.5 kg) SpO2 97% BMI 23.61 kg/m² Temp (24hrs), Av.2 °F (36.8 °C), Min:98.1 °F (36.7 °C), Max:98.4 °F (36.9 °C) Admission Weight: Last Weight Weight: 160 lb 11.5 oz (72.9 kg) Weight: 159 lb 14.4 oz (72.5 kg) Oxygen Therapy: 
Oxygen Therapy O2 Sat (%): 97 % (19 1504) Pulse via Oximetry: 70 beats per minute (19 0811) O2 Device: Nasal cannula (19 1230) O2 Flow Rate (L/min): 5 l/min (19 1230) Recent Labs:  
Labs Latest Ref Rng & Units 2019 WBC 4.1 - 11.1 K/uL 11. 5(H) 11. 3(H) 8.8 10.9 RBC 4.10 - 5.70 M/uL 2.94(L) 3.11(L) 2.76(L) 3.24(L) Hemoglobin 12.1 - 17.0 g/dL 8. 3(L) 8.7(L) 7. 8(L) 9.2(L) Hematocrit 36.6 - 50.3 % 25. 5(L) 26. 5(L) 23. 6(L) 28. 1(L) MCV 80.0 - 99.0 FL 86.7 85.2 85.5 86.7 Platelets 861 - 413 K/uL 204 208 196 211 Lymphocytes 12 - 49 % - - - 5(L) Monocytes 5 - 13 % - - - 7 Eosinophils 0 - 7 % - - - 1 Basophils 0 - 1 % - - - 0 Albumin 3.5 - 5.0 g/dL - - - 3. 2(L) Calcium 8.5 - 10.1 MG/DL 8.8 9.2 8.6 9.2 SGOT 15 - 37 U/L - - - 76(H) Glucose 65 - 100 mg/dL 156(H) 136(H) 267(H) 204(H) BUN 6 - 20 MG/DL 71(H) 71(H) 71(H) 65(H) Creatinine 0.70 - 1.30 MG/DL 2.93(H) 3.35(H) 3.64(H) 3.85(H) Sodium 136 - 145 mmol/L 136 134(L) 134(L) 131(L) Potassium 3.5 - 5.1 mmol/L 3.7 5.1 3.1(L) 3. 2(L) Some recent data might be hidden EKG:  
EKG Results Procedure 720 Value Units Date/Time EKG, 12 LEAD, INITIAL [[id number]] Collected:  08/05/19 1558 Order Status:  Completed Updated:  08/05/19 1708 Ventricular Rate 63 BPM   
  Atrial Rate 300 BPM   
  QRS Duration 156 ms   
  Q-T Interval 532 ms QTC Calculation (Bezet) 544 ms Calculated R Axis -44 degrees Calculated T Axis -54 degrees Diagnosis --  
  Demand pacemaker, interpretation is based on intrinsic rhythm Atrial fibrillation with premature ventricular or aberrantly conducted  
complexes Left axis deviation Right bundle branch block T wave abnormality, consider inferolateral ischemia or digitalis effect When compared with ECG of 05-AUG-2019 08:37, 
Electronic demand pacing is now present Confirmed by Zulema Bray MD ([id number]) on 8/5/2019 5:08:14 PM 
  
 EKG 12 LEAD INITIAL [[id number]] Collected:  08/05/19 9204 Order Status:  Completed Updated:  08/05/19 1657 Ventricular Rate 78 BPM   
  Atrial Rate 357 BPM   
  QRS Duration 152 ms Q-T Interval 468 ms QTC Calculation (Bezet) 533 ms Calculated R Axis -22 degrees Calculated T Axis -55 degrees Diagnosis -- Atrial fibrillation Right bundle branch block When compared with ECG of 18-MAY-2019 05:57, Atrial fibrillation has replaced Electronic ventricular pacemaker Confirmed by Zulema Bray MD ([id number]) on 8/5/2019 4:57:04 PM 
  
  
 
Echocardiogram: 
ECHO (8/6/19), LVEF 66-70%, mild AS, mild MR, ,mild-mod pulmonary HTN with PAS of 50 mmHG. FÉLIX Bradley [address], [address] Office [phone] Fax [phone] 
 
24 hour VAD/HF Pager: [phone] AHF ATTENDING ADDENDUM Patient was seen and examined in person. Data and notes were reviewed.  I have discussed and agree with the plan as noted in the NP note above without further additions. Josette Denny MD PhD 
Radha Pisano [phone] [facility]

## 2019-08-07 NOTE — PROGRESS NOTES
Hospitalist Progress Note Brittany Stone MD 
Answering service: [phone] OR [phone] from in house phone Cell:   
  
Date of Service:  2019 NAME:  Gal Betts Sr. 
:  1935 MRN:  [id number] Admission Summary:  
The patient is an 56-year-old male with past medical history of diabetes, hypertension, coronary artery disease, hypercholesterolemia, peripheral vascular disease, AFib, and CKD, who presented to the emergency room via private vehicle with chief complaint of shortness of breath. His shortness of breath has been persistent for more than one week and subsequently has worsened since yesterday. There is associated bilateral leg swelling since yesterday. The patient has been taking four albuterol nebulizer treatments with no relief. The patient also reports cough with occasional greenish sputum. He recently had pacemaker placed by Dr. Cheyenne Murdock and had followed up with Cardiology yesterday who increased his diuretic dose. The patient has been seen in the emergency room for shortness of breath three times this week. Interval history / Subjective:  
   
 
PATIENT SEEN THIS AM  
During rounds Is more short of breath today than yesterday Assessment & Plan:  
 
 acute hypoxemic resp failure-  
More likely from PNA than due to volume overload- got  IV bumex given in ED His bumex is held by nephrology  
worsening resp status with tachypnea and end exp wheeze , increase dose of prednisone and CT chest , resp panel pcr and increase prednisone to iv , continue nebs Restart bumex  
  
 severe pulm HTN 
RHC w/ PCW 28 ECHO 50/60% with PAP 50 CAP poa On xray Started on ceftriaxone jimmy ddition to azithromycin Improving  
  
 suspected COPD from past tobacco use-  
resume steroids and home meds 
increased dose on  On nebs  
  
.  KATALIAN on CKD stage 4- 
 baseline creatinine 3.1-3.3; was 3.8 on admission- 
 likely due to diureitcs and hypotension 
nephro held the diuretic improving Metabolic acidosis On po bicarb  
  
. PVD-  
s/p stented in both LE, cont aspirin 
  
 DM type 2-  
resume lantus w accuchecks and ss insulin 
  
. HLD-  
resume statin meds 
  
. BPH 
- resume flomax Anemia of CKD  
 
CAD s/p CABG Bradycardia with afib w pacemaker, Code status: DVT prophylaxis:  
 
Care Plan discussed with: Patient/Family Disposition: TBD Hospital Problems  Date Reviewed: 7/28/2019 Codes Class Noted POA  
 CHF exacerbation ([facility] ICD-10-CM: I50.9 ICD-9-CM: 428.0  8/5/2019 Unknown Acute hypoxemic respiratory failure ([facility] ICD-10-CM: J96.01 
ICD-9-CM: 518.81  8/5/2019 Unknown Review of Systems: A comprehensive review of systems was negative except for that written in the HPI. Vital Signs:  
 Last 24hrs VS reviewed since prior progress note. Most recent are: 
Visit Vitals /46 (BP 1 Location: Left arm, BP Patient Position: At rest) Pulse 69 Temp 98.4 °F (36.9 °C) Resp 26 Ht 5' 9\" (1.753 m) Wt 72.5 kg (159 lb 14.4 oz) SpO2 95% BMI 23.61 kg/m² Intake/Output Summary (Last 24 hours) at 8/7/2019 1005 Last data filed at 8/7/2019 1375 Gross per 24 hour Intake 358 ml Output 400 ml Net -42 ml Physical Examination:  
 
 
     
Constitutional:  tacypnic and on oxygen ENT:  Oral mucous moist, oropharynx benign. Neck supple, Resp:  end exp wheeze CV:  Regular rhythm, normal rate, no murmurs, gallops, rubs GI:  Soft, non distended, non tender. normoactive bowel sounds, no hepatosplenomegaly Musculoskeletal:  No edema, warm, 2+ pulses throughout Neurologic:  Moves all extremities. AAOx3, CN II-XII reviewed Data Review:  
 Review and/or order of clinical lab test 
 
 
Labs:  
 
Recent Labs 08/06/19 
1246 08/06/19 
0445 WBC 11.3* 8.8 HGB 8.7* 7.8* HCT 26.5* 23.6*  196 Recent Labs 08/07/19 
6051 08/06/19 
1246 08/06/19 
0445  134* 134* K 3.7 5.1 3.1*  
CL 95* 93* 91* CO2 31 31 31 BUN 71* 71* 71* CREA 2.93* 3.35* 3.64* * 136* 267* CA 8.8 9.2 8.6 URICA  --  13.1*  --   
 
Recent Labs 08/05/19 
7132 SGOT 76* ALT 54 * TBILI 0.8 TP 7.5 ALB 3.2*  
GLOB 4.3* Recent Labs 08/06/19 
1246 INR 1.1 PTP 10.9 Recent Labs 08/06/19 
1246 FERR 1,049* No results found for: FOL, RBCF No results for input(s): PH, PCO2, PO2 in the last 72 hours. Recent Labs 08/05/19 
1551 08/05/19 
0649 TROIQ 0.45* 0.42* No results found for: CHOL, CHOLX, CHLST, CHOLV, HDL, LDL, LDLC, DLDLP, TGLX, TRIGL, TRIGP, CHHD, CHHDX Lab Results Component Value Date/Time Glucose (POC) 156 (H) 08/07/2019 06:19 AM  
 Glucose (POC) 261 (H) 08/06/2019 09:38 PM  
 Glucose (POC) 276 (H) 08/06/2019 04:33 PM  
 Glucose (POC) 159 (H) 08/06/2019 12:07 PM  
 Glucose (POC) 265 (H) 08/06/2019 06:27 AM  
 
Lab Results Component Value Date/Time Color YELLOW/STRAW 06/25/2017 11:40 AM  
 Appearance CLEAR 06/25/2017 11:40 AM  
 Specific gravity 1.018 06/25/2017 11:40 AM  
 pH (UA) 5.0 06/25/2017 11:40 AM  
 Protein 300 (A) 06/25/2017 11:40 AM  
 Glucose NEGATIVE  06/25/2017 11:40 AM  
 Ketone NEGATIVE  06/25/2017 11:40 AM  
 Bilirubin NEGATIVE  06/25/2017 11:40 AM  
 Urobilinogen 0.2 06/25/2017 11:40 AM  
 Nitrites NEGATIVE  06/25/2017 11:40 AM  
 Leukocyte Esterase NEGATIVE  06/25/2017 11:40 AM  
 Epithelial cells FEW 06/25/2017 11:40 AM  
 Bacteria NEGATIVE  06/25/2017 11:40 AM  
 WBC 0-4 06/25/2017 11:40 AM  
 RBC 0-5 06/25/2017 11:40 AM  
 
 
 
Medications Reviewed:  
 
Current Facility-Administered Medications Medication Dose Route Frequency  bumetanide (BUMEX) tablet 2 mg  2 mg Oral DAILY  [START ON 8/8/2019] predniSONE (DELTASONE) tablet 40 mg  40 mg Oral DAILY WITH BREAKFAST  insulin glargine (LANTUS) injection 18 Units  18 Units SubCUTAneous QHS  insulin lispro (HUMALOG) injection   SubCUTAneous AC&HS  
 glucose chewable tablet 16 g  4 Tab Oral PRN  
 dextrose (D50W) injection syrg 12.5-25 g  12.5-25 g IntraVENous PRN  
 glucagon (GLUCAGEN) injection 1 mg  1 mg IntraMUSCular PRN  
 albuterol-ipratropium (DUO-NEB) 2.5 MG-0.5 MG/3 ML  3 mL Nebulization BID RT  
 cefTRIAXone (ROCEPHIN) 1 g in 0.9% sodium chloride (MBP/ADV) 50 mL  1 g IntraVENous Q24H  
 azithromycin (ZITHROMAX) tablet 500 mg  500 mg Oral DAILY  balsam peru-castor oil (VENELEX) ointment   Topical TID  sodium chloride (NS) flush 5-40 mL  5-40 mL IntraVENous Q8H  
 sodium chloride (NS) flush 5-40 mL  5-40 mL IntraVENous PRN  
 sodium chloride (NS) flush 5-40 mL  5-40 mL IntraVENous Q8H  
 sodium chloride (NS) flush 5-40 mL  5-40 mL IntraVENous PRN  
 acetaminophen (TYLENOL) tablet 650 mg  650 mg Oral Q4H PRN  
 HYDROcodone-acetaminophen (NORCO) 5-325 mg per tablet 1 Tab  1 Tab Oral Q4H PRN  
 morphine injection 1 mg  1 mg IntraVENous Q4H PRN  
 heparin (porcine) injection 5,000 Units  5,000 Units SubCUTAneous Q12H  aspirin chewable tablet 81 mg  81 mg Oral DAILY  carvedilol (COREG) tablet 12.5 mg  12.5 mg Oral BID WITH MEALS  simvastatin (ZOCOR) tablet 20 mg  20 mg Oral QHS  sodium bicarbonate tablet 1,300 mg  1,300 mg Oral TID  tamsulosin (FLOMAX) capsule 0.4 mg  0.4 mg Oral DAILY  epoetin dm-epbx (RETACRIT) injection 10,000 Units  10,000 Units SubCUTAneous Q7D  
 
______________________________________________________________________ EXPECTED LENGTH OF STAY: 3d 19h ACTUAL LENGTH OF STAY:          2 Vini Linares MD

## 2019-08-07 NOTE — PROGRESS NOTES
[facility] Transitional Care Team: Follow-Up HUG Note    Date of Assessment: 08/07/19  Time of Assessment:  2:13 PM    Assessment & Plan   MARTINEZ Diagnoses:  Acute hypoxemic resp failure-   More likely from PNA than due to volume overload  Worsening resp status with tachypnea and end-exp wheeze     Pulm HTN-  Echo 8/6: Left Ventricle: Normal cavity size, systolic function (ejection fraction normal) and diastolic function. Mild concentric hypertrophy. Estimated left ventricular ejection fraction is 66 - 70%. On recent echo--mild-mod pulm HTN reported     CAP POA-  On CXR--developing opacities in right lung   Started on ceftriaxone in addition to azithromycin   Improving      Suspected COPD from past tobacco use-  Resume steroids and home meds     KATALINA on CKD Stage 4-  Per nephrology, he had elevated right and left heart pressures noted on RHC on 5/2 and has improved w/aggressive diuresis. He is at risk for decompensation by holding diuretics for too long   -  resumed Bumex 2 mg daily to keep him net negative   - strict I. O   - daily labs      Metabolic acidosis-   On po bicarb      PVD-   S/P stented in both LE, cont aspirin     DM type 2-   Resume lantus w/accuchecks and ss insulin     HLD-   resume statin meds        Anemia of CKD      CAD, s/p CABG      Bradycardia-  With afib w pacemaker      Readmission Risks:  mod-high    1. Older adult with multiple co-morbidities  2. Three hospital admissions this year  3. Wants to remain a full code  4. Lives out of [facility] area        Nurse Navigator: has [address] [facility]  [facility] appointments: TBD    Code status: Full  Recommended Disposition: TBD       Number of Admissions this year:  3, including current    Heart Failure Bundle:  Yes    Met Mr. Eleni Howell today at bedside and re-introduced myself and the [facility] and its goals. We met during his admission in May. He appears dyspneic today--remains on nasal cannula O2.   Asked what he can tell me about an order for oxygen at discharge last time--notes indicate was ordered but wasn't used, delivered? He seems to be confused about this, too. If I'm able, I will ask his wife when I speak with her later this week. He reports managing his own medications and keeps them in the original pill bottles. Reports being on prednisone taper and improving as long as prednisone is being taken, but worsens within a few days of end of taper. ED provider note reports he has been seen in ED x 3 this week for breathing issue. Advance Care Plan: has a living will at home. Spoke to wife who will bring to hospital and request staff make copy for his medical record. Persons included in Conversation:  patient and family--wife by phone, gave her my contact phone # as well. Length of ACP Conversation in minutes:  <16 minutes (Non-Billable)    Medication reconciliation was performed at admission by PharmD. Discharge Needs: TBD     Awareness of medical conditions: did not discuss today. Patient's willingness to go to SNF or inpt rehab if necessary: TBD     HUG NP will return with [facility] calender/follow up appointments/ Ambulatory Nurse Navigation information when patient ready for discharge. [facility] information not given to patient--[place] is outside of service area. Continue to follow CM documentation. Patient/family re-education focused on readmission zones as described as: The Red Zone: High risk for readmission, days 1-21                          The Yellow Zone: Moderate risk for readmission, days 22-29                          The Green Zone: Lower risk for readmission, days 30 and after    Candida Palm. is a 81 y.o. male inpatient at [facility] admitted with shortness of breath on  8/5/19. Chart reviewed by Mihai Aponte, NP and [facility] Understanding of Goals) program re-introduced to patient and will be re-introduced to .  Natalya Mane when there is more time; today she did not have much time to talk on the phone. The Transitional Care Team bridges the gaps in care and education surrounding discharge from the acute care facility. The objective is to empower the patient and family in taking a proactive role in the task of preventing readmission within the first thirty days after discharge from the acute care setting. The team is also involved in the efforts to reduce readmission to the acute care setting after stabilization and discharge from the acute care environment either to the skilled nursing facilities or community. The TC Team will follow patient from a distance while inpatient as well as be available for further transition disposition as needed. The MARTINEZ TEAM will continue to offer support during the 30- 90 day discharge from acute care setting. Will notify Ambulatory HF Nurse Navigators Danya Molina RN and Noel Velazquez RN, when patient is ready for discharge.     Past Medical History:   Diagnosis Date    CAD (coronary artery disease)     s/p CABG 2008    Carotid arterial disease (HCC)     CKD (chronic kidney disease) stage 3, GFR 30-59 ml/min ([facility] [address] 10/21/2017    hypertension and DM nephrosclerosis    Diabetes mellitus, type 2 ([facility]     Hypercholesteremia     Hypertension     Paroxysmal atrial fibrillation ([facility] 10/21/2017    new onset afib with BRPR 10-19-17 admit    PVC's (premature ventricular contractions) 5/26/2014    PVD (peripheral vascular disease) [facility])        Advance Care Planning 8/5/2019   Patient's Healthcare Decision Maker is: -   Primary Decision Maker Name -   Primary Decision Maker Relationship to Patient -   Confirm Advance Directive Yes, not on file   Patient Would Like to Complete Advance Directive -

## 2019-08-08 ENCOUNTER — DOCUMENTATION ONLY (OUTPATIENT)
Dept: CASE MANAGEMENT | Age: 84
End: 2019-08-08

## 2019-08-08 LAB
ALBUMIN SERPL ELPH-MCNC: 3.1 G/DL (ref 2.9–4.4)
ALBUMIN/GLOB SERPL: 1 {RATIO} (ref 0.7–1.7)
ALPHA1 GLOB SERPL ELPH-MCNC: 0.4 G/DL (ref 0–0.4)
ALPHA2 GLOB SERPL ELPH-MCNC: 1 G/DL (ref 0.4–1)
ANION GAP SERPL CALC-SCNC: 9 MMOL/L (ref 5–15)
B-GLOBULIN SERPL ELPH-MCNC: 0.7 G/DL (ref 0.7–1.3)
BNP SERPL-MCNC: ABNORMAL PG/ML
BUN SERPL-MCNC: 62 MG/DL (ref 6–20)
BUN/CREAT SERPL: 26 (ref 12–20)
CALCIUM SERPL-MCNC: 8.6 MG/DL (ref 8.5–10.1)
CHLORIDE SERPL-SCNC: 92 MMOL/L (ref 97–108)
CO2 SERPL-SCNC: 31 MMOL/L (ref 21–32)
CREAT SERPL-MCNC: 2.4 MG/DL (ref 0.7–1.3)
FLUID CULTURE, SPNG2: NORMAL
GAMMA GLOB SERPL ELPH-MCNC: 1.1 G/DL (ref 0.4–1.8)
GLOBULIN SER-MCNC: 3.2 G/DL (ref 2.2–3.9)
GLUCOSE BLD STRIP.AUTO-MCNC: 254 MG/DL (ref 65–100)
GLUCOSE BLD STRIP.AUTO-MCNC: 315 MG/DL (ref 65–100)
GLUCOSE BLD STRIP.AUTO-MCNC: 347 MG/DL (ref 65–100)
GLUCOSE BLD STRIP.AUTO-MCNC: 382 MG/DL (ref 65–100)
GLUCOSE SERPL-MCNC: 239 MG/DL (ref 65–100)
IGA SERPL-MCNC: 163 MG/DL (ref 61–437)
IGG SERPL-MCNC: 1114 MG/DL (ref 700–1600)
IGM SERPL-MCNC: 52 MG/DL (ref 15–143)
INTERPRETATION SERPL IEP-IMP: ABNORMAL
KAPPA LC FREE SER-MCNC: 107 MG/L (ref 3.3–19.4)
KAPPA LC FREE/LAMBDA FREE SER: 1.2 {RATIO} (ref 0.26–1.65)
L PNEUMO1 AG UR QL IA: NEGATIVE
LAMBDA LC FREE SERPL-MCNC: 88.8 MG/L (ref 5.7–26.3)
M PROTEIN SERPL ELPH-MCNC: ABNORMAL G/DL
ORGANISM ID, SPNG3: NORMAL
PLEASE NOTE, SPNG4: NORMAL
POTASSIUM SERPL-SCNC: 3.7 MMOL/L (ref 3.5–5.1)
PROT SERPL-MCNC: 6.3 G/DL (ref 6–8.5)
S PNEUM AG SPEC QL LA: NEGATIVE
SERVICE CMNT-IMP: ABNORMAL
SODIUM SERPL-SCNC: 132 MMOL/L (ref 136–145)
SPECIMEN SOURCE: NORMAL
SPECIMEN SOURCE: NORMAL
SPECIMEN, SPNG1: NORMAL

## 2019-08-08 PROCEDURE — 74011250636 HC RX REV CODE- 250/636: Performed by: NURSE PRACTITIONER

## 2019-08-08 PROCEDURE — 82962 GLUCOSE BLOOD TEST: CPT

## 2019-08-08 PROCEDURE — 74011636637 HC RX REV CODE- 636/637: Performed by: INTERNAL MEDICINE

## 2019-08-08 PROCEDURE — 36415 COLL VENOUS BLD VENIPUNCTURE: CPT

## 2019-08-08 PROCEDURE — 80048 BASIC METABOLIC PNL TOTAL CA: CPT

## 2019-08-08 PROCEDURE — 74011250636 HC RX REV CODE- 250/636: Performed by: FAMILY MEDICINE

## 2019-08-08 PROCEDURE — 94664 DEMO&/EVAL PT USE INHALER: CPT

## 2019-08-08 PROCEDURE — 77010033678 HC OXYGEN DAILY

## 2019-08-08 PROCEDURE — 74011000250 HC RX REV CODE- 250: Performed by: INTERNAL MEDICINE

## 2019-08-08 PROCEDURE — 65660000000 HC RM CCU STEPDOWN

## 2019-08-08 PROCEDURE — 74011000250 HC RX REV CODE- 250: Performed by: NURSE PRACTITIONER

## 2019-08-08 PROCEDURE — 74011636637 HC RX REV CODE- 636/637: Performed by: NURSE PRACTITIONER

## 2019-08-08 PROCEDURE — 74011000258 HC RX REV CODE- 258: Performed by: INTERNAL MEDICINE

## 2019-08-08 PROCEDURE — 74011250637 HC RX REV CODE- 250/637: Performed by: FAMILY MEDICINE

## 2019-08-08 PROCEDURE — 74011250636 HC RX REV CODE- 250/636: Performed by: INTERNAL MEDICINE

## 2019-08-08 PROCEDURE — 94640 AIRWAY INHALATION TREATMENT: CPT

## 2019-08-08 PROCEDURE — 83880 ASSAY OF NATRIURETIC PEPTIDE: CPT

## 2019-08-08 RX ORDER — HYDRALAZINE HYDROCHLORIDE 20 MG/ML
10 INJECTION INTRAMUSCULAR; INTRAVENOUS ONCE
Status: COMPLETED | OUTPATIENT
Start: 2019-08-08 | End: 2019-08-08

## 2019-08-08 RX ORDER — BUMETANIDE 0.25 MG/ML
2 INJECTION INTRAMUSCULAR; INTRAVENOUS 3 TIMES DAILY
Status: DISCONTINUED | OUTPATIENT
Start: 2019-08-08 | End: 2019-08-09

## 2019-08-08 RX ORDER — INSULIN GLARGINE 100 [IU]/ML
22 INJECTION, SOLUTION SUBCUTANEOUS
Status: DISCONTINUED | OUTPATIENT
Start: 2019-08-08 | End: 2019-08-09

## 2019-08-08 RX ORDER — INSULIN LISPRO 100 [IU]/ML
8 INJECTION, SOLUTION INTRAVENOUS; SUBCUTANEOUS ONCE
Status: COMPLETED | OUTPATIENT
Start: 2019-08-08 | End: 2019-08-08

## 2019-08-08 RX ORDER — BUMETANIDE 0.25 MG/ML
2 INJECTION INTRAMUSCULAR; INTRAVENOUS 3 TIMES DAILY
Status: DISCONTINUED | OUTPATIENT
Start: 2019-08-08 | End: 2019-08-08

## 2019-08-08 RX ADMIN — INSULIN GLARGINE 22 UNITS: 100 INJECTION, SOLUTION SUBCUTANEOUS at 22:17

## 2019-08-08 RX ADMIN — HYDRALAZINE HYDROCHLORIDE 10 MG: 20 INJECTION INTRAMUSCULAR; INTRAVENOUS at 23:01

## 2019-08-08 RX ADMIN — METHYLPREDNISOLONE SODIUM SUCCINATE 40 MG: 40 INJECTION, POWDER, FOR SOLUTION INTRAMUSCULAR; INTRAVENOUS at 08:11

## 2019-08-08 RX ADMIN — IPRATROPIUM BROMIDE AND ALBUTEROL SULFATE 3 ML: .5; 3 SOLUTION RESPIRATORY (INHALATION) at 16:10

## 2019-08-08 RX ADMIN — METHYLPREDNISOLONE SODIUM SUCCINATE 40 MG: 40 INJECTION, POWDER, FOR SOLUTION INTRAMUSCULAR; INTRAVENOUS at 22:18

## 2019-08-08 RX ADMIN — Medication: at 08:14

## 2019-08-08 RX ADMIN — BUMETANIDE 2 MG: 0.25 INJECTION INTRAMUSCULAR; INTRAVENOUS at 16:57

## 2019-08-08 RX ADMIN — BUMETANIDE 2 MG: 0.25 INJECTION INTRAMUSCULAR; INTRAVENOUS at 12:26

## 2019-08-08 RX ADMIN — INSULIN LISPRO 8 UNITS: 100 INJECTION, SOLUTION INTRAVENOUS; SUBCUTANEOUS at 16:56

## 2019-08-08 RX ADMIN — Medication 10 ML: at 06:44

## 2019-08-08 RX ADMIN — SODIUM BICARBONATE 1300 MG: 650 TABLET ORAL at 22:18

## 2019-08-08 RX ADMIN — BUMETANIDE 2 MG: 0.25 INJECTION INTRAMUSCULAR; INTRAVENOUS at 22:19

## 2019-08-08 RX ADMIN — CARVEDILOL 12.5 MG: 12.5 TABLET, FILM COATED ORAL at 16:57

## 2019-08-08 RX ADMIN — TAMSULOSIN HYDROCHLORIDE 0.4 MG: 0.4 CAPSULE ORAL at 08:11

## 2019-08-08 RX ADMIN — Medication: at 22:19

## 2019-08-08 RX ADMIN — SIMVASTATIN 20 MG: 20 TABLET, FILM COATED ORAL at 22:22

## 2019-08-08 RX ADMIN — INSULIN LISPRO 7 UNITS: 100 INJECTION, SOLUTION INTRAVENOUS; SUBCUTANEOUS at 12:26

## 2019-08-08 RX ADMIN — HEPARIN SODIUM 5000 UNITS: 5000 INJECTION INTRAVENOUS; SUBCUTANEOUS at 22:18

## 2019-08-08 RX ADMIN — Medication 10 ML: at 22:22

## 2019-08-08 RX ADMIN — Medication: at 16:00

## 2019-08-08 RX ADMIN — IPRATROPIUM BROMIDE AND ALBUTEROL SULFATE 3 ML: .5; 3 SOLUTION RESPIRATORY (INHALATION) at 20:35

## 2019-08-08 RX ADMIN — ASPIRIN 81 MG CHEWABLE TABLET 81 MG: 81 TABLET CHEWABLE at 08:11

## 2019-08-08 RX ADMIN — INSULIN LISPRO 5 UNITS: 100 INJECTION, SOLUTION INTRAVENOUS; SUBCUTANEOUS at 06:58

## 2019-08-08 RX ADMIN — Medication 10 ML: at 22:21

## 2019-08-08 RX ADMIN — AZITHROMYCIN MONOHYDRATE 500 MG: 500 INJECTION, POWDER, LYOPHILIZED, FOR SOLUTION INTRAVENOUS at 08:16

## 2019-08-08 RX ADMIN — IPRATROPIUM BROMIDE AND ALBUTEROL SULFATE 3 ML: .5; 3 SOLUTION RESPIRATORY (INHALATION) at 07:31

## 2019-08-08 RX ADMIN — Medication 10 ML: at 14:00

## 2019-08-08 RX ADMIN — HEPARIN SODIUM 5000 UNITS: 5000 INJECTION INTRAVENOUS; SUBCUTANEOUS at 12:27

## 2019-08-08 RX ADMIN — CEFTRIAXONE 1 G: 1 INJECTION, POWDER, FOR SOLUTION INTRAMUSCULAR; INTRAVENOUS at 12:27

## 2019-08-08 RX ADMIN — SODIUM BICARBONATE 1300 MG: 650 TABLET ORAL at 08:11

## 2019-08-08 RX ADMIN — CARVEDILOL 12.5 MG: 12.5 TABLET, FILM COATED ORAL at 08:11

## 2019-08-08 RX ADMIN — INSULIN LISPRO 4 UNITS: 100 INJECTION, SOLUTION INTRAVENOUS; SUBCUTANEOUS at 22:18

## 2019-08-08 RX ADMIN — SODIUM BICARBONATE 1300 MG: 650 TABLET ORAL at 16:57

## 2019-08-08 RX ADMIN — IPRATROPIUM BROMIDE AND ALBUTEROL SULFATE 3 ML: .5; 3 SOLUTION RESPIRATORY (INHALATION) at 11:50

## 2019-08-08 NOTE — PROGRESS NOTES
Cardiology Progress Note Admit Date: 8/5/2019 Admit Diagnosis: CHF exacerbation ([facility] [I50.9] Acute hypoxemic respiratory failure ([facility] [J96.01] Date: 8/8/2019     Time: 9:37 AM 
 
Subjective: 
Feeling better. Still with productive cough. Would like to walk today. Assessment and Plan 1. AGUILAR 
            - with Ambulation - CXR with mild edema, more significant for PNA pattern - IV Abx started - No edema of ankles or abdomen. Focal crackles in left lower base. - Hypoxia - Sats 81-93% on 5L NC on admission - 4L NC today. Sats 94% 2. PNA 
 - CT suggestive of patchy ground glassing with focal infilterates? Interstitial pneumonia such as Legionella vs CHF(clinically unlikely) - Cx sent 
 - on Azith IV 2. Diastolic heart failure - Chronic    
            - NYHA class II 
            - EF - 65-70% on echo. Normal systolic and diastolic function. [address] - Bumex restarted. 3. COPD 
            - stable, Primary team resumed steroids and home meds 4. CAD 
            - h/o off pump CABG - 2008 - Trop 0.42 - mixed demand with fixed CAD. Second trop 0.45. No CP reported - Continue home meds 5. CKD 
            - stage IV 
            - Dr. Gume Hernandes to see 6. DM 
            - SSI 7. HLD 
            - Home statin resumed 8. PHTN 
 - mild to moderate on echo. PAS 50 
  
CT scan with interesting picture. Looks like Legionella PNA pattern. Continues to cough up \"stuff\". Volume status compensated. May consider adding Cipro. Cx pending. Past Medical History:  
Diagnosis Date  CAD (coronary artery disease) s/p CABG 2008  Carotid arterial disease ([facility]  CKD (chronic kidney disease) stage 3, GFR 30-59 ml/min ([facility]) 10/21/2017  
 hypertension and DM nephrosclerosis  Diabetes mellitus, type 2 ([facility]  Hypercholesteremia  Hypertension  Paroxysmal atrial fibrillation ([facility] 10/21/2017  
 new onset afib with BRPR 10-19-17 admit  PVC's (premature ventricular contractions) 2014  PVD (peripheral vascular disease) ([facility] Social History Tobacco Use  Smoking status: Former Smoker Packs/day: 3.00 Years: 20.00 Pack years: 60.00 Types: Cigarettes Last attempt to quit: 1985 Years since quittin.5  Smokeless tobacco: Never Used Substance Use Topics  Alcohol use: No  
 Drug use: No  
  
  
ROS: 
 
 GENERAL Recent weight loss - no Fever -----------------   no 
 Chills -----------------   no 
 
 EYES, VISION Visual Changes - no 
 
 EARS, NOSE, THROAT Hearing loss ----------- no 
 Swallowing difficulties - no CARDIOVASCULAR Chest pain/pressure ---- no 
 Arrhythmia/palpitations - no RESPIRATORY Cough ------------------ yes Shortness of breath - yes Wheezing -------------- no   GASTROINTESTINAL Abdominal pain - no Heartburn -------- no 
 Bloody stool ----- no 
 
 GENITOURINARY Frequent urination - no Urgency -------------- no 
 
 MUSCULOSKELETAL Joint pain/swelling ---- no 
 Musculoskeletal pain - no 
 
 SKIN & INTEGUMENTARY Rashes - no Sores --- no 
 
 
   NEUROLOGICAL Numbness/tingling - no Sensation loss ------ no 
 
 PSYCHIATRIC Nervousness/anxiety - no Depression -------------- no 
 
 ENDOCRINE Heat/cold intolerance - no Excessive thirst -------- no 
 
 HEMATOLOGIC/LYMPHATIC Abnormal bleeding - no ALL/IMMUN Allergic reaction ------ no 
 Recurrent infections - no Objective: 
 
 Physical Exam: 
             
Visit Vitals /55 (BP 1 Location: Right arm, BP Patient Position: At rest) Pulse 76 Temp 98 °F (36.7 °C) Resp 19 Ht 5' 9\" (1.753 m) Wt 156 lb 15.5 oz (71.2 kg) SpO2 90% BMI 23.18 kg/m²  General Appearance:   Well developed, well nourished,alert and oriented x 3, and 
 individual in no acute distress. Ears/Nose/Mouth/Throat:    Hearing grossly normal. 
  
    Neck:  Supple. Chest:    Lungs fine crackles, rales to auscultation bilaterally. Cardiovascular:   Regular rate and rhythm, S1, S2 normal, no murmur. Abdomen:    Soft, non-tender, bowel sounds are active. Extremities:  No edema bilaterally. Skin:  Warm and dry. Telemetry: normal sinus rhythm Data Review:  
 Labs:   
Recent Results (from the past 24 hour(s)) CBC W/O DIFF Collection Time: 08/07/19  9:56 AM  
Result Value Ref Range WBC 11.5 (H) 4.1 - 11.1 K/uL  
 RBC 2.94 (L) 4.10 - 5.70 M/uL HGB 8.3 (L) 12.1 - 17.0 g/dL HCT 25.5 (L) 36.6 - 50.3 % MCV 86.7 80.0 - 99.0 FL  
 MCH 28.2 26.0 - 34.0 PG  
 MCHC 32.5 30.0 - 36.5 g/dL  
 RDW 14.8 (H) 11.5 - 14.5 % PLATELET 277 626 - 639 K/uL MPV 10.5 8.9 - 12.9 FL  
 NRBC 0.0 0  WBC ABSOLUTE NRBC 0.00 0.00 - 0.01 K/uL GLUCOSE, POC Collection Time: 08/07/19 12:44 PM  
Result Value Ref Range Glucose (POC) 148 (H) 65 - 100 mg/dL Performed by Reyes Guan RESPIRATORY PANEL,PCR,NASOPHARYNGEAL Collection Time: 08/07/19  1:24 PM  
Result Value Ref Range Adenovirus NOT DETECTED NOTD Coronavirus 229E NOT DETECTED NOTD Coronavirus HKU1 NOT DETECTED NOTD Coronavirus CVNL63 NOT DETECTED NOTD Coronavirus OC43 NOT DETECTED NOTD Metapneumovirus NOT DETECTED NOTD Rhinovirus and Enterovirus NOT DETECTED NOTD Influenza A NOT DETECTED NOTD Influenza A, subtype H1 NOT DETECTED NOTD Influenza A, subtype H3 NOT DETECTED NOTD INFLUENZA A H1N1 PCR NOT DETECTED NOTD Influenza B NOT DETECTED NOTD Parainfluenza 1 NOT DETECTED NOTD Parainfluenza 2 NOT DETECTED NOTD Parainfluenza 3 NOT DETECTED NOTD Parainfluenza virus 4 NOT DETECTED NOTD    
 RSV by PCR NOT DETECTED NOTD Bordetella pertussis - PCR NOT DETECTED NOTD Chlamydophila pneumoniae DNA, QL, PCR NOT DETECTED NOTD Mycoplasma pneumoniae DNA, QL, PCR NOT DETECTED NOTD    
GLUCOSE, POC Collection Time: 08/07/19  4:25 PM  
Result Value Ref Range Glucose (POC) 318 (H) 65 - 100 mg/dL Performed by Emely Miramontes GLUCOSE, POC Collection Time: 08/07/19  9:16 PM  
Result Value Ref Range Glucose (POC) 178 (H) 65 - 100 mg/dL Performed by Kenan Rodriguez NT-PRO BNP Collection Time: 08/08/19  4:48 AM  
Result Value Ref Range NT pro-BNP >35,000 (H) <729 PG/ML  
METABOLIC PANEL, BASIC Collection Time: 08/08/19  4:48 AM  
Result Value Ref Range Sodium 132 (L) 136 - 145 mmol/L Potassium 3.7 3.5 - 5.1 mmol/L Chloride 92 (L) 97 - 108 mmol/L  
 CO2 31 21 - 32 mmol/L Anion gap 9 5 - 15 mmol/L Glucose 239 (H) 65 - 100 mg/dL BUN 62 (H) 6 - 20 MG/DL Creatinine 2.40 (H) 0.70 - 1.30 MG/DL  
 BUN/Creatinine ratio 26 (H) 12 - 20 GFR est AA 32 (L) >60 ml/min/1.73m2 GFR est non-AA 26 (L) >60 ml/min/1.73m2 Calcium 8.6 8.5 - 10.1 MG/DL  
GLUCOSE, POC Collection Time: 08/08/19  6:52 AM  
Result Value Ref Range Glucose (POC) 254 (H) 65 - 100 mg/dL Performed by Kenan Rodriguez Radiology:  
 
  
Current Facility-Administered Medications Medication Dose Route Frequency  methylPREDNISolone (PF) (SOLU-MEDROL) injection 40 mg  40 mg IntraVENous Q12H  
 azithromycin (ZITHROMAX) 500 mg in 0.9% sodium chloride (MBP/ADV) 250 mL  500 mg IntraVENous Q24H  
 albuterol-ipratropium (DUO-NEB) 2.5 MG-0.5 MG/3 ML  3 mL Nebulization QID RT  
 insulin glargine (LANTUS) injection 18 Units  18 Units SubCUTAneous QHS  insulin lispro (HUMALOG) injection   SubCUTAneous AC&HS  
 glucose chewable tablet 16 g  4 Tab Oral PRN  
 dextrose (D50W) injection syrg 12.5-25 g  12.5-25 g IntraVENous PRN  
 glucagon (GLUCAGEN) injection 1 mg  1 mg IntraMUSCular PRN  
 cefTRIAXone (ROCEPHIN) 1 g in 0.9% sodium chloride (MBP/ADV) 50 mL  1 g IntraVENous Q24H  
 balsam peru-castor oil (VENELEX) ointment   Topical TID  sodium chloride (NS) flush 5-40 mL  5-40 mL IntraVENous Q8H  
 sodium chloride (NS) flush 5-40 mL  5-40 mL IntraVENous PRN  
 sodium chloride (NS) flush 5-40 mL  5-40 mL IntraVENous Q8H  
 sodium chloride (NS) flush 5-40 mL  5-40 mL IntraVENous PRN  
 acetaminophen (TYLENOL) tablet 650 mg  650 mg Oral Q4H PRN  
 HYDROcodone-acetaminophen (NORCO) 5-325 mg per tablet 1 Tab  1 Tab Oral Q4H PRN  
 morphine injection 1 mg  1 mg IntraVENous Q4H PRN  
 heparin (porcine) injection 5,000 Units  5,000 Units SubCUTAneous Q12H  aspirin chewable tablet 81 mg  81 mg Oral DAILY  carvedilol (COREG) tablet 12.5 mg  12.5 mg Oral BID WITH MEALS  simvastatin (ZOCOR) tablet 20 mg  20 mg Oral QHS  sodium bicarbonate tablet 1,300 mg  1,300 mg Oral TID  tamsulosin (FLOMAX) capsule 0.4 mg  0.4 mg Oral DAILY  epoetin dm-epbx (RETACRIT) injection 10,000 Units  10,000 Units SubCUTAneous Q7D Stefany Su. MARGARITA Chaudhary MD 
 
 [facility] [address] [place] [address], [address] Citlalli Jones 
 [phone]

## 2019-08-08 NOTE — DIABETES MGMT
[facility] Progress Note Recommendations/ Comments: Chart review for hyperglycemia, improving ranged 148 mgd/L - 347 mg/dL past 24 hours  mg/dL today Steroids - Noted ws on Prednisone and changed to Solu Medrol 40 mg Q 12 hours yesterday; BG's have risen to > 250 mg/dL since then Received 11 units of lispro correction yesterday If appropriate please consider: 
Increase Lantus to 22 units at bedtime Dr Tabitha Gipson regarding above Current hospital DM medication:  
Lantus 18 units at bedtime Lispro normal sensitivity correction scael [address] Patient is a 81 y.o. male with known DM on Lantus U-300 18 units daily at home A1c:  
Lab Results Component Value Date/Time Hemoglobin A1c 9.1 (H) 05/11/2019 03:37 AM  
 Hemoglobin A1c 9.8 (H) 10/20/2017 08:11 AM  
 Hemoglobin A1c, External 8.4 08/15/2017 Recent Glucose Results:  
Lab Results Component Value Date/Time  (H) 08/08/2019 04:48 AM  
 GLUCPOC 347 (H) 08/08/2019 12:20 PM  
 GLUCPOC 254 (H) 08/08/2019 06:52 AM  
 GLUCPOC 178 (H) 08/07/2019 09:16 PM  
  
 
Lab Results Component Value Date/Time Creatinine 2.40 (H) 08/08/2019 04:48 AM  
 
Estimated Creatinine Clearance: 23.7 mL/min (A) (based on SCr of 2.4 mg/dL (H)). Active Orders Diet DIET DIABETIC WITH OPTIONS Consistent Carb 2000kcal; Regular PO intake:  
Patient Vitals for the past 72 hrs: 
 % Diet Eaten 08/07/19 1300 75 % 08/07/19 0830 75 % Will continue to follow as needed. Thank you Gillian Novoa RN, CDE Time spent: 6 min

## 2019-08-08 NOTE — PROGRESS NOTES
.. 
 
 
                       
[address] Annabel Chopra YOB: 1935 Assessment & Plan: KATALINA  on CKD  
- 2/2 pre renal azotemia from diuretics, infection ==> resolved   
- he had elevated Right and left heart pressures noted on RHC on 5/2 and has improved w/ aggressive diuresis. He is at risk for decompensation by holding diuretics for too long  
-  resumed Bumex 2 mg daily to keep him net negative  
- improving neg fluid balance 
- strict I. O  
- daily labs  
  
COPD flare, RML PNA: 
- Mx per primary team 
- could consider CT chest  
  
CKD IV: 
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 
- baseline Cr at best : 2.7- 3 mg/dl  
- UPCR showed 11 gm of Protein  
- he wants to do PD if he becomes ESRD, but he does not appears to be a good candidate for long term dialysis  
  
Metablolic Acidosis: 
- cont PO BICARB 
  
Dyspnea : 
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  
- RHC w/ PCW 28 
  
HTN: 
- continue current meds 
  
Bradycardia : 
- s/p PPM on 5/9 
  
Anemia of CKD: 
- hgb stable - weekly MICHAEL while in hospital  
  
Afib: 
- per cards 
  
CAD s/p CABG 
  
DM2: 
- on insulin 
  
Care Plan discussed with: pt    
 
 
Subjective:  
CC: KATALINA/CKD HPI: Patient seen and examined. Neg fluid balance with improving creat. ROS: No sob/cp/f/c/ns. Current Facility-Administered Medications Medication Dose Route Frequency  methylPREDNISolone (PF) (SOLU-MEDROL) injection 40 mg  40 mg IntraVENous Q12H  
 azithromycin (ZITHROMAX) 500 mg in 0.9% sodium chloride (MBP/ADV) 250 mL  500 mg IntraVENous Q24H  
 albuterol-ipratropium (DUO-NEB) 2.5 MG-0.5 MG/3 ML  3 mL Nebulization QID RT  
 insulin glargine (LANTUS) injection 18 Units  18 Units SubCUTAneous QHS  insulin lispro (HUMALOG) injection   SubCUTAneous AC&HS  
 glucose chewable tablet 16 g  4 Tab Oral PRN  
 dextrose (D50W) injection syrg 12.5-25 g  12.5-25 g IntraVENous PRN  
  glucagon (GLUCAGEN) injection 1 mg  1 mg IntraMUSCular PRN  
 cefTRIAXone (ROCEPHIN) 1 g in 0.9% sodium chloride (MBP/ADV) 50 mL  1 g IntraVENous Q24H  
 balsam peru-castor oil (VENELEX) ointment   Topical TID  sodium chloride (NS) flush 5-40 mL  5-40 mL IntraVENous Q8H  
 sodium chloride (NS) flush 5-40 mL  5-40 mL IntraVENous PRN  
 sodium chloride (NS) flush 5-40 mL  5-40 mL IntraVENous Q8H  
 sodium chloride (NS) flush 5-40 mL  5-40 mL IntraVENous PRN  
 acetaminophen (TYLENOL) tablet 650 mg  650 mg Oral Q4H PRN  
 HYDROcodone-acetaminophen (NORCO) 5-325 mg per tablet 1 Tab  1 Tab Oral Q4H PRN  
 morphine injection 1 mg  1 mg IntraVENous Q4H PRN  
 heparin (porcine) injection 5,000 Units  5,000 Units SubCUTAneous Q12H  aspirin chewable tablet 81 mg  81 mg Oral DAILY  carvedilol (COREG) tablet 12.5 mg  12.5 mg Oral BID WITH MEALS  simvastatin (ZOCOR) tablet 20 mg  20 mg Oral QHS  sodium bicarbonate tablet 1,300 mg  1,300 mg Oral TID  tamsulosin (FLOMAX) capsule 0.4 mg  0.4 mg Oral DAILY  epoetin dm-epbx (RETACRIT) injection 10,000 Units  10,000 Units SubCUTAneous Q7D Objective:  
 
Vitals: 
Blood pressure 133/55, pulse 76, temperature 98 °F (36.7 °C), resp. rate 19, height 5' 9\" (1.753 m), weight 71.2 kg (156 lb 15.5 oz), SpO2 93 %. Temp (24hrs), Av °F (36.7 °C), Min:97.6 °F (36.4 °C), Max:98.1 °F (36.7 °C) Intake and Output: 
No intake/output data recorded.  1901 -  0700 In: 4544 [P.O.:1398] Out: 1013 Reza Loya [UBHBC:2330] Physical Exam:              
 Patient is intubated: no 
 
Physical Examination:  
GENERAL ASSESSMENT: NAD HEENT:Nontraumatic CHEST: CTA HEART: S1S2 ABDOMEN: Soft,NT, 
:Myers:  
EXTREMITY: EDEMA 
NEURO:Grossly non focal 
 
 
   
ECG/rhythm: 
 
Data Review No results for input(s): TNIPOC in the last 72 hours. No lab exists for component: ITNL Recent Labs 19 
1551 TROIQ 0.45* Recent Labs 19 6415 08/07/19 
4942 08/07/19 
0238 08/06/19 
1246 08/06/19 
0445 *  --  136 134* 134* K 3.7  --  3.7 5.1 3.1*  
CL 92*  --  95* 93* 91* CO2 31  --  31 31 31 BUN 62*  --  71* 71* 71* CREA 2.40*  --  2.93* 3.35* 3.64* *  --  156* 136* 267* CA 8.6  --  8.8 9.2 8.6 WBC  --  11.5*  --  11.3* 8.8 HGB  --  8.3*  --  8.7* 7.8* HCT  --  25.5*  --  26.5* 23.6* PLT  --  204  --  208 196 Recent Labs 08/06/19 
1246 INR 1.1 PTP 10.9 Needs: urine analysis, urine sodium, protein and creatinine Lab Results Component Value Date/Time Sodium,urine random 24 05/02/2019 11:13 AM  
 Creatinine, urine 184.00 05/02/2019 11:13 AM  
 
 
 
Discussed with: pt 
: Norbert Palomino MD 
8/8/2019 [facility]: 
[email or web address] Brooke Reyez office: 
[address] [place] [address], [address] Phone: [phone] Fax :     [phone] [place] office: 
[address] [facility], [facility] [address] [facility] Phone - [phone] Fax - [phone]

## 2019-08-08 NOTE — PROGRESS NOTES
Problem: Falls - Risk of 
Goal: *Absence of Falls Description Document Tr Alejo Fall Risk and appropriate interventions in the flowsheet. Outcome: Progressing Towards Goal 
Note:  
Fall Risk Interventions: 
Mobility Interventions: Communicate number of staff needed for ambulation/transfer, OT consult for ADLs, Patient to call before getting OOB Medication Interventions: Assess postural VS orthostatic hypotension, Evaluate medications/consider consulting pharmacy, Patient to call before getting OOB Elimination Interventions: Call light in reach, Elevated toilet seat, Patient to call for help with toileting needs Problem: Patient Education: Go to Patient Education Activity Goal: Patient/Family Education Outcome: Progressing Towards Goal 
  
Problem: Heart Failure: Day 3 Goal: Off Pathway (Use only if patient is Off Pathway) Outcome: Progressing Towards Goal 
Goal: Activity/Safety Outcome: Progressing Towards Goal 
Goal: Diagnostic Test/Procedures Outcome: Progressing Towards Goal 
Goal: Nutrition/Diet Outcome: Progressing Towards Goal 
Goal: Discharge Planning Outcome: Progressing Towards Goal 
  
Problem: Pressure Injury - Risk of 
Goal: *Prevention of pressure injury Description Document Fabio Scale and appropriate interventions in the flowsheet. Outcome: Progressing Towards Goal 
Note:  
Pressure Injury Interventions: 
  
 
Moisture Interventions: Apply protective barrier, creams and emollients, Check for incontinence Q2 hours and as needed, Maintain skin hydration (lotion/cream), Limit adult briefs Activity Interventions: Assess need for specialty bed, Increase time out of bed, PT/OT evaluation Mobility Interventions: Assess need for specialty bed, Float heels, PT/OT evaluation, Turn and reposition approx. every two hours(pillow and wedges) Nutrition Interventions: Document food/fluid/supplement intake Friction and Shear Interventions: Apply protective barrier, creams and emollients, Feet elevated on foot rest, Minimize layers Bedside and Verbal shift change report given to Roderick Rowan RN (oncoming nurse) by Ana Sanabria RN (offgoing nurse). Report included the following information SBAR, Kardex, MAR, Recent Results and Cardiac Rhythm AFIB. Patient Vitals for the past 12 hrs: 
 Temp Pulse Resp BP SpO2  
08/08/19 1901 97.8 °F (36.6 °C) 77 23 170/64 97 % 08/08/19 1656  79  167/59   
08/08/19 1610     100 % 08/08/19 1534 98.2 °F (36.8 °C) 79 25 171/90 96 % 08/08/19 1215 98.1 °F (36.7 °C) 71 19 187/75 100 % 08/08/19 1151     96 % 08/08/19 0800     93 % 08/08/19 0755 98 °F (36.7 °C) 76 19 133/55 90 % 08/08/19 0731     98 %

## 2019-08-08 NOTE — PROGRESS NOTES
Bedside and Verbal shift change report given to Silvia (oncoming nurse) by Danielle Martinez (offgoing nurse). Report included the following information SBAR, Kardex, Intake/Output, MAR, Accordion and Recent Results. Problem: Falls - Risk of 
Goal: *Absence of Falls Description Document Alf Choi Fall Risk and appropriate interventions in the flowsheet. Outcome: Progressing Towards Goal 
Note:  
Fall Risk Interventions: 
Mobility Interventions: Communicate number of staff needed for ambulation/transfer, Patient to call before getting OOB, PT Consult for mobility concerns, PT Consult for assist device competence, Strengthening exercises (ROM-active/passive), Utilize walker, cane, or other assistive device Medication Interventions: Evaluate medications/consider consulting pharmacy, Patient to call before getting OOB, Teach patient to arise slowly Elimination Interventions: Call light in reach, Patient to call for help with toileting needs, Stay With Me (per policy), Toilet paper/wipes in reach, Toileting schedule/hourly rounds, Urinal in reach Problem: Patient Education: Go to Patient Education Activity Goal: Patient/Family Education Outcome: Progressing Towards Goal 
  
Problem: Patient Education: Go to Patient Education Activity Goal: Patient/Family Education Outcome: Progressing Towards Goal 
  
Problem: Heart Failure: Day 2 Goal: Activity/Safety Outcome: Progressing Towards Goal 
Goal: Nutrition/Diet Outcome: Progressing Towards Goal 
Goal: Psychosocial 
Outcome: Progressing Towards Goal 
Goal: *Oxygen saturation within defined limits Outcome: Progressing Towards Goal 
Goal: *Optimal pain control at patient's stated goal 
Outcome: Progressing Towards Goal 
  
Problem: Pressure Injury - Risk of 
Goal: *Prevention of pressure injury Description Document Fabio Scale and appropriate interventions in the flowsheet. Outcome: Progressing Towards Goal 
Note:  
Pressure Injury Interventions: Moisture Interventions: Absorbent underpads, Check for incontinence Q2 hours and as needed, Limit adult briefs, Minimize layers, Offer toileting Q_hr Activity Interventions: Increase time out of bed, Pressure redistribution bed/mattress(bed type), PT/OT evaluation Mobility Interventions: HOB 30 degrees or less, Pressure redistribution bed/mattress (bed type), PT/OT evaluation Nutrition Interventions: Document food/fluid/supplement intake, Discuss nutritional consult with provider, Offer support with meals,snacks and hydration Friction and Shear Interventions: HOB 30 degrees or less, Lift sheet, Lift team/patient mobility team, Minimize layers Problem: Patient Education: Go to Patient Education Activity Goal: Patient/Family Education Outcome: Progressing Towards Goal 
  
Problem: Activity Intolerance Goal: *Able to remain out of bed as prescribed Outcome: Progressing Towards Goal

## 2019-08-08 NOTE — NURSE NAVIGATOR
Heart Failure Nurse Navigator met with patient and his wife. LIVING WITH HEART FAILURE book given to patient. Educated using teach back method. Discussed diagnosis definition and assessed patient understanding. Reviewed importance of daily weight monitoring. Patient states he has a scale. Weight calendar given with instructions for use. Reviewed low sodium diet. Reviewed high salt content food. Patient states he does like [employer]. Discussed high salt content of processed food and restaurant food. Reviewed signs and symptoms of heart failure and heart failure zones. A magnet with this information was given.

## 2019-08-08 NOTE — PROGRESS NOTES
Hospitalist Progress Note Lisa Bryan MD 
Answering service: [phone] OR [phone] from in house phone Cell:   
  
Date of Service:  2019 NAME:  Matt Gandara Sr. 
:  1935 MRN:  [id number] Admission Summary:  
The patient is an 61-year-old male with past medical history of diabetes, hypertension, coronary artery disease, hypercholesterolemia, peripheral vascular disease, AFib, and CKD, who presented to the emergency room via private vehicle with chief complaint of shortness of breath. His shortness of breath has been persistent for more than one week and subsequently has worsened since yesterday. There is associated bilateral leg swelling since yesterday. The patient has been taking four albuterol nebulizer treatments with no relief. The patient also reports cough with occasional greenish sputum. He recently had pacemaker placed by Dr. Sandro Cottrell and had followed up with Cardiology yesterday who increased his diuretic dose. The patient has been seen in the emergency room for shortness of breath three times this week. Interval history / Subjective:  
   
 
Patient rounded on this am, He is feeling much better today His breathing whole lot of better than yesterday and said he had lot of urine to make overnight Assessment & Plan:  
 
 acute hypoxemic resp failure-   
PNA and  due to volume overload- got  IV bumex given in ED and then His bumex was held He went into almost flash pulm edema on  and his bumex was retarted CT chest   showed diffuse alveolar pattern suggestive of pulm edema , resp panel pcr is neg  and increase prednisone to iv , continue nebs Change to po prednisone in am  
Given his improvement with restarting his diuretics , his resp distress can be attributed to the pulm edema Started on bumex iv tid on  Keep him negative  
 
  
 severe pulm HTN 
RHC w/ PCW 28 ECHO 50/60% with PAP 50 pulm consult to see if additional therapies needed CAP poa Xray reviewed Started on ceftriaxone in addition to azithromycin Improving Legionella Ag is pending  
  
 suspected COPD from past tobacco use-  
resume steroids and home meds 
increased dose on 8/7 On nebs  
  
. KATALINA on CKD stage 4- 
 baseline creatinine 3.1-3.3; was 3.8 on admission- 
 likely due to diureitcs and hypotension Improving Metabolic acidosis On po bicarb  
  
. PVD-  
s/p stented in both LE, cont aspirin 
  
 DM type 2-  
resume lantus w accuchecks and ss insulin 
 improved Dewane Newness HLD-  
resume statin meds 
  
. BPH 
- resume flomax Anemia of CKD  
 
CAD s/p CABG Bradycardia with afib w pacemaker, Code status: full DVT prophylaxis: heaprin Care Plan discussed with: Patient/Family Disposition: TBD Hospital Problems  Date Reviewed: 7/28/2019 Codes Class Noted POA  
 CHF exacerbation ([facility] ICD-10-CM: I50.9 ICD-9-CM: 428.0  8/5/2019 Unknown Acute hypoxemic respiratory failure ([facility] ICD-10-CM: J96.01 
ICD-9-CM: 518.81  8/5/2019 Unknown Review of Systems: A comprehensive review of systems was negative except for that written in the HPI. Vital Signs:  
 Last 24hrs VS reviewed since prior progress note. Most recent are: 
Visit Vitals /55 (BP 1 Location: Right arm, BP Patient Position: At rest) Pulse 76 Temp 98 °F (36.7 °C) Resp 19 Ht 5' 9\" (1.753 m) Wt 71.2 kg (156 lb 15.5 oz) SpO2 93% BMI 23.18 kg/m² Intake/Output Summary (Last 24 hours) at 8/8/2019 1114 Last data filed at 8/8/2019 4079 Gross per 24 hour Intake 1040 ml Output 2400 ml Net -1360 ml Physical Examination:  
 
 
     
Constitutional:  on oxygen but comfortable ENT:  Oral mucous moist, oropharynx benign. Neck supple, Resp:  clear b/l breath sounds CV:  Regular rhythm, normal rate, no murmurs, gallops, rubs GI:  Soft, non distended, non tender. normoactive bowel sounds, no hepatosplenomegaly Musculoskeletal:  No edema, warm, 2+ pulses throughout Neurologic:  Moves all extremities. AAOx3, CN II-XII reviewed Data Review:  
 Review and/or order of clinical lab test 
 
 
Labs:  
 
Recent Labs 08/07/19 
0956 08/06/19 
1246 WBC 11.5* 11.3* HGB 8.3* 8.7* HCT 25.5* 26.5*  
 208 Recent Labs 08/08/19 
0448 08/07/19 
0238 08/06/19 
1246 * 136 134* K 3.7 3.7 5.1 CL 92* 95* 93* CO2 31 31 31 BUN 62* 71* 71* CREA 2.40* 2.93* 3.35* * 156* 136* CA 8.6 8.8 9.2 URICA  --   --  13.1* No results for input(s): SGOT, GPT, ALT, AP, TBIL, TBILI, TP, ALB, GLOB, GGT, AML, LPSE in the last 72 hours. No lab exists for component: AMYP, HLPSE Recent Labs 08/06/19 
1246 INR 1.1 PTP 10.9 Recent Labs 08/06/19 
1246 FERR 1,049* No results found for: FOL, RBCF No results for input(s): PH, PCO2, PO2 in the last 72 hours. Recent Labs 08/05/19 
1551 TROIQ 0.45* No results found for: CHOL, CHOLX, CHLST, CHOLV, HDL, LDL, LDLC, DLDLP, TGLX, TRIGL, TRIGP, CHHD, CHHDX Lab Results Component Value Date/Time Glucose (POC) 254 (H) 08/08/2019 06:52 AM  
 Glucose (POC) 178 (H) 08/07/2019 09:16 PM  
 Glucose (POC) 318 (H) 08/07/2019 04:25 PM  
 Glucose (POC) 148 (H) 08/07/2019 12:44 PM  
 Glucose (POC) 156 (H) 08/07/2019 06:19 AM  
 
Lab Results Component Value Date/Time  Color YELLOW/STRAW 06/25/2017 11:40 AM  
 Appearance CLEAR 06/25/2017 11:40 AM  
 Specific gravity 1.018 06/25/2017 11:40 AM  
 pH (UA) 5.0 06/25/2017 11:40 AM  
 Protein 300 (A) 06/25/2017 11:40 AM  
 Glucose NEGATIVE  06/25/2017 11:40 AM  
 Ketone NEGATIVE  06/25/2017 11:40 AM  
 Bilirubin NEGATIVE  06/25/2017 11:40 AM  
 Urobilinogen 0.2 06/25/2017 11:40 AM  
 Nitrites NEGATIVE  06/25/2017 11:40 AM  
 Leukocyte Esterase NEGATIVE  06/25/2017 11:40 AM  
 Epithelial cells FEW 06/25/2017 11:40 AM  
 Bacteria NEGATIVE  06/25/2017 11:40 AM  
 WBC 0-4 06/25/2017 11:40 AM  
 RBC 0-5 06/25/2017 11:40 AM  
 
 
 
Medications Reviewed:  
 
Current Facility-Administered Medications Medication Dose Route Frequency  bumetanide (BUMEX) injection 2 mg  2 mg IntraVENous TID  methylPREDNISolone (PF) (SOLU-MEDROL) injection 40 mg  40 mg IntraVENous Q12H  
 azithromycin (ZITHROMAX) 500 mg in 0.9% sodium chloride (MBP/ADV) 250 mL  500 mg IntraVENous Q24H  
 albuterol-ipratropium (DUO-NEB) 2.5 MG-0.5 MG/3 ML  3 mL Nebulization QID RT  
 insulin glargine (LANTUS) injection 18 Units  18 Units SubCUTAneous QHS  insulin lispro (HUMALOG) injection   SubCUTAneous AC&HS  
 glucose chewable tablet 16 g  4 Tab Oral PRN  
 dextrose (D50W) injection syrg 12.5-25 g  12.5-25 g IntraVENous PRN  
 glucagon (GLUCAGEN) injection 1 mg  1 mg IntraMUSCular PRN  
 cefTRIAXone (ROCEPHIN) 1 g in 0.9% sodium chloride (MBP/ADV) 50 mL  1 g IntraVENous Q24H  
 balsam peru-castor oil (VENELEX) ointment   Topical TID  sodium chloride (NS) flush 5-40 mL  5-40 mL IntraVENous Q8H  
 sodium chloride (NS) flush 5-40 mL  5-40 mL IntraVENous PRN  
 sodium chloride (NS) flush 5-40 mL  5-40 mL IntraVENous Q8H  
 sodium chloride (NS) flush 5-40 mL  5-40 mL IntraVENous PRN  
 acetaminophen (TYLENOL) tablet 650 mg  650 mg Oral Q4H PRN  
 HYDROcodone-acetaminophen (NORCO) 5-325 mg per tablet 1 Tab  1 Tab Oral Q4H PRN  
 morphine injection 1 mg  1 mg IntraVENous Q4H PRN  
 heparin (porcine) injection 5,000 Units  5,000 Units SubCUTAneous Q12H  aspirin chewable tablet 81 mg  81 mg Oral DAILY  carvedilol (COREG) tablet 12.5 mg  12.5 mg Oral BID WITH MEALS  simvastatin (ZOCOR) tablet 20 mg  20 mg Oral QHS  sodium bicarbonate tablet 1,300 mg  1,300 mg Oral TID  tamsulosin (FLOMAX) capsule 0.4 mg  0.4 mg Oral DAILY  epoetin dm-epbx (RETACRIT) injection 10,000 Units  10,000 Units SubCUTAneous Q7D  
 
______________________________________________________________________ EXPECTED LENGTH OF STAY: 3d 19h ACTUAL LENGTH OF STAY:          3 Vini Linares MD

## 2019-08-08 NOTE — PROGRESS NOTES
Transitional Care Team: Follow-Up CHELITA Note    Received the following Connect Care message from  XANDER Berry RN and will communicate this with CM. Hi Yamilet,     I saw your note about home 18. I followed up with pulmonary associates at the end of July. This is my note excerpt.     07/24/19   Received call from [address] [place] [address] at [facility]. She reports that she put order in to [address] on 7/17/19 for nocturnal 02 at 3l/NC--pt should be hearing from [address] in a few days to set up delivery. She also reports that the only testing for PH to be done by Dr Garth Alvarez is the sleep apnea eval and study. The eval is scheduled for 8/22 at 56 at [facility] location. She states pt is not a candidate of vasodilative medications so they are trying other options such as 02 and CPAP. CTN called pt's home to relay information, LM on VM asking pt/wife to call back. ---mkrw       They should have received by now, but if they haven't , CM needs to ensure it is in place prior to discharge---let me know if I need to do anything to help. Thank you!    Josee Castrejon

## 2019-08-09 ENCOUNTER — TELEPHONE (OUTPATIENT)
Dept: CARDIOLOGY CLINIC | Age: 84
End: 2019-08-09

## 2019-08-09 LAB
ANION GAP SERPL CALC-SCNC: 10 MMOL/L (ref 5–15)
BNP SERPL-MCNC: ABNORMAL PG/ML
BUN SERPL-MCNC: 59 MG/DL (ref 6–20)
BUN/CREAT SERPL: 26 (ref 12–20)
CALCIUM SERPL-MCNC: 8.6 MG/DL (ref 8.5–10.1)
CHLORIDE SERPL-SCNC: 89 MMOL/L (ref 97–108)
CO2 SERPL-SCNC: 34 MMOL/L (ref 21–32)
CREAT SERPL-MCNC: 2.28 MG/DL (ref 0.7–1.3)
CRYPTOC AG SER QL IA: NEGATIVE
GLUCOSE BLD STRIP.AUTO-MCNC: 199 MG/DL (ref 65–100)
GLUCOSE BLD STRIP.AUTO-MCNC: 271 MG/DL (ref 65–100)
GLUCOSE BLD STRIP.AUTO-MCNC: 452 MG/DL (ref 65–100)
GLUCOSE BLD STRIP.AUTO-MCNC: 461 MG/DL (ref 65–100)
GLUCOSE BLD STRIP.AUTO-MCNC: 496 MG/DL (ref 65–100)
GLUCOSE SERPL-MCNC: 242 MG/DL (ref 65–100)
POTASSIUM SERPL-SCNC: 3.5 MMOL/L (ref 3.5–5.1)
SERVICE CMNT-IMP: ABNORMAL
SODIUM SERPL-SCNC: 133 MMOL/L (ref 136–145)

## 2019-08-09 PROCEDURE — 74011250636 HC RX REV CODE- 250/636: Performed by: FAMILY MEDICINE

## 2019-08-09 PROCEDURE — 74011250636 HC RX REV CODE- 250/636: Performed by: INTERNAL MEDICINE

## 2019-08-09 PROCEDURE — 82962 GLUCOSE BLOOD TEST: CPT

## 2019-08-09 PROCEDURE — 74011250637 HC RX REV CODE- 250/637: Performed by: INTERNAL MEDICINE

## 2019-08-09 PROCEDURE — 80048 BASIC METABOLIC PNL TOTAL CA: CPT

## 2019-08-09 PROCEDURE — 74011636637 HC RX REV CODE- 636/637: Performed by: INTERNAL MEDICINE

## 2019-08-09 PROCEDURE — 83880 ASSAY OF NATRIURETIC PEPTIDE: CPT

## 2019-08-09 PROCEDURE — 74011000258 HC RX REV CODE- 258: Performed by: INTERNAL MEDICINE

## 2019-08-09 PROCEDURE — 94640 AIRWAY INHALATION TREATMENT: CPT

## 2019-08-09 PROCEDURE — 87327 CRYPTOCOCCUS NEOFORM AG IA: CPT

## 2019-08-09 PROCEDURE — 86632 CHLAMYDIA IGM ANTIBODY: CPT

## 2019-08-09 PROCEDURE — 77010033678 HC OXYGEN DAILY

## 2019-08-09 PROCEDURE — 65660000000 HC RM CCU STEPDOWN

## 2019-08-09 PROCEDURE — 86738 MYCOPLASMA ANTIBODY: CPT

## 2019-08-09 PROCEDURE — 36415 COLL VENOUS BLD VENIPUNCTURE: CPT

## 2019-08-09 PROCEDURE — 94621 CARDIOPULM EXERCISE TESTING: CPT

## 2019-08-09 PROCEDURE — 74011250637 HC RX REV CODE- 250/637: Performed by: FAMILY MEDICINE

## 2019-08-09 PROCEDURE — 74011000250 HC RX REV CODE- 250: Performed by: INTERNAL MEDICINE

## 2019-08-09 PROCEDURE — 74011000250 HC RX REV CODE- 250: Performed by: NURSE PRACTITIONER

## 2019-08-09 PROCEDURE — 74011636637 HC RX REV CODE- 636/637: Performed by: NURSE PRACTITIONER

## 2019-08-09 RX ORDER — INSULIN LISPRO 100 [IU]/ML
10 INJECTION, SOLUTION INTRAVENOUS; SUBCUTANEOUS ONCE
Status: COMPLETED | OUTPATIENT
Start: 2019-08-09 | End: 2019-08-09

## 2019-08-09 RX ORDER — INSULIN GLARGINE 100 [IU]/ML
30 INJECTION, SOLUTION SUBCUTANEOUS
Status: DISCONTINUED | OUTPATIENT
Start: 2019-08-09 | End: 2019-08-11

## 2019-08-09 RX ORDER — HYDRALAZINE HYDROCHLORIDE 50 MG/1
50 TABLET, FILM COATED ORAL 3 TIMES DAILY
Status: DISCONTINUED | OUTPATIENT
Start: 2019-08-09 | End: 2019-08-15

## 2019-08-09 RX ORDER — HYDRALAZINE HYDROCHLORIDE 25 MG/1
25 TABLET, FILM COATED ORAL 3 TIMES DAILY
Status: DISCONTINUED | OUTPATIENT
Start: 2019-08-09 | End: 2019-08-09

## 2019-08-09 RX ORDER — BUMETANIDE 1 MG/1
2 TABLET ORAL 3 TIMES DAILY
Status: DISCONTINUED | OUTPATIENT
Start: 2019-08-09 | End: 2019-08-12

## 2019-08-09 RX ORDER — PREDNISONE 20 MG/1
40 TABLET ORAL
Status: DISCONTINUED | OUTPATIENT
Start: 2019-08-10 | End: 2019-08-13

## 2019-08-09 RX ORDER — INSULIN LISPRO 100 [IU]/ML
12 INJECTION, SOLUTION INTRAVENOUS; SUBCUTANEOUS ONCE
Status: COMPLETED | OUTPATIENT
Start: 2019-08-09 | End: 2019-08-09

## 2019-08-09 RX ORDER — LEVOFLOXACIN 5 MG/ML
750 INJECTION, SOLUTION INTRAVENOUS
Status: DISCONTINUED | OUTPATIENT
Start: 2019-08-09 | End: 2019-08-11

## 2019-08-09 RX ADMIN — INSULIN GLARGINE 30 UNITS: 100 INJECTION, SOLUTION SUBCUTANEOUS at 23:02

## 2019-08-09 RX ADMIN — Medication 10 ML: at 23:04

## 2019-08-09 RX ADMIN — AZITHROMYCIN MONOHYDRATE 500 MG: 500 INJECTION, POWDER, LYOPHILIZED, FOR SOLUTION INTRAVENOUS at 09:08

## 2019-08-09 RX ADMIN — BUMETANIDE 2 MG: 0.25 INJECTION INTRAMUSCULAR; INTRAVENOUS at 09:10

## 2019-08-09 RX ADMIN — SODIUM BICARBONATE 1300 MG: 650 TABLET ORAL at 09:10

## 2019-08-09 RX ADMIN — IPRATROPIUM BROMIDE AND ALBUTEROL SULFATE 3 ML: .5; 3 SOLUTION RESPIRATORY (INHALATION) at 11:19

## 2019-08-09 RX ADMIN — IPRATROPIUM BROMIDE AND ALBUTEROL SULFATE 3 ML: .5; 3 SOLUTION RESPIRATORY (INHALATION) at 16:22

## 2019-08-09 RX ADMIN — METHYLPREDNISOLONE SODIUM SUCCINATE 40 MG: 40 INJECTION, POWDER, FOR SOLUTION INTRAMUSCULAR; INTRAVENOUS at 09:10

## 2019-08-09 RX ADMIN — SODIUM BICARBONATE 1300 MG: 650 TABLET ORAL at 23:01

## 2019-08-09 RX ADMIN — Medication 5 ML: at 14:00

## 2019-08-09 RX ADMIN — HYDRALAZINE HYDROCHLORIDE 50 MG: 50 TABLET, FILM COATED ORAL at 23:02

## 2019-08-09 RX ADMIN — Medication 10 ML: at 06:41

## 2019-08-09 RX ADMIN — CARVEDILOL 12.5 MG: 12.5 TABLET, FILM COATED ORAL at 17:02

## 2019-08-09 RX ADMIN — CARVEDILOL 12.5 MG: 12.5 TABLET, FILM COATED ORAL at 09:13

## 2019-08-09 RX ADMIN — BUMETANIDE 2 MG: 1 TABLET ORAL at 23:02

## 2019-08-09 RX ADMIN — SIMVASTATIN 20 MG: 20 TABLET, FILM COATED ORAL at 23:02

## 2019-08-09 RX ADMIN — BUMETANIDE 2 MG: 1 TABLET ORAL at 17:02

## 2019-08-09 RX ADMIN — IPRATROPIUM BROMIDE AND ALBUTEROL SULFATE 3 ML: .5; 3 SOLUTION RESPIRATORY (INHALATION) at 19:27

## 2019-08-09 RX ADMIN — INSULIN LISPRO 5 UNITS: 100 INJECTION, SOLUTION INTRAVENOUS; SUBCUTANEOUS at 06:41

## 2019-08-09 RX ADMIN — SODIUM BICARBONATE 1300 MG: 650 TABLET ORAL at 17:02

## 2019-08-09 RX ADMIN — HYDRALAZINE HYDROCHLORIDE 50 MG: 50 TABLET, FILM COATED ORAL at 17:02

## 2019-08-09 RX ADMIN — HEPARIN SODIUM 5000 UNITS: 5000 INJECTION INTRAVENOUS; SUBCUTANEOUS at 23:04

## 2019-08-09 RX ADMIN — CEFTRIAXONE 1 G: 1 INJECTION, POWDER, FOR SOLUTION INTRAMUSCULAR; INTRAVENOUS at 12:17

## 2019-08-09 RX ADMIN — INSULIN LISPRO 12 UNITS: 100 INJECTION, SOLUTION INTRAVENOUS; SUBCUTANEOUS at 17:02

## 2019-08-09 RX ADMIN — ASPIRIN 81 MG CHEWABLE TABLET 81 MG: 81 TABLET CHEWABLE at 09:11

## 2019-08-09 RX ADMIN — INSULIN LISPRO 10 UNITS: 100 INJECTION, SOLUTION INTRAVENOUS; SUBCUTANEOUS at 12:40

## 2019-08-09 RX ADMIN — IPRATROPIUM BROMIDE AND ALBUTEROL SULFATE 3 ML: .5; 3 SOLUTION RESPIRATORY (INHALATION) at 08:29

## 2019-08-09 RX ADMIN — Medication 10 ML: at 09:17

## 2019-08-09 RX ADMIN — TAMSULOSIN HYDROCHLORIDE 0.4 MG: 0.4 CAPSULE ORAL at 09:10

## 2019-08-09 RX ADMIN — LEVOFLOXACIN 750 MG: 5 INJECTION, SOLUTION INTRAVENOUS at 17:02

## 2019-08-09 RX ADMIN — HEPARIN SODIUM 5000 UNITS: 5000 INJECTION INTRAVENOUS; SUBCUTANEOUS at 12:18

## 2019-08-09 NOTE — DIABETES MGMT
[facility] Progress Note Recommendations/ Comments: Chart review for hyperglycemia. BG ranging 254-382 mg/dl yesterday.  mg/dl this morning. Note Lantus was increased from 18 to 22 units yesterday evening. Pt remains on steroids - 40 mg Solu Medrol q 12 hours. Pt received 16 units of correction yesterday. If appropriate please consider: D/C Lantus Initiate NPH q 12 hours, linked and times with steroid dose - consider 12 units q 12 hours (pt's last dose of steroids was given at 0900) Dr Sabra Gonzalez regarding above Current hospital DM medication:  
Lantus 22 units at bedtime Lispro normal sensitivity correction scale Chart reviewed on [address]. Roya Ospina Patient is a 81 y.o. male with known DM on Lantus U-300 18 units daily at home A1c:  
Lab Results Component Value Date/Time Hemoglobin A1c 9.1 (H) 05/11/2019 03:37 AM  
 Hemoglobin A1c 9.8 (H) 10/20/2017 08:11 AM  
 Hemoglobin A1c, External 8.4 08/15/2017 Recent Glucose Results:  
Lab Results Component Value Date/Time  (H) 08/09/2019 04:00 AM  
 GLUCPOC 271 (H) 08/09/2019 06:32 AM  
 GLUCPOC 315 (H) 08/08/2019 09:16 PM  
 GLUCPOC 382 (H) 08/08/2019 04:12 PM  
  
 
Lab Results Component Value Date/Time Creatinine 2.28 (H) 08/09/2019 04:00 AM  
 
Estimated Creatinine Clearance: 24.9 mL/min (A) (based on SCr of 2.28 mg/dL (H)). Active Orders Diet DIET DIABETIC WITH OPTIONS Consistent Carb 2000kcal; Regular PO intake:  
Patient Vitals for the past 72 hrs: 
 % Diet Eaten 08/07/19 1300 75 % 08/07/19 0830 75 % Will continue to follow as needed. Thank you Pawel Mosley RD, Diabetes Clinician Time spent: 5 minutes

## 2019-08-09 NOTE — PROGRESS NOTES
Spiritual Care Assessment/Progress Note [address]. [address] 
 
 
NAME: [address] [place] [address] MRN: [id number] AGE: 81 y.o. SEX: male Yazdanism Affiliation: Congregational  
Language: English  
 
8/9/2019     Total Time (in minutes): 15 Spiritual Assessment begun in [address] [place] [address] IMCU 2 through conversation with: 
  
    [x]Patient        [] Family    [] Friend(s) Reason for Consult: Initial/Spiritual assessment, patient floor Spiritual beliefs: (Please include comment if needed) [x] Identifies with a sampson tradition:     
   [x] Supported by a sampson community:        
   [] Claims no spiritual orientation:       
   [] Seeking spiritual identity:            
   [] Adheres to an individual form of spirituality:       
   [] Not able to assess:                   
 
    
Identified resources for coping:  
   [x] Prayer                           
   [x] Music                  [] Guided Imagery [x] Family/friends                 [] Pet visits [] Devotional reading                         [] Unknown 
   [] Other:                                        
 
 
Interventions offered during this visit: (See comments for more details) Patient Interventions: Affirmation of emotions/emotional suffering, Affirmation of sampson, Coping skills reviewed/reinforced, End of life issues discussed, Iconic (affirming the presence of God/Higher Power), Initial/Spiritual assessment, patient floor, Life review/legacy, Normalization of emotional/spiritual concerns, Prayer (actual), Prayer (assurance of), Yazdanism beliefs/image of God discussed Plan of Care: 
 
 [x] Support spiritual and/or cultural needs  
 [] Support AMD and/or advance care planning process    
 [] Support grieving process 
 [] Coordinate Rites and/or Rituals  
 [] Coordination with community clergy [] No spiritual needs identified at this time 
 [] Detailed Plan of Care below (See Comments)  [] Make referral to Music Therapy [] Make referral to Pet Therapy    
[] Make referral to Addiction services 
[] Make referral to [facility] 
[] Make referral to Spiritual Care Partner 
[] No future visits requested       
[x] Follow up visits as needed Comments:   visited pt in room 425-01. No family present at time of visit.  provided compassionate listening as pt shared about his family. Pt stated that he has been  for 63 years \"to the same woman\". 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 Pt loves music. Pt stated that he has a relationship with the [employer] and tries to live a good life. Pt relies of prayer and support from family to cope with difficult situations.  prayed with pt and assured pt of continued prayer and spiritual support.  will follow up as needed. Chaplain Leroy Intern, MDiv 
[phone]

## 2019-08-09 NOTE — PROGRESS NOTES
Day #1 of Levaquin Indication:  PNA Current regimen:  750 mg IV Q 24 hours Recent Labs 19 
0400 19 
0448 19 
6139 19 
3078 WBC  --   --  11.5*  --   
CREA 2.28* 2.40*  --  2.93* BUN 59* 62*  --  71* Est CrCl: 25 ml/min Temp (24hrs), Av.9 °F (36.6 °C), Min:97.5 °F (36.4 °C), Max:98.7 °F (37.1 °C) Plan: Change to 750 mg IV Q 48 hours per [facility] P&T Committee Protocol with respect to renal function. Pharmacy will continue to monitor patient daily and will make dosage adjustments based upon changing renal function.  
 
Mishel Nixon, PharmD, BCPS

## 2019-08-09 NOTE — TELEPHONE ENCOUNTER
Carline Powell LPN care coordinator - Patient is currently in the hospital at [facility]. Patient needs a hospital follow up for Edis Robertson the week of 8/13.      Phone: [phone] Until 4 pm, after 4pm would need to reach out to pt

## 2019-08-09 NOTE — PROGRESS NOTES
[facility] 
 [address] [facility], [address] [facility] Phone: [phone]   [phone]   
  
Nephrology Progress Note Briana Stoll.     1935     [id number] Date of Admission : 8/5/2019 08/09/19 CC:  Follow up for  KATALINA Assessment and Plan KATALINA  on CKD  
- 2/2 pre renal azotemia from diuretics, infection ==> resolved   
- transition to po diuretics today and monitor 
- daily labs for now 
  
COPD flare, RML PNA: 
- Mx per primary team 
- abx per pulm 
  
CKD IV: 
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 
- baseline Cr at best : 2.7- 3 mg/dl - UPCR showed 11 gm of Protein  
- he wants to do PD if he becomes ESRD Metablolic Acidosis: 
- d/c bicarb 
  
Dyspnea : 
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  
- RHC w/ PCW 28 
  
HTN: 
- continue current meds 
  
Bradycardia : 
- s/p PPM on 5/9 
  
Anemia of CKD: 
- hgb stable - weekly MICHAEL while in hospital  
  
Afib: 
- per cards 
  
CAD s/p CABG 
  
DM2: 
- on insulin 
  
Care Plan discussed with: pt, pulm and hospitalist  
 
Interval History: 
Seen and examined. Cr stable, feeling better. On NC O2. Still w/ sputum production. No cp, n/v/d reported. Review of Systems: Pertinent items are noted in HPI. Current Medications:  
Current Facility-Administered Medications Medication Dose Route Frequency  [START ON 8/10/2019] predniSONE (DELTASONE) tablet 40 mg  40 mg Oral DAILY WITH BREAKFAST  hydrALAZINE (APRESOLINE) tablet 25 mg  25 mg Oral TID  bumetanide (BUMEX) injection 2 mg  2 mg IntraVENous TID  insulin glargine (LANTUS) injection 22 Units  22 Units SubCUTAneous QHS  dextrose 10 % infusion 125-250 mL  125-250 mL IntraVENous PRN  
 azithromycin (ZITHROMAX) 500 mg in 0.9% sodium chloride (MBP/ADV) 250 mL  500 mg IntraVENous Q24H  
 albuterol-ipratropium (DUO-NEB) 2.5 MG-0.5 MG/3 ML  3 mL Nebulization QID RT  
 insulin lispro (HUMALOG) injection   SubCUTAneous AC&HS  
  glucose chewable tablet 16 g  4 Tab Oral PRN  
 glucagon (GLUCAGEN) injection 1 mg  1 mg IntraMUSCular PRN  
 cefTRIAXone (ROCEPHIN) 1 g in 0.9% sodium chloride (MBP/ADV) 50 mL  1 g IntraVENous Q24H  
 balsam peru-castor oil (VENELEX) ointment   Topical TID  sodium chloride (NS) flush 5-40 mL  5-40 mL IntraVENous Q8H  
 sodium chloride (NS) flush 5-40 mL  5-40 mL IntraVENous PRN  
 sodium chloride (NS) flush 5-40 mL  5-40 mL IntraVENous Q8H  
 sodium chloride (NS) flush 5-40 mL  5-40 mL IntraVENous PRN  
 acetaminophen (TYLENOL) tablet 650 mg  650 mg Oral Q4H PRN  
 HYDROcodone-acetaminophen (NORCO) 5-325 mg per tablet 1 Tab  1 Tab Oral Q4H PRN  
 morphine injection 1 mg  1 mg IntraVENous Q4H PRN  
 heparin (porcine) injection 5,000 Units  5,000 Units SubCUTAneous Q12H  aspirin chewable tablet 81 mg  81 mg Oral DAILY  carvedilol (COREG) tablet 12.5 mg  12.5 mg Oral BID WITH MEALS  simvastatin (ZOCOR) tablet 20 mg  20 mg Oral QHS  sodium bicarbonate tablet 1,300 mg  1,300 mg Oral TID  tamsulosin (FLOMAX) capsule 0.4 mg  0.4 mg Oral DAILY  epoetin dm-epbx (RETACRIT) injection 10,000 Units  10,000 Units SubCUTAneous Q7D Allergies Allergen Reactions  Lisinopril Cough Objective: 
Vitals:   
Vitals:  
 08/09/19 4990 08/09/19 0809 08/09/19 0825 08/09/19 1120 BP:  161/76 Pulse:  70 Resp:  21 Temp:  97.9 °F (36.6 °C) SpO2:  99% 96% 90% Weight: 70.5 kg (155 lb 6.8 oz) Height:      
 
Intake and Output: 
08/09 0701 - 08/09 1900 In: 106 [P.O.:536; I.V.:400] Out: -  
08/07 1901 - 08/09 0700 In: 360 [P.O.:360] Out: 3259 [[id number]] Physical Examination: 
 
General: NAD,Conversant Neck:  Supple, no mass Resp:  Diffuse rhonchi + CV:  RRR,  no murmur or rub,no LE edema GI:  Soft, NT, + Bowel sounds, no hepatosplenomegaly Neurologic:  Non focal 
Psych:             AAO x 3 appropriate affect []    High complexity decision making was performed 
[]    Patient is at high-risk of decompensation with multiple organ involvement Lab Data Personally Reviewed: I have reviewed all the pertinent labs, microbiology data and radiology studies during assessment. Recent Labs 08/09/19 
0400 08/08/19 
0448 08/07/19 
0238 08/06/19 
1246 * 132* 136 134* K 3.5 3.7 3.7 5.1 CL 89* 92* 95* 93* CO2 34* 31 31 31 * 239* 156* 136* BUN 59* 62* 71* 71* CREA 2.28* 2.40* 2.93* 3.35* CA 8.6 8.6 8.8 9.2 INR  --   --   --  1.1 Recent Labs 08/07/19 
0956 08/06/19 
1246 WBC 11.5* 11.3* HGB 8.3* 8.7* HCT 25.5* 26.5*  
 208 No results found for: [facility] Lab Results Component Value Date/Time Culture result: NO GROWTH 4 DAYS 08/05/2019 10:46 AM  
 Culture result: CANDIDA ALBICANS (A) 03/16/2017 06:00 AM  
 Culture result: NO GROWTH 5 DAYS 03/16/2017 05:40 AM  
 
Recent Results (from the past 24 hour(s)) GLUCOSE, POC Collection Time: 08/08/19 12:20 PM  
Result Value Ref Range Glucose (POC) 347 (H) 65 - 100 mg/dL Performed by Arch Force GLUCOSE, POC Collection Time: 08/08/19  4:12 PM  
Result Value Ref Range Glucose (POC) 382 (H) 65 - 100 mg/dL Performed by Arch Force GLUCOSE, POC Collection Time: 08/08/19  9:16 PM  
Result Value Ref Range Glucose (POC) 315 (H) 65 - 100 mg/dL Performed by Arpan Tidwell METABOLIC PANEL, BASIC Collection Time: 08/09/19  4:00 AM  
Result Value Ref Range Sodium 133 (L) 136 - 145 mmol/L Potassium 3.5 3.5 - 5.1 mmol/L Chloride 89 (L) 97 - 108 mmol/L  
 CO2 34 (H) 21 - 32 mmol/L Anion gap 10 5 - 15 mmol/L Glucose 242 (H) 65 - 100 mg/dL BUN 59 (H) 6 - 20 MG/DL Creatinine 2.28 (H) 0.70 - 1.30 MG/DL  
 BUN/Creatinine ratio 26 (H) 12 - 20 GFR est AA 33 (L) >60 ml/min/1.73m2 GFR est non-AA 28 (L) >60 ml/min/1.73m2  Calcium 8.6 8.5 - 10.1 MG/DL  
GLUCOSE, POC  
 Collection Time: 08/09/19  6:32 AM  
Result Value Ref Range Glucose (POC) 271 (H) 65 - 100 mg/dL Performed by Patricia Cope Total time spent with patient:  xxx   min. Care Plan discussed with: 
Patient Family RN Consulting Physician [address] [facility],      
 
I have reviewed the flowsheets. Chart and Pertinent Notes have been reviewed. No change in PMH ,family and social history from Consult note.  
 
 
Addie Grossman MD

## 2019-08-09 NOTE — PROGRESS NOTES
08/09/19 1416 RT Walking Oximetry Stage During Walk [employer]) SpO2 98 % HR 80 bpm  
Rate of Dyspnea 2 Symptoms Shortness of Breath O2 Device Nasal cannula O2 Flow Rate (L/min)  
(3-5) Walk/Assistive Device (walked without device) Distance Walked in Feet (ft) 740 ft  
Comments (pt requires oxygen during walk 3-4 LPM)

## 2019-08-09 NOTE — PROGRESS NOTES
Pulse oximetry assessment 91% at rest on room air (if 88% or less, skip next steps) 87% while ambulating on room air 96% at rest on 3LPM 
84-94% while ambulating on 3LPM   
 
 
 
Problem: Heart Failure: Day 4 Goal: Nutrition/Diet Outcome: Progressing Towards Goal 
Note:  
Pt on Fluid restriction of 1500mL and verbalizes understanding of fluid restriction related to heart failure diagnosis. Adequate urine output. Will continue to monitor pt. Problem: Pressure Injury - Risk of 
Goal: *Prevention of pressure injury Description Document Fabio Scale and appropriate interventions in the flowsheet. Outcome: Progressing Towards Goal 
Note:  
Pressure Injury Interventions: 
  
 
Moisture Interventions: Absorbent underpads, Check for incontinence Q2 hours and as needed, Limit adult briefs, Minimize layers, Offer toileting Q_hr Activity Interventions: Increase time out of bed, Pressure redistribution bed/mattress(bed type), PT/OT evaluation Mobility Interventions: HOB 30 degrees or less, Pressure redistribution bed/mattress (bed type), PT/OT evaluation, Turn and reposition approx. every two hours(pillow and wedges) Nutrition Interventions: Document food/fluid/supplement intake, Discuss nutritional consult with provider, Offer support with meals,snacks and hydration Friction and Shear Interventions: Apply protective barrier, creams and emollients, HOB 30 degrees or less, Lift sheet, Lift team/patient mobility team, Minimize layers Problem: Falls - Risk of 
Goal: *Absence of Falls Description Document Arvin Hernández Fall Risk and appropriate interventions in the flowsheet.  
Note:  
Fall Risk Interventions: 
Mobility Interventions: Assess mobility with egress test, Communicate number of staff needed for ambulation/transfer, OT consult for ADLs, Patient to call before getting OOB, PT Consult for mobility concerns, PT Consult for assist device competence, Strengthening exercises (ROM-active/passive), Utilize walker, cane, or other assistive device, Utilize gait belt for transfers/ambulation Medication Interventions: Assess postural VS orthostatic hypotension, Evaluate medications/consider consulting pharmacy, Patient to call before getting OOB, Teach patient to arise slowly, Utilize gait belt for transfers/ambulation Elimination Interventions: Call light in reach, Elevated toilet seat, Stay With Me (per policy), Toilet paper/wipes in reach, Toileting schedule/hourly rounds, Patient to call for help with toileting needs, Urinal in reach Bedside shift change report given to [address] [place] [address] (oncoming nurse) by [address] [place] [address] (offgoing nurse). Report included the following information SBAR, Kardex, ED Summary, Intake/Output, MAR, Recent Results, Med Rec Status, Cardiac Rhythm Afib/paced, Alarm Parameters  and Quality Measures.

## 2019-08-09 NOTE — PROGRESS NOTES
Cardiology Progress Note Admit Date: 8/5/2019 Admit Diagnosis: CHF exacerbation ([facility] [I50.9] Acute hypoxemic respiratory failure ([facility] [J96.01] Date: 8/9/2019     Time: 9:37 AM 
 
Subjective: 
Feeling better. Still with productive cough. Assessment and Plan 1. Acute hypoxemic respiratory failure. Improving. Likely related to lung infiltrates. Less likely from pulmonary edema. Despite dramatic net negative output, pulmonary findings has not resolved fast enough to be consistent with cardiogenic pulmonary edema. 2. PNA - CT suggestive of patchy ground glassing with focal infilterates? Interstitial pneumonia vs CHF(clinically unlikely). May continue antibiotics. 3.. Diastolic heart failure - Chronic    
            - NYHA class II 
            - EF - 65-70% on echo. Normal systolic and diastolic function. [address] - Bumex restarted. 4. COPD 
            - stable, Primary team resumed steroids and home meds 5. CAD: Trop 0.42 - mixed demand with fixed CAD. Second trop 0.45. No CP reported - Continue home meds 6. CKD 
            - stage IV 
            - Dr. Lito Cabrera to see 7. DM:      - SSI 7. HLD 
            - Home statin resumed 8. PHTN 
 - mild to moderate on echo. PAS 50 
9. Essential HTN: Not very high. Partly stress induced BP increase. Mgm per nephrology. 10: long term persistent AF: Rates reasonable. Not on AC due to prior history of GI bleed. Potentially a Watchman candidate. 
  
 
Past Medical History:  
Diagnosis Date  CAD (coronary artery disease) s/p CABG 2008  Carotid arterial disease ([facility]  CKD (chronic kidney disease) stage 3, GFR 30-59 ml/min ([facility]) 10/21/2017  
 hypertension and DM nephrosclerosis  Diabetes mellitus, type 2 ([facility]  Hypercholesteremia  Hypertension  Paroxysmal atrial fibrillation ([facility] 10/21/2017 new onset afib with BRPR 10-19-17 admit  PVC's (premature ventricular contractions) 2014  PVD (peripheral vascular disease) ([facility] Social History Tobacco Use  Smoking status: Former Smoker Packs/day: 3.00 Years: 20.00 Pack years: 60.00 Types: Cigarettes Last attempt to quit: 1985 Years since quittin.5  Smokeless tobacco: Never Used Substance Use Topics  Alcohol use: No  
 Drug use: No  
  
  
ROS: 
 
 GENERAL Recent weight loss - no Fever -----------------   no 
 Chills -----------------   no 
 
 EYES, VISION Visual Changes - no 
 
 EARS, NOSE, THROAT Hearing loss ----------- no 
 Swallowing difficulties - no CARDIOVASCULAR Chest pain/pressure ---- no 
 Arrhythmia/palpitations - no RESPIRATORY Cough ------------------ yes Shortness of breath - yes Wheezing -------------- no   GASTROINTESTINAL Abdominal pain - no Heartburn -------- no 
 Bloody stool ----- no 
 
 GENITOURINARY Frequent urination - no Urgency -------------- no 
 
 MUSCULOSKELETAL Joint pain/swelling ---- no 
 Musculoskeletal pain - no 
 
 SKIN & INTEGUMENTARY Rashes - no Sores --- no 
 
 
   NEUROLOGICAL Numbness/tingling - no Sensation loss ------ no 
 
 PSYCHIATRIC Nervousness/anxiety - no Depression -------------- no 
 
 ENDOCRINE Heat/cold intolerance - no Excessive thirst -------- no 
 
 HEMATOLOGIC/LYMPHATIC Abnormal bleeding - no ALL/IMMUN Allergic reaction ------ no 
 Recurrent infections - no Objective: 
 
 Physical Exam: 
             
Visit Vitals /76 (BP 1 Location: Left arm, BP Patient Position: At rest) Pulse 70 Temp 97.9 °F (36.6 °C) Resp 21 Ht 5' 9\" (1.753 m) Wt 155 lb 6.8 oz (70.5 kg) SpO2 96% BMI 22.95 kg/m² General Appearance:   Well developed, well nourished,alert and oriented x 3, and 
 individual in no acute distress.   
Ears/Nose/Mouth/Throat:    Hearing grossly normal. 
  
 Neck:  Supple. Chest:    Lungs coarse crackles, rales to auscultation bilaterally. Cardiovascular:   irregular rate and rhythm, variable normal,MSM. Abdomen:    Soft, non-tender, bowel sounds are active. Extremities:  No edema bilaterally. Skin:  Warm and dry. Telemetry: normal sinus rhythm Data Review:  
 Labs:   
Recent Results (from the past 24 hour(s)) GLUCOSE, POC Collection Time: 08/08/19 12:20 PM  
Result Value Ref Range Glucose (POC) 347 (H) 65 - 100 mg/dL Performed by Dorothea Carballo GLUCOSE, POC Collection Time: 08/08/19  4:12 PM  
Result Value Ref Range Glucose (POC) 382 (H) 65 - 100 mg/dL Performed by Dorothea Carballo GLUCOSE, POC Collection Time: 08/08/19  9:16 PM  
Result Value Ref Range Glucose (POC) 315 (H) 65 - 100 mg/dL Performed by Gustavo Castanon METABOLIC PANEL, BASIC Collection Time: 08/09/19  4:00 AM  
Result Value Ref Range Sodium 133 (L) 136 - 145 mmol/L Potassium 3.5 3.5 - 5.1 mmol/L Chloride 89 (L) 97 - 108 mmol/L  
 CO2 34 (H) 21 - 32 mmol/L Anion gap 10 5 - 15 mmol/L Glucose 242 (H) 65 - 100 mg/dL BUN 59 (H) 6 - 20 MG/DL Creatinine 2.28 (H) 0.70 - 1.30 MG/DL  
 BUN/Creatinine ratio 26 (H) 12 - 20 GFR est AA 33 (L) >60 ml/min/1.73m2 GFR est non-AA 28 (L) >60 ml/min/1.73m2 Calcium 8.6 8.5 - 10.1 MG/DL  
GLUCOSE, POC Collection Time: 08/09/19  6:32 AM  
Result Value Ref Range Glucose (POC) 271 (H) 65 - 100 mg/dL Performed by Gustavo Castanon Radiology:  
 
  
Current Facility-Administered Medications Medication Dose Route Frequency  bumetanide (BUMEX) injection 2 mg  2 mg IntraVENous TID  insulin glargine (LANTUS) injection 22 Units  22 Units SubCUTAneous QHS  dextrose 10 % infusion 125-250 mL  125-250 mL IntraVENous PRN  
 methylPREDNISolone (PF) (SOLU-MEDROL) injection 40 mg  40 mg IntraVENous Q12H  azithromycin (ZITHROMAX) 500 mg in 0.9% sodium chloride (MBP/ADV) 250 mL  500 mg IntraVENous Q24H  
 albuterol-ipratropium (DUO-NEB) 2.5 MG-0.5 MG/3 ML  3 mL Nebulization QID RT  
 insulin lispro (HUMALOG) injection   SubCUTAneous AC&HS  
 glucose chewable tablet 16 g  4 Tab Oral PRN  
 glucagon (GLUCAGEN) injection 1 mg  1 mg IntraMUSCular PRN  
 cefTRIAXone (ROCEPHIN) 1 g in 0.9% sodium chloride (MBP/ADV) 50 mL  1 g IntraVENous Q24H  
 balsam peru-castor oil (VENELEX) ointment   Topical TID  sodium chloride (NS) flush 5-40 mL  5-40 mL IntraVENous Q8H  
 sodium chloride (NS) flush 5-40 mL  5-40 mL IntraVENous PRN  
 sodium chloride (NS) flush 5-40 mL  5-40 mL IntraVENous Q8H  
 sodium chloride (NS) flush 5-40 mL  5-40 mL IntraVENous PRN  
 acetaminophen (TYLENOL) tablet 650 mg  650 mg Oral Q4H PRN  
 HYDROcodone-acetaminophen (NORCO) 5-325 mg per tablet 1 Tab  1 Tab Oral Q4H PRN  
 morphine injection 1 mg  1 mg IntraVENous Q4H PRN  
 heparin (porcine) injection 5,000 Units  5,000 Units SubCUTAneous Q12H  aspirin chewable tablet 81 mg  81 mg Oral DAILY  carvedilol (COREG) tablet 12.5 mg  12.5 mg Oral BID WITH MEALS  simvastatin (ZOCOR) tablet 20 mg  20 mg Oral QHS  sodium bicarbonate tablet 1,300 mg  1,300 mg Oral TID  tamsulosin (FLOMAX) capsule 0.4 mg  0.4 mg Oral DAILY  epoetin dm-epbx (RETACRIT) injection 10,000 Units  10,000 Units SubCUTAneous Q7D Vince Mays MD 
 
 [facility], [address] [place] 
 [phone]

## 2019-08-09 NOTE — NURSE NAVIGATOR
Primary care office will not schedule an appointment unless called by patient. Cardiology appointment pending.

## 2019-08-09 NOTE — PROGRESS NOTES
Bedside and Verbal shift change report given to Millie Speedy Mansfield (oncoming nurse) by Ilya Forte (offgoing nurse). Report included the following information SBAR, Kardex, Intake/Output, MAR, Accordion, Recent Results and Cardiac Rhythm AFIB/Paced. Problem: Falls - Risk of 
Goal: *Absence of Falls Description Document Chyna Russo Fall Risk and appropriate interventions in the flowsheet. Outcome: Progressing Towards Goal 
Note:  
Fall Risk Interventions: 
Mobility Interventions: Communicate number of staff needed for ambulation/transfer, Patient to call before getting OOB, PT Consult for mobility concerns, Strengthening exercises (ROM-active/passive), Utilize walker, cane, or other assistive device Medication Interventions: Bed/chair exit alarm, Evaluate medications/consider consulting pharmacy, Patient to call before getting OOB, Teach patient to arise slowly Elimination Interventions: Call light in reach, Patient to call for help with toileting needs, Stay With Me (per policy), Toilet paper/wipes in reach, Toileting schedule/hourly rounds, Urinal in reach Problem: Patient Education: Go to Patient Education Activity Goal: Patient/Family Education Outcome: Progressing Towards Goal 
  
Problem: Patient Education: Go to Patient Education Activity Goal: Patient/Family Education Outcome: Progressing Towards Goal 
  
Problem: Heart Failure: Day 5 Goal: Psychosocial 
Outcome: Progressing Towards Goal 
  
Problem: Pressure Injury - Risk of 
Goal: *Prevention of pressure injury Description Document Fabio Scale and appropriate interventions in the flowsheet. Outcome: Progressing Towards Goal 
Note:  
Pressure Injury Interventions: 
  
 
Moisture Interventions: Absorbent underpads, Check for incontinence Q2 hours and as needed, Limit adult briefs, Minimize layers, Offer toileting Q_hr Activity Interventions: Increase time out of bed, Pressure redistribution bed/mattress(bed type), PT/OT evaluation Mobility Interventions: HOB 30 degrees or less, Pressure redistribution bed/mattress (bed type), PT/OT evaluation, Turn and reposition approx. every two hours(pillow and wedges) Nutrition Interventions: Document food/fluid/supplement intake, Discuss nutritional consult with provider, Offer support with meals,snacks and hydration Friction and Shear Interventions: Apply protective barrier, creams and emollients, HOB 30 degrees or less, Lift sheet, Lift team/patient mobility team, Minimize layers Problem: Activity Intolerance Goal: *Able to remain out of bed as prescribed Outcome: Progressing Towards Goal

## 2019-08-09 NOTE — PROGRESS NOTES
Hospitalist Progress Note Kelli Sher MD 
Answering service: [phone] OR [phone] from in house phone Cell:   
  
Date of Service:  2019 NAME:  Meryle Laud Sr. 
:  1935 MRN:  [id number] Admission Summary:  
The patient is an 25-year-old male with past medical history of diabetes, hypertension, coronary artery disease, hypercholesterolemia, peripheral vascular disease, AFib, and CKD, who presented to the emergency room via private vehicle with chief complaint of shortness of breath. His shortness of breath has been persistent for more than one week and subsequently has worsened since yesterday. There is associated bilateral leg swelling since yesterday. The patient has been taking four albuterol nebulizer treatments with no relief. The patient also reports cough with occasional greenish sputum. He recently had pacemaker placed by Dr. Arlette Sheikh and had followed up with Cardiology yesterday who increased his diuretic dose. The patient has been seen in the emergency room for shortness of breath three times this week. Interval history / Subjective:  
   
 
Patient rounded on this am, Feels better breathing wise Is in good spirits Assessment & Plan:  
 
 acute hypoxemic resp failure-   
PNA and  due to volume overload- got  IV bumex given in ED and then His bumex was held He went into almost flash pulm edema on  and his bumex was retarted CT chest   showed diffuse alveolar pattern suggestive of pulm edema , resp panel pcr is neg and legionella neg  and increase prednisone to iv , continue nebs Change to po prednisone in am  
Given his improvement with restarting his diuretics , his resp distress can be attributed to the pulm edema an possible atypical PNA on admission Started on bumex iv tid on , change to oral on  Keep him negative Mycoplasma , CRAg.  Pending  
 
  
 severe pulm HTN 
 RHC w/ PCW 28 ECHO 50/60% with PAP 50  
pulm consult appreciated CAP poa Xray reviewed Started on ceftriaxone in addition to azithromycin Improving Legionella Ag is pending Change to 7 days of Levaquin  
  
 suspected COPD from past tobacco use-  
resume steroids and home meds 
increased dose on 8/7 , decreased on 8/10 On nebs  
  
. KATALINA on CKD stage 4- 
 baseline creatinine 3.1-3.3; was 3.8 on admission- 
 likely due to diureitcs and hypotension Improving Metabolic acidosis On po bicarb  
  
. PVD-  
s/p stented in both LE, cont aspirin 
  
 DM type 2- Uncontrolled Blood sugars running in 400s on 8/9 Increase  lantus w accuchecks and ss insulin Estella Yousuf HLD-  
resume statin meds 
  
. BPH 
- resume flomax Anemia of CKD  
 
CAD s/p CABG Bradycardia with afib w pacemaker, HTN  
BP high on 8/9 Restart home hydralazine Code status: full DVT prophylaxis: heaprin Care Plan discussed with: Patient/Family Disposition: TBD Hospital Problems  Date Reviewed: 7/28/2019 Codes Class Noted POA  
 CHF exacerbation ([facility] ICD-10-CM: I50.9 ICD-9-CM: 428.0  8/5/2019 Unknown Acute hypoxemic respiratory failure ([facility] ICD-10-CM: J96.01 
ICD-9-CM: 518.81  8/5/2019 Unknown Review of Systems: A comprehensive review of systems was negative except for that written in the HPI. Vital Signs:  
 Last 24hrs VS reviewed since prior progress note. Most recent are: 
Visit Vitals /65 (BP 1 Location: Left arm, BP Patient Position: Sitting) Pulse 71 Temp 97.5 °F (36.4 °C) Resp 18 Ht 5' 9\" (1.753 m) Wt 70.5 kg (155 lb 6.8 oz) SpO2 99% BMI 22.95 kg/m² Intake/Output Summary (Last 24 hours) at 8/9/2019 1537 Last data filed at 8/9/2019 1412 Gross per 24 hour Intake 1416 ml Output 2475 ml Net -1059 ml Physical Examination:  
 
 
     
Constitutional:  on oxygen but comfortable , coughing ENT:  Oral mucous moist, oropharynx benign. Neck supple, Resp:  clear b/l breath sounds , occasional ronchi CV:  Regular rhythm, normal rate, no murmurs, gallops, rubs GI:  Soft, non distended, non tender. normoactive bowel sounds, no hepatosplenomegaly Musculoskeletal:  No edema, warm, 2+ pulses throughout Neurologic:  Moves all extremities. AAOx3, CN II-XII reviewed Data Review:  
 Review and/or order of clinical lab test 
 
 
Labs:  
 
Recent Labs 08/07/19 
4375 WBC 11.5* HGB 8.3* HCT 25.5*  
 Recent Labs 08/09/19 
0400 08/08/19 
0448 08/07/19 
6168 * 132* 136  
K 3.5 3.7 3.7 CL 89* 92* 95* CO2 34* 31 31 BUN 59* 62* 71* CREA 2.28* 2.40* 2.93* * 239* 156* CA 8.6 8.6 8.8 No results for input(s): SGOT, GPT, ALT, AP, TBIL, TBILI, TP, ALB, GLOB, GGT, AML, LPSE in the last 72 hours. No lab exists for component: AMYP, HLPSE No results for input(s): INR, PTP, APTT in the last 72 hours. No lab exists for component: INREXT, INREXT No results for input(s): FE, TIBC, PSAT, FERR in the last 72 hours. No results found for: FOL, RBCF No results for input(s): PH, PCO2, PO2 in the last 72 hours. No results for input(s): CPK, CKNDX, TROIQ in the last 72 hours. No lab exists for component: CPKMB No results found for: CHOL, CHOLX, CHLST, CHOLV, HDL, LDL, LDLC, DLDLP, TGLX, TRIGL, TRIGP, CHHD, CHHDX Lab Results Component Value Date/Time Glucose (POC) 452 (H) 08/09/2019 12:05 PM  
 Glucose (POC) 461 (H) 08/09/2019 12:02 PM  
 Glucose (POC) 271 (H) 08/09/2019 06:32 AM  
 Glucose (POC) 315 (H) 08/08/2019 09:16 PM  
 Glucose (POC) 382 (H) 08/08/2019 04:12 PM  
 
Lab Results Component Value Date/Time  Color YELLOW/STRAW 06/25/2017 11:40 AM  
 Appearance CLEAR 06/25/2017 11:40 AM  
 Specific gravity 1.018 06/25/2017 11:40 AM  
 pH (UA) 5.0 06/25/2017 11:40 AM  
 Protein 300 (A) 06/25/2017 11:40 AM  
 Glucose NEGATIVE  06/25/2017 11:40 AM  
 Ketone NEGATIVE  06/25/2017 11:40 AM  
 Bilirubin NEGATIVE  06/25/2017 11:40 AM  
 Urobilinogen 0.2 06/25/2017 11:40 AM  
 Nitrites NEGATIVE  06/25/2017 11:40 AM  
 Leukocyte Esterase NEGATIVE  06/25/2017 11:40 AM  
 Epithelial cells FEW 06/25/2017 11:40 AM  
 Bacteria NEGATIVE  06/25/2017 11:40 AM  
 WBC 0-4 06/25/2017 11:40 AM  
 RBC 0-5 06/25/2017 11:40 AM  
 
 
 
Medications Reviewed:  
 
Current Facility-Administered Medications Medication Dose Route Frequency  [START ON 8/10/2019] predniSONE (DELTASONE) tablet 40 mg  40 mg Oral DAILY WITH BREAKFAST  bumetanide (BUMEX) tablet 2 mg  2 mg Oral TID  hydrALAZINE (APRESOLINE) tablet 50 mg  50 mg Oral TID  insulin glargine (LANTUS) injection 30 Units  30 Units SubCUTAneous QHS  dextrose 10 % infusion 125-250 mL  125-250 mL IntraVENous PRN  
 azithromycin (ZITHROMAX) 500 mg in 0.9% sodium chloride (MBP/ADV) 250 mL  500 mg IntraVENous Q24H  
 albuterol-ipratropium (DUO-NEB) 2.5 MG-0.5 MG/3 ML  3 mL Nebulization QID RT  
 insulin lispro (HUMALOG) injection   SubCUTAneous AC&HS  
 glucose chewable tablet 16 g  4 Tab Oral PRN  
 glucagon (GLUCAGEN) injection 1 mg  1 mg IntraMUSCular PRN  
 cefTRIAXone (ROCEPHIN) 1 g in 0.9% sodium chloride (MBP/ADV) 50 mL  1 g IntraVENous Q24H  
 balsam peru-castor oil (VENELEX) ointment   Topical TID  sodium chloride (NS) flush 5-40 mL  5-40 mL IntraVENous Q8H  
 sodium chloride (NS) flush 5-40 mL  5-40 mL IntraVENous PRN  
 sodium chloride (NS) flush 5-40 mL  5-40 mL IntraVENous Q8H  
 sodium chloride (NS) flush 5-40 mL  5-40 mL IntraVENous PRN  
 acetaminophen (TYLENOL) tablet 650 mg  650 mg Oral Q4H PRN  
 HYDROcodone-acetaminophen (NORCO) 5-325 mg per tablet 1 Tab  1 Tab Oral Q4H PRN  
 morphine injection 1 mg  1 mg IntraVENous Q4H PRN  
 heparin (porcine) injection 5,000 Units  5,000 Units SubCUTAneous Q12H  aspirin chewable tablet 81 mg  81 mg Oral DAILY  carvedilol (COREG) tablet 12.5 mg  12.5 mg Oral BID WITH MEALS  simvastatin (ZOCOR) tablet 20 mg  20 mg Oral QHS  sodium bicarbonate tablet 1,300 mg  1,300 mg Oral TID  tamsulosin (FLOMAX) capsule 0.4 mg  0.4 mg Oral DAILY  epoetin dm-epbx (RETACRIT) injection 10,000 Units  10,000 Units SubCUTAneous Q7D  
 
______________________________________________________________________ EXPECTED LENGTH OF STAY: 3d 19h ACTUAL LENGTH OF STAY:          4 Patti Archer MD

## 2019-08-10 LAB
BACTERIA SPEC CULT: NORMAL
GLUCOSE BLD STRIP.AUTO-MCNC: 125 MG/DL (ref 65–100)
GLUCOSE BLD STRIP.AUTO-MCNC: 202 MG/DL (ref 65–100)
GLUCOSE BLD STRIP.AUTO-MCNC: 262 MG/DL (ref 65–100)
GLUCOSE BLD STRIP.AUTO-MCNC: 326 MG/DL (ref 65–100)
SERVICE CMNT-IMP: ABNORMAL
SERVICE CMNT-IMP: NORMAL

## 2019-08-10 PROCEDURE — 74011250637 HC RX REV CODE- 250/637: Performed by: INTERNAL MEDICINE

## 2019-08-10 PROCEDURE — 74011250637 HC RX REV CODE- 250/637: Performed by: FAMILY MEDICINE

## 2019-08-10 PROCEDURE — 82962 GLUCOSE BLOOD TEST: CPT

## 2019-08-10 PROCEDURE — 65660000000 HC RM CCU STEPDOWN

## 2019-08-10 PROCEDURE — 94640 AIRWAY INHALATION TREATMENT: CPT

## 2019-08-10 PROCEDURE — 74011250636 HC RX REV CODE- 250/636: Performed by: FAMILY MEDICINE

## 2019-08-10 PROCEDURE — 74011636637 HC RX REV CODE- 636/637: Performed by: NURSE PRACTITIONER

## 2019-08-10 PROCEDURE — 94760 N-INVAS EAR/PLS OXIMETRY 1: CPT

## 2019-08-10 PROCEDURE — 74011636637 HC RX REV CODE- 636/637: Performed by: INTERNAL MEDICINE

## 2019-08-10 PROCEDURE — 74011000250 HC RX REV CODE- 250: Performed by: NURSE PRACTITIONER

## 2019-08-10 PROCEDURE — 77010033678 HC OXYGEN DAILY

## 2019-08-10 RX ORDER — AMLODIPINE BESYLATE 5 MG/1
2.5 TABLET ORAL DAILY
Status: DISCONTINUED | OUTPATIENT
Start: 2019-08-10 | End: 2019-08-15

## 2019-08-10 RX ORDER — AMLODIPINE BESYLATE 5 MG/1
5 TABLET ORAL DAILY
Status: DISCONTINUED | OUTPATIENT
Start: 2019-08-10 | End: 2019-08-10

## 2019-08-10 RX ADMIN — IPRATROPIUM BROMIDE AND ALBUTEROL SULFATE 3 ML: .5; 3 SOLUTION RESPIRATORY (INHALATION) at 20:59

## 2019-08-10 RX ADMIN — SODIUM BICARBONATE 1300 MG: 650 TABLET ORAL at 21:54

## 2019-08-10 RX ADMIN — HYDRALAZINE HYDROCHLORIDE 50 MG: 50 TABLET, FILM COATED ORAL at 16:30

## 2019-08-10 RX ADMIN — BUMETANIDE 2 MG: 1 TABLET ORAL at 21:54

## 2019-08-10 RX ADMIN — ACETAMINOPHEN 650 MG: 325 TABLET ORAL at 22:06

## 2019-08-10 RX ADMIN — SODIUM BICARBONATE 1300 MG: 650 TABLET ORAL at 09:11

## 2019-08-10 RX ADMIN — PREDNISONE 40 MG: 20 TABLET ORAL at 09:11

## 2019-08-10 RX ADMIN — ASPIRIN 81 MG CHEWABLE TABLET 81 MG: 81 TABLET CHEWABLE at 09:10

## 2019-08-10 RX ADMIN — IPRATROPIUM BROMIDE AND ALBUTEROL SULFATE 3 ML: .5; 3 SOLUTION RESPIRATORY (INHALATION) at 12:15

## 2019-08-10 RX ADMIN — CARVEDILOL 12.5 MG: 12.5 TABLET, FILM COATED ORAL at 09:11

## 2019-08-10 RX ADMIN — Medication: at 22:03

## 2019-08-10 RX ADMIN — Medication: at 16:30

## 2019-08-10 RX ADMIN — SIMVASTATIN 20 MG: 20 TABLET, FILM COATED ORAL at 21:54

## 2019-08-10 RX ADMIN — HYDRALAZINE HYDROCHLORIDE 50 MG: 50 TABLET, FILM COATED ORAL at 09:10

## 2019-08-10 RX ADMIN — INSULIN GLARGINE 30 UNITS: 100 INJECTION, SOLUTION SUBCUTANEOUS at 21:54

## 2019-08-10 RX ADMIN — HYDRALAZINE HYDROCHLORIDE 50 MG: 50 TABLET, FILM COATED ORAL at 21:54

## 2019-08-10 RX ADMIN — Medication 10 ML: at 06:00

## 2019-08-10 RX ADMIN — SODIUM BICARBONATE 1300 MG: 650 TABLET ORAL at 17:50

## 2019-08-10 RX ADMIN — INSULIN LISPRO 5 UNITS: 100 INJECTION, SOLUTION INTRAVENOUS; SUBCUTANEOUS at 17:44

## 2019-08-10 RX ADMIN — BUMETANIDE 2 MG: 1 TABLET ORAL at 09:11

## 2019-08-10 RX ADMIN — TAMSULOSIN HYDROCHLORIDE 0.4 MG: 0.4 CAPSULE ORAL at 09:11

## 2019-08-10 RX ADMIN — INSULIN LISPRO 3 UNITS: 100 INJECTION, SOLUTION INTRAVENOUS; SUBCUTANEOUS at 11:18

## 2019-08-10 RX ADMIN — IPRATROPIUM BROMIDE AND ALBUTEROL SULFATE 3 ML: .5; 3 SOLUTION RESPIRATORY (INHALATION) at 08:18

## 2019-08-10 RX ADMIN — BUMETANIDE 2 MG: 1 TABLET ORAL at 16:29

## 2019-08-10 RX ADMIN — HEPARIN SODIUM 5000 UNITS: 5000 INJECTION INTRAVENOUS; SUBCUTANEOUS at 10:28

## 2019-08-10 RX ADMIN — CARVEDILOL 12.5 MG: 12.5 TABLET, FILM COATED ORAL at 17:44

## 2019-08-10 RX ADMIN — HEPARIN SODIUM 5000 UNITS: 5000 INJECTION INTRAVENOUS; SUBCUTANEOUS at 21:53

## 2019-08-10 RX ADMIN — INSULIN LISPRO 4 UNITS: 100 INJECTION, SOLUTION INTRAVENOUS; SUBCUTANEOUS at 21:54

## 2019-08-10 RX ADMIN — AMLODIPINE BESYLATE 2.5 MG: 5 TABLET ORAL at 10:28

## 2019-08-10 NOTE — PROGRESS NOTES
Cardiology Progress Note Admit Date: 8/5/2019 Admit Diagnosis: [facility] exacerbation ([facility] [I50.9] Acute hypoxemic respiratory failure ([facility] [J96.01] Date: 8/10/2019     Time: 9:37 AM 
 
Subjective: 
Feeling better. Still with productive cough. Congested but feeling better. No edema. No chest pain, had a lot of coughing last night. Add back low dose amloidpine fo rBP Assessment and Plan 1. Acute hypoxemic respiratory failure. Improving. 2. PNA - CT suggestive of patchy ground glassing with focal infilterates? Interstitial pneumonia vs CHF(clinically unlikely). May continue antibiotics. 3.. Diastolic heart failure - Chronic    
            - NYHA class II 
            - EF - 65-70% on echo. Normal systolic and diastolic function. [address] - Bumex restarted. 4. COPD 
            - stable, Primary team resumed steroids and home meds 5. CAD: Trop 0.42 - mixed demand with fixed CAD. Second trop 0.45. No CP reported - Continue home meds 6. CKD 
            - stage IV 
            - Dr. Uli Rojas to see 7. DM:      - SSI 7. HLD 
            - Home statin resumed 8. PHTN 
 - mild to moderate on echo. PAS 50 
9. Essential HTN: Not very high. Partly stress induced BP increase. Add amlodipine. 10: long term persistent AF: Rates reasonable. Not on AC due to prior history of GI bleed. Potentially a Watchman candidate. 
  
 
Past Medical History:  
Diagnosis Date  CAD (coronary artery disease) s/p CABG 2008  Carotid arterial disease ([facility]  CKD (chronic kidney disease) stage 3, GFR 30-59 ml/min ([facility]) 10/21/2017  
 hypertension and DM nephrosclerosis  Diabetes mellitus, type 2 ([facility]  Hypercholesteremia  Hypertension  Paroxysmal atrial fibrillation ([facility] 10/21/2017  
 new onset afib with BRPR 10-19-17 admit  PVC's (premature ventricular contractions) 5/26/2014  PVD [facility]peripheral vascular disease) [facility] Social History Tobacco Use  Smoking status: Former Smoker Packs/day: 3.00 Years: 20.00 Pack years: 60.00 Types: Cigarettes Last attempt to quit: 1985 Years since quittin.5  Smokeless tobacco: Never Used Substance Use Topics  Alcohol use: No  
 Drug use: No  
  
  
ROS: 
 
 GENERAL Recent weight loss - no Fever -----------------   no 
 Chills -----------------   no 
 
 EYES, VISION Visual Changes - no 
 
 EARS, NOSE, THROAT Hearing loss ----------- no 
 Swallowing difficulties - no CARDIOVASCULAR Chest pain/pressure ---- no 
 Arrhythmia/palpitations - no RESPIRATORY Cough ------------------ yes Shortness of breath - yes Wheezing -------------- no   GASTROINTESTINAL Abdominal pain - no Heartburn -------- no 
 Bloody stool ----- no 
 
 GENITOURINARY Frequent urination - no Urgency -------------- no 
 
 MUSCULOSKELETAL Joint pain/swelling ---- no 
 Musculoskeletal pain - no 
 
 SKIN & INTEGUMENTARY Rashes - no Sores --- no 
 
 
   NEUROLOGICAL Numbness/tingling - no Sensation loss ------ no 
 
 PSYCHIATRIC Nervousness/anxiety - no Depression -------------- no 
 
 ENDOCRINE Heat/cold intolerance - no Excessive thirst -------- no 
 
 HEMATOLOGIC/LYMPHATIC Abnormal bleeding - no ALL/IMMUN Allergic reaction ------ no 
 Recurrent infections - no Objective: 
 
 Physical Exam: 
             
Visit Vitals /56 Pulse 77 Temp 98.2 °F (36.8 °C) Resp 14 Ht 5' 9\" (1.753 m) Wt 156 lb 15.5 oz (71.2 kg) SpO2 100% BMI 23.18 kg/m² General Appearance:   Well developed, well nourished,alert and oriented x 3, and 
 individual in no acute distress. Ears/Nose/Mouth/Throat:    Hearing grossly normal. 
  
    Neck:  Supple. Chest:    Lungs coarse crackles, rales to auscultation bilaterally. Cardiovascular:   irregular rate and rhythm, variable normal,MSM. Abdomen:    Soft, non-tender, bowel sounds are active. Extremities:  No edema bilaterally. Skin:  Warm and dry. Telemetry: normal sinus rhythm Data Review:  
 Labs:   
Recent Results (from the past 24 hour(s)) GLUCOSE, POC Collection Time: 08/09/19 12:02 PM  
Result Value Ref Range Glucose (POC) 461 (H) 65 - 100 mg/dL Performed by [employer] GLUCOSE, POC Collection Time: 08/09/19 12:05 PM  
Result Value Ref Range Glucose (POC) 452 (H) 65 - 100 mg/dL Performed by [employer] CRYPTOCOCCUS AG W/REFLEX TITER Collection Time: 08/09/19 12:25 PM  
Result Value Ref Range Cryptococcus Ag NEGATIVE  NEG    
GLUCOSE, POC Collection Time: 08/09/19  4:50 PM  
Result Value Ref Range Glucose (POC) 496 (H) 65 - 100 mg/dL Performed by Teja Gilmore, POC Collection Time: 08/09/19  9:24 PM  
Result Value Ref Range Glucose (POC) 199 (H) 65 - 100 mg/dL Performed by Stew Falk GLUCOSE, POC Collection Time: 08/10/19  6:43 AM  
Result Value Ref Range Glucose (POC) 125 (H) 65 - 100 mg/dL Performed by Marlin Rodriguez Radiology:  
 
  
Current Facility-Administered Medications Medication Dose Route Frequency  predniSONE (DELTASONE) tablet 40 mg  40 mg Oral DAILY WITH BREAKFAST  bumetanide (BUMEX) tablet 2 mg  2 mg Oral TID  hydrALAZINE (APRESOLINE) tablet 50 mg  50 mg Oral TID  insulin glargine (LANTUS) injection 30 Units  30 Units SubCUTAneous QHS  levoFLOXacin (LEVAQUIN) 750 mg in D5W IVPB  750 mg IntraVENous Q48H  
 dextrose 10 % infusion 125-250 mL  125-250 mL IntraVENous PRN  
 albuterol-ipratropium (DUO-NEB) 2.5 MG-0.5 MG/3 ML  3 mL Nebulization QID RT  
 insulin lispro (HUMALOG) injection   SubCUTAneous AC&HS  
 glucose chewable tablet 16 g  4 Tab Oral PRN  
 glucagon (GLUCAGEN) injection 1 mg  1 mg IntraMUSCular PRN  
  balsam peru-castor oil (VENELEX) ointment   Topical TID  sodium chloride (NS) flush 5-40 mL  5-40 mL IntraVENous Q8H  
 sodium chloride (NS) flush 5-40 mL  5-40 mL IntraVENous PRN  
 sodium chloride (NS) flush 5-40 mL  5-40 mL IntraVENous Q8H  
 sodium chloride (NS) flush 5-40 mL  5-40 mL IntraVENous PRN  
 acetaminophen (TYLENOL) tablet 650 mg  650 mg Oral Q4H PRN  
 HYDROcodone-acetaminophen (NORCO) 5-325 mg per tablet 1 Tab  1 Tab Oral Q4H PRN  
 morphine injection 1 mg  1 mg IntraVENous Q4H PRN  
 heparin (porcine) injection 5,000 Units  5,000 Units SubCUTAneous Q12H  aspirin chewable tablet 81 mg  81 mg Oral DAILY  carvedilol (COREG) tablet 12.5 mg  12.5 mg Oral BID WITH MEALS  simvastatin (ZOCOR) tablet 20 mg  20 mg Oral QHS  sodium bicarbonate tablet 1,300 mg  1,300 mg Oral TID  tamsulosin (FLOMAX) capsule 0.4 mg  0.4 mg Oral DAILY  epoetin md-epbx (RETACRIT) injection 10,000 Units  10,000 Units SubCUTAneous Q7D Jason Son MD 
 
 [facility] [address] [place] [address], [address] [facility] 
 [phone]

## 2019-08-10 NOTE — PROGRESS NOTES
[facility] 
 [address] Phone: [phone]   [phone]   
  
Nephrology Progress Note Kristen Lopez.     1935     [id number] Date of Admission : 8/5/2019 
08/10/19 CC:  Follow up for  KATALINA Assessment and Plan KATALINA  on CKD  
- 2/2 pre renal azotemia from diuretics, infection ==> resolved   
- cont po diuretics 
- labs in AM 
  
COPD flare, RML PNA: 
- Mx per primary team 
- abx per pulm 
  
CKD IV: 
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 
- baseline Cr at best : 2.7- 3 mg/dl - UPCR showed 11 gm of Protein  
- he wants to do PD if he becomes ESRD Metablolic Acidosis: 
- d/c bicarb 
  
Dyspnea : 
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  
- RHC w/ PCW 28 
  
HTN: 
- continue current meds 
  
Bradycardia : 
- s/p PPM on 5/9 
  
Anemia of CKD: 
- hgb stable - weekly MICHAEL while in hospital  
  
Afib: 
- per cards 
  
CAD s/p CABG 
  
DM2: 
- on insulin 
  
Care Plan discussed with: pt, pulm and hospitalist  
 
Interval History: 
Seen and examined. Feeling ok. No labs today. Still w/ sputum production. No cp, n/v/d reported. Review of Systems: Pertinent items are noted in HPI. Current Medications:  
Current Facility-Administered Medications Medication Dose Route Frequency  amLODIPine (NORVASC) tablet 2.5 mg  2.5 mg Oral DAILY  predniSONE (DELTASONE) tablet 40 mg  40 mg Oral DAILY WITH BREAKFAST  bumetanide (BUMEX) tablet 2 mg  2 mg Oral TID  hydrALAZINE (APRESOLINE) tablet 50 mg  50 mg Oral TID  insulin glargine (LANTUS) injection 30 Units  30 Units SubCUTAneous QHS  levoFLOXacin (LEVAQUIN) 750 mg in D5W IVPB  750 mg IntraVENous Q48H  
 dextrose 10 % infusion 125-250 mL  125-250 mL IntraVENous PRN  
 albuterol-ipratropium (DUO-NEB) 2.5 MG-0.5 MG/3 ML  3 mL Nebulization QID RT  
 insulin lispro (HUMALOG) injection   SubCUTAneous AC&HS  
 glucose chewable tablet 16 g  4 Tab Oral PRN  
  glucagon (GLUCAGEN) injection 1 mg  1 mg IntraMUSCular PRN  
 balsam peru-castor oil (VENELEX) ointment   Topical TID  sodium chloride (NS) flush 5-40 mL  5-40 mL IntraVENous Q8H  
 sodium chloride (NS) flush 5-40 mL  5-40 mL IntraVENous PRN  
 sodium chloride (NS) flush 5-40 mL  5-40 mL IntraVENous Q8H  
 sodium chloride (NS) flush 5-40 mL  5-40 mL IntraVENous PRN  
 acetaminophen (TYLENOL) tablet 650 mg  650 mg Oral Q4H PRN  
 HYDROcodone-acetaminophen (NORCO) 5-325 mg per tablet 1 Tab  1 Tab Oral Q4H PRN  
 morphine injection 1 mg  1 mg IntraVENous Q4H PRN  
 heparin (porcine) injection 5,000 Units  5,000 Units SubCUTAneous Q12H  aspirin chewable tablet 81 mg  81 mg Oral DAILY  carvedilol (COREG) tablet 12.5 mg  12.5 mg Oral BID WITH MEALS  simvastatin (ZOCOR) tablet 20 mg  20 mg Oral QHS  sodium bicarbonate tablet 1,300 mg  1,300 mg Oral TID  tamsulosin (FLOMAX) capsule 0.4 mg  0.4 mg Oral DAILY  epoetin dm-epbx (RETACRIT) injection 10,000 Units  10,000 Units SubCUTAneous Q7D Allergies Allergen Reactions  Lisinopril Cough Objective: 
Vitals:   
Vitals:  
 08/10/19 1418 08/10/19 1622 08/10/19 2511 08/10/19 4424 BP: 167/65 175/60  153/56 Pulse: 78 73  77 Resp: 18 14 Temp: 98.1 °F (36.7 °C) 98.2 °F (36.8 °C) SpO2: 100% 100% 100% Weight: 71.2 kg (156 lb 15.5 oz) Height:      
 
Intake and Output: 
08/10 0701 - 08/10 1900 In: 150 [I.V.:150] Out: 1400 [TOGVC:1083] 08/08 1901 - 08/10 0700 In: 2006 [P.O.:1456; I.V.:550] Out: 5988 [Urine:2675] Physical Examination: 
 
General: NAD,Conversant Neck:  Supple, no mass Resp:  Diffuse rhonchi + CV:  RRR,  no murmur or rub,no LE edema GI:  Soft, NT, + Bowel sounds, no hepatosplenomegaly Neurologic:  Non focal 
Psych:             AAO x 3 appropriate affect  
 
 
[]    High complexity decision making was performed 
[]    Patient is at high-risk of decompensation with multiple organ involvement Lab Data Personally Reviewed: I have reviewed all the pertinent labs, microbiology data and radiology studies during assessment. Recent Labs 08/09/19 
0400 08/08/19 
0448 * 132* K 3.5 3.7 CL 89* 92* CO2 34* 31  
* 239* BUN 59* 62* CREA 2.28* 2.40* CA 8.6 8.6 No results for input(s): WBC, HGB, HCT, PLT, HGBEXT, HCTEXT, PLTEXT, HGBEXT, HCTEXT, PLTEXT in the last 72 hours. No results found for: [facility] Lab Results Component Value Date/Time Culture result: NO GROWTH 5 DAYS 08/05/2019 10:46 AM  
 Culture result: CANDIDA ALBICANS (A) 03/16/2017 06:00 AM  
 Culture result: NO GROWTH 5 DAYS 03/16/2017 05:40 AM  
 
Recent Results (from the past 24 hour(s)) GLUCOSE, POC Collection Time: 08/09/19 12:02 PM  
Result Value Ref Range Glucose (POC) 461 (H) 65 - 100 mg/dL Performed by Dimas Reyes GLUCOSE, POC Collection Time: 08/09/19 12:05 PM  
Result Value Ref Range Glucose (POC) 452 (H) 65 - 100 mg/dL Performed by Dimas Reyes CRYPTOCOCCUS AG W/REFLEX TITER Collection Time: 08/09/19 12:25 PM  
Result Value Ref Range Cryptococcus Ag NEGATIVE  NEG    
GLUCOSE, POC Collection Time: 08/09/19  4:50 PM  
Result Value Ref Range Glucose (POC) 496 (H) 65 - 100 mg/dL Performed by Veronica Fermin, POC Collection Time: 08/09/19  9:24 PM  
Result Value Ref Range Glucose (POC) 199 (H) 65 - 100 mg/dL Performed by Tirso Rey GLUCOSE, POC Collection Time: 08/10/19  6:43 AM  
Result Value Ref Range Glucose (POC) 125 (H) 65 - 100 mg/dL Performed by Leslie Marlow GLUCOSE, POC Collection Time: 08/10/19 10:56 AM  
Result Value Ref Range Glucose (POC) 202 (H) 65 - 100 mg/dL Performed by Charleen Terrell (CON) Total time spent with patient:  xxx   min. Care Plan discussed with: 
Patient Family RN Consulting Physician Lesli Robledo     
 
 I have reviewed the flowsheets. Chart and Pertinent Notes have been reviewed. No change in PMH ,family and social history from Consult note.  
 
 
Larry Holman MD

## 2019-08-10 NOTE — PROGRESS NOTES
Problem: Falls - Risk of 
Goal: *Absence of Falls Description Document Fabiola Mackenzie Fall Risk and appropriate interventions in the flowsheet. Outcome: Progressing Towards Goal 
Note:  
Fall Risk Interventions: 
Mobility Interventions: Assess mobility with egress test, Communicate number of staff needed for ambulation/transfer, OT consult for ADLs, Patient to call before getting OOB, PT Consult for mobility concerns, PT Consult for assist device competence, Strengthening exercises (ROM-active/passive), Utilize walker, cane, or other assistive device, Utilize gait belt for transfers/ambulation Medication Interventions: Assess postural VS orthostatic hypotension, Patient to call before getting OOB, Utilize gait belt for transfers/ambulation, Evaluate medications/consider consulting pharmacy, Teach patient to arise slowly Elimination Interventions: Call light in reach, Toileting schedule/hourly rounds, Urinal in reach, Toilet paper/wipes in reach, Patient to call for help with toileting needs, Stay With Me (per policy), Elevated toilet seat Problem: Pressure Injury - Risk of 
Goal: *Prevention of pressure injury Description Document Fabio Scale and appropriate interventions in the flowsheet. Outcome: Progressing Towards Goal 
Note:  
Pressure Injury Interventions: 
  
 
Moisture Interventions: Absorbent underpads, Apply protective barrier, creams and emollients, Assess need for specialty bed, Limit adult briefs, Maintain skin hydration (lotion/cream), Minimize layers, Moisture barrier, Offer toileting Q_hr Activity Interventions: Assess need for specialty bed, Increase time out of bed, Pressure redistribution bed/mattress(bed type), PT/OT evaluation Mobility Interventions: Assess need for specialty bed, HOB 30 degrees or less, Float heels, Pressure redistribution bed/mattress (bed type), PT/OT evaluation, Turn and reposition approx. every two hours(pillow and wedges) Nutrition Interventions: Document food/fluid/supplement intake, Discuss nutritional consult with provider, Offer support with meals,snacks and hydration Friction and Shear Interventions: Apply protective barrier, creams and emollients, Transferring/repositioning devices, Transfer aides:transfer board/Tamika lift/ceiling lift, Sit at 90-degree angle, Minimize layers, Lift team/patient mobility team, Lift sheet, HOB 30 degrees or less TRANSFER - OUT REPORT: 
 
Verbal report given to Ilia RN(name) on [address]  being transferred to [facility] [place] [address]) for routine progression of care Report consisted of patients Situation, Background, Assessment and  
Recommendations(SBAR). Information from the following report(s) SBAR, Kardex, ED Summary, Intake/Output, MAR, Recent Results, Med Rec Status, Cardiac Rhythm Afib/paced, Alarm Parameters  and Quality Measures was reviewed with the receiving nurse. Lines:  
Peripheral IV 08/05/19 Left Antecubital (Active) Site Assessment Clean, dry, & intact 8/10/2019  8:34 AM  
Phlebitis Assessment 0 8/10/2019  8:34 AM  
Infiltration Assessment 0 8/10/2019  8:34 AM  
Dressing Status Clean, dry, & intact 8/10/2019  8:34 AM  
Dressing Type Tape;Transparent 8/10/2019  8:34 AM  
Hub Color/Line Status Pink;Capped 8/10/2019  8:34 AM  
Action Taken Open ports on tubing capped 8/10/2019  8:34 AM  
Alcohol Cap Used Yes 8/10/2019  8:34 AM  
   
Peripheral IV 08/09/19 Left Hand (Active) Site Assessment Clean, dry, & intact 8/10/2019  8:34 AM  
Phlebitis Assessment 0 8/10/2019  8:34 AM  
Infiltration Assessment 0 8/10/2019  8:34 AM  
Dressing Status Clean, dry, & intact 8/10/2019  8:34 AM  
Dressing Type Tape;Transparent 8/10/2019  8:34 AM  
Hub Color/Line Status Pink;Capped 8/10/2019  8:34 AM  
Action Taken Open ports on tubing capped 8/10/2019  8:34 AM  
Alcohol Cap Used Yes 8/10/2019  8:34 AM  
  
 
Opportunity for questions and clarification was provided. Patient transported with: 
 O2 @ 3 liters Tech, patient belongings, pt transferred via wheelchair.

## 2019-08-10 NOTE — PROGRESS NOTES
Problem: Falls - Risk of 
Goal: *Absence of Falls Description Document Oumar Dotson Fall Risk and appropriate interventions in the flowsheet. Outcome: Progressing Towards Goal 
Note:  
Fall Risk Interventions: 
Mobility Interventions: Assess mobility with egress test, Communicate number of staff needed for ambulation/transfer, OT consult for ADLs, Patient to call before getting OOB, PT Consult for mobility concerns, PT Consult for assist device competence, Strengthening exercises (ROM-active/passive), Utilize walker, cane, or other assistive device, Utilize gait belt for transfers/ambulation Medication Interventions: Assess postural VS orthostatic hypotension, Patient to call before getting OOB, Utilize gait belt for transfers/ambulation, Evaluate medications/consider consulting pharmacy, Teach patient to arise slowly Elimination Interventions: Call light in reach, Toileting schedule/hourly rounds, Urinal in reach, Toilet paper/wipes in reach, Patient to call for help with toileting needs, Stay With Me (per policy), Elevated toilet seat Pt remains free of falls during admission. Call bell and frequently used items within reach. Bedside table within reach. Patient provided non skid socks and instructed to call out for nurse when in need of assistance. Problem: Heart Failure: Day 1 Goal: *Oxygen saturation within defined limits Outcome: Progressing Towards Goal 
  
Last 3 Recorded Weights in this Encounter 08/08/19 6099 08/09/19 0578 08/10/19 9321 Weight: 71.2 kg (156 lb 15.5 oz) 70.5 kg (155 lb 6.8 oz) 71.2 kg (156 lb 15.5 oz) Bedside and Verbal shift change report given to Alea Cooper (oncoming nurse) by Charly Núñez (offgoing nurse). Report included the following information SBAR, Kardex and Quality Measures.

## 2019-08-10 NOTE — PROGRESS NOTES
Primary Nurse Blaise Covarrubias RN and Juarez Covington RN performed a dual skin assessment on this patient Impairment noted- see wound doc flow sheet Fabio score is 19

## 2019-08-10 NOTE — PROGRESS NOTES
Hospitalist Progress Note Danielle Aquino MD 
Answering service: [phone] OR [phone] from in house phone Cell:   
  
Date of Service:  8/10/2019 NAME:  Alli Pandey Sr. 
:  1935 MRN:  [id number] Admission Summary:  
The patient is an 69-year-old male with past medical history of diabetes, hypertension, coronary artery disease, hypercholesterolemia, peripheral vascular disease, AFib, and CKD, who presented to the emergency room via private vehicle with chief complaint of shortness of breath. His shortness of breath has been persistent for more than one week and subsequently has worsened since yesterday. There is associated bilateral leg swelling since yesterday. The patient has been taking four albuterol nebulizer treatments with no relief. The patient also reports cough with occasional greenish sputum. He recently had pacemaker placed by Dr. Bianca Choi and had followed up with Cardiology yesterday who increased his diuretic dose. The patient has been seen in the emergency room for shortness of breath three times this week. Interval history / Subjective:  
   
 
Patient rounded on this am, He feels better His breathing is ok Still negative Assessment & Plan:  
 
 acute hypoxemic resp failure-   
improving PNA and  due to volume overload- got  IV bumex given in ED and then His bumex was held He went into almost flash pulm edema on  and his bumex was retarted CT chest   showed diffuse alveolar pattern suggestive of pulm edema , resp panel pcr is neg and legionella neg  and increase prednisone to iv , continue nebs Change to po prednisone /10 Given his improvement with restarting his diuretics , his resp distress can be attributed to the pulm edema an possible atypical PNA on admission Started on bumex iv tid on , change to oral on  Keep him negative Mycoplasma , CRAg.  Pending  
 
  
 severe pulm HTN 
RHC w/ PCW 28 ECHO 50/60% with PAP 50  
pulm consult appreciated CAP poa Xray reviewed Started on ceftriaxone in addition to azithromycin Improving Legionella Ag is negative Change to 7 days of Levaquin on discharge  
  
 suspected COPD from past tobacco use-  
resume steroids and home meds 
increased dose on 8/7 , decreased on 8/10 On nebs  
  
. KATALINA on CKD stage 4- 
 baseline creatinine 3.1-3.3; was 3.8 on admission- 
 likely due to diureitcs and hypotension Improving Metabolic acidosis On po bicarb  
  
. PVD-  
s/p stented in both LE, cont aspirin 
  
 DM type 2- Uncontrolled Blood sugars running in 400s on 8/9 Increase  lantus w accuchecks and ss insulin Cydne Jazmín HLD-  
resume statin meds 
  
. BPH 
- resume flomax Anemia of CKD  
 
CAD s/p CABG Bradycardia with afib w pacemaker, HTN  
BP high on 8/9 Restart home hydralazine Code status: full DVT prophylaxis: heaprin Care Plan discussed with: Patient/Family Disposition: TBD Hospital Problems  Date Reviewed: 7/28/2019 Codes Class Noted POA  
 [facility] ICD-10-CM: I50.9 ICD-9-CM: 428.0  8/5/2019 Unknown Acute hypoxemic respiratory failure ([facility] ICD-10-CM: J96.01 
ICD-9-CM: 518.81  8/5/2019 Unknown Review of Systems: A comprehensive review of systems was negative except for that written in the HPI. Vital Signs:  
 Last 24hrs VS reviewed since prior progress note. Most recent are: 
Visit Vitals /56 Pulse 77 Temp 98.2 °F (36.8 °C) Resp 14 Ht 5' 9\" (1.753 m) Wt 71.2 kg (156 lb 15.5 oz) SpO2 100% BMI 23.18 kg/m² Intake/Output Summary (Last 24 hours) at 8/10/2019 1137 Last data filed at 8/10/2019 0432 Gross per 24 hour Intake 1100 ml Output 2300 ml Net -1200 ml Physical Examination:  
 
 
     
Constitutional:  on oxygen but comfortable , coughing ENT:  Oral mucous moist, oropharynx benign. Neck supple, Resp:  clear b/l breath sounds , occasional ronchi CV:  Regular rhythm, normal rate, no murmurs, gallops, rubs GI:  Soft, non distended, non tender. normoactive bowel sounds, no hepatosplenomegaly Musculoskeletal:  No edema, warm, 2+ pulses throughout Neurologic:  Moves all extremities. AAOx3, CN II-XII reviewed Data Review:  
 Review and/or order of clinical lab test 
 
 
Labs:  
 
No results for input(s): WBC, HGB, HCT, PLT, HGBEXT, HCTEXT, PLTEXT, HGBEXT, HCTEXT, PLTEXT in the last 72 hours. Recent Labs 08/09/19 
0400 08/08/19 
0448 * 132* K 3.5 3.7 CL 89* 92* CO2 34* 31  
BUN 59* 62* CREA 2.28* 2.40* * 239* CA 8.6 8.6 No results for input(s): SGOT, GPT, ALT, AP, TBIL, TBILI, TP, ALB, GLOB, GGT, AML, LPSE in the last 72 hours. No lab exists for component: AMYP, HLPSE No results for input(s): INR, PTP, APTT in the last 72 hours. No lab exists for component: INREXT, INREXT No results for input(s): FE, TIBC, PSAT, FERR in the last 72 hours. No results found for: FOL, RBCF No results for input(s): PH, PCO2, PO2 in the last 72 hours. No results for input(s): CPK, CKNDX, TROIQ in the last 72 hours. No lab exists for component: CPKMB No results found for: CHOL, CHOLX, CHLST, CHOLV, HDL, LDL, LDLC, DLDLP, TGLX, TRIGL, TRIGP, CHHD, CHHDX Lab Results Component Value Date/Time Glucose (POC) 202 (H) 08/10/2019 10:56 AM  
 Glucose (POC) 125 (H) 08/10/2019 06:43 AM  
 Glucose (POC) 199 (H) 08/09/2019 09:24 PM  
 Glucose (POC) 496 (H) 08/09/2019 04:50 PM  
 Glucose (POC) 452 (H) 08/09/2019 12:05 PM  
 
Lab Results Component Value Date/Time  Color YELLOW/STRAW 06/25/2017 11:40 AM  
 Appearance CLEAR 06/25/2017 11:40 AM  
 Specific gravity 1.018 06/25/2017 11:40 AM  
 pH (UA) 5.0 06/25/2017 11:40 AM  
 Protein 300 (A) 06/25/2017 11:40 AM  
 Glucose NEGATIVE  06/25/2017 11:40 AM  
 Ketone NEGATIVE  06/25/2017 11:40 AM  
 Bilirubin NEGATIVE  06/25/2017 11:40 AM  
 Urobilinogen 0.2 06/25/2017 11:40 AM  
 Nitrites NEGATIVE  06/25/2017 11:40 AM  
 Leukocyte Esterase NEGATIVE  06/25/2017 11:40 AM  
 Epithelial cells FEW 06/25/2017 11:40 AM  
 Bacteria NEGATIVE  06/25/2017 11:40 AM  
 WBC 0-4 06/25/2017 11:40 AM  
 RBC 0-5 06/25/2017 11:40 AM  
 
 
 
Medications Reviewed:  
 
Current Facility-Administered Medications Medication Dose Route Frequency  amLODIPine (NORVASC) tablet 2.5 mg  2.5 mg Oral DAILY  predniSONE (DELTASONE) tablet 40 mg  40 mg Oral DAILY WITH BREAKFAST  bumetanide (BUMEX) tablet 2 mg  2 mg Oral TID  hydrALAZINE (APRESOLINE) tablet 50 mg  50 mg Oral TID  insulin glargine (LANTUS) injection 30 Units  30 Units SubCUTAneous QHS  levoFLOXacin (LEVAQUIN) 750 mg in D5W IVPB  750 mg IntraVENous Q48H  
 dextrose 10 % infusion 125-250 mL  125-250 mL IntraVENous PRN  
 albuterol-ipratropium (DUO-NEB) 2.5 MG-0.5 MG/3 ML  3 mL Nebulization QID RT  
 insulin lispro (HUMALOG) injection   SubCUTAneous AC&HS  
 glucose chewable tablet 16 g  4 Tab Oral PRN  
 glucagon (GLUCAGEN) injection 1 mg  1 mg IntraMUSCular PRN  
 balsam peru-castor oil (VENELEX) ointment   Topical TID  sodium chloride (NS) flush 5-40 mL  5-40 mL IntraVENous Q8H  
 sodium chloride (NS) flush 5-40 mL  5-40 mL IntraVENous PRN  
 sodium chloride (NS) flush 5-40 mL  5-40 mL IntraVENous Q8H  
 sodium chloride (NS) flush 5-40 mL  5-40 mL IntraVENous PRN  
 acetaminophen (TYLENOL) tablet 650 mg  650 mg Oral Q4H PRN  
 HYDROcodone-acetaminophen (NORCO) 5-325 mg per tablet 1 Tab  1 Tab Oral Q4H PRN  
 morphine injection 1 mg  1 mg IntraVENous Q4H PRN  
 heparin (porcine) injection 5,000 Units  5,000 Units SubCUTAneous Q12H  aspirin chewable tablet 81 mg  81 mg Oral DAILY  carvedilol (COREG) tablet 12.5 mg  12.5 mg Oral BID WITH MEALS  simvastatin (ZOCOR) tablet 20 mg  20 mg Oral QHS  sodium bicarbonate tablet 1,300 mg  1,300 mg Oral TID  tamsulosin (FLOMAX) capsule 0.4 mg  0.4 mg Oral DAILY  epoetin dm-epbx (RETACRIT) injection 10,000 Units  10,000 Units SubCUTAneous Q7D  
 
______________________________________________________________________ EXPECTED LENGTH OF STAY: 3d 19h ACTUAL LENGTH OF STAY:          Opal Kayser, MD

## 2019-08-11 LAB
ANION GAP SERPL CALC-SCNC: 6 MMOL/L (ref 5–15)
BNP SERPL-MCNC: ABNORMAL PG/ML
BUN SERPL-MCNC: 67 MG/DL (ref 6–20)
BUN/CREAT SERPL: 28 (ref 12–20)
CALCIUM SERPL-MCNC: 8.4 MG/DL (ref 8.5–10.1)
CHLORIDE SERPL-SCNC: 90 MMOL/L (ref 97–108)
CO2 SERPL-SCNC: 36 MMOL/L (ref 21–32)
CREAT SERPL-MCNC: 2.43 MG/DL (ref 0.7–1.3)
GLUCOSE BLD STRIP.AUTO-MCNC: 215 MG/DL (ref 65–100)
GLUCOSE BLD STRIP.AUTO-MCNC: 250 MG/DL (ref 65–100)
GLUCOSE BLD STRIP.AUTO-MCNC: 285 MG/DL (ref 65–100)
GLUCOSE BLD STRIP.AUTO-MCNC: 363 MG/DL (ref 65–100)
GLUCOSE BLD STRIP.AUTO-MCNC: 62 MG/DL (ref 65–100)
GLUCOSE BLD STRIP.AUTO-MCNC: 88 MG/DL (ref 65–100)
GLUCOSE SERPL-MCNC: 184 MG/DL (ref 65–100)
POTASSIUM SERPL-SCNC: 3.6 MMOL/L (ref 3.5–5.1)
SERVICE CMNT-IMP: ABNORMAL
SERVICE CMNT-IMP: NORMAL
SODIUM SERPL-SCNC: 132 MMOL/L (ref 136–145)

## 2019-08-11 PROCEDURE — 86641 CRYPTOCOCCUS ANTIBODY: CPT

## 2019-08-11 PROCEDURE — 74011000250 HC RX REV CODE- 250: Performed by: NURSE PRACTITIONER

## 2019-08-11 PROCEDURE — 94640 AIRWAY INHALATION TREATMENT: CPT

## 2019-08-11 PROCEDURE — 83880 ASSAY OF NATRIURETIC PEPTIDE: CPT

## 2019-08-11 PROCEDURE — 74011250637 HC RX REV CODE- 250/637: Performed by: INTERNAL MEDICINE

## 2019-08-11 PROCEDURE — 74011250637 HC RX REV CODE- 250/637: Performed by: NURSE PRACTITIONER

## 2019-08-11 PROCEDURE — 74011636637 HC RX REV CODE- 636/637: Performed by: INTERNAL MEDICINE

## 2019-08-11 PROCEDURE — 82962 GLUCOSE BLOOD TEST: CPT

## 2019-08-11 PROCEDURE — 80048 BASIC METABOLIC PNL TOTAL CA: CPT

## 2019-08-11 PROCEDURE — 36415 COLL VENOUS BLD VENIPUNCTURE: CPT

## 2019-08-11 PROCEDURE — 94760 N-INVAS EAR/PLS OXIMETRY 1: CPT

## 2019-08-11 PROCEDURE — 74011250636 HC RX REV CODE- 250/636: Performed by: FAMILY MEDICINE

## 2019-08-11 PROCEDURE — 65660000000 HC RM CCU STEPDOWN

## 2019-08-11 PROCEDURE — 74011636637 HC RX REV CODE- 636/637: Performed by: NURSE PRACTITIONER

## 2019-08-11 PROCEDURE — 74011250637 HC RX REV CODE- 250/637: Performed by: FAMILY MEDICINE

## 2019-08-11 RX ORDER — LEVOFLOXACIN 750 MG/1
750 TABLET ORAL
Status: COMPLETED | OUTPATIENT
Start: 2019-08-11 | End: 2019-08-15

## 2019-08-11 RX ORDER — INSULIN GLARGINE 100 [IU]/ML
20 INJECTION, SOLUTION SUBCUTANEOUS
Status: DISCONTINUED | OUTPATIENT
Start: 2019-08-11 | End: 2019-08-12

## 2019-08-11 RX ORDER — IPRATROPIUM BROMIDE AND ALBUTEROL SULFATE 2.5; .5 MG/3ML; MG/3ML
3 SOLUTION RESPIRATORY (INHALATION)
Status: DISCONTINUED | OUTPATIENT
Start: 2019-08-11 | End: 2019-08-20 | Stop reason: HOSPADM

## 2019-08-11 RX ADMIN — SODIUM BICARBONATE 1300 MG: 650 TABLET ORAL at 09:07

## 2019-08-11 RX ADMIN — HEPARIN SODIUM 5000 UNITS: 5000 INJECTION INTRAVENOUS; SUBCUTANEOUS at 11:45

## 2019-08-11 RX ADMIN — INSULIN LISPRO 4 UNITS: 100 INJECTION, SOLUTION INTRAVENOUS; SUBCUTANEOUS at 21:40

## 2019-08-11 RX ADMIN — Medication: at 09:07

## 2019-08-11 RX ADMIN — SIMVASTATIN 20 MG: 20 TABLET, FILM COATED ORAL at 21:40

## 2019-08-11 RX ADMIN — HYDRALAZINE HYDROCHLORIDE 50 MG: 50 TABLET, FILM COATED ORAL at 21:48

## 2019-08-11 RX ADMIN — Medication: at 21:52

## 2019-08-11 RX ADMIN — CARVEDILOL 12.5 MG: 12.5 TABLET, FILM COATED ORAL at 16:40

## 2019-08-11 RX ADMIN — HEPARIN SODIUM 5000 UNITS: 5000 INJECTION INTRAVENOUS; SUBCUTANEOUS at 21:40

## 2019-08-11 RX ADMIN — Medication 5 ML: at 15:00

## 2019-08-11 RX ADMIN — SODIUM BICARBONATE 1300 MG: 650 TABLET ORAL at 21:40

## 2019-08-11 RX ADMIN — ASPIRIN 81 MG CHEWABLE TABLET 81 MG: 81 TABLET CHEWABLE at 09:07

## 2019-08-11 RX ADMIN — BUMETANIDE 2 MG: 1 TABLET ORAL at 16:40

## 2019-08-11 RX ADMIN — IPRATROPIUM BROMIDE AND ALBUTEROL SULFATE 3 ML: .5; 3 SOLUTION RESPIRATORY (INHALATION) at 08:41

## 2019-08-11 RX ADMIN — INSULIN LISPRO 3 UNITS: 100 INJECTION, SOLUTION INTRAVENOUS; SUBCUTANEOUS at 16:39

## 2019-08-11 RX ADMIN — Medication 5 ML: at 15:01

## 2019-08-11 RX ADMIN — Medication: at 16:43

## 2019-08-11 RX ADMIN — SODIUM BICARBONATE 1300 MG: 650 TABLET ORAL at 16:40

## 2019-08-11 RX ADMIN — PREDNISONE 40 MG: 20 TABLET ORAL at 06:48

## 2019-08-11 RX ADMIN — INSULIN LISPRO 5 UNITS: 100 INJECTION, SOLUTION INTRAVENOUS; SUBCUTANEOUS at 12:03

## 2019-08-11 RX ADMIN — Medication 10 ML: at 06:56

## 2019-08-11 RX ADMIN — HYDRALAZINE HYDROCHLORIDE 50 MG: 50 TABLET, FILM COATED ORAL at 16:40

## 2019-08-11 RX ADMIN — ALUMINUM HYDROXIDE AND MAGNESIUM HYDROXIDE 15 ML: 200; 200 SUSPENSION ORAL at 22:53

## 2019-08-11 RX ADMIN — BUMETANIDE 2 MG: 1 TABLET ORAL at 21:40

## 2019-08-11 RX ADMIN — TAMSULOSIN HYDROCHLORIDE 0.4 MG: 0.4 CAPSULE ORAL at 09:06

## 2019-08-11 RX ADMIN — INSULIN GLARGINE 20 UNITS: 100 INJECTION, SOLUTION SUBCUTANEOUS at 21:40

## 2019-08-11 RX ADMIN — LEVOFLOXACIN 750 MG: 750 TABLET, FILM COATED ORAL at 16:40

## 2019-08-11 NOTE — PROGRESS NOTES
Cardiology Progress Note Admit Date: 8/5/2019 Admit Diagnosis: [facility] [I50.9] Acute hypoxemic respiratory failure ([facility] [J96.01] Date: 8/11/2019     Time: 9:37 AM 
 
Subjective: 
Feeling better. Congested but feeling better. No edema. No chest pain, had a lot of coughing last night. BP briefly low this am, better now. Will fu as op for watchman consideration. For now, cardiac issues are stable, available to see again as needed, thank you. Assessment and Plan 1. Acute hypoxemic respiratory failure. Improving. 2. PNA - CT suggestive of patchy ground glassing with focal infilterates? Interstitial pneumonia vs CHF(clinically unlikely). May continue antibiotics. 3.. Diastolic heart failure - Chronic    
            - NYHA class II 
            - EF - 65-70% on echo. Normal systolic and diastolic function. [address] - Bumex restarted. 4. COPD 
            - stable, Primary team resumed steroids and home meds 5. CAD: Trop 0.42 - mixed demand with fixed CAD. Second trop 0.45. No CP reported - Continue home meds 6. CKD 
            - stage IV 
            - Dr. Gume Hernandes to see 7. DM:      - SSI 7. HLD 
            - Home statin resumed 8. PHTN 
 - mild to moderate on echo. PAS 50 
9. Essential HTN: Not very high. Partly stress induced BP increase. Add amlodipine. 10: long term persistent AF: Rates reasonable. Not on AC due to prior history of GI bleed. Potentially a Watchman candidate. 
  
 
Past Medical History:  
Diagnosis Date  CAD (coronary artery disease) s/p CABG 2008  Carotid arterial disease ([facility]  CKD (chronic kidney disease) stage 3, GFR 30-59 ml/min ([facility]) 10/21/2017  
 hypertension and DM nephrosclerosis  Diabetes mellitus, type 2 ([facility]  Hypercholesteremia  Hypertension  Paroxysmal atrial fibrillation ([facility] 10/21/2017 new onset afib with BRPR 10-19-17 admit  PVC's (premature ventricular contractions) 2014  PVD (peripheral vascular disease) ([facility] Social History Tobacco Use  Smoking status: Former Smoker Packs/day: 3.00 Years: 20.00 Pack years: 60.00 Types: Cigarettes Last attempt to quit: 1985 Years since quittin.5  Smokeless tobacco: Never Used Substance Use Topics  Alcohol use: No  
 Drug use: No  
  
  
ROS: 
 
 GENERAL Recent weight loss - no Fever -----------------   no 
 Chills -----------------   no 
 
 EYES, VISION Visual Changes - no 
 
 EARS, NOSE, THROAT Hearing loss ----------- no 
 Swallowing difficulties - no CARDIOVASCULAR Chest pain/pressure ---- no 
 Arrhythmia/palpitations - no RESPIRATORY Cough ------------------ yes Shortness of breath - yes Wheezing -------------- no   GASTROINTESTINAL Abdominal pain - no Heartburn -------- no 
 Bloody stool ----- no 
 
 GENITOURINARY Frequent urination - no Urgency -------------- no 
 
 MUSCULOSKELETAL Joint pain/swelling ---- no 
 Musculoskeletal pain - no 
 
 SKIN & INTEGUMENTARY Rashes - no Sores --- no 
 
 
   NEUROLOGICAL Numbness/tingling - no Sensation loss ------ no 
 
 PSYCHIATRIC Nervousness/anxiety - no Depression -------------- no 
 
 ENDOCRINE Heat/cold intolerance - no Excessive thirst -------- no 
 
 HEMATOLOGIC/LYMPHATIC Abnormal bleeding - no ALL/IMMUN Allergic reaction ------ no 
 Recurrent infections - no Objective: 
 
 Physical Exam: 
             
Visit Vitals /66 (BP 1 Location: Right arm, BP Patient Position: At rest) Pulse 70 Temp 97.6 °F (36.4 °C) Resp 15 Ht 5' 9\" (1.753 m) Wt 148 lb 14.4 oz (67.5 kg) SpO2 97% BMI 21.99 kg/m² General Appearance:   Well developed, well nourished,alert and oriented x 3, and 
 individual in no acute distress.   
Ears/Nose/Mouth/Throat:    Hearing grossly normal. 
 Neck:  Supple. Chest:    Lungs coarse crackles, rales to auscultation bilaterally. Cardiovascular:   irregular rate and rhythm, variable normal,MSM. Abdomen:    Soft, non-tender, bowel sounds are active. Extremities:  No edema bilaterally. Skin:  Warm and dry. Telemetry: normal sinus rhythm Data Review:  
 Labs:   
Recent Results (from the past 24 hour(s)) GLUCOSE, POC Collection Time: 08/10/19  4:16 PM  
Result Value Ref Range Glucose (POC) 262 (H) 65 - 100 mg/dL Performed by Kassy Sinha GLUCOSE, POC Collection Time: 08/10/19  9:31 PM  
Result Value Ref Range Glucose (POC) 326 (H) 65 - 100 mg/dL Performed by Stella Pointsera METABOLIC PANEL, BASIC Collection Time: 08/11/19 12:53 AM  
Result Value Ref Range Sodium 132 (L) 136 - 145 mmol/L Potassium 3.6 3.5 - 5.1 mmol/L Chloride 90 (L) 97 - 108 mmol/L  
 CO2 36 (H) 21 - 32 mmol/L Anion gap 6 5 - 15 mmol/L Glucose 184 (H) 65 - 100 mg/dL BUN 67 (H) 6 - 20 MG/DL Creatinine 2.43 (H) 0.70 - 1.30 MG/DL  
 BUN/Creatinine ratio 28 (H) 12 - 20 GFR est AA 31 (L) >60 ml/min/1.73m2 GFR est non-AA 26 (L) >60 ml/min/1.73m2 Calcium 8.4 (L) 8.5 - 10.1 MG/DL  
NT-PRO BNP Collection Time: 08/11/19 12:53 AM  
Result Value Ref Range NT pro-BNP 32,911 (H) <450 PG/ML  
GLUCOSE, POC Collection Time: 08/11/19  6:48 AM  
Result Value Ref Range Glucose (POC) 62 (L) 65 - 100 mg/dL Performed by Stella Pointer GLUCOSE, POC Collection Time: 08/11/19  7:17 AM  
Result Value Ref Range Glucose (POC) 88 65 - 100 mg/dL Performed by Stella Pointer GLUCOSE, POC Collection Time: 08/11/19 10:24 AM  
Result Value Ref Range Glucose (POC) 250 (H) 65 - 100 mg/dL Performed by Ramiro Hiram GLUCOSE, POC Collection Time: 08/11/19 11:49 AM  
Result Value Ref Range Glucose (POC) 285 (H) 65 - 100 mg/dL Performed by Paola Lunch Radiology: Current Facility-Administered Medications Medication Dose Route Frequency  insulin glargine (LANTUS) injection 20 Units  20 Units SubCUTAneous QHS  albuterol-ipratropium (DUO-NEB) 2.5 MG-0.5 MG/3 ML  3 mL Nebulization Q4H PRN  
 levoFLOXacin (LEVAQUIN) tablet 750 mg  750 mg Oral Q48H  
 amLODIPine (NORVASC) tablet 2.5 mg  2.5 mg Oral DAILY  predniSONE (DELTASONE) tablet 40 mg  40 mg Oral DAILY WITH BREAKFAST  bumetanide (BUMEX) tablet 2 mg  2 mg Oral TID  hydrALAZINE (APRESOLINE) tablet 50 mg  50 mg Oral TID  dextrose 10 % infusion 125-250 mL  125-250 mL IntraVENous PRN  
 insulin lispro (HUMALOG) injection   SubCUTAneous AC&HS  
 glucose chewable tablet 16 g  4 Tab Oral PRN  
 glucagon (GLUCAGEN) injection 1 mg  1 mg IntraMUSCular PRN  
 balsam peru-castor oil (VENELEX) ointment   Topical TID  sodium chloride (NS) flush 5-40 mL  5-40 mL IntraVENous Q8H  
 sodium chloride (NS) flush 5-40 mL  5-40 mL IntraVENous PRN  
 sodium chloride (NS) flush 5-40 mL  5-40 mL IntraVENous Q8H  
 sodium chloride (NS) flush 5-40 mL  5-40 mL IntraVENous PRN  
 acetaminophen (TYLENOL) tablet 650 mg  650 mg Oral Q4H PRN  
 HYDROcodone-acetaminophen (NORCO) 5-325 mg per tablet 1 Tab  1 Tab Oral Q4H PRN  
 morphine injection 1 mg  1 mg IntraVENous Q4H PRN  
 heparin (porcine) injection 5,000 Units  5,000 Units SubCUTAneous Q12H  aspirin chewable tablet 81 mg  81 mg Oral DAILY  carvedilol (COREG) tablet 12.5 mg  12.5 mg Oral BID WITH MEALS  simvastatin (ZOCOR) tablet 20 mg  20 mg Oral QHS  sodium bicarbonate tablet 1,300 mg  1,300 mg Oral TID  tamsulosin (FLOMAX) capsule 0.4 mg  0.4 mg Oral DAILY  epoetin dm-epbx (RETACRIT) injection 10,000 Units  10,000 Units SubCUTAneous Q7D Jj Nichole MD 
 
 [facility] [address], [address] [place], [address] 
 [phone]

## 2019-08-11 NOTE — PROGRESS NOTES
Hospitalist Progress Note Luda Hilario MD 
Answering service: [phone] OR [phone] from in house phone Cell:   
  
Date of Service:  2019 NAME:  Lito Carrasquillo Sr. 
:  1935 MRN:  [id number] Admission Summary:  
The patient is an 81-year-old male with past medical history of diabetes, hypertension, coronary artery disease, hypercholesterolemia, peripheral vascular disease, AFib, and CKD, who presented to the emergency room via private vehicle with chief complaint of shortness of breath. His shortness of breath has been persistent for more than one week and subsequently has worsened since yesterday. There is associated bilateral leg swelling since yesterday. The patient has been taking four albuterol nebulizer treatments with no relief. The patient also reports cough with occasional greenish sputum. He recently had pacemaker placed by Dr. Kay Fam and had followed up with Cardiology yesterday who increased his diuretic dose. The patient has been seen in the emergency room for shortness of breath three times this week. Interval history / Subjective:  
   
 
Patient rounded on this am,  
 
Transient hypoglycemia and hypotension , morning BP meds and lasix held But now unheld as blood sugars and BP now looks better He feels very good this am , in excellent spirits Assessment & Plan:  
 
 acute hypoxemic resp failure-   
improving PNA and  due to volume overload- got  IV bumex given in ED and then His bumex was held He went into almost flash pulm edema on  and his bumex was retarted CT chest   showed diffuse alveolar pattern suggestive of pulm edema , resp panel pcr is neg and legionella neg  and increase prednisone to iv , continue nebs Change to po prednisone /10 Given his improvement with restarting his diuretics , his resp distress can be attributed to the pulm edema an possible atypical PNA on admission Started on bumex iv tid on 8/8, change to oral on 8/9 Keep him negative Mycoplasma , CRAg neg  
 
  
 severe pulm HTN 
RHC w/ PCW 28 ECHO 50/60% with PAP 50  
pulm consult appreciated CAP poa Xray reviewed Started on ceftriaxone in addition to azithromycin Improving Legionella Ag is negative Change to 7 days of Levaquin on discharge  
  
 suspected COPD from past tobacco use-  
resume steroids and home meds 
increased dose on 8/7 , decreased on 8/10 On nebs  
  
. KATALINA on CKD stage 4- 
 baseline creatinine 3.1-3.3; was 3.8 on admission- 
 likely due to diureitcs and hypotension Improving Metabolic acidosis On po bicarb  
  
. PVD-  
s/p stented in both LE, cont aspirin 
  
 DM type 2- Uncontrolled Blood sugars running in 400s on 8/9 Increase  lantus w accuchecks and ss insulin Hypoglycemia on 8/11 as prednisone dose was reduced Decrease dose of lantus Darrick Motto HLD-  
resume statin meds 
  
. BPH 
- resume flomax Anemia of CKD  
 
CAD s/p CABG Bradycardia with afib w pacemaker, HTN  
BP high on 8/9 Restart home hydralazine Code status: full DVT prophylaxis: heaprin Care Plan discussed with: Patient/Family Disposition: TBD Hospital Problems  Date Reviewed: 7/28/2019 Codes Class Noted POA  
 CHF exacerbation ([facility] ICD-10-CM: I50.9 ICD-9-CM: 428.0  8/5/2019 Unknown Acute hypoxemic respiratory failure ([facility] ICD-10-CM: J96.01 
ICD-9-CM: 518.81  8/5/2019 Unknown Review of Systems: A comprehensive review of systems was negative except for that written in the HPI. Vital Signs:  
 Last 24hrs VS reviewed since prior progress note. Most recent are: 
Visit Vitals /66 (BP 1 Location: Right arm, BP Patient Position: At rest) Pulse 70 Temp 97.6 °F (36.4 °C) Resp 15 Ht 5' 9\" (1.753 m) Wt 67.5 kg (148 lb 14.4 oz) SpO2 97% BMI 21.99 kg/m² Intake/Output Summary (Last 24 hours) at 8/11/2019 1114 Last data filed at 8/11/2019 9952 Gross per 24 hour Intake 825 ml Output 1825 ml Net -1000 ml Physical Examination:  
 
 
     
Constitutional:  on oxygen but comfortable , coughing ENT:  Oral mucous moist, oropharynx benign. Neck supple, Resp:  clear b/l breath sounds , occasional ronchi CV:  Regular rhythm, normal rate, no murmurs, gallops, rubs GI:  Soft, non distended, non tender. normoactive bowel sounds, no hepatosplenomegaly Musculoskeletal:  No edema, warm, 2+ pulses throughout Neurologic:  Moves all extremities. AAOx3, CN II-XII reviewed Data Review:  
 Review and/or order of clinical lab test 
 
 
Labs:  
 
No results for input(s): WBC, HGB, HCT, PLT, HGBEXT, HCTEXT, PLTEXT, HGBEXT, HCTEXT, PLTEXT in the last 72 hours. Recent Labs 08/11/19 
0053 08/09/19 
0400 * 133* K 3.6 3.5 CL 90* 89* CO2 36* 34* BUN 67* 59* CREA 2.43* 2.28* * 242* CA 8.4* 8.6 No results for input(s): SGOT, GPT, ALT, AP, TBIL, TBILI, TP, ALB, GLOB, GGT, AML, LPSE in the last 72 hours. No lab exists for component: AMYP, HLPSE No results for input(s): INR, PTP, APTT in the last 72 hours. No lab exists for component: INREXT, INREXT No results for input(s): FE, TIBC, PSAT, FERR in the last 72 hours. No results found for: FOL, RBCF No results for input(s): PH, PCO2, PO2 in the last 72 hours. No results for input(s): CPK, CKNDX, TROIQ in the last 72 hours. No lab exists for component: CPKMB No results found for: CHOL, CHOLX, CHLST, CHOLV, HDL, LDL, LDLC, DLDLP, TGLX, TRIGL, TRIGP, CHHD, CHHDX Lab Results Component Value Date/Time Glucose (POC) 250 (H) 08/11/2019 10:24 AM  
 Glucose (POC) 88 08/11/2019 07:17 AM  
 Glucose (POC) 62 (L) 08/11/2019 06:48 AM  
 Glucose (POC) 326 (H) 08/10/2019 09:31 PM  
 Glucose (POC) 262 (H) 08/10/2019 04:16 PM  
 
Lab Results Component Value Date/Time Color YELLOW/STRAW 06/25/2017 11:40 AM  
 Appearance CLEAR 06/25/2017 11:40 AM  
 Specific gravity 1.018 06/25/2017 11:40 AM  
 pH (UA) 5.0 06/25/2017 11:40 AM  
 Protein 300 (A) 06/25/2017 11:40 AM  
 Glucose NEGATIVE  06/25/2017 11:40 AM  
 Ketone NEGATIVE  06/25/2017 11:40 AM  
 Bilirubin NEGATIVE  06/25/2017 11:40 AM  
 Urobilinogen 0.2 06/25/2017 11:40 AM  
 Nitrites NEGATIVE  06/25/2017 11:40 AM  
 Leukocyte Esterase NEGATIVE  06/25/2017 11:40 AM  
 Epithelial cells FEW 06/25/2017 11:40 AM  
 Bacteria NEGATIVE  06/25/2017 11:40 AM  
 WBC 0-4 06/25/2017 11:40 AM  
 RBC 0-5 06/25/2017 11:40 AM  
 
 
 
Medications Reviewed:  
 
Current Facility-Administered Medications Medication Dose Route Frequency  insulin glargine (LANTUS) injection 20 Units  20 Units SubCUTAneous QHS  albuterol-ipratropium (DUO-NEB) 2.5 MG-0.5 MG/3 ML  3 mL Nebulization Q4H PRN  
 amLODIPine (NORVASC) tablet 2.5 mg  2.5 mg Oral DAILY  predniSONE (DELTASONE) tablet 40 mg  40 mg Oral DAILY WITH BREAKFAST  bumetanide (BUMEX) tablet 2 mg  2 mg Oral TID  hydrALAZINE (APRESOLINE) tablet 50 mg  50 mg Oral TID  levoFLOXacin (LEVAQUIN) 750 mg in D5W IVPB  750 mg IntraVENous Q48H  
 dextrose 10 % infusion 125-250 mL  125-250 mL IntraVENous PRN  
 insulin lispro (HUMALOG) injection   SubCUTAneous AC&HS  
 glucose chewable tablet 16 g  4 Tab Oral PRN  
 glucagon (GLUCAGEN) injection 1 mg  1 mg IntraMUSCular PRN  
 balsam peru-castor oil (VENELEX) ointment   Topical TID  sodium chloride (NS) flush 5-40 mL  5-40 mL IntraVENous Q8H  
 sodium chloride (NS) flush 5-40 mL  5-40 mL IntraVENous PRN  
 sodium chloride (NS) flush 5-40 mL  5-40 mL IntraVENous Q8H  
 sodium chloride (NS) flush 5-40 mL  5-40 mL IntraVENous PRN  
 acetaminophen (TYLENOL) tablet 650 mg  650 mg Oral Q4H PRN  
 HYDROcodone-acetaminophen (NORCO) 5-325 mg per tablet 1 Tab  1 Tab Oral Q4H PRN  
  morphine injection 1 mg  1 mg IntraVENous Q4H PRN  
 heparin (porcine) injection 5,000 Units  5,000 Units SubCUTAneous Q12H  aspirin chewable tablet 81 mg  81 mg Oral DAILY  carvedilol (COREG) tablet 12.5 mg  12.5 mg Oral BID WITH MEALS  simvastatin (ZOCOR) tablet 20 mg  20 mg Oral QHS  sodium bicarbonate tablet 1,300 mg  1,300 mg Oral TID  tamsulosin (FLOMAX) capsule 0.4 mg  0.4 mg Oral DAILY  epoetin dm-epbx (RETACRIT) injection 10,000 Units  10,000 Units SubCUTAneous Q7D  
 
______________________________________________________________________ EXPECTED LENGTH OF STAY: 3d 19h ACTUAL LENGTH OF STAY:          6 Conchita Kirk MD

## 2019-08-11 NOTE — PROGRESS NOTES
Bedside shift change report given to Larisa Valiente (oncoming nurse) by aSndro Cottrell RN (offgoing nurse). Report included the following information SBAR, Kardex, MAR and Recent Results.

## 2019-08-11 NOTE — PROGRESS NOTES
Labs reviewed Cr stable Cont current care Will f/u in AM 
Call with any questions Mandi Gould MD 
[address] [place] [address]  
[address] [facility], [address] [place] [address] Phone - [phone] Fax - [phone] 
[facility]. [email or web address]

## 2019-08-11 NOTE — PROGRESS NOTES
ADULT PROTOCOL: JET AEROSOL  REASSESSMENT Patient  Rober Norris     81 y.o.   male     8/11/2019  9:42 AM 
 
Breath Sounds Pre Procedure: Right Breath Sounds: Diminished Left Breath Sounds: Clear, Diminished Breath Sounds Post Procedure: Right Breath Sounds: Diminished Left Breath Sounds: Diminished Breathing pattern: Pre procedure Breathing Pattern: Regular Post procedure Breathing Pattern: Regular Heart Rate: Pre procedure Pulse: 60 
         Post procedure Pulse: 76 Resp Rate: Pre procedure Respirations: 18 Post procedure Respirations: 18 
 
Cough: Pre procedure Cough: Non-productive Post procedure Cough: Non-productive Suctioned: NO Oxygen: O2 Device: Nasal cannula   Flow rate (L/min) 2 Changed: NO SpO2: Pre procedure SpO2: 94 %   with oxygen Post procedure SpO2: 96 %  with oxygen Nebulizer Therapy: Current medications Aerosolized Medications: DuoNeb Changed: YES Smoking History: Former smoker Problem List:  
Patient Active Problem List  
Diagnosis Code  Bradycardia R00.1  CAD (coronary artery disease) I25.10  Hypertension, essential, benign I10  
 Carotid arterial disease ([facility]) I77.9  Hypercholesteremia E78.00  
 PVD (peripheral vascular disease) ([facility]) I73.9  PVC's (premature ventricular contractions) I49.3  Dyspnea R06.00  Type 2 diabetes mellitus with diabetic peripheral angiopathy without gangrene ([facility]) E11.51  
 CKD (chronic kidney disease) N18.9  Acute renal failure (ARF) ([facility]) N17.9  Hypokalemia E87.6  Generalized weakness R53.1  Elevated troponin R74.8  KATALINA (acute kidney injury) ([facility] N17.9  GI bleed K92.2  Hyperglycemia due to type 2 diabetes mellitus ([facility] E11.65  Atrial fibrillation, chronic ([facility]) I48.2  CKD (chronic kidney disease) stage 3, GFR 30-59 ml/min ([facility]) N18.3  Type 2 diabetes with nephropathy ([facility]) E11.21  
 CHF (congestive heart failure) ([facility]) I50.9  Atrial fibrillation with slow ventricular response ([facility]) I48.91  
 Pacemaker Z95.0  
 CHF exacerbation ([facility]) I50.9  Acute hypoxemic respiratory failure ([facility]) J96.01 Respiratory Therapist: Milan Romero

## 2019-08-11 NOTE — PROGRESS NOTES
Problem: Heart Failure: Discharge Outcomes Goal: *Verbalizes understanding/describes prescribed medications Outcome: Progressing Towards Goal 
Goal: *Describes/verbalizes understanding of follow-up/return appt Description 
(eg: to physicians, diabetes treatment coordinator, and other resources Outcome: Progressing Towards Goal

## 2019-08-11 NOTE — PROGRESS NOTES
Clinical Pharmacy Note: Re: IV to PO Automatic Conversion - Antibiotic Please note: Nelly Crystal Sr.s medication(s) (Levofloxacin) has/have been changed from IV to PO based on the following criteria: 
 
The patient: 
1. Has received IV therapy for at least 48 hours 2. Has a functioning GI tract - Taking scheduled oral medications - Tolerating tube feeds at goal rate or a full liquid, soft, or regular diet 3. Is clinically stable - Temperature < 100.4F for at least 24 hours - WBC is trending down This IV to PO conversion is based on the P&T approved automatic conversion policy for eligible patients. Please call with questions.

## 2019-08-12 LAB
ANION GAP SERPL CALC-SCNC: 7 MMOL/L (ref 5–15)
BNP SERPL-MCNC: ABNORMAL PG/ML
BUN SERPL-MCNC: 64 MG/DL (ref 6–20)
BUN/CREAT SERPL: 26 (ref 12–20)
C PNEUM IGM TITR SER IF: NORMAL {TITER}
C PSITTACI IGM TITR SER IF: NORMAL {TITER}
C TRACH IGM SER IA-ACNC: <0.8 INDEX (ref 0–0.7)
CALCIUM SERPL-MCNC: 8.1 MG/DL (ref 8.5–10.1)
CHLORIDE SERPL-SCNC: 89 MMOL/L (ref 97–108)
CO2 SERPL-SCNC: 36 MMOL/L (ref 21–32)
CREAT SERPL-MCNC: 2.42 MG/DL (ref 0.7–1.3)
ERYTHROCYTE [DISTWIDTH] IN BLOOD BY AUTOMATED COUNT: 14.9 % (ref 11.5–14.5)
GLUCOSE BLD STRIP.AUTO-MCNC: 156 MG/DL (ref 65–100)
GLUCOSE BLD STRIP.AUTO-MCNC: 162 MG/DL (ref 65–100)
GLUCOSE BLD STRIP.AUTO-MCNC: 269 MG/DL (ref 65–100)
GLUCOSE BLD STRIP.AUTO-MCNC: 279 MG/DL (ref 65–100)
GLUCOSE BLD STRIP.AUTO-MCNC: 56 MG/DL (ref 65–100)
GLUCOSE BLD STRIP.AUTO-MCNC: 91 MG/DL (ref 65–100)
GLUCOSE SERPL-MCNC: 164 MG/DL (ref 65–100)
HCT VFR BLD AUTO: 31.3 % (ref 36.6–50.3)
HGB BLD-MCNC: 10.3 G/DL (ref 12.1–17)
M PNEUMO IGG SER IA-ACNC: 120 U/ML (ref 0–99)
M PNEUMO IGM SER IA-ACNC: <770 U/ML (ref 0–769)
MCH RBC QN AUTO: 28.1 PG (ref 26–34)
MCHC RBC AUTO-ENTMCNC: 32.9 G/DL (ref 30–36.5)
MCV RBC AUTO: 85.3 FL (ref 80–99)
NRBC # BLD: 0 K/UL (ref 0–0.01)
NRBC BLD-RTO: 0 PER 100 WBC
PLATELET # BLD AUTO: 269 K/UL (ref 150–400)
PMV BLD AUTO: 10.1 FL (ref 8.9–12.9)
POTASSIUM SERPL-SCNC: 3.5 MMOL/L (ref 3.5–5.1)
RBC # BLD AUTO: 3.67 M/UL (ref 4.1–5.7)
SERVICE CMNT-IMP: ABNORMAL
SERVICE CMNT-IMP: NORMAL
SODIUM SERPL-SCNC: 132 MMOL/L (ref 136–145)
TEST INFORMATION, 140635: NORMAL
WBC # BLD AUTO: 11.4 K/UL (ref 4.1–11.1)

## 2019-08-12 PROCEDURE — 74011636637 HC RX REV CODE- 636/637: Performed by: NURSE PRACTITIONER

## 2019-08-12 PROCEDURE — 65660000000 HC RM CCU STEPDOWN

## 2019-08-12 PROCEDURE — 82962 GLUCOSE BLOOD TEST: CPT

## 2019-08-12 PROCEDURE — 83880 ASSAY OF NATRIURETIC PEPTIDE: CPT

## 2019-08-12 PROCEDURE — 74011250636 HC RX REV CODE- 250/636: Performed by: FAMILY MEDICINE

## 2019-08-12 PROCEDURE — 94010 BREATHING CAPACITY TEST: CPT

## 2019-08-12 PROCEDURE — 97530 THERAPEUTIC ACTIVITIES: CPT

## 2019-08-12 PROCEDURE — 97161 PT EVAL LOW COMPLEX 20 MIN: CPT

## 2019-08-12 PROCEDURE — 74011636637 HC RX REV CODE- 636/637: Performed by: INTERNAL MEDICINE

## 2019-08-12 PROCEDURE — 36415 COLL VENOUS BLD VENIPUNCTURE: CPT

## 2019-08-12 PROCEDURE — 74011250636 HC RX REV CODE- 250/636: Performed by: INTERNAL MEDICINE

## 2019-08-12 PROCEDURE — 74011250637 HC RX REV CODE- 250/637: Performed by: NURSE PRACTITIONER

## 2019-08-12 PROCEDURE — 74011250637 HC RX REV CODE- 250/637: Performed by: FAMILY MEDICINE

## 2019-08-12 PROCEDURE — 80048 BASIC METABOLIC PNL TOTAL CA: CPT

## 2019-08-12 PROCEDURE — 94621 CARDIOPULM EXERCISE TESTING: CPT

## 2019-08-12 PROCEDURE — 85027 COMPLETE CBC AUTOMATED: CPT

## 2019-08-12 RX ORDER — INSULIN GLARGINE 100 [IU]/ML
16 INJECTION, SOLUTION SUBCUTANEOUS
Status: DISCONTINUED | OUTPATIENT
Start: 2019-08-12 | End: 2019-08-13

## 2019-08-12 RX ADMIN — INSULIN LISPRO 3 UNITS: 100 INJECTION, SOLUTION INTRAVENOUS; SUBCUTANEOUS at 22:30

## 2019-08-12 RX ADMIN — HEPARIN SODIUM 5000 UNITS: 5000 INJECTION INTRAVENOUS; SUBCUTANEOUS at 22:31

## 2019-08-12 RX ADMIN — INSULIN LISPRO 5 UNITS: 100 INJECTION, SOLUTION INTRAVENOUS; SUBCUTANEOUS at 17:07

## 2019-08-12 RX ADMIN — HEPARIN SODIUM 5000 UNITS: 5000 INJECTION INTRAVENOUS; SUBCUTANEOUS at 11:57

## 2019-08-12 RX ADMIN — ALUMINUM HYDROXIDE AND MAGNESIUM HYDROXIDE 15 ML: 200; 200 SUSPENSION ORAL at 07:10

## 2019-08-12 RX ADMIN — INSULIN GLARGINE 16 UNITS: 100 INJECTION, SOLUTION SUBCUTANEOUS at 22:30

## 2019-08-12 RX ADMIN — TAMSULOSIN HYDROCHLORIDE 0.4 MG: 0.4 CAPSULE ORAL at 09:18

## 2019-08-12 RX ADMIN — SIMVASTATIN 20 MG: 20 TABLET, FILM COATED ORAL at 22:37

## 2019-08-12 RX ADMIN — Medication: at 09:20

## 2019-08-12 RX ADMIN — ASPIRIN 81 MG CHEWABLE TABLET 81 MG: 81 TABLET CHEWABLE at 09:18

## 2019-08-12 RX ADMIN — Medication: at 22:31

## 2019-08-12 RX ADMIN — SODIUM BICARBONATE 1300 MG: 650 TABLET ORAL at 09:17

## 2019-08-12 RX ADMIN — PREDNISONE 40 MG: 20 TABLET ORAL at 07:03

## 2019-08-12 RX ADMIN — EPOETIN ALFA-EPBX 10000 UNITS: 10000 INJECTION, SOLUTION INTRAVENOUS; SUBCUTANEOUS at 17:07

## 2019-08-12 RX ADMIN — SODIUM BICARBONATE 1300 MG: 650 TABLET ORAL at 22:32

## 2019-08-12 RX ADMIN — Medication 10 ML: at 22:32

## 2019-08-12 RX ADMIN — ALUMINUM HYDROXIDE AND MAGNESIUM HYDROXIDE 15 ML: 200; 200 SUSPENSION ORAL at 22:37

## 2019-08-12 RX ADMIN — Medication 10 ML: at 22:31

## 2019-08-12 RX ADMIN — Medication: at 17:07

## 2019-08-12 RX ADMIN — SODIUM BICARBONATE 1300 MG: 650 TABLET ORAL at 17:07

## 2019-08-12 RX ADMIN — CARVEDILOL 12.5 MG: 12.5 TABLET, FILM COATED ORAL at 07:03

## 2019-08-12 NOTE — PROGRESS NOTES
MARTINEZ Plan: 1. Home with Home Oxygen; CM to sent a referral once O2 stats are updated 2. Transport; wife to transport Chris Looney [phone]) 3. Continue Medical Care, follow up with PCP/or Specialist  
 
 
Cesar Schofield 
[facility] [phone]

## 2019-08-12 NOTE — CDMP QUERY
Patient admitted with acute respiratory failure, noted to have diabetes. If possible, please document in progress notes and d/c summary if you are evaluating and/or treating any of the following: 
 
 =>Diabetes, Type 2 with hyperglycemia 
 =>Diabetes, Type 2 with hypoglycemia 
 =>Diabetes, Type 2 with hypoglycemia and hyperglycemia 
 =>Other Explanation of clinical findings =>Clinically Undetermined (no explanation for clinical findings) The medical record reflects the following: 
 
   Risk Factors: hx DM Clinical Indicators: a1c 9.1 (5/11/19); POC BS 500s - 100s; couple low readings 56, 62 Treatment: DTC, Lantus 16u HS, Humalog SSI; diabetic diet Thank you, Yovanny Ochoa, RN, BSN, [facility] Clinical  
[facility] 
[phone]

## 2019-08-12 NOTE — PROGRESS NOTES
Post Fall Documentation 25 [facility] witnessed/unwitnessed fall occurred on 08/12/19 (Date) at 65 (Time). The answers to the following questions summarize the fall: In the patient's own words,: 
· What were you attempting to do when you fell? \"I was going to stand up and walk with RT for my walk test\" · Do you know why you fell? Legs are weak · Do you have any pain/discomfort or any other complaints? No I am fine, I just want to get home · Which part of your body made contact with the floor or other object?  knees Nurse: 
? Was this an assisted fall? no 
? Was fall witnessed? Yes     By Whom? Respiratory therapist 
? If witnessed, what part of the body made contact with the floor or other object?  knees ? Patients mental status after the fall/when found: Alert and oriented ? Any apparent injury:  No apparent injury ? Immediate interventions for injury/suspected injury? No interventions needed ? Patient assisted back to bed? Assist X2 
? Name of provider notified and time, any comments? Dr. Nam Patient, Hold BP meds this morning ? Name of family member notified and time: Martin Lomas 0938 Immediate VS and physical assessment documented in flow sheets. Neuro assessment every hour x 4 (for potential head injury or unwitnessed fall) documented in flow sheets.  
 
 
Bev Edwards RN

## 2019-08-12 NOTE — PROGRESS NOTES
08/12/19 1001 RT Walking Oximetry Stage After Walk SpO2  
(dropped to 70%) HR 70 bpm  
Rate of Dyspnea 1 FIO2 (%) 21 % Comments (pt fell, oxygen dropped with room air.)

## 2019-08-12 NOTE — PROGRESS NOTES
Problem: Mobility Impaired (Adult and Pediatric) Goal: *Acute Goals and Plan of Care (Insert Text) Description FUNCTIONAL STATUS PRIOR TO ADMISSION: Patient was independent without use of DME. 
 
HOME SUPPORT PRIOR TO ADMISSION: The patient lived with wife/son but did not require assist. 
 
Physical Therapy Goals Initiated 8/12/2019 1. Patient will transfer from bed to chair and chair to bed with modified independence using the least restrictive device within 7 day(s). 2.  Patient will perform sit to stand with modified independence within 7 day(s). 3.  Patient will ambulate with modified independence for > 200 feet with the least restrictive device within 7 day(s). 4.  Patient will ascend/descend 3 stairs with one handrail(s) with supervision/set-up within 7 day(s). Outcome: Progressing Towards Goal 
PHYSICAL THERAPY EVALUATION Patient: Rin Pacheco (81 y.o. male) Date: 8/12/2019 Primary Diagnosis: CHF exacerbation ([facility] [I50.9] [facility] hypoxemic [facility] [J96.01] Precautions: Fall ASSESSMENT Based on the objective data described below, the patient presents with orthostatic hypotension symptomatic x 2 this am - eval limited to standing at bedside -- pt becomes weak, legs shaky with knees buckling - assisted patient back to bed. Nursing notified. 1st attempt to get to standing (10:35 am) ---  Sitting  Bp 74/45   Oxygen sats 93% on 3 L    HR 74 
 
2nd attempt -- (12:25 pm) Supine   Bp 150/70 Sitting     Bp 106/57   asymptomatic Standing - after 2 mins of standing -  pt became symptomatic, knee buckling, shaky - Bp sitting 84/54 Current Level of Function Impacting Discharge (mobility/balance): Unable to fully assess secondary to orthostatic hypotension. Note:  Earlier this am - patient had witnessed fall when attempting 6 min walk test - Bp marginal ; Bp meds held per nursing Functional Outcome Measure:  Functional measurement deferred - recommend Sarah tomorrow. Other factors to consider for discharge: need for home oxygen Patient will benefit from skilled therapy intervention to address the above noted impairments. PLAN : 
Recommendations and Planned Interventions: bed mobility training, transfer training, gait training, therapeutic exercises, patient and family training/education and therapeutic activities Frequency/Duration: Patient will be followed by physical therapy:  5 times a week to address goals. Recommendation for discharge: (in order for the patient to meet his/her long term goals) To be determined: following further eval  
 
This discharge recommendation: 
Has not yet been discussed the attending provider and/or case management Equipment recommendations for successful discharge (if) home: patient owns DME required for discharge SUBJECTIVE:  
Patient stated I plan on using the walker when I first get home.  OBJECTIVE DATA SUMMARY:  
HISTORY:   
Past Medical History:  
Diagnosis Date CAD (coronary artery disease) s/p CABG 2008 Carotid arterial disease ([facility] CKD (chronic kidney disease) stage 3, GFR 30-59 ml/min ([facility]) 10/21/2017  
 hypertension and DM nephrosclerosis Diabetes mellitus, type 2 ([facility] Hypercholesteremia Hypertension Paroxysmal atrial fibrillation ([facility] 10/21/2017  
 new onset afib with BRPR 10-19-17 admit PVC's (premature ventricular contractions) 5/26/2014 PVD (peripheral vascular disease) ([facility] Past Surgical History:  
Procedure Laterality Date HX CORONARY ARTERY BYPASS GRAFT    
 HX HEART CATHETERIZATION    
 MD TCAT INSJ/RPL PERM LEADLESS PACEMAKER RV W/IMG N/A 5/9/2019 INSERT OR REPLACE TRANSCATH PPM LEADLESS performed [place] [address], [facility] CATH LAB Personal factors and/or comorbidities impacting plan of care: none noted Home Situation Home Environment: Private residence # Steps to Enter: 3 One/Two Story Residence: One story Living Alone: No 
Support Systems: Spouse/Significant Other/Partner Patient Expects to be Discharged to[de-identified] Private residence Current DME Used/Available at Home: Walker, rolling EXAMINATION/PRESENTATION/DECISION MAKING:  
Critical Behavior: 
Neurologic State: Alert Orientation Level: Oriented X4 Cognition: Appropriate decision making, Follows commands, Appropriate safety awareness Safety/Judgement: Awareness of environment, Decreased awareness of need for safety Hearing: Auditory Auditory Impairment: Hard of hearing, bilateral 
Range Of Motion: 
AROM: Generally decreased, functional 
  
  
  
  
  
  
  
Strength:   
Strength: Generally decreased, functional 
  
  
  
  
  
  
Tone & Sensation:  
Tone: Normal 
  
  
  
  
Sensation: Intact Coordination: 
Coordination: Within functional limits Vision:  
  
Functional Mobility: 
Bed Mobility: 
 Independent rolling, supine to/from sitting Transfers: 
 Sit to /from stand with standby guard - RW in front of patient Balance:  
Sitting: Intact; Without support Standing: Impaired; With support Standing - Static: Fair;Constant support Standing - Dynamic : Not tested Ambulation/Gait Training: 
  
 Deferred secondary to hypotension. Physical Therapy Evaluation Charge Determination History Examination Presentation Decision-Making LOW Complexity : Zero comorbidities / personal factors that will impact the outcome / POC LOW Complexity : 1-2 Standardized tests and measures addressing body structure, function, activity limitation and / or participation in recreation  LOW Complexity : Stable, uncomplicated  LOW Complexity : FOTO score of  Based on the above components, the patient evaluation is determined to be of the following complexity level: LOW Pain Rating: Pt denied pain. Activity Tolerance:  
Poor - today -- hypotensive sitting and standing. Please refer to the flowsheet for vital signs taken during this treatment. After treatment patient left in no apparent distress:  
Supine in bed, Call bell within reach and RN notified. COMMUNICATION/EDUCATION:  
The patients plan of care was discussed with: Registered Nurse. Fall prevention education was provided and the patient/caregiver indicated understanding., Patient/family have participated as able in goal setting and plan of care. and Patient/family agree to work toward stated goals and plan of care. Thank you for this referral. 
Elpidio Romberg, PT Time Calculation: 25 mins

## 2019-08-12 NOTE — PROGRESS NOTES
[facility] 
 [address], [address] [facility] Phone: [phone]   [phone]   
  
Nephrology Progress Note Briana Stoll.     1935     [id number] Date of Admission : 8/5/2019 08/12/19 CC:  Follow up for  KATALINA Assessment and Plan KATALINA  on CKD  
- 2/2 pre renal azotemia from diuretics, infection ==> resolved   
- hold diuretics today - Cr stable 
- will resume at lower dose in AM 
 
COPD flare, RML PNA: 
- Mx per primary team 
- abx per pulm 
  
CKD IV: 
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 
- baseline Cr at best : 2.7- 3 mg/dl - UPCR showed 11 gm of Protein  
- he wants to do PD if he becomes ESRD Metablolic Acidosis: 
- from chronic resp disease and contraction alkalosis from diuresis 
- hold diuretics as above 
- off sodium bicarb Pulm HTN: 
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  
- RHC w/ PCW 28 
 
Hypoxia: 
- would repeat CXR 
- sounds clear on exam 
  
HTN: 
- BP low yesterday and this AM 
- would hold BP meds 
  
Bradycardia : 
- s/p PPM on 5/9 
  
Anemia of CKD: 
- hgb stable - weekly MICHAEL while in hospital  
  
Afib: 
- per cards 
  
CAD s/p CABG 
  
DM2: 
- on insulin Interval History: 
Seen and examined. Had a fall this AM while doing his 6 minute walk. Did not hit his head. BP borderline. Cr stable. Apparently O2 in the low 70s off oxygen. Review of Systems: Pertinent items are noted in HPI. Current Medications:  
Current Facility-Administered Medications Medication Dose Route Frequency  insulin glargine (LANTUS) injection 16 Units  16 Units SubCUTAneous QHS  albuterol-ipratropium (DUO-NEB) 2.5 MG-0.5 MG/3 ML  3 mL Nebulization Q4H PRN  
 levoFLOXacin (LEVAQUIN) tablet 750 mg  750 mg Oral Q48H  
 aluminum-magnesium hydroxide (MAALOX) oral suspension 15 mL  15 mL Oral QID PRN  
 [Held by provider] amLODIPine (NORVASC) tablet 2.5 mg  2.5 mg Oral DAILY  predniSONE (DELTASONE) tablet 40 mg  40 mg Oral DAILY WITH BREAKFAST  [Held by provider] hydrALAZINE (APRESOLINE) tablet 50 mg  50 mg Oral TID  dextrose 10 % infusion 125-250 mL  125-250 mL IntraVENous PRN  
 insulin lispro (HUMALOG) injection   SubCUTAneous AC&HS  
 glucose chewable tablet 16 g  4 Tab Oral PRN  
 glucagon (GLUCAGEN) injection 1 mg  1 mg IntraMUSCular PRN  
 balsam peru-castor oil (VENELEX) ointment   Topical TID  sodium chloride (NS) flush 5-40 mL  5-40 mL IntraVENous Q8H  
 sodium chloride (NS) flush 5-40 mL  5-40 mL IntraVENous PRN  
 sodium chloride (NS) flush 5-40 mL  5-40 mL IntraVENous Q8H  
 sodium chloride (NS) flush 5-40 mL  5-40 mL IntraVENous PRN  
 acetaminophen (TYLENOL) tablet 650 mg  650 mg Oral Q4H PRN  
 HYDROcodone-acetaminophen (NORCO) 5-325 mg per tablet 1 Tab  1 Tab Oral Q4H PRN  
 morphine injection 1 mg  1 mg IntraVENous Q4H PRN  
 heparin (porcine) injection 5,000 Units  5,000 Units SubCUTAneous Q12H  aspirin chewable tablet 81 mg  81 mg Oral DAILY  [Held by provider] carvedilol (COREG) tablet 12.5 mg  12.5 mg Oral BID WITH MEALS  simvastatin (ZOCOR) tablet 20 mg  20 mg Oral QHS  sodium bicarbonate tablet 1,300 mg  1,300 mg Oral TID  tamsulosin (FLOMAX) capsule 0.4 mg  0.4 mg Oral DAILY  epoetin dm-epbx (RETACRIT) injection 10,000 Units  10,000 Units SubCUTAneous Q7D Allergies Allergen Reactions  Lisinopril Cough Objective: 
Vitals:   
Vitals:  
 08/12/19 5504 08/12/19 5341 08/12/19 0840 08/12/19 3451 BP: 112/66  104/63 115/61 Pulse: 70  71 66 Resp:   16 16 Temp:   97.5 °F (36.4 °C) 97.4 °F (36.3 °C) SpO2:   93% 91% Weight:  68.4 kg (150 lb 12.7 oz) Height:      
 
Intake and Output: 
No intake/output data recorded. 08/10 1901 - 08/12 0700 In: 3215 [P.O.:1375] Out: 4577 [Urine:2425] Physical Examination: 
 
General: NAD,Conversant Neck:  Supple, no mass Resp:  Lungs mostly clear CV:  RRR,  no murmur or rub,no LE edema GI:  Soft, NT, + Bowel sounds, no hepatosplenomegaly Neurologic:  Non focal 
Psych:             AAO x 3 appropriate affect  
 
 
[]    High complexity decision making was performed 
[]    Patient is at high-risk of decompensation with multiple organ involvement Lab Data Personally Reviewed: I have reviewed all the pertinent labs, microbiology data and radiology studies during assessment. Recent Labs 08/12/19 
5881 08/11/19 1948 * 132* K 3.5 3.6 CL 89* 90* CO2 36* 36* * 184* BUN 64* 67* CREA 2.42* 2.43* CA 8.1* 8.4* No results for input(s): WBC, HGB, HCT, PLT, HGBEXT, HCTEXT, PLTEXT, HGBEXT, HCTEXT, PLTEXT in the last 72 hours. No results found for: SDES Lab Results Component Value Date/Time Culture result: NO GROWTH 5 DAYS 08/05/2019 10:46 AM  
 Culture result: CANDIDA ALBICANS (A) 03/16/2017 06:00 AM  
 Culture result: NO GROWTH 5 DAYS 03/16/2017 05:40 AM  
 
Recent Results (from the past 24 hour(s)) GLUCOSE, POC Collection Time: 08/11/19 10:24 AM  
Result Value Ref Range Glucose (POC) 250 (H) 65 - 100 mg/dL Performed by Kristyn Su GLUCOSE, POC Collection Time: 08/11/19 11:49 AM  
Result Value Ref Range Glucose (POC) 285 (H) 65 - 100 mg/dL Performed by Kristyn Su GLUCOSE, POC Collection Time: 08/11/19  4:20 PM  
Result Value Ref Range Glucose (POC) 215 (H) 65 - 100 mg/dL Performed by Morene Fothergill GLUCOSE, POC Collection Time: 08/11/19  9:05 PM  
Result Value Ref Range Glucose (POC) 363 (H) 65 - 100 mg/dL Performed by Morene Fothergill METABOLIC PANEL, BASIC Collection Time: 08/12/19 12:43 AM  
Result Value Ref Range Sodium 132 (L) 136 - 145 mmol/L Potassium 3.5 3.5 - 5.1 mmol/L Chloride 89 (L) 97 - 108 mmol/L  
 CO2 36 (H) 21 - 32 mmol/L Anion gap 7 5 - 15 mmol/L Glucose 164 (H) 65 - 100 mg/dL  BUN 64 (H) 6 - 20 MG/DL  
 Creatinine 2.42 (H) 0.70 - 1.30 MG/DL  
 BUN/Creatinine ratio 26 (H) 12 - 20 GFR est AA 31 (L) >60 ml/min/1.73m2 GFR est non-AA 26 (L) >60 ml/min/1.73m2 Calcium 8.1 (L) 8.5 - 10.1 MG/DL  
NT-PRO BNP Collection Time: 08/12/19 12:43 AM  
Result Value Ref Range NT pro-BNP 20,281 (H) <450 PG/ML  
GLUCOSE, POC Collection Time: 08/12/19  6:59 AM  
Result Value Ref Range Glucose (POC) 56 (L) 65 - 100 mg/dL Performed by Eduar Grissom   
GLUCOSE, POC Collection Time: 08/12/19  7:22 AM  
Result Value Ref Range Glucose (POC) 91 65 - 100 mg/dL Performed by Eduar Grissom Total time spent with patient:  xxx   min. Care Plan discussed with: 
Patient Family RN Consulting Physician [facility] [place],      
 
I have reviewed the flowsheets. Chart and Pertinent Notes have been reviewed. No change in PMH ,family and social history from Consult note.  
 
 
Carolee Stanley MD

## 2019-08-12 NOTE — PROGRESS NOTES
Patient fell attempting 6 minute walk test with respiratory therapist. MD came to check on patient, patient placed back in the bed. Patient states that his legs felt weak. No sign of injury, no complaints of injury, vitals are stable. O2 sats are low in 70's without oxygen.

## 2019-08-12 NOTE — PROGRESS NOTES
[facility] Pulmonary, Critical Care, and Sleep Medicine Progress Note Name: Juany Mir MRN: [id number] : 1935 Hospital[facility] Date: 2019 IMPRESSION:  
· Acute hypoxic resp failure- with diffuse GGO- in setting of fish pond cleaning-  concerns for water borne atypicals like legionella. MAC also possible. · KATALINA on CKD · DM2 
· CAD/CABG 
· HTN 
· PPM  · PAF  
  
RECOMMENDATIONS:  
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 He will need repeat CT scan in 4-6 wks on outpatient · It appears he will need O2 at discharge as well. We can reassess O2 requirements in follow up. Pt is acutely ill and at risk for decline due to resp failure. Subjective:  
 
 Feeling better  - Resting in bed. States he feels much better today. Cough decreased in frequency and amt of green sputum. Dyspnea improved. On 3LPM. Afebrile. Initial HPI This patient has been seen and evaluated at the request of Dr. Ritika Gannon for SOB. Patient is a 81 y.o. male Who has a fish pond at home and routinely cleans it. Pt over last 6 weeks with coughj - green sputum and worsening SOB. Admitted with CT chest showing b/l GGO no fever or WBC. Pt started on rocephin/azithro for atypical pna ? Legionella. Pt states cough a little bit better. No n/v/d. No CP  Or abd pain. Past Medical History:  
Diagnosis Date  CAD (coronary artery disease) s/p CABG   Carotid arterial disease ([facility]  CKD (chronic kidney disease) stage 3, GFR 30-59 ml/min (formerly [facility]) 10/21/2017  
 hypertension and DM nephrosclerosis  Diabetes mellitus, type 2 ([facility]  Hypercholesteremia  Hypertension  Paroxysmal atrial fibrillation ([facility] 10/21/2017  
 new onset afib with BRPR 10-19-17 admit  PVC's (premature ventricular contractions) 5/26/2014  PVD (peripheral vascular disease) ([facility] Past Surgical History:  
Procedure Laterality Date  HX CORONARY ARTERY BYPASS GRAFT    
 HX HEART CATHETERIZATION    
 VA TCAT INSJ/RPL PERM LEADLESS PACEMAKER RV W/IMG N/A 5/9/2019 INSERT OR REPLACE TRANSCATH PPM LEADLESS performed by Marielena Mosley MD at [place] [address], [facility] CATH LAB Prior to Admission medications Medication Sig Start Date End Date Taking? Authorizing Provider  
bumetanide (BUMEX) 1 mg tablet Take 3 mg by mouth two (2) times a day. Yes Provider, Historical  
albuterol (PROVENTIL VENTOLIN) 2.5 mg /3 mL (0.083 %) nebulizer solution 3 mL by Nebulization route every four (4) hours as needed for Wheezing. 5/14/19  Yes Ambar Florence MD  
carvedilol (COREG) 12.5 mg tablet Take 1 Tab by mouth two (2) times daily (with meals). Patient taking differently: Take 12.5 mg by mouth daily. 5/12/19  Yes Keya Sandoval, DO  
hydrALAZINE (APRESOLINE) 50 mg tablet Take 3 Tabs by mouth three (3) times daily. 5/12/19  Yes Keya Sandoval, DO  
sodium bicarbonate 650 mg tablet Take 2 Tabs by mouth three (3) times daily. 5/12/19  Yes Keya Sandoval, DO  
tamsulosin (FLOMAX) 0.4 mg capsule Take 1 Cap by mouth daily. 5/13/19  Yes Keya Sandoval, DO  
insulin glargine U-300 conc 300 unit/mL (1.5 mL) inpn 18 Units by SubCUTAneous route every morning. Yes Provider, Historical  
aspirin 81 mg chewable tablet Take 1 Tab by mouth daily. 9/27/18  Yes Cherelle Brock MD  
simvastatin (ZOCOR) 20 mg tablet Take 20 mg by mouth nightly. Yes Provider, Historical  
omega 3-dha-epa-fish oil (FISH OIL) 100-160-1,000 mg cap Take 1 Cap by mouth two (2) times a day. Yes Provider, Historical  
cinnamon bark (CINNAMON) 500 mg cap Take 1,000 mg by mouth two (2) times a day. Yes Provider, Historical  
 
Allergies Allergen Reactions  Lisinopril Cough Social History Tobacco Use  Smoking status: Former Smoker Packs/day: 3.00 Years: 20.00 Pack years: 60.00 Types: Cigarettes Last attempt to quit: 1985 Years since quittin.5  Smokeless tobacco: Never Used Substance Use Topics  Alcohol use: No  
  
History reviewed. No pertinent family history. Current Facility-Administered Medications Medication Dose Route Frequency  insulin glargine (LANTUS) injection 16 Units  16 Units SubCUTAneous QHS  levoFLOXacin (LEVAQUIN) tablet 750 mg  750 mg Oral Q48H  
 [Held by provider] amLODIPine (NORVASC) tablet 2.5 mg  2.5 mg Oral DAILY  predniSONE (DELTASONE) tablet 40 mg  40 mg Oral DAILY WITH BREAKFAST  [Held by provider] hydrALAZINE (APRESOLINE) tablet 50 mg  50 mg Oral TID  insulin lispro (HUMALOG) injection   SubCUTAneous AC&HS  
 balsam peru-castor oil (VENELEX) ointment   Topical TID  sodium chloride (NS) flush 5-40 mL  5-40 mL IntraVENous Q8H  
 sodium chloride (NS) flush 5-40 mL  5-40 mL IntraVENous Q8H  
 heparin (porcine) injection 5,000 Units  5,000 Units SubCUTAneous Q12H  aspirin chewable tablet 81 mg  81 mg Oral DAILY  [Held by provider] carvedilol (COREG) tablet 12.5 mg  12.5 mg Oral BID WITH MEALS  simvastatin (ZOCOR) tablet 20 mg  20 mg Oral QHS  sodium bicarbonate tablet 1,300 mg  1,300 mg Oral TID  tamsulosin (FLOMAX) capsule 0.4 mg  0.4 mg Oral DAILY  epoetin dm-epbx (RETACRIT) injection 10,000 Units  10,000 Units SubCUTAneous Q7D Review of Systems: 
Pertinent items are noted in HPI. Objective:  
Vital Signs:   
Visit Vitals /61 (BP 1 Location: Right arm, BP Patient Position: At rest) Pulse 66 Temp 97.4 °F (36.3 °C) Resp 16 Ht 5' 9\" (1.753 m) Wt 68.4 kg (150 lb 12.7 oz) SpO2 91% BMI 22.27 kg/m² O2 Device: Room air O2 Flow Rate (L/min): 2 l/min Temp (24hrs), Av.7 °F (36.5 °C), Min:97.3 °F (36.3 °C), Max:98.4 °F (36.9 °C) Intake/Output:  
Last shift:      No intake/output data recorded. Last 3 shifts: 08/10 1901 -  0700 In: 8952 [P.O.:1375] Out: 3927 [Urine:2425] Intake/Output Summary (Last 24 hours) at 2019 1109 Last data filed at 2019 2138 Gross per 24 hour Intake 450 ml Output 1000 ml Net -550 ml Physical Exam:  
General:  Alert, cooperative, no distress, appears stated age. Head:  Normocephalic, without obvious abnormality, atraumatic. Eyes:  Conjunctivae/corneas clear. Nose: Nares normal. Septum midline. Neck: Supple, symmetrical, trachea midline,  
   
Lungs:   Coarse rales b/l Heart:  Regular rate and rhythm, S1, S2 normal, no murmur, click, rub or gallop. Abdomen:   Soft, non-tender. Bowel sounds normal. No masses,  No organomegaly. Extremities: Extremities normal, atraumatic, no cyanosis or edema. Pulses: 2+ and symmetric all extremities. Skin: Skin color, texture, turgor normal. No rashes or lesions Data review:  
 
Recent Results (from the past 24 hour(s)) GLUCOSE, POC Collection Time: 19 11:49 AM  
Result Value Ref Range Glucose (POC) 285 (H) 65 - 100 mg/dL Performed by Elaine Jara GLUCOSE, POC Collection Time: 19  4:20 PM  
Result Value Ref Range Glucose (POC) 215 (H) 65 - 100 mg/dL Performed by Audrey Cooper GLUCOSE, POC Collection Time: 19  9:05 PM  
Result Value Ref Range Glucose (POC) 363 (H) 65 - 100 mg/dL Performed by Audrey Cooper METABOLIC PANEL, BASIC Collection Time: 19 12:43 AM  
Result Value Ref Range Sodium 132 (L) 136 - 145 mmol/L Potassium 3.5 3.5 - 5.1 mmol/L Chloride 89 (L) 97 - 108 mmol/L  
 CO2 36 (H) 21 - 32 mmol/L Anion gap 7 5 - 15 mmol/L Glucose 164 (H) 65 - 100 mg/dL BUN 64 (H) 6 - 20 MG/DL  Creatinine 2.42 (H) 0.70 - 1.30 MG/DL  
 BUN/Creatinine ratio 26 (H) 12 - 20 GFR est AA 31 (L) >60 ml/min/1.73m2 GFR est non-AA 26 (L) >60 ml/min/1.73m2 Calcium 8.1 (L) 8.5 - 10.1 MG/DL  
NT-PRO BNP Collection Time: 08/12/19 12:43 AM  
Result Value Ref Range NT pro-BNP 20,281 (H) <450 PG/ML  
GLUCOSE, POC Collection Time: 08/12/19  6:59 AM  
Result Value Ref Range Glucose (POC) 56 (L) 65 - 100 mg/dL Performed by Sudha Ryder   
GLUCOSE, POC Collection Time: 08/12/19  7:22 AM  
Result Value Ref Range Glucose (POC) 91 65 - 100 mg/dL Performed by Sudha Ryder Imaging: 
I have personally reviewed the patients radiographs and have reviewed the reports: 
CT chest with diffuse GGO and med LAD no masses no effusions Dorrie Fabry, PA

## 2019-08-12 NOTE — PROGRESS NOTES
Bedside shift change report given to [address] (oncoming nurse) by [facility] RN (offgoing nurse). Report included the following information SBAR, Kardex, MAR and Recent Results.

## 2019-08-12 NOTE — DIABETES MGMT
[facility] Progress Note Recommendations/ Comments: Chart review for variable BG. BG ranging from  mg/dl yesterday. Pt noted with fasting hypoglycemia past two mornings. This am FBG was 56 mg/dl. HS Lantus decreased from 30 to 20 units. Steroids tapered - pt now receiving 40 mg once daily at breakfast.  
Pt received 12 units of correction yesterday. If appropriate please consider: 
Please change lispro correction scale to resistant sensitivity while pt remains on steroids. Current hospital DM medication:  
Lantus 20 units at bedtime Lispro normal sensitivity correction scale Chart reviewed on Greta Valderrama. Roya Ospina Patient is a 81 y.o. male with known DM on Lantus U-300 18 units daily at home A1c:  
Lab Results Component Value Date/Time Hemoglobin A1c 9.1 (H) 05/11/2019 03:37 AM  
 Hemoglobin A1c 9.8 (H) 10/20/2017 08:11 AM  
 Hemoglobin A1c, External 8.4 08/15/2017 Recent Glucose Results:  
Lab Results Component Value Date/Time  (H) 08/12/2019 12:43 AM  
 GLUCPOC 91 08/12/2019 07:22 AM  
 GLUCPOC 56 (L) 08/12/2019 06:59 AM  
 GLUCPOC 363 (H) 08/11/2019 09:05 PM  
  
 
Lab Results Component Value Date/Time Creatinine 2.42 (H) 08/12/2019 12:43 AM  
 
Estimated Creatinine Clearance: 22.8 mL/min (A) (based on SCr of 2.42 mg/dL (H)). Active Orders Diet DIET DIABETIC WITH OPTIONS Consistent Carb 2000kcal; Regular PO intake:  
Patient Vitals for the past 72 hrs: 
 % Diet Eaten 08/10/19 0834 90 % 08/09/19 1623 80 % Will continue to follow as needed. Thank you Pawel Mosley RD, Diabetes Clinician Time spent: 4 minutes

## 2019-08-12 NOTE — PROGRESS NOTES
Hospitalist Progress Note Dagmar Wolf MD 
Answering service: [phone] OR [phone] from in house phone Cell:   
  
Date of Service:  2019 NAME:  Grecia Curtis Sr. 
:  1935 MRN:  [id number] Admission Summary:  
The patient is an 72-year-old male with past medical history of diabetes, hypertension, coronary artery disease, hypercholesterolemia, peripheral vascular disease, AFib, and CKD, who presented to the emergency room via private vehicle with chief complaint of shortness of breath. His shortness of breath has been persistent for more than one week and subsequently has worsened since yesterday. There is associated bilateral leg swelling since yesterday. The patient has been taking four albuterol nebulizer treatments with no relief. The patient also reports cough with occasional greenish sputum. He recently had pacemaker placed by Dr. Fritz Owens and had followed up with Cardiology yesterday who increased his diuretic dose. The patient has been seen in the emergency room for shortness of breath three times this week. Interval history / Subjective:  
   
 
Patient rounded on this am, He had presyncope when he wanted to walk with RT He looks dry today , He is orthostatic and hypoglycemic this am  
 
May need oxygen on discharge but the test was on hold due to above presyncope His discharge was cancelled today due to above but he is adamant and wants to go home Will revaluate in evening if meets discharge cri retia Meanwhile PT for 6 min walk Assessment & Plan:  
 
 acute hypoxemic resp failure-  POA  
improving PNA and  due to volume overload- got  IV bumex given in ED and then His bumex was held He went into almost flash pulm edema on  and his bumex was retarted CT chest   showed diffuse alveolar pattern suggestive of pulm edema , resp panel pcr is neg and legionella neg  and increase prednisone to iv , continue nebs Change to po prednisone /10 Given his improvement with restarting his diuretics , his resp distress can be attributed to the pulm edema and possible atypical PNA on admission Started on bumex iv tid on 8/8, change to oral on 8/9 Keep him negative Mycoplasma , CRAg neg Presyncope and ortho stasis on 8/12 Hold all bumex and BP meds  
dont give iv fluids If discharged needs bumex 2 mg bid po instead  
 
  
 severe pulm HTN 
RHC w/ PCW 28 ECHO 50/60% with PAP 50  
pulm consult appreciated CAP poa Xray reviewed Started on ceftriaxone in addition to azithromycin Improving Legionella Ag is negative Change to 7 days of Levaquin on discharge  
  
 suspected COPD from past tobacco use-  
resume steroids and home meds 
increased dose on 8/7 , decreased on 8/10 On nebs  
  
. KATALINA on CKD stage 4- 
 baseline creatinine 3.1-3.3; was 3.8 on admission- 
 likely due to diureitcs and hypotension Improving Metabolic acidosis On po bicarb  
  
. PVD-  
s/p stented in both LE, cont aspirin 
  
 DM type 2- Uncontrolled Blood sugars running in 400s on 8/9 Increase  lantus w accuchecks and ss insulin Hypoglycemia on 8/11 as prednisone dose was reduced Decrease dose of lantus Hypoglycemia 8/12 , Blood sugar low , decrease lantus further [place] HLD-  
resume statin meds 
  
. BPH 
- resume flomax Anemia of CKD  
 
CAD s/p CABG Bradycardia with afib w pacemaker, HTN  
BP high on 8/9 Restart home hydralazine Hold BP meds on 8/12 due to orthostatsis and presyncope Code status: full DVT prophylaxis: heaprin Care Plan discussed with: Patient/Family Disposition: TBD Hospital Problems  Date Reviewed: 7/28/2019 Codes Class Noted POA  
 CHF exacerbation ([facility] ICD-10-CM: I50.9 ICD-9-CM: 428.0  8/5/2019 Unknown [facility] ICD-10-CM: J96.01 
ICD-9-CM: 518.81  8/5/2019 Unknown Review of Systems: A comprehensive review of systems was negative except for that written in the HPI. Vital Signs:  
 Last 24hrs VS reviewed since prior progress note. Most recent are: 
Visit Vitals /61 (BP 1 Location: Right arm, BP Patient Position: At rest) Pulse 66 Temp 97.4 °F (36.3 °C) Resp 16 Ht 5' 9\" (1.753 m) Wt 68.4 kg (150 lb 12.7 oz) SpO2 91% BMI 22.27 kg/m² Intake/Output Summary (Last 24 hours) at 8/12/2019 0935 Last data filed at 8/11/2019 2138 Gross per 24 hour Intake 550 ml Output 1000 ml Net -450 ml Physical Examination:  
 
 
     
Constitutional:  on oxygen but comfortable , coughing ENT:  Oral mucous moist, oropharynx benign. Neck supple, Resp:  clear b/l breath sounds ,  
CV:  Regular rhythm, normal rate, no murmurs, gallops, rubs GI:  Soft, non distended, non tender. normoactive bowel sounds, no hepatosplenomegaly Musculoskeletal:  No edema, warm, 2+ pulses throughout Neurologic:  Moves all extremities. AAOx3, CN II-XII reviewed Data Review:  
 Review and/or order of clinical lab test 
 
 
Labs:  
 
No results for input(s): WBC, HGB, HCT, PLT, HGBEXT, HCTEXT, PLTEXT, HGBEXT, HCTEXT, PLTEXT in the last 72 hours. Recent Labs 08/12/19 
4142 08/11/19 
4222 * 132* K 3.5 3.6 CL 89* 90* CO2 36* 36*  
BUN 64* 67* CREA 2.42* 2.43* * 184* CA 8.1* 8.4* No results for input(s): SGOT, GPT, ALT, AP, TBIL, TBILI, TP, ALB, GLOB, GGT, AML, LPSE in the last 72 hours. No lab exists for component: AMYP, HLPSE No results for input(s): INR, PTP, APTT in the last 72 hours. No lab exists for component: INREXT, INREXT No results for input(s): FE, TIBC, PSAT, FERR in the last 72 hours. No results found for: FOL, RBCF No results for input(s): PH, PCO2, PO2 in the last 72 hours. No results for input(s): CPK, CKNDX, TROIQ in the last 72 hours. No lab exists for component: CPKMB No results found for: CHOL, CHOLX, CHLST, CHOLV, HDL, LDL, LDLC, DLDLP, TGLX, TRIGL, TRIGP, CHHD, CHHDX Lab Results Component Value Date/Time Glucose (POC) 91 08/12/2019 07:22 AM  
 Glucose (POC) 56 (L) 08/12/2019 06:59 AM  
 Glucose (POC) 363 (H) 08/11/2019 09:05 PM  
 Glucose (POC) 215 (H) 08/11/2019 04:20 PM  
 Glucose (POC) 285 (H) 08/11/2019 11:49 AM  
 
Lab Results Component Value Date/Time Color YELLOW/STRAW 06/25/2017 11:40 AM  
 Appearance CLEAR 06/25/2017 11:40 AM  
 Specific gravity 1.018 06/25/2017 11:40 AM  
 pH (UA) 5.0 06/25/2017 11:40 AM  
 Protein 300 (A) 06/25/2017 11:40 AM  
 Glucose NEGATIVE  06/25/2017 11:40 AM  
 Ketone NEGATIVE  06/25/2017 11:40 AM  
 Bilirubin NEGATIVE  06/25/2017 11:40 AM  
 Urobilinogen 0.2 06/25/2017 11:40 AM  
 Nitrites NEGATIVE  06/25/2017 11:40 AM  
 Leukocyte Esterase NEGATIVE  06/25/2017 11:40 AM  
 Epithelial cells FEW 06/25/2017 11:40 AM  
 Bacteria NEGATIVE  06/25/2017 11:40 AM  
 WBC 0-4 06/25/2017 11:40 AM  
 RBC 0-5 06/25/2017 11:40 AM  
 
 
 
Medications Reviewed:  
 
Current Facility-Administered Medications Medication Dose Route Frequency  insulin glargine (LANTUS) injection 16 Units  16 Units SubCUTAneous QHS  albuterol-ipratropium (DUO-NEB) 2.5 MG-0.5 MG/3 ML  3 mL Nebulization Q4H PRN  
 levoFLOXacin (LEVAQUIN) tablet 750 mg  750 mg Oral Q48H  
 aluminum-magnesium hydroxide (MAALOX) oral suspension 15 mL  15 mL Oral QID PRN  
 [Held by provider] amLODIPine (NORVASC) tablet 2.5 mg  2.5 mg Oral DAILY  predniSONE (DELTASONE) tablet 40 mg  40 mg Oral DAILY WITH BREAKFAST  [Held by provider] bumetanide (BUMEX) tablet 2 mg  2 mg Oral TID  [Held by provider] hydrALAZINE (APRESOLINE) tablet 50 mg  50 mg Oral TID  dextrose 10 % infusion 125-250 mL  125-250 mL IntraVENous PRN  
  insulin lispro (HUMALOG) injection   SubCUTAneous AC&HS  
 glucose chewable tablet 16 g  4 Tab Oral PRN  
 glucagon (GLUCAGEN) injection 1 mg  1 mg IntraMUSCular PRN  
 balsam peru-castor oil (VENELEX) ointment   Topical TID  sodium chloride (NS) flush 5-40 mL  5-40 mL IntraVENous Q8H  
 sodium chloride (NS) flush 5-40 mL  5-40 mL IntraVENous PRN  
 sodium chloride (NS) flush 5-40 mL  5-40 mL IntraVENous Q8H  
 sodium chloride (NS) flush 5-40 mL  5-40 mL IntraVENous PRN  
 acetaminophen (TYLENOL) tablet 650 mg  650 mg Oral Q4H PRN  
 HYDROcodone-acetaminophen (NORCO) 5-325 mg per tablet 1 Tab  1 Tab Oral Q4H PRN  
 morphine injection 1 mg  1 mg IntraVENous Q4H PRN  
 heparin (porcine) injection 5,000 Units  5,000 Units SubCUTAneous Q12H  aspirin chewable tablet 81 mg  81 mg Oral DAILY  [Held by provider] carvedilol (COREG) tablet 12.5 mg  12.5 mg Oral BID WITH MEALS  simvastatin (ZOCOR) tablet 20 mg  20 mg Oral QHS  sodium bicarbonate tablet 1,300 mg  1,300 mg Oral TID  tamsulosin (FLOMAX) capsule 0.4 mg  0.4 mg Oral DAILY  epoetin dm-epbx (RETACRIT) injection 10,000 Units  10,000 Units SubCUTAneous Q7D  
 
______________________________________________________________________ EXPECTED LENGTH OF STAY: 3d 19h ACTUAL LENGTH OF STAY:          7 Viviana Sanchez MD

## 2019-08-13 LAB
GLUCOSE BLD STRIP.AUTO-MCNC: 107 MG/DL (ref 65–100)
GLUCOSE BLD STRIP.AUTO-MCNC: 196 MG/DL (ref 65–100)
GLUCOSE BLD STRIP.AUTO-MCNC: 349 MG/DL (ref 65–100)
GLUCOSE BLD STRIP.AUTO-MCNC: 454 MG/DL (ref 65–100)
GLUCOSE BLD STRIP.AUTO-MCNC: 466 MG/DL (ref 65–100)
GLUCOSE BLD STRIP.AUTO-MCNC: 62 MG/DL (ref 65–100)
SERVICE CMNT-IMP: ABNORMAL

## 2019-08-13 PROCEDURE — 74011000250 HC RX REV CODE- 250: Performed by: NURSE PRACTITIONER

## 2019-08-13 PROCEDURE — 74011636637 HC RX REV CODE- 636/637: Performed by: INTERNAL MEDICINE

## 2019-08-13 PROCEDURE — 77010033678 HC OXYGEN DAILY

## 2019-08-13 PROCEDURE — 65660000000 HC RM CCU STEPDOWN

## 2019-08-13 PROCEDURE — 97530 THERAPEUTIC ACTIVITIES: CPT

## 2019-08-13 PROCEDURE — 74011250637 HC RX REV CODE- 250/637: Performed by: NURSE PRACTITIONER

## 2019-08-13 PROCEDURE — 74011636637 HC RX REV CODE- 636/637: Performed by: NURSE PRACTITIONER

## 2019-08-13 PROCEDURE — 74011250637 HC RX REV CODE- 250/637: Performed by: INTERNAL MEDICINE

## 2019-08-13 PROCEDURE — 82962 GLUCOSE BLOOD TEST: CPT

## 2019-08-13 PROCEDURE — 74011636637 HC RX REV CODE- 636/637: Performed by: FAMILY MEDICINE

## 2019-08-13 PROCEDURE — 97165 OT EVAL LOW COMPLEX 30 MIN: CPT | Performed by: OCCUPATIONAL THERAPIST

## 2019-08-13 PROCEDURE — 74011250637 HC RX REV CODE- 250/637: Performed by: FAMILY MEDICINE

## 2019-08-13 PROCEDURE — 74011250636 HC RX REV CODE- 250/636: Performed by: FAMILY MEDICINE

## 2019-08-13 PROCEDURE — 94760 N-INVAS EAR/PLS OXIMETRY 1: CPT

## 2019-08-13 PROCEDURE — 97535 SELF CARE MNGMENT TRAINING: CPT | Performed by: OCCUPATIONAL THERAPIST

## 2019-08-13 RX ORDER — INSULIN GLARGINE 100 [IU]/ML
15 INJECTION, SOLUTION SUBCUTANEOUS DAILY
Status: DISCONTINUED | OUTPATIENT
Start: 2019-08-14 | End: 2019-08-14

## 2019-08-13 RX ORDER — INSULIN LISPRO 100 [IU]/ML
15 INJECTION, SOLUTION INTRAVENOUS; SUBCUTANEOUS ONCE
Status: COMPLETED | OUTPATIENT
Start: 2019-08-13 | End: 2019-08-13

## 2019-08-13 RX ADMIN — Medication: at 21:53

## 2019-08-13 RX ADMIN — Medication 10 ML: at 14:44

## 2019-08-13 RX ADMIN — INSULIN LISPRO 2 UNITS: 100 INJECTION, SOLUTION INTRAVENOUS; SUBCUTANEOUS at 12:06

## 2019-08-13 RX ADMIN — HEPARIN SODIUM 5000 UNITS: 5000 INJECTION INTRAVENOUS; SUBCUTANEOUS at 21:54

## 2019-08-13 RX ADMIN — ALUMINUM HYDROXIDE AND MAGNESIUM HYDROXIDE 15 ML: 200; 200 SUSPENSION ORAL at 19:55

## 2019-08-13 RX ADMIN — SIMVASTATIN 20 MG: 20 TABLET, FILM COATED ORAL at 21:51

## 2019-08-13 RX ADMIN — Medication 10 ML: at 06:49

## 2019-08-13 RX ADMIN — Medication 10 ML: at 21:52

## 2019-08-13 RX ADMIN — Medication: at 17:56

## 2019-08-13 RX ADMIN — INSULIN LISPRO 4 UNITS: 100 INJECTION, SOLUTION INTRAVENOUS; SUBCUTANEOUS at 21:52

## 2019-08-13 RX ADMIN — LEVOFLOXACIN 750 MG: 750 TABLET, FILM COATED ORAL at 17:51

## 2019-08-13 RX ADMIN — PREDNISONE 40 MG: 20 TABLET ORAL at 06:48

## 2019-08-13 RX ADMIN — Medication 10 ML: at 21:53

## 2019-08-13 RX ADMIN — HEPARIN SODIUM 5000 UNITS: 5000 INJECTION INTRAVENOUS; SUBCUTANEOUS at 09:37

## 2019-08-13 RX ADMIN — SODIUM BICARBONATE 1300 MG: 650 TABLET ORAL at 09:36

## 2019-08-13 RX ADMIN — INSULIN LISPRO 15 UNITS: 100 INJECTION, SOLUTION INTRAVENOUS; SUBCUTANEOUS at 17:52

## 2019-08-13 RX ADMIN — SODIUM BICARBONATE 1300 MG: 650 TABLET ORAL at 17:51

## 2019-08-13 RX ADMIN — SODIUM BICARBONATE 1300 MG: 650 TABLET ORAL at 21:51

## 2019-08-13 RX ADMIN — Medication 10 ML: at 14:45

## 2019-08-13 RX ADMIN — ASPIRIN 81 MG CHEWABLE TABLET 81 MG: 81 TABLET CHEWABLE at 09:36

## 2019-08-13 RX ADMIN — IPRATROPIUM BROMIDE AND ALBUTEROL SULFATE 3 ML: .5; 3 SOLUTION RESPIRATORY (INHALATION) at 15:49

## 2019-08-13 RX ADMIN — IPRATROPIUM BROMIDE AND ALBUTEROL SULFATE 3 ML: .5; 3 SOLUTION RESPIRATORY (INHALATION) at 11:29

## 2019-08-13 RX ADMIN — Medication: at 09:36

## 2019-08-13 RX ADMIN — TAMSULOSIN HYDROCHLORIDE 0.4 MG: 0.4 CAPSULE ORAL at 09:36

## 2019-08-13 NOTE — PROGRESS NOTES
See Vital signs below - continues orthostatic hypotension - unable to stand > 2 mins secondary to legs become weak/legs shaking - still unable to get Bp in standing  - pt left up in bedside chair - instructed to call for assist if wants to change position; nursing notified. Vitals:  
 08/13/19 1030 08/13/19 1037 08/13/19 1042 08/13/19 1050 BP: 141/81 95/42 Comment: no reported dizziness 106/69 94/59 BP 1 Location: Right arm Right arm Right arm Right arm- Sitting BP Patient Position: Supine Sitting Comment: after sitting 5 mins After standing 2 min - unable to get Bp in standing - legs start buckling - c/o weakness Pulse: 70 76 74 76 Resp:      
Temp:      
SpO2: 97% 98% 99% 98% Weight:      
Height:      
 
Rudy Rahman, PT

## 2019-08-13 NOTE — PROGRESS NOTES
MARTINEZ Plan:  
  
1. Home with Home Oxygen; CM has sent a referral to [address]; [address] needs a chart note from the attending that shows a valid diagnosis (needs supporting diagnosis), CM has placed a note for MD/RN/PT on the bedside chart  
  
2. Transport; wife to transport Dee Fox [phone]) 
  
3. Continue Medical Care, follow up with PCP/or Specialist  
 
 
Richi Del Cid 
[facility], later [facility] [phone]

## 2019-08-13 NOTE — PROGRESS NOTES
Problem: Self Care Deficits Care Plan (Adult) Goal: *Acute Goals and Plan of Care (Insert Text) Description FUNCTIONAL STATUS PRIOR TO ADMISSION: Patient was independent and active without use of DME. 
 
HOME SUPPORT: The patient lived with his wife and son but did not require assist. 
 
Occupational Therapy Goals Initiated 8/13/2019 1. Patient will perform grooming with modified independence standing at the sink within 7 day(s). 2.  Patient will perform lower body dressing with supervision/set-up within 7 day(s). 3.  Patient will perform static standing > or = 3 minutes with close supervision and without loss of balance within 7 day(s). 4.  Patient will perform toilet transfers with supervision/set-up into and out of bathroom within 7 day(s). 5.  Patient will perform all aspects of toileting with modified independence within 7 day(s). 6.  Patient will demonstrate safety awareness during ADL tasks and mobility within 7 day(s). OCCUPATIONAL THERAPY EVALUATION Patient: Pritesh Rene (81 y.o. male) Date: 8/13/2019 Primary Diagnosis: [facility] [I50.9] Acute hypoxemic respiratory failure ([facility] [J96.01] Precautions:   Fall(orthostatic blood pressure, weak LE's symptom) ASSESSMENT Based on the objective data described below, the patient presents with continued orthostatic hypotension, denies symptoms, however after standing ~2 minutes noted LE's trembling and posterior lean and mod to max assist to maintain balance, unable to get reading of BP in standing due to artifact related to gripping walker and trembling, upon sitting noted drop. Patient adamant on sitting in chair and BP increased after sitting ~5 minutes (see doc flowsheet). Current Level of Function Impacting Discharge (ADLs/self-care): unable to ambulate to bathroom or in room due to orthostatic hypotension resulting in increased assist toileting, bathing, standing tasks Functional Outcome Measure: The patient scored 50/100 on the Barthel Index outcome measure Other factors to consider for discharge: adamant against rehab and wants to go home, decreased insight to deficits Patient will benefit from skilled therapy intervention to address the above noted impairments. PLAN : 
Recommendations and Planned Interventions: self care training, functional mobility training, therapeutic exercise, balance training, therapeutic activities, cognitive retraining, endurance activities, patient education, home safety training and family training/education Frequency/Duration: Patient will be followed by occupational therapy 5 times a week to address goals. Recommendation for discharge: (in order for the patient to meet his/her long term goals) To be determined: would benefit from rehab however declining at this time, if BP improves may make good progress and benefit from home health This discharge recommendation: 
Has not yet been discussed the attending provider and/or case management Equipment recommendations for successful discharge (if) home: none SUBJECTIVE:  
Patient stated I'm not going to rehab.  OBJECTIVE DATA SUMMARY:  
HISTORY:  
Past Medical History:  
Diagnosis Date CAD (coronary artery disease) s/p CABG 2008 Carotid arterial disease ([facility] CKD (chronic kidney disease) stage 3, GFR 30-59 ml/min ([facility]) 10/21/2017  
 hypertension and DM nephrosclerosis Diabetes mellitus, type 2 ([facility] [facility] Hypercholesteremia Hypertension Paroxysmal atrial fibrillation ([facility] 10/21/2017  
 new onset afib with BRPR 10-19-17 admit PVC's (premature ventricular contractions) 5/26/2014 PVD (peripheral vascular disease) ([facility] Past Surgical History:  
Procedure Laterality Date HX CORONARY ARTERY BYPASS GRAFT    
 HX HEART CATHETERIZATION    
 [place] TCAT INSJ/RPL PERM LEADLESS PACEMAKER RV W/IMG N/A 5/9/2019 INSERT OR REPLACE TRANSCATH PPM LEADLESS performed by Remigio Cole MD at [address], [facility] [address] [facility] Expanded or extensive additional review of patient history:  
 
Home Situation Home Environment: Private residence # Steps to Enter: 3 One/Two Story Residence: One story Living Alone: No 
Support Systems: Spouse/Significant Other/Partner Patient Expects to be Discharged to[de-identified] Private residence Current DME Used/Available at Home: Walker, rolling Hand dominance: Right EXAMINATION OF PERFORMANCE DEFICITS: 
Cognitive/Behavioral Status: 
Neurologic State: Alert Orientation Level: Oriented X4 Cognition: Appropriate decision making; Appropriate for age attention/concentration; Appropriate safety awareness; Follows commands Perception: Appears intact Perseveration: No perseveration noted Safety/Judgement: Awareness of environment;Decreased awareness of need for assistance; Fall prevention;Lack of insight into deficits Skin: intact Edema: none noted Hearing: Auditory Auditory Impairment: Hard of hearing, bilateral(functional) Range of Motion: 
AROM: Generally decreased, functional 
  
 
Strength: 
Strength: Generally decreased, functional 
   
 
Coordination: 
Coordination: Within functional limits Fine Motor Skills-Upper: Left Intact; Right Intact Gross Motor Skills-Upper: Left Intact; Right Intact Tone & Sensation: 
Tone: Normal 
Sensation: Intact Balance: 
Sitting: Intact; Without support Standing: Impaired; With support Standing - Static: Constant support;Poor; Other (comment)(after ~1 minute standing, posterior lean and unaware) Standing - Dynamic : Poor Functional Mobility and Transfers for ADLs: 
Bed Mobility: 
Rolling: Modified independent Supine to Sit: Modified independent Transfers: 
Sit to Stand: Contact guard assistance Stand to Sit: Contact guard assistance Bed to Chair: Minimum assistance;Assist x1;Adaptive equipment Bathroom Mobility: Dependent/total assistance(unable to tolerate) Toilet Transfer : Minimum assistance;Assist x1(stand pivot only) ADL Assessment: 
Feeding: Independent Oral Facial Hygiene/Grooming: Setup(seated) Bathing: Moderate assistance;Minimum assistance(seated) Upper Body Dressing: Setup Lower Body Dressing: Minimum assistance Toileting: Moderate assistance; Other (comment)(increased assist clothing management) ADL Intervention and task modifications: 
  
 
 Educated on role of OT, pursed lip breathing, fall prevention, home safety, stood for ~2 minutes and with heavy posterior lean and without insight to deficits, educated on inability to ambulate at this time due to fall risk as his LE's trembling and posterior lean Cognitive Retraining Safety/Judgement: Awareness of environment;Decreased awareness of need for assistance; Fall prevention;Lack of insight into deficits Functional Measure: 
Barthel Index: 
 
Bathin Bladder: 10 Bowels: 10 
Groomin Dressin Feeding: 10 Mobility: 0 Stairs: 0 Toilet Use: 5 Transfer (Bed to Chair and Back): 5 Total: 50/100 Percentage of impairment  
0% 1-19% 20-39% 40-59% 60-79% 80-99% 100% Barthel Score 0-100 100 99-80 79-60 59-40 20-39 1-19 
 0 The Barthel ADL Index: Guidelines 1. The index should be used as a record of what a patient does, not as a record of what a patient could do. 2. The main aim is to establish degree of independence from any help, physical or verbal, however minor and for whatever reason. 3. The need for supervision renders the patient not independent. 4. A patient's performance should be established using the best available evidence. Asking the patient, friends/relatives and nurses are the usual sources, but direct observation and common sense are also important. However direct testing is not needed. 5. Usually the patient's performance over the preceding 24-48 hours is important, but occasionally longer periods will be relevant. 6. Middle categories imply that the patient supplies over 50 per cent of the effort. 7. Use of aids to be independent is allowed. Buzz Anderson., Barthel, D.W. (2246). Functional evaluation: the Barthel Index. [address] [facility] (14)2. [facility] Canales, Gibson Rudolph., Ankit Vyas., [place], [address] (1999). Measuring the change indisability after inpatient rehabilitation; comparison of the responsiveness of the Barthel Index and Functional [place] Measure. Journal of Neurology, Neurosurgery, and Psychiatry, 66(4), 485-528. CORIN Loera, TAVARES Snyder, & Selma Awad M.A. (2004.) Assessment of post-stroke quality of life in cost-effectiveness studies: The usefulness of the Barthel Index and the EuroQoL-5D. [facility], 13, 697-26 Occupational Therapy Evaluation Charge Determination History Examination Decision-Making LOW Complexity : Brief history review  MEDIUM Complexity : 3-5 performance deficits relating to physical, cognitive , or psychosocial skils that result in activity limitations and / or participation restrictions MEDIUM Complexity : Patient may present with comorbidities that affect occupational performnce. Miniml to moderate modification of tasks or assistance (eg, physical or verbal ) with assesment(s) is necessary to enable patient to complete evaluation Based on the above components, the patient evaluation is determined to be of the following complexity level: LOW Pain Rating: No complaint Activity Tolerance:  
Poor and signs and symptoms of orthostatic hypotension Please refer to the flowsheet for vital signs taken during this treatment. After treatment patient left in no apparent distress:   
Sitting in chair and Call bell within reach COMMUNICATION/EDUCATION:  
 The patients plan of care was discussed with: Physical Therapist and Registered Nurse. Home safety education was provided and the patient/caregiver indicated understanding. and Patient/family have participated as able in goal setting and plan of care. This patients plan of care is appropriate for delegation to [facility]. Thank you for this referral. 
Adwoa Rogers OTR/L Time Calculation: 25 mins

## 2019-08-13 NOTE — PROGRESS NOTES
Bedside shift change report given to Hyacinth Brito RN (oncoming nurse) by Ken Etienne RN (offgoing nurse). Report included the following information SBAR, Kardex and MAR.

## 2019-08-13 NOTE — PROGRESS NOTES
Spiritual Care Partner Volunteer visited patient in room 208/01 on 8.13.19. Documented by: : Rev. Maxine Worthy. Nikole Bender; [facility], to contact [phone] [address] call: [phone]

## 2019-08-13 NOTE — PROGRESS NOTES
Problem: Falls - Risk of 
Goal: *Absence of Falls Description Document Shreya Blanco Fall Risk and appropriate interventions in the flowsheet. Outcome: Progressing Towards Goal 
Note:  
Fall Risk Interventions: 
Mobility Interventions: Communicate number of staff needed for ambulation/transfer Medication Interventions: Evaluate medications/consider consulting pharmacy Elimination Interventions: Patient to call for help with toileting needs History of Falls Interventions: Investigate reason for fall, Room close to nurse's station

## 2019-08-13 NOTE — WOUND CARE
WOCN Note: Follow-up visit for sacrum with scattered magui of blanching erythema. Chart shows: 
Admitted for: CHF exacerbation; history of : DM, HTN and CABG Assessment: patient alert and oriented ordering lunch via nutritional services. Patient states he had a fall this a.m. Communicative, continent and mobile only with assistance. Patient turns self in bed and repositions. Needs 2 assist to lift up in the bed. Bed: Lyons Frame Bed Patient reports no pain with assessment. Bilateral heels and sacral skin intact and without erythema. Heels offloaded on pillows. -left and right buttock: appears like patient has been scratching and picking with noted scars and redness. Recommendations:   
-left and right buttock: Venelex ointment TID to left and right buttock. Minimize layers of linen/pads under patient to optimize support surface. Turn/reposition approximately every 2 hours and offload heels. Manage incontinence / promote continence; Purple Tube: Nourishing Skin cream to buttocks and sacrum daily and as needed with incontinence care. Specialty bed: Lyons Frame Bed Discussed above plan with patient and RN's Ronni Brown and Elijah Fischer. Transition of Care: Plan to follow weekly and as needed while admitted to hospital.  
 
Ronda FRANCO RN Wound Care Department Office: [phone] Pager: [phone]

## 2019-08-13 NOTE — PROGRESS NOTES
Bedside shift change report given to [place] (oncoming nurse) by Danii Tirado (offgoing nurse). Report included the following information SBAR, Kardex, MAR and Recent Results.

## 2019-08-13 NOTE — PROGRESS NOTES
[facility] Pulmonary, Critical Care, and Sleep Medicine Progress Note Name: Jose Harvey MRN: [id number] : 1935 Hospital: [address] [address]  Date: 2019 IMPRESSION:  
· Acute hypoxic resp failure- with diffuse GGO- in setting of fish pond cleaning-  concerns for water borne atypicals like legionella. MAC also possible. · KATALINA on CKD · DM2 
· CAD/CABG 
· HTN 
· PPM  · PAF  
  
RECOMMENDATIONS:  
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 He will need repeat CT scan in 4-6 wks on outpatient · It appears he will need O2 at discharge as well. We can reassess O2 requirements in follow up. · We will be available again to see if needed Subjective:  
 
 Noted fall yesterday per RN report. Resting in bed  Feeling better  - Resting in bed. States he feels much better today. Cough decreased in frequency and amt of green sputum. Dyspnea improved. On 3LPM. Afebrile. Initial HPI This patient has been seen and evaluated at the request of Dr. German Desai for SOB. Patient is a 81 y.o. male Who has a fish pond at home and routinely cleans it. Pt over last 6 weeks with coughj - green sputum and worsening SOB. Admitted with CT chest showing b/l GGO no fever or WBC. Pt started on rocephin/azithro for atypical pna ? Legionella. Pt states cough a little bit better. No n/v/d. No CP  Or abd pain. Past Medical History:  
Diagnosis Date  CAD (coronary artery disease) s/p CABG   Carotid arterial disease ([facility]  CKD (chronic kidney disease) stage 3, GFR 30-59 ml/min (formerly [facility]) 10/21/2017  
 hypertension and DM nephrosclerosis  Diabetes mellitus, type 2 ([facility]  Hypercholesteremia  Hypertension  Paroxysmal atrial fibrillation ([facility] 10/21/2017  
 new onset afib with BRPR 10-19-17 admit  PVC's (premature ventricular contractions) 5/26/2014  PVD (peripheral vascular disease) ([facility] Past Surgical History:  
Procedure Laterality Date  HX CORONARY ARTERY BYPASS GRAFT    
 HX HEART CATHETERIZATION    
 NE TCAT INSJ/RPL PERM LEADLESS PACEMAKER RV W/IMG N/A 5/9/2019 INSERT OR REPLACE TRANSCATH PPM LEADLESS performed by Donya Hawkins MD at [place] [address], [address] CATH LAB Prior to Admission medications Medication Sig Start Date End Date Taking? Authorizing Provider  
bumetanide (BUMEX) 1 mg tablet Take 3 mg by mouth two (2) times a day. Yes Provider, Historical  
albuterol (PROVENTIL VENTOLIN) 2.5 mg /3 mL (0.083 %) nebulizer solution 3 mL by Nebulization route every four (4) hours as needed for Wheezing. 5/14/19  Yes Zahra Talamantes MD  
carvedilol (COREG) 12.5 mg tablet Take 1 Tab by mouth two (2) times daily (with meals). Patient taking differently: Take 12.5 mg by mouth daily. 5/12/19  Yes Keya Sandoval, DO  
hydrALAZINE (APRESOLINE) 50 mg tablet Take 3 Tabs by mouth three (3) times daily. 5/12/19  Yes Keya Sandoval, DO  
sodium bicarbonate 650 mg tablet Take 2 Tabs by mouth three (3) times daily. 5/12/19  Yes Keya Sandoval, DO  
tamsulosin (FLOMAX) 0.4 mg capsule Take 1 Cap by mouth daily. 5/13/19  Yes Keya Sandoval, DO  
insulin glargine U-300 conc 300 unit/mL (1.5 mL) inpn 18 Units by SubCUTAneous route every morning. Yes Provider, Historical  
aspirin 81 mg chewable tablet Take 1 Tab by mouth daily. 9/27/18  Yes Tina Monroy MD  
simvastatin (ZOCOR) 20 mg tablet Take 20 mg by mouth nightly. Yes Provider, Historical  
omega 3-dha-epa-fish oil (FISH OIL) 100-160-1,000 mg cap Take 1 Cap by mouth two (2) times a day.    Yes Provider, Historical  
cinnamon bark (CINNAMON) 500 mg cap Take 1,000 mg by mouth two (2) times a day.   Yes Provider, Historical  
 
Allergies Allergen Reactions  Lisinopril Cough Social History Tobacco Use  Smoking status: Former Smoker Packs/day: 3.00 Years: 20.00 Pack years: 60.00 Types: Cigarettes Last attempt to quit: 1985 Years since quittin.6  Smokeless tobacco: Never Used Substance Use Topics  Alcohol use: No  
  
History reviewed. No pertinent family history. Current Facility-Administered Medications Medication Dose Route Frequency  insulin glargine (LANTUS) injection 16 Units  16 Units SubCUTAneous QHS  levoFLOXacin (LEVAQUIN) tablet 750 mg  750 mg Oral Q48H  
 [Held by provider] amLODIPine (NORVASC) tablet 2.5 mg  2.5 mg Oral DAILY  predniSONE (DELTASONE) tablet 40 mg  40 mg Oral DAILY WITH BREAKFAST  [Held by provider] hydrALAZINE (APRESOLINE) tablet 50 mg  50 mg Oral TID  insulin lispro (HUMALOG) injection   SubCUTAneous AC&HS  
 balsam peru-castor oil (VENELEX) ointment   Topical TID  sodium chloride (NS) flush 5-40 mL  5-40 mL IntraVENous Q8H  
 sodium chloride (NS) flush 5-40 mL  5-40 mL IntraVENous Q8H  
 heparin (porcine) injection 5,000 Units  5,000 Units SubCUTAneous Q12H  aspirin chewable tablet 81 mg  81 mg Oral DAILY  [Held by provider] carvedilol (COREG) tablet 12.5 mg  12.5 mg Oral BID WITH MEALS  simvastatin (ZOCOR) tablet 20 mg  20 mg Oral QHS  sodium bicarbonate tablet 1,300 mg  1,300 mg Oral TID  tamsulosin (FLOMAX) capsule 0.4 mg  0.4 mg Oral DAILY  epoetin dm-epbx (RETACRIT) injection 10,000 Units  10,000 Units SubCUTAneous Q7D Review of Systems: 
Pertinent items are noted in HPI. Objective:  
Vital Signs:   
Visit Vitals /64 (BP 1 Location: Right arm, BP Patient Position: At rest) Pulse 65 Temp 97.6 °F (36.4 °C) Resp 16 Ht 5' 9\" (1.753 m) Wt 65 kg (143 lb 3.2 oz) SpO2 100% BMI 21.15 kg/m² O2 Device: Nasal cannula O2 Flow Rate (L/min): 4 l/min Temp (24hrs), Av.2 °F (36.2 °C), Min:96 °F (35.6 °C), Max:97.8 °F (36.6 °C) Intake/Output:  
Last shift:      701 - 1900 In: 240 [P.O.:240] Out: - Last 3 shifts: 1901 -  07 In: 560 [P.O.:560] Out: 1425 [[facility]:4694] Intake/Output Summary (Last 24 hours) at 2019 4748 Last data filed at 2019 2062 Gross per 24 hour Intake 480 ml Output 975 ml Net -495 ml Physical Exam:  
General:  Alert, cooperative, no distress, appears stated age. Head:  Normocephalic, without obvious abnormality, atraumatic. Eyes:  Conjunctivae/corneas clear. Nose: Nares normal. Septum midline. Neck: Supple, symmetrical, trachea midline,  
   
Lungs:   Coarse rales b/l Heart:  Regular rate and rhythm, S1, S2 normal, no murmur, click, rub or gallop. Abdomen:   Soft, non-tender. Bowel sounds normal. No masses,  No organomegaly. Extremities: Extremities normal, atraumatic, no cyanosis or edema. Pulses: 2+ and symmetric all extremities. Skin: Skin color, texture, turgor normal. No rashes or lesions Data review:  
 
Recent Results (from the past 24 hour(s)) CBC W/O DIFF Collection Time: 19 11:05 AM  
Result Value Ref Range WBC 11.4 (H) 4.1 - 11.1 K/uL  
 RBC 3.67 (L) 4.10 - 5.70 M/uL  
 HGB 10.3 (L) 12.1 - 17.0 g/dL HCT 31.3 (L) 36.6 - 50.3 % MCV 85.3 80.0 - 99.0 FL  
 MCH 28.1 26.0 - 34.0 PG  
 MCHC 32.9 30.0 - 36.5 g/dL  
 RDW 14.9 (H) 11.5 - 14.5 % PLATELET 307 975 - 178 K/uL MPV 10.1 8.9 - 12.9 FL  
 NRBC 0.0 0  WBC ABSOLUTE NRBC 0.00 0.00 - 0.01 K/uL GLUCOSE, POC Collection Time: 19 11:14 AM  
Result Value Ref Range Glucose (POC) 156 (H) 65 - 100 mg/dL Performed by Akhil Keller GLUCOSE, POC Collection Time: 19 11:40 AM  
Result Value Ref Range Glucose (POC) 162 (H) 65 - 100 mg/dL Performed by Marilyn Aguilar GLUCOSE, POC  
 Collection Time: 08/12/19  4:37 PM  
Result Value Ref Range Glucose (POC) 269 (H) 65 - 100 mg/dL Performed by Arely Cosby GLUCOSE, POC Collection Time: 08/12/19 10:08 PM  
Result Value Ref Range Glucose (POC) 279 (H) 65 - 100 mg/dL Performed by Juanita Santos   
GLUCOSE, POC Collection Time: 08/13/19  6:46 AM  
Result Value Ref Range Glucose (POC) 62 (L) 65 - 100 mg/dL Performed by Jayson Hoover, POC Collection Time: 08/13/19  7:08 AM  
Result Value Ref Range Glucose (POC) 107 (H) 65 - 100 mg/dL Performed by [facility] ([facility]) AT [facility]: 
I have personally reviewed the patients radiographs and have reviewed the reports: 
CT chest with diffuse GGO and med LAD no masses no effusions MICHELLE Merrill

## 2019-08-13 NOTE — PROGRESS NOTES
Problem: Mobility Impaired (Adult and Pediatric) Goal: *Acute Goals and Plan of Care (Insert Text) Description FUNCTIONAL STATUS PRIOR TO ADMISSION: Patient was independent without use of DME. 
 
HOME SUPPORT PRIOR TO ADMISSION: The patient lived with wife/son but did not require assist. 
 
Physical Therapy Goals Initiated 8/12/2019 1. Patient will transfer from bed to chair and chair to bed with modified independence using the least restrictive device within 7 day(s). 2.  Patient will perform sit to stand with modified independence within 7 day(s). 3.  Patient will ambulate with modified independence for > 200 feet with the least restrictive device within 7 day(s). 4.  Patient will ascend/descend 3 stairs with one handrail(s) with supervision/set-up within 7 day(s). Outcome: Progressing Towards Goal 
PHYSICAL THERAPY TREATMENT Patient: Art Ng (81 y.o. male) Date: 8/13/2019 Diagnosis: CHF exacerbation ([facility] [I50.9] Acute hypoxemic respiratory failure (HCC) [J96.01] <principal problem not specified> Precautions: Fall Chart, physical therapy assessment, plan of care and goals were reviewed. ASSESSMENT Based on the objective data described below, pt continues to demonstrate orthostatic hypotension. Patient is asymptomatic in sitting with Bp 95/42 - able to stand only 2 mins prior to becoming weak - legs shaking. Unable to safely attempt standing or amb at this time. Patient tolerating sitting up in chair. Current Level of Function Impacting Discharge (mobility/balance): Hypotension limiting any standing or amb Other factors to consider for discharge: ? Need for home oxygen - have been unable to monitor oxygen sats during ambulation PLAN : 
Patient continues to benefit from skilled intervention to address the above impairments. Continue treatment per established plan of care. to address goals. Recommendation for discharge: (in order for the patient to meet his/her long term goals-  
Pending assessment of gait/balance - anticipate Home Health This discharge recommendation: 
Has been made in collaboration with the attending provider and/or case management Equipment recommendations for successful discharge (if) home: patient owns DME required for discharge - pt has RW at home SUBJECTIVE:  
Patient stated well I do feel better today - I wonder why I can't stand or walk  without my Bp dropping OBJECTIVE DATA SUMMARY:  
Critical Behavior: 
Neurologic State: Alert Orientation Level: Oriented X4 Cognition: Appropriate decision making, Appropriate for age attention/concentration, Appropriate safety awareness Safety/Judgement: Awareness of environment, Decreased awareness of need for safety Functional Mobility Training: 
Bed Mobility: 
Rolling: Modified independent Supine to Sit: Modified independent Transfers: 
Sit to Stand: Contact guard assistance Stand to Sit: Contact guard assistance Stand Pivot Transfers: Minimal assistance Balance: 
Sitting: Intact; Without support Standing: Impaired; With support Standing - Static: Fair;Constant support Standing - Dynamic : Fair;Constant support Ambulation/Gait Training: 
  
 Unable secondary to hypotension and pt unsteady when standing. Pain Rating: 
Pt denied pain. Activity Tolerance:  
Poor - unable to tolerate standing position Please refer to the flowsheet for vital signs taken during this treatment. After treatment patient left in no apparent distress:  
Sitting in chair, Call bell within reach and Caregiver / family present COMMUNICATION/COLLABORATION:  
The patients plan of care was discussed with: Registered Nurse Ministerio Cade, PT Time Calculation: 25 mins

## 2019-08-13 NOTE — DIABETES MGMT
[facility] Progress Note Recommendations/ Comments: Chart review for variable BG. BG ranging from  mg/dl over the last 24 hours. If appropriate please consider: 
Please change lispro correction scale to high sensitivity due to renal status Message sent to Dr. Andrei Lopez regarding above Current hospital DM medication:  
Lantus 16 units at bedtime Lispro normal sensitivity correction scale Chart reviewed on [address] Patient is a 81 y.o. male with known DM on Lantus U-300 18 units daily at home A1c:  
Lab Results Component Value Date/Time Hemoglobin A1c 9.1 (H) 05/11/2019 03:37 AM  
 Hemoglobin A1c 9.8 (H) 10/20/2017 08:11 AM  
 Hemoglobin A1c, External 8.4 08/15/2017 Recent Glucose Results:  
Lab Results Component Value Date/Time GLUCPOC 196 (H) 08/13/2019 11:23 AM  
 GLUCPOC 107 (H) 08/13/2019 07:08 AM  
 GLUCPOC 62 (L) 08/13/2019 06:46 AM  
  
 
Lab Results Component Value Date/Time Creatinine 2.42 (H) 08/12/2019 12:43 AM  
 
Estimated Creatinine Clearance: 21.6 mL/min (A) (based on SCr of 2.42 mg/dL (H)). Active Orders Diet DIET DIABETIC WITH OPTIONS Consistent Carb 2000kcal; Regular PO intake:  
Patient Vitals for the past 72 hrs: 
 % Diet Eaten 08/13/19 1406 100 % 08/13/19 0834 25 % 08/12/19 1710 100 % 08/12/19 1356 100 % 08/12/19 0918 100 % Will continue to follow as needed. Thank you Moo Sanchez, MS, RN, CDE Time spent: 4 minutes

## 2019-08-13 NOTE — PROGRESS NOTES
[facility] 
 [address] [address], [address] [facility] Phone: [phone]   [phone]   
  
Nephrology Progress Note Jean Paul Miller.     1935     [id number] Date of Admission : 8/5/2019 08/13/19 CC:  Follow up for KATALINA on CKD Assessment and Plan KATALINA  on CKD  
- 2/2 pre renal azotemia from diuretics, infection - Cr stable 
- hold diuretics for now, he appears dry on exam 
- repeat labs in AM 
 
COPD flare, RML PNA: 
- Mx per primary team 
- abx per pulm 
  
CKD IV: 
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 
- baseline Cr at best : 2.7- 3 mg/dl - UPCR showed 11 gm of Protein  
- he wants to do PD if he becomes ESRD Metablolic Acidosis: 
- from chronic resp disease and contraction alkalosis from diuresis Pulm HTN: 
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  
- RHC w/ PCW 28 
 
Hypoxia: 
- from PNA + pulm edema 
- abx per pulmonary 
  
HTN: 
- BP low  
- hold antihypertensives for now 
  
Bradycardia : 
- s/p PPM on 5/9 
  
Anemia of CKD: 
- hgb stable - weekly MICHAEL for now 
  
Afib: 
- per cards 
  
CAD s/p CABG 
  
DM2: 
- on insulin Interval History: 
Seen and examined. Had a fall this AM while doing his 6 minute walk. Did not hit his head. BP borderline. Cr stable. Apparently O2 in the low 70s off oxygen. Review of Systems: Pertinent items are noted in HPI. Current Medications:  
Current Facility-Administered Medications Medication Dose Route Frequency  insulin glargine (LANTUS) injection 16 Units  16 Units SubCUTAneous QHS  albuterol-ipratropium (DUO-NEB) 2.5 MG-0.5 MG/3 ML  3 mL Nebulization Q4H PRN  
 levoFLOXacin (LEVAQUIN) tablet 750 mg  750 mg Oral Q48H  
 aluminum-magnesium hydroxide (MAALOX) oral suspension 15 mL  15 mL Oral QID PRN  
 [Held by provider] amLODIPine (NORVASC) tablet 2.5 mg  2.5 mg Oral DAILY  predniSONE (DELTASONE) tablet 40 mg  40 mg Oral DAILY WITH BREAKFAST  [Held by provider] hydrALAZINE (APRESOLINE) tablet 50 mg  50 mg Oral TID  dextrose 10 % infusion 125-250 mL  125-250 mL IntraVENous PRN  
 insulin lispro (HUMALOG) injection   SubCUTAneous AC&HS  
 glucose chewable tablet 16 g  4 Tab Oral PRN  
 glucagon (GLUCAGEN) injection 1 mg  1 mg IntraMUSCular PRN  
 balsam peru-castor oil (VENELEX) ointment   Topical TID  sodium chloride (NS) flush 5-40 mL  5-40 mL IntraVENous Q8H  
 sodium chloride (NS) flush 5-40 mL  5-40 mL IntraVENous PRN  
 sodium chloride (NS) flush 5-40 mL  5-40 mL IntraVENous Q8H  
 sodium chloride (NS) flush 5-40 mL  5-40 mL IntraVENous PRN  
 acetaminophen (TYLENOL) tablet 650 mg  650 mg Oral Q4H PRN  
 HYDROcodone-acetaminophen (NORCO) 5-325 mg per tablet 1 Tab  1 Tab Oral Q4H PRN  
 morphine injection 1 mg  1 mg IntraVENous Q4H PRN  
 heparin (porcine) injection 5,000 Units  5,000 Units SubCUTAneous Q12H  aspirin chewable tablet 81 mg  81 mg Oral DAILY  [Held by provider] carvedilol (COREG) tablet 12.5 mg  12.5 mg Oral BID WITH MEALS  simvastatin (ZOCOR) tablet 20 mg  20 mg Oral QHS  sodium bicarbonate tablet 1,300 mg  1,300 mg Oral TID  tamsulosin (FLOMAX) capsule 0.4 mg  0.4 mg Oral DAILY  epoetin dm-epbx (RETACRIT) injection 10,000 Units  10,000 Units SubCUTAneous Q7D Allergies Allergen Reactions  Lisinopril Cough Objective: 
Vitals:   
Vitals:  
 08/13/19 1030 08/13/19 1037 08/13/19 1042 08/13/19 1050 BP: 141/81 95/42 106/69 94/59 Pulse: 70 76 74 76 Resp:      
Temp:      
SpO2: 97% 98% 99% 98% Weight:      
Height:      
 
Intake and Output: 
08/13 0701 - 08/13 1900 In: 240 [P.O.:240] Out: -  
08/11 1901 - 08/13 0700 In: 560 [P.O.:560] Out: 1425 [[id number]] Physical Examination: 
 
General: NAD,Conversant Neck:  Supple, no mass Resp:  Lungs mostly clear CV:  RRR,  no murmur or rub,no LE edema GI:  Soft, NT, + Bowel sounds, no hepatosplenomegaly Neurologic:  Non focal 
Psych:             AAO x 3 appropriate affect  
 
 
[]    High complexity decision making was performed 
[]    Patient is at high-risk of decompensation with multiple organ involvement Lab Data Personally Reviewed: I have reviewed all the pertinent labs, microbiology data and radiology studies during assessment. Recent Labs 08/12/19 
2691 08/11/19 
9265 * 132* K 3.5 3.6 CL 89* 90* CO2 36* 36* * 184* BUN 64* 67* CREA 2.42* 2.43* CA 8.1* 8.4* Recent Labs 08/12/19 
1105 WBC 11.4* HGB 10.3* HCT 31.3*  
 No results found for: SDES Lab Results Component Value Date/Time Culture result: NO GROWTH 5 DAYS 08/05/2019 10:46 AM  
 Culture result: CANDIDA ALBICANS (A) 03/16/2017 06:00 AM  
 Culture result: NO GROWTH 5 DAYS 03/16/2017 05:40 AM  
 
Recent Results (from the past 24 hour(s)) GLUCOSE, POC Collection Time: 08/12/19  4:37 PM  
Result Value Ref Range Glucose (POC) 269 (H) 65 - 100 mg/dL Performed by Charlotte Staley GLUCOSE, POC Collection Time: 08/12/19 10:08 PM  
Result Value Ref Range Glucose (POC) 279 (H) 65 - 100 mg/dL Performed by Anca SULLIVAN, POC Collection Time: 08/13/19  6:46 AM  
Result Value Ref Range Glucose (POC) 62 (L) 65 - 100 mg/dL Performed by Quincy Reyes, POC Collection Time: 08/13/19  7:08 AM  
Result Value Ref Range Glucose (POC) 107 (H) 65 - 100 mg/dL Performed by Quincy Reyes, POC Collection Time: 08/13/19 11:23 AM  
Result Value Ref Range Glucose (POC) 196 (H) 65 - 100 mg/dL Performed by Karly Yoder Total time spent with patient:  xxx   min. Care Plan discussed with: 
Patient Family RN Consulting Physician [facility],      
 
I have reviewed the flowsheets. Chart and Pertinent Notes have been reviewed. No change in PMH ,family and social history from Consult note.  
 
 
Riya Hedrick MD

## 2019-08-13 NOTE — PROGRESS NOTES
NUTRITION COMPLETE ASSESSMENT 
 
RECOMMENDATIONS:  
1. Continue Consistent Carb diet 2. Monitor PO intakes, BG.  
3. Please get new standing weight 4. Add Bowel regimen- no BM since 8/7. Interventions/Plan:  
Food/Nutrient Delivery:  General/healthful diet Commercial supplement Assessment:  
Reason for Assessment:  
  
[x]LOS Diet: Consistent carb Supplements: n/a Nutritionally Significant Medications: [x] Reviewed Meal Intake:  
Patient Vitals for the past 100 hrs: 
 % Diet Eaten 08/13/19 1406 100 % 08/13/19 0834 25 % 08/12/19 1710 100 % 08/12/19 1356 100 % 08/12/19 0918 100 % 08/10/19 0834 90 % 08/09/19 1623 80 % Pre-Hospitalization: 
Usual Appetite: Fair Current Hospitalization:  
Fluid Restriction:  n/a Appetite: Poor PO Ability: Independent Average po intake:% Average supplements intake:    
  
Subjective: 
Pt out of room. PO good, BG elevated Objective: 
81 yr old male admitted for SOB. PMhx: CHF, CAD, HTN, XOL, DM, CKD, pacemaker. Pt out of room. Unable to gather diet hx. Weight hx indicates pt has lost 27 lbs over the last month. This is significant for time frame. Please get new standing weight as this would be very concerning. Unable to see pt to determine if weight is accurate. Appetite appears to be intact, documented as eating >75% on average. No reported n/v. Last BM 8/7. No bowel regimen at this time. Added Glucerna for PM snacks for added kcal for weight maintenance. , 466, 196, 107, 62 mg/dL. , 184, 242 mg/dL. A1C 9.1. DTC following. On steroids. Estimated Nutrition Needs:  
Kcals/day: 4699 Kcals/day(BMR(1341x1.3)) Protein: 52 g(-65g/day(0.8-1.0g/kg)) Fluid:   1750 mL Based On: Kcal/kg - specify (Comment) Weight Used: Actual wt(65 kg) Pt expected to meet estimated nutrient needs:  [x]   Yes     []  No [] Unable to predict at this time Nutrition Diagnosis: 1. Altered nutrition-related lab values related to endocrine dysfunction as evidenced by elevated BG A1C 9.1 Goals:   
 pt will consume >50% of meals and ONS in 3-5 days Monitoring & Evaluation: - Total energy intake - Weight/weight change, Glucose profile -   
 
Previous Nutrition Goals Met:   N/A Previous Recommendations:    N/A Education & Discharge Needs: 
 [x] None Identified 
 [] Identified and addressed  
 [] Participated in care plan, discharge planning, and/or interdisciplinary rounds Cultural, Baptist and ethnic food preferences identified:   
 
Skin Integrity: [x]Intact  []Other Edema: [x]None []Other Last BM: 8/7 Food Allergies: [x]None []Other Diet Restrictions: Anthropometrics: Wt Readings from Last 30 Encounters:  
08/13/19 65 kg (143 lb 3.2 oz)  
07/24/19 77.1 kg (170 lb) 05/22/19 83.9 kg (185 lb) 05/19/19 80.7 kg (178 lb) 05/16/19 85.7 kg (189 lb) 05/12/19 86.4 kg (190 lb 6.4 oz) 12/17/18 79.8 kg (176 lb) 10/10/18 79.5 kg (175 lb 3.2 oz)  
09/27/18 77.7 kg (171 lb 6.4 oz) 08/20/18 77.6 kg (171 lb)  
07/19/18 78 kg (172 lb)  
03/26/18 75.4 kg (166 lb 3.2 oz)  
11/27/17 73.4 kg (161 lb 12.8 oz) 10/23/17 82.5 kg (181 lb 14.1 oz) 08/14/17 83.5 kg (184 lb)  
06/27/17 82.4 kg (181 lb 9.6 oz) 06/15/17 82.6 kg (182 lb) 06/01/17 83 kg (183 lb) 04/24/17 81.8 kg (180 lb 6.4 oz) 03/18/17 85.5 kg (188 lb 9.6 oz) 01/31/17 86 kg (189 lb 9.6 oz) 01/20/17 83.8 kg (184 lb 11.9 oz) 01/04/17 84.6 kg (186 lb 9.6 oz)  
12/22/16 84.6 kg (186 lb 9.6 oz)  
12/12/16 86.6 kg (191 lb) 12/08/16 85.5 kg (188 lb 9.6 oz)  
09/20/16 83.6 kg (184 lb 3.2 oz) 09/10/15 79.1 kg (174 lb 6.4 oz) 03/03/15 83.1 kg (183 lb 3.2 oz) 09/02/14 80.8 kg (178 lb 3.2 oz) Weight Loss Metrics 8/13/2019 7/24/2019 5/22/2019 5/19/2019 5/17/2019 5/16/2019 5/12/2019 Today's Wt 143 lb 3.2 oz 170 lb 185 lb 178 lb - 189 lb 190 lb 6.4 oz  
 BMI 21.15 kg/m2 25.1 kg/m2 27.32 kg/m2 - 26.29 kg/m2 27.91 kg/m2 28.12 kg/m2 Weight Source: (w/ leg pump) Height: 5' 9\" (175.3 cm), Body mass index is 21.15 kg/m². ,   
 ,   
 
Labs:   
Lab Results Component Value Date/Time Sodium 132 (L) 08/12/2019 12:43 AM  
 Potassium 3.5 08/12/2019 12:43 AM  
 Chloride 89 (L) 08/12/2019 12:43 AM  
 CO2 36 (H) 08/12/2019 12:43 AM  
 Glucose 164 (H) 08/12/2019 12:43 AM  
 BUN 64 (H) 08/12/2019 12:43 AM  
 Creatinine 2.42 (H) 08/12/2019 12:43 AM  
 Calcium 8.1 (L) 08/12/2019 12:43 AM  
 Magnesium 2.4 05/14/2019 12:09 PM  
 Phosphorus 4.6 05/07/2019 08:05 AM  
 Albumin 3.2 (L) 08/05/2019 08:41 AM  
 
Lab Results Component Value Date/Time Hemoglobin A1c 9.1 (H) 05/11/2019 03:37 AM  
 Hemoglobin A1c, External 8.4 08/15/2017 [address] Pager: [phone]

## 2019-08-13 NOTE — PROGRESS NOTES
Patient is a two person assist from the bed to the chair. Patient has had no fall today. His anti-hypertensive medications were put on hold for the meantime. Tolerating his diet well. Currently, no subjective complaints.

## 2019-08-14 LAB
ALBUMIN SERPL-MCNC: 2.4 G/DL (ref 3.5–5)
ANION GAP SERPL CALC-SCNC: 4 MMOL/L (ref 5–15)
BUN SERPL-MCNC: 63 MG/DL (ref 6–20)
BUN/CREAT SERPL: 26 (ref 12–20)
C NEOFORM AB TITR SER AGGL: NEGATIVE {TITER}
CALCIUM SERPL-MCNC: 8.1 MG/DL (ref 8.5–10.1)
CHLORIDE SERPL-SCNC: 91 MMOL/L (ref 97–108)
CO2 SERPL-SCNC: 36 MMOL/L (ref 21–32)
CREAT SERPL-MCNC: 2.47 MG/DL (ref 0.7–1.3)
GLUCOSE BLD STRIP.AUTO-MCNC: 163 MG/DL (ref 65–100)
GLUCOSE BLD STRIP.AUTO-MCNC: 288 MG/DL (ref 65–100)
GLUCOSE BLD STRIP.AUTO-MCNC: 316 MG/DL (ref 65–100)
GLUCOSE BLD STRIP.AUTO-MCNC: 403 MG/DL (ref 65–100)
GLUCOSE SERPL-MCNC: 139 MG/DL (ref 65–100)
PHOSPHATE SERPL-MCNC: 2.7 MG/DL (ref 2.6–4.7)
POTASSIUM SERPL-SCNC: 3.6 MMOL/L (ref 3.5–5.1)
SERVICE CMNT-IMP: ABNORMAL
SODIUM SERPL-SCNC: 131 MMOL/L (ref 136–145)

## 2019-08-14 PROCEDURE — 80069 RENAL FUNCTION PANEL: CPT

## 2019-08-14 PROCEDURE — 74011636637 HC RX REV CODE- 636/637: Performed by: FAMILY MEDICINE

## 2019-08-14 PROCEDURE — 36415 COLL VENOUS BLD VENIPUNCTURE: CPT

## 2019-08-14 PROCEDURE — 74011636637 HC RX REV CODE- 636/637: Performed by: NURSE PRACTITIONER

## 2019-08-14 PROCEDURE — 74011250636 HC RX REV CODE- 250/636: Performed by: FAMILY MEDICINE

## 2019-08-14 PROCEDURE — 94760 N-INVAS EAR/PLS OXIMETRY 1: CPT

## 2019-08-14 PROCEDURE — 74011250637 HC RX REV CODE- 250/637: Performed by: FAMILY MEDICINE

## 2019-08-14 PROCEDURE — 65660000000 HC RM CCU STEPDOWN

## 2019-08-14 PROCEDURE — 97530 THERAPEUTIC ACTIVITIES: CPT

## 2019-08-14 PROCEDURE — 77010033678 HC OXYGEN DAILY

## 2019-08-14 PROCEDURE — 82962 GLUCOSE BLOOD TEST: CPT

## 2019-08-14 PROCEDURE — 74011250637 HC RX REV CODE- 250/637: Performed by: NURSE PRACTITIONER

## 2019-08-14 RX ORDER — INSULIN LISPRO 100 [IU]/ML
15 INJECTION, SOLUTION INTRAVENOUS; SUBCUTANEOUS ONCE
Status: COMPLETED | OUTPATIENT
Start: 2019-08-14 | End: 2019-08-14

## 2019-08-14 RX ORDER — FLUDROCORTISONE ACETATE 0.1 MG/1
0.1 TABLET ORAL DAILY
Status: DISCONTINUED | OUTPATIENT
Start: 2019-08-14 | End: 2019-08-15

## 2019-08-14 RX ORDER — CARVEDILOL 6.25 MG/1
6.25 TABLET ORAL 2 TIMES DAILY WITH MEALS
Status: DISCONTINUED | OUTPATIENT
Start: 2019-08-14 | End: 2019-08-18

## 2019-08-14 RX ORDER — INSULIN GLARGINE 100 [IU]/ML
20 INJECTION, SOLUTION SUBCUTANEOUS DAILY
Status: DISCONTINUED | OUTPATIENT
Start: 2019-08-15 | End: 2019-08-20 | Stop reason: HOSPADM

## 2019-08-14 RX ORDER — CARVEDILOL 6.25 MG/1
6.25 TABLET ORAL 2 TIMES DAILY WITH MEALS
Status: DISCONTINUED | OUTPATIENT
Start: 2019-08-14 | End: 2019-08-14

## 2019-08-14 RX ADMIN — SODIUM BICARBONATE 1300 MG: 650 TABLET ORAL at 22:01

## 2019-08-14 RX ADMIN — INSULIN LISPRO 2 UNITS: 100 INJECTION, SOLUTION INTRAVENOUS; SUBCUTANEOUS at 06:49

## 2019-08-14 RX ADMIN — INSULIN LISPRO 4 UNITS: 100 INJECTION, SOLUTION INTRAVENOUS; SUBCUTANEOUS at 22:00

## 2019-08-14 RX ADMIN — Medication 10 ML: at 22:01

## 2019-08-14 RX ADMIN — FLUDROCORTISONE ACETATE 0.1 MG: 0.1 TABLET ORAL at 18:58

## 2019-08-14 RX ADMIN — CARVEDILOL 6.25 MG: 6.25 TABLET, FILM COATED ORAL at 16:45

## 2019-08-14 RX ADMIN — Medication: at 09:51

## 2019-08-14 RX ADMIN — PREDNISONE 30 MG: 10 TABLET ORAL at 06:49

## 2019-08-14 RX ADMIN — Medication 10 ML: at 06:50

## 2019-08-14 RX ADMIN — SODIUM BICARBONATE 1300 MG: 650 TABLET ORAL at 15:32

## 2019-08-14 RX ADMIN — INSULIN GLARGINE 15 UNITS: 100 INJECTION, SOLUTION SUBCUTANEOUS at 09:50

## 2019-08-14 RX ADMIN — ALUMINUM HYDROXIDE AND MAGNESIUM HYDROXIDE 15 ML: 200; 200 SUSPENSION ORAL at 22:00

## 2019-08-14 RX ADMIN — Medication: at 22:00

## 2019-08-14 RX ADMIN — HEPARIN SODIUM 5000 UNITS: 5000 INJECTION INTRAVENOUS; SUBCUTANEOUS at 22:01

## 2019-08-14 RX ADMIN — INSULIN LISPRO 5 UNITS: 100 INJECTION, SOLUTION INTRAVENOUS; SUBCUTANEOUS at 11:41

## 2019-08-14 RX ADMIN — ASPIRIN 81 MG CHEWABLE TABLET 81 MG: 81 TABLET CHEWABLE at 09:49

## 2019-08-14 RX ADMIN — TAMSULOSIN HYDROCHLORIDE 0.4 MG: 0.4 CAPSULE ORAL at 09:50

## 2019-08-14 RX ADMIN — Medication 10 ML: at 14:29

## 2019-08-14 RX ADMIN — CARVEDILOL 6.25 MG: 6.25 TABLET, FILM COATED ORAL at 12:31

## 2019-08-14 RX ADMIN — Medication 10 ML: at 14:30

## 2019-08-14 RX ADMIN — Medication: at 15:32

## 2019-08-14 RX ADMIN — SODIUM BICARBONATE 1300 MG: 650 TABLET ORAL at 09:49

## 2019-08-14 RX ADMIN — SODIUM CHLORIDE 250 ML: 900 INJECTION, SOLUTION INTRAVENOUS at 10:16

## 2019-08-14 RX ADMIN — SIMVASTATIN 20 MG: 20 TABLET, FILM COATED ORAL at 22:01

## 2019-08-14 RX ADMIN — HEPARIN SODIUM 5000 UNITS: 5000 INJECTION INTRAVENOUS; SUBCUTANEOUS at 09:50

## 2019-08-14 RX ADMIN — INSULIN LISPRO 15 UNITS: 100 INJECTION, SOLUTION INTRAVENOUS; SUBCUTANEOUS at 16:44

## 2019-08-14 NOTE — PROGRESS NOTES
Hospitalist Progress Note Ed Rogers MD 
Answering service: [phone] from in house phone Date of Service:  2019 NAME:  Natividad Wagoner Sr. 
:  1935 MRN:  [id number] Admission Summary:  
The patient is an 21-year-old male with past medical history of diabetes, hypertension, coronary artery disease, hypercholesterolemia, peripheral vascular disease, AFib, and CKD, who presented to the emergency room via private vehicle with chief complaint of shortness of breath. His shortness of breath has been persistent for more than one week and subsequently has worsened since yesterday. There is associated bilateral leg swelling since yesterday. The patient has been taking four albuterol nebulizer treatments with no relief. The patient also reports cough with occasional greenish sputum. He recently had pacemaker placed by Dr. George Jackson and had followed up with Cardiology yesterday who increased his diuretic dose. The patient has been seen in the emergency room for shortness of breath three times this week. Interval history / Subjective:  
Pt reports shortness of breath this am but improving He is orthostatic with low Blood pressures today He will likely need home oxygen on discharge Assessment & Plan:  
 
 acute hypoxemic resp failure-  POA - multifactorial- due to acute resp infection and edema from pulmonary hypertension- still present but improving CT chest   showed diffuse alveolar pattern suggestive of pulm edema ,  
resp panel pcr is neg and legionella neg Currently on oxygen, PO prednisone,  Nebs,antibiotics- levaquin for 14 days Breathing improved with bumex 
pulm consulted and following He will need repeat CT scan in 4-6 wks on outpatient Presyncope and orthostasis and hypotension on  Hold all bumex and BP meds If discharged needs bumex 2 mg bid po instead severe pulm HTN- causing persistent hypoxemia RHC w/ PCW 28 ECHO 50/60% with PAP 50 Cont diuretics on DC but patient will need oxygen on discharge due to chronic hypoxemia from pulm edema 
  
. KATALINA on CKD stage 4- 
 baseline creatinine 3.1-3.3; was 3.8 on admission- 
 likely due to diureitcs and hypotension Improving Metabolic acidosis On po bicarb  
  
. PVD-  
s/p stented in both LE, cont aspirin 
  
 DM type 2- Uncontrolled Elevated Blood sugars due to steroids Cont accuchecks and ss insulin Changed lantus back to AM dose - due to patient home med rec Cherelle Pandey HLD-  
resume statin meds 
  
. BPH 
- resume flomax Anemia of CKD- Hb low stable CAD s/p CABG- asa Bradycardia with afib w pacemaker, HTN  
-hypotensive after restarting home meds-  
Hold BP meds on 8/12 due to orthostatsis and presyncope Code status: full DVT prophylaxis: heaprin Care Plan discussed with: Patient/Family Disposition: TBD Hospital Problems  Date Reviewed: 7/28/2019 Codes Class Noted POA  
 CHF exacerbation ([facility] [address] ICD-10-CM: I50.9 ICD-9-CM: 428.0  8/5/2019 Unknown Acute hypoxemic respiratory failure ([facility] [address] ICD-10-CM: J96.01 
ICD-9-CM: 518.81  8/5/2019 Unknown Review of Systems: A comprehensive review of systems was negative except for that written in the HPI. Vital Signs:  
 Last 24hrs VS reviewed since prior progress note. Most recent are: 
Visit Vitals /68 (BP 1 Location: Right arm, BP Patient Position: At rest) Pulse 75 Temp 98.1 °F (36.7 °C) Resp 16 Ht 5' 9\" (1.753 m) Wt 65 kg (143 lb 3.2 oz) SpO2 100% BMI 21.15 kg/m² Intake/Output Summary (Last 24 hours) at 8/13/2019 2141 Last data filed at 8/13/2019 2020 Gross per 24 hour Intake 600 ml Output 800 ml Net -200 ml Physical Examination:  
 
 
     
Constitutional:  on oxygen but comfortable ,   
ENT:  Oral mucous moist, Neck supple,   
 Resp:  decreased breath sounds ,no wheezing CV:  Regular rhythm, normal rate, no murmurs, gallops, rubs GI:  Soft, non distended, non tender. normoactive bowel sounds, no hepatosplenomegaly Musculoskeletal:  No edema, warm, 2+ pulses throughout Neurologic:  Moves all extremities. AAOx3, CN II-XII reviewed Data Review:  
 Review and/or order of clinical lab test 
 
 
Labs:  
 
Recent Labs 08/12/19 
1105 WBC 11.4* HGB 10.3* HCT 31.3*  
 Recent Labs 08/12/19 
9622 08/11/19 
1202 * 132* K 3.5 3.6 CL 89* 90* CO2 36* 36*  
BUN 64* 67* CREA 2.42* 2.43* * 184* CA 8.1* 8.4* No results for input(s): SGOT, GPT, ALT, AP, TBIL, TBILI, TP, ALB, GLOB, GGT, AML, LPSE in the last 72 hours. No lab exists for component: AMYP, HLPSE No results for input(s): INR, PTP, APTT in the last 72 hours. No lab exists for component: INREXT, INREXT No results for input(s): FE, TIBC, PSAT, FERR in the last 72 hours. No results found for: FOL, RBCF No results for input(s): PH, PCO2, PO2 in the last 72 hours. No results for input(s): CPK, CKNDX, TROIQ in the last 72 hours. No lab exists for component: CPKMB No results found for: CHOL, CHOLX, CHLST, CHOLV, HDL, LDL, LDLC, DLDLP, TGLX, TRIGL, TRIGP, CHHD, CHHDX Lab Results Component Value Date/Time Glucose (POC) 349 (H) 08/13/2019 09:08 PM  
 Glucose (POC) 454 (H) 08/13/2019 04:16 PM  
 Glucose (POC) 466 (H) 08/13/2019 04:15 PM  
 Glucose (POC) 196 (H) 08/13/2019 11:23 AM  
 Glucose (POC) 107 (H) 08/13/2019 07:08 AM  
 
Lab Results Component Value Date/Time  Color YELLOW/STRAW 06/25/2017 11:40 AM  
 Appearance CLEAR 06/25/2017 11:40 AM  
 Specific gravity 1.018 06/25/2017 11:40 AM  
 pH (UA) 5.0 06/25/2017 11:40 AM  
 Protein 300 (A) 06/25/2017 11:40 AM  
 Glucose NEGATIVE  06/25/2017 11:40 AM  
 Ketone NEGATIVE  06/25/2017 11:40 AM  
 Bilirubin NEGATIVE  06/25/2017 11:40 AM  
 Urobilinogen 0.2 06/25/2017 11:40 AM  
 Nitrites NEGATIVE  06/25/2017 11:40 AM  
 Leukocyte Esterase NEGATIVE  06/25/2017 11:40 AM  
 Epithelial cells FEW 06/25/2017 11:40 AM  
 Bacteria NEGATIVE  06/25/2017 11:40 AM  
 WBC 0-4 06/25/2017 11:40 AM  
 RBC 0-5 06/25/2017 11:40 AM  
 
 
 
Medications Reviewed:  
 
Current Facility-Administered Medications Medication Dose Route Frequency  [START ON 8/14/2019] insulin glargine (LANTUS) injection 15 Units  15 Units SubCUTAneous DAILY  [START ON 8/14/2019] predniSONE (DELTASONE) tablet 30 mg  30 mg Oral DAILY WITH BREAKFAST  albuterol-ipratropium (DUO-NEB) 2.5 MG-0.5 MG/3 ML  3 mL Nebulization Q4H PRN  
 levoFLOXacin (LEVAQUIN) tablet 750 mg  750 mg Oral Q48H  
 aluminum-magnesium hydroxide (MAALOX) oral suspension 15 mL  15 mL Oral QID PRN  
 [Held by provider] amLODIPine (NORVASC) tablet 2.5 mg  2.5 mg Oral DAILY  [Held by provider] hydrALAZINE (APRESOLINE) tablet 50 mg  50 mg Oral TID  dextrose 10 % infusion 125-250 mL  125-250 mL IntraVENous PRN  
 insulin lispro (HUMALOG) injection   SubCUTAneous AC&HS  
 glucose chewable tablet 16 g  4 Tab Oral PRN  
 glucagon (GLUCAGEN) injection 1 mg  1 mg IntraMUSCular PRN  
 balsam peru-castor oil (VENELEX) ointment   Topical TID  sodium chloride (NS) flush 5-40 mL  5-40 mL IntraVENous Q8H  
 sodium chloride (NS) flush 5-40 mL  5-40 mL IntraVENous PRN  
 sodium chloride (NS) flush 5-40 mL  5-40 mL IntraVENous Q8H  
 sodium chloride (NS) flush 5-40 mL  5-40 mL IntraVENous PRN  
 acetaminophen (TYLENOL) tablet 650 mg  650 mg Oral Q4H PRN  
 HYDROcodone-acetaminophen (NORCO) 5-325 mg per tablet 1 Tab  1 Tab Oral Q4H PRN  
 morphine injection 1 mg  1 mg IntraVENous Q4H PRN  
 heparin (porcine) injection 5,000 Units  5,000 Units SubCUTAneous Q12H  aspirin chewable tablet 81 mg  81 mg Oral DAILY  [Held by provider] carvedilol (COREG) tablet 12.5 mg  12.5 mg Oral BID WITH MEALS  
  simvastatin (ZOCOR) tablet 20 mg  20 mg Oral QHS  sodium bicarbonate tablet 1,300 mg  1,300 mg Oral TID  tamsulosin (FLOMAX) capsule 0.4 mg  0.4 mg Oral DAILY  epoetin dm-epbx (RETACRIT) injection 10,000 Units  10,000 Units SubCUTAneous Q7D  
 
______________________________________________________________________ EXPECTED LENGTH OF STAY: 4d 4h 
ACTUAL LENGTH OF STAY:          8 Angelica Dominguez MD

## 2019-08-14 NOTE — DIABETES MGMT
[facility] Progress Note Recommendations/ Comments: Chart review for variable BG. Fasting hypoglycemia has improved with decrease in Lantus. FBG this am was 163 mg/dl. Note Lantus has been rescheduled from bedtime to morning. Pt received 16 units of Lantus at 2200 yesterday evening. BG above 400 mg/dl at ~1600 yesterday. Also note BG was significantly elevated around the same time on 8/12/19. Likely due to peak of steroids. Pt continues to receive 30 mg prednisone daily at this time. Afternoon/evening BG may improve with rescheduling of Lantus. However, pt may require meal time insulin until off steroids. Please consider scheduling AC lispro 4 units at lunch and 2 units at dinner while pt remains on steroids. Current hospital DM medication:  
Lantus 15 units at 0900 Lispro normal sensitivity correction scale [address] Patient is a 81 y.o. male with known DM on Lantus U-300 18 units daily at home A1c:  
Lab Results Component Value Date/Time Hemoglobin A1c 9.1 (H) 05/11/2019 03:37 AM  
 Hemoglobin A1c 9.8 (H) 10/20/2017 08:11 AM  
 Hemoglobin A1c, External 8.4 08/15/2017 Recent Glucose Results:  
Lab Results Component Value Date/Time  (H) 08/14/2019 07:15 AM  
 GLUCPOC 163 (H) 08/14/2019 06:24 AM  
 GLUCPOC 349 (H) 08/13/2019 09:08 PM  
 GLUCPOC 454 (H) 08/13/2019 04:16 PM  
  
 
Lab Results Component Value Date/Time Creatinine 2.47 (H) 08/14/2019 07:15 AM  
 
Estimated Creatinine Clearance: 22.2 mL/min (A) (based on SCr of 2.47 mg/dL (H)). Active Orders Diet DIET DIABETIC WITH OPTIONS Consistent Carb 2000kcal; Regular PO intake:  
Patient Vitals for the past 72 hrs: 
 % Diet Eaten 08/14/19 0915 100 % 08/13/19 1406 100 % 08/13/19 0834 25 % 08/12/19 1710 100 % 08/12/19 1356 100 % 08/12/19 0918 100 % Will continue to follow as needed. Thank you Fany López RD, Diabetes Clinician Time spent: 8 minutes

## 2019-08-14 NOTE — PROGRESS NOTES
[facility] 
 [address] [address] [facility] Phone: [phone]   [phone]   
  
Nephrology Progress Note Cheyenne Dee.     1935     [id number] Date of Admission : 8/5/2019 08/14/19 CC:  Follow up for KATALINA on CKD Assessment and Plan KATALINA  on CKD  
- 2/2 pre renal azotemia from diuretics, infection - Cr stable 
- cont to hold diuretics and BP meds 
- daily labs COPD flare, RML PNA: 
- Mx per primary team 
- abx per pulm 
  
CKD IV: 
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 
- baseline Cr at best : 2.7- 3 mg/dl - UPCR showed 11 gm of Protein  
- he wants to do PD if he becomes ESRD Metablolic Acidosis: 
- from chronic resp disease and contraction alkalosis from diuresis Pulm HTN: 
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  
- RHC w/ PCW 28 
 
Hypoxia: 
- from PNA + pulm edema 
- abx per pulmonary 
  
HTN: 
- BP low  
- hold antihypertensives for now 
  
Bradycardia : 
- s/p PPM on 5/9 
  
Anemia of CKD: 
- hgb stable - weekly MICHAEL for now 
  
Afib: 
- per cards 
  
CAD s/p CABG 
  
DM2: 
- on insulin Interval History: 
Seen and examined. Eating lunch, no distress,  Remains orthostatic. No cp, sob, n/v/d. Review of Systems: Pertinent items are noted in HPI. Current Medications:  
Current Facility-Administered Medications Medication Dose Route Frequency  carvedilol (COREG) tablet 6.25 mg  6.25 mg Oral BID WITH MEALS  insulin glargine (LANTUS) injection 15 Units  15 Units SubCUTAneous DAILY  predniSONE (DELTASONE) tablet 30 mg  30 mg Oral DAILY WITH BREAKFAST  albuterol-ipratropium (DUO-NEB) 2.5 MG-0.5 MG/3 ML  3 mL Nebulization Q4H PRN  
 levoFLOXacin (LEVAQUIN) tablet 750 mg  750 mg Oral Q48H  
 aluminum-magnesium hydroxide (MAALOX) oral suspension 15 mL  15 mL Oral QID PRN  
 [Held by provider] amLODIPine (NORVASC) tablet 2.5 mg  2.5 mg Oral DAILY  [Held by provider] hydrALAZINE (APRESOLINE) tablet 50 mg  50 mg Oral TID  dextrose 10 % infusion 125-250 mL  125-250 mL IntraVENous PRN  
 insulin lispro (HUMALOG) injection   SubCUTAneous AC&HS  
 glucose chewable tablet 16 g  4 Tab Oral PRN  
 glucagon (GLUCAGEN) injection 1 mg  1 mg IntraMUSCular PRN  
 balsam peru-castor oil (VENELEX) ointment   Topical TID  sodium chloride (NS) flush 5-40 mL  5-40 mL IntraVENous Q8H  
 sodium chloride (NS) flush 5-40 mL  5-40 mL IntraVENous PRN  
 sodium chloride (NS) flush 5-40 mL  5-40 mL IntraVENous Q8H  
 sodium chloride (NS) flush 5-40 mL  5-40 mL IntraVENous PRN  
 acetaminophen (TYLENOL) tablet 650 mg  650 mg Oral Q4H PRN  
 HYDROcodone-acetaminophen (NORCO) 5-325 mg per tablet 1 Tab  1 Tab Oral Q4H PRN  
 morphine injection 1 mg  1 mg IntraVENous Q4H PRN  
 heparin (porcine) injection 5,000 Units  5,000 Units SubCUTAneous Q12H  aspirin chewable tablet 81 mg  81 mg Oral DAILY  simvastatin (ZOCOR) tablet 20 mg  20 mg Oral QHS  sodium bicarbonate tablet 1,300 mg  1,300 mg Oral TID  tamsulosin (FLOMAX) capsule 0.4 mg  0.4 mg Oral DAILY  epoetin dm-epbx (RETACRIT) injection 10,000 Units  10,000 Units SubCUTAneous Q7D Allergies Allergen Reactions  Lisinopril Cough Objective: 
Vitals:   
Vitals:  
 08/14/19 0915 08/14/19 0919 08/14/19 7771 08/14/19 1154 BP: 96/51 (!) 65/31 96/57 143/82 Pulse: 83 92 74 72 Resp:      
Temp:      
SpO2: 95% 92% 95% Weight:      
Height:      
 
Intake and Output: 
No intake/output data recorded. 08/12 1901 - 08/14 0700 In: 600 [P.O.:600] Out: 1725 [[id number]] Physical Examination: 
 
General: NAD,Conversant Neck:  Supple, no mass Resp:  Lungs mostly clear CV:  RRR,  no murmur or rub,no LE edema GI:  Soft, NT, + Bowel sounds, no hepatosplenomegaly Neurologic:  Non focal 
Psych:             AAO x 3 appropriate affect  
 
 
[]    High complexity decision making was performed []    Patient is at high-risk of decompensation with multiple organ involvement Lab Data Personally Reviewed: I have reviewed all the pertinent labs, microbiology data and radiology studies during assessment. Recent Labs 08/14/19 
0715 08/12/19 
4076 * 132* K 3.6 3.5 CL 91* 89* CO2 36* 36* * 164* BUN 63* 64* CREA 2.47* 2.42* CA 8.1* 8.1*  
PHOS 2.7  --   
ALB 2.4*  --   
 
Recent Labs 08/12/19 
1105 WBC 11.4* HGB 10.3* HCT 31.3*  
 No results found for: SDES Lab Results Component Value Date/Time Culture result: NO GROWTH 5 DAYS 08/05/2019 10:46 AM  
 Culture result: CANDIDA ALBICANS (A) 03/16/2017 06:00 AM  
 Culture result: NO GROWTH 5 DAYS 03/16/2017 05:40 AM  
 
Recent Results (from the past 24 hour(s)) GLUCOSE, POC Collection Time: 08/13/19  4:15 PM  
Result Value Ref Range Glucose (POC) 466 (H) 65 - 100 mg/dL Performed by Jacquelin Mustafa GLUCOSE, POC Collection Time: 08/13/19  4:16 PM  
Result Value Ref Range Glucose (POC) 454 (H) 65 - 100 mg/dL Performed by Jacquelin Mustafa GLUCOSE, POC Collection Time: 08/13/19  9:08 PM  
Result Value Ref Range Glucose (POC) 349 (H) 65 - 100 mg/dL Performed by Nova Hoffman GLUCOSE, POC Collection Time: 08/14/19  6:24 AM  
Result Value Ref Range Glucose (POC) 163 (H) 65 - 100 mg/dL Performed by Nova Hoffman RENAL FUNCTION PANEL Collection Time: 08/14/19  7:15 AM  
Result Value Ref Range Sodium 131 (L) 136 - 145 mmol/L Potassium 3.6 3.5 - 5.1 mmol/L Chloride 91 (L) 97 - 108 mmol/L  
 CO2 36 (H) 21 - 32 mmol/L Anion gap 4 (L) 5 - 15 mmol/L Glucose 139 (H) 65 - 100 mg/dL BUN 63 (H) 6 - 20 MG/DL Creatinine 2.47 (H) 0.70 - 1.30 MG/DL  
 BUN/Creatinine ratio 26 (H) 12 - 20 GFR est AA 31 (L) >60 ml/min/1.73m2 GFR est non-AA 25 (L) >60 ml/min/1.73m2 Calcium 8.1 (L) 8.5 - 10.1 MG/DL  Phosphorus 2.7 2.6 - 4.7 MG/DL  
 Albumin 2.4 (L) 3.5 - 5.0 g/dL GLUCOSE, POC Collection Time: 08/14/19 11:06 AM  
Result Value Ref Range Glucose (POC) 288 (H) 65 - 100 mg/dL Performed by Navarro Rm Total time spent with patient:  xxx   min. Care Plan discussed with: 
Patient Family RN Consulting Physician [facility]     
 
I have reviewed the flowsheets. Chart and Pertinent Notes have been reviewed. No change in PMH ,family and social history from Consult note.  
 
 
Tana Martinez MD

## 2019-08-14 NOTE — PROGRESS NOTES
Problem: Mobility Impaired (Adult and Pediatric) Goal: *Acute Goals and Plan of Care (Insert Text) Description FUNCTIONAL STATUS PRIOR TO ADMISSION: Patient was independent without use of DME. 
 
HOME SUPPORT PRIOR TO ADMISSION: The patient lived with wife/son but did not require assist. 
 
Physical Therapy Goals Initiated 8/12/2019 1. Patient will transfer from bed to chair and chair to bed with modified independence using the least restrictive device within 7 day(s). 2.  Patient will perform sit to stand with modified independence within 7 day(s). 3.  Patient will ambulate with modified independence for > 200 feet with the least restrictive device within 7 day(s). 4.  Patient will ascend/descend 3 stairs with one handrail(s) with supervision/set-up within 7 day(s). Outcome: Progressing Towards Goal 
PHYSICAL THERAPY TREATMENT Patient: Luz Elena Enciso (81 y.o. male) Date: 8/14/2019 Diagnosis: CHF exacerbation ([facility] [address] [I50.9] Acute hypoxemic respiratory failure (HCC) [J96.01] <principal problem not specified> Precautions: Fall(orthostatic blood pressure, weak LE's symptom) Chart, physical therapy assessment, plan of care and goals were reviewed. ASSESSMENT Based on the objective data described below, pt continues to exhibit symptomatic orthostatic hypotension with standing. Activity continues to be limited to sitting up in chair. Patient tolerated supine exercise without difficulty. Vitals:  
 08/14/19 9497 08/14/19 0915 08/14/19 0919 08/14/19 6855 BP: 144/66 96/51 (!) 65/31 96/57 BP 1 Location: Right arm Right arm Right arm Right arm BP Patient Position: At rest;Supine Sitting Standing Legs - trembling, Denies dizziness Sitting Pulse: 79 83 92 74 Resp:      
Temp:      
SpO2: 93% 95% 92% 95% Weight:      
Height:      
  
 
Current Level of Function Impacting Discharge (mobility/balance): Orthostatic - Unable to stand > 2 mins or ambulate Other factors to consider for discharge: need to assess home oxygen requirements prior to discharge PLAN : 
Patient continues to benefit from skilled intervention to address the above impairments. Continue treatment per established plan of care. to address goals. Recommendation for discharge: (in order for the patient to meet his/her long term goals) Physical therapy at least 2 days/week in the home AND ensure assist and/or supervision for safety with mobility This discharge recommendation: 
Has been made in collaboration with the attending provider and/or case management Equipment recommendations for successful discharge (if) home: patient owns DME required for discharge SUBJECTIVE:  
Patient stated I never get dizzy - my legs just get weak and start shaking.  OBJECTIVE DATA SUMMARY:  
Critical Behavior: 
Neurologic State: Alert Orientation Level: Oriented X4 Cognition: Appropriate for age attention/concentration Safety/Judgement: Awareness of environment, Decreased awareness of need for assistance, Fall prevention, Lack of insight into deficits Functional Mobility Training: 
Bed Mobility: 
Rolling: Modified independent Supine to Sit: Modified independent Sit to Supine: Modified independent Scooting: Modified independent Transfers: 
Sit to Stand: Contact guard assistance Stand to Sit: Contact guard assistance Balance: 
Sitting: Intact; Without support Standing: Impaired; With support(secondary to hypotension - legs start shaking) Standing - Static: Poor;Constant support Standing - Dynamic : Not tested Ambulation/Gait Training: 
  
 Deferred secondary to low Bp. Therapeutic Exercises:  
Pt instructed in supine leg exercise - to be performed 2 x day - pt performed 10 reps of ankle pumps, heel slides and SLR - pt required repeated verbal cues to slow down movements and control eccentric contractions. Pain Rating: 
Pt denied pain. Activity Tolerance:  
Poor - unable to tolerate standing. Please refer to the flowsheet for vital signs taken during this treatment. After treatment patient left in no apparent distress:  
Supine in bed and Call bell within reach COMMUNICATION/COLLABORATION:  
The patients plan of care was discussed with: Registered Nurse Devan Mesa, PT Time Calculation: 25 mins

## 2019-08-14 NOTE — PROGRESS NOTES
MARTINEZ Plan:  
  
1. Home with Home Oxygen; CM has sent a referral to [address]; [address] needs a chart note from the attending that shows a valid diagnosis (needs supporting diagnosis), CM has placed a note for MD/RN/PT on the bedside chart  
  
2. Transport; wife to transport Leatha Gonzalez [phone]) 
  
3. Continue Medical Care, follow up with PCP/or Specialist  
  
  
CM is continuously monitoring the pt's stat, has not been dropped per RN and PT notes, will continue to follow up. Torsten Lang Clinical  [phone]

## 2019-08-14 NOTE — PROGRESS NOTES
Patient spent a significant amount of time sitting in the bedside chair. Tolerated his breakfast well, and asked for assistance in getting back in bed. He is currently sleeping.

## 2019-08-14 NOTE — PROGRESS NOTES
Hospitalist Progress Note Bereket Jiang MD 
Answering service: [phone] from in house phone Date of Service:  2019 NAME:  Sherryle Ramsay Sr. 
:  1935 MRN:  [id number] Admission Summary:  
The patient is an 25-year-old male with past medical history of diabetes, hypertension, coronary artery disease, hypercholesterolemia, peripheral vascular disease, AFib, and CKD, who presented to the emergency room via private vehicle with chief complaint of shortness of breath. His shortness of breath has been persistent for more than one week and subsequently has worsened since yesterday. There is associated bilateral leg swelling since yesterday. The patient has been taking four albuterol nebulizer treatments with no relief. The patient also reports cough with occasional greenish sputum. He recently had pacemaker placed by Dr. Wang Dorman and had followed up with Cardiology yesterday who increased his diuretic dose. The patient has been seen in the emergency room for shortness of breath three times this week. Interval history / Subjective:  
Pt reports shortness of breath this am but improving He is orthostatic with low Blood pressures today He will likely need home oxygen on discharge Assessment & Plan:  
 
 acute hypoxemic resp failure-  POA - multifactorial- due to acute resp infection and edema from pulmonary hypertension- still present but improving CT chest   showed diffuse alveolar pattern suggestive of pulm edema ,  
resp panel pcr is neg and legionella neg Currently on oxygen, PO prednisone,  Nebs,antibiotics- levaquin for 14 days Breathing improved with bumex 
pulm consulted and following He will need repeat CT scan in 4-6 wks on outpatient Presyncope and orthostasis and hypotension on  Hold all bumex and BP meds If discharged needs bumex 2 mg bid po instead severe pulm HTN- causing persistent hypoxemia RHC w/ PCW 28 ECHO 50/60% with PAP 50 Cont diuretics on DC but patient will need oxygen on discharge due to chronic hypoxemia from pulm edema 
  
. KATALINA on CKD stage 4- 
 baseline creatinine 3.1-3.3; was 3.8 on admission- 
 likely due to diureitcs and hypotension Improving Metabolic acidosis On po bicarb  
  
. PVD-  
s/p stented in both LE, cont aspirin 
  
 DM type 2- Uncontrolled Elevated Blood sugars due to steroids and changes in insulin regimen Cont accuchecks and ss insulin Changed lantus back to AM dose due to am hypoglycemia Increased lantus dose for tomorrow Velora Sis HLD-  
resume statin meds 
  
. BPH 
- resume flomax Anemia of CKD- Hb low stable CAD s/p CABG- asa Bradycardia with afib w pacemaker, 
 
-supine hypertension with severe orthostatic hypotension -  
Holding some home BP meds due to orthostatsis and presyncope Started florinef 8/14/19 Restarted coreg at lower dose due to tachycardia Code status: full DVT prophylaxis: heaprin Care Plan discussed with: Patient/Family Disposition: TBD Hospital Problems  Date Reviewed: 7/28/2019 Codes Class Noted POA  
 [facility] ICD-10-CM: I50.9 ICD-9-CM: 428.0  8/5/2019 Unknown Acute hypoxemic respiratory failure ([facility] ICD-10-CM: J96.01 
ICD-9-CM: 518.81  8/5/2019 Unknown Review of Systems: A comprehensive review of systems was negative except for that written in the subjective section Vital Signs:  
 Last 24hrs VS reviewed since prior progress note. Most recent are: 
Visit Vitals /76 (BP 1 Location: Right arm, BP Patient Position: At rest) Pulse 72 Temp 98.1 °F (36.7 °C) Resp 16 Ht 5' 9\" (1.753 m) Wt 68.1 kg (150 lb 1.6 oz) SpO2 98% BMI 22.17 kg/m² Intake/Output Summary (Last 24 hours) at 8/14/2019 1741 Last data filed at 8/14/2019 1550 Gross per 24 hour Intake  Output 1675 ml Net -1675 ml Physical Examination:  
 
 
     
Constitutional:  on oxygen but comfortable ,   
ENT:  Oral mucous moist, Neck supple, Resp:  decreased breath sounds ,no wheezing CV:  Regular rhythm, normal rate, no murmurs, gallops, rubs GI:  Soft, non distended, non tender. normoactive bowel sounds, no hepatosplenomegaly Musculoskeletal:  No edema, warm, 2+ pulses throughout Neurologic:  Moves all extremities. AAOx3, CN II-XII reviewed Data Review:  
 Review and/or order of clinical lab test 
 
 
Labs:  
 
Recent Labs 08/12/19 
1105 WBC 11.4* HGB 10.3* HCT 31.3*  
 Recent Labs 08/14/19 
0715 08/12/19 
8169 * 132* K 3.6 3.5 CL 91* 89* CO2 36* 36*  
BUN 63* 64* CREA 2.47* 2.42* * 164* CA 8.1* 8.1*  
PHOS 2.7  --   
 
Recent Labs 08/14/19 0715 ALB 2.4* No results for input(s): INR, PTP, APTT in the last 72 hours. No lab exists for component: INREXT, INREXT No results for input(s): FE, TIBC, PSAT, FERR in the last 72 hours. No results found for: FOL, RBCF No results for input(s): PH, PCO2, PO2 in the last 72 hours. No results for input(s): CPK, CKNDX, TROIQ in the last 72 hours. No lab exists for component: CPKMB No results found for: CHOL, CHOLX, CHLST, CHOLV, HDL, LDL, LDLC, DLDLP, TGLX, TRIGL, TRIGP, CHHD, CHHDX Lab Results Component Value Date/Time Glucose (POC) 403 (H) 08/14/2019 04:01 PM  
 Glucose (POC) 288 (H) 08/14/2019 11:06 AM  
 Glucose (POC) 163 (H) 08/14/2019 06:24 AM  
 Glucose (POC) 349 (H) 08/13/2019 09:08 PM  
 Glucose (POC) 454 (H) 08/13/2019 04:16 PM  
 
Lab Results Component Value Date/Time  Color YELLOW/STRAW 06/25/2017 11:40 AM  
 Appearance CLEAR 06/25/2017 11:40 AM  
 Specific gravity 1.018 06/25/2017 11:40 AM  
 pH (UA) 5.0 06/25/2017 11:40 AM  
 Protein 300 (A) 06/25/2017 11:40 AM  
 Glucose NEGATIVE  06/25/2017 11:40 AM  
 Ketone NEGATIVE  06/25/2017 11:40 AM  
 Bilirubin NEGATIVE  06/25/2017 11:40 AM  
 Urobilinogen 0.2 06/25/2017 11:40 AM  
 Nitrites NEGATIVE  06/25/2017 11:40 AM  
 Leukocyte Esterase NEGATIVE  06/25/2017 11:40 AM  
 Epithelial cells FEW 06/25/2017 11:40 AM  
 Bacteria NEGATIVE  06/25/2017 11:40 AM  
 WBC 0-4 06/25/2017 11:40 AM  
 RBC 0-5 06/25/2017 11:40 AM  
 
 
 
Medications Reviewed:  
 
Current Facility-Administered Medications Medication Dose Route Frequency  carvedilol (COREG) tablet 6.25 mg  6.25 mg Oral BID WITH MEALS  fludrocortisone (FLORINEF) tablet 0.1 mg  0.1 mg Oral DAILY  [START ON 8/15/2019] insulin glargine (LANTUS) injection 20 Units  20 Units SubCUTAneous DAILY  predniSONE (DELTASONE) tablet 30 mg  30 mg Oral DAILY WITH BREAKFAST  albuterol-ipratropium (DUO-NEB) 2.5 MG-0.5 MG/3 ML  3 mL Nebulization Q4H PRN  
 levoFLOXacin (LEVAQUIN) tablet 750 mg  750 mg Oral Q48H  
 aluminum-magnesium hydroxide (MAALOX) oral suspension 15 mL  15 mL Oral QID PRN  
 [Held by provider] amLODIPine (NORVASC) tablet 2.5 mg  2.5 mg Oral DAILY  [Held by provider] hydrALAZINE (APRESOLINE) tablet 50 mg  50 mg Oral TID  dextrose 10 % infusion 125-250 mL  125-250 mL IntraVENous PRN  
 insulin lispro (HUMALOG) injection   SubCUTAneous AC&HS  
 glucose chewable tablet 16 g  4 Tab Oral PRN  
 glucagon (GLUCAGEN) injection 1 mg  1 mg IntraMUSCular PRN  
 balsam peru-castor oil (VENELEX) ointment   Topical TID  sodium chloride (NS) flush 5-40 mL  5-40 mL IntraVENous Q8H  
 sodium chloride (NS) flush 5-40 mL  5-40 mL IntraVENous PRN  
 sodium chloride (NS) flush 5-40 mL  5-40 mL IntraVENous Q8H  
 sodium chloride (NS) flush 5-40 mL  5-40 mL IntraVENous PRN  
 acetaminophen (TYLENOL) tablet 650 mg  650 mg Oral Q4H PRN  
 HYDROcodone-acetaminophen (NORCO) 5-325 mg per tablet 1 Tab  1 Tab Oral Q4H PRN  
 morphine injection 1 mg  1 mg IntraVENous Q4H PRN  
 heparin (porcine) injection 5,000 Units  5,000 Units SubCUTAneous Q12H  aspirin chewable tablet 81 mg  81 mg Oral DAILY  simvastatin (ZOCOR) tablet 20 mg  20 mg Oral QHS  sodium bicarbonate tablet 1,300 mg  1,300 mg Oral TID  tamsulosin (FLOMAX) capsule 0.4 mg  0.4 mg Oral DAILY  epoetin dm-epbx (RETACRIT) injection 10,000 Units  10,000 Units SubCUTAneous Q7D  
 
______________________________________________________________________ EXPECTED LENGTH OF STAY: 4d 4h 
ACTUAL LENGTH OF STAY:          9 Romy Hernandez MD

## 2019-08-14 NOTE — PROGRESS NOTES
Bedside and Verbal shift change report given to Marcell (oncoming nurse) by Joey Zurita (offgoing nurse). Report included the following information SBAR, Kardex and MAR.

## 2019-08-15 LAB
GLUCOSE BLD STRIP.AUTO-MCNC: 156 MG/DL (ref 65–100)
GLUCOSE BLD STRIP.AUTO-MCNC: 320 MG/DL (ref 65–100)
GLUCOSE BLD STRIP.AUTO-MCNC: 337 MG/DL (ref 65–100)
GLUCOSE BLD STRIP.AUTO-MCNC: 401 MG/DL (ref 65–100)
SERVICE CMNT-IMP: ABNORMAL

## 2019-08-15 PROCEDURE — 65660000000 HC RM CCU STEPDOWN

## 2019-08-15 PROCEDURE — 74011636637 HC RX REV CODE- 636/637: Performed by: NURSE PRACTITIONER

## 2019-08-15 PROCEDURE — 74011250637 HC RX REV CODE- 250/637: Performed by: INTERNAL MEDICINE

## 2019-08-15 PROCEDURE — 74011000250 HC RX REV CODE- 250: Performed by: NURSE PRACTITIONER

## 2019-08-15 PROCEDURE — 82962 GLUCOSE BLOOD TEST: CPT

## 2019-08-15 PROCEDURE — 97535 SELF CARE MNGMENT TRAINING: CPT

## 2019-08-15 PROCEDURE — 94640 AIRWAY INHALATION TREATMENT: CPT

## 2019-08-15 PROCEDURE — 74011250636 HC RX REV CODE- 250/636: Performed by: FAMILY MEDICINE

## 2019-08-15 PROCEDURE — 74011636637 HC RX REV CODE- 636/637: Performed by: FAMILY MEDICINE

## 2019-08-15 PROCEDURE — 74011250637 HC RX REV CODE- 250/637: Performed by: FAMILY MEDICINE

## 2019-08-15 RX ORDER — FLUDROCORTISONE ACETATE 0.1 MG/1
0.2 TABLET ORAL DAILY
Status: DISCONTINUED | OUTPATIENT
Start: 2019-08-16 | End: 2019-08-20

## 2019-08-15 RX ADMIN — Medication 10 ML: at 06:52

## 2019-08-15 RX ADMIN — INSULIN LISPRO 15 UNITS: 100 INJECTION, SOLUTION INTRAVENOUS; SUBCUTANEOUS at 17:50

## 2019-08-15 RX ADMIN — Medication: at 09:29

## 2019-08-15 RX ADMIN — INSULIN LISPRO 4 UNITS: 100 INJECTION, SOLUTION INTRAVENOUS; SUBCUTANEOUS at 21:34

## 2019-08-15 RX ADMIN — SIMVASTATIN 20 MG: 20 TABLET, FILM COATED ORAL at 21:34

## 2019-08-15 RX ADMIN — Medication 10 ML: at 16:02

## 2019-08-15 RX ADMIN — SODIUM BICARBONATE 1300 MG: 650 TABLET ORAL at 09:28

## 2019-08-15 RX ADMIN — Medication 10 ML: at 21:38

## 2019-08-15 RX ADMIN — INSULIN LISPRO 2 UNITS: 100 INJECTION, SOLUTION INTRAVENOUS; SUBCUTANEOUS at 07:00

## 2019-08-15 RX ADMIN — PREDNISONE 30 MG: 10 TABLET ORAL at 06:52

## 2019-08-15 RX ADMIN — ASPIRIN 81 MG CHEWABLE TABLET 81 MG: 81 TABLET CHEWABLE at 09:28

## 2019-08-15 RX ADMIN — HEPARIN SODIUM 5000 UNITS: 5000 INJECTION INTRAVENOUS; SUBCUTANEOUS at 12:55

## 2019-08-15 RX ADMIN — SODIUM BICARBONATE 1300 MG: 650 TABLET ORAL at 16:01

## 2019-08-15 RX ADMIN — TAMSULOSIN HYDROCHLORIDE 0.4 MG: 0.4 CAPSULE ORAL at 09:28

## 2019-08-15 RX ADMIN — SODIUM BICARBONATE 1300 MG: 650 TABLET ORAL at 21:34

## 2019-08-15 RX ADMIN — CARVEDILOL 6.25 MG: 6.25 TABLET, FILM COATED ORAL at 17:35

## 2019-08-15 RX ADMIN — HEPARIN SODIUM 5000 UNITS: 5000 INJECTION INTRAVENOUS; SUBCUTANEOUS at 21:34

## 2019-08-15 RX ADMIN — IPRATROPIUM BROMIDE AND ALBUTEROL SULFATE 3 ML: .5; 3 SOLUTION RESPIRATORY (INHALATION) at 12:22

## 2019-08-15 RX ADMIN — FLUDROCORTISONE ACETATE 0.1 MG: 0.1 TABLET ORAL at 09:28

## 2019-08-15 RX ADMIN — Medication: at 21:35

## 2019-08-15 RX ADMIN — INSULIN LISPRO 7 UNITS: 100 INJECTION, SOLUTION INTRAVENOUS; SUBCUTANEOUS at 12:54

## 2019-08-15 RX ADMIN — LEVOFLOXACIN 750 MG: 750 TABLET, FILM COATED ORAL at 17:35

## 2019-08-15 RX ADMIN — Medication: at 16:01

## 2019-08-15 RX ADMIN — INSULIN GLARGINE 20 UNITS: 100 INJECTION, SOLUTION SUBCUTANEOUS at 09:36

## 2019-08-15 RX ADMIN — CARVEDILOL 6.25 MG: 6.25 TABLET, FILM COATED ORAL at 06:51

## 2019-08-15 NOTE — PROGRESS NOTES
Bedside shift change report given to ted (oncoming nurse) by karan (offgoing nurse). Report included the following information SBAR, Kardex and MAR.

## 2019-08-15 NOTE — DIABETES MGMT
[facility]    [facility] Progress Note    Recommendations/ Comments: Chart review for hyperglycemia. Pre lunch BG today was 337 mg/dl. Pt required 11 units of correction yesterday. Lantus increased from 15 to 20 units today. Pt continues to receive 30 mg prednisone daily at this time. Afternoon/evening BG may improve with rescheduling of Lantus. However, pt may require meal time insulin until off steroids. Please consider scheduling AC lispro 4 units at breakfast and lunch and 2 units at dinner while pt remains on steroids. Current hospital DM medication:   Lantus 15 units at 0900  Lispro normal sensitivity correction scale    Chart reviewed on [facility]. .    Patient is a 81 y.o. male with known DM on Lantus U-300 18 units daily at home    A1c:   Lab Results   Component Value Date/Time    Hemoglobin A1c 9.1 (H) 05/11/2019 03:37 AM    Hemoglobin A1c 9.8 (H) 10/20/2017 08:11 AM    Hemoglobin A1c, External 8.4 08/15/2017       Recent Glucose Results:   Lab Results   Component Value Date/Time    GLUCPOC 337 (H) 08/15/2019 11:32 AM    GLUCPOC 156 (H) 08/15/2019 06:50 AM    GLUCPOC 316 (H) 08/14/2019 09:54 PM        Lab Results   Component Value Date/Time    Creatinine 2.47 (H) 08/14/2019 07:15 AM     Estimated Creatinine Clearance: 22.8 mL/min (A) (based on SCr of 2.47 mg/dL (H)). Active Orders   Diet    DIET DIABETIC WITH OPTIONS Consistent Carb 2000kcal; Regular        PO intake:   Patient Vitals for the past 72 hrs:   % Diet Eaten   08/15/19 1001 100 %   08/14/19 0915 100 %   08/13/19 1406 100 %   08/13/19 0834 25 %   08/12/19 1710 100 %   08/12/19 1356 100 %       Will continue to follow as needed.     Thank you  Emma Wright RD, Diabetes Clinician     Time spent: 4 minutes

## 2019-08-15 NOTE — PROGRESS NOTES
Bedside shift change report given to Angelica Verdin (oncoming nurse) by Dmitriy Richards (offgoing nurse). Report included the following information SBAR, Kardex and MAR.

## 2019-08-15 NOTE — PROGRESS NOTES
Chart reviewed, per OT note - pt continues with  orthostatic hypotension - unable to stand > 1 min secondary to drop in Bp - will continue to follow.   oMlly Bermudez, PT

## 2019-08-15 NOTE — PROGRESS NOTES
CM will continue to monitor pt's medical progress, consult RN/PT for updated O2 stats and assist with home oxygen order as necessary. [facility] Clinical  [phone]

## 2019-08-15 NOTE — PROGRESS NOTES
[facility] 
 [address] [address], [facility] [address] [facility] Phone: [phone]   [phone]   
  
Nephrology Progress Note Cheyenne Dee.     1935     [id number] Date of Admission : 8/5/2019 
08/15/19 CC:  Follow up for KATALINA on CKD Assessment and Plan KATALINA  on CKD  
- 2/2 pre renal azotemia from diuretics, infection - Cr stable 
- cont to hold diuretics and BP meds 
- watch fluid status now that he is on Florinef 
- will need diuretics added back soon COPD flare, RML PNA: 
- Mx per primary team 
- abx per pulm 
  
CKD IV: 
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 
- baseline Cr at best : 2.7- 3 mg/dl - UPCR showed 11 gm of Protein  
- he wants to do PD if he becomes ESRD Metablolic Acidosis: 
- from chronic resp disease and contraction alkalosis from diuresis Pulm HTN: 
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  
- RHC w/ PCW 28 
 
Hypoxia: 
- from PNA + pulm edema 
- abx per pulmonary 
  
Orthostatic Hypotension: - BP meds and diuretics on hold - Florinef added by hospitalist yesterday 
  
Bradycardia : 
- s/p PPM on 5/9 
  
Anemia of CKD: 
- hgb stable - weekly MICHAEL for now 
  
Afib: 
- per cards 
  
CAD s/p CABG 
  
DM2: 
- on insulin Interval History: 
Seen and examined. No labs today. Feeling ok. Still orthostatic this AM.  No cp, sob, n/v/d. Review of Systems: Pertinent items are noted in HPI. Current Medications:  
Current Facility-Administered Medications Medication Dose Route Frequency  carvedilol (COREG) tablet 6.25 mg  6.25 mg Oral BID WITH MEALS  fludrocortisone (FLORINEF) tablet 0.1 mg  0.1 mg Oral DAILY  insulin glargine (LANTUS) injection 20 Units  20 Units SubCUTAneous DAILY  predniSONE (DELTASONE) tablet 30 mg  30 mg Oral DAILY WITH BREAKFAST  albuterol-ipratropium (DUO-NEB) 2.5 MG-0.5 MG/3 ML  3 mL Nebulization Q4H PRN  
 levoFLOXacin (LEVAQUIN) tablet 750 mg  750 mg Oral Q48H  aluminum-magnesium hydroxide (MAALOX) oral suspension 15 mL  15 mL Oral QID PRN  
 [Held by provider] amLODIPine (NORVASC) tablet 2.5 mg  2.5 mg Oral DAILY  [Held by provider] hydrALAZINE (APRESOLINE) tablet 50 mg  50 mg Oral TID  dextrose 10 % infusion 125-250 mL  125-250 mL IntraVENous PRN  
 insulin lispro (HUMALOG) injection   SubCUTAneous AC&HS  
 glucose chewable tablet 16 g  4 Tab Oral PRN  
 glucagon (GLUCAGEN) injection 1 mg  1 mg IntraMUSCular PRN  
 balsam peru-castor oil (VENELEX) ointment   Topical TID  sodium chloride (NS) flush 5-40 mL  5-40 mL IntraVENous Q8H  
 sodium chloride (NS) flush 5-40 mL  5-40 mL IntraVENous PRN  
 sodium chloride (NS) flush 5-40 mL  5-40 mL IntraVENous Q8H  
 sodium chloride (NS) flush 5-40 mL  5-40 mL IntraVENous PRN  
 acetaminophen (TYLENOL) tablet 650 mg  650 mg Oral Q4H PRN  
 HYDROcodone-acetaminophen (NORCO) 5-325 mg per tablet 1 Tab  1 Tab Oral Q4H PRN  
 morphine injection 1 mg  1 mg IntraVENous Q4H PRN  
 heparin (porcine) injection 5,000 Units  5,000 Units SubCUTAneous Q12H  aspirin chewable tablet 81 mg  81 mg Oral DAILY  simvastatin (ZOCOR) tablet 20 mg  20 mg Oral QHS  sodium bicarbonate tablet 1,300 mg  1,300 mg Oral TID  tamsulosin (FLOMAX) capsule 0.4 mg  0.4 mg Oral DAILY  epoetin dm-epbx (RETACRIT) injection 10,000 Units  10,000 Units SubCUTAneous Q7D Allergies Allergen Reactions  Lisinopril Cough Objective: 
Vitals:   
Vitals:  
 08/15/19 1530 08/15/19 1165 08/15/19 3742 08/15/19 7250 BP: 128/65 108/65 (!) 73/44 (!) 81/51 Pulse: 76 80 Resp:      
Temp:      
SpO2:      
Weight:      
Height:      
 
Intake and Output: 
No intake/output data recorded. 08/13 1901 - 08/15 0700 In: 600 [P.O.:600] Out: 9021 [Urine:2475] Physical Examination: 
 
General: NAD,Conversant Neck:  Supple, no mass Resp:  Lungs mostly clear CV:  RRR,  no murmur or rub,no LE edema GI:  Soft, NT, + Bowel sounds, no hepatosplenomegaly Neurologic:  Non focal 
Psych:             AAO x 3 appropriate affect  
 
 
[]    High complexity decision making was performed 
[]    Patient is at high-risk of decompensation with multiple organ involvement Lab Data Personally Reviewed: I have reviewed all the pertinent labs, microbiology data and radiology studies during assessment. Recent Labs 08/14/19 
0715 *  
K 3.6 CL 91* CO2 36* * BUN 63* CREA 2.47* CA 8.1*  
PHOS 2.7 ALB 2.4* Recent Labs 08/12/19 
1105 WBC 11.4* HGB 10.3* HCT 31.3*  
 No results found for: SDES Lab Results Component Value Date/Time Culture result: NO GROWTH 5 DAYS 08/05/2019 10:46 AM  
 Culture result: CANDIDA ALBICANS (A) 03/16/2017 06:00 AM  
 Culture result: NO GROWTH 5 DAYS 03/16/2017 05:40 AM  
 
Recent Results (from the past 24 hour(s)) GLUCOSE, POC Collection Time: 08/14/19 11:06 AM  
Result Value Ref Range Glucose (POC) 288 (H) 65 - 100 mg/dL Performed by [employer] (P) Lunch GLUCOSE, POC Collection Time: 08/14/19  4:01 PM  
Result Value Ref Range Glucose (POC) 403 (H) 65 - 100 mg/dL Performed by [employer] (P) Lunch GLUCOSE, POC Collection Time: 08/14/19  9:54 PM  
Result Value Ref Range Glucose (POC) 316 (H) 65 - 100 mg/dL Performed by Donald Castellano GLUCOSE, POC Collection Time: 08/15/19  6:50 AM  
Result Value Ref Range Glucose (POC) 156 (H) 65 - 100 mg/dL Performed by Susi Chase Total time spent with patient:  xxx   min. Care Plan discussed with: 
Patient Family RN Consulting Physician [facility],      
 
I have reviewed the flowsheets. Chart and Pertinent Notes have been reviewed. No change in PMH ,family and social history from Consult note.  
 
 
Karen Conklin MD

## 2019-08-15 NOTE — PROGRESS NOTES
Bedside and Verbal shift change report given to Marcell (oncoming nurse) by Armand Hatch (offgoing nurse). Report included the following information SBAR, Kardex and MAR.

## 2019-08-15 NOTE — PROGRESS NOTES
Problem: Self Care Deficits Care Plan (Adult) Goal: *Acute Goals and Plan of Care (Insert Text) Description FUNCTIONAL STATUS PRIOR TO ADMISSION: Patient was independent and active without use of DME. 
 
HOME SUPPORT: The patient lived with his wife and son but did not require assist. 
 
Occupational Therapy Goals Initiated 8/13/2019 1. Patient will perform grooming with modified independence standing at the sink within 7 day(s). 2.  Patient will perform lower body dressing with supervision/set-up within 7 day(s). 3.  Patient will perform static standing > or = 3 minutes with close supervision and without loss of balance within 7 day(s). 4.  Patient will perform toilet transfers with supervision/set-up into and out of bathroom within 7 day(s). 5.  Patient will perform all aspects of toileting with modified independence within 7 day(s). 6.  Patient will demonstrate safety awareness during ADL tasks and mobility within 7 day(s). Outcome: Progressing Towards Goal 
  
Problem: Patient Education: Go to Patient Education Activity Goal: Patient/Family Education Outcome: Progressing Towards Goal 
 OCCUPATIONAL THERAPY TREATMENT Patient: Briana Stoll. (81 y.o. male) Date: 8/15/2019 Diagnosis: CHF exacerbation ([facility] [I50.9] Acute hypoxemic respiratory failure (HCC) [J96.01] <principal problem not specified> Precautions: Fall(orthostatic blood pressure, weak LE's symptom) Chart, occupational therapy assessment, plan of care, and goals were reviewed. ASSESSMENT Based on the objective data described below, patient presents with asymptomatic orthostatic hypotension; Nsg aware 
128/65 supine 108/63 seated edge of bed 
73/44 standing 1 minute 81/51 seated post standing 89/52 seated after 5 minutes. Per protocol patient left in chair, cog aware and call bell in reach Current Level of Function Impacting Discharge (ADLs): S-CGA, limited by low BP in standing Other factors to consider for discharge: was fully I PTA;  60 yrs, lives with wife PLAN : 
Patient continues to benefit from skilled intervention to address the above impairments. Continue treatment per established plan of care. to address goals. Recommend with staff: out of bed all meals; close watch on BP Recommend next OT session: standing to groom with VS monitor Recommendation for discharge: (in order for the patient to meet his/her long term goals) Occupational therapy at least 2 days/week in the home This discharge recommendation: A follow-up discussion with the attending provider and/or case management is planned Equipment recommendations for successful discharge (if) home: shower chair and transfer bench SUBJECTIVE:  
Patient stated I feel fine, no pain not dizzy.  OBJECTIVE DATA SUMMARY:  
Cognitive/Behavioral Status: 
Neurologic State: Alert Orientation Level: Oriented X4 Cognition: Appropriate decision making; Appropriate for age attention/concentration; Appropriate safety awareness Perception: Appears intact Perseveration: No perseveration noted Safety/Judgement: Fall prevention Functional Mobility and Transfers for ADLs: 
Bed Mobility: 
Rolling: Modified independent Supine to Sit: Modified independent Sit to Supine: Modified independent Scooting: Supervision Transfers: 
Sit to Stand: Contact guard assistance; Adaptive equipment Bed to Chair: Contact guard assistance; Additional time; Adaptive equipment Balance: 
Sitting: Intact; Without support Standing: Impaired; With support Standing - Static: Good;Constant support Standing - Dynamic : Fair;Constant support(BP 73/44 standing; 81/51 seated after standing) ADL Intervention: 
Feeding Feeding Assistance: Independent Grooming Grooming Assistance: Set-up Upper Body Dressing Assistance Dressing Assistance: Set-up Lower Body Dressing Assistance Dressing Assistance: Supervision;Contact guard assistance(orthostatic in standing 73/44) Socks: Supervision(timely) Toileting Toileting Assistance: Supervision(stool smears) Cognitive Retraining Attention to Task: Single task Maintains Attention For (Time): Greater than 10 minutes Following Commands: Follows one step commands/directions; Follows two step commands/directions Safety/Judgement: Fall prevention Pain: 
0/10 Activity Tolerance:  
Fair, requires rest breaks and orthostatic BP Please refer to the flowsheet for vital signs taken during this treatment. After treatment patient left in no apparent distress:  
Sitting in chair and Call bell within reach COMMUNICATION/COLLABORATION:  
The patients plan of care was discussed with: Physical Therapist and Registered Nurse Lanre Browne OTR/L Time Calculation: 28 mins

## 2019-08-16 LAB
ALBUMIN SERPL-MCNC: 2.5 G/DL (ref 3.5–5)
ANION GAP SERPL CALC-SCNC: 6 MMOL/L (ref 5–15)
BUN SERPL-MCNC: 55 MG/DL (ref 6–20)
BUN/CREAT SERPL: 24 (ref 12–20)
CALCIUM SERPL-MCNC: 7.8 MG/DL (ref 8.5–10.1)
CHLORIDE SERPL-SCNC: 92 MMOL/L (ref 97–108)
CO2 SERPL-SCNC: 33 MMOL/L (ref 21–32)
CREAT SERPL-MCNC: 2.32 MG/DL (ref 0.7–1.3)
GLUCOSE BLD STRIP.AUTO-MCNC: 126 MG/DL (ref 65–100)
GLUCOSE BLD STRIP.AUTO-MCNC: 187 MG/DL (ref 65–100)
GLUCOSE BLD STRIP.AUTO-MCNC: 338 MG/DL (ref 65–100)
GLUCOSE BLD STRIP.AUTO-MCNC: 400 MG/DL (ref 65–100)
GLUCOSE SERPL-MCNC: 316 MG/DL (ref 65–100)
PHOSPHATE SERPL-MCNC: 2.1 MG/DL (ref 2.6–4.7)
POTASSIUM SERPL-SCNC: 4 MMOL/L (ref 3.5–5.1)
SERVICE CMNT-IMP: ABNORMAL
SODIUM SERPL-SCNC: 131 MMOL/L (ref 136–145)

## 2019-08-16 PROCEDURE — 77010033678 HC OXYGEN DAILY

## 2019-08-16 PROCEDURE — 82962 GLUCOSE BLOOD TEST: CPT

## 2019-08-16 PROCEDURE — 74011636637 HC RX REV CODE- 636/637: Performed by: FAMILY MEDICINE

## 2019-08-16 PROCEDURE — 65660000000 HC RM CCU STEPDOWN

## 2019-08-16 PROCEDURE — 74011250637 HC RX REV CODE- 250/637: Performed by: NURSE PRACTITIONER

## 2019-08-16 PROCEDURE — 97530 THERAPEUTIC ACTIVITIES: CPT

## 2019-08-16 PROCEDURE — 36415 COLL VENOUS BLD VENIPUNCTURE: CPT

## 2019-08-16 PROCEDURE — 94760 N-INVAS EAR/PLS OXIMETRY 1: CPT

## 2019-08-16 PROCEDURE — 74011250637 HC RX REV CODE- 250/637: Performed by: FAMILY MEDICINE

## 2019-08-16 PROCEDURE — 97535 SELF CARE MNGMENT TRAINING: CPT

## 2019-08-16 PROCEDURE — 74011250636 HC RX REV CODE- 250/636: Performed by: FAMILY MEDICINE

## 2019-08-16 PROCEDURE — 80069 RENAL FUNCTION PANEL: CPT

## 2019-08-16 RX ADMIN — Medication 10 ML: at 23:38

## 2019-08-16 RX ADMIN — Medication 10 ML: at 07:07

## 2019-08-16 RX ADMIN — TAMSULOSIN HYDROCHLORIDE 0.4 MG: 0.4 CAPSULE ORAL at 09:53

## 2019-08-16 RX ADMIN — HEPARIN SODIUM 5000 UNITS: 5000 INJECTION INTRAVENOUS; SUBCUTANEOUS at 11:38

## 2019-08-16 RX ADMIN — SIMVASTATIN 20 MG: 20 TABLET, FILM COATED ORAL at 21:47

## 2019-08-16 RX ADMIN — SODIUM BICARBONATE 1300 MG: 650 TABLET ORAL at 09:53

## 2019-08-16 RX ADMIN — SODIUM BICARBONATE 1300 MG: 650 TABLET ORAL at 21:47

## 2019-08-16 RX ADMIN — HEPARIN SODIUM 5000 UNITS: 5000 INJECTION INTRAVENOUS; SUBCUTANEOUS at 21:47

## 2019-08-16 RX ADMIN — CARVEDILOL 6.25 MG: 6.25 TABLET, FILM COATED ORAL at 16:59

## 2019-08-16 RX ADMIN — INSULIN LISPRO 3 UNITS: 100 INJECTION, SOLUTION INTRAVENOUS; SUBCUTANEOUS at 11:39

## 2019-08-16 RX ADMIN — INSULIN LISPRO 10 UNITS: 100 INJECTION, SOLUTION INTRAVENOUS; SUBCUTANEOUS at 16:58

## 2019-08-16 RX ADMIN — FLUDROCORTISONE ACETATE 0.2 MG: 0.1 TABLET ORAL at 09:53

## 2019-08-16 RX ADMIN — Medication: at 16:59

## 2019-08-16 RX ADMIN — ALUMINUM HYDROXIDE AND MAGNESIUM HYDROXIDE 15 ML: 200; 200 SUSPENSION ORAL at 21:52

## 2019-08-16 RX ADMIN — Medication: at 21:47

## 2019-08-16 RX ADMIN — SODIUM BICARBONATE 1300 MG: 650 TABLET ORAL at 16:58

## 2019-08-16 RX ADMIN — PREDNISONE 30 MG: 10 TABLET ORAL at 07:06

## 2019-08-16 RX ADMIN — INSULIN GLARGINE 20 UNITS: 100 INJECTION, SOLUTION SUBCUTANEOUS at 09:53

## 2019-08-16 RX ADMIN — Medication: at 09:53

## 2019-08-16 RX ADMIN — INSULIN LISPRO 5 UNITS: 100 INJECTION, SOLUTION INTRAVENOUS; SUBCUTANEOUS at 21:47

## 2019-08-16 RX ADMIN — CARVEDILOL 6.25 MG: 6.25 TABLET, FILM COATED ORAL at 07:05

## 2019-08-16 RX ADMIN — ASPIRIN 81 MG CHEWABLE TABLET 81 MG: 81 TABLET CHEWABLE at 09:53

## 2019-08-16 NOTE — PROGRESS NOTES
Completed orthostatic Bp this am - Pt continues asymptomatic - no reports of dizziness of leg weakness/shaking observed.      Vitals:    08/16/19 1051 08/16/19 1059 08/16/19 1100 08/16/19 1103   BP: 119/65 (!) 75/47 (!) 86/31 108/62   BP 1 Location:       BP Patient Position: Sitting Standing Standing Sitting   Pulse: 76 80 80 78   Resp:       Temp:       SpO2: 97% 97% 97% 98%   Weight:       Height:         Luis Enrique Orr, PT

## 2019-08-16 NOTE — PROGRESS NOTES
Bedside and Verbal shift change report given to Marcell (oncoming nurse) by Ankur Haider (offgoing nurse). Report included the following information SBAR, Kardex and MAR.

## 2019-08-16 NOTE — PROGRESS NOTES
[facility]   [address] [facility], [facility] [address], [facility]– [place]  Phone: [phone]   [phone]       Nephrology Progress Note  Claudio Diaz     1935     [id number]  Date of Admission : 8/5/2019 08/16/19    CC:  Follow up for KATALINA on CKD    Assessment and Plan   KATALINA  on CKD   - 2/2 pre renal azotemia from diuretics, infection  - stable on 8/14  - repeat labs pending today  - volume appears stable, cont to hold diuetics for now  - daily labs over the weekend    COPD flare, RML PNA:  - Mx per primary team  - abx per pulm     CKD IV:  - 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  - baseline Cr at best : 2.7- 3 mg/dl   - UPCR showed 11 gm of Protein     Metablolic Acidosis:  - from chronic resp disease and contraction alkalosis from diuresis    Pulm HTN:  - Echo showing severe Pulm HTN w/ PASP ~ 72   - RHC w/ PCW 28    Hypoxia:  - from PNA + pulm edema  - abx per pulmonary     Orthostatic Hypotension:  - BP meds and diuretics on hold for now  - Florinef added by hospitalist 8/14     Bradycardia :  - s/p PPM on 5/9     Anemia of CKD:  - hgb stable  - weekly MICHAEL for now     Afib:  - per cards     CAD s/p CABG     DM2:  - on insulin     Interval History:  Seen and examined. Labs pending today. Feeling ok. Has not stood up yet for orthostatic BP measurement. No cp, sob, n/v/d. Review of Systems: Pertinent items are noted in HPI.     Current Medications:   Current Facility-Administered Medications   Medication Dose Route Frequency    fludrocortisone (FLORINEF) tablet 0.2 mg  0.2 mg Oral DAILY    carvedilol (COREG) tablet 6.25 mg  6.25 mg Oral BID WITH MEALS    insulin glargine (LANTUS) injection 20 Units  20 Units SubCUTAneous DAILY    predniSONE (DELTASONE) tablet 30 mg  30 mg Oral DAILY WITH BREAKFAST    albuterol-ipratropium (DUO-NEB) 2.5 MG-0.5 MG/3 ML  3 mL Nebulization Q4H PRN    aluminum-magnesium hydroxide (MAALOX) oral suspension 15 mL  15 mL Oral QID PRN    dextrose 10 % infusion 125-250 mL  125-250 mL IntraVENous PRN    insulin lispro (HUMALOG) injection   SubCUTAneous AC&HS    glucose chewable tablet 16 g  4 Tab Oral PRN    glucagon (GLUCAGEN) injection 1 mg  1 mg IntraMUSCular PRN    balsam peru-castor oil (VENELEX) ointment   Topical TID    sodium chloride (NS) flush 5-40 mL  5-40 mL IntraVENous Q8H    sodium chloride (NS) flush 5-40 mL  5-40 mL IntraVENous PRN    sodium chloride (NS) flush 5-40 mL  5-40 mL IntraVENous Q8H    sodium chloride (NS) flush 5-40 mL  5-40 mL IntraVENous PRN    acetaminophen (TYLENOL) tablet 650 mg  650 mg Oral Q4H PRN    HYDROcodone-acetaminophen (NORCO) 5-325 mg per tablet 1 Tab  1 Tab Oral Q4H PRN    morphine injection 1 mg  1 mg IntraVENous Q4H PRN    heparin (porcine) injection 5,000 Units  5,000 Units SubCUTAneous Q12H    aspirin chewable tablet 81 mg  81 mg Oral DAILY    simvastatin (ZOCOR) tablet 20 mg  20 mg Oral QHS    sodium bicarbonate tablet 1,300 mg  1,300 mg Oral TID    tamsulosin (FLOMAX) capsule 0.4 mg  0.4 mg Oral DAILY    epoetin dm-epbx (RETACRIT) injection 10,000 Units  10,000 Units SubCUTAneous Q7D      Allergies   Allergen Reactions    Lisinopril Cough       Objective:  Vitals:    Vitals:    08/15/19 2017 08/16/19 0236 08/16/19 0612 08/16/19 0705   BP: 116/70 140/76  119/66   Pulse: 68 75  75   Resp: 16 16     Temp: 97.5 °F (36.4 °C) 97.8 °F (36.6 °C)     SpO2: 99% 99%     Weight:   70 kg (154 lb 6.4 oz)    Height:         Intake and Output:  08/16 0701 - 08/16 1900  In: -   Out: 450 [Urine:450]  08/14 1901 - 08/16 0700  In: 1200 [P.O.:1200]  Out: 1400 [Urine:1400]    Physical Examination:    General: NAD,Conversant   Neck:  Supple, no mass  Resp:  Lungs mostly clear  CV:  RRR,  no murmur or rub,no LE edema  GI:  Soft, NT, + Bowel sounds, no hepatosplenomegaly  Neurologic:  Non focal  Psych:             AAO x 3 appropriate affect       []    High complexity decision making was performed  []    Patient is at high-risk of decompensation with multiple organ involvement    Lab Data Personally Reviewed: I have reviewed all the pertinent labs, microbiology data and radiology studies during assessment. Recent Labs     08/14/19  0715   *   K 3.6   CL 91*   CO2 36*   *   BUN 63*   CREA 2.47*   CA 8.1*   PHOS 2.7   ALB 2.4*     No results for input(s): WBC, HGB, HCT, PLT, HGBEXT, HCTEXT, PLTEXT, HGBEXT, HCTEXT, PLTEXT in the last 72 hours. No results found for: [facility]  Lab Results   Component Value Date/Time    Culture result: NO GROWTH 5 DAYS 08/05/2019 10:46 AM    Culture result: CANDIDA ALBICANS (A) 03/16/2017 06:00 AM    Culture result: NO GROWTH 5 DAYS 03/16/2017 05:40 AM     Recent Results (from the past 24 hour(s))   GLUCOSE, POC    Collection Time: 08/15/19 11:32 AM   Result Value Ref Range    Glucose (POC) 337 (H) 65 - 100 mg/dL    Performed by Tanner Sheppard    GLUCOSE, POC    Collection Time: 08/15/19  5:00 PM   Result Value Ref Range    Glucose (POC) 401 (H) 65 - 100 mg/dL    Performed by Rena Stewart    GLUCOSE, POC    Collection Time: 08/15/19  9:04 PM   Result Value Ref Range    Glucose (POC) 320 (H) 65 - 100 mg/dL    Performed by Joel Bravo 20, POC    Collection Time: 08/16/19  6:22 AM   Result Value Ref Range    Glucose (POC) 126 (H) 65 - 100 mg/dL    Performed by Marybeth Blackman            Total time spent with patient:  xxx   min. Care Plan discussed with:  Patient     Family      RN      Consulting Physician [facility],         I have reviewed the flowsheets. Chart and Pertinent Notes have been reviewed. No change in PMH ,family and social history from Consult note.       Brian Perry MD

## 2019-08-16 NOTE — PROGRESS NOTES
NUTRITION brief       Diet: consistent CHO, 2000kcal, 1500ml FR  Supplements: Glucerna    Pt and family visited for PO check. Appetite has been good. Likes Glucerna shakes and having snacks between meals as needed. Patient Vitals for the past 168 hrs:   % Diet Eaten   08/16/19 0953 100 %   08/15/19 1001 100 %   08/14/19 0915 100 %   08/13/19 1406 100 %   08/13/19 0834 25 %   08/12/19 1710 100 %   08/12/19 1356 100 %   08/12/19 0918 100 %   08/10/19 0834 90 %   08/09/19 1623 80 %     Notable wt loss over past month but pt reports significant reduction in sodium intake over that period of time. Had been eating canned soup with extra broth nightly for snack. Now avoiding all added salt and high sodium foods. Atrributes wt loss to improvement in fluid status. BG elevated this admit with steroids rx. DTC following for insulin recommendations while on steroids. Pt making conscious decisions to avoid added sugars as well. Diet questions answered with good understanding. Diet order adjusted to 2gm Na to avoid added salt on trays. Will continue to follow with goals and monitoring/evaluation per previous notes.      Estimated Nutrition Needs:   Kcals/day: 4331 Kcals/day(BMR(1341x1.3))  Protein: 52 g(-65g/day(0.8-1.0g/kg))  Fluid:  1743ml (1ml/kg)  Based On: Kcal/kg - specify (Comment)  Weight Used: Actual wt(65 kg)    Nehemias Hopkins RD [facility], Pager #[phone] or [phone]

## 2019-08-16 NOTE — PROGRESS NOTES
Problem: Mobility Impaired (Adult and Pediatric)  Goal: *Acute Goals and Plan of Care (Insert Text)  Description  FUNCTIONAL STATUS PRIOR TO ADMISSION: Patient was independent without use of DME.    HOME SUPPORT PRIOR TO ADMISSION: The patient lived with wife/son but did not require assist.    Physical Therapy Goals  Initiated 8/12/2019  1. Patient will transfer from bed to chair and chair to bed with modified independence using the least restrictive device within 7 day(s). 2.  Patient will perform sit to stand with modified independence within 7 day(s). 3.  Patient will ambulate with modified independence for > 200 feet with the least restrictive device within 7 day(s). 4.  Patient will ascend/descend 3 stairs with one handrail(s) with supervision/set-up within 7 day(s). Outcome: Progressing Towards Goal  PHYSICAL THERAPY TREATMENT  Patient: America Martinez  (81 y.o. male)  Date: 8/16/2019  Diagnosis: CHF exacerbation ([facility] [address] [I50.9]  Acute hypoxemic respiratory failure ([facility] [address] [J96.01] <principal problem not specified>       Precautions: Fall(orthostatic blood pressure, weak LE's symptom)  Chart, physical therapy assessment, plan of care and goals were reviewed. ASSESSMENT  Based on the objective data described below, less drop in Bp with positional changes today ---  and asymptomatic - no leg shaking/buckling noted with 5 mins of standing with support of rolling walker. Current Level of Function Impacting Discharge (mobility/balance): Unable to assess functional mobility or 6 Min Walk Test - secondary to Low Blood pressure 86/31 standing -    Other factors to consider for discharge: Lives with elderly wife who uses cane         PLAN :  Patient continues to benefit from skilled intervention to address the above impairments. Continue treatment per established plan of care. to address goals.     Recommendation for discharge: (in order for the patient to meet his/her long term goals)  To be determined: needs further assessment once Bp stable to assess amb/stairs and oxygen needs     This discharge recommendation:  A follow-up discussion with the attending provider and/or case management is planned    Equipment recommendations for successful discharge (if) home: patient owns DME required for discharge - rolling walker       SUBJECTIVE:   Patient stated i'm not getting my hopes up.     OBJECTIVE DATA SUMMARY:   Critical Behavior:  Neurologic State: Alert  Orientation Level: Oriented X4  Cognition: Appropriate for age attention/concentration  Safety/Judgement: Fall prevention  Functional Mobility Training:  Bed Mobility:  Rolling: Modified independent  Supine to Sit: Modified independent  Sit to Supine: Modified independent  Scooting: Modified independent        Transfers:  Sit to Stand: Contact guard assistance  Stand to Sit: Contact guard assistance     Stand Pivot Transfers: Contact guard assistance  Bed to Chair: Contact guard assistance                    Balance:  Sitting: Intact; Without support  Standing: Impaired; With support  Standing - Static: Good;Constant support(For 5 mins standing with RW - marching in place)  Standing - Dynamic : Not tested    Therapeutic Exercises:   Pt performed marching in place and heel raises in standing with rolling walker/contact guard. Pain Rating:  Pt denied pain. Activity Tolerance:   Fair - still limited by decreased Bp.     Vital Signs:  Vitals:    08/16/19 1051 08/16/19 1059 08/16/19 1100 08/16/19 1103   BP: 119/65 (!) 75/47 (!) 86/31 108/62   BP 1 Location:       BP Patient Position: Sitting Standing Standing Sitting   Pulse: 76 80 80 78   Resp:       Temp:       SpO2: 97% 97% 97% 98%   Weight:       Height:            After treatment patient left in no apparent distress:   Sitting in chair, Call bell within reach and Bed / chair alarm activated    COMMUNICATION/COLLABORATION:   The patients plan of care was discussed with: Registered Nurse    Shana Altamirano Sivakumar Gonsales, PT   Time Calculation: 30 mins

## 2019-08-16 NOTE — PROGRESS NOTES
Problem: Self Care Deficits Care Plan (Adult)  Goal: *Acute Goals and Plan of Care (Insert Text)  Description    FUNCTIONAL STATUS PRIOR TO ADMISSION: Patient was independent and active without use of DME.    HOME SUPPORT: The patient lived with his wife and son but did not require assist.    Occupational Therapy Goals  Initiated 8/13/2019  1. Patient will perform grooming with modified independence standing at the sink within 7 day(s). 2.  Patient will perform lower body dressing with supervision/set-up within 7 day(s). 3.  Patient will perform static standing > or = 3 minutes with close supervision and without loss of balance within 7 day(s). 4.  Patient will perform toilet transfers with supervision/set-up into and out of bathroom within 7 day(s). 5.  Patient will perform all aspects of toileting with modified independence within 7 day(s). 6.  Patient will demonstrate safety awareness during ADL tasks and mobility within 7 day(s). Outcome: Progressing Towards Goal   OCCUPATIONAL THERAPY TREATMENT  Patient: Sherryle Ramsay Sr. (81 y.o. male)  Date: 8/16/2019  Diagnosis: CHF exacerbation ([facility] [address] [I50.9]  Acute hypoxemic respiratory failure ([facility] [address] [J96.01] <principal problem not specified>       Precautions: Fall(orthostatic blood pressure, weak LE's symptom)  Chart, occupational therapy assessment, plan of care, and goals were reviewed. ASSESSMENT  Based on the objective data described below, Participation impacted by orthostatic hypotension in standing and need for CGA to supervision for mobility on and off of BSC and in and out of bed. Current Level of Function Impacting Discharge (ADLs): Self care with CGA to Mod I. Using BSC secondary to orthostatic BPs in standing/walking. Patient on 1 liter of O2 on arrival. Participated in mobility with short walk,  tranfers on and off of BSC and in and out of bed. After brief walk on room air, O2 SATs 89%.  At rest in sitting and in bed, O2 SATs 91 to 92% on room air. Patient left on room air and nurse notified. See vitals below. Other factors to consider for discharge: Spouse is elderly and uses cane         PLAN :  Patient continues to benefit from skilled intervention to address the above impairments. Continue treatment per established plan of care. to address goals. Recommend with staff: Stephanie Zarephath in chair for all meals, BSC for toileting with progression to toilet (monitor BP), self care with set up to [address] next OT session: Standing BP for grooming and LE self care     Recommendation for discharge: (in order for the patient to meet his/her long term goals)  To be determined based on stability of BP versus HH versus none    This discharge recommendation:  Has been made in collaboration with the attending provider and/or case management    Equipment recommendations for successful discharge (if) home: none       SUBJECTIVE:   Patient stated Pepito Alejandra been  60 years. Timmyimer Pool    OBJECTIVE DATA SUMMARY:   Cognitive/Behavioral Status:  Neurologic State: Alert  Orientation Level: Oriented X4  Cognition: Appropriate for age attention/concentration; Follows commands  Perception: Appears intact  Perseveration: No perseveration noted  Safety/Judgement: Awareness of environment    Functional Mobility and Transfers for ADLs:  Bed Mobility:  Rolling: Modified independent  Supine to Sit: Modified independent  Sit to Supine: Supervision;Assist x1  Scooting: Modified independent(to head of bed)    Transfers:  Sit to Stand: Contact guard assistance;Assist x1  Functional Transfers  Toilet Transfer : Supervision;Assist x1  Adaptive Equipment: Bedside commode  Bed to Chair: Assist x1;Contact guard assistance    Balance:  Sitting: Intact  Standing: Impaired; With support(othostatic BPs in standing)  Standing - Static: Fair  Standing - Dynamic : Fair    ADL Intervention:     Vitals:    08/16/19 1450 08/16/19 1459 08/16/19 1506 08/16/19 1507   BP: 143/66 99/56 (!) 86/43 123/64 BP 1 Location: Right arm Right arm Right arm Right arm   BP Patient Position: Sitting Standing Post activity; Walking Sitting   Pulse: 79 77 83 73   Resp:       Temp:       SpO2: 97%  (!) 89% 92%   Weight:       Height:                 Lower Body Bathing  Bathing Assistance: Supervision  Perineal  : Supervision  Position Performed: Seated on toilet  Lower Body : Set-up  Position Performed: Seated in chair         Lower Body Dressing Assistance  Dressing Assistance: Supervision  Leg Crossed Method Used: No  Position Performed: Bending forward method;Seated in chair;Standing  Adaptive Equipment Used: Jhon Rowe    Toileting  Toileting Assistance: Minimum assistance  Bladder Hygiene: Supervision  Bowel Hygiene: Supervision  Clothing Management: Minimum assistance(gown)  Adaptive Equipment: Walker    Cognitive Retraining  Safety/Judgement: Awareness of environment      Activity Tolerance:   Fair  Please refer to the flowsheet for vital signs taken during this treatment.     After treatment patient left in no apparent distress:   Supine in bed, Call bell within reach and Bed / chair alarm activated    COMMUNICATION/COLLABORATION:   The patients plan of care was discussed with: Physical Therapist and Registered Nurse    CLARISA Farris  Time Calculation: 42 mins

## 2019-08-16 NOTE — DIABETES MGMT
[facility]    [facility] Progress Note    Recommendations/ Comments: Chart review for hyperglycemia following Prednisone 30 mg each morning. Blood sugars ranged 126-401 mg/dL in the last 24 hours and pt required 26 units of correction. If appropriate, please consider:   - addition of NPH 10 units to be paired with am prednisone (could include now dose today)  Message sent to Dr. Rita More regarding above    Current hospital DM medication:   Lantus 20 units at 0900  Lispro correction, resistant scale    Chart reviewed on [address] .    Patient is a 81 y.o. male with known DM on Lantus U-300 18 units daily at home    A1c:   Lab Results   Component Value Date/Time    Hemoglobin A1c 9.1 (H) 05/11/2019 03:37 AM    Hemoglobin A1c 9.8 (H) 10/20/2017 08:11 AM    Hemoglobin A1c, External 8.4 08/15/2017       Recent Glucose Results:   Lab Results   Component Value Date/Time    GLUCPOC 126 (H) 08/16/2019 06:22 AM    GLUCPOC 320 (H) 08/15/2019 09:04 PM    GLUCPOC 401 (H) 08/15/2019 05:00 PM        Lab Results   Component Value Date/Time    Creatinine 2.47 (H) 08/14/2019 07:15 AM     Estimated Creatinine Clearance: 22.8 mL/min (A) (based on SCr of 2.47 mg/dL (H)). Active Orders   Diet    DIET DIABETIC WITH OPTIONS Consistent Carb 2000kcal; Regular        PO intake:   Patient Vitals for the past 72 hrs:   % Diet Eaten   08/15/19 1001 100 %   08/14/19 0915 100 %   08/13/19 1406 100 %       Will continue to follow as needed.     Thank you  Michael Whiting MS, RN, CDE    Time spent: 6 minutes

## 2019-08-16 NOTE — PROGRESS NOTES
Hospitalist Progress Note  Amee Apodaca MD  Answering service: [phone] from in house phone      Date of Service:  8/15/2019  NAME:  Fernando Reyes  :  1935  MRN:  [id number]      Admission Summary:   The patient is an 20-year-old male with past medical history of diabetes, hypertension, coronary artery disease, hypercholesterolemia, peripheral vascular disease, AFib, and CKD, who presented to the emergency room via private vehicle with chief complaint of shortness of breath. His shortness of breath has been persistent for more than one week and subsequently has worsened since yesterday. There is associated bilateral leg swelling since yesterday. The patient has been taking four albuterol nebulizer treatments with no relief. The patient also reports cough with occasional greenish sputum. He recently had pacemaker placed by Dr. Berenice Davis and had followed up with Cardiology yesterday who increased his diuretic dose. The patient has been seen in the emergency room for shortness of breath three times this week.       Interval history / Subjective:   Pt reports shortness of breath this am but improving  He is orthostatic with low Blood pressures today- increased florinef  He will likely need home oxygen on discharge  High sugars due to diet noncompliance       Assessment & Plan:      acute hypoxemic resp failure-  POA - multifactorial- due to acute resp infection and edema from pulmonary hypertension- still present but improving  CT chest   showed diffuse alveolar pattern suggestive of pulm edema ,   resp panel pcr is neg and legionella neg   Currently on oxygen, PO prednisone,  Nebs,antibiotics- levaquin for 14 days   Breathing improved with bumex  pulm consulted and following  He will need repeat CT scan in 4-6 wks on outpatient    Presyncope and orthostasis and hypotension on    Hold all bumex and BP meds   If discharged needs bumex 2 mg bid po instead      severe pulm HTN- causing persistent hypoxemia  RHC w/ PCW 28  ECHO 50/60% with PAP 50   Cont diuretics on DC but patient will need oxygen on discharge due to chronic hypoxemia from pulm edema     . KATALINA on CKD stage 4-   baseline creatinine 3.1-3.3; was 3.8 on admission-   likely due to diureitcs and hypotension  Improving     Metabolic acidosis   On po bicarb      . PVD-   s/p stented in both LE, cont aspirin      DM type 2-   Uncontrolled   Elevated Blood sugars due to steroids and changes in insulin regimen  Cont accuchecks and ss insulin  Changed lantus back to AM dose due to am hypoglycemia  Increased lantus dose for tomorrow    . HLD-   resume statin meds     . BPH  - resume flomax    Anemia of CKD- Hb low stable     CAD s/p CABG- asa     Bradycardia with afib w pacemaker,    -supine hypertension with severe orthostatic hypotension -   Holding some home BP meds due to orthostatsis and presyncope   Started florinef 8/14/19- increased dose on 8/15/19  Restarted coreg at lower dose due to tachycardia- tachycardia resolved    Code status: full   DVT prophylaxis: heaprin     Care Plan discussed with: Patient/Family  Disposition: TBD     Hospital Problems  Date Reviewed: 7/28/2019          Codes Class Noted POA    CHF exacerbation ([facility] [address] ICD-10-CM: I50.9  ICD-9-CM: 428.0  8/5/2019 Unknown        Acute hypoxemic respiratory failure [facility]) ICD-10-CM: J96.01  ICD-9-CM: 518.81  8/5/2019 Unknown                Review of Systems:   A comprehensive review of systems was negative except for that written in the subjective section      Vital Signs:    Last 24hrs VS reviewed since prior progress note.  Most recent are:  Visit Vitals  /70 (BP 1 Location: Right arm, BP Patient Position: Sitting)   Pulse 68   Temp 97.5 °F (36.4 °C)   Resp 16   Ht 5' 9\" (1.753 m)   Wt 69.9 kg (154 lb)   SpO2 99%   BMI 22.74 kg/m²         Intake/Output Summary (Last 24 hours) at 8/15/2019 1601  Last data filed at 8/15/2019 2017  Gross per 24 hour   Intake 1200 ml   Output 1400 ml   Net -200 ml        Physical Examination:             Constitutional:  on oxygen but comfortable ,    ENT:  Oral mucous moist, Neck supple,    Resp:  decreased breath sounds ,no wheezing   CV:  Regular rhythm, normal rate, no murmurs, gallops, rubs    GI:  Soft, non distended, non tender. normoactive bowel sounds, no hepatosplenomegaly     Musculoskeletal:  No edema, warm, 2+ pulses throughout    Neurologic:  Moves all extremities. AAOx3, CN II-XII reviewed            Data Review:    Review and/or order of clinical lab test      Labs:     No results for input(s): WBC, HGB, HCT, PLT, HGBEXT, HCTEXT, PLTEXT, HGBEXT, HCTEXT, PLTEXT in the last 72 hours. Recent Labs     08/14/19  0715   *   K 3.6   CL 91*   CO2 36*   BUN 63*   CREA 2.47*   *   CA 8.1*   PHOS 2.7     Recent Labs     08/14/19  0715   ALB 2.4*     No results for input(s): INR, PTP, APTT in the last 72 hours. No lab exists for component: INREXT, INREXT   No results for input(s): FE, TIBC, PSAT, FERR in the last 72 hours. No results found for: FOL, RBCF   No results for input(s): PH, PCO2, PO2 in the last 72 hours. No results for input(s): CPK, CKNDX, TROIQ in the last 72 hours.     No lab exists for component: CPKMB  No results found for: CHOL, CHOLX, CHLST, CHOLV, HDL, LDL, LDLC, DLDLP, TGLX, TRIGL, TRIGP, CHHD, CHHDX  Lab Results   Component Value Date/Time    Glucose (POC) 320 (H) 08/15/2019 09:04 PM    Glucose (POC) 401 (H) 08/15/2019 05:00 PM    Glucose (POC) 337 (H) 08/15/2019 11:32 AM    Glucose (POC) 156 (H) 08/15/2019 06:50 AM    Glucose (POC) 316 (H) 08/14/2019 09:54 PM     Lab Results   Component Value Date/Time    Color YELLOW/STRAW 06/25/2017 11:40 AM    Appearance CLEAR 06/25/2017 11:40 AM    Specific gravity 1.018 06/25/2017 11:40 AM    pH (UA) 5.0 06/25/2017 11:40 AM    Protein 300 (A) 06/25/2017 11:40 AM    Glucose NEGATIVE  06/25/2017 11:40 AM Ketone NEGATIVE  06/25/2017 11:40 AM    Bilirubin NEGATIVE  06/25/2017 11:40 AM    Urobilinogen 0.2 06/25/2017 11:40 AM    Nitrites NEGATIVE  06/25/2017 11:40 AM    Leukocyte Esterase NEGATIVE  06/25/2017 11:40 AM    Epithelial cells FEW 06/25/2017 11:40 AM    Bacteria NEGATIVE  06/25/2017 11:40 AM    WBC 0-4 06/25/2017 11:40 AM    RBC 0-5 06/25/2017 11:40 AM         Medications Reviewed:     Current Facility-Administered Medications   Medication Dose Route Frequency    [START ON 8/16/2019] fludrocortisone (FLORINEF) tablet 0.2 mg  0.2 mg Oral DAILY    carvedilol (COREG) tablet 6.25 mg  6.25 mg Oral BID WITH MEALS    insulin glargine (LANTUS) injection 20 Units  20 Units SubCUTAneous DAILY    predniSONE (DELTASONE) tablet 30 mg  30 mg Oral DAILY WITH BREAKFAST    albuterol-ipratropium (DUO-NEB) 2.5 MG-0.5 MG/3 ML  3 mL Nebulization Q4H PRN    aluminum-magnesium hydroxide (MAALOX) oral suspension 15 mL  15 mL Oral QID PRN    dextrose 10 % infusion 125-250 mL  125-250 mL IntraVENous PRN    insulin lispro (HUMALOG) injection   SubCUTAneous AC&HS    glucose chewable tablet 16 g  4 Tab Oral PRN    glucagon (GLUCAGEN) injection 1 mg  1 mg IntraMUSCular PRN    balsam peru-castor oil (VENELEX) ointment   Topical TID    sodium chloride (NS) flush 5-40 mL  5-40 mL IntraVENous Q8H    sodium chloride (NS) flush 5-40 mL  5-40 mL IntraVENous PRN    sodium chloride (NS) flush 5-40 mL  5-40 mL IntraVENous Q8H    sodium chloride (NS) flush 5-40 mL  5-40 mL IntraVENous PRN    acetaminophen (TYLENOL) tablet 650 mg  650 mg Oral Q4H PRN    HYDROcodone-acetaminophen (NORCO) 5-325 mg per tablet 1 Tab  1 Tab Oral Q4H PRN    morphine injection 1 mg  1 mg IntraVENous Q4H PRN    heparin (porcine) injection 5,000 Units  5,000 Units SubCUTAneous Q12H    aspirin chewable tablet 81 mg  81 mg Oral DAILY    simvastatin (ZOCOR) tablet 20 mg  20 mg Oral QHS    sodium bicarbonate tablet 1,300 mg  1,300 mg Oral TID    tamsulosin (FLOMAX) capsule 0.4 mg  0.4 mg Oral DAILY    epoetin dm-epbx (RETACRIT) injection 10,000 Units  10,000 Units SubCUTAneous Q7D     ______________________________________________________________________  EXPECTED LENGTH OF STAY: 4d 4h  ACTUAL LENGTH OF STAY:          10                 Jeronimo Marshall MD

## 2019-08-17 LAB
ALBUMIN SERPL-MCNC: 2.3 G/DL (ref 3.5–5)
ANION GAP SERPL CALC-SCNC: 6 MMOL/L (ref 5–15)
BUN SERPL-MCNC: 53 MG/DL (ref 6–20)
BUN/CREAT SERPL: 22 (ref 12–20)
CALCIUM SERPL-MCNC: 7.9 MG/DL (ref 8.5–10.1)
CHLORIDE SERPL-SCNC: 95 MMOL/L (ref 97–108)
CO2 SERPL-SCNC: 34 MMOL/L (ref 21–32)
CREAT SERPL-MCNC: 2.39 MG/DL (ref 0.7–1.3)
GLUCOSE BLD STRIP.AUTO-MCNC: 131 MG/DL (ref 65–100)
GLUCOSE BLD STRIP.AUTO-MCNC: 167 MG/DL (ref 65–100)
GLUCOSE BLD STRIP.AUTO-MCNC: 184 MG/DL (ref 65–100)
GLUCOSE BLD STRIP.AUTO-MCNC: 91 MG/DL (ref 65–100)
GLUCOSE SERPL-MCNC: 159 MG/DL (ref 65–100)
PHOSPHATE SERPL-MCNC: 1.7 MG/DL (ref 2.6–4.7)
POTASSIUM SERPL-SCNC: 3.3 MMOL/L (ref 3.5–5.1)
SERVICE CMNT-IMP: ABNORMAL
SERVICE CMNT-IMP: NORMAL
SODIUM SERPL-SCNC: 135 MMOL/L (ref 136–145)

## 2019-08-17 PROCEDURE — 97116 GAIT TRAINING THERAPY: CPT

## 2019-08-17 PROCEDURE — 74011636637 HC RX REV CODE- 636/637: Performed by: FAMILY MEDICINE

## 2019-08-17 PROCEDURE — 65660000000 HC RM CCU STEPDOWN

## 2019-08-17 PROCEDURE — 74011250637 HC RX REV CODE- 250/637: Performed by: NURSE PRACTITIONER

## 2019-08-17 PROCEDURE — 74011250637 HC RX REV CODE- 250/637: Performed by: FAMILY MEDICINE

## 2019-08-17 PROCEDURE — 80069 RENAL FUNCTION PANEL: CPT

## 2019-08-17 PROCEDURE — 36415 COLL VENOUS BLD VENIPUNCTURE: CPT

## 2019-08-17 PROCEDURE — 82962 GLUCOSE BLOOD TEST: CPT

## 2019-08-17 PROCEDURE — 74011250636 HC RX REV CODE- 250/636: Performed by: FAMILY MEDICINE

## 2019-08-17 PROCEDURE — 97530 THERAPEUTIC ACTIVITIES: CPT

## 2019-08-17 RX ORDER — SODIUM,POTASSIUM PHOSPHATES 280-250MG
1 POWDER IN PACKET (EA) ORAL 3 TIMES DAILY
Status: COMPLETED | OUTPATIENT
Start: 2019-08-17 | End: 2019-08-19

## 2019-08-17 RX ORDER — POTASSIUM CHLORIDE 1.5 G/1.77G
20 POWDER, FOR SOLUTION ORAL
Status: ACTIVE | OUTPATIENT
Start: 2019-08-17 | End: 2019-08-18

## 2019-08-17 RX ADMIN — SIMVASTATIN 20 MG: 20 TABLET, FILM COATED ORAL at 21:32

## 2019-08-17 RX ADMIN — Medication 10 ML: at 07:13

## 2019-08-17 RX ADMIN — Medication 10 ML: at 14:00

## 2019-08-17 RX ADMIN — ASPIRIN 81 MG CHEWABLE TABLET 81 MG: 81 TABLET CHEWABLE at 09:25

## 2019-08-17 RX ADMIN — SODIUM BICARBONATE 1300 MG: 650 TABLET ORAL at 16:23

## 2019-08-17 RX ADMIN — Medication 10 ML: at 21:32

## 2019-08-17 RX ADMIN — INSULIN LISPRO 3 UNITS: 100 INJECTION, SOLUTION INTRAVENOUS; SUBCUTANEOUS at 11:38

## 2019-08-17 RX ADMIN — Medication: at 16:47

## 2019-08-17 RX ADMIN — HEPARIN SODIUM 5000 UNITS: 5000 INJECTION INTRAVENOUS; SUBCUTANEOUS at 21:32

## 2019-08-17 RX ADMIN — CARVEDILOL 6.25 MG: 6.25 TABLET, FILM COATED ORAL at 08:00

## 2019-08-17 RX ADMIN — TAMSULOSIN HYDROCHLORIDE 0.4 MG: 0.4 CAPSULE ORAL at 09:25

## 2019-08-17 RX ADMIN — SODIUM BICARBONATE 1300 MG: 650 TABLET ORAL at 09:26

## 2019-08-17 RX ADMIN — ALUMINUM HYDROXIDE AND MAGNESIUM HYDROXIDE 15 ML: 200; 200 SUSPENSION ORAL at 09:29

## 2019-08-17 RX ADMIN — SODIUM BICARBONATE 1300 MG: 650 TABLET ORAL at 21:32

## 2019-08-17 RX ADMIN — INSULIN GLARGINE 20 UNITS: 100 INJECTION, SOLUTION SUBCUTANEOUS at 11:38

## 2019-08-17 RX ADMIN — CARVEDILOL 6.25 MG: 6.25 TABLET, FILM COATED ORAL at 16:46

## 2019-08-17 RX ADMIN — PREDNISONE 30 MG: 10 TABLET ORAL at 08:00

## 2019-08-17 RX ADMIN — POTASSIUM & SODIUM PHOSPHATES POWDER PACK 280-160-250 MG 1 PACKET: 280-160-250 PACK at 21:32

## 2019-08-17 RX ADMIN — POTASSIUM & SODIUM PHOSPHATES POWDER PACK 280-160-250 MG 1 PACKET: 280-160-250 PACK at 16:24

## 2019-08-17 RX ADMIN — INSULIN LISPRO 3 UNITS: 100 INJECTION, SOLUTION INTRAVENOUS; SUBCUTANEOUS at 16:23

## 2019-08-17 RX ADMIN — FLUDROCORTISONE ACETATE 0.2 MG: 0.1 TABLET ORAL at 09:25

## 2019-08-17 RX ADMIN — Medication 10 ML: at 16:24

## 2019-08-17 RX ADMIN — HEPARIN SODIUM 5000 UNITS: 5000 INJECTION INTRAVENOUS; SUBCUTANEOUS at 11:39

## 2019-08-17 RX ADMIN — Medication: at 09:26

## 2019-08-17 NOTE — PROGRESS NOTES
Problem: Falls - Risk of  Goal: *Absence of Falls  Description  Document Shireen Daniellizet Fall Risk and appropriate interventions in the flowsheet.   Outcome: Progressing Towards Goal  Note:   Fall Risk Interventions:  Mobility Interventions: Bed/chair exit alarm         Medication Interventions: Bed/chair exit alarm    Elimination Interventions: Patient to call for help with toileting needs    History of Falls Interventions: Bed/chair exit alarm         Problem: Patient Education: Go to Patient Education Activity  Goal: Patient/Family Education  Outcome: Progressing Towards Goal

## 2019-08-17 NOTE — PROGRESS NOTES
Pt walked with PT, B/P dropped with activities, orthostatics hypotension, asymptomatic, could not complete his 6 minutes walk, back in bed resting, will cont. To monitor him.

## 2019-08-17 NOTE — PROGRESS NOTES
08/17/19 1253   Vital Signs   BP (!) 66/41  (pt denies orthostatic symptoms)   MAP (Calculated) (!) 49   BP 1 Method Automatic   BP 1 Location Right arm   BP Patient Position Standing

## 2019-08-17 NOTE — PROGRESS NOTES
08/17/19 1307   Vital Signs   Pulse (Heart Rate) 70   BP (!) 87/49  (reported mild lightheadedness once seated in w/c)   MAP (Calculated) (!) 62   BP 1 Method Automatic   BP 1 Location Left arm   BP Patient Position Post activity; Sitting  (post gait train x45 Ft approx)

## 2019-08-17 NOTE — PROGRESS NOTES
08/17/19 1258   Vital Signs   Pulse (Heart Rate) 70   BP 95/55   MAP (Calculated) 68   BP 1 Method Automatic   BP 1 Location Left arm   BP Patient Position Sitting

## 2019-08-17 NOTE — PROGRESS NOTES
Hospitalist Progress Note  Amee Apodaca MD  Answering service: [phone] from in house phone      Date of Service:  2019  NAME:  Fernando Reyes  :  1935  MRN:  [id number]      Admission Summary:   The patient is an 76-year-old male with past medical history of diabetes, hypertension, coronary artery disease, hypercholesterolemia, peripheral vascular disease, AFib, and CKD, who presented to the emergency room via private vehicle with chief complaint of shortness of breath. His shortness of breath has been persistent for more than one week and subsequently has worsened since yesterday. There is associated bilateral leg swelling since yesterday. The patient has been taking four albuterol nebulizer treatments with no relief. The patient also reports cough with occasional greenish sputum. He recently had pacemaker placed by Dr. Berenice Davis and had followed up with Cardiology yesterday who increased his diuretic dose. The patient has been seen in the emergency room for shortness of breath three times this week.       Interval history / Subjective:   Pt reports shortness of breath this am but improving  He is orthostatic with low Blood pressures today- increased florinef yesterday  He was able to ambulate in the hallway today off oxygen        Assessment & Plan:      acute hypoxemic resp failure-  POA - multifactorial- due to acute resp infection and edema from pulmonary hypertension- still present but improving  CT chest   showed diffuse alveolar pattern suggestive of pulm edema ,   resp panel pcr is neg and legionella neg   PO prednisone,  Nebs,antibiotics- levaquin for 14 days, off oxygen  Breathing improved with bumex- currently on hold  pulm consulted and has now signed off  He will need repeat CT scan in 4-6 wks on outpatient    Presyncope and orthostasis and hypotension on    Hold all bumex and BP meds   Cont current florinef dose     severe pulm HTN- causing episodic hypoxemia  RHC w/ PCW 28  ECHO 50/60% with PAP 50      KATALINA on CKD stage 4-   baseline creatinine 3.1-3.3; was 3.8 on admission-  Stable at baseline    Metabolic acidosis   On po bicarb      . PVD-   s/p stented in both LE, cont aspirin      DM type 2-   Uncontrolled   Elevated Blood sugars due to steroids and changes in insulin regimen  Cont accuchecks and ss insulin  Changed lantus back to AM dose due to am hypoglycemia  Increased lantus dose for hyperglycemia    . HLD-   cont statin meds     . BPH  Stable on flomax    Anemia of CKD- Hb low stable     CAD s/p CABG- asa     Bradycardia with afib w pacemaker,    -supine hypertension with severe orthostatic hypotension -   Holding some home BP meds due to orthostatsis and presyncope   Started florinef 8/14/19- increased dose on 8/15/19  Restarted coreg at lower dose due to tachycardia- tachycardia resolved    Code status: full   DVT prophylaxis: heaprin     Care Plan discussed with: Patient/Family  Disposition: TBD     Hospital Problems  Date Reviewed: 7/28/2019          Codes Class Noted POA    CHF exacerbation ([facility] [address] ICD-10-CM: I50.9  ICD-9-CM: 428.0  8/5/2019 Unknown        Acute hypoxemic respiratory failure [facility]) ICD-10-CM: J96.01  ICD-9-CM: 518.81  8/5/2019 Unknown                Review of Systems:   A comprehensive review of systems was negative except for that written in the subjective section      Vital Signs:    Last 24hrs VS reviewed since prior progress note.  Most recent are:  Visit Vitals  /67 (BP 1 Location: Right arm, BP Patient Position: At rest)   Pulse 80   Temp 97.9 °F (36.6 °C)   Resp 16   Ht 5' 9\" (1.753 m)   Wt 70 kg (154 lb 6.4 oz)   SpO2 94%   BMI 22.80 kg/m²         Intake/Output Summary (Last 24 hours) at 8/16/2019 2347  Last data filed at 8/16/2019 0953  Gross per 24 hour   Intake 240 ml   Output 450 ml   Net -210 ml        Physical Examination:             Constitutional:  on oxygen but comfortable ,    ENT:  Oral mucous moist, Neck supple,    Resp:  decreased breath sounds ,no wheezing   CV:  Regular rhythm, normal rate, no murmurs, gallops, rubs    GI:  Soft, non distended, non tender. normoactive bowel sounds, no hepatosplenomegaly     Musculoskeletal:  No edema, warm, 2+ pulses throughout    Neurologic:  Moves all extremities. AAOx3, CN II-XII reviewed            Data Review:    Review and/or order of clinical lab test      Labs:     No results for input(s): WBC, HGB, HCT, PLT, HGBEXT, HCTEXT, PLTEXT, HGBEXT, HCTEXT, PLTEXT in the last 72 hours. Recent Labs     08/16/19  1602 08/14/19  0715   * 131*   K 4.0 3.6   CL 92* 91*   CO2 33* 36*   BUN 55* 63*   CREA 2.32* 2.47*   * 139*   CA 7.8* 8.1*   PHOS 2.1* 2.7     Recent Labs     08/16/19  1602 08/14/19  0715   ALB 2.5* 2.4*     No results for input(s): INR, PTP, APTT in the last 72 hours. No lab exists for component: INREXT, INREXT   No results for input(s): FE, TIBC, PSAT, FERR in the last 72 hours. No results found for: FOL, RBCF   No results for input(s): PH, PCO2, PO2 in the last 72 hours. No results for input(s): CPK, CKNDX, TROIQ in the last 72 hours.     No lab exists for component: CPKMB  No results found for: CHOL, CHOLX, CHLST, CHOLV, HDL, LDL, LDLC, DLDLP, TGLX, TRIGL, TRIGP, CHHD, CHHDX  Lab Results   Component Value Date/Time    Glucose (POC) 400 (H) 08/16/2019 08:44 PM    Glucose (POC) 338 (H) 08/16/2019 04:17 PM    Glucose (POC) 187 (H) 08/16/2019 11:22 AM    Glucose (POC) 126 (H) 08/16/2019 06:22 AM    Glucose (POC) 320 (H) 08/15/2019 09:04 PM     Lab Results   Component Value Date/Time    Color YELLOW/STRAW 06/25/2017 11:40 AM    Appearance CLEAR 06/25/2017 11:40 AM    Specific gravity 1.018 06/25/2017 11:40 AM    pH (UA) 5.0 06/25/2017 11:40 AM    Protein 300 (A) 06/25/2017 11:40 AM    Glucose NEGATIVE  06/25/2017 11:40 AM    Ketone NEGATIVE  06/25/2017 11:40 AM    Bilirubin NEGATIVE  06/25/2017 11:40 AM    Urobilinogen 0.2 06/25/2017 11:40 AM    Nitrites NEGATIVE  06/25/2017 11:40 AM    Leukocyte Esterase NEGATIVE  06/25/2017 11:40 AM    Epithelial cells FEW 06/25/2017 11:40 AM    Bacteria NEGATIVE  06/25/2017 11:40 AM    WBC 0-4 06/25/2017 11:40 AM    RBC 0-5 06/25/2017 11:40 AM         Medications Reviewed:     Current Facility-Administered Medications   Medication Dose Route Frequency    fludrocortisone (FLORINEF) tablet 0.2 mg  0.2 mg Oral DAILY    carvedilol (COREG) tablet 6.25 mg  6.25 mg Oral BID WITH MEALS    insulin glargine (LANTUS) injection 20 Units  20 Units SubCUTAneous DAILY    predniSONE (DELTASONE) tablet 30 mg  30 mg Oral DAILY WITH BREAKFAST    albuterol-ipratropium (DUO-NEB) 2.5 MG-0.5 MG/3 ML  3 mL Nebulization Q4H PRN    aluminum-magnesium hydroxide (MAALOX) oral suspension 15 mL  15 mL Oral QID PRN    dextrose 10 % infusion 125-250 mL  125-250 mL IntraVENous PRN    insulin lispro (HUMALOG) injection   SubCUTAneous AC&HS    glucose chewable tablet 16 g  4 Tab Oral PRN    glucagon (GLUCAGEN) injection 1 mg  1 mg IntraMUSCular PRN    balsam peru-castor oil (VENELEX) ointment   Topical TID    sodium chloride (NS) flush 5-40 mL  5-40 mL IntraVENous Q8H    sodium chloride (NS) flush 5-40 mL  5-40 mL IntraVENous PRN    sodium chloride (NS) flush 5-40 mL  5-40 mL IntraVENous Q8H    sodium chloride (NS) flush 5-40 mL  5-40 mL IntraVENous PRN    acetaminophen (TYLENOL) tablet 650 mg  650 mg Oral Q4H PRN    HYDROcodone-acetaminophen (NORCO) 5-325 mg per tablet 1 Tab  1 Tab Oral Q4H PRN    morphine injection 1 mg  1 mg IntraVENous Q4H PRN    heparin (porcine) injection 5,000 Units  5,000 Units SubCUTAneous Q12H    aspirin chewable tablet 81 mg  81 mg Oral DAILY    simvastatin (ZOCOR) tablet 20 mg  20 mg Oral QHS    sodium bicarbonate tablet 1,300 mg  1,300 mg Oral TID    tamsulosin (FLOMAX) capsule 0.4 mg  0.4 mg Oral DAILY    epoetin dm-epbx (RETACRIT) injection 10,000 Units 10,000 Units SubCUTAneous Q7D     ______________________________________________________________________  EXPECTED LENGTH OF STAY: 4d 2h  ACTUAL LENGTH OF STAY:          11                 Ashu Barker MD

## 2019-08-17 NOTE — PROGRESS NOTES
Problem: Mobility Impaired (Adult and Pediatric)  Goal: *Acute Goals and Plan of Care (Insert Text)  Description  FUNCTIONAL STATUS PRIOR TO ADMISSION: Patient was independent without use of DME.    HOME SUPPORT PRIOR TO ADMISSION: The patient lived with wife/son but did not require assist.    Physical Therapy Goals  Initiated 8/12/2019  1. Patient will transfer from bed to chair and chair to bed with modified independence using the least restrictive device within 7 day(s). 2.  Patient will perform sit to stand with modified independence within 7 day(s). 3.  Patient will ambulate with modified independence for > 200 feet with the least restrictive device within 7 day(s). 4.  Patient will ascend/descend 3 stairs with one handrail(s) with supervision/set-up within 7 day(s). Outcome: Progressing Towards Goal  PHYSICAL THERAPY TREATMENT  Patient: Dottie Urias Sr. (81 y.o. male)  Date: 8/17/2019  Diagnosis: CHF exacerbation ([facility] [address] [I50.9]  [facility] ([facility] [J96.01] <principal problem not specified>       Precautions: Fall(orthostatic blood pressure, weak LE's symptom)  Chart, physical therapy assessment, plan of care and goals were reviewed. ASSESSMENT  Based on the objective data described below, continues to demonstrate asymptomatic orthostatic hypotension. Pt denies orthostatic symptoms all throughout session, however experienced near syncopal episode in anthony. Unsure if pt is able to tell if he is symptomatic (symptoms explained) or if pt is simply denying symptoms although present (consistently states \" I'm fine\" when asked about symptoms). BP initially 106/58 seated in chair. Lowest drop in BP 66/41-pt denies symptoms (increased to 95/55 once seated). Pt completed approx 45 Ft gait w/ RW (close CGA + 2nd assist for chair follow) when BLE's began to give out on pt when standing to obtain BP. Quickly returned to sit in w/c w/ Mod-Max Ax1.  Once seated, pt then reports mild lightheadedness but states \"I'm fine\"; BP 87/49. Pt returned to bed after use of BSC w/ close supervision. Continues to report he \"is fine\". RN made aware of above. Current Level of Function Impacting Discharge (mobility/balance): Mod-Max 1-2 during gait (drop in BP, pt LE's giving out). CGA-SBA for functional transfers otherwise. Other factors to consider for discharge: Asymptomatic Orthostatic Hypotension         PLAN :  Patient continues to benefit from skilled intervention to address the above impairments. Continue treatment per established plan of care. to address goals. Recommendation for discharge: (in order for the patient to meet his/her long term goals)  To be determined: pending BP stability     This discharge recommendation:  A follow-up discussion with the attending provider and/or case management is planned    Equipment recommendations for successful discharge (if) home: Pt owns RW (reports RW is his wife's)        SUBJECTIVE:   Patient stated I've been  for 59 years and he's worse than my wife!     OBJECTIVE DATA SUMMARY:   Critical Behavior:  Neurologic State: Alert  Orientation Level: Oriented X4  Cognition: Follows commands  Safety/Judgement: Awareness of environment  Functional Mobility Training:  Bed Mobility:     Supine to Sit: (received OOB in chair)  Sit to Supine: Contact guard assistance           Transfers:  Sit to Stand: Stand-by assistance;Contact guard assistance;Assist x1;Assist x2  Stand to Sit: Contact guard assistance; Moderate assistance;Maximum assistance;Assist x1;Assist x2(Mod-Max Ax1-2 to safely sit in w/c d/t orthostatic BP)                             Balance:  Sitting: Intact  Standing: Impaired; With support  Standing - Static: Constant support;Fair(orthostatic (asymptomatic))  Standing - Dynamic : Constant support; Fair  Ambulation/Gait Training:     Assistive Device: Gait belt;Walker, rolling; Other (comment)(Portable O2)  Ambulation - Level of Assistance: Contact guard assistance;Minimal assistance(Mod-Max A to quickly sit in w/c;BLE's giving away, BP drop)                 Base of Support: Narrowed     Speed/Karla: Pace decreased (<100 feet/min); Shuffled  Step Length: Left shortened;Right shortened                            Therapeutic Exercises:   BLE's  PLB      Activity Tolerance:   Poor d/t low BP's  Please refer to the flowsheet for vital signs taken during this treatment.     After treatment patient left in no apparent distress:   Supine in bed, Call bell within reach and Side rails x 3    COMMUNICATION/COLLABORATION:   The patients plan of care was discussed with: Registered Nurse    Camilo GARCIA Means,PTA   Time Calculation: 34 mins

## 2019-08-18 LAB
ALBUMIN SERPL-MCNC: 2.1 G/DL (ref 3.5–5)
ANION GAP SERPL CALC-SCNC: 5 MMOL/L (ref 5–15)
BUN SERPL-MCNC: 46 MG/DL (ref 6–20)
BUN/CREAT SERPL: 23 (ref 12–20)
CALCIUM SERPL-MCNC: 7.6 MG/DL (ref 8.5–10.1)
CHLORIDE SERPL-SCNC: 98 MMOL/L (ref 97–108)
CO2 SERPL-SCNC: 33 MMOL/L (ref 21–32)
CREAT SERPL-MCNC: 2.04 MG/DL (ref 0.7–1.3)
GLUCOSE BLD STRIP.AUTO-MCNC: 195 MG/DL (ref 65–100)
GLUCOSE BLD STRIP.AUTO-MCNC: 262 MG/DL (ref 65–100)
GLUCOSE BLD STRIP.AUTO-MCNC: 352 MG/DL (ref 65–100)
GLUCOSE BLD STRIP.AUTO-MCNC: 90 MG/DL (ref 65–100)
GLUCOSE SERPL-MCNC: 75 MG/DL (ref 65–100)
PHOSPHATE SERPL-MCNC: 2.2 MG/DL (ref 2.6–4.7)
POTASSIUM SERPL-SCNC: 3.4 MMOL/L (ref 3.5–5.1)
SERVICE CMNT-IMP: ABNORMAL
SERVICE CMNT-IMP: NORMAL
SODIUM SERPL-SCNC: 136 MMOL/L (ref 136–145)

## 2019-08-18 PROCEDURE — 74011250636 HC RX REV CODE- 250/636: Performed by: FAMILY MEDICINE

## 2019-08-18 PROCEDURE — 80069 RENAL FUNCTION PANEL: CPT

## 2019-08-18 PROCEDURE — 94760 N-INVAS EAR/PLS OXIMETRY 1: CPT

## 2019-08-18 PROCEDURE — 65660000000 HC RM CCU STEPDOWN

## 2019-08-18 PROCEDURE — 82962 GLUCOSE BLOOD TEST: CPT

## 2019-08-18 PROCEDURE — 77010033678 HC OXYGEN DAILY

## 2019-08-18 PROCEDURE — 74011000250 HC RX REV CODE- 250: Performed by: NURSE PRACTITIONER

## 2019-08-18 PROCEDURE — 74011636637 HC RX REV CODE- 636/637: Performed by: FAMILY MEDICINE

## 2019-08-18 PROCEDURE — 74011250637 HC RX REV CODE- 250/637: Performed by: FAMILY MEDICINE

## 2019-08-18 PROCEDURE — 36415 COLL VENOUS BLD VENIPUNCTURE: CPT

## 2019-08-18 RX ORDER — POTASSIUM CHLORIDE 750 MG/1
40 TABLET, FILM COATED, EXTENDED RELEASE ORAL
Status: COMPLETED | OUTPATIENT
Start: 2019-08-18 | End: 2019-08-18

## 2019-08-18 RX ORDER — CARVEDILOL 3.12 MG/1
3.12 TABLET ORAL 2 TIMES DAILY WITH MEALS
Status: DISCONTINUED | OUTPATIENT
Start: 2019-08-18 | End: 2019-08-20 | Stop reason: HOSPADM

## 2019-08-18 RX ADMIN — TAMSULOSIN HYDROCHLORIDE 0.4 MG: 0.4 CAPSULE ORAL at 09:52

## 2019-08-18 RX ADMIN — Medication: at 22:41

## 2019-08-18 RX ADMIN — HEPARIN SODIUM 5000 UNITS: 5000 INJECTION INTRAVENOUS; SUBCUTANEOUS at 10:30

## 2019-08-18 RX ADMIN — POTASSIUM & SODIUM PHOSPHATES POWDER PACK 280-160-250 MG 1 PACKET: 280-160-250 PACK at 09:52

## 2019-08-18 RX ADMIN — Medication: at 16:44

## 2019-08-18 RX ADMIN — CARVEDILOL 6.25 MG: 6.25 TABLET, FILM COATED ORAL at 07:11

## 2019-08-18 RX ADMIN — SODIUM BICARBONATE 1300 MG: 650 TABLET ORAL at 22:39

## 2019-08-18 RX ADMIN — IPRATROPIUM BROMIDE AND ALBUTEROL SULFATE 3 ML: .5; 3 SOLUTION RESPIRATORY (INHALATION) at 09:53

## 2019-08-18 RX ADMIN — ASPIRIN 81 MG CHEWABLE TABLET 81 MG: 81 TABLET CHEWABLE at 09:51

## 2019-08-18 RX ADMIN — Medication 10 ML: at 07:11

## 2019-08-18 RX ADMIN — Medication 10 ML: at 22:44

## 2019-08-18 RX ADMIN — SODIUM BICARBONATE 1300 MG: 650 TABLET ORAL at 09:52

## 2019-08-18 RX ADMIN — Medication 10 ML: at 13:24

## 2019-08-18 RX ADMIN — INSULIN GLARGINE 20 UNITS: 100 INJECTION, SOLUTION SUBCUTANEOUS at 09:52

## 2019-08-18 RX ADMIN — SODIUM BICARBONATE 1300 MG: 650 TABLET ORAL at 16:43

## 2019-08-18 RX ADMIN — CARVEDILOL 3.12 MG: 3.12 TABLET, FILM COATED ORAL at 16:43

## 2019-08-18 RX ADMIN — Medication 10 ML: at 13:25

## 2019-08-18 RX ADMIN — HEPARIN SODIUM 5000 UNITS: 5000 INJECTION INTRAVENOUS; SUBCUTANEOUS at 22:39

## 2019-08-18 RX ADMIN — INSULIN LISPRO 3 UNITS: 100 INJECTION, SOLUTION INTRAVENOUS; SUBCUTANEOUS at 11:22

## 2019-08-18 RX ADMIN — POTASSIUM & SODIUM PHOSPHATES POWDER PACK 280-160-250 MG 1 PACKET: 280-160-250 PACK at 16:43

## 2019-08-18 RX ADMIN — POTASSIUM & SODIUM PHOSPHATES POWDER PACK 280-160-250 MG 1 PACKET: 280-160-250 PACK at 22:39

## 2019-08-18 RX ADMIN — Medication 10 ML: at 22:43

## 2019-08-18 RX ADMIN — INSULIN LISPRO 3 UNITS: 100 INJECTION, SOLUTION INTRAVENOUS; SUBCUTANEOUS at 22:39

## 2019-08-18 RX ADMIN — PREDNISONE 30 MG: 10 TABLET ORAL at 07:11

## 2019-08-18 RX ADMIN — FLUDROCORTISONE ACETATE 0.2 MG: 0.1 TABLET ORAL at 09:52

## 2019-08-18 RX ADMIN — POTASSIUM CHLORIDE 40 MEQ: 750 TABLET, EXTENDED RELEASE ORAL at 13:24

## 2019-08-18 RX ADMIN — Medication: at 09:53

## 2019-08-18 RX ADMIN — SIMVASTATIN 20 MG: 20 TABLET, FILM COATED ORAL at 22:39

## 2019-08-18 RX ADMIN — INSULIN LISPRO 15 UNITS: 100 INJECTION, SOLUTION INTRAVENOUS; SUBCUTANEOUS at 16:44

## 2019-08-18 NOTE — PROGRESS NOTES
Hospitalist Progress Note  Isidra Tejeda MD  Answering service: [phone] from in house phone      Date of Service:  2019  NAME:  Bibiana Christian  :  1935  MRN:  [id number]      Admission Summary:   The patient is an 81-year-old male with past medical history of diabetes, hypertension, coronary artery disease, hypercholesterolemia, peripheral vascular disease, AFib, and CKD, who presented to the emergency room via private vehicle with chief complaint of shortness of breath. His shortness of breath has been persistent for more than one week and subsequently has worsened since yesterday. There is associated bilateral leg swelling since yesterday. The patient has been taking four albuterol nebulizer treatments with no relief. The patient also reports cough with occasional greenish sputum. He recently had pacemaker placed by Dr. Keron Balderas and had followed up with Cardiology yesterday who increased his diuretic dose. The patient has been seen in the emergency room for shortness of breath three times this week.       Interval history / Subjective:   Pt reports shortness of breath this am but improving  He is orthostatic with low Blood pressures today- increased florinef  2 days ago  He was able to ambulate in the hallway yesterday off oxygen   He denies dizziness with ambulation     Assessment & Plan:      acute hypoxemic resp failure-  POA - multifactorial- due to acute resp infection and edema from pulmonary hypertension- still present but improving  CT chest   showed diffuse alveolar pattern suggestive of pulm edema ,   resp panel pcr is neg and legionella neg   PO prednisone,  Nebs,antibiotics- levaquin for 14 days,   He still requires oxygen periodically  Breathing improved with bumex- currently on hold  pulm consulted and has now signed off  He will need repeat CT scan in 4-6 wks on outpatient    Presyncope and orthostasis and hypotension on 8/12   Hold all bumex and BP meds   Cont current florinef dose     severe pulm HTN- causing episodic hypoxemia  RHC w/ PCW 28  ECHO 50/60% with PAP 50      KATALINA on CKD stage 4-   baseline creatinine 3.1-3.3; was 3.8 on admission-  Stable at baseline    Metabolic acidosis   On po bicarb      . PVD-   s/p stented in both LE, cont aspirin      DM type 2-   Uncontrolled   Elevated Blood sugars due to steroids and changes in insulin regimen  Cont accuchecks and ss insulin  Changed lantus back to AM dose due to am hypoglycemia  Increased lantus dose for hyperglycemia    . HLD-   cont statin meds     . BPH  Stable on flomax    Anemia of CKD- Hb low stable     CAD s/p CABG- asa     Bradycardia with afib w pacemaker,    -supine hypertension with severe orthostatic hypotension -   Holding some home BP meds due to orthostatsis and presyncope   Started florinef 8/14/19- increased dose on 8/15/19  Restarted coreg at lower dose due to tachycardia- tachycardia resolved    Code status: full   DVT prophylaxis: heaprin     Care Plan discussed with: Patient/Family  Disposition: TBD     Hospital Problems  Date Reviewed: 7/28/2019          Codes Class Noted POA    CHF exacerbation ([facility] [address] ICD-10-CM: I50.9  ICD-9-CM: 428.0  8/5/2019 Unknown        Acute hypoxemic respiratory failure [facility]) ICD-10-CM: J96.01  ICD-9-CM: 518.81  8/5/2019 Unknown                Review of Systems:   A comprehensive review of systems was negative except for that written in the subjective section      Vital Signs:    Last 24hrs VS reviewed since prior progress note.  Most recent are:  Visit Vitals  /67 (BP 1 Location: Left arm, BP Patient Position: At rest)   Pulse 76   Temp 98.8 °F (37.1 °C)   Resp 16   Ht 5' 9\" (1.753 m)   Wt 70 kg (154 lb 5.2 oz)   SpO2 95%   BMI 22.79 kg/m²         Intake/Output Summary (Last 24 hours) at 8/18/2019 1144  Last data filed at 8/18/2019 0844  Gross per 24 hour   Intake 480 ml   Output    Net 480 ml Physical Examination:             Constitutional:  on oxygen but comfortable ,    ENT:  Oral mucous moist, Neck supple,    Resp:  decreased breath sounds ,no wheezing   CV:  Regular rhythm, normal rate, no murmurs, gallops, rubs    GI:  Soft, non distended, non tender. normoactive bowel sounds, no hepatosplenomegaly     Musculoskeletal:  No edema, warm, 2+ pulses throughout    Neurologic:  Moves all extremities. AAOx3, CN II-XII reviewed            Data Review:    Review and/or order of clinical lab test      Labs:     No results for input(s): WBC, HGB, HCT, PLT, HGBEXT, HCTEXT, PLTEXT, HGBEXT, HCTEXT, PLTEXT in the last 72 hours. Recent Labs     08/18/19  0603 08/17/19  0204 08/16/19  1602    135* 131*   K 3.4* 3.3* 4.0   CL 98 95* 92*   CO2 33* 34* 33*   BUN 46* 53* 55*   CREA 2.04* 2.39* 2.32*   GLU 75 159* 316*   CA 7.6* 7.9* 7.8*   PHOS 2.2* 1.7* 2.1*     Recent Labs     08/18/19  0603 08/17/19  0204 08/16/19  1602   ALB 2.1* 2.3* 2.5*     No results for input(s): INR, PTP, APTT in the last 72 hours. No lab exists for component: INREXT, INREXT   No results for input(s): FE, TIBC, PSAT, FERR in the last 72 hours. No results found for: FOL, RBCF   No results for input(s): PH, PCO2, PO2 in the last 72 hours. No results for input(s): CPK, CKNDX, TROIQ in the last 72 hours.     No lab exists for component: CPKMB  No results found for: CHOL, CHOLX, CHLST, CHOLV, HDL, LDL, LDLC, DLDLP, TGLX, TRIGL, TRIGP, CHHD, CHHDX  Lab Results   Component Value Date/Time    Glucose (POC) 195 (H) 08/18/2019 11:04 AM    Glucose (POC) 90 08/18/2019 06:17 AM    Glucose (POC) 131 (H) 08/17/2019 09:12 PM    Glucose (POC) 167 (H) 08/17/2019 03:59 PM    Glucose (POC) 184 (H) 08/17/2019 11:16 AM     Lab Results   Component Value Date/Time    Color YELLOW/STRAW 06/25/2017 11:40 AM    Appearance CLEAR 06/25/2017 11:40 AM    Specific gravity 1.018 06/25/2017 11:40 AM    pH (UA) 5.0 06/25/2017 11:40 AM    Protein 300 (A) 06/25/2017 11:40 AM    Glucose NEGATIVE  06/25/2017 11:40 AM    Ketone NEGATIVE  06/25/2017 11:40 AM    Bilirubin NEGATIVE  06/25/2017 11:40 AM    Urobilinogen 0.2 06/25/2017 11:40 AM    Nitrites NEGATIVE  06/25/2017 11:40 AM    Leukocyte Esterase NEGATIVE  06/25/2017 11:40 AM    Epithelial cells FEW 06/25/2017 11:40 AM    Bacteria NEGATIVE  06/25/2017 11:40 AM    WBC 0-4 06/25/2017 11:40 AM    RBC 0-5 06/25/2017 11:40 AM         Medications Reviewed:     Current Facility-Administered Medications   Medication Dose Route Frequency    carvedilol (COREG) tablet 3.125 mg  3.125 mg Oral BID WITH MEALS    potassium, sodium phosphates (NEUTRA-PHOS) packet 1 Packet  1 Packet Oral TID    fludrocortisone (FLORINEF) tablet 0.2 mg  0.2 mg Oral DAILY    insulin glargine (LANTUS) injection 20 Units  20 Units SubCUTAneous DAILY    predniSONE (DELTASONE) tablet 30 mg  30 mg Oral DAILY WITH BREAKFAST    albuterol-ipratropium (DUO-NEB) 2.5 MG-0.5 MG/3 ML  3 mL Nebulization Q4H PRN    aluminum-magnesium hydroxide (MAALOX) oral suspension 15 mL  15 mL Oral QID PRN    dextrose 10 % infusion 125-250 mL  125-250 mL IntraVENous PRN    insulin lispro (HUMALOG) injection   SubCUTAneous AC&HS    glucose chewable tablet 16 g  4 Tab Oral PRN    glucagon (GLUCAGEN) injection 1 mg  1 mg IntraMUSCular PRN    balsam peru-castor oil (VENELEX) ointment   Topical TID    sodium chloride (NS) flush 5-40 mL  5-40 mL IntraVENous Q8H    sodium chloride (NS) flush 5-40 mL  5-40 mL IntraVENous PRN    sodium chloride (NS) flush 5-40 mL  5-40 mL IntraVENous Q8H    sodium chloride (NS) flush 5-40 mL  5-40 mL IntraVENous PRN    acetaminophen (TYLENOL) tablet 650 mg  650 mg Oral Q4H PRN    HYDROcodone-acetaminophen (NORCO) 5-325 mg per tablet 1 Tab  1 Tab Oral Q4H PRN    morphine injection 1 mg  1 mg IntraVENous Q4H PRN    heparin (porcine) injection 5,000 Units  5,000 Units SubCUTAneous Q12H    aspirin chewable tablet 81 mg  81 mg Oral DAILY  simvastatin (ZOCOR) tablet 20 mg  20 mg Oral QHS    sodium bicarbonate tablet 1,300 mg  1,300 mg Oral TID    tamsulosin (FLOMAX) capsule 0.4 mg  0.4 mg Oral DAILY    epoetin dm-epbx (RETACRIT) injection 10,000 Units  10,000 Units SubCUTAneous Q7D     ______________________________________________________________________  EXPECTED LENGTH OF STAY: 4d 2h  ACTUAL LENGTH OF STAY:          13                 Sharon Vásquez MD

## 2019-08-18 NOTE — PROGRESS NOTES
Bedside shift change report given to silvio (oncoming nurse) by Julianna Bunn (offgoing nurse). Report included the following information SBAR and Kardex.

## 2019-08-18 NOTE — PROGRESS NOTES
Hospitalist Progress Note  Bereket Jiang MD  Answering service: [phone] OR [phone] from in house phone      Date of Service:  2019  NAME:  Mauri Mckeon  :  1935  MRN:  [id number]      Admission Summary:   The patient is an 72-year-old male with past medical history of diabetes, hypertension, coronary artery disease, hypercholesterolemia, peripheral vascular disease, AFib, and CKD, who presented to the emergency room via private vehicle with chief complaint of shortness of breath. His shortness of breath has been persistent for more than one week and subsequently has worsened since yesterday. There is associated bilateral leg swelling since yesterday. The patient has been taking four albuterol nebulizer treatments with no relief. The patient also reports cough with occasional greenish sputum. He recently had pacemaker placed by Dr. Wang Dorman and had followed up with Cardiology yesterday who increased his diuretic dose. The patient has been seen in the emergency room for shortness of breath three times this week.       Interval history / Subjective:   Pt reports shortness of breath this am but improving  He is orthostatic with low Blood pressures today- increased florinef  2 days ago  He was able to ambulate in the hallway yesterday off oxygen        Assessment & Plan:      acute hypoxemic resp failure-  POA - multifactorial- due to acute resp infection and edema from pulmonary hypertension- still present but improving  CT chest   showed diffuse alveolar pattern suggestive of pulm edema ,   resp panel pcr is neg and legionella neg   PO prednisone,  Nebs,antibiotics- levaquin for 14 days, off oxygen  Breathing improved with bumex- currently on hold  pulm consulted and has now signed off  He will need repeat CT scan in 4-6 wks on outpatient    Presyncope and orthostasis and hypotension on    Hold all bumex and BP meds   Cont current florinef dose     severe pulm HTN- causing episodic hypoxemia  RHC w/ PCW 28  ECHO 50/60% with PAP 50      KATALINA on CKD stage 4-   baseline creatinine 3.1-3.3; was 3.8 on admission-  Stable at baseline    Metabolic acidosis   On po bicarb      . PVD-   s/p stented in both LE, cont aspirin      DM type 2-   Uncontrolled   Elevated Blood sugars due to steroids and changes in insulin regimen  Cont accuchecks and ss insulin  Changed lantus back to AM dose due to am hypoglycemia  Increased lantus dose for hyperglycemia    . HLD-   cont statin meds     . BPH  Stable on flomax    Anemia of CKD- Hb low stable     CAD s/p CABG- asa     Bradycardia with afib w pacemaker,    -supine hypertension with severe orthostatic hypotension -   Holding some home BP meds due to orthostatsis and presyncope   Started florinef 8/14/19- increased dose on 8/15/19  Restarted coreg at lower dose due to tachycardia- tachycardia resolved    Code status: full   DVT prophylaxis: heaprin     Care Plan discussed with: Patient/Family  Disposition: TBD     Hospital Problems  Date Reviewed: 7/28/2019          Codes Class Noted POA    [facility]ation ([facility] [place] [facility] ICD-10-CM: I50.9  ICD-9-CM: 428.0  8/5/2019 Unknown        Acute hypoxemic respiratory failure [facility]) ICD-10-CM: J96.01  ICD-9-CM: 518.81  8/5/2019 Unknown                Review of Systems:   A comprehensive review of systems was negative except for that written in the subjective section      Vital Signs:    Last 24hrs VS reviewed since prior progress note.  Most recent are:  Visit Vitals  /66 (BP 1 Location: Right arm, BP Patient Position: At rest)   Pulse 79   Temp 98 °F (36.7 °C)   Resp 16   Ht 5' 9\" (1.753 m)   Wt 70.1 kg (154 lb 8.7 oz)   SpO2 95%   BMI 22.82 kg/m²         Intake/Output Summary (Last 24 hours) at 8/17/2019 2314  Last data filed at 8/17/2019 1321  Gross per 24 hour   Intake 480 ml   Output    Net 480 ml        Physical Examination:             Constitutional:  on oxygen but comfortable ,    ENT:  Oral mucous moist, Neck supple,    Resp:  decreased breath sounds ,no wheezing   CV:  Regular rhythm, normal rate, no murmurs, gallops, rubs    GI:  Soft, non distended, non tender. normoactive bowel sounds, no hepatosplenomegaly     Musculoskeletal:  No edema, warm, 2+ pulses throughout    Neurologic:  Moves all extremities. AAOx3, CN II-XII reviewed            Data Review:    Review and/or order of clinical lab test      Labs:     No results for input(s): WBC, HGB, HCT, PLT, HGBEXT, HCTEXT, PLTEXT, HGBEXT, HCTEXT, PLTEXT in the last 72 hours. Recent Labs     08/17/19  0204 08/16/19  1602   * 131*   K 3.3* 4.0   CL 95* 92*   CO2 34* 33*   BUN 53* 55*   CREA 2.39* 2.32*   * 316*   CA 7.9* 7.8*   PHOS 1.7* 2.1*     Recent Labs     08/17/19  0204 08/16/19  1602   ALB 2.3* 2.5*     No results for input(s): INR, PTP, APTT in the last 72 hours. No lab exists for component: INREXT, INREXT   No results for input(s): FE, TIBC, PSAT, FERR in the last 72 hours. No results found for: FOL, RBCF   No results for input(s): PH, PCO2, PO2 in the last 72 hours. No results for input(s): CPK, CKNDX, TROIQ in the last 72 hours.     No lab exists for component: CPKMB  No results found for: CHOL, CHOLX, CHLST, CHOLV, HDL, LDL, LDLC, DLDLP, TGLX, TRIGL, TRIGP, CHHD, CHHDX  Lab Results   Component Value Date/Time    Glucose (POC) 131 (H) 08/17/2019 09:12 PM    Glucose (POC) 167 (H) 08/17/2019 03:59 PM    Glucose (POC) 184 (H) 08/17/2019 11:16 AM    Glucose (POC) 91 08/17/2019 07:10 AM    Glucose (POC) 400 (H) 08/16/2019 08:44 PM     Lab Results   Component Value Date/Time    Color YELLOW/STRAW 06/25/2017 11:40 AM    Appearance CLEAR 06/25/2017 11:40 AM    Specific gravity 1.018 06/25/2017 11:40 AM    pH (UA) 5.0 06/25/2017 11:40 AM    Protein 300 (A) 06/25/2017 11:40 AM    Glucose NEGATIVE  06/25/2017 11:40 AM    Ketone NEGATIVE  06/25/2017 11:40 AM    Bilirubin NEGATIVE  06/25/2017 11:40 AM Urobilinogen 0.2 06/25/2017 11:40 AM    Nitrites NEGATIVE  06/25/2017 11:40 AM    Leukocyte Esterase NEGATIVE  06/25/2017 11:40 AM    Epithelial cells FEW 06/25/2017 11:40 AM    Bacteria NEGATIVE  06/25/2017 11:40 AM    WBC 0-4 06/25/2017 11:40 AM    RBC 0-5 06/25/2017 11:40 AM         Medications Reviewed:     Current Facility-Administered Medications   Medication Dose Route Frequency    potassium, sodium phosphates (NEUTRA-PHOS) packet 1 Packet  1 Packet Oral TID    potassium chloride (KLOR-CON) packet for solution 20 mEq  20 mEq Oral NOW    fludrocortisone (FLORINEF) tablet 0.2 mg  0.2 mg Oral DAILY    carvedilol (COREG) tablet 6.25 mg  6.25 mg Oral BID WITH MEALS    insulin glargine (LANTUS) injection 20 Units  20 Units SubCUTAneous DAILY    predniSONE (DELTASONE) tablet 30 mg  30 mg Oral DAILY WITH BREAKFAST    albuterol-ipratropium (DUO-NEB) 2.5 MG-0.5 MG/3 ML  3 mL Nebulization Q4H PRN    aluminum-magnesium hydroxide (MAALOX) oral suspension 15 mL  15 mL Oral QID PRN    dextrose 10 % infusion 125-250 mL  125-250 mL IntraVENous PRN    insulin lispro (HUMALOG) injection   SubCUTAneous AC&HS    glucose chewable tablet 16 g  4 Tab Oral PRN    glucagon (GLUCAGEN) injection 1 mg  1 mg IntraMUSCular PRN    balsam peru-castor oil (VENELEX) ointment   Topical TID    sodium chloride (NS) flush 5-40 mL  5-40 mL IntraVENous Q8H    sodium chloride (NS) flush 5-40 mL  5-40 mL IntraVENous PRN    sodium chloride (NS) flush 5-40 mL  5-40 mL IntraVENous Q8H    sodium chloride (NS) flush 5-40 mL  5-40 mL IntraVENous PRN    acetaminophen (TYLENOL) tablet 650 mg  650 mg Oral Q4H PRN    HYDROcodone-acetaminophen (NORCO) 5-325 mg per tablet 1 Tab  1 Tab Oral Q4H PRN    morphine injection 1 mg  1 mg IntraVENous Q4H PRN    heparin (porcine) injection 5,000 Units  5,000 Units SubCUTAneous Q12H    aspirin chewable tablet 81 mg  81 mg Oral DAILY    simvastatin (ZOCOR) tablet 20 mg  20 mg Oral QHS    sodium bicarbonate tablet 1,300 mg  1,300 mg Oral TID    tamsulosin (FLOMAX) capsule 0.4 mg  0.4 mg Oral DAILY    epoetin dm-epbx (RETACRIT) injection 10,000 Units  10,000 Units SubCUTAneous Q7D     ______________________________________________________________________  EXPECTED LENGTH OF STAY: 4d 2h  ACTUAL LENGTH OF STAY:          12                 Lorelei Ramirez MD

## 2019-08-18 NOTE — PROGRESS NOTES
Spiritual Care Partner Volunteer visited patient in Rm 208 on 8/18/19. Documented by:   Chaplain Muse MDiv, MACE  [phone] PRAY [phone])

## 2019-08-18 NOTE — PROGRESS NOTES
MD paged about , staff observed pt eating a box of chicken his wife brought while she visited. Pt is asymptomatic.   Spoke to Dr. Angela Beasley, no new orders, proceed with sliding scale coverage and monitor

## 2019-08-19 LAB
ALBUMIN SERPL-MCNC: 2.1 G/DL (ref 3.5–5)
ANION GAP SERPL CALC-SCNC: 7 MMOL/L (ref 5–15)
BUN SERPL-MCNC: 49 MG/DL (ref 6–20)
BUN/CREAT SERPL: 24 (ref 12–20)
CALCIUM SERPL-MCNC: 7.5 MG/DL (ref 8.5–10.1)
CHLORIDE SERPL-SCNC: 102 MMOL/L (ref 97–108)
CO2 SERPL-SCNC: 29 MMOL/L (ref 21–32)
CREAT SERPL-MCNC: 2.05 MG/DL (ref 0.7–1.3)
ERYTHROCYTE [DISTWIDTH] IN BLOOD BY AUTOMATED COUNT: 16.2 % (ref 11.5–14.5)
GLUCOSE BLD STRIP.AUTO-MCNC: 179 MG/DL (ref 65–100)
GLUCOSE BLD STRIP.AUTO-MCNC: 244 MG/DL (ref 65–100)
GLUCOSE BLD STRIP.AUTO-MCNC: 313 MG/DL (ref 65–100)
GLUCOSE BLD STRIP.AUTO-MCNC: 475 MG/DL (ref 65–100)
GLUCOSE SERPL-MCNC: 161 MG/DL (ref 65–100)
HCT VFR BLD AUTO: 32 % (ref 36.6–50.3)
HGB BLD-MCNC: 10.2 G/DL (ref 12.1–17)
MCH RBC QN AUTO: 28.4 PG (ref 26–34)
MCHC RBC AUTO-ENTMCNC: 31.9 G/DL (ref 30–36.5)
MCV RBC AUTO: 89.1 FL (ref 80–99)
NRBC # BLD: 0 K/UL (ref 0–0.01)
NRBC BLD-RTO: 0 PER 100 WBC
PHOSPHATE SERPL-MCNC: 2.8 MG/DL (ref 2.6–4.7)
PLATELET # BLD AUTO: 241 K/UL (ref 150–400)
PMV BLD AUTO: 10.2 FL (ref 8.9–12.9)
POTASSIUM SERPL-SCNC: 3.5 MMOL/L (ref 3.5–5.1)
RBC # BLD AUTO: 3.59 M/UL (ref 4.1–5.7)
SERVICE CMNT-IMP: ABNORMAL
SODIUM SERPL-SCNC: 138 MMOL/L (ref 136–145)
WBC # BLD AUTO: 9.4 K/UL (ref 4.1–11.1)

## 2019-08-19 PROCEDURE — 97535 SELF CARE MNGMENT TRAINING: CPT

## 2019-08-19 PROCEDURE — 77010033678 HC OXYGEN DAILY

## 2019-08-19 PROCEDURE — 74011250636 HC RX REV CODE- 250/636: Performed by: FAMILY MEDICINE

## 2019-08-19 PROCEDURE — 97530 THERAPEUTIC ACTIVITIES: CPT

## 2019-08-19 PROCEDURE — 85027 COMPLETE CBC AUTOMATED: CPT

## 2019-08-19 PROCEDURE — 94640 AIRWAY INHALATION TREATMENT: CPT

## 2019-08-19 PROCEDURE — 65660000000 HC RM CCU STEPDOWN

## 2019-08-19 PROCEDURE — 74011636637 HC RX REV CODE- 636/637: Performed by: FAMILY MEDICINE

## 2019-08-19 PROCEDURE — 74011250636 HC RX REV CODE- 250/636: Performed by: INTERNAL MEDICINE

## 2019-08-19 PROCEDURE — 80069 RENAL FUNCTION PANEL: CPT

## 2019-08-19 PROCEDURE — 97116 GAIT TRAINING THERAPY: CPT

## 2019-08-19 PROCEDURE — 74011250637 HC RX REV CODE- 250/637: Performed by: FAMILY MEDICINE

## 2019-08-19 PROCEDURE — 82962 GLUCOSE BLOOD TEST: CPT

## 2019-08-19 PROCEDURE — 36415 COLL VENOUS BLD VENIPUNCTURE: CPT

## 2019-08-19 PROCEDURE — 94760 N-INVAS EAR/PLS OXIMETRY 1: CPT

## 2019-08-19 PROCEDURE — 74011000250 HC RX REV CODE- 250: Performed by: NURSE PRACTITIONER

## 2019-08-19 RX ORDER — INSULIN LISPRO 100 [IU]/ML
20 INJECTION, SOLUTION INTRAVENOUS; SUBCUTANEOUS ONCE
Status: COMPLETED | OUTPATIENT
Start: 2019-08-19 | End: 2019-08-19

## 2019-08-19 RX ADMIN — ASPIRIN 81 MG CHEWABLE TABLET 81 MG: 81 TABLET CHEWABLE at 10:05

## 2019-08-19 RX ADMIN — CARVEDILOL 3.12 MG: 3.12 TABLET, FILM COATED ORAL at 17:19

## 2019-08-19 RX ADMIN — CARVEDILOL 3.12 MG: 3.12 TABLET, FILM COATED ORAL at 06:42

## 2019-08-19 RX ADMIN — Medication 10 ML: at 06:43

## 2019-08-19 RX ADMIN — INSULIN GLARGINE 20 UNITS: 100 INJECTION, SOLUTION SUBCUTANEOUS at 15:30

## 2019-08-19 RX ADMIN — Medication: at 10:06

## 2019-08-19 RX ADMIN — PREDNISONE 30 MG: 10 TABLET ORAL at 06:42

## 2019-08-19 RX ADMIN — INSULIN LISPRO 2 UNITS: 100 INJECTION, SOLUTION INTRAVENOUS; SUBCUTANEOUS at 21:41

## 2019-08-19 RX ADMIN — SODIUM BICARBONATE 1300 MG: 650 TABLET ORAL at 21:43

## 2019-08-19 RX ADMIN — HEPARIN SODIUM 5000 UNITS: 5000 INJECTION INTRAVENOUS; SUBCUTANEOUS at 21:43

## 2019-08-19 RX ADMIN — Medication 10 ML: at 15:36

## 2019-08-19 RX ADMIN — HEPARIN SODIUM 5000 UNITS: 5000 INJECTION INTRAVENOUS; SUBCUTANEOUS at 10:45

## 2019-08-19 RX ADMIN — EPOETIN ALFA-EPBX 10000 UNITS: 10000 INJECTION, SOLUTION INTRAVENOUS; SUBCUTANEOUS at 21:42

## 2019-08-19 RX ADMIN — Medication 10 ML: at 22:00

## 2019-08-19 RX ADMIN — INSULIN LISPRO 3 UNITS: 100 INJECTION, SOLUTION INTRAVENOUS; SUBCUTANEOUS at 06:41

## 2019-08-19 RX ADMIN — POTASSIUM & SODIUM PHOSPHATES POWDER PACK 280-160-250 MG 1 PACKET: 280-160-250 PACK at 10:05

## 2019-08-19 RX ADMIN — Medication: at 15:31

## 2019-08-19 RX ADMIN — IPRATROPIUM BROMIDE AND ALBUTEROL SULFATE 3 ML: .5; 3 SOLUTION RESPIRATORY (INHALATION) at 08:45

## 2019-08-19 RX ADMIN — SIMVASTATIN 20 MG: 20 TABLET, FILM COATED ORAL at 21:43

## 2019-08-19 RX ADMIN — IPRATROPIUM BROMIDE AND ALBUTEROL SULFATE 3 ML: .5; 3 SOLUTION RESPIRATORY (INHALATION) at 20:37

## 2019-08-19 RX ADMIN — INSULIN LISPRO 20 UNITS: 100 INJECTION, SOLUTION INTRAVENOUS; SUBCUTANEOUS at 17:19

## 2019-08-19 RX ADMIN — Medication 10 ML: at 21:43

## 2019-08-19 RX ADMIN — SODIUM BICARBONATE 1300 MG: 650 TABLET ORAL at 15:37

## 2019-08-19 RX ADMIN — SODIUM BICARBONATE 1300 MG: 650 TABLET ORAL at 10:05

## 2019-08-19 RX ADMIN — FLUDROCORTISONE ACETATE 0.2 MG: 0.1 TABLET ORAL at 10:05

## 2019-08-19 RX ADMIN — IPRATROPIUM BROMIDE AND ALBUTEROL SULFATE 3 ML: .5; 3 SOLUTION RESPIRATORY (INHALATION) at 13:30

## 2019-08-19 RX ADMIN — TAMSULOSIN HYDROCHLORIDE 0.4 MG: 0.4 CAPSULE ORAL at 10:05

## 2019-08-19 RX ADMIN — Medication: at 22:00

## 2019-08-19 NOTE — PROGRESS NOTES
Hospitalist Progress Note  Ashu Barker MD  Answering service: [phone] from in house phone      Date of Service:  2019  NAME:  Rosario Cobb  :  1935  MRN:  [id number]      Admission Summary:   The patient is an 81-year-old male with past medical history of diabetes, hypertension, coronary artery disease, hypercholesterolemia, peripheral vascular disease, AFib, and CKD, who presented to the emergency room via private vehicle with chief complaint of shortness of breath. His shortness of breath has been persistent for more than one week and subsequently has worsened since yesterday. There is associated bilateral leg swelling since yesterday. The patient has been taking four albuterol nebulizer treatments with no relief. The patient also reports cough with occasional greenish sputum. He recently had pacemaker placed by Dr. Connor Mcclain and had followed up with Cardiology yesterday who increased his diuretic dose. The patient has been seen in the emergency room for shortness of breath three times this week.       Interval history / Subjective:   Pt reports shortness of breath this am but improving  He is orthostatic with low Blood pressures today- increased florinef  2 days ago  Oxygen testing today   PT eval to see if he is still dizzy with ambulation     Assessment & Plan:      acute hypoxemic resp failure-  POA - multifactorial- due to acute resp infection and edema from pulmonary hypertension- still present but improving  CT chest   showed diffuse alveolar pattern suggestive of pulm edema ,   resp panel pcr is neg and legionella neg   PO prednisone,  Nebs,antibiotics- levaquin for 14 days,     Presyncope and orthostasis and hypotension on    Hold all bumex and BP meds   Cont current florinef dose recently increased  Daily orthostatics     chronic severe pulm HTN- causing chronic hypoxemia that worsens with exertion  RHC w/ PCW 28  ECHO 50/60% with PAP 50  He still requires oxygen- oxygen testing 8/19/19 showing the following:  Pulse oximetry assessment   80 % while ambulating on room air  92 % at rest on 2 LPM  90 % while ambulating on 2 LPM    Breathing improved with bumex- currently on hold  pulm consulted and has now signed off  He will need repeat CT scan in 4-6 wks on outpatient        KATALINA on CKD stage 4-   baseline creatinine 3.1-3.3; was 3.8 on admission-  Stable at baseline    Metabolic acidosis   Stable on po bicarb      . PVD-   s/p stented in both LE, cont aspirin      DM type 2-   Uncontrolled due to noncompliance with diet  Cont accuchecks and ss insulin  Changed lantus back to AM dose due to am hypoglycemia  Increased lantus dose for hyperglycemia    . HLD-   cont statin meds     . BPH  Stable on flomax    Anemia of CKD- Hb low stable     CAD s/p CABG- asa     Bradycardia with afib w pacemaker,    -supine hypertension with severe orthostatic hypotension -   Holding some home BP meds due to orthostatsis and presyncope   Started florinef 8/14/19- increased dose on 8/15/19  Restarted coreg at lower dose due to tachycardia- tachycardia resolved  Hypokalemia- repleted    Code status: full   DVT prophylaxis: heaprin     Care Plan discussed with: Patient/Family  Disposition: TBD     Hospital Problems  Date Reviewed: 7/28/2019          Codes Class Noted POA    CHF exacerbation ([facility] [address] ICD-10-CM: I50.9  ICD-9-CM: 428.0  8/5/2019 Unknown        Acute hypoxemic respiratory failure [facility]) ICD-10-CM: J96.01  ICD-9-CM: 518.81  8/5/2019 Unknown                Review of Systems:   A comprehensive review of systems was negative except for that written in the subjective section      Vital Signs:    Last 24hrs VS reviewed since prior progress note.  Most recent are:  Visit Vitals  /77 (BP 1 Location: Right arm, BP Patient Position: Sitting;Post activity)   Pulse 70   Temp 98.1 °F (36.7 °C)   Resp 16   Ht 5' 9\" (1.753 m) Wt 68.8 kg (151 lb 9.6 oz)   SpO2 98%   BMI 22.39 kg/m²         Intake/Output Summary (Last 24 hours) at 8/19/2019 1059  Last data filed at 8/18/2019 1932  Gross per 24 hour   Intake 540 ml   Output 400 ml   Net 140 ml        Physical Examination:             Constitutional:  on oxygen but comfortable ,    ENT:  Oral mucous moist, Neck supple,    Resp:  decreased breath sounds ,no wheezing   CV:  Regular rhythm, normal rate, no murmurs, gallops, rubs    GI:  Soft, non distended, non tender. normoactive bowel sounds, no hepatosplenomegaly     Musculoskeletal:  No edema, warm, 2+ pulses throughout    Neurologic:  Moves all extremities. AAOx3, CN II-XII reviewed            Data Review:    Review and/or order of clinical lab test      Labs:     No results for input(s): WBC, HGB, HCT, PLT, HGBEXT, HCTEXT, PLTEXT, HGBEXT, HCTEXT, PLTEXT in the last 72 hours. Recent Labs     08/19/19  0630 08/18/19  0603 08/17/19  0204    136 135*   K 3.5 3.4* 3.3*    98 95*   CO2 29 33* 34*   BUN 49* 46* 53*   CREA 2.05* 2.04* 2.39*   * 75 159*   CA 7.5* 7.6* 7.9*   PHOS 2.8 2.2* 1.7*     Recent Labs     08/19/19  0630 08/18/19  0603 08/17/19  0204   ALB 2.1* 2.1* 2.3*     No results for input(s): INR, PTP, APTT in the last 72 hours. No lab exists for component: INREXT, INREXT   No results for input(s): FE, TIBC, PSAT, FERR in the last 72 hours. No results found for: FOL, RBCF   No results for input(s): PH, PCO2, PO2 in the last 72 hours. No results for input(s): CPK, CKNDX, TROIQ in the last 72 hours.     No lab exists for component: CPKMB  No results found for: CHOL, CHOLX, CHLST, CHOLV, HDL, LDL, LDLC, DLDLP, TGLX, TRIGL, TRIGP, CHHD, CHHDX  Lab Results   Component Value Date/Time    Glucose (POC) 179 (H) 08/19/2019 06:20 AM    Glucose (POC) 262 (H) 08/18/2019 09:06 PM    Glucose (POC) 352 (H) 08/18/2019 03:58 PM    Glucose (POC) 195 (H) 08/18/2019 11:04 AM    Glucose (POC) 90 08/18/2019 06:17 AM     Lab Results   Component Value Date/Time    Color YELLOW/STRAW 06/25/2017 11:40 AM    Appearance CLEAR 06/25/2017 11:40 AM    Specific gravity 1.018 06/25/2017 11:40 AM    pH (UA) 5.0 06/25/2017 11:40 AM    Protein 300 (A) 06/25/2017 11:40 AM    Glucose NEGATIVE  06/25/2017 11:40 AM    Ketone NEGATIVE  06/25/2017 11:40 AM    Bilirubin NEGATIVE  06/25/2017 11:40 AM    Urobilinogen 0.2 06/25/2017 11:40 AM    Nitrites NEGATIVE  06/25/2017 11:40 AM    Leukocyte Esterase NEGATIVE  06/25/2017 11:40 AM    Epithelial cells FEW 06/25/2017 11:40 AM    Bacteria NEGATIVE  06/25/2017 11:40 AM    WBC 0-4 06/25/2017 11:40 AM    RBC 0-5 06/25/2017 11:40 AM         Medications Reviewed:     Current Facility-Administered Medications   Medication Dose Route Frequency    carvedilol (COREG) tablet 3.125 mg  3.125 mg Oral BID WITH MEALS    fludrocortisone (FLORINEF) tablet 0.2 mg  0.2 mg Oral DAILY    insulin glargine (LANTUS) injection 20 Units  20 Units SubCUTAneous DAILY    predniSONE (DELTASONE) tablet 30 mg  30 mg Oral DAILY WITH BREAKFAST    albuterol-ipratropium (DUO-NEB) 2.5 MG-0.5 MG/3 ML  3 mL Nebulization Q4H PRN    aluminum-magnesium hydroxide (MAALOX) oral suspension 15 mL  15 mL Oral QID PRN    dextrose 10 % infusion 125-250 mL  125-250 mL IntraVENous PRN    insulin lispro (HUMALOG) injection   SubCUTAneous AC&HS    glucose chewable tablet 16 g  4 Tab Oral PRN    glucagon (GLUCAGEN) injection 1 mg  1 mg IntraMUSCular PRN    balsam peru-castor oil (VENELEX) ointment   Topical TID    sodium chloride (NS) flush 5-40 mL  5-40 mL IntraVENous Q8H    sodium chloride (NS) flush 5-40 mL  5-40 mL IntraVENous PRN    sodium chloride (NS) flush 5-40 mL  5-40 mL IntraVENous Q8H    sodium chloride (NS) flush 5-40 mL  5-40 mL IntraVENous PRN    acetaminophen (TYLENOL) tablet 650 mg  650 mg Oral Q4H PRN    HYDROcodone-acetaminophen (NORCO) 5-325 mg per tablet 1 Tab  1 Tab Oral Q4H PRN    morphine injection 1 mg  1 mg IntraVENous Q4H PRN    heparin (porcine) injection 5,000 Units  5,000 Units SubCUTAneous Q12H    aspirin chewable tablet 81 mg  81 mg Oral DAILY    simvastatin (ZOCOR) tablet 20 mg  20 mg Oral QHS    sodium bicarbonate tablet 1,300 mg  1,300 mg Oral TID    tamsulosin (FLOMAX) capsule 0.4 mg  0.4 mg Oral DAILY    epoetin dm-epbx (RETACRIT) injection 10,000 Units  10,000 Units SubCUTAneous Q7D     ______________________________________________________________________  EXPECTED LENGTH OF STAY: 4d 2h  ACTUAL LENGTH OF STAY:          [facility] Quyen Al MD

## 2019-08-19 NOTE — PROGRESS NOTES
Problem: Mobility Impaired (Adult and Pediatric)  Goal: *Acute Goals and Plan of Care (Insert Text)  Description  FUNCTIONAL STATUS PRIOR TO ADMISSION: Patient was independent without use of DME.    HOME SUPPORT PRIOR TO ADMISSION: The patient lived with wife/son but did not require assist.    Physical Therapy Goals  Initiated 8/12/2019  Reviewed and Remain Appropriate 8/19/19    1. Patient will transfer from bed to chair and chair to bed with modified independence using the least restrictive device within 7 day(s). 2.  Patient will perform sit to stand with modified independence within 7 day(s). 3.  Patient will ambulate with modified independence for > 200 feet with the least restrictive device within 7 day(s). 4.  Patient will ascend/descend 3 stairs with one handrail(s) with supervision/set-up within 7 day(s). 8/19/2019 1334 by Fabio Bolden, PT  Outcome: Progressing Towards Goal   PHYSICAL THERAPY TREATMENT: WEEKLY REASSESSMENT  Patient: Ed Serna Sr. (81 y.o. male)  Date: 8/19/2019  Primary Diagnosis: CHF exacerbation ([facility]) [I50.9]  Acute hypoxemic respiratory failure ([facility]) [J96.01]        Precautions:   Fall(orthostatic blood pressure, weak LE's symptom)      ASSESSMENT  Based on the objective data described below, the patient presents with continued asymptomatic orthostatic hypotension for standing and ambulation; pt oxygen dependent with activity - desaturated with amb to 80% ; amb with oxygen 90%. Patient's progression toward goals since last assessment: Amb short distances with close monitoring and standby guard - Bp in standing 94/49 - no dizziness reported. Current Level of Function Impacting Discharge (mobility/balance): Requires assist x 1 for amb/fall risk    Functional Outcome Measure: The patient scored 17/28 on the Tinetti outcome measure which is indicative of high fall risk. Pt scored 5/28 at evaluation on wk ago.     Other factors to consider for discharge: Lives with elderly wife who uses cane         PLAN :  Goals have been updated based on progression since last assessment. Patient continues to benefit from skilled intervention to address the above impairments. Recommendations and Planned Interventions: transfer training, gait training, therapeutic exercises, patient and family training/education and therapeutic activities      Frequency/Duration: Patient will be followed by physical therapy:  5 times a week to address goals.     Recommendation for discharge: (in order for the patient to meet his/her long term goals)  If Bp better controlled -   Physical therapy at least 2 days/week in the home AND ensure assist and/or supervision for safety with mobility     This discharge recommendation:  Has been made in collaboration with the attending provider and/or case management    Equipment recommendations for successful discharge (if) home: patient owns DME required for discharge         SUBJECTIVE:   Patient stated I was suppose to go home last [address]:   HISTORY:    Past Medical History:   Diagnosis Date    CAD (coronary artery disease)     s/p CABG 2008    Carotid arterial disease ([facility] [address]     CKD (chronic kidney disease) stage 3, GFR 30-59 ml/min ([address] 10/21/2017    hypertension and DM nephrosclerosis    Diabetes mellitus, type 2 ([address]     Hypercholesteremia     Hypertension     Paroxysmal atrial fibrillation ([address] 10/21/2017    new onset afib with BRPR 10-19-17 admit    PVC's (premature ventricular contractions) 5/26/2014    PVD (peripheral vascular disease) ([address]      Past Surgical History:   Procedure Laterality Date    HX CORONARY ARTERY BYPASS GRAFT      HX HEART CATHETERIZATION      FL TCAT INSJ/RPL PERM LEADLESS PACEMAKER RV W/IMG N/A 5/9/2019    INSERT OR REPLACE TRANSCATH PPM LEADLESS performed by Gold Moscoso [place] [address], [facility] CATH LAB       Personal factors and/or comorbidities impacting plan of care: none noted    [address] [place] [address] Environment: Private residence  # Steps to Enter: 3  One/Two Story Residence: One story  Living Alone: No  Support Systems: Spouse/Significant Other/Partner  Patient Expects to be Discharged to[de-identified] Private residence  Current DME Used/Available at Home: Walker, rolling    EXAMINATION/PRESENTATION/DECISION MAKING:   Critical Behavior:  Neurologic State: Alert  Orientation Level: Appropriate for age  Cognition: Follows commands  Safety/Judgement: Awareness of environment  Hearing: Auditory  Auditory Impairment: Hard of hearing, bilateral(functional)    Range Of Motion:  AROM: Generally decreased, functional                       Strength:    Strength: Generally decreased, functional                    Tone & Sensation:   Tone: Normal              Sensation: Intact               Coordination:  Coordination: Within functional limits  Functional Mobility:  Bed Mobility:              Transfers:  Sit to Stand: Stand-by assistance  Stand to Sit: Stand-by assistance        Bed to Chair: Assist x1;Contact guard assistance              Balance:   Sitting: Intact; Without support  Standing: Impaired  Standing - Static: Good  Standing - Dynamic : Fair  Ambulation/Gait Training:  Distance (ft): 125 Feet (ft)  Assistive Device: Walker, rolling;Gait belt  Ambulation - Level of Assistance: Contact guard assistance                 Base of Support: Narrowed     Speed/Karla: Pace decreased (<100 feet/min)  Step Length: Right shortened;Left shortened                Functional Measure:  Tinetti test:    Sitting Balance: 1  Arises: 1  Attempts to Rise: 1  Immediate Standing Balance: 1  Standing Balance: 1  Nudged: 2  Eyes Closed: 1  Turn 360 Degrees - Continuous/Discontinuous: 1  Turn 360 Degrees - Steady/Unsteady: 0  Sitting Down: 1  Balance Score: 10 Balance total score  Indication of Gait: 0  R Step Length/Height: 1  L Step Length/Height: 1  R Foot Clearance: 1  L Foot Clearance: 1  Step Symmetry: 1  Step Continuity: 1  Path: 1  Trunk: 0  Walking Time: 0  Gait Score: 7 Gait total score  Total Score: 17/28 Overall total score         Tinetti Tool Score Risk of Falls  <19 = High Fall Risk  19-24 = Moderate Fall Risk  25-28 = Low Fall Risk  Sarah ME. Performance-Oriented Assessment of Mobility Problems in Elderly Patients. Hussein 66; [id number]. (Scoring Description: PT Bulletin Feb. 10, 1993)    Older adults: Michelle Hays et al, 2009; n = 1000 [facility] elderly evaluated with ABC, DEBRA, ADL, and IADL)  · Mean DEBRA score for males aged 69-68 years = 26.21(3.40)  · Mean DEBRA score for females age 69-68 years = 25.16(4.30)  · Mean DEBRA score for males over 80 years = 23.29(6.02)  · Mean DEBRA score for females over 80 years = 17.20(8.32)          Pain Rating:  Pt denied pain. Activity Tolerance:   Fair and requires rest breaks; Low Bp in standing/amb - becomes unsteady at times  Please refer to the flowsheet for vital signs taken during this treatment. After treatment patient left in no apparent distress:   Sitting in chair, Call bell within reach and OT with patient     COMMUNICATION/EDUCATION:   The patients plan of care was discussed with: Occupational Therapist.    Fall prevention education was provided and the patient/caregiver indicated understanding., Patient/family have participated as able in goal setting and plan of care. and Patient/family agree to work toward stated goals and plan of care.     Thank you for this referral.  Pete Prabhakar, PT   Time Calculation: 35 mins

## 2019-08-19 NOTE — PROGRESS NOTES
Pt had 2 beats runs of A flutter and multiple PVCs with pacer spikes on the 6 second strips, pt was assessed and was asymptomatic, sitting up in his chair and watching Television, states \"I'm fine\" Pt has a pacemaker in placed. Strips placed in the chart.

## 2019-08-19 NOTE — PROGRESS NOTES
Bedside shift change report given to Torrey Carrasco (oncoming nurse) by Diana Mayer RN (offgoing nurse). Report included the following information SBAR, Kardex and MAR.

## 2019-08-19 NOTE — PROGRESS NOTES
Pulse oximetry assessment   93 % at rest on room air (if 88% or less, skip next steps)  80 % while ambulating on room air  92 % at rest on 2 LPM  90 % while ambulating on 2 LPM      Norton Riedel, SUSIE

## 2019-08-19 NOTE — PROGRESS NOTES
CM reviewed chart and attended IDR, will continue to monitor oxygen stats, as soon as the qualify stat is available CM will work on oxygen order. 11:42 AM: CM faxed [facility] an updated oxygen stat and MD's recent progress note at [phone], will follow up as needed. 12:29 PM: Received a call from [facility] [phone], states pt needs to have chronic condition, wants updated MD's note which indicates an acute condition requirement.      Roberto Carlos      [phone]

## 2019-08-19 NOTE — PROGRESS NOTES
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  - baseline Cr at best : 2.7- 3 mg/dl   - UPCR showed 11 gm of Protein     Metablolic Acidosis:  - from chronic resp disease and contraction alkalosis from diuresis    Pulm HTN:  - Echo showing severe Pulm HTN w/ PASP ~ 72   - RHC w/ PCW 28    Hypoxia:  - from PNA + pulm edema  - abx per pulmonary     Orthostatic Hypotension:  - BP meds and diuretics on hold for now  - Florinef added by hospitalist 8/14 and dose increased   - suggested thigh length medium compression stockings     Bradycardia :  - s/p PPM on 5/9     Anemia of CKD:  - hgb stable  - weekly MICHAEL for now     Afib:  - per cards     CAD s/p CABG     DM2:  - on insulin     Interval History:  Seen and examined. Labs noted. Feeling ok. Continues to be orthostatic. No cp, sob, n/v/d. Review of Systems: Pertinent items are noted in HPI.     Current Medications:   Current Facility-Administered Medications   Medication Dose Route Frequency    epoetin dm-epbx (RETACRIT) injection 10,000 Units  10,000 Units SubCUTAneous Q7D    insulin lispro (HUMALOG) injection 20 Units  20 Units SubCUTAneous ONCE    carvedilol (COREG) tablet 3.125 mg  3.125 mg Oral BID WITH MEALS    fludrocortisone (FLORINEF) tablet 0.2 mg  0.2 mg Oral DAILY    insulin glargine (LANTUS) injection 20 Units  20 Units SubCUTAneous DAILY    predniSONE (DELTASONE) tablet 30 mg  30 mg Oral DAILY WITH BREAKFAST    albuterol-ipratropium (DUO-NEB) 2.5 MG-0.5 MG/3 ML  3 mL Nebulization Q4H PRN    aluminum-magnesium hydroxide (MAALOX) oral suspension 15 mL  15 mL Oral QID PRN    dextrose 10 % infusion 125-250 mL  125-250 mL IntraVENous PRN    insulin lispro (HUMALOG) injection   SubCUTAneous AC&HS    glucose chewable tablet 16 g  4 Tab Oral PRN    glucagon (GLUCAGEN) injection 1 mg  1 mg IntraMUSCular PRN    balsam peru-castor oil (VENELEX) ointment   Topical TID    sodium chloride (NS) flush 5-40 mL  5-40 mL IntraVENous Q8H    sodium chloride (NS) flush 5-40 mL  5-40 mL IntraVENous PRN    sodium chloride (NS) flush 5-40 mL  5-40 mL IntraVENous Q8H    sodium chloride (NS) flush 5-40 mL  5-40 mL IntraVENous PRN    acetaminophen (TYLENOL) tablet 650 mg  650 mg Oral Q4H PRN    HYDROcodone-acetaminophen (NORCO) 5-325 mg per tablet 1 Tab  1 Tab Oral Q4H PRN    morphine injection 1 mg  1 mg IntraVENous Q4H PRN    heparin (porcine) injection 5,000 Units  5,000 Units SubCUTAneous Q12H    aspirin chewable tablet 81 mg  81 mg Oral DAILY    simvastatin (ZOCOR) tablet 20 mg  20 mg Oral QHS    sodium bicarbonate tablet 1,300 mg  1,300 mg Oral TID    tamsulosin (FLOMAX) capsule 0.4 mg  0.4 mg Oral DAILY      Allergies   Allergen Reactions    Lisinopril Cough       Objective:  Vitals:    Vitals:    08/19/19 1029 08/19/19 1032 08/19/19 1036 08/19/19 1424   BP: (!) 88/41 110/40 140/77 171/83   Pulse:    82   Resp:    16   Temp:    97.9 °F (36.6 °C)   SpO2:    98%   Weight:       Height:         Intake and Output:  No intake/output data recorded.   08/17 1901 - 08/19 0700  In: 80 [P.O.:780]  Out: 400 [Urine:400]    Physical Examination:    General: NAD,Conversant   Neck:  Supple, no mass  Resp:  Lungs mostly clear  CV:  RRR,  no murmur or rub,no LE edema  GI:  Soft, NT, + Bowel sounds, no hepatosplenomegaly  Neurologic:  Non focal  Psych:             AAO x 3 appropriate affect       [x]    High complexity decision making was performed  [x]    Patient is at high-risk of decompensation with multiple organ involvement    Lab Data Personally Reviewed: I have reviewed all the pertinent labs, microbiology data and radiology studies during assessment.     Recent Labs     08/19/19  0630 08/18/19  0603 08/17/19  0204    136 135*   K 3.5 3.4* 3.3*    98 95*   CO2 29 33* 34*   * 75 159*   BUN 49* 46* 53*   CREA 2.05* 2.04* 2.39*   CA 7.5* 7.6* 7.9*   PHOS 2.8 2.2* 1.7*   ALB 2.1* 2.1* 2.3*     Recent Labs     08/19/19  1635   WBC 9.4   HGB 10.2*   HCT 32.0*        No results found for: SDES  Lab Results   Component Value Date/Time    Culture result: NO GROWTH 5 DAYS 08/05/2019 10:46 AM    Culture result: CANDIDA ALBICANS (A) 03/16/2017 06:00 AM    Culture result: NO GROWTH 5 DAYS 03/16/2017 05:40 AM     Recent Results (from the past 24 hour(s))   GLUCOSE, POC    Collection Time: 08/18/19  9:06 PM   Result Value Ref Range    Glucose (POC) 262 (H) 65 - 100 mg/dL    Performed by Joel Bravo 20, POC    Collection Time: 08/19/19  6:20 AM   Result Value Ref Range    Glucose (POC) 179 (H) 65 - 100 mg/dL    Performed by Tho Vazquez    RENAL FUNCTION PANEL    Collection Time: 08/19/19  6:30 AM   Result Value Ref Range    Sodium 138 136 - 145 mmol/L    Potassium 3.5 3.5 - 5.1 mmol/L    Chloride 102 97 - 108 mmol/L    CO2 29 21 - 32 mmol/L    Anion gap 7 5 - 15 mmol/L    Glucose 161 (H) 65 - 100 mg/dL    BUN 49 (H) 6 - 20 MG/DL    Creatinine 2.05 (H) 0.70 - 1.30 MG/DL    BUN/Creatinine ratio 24 (H) 12 - 20      GFR est AA 38 (L) >60 ml/min/1.73m2    GFR est non-AA 31 (L) >60 ml/min/1.73m2    Calcium 7.5 (L) 8.5 - 10.1 MG/DL    Phosphorus 2.8 2.6 - 4.7 MG/DL    Albumin 2.1 (L) 3.5 - 5.0 g/dL   GLUCOSE, POC    Collection Time: 08/19/19 10:58 AM   Result Value Ref Range    Glucose (POC) 313 (H) 65 - 100 mg/dL    Performed by Eugenie Mariano 26, POC    Collection Time: 08/19/19  4:14 PM   Result Value Ref Range    Glucose (POC) 475 (H) 65 - 100 mg/dL    Performed by [facility]    CBC W/O DIFF    Collection Time: 08/19/19  4:35 PM   Result Value Ref Range    WBC 9.4 4.1 - 11.1 K/uL    RBC 3.59 (L) 4.10 - 5.70 M/uL    HGB 10.2 (L) 12.1 - 17.0 g/dL    HCT 32.0 (L) 36.6 - 50.3 %    MCV 89.1 80.0 - 99.0 FL    MCH 28.4 26.0 - 34.0 PG    MCHC 31.9 30.0 - 36.5 g/dL    RDW 16.2 (H) 11.5 - 14.5 %    PLATELET 543 885 - 953 K/uL    MPV 10.2 8.9 - 12.9 FL    NRBC 0.0 0  WBC    ABSOLUTE NRBC 0.00 0.00 - 0.01 K/uL           Total time spent with patient:  25  min. Care Plan discussed with:  Patient  y   Family      RN   y   Consulting Physician [facility]        I have reviewed the flowsheets. Chart and Pertinent Notes have been reviewed. No change in PMH ,family and social history from Consult note.       Martin Key MD

## 2019-08-19 NOTE — PROGRESS NOTES
Bedside shift change report given to silvio (oncoming nurse) by Sivan Milan (offgoing nurse). Report included the following information SBAR and Kardex.

## 2019-08-19 NOTE — PROGRESS NOTES
OCCUPATIONAL THERAPY TREATMENT  Patient: Natividad Wagoner Sr. (81 y.o. male)  Date: 8/19/2019  Diagnosis: CHF exacerbation ([facility] [I50.9]  Acute hypoxemic respiratory failure ([facility] [J96.01] <principal problem not specified>       Precautions: Fall(orthostatic blood pressure, weak LE's symptom)  Chart, occupational therapy assessment, plan of care, and goals were reviewed. ASSESSMENT  Based on the objective data described below, patient presents with supervision to mod I ADL and but significantly impacted by BP today (see chart). Patient completed walk with chair follow on room air O2 down to low 80s, given 2L O2 and back to low 90's during activity and upper 90's static standing. Patient returned to room and BP stable in 115/50 during grooming standing activity at bedside, standing for 8 minutes (grooming with SBA for toothbrushing and hairbrushing) and BP remaining stable. Completed short functional walk to bathroom, completing toilet transfer and returning to bedside with BP 88/41, returning to sitting and back up to 140/70. Patient claiming asymptomatic throughout session. Left up in chair, nursing notified, patient with call bell. Patient functionally near baseline mobility and self care wise however therapist has significant concerns about safety due to patient's BP dropping significantly with dynamic standing activity necessary to complete ADLs. Current Level of Function Impacting Discharge (ADLs): Independent with all self care PTA, lives with wife     Other factors to consider for discharge: BP significantly impacting safety with ADL         PLAN :  Patient continues to benefit from skilled intervention to address the above impairments. Continue treatment per established plan of care. to address goals.     Recommend with staff: Recommend with nursing patient to complete as able in order to maintain strength, endurance and independence: ADLs with supervision/setup, OOB to chair 3x/day and mobilizing to the [facility] for toileting with 1 assist. Thank you for your assistance. Recommend next OT session: continue attempted standing ADL as able    Recommendation for discharge: (in order for the patient to meet his/her long term goals)  To be determined: if BP signficantly improves and remains stable, home with support and HHOT,  otherwise SNF for safety with standing ADL    This discharge recommendation:  Has not yet been discussed the attending provider and/or case management    Equipment recommendations for successful discharge (if) home: none        SUBJECTIVE:   Patient stated I feel fine.     OBJECTIVE DATA SUMMARY:   Cognitive/Behavioral Status:  Neurologic State: Alert  Orientation Level: Appropriate for age                Functional Mobility and Transfers for ADLs:  Bed Mobility:       Transfers:  Sit to Stand: Stand-by assistance  Functional Transfers  Toilet Transfer : Supervision(grab bar and RW)       Balance:  Sitting: Intact; Without support  Standing: Intact; With support  Standing - Static: Good  Standing - Dynamic : Good    ADL Intervention:       Grooming  Grooming Assistance: Supervision(standing with chair behind)                 Therapeutic Exercises:       Pain:      Activity Tolerance:   Good  Please refer to the flowsheet for vital signs taken during this treatment.     After treatment patient left in no apparent distress:   Sitting in chair and Call bell within reach    COMMUNICATION/COLLABORATION:   The patients plan of care was discussed with: Physical Therapist and Registered Nurse    Jossie Magana  Time Calculation: 33 mins

## 2019-08-19 NOTE — DIABETES MGMT
[facility]    [facility]TC Progress Note    Recommendations/ Comments: Chart review for hyperglycemia following Prednisone 30 mg each morning. Blood sugars ranged 175-352 mg/dL in the last 24 hours and pt required 24 units of correction. If appropriate, please consider:   - addition of NPH 10 units to be paired with am prednisone (could include now dose today)   - OR, addition of Glimepiride 1 mg with breakfast since GFR >30    Current hospital DM medication:   Lantus 20 units at 0900  Lispro correction, resistant scale    Chart reviewed on [address] .    Patient is a 81 y.o. male with known DM on Lantus U-300 18 units daily at home    A1c:   Lab Results   Component Value Date/Time    Hemoglobin A1c 9.1 (H) 05/11/2019 03:37 AM    Hemoglobin A1c 9.8 (H) 10/20/2017 08:11 AM    Hemoglobin A1c, External 8.4 08/15/2017       Recent Glucose Results:   Lab Results   Component Value Date/Time     (H) 08/19/2019 06:30 AM    GLUCPOC 179 (H) 08/19/2019 06:20 AM    GLUCPOC 262 (H) 08/18/2019 09:06 PM    GLUCPOC 352 (H) 08/18/2019 03:58 PM        Lab Results   Component Value Date/Time    Creatinine 2.05 (H) 08/19/2019 06:30 AM     Estimated Creatinine Clearance: 27 mL/min (A) (based on SCr of 2.05 mg/dL (H)). Active Orders   Diet    DIET DIABETIC WITH OPTIONS Consistent Carb 2000kcal; Regular; 2 GM NA (House Low NA); FR 1500ML        PO intake:   Patient Vitals for the past 72 hrs:   % Diet Eaten   08/18/19 1258 95 %   08/18/19 0844 100 %   08/17/19 1321 95 %   08/17/19 0837 100 %   08/16/19 0953 100 %       Will continue to follow as needed.     Thank you  Jeff Washington MS, RN, CDE    Time spent: 6 minutes

## 2019-08-20 ENCOUNTER — PATIENT OUTREACH (OUTPATIENT)
Dept: CARDIOLOGY CLINIC | Age: 84
End: 2019-08-20

## 2019-08-20 VITALS
DIASTOLIC BLOOD PRESSURE: 85 MMHG | RESPIRATION RATE: 16 BRPM | HEIGHT: 69 IN | BODY MASS INDEX: 23.55 KG/M2 | OXYGEN SATURATION: 98 % | TEMPERATURE: 97.5 F | SYSTOLIC BLOOD PRESSURE: 165 MMHG | WEIGHT: 159 LBS | HEART RATE: 76 BPM

## 2019-08-20 PROBLEM — J96.01 ACUTE HYPOXEMIC RESPIRATORY FAILURE (HCC): Status: RESOLVED | Noted: 2019-08-05 | Resolved: 2019-08-20

## 2019-08-20 PROBLEM — I50.9 CHF EXACERBATION (HCC): Status: RESOLVED | Noted: 2019-08-05 | Resolved: 2019-08-20

## 2019-08-20 LAB
ALBUMIN SERPL-MCNC: 2.2 G/DL (ref 3.5–5)
ANION GAP SERPL CALC-SCNC: 5 MMOL/L (ref 5–15)
BUN SERPL-MCNC: 45 MG/DL (ref 6–20)
BUN/CREAT SERPL: 23 (ref 12–20)
CALCIUM SERPL-MCNC: 7.8 MG/DL (ref 8.5–10.1)
CHLORIDE SERPL-SCNC: 101 MMOL/L (ref 97–108)
CO2 SERPL-SCNC: 32 MMOL/L (ref 21–32)
CREAT SERPL-MCNC: 1.93 MG/DL (ref 0.7–1.3)
GLUCOSE BLD STRIP.AUTO-MCNC: 189 MG/DL (ref 65–100)
GLUCOSE BLD STRIP.AUTO-MCNC: 95 MG/DL (ref 65–100)
GLUCOSE SERPL-MCNC: 94 MG/DL (ref 65–100)
PHOSPHATE SERPL-MCNC: 3.6 MG/DL (ref 2.6–4.7)
POTASSIUM SERPL-SCNC: 3.1 MMOL/L (ref 3.5–5.1)
SERVICE CMNT-IMP: ABNORMAL
SERVICE CMNT-IMP: NORMAL
SODIUM SERPL-SCNC: 138 MMOL/L (ref 136–145)

## 2019-08-20 PROCEDURE — 80069 RENAL FUNCTION PANEL: CPT

## 2019-08-20 PROCEDURE — 74011636637 HC RX REV CODE- 636/637: Performed by: FAMILY MEDICINE

## 2019-08-20 PROCEDURE — 77010033678 HC OXYGEN DAILY

## 2019-08-20 PROCEDURE — 36415 COLL VENOUS BLD VENIPUNCTURE: CPT

## 2019-08-20 PROCEDURE — 74011250637 HC RX REV CODE- 250/637: Performed by: HOSPITALIST

## 2019-08-20 PROCEDURE — 74011250637 HC RX REV CODE- 250/637: Performed by: FAMILY MEDICINE

## 2019-08-20 PROCEDURE — 82962 GLUCOSE BLOOD TEST: CPT

## 2019-08-20 RX ORDER — PREDNISONE 10 MG/1
30 TABLET ORAL
Qty: 15 TAB | Refills: 0 | Status: SHIPPED | OUTPATIENT
Start: 2019-08-21 | End: 2019-08-27

## 2019-08-20 RX ORDER — CARVEDILOL 3.12 MG/1
3.12 TABLET ORAL 2 TIMES DAILY WITH MEALS
Qty: 60 TAB | Refills: 0 | Status: SHIPPED | OUTPATIENT
Start: 2019-08-20 | End: 2019-09-20

## 2019-08-20 RX ORDER — POTASSIUM CHLORIDE 750 MG/1
40 TABLET, FILM COATED, EXTENDED RELEASE ORAL 2 TIMES DAILY
Status: DISCONTINUED | OUTPATIENT
Start: 2019-08-20 | End: 2019-08-20 | Stop reason: HOSPADM

## 2019-08-20 RX ORDER — FLUDROCORTISONE ACETATE 0.1 MG/1
0.1 TABLET ORAL DAILY
Status: DISCONTINUED | OUTPATIENT
Start: 2019-08-20 | End: 2019-08-20 | Stop reason: HOSPADM

## 2019-08-20 RX ORDER — FLUDROCORTISONE ACETATE 0.1 MG/1
0.1 TABLET ORAL DAILY
Qty: 30 TAB | Refills: 0 | Status: SHIPPED | OUTPATIENT
Start: 2019-08-20 | End: 2019-09-04

## 2019-08-20 RX ADMIN — ASPIRIN 81 MG CHEWABLE TABLET 81 MG: 81 TABLET CHEWABLE at 09:22

## 2019-08-20 RX ADMIN — Medication: at 09:24

## 2019-08-20 RX ADMIN — PREDNISONE 30 MG: 10 TABLET ORAL at 07:15

## 2019-08-20 RX ADMIN — FLUDROCORTISONE ACETATE 0.1 MG: 0.1 TABLET ORAL at 09:22

## 2019-08-20 RX ADMIN — Medication 10 ML: at 07:16

## 2019-08-20 RX ADMIN — SODIUM BICARBONATE 1300 MG: 650 TABLET ORAL at 09:22

## 2019-08-20 RX ADMIN — POTASSIUM CHLORIDE 40 MEQ: 750 TABLET, EXTENDED RELEASE ORAL at 09:22

## 2019-08-20 RX ADMIN — Medication 10 ML: at 07:15

## 2019-08-20 RX ADMIN — CARVEDILOL 3.12 MG: 3.12 TABLET, FILM COATED ORAL at 07:15

## 2019-08-20 RX ADMIN — INSULIN LISPRO 3 UNITS: 100 INJECTION, SOLUTION INTRAVENOUS; SUBCUTANEOUS at 11:11

## 2019-08-20 RX ADMIN — INSULIN GLARGINE 20 UNITS: 100 INJECTION, SOLUTION SUBCUTANEOUS at 09:23

## 2019-08-20 RX ADMIN — TAMSULOSIN HYDROCHLORIDE 0.4 MG: 0.4 CAPSULE ORAL at 09:22

## 2019-08-20 NOTE — DISCHARGE INSTRUCTIONS
Discharge Instructions       PATIENT ID: Douglas De Santiago MRN: [id number]   YOB: 1935    DATE OF ADMISSION: 8/5/2019  8:26 AM    DATE OF DISCHARGE: 8/20/2019    PRIMARY CARE PROVIDER: Houston Gray MD     ATTENDING PHYSICIAN: Flavio Lake MD  DISCHARGING PROVIDER: Domonique Krishna MD    To contact this individual call [phone] and ask the  to page. If unavailable ask to be transferred the Adult Hospitalist Department. DISCHARGE DIAGNOSES PNA/ Pul; HTN / CKD and A fib PPM    CONSULTATIONS: IP CONSULT TO CARDIOLOGY  IP CONSULT TO CARDIOLOGY  IP CONSULT TO ADVANCED HEART FAILURE  IP CONSULT TO NEPHROLOGY  IP CONSULT TO HOSPITALIST  IP CONSULT TO PULMONOLOGY    PROCEDURES/SURGERIES: * No surgery found *    PENDING TEST RESULTS:   At the time of discharge the following test results are still pending:     FOLLOW UP APPOINTMENTS:   Follow-up Information     Follow up With Specialties Details Why Contact Info    Erika Vivas MD Cardiology On 8/21/2019 at 4:20 pm for hospital follow  up [address] [facility] [address]      Houston Gray MD Family Practice In 1 week pl readjust BP meds  [address] [facility]  [address]  [address] [facility]  [phone]             ADDITIONAL CARE RECOMMENDATIONS:  currently your diuretics ( bumax ) in on hold will be reassessed by cardiology tomorrow for restart     DIET: Cardiac Diet      ACTIVITY: Activity as tolerated    WOUND CARE:     EQUIPMENT needed:       DISCHARGE MEDICATIONS:   See Medication Reconciliation Form    · It is important that you take the medication exactly as they are prescribed. · Keep your medication in the bottles provided by the pharmacist and keep a list of the medication names, dosages, and times to be taken in your wallet. · Do not take other medications without consulting your doctor. NOTIFY YOUR PHYSICIAN FOR ANY OF THE FOLLOWING:   Fever over 101 degrees for 24 hours.    Chest pain, shortness of breath, fever, chills, nausea, vomiting, diarrhea, change in mentation, falling, weakness, bleeding. Severe pain or pain not relieved by medications. Or, any other signs or symptoms that you may have questions about.       DISPOSITION:  x  Home With:   OT  PT  HH  RN       SNF/Inpatient Rehab/LTAC    Independent/assisted living    Hospice   x Other: home o2      CDMP Checked:   Yes x     PROBLEM LIST Updated:  Yes x       Signed:   Mario Adhikari MD  8/20/2019  8:50 AM

## 2019-08-20 NOTE — PROGRESS NOTES
Bedside shift change report given to Bob Castillo RN (oncoming nurse) by Johnna Mi (offgoing nurse). Report included the following information SBAR, Kardex, Intake/Output and MAR.       1230: Pt discharged; went over pt discharge instructions and prescriptions. Answered all and any questions. Pt wheeled down via PCT to private transportation.

## 2019-08-20 NOTE — PROGRESS NOTES
Hospitalist Progress Note  Michelle Cedeno MD  Answering service: [phone] OR 36 from in house phone      Date of Service:  2019  NAME:  Ortiz Beard  :  1935  MRN:  [id number]      Admission Summary:   The patient is an 81-year-old male with past medical history of diabetes, hypertension, coronary artery disease, hypercholesterolemia, peripheral vascular disease, AFib, and CKD, who presented to the emergency room via private vehicle with chief complaint of shortness of breath. His shortness of breath has been persistent for more than one week and subsequently has worsened since yesterday. There is associated bilateral leg swelling since yesterday. The patient has been taking four albuterol nebulizer treatments with no relief. The patient also reports cough with occasional greenish sputum. He recently had pacemaker placed by Dr. Bianca Choi and had followed up with Cardiology yesterday who increased his diuretic dose. The patient has been seen in the emergency room for shortness of breath three times this week.       Interval history / Subjective:   Pt reports shortness of breath this am but improving  He is orthostatic with low Blood pressures today- increased florinef  2 days ago  Oxygen ordered     Assessment & Plan:     Acute on chronic  hypoxemic resp failure-  POA - multifactorial- due to resp infection and edema from pulmonary hypertension- still present but improving day by day   CT chest   showed diffuse alveolar pattern suggestive of pulm edema    resp panel pcr is neg and legionella neg   PO prednisone,  Nebs, antibiotics- levaquin for 14 days    Presyncope and orthostasis and hypotension on    Hold all bumex and BP meds  But is high this morning  Cont current florinef dose recently increased but can cut down its too high this morning  Daily orthostatics documentation    Chronic severe pulm HTN- causing chronic hypoxemia that worsens with exertion  RHC w/ PCW 28  ECHO 50/60% with PAP 50  He still requires oxygen- oxygen testing 8/19/19 showing the following:  Pulse oximetry assessment   80 % while ambulating on room air  92 % at rest on 2 LPM  90 % while ambulating on 2 LPM    Breathing improved with bumex- currently on hold  pulm consulted and has now signed off  He will need repeat CT scan in 4-6 wks on outpatient     KATALINA on CKD stage 4-   baseline creatinine 3.1-3.3; was 3.8 on admission-  Stable at baseline    Metabolic acidosis   Stable on po bicarb      PVD-   s/p stented in both LE, cont aspirin     DM type 2- Uncontrolled due to noncompliance with diet  Cont accuchecks and ss insulin  Changed lantus back to AM dose due to am hypoglycemia  Increased lantus dose for hyperglycemia    . HLD-   cont statin meds     . BPH  Stable on flomax    Anemia of CKD- Hb low stable     CAD s/p CABG- asa     Bradycardia with afib w pacemaker,  supine hypertension with severe orthostatic hypotension -   Holding some home BP meds due to orthostatsis and presyncope   Started florinef 8/14/19- increased dose on 8/15/19  Restarted coreg at lower dose due to tachycardia- tachycardia resolved  Hypokalemia- repleted    Code status: Full   DVT prophylaxis: heaprin     Care Plan discussed with: Patient/Family  Disposition: TBD   D/w CM need home o2 order placed can be d/c'd home with PCP f/u      Hospital Problems  Date Reviewed: 7/28/2019          Codes Class Noted POA    CHF exacerbation ([facility] [address] ICD-10-CM: I50.9  ICD-9-CM: 428.0  8/5/2019 Unknown        Acute hypoxemic respiratory failure [facility]) ICD-10-CM: J96.01  ICD-9-CM: 518.81  8/5/2019 Unknown                Review of Systems:   A comprehensive review of systems was negative except for that written in the subjective section      Vital Signs:    Last 24hrs VS reviewed since prior progress note.  Most recent are:  Visit Vitals  /78 (BP Patient Position: At rest)   Pulse 75   Temp 97.7 °F (36.5 °C)   Resp 16   Ht 5' 9\" (1.753 m)   Wt 68.8 kg (151 lb 9.6 oz)   SpO2 97%   BMI 22.39 kg/m²       No intake or output data in the 24 hours ending 08/20/19 0835     Physical Examination:             Constitutional:  on oxygen but comfortable ,    ENT:  Oral mucous moist, Neck supple,    Resp:  decreased breath sounds ,no wheezing   CV:  Regular rhythm, normal rate, no murmurs, gallops, rubs    GI:  Soft, non distended, non tender. normoactive bowel sounds, no hepatosplenomegaly     Musculoskeletal:  No edema, warm, 2+ pulses throughout    Neurologic:  Moves all extremities. AAOx3, CN II-XII reviewed            Data Review:    Review and/or order of clinical lab test    Xr Chest Pa Lat    Result Date: 8/5/2019  IMPRESSION: 1. Mild pulmonary vascular congestion and mild pulmonary edema is again noted. 2. New mild focal opacities in the right mid zone may reflect developing pneumonia. Follow-up to resolution is suggested. Ct Chest Wo Cont    Result Date: 8/7/2019  IMPRESSION: 1. Diffuse bilateral groundglass opacities. This could represent edema or infection. Legionella could have this appearance. 2. Mediastinal adenopathy. 3. Status post median sternotomy with cardiomegaly. 4. Atherosclerosis with coronary artery calcification. 5. Anemia. Labs:     Recent Labs     08/19/19  1635   WBC 9.4   HGB 10.2*   HCT 32.0*        Recent Labs     08/20/19  0558 08/19/19  0630 08/18/19  0603    138 136   K 3.1* 3.5 3.4*    102 98   CO2 32 29 33*   BUN 45* 49* 46*   CREA 1.93* 2.05* 2.04*   GLU 94 161* 75   CA 7.8* 7.5* 7.6*   PHOS 3.6 2.8 2.2*     Recent Labs     08/20/19  0558 08/19/19  0630 08/18/19  0603   ALB 2.2* 2.1* 2.1*     No results for input(s): INR, PTP, APTT in the last 72 hours. No lab exists for component: INREXT, INREXT   No results for input(s): FE, TIBC, PSAT, FERR in the last 72 hours.    No results found for: FOL, RBCF   No results for input(s): PH, PCO2, PO2 in the last 72 hours. No results for input(s): CPK, CKNDX, TROIQ in the last 72 hours.     No lab exists for component: CPKMB  No results found for: CHOL, CHOLX, CHLST, CHOLV, HDL, LDL, LDLC, DLDLP, TGLX, TRIGL, TRIGP, CHHD, CHHDX  Lab Results   Component Value Date/Time    Glucose (POC) 95 08/20/2019 07:04 AM    Glucose (POC) 244 (H) 08/19/2019 09:39 PM    Glucose (POC) 475 (H) 08/19/2019 04:14 PM    Glucose (POC) 313 (H) 08/19/2019 10:58 AM    Glucose (POC) 179 (H) 08/19/2019 06:20 AM     Lab Results   Component Value Date/Time    Color YELLOW/STRAW 06/25/2017 11:40 AM    Appearance CLEAR 06/25/2017 11:40 AM    Specific gravity 1.018 06/25/2017 11:40 AM    pH (UA) 5.0 06/25/2017 11:40 AM    Protein 300 (A) 06/25/2017 11:40 AM    Glucose NEGATIVE  06/25/2017 11:40 AM    Ketone NEGATIVE  06/25/2017 11:40 AM    Bilirubin NEGATIVE  06/25/2017 11:40 AM    Urobilinogen 0.2 06/25/2017 11:40 AM    Nitrites NEGATIVE  06/25/2017 11:40 AM    Leukocyte Esterase NEGATIVE  06/25/2017 11:40 AM    Epithelial cells FEW 06/25/2017 11:40 AM    Bacteria NEGATIVE  06/25/2017 11:40 AM    WBC 0-4 06/25/2017 11:40 AM    RBC 0-5 06/25/2017 11:40 AM         Medications Reviewed:     Current Facility-Administered Medications   Medication Dose Route Frequency    epoetin dm-epbx (RETACRIT) injection 10,000 Units  10,000 Units SubCUTAneous Q7D    carvedilol (COREG) tablet 3.125 mg  3.125 mg Oral BID WITH MEALS    fludrocortisone (FLORINEF) tablet 0.2 mg  0.2 mg Oral DAILY    insulin glargine (LANTUS) injection 20 Units  20 Units SubCUTAneous DAILY    predniSONE (DELTASONE) tablet 30 mg  30 mg Oral DAILY WITH BREAKFAST    albuterol-ipratropium (DUO-NEB) 2.5 MG-0.5 MG/3 ML  3 mL Nebulization Q4H PRN    aluminum-magnesium hydroxide (MAALOX) oral suspension 15 mL  15 mL Oral QID PRN    dextrose 10 % infusion 125-250 mL  125-250 mL IntraVENous PRN    insulin lispro (HUMALOG) injection   SubCUTAneous AC&HS    glucose chewable tablet 16 g  4 Tab Oral PRN    glucagon (GLUCAGEN) injection 1 mg  1 mg IntraMUSCular PRN    balsam peru-castor oil (VENELEX) ointment   Topical TID    sodium chloride (NS) flush 5-40 mL  5-40 mL IntraVENous Q8H    sodium chloride (NS) flush 5-40 mL  5-40 mL IntraVENous PRN    sodium chloride (NS) flush 5-40 mL  5-40 mL IntraVENous Q8H    sodium chloride (NS) flush 5-40 mL  5-40 mL IntraVENous PRN    acetaminophen (TYLENOL) tablet 650 mg  650 mg Oral Q4H PRN    HYDROcodone-acetaminophen (NORCO) 5-325 mg per tablet 1 Tab  1 Tab Oral Q4H PRN    morphine injection 1 mg  1 mg IntraVENous Q4H PRN    heparin (porcine) injection 5,000 Units  5,000 Units SubCUTAneous Q12H    aspirin chewable tablet 81 mg  81 mg Oral DAILY    simvastatin (ZOCOR) tablet 20 mg  20 mg Oral QHS    sodium bicarbonate tablet 1,300 mg  1,300 mg Oral TID    tamsulosin (FLOMAX) capsule 0.4 mg  0.4 mg Oral DAILY     ______________________________________________________________________  EXPECTED LENGTH OF STAY: 4d 2h  ACTUAL LENGTH OF STAY:          15                 Cassi Hoff MD

## 2019-08-20 NOTE — PROGRESS NOTES
Patient has graduated from the Disease Specific Care Management  program on 8/19/19. Patient's symptoms are not stable at this time. Patient is currently at [facility] since 8/5/19. This CTN will follow if pt rebundles. Care management goals have been completed at this time. No further nurse navigator follow up scheduled. Goals Addressed                 This Visit's Progress     COMPLETED: Attends follow-up appointments as directed. 05/21/19    Spoke to patient regarding follow up appts. And discussed follow up with other providers that were not listed in discharge instructions. Patient reports he saw Dr Kriss Smith yesterday---nephrology. Pt reports MD wants pt and wife to attend a class to educate them on dialysis---pt states he will be going on dialysis sometime soon. He has not made appointment yet to attend informational session yet. NN will follow up with pt on this. Patient reports he saw a NP for Dr Ros Rice at pulmonology office right before he was readmitted. He states they could not perform any tests on him since he was feeling so poorly. He also states they instructed him to use nebulizer treatments to see if that would help and scheduled him to have tests performed either in June or July--he could not remember exactly during this call. NN will follow up with pt at later date to confirm appt. Pt does have an appt tomorrow to see Dr Roosevelt Hines, cardiology. NN will see pt during this apt to provide educational material on diet, ACP, and additional HF information. NN will ask pt about PCP follow up tomorrow---there ws no appt scheduled at Discharge. Pt lives out of range for [facility] and [facility].---edgardrw    05/29/19  NN unable to reach pt by phone--ARGELIA on . NN attempted to contact PCP office to see if patient scheduled MARTINEZ appointment, ARGELIA on MD office . NN will follow up next week---mkrw    06/05/19  Spoke to patient 's wife.  She reports that they has an appt today with PCP, however the office cancelled the appt and r/s for 6/11/19. She reports pt had labwork drawn on 6/3 for this appt. Patient has a follow up for COPD exacerbation on 6/10/19. Wife also reports patient has an appt to have labs drawn for Dr Juanita Cain, nephrology on the 14th and then on June 24th will start their dialysis training. Wife also reports that a dietician is coming to their home on Friday 6/7/19 to prepare them for dialysis. NN will follow up with patient for updates next week---mkrw    06/10/19  NN unable to reach patient---LM on VM. Patient is supposed to have appt with pulmonary today. NN will follow up later in the week if no return call received---rw    06/26/19  Spoke to pt's wife today---she reports that they have a very busy month in July with the following appts:    7/1/19   [facility] for PFT's and other studies. They will return again on 7/15/19  To see MD regarding pulmonary HTN and results of PFT.    7/16/19   They will attend dialysis class for PD. Although Dr Juanita Cain has put PD plan on hold for now, he want pt to still attend class in the event that pt will have to go on PD. Note: Per wife, they saw Dr Juanita Cain on Monday 6/24/19.    7/24/19  Pt sees cardiology Dr Uyen Parker.    NN will follow up in 7-10 days for updates---mkrw    07/23/19  Spoke with patient's wife. She reports [facility] saw [facility] on 7/15/19. She had questions about why patient did not have a test performed for pulmonary HTN and also wants to know results of an overnight sleep study that was performed by Eugenia Lucas at home back in June --she states that it was to get pt home 02 at night. She also states that pt will be having a sleep apnea study on 7/22. [facility]--spoke with LONNY Duncan. She took detailed message to send to MA to return call. Mrs Stanislav Marquez reports they went to Dialysis class on 7/15/19.  She states it was very informative and that they also got more dietary resources from the nutritionist.  They also had labs drawn at this appt---will see Dr Shabnam Gamez on 7/25 to see if pt remains stable. Tomorrow they have an appt to see Dr Idania Lincoln at 2:00. CTN will follow up with [facility]. If no return call received---mkrw    07/24/19  Received call from [address] [place] [address] at [facility]. She reports that she put order in to [address] on 7/17/19 for nocturnal 02 at 3l/NC--pt should be hearing from [address] in a few days to set up delivery. She also reports that the only testing for PH to be done by Dr Jose Corona is the sleep apnea eval and study. The eval is scheduled for 8/22 at 56 at [facility] location. She states pt is not a candidate of vasodilative medications so they are trying other options such as 02 and CPAP. CTN called pt's home to relay information, LM on VM asking pt/wife to call back. ---mkrw    07/25/19  Pt's wife returned CTN call this morning. CTN gave her information from [facility] about the home 02 and that [address] should be contacting her before the end of the week. CTN will follow up with [address] if pt has not heard from them by end of week. Pt's wife reports they saw Dr Idania Lincoln yesterday. Reports everything is stable--no changes made. CTN will follow up next week---mkrw    8/19/19  Pt readmitted to [facility] on 8/[facility]/19 remains in hospital at this time. This CHF Bundle period completed, will follow if pt rebundles---mkrw         COMPLETED: Maintains daily weight. 05/21/19  NN spoke to pt today---he reports that he weighs daily. Today's weight 180 lbs, stable. NN discussed with pt when to call Dr Idania Lincoln --pt verbalizes when weight goes up 3 lbs/day. NN also educated him on weight increase 5 lbs/week. NN confirmed that pt is taking bumex 3 mg in AM and 1 mg in PM. NN suggested to pt to take evening bumex around 3-4pm so he doesn't wake up urinating all night. Pt verbalizes understanding.   NN encouraged pt to continue daily weights--will follow up next week for updates--[facility]    05/29/19  Unable to reach pt by phone, XANDER STOUT on . [facility]    06/05/19  Spoke to patient's wife today. She reports they are weighing daily ---today's weight is 178 lbs, down 2 lbs from 5/21/19. She reports that pt has a small amount of edema in his feet, but it has improved. She reports his bumex has been increased to taking 3 mg bumex in AM and 3 mg in PM.   Patient was struggling last week with his breathing---they saw [facility] Last Wednesday, ordered prednisone taper. Patient reports he has 2 more doses to take. He was also on a Zpack which is almost completed. Wife reports patient is doing better than he was last week prior to the prednisone. NN will follow up next week for updates--[facility]    06/20/19  NN unable to reach pt by phone, ARGELIA on  . NN will follow up next week for updates---[facility]    06/26/19  Spoke to pt's wife as pt was having a breathing treatment at time of call. She reports pt weight is currently 175 lbs--down 3 lbs from last report on 6/5/19. She reports that pt has been feeling better since completing abx , reports no increased SOB (she states pt always has a little SOB due to COPD), no coughing, no edema. She also reports that dialysis has been put on hold by Dr America Ochoa for now---states his kidney function has stabilized and pt's fluid status is stable. She states that this may change but for now MD is satified. NN asked wife to weigh pt daily and to call MD if weight increases by 3 lbs/day or 5 lbs/week, she verbalizes understanding. NN will follow up in one week---[facility]    07/09/19  Unable to reach pt by phone---ARGELIA on  requesting return call, CTN will follow up next week---[facility]    07/25/19   Patient saw Dr Mino Shelton yesterday.  Reports the following: /60 (BP 1 Location: Left arm, BP Patient Position: Sitting)    Pulse 78    Resp 17    Ht 5' 9\" (1.753 m)    Wt 170 lb (77.1 kg)   SpO2 98%   Weight down 3 lbs from last pt report. CTN will follow up with pt next week---mkrrj    8/19/19  Pt admitted on 8/5/19 to [facility] for SOB. Is currently still in hospital. Last weight documented 159 lbs. Has lost much weight as last weight 170 lbs documented by this CTN. Bundle CM period completed. ---mkrw       COMPLETED: Understands and adheres to diet. 05/21/19  NN spoke to patient regarding low sodium diet. Patient reports his wife prepares all meals using mostly fresh items and they do not buy frozen dinners. He reports \"I try to stay away from salty food. \" Patient denies using salt on any of his food and states he is using Mrs. Holland for seasoning. NN asked pt if they read labels on food items to keep under 2000 mg/day---pt states that they do. NN will provide [employer] low sodium pamphlets when pt comes to cards appt tomorrow and review with pt.---xena    06/05/19  Patient's wife states she has been trying to keep pt on low sodium and low carb diet but it is difficult to think of ideas. NN will mail information and recipes today. Harrison Ruelas will also have a dietician come to pt's home this Friday to prepare them for dialysis. NN discussed with wife the importance of keeping pt on low sodium diet at this time due to increased risk of fluid overload and how this relates to his reanl failure and breathing. Wife verbalizes understanding of disease management with respects to diet. NN will follow up at the end of the week to see if patient has questions about mailing---mkrw            Pt has nurse navigator's contact information for any further questions, concerns, or needs.   Patients upcoming visits:    Future Appointments   Date Time Provider Muna Delgado   8/21/2019  4:20 PM Ervin Berg  [address]   8/27/2019 11:00 AM [email or web address], [address]   8/27/2019 11:20 AM Jeimy Sesay  [address]   11/22/2019 10:20 AM Ervin Berg  [address]

## 2019-08-20 NOTE — PROGRESS NOTES
CM faxed ([phone]) updated hospitalist note to Dayana Loza, called Amna Baltazar (representative) and informed about the fax. Will follow up.     9:41 AM: CM met pt at bedside to assist with setting up home health PT/OT. Pt called his wife and is awaiting to find out agency information that they have used in past. Will follow up and assist as needed. 10:22 AM: Pt chose [facility] [phone], referral sent via [employer], CM will receive a call back from [facility] with confirmation. 10:35 AM: Oxygen has been delivered to pt at bedside by [facility]. 11:00 am: Pt has been accepted by [facility]. Pt all cleared for discharge.      Roberto Carlos Morrow     [phone]

## 2019-08-21 ENCOUNTER — PATIENT OUTREACH (OUTPATIENT)
Dept: CARDIOLOGY CLINIC | Age: 84
End: 2019-08-21

## 2019-08-21 ENCOUNTER — OFFICE VISIT (OUTPATIENT)
Dept: CARDIOLOGY CLINIC | Age: 84
End: 2019-08-21

## 2019-08-21 VITALS
HEIGHT: 69 IN | OXYGEN SATURATION: 94 % | WEIGHT: 164 LBS | HEART RATE: 86 BPM | RESPIRATION RATE: 16 BRPM | BODY MASS INDEX: 24.29 KG/M2 | SYSTOLIC BLOOD PRESSURE: 110 MMHG | DIASTOLIC BLOOD PRESSURE: 60 MMHG

## 2019-08-21 DIAGNOSIS — I50.32 DIASTOLIC CHF, CHRONIC (HCC): ICD-10-CM

## 2019-08-21 DIAGNOSIS — I65.23 BILATERAL CAROTID ARTERY STENOSIS: ICD-10-CM

## 2019-08-21 DIAGNOSIS — N18.30 CKD (CHRONIC KIDNEY DISEASE) STAGE 3, GFR 30-59 ML/MIN (HCC): ICD-10-CM

## 2019-08-21 DIAGNOSIS — I73.9 PERIPHERAL VASCULAR DISEASE (HCC): ICD-10-CM

## 2019-08-21 DIAGNOSIS — I10 HYPERTENSION, ESSENTIAL, BENIGN: ICD-10-CM

## 2019-08-21 DIAGNOSIS — I25.10 CORONARY ARTERY DISEASE INVOLVING NATIVE CORONARY ARTERY OF NATIVE HEART WITHOUT ANGINA PECTORIS: Primary | ICD-10-CM

## 2019-08-21 DIAGNOSIS — I48.20 ATRIAL FIBRILLATION, CHRONIC (HCC): ICD-10-CM

## 2019-08-21 DIAGNOSIS — E78.00 HYPERCHOLESTEREMIA: ICD-10-CM

## 2019-08-21 DIAGNOSIS — Z95.1 HX OF CABG: ICD-10-CM

## 2019-08-21 RX ORDER — BUMETANIDE 1 MG/1
1 TABLET ORAL
Qty: 90 TAB | Refills: 1
Start: 2019-08-21 | End: 2019-11-21

## 2019-08-21 NOTE — PROGRESS NOTES
[address] [place] [address]     1935       David Lincoln MD, [facility]  Date of Visit-8/21/2019   PCP is Austin Gonzales MD   [facility] [place]  [facility]  HPI:  [address] [place] [address]. is a 81 y.o. male   Pt with CHF, CAD, CKD, see previous note. Hospitalized 8/5/19. We did not feel it was CHF, more likely to be pneumonia. Was discharged yesterday after improving respiratory failure and was on Levaquin. He had some dizziness and orthostasis and was on florinef. He has CKD, creatinine was 3.8. There was a suspicion of legionella pneumonia. Pt had slight elevation of troponin. EF was normal.   08/05/19   ECHO ADULT COMPLETE 08/06/2019 8/6/2019    Narrative · Left Ventricle: Normal cavity size, systolic function (ejection fraction   normal) and diastolic function. Mild concentric hypertrophy. Estimated   left ventricular ejection fraction is 66 - 70%. Visually measured ejection   fraction. No regional wall motion abnormality noted. Normal left   ventricular strain. · Left Atrium: Moderately dilated left atrium. · Right Atrium: Mildly dilated right atrium. · Interatrial Septum: Color flow Doppler was used. · Aortic Valve: Mild aortic valve sclerosis with no significant stenosis. · Mitral Valve: Mitral valve thickening. Mild mitral valve regurgitation. · Tricuspid Valve: Mild tricuspid valve regurgitation is present. · Pulmonary Artery: Mild to moderate pulmonary hypertension. Signed by: Westley Lorenzo MD      Pt is present with his wife. Pt uses a walker. Pt is on oxygen. Pt states that he did not feel the low BP and did not get dizzy. Pt states that he feels stronger today. Pt went to his PCP who said to continue with the medication the hospital told him to take. Pt started the florinef today. Pt denies any chest pain. Pt states that the SOB is better, but he still feels SOB if he moves around.  Before going to the hospital he felt SOB while at rest. Pt has another appointment with his PCP on the 11th of September. Pt has not been taking Bumex. Denies chest pain, edema, syncope or shortness of breath at rest, has no tachycardia, palpitations or sense of arrhythmia. Assessment/Plan:     1. Recent pneumonia with hypotension. Good LV function and stable coronary disease. Current meds are fine. I will see him back in 2 weeks. I want him to use Bumex only PRN if he gains 3 lbs. 2. CAD native vessel with no angina      JOCELYN 8-21-17 Normal perfusion  3. CKD--likely headed to HD at some point, wont be able to control volume  4.diastolic dysfunction CHF  ---[facility]HC May 2, 2019 =PA 66/18 W 20 PA sat 55% CO 4.1 CI 2.12  Echo May 1 ,2019 EF 55-60% mod LAE, Mild CASSIE, Mild AS BELLE 1.6 cm2 mild MR & TR, RV fx mildly reduced  5. Chronic afib with pacer-rate control with anticoag only if no bleeding  6. PVD recurrent  PVD-9/27/18- RLE- mid CRA 70, Pop 99-PTA on right pop,  LLE LCFA 40%         - 1-19-17 PTA RCFA 90, JAS to [facility]         -12/22/16 PTA Left Pop, PTA  Left tib-per        --3-13-14 PTA Left op PTA RSFA       ---1/25/11 stent RSFA   7. HTN-eelvated with CKD  F/u in 2 weeks     Impression:   1. Coronary artery disease involving native coronary artery of native heart without angina pectoris    2. CKD (chronic kidney disease) stage 3, GFR 30-59 ml/min ([facility])    3. Atrial fibrillation, chronic ([facility]    4. Peripheral vascular disease ([facility]    5. Diastolic CHF, chronic ([facility])    6. Hypertension, essential, benign    7. Hypercholesteremia    8. Bilateral carotid artery stenosis    9. Hx of CABG       Cardiac History:   CAD/CABG   off pump x3 3/2008 . =post op anemia and renal failure  CATH March 7, 2008=   LM -bifurcation dz 40% ;LAD 85% ; Circ ost 70% mid 70%   RCA proximal 60% tapering, EF 60%-70%, bilaterally patent renal arteries, mild atherosclerosis of the distal abdominal aorta.     [address] May 2, 2019 =PA 66/18 W 20 PA sat 55% CO 4.1 CI 2.12  Echo May 1 ,2019 EF 55-60% mod LAE, Mild CASSIE, Mild AS BELLE 1.6 cm2 mild MR & TR, RV fx mildly reduced  PVD-18- RLE- mid CRA 70, Pop 99-PTA on right pop, LLE LCFA 40%  - 17 PTA RCFA 90, JAS to RSFA   -16 PTA Left Pop, PTA Left tib-per  --3-13-14 PTA Left op PTA RSFA  ---11 stent RSFA   JOCELYN 17 Normal perfusion    Mild carotid artery disease 3-7-08 then 12 with 10-49% left, less than 10% on right    SOCIAL: Drinks no alcohol, quit smoking several years ago, worked as an . Lives with his wife and has four children. Enjoys gardening, fishing and music. FAMILY HISTORY: Mother  of cancer at 76, father  of Parkinson's at 70, one brother  of cancer of the liver at 76 and one  of an infection at 64. ROS-except as noted above. . A complete cardiac and respiratory are reviewed and negative except as above ; Resp-denies wheezing  or productive cough,. Const- No unusual weight loss or fever; Neuro-no recent seizure or CVA ; GI- No BRBPR, abdom pain, bloating ; - no  hematuria   see supplement sheet, initialed and to be scanned by staff  Past Medical History:   Diagnosis Date    CAD (coronary artery disease)     s/p CABG     Carotid arterial disease ([facility]     CKD (chronic kidney disease) stage 3, GFR 30-59 ml/min ([facility] [facility] 10/21/2017    hypertension and DM nephrosclerosis    Diabetes mellitus, type 2 ([address]     Hypercholesteremia     Hypertension     Paroxysmal atrial fibrillation ([address] 10/21/2017    new onset afib with BRPR 10-19-17 admit    PVC's (premature ventricular contractions) 2014    PVD (peripheral vascular disease) ([facility]       Social Hx= reports that he quit smoking about 34 years ago. His smoking use included cigarettes. He has a 60.00 pack-year smoking history. He has never used smokeless tobacco. He reports that he does not drink alcohol or use drugs.      Exam and Labs:  /60 (BP 1 Location: Left arm, BP Patient Position: Sitting)   Pulse 86 Resp 16   Ht 5' 9\" (1.753 m)   Wt 164 lb (74.4 kg)   SpO2 94%   BMI 24.22 kg/m² Constitutional:  NAD, comfortable  Head: NC,AT. Eyes: No scleral icterus. Neck:  Neck supple. No JVD present. Throat: moist mucous membranes. Chest: Effort normal & normal respiratory excursion . Neurological: alert, conversant and oriented . Skin: Skin is not cold. No obvious systemic rash noted. Not diaphoretic. No erythema. Psychiatric:  Grossly normal mood and affect. Behavior appears normal. Extremities:  no clubbing or cyanosis. Abdomen: non distended    Lungs:breath sounds normal. No stridor. distress, wheezes or  Rales. Heart: normal rate, regular rhythm, normal S1, S2, no murmurs, rubs, clicks or gallops , PMI non displaced. Edema: Edema is trace edema. No results found for: CHOL, CHOLX, CHLST, CHOLV, HDL, LDL, LDLC, DLDLP, TGLX, TRIGL, TRIGP, CHHD, CHHDX  Lab Results   Component Value Date/Time    Sodium 138 08/20/2019 05:58 AM    Potassium 3.1 (L) 08/20/2019 05:58 AM    Chloride 101 08/20/2019 05:58 AM    CO2 32 08/20/2019 05:58 AM    Anion gap 5 08/20/2019 05:58 AM    Glucose 94 08/20/2019 05:58 AM    BUN 45 (H) 08/20/2019 05:58 AM    Creatinine 1.93 (H) 08/20/2019 05:58 AM    BUN/Creatinine ratio 23 (H) 08/20/2019 05:58 AM    GFR est AA 41 (L) 08/20/2019 05:58 AM    GFR est non-AA 33 (L) 08/20/2019 05:58 AM    Calcium 7.8 (L) 08/20/2019 05:58 AM      Wt Readings from Last 3 Encounters:   08/21/19 164 lb (74.4 kg)   08/20/19 159 lb (72.1 kg)   07/24/19 170 lb (77.1 kg)      BP Readings from Last 3 Encounters:   08/21/19 110/60   08/20/19 165/85   07/24/19 108/60      Current Outpatient Medications   Medication Sig    carvedilol (COREG) 3.125 mg tablet Take 1 Tab by mouth two (2) times daily (with meals) for 30 days.  fludrocortisone (FLORINEF) 0.1 mg tablet Take 1 Tab by mouth daily for 30 days.  predniSONE (DELTASONE) 10 mg tablet Take 30 mg by mouth daily (with breakfast) for 5 days.     albuterol (PROVENTIL VENTOLIN) 2.5 mg /3 mL (0.083 %) nebulizer solution 3 mL by Nebulization route every four (4) hours as needed for Wheezing.  sodium bicarbonate 650 mg tablet Take 2 Tabs by mouth three (3) times daily.  tamsulosin (FLOMAX) 0.4 mg capsule Take 1 Cap by mouth daily.  insulin glargine U-300 conc 300 unit/mL (1.5 mL) inpn 18 Units by SubCUTAneous route every morning.  aspirin 81 mg chewable tablet Take 1 Tab by mouth daily.  simvastatin (ZOCOR) 20 mg tablet Take 20 mg by mouth nightly.  omega 3-dha-epa-fish oil (FISH OIL) 100-160-1,000 mg cap Take 1 Cap by mouth two (2) times a day.  cinnamon bark (CINNAMON) 500 mg cap Take 1,000 mg by mouth two (2) times a day. No current facility-administered medications for this visit. Impression see above.       Written by Christine Higgins, as dictated by Haven Kam MD.

## 2019-08-21 NOTE — PROGRESS NOTES
Visit Vitals  /60 (BP 1 Location: Left arm, BP Patient Position: Sitting)   Pulse 86   Resp 16   Ht 5' 9\" (1.753 m)   Wt 164 lb (74.4 kg)   SpO2 94%   BMI 24.22 kg/m²

## 2019-08-21 NOTE — PATIENT INSTRUCTIONS
Please take your Bumex daily as needed for a weight gain of more than 3 pounds and/or increase swelling    You will need to follow up in clinic with Dr. Raoul Rene in 2 weeks.

## 2019-08-21 NOTE — PROGRESS NOTES
Hospital Discharge Follow-Up      Date/Time:  2019 10:38 AM    Patient was admitted to [facility] on 19 and discharged on 19 for SOB. The physician discharge summary was not available at the time of outreach. Patient was contacted within 1 business days of discharge. Top Challenges reviewed with the provider   Per IP pulmonary notes, patient needs repeat CT of chest in 4-6 weeks. On , K 3.1, Cr/BUN 1.93/45, calcium 7.8, alb. 2.2---consider repeat labs. CTN will ask pt/wife to see renal dietician OP for alb. 2.2    Pt on steroids, BS elevated while IP--will follow BS and ask PCP for insulin adjustment    Pt on 1500ml/day fluid restriction     Method of communication with provider :chart routing, staff message, phone    Inpatient RRAT score: 48  Was this a readmission? yes   Patient stated reason for the readmission: SOB    Nurse Navigator (NN) contacted the patient by telephone to perform post hospital discharge assessment. Verified name and  with patient as identifiers. Provided introduction to self, and explanation of the Nurse Navigator role. Reviewed discharge instructions and red flags with patient who verbalized understanding. Patient given an opportunity to ask questions and does not have any further questions or concerns at this time. The patient agrees to contact the PCP office for questions related to their healthcare. NN provided contact information for future reference. Disease Specific:   CHF    Heart Failure Note    Do you have a Scale:    yes   How often do you weigh:  daily    Daily Weight (document daily weights in flowsheets):   patient weight 159 lbs in hospital  Amount: Will obtain weight when pt sees Dr Marquita Mosley today.        Provider Notified:   No     Zone:(Pt Reported)  green     EF: 66-70%(result) on 19 (date)   Type of HF:   HFpEF     Cardiac Device present: pacemaker      Heart Failure Medications: Betablocker     Summary of patient's top problems:  1. CHF---pt readmitted for increase SOB. History includes CAD, s/p CABG, HTN, DMT2, PVD, PAF, CKD, and COPD. Patient reports starting yesterday around noon with SOB. He states his SOB is provoked by exertion and is somewhat relieved at rest. He reports 3 recent med changes and low BP for 1 week- his wife takes his BP every morning and it has been in the 70/45 range- his PCP recently took him off amlodipine which did not help the low BP, He apparently has been taking coreg only once daily, and just finished 2 separate- courses of steroid tapers- the last of which he finished about 4 days ago. He denies fevers or chills, productive sputum which is not really new. Laboratory Results:         Recent Labs     08/05/19  0841   *   K 3.2*   CL 90*   CO2 30   *   BUN 65*   CREA 3.85*   CA 9.2   ALB 3.2*   SGOT 76*   ALT 54          Recent Labs     08/05/19  0841   WBC 10.9   HGB 9.2*   HCT 28.1*      CXR  on admit     IMPRESSION:  1. Mild pulmonary vascular congestion and mild pulmonary edema is again noted. 2. New mild focal opacities in the right mid zone may reflect developing  pneumonia. Follow-up to resolution is suggested     EF 66-70% on 8/6. Pt was diuesed, placed on ABX and steroids. Started on home oxygen. Discharged to home with [place].       Home Health orders at discharge: PT, OT    CTN asking for SN since pt on CHF [address]: [facility]  Date of initial visit: 8/22/19    Durable Medical Equipment ordered/company: [id number] received: Home 02    Barriers to care? none at this time    Advance Care Planning:   Does patient have an Advance Directive:  reviewed and current     Medication(s):   New Medications at Discharge: Florinef 0.1 mg daily, prednisone 30 mg daily for 5 days,  Changed Medications at Discharge: carvedilol 3.125 mg BID  Discontinued Medications at Discharge: Bumex, hydralazine    Medication reconciliation was performed with patient, who verbalizes understanding of administration of home medications. There were no barriers to obtaining medications identified at this time. Referral to Pharm D needed: no     Current Outpatient Medications   Medication Sig    carvedilol (COREG) 3.125 mg tablet Take 1 Tab by mouth two (2) times daily (with meals) for 30 days.  fludrocortisone (FLORINEF) 0.1 mg tablet Take 1 Tab by mouth daily for 30 days.  predniSONE (DELTASONE) 10 mg tablet Take 30 mg by mouth daily (with breakfast) for 5 days.  albuterol (PROVENTIL VENTOLIN) 2.5 mg /3 mL (0.083 %) nebulizer solution 3 mL by Nebulization route every four (4) hours as needed for Wheezing.  sodium bicarbonate 650 mg tablet Take 2 Tabs by mouth three (3) times daily.  tamsulosin (FLOMAX) 0.4 mg capsule Take 1 Cap by mouth daily.  insulin glargine U-300 conc 300 unit/mL (1.5 mL) inpn 18 Units by SubCUTAneous route every morning.  aspirin 81 mg chewable tablet Take 1 Tab by mouth daily.  simvastatin (ZOCOR) 20 mg tablet Take 20 mg by mouth nightly.  omega 3-dha-epa-fish oil (FISH OIL) 100-160-1,000 mg cap Take 1 Cap by mouth two (2) times a day.  cinnamon bark (CINNAMON) 500 mg cap Take 1,000 mg by mouth two (2) times a day. No current facility-administered medications for this visit. There are no discontinued medications. [facility] follow up appointment(s):   Future Appointments   Date Time Provider Muna Delgado   8/21/2019  4:20 PM Humble Gomez  [address]   8/27/2019 11:00 AM PACEMAKER3, [address]   8/27/2019 11:20 AM Pradeep Foley  [address]   11/22/2019 10:20 AM David Robertson  [address]      Non-[facility] follow up appointment(s): Will follow up with pt for pulmonary and renal appts. [facility]:  out of service area       Goals      Attends follow-up appointments as directed.       08/21/19  CTN unable to reach pt this morning-- requesting return call . Patient discharged with following appts:    Dr Vanessa Hutchins      8/21/19 at 4:20pm    Dr Isidra Tuckre   Per chart notes IP team was informed by PCP office that pt would have to make follow up appt---will confirm with pt/wife this is done. Pulmonary---pt did not have pulmonary appt on AVS, will recommend pt see pulmonary ASAP and ask for repeat CT in 4-6 weeks    Nephrology---no appt on AVS, will confirm that pt has a follow up appt and to see dietician regarding low albumin    Per AVS pt to receive [facility] for PT/OT. ---mkrw      UPDATE:  CTN was able to reach patient on cell phone. He reports he just left his PCP appt and is aware that he sees Dr Vanessa Hutchins today. Patient was in a parking lot when he answered call, CTN will call pt tomorrow when pt is home. ---Camila Greer Supportive resources in place to maintain patient in the community (ie. Home Health, DME equipment, refer to, medication assistant plan, etc.)      08/21/19  CT[facility]---spoke to [place]. She reports that pt only has PT/OT ordered and that [facility] is 8/22/19. CTN asked about SN--she states that they were not notified pt was on CHF Bundle. CTN sent message to [address] [place] [address] Street manager at [facility] to ask about including SN to pt's orders. Will follow up later today---mkrw    Update: Received message from [facility] that she adjusted order to include SN. CTN called Samy Garcia , spoke to Crystal Spring to let her know. She states she has added SN to pt's services. ---mkrw

## 2019-08-21 NOTE — Clinical Note
8/21/19 Patient: Mila Mcleod. YOB: 1935 Date of Visit: 8/21/2019 Jamir Anthony MD 
[address] [address] VIA Facsimile: [phone] Dear Jamir Anthony MD, Thank you for referring Mr. Lizet Blanco to [facility] for evaluation. My notes for this consultation are attached. If you have questions, please do not hesitate to call me. I look forward to following your patient along with you.  
 
 
Sincerely, 
 
Tamiko Rodriguez MD

## 2019-08-22 ENCOUNTER — PATIENT OUTREACH (OUTPATIENT)
Dept: CARDIOLOGY CLINIC | Age: 84
End: 2019-08-22

## 2019-08-24 NOTE — DISCHARGE SUMMARY
Discharge Summary       PATIENT ID: Boyd Rankin MRN: [id number]   YOB: 1935    DATE OF ADMISSION: 8/5/2019  8:26 AM    DATE OF DISCHARGE: 8/20/19   PRIMARY CARE PROVIDER: Kari Ahumada MD     ATTENDING PHYSICIAN: Pina Paul  DISCHARGING PROVIDER: Nasreen Huff MD    To contact this individual call [phone] and ask the  to page. If unavailable ask to be transferred the Adult Hospitalist Department.     CONSULTATIONS: IP CONSULT TO CARDIOLOGY  IP CONSULT TO CARDIOLOGY  IP CONSULT TO ADVANCED HEART FAILURE  IP CONSULT TO NEPHROLOGY  IP CONSULT TO PULMONOLOGY    PROCEDURES/SURGERIES: * No surgery found *    ADMITTING [address] [place] [address] COURSE:   The patient is an 61-year-old male with past medical history of diabetes, hypertension, coronary artery disease, hypercholesterolemia, peripheral vascular disease, AFib, and CKD, who presented to the emergency room via private vehicle with chief complaint of shortness of breath.  His shortness of breath has been persistent for more than one week and subsequently has worsened since yesterday. Tina Dine is associated bilateral leg swelling since yesterday.  The patient has been taking four albuterol nebulizer treatments with no relief.  The patient also reports cough with occasional greenish sputum.  He recently had pacemaker placed by Dr. Brandie Bellamy and had followed up with Cardiology yesterday who increased his diuretic dose.  The patient has been seen in the emergency room for shortness of breath three times this week.          Assessment & Plan:      Acute on chronic  hypoxemic resp failure-  POA - multifactorial- due to resp infection and edema from pulmonary hypertension- still present but improving day by day   CT chest  8/8 showed diffuse alveolar pattern suggestive of pulm edema    resp panel pcr is neg and legionella neg   PO prednisone,  Nebs, antibiotics- levaquin for 14 days completed     Presyncope and orthostasis and hypotension on 8/12   Hold all bumex and BP meds  But is high this morning  Cont current florinef dose recently increased but can cut down its too high this morning     Chronic severe pulm HTN- causing chronic hypoxemia that worsens with exertion  RHC w/ PCW 28  ECHO 50/60% with PAP 50  He still requires oxygen- oxygen testing 8/19/19 showing the following:  Pulse oximetry assessment   80 % while ambulating on room air  92 % at rest on 2 LPM  90 % while ambulating on 2 LPM    Breathing improved with bumex- currently on hold  pulm consulted and has now signed off  He will need repeat CT scan in 4-6 wks on outpatient     KATALINA on CKD stage 4-   baseline creatinine 3.1-3.3; was 3.8 on admission-  Stable at baseline     Metabolic acidosis   Stable on po bicarb      PVD-   s/p stented in both LE, cont aspirin     DM type 2- Uncontrolled due to noncompliance with diet  Cont accuchecks and ss insulin  Changed lantus back to AM dose due to am hypoglycemia  Increased lantus dose for hyperglycemia     HLD-   cont statin meds     BPH  Stable on flomax     Anemia of CKD- Hb low stable      CAD s/p CABG- asa      Bradycardia with afib w pacemaker,  supine hypertension with severe orthostatic hypotension -   Restarted coreg at lower dose due to tachycardia- tachycardia resolved  Hypokalemia- repleted     Code status: Full              DISCHARGE DIAGNOSES / PLAN:      1. D/c home with oxygen     ADDITIONAL CARE RECOMMENDATIONS:     PENDING TEST RESULTS:   At the time of discharge the following test results are still pending:     FOLLOW UP APPOINTMENTS:    Follow-up Information     Follow up With Specialties Details Why Contact Info    Lorene Santos MD Cardiology On 8/21/2019 at 4:20 pm for hospital follow  up [address]      Tran Kelly MD Family Practice In 1 week pl readjust BP meds  [facility]  [address]  [facility]  [phone]               DIET: Cardiac Diet    ACTIVITY: Activity as tolerated    WOUND CARE:     EQUIPMENT needed:       DISCHARGE MEDICATIONS:  Discharge Medication List as of 8/20/2019 11:05 AM      START taking these medications    Details   fludrocortisone (FLORINEF) 0.1 mg tablet Take 1 Tab by mouth daily for 30 days. , Print, Disp-30 Tab, R-0      predniSONE (DELTASONE) 10 mg tablet Take 30 mg by mouth daily (with breakfast) for 5 days. , Print, Disp-15 Tab, R-0         CONTINUE these medications which have CHANGED    Details   carvedilol (COREG) 3.125 mg tablet Take 1 Tab by mouth two (2) times daily (with meals) for 30 days. , Print, Disp-60 Tab, R-0         CONTINUE these medications which have NOT CHANGED    Details   albuterol (PROVENTIL VENTOLIN) 2.5 mg /3 mL (0.083 %) nebulizer solution 3 mL by Nebulization route every four (4) hours as needed for Wheezing., Normal, Disp-1 Package, R-0      sodium bicarbonate 650 mg tablet Take 2 Tabs by mouth three (3) times daily. , Print, Disp-90 Tab, R-0      tamsulosin (FLOMAX) 0.4 mg capsule Take 1 Cap by mouth daily. , Print, Disp-30 Cap, R-0      insulin glargine U-300 conc 300 unit/mL (1.5 mL) inpn 18 Units by SubCUTAneous route every morning., Historical Med      aspirin 81 mg chewable tablet Take 1 Tab by mouth daily. , Normal, Disp-33 Tab, R-11      simvastatin (ZOCOR) 20 mg tablet Take 20 mg by mouth nightly., Historical Med      omega 3-dha-epa-fish oil (FISH OIL) 100-160-1,000 mg cap Take 1 Cap by mouth two (2) times a day., Historical Med      cinnamon bark (CINNAMON) 500 mg cap Take 1,000 mg by mouth two (2) times a day., Historical Med         STOP taking these medications       bumetanide (BUMEX) 1 mg tablet Comments:   Reason for Stopping:         hydrALAZINE (APRESOLINE) 50 mg tablet Comments:   Reason for Stopping:                 NOTIFY YOUR PHYSICIAN FOR ANY OF THE FOLLOWING:   Fever over 101 degrees for 24 hours.    Chest pain, shortness of breath, fever, chills, nausea, vomiting, diarrhea, change in mentation, falling, weakness, bleeding. Severe pain or pain not relieved by medications. Or, any other signs or symptoms that you may have questions about.     DISPOSITION:  x  Home With:   OT  PT  HH  RN       Long term SNF/Inpatient Rehab    Independent/assisted living    Hospice    Other:       PATIENT CONDITION AT DISCHARGE:     Functional status    Poor    x Deconditioned     Independent      Cognition   x  Lucid     Forgetful     Dementia      Catheters/lines (plus indication)    Myers     PICC     PEG    x None      Code status   x  Full code     DNR      PHYSICAL EXAMINATION AT DISCHARGE:   Refer to Progress Note      CHRONIC MEDICAL DIAGNOSES:  Problem List as of 8/20/2019 Date Reviewed: 8/20/2019          Codes Class Noted - Resolved    Pacemaker ICD-10-CM: Z95.0  ICD-9-CM: V45.01  5/9/2019 - Present    Overview Signed 5/9/2019  6:22 PM by Teressa Pink MD     5/9/2019 leadless pacer implant             CHF (congestive heart failure) ([facility] ICD-10-CM: I50.9  ICD-9-CM: 428.0  5/1/2019 - Present        Atrial fibrillation with slow ventricular response ([facility] ICD-10-CM: I48.91  ICD-9-CM: 427.31  5/1/2019 - Present    Overview Signed 5/8/2019 11:24 PM by Teressa Pink MD     Added automatically from request for surgery [id number]             Type 2 diabetes with nephropathy ([facility] ICD-10-CM: E11.21  ICD-9-CM: 250.40, 583.81  7/26/2018 - Present        Atrial fibrillation, chronic ([facility] ICD-10-CM: I48.2  ICD-9-CM: 427.31  10/21/2017 - Present    Overview Signed 10/21/2017  1:45 PM by Mikayla Morales MD     new onset afib with BRPR 10-19-17 admit             CKD (chronic kidney disease) stage 3, GFR 30-59 ml/min ([facility] ICD-10-CM: N18.3  ICD-9-CM: 585.3  10/21/2017 - Present    Overview Signed 10/21/2017  1:47 PM by Mikayla Morales MD     hypertension and DM nephrosclerosis             GI bleed ICD-10-CM: K92.2  ICD-9-CM: 578.9  10/20/2017 - Present        Hyperglycemia due to type 2 diabetes mellitus ([facility] ICD-10-CM: E11.65  ICD-9-CM: 250.00  10/20/2017 - Present        Hypokalemia ICD-10-CM: E87.6  ICD-9-CM: 276.8  6/25/2017 - Present        Generalized weakness ICD-10-CM: R53.1  ICD-9-CM: 780.79  6/25/2017 - Present        Elevated troponin ICD-10-CM: R74.8  ICD-9-CM: 790.6  6/25/2017 - Present        KATALINA (acute kidney injury) ([facility] ICD-10-CM: N17.9  ICD-9-CM: 584.9  6/25/2017 - Present        Acute renal failure (ARF) ([facility]) ICD-10-CM: N17.9  ICD-9-CM: 584.9  3/16/2017 - Present        CKD (chronic kidney disease) ICD-10-CM: N18.9  ICD-9-CM: 585.9  1/20/2017 - Present        Type 2 diabetes mellitus with diabetic peripheral angiopathy without gangrene ([facility]) ICD-10-CM: E11.51  ICD-9-CM: 250.70, 443.81  9/27/2015 - Present        Dyspnea ICD-10-CM: R06.00  ICD-9-CM: 786.09  7/15/2014 - Present        PVC's (premature ventricular contractions) ICD-10-CM: I49.3  ICD-9-CM: 427.69  5/26/2014 - Present        Carotid arterial disease ([facility]) ICD-10-CM: I77.9  ICD-9-CM: 447.9  Unknown - Present        Hypercholesteremia ICD-10-CM: E78.00  ICD-9-CM: 272.0  Unknown - Present        PVD (peripheral vascular disease) ([facility]) ICD-10-CM: I73.9  ICD-9-CM: 443.9  Unknown - Present        Bradycardia ICD-10-CM: R00.1  ICD-9-CM: 427.89  Unknown - Present        CAD (coronary artery disease) ICD-10-CM: I25.10  ICD-9-CM: 414.00  Unknown - Present        Hypertension, essential, benign ICD-10-CM: I10  ICD-9-CM: 401.1  Unknown - Present        RESOLVED: Acute bronchospasm ICD-10-CM: J98.01  ICD-9-CM: 519.11  5/18/2019 - 5/19/2019        RESOLVED: CHF exacerbation ([facility] ICD-10-CM: I50.9  ICD-9-CM: 428.0  5/18/2019 - 5/19/2019        RESOLVED: New onset a-fib ([facility] ICD-10-CM: I48.91  ICD-9-CM: 427.31  10/20/2017 - 11/30/2017        RESOLVED: Fever ICD-10-CM: R50.9  ICD-9-CM: 780.60  3/16/2017 - 3/18/2017        RESOLVED: Hyponatremia ICD-10-CM: E87.1  ICD-9-CM: 276.1  3/16/2017 - 3/18/2017        RESOLVED: Urinary retention ICD-10-CM: R33.9  ICD-9-CM: 788.20  1/19/2017 - 1/20/2017        RESOLVED: Heart palpitations ICD-10-CM: R00.2  ICD-9-CM: 785.1  7/15/2014 - 9/20/2016        RESOLVED: Atherosclerosis of native artery of extremity with intermittent claudication ([facility] ICD-10-CM: I70.219  ICD-9-CM: 440.21  2/19/2014 - 9/20/2016        RESOLVED: Other dyspnea and respiratory abnormality ICD-10-CM: R06.09, R09.89  ICD-9-CM: 786.09  Unknown - 9/20/2016        RESOLVED: Diabetes mellitus, type 2 ([facility] ICD-10-CM: E11.9  ICD-9-CM: 250.00  Unknown - 9/20/2016        RESOLVED: CHF exacerbation ([facility] ICD-10-CM: I50.9  ICD-9-CM: 428.0  8/5/2019 - 8/20/2019        RESOLVED: Acute hypoxemic respiratory failure ([facility] ICD-10-CM: J96.01  ICD-9-CM: 518.81  8/5/2019 - 8/20/2019              Greater than 30  minutes were spent with the patient on counseling and coordination of care    Signed:   Shelby Kitchen MD  8/24/2019  2:03 PM

## 2019-08-27 ENCOUNTER — APPOINTMENT (OUTPATIENT)
Dept: CT IMAGING | Age: 84
End: 2019-08-27
Attending: FAMILY MEDICINE
Payer: MEDICARE

## 2019-08-27 ENCOUNTER — APPOINTMENT (OUTPATIENT)
Dept: NUCLEAR MEDICINE | Age: 84
End: 2019-08-27
Attending: FAMILY MEDICINE
Payer: MEDICARE

## 2019-08-27 ENCOUNTER — APPOINTMENT (OUTPATIENT)
Dept: GENERAL RADIOLOGY | Age: 84
End: 2019-08-27
Attending: EMERGENCY MEDICINE
Payer: MEDICARE

## 2019-08-27 ENCOUNTER — APPOINTMENT (OUTPATIENT)
Dept: VASCULAR SURGERY | Age: 84
End: 2019-08-27
Attending: FAMILY MEDICINE
Payer: MEDICARE

## 2019-08-27 ENCOUNTER — HOSPITAL ENCOUNTER (OUTPATIENT)
Age: 84
Setting detail: OBSERVATION
Discharge: HOME | End: 2019-08-28
Attending: STUDENT IN AN ORGANIZED HEALTH CARE EDUCATION/TRAINING PROGRAM | Admitting: FAMILY MEDICINE
Payer: MEDICARE

## 2019-08-27 DIAGNOSIS — N18.9 CHRONIC KIDNEY DISEASE, UNSPECIFIED CKD STAGE: ICD-10-CM

## 2019-08-27 DIAGNOSIS — E16.2 HYPOGLYCEMIA: Primary | ICD-10-CM

## 2019-08-27 LAB
ALBUMIN SERPL-MCNC: 2.6 G/DL (ref 3.5–5)
ALBUMIN/GLOB SERPL: 0.7 {RATIO} (ref 1.1–2.2)
ALP SERPL-CCNC: 74 U/L (ref 45–117)
ALT SERPL-CCNC: 24 U/L (ref 12–78)
ANION GAP SERPL CALC-SCNC: 5 MMOL/L (ref 5–15)
APPEARANCE UR: CLEAR
AST SERPL-CCNC: 46 U/L (ref 15–37)
BACTERIA URNS QL MICRO: NEGATIVE /HPF
BASOPHILS # BLD: 0 K/UL (ref 0–0.1)
BASOPHILS NFR BLD: 0 % (ref 0–1)
BILIRUB SERPL-MCNC: 0.7 MG/DL (ref 0.2–1)
BILIRUB UR QL: NEGATIVE
BUN SERPL-MCNC: 40 MG/DL (ref 6–20)
BUN/CREAT SERPL: 20 (ref 12–20)
CALCIUM SERPL-MCNC: 8 MG/DL (ref 8.5–10.1)
CHLORIDE SERPL-SCNC: 105 MMOL/L (ref 97–108)
CO2 SERPL-SCNC: 28 MMOL/L (ref 21–32)
COLOR UR: ABNORMAL
COMMENT, HOLDF: NORMAL
COMMENT, HOLDF: NORMAL
CREAT SERPL-MCNC: 1.98 MG/DL (ref 0.7–1.3)
DIFFERENTIAL METHOD BLD: ABNORMAL
EOSINOPHIL # BLD: 0.3 K/UL (ref 0–0.4)
EOSINOPHIL NFR BLD: 3 % (ref 0–7)
EPITH CASTS URNS QL MICRO: ABNORMAL /LPF
ERYTHROCYTE [DISTWIDTH] IN BLOOD BY AUTOMATED COUNT: 16.7 % (ref 11.5–14.5)
EST. AVERAGE GLUCOSE BLD GHB EST-MCNC: 194 MG/DL
GLOBULIN SER CALC-MCNC: 3.9 G/DL (ref 2–4)
GLUCOSE BLD STRIP.AUTO-MCNC: 120 MG/DL (ref 65–100)
GLUCOSE BLD STRIP.AUTO-MCNC: 120 MG/DL (ref 65–100)
GLUCOSE BLD STRIP.AUTO-MCNC: 136 MG/DL (ref 65–100)
GLUCOSE BLD STRIP.AUTO-MCNC: 163 MG/DL (ref 65–100)
GLUCOSE BLD STRIP.AUTO-MCNC: 89 MG/DL (ref 65–100)
GLUCOSE SERPL-MCNC: 133 MG/DL (ref 65–100)
GLUCOSE UR STRIP.AUTO-MCNC: 500 MG/DL
HBA1C MFR BLD: 8.4 % (ref 4.2–6.3)
HCT VFR BLD AUTO: 33.3 % (ref 36.6–50.3)
HGB BLD-MCNC: 10.2 G/DL (ref 12.1–17)
HGB UR QL STRIP: ABNORMAL
HYALINE CASTS URNS QL MICRO: ABNORMAL /LPF (ref 0–5)
IMM GRANULOCYTES # BLD AUTO: 0 K/UL
IMM GRANULOCYTES NFR BLD AUTO: 0 %
KETONES UR QL STRIP.AUTO: NEGATIVE MG/DL
LACTATE SERPL-SCNC: 2.2 MMOL/L (ref 0.4–2)
LEUKOCYTE ESTERASE UR QL STRIP.AUTO: NEGATIVE
LYMPHOCYTES # BLD: 0.4 K/UL (ref 0.8–3.5)
LYMPHOCYTES NFR BLD: 5 % (ref 12–49)
MCH RBC QN AUTO: 28.2 PG (ref 26–34)
MCHC RBC AUTO-ENTMCNC: 30.6 G/DL (ref 30–36.5)
MCV RBC AUTO: 92 FL (ref 80–99)
MONOCYTES # BLD: 0.3 K/UL (ref 0–1)
MONOCYTES NFR BLD: 4 % (ref 5–13)
MYELOCYTES NFR BLD MANUAL: 1 %
NEUTS BAND NFR BLD MANUAL: 1 % (ref 0–6)
NEUTS SEG # BLD: 7.6 K/UL (ref 1.8–8)
NEUTS SEG NFR BLD: 86 % (ref 32–75)
NITRITE UR QL STRIP.AUTO: NEGATIVE
NRBC # BLD: 0 K/UL (ref 0–0.01)
NRBC BLD-RTO: 0 PER 100 WBC
PH UR STRIP: 6 [PH] (ref 5–8)
PLATELET # BLD AUTO: 196 K/UL (ref 150–400)
PLATELET COMMENTS,PCOM: ABNORMAL
PMV BLD AUTO: 11 FL (ref 8.9–12.9)
POTASSIUM SERPL-SCNC: 4.3 MMOL/L (ref 3.5–5.1)
PROT SERPL-MCNC: 6.5 G/DL (ref 6.4–8.2)
PROT UR STRIP-MCNC: >300 MG/DL
RBC # BLD AUTO: 3.62 M/UL (ref 4.1–5.7)
RBC #/AREA URNS HPF: ABNORMAL /HPF (ref 0–5)
RBC MORPH BLD: ABNORMAL
RBC MORPH BLD: ABNORMAL
SAMPLES BEING HELD,HOLD: NORMAL
SAMPLES BEING HELD,HOLD: NORMAL
SERVICE CMNT-IMP: ABNORMAL
SERVICE CMNT-IMP: NORMAL
SODIUM SERPL-SCNC: 138 MMOL/L (ref 136–145)
SP GR UR REFRACTOMETRY: 1.02 (ref 1–1.03)
TROPONIN I SERPL-MCNC: 0.08 NG/ML
UR CULT HOLD, URHOLD: NORMAL
UROBILINOGEN UR QL STRIP.AUTO: 0.2 EU/DL (ref 0.2–1)
WBC # BLD AUTO: 8.7 K/UL (ref 4.1–11.1)
WBC URNS QL MICRO: ABNORMAL /HPF (ref 0–4)

## 2019-08-27 PROCEDURE — 81001 URINALYSIS AUTO W/SCOPE: CPT

## 2019-08-27 PROCEDURE — 74011250637 HC RX REV CODE- 250/637: Performed by: FAMILY MEDICINE

## 2019-08-27 PROCEDURE — A9540 TC99M MAA: HCPCS

## 2019-08-27 PROCEDURE — 70450 CT HEAD/BRAIN W/O DYE: CPT

## 2019-08-27 PROCEDURE — 93971 EXTREMITY STUDY: CPT

## 2019-08-27 PROCEDURE — 71045 X-RAY EXAM CHEST 1 VIEW: CPT

## 2019-08-27 PROCEDURE — 82962 GLUCOSE BLOOD TEST: CPT

## 2019-08-27 PROCEDURE — 74011000258 HC RX REV CODE- 258: Performed by: FAMILY MEDICINE

## 2019-08-27 PROCEDURE — 96372 THER/PROPH/DIAG INJ SC/IM: CPT

## 2019-08-27 PROCEDURE — 83036 HEMOGLOBIN GLYCOSYLATED A1C: CPT

## 2019-08-27 PROCEDURE — 36415 COLL VENOUS BLD VENIPUNCTURE: CPT

## 2019-08-27 PROCEDURE — 85025 COMPLETE CBC W/AUTO DIFF WBC: CPT

## 2019-08-27 PROCEDURE — 87040 BLOOD CULTURE FOR BACTERIA: CPT

## 2019-08-27 PROCEDURE — 74011250636 HC RX REV CODE- 250/636: Performed by: FAMILY MEDICINE

## 2019-08-27 PROCEDURE — 84484 ASSAY OF TROPONIN QUANT: CPT

## 2019-08-27 PROCEDURE — 99218 HC RM OBSERVATION: CPT

## 2019-08-27 PROCEDURE — 80053 COMPREHEN METABOLIC PANEL: CPT

## 2019-08-27 PROCEDURE — 83605 ASSAY OF LACTIC ACID: CPT

## 2019-08-27 PROCEDURE — 96365 THER/PROPH/DIAG IV INF INIT: CPT

## 2019-08-27 PROCEDURE — 94762 N-INVAS EAR/PLS OXIMTRY CONT: CPT

## 2019-08-27 PROCEDURE — 96367 TX/PROPH/DG ADDL SEQ IV INF: CPT

## 2019-08-27 PROCEDURE — 74011250636 HC RX REV CODE- 250/636: Performed by: NURSE PRACTITIONER

## 2019-08-27 PROCEDURE — 99285 EMERGENCY DEPT VISIT HI MDM: CPT

## 2019-08-27 RX ORDER — INSULIN GLARGINE 100 [IU]/ML
10 INJECTION, SOLUTION SUBCUTANEOUS
Status: ON HOLD | COMMUNITY
End: 2019-08-28 | Stop reason: SDUPTHER

## 2019-08-27 RX ORDER — GUAIFENESIN 100 MG/5ML
81 LIQUID (ML) ORAL DAILY
Status: DISCONTINUED | OUTPATIENT
Start: 2019-08-28 | End: 2019-08-28 | Stop reason: HOSPADM

## 2019-08-27 RX ORDER — INSULIN GLARGINE 100 [IU]/ML
INJECTION, SOLUTION SUBCUTANEOUS
COMMUNITY
End: 2019-08-28

## 2019-08-27 RX ORDER — HEPARIN SODIUM 5000 [USP'U]/ML
5000 INJECTION, SOLUTION INTRAVENOUS; SUBCUTANEOUS EVERY 8 HOURS
Status: DISCONTINUED | OUTPATIENT
Start: 2019-08-27 | End: 2019-08-28 | Stop reason: HOSPADM

## 2019-08-27 RX ORDER — DEXTROSE MONOHYDRATE 100 MG/ML
125-250 INJECTION, SOLUTION INTRAVENOUS AS NEEDED
Status: DISCONTINUED | OUTPATIENT
Start: 2019-08-27 | End: 2019-08-28 | Stop reason: HOSPADM

## 2019-08-27 RX ORDER — MAGNESIUM SULFATE 100 %
4 CRYSTALS MISCELLANEOUS AS NEEDED
Status: DISCONTINUED | OUTPATIENT
Start: 2019-08-27 | End: 2019-08-28 | Stop reason: HOSPADM

## 2019-08-27 RX ORDER — HYDRALAZINE HYDROCHLORIDE 20 MG/ML
10 INJECTION INTRAMUSCULAR; INTRAVENOUS
Status: DISCONTINUED | OUTPATIENT
Start: 2019-08-27 | End: 2019-08-28 | Stop reason: HOSPADM

## 2019-08-27 RX ORDER — SODIUM BICARBONATE 650 MG/1
1300 TABLET ORAL 3 TIMES DAILY
Status: DISCONTINUED | OUTPATIENT
Start: 2019-08-27 | End: 2019-08-28 | Stop reason: HOSPADM

## 2019-08-27 RX ORDER — CARVEDILOL 3.12 MG/1
3.12 TABLET ORAL 2 TIMES DAILY WITH MEALS
Status: DISCONTINUED | OUTPATIENT
Start: 2019-08-27 | End: 2019-08-28 | Stop reason: HOSPADM

## 2019-08-27 RX ORDER — TAMSULOSIN HYDROCHLORIDE 0.4 MG/1
0.4 CAPSULE ORAL DAILY
Status: DISCONTINUED | OUTPATIENT
Start: 2019-08-28 | End: 2019-08-28 | Stop reason: HOSPADM

## 2019-08-27 RX ORDER — SIMVASTATIN 20 MG/1
20 TABLET, FILM COATED ORAL
Status: DISCONTINUED | OUTPATIENT
Start: 2019-08-27 | End: 2019-08-28 | Stop reason: HOSPADM

## 2019-08-27 RX ORDER — SODIUM CHLORIDE 0.9 % (FLUSH) 0.9 %
5-40 SYRINGE (ML) INJECTION EVERY 8 HOURS
Status: DISCONTINUED | OUTPATIENT
Start: 2019-08-27 | End: 2019-08-28 | Stop reason: HOSPADM

## 2019-08-27 RX ORDER — INSULIN LISPRO 100 [IU]/ML
INJECTION, SOLUTION INTRAVENOUS; SUBCUTANEOUS
Status: DISCONTINUED | OUTPATIENT
Start: 2019-08-27 | End: 2019-08-28 | Stop reason: HOSPADM

## 2019-08-27 RX ORDER — SODIUM CHLORIDE 9 MG/ML
75 INJECTION, SOLUTION INTRAVENOUS CONTINUOUS
Status: DISCONTINUED | OUTPATIENT
Start: 2019-08-27 | End: 2019-08-28 | Stop reason: HOSPADM

## 2019-08-27 RX ORDER — ACETAMINOPHEN 325 MG/1
650 TABLET ORAL
Status: DISCONTINUED | OUTPATIENT
Start: 2019-08-27 | End: 2019-08-28 | Stop reason: HOSPADM

## 2019-08-27 RX ORDER — FLUDROCORTISONE ACETATE 0.1 MG/1
0.1 TABLET ORAL DAILY
Status: DISCONTINUED | OUTPATIENT
Start: 2019-08-28 | End: 2019-08-28 | Stop reason: HOSPADM

## 2019-08-27 RX ORDER — SODIUM CHLORIDE 0.9 % (FLUSH) 0.9 %
5-40 SYRINGE (ML) INJECTION AS NEEDED
Status: DISCONTINUED | OUTPATIENT
Start: 2019-08-27 | End: 2019-08-28 | Stop reason: HOSPADM

## 2019-08-27 RX ADMIN — SODIUM BICARBONATE 1300 MG: 650 TABLET ORAL at 17:40

## 2019-08-27 RX ADMIN — HEPARIN SODIUM 5000 UNITS: 5000 INJECTION INTRAVENOUS; SUBCUTANEOUS at 17:40

## 2019-08-27 RX ADMIN — SODIUM BICARBONATE 1300 MG: 650 TABLET ORAL at 22:21

## 2019-08-27 RX ADMIN — SODIUM CHLORIDE, SODIUM LACTATE, POTASSIUM CHLORIDE, AND CALCIUM CHLORIDE 1000 ML: 600; 310; 30; 20 INJECTION, SOLUTION INTRAVENOUS at 22:21

## 2019-08-27 RX ADMIN — SIMVASTATIN 20 MG: 20 TABLET, FILM COATED ORAL at 22:21

## 2019-08-27 RX ADMIN — CEFTRIAXONE SODIUM 1 G: 1 INJECTION, POWDER, FOR SOLUTION INTRAMUSCULAR; INTRAVENOUS at 17:40

## 2019-08-27 RX ADMIN — SODIUM CHLORIDE 75 ML/HR: 900 INJECTION, SOLUTION INTRAVENOUS at 17:27

## 2019-08-27 RX ADMIN — Medication 10 ML: at 22:25

## 2019-08-27 RX ADMIN — Medication 10 ML: at 17:48

## 2019-08-27 RX ADMIN — CARVEDILOL 3.12 MG: 3.12 TABLET, FILM COATED ORAL at 17:40

## 2019-08-27 RX ADMIN — AZITHROMYCIN MONOHYDRATE 500 MG: 500 INJECTION, POWDER, LYOPHILIZED, FOR SOLUTION INTRAVENOUS at 19:40

## 2019-08-27 NOTE — H&P
[address]   HISTORY AND PHYSICAL    Name:  Werner Pineda  MR#:  [id number]  :  1935  ACCOUNT #:  [de-identified]  ADMIT DATE:  2019    CHIEF COMPLAINT:  Shortness of breath. HISTORY OF PRESENT ILLNESS:  The patient is an 71-year-old male with past medical history of coronary artery disease, status post CABG, hypertension, diabetes, hypercholesterolemia, carotid arterial disease, peripheral vascular disease, paroxysmal atrial fibrillation, chronic kidney disease, who presents to the hospital with the above-mentioned symptom. The patient reports he woke up this morning and felt that his blood glucose was low. The patient reports he feels lethargic, felt mild shortness of breath, has been having some cough for the past few days and \"things just did not seem right. \"  The patient reports that he thought sleeping for a longer period of time would make it better, so he went back to sleep again, woke up at around 9 o'clock and checked his blood glucose, it was found to be 27. The patient called EMS. The patient was given glucagon, and blood sugar xander to 228. The patient reports that he usually does not get any low blood glucose, and in the last 33 years of known diabetes, it has never been as low as 27. The patient reports that he was admitted to the hospital from 2019 to 2019. Reviewing the chart, it appears that the patient was admitted for acute on chronic hypoxemic respiratory failure, chronic severe pulmonary hypertension, orthostatic hypotension, KATALINA and metabolic acidosis. The patient was discharged on home oxygen as well as prescription for fludrocortisone and prednisone. The patient reports he just finished his steroids yesterdays. The patient reports that he is taking Lantus for diabetes and took 10 units yesterday morning and 8 units last night, but did not take any Lantus today.   The patient was brought to the hospital and was requested to be observed under the hospitalist service. The patient reports that he did not eat any breakfast this morning. The patient denies any headaches, blurry vision, sore throat, trouble swallowing, trouble with speech. Denies any chest pain, abdominal pain, constipation, diarrhea, urinary symptoms, focal or generalized neurological weakness, recent travel, sick contacts, falls, injuries, hematemesis, melena, hemoptysis, hematuria or any other concerns or problems. PAST MEDICAL HISTORY:  See above. HOME MEDICATIONS:  Currently, the patient is on:  1. Coreg 3.125 mg b.i.d.  2.  Florinef 0.1 mg daily. 3.  Albuterol. 4.  Sodium bicarbonate 1300 mg t.i.d.  5.  Flomax 0.4 mg daily. 6.  Aspirin 81 mg daily. 7.  Zocor 20 mg daily. 8.  Bumex 1 mg as needed. 9.  Lantus 10 units subcutaneous every morning. ALLERGIES:  LISINOPRIL. SOCIAL HISTORY:  Former smoker, used to smoke three packs per day for 20 years. No alcohol or IV drug abuse. REVIEW OF SYMPTOMS:  All systems were reviewed and found to be essentially negative except for the symptoms mentioned above. FAMILY HISTORY:  Discussed. Strong family history of diabetes and hypertension on both sides of the family. PHYSICAL EXAMINATION:  VITAL SIGNS:  Temperature 97. 6. pulse 73, respiratory rate 18, blood pressure 188/97, pulse oximetry 99% on 2 liters. GENERAL:  Alert, oriented x3, awake, in no acute distress, resting in bed, pleasant male, appears to be stated age. HEENT:  Pupils equal and reactive to light. Dry mucous membranes. Tympanic membranes clear. NECK:  Supple. CHEST:  Decreased basilar breath sounds. HEART:  S1 and S2 heard. ABDOMEN: Soft, nontender, nondistended. Bowel sounds are physiological.  EXTREMITIES:  No clubbing, no cyanosis, no edema. NEURO/PSYCH:  Pleasant mood and affect. Cranial nerves II through XII are grossly intact. Sensory grossly within normal limits. DTRs 2+ x4. Strength 5/5. SKIN:  Warm.     LABORATORY DATA:  White count 8.7, hemoglobin 10.2, hematocrit 33.3, platelets 345. Urine shows no signs of infection. Sodium 138, potassium 4.6, chloride 105, bicarbonate 28, anion gap 5, glucose 133, BUN 40, creatinine 1.98, calcium 8.0, bilirubin total 0.7, ALT 24, AST 46, alkaline phosphatase 74. CT of the head is pending. X-ray of the chest shows evidence of pulmonary hyperaeration, abnormal prominence to the pulmonary interstitial markings, right greater than left. Findings suggestive of developing infiltrates involving the right lung. Evidence of cardiomegaly and mild congestive changes. ASSESSMENT AND PLAN:  1. Hypoglycemia: We will observe the patient on telemetry bed. We will hold scheduled insulin. Put the patient on hypoglycemic protocol, IV hydration, supportive care, blood glucose monitoring and further intervention per hospital course. Reassess as needed. 2.  Hypertensive urgency:  Blood pressure poorly controlled. We will provide hydralazine p.r.n. for now and telemetry monitoring. We will get a CT of the head. We will cycle troponins. May consider adding an oral agent if blood pressure continues to demonstrate sustained elevation. Further intervention per hospital course. Continue to closely monitor. Reassess as needed. 3.  Possible pneumonia:  The patient will be on broad-spectrum IV antibiotics. Blood cultures have been drawn. Provide supportive care. Close monitoring. Further intervention per hospital course. Reassess as needed. Monitor for now. Oxygen support. 4.  Hypercholesterolemia:  Continue home medication. 5.  Chronic kidney disease stage III:  Avoid nephrotoxic medications. Renally dose all other medications. Trend creatinine and further intervention per hospital course. Monitor for now. 6.  Gastrointestinal and deep venous thrombosis prophylaxes: The patient will be on heparin.       Callum Olmos MD [id number]  D:  08/27/2019 15:02  T: 08/27/2019 16:05  JOB #:  [id number]

## 2019-08-27 NOTE — ED PROVIDER NOTES
81 y.o. male with past medical history significant for CAD s/p CABG, HTN, DM, hypercholesteremia, carotid arterial disease, peripheral vascular disease, paroxysmal atrial fibrillation, chronic kidney disease, presents via EMS with chief complaint of low blood glucose level. Pt states that when he woke up this morning, and he could \"tell that my blood sugar was low\", but he went back to sleep anyway. Then at 0900 this morning, patient's BG was found to be 27 mg/dL. EMS was called to the scene, and they administered glucagon and patient's BG xander to 228 mg/dL. Patient states that in his 33 years of known diabetes, his BG has never been as low as 27. Of note, patient states that he was recently admitted to the hospital 8/5-8/20/19 for acute on chronic hypoxemic respiratory failure, chronic severe pulmonary HTN, orthostatic hypotension, KATALINA, and metabolic acidosis. Patient was discharged with home oxygen, as well as prescriptions for fludrocortisone and prednisone. Patient states that he finished the steroids yesterday. He also notes that he is taking Lantus for DM; reports taking 10 units yesterday morning and 8 units last night, but has not yet taken any Lantus today. Patient has not eaten anything since his dose of Lantus last night; he did not eat after 1700 yesterday evening and also did not eat breakfast this morning. He denies current use of oral hypoglycemics, and he specifically denies fevers, shortness of breath, pain, or any new symptoms. Patient's only complaint at this time is feeling \"hungry\". There are no other acute medical concerns at this time. Social hx: Former smoker (quit 1985), No EtOH use  PCP: Gwenette Ganser, MD  Cardiologist: Keeley Rubin MD     Note written by Cooper Gillespie, as dictated by Mary Harrington MD 12:28 PM     The history is provided by the patient, the spouse and medical records. No  was used.         Past Medical History: Diagnosis Date    CAD (coronary artery disease)     s/p CABG     Carotid arterial disease (HCC)     CKD (chronic kidney disease) stage 3, GFR 30-59 ml/min ([facility] 10/21/2017    hypertension and DM nephrosclerosis    Diabetes mellitus, type 2 ([facility]     Hypercholesteremia     Hypertension     Paroxysmal atrial fibrillation ([facility] 10/21/2017    new onset afib with BRPR 10-- admit    PVC's (premature ventricular contractions) 2014    PVD (peripheral vascular disease) ([facility]        Past Surgical History:   Procedure Laterality Date    HX CORONARY ARTERY BYPASS GRAFT      HX HEART CATHETERIZATION      IN TCAT INSJ/RPL PERM LEADLESS PACEMAKER RV W/IMG N/A 2019    INSERT OR REPLACE TRANSCATH PPM LEADLESS [facility] by [facility]  CATH LAB         History reviewed. No pertinent family history.     Social History     Socioeconomic History    Marital status:      Spouse name: Not on file    Number of children: Not on file    Years of education: Not on file    Highest education level: Not on file   Occupational History    Not on file   Social Needs    Financial resource strain: Not on file    Food insecurity:     Worry: Not on file     Inability: Not on file    Transportation needs:     Medical: Not on file     Non-medical: Not on file   Tobacco Use    Smoking status: Former Smoker     Packs/day: 3.00     Years: 20.00     Pack years: 60.00     Types: Cigarettes     Last attempt to quit: 1985     Years since quittin.6    Smokeless tobacco: Never Used   Substance and Sexual Activity    Alcohol use: No    Drug use: No    Sexual activity: Not on file   Lifestyle    Physical activity:     Days per week: Not on file     Minutes per session: Not on file    Stress: Not on file   Relationships    Social connections:     Talks on phone: Not on file     Gets together: Not on file     Attends Latter-day service: Not on file     Active member of club or organization: Not on file     Attends meetings of clubs or organizations: Not on file     Relationship status: Not on file    Intimate partner violence:     Fear of current or ex partner: Not on file     Emotionally abused: Not on file     Physically abused: Not on file     Forced sexual activity: Not on file   Other Topics Concern    Not on file   Social History Narrative    Not on file         ALLERGIES: Lisinopril    Review of Systems   Respiratory: Negative for shortness of breath. Cardiovascular: Negative for chest pain. Neurological: Negative for headaches. All other systems reviewed and are negative. Vitals:    08/27/19 1039 08/27/19 1148 08/27/19 1200 08/27/19 1300   BP:  149/83  (!) 188/97   Pulse: 68 73     Resp:  18     Temp:  97.6 °F (36.4 °C)     SpO2: 99% 94% 96% 99%   Weight:  75.3 kg (166 lb)     Height:  5' 9\" (1.753 m)              Physical Exam   Constitutional: He is oriented to person, place, and time. He appears well-developed and well-nourished. No distress. HENT:   Head: Normocephalic and atraumatic. Mouth/Throat: Oropharynx is clear and moist.   Eyes: Conjunctivae are normal. Right eye exhibits no discharge. Left eye exhibits no discharge. No scleral icterus. Neck: Normal range of motion. Neck supple. Cardiovascular: Normal rate, regular rhythm and normal heart sounds. Pulmonary/Chest: Effort normal and breath sounds normal.   Abdominal: Soft. There is no tenderness. Musculoskeletal: Normal range of motion. He exhibits edema. Neurological: He is alert and oriented to person, place, and time. He exhibits normal muscle tone. Skin: Skin is warm and dry. Nursing note and vitals reviewed. MDM 81-year-old male with hypoglycemia this morning. He has not eaten today. He last took Lantus last night. He is known to have chronic kidney disease. He recently just stopped steroids.   Need to consider worsening chronic kidney disease, inappropriate dosing of Lantus, adrenal insufficiency, infection and others. Will check chest x-ray to assess for resolution of infection, labs, p.o. challenge. Serial blood sugars. Procedures        1:53 PM  Patient is being admitted to the hospital.  The results of their tests and reasons for their admission have been discussed with them and/or available family. They convey agreement and understanding for the need to be admitted and for their admission diagnosis. Consultation will be made now with the inpatient physician for hospitalization. Hospitalist Lorenzo for Admission  1:53 PM    ED Room Number: ER26/26  Patient Name and age:  Maria T Gregory. 81 y.o.  male  Working Diagnosis:   1. Hypoglycemia    2. Chronic kidney disease, unspecified CKD stage    3. Insulin dependent diabetes mellitus ([facility]      Readmission: yes  Isolation Requirements:  no  Recommended Level of Care:  telemetry  Code Status:  Full Code  Department:[facility] Adult ED - 21   Other:  cxr pending. 1:56 PM  tigertexted Dr. Roxi Ramos.   Gleen Bence, MD      Recent Results (from the past 24 hour(s))   GLUCOSE, POC    Collection Time: 08/27/19 10:47 AM   Result Value Ref Range    Glucose (POC) 89 65 - 100 mg/dL    Performed by [address] [place] [address], POC    Collection Time: 08/27/19 11:52 AM   Result Value Ref Range    Glucose (POC) 163 (H) 65 - 100 mg/dL    Performed by Hudson Nava    CBC WITH AUTOMATED DIFF    Collection Time: 08/27/19 11:56 AM   Result Value Ref Range    WBC 8.7 4.1 - 11.1 K/uL    RBC 3.62 (L) 4.10 - 5.70 M/uL    HGB 10.2 (L) 12.1 - 17.0 g/dL    HCT 33.3 (L) 36.6 - 50.3 %    MCV 92.0 80.0 - 99.0 FL    MCH 28.2 26.0 - 34.0 PG    MCHC 30.6 30.0 - 36.5 g/dL    RDW 16.7 (H) 11.5 - 14.5 %    PLATELET 341 117 - 934 K/uL    MPV 11.0 8.9 - 12.9 FL    NRBC 0.0 0  WBC    ABSOLUTE NRBC 0.00 0.00 - 0.01 K/uL    NEUTROPHILS 86 (H) 32 - 75 %    BAND NEUTROPHILS 1 0 - 6 %    LYMPHOCYTES 5 (L) 12 - 49 %    MONOCYTES 4 (L) 5 - 13 %    EOSINOPHILS 3 0 - 7 %    BASOPHILS 0 0 - 1 %    MYELOCYTES 1 (H) 0 %    IMMATURE GRANULOCYTES 0 %    ABS. NEUTROPHILS 7.6 1.8 - 8.0 K/UL    ABS. LYMPHOCYTES 0.4 (L) 0.8 - 3.5 K/UL    ABS. MONOCYTES 0.3 0.0 - 1.0 K/UL    ABS. EOSINOPHILS 0.3 0.0 - 0.4 K/UL    ABS. BASOPHILS 0.0 0.0 - 0.1 K/UL    ABS. IMM. GRANS. 0.0 K/UL    DF MANUAL      PLATELET COMMENTS Large Platelets      RBC COMMENTS ANISOCYTOSIS  1+        RBC COMMENTS DEANA CELLS  PRESENT       METABOLIC PANEL, COMPREHENSIVE    Collection Time: 08/27/19 11:56 AM   Result Value Ref Range    Sodium 138 136 - 145 mmol/L    Potassium 4.3 3.5 - 5.1 mmol/L    Chloride 105 97 - 108 mmol/L    CO2 28 21 - 32 mmol/L    Anion gap 5 5 - 15 mmol/L    Glucose 133 (H) 65 - 100 mg/dL    BUN 40 (H) 6 - 20 MG/DL    Creatinine 1.98 (H) 0.70 - 1.30 MG/DL    BUN/Creatinine ratio 20 12 - 20      GFR est AA 39 (L) >60 ml/min/1.73m2    GFR est non-AA 33 (L) >60 ml/min/1.73m2    Calcium 8.0 (L) 8.5 - 10.1 MG/DL    Bilirubin, total 0.7 0.2 - 1.0 MG/DL    ALT (SGPT) 24 12 - 78 U/L    AST (SGOT) 46 (H) 15 - 37 U/L    Alk. phosphatase 74 45 - 117 U/L    Protein, total 6.5 6.4 - 8.2 g/dL    Albumin 2.6 (L) 3.5 - 5.0 g/dL    Globulin 3.9 2.0 - 4.0 g/dL    A-G Ratio 0.7 (L) 1.1 - 2.2     SAMPLES BEING HELD    Collection Time: 08/27/19 11:56 AM   Result Value Ref Range    SAMPLES BEING HELD 1BLUE 1RED     COMMENT        Add-on orders for these samples will be processed based on acceptable specimen integrity and analyte stability, which may vary by analyte.    URINALYSIS W/ RFLX MICROSCOPIC    Collection Time: 08/27/19 12:56 PM   Result Value Ref Range    Color YELLOW/STRAW      Appearance CLEAR CLEAR      Specific gravity 1.023 1.003 - 1.030      pH (UA) 6.0 5.0 - 8.0      Protein >300 (A) NEG mg/dL    Glucose 500 (A) NEG mg/dL    Ketone NEGATIVE  NEG mg/dL    Bilirubin NEGATIVE  NEG      Blood SMALL (A) NEG      Urobilinogen 0.2 0.2 - 1.0 EU/dL    Nitrites NEGATIVE  NEG Leukocyte Esterase NEGATIVE  NEG      WBC 0-4 0 - 4 /hpf    RBC 5-10 0 - 5 /hpf    Epithelial cells FEW FEW /lpf    Bacteria NEGATIVE  NEG /hpf    Hyaline cast 0-2 0 - 5 /lpf   URINE CULTURE HOLD SAMPLE    Collection Time: 08/27/19 12:56 PM   Result Value Ref Range    Urine culture hold        URINE ON HOLD IN MICROBIOLOGY DEPT FOR 3 DAYS. IF UNPRESERVED URINE IS SUBMITTED, IT CANNOT BE USED FOR ADDITIONAL TESTING AFTER 24 HRS, RECOLLECTION WILL BE REQUIRED. GLUCOSE, POC    Collection Time: 08/27/19  1:22 PM   Result Value Ref Range    Glucose (POC) 120 (H) 65 - 100 mg/dL    Performed by Coleman Shoemaker        No results found.

## 2019-08-27 NOTE — PROGRESS NOTES
Admission Medication Reconciliation:    Information obtained from: This medication history was obtained from the patient and his wife; he appears to be a good historian. An RX Query was available. Summary:     Medications added: None  Medications deleted: Prednisone - completed 5 day course on 8/26/19  Dose changes: None     Bumex was originally ordered 3 tablets BID, however patient reports taking only 1 tablet daily as needed for swelling.  Patient reports that for the last month he has been taking 10 units of insulin glargine in the morning and taking up to an additional 8 units at night. Further explained that if his blood sugar is >200, he takes the 8 units. If it is less than 200, he decreases to 5 or 6 units.  Confirmed that amlodipine has been discontinued. (Appeared on Rx Query)    Inpatient orders were reviewed and no changes were made. Chief Complaint for this Admission:  Hypoglycemia    Prior to Admission Medications:   Prior to Admission Medications   Prescriptions Last Dose Informant Patient Reported? Taking? albuterol (PROVENTIL VENTOLIN) 2.5 mg /3 mL (0.083 %) nebulizer solution 8/26/2019 at PM Self No Yes   Sig: 3 mL by Nebulization route every four (4) hours as needed for Wheezing. aspirin 81 mg chewable tablet 8/26/2019 Self No Yes   Sig: Take 1 Tab by mouth daily. bumetanide (BUMEX) 1 mg tablet 8/26/2019 Self No Yes   Sig: Take 1 Tab by mouth daily as needed (swelling). carvedilol (COREG) 3.125 mg tablet 8/26/2019 Self No Yes   Sig: Take 1 Tab by mouth two (2) times daily (with meals) for 30 days. cinnamon bark (CINNAMON) 500 mg cap 8/26/2019 Self Yes Yes   Sig: Take 1,000 mg by mouth two (2) times a day. fludrocortisone (FLORINEF) 0.1 mg tablet 8/26/2019 Self No Yes   Sig: Take 1 Tab by mouth daily for 30 days. insulin glargine (LANTUS U-100 INSULIN) 100 unit/mL injection   Yes Yes   Sig: 10 Units by SubCUTAneous route every morning. Prescribed 18 units daily. Reports taking 10 units in the morning, then up to 8 units in the evening depending on his blood sugars. If blood sugar is <200, he reduces his evening dose. insulin glargine (LANTUS U-100 INSULIN) 100 unit/mL injection   Yes Yes   Sig: by SubCUTAneous route nightly. Prescribed 18 units daily. Reports taking 10 units in the morning, then up to 8 units in the evening depending on his blood sugars. If blood sugar is <200, he reduces his evening dose. omega 3-dha-epa-fish oil (FISH OIL) 100-160-1,000 mg cap 8/26/2019 Self Yes Yes   Sig: Take 1 Cap by mouth two (2) times a day. simvastatin (ZOCOR) 20 mg tablet 8/26/2019 Self Yes Yes   Sig: Take 20 mg by mouth nightly.   sodium bicarbonate 650 mg tablet 8/26/2019 Self No Yes   Sig: Take 2 Tabs by mouth three (3) times daily. tamsulosin (FLOMAX) 0.4 mg capsule 8/26/2019 Self No Yes   Sig: Take 1 Cap by mouth daily.       Facility-Administered Medications: None

## 2019-08-27 NOTE — ED TRIAGE NOTES
Pt presents from EMS with complaints of hypoglycemia. Pt blood glucose found to be 28. After glucagon administration pt .

## 2019-08-27 NOTE — ROUTINE PROCESS
TRANSFER - OUT REPORT:    Verbal report given to BUCK Duke(name) on Guillermo Aponte Sr.  being transferred to [facility](unit) for progression of care     Report consisted of patients Situation, Background, Assessment and   Recommendations(SBAR). Information from the following report(s) SBAR, Kardex, Intake/Output, MAR and Recent Results was reviewed with the receiving nurse. Lines:   Peripheral IV 08/27/19 Left Forearm (Active)   Site Assessment Clean, dry, & intact 8/27/2019 11:57 AM   Phlebitis Assessment 0 8/27/2019 11:57 AM   Infiltration Assessment 0 8/27/2019 11:57 AM   Dressing Status Clean, dry, & intact 8/27/2019 11:57 AM   Hub Color/Line Status Green;Capped;Flushed;Patent 8/27/2019 11:57 AM   Action Taken Blood drawn 8/27/2019 11:57 AM        Opportunity for questions and clarification was provided.       Patient transported with:

## 2019-08-28 ENCOUNTER — PATIENT OUTREACH (OUTPATIENT)
Dept: CARDIOLOGY CLINIC | Age: 84
End: 2019-08-28

## 2019-08-28 VITALS
DIASTOLIC BLOOD PRESSURE: 79 MMHG | HEART RATE: 88 BPM | BODY MASS INDEX: 24.59 KG/M2 | OXYGEN SATURATION: 93 % | SYSTOLIC BLOOD PRESSURE: 156 MMHG | WEIGHT: 166 LBS | HEIGHT: 69 IN | TEMPERATURE: 98.2 F | RESPIRATION RATE: 18 BRPM

## 2019-08-28 LAB
ANION GAP SERPL CALC-SCNC: 7 MMOL/L (ref 5–15)
BASOPHILS # BLD: 0 K/UL (ref 0–0.1)
BASOPHILS NFR BLD: 0 % (ref 0–1)
BUN SERPL-MCNC: 36 MG/DL (ref 6–20)
BUN/CREAT SERPL: 19 (ref 12–20)
CALCIUM SERPL-MCNC: 7.4 MG/DL (ref 8.5–10.1)
CHLORIDE SERPL-SCNC: 107 MMOL/L (ref 97–108)
CO2 SERPL-SCNC: 29 MMOL/L (ref 21–32)
CREAT SERPL-MCNC: 1.89 MG/DL (ref 0.7–1.3)
DIFFERENTIAL METHOD BLD: ABNORMAL
EOSINOPHIL # BLD: 0.4 K/UL (ref 0–0.4)
EOSINOPHIL NFR BLD: 7 % (ref 0–7)
ERYTHROCYTE [DISTWIDTH] IN BLOOD BY AUTOMATED COUNT: 16.9 % (ref 11.5–14.5)
GLUCOSE BLD STRIP.AUTO-MCNC: 165 MG/DL (ref 65–100)
GLUCOSE BLD STRIP.AUTO-MCNC: 96 MG/DL (ref 65–100)
GLUCOSE SERPL-MCNC: 82 MG/DL (ref 65–100)
HCT VFR BLD AUTO: 25.5 % (ref 36.6–50.3)
HGB BLD-MCNC: 7.9 G/DL (ref 12.1–17)
IMM GRANULOCYTES # BLD AUTO: 0 K/UL
IMM GRANULOCYTES NFR BLD AUTO: 0 %
LACTATE SERPL-SCNC: 0.7 MMOL/L (ref 0.4–2)
LYMPHOCYTES # BLD: 0.5 K/UL (ref 0.8–3.5)
LYMPHOCYTES NFR BLD: 8 % (ref 12–49)
MCH RBC QN AUTO: 28 PG (ref 26–34)
MCHC RBC AUTO-ENTMCNC: 31 G/DL (ref 30–36.5)
MCV RBC AUTO: 90.4 FL (ref 80–99)
MONOCYTES # BLD: 0.4 K/UL (ref 0–1)
MONOCYTES NFR BLD: 7 % (ref 5–13)
NEUTS SEG # BLD: 4.4 K/UL (ref 1.8–8)
NEUTS SEG NFR BLD: 78 % (ref 32–75)
NRBC # BLD: 0 K/UL (ref 0–0.01)
NRBC BLD-RTO: 0 PER 100 WBC
PLATELET # BLD AUTO: 172 K/UL (ref 150–400)
PMV BLD AUTO: 10.6 FL (ref 8.9–12.9)
POTASSIUM SERPL-SCNC: 3.4 MMOL/L (ref 3.5–5.1)
RBC # BLD AUTO: 2.82 M/UL (ref 4.1–5.7)
RBC MORPH BLD: ABNORMAL
RBC MORPH BLD: ABNORMAL
SERVICE CMNT-IMP: ABNORMAL
SERVICE CMNT-IMP: NORMAL
SODIUM SERPL-SCNC: 143 MMOL/L (ref 136–145)
TROPONIN I SERPL-MCNC: 0.1 NG/ML
WBC # BLD AUTO: 5.7 K/UL (ref 4.1–11.1)

## 2019-08-28 PROCEDURE — 84484 ASSAY OF TROPONIN QUANT: CPT

## 2019-08-28 PROCEDURE — 80048 BASIC METABOLIC PNL TOTAL CA: CPT

## 2019-08-28 PROCEDURE — 74011250637 HC RX REV CODE- 250/637: Performed by: FAMILY MEDICINE

## 2019-08-28 PROCEDURE — 96372 THER/PROPH/DIAG INJ SC/IM: CPT

## 2019-08-28 PROCEDURE — 85025 COMPLETE CBC W/AUTO DIFF WBC: CPT

## 2019-08-28 PROCEDURE — 77010033678 HC OXYGEN DAILY

## 2019-08-28 PROCEDURE — 36415 COLL VENOUS BLD VENIPUNCTURE: CPT

## 2019-08-28 PROCEDURE — 83605 ASSAY OF LACTIC ACID: CPT

## 2019-08-28 PROCEDURE — 99218 HC RM OBSERVATION: CPT

## 2019-08-28 PROCEDURE — 94760 N-INVAS EAR/PLS OXIMETRY 1: CPT

## 2019-08-28 PROCEDURE — 74011250636 HC RX REV CODE- 250/636: Performed by: FAMILY MEDICINE

## 2019-08-28 PROCEDURE — 82962 GLUCOSE BLOOD TEST: CPT

## 2019-08-28 RX ORDER — LEVOFLOXACIN 500 MG/1
500 TABLET, FILM COATED ORAL DAILY
Qty: 7 TAB | Refills: 0 | Status: SHIPPED | OUTPATIENT
Start: 2019-08-28 | End: 2019-09-20

## 2019-08-28 RX ORDER — INSULIN GLARGINE 100 [IU]/ML
5 INJECTION, SOLUTION SUBCUTANEOUS
Qty: 1 VIAL | Refills: 0 | Status: ON HOLD
Start: 2019-08-28 | End: 2019-12-10 | Stop reason: SDUPTHER

## 2019-08-28 RX ADMIN — TAMSULOSIN HYDROCHLORIDE 0.4 MG: 0.4 CAPSULE ORAL at 08:45

## 2019-08-28 RX ADMIN — ACETAMINOPHEN 650 MG: 325 TABLET, FILM COATED ORAL at 00:20

## 2019-08-28 RX ADMIN — Medication 10 ML: at 05:02

## 2019-08-28 RX ADMIN — FLUDROCORTISONE ACETATE 0.1 MG: 0.1 TABLET ORAL at 08:55

## 2019-08-28 RX ADMIN — SODIUM BICARBONATE 1300 MG: 650 TABLET ORAL at 08:45

## 2019-08-28 RX ADMIN — CARVEDILOL 3.12 MG: 3.12 TABLET, FILM COATED ORAL at 07:14

## 2019-08-28 RX ADMIN — ASPIRIN 81 MG 81 MG: 81 TABLET ORAL at 08:45

## 2019-08-28 RX ADMIN — HEPARIN SODIUM 5000 UNITS: 5000 INJECTION INTRAVENOUS; SUBCUTANEOUS at 09:00

## 2019-08-28 NOTE — PROGRESS NOTES
I have reviewed and agree with the charting of Moisés Acuna RN. Patient's corrected Fabio Score is 19 (not 16 from the skin note).

## 2019-08-28 NOTE — PROGRESS NOTES
Primary Nurse Jovi Kimball RN and Consuelo Perez RN performed a dual skin assessment on this patient Impairment noted- see wound doc flow sheet. Patient has old scabbing and redness on sacral area  Fabio score is 16.

## 2019-08-28 NOTE — PROGRESS NOTES
Hospital follow-up PCP transitional care appointment has been scheduled with Dr. Nathanael Toro for Tuesday, 9/3/19 at 10:15 a.m. Pending patient discharge.   Mandi Rodriguez, Care Management Specialist.

## 2019-08-28 NOTE — PROGRESS NOTES
Reason for Readmission:     Low blood sugar         RRAT Score and Risk Level:    47, moderate      Level of Readmission:  low        Care Conference scheduled:   Not needed at this time       Resources/supports as identified by patient/family:   Lives with wife       Top Challenges facing patient (as identified by patient/family and CM): Finances/Medication cost?    No concerns   Transportation    Wife to transport home    Support system or lack thereof? See above  Living arrangements? See above     Self-care/ADLs/Cognition? Independent with ADLs, alert and oriented         Current Advanced Directive/Advance Care Plan: On file, wife is medical agent           Plan for utilizing home health:   Patient is open to [facility], uses [employer] for home oxygen             Transition of Care Plan:    Based on readmission, the patient's previous Plan of Care   has been evaluated and/or modified. The current Transition of Care Plan is:   Cm met with patient at the bedside to explain role and offer support. Patient is anxious to be discharged home, states he will make sure his BS never gets that low again. Patient was just discharged on 08/24 after spending 2 weeks here and wants to return home and live his life. Cm informed him I would communicate this to the doctor. Cm sent a resumption of services to [facility] via [facility], advising them I was not sure when he would be discharged. 2:40 pm: Huy received a call from a nurse navigator stating she would like the MD to consult the diabetes educator prior to patients' discharge, if he was in agreement. Huy sent a message to Dr. Carl Alves with this information. Care Management Interventions  PCP Verified by CM:  Yes  Palliative Care Criteria Met (RRAT>21 & CHF Dx)?: No  MyChart Signup: No  Discharge Durable Medical Equipment: No  Health Maintenance Reviewed: Yes  Physical Therapy Consult: No  Occupational Therapy Consult: No  Speech Therapy Consult: No  Confirm Follow Up Transport: Family  Plan discussed with Pt/Family/Caregiver: Yes  Freedom of Choice Offered: Yes  [place] Resource Information Provided?: No  Discharge Location  Discharge Placement: Home with family assistance    Madison Bustillo

## 2019-08-28 NOTE — PROGRESS NOTES
Bedside shift change report given to Diamond Ordonez (oncoming nurse) by Naomy Tidwell (offgoing nurse). Report included the following information SBAR, Kardex, Intake/Output, MAR, Accordion, Recent Results, Med Rec Status, Alarm Parameters  and Quality Measures.

## 2019-08-28 NOTE — PROGRESS NOTES
Goals Addressed                 This Visit's Progress     Prevent complications post hospitalization. 08/28/19  Per chart notes, pt readmitted to [facility] on 8/27/19 for hypoglycemia. BS was reported to be 27 per ED notes. CTN contacted ZION Blank to request consult for diabetic educator to speak with pt about hypoglycemia and what he can do at home prior to calling rescue squad. She states she will ask MD for consult. CTN will follow. ---xena

## 2019-08-28 NOTE — DISCHARGE SUMMARY
Discharge Summary       PATIENT ID: Loyd Rodgers MRN: [id number]   YOB: 1935    DATE OF ADMISSION: 8/27/2019 11:38 AM    DATE OF DISCHARGE: 8/28/19 15:30  PRIMARY CARE PROVIDER: Elvin Siegel MD     ATTENDING PHYSICIAN: Abida Dykes  DISCHARGING PROVIDER: David Constantino MD    To contact this individual call [phone] and ask the  to page. If unavailable ask to be transferred the Adult Hospitalist Department. CONSULTATIONS: IP CONSULT TO HOSPITALIST    PROCEDURES/SURGERIES: * No surgery found *    [address] [facility] COURSE:   The patient is an 50-year-old male with past medical history of coronary artery disease, status post CABG, hypertension, diabetes, hypercholesterolemia, carotid arterial disease, peripheral vascular disease, paroxysmal atrial fibrillation, chronic kidney disease, who presents to the hospital with the above-mentioned symptom. The patient reports he woke up this morning and felt that his blood glucose was low. The patient reports he feels lethargic, felt mild shortness of breath, has been having some cough for the past few days and \"things just did not seem right. \"  The patient reports that he thought sleeping for a longer period of time would make it better, so he went back to sleep again, woke up at around 9 o'clock and checked his blood glucose, it was found to be 27. The patient called EMS. The patient was given glucagon, and blood sugar xander to 228. The patient reports that he usually does not get any low blood glucose, and in the last 33 years of known diabetes, it has never been as low as 27. The patient reports that he was admitted to the hospital from 08/05/2019 to 08/20/2019. Reviewing the chart, it appears that the patient was admitted for acute on chronic hypoxemic respiratory failure, chronic severe pulmonary hypertension, orthostatic hypotension, KATALINA and metabolic acidosis.   The patient was discharged on home oxygen as well as prescription for fludrocortisone and prednisone. The patient reports he just finished his steroids yesterdays. The patient reports that he is taking Lantus for diabetes and took 10 units yesterday morning and 8 units last night, but did not take any Lantus today. The patient was brought to the hospital and was requested to be observed under the hospitalist service. The patient reports that he did not eat any breakfast this morning. The patient denies any headaches, blurry vision, sore throat, trouble swallowing, trouble with speech. Denies any chest pain, abdominal pain, constipation, diarrhea, urinary symptoms, focal or generalized neurological weakness, recent travel, sick contacts, falls, injuries, hematemesis, melena, hemoptysis, hematuria or any other concerns or problems. DISCHARGE DIAGNOSES / PLAN:      1. Hypoglycemia- as a result of weaning off of steroids:   Resolved  Patient wants to go home and reduce lantus dose to 1/2 dose and once daily in am  2. Hypertensive urgency:    BP improved with restarting home meds  3. aspiration pneumonia:    Home on PO levaquin- dose reduced due to low GFR  4. Hypercholesterolemia:  resume home meds  5. Chronic kidney disease stage III:  stable  6. chronic  hypoxemic resp failure-  POA -   Chronic- due to pulmonary hypertension- cont diuretics       ADDITIONAL CARE RECOMMENDATIONS:  Decrease your home insulin doses due to low blood sugars after stopping steroids    Monitor home blood glucose levels    Eat a snack at bedtime to prevent low blood sugar levels overnight    Continue home health       PENDING TEST RESULTS:   At the time of discharge the following test results are still pending: none    FOLLOW UP APPOINTMENTS:    Follow-up Information     Follow up With Specialties Details Why Contact [facility]   [address] [facility] Michelle Turcios [address].   [address] Sebastian Talamantes, [address] [place] [address] Rd  [address]  [address] [facility]  [phone]               DIET: Diabetic Diet    ACTIVITY: Activity as tolerated    WOUND CARE: NA    EQUIPMENT needed: none      DISCHARGE MEDICATIONS:  Current Discharge Medication List      START taking these medications    Details   levoFLOXacin (LEVAQUIN) 500 mg tablet Take 1 Tab by mouth daily. Qty: 7 Tab, Refills: 0         CONTINUE these medications which have CHANGED    Details   insulin glargine (LANTUS U-100 INSULIN) 100 unit/mL injection 5 Units by SubCUTAneous route every morning. Prescribed 18 units daily. Reports taking 10 units in the morning, then up to 8 units in the evening depending on his blood sugars. If blood sugar is <200, he reduces his evening dose. Qty: 1 Vial, Refills: 0         CONTINUE these medications which have NOT CHANGED    Details   bumetanide (BUMEX) 1 mg tablet Take 1 Tab by mouth daily as needed (swelling). Qty: 90 Tab, Refills: 1      carvedilol (COREG) 3.125 mg tablet Take 1 Tab by mouth two (2) times daily (with meals) for 30 days. Qty: 60 Tab, Refills: 0      fludrocortisone (FLORINEF) 0.1 mg tablet Take 1 Tab by mouth daily for 30 days. Qty: 30 Tab, Refills: 0      albuterol (PROVENTIL VENTOLIN) 2.5 mg /3 mL (0.083 %) nebulizer solution 3 mL by Nebulization route every four (4) hours as needed for Wheezing. Qty: 1 Package, Refills: 0      sodium bicarbonate 650 mg tablet Take 2 Tabs by mouth three (3) times daily. Qty: 90 Tab, Refills: 0      tamsulosin (FLOMAX) 0.4 mg capsule Take 1 Cap by mouth daily. Qty: 30 Cap, Refills: 0      aspirin 81 mg chewable tablet Take 1 Tab by mouth daily. Qty: 33 Tab, Refills: 11      simvastatin (ZOCOR) 20 mg tablet Take 20 mg by mouth nightly. omega 3-dha-epa-fish oil (FISH OIL) 100-160-1,000 mg cap Take 1 Cap by mouth two (2) times a day. cinnamon bark (CINNAMON) 500 mg cap Take 1,000 mg by mouth two (2) times a day.                NOTIFY YOUR PHYSICIAN FOR ANY OF THE FOLLOWING:   Fever over 101 degrees for 24 hours. Chest pain, shortness of breath, fever, chills, nausea, vomiting, diarrhea, change in mentation, falling, weakness, bleeding. Severe pain or pain not relieved by medications. Or, any other signs or symptoms that you may have questions about. DISPOSITION:    Home With:   OT  PT X HH  RN       Long term SNF/Inpatient Rehab    Independent/    Hospice    Other:       PATIENT CONDITION AT DISCHARGE:     Functional status    Poor     Deconditioned    x Independent      Cognition   X  Lucid     Forgetful     Dementia      Catheters/lines (plus indication)    Myers     PICC     PEG    X None      Code status   X  Full code     DNR      PHYSICAL EXAMINATION AT DISCHARGE:  Patient Vitals for the past 24 hrs:   Temp Pulse Resp BP SpO2   08/28/19 1203 98.2 °F (36.8 °C) 88 18 156/79 93 %   08/28/19 0822 97.5 °F (36.4 °C) 73 18 149/60 95 %   08/28/19 0711  88  172/83    08/28/19 0211 99.2 °F (37.3 °C)       08/28/19 0002 (!) 100.5 °F (38.1 °C) 83 18 144/70 94 %   08/27/19 1915 98 °F (36.7 °C) 78 16 156/76 95 %   08/27/19 1653     93 %   08/27/19 1649 97.9 °F (36.6 °C) 84 18 185/87 (!) 89 %                                                      Constitutional:  on oxygen but comfortable ,    ENT:  Oral mucous moist, Neck supple,    Resp:  decreased breath sounds ,no wheezing   CV:  Regular rhythm, normal rate, no murmurs, gallops, rubs    GI:  Soft, non distended, non tender. normoactive bowel sounds, no hepatosplenomegaly     Musculoskeletal:  No edema, warm, 2+ pulses throughout    Neurologic:  Moves all extremities.   AAOx3, CN II-XII reviewed                             Recent Results (from the past 12 hour(s))   GLUCOSE, POC    Collection Time: 08/28/19  7:09 AM   Result Value Ref Range    Glucose (POC) 96 65 - 100 mg/dL    Performed by Arabella Valderrama    GLUCOSE, POC    Collection Time: 08/28/19 11:16 AM   Result Value Ref Range    Glucose (POC) 165 (H) 65 - 100 mg/dL    Performed by [id number]:  Problem List as of 8/28/2019 Date Reviewed: 8/21/2019          Codes Class Noted - Resolved    Hypoglycemia ICD-10-CM: E16.2  ICD-9-CM: 251.2  8/27/2019 - Present        Pacemaker ICD-10-CM: Z95.0  ICD-9-CM: V45.01  5/9/2019 - Present    Overview Signed 5/9/2019  6:22 PM by Ronna Snyder MD     5/9/2019 leadless pacer implant             CHF (congestive heart failure) ([facility] ICD-10-CM: I50.9  ICD-9-CM: 428.0  5/1/2019 - Present        Atrial fibrillation with slow ventricular response ([facility] ICD-10-CM: I48.91  ICD-9-CM: 427.31  5/1/2019 - Present    Overview Signed 5/8/2019 11:24 PM by Ronna Snyder MD     Added automatically from request for surgery [id number]             Type 2 diabetes with nephropathy ([facility] ICD-10-CM: E11.21  ICD-9-CM: 250.40, 583.81  7/26/2018 - Present        Atrial fibrillation, chronic ([facility] ICD-10-CM: I48.2  ICD-9-CM: 427.31  10/21/2017 - Present    Overview Signed 10/21/2017  1:45 PM by Melodye Cogan, MD     new onset afib with BRPR 10-19-17 admit             CKD (chronic kidney disease) stage 3, GFR 30-59 ml/min ([facility] ICD-10-CM: N18.3  ICD-9-CM: 585.3  10/21/2017 - Present    Overview Signed 10/21/2017  1:47 PM by Melodye Cogan, MD     hypertension and DM nephrosclerosis             GI bleed ICD-10-CM: K92.2  ICD-9-CM: 578.9  10/20/2017 - Present        Hyperglycemia due to type 2 diabetes mellitus ([facility] ICD-10-CM: E11.65  ICD-9-CM: 250.00  10/20/2017 - Present        Hypokalemia ICD-10-CM: E87.6  ICD-9-CM: 276.8  6/25/2017 - Present        Generalized weakness ICD-10-CM: R53.1  ICD-9-CM: 780.79  6/25/2017 - Present        Elevated troponin ICD-10-CM: R74.8  ICD-9-CM: 790.6  6/25/2017 - Present        KATALINA (acute kidney injury) ([facility] ICD-10-CM: N17.9  ICD-9-CM: 584.9  6/25/2017 - Present        Acute renal failure (ARF) ([facility] ICD-10-CM: N17.9  ICD-9-CM: 584.9  3/16/2017 - Present        CKD (chronic kidney disease) ICD-10-CM: N18.9  ICD-9-CM: 585.9  1/20/2017 - Present        Type 2 diabetes mellitus with diabetic peripheral angiopathy without gangrene [facility]) ICD-10-CM: E11.51  ICD-9-CM: 250.70, 443.81  9/27/2015 - Present        Dyspnea ICD-10-CM: R06.00  ICD-9-CM: 786.09  7/15/2014 - Present        PVC's (premature ventricular contractions) ICD-10-CM: I49.3  ICD-9-CM: 427.69  5/26/2014 - Present        Carotid arterial disease (HCC) ICD-10-CM: I77.9  ICD-9-CM: 447.9  Unknown - Present        Hypercholesteremia ICD-10-CM: E78.00  ICD-9-CM: 272.0  Unknown - Present        PVD (peripheral vascular disease) (HCC) ICD-10-CM: I73.9  ICD-9-CM: 443.9  Unknown - Present        Bradycardia ICD-10-CM: R00.1  ICD-9-CM: 427.89  Unknown - Present        CAD (coronary artery disease) ICD-10-CM: I25.10  ICD-9-CM: 414.00  Unknown - Present        Hypertension, essential, benign ICD-10-CM: I10  ICD-9-CM: 401.1  Unknown - Present        RESOLVED: CHF exacerbation ([facility] ICD-10-CM: I50.9  ICD-9-CM: 428.0  8/5/2019 - 8/20/2019        RESOLVED: Acute hypoxemic respiratory failure ([facility] ICD-10-CM: J96.01  ICD-9-CM: 518.81  8/5/2019 - 8/20/2019        RESOLVED: Acute bronchospasm ICD-10-CM: J98.01  ICD-9-CM: 519.11  5/18/2019 - 5/19/2019        RESOLVED: CHF exacerbation ([facility] ICD-10-CM: I50.9  ICD-9-CM: 428.0  5/18/2019 - 5/19/2019        RESOLVED: New onset a-fib ([facility] ICD-10-CM: I48.91  ICD-9-CM: 427.31  10/20/2017 - 11/30/2017        RESOLVED: Fever ICD-10-CM: R50.9  ICD-9-CM: 780.60  3/16/2017 - 3/18/2017        RESOLVED: Hyponatremia ICD-10-CM: E87.1  ICD-9-CM: 276.1  3/16/2017 - 3/18/2017        RESOLVED: Urinary retention ICD-10-CM: R33.9  ICD-9-CM: 788.20  1/19/2017 - 1/20/2017        RESOLVED: Heart palpitations ICD-10-CM: R00.2  ICD-9-CM: 785.1  7/15/2014 - 9/20/2016        RESOLVED: Atherosclerosis of native artery of extremity with intermittent claudication ([facility] ICD-10-CM: T35.494  ICD-9-CM: 440.21  2/19/2014 - 9/20/2016        RESOLVED: Other dyspnea and respiratory abnormality ICD-10-CM: R06.09, R09.89  ICD-9-CM: 786.09  Unknown - 9/20/2016        RESOLVED: Diabetes mellitus, type 2 ([facility] ICD-10-CM: E11.9  ICD-9-CM: 250.00  Unknown - 9/20/2016              Greater than 30 minutes were spent with the patient on counseling and coordination of care    Signed:   Angelica Dominguez MD  8/28/2019  3:31 PM

## 2019-08-28 NOTE — ROUTINE PROCESS
Bedside and Verbal shift change report given to [address] [place] [address] (oncoming nurse) by Elisabet Gamez (offgoing nurse). Report included the following information SBAR, Kardex and MAR.

## 2019-08-28 NOTE — DISCHARGE INSTRUCTIONS
Decrease your home insulin doses due to low blood sugars after stopping steroids    Monitor home blood glucose levels    Eat a snack at bedtime to prevent low blood sugar levels overnight    Continue home health

## 2019-08-29 ENCOUNTER — TELEPHONE (OUTPATIENT)
Dept: CARDIOLOGY CLINIC | Age: 84
End: 2019-08-29

## 2019-08-29 ENCOUNTER — PATIENT OUTREACH (OUTPATIENT)
Dept: CARDIOLOGY CLINIC | Age: 84
End: 2019-08-29

## 2019-08-29 NOTE — TELEPHONE ENCOUNTER
Patient's wife states they had to cancel their appts with Dr. Berenice Davis and pacemaker clinic on 8/27/19 due to patient having to go to the hospital. She is calling back to r/s but requests a sooner appt than Dr. Fernandez Records next available. Please advise.    Phone: [phone]

## 2019-08-29 NOTE — PROGRESS NOTES
Goals Addressed                 This Visit's Progress     Attends follow-up appointments as directed. 08/21/19  CTN unable to reach pt this morning--LM requesting return call . Patient discharged with following appts:    Dr Albert Necessary      8/21/19 at 4:20pm    Dr Sergio Li   Per chart notes IP team was informed by PCP office that pt would have to make follow up appt---will confirm with pt/wife this is done. Pulmonary---pt did not have pulmonary appt on AVS, will recommend pt see pulmonary ASAP and ask for repeat CT in 4-6 weeks    Nephrology---no appt on AVS, will confirm that pt has a follow up appt and to see dietician regarding low albumin    Per AVS pt to receive [facility] for PT/OT. ---mkrw      UPDATE:  CTN was able to reach patient on cell phone. He reports he just left his PCP appt and is aware that he sees Dr Albert Necessary today. Patient was in a parking lot when he answered call, CTN will call pt tomorrow when pt is home. ---mkrw    08/29/19  CTN notes pt recently readmitted to [facility] on 8/27-8/28 for hypoglycemia. CTN contacted pt's wife to review appointments:    PCP Dr Yael Gunter              9/3/19 at  10:15  Cards Dr Albert Necessary      9/4/19 at   3:00    CTN asked about pulmonary follow up---wife states they have appt in November with Dr Kristine Diaz. CTN asked if I could move appt up since it was recommended during previous admission that pt have follow up CT scan (see notes Angela Roberts PA on 8/13/19) in 4-6 weeks and patient was placed back on Levaquin again this readmission for pneumonia. Amma that sooner follow up would benefit patient. Wife agreeable. [facility], new appt made for 9/12/19 at 1:00. Wife made aware. CTN faxed notes over to Dr Kristine Diaz (fax [phone]) regarding follow up CT as OP. Wife reports pt seeing a podiatrist on 8/30 to have toenails clipped. [facility] has contacted patient's wife for resumption of care beginning today.     CTN will follow up next week to follow up on attendance. ---mkrw       Prevent complications post hospitalization. 08/28/19  Per chart notes, pt readmitted to [facility] on 8/27/19 for hypoglycemia. BS was reported to be 27 per ED notes. CTN contacted ZION Jeffries to request consult for diabetic educator to speak with pt about hypoglycemia and what he can do at home prior to calling rescue squad. She states she will ask MD for consult. CTN will follow. ---mkrw    08/29/19  CTN spoke with patient's wife today. Patient was discharged prior to having diabetic educator see patient as requested by this CTN yesterday. CTN reviewed s/sx hypoglycemia with wife and what to do at home for hypoglycemia. CTN also mailed information about the 15/15/15 rule and ideas for bedtimes snacks to patient for review. Will discuss with pt next week and answer questions. ---[place] Rain Supportive resources in place to maintain patient in the community (ie. Home Health, DME equipment, refer to, medication assistant plan, etc.)        08/21/19  CTN contacted [facility] to confirm [facility] services---spoke to [place]. She reports that pt only has PT/OT ordered and that [facility] is 8/22/19. CTN asked about SN--she states that they were not notified pt was on CHF Bundle. CTN sent message to [address] [place] [address] manager at [facility] to ask about including SN to pt's orders. Will follow up later today---mkrw    Update: Received message from [facility] PO[facility] that she adjusted order to include SN. CTN called José Luis Guerrier , spoke to [place] to let her know. She states she has added SN to pt's services. ---mkrw    08/22/19  CTN contacted pt's wife, she could not talk at time of call, but did report pt had PT this morning. CTN will call back later this afternoon per wife request.---mkrw    08/29/19  Pt recently discharged from [facility] 8/27--8/28 for hypoglycemia. CTN spoke to wife. Fareed Greco has been in touch with family today to resume services.     Per wife Patient dos have home 02 at 2 L NC set up by Robin Hoover since last hospital discharge. ---xena

## 2019-08-29 NOTE — TELEPHONE ENCOUNTER
Verified patient with two types of identifiers. Rescheduled patient's device check and appointment with Dr. Eve Brink. Patient verbalized understanding and will call with any other questions.

## 2019-09-02 LAB
BACTERIA SPEC CULT: NORMAL
SERVICE CMNT-IMP: NORMAL

## 2019-09-03 ENCOUNTER — PATIENT OUTREACH (OUTPATIENT)
Dept: CARDIOLOGY CLINIC | Age: 84
End: 2019-09-03

## 2019-09-04 ENCOUNTER — OFFICE VISIT (OUTPATIENT)
Dept: CARDIOLOGY CLINIC | Age: 84
End: 2019-09-04

## 2019-09-04 VITALS
DIASTOLIC BLOOD PRESSURE: 70 MMHG | SYSTOLIC BLOOD PRESSURE: 130 MMHG | HEIGHT: 69 IN | BODY MASS INDEX: 24.29 KG/M2 | OXYGEN SATURATION: 92 % | RESPIRATION RATE: 16 BRPM | WEIGHT: 164 LBS | HEART RATE: 85 BPM

## 2019-09-04 DIAGNOSIS — I48.20 ATRIAL FIBRILLATION, CHRONIC (HCC): ICD-10-CM

## 2019-09-04 DIAGNOSIS — N18.30 CKD (CHRONIC KIDNEY DISEASE) STAGE 3, GFR 30-59 ML/MIN (HCC): ICD-10-CM

## 2019-09-04 DIAGNOSIS — I73.9 PERIPHERAL VASCULAR DISEASE (HCC): ICD-10-CM

## 2019-09-04 DIAGNOSIS — I50.32 DIASTOLIC CHF, CHRONIC (HCC): Primary | ICD-10-CM

## 2019-09-04 DIAGNOSIS — I10 HYPERTENSION, ESSENTIAL, BENIGN: ICD-10-CM

## 2019-09-04 DIAGNOSIS — I25.10 CORONARY ARTERY DISEASE INVOLVING NATIVE CORONARY ARTERY OF NATIVE HEART WITHOUT ANGINA PECTORIS: ICD-10-CM

## 2019-09-04 DIAGNOSIS — E78.00 HYPERCHOLESTEREMIA: ICD-10-CM

## 2019-09-04 DIAGNOSIS — Z95.1 HX OF CABG: ICD-10-CM

## 2019-09-04 DIAGNOSIS — I65.23 BILATERAL CAROTID ARTERY STENOSIS: ICD-10-CM

## 2019-09-04 NOTE — PROGRESS NOTES
Visit Vitals  /70 (BP 1 Location: Left arm, BP Patient Position: Sitting)   Pulse 85   Resp 16   Ht 5' 9\" (1.753 m)   Wt 164 lb (74.4 kg)   SpO2 92%   BMI 24.22 kg/m²

## 2019-09-04 NOTE — PROGRESS NOTES
[address] [facility]     1935       David Gamboa MD, [facility] [place]  Date of Visit-9/4/2019   PCP is Simon Strickland MD   [facility] [place]  [facility]  HPI:  25 [facility]. is a 81 y.o. male   Pt with CHF, CAD, CKD, see previous note. Hospitalized 8/5/19. We did not feel it was CHF, more likely to be pneumonia. Was discharged yesterday after improving respiratory failure and was on Levaquin. He had some dizziness and orthostasis and was on florinef. He has CKD, creatinine was 3.8. There was a suspicion of legionella pneumonia. Pt had slight elevation of troponin. EF was normal.   08/05/19   ECHO ADULT COMPLETE 08/06/2019 8/6/2019     Narrative · Left Ventricle: Normal cavity size, systolic function (ejection fraction   normal) and diastolic function. Mild concentric hypertrophy. Estimated   left ventricular ejection fraction is 66 - 70%. Visually measured ejection   fraction. No regional wall motion abnormality noted. Normal left   ventricular strain. · Left Atrium: Moderately dilated left atrium. · Right Atrium: Mildly dilated right atrium. · Interatrial Septum: Color flow Doppler was used. · Aortic Valve: Mild aortic valve sclerosis with no significant stenosis. · Mitral Valve: Mitral valve thickening. Mild mitral valve regurgitation. · Tricuspid Valve: Mild tricuspid valve regurgitation is present. · Pulmonary Artery: Mild to moderate pulmonary hypertension.          Signed by: Carmen Trujillo MD       Pt is present with his wife and is in a wheelchair. Pt is on oxygen. Overall the pt states he is doing well, but notes that he is still experiencing some swelling. Pt denies any dizziness when standing up. Pt states that he cannot walk short distances without getting SOB. Pt states that his SOB has gotten worse ever since getting pneumonia. Pt now has therapy coming to his house 4 days a week. Pt also has a home health nurse coming every week.  The home health nurse told them that he was not a candidate for rehab at a hospital.     Denies chest pain, syncope , has no tachycardia, palpitations or sense of arrhythmia. Assessment/Plan:   Patient Instructions   Stop Florinef    You will need to follow up in clinic with Dr. Taylor Byers in 6-8 weeks. 1. Diastolic CHF, chronic (HCC)  Pretty much the same in eight today as last time. Will stay on the same amount of Bumex. Is very short of breath going to room to room post pneumonia. Therapy which is now arranged will be helpful. Will see him back in 6 weeks. Will continue Bumex. BP has been low to really not have us use much Coreg. Will have him stop florinef. 2. CAD native vessel with no angina  JOCELYN 8-21-17 Normal perfusion    3. CKD-- headed to [facility] control volume- Dr Tomasa Shine following closely  4.diastolic dysfunction CHF  ---RHC May 2, 2019 =PA 66/18 W 20 PA sat 55% CO 4.1 CI 2.12  Echo May 1 ,2019 EF 55-60% mod LAE, Mild CASSIE, Mild AS BELLE 1.6 cm2 mild MR & TR, RV fx mildly reduced    5. Chronic afib with pacer-rate control with anticoag only if no bleeding  6. PVD recurrent  PVD-9/27/18- RLE- mid CRA 70, Pop 99-PTA on right pop,  LLE LCFA 40%         - 1-19-17 PTA RCFA 90, JAS to [facility]         -12/22/16 PTA Left Pop, PTA  Left tib-per        --0-62-56 PTA Left op PTA RSFA       ---1/25/11 stent RSFA   7. HTN-elevated with CKD    F/u in 6 weeks        Impression:   1. Diastolic CHF, chronic ([address]    2. Coronary artery disease involving native coronary artery of native heart without angina pectoris    3. Atrial fibrillation, chronic ([address]    4. CKD (chronic kidney disease) stage 3, GFR 30-59 ml/min ([facility])    5. Peripheral vascular disease ([address]    6. Hx of CABG    7. Bilateral carotid artery stenosis    8. Hypercholesteremia    9. Hypertension, essential, benign       Cardiac History:   CAD/CABG   off pump x3 3/2008 .  =post op anemia and renal failure  CATH March 7, 2008=   LM -bifurcation dz 40% ;LAD 85% ;Circ ost 70% mid 70%   RCA proximal 60% tapering, EF 60%-70%, bilaterally patent renal arteries, mild atherosclerosis of the distal abdominal aorta. [address] May 2, 2019 =PA 66/18 W 20 PA sat 55% CO 4.1 CI 2.12  Echo May 1 ,2019 EF 55-60% mod LAE, Mild ACSSIE, Mild AS BELLE 1.6 cm2 mild MR & TR, RV fx mildly reduced  PVD-18- RLE- mid CRA 70, Pop 99-PTA on right pop, LLE LCFA 40%  - 17 PTA RCFA 90, JAS to RSFA   -16 PTA Left Pop, PTA Left tib-per  --3-13-14 PTA Left op PTA RSFA  ---11 stent RSFA   JOCELYN 17 Normal perfusion    Mild carotid artery disease 3-7-08 then 12 with 10-49% left, less than 10% on right    SOCIAL: Drinks no alcohol, quit smoking several years ago, worked as an . Lives with his wife and has four children. Enjoys gardening, fishing and music. FAMILY HISTORY: Mother  of cancer at 76, father  of Parkinson's at 70, one brother  of cancer of the liver at 76 and one  of an infection at 64. ROS-except as noted above. . A complete cardiac and respiratory are reviewed and negative except as above ; Resp-denies wheezing  or productive cough,. Const- No unusual weight loss or fever; Neuro-no recent seizure or CVA ; GI- No BRBPR, abdom pain, bloating ; - no  hematuria   see supplement sheet, initialed and to be scanned by staff  Past Medical History:   Diagnosis Date    CAD (coronary artery disease)     s/p CABG     [facility]     CKD (chronic kidney disease) stage 3, GFR 30-59 ml/min ([facility] 10/21/2017    hypertension and DM nephrosclerosis    Diabetes mellitus, type 2 ([facility]     Hypercholesteremia     Hypertension     Paroxysmal atrial fibrillation ([facility] 10/21/2017    new onset afib with BRPR 10-19-17 admit    PVC's (premature ventricular contractions) 2014    PVD (peripheral vascular disease) ([facility]       Social Hx= reports that he quit smoking about 34 years ago.  His smoking use included cigarettes. He has a 60.00 pack-year smoking history. He has never used smokeless tobacco. He reports that he does not drink alcohol or use drugs. Exam and Labs:  /70 (BP 1 Location: Left arm, BP Patient Position: Sitting)   Pulse 85   Resp 16   Ht 5' 9\" (1.753 m)   Wt 164 lb (74.4 kg)   SpO2 92%   BMI 24.22 kg/m² Constitutional:  NAD, comfortable  Head: NC,AT. Eyes: No scleral icterus. Neck:  Neck supple. No JVD present. Throat: moist mucous membranes. Chest: Effort normal & normal respiratory excursion . Neurological: alert, conversant and oriented . Skin: Skin is not cold. No obvious systemic rash noted. Not diaphoretic. No erythema. Psychiatric:  Grossly normal mood and affect. Behavior appears normal. Extremities:  no clubbing or cyanosis. Abdomen: non distended    Lungs: Diffuse mild crackles, not focal breath sounds normal. No stridor. distress, wheezes or  Rales. Heart:3/6 murmur, loudest at the LUSB with a mechanical click normal rate, regular rhythm, normal S1, S2, no rubs, clicks or gallops , PMI non displaced. Edema: Edema is 1+ pitting to the knees.   No results found for: CHOL, CHOLX, CHLST, CHOLV, HDL, LDL, LDLC, DLDLP, TGLX, TRIGL, TRIGP, CHHD, CHHDX  Lab Results   Component Value Date/Time    Sodium 143 08/28/2019 01:57 AM    Potassium 3.4 (L) 08/28/2019 01:57 AM    Chloride 107 08/28/2019 01:57 AM    CO2 29 08/28/2019 01:57 AM    Anion gap 7 08/28/2019 01:57 AM    Glucose 82 08/28/2019 01:57 AM    BUN 36 (H) 08/28/2019 01:57 AM    Creatinine 1.89 (H) 08/28/2019 01:57 AM    BUN/Creatinine ratio 19 08/28/2019 01:57 AM    GFR est AA 41 (L) 08/28/2019 01:57 AM    GFR est non-AA 34 (L) 08/28/2019 01:57 AM    Calcium 7.4 (L) 08/28/2019 01:57 AM      Wt Readings from Last 3 Encounters:   09/04/19 164 lb (74.4 kg)   08/27/19 166 lb (75.3 kg)   08/21/19 164 lb (74.4 kg)      BP Readings from Last 3 Encounters:   09/04/19 130/70   08/28/19 156/79   08/21/19 110/60      Current Outpatient Medications   Medication Sig    insulin glargine (LANTUS U-100 INSULIN) 100 unit/mL injection 5 Units by SubCUTAneous route every morning. Prescribed 18 units daily. Reports taking 10 units in the morning, then up to 8 units in the evening depending on his blood sugars. If blood sugar is <200, he reduces his evening dose.  bumetanide (BUMEX) 1 mg tablet Take 1 Tab by mouth daily as needed (swelling).  carvedilol (COREG) 3.125 mg tablet Take 1 Tab by mouth two (2) times daily (with meals) for 30 days.  fludrocortisone (FLORINEF) 0.1 mg tablet Take 1 Tab by mouth daily for 30 days.  albuterol (PROVENTIL VENTOLIN) 2.5 mg /3 mL (0.083 %) nebulizer solution 3 mL by Nebulization route every four (4) hours as needed for Wheezing.  sodium bicarbonate 650 mg tablet Take 2 Tabs by mouth three (3) times daily.  tamsulosin (FLOMAX) 0.4 mg capsule Take 1 Cap by mouth daily.  aspirin 81 mg chewable tablet Take 1 Tab by mouth daily.  omega 3-dha-epa-fish oil (FISH OIL) 100-160-1,000 mg cap Take 1 Cap by mouth two (2) times a day.  cinnamon bark (CINNAMON) 500 mg cap Take 1,000 mg by mouth two (2) times a day.  levoFLOXacin (LEVAQUIN) 500 mg tablet Take 1 Tab by mouth daily.  simvastatin (ZOCOR) 20 mg tablet Take 20 mg by mouth nightly. No current facility-administered medications for this visit. Impression see above.       Written by Aniya Mendoza, as dictated by Kimberly Gacsa MD.

## 2019-09-04 NOTE — Clinical Note
9/4/19 Patient: Rajendra Ndiaye. YOB: 1935 Date of Visit: 9/4/2019 Sherwin Dunn MD 
[address] [place] [address] VIA Facsimile: [phone] Dear Sherwin Dunn MD, Thank you for referring Mr. Blanche Gray to [facility] for evaluation. My notes for this consultation are attached. If you have questions, please do not hesitate to call me. I look forward to following your patient along with you.  
 
 
Sincerely, 
 
Erik Garcia MD

## 2019-09-04 NOTE — PATIENT INSTRUCTIONS
Carlos A Donahue    You will need to follow up in clinic with Dr. Jose Alejandro Hedrick in 6-8 weeks.

## 2019-09-04 NOTE — PROGRESS NOTES
Goals Addressed                 This Visit's Progress     Attends follow-up appointments as directed. 08/21/19  CTN unable to reach pt this morning--LM requesting return call . Patient discharged with following appts:    Dr Idania Lincoln      8/21/19 at 4:20pm    Dr Coretta Lugo   Per chart notes IP team was informed by PCP office that pt would have to make follow up appt---will confirm with pt/wife this is done. Pulmonary---pt did not have pulmonary appt on AVS, will recommend pt see pulmonary ASAP and ask for repeat CT in 4-6 weeks    Nephrology---no appt on AVS, will confirm that pt has a follow up appt and to see dietician regarding low albumin    Per AVS pt to receive [facility] for PT/OT. ---mkrw      UPDATE:  CTN was able to reach patient on cell phone. He reports he just left his PCP appt and is aware that he sees Dr Idania Lincoln today. Patient was in a parking lot when he answered call, CTN will call pt tomorrow when pt is home. ---mkrw    08/29/19  CTN notes pt recently readmitted to [facility] on 8/27-8/28 for hypoglycemia. CTN contacted pt's wife to review appointments:    PCP Dr Jose Hansen              9/3/19 at  10:15  Cards Dr Idania Lincoln      9/4/19 at   3:00    CTN asked about pulmonary follow up---wife states they have appt in November with Dr Jose Corona. CTN asked if I could move appt up since it was recommended during previous admission that pt have follow up CT scan (see notes Richar MARTINEZ on 8/13/19) in 4-6 weeks and patient was placed back on Levaquin again this readmission for pneumonia. [place] that sooner follow up would benefit patient. Wife agreeable. [facility], new appt made for 9/12/19 at 1:00. Wife made aware. CTN faxed notes over to Dr Jose Corona (fax [phone]) regarding follow up CT as OP. Wife reports pt seeing a podiatrist on 8/30 to have toenails clipped. [facility] has contacted patient's wife for resumption of care beginning today.     CTN will follow up next week to follow up on attendance. ---mkrw    09/03/19  Pt unable to get to PCP appt today due to weakness--wife reports she doesn't have help to get pt into wheelchair and into car, feels it would be unsafe for her to do this alone. CTN notified MD office. CTN called PCP back again after [facility] evaluated pt to get another follow up appt based on [facility] evaluation of patient. Since it was decided that pt would not have to go to SNF for rehab at this time, pt next PCP appt rescheduled for 9/11/19 at 1015. Pt's wife made aware---mkrw       Prevent complications post hospitalization. 08/28/19  Per chart notes, pt readmitted to [facility] on 8/27/19 for hypoglycemia. BS was reported to be 27 per ED notes. CTN contacted IP [facility] to request consult for diabetic educator to speak with pt about hypoglycemia and what he can do at home prior to calling rescue squad. She states she will ask MD for consult. CTN will follow. ---mkrw    08/29/19  CTN spoke with patient's wife today. Patient was discharged prior to having diabetic educator see patient as requested by this CTN yesterday. CTN reviewed s/sx hypoglycemia with wife and what to do at home for hypoglycemia. CTN also mailed information about the 15/15/15 rule and ideas for bedtimes snacks to patient for review. Will discuss with pt next week and answer questions. ---mkrw    09/03/19  Patient's wife called CTN to report patient almost fell this morning--reports that pt lowered himself to the ground because he thought he was going to fall. Pt /69, HR 92.   02 2L nc sat 97%. Pt denies CP, unusual SOB (pt has COPD) from baseline, is alert and oriented X 3. Wife reports the only complaint is weakness. Wife states that she would be unable to take pt to PCP appt this morning as pt is too weak to get into the car. She does not have anyone to help her today--her son is in the hospital and \"everyone else is at [facility]".   She states pt wanted to call the rescue squad however wife called this CTN instead. CTN called [address] to [place]. She reports she can get a nurse to see pt today to evaluate. CTN gave her contact number to ask nurse to call me. CTN called PCP office and left VM on  [facility] nurse to inform them that pt would not be able to come to appt today and would like to speak with her about possible admission to SNF for rehab. UPDATE: 56 CTN spoke to Dr Alize Carr agrees with [facility] Morgan nurse reevaluating and asked the nurse to call him after she assesses pt. Will notify [facility] ---mkrw    [id number]  Received call from pt's wife--she reports Mandi Vidal from [facility] came to home, she states that nurse felt pt did not need to go to hospital or inpatient rehab yet. Nurse called Dr Isabel Espinosa to request increased PT and SN visits for patient. They will start with 2 SN and PT visits a week to see if this will help increase pt's endurance. CTN spoke directly with [facility] Cotter nurse Mandi Vidal to confirm this. She states that due to second rehospitalization and pt's follow up appts that PT had not had the chance to work with pt effectively. She also reports that pt lives so far out that drive-by's are not feasible--she states she asked Mrs Omar Singh to please answer phone so therapies could come.    CTN will follow up with pt later this week for updates---mkrw

## 2019-09-05 ENCOUNTER — PATIENT OUTREACH (OUTPATIENT)
Dept: CARDIOLOGY CLINIC | Age: 84
End: 2019-09-05

## 2019-09-05 NOTE — PROGRESS NOTES
Goals Addressed                 This Visit's Progress     Supportive resources in place to maintain patient in the community (ie. Home Health, DME equipment, refer to, medication assistant plan, etc.)        08/21/19  CTN [facility] [place] [facility]spoke to Mariano Markham. She reports that pt only has PT/OT ordered and that [facility] is 8/22/19. CTN asked about SN--she states that they were not notified pt was on CHF Bundle. CTN sent message to [address] [place] [address] manager at [facility] to ask about including SN to pt's orders. Will follow up later today---mkr    Update: Received message from [facility] that she adjusted order to include SN. CTN called [facility] , spoke to Mariano Markham to let her know. She states she has added SN to pt's services. ---mkrw    08/22/19  CTN contacted pt's wife, she could not talk at time of call, but did report pt had PT this morning. CTN will call back later this afternoon per wife request.---mkrw    08/29/19  Pt recently discharged from [facility] 8/27--8/28 for hypoglycemia. CTN spoke to wife. Maulik Lowry has been in touch with family today to resume services. Per wife Patient does have home 02 at 2 L NC set up by Megan Hobbs since last hospital discharge. ---mkrw    09/05/19  CTN met with patient and wife today after cards appt with Dr Dinesh Medel . Patient and wife wanted to meet CTN in person. We discussed starting a think about long term care needs and asked pt/wife to speak with family regarding a plan in the event that patient or wife's health were to decline. Wife reports that she did find a friend's son who lives close to them that agrees to assist family. She has spoken with him and has his contact number. We also discussed speaking with neighbors to see if they would be willing to assist in the event of emergency. Wife states that they haven't established relationships yet with neighbors but will make attempt.  CTN also encouraged wife again to speak with their children regarding assistance if needed. Informed them that if patient readmits again he may become weaker and may not be able to discharge home if no support available. Pt  And wife verbalize understanding.  CTN will follow up next call---xena

## 2019-09-09 ENCOUNTER — PATIENT OUTREACH (OUTPATIENT)
Dept: CARDIOLOGY CLINIC | Age: 84
End: 2019-09-09

## 2019-09-09 NOTE — PROGRESS NOTES
Goals Addressed                 This Visit's Progress     Maintains daily weight. 08/23/19  CTN unable to reach pt/wife , LM on VM. Will need to monitor weight closely as pt is off Bumex and was instructed by cards to only take PRN if weight increase by 3 lbs. Current weight documented 164 lbs per cards OV note. Will follow up with pt next week for updates---mkrw    09/09/19  Spouse reports pt has not weighed yet today --weight 162-164lb over the weekend. Pt BP stable  --she reports on 9/6 135/55. 9/7 136/52--this is after d/cing florinef on 9/5/19. She has not taken pt's BP yet today, she states pt ate breakfast and bathed himself--this wore him out so he is currently sleeping. She reports his BS today at 178. CTN will follow up with pt later this week for weights---mkrw         Prevent complications post hospitalization. 08/28/19  Per chart notes, pt readmitted to [facility] on 8/27/19 for hypoglycemia. BS was reported to be 27 per ED notes. CTN contacted  [facility] to request consult for diabetic educator to speak with pt about hypoglycemia and what he can do at home prior to calling rescue squad. She states she will ask MD for consult. CTN will follow. ---mkrw    08/29/19  CTN spoke with patient's wife today. Patient was discharged prior to having diabetic educator see patient as requested by this CTN yesterday. CTN reviewed s/sx hypoglycemia with wife and what to do at home for hypoglycemia. CTN also mailed information about the 15/15/15 rule and ideas for bedtimes snacks to patient for review. Will discuss with pt next week and answer questions. ---mkrw    09/03/19  Patient's wife called CTN to report patient almost fell this morning--reports that pt lowered himself to the ground because he thought he was going to fall. Pt /69, HR 92.   02 2L nc sat 97%. Pt denies CP, unusual SOB (pt has COPD) from baseline, is alert and oriented X 3. Wife reports the only complaint is weakness. Wife states that she would be unable to take pt to PCP appt this morning as pt is too weak to get into the car. She does not have anyone to help her today--her son is in the hospital and \"everyone else is at [facility]". She states pt wanted to call the rescue squad however wife called this CTN instead. CTN called [address] to [place]. She reports she can get a nurse to see pt today to evaluate. CTN gave her contact number to ask nurse to call me. CTN called PCP office and left VM on  [facility] nurse to inform them that pt would not be able to come to appt today and would like to speak with her about possible admission to SNF for rehab. UPDATE: 56 CTN spoke to Dr Davina De La Torre agrees with [facility] nurse reevaluating and asked the nurse to call him after she assesses pt. Will notify [facility]---mkrw    1430  Received call from pt's wife--she reports Alberto Paige from [facility] came to home, she states that nurse felt pt did not need to go to hospital or inpatient rehab yet. Nurse called Dr John Pepe to request increased PT and SN visits for patient. They will start with 2 SN and PT visits a week to see if this will help increase pt's endurance. CTN spoke directly with [facility] nurse Alberto Paige to confirm this. She states that due to second rehospitalization and pt's follow up appts that PT had not had the chance to work with pt effectively. She also reports that pt lives so far out that drive-by's are not feasible--she states she asked Mrs Dona Rothman to please answer phone so therapies could come. CTN will follow up with pt later this week for updates---mkrw    09/09/19  Patient's wife called stating that patient wants to go to SNF for rehab. She reports patient does not have any confidence therefore is not getting up from chair for fear of falling. She states that pt is aware that wife is unable to help him in his current states as this is too much for her. She wants to know what to do.  CTN called [facility], spoke to [facility] pt's concerns. She will have PT call CTN back. CTN also called [facility], spoke with Ayse---she requested hospital H&P, med list,therapy notes---faxed to [phone]. Will ask [facility] to fax their notes as well when they call. Jan Pedrito will call CTN back after notes received/reviewed. Will follow up later today---mkrw    UPDATE: Spoke with Anand Lee , nurse at [facility] to update her that patient is asking to be placed in SNF for rehab. She states that her office can provide notes needed for [facility]. CTN called [place] [facility] office , spoke to [facility] SRINI AGGARWAL FALL her contact information to fax therapy notes to [facility]. She states she will do this today. CTN called pt's wife to update her---mkrw    UPDATE:  Jan Major at [facility] called to report that patient has been accepted to their facility for rehab. She states he can come tomorrow. CTN notified wife who states she will transport him tomorrow. She asks about appts she has this week and 02 deliver tomorrow. CTN informed her not to go to PCP as he will be seen by facility MD, but I encouraged her to take patient to see Dr Ruthie Shane, pulmonology on Thursday. CTN called [facility], spoke to Liseth Ugalde to cancel home 02 deliver. They will await call from pt/wife when next delivery needed. CTN called PCP office, cancelled appt on 9/11,  for MD nurse to inform them that pt would be going to SNF for rehab. CTN also called Anand Lee at [facility] to let her know that pt accepted into [facility] and wife would be taking pt tomorrow. She states she will call wife tonight and thanked CTN for information.   CTN will follow up next week at [facility] for updates---mkrw

## 2019-09-10 ENCOUNTER — OFFICE VISIT (OUTPATIENT)
Dept: CARDIOLOGY CLINIC | Age: 84
End: 2019-09-10

## 2019-09-10 ENCOUNTER — TELEPHONE (OUTPATIENT)
Dept: CARDIOLOGY CLINIC | Age: 84
End: 2019-09-10

## 2019-09-10 ENCOUNTER — PATIENT OUTREACH (OUTPATIENT)
Dept: CARDIOLOGY CLINIC | Age: 84
End: 2019-09-10

## 2019-09-10 DIAGNOSIS — Z95.0 CARDIAC PACEMAKER IN SITU: Primary | ICD-10-CM

## 2019-09-10 NOTE — TELEPHONE ENCOUNTER
Deana Goodpasture From [address] states patient is there for rehab and has a \"heart monitor\" with him. They do not know what to do with it nor have any instructions for it and would like to have instructions faxed to them.      Fax: [phone]    Phone: [phone] Station [phone]

## 2019-09-11 ENCOUNTER — PATIENT OUTREACH (OUTPATIENT)
Dept: CARDIOLOGY CLINIC | Age: 84
End: 2019-09-11

## 2019-09-11 NOTE — PROGRESS NOTES
Goals Addressed                 This Visit's Progress     Prevent complications post hospitalization. 08/28/19  Per chart notes, pt readmitted to [facility] on 8/27/19 for hypoglycemia. BS was reported to be 27 per ED notes. CTN contacted IP GAL Almanzar to request consult for diabetic educator to speak with pt about hypoglycemia and what he can do at home prior to calling rescue squad. She states she will ask MD for consult. CTN will follow. ---mkrw    08/29/19  CTN spoke with patient's wife today. Patient was discharged prior to having diabetic educator see patient as requested by this CTN yesterday. CTN reviewed s/sx hypoglycemia with wife and what to do at home for hypoglycemia. CTN also mailed information about the 15/15/15 rule and ideas for bedtimes snacks to patient for review. Will discuss with pt next week and answer questions. ---rw    09/03/19  Patient's wife called CTN to report patient almost fell this morning--reports that pt lowered himself to the ground because he thought he was going to fall. Pt /69, HR 92.   02 2L nc sat 97%. Pt denies CP, unusual SOB (pt has COPD) from baseline, is alert and oriented X 3. Wife reports the only complaint is weakness. Wife states that she would be unable to take pt to PCP appt this morning as pt is too weak to get into the car. She does not have anyone to help her today--her son is in the hospital and \"everyone else is at [facility]". She states pt wanted to call the rescue squad however wife called this CTN instead. CTN called [address] to Oliva Nye. She reports she can get a nurse to see pt today to evaluate. CTN gave her contact number to ask nurse to call me. CTN called PCP office and left VM on  [facility] nurse to inform them that pt would not be able to come to appt today and would like to speak with her about possible admission to SNF for rehab.     UPDATE: 56 CTN spoke to Dr Angela Russell agrees with [place] nurse reevaluating and asked the nurse to call him after she assesses pt. Will notify [place] ---mkrw    143  Received call from pt's wife--she reports Elkin Soto from [facility] came to home, she states that nurse felt pt did not need to go to hospital or inpatient rehab yet. Nurse called Dr Ariella Eric to request increased PT and SN visits for patient. They will start with 2 SN and PT visits a week to see if this will help increase pt's endurance. CTN spoke directly with Jessica Molina nurse Elkin Soto to confirm this. She states that due to second rehospitalization and pt's follow up appts that PT had not had the chance to work with pt effectively. She also reports that pt lives so far out that drive-by's are not feasible--she states she asked Mrs Candida Pereira to please answer phone so therapies could come. CTN will follow up with pt later this week for updates---mkrw    09/09/19  Patient's wife called stating that patient wants to go to SNF for rehab. She reports patient does not have any confidence therefore is not getting up from chair for fear of falling. She states that pt is aware that wife is unable to help him in his current states as this is too much for her. She wants to know what to do. CTN called [facility], spoke to [facility] pt's concerns. She will have PT call CTN back. CTN also called [facility], spoke with Ayse---she requested hospital H&P, med list,therapy notes---faxed to [phone]. Will ask Rupinder to fax their notes as well when they call. Jana Ellis will call CTN back after notes received/reviewed. Will follow up later today---mkrw    UPDATE: Spoke with Elkin Soto nurse at [facility] to update her that patient is asking to be placed in SNF for rehab. She states that her office can provide notes needed for [facility]. CTN called Jessica Molina office , spoke to [facility] ADAMARIS ALESSIA FALL her contact information to fax therapy notes to [facility]. She states she will do this today.  CTN called pt's wife to update her---mkrw    UPDATE:  Jana Ellis at [address] called to report that patient has been accepted to their facility for rehab. She states he can come tomorrow. CTN notified wife who states she will transport him tomorrow. She asks about appts she has this week and 02 deliver tomorrow. CTN informed her not to go to PCP as he will be seen by facility MD, but I encouraged her to take patient to see Dr Michael Moura, pulmonology on Thursday. CTN called [facility], spoke to Lucinda Melton to cancel home 02 deliver. They will await call from pt/wife when next delivery needed. CTN called PCP office, cancelled appt on 9/11,  for MD nurse to inform them that pt would be going to SNF for rehab. CTN also called Vi Matthews at [facility] to let her know that pt accepted into [facility] and wife would be taking pt tomorrow. She states she will call wife tonight and thanked CTN for information. CTN will follow up next week at [facility] for updates---mkrw    09/10/19  CTN unable to reach pt's wife-- asking for return call. CTN wants to confirm patient placement into [facility] [place] [address] today. Will follow up later this week---mkrw    09/11/19  CTN received VM from pt's wife, however no information given. CTN returned call, had to  on VM---mkrw    UPDATE: Mrs Pa Stern called back to report patient was admitted to [place] [address] yesterday without any problems. She will be taking him to his specialty appts (pulmonary and cardiology) while he is there.   CTN will follow up with wife next week for updates---mkrw

## 2019-09-16 ENCOUNTER — PATIENT OUTREACH (OUTPATIENT)
Dept: CARDIOLOGY CLINIC | Age: 84
End: 2019-09-16

## 2019-09-16 NOTE — PROGRESS NOTES
Goals Addressed                 This Visit's Progress     Attends follow-up appointments as directed. 08/21/19  CTN unable to reach pt this morning--LM requesting return call . Patient discharged with following appts:    Dr Jesse Bright      8/21/19 at 4:20pm    Dr Jason Arthur   Per chart notes IP team was informed by PCP office that pt would have to make follow up appt---will confirm with pt/wife this is done. Pulmonary---pt did not have pulmonary appt on AVS, will recommend pt see pulmonary ASAP and ask for repeat CT in 4-6 weeks    Nephrology---no appt on AVS, will confirm that pt has a follow up appt and to see dietician regarding low albumin    Per AVS pt to receive [facility] for PT/OT. ---mkrw      UPDATE:  CTN was able to reach patient on cell phone. He reports he just left his PCP appt and is aware that he sees Dr Jesse Bright today. Patient was in a parking lot when he answered call, CTN will call pt tomorrow when pt is home. ---mkrw    08/29/19  CTN notes pt recently readmitted to [facility] on 8/27-8/28 for hypoglycemia. CTN contacted pt's wife to review appointments:    PCP Dr Isabel Espinosa              9/3/19 at  10:15  Cards Dr Jesse Bright      9/4/19 at   3:00    CTN asked about pulmonary follow up---wife states they have appt in November with Dr Jana Shipman. CTN asked if I could move appt up since it was recommended during previous admission that pt have follow up CT scan (see notes Loye Schilder PA on 8/13/19) in 4-6 weeks and patient was placed back on Levaquin again this readmission for pneumonia. [place] that sooner follow up would benefit patient. Wife agreeable. [facility], new appt made for 9/12/19 at 1:00. Wife made aware. CTN faxed notes over to Dr Jana Shipman (fax [phone]) regarding follow up CT as OP. Wife reports pt seeing a podiatrist on 8/30 to have toenails clipped. [facility] has contacted patient's wife for resumption of care beginning today.     CTN will follow up next week to follow up on attendance. ---xena    09/03/19  Pt unable to get to PCP appt today due to weakness--wife reports she doesn't have help to get pt into wheelchair and into car, feels it would be unsafe for her to do this alone. CTN notified MD office. CTN called PCP back again after [facility] evaluated pt to get another follow up appt based on [facility] evaluation of patient. Since it was decided that pt would not have to go to SNF for rehab at this time, pt next PCP appt rescheduled for 9/11/19 at 1015. Pt's wife made aware---xena    09/16/19  Patient currently at [facility] for SNF rehab. Pt's wife had contacted CTN to ask for assistance with moving an appt with Dr Brandie Bellamy to accommodate transportation, as pt is weak and wife needs assistance. Per Shannon at Dr Brandie Bellamy ofc, pt can be seen by FÉLIX Simental on 9/20 at 11:00. CTN contacted pt's wife to let her know of appt changes. Wife states this is good and will make arrangements with granddaughter to have pt seen at new date and time. ---xena

## 2019-09-20 ENCOUNTER — OFFICE VISIT (OUTPATIENT)
Dept: CARDIOLOGY CLINIC | Age: 84
End: 2019-09-20

## 2019-09-20 ENCOUNTER — PATIENT OUTREACH (OUTPATIENT)
Dept: CARDIOLOGY CLINIC | Age: 84
End: 2019-09-20

## 2019-09-20 VITALS
HEART RATE: 76 BPM | WEIGHT: 169 LBS | OXYGEN SATURATION: 97 % | DIASTOLIC BLOOD PRESSURE: 96 MMHG | RESPIRATION RATE: 14 BRPM | BODY MASS INDEX: 25.03 KG/M2 | HEIGHT: 69 IN | SYSTOLIC BLOOD PRESSURE: 160 MMHG

## 2019-09-20 DIAGNOSIS — I25.10 CORONARY ARTERY DISEASE INVOLVING NATIVE CORONARY ARTERY OF NATIVE HEART WITHOUT ANGINA PECTORIS: ICD-10-CM

## 2019-09-20 DIAGNOSIS — Z95.0 CARDIAC PACEMAKER IN SITU: Primary | ICD-10-CM

## 2019-09-20 DIAGNOSIS — I48.20 ATRIAL FIBRILLATION, CHRONIC (HCC): ICD-10-CM

## 2019-09-20 DIAGNOSIS — I10 HYPERTENSION, ESSENTIAL, BENIGN: ICD-10-CM

## 2019-09-20 DIAGNOSIS — I50.32 DIASTOLIC CHF, CHRONIC (HCC): ICD-10-CM

## 2019-09-20 RX ORDER — CARVEDILOL 6.25 MG/1
6.25 TABLET ORAL 2 TIMES DAILY WITH MEALS
Qty: 60 TAB | Refills: 5
Start: 2019-09-20 | End: 2019-12-03 | Stop reason: DRUGHIGH

## 2019-09-20 NOTE — PROGRESS NOTES
Patient has graduated from the Transitions of Care Coordination  program on 9/20/19. Patient's symptoms are stable at this time. Patient/family has the ability to self-manage. Care management goals have been completed at this time. No further nurse navigator follow up scheduled. Goals Addressed This Visit's Progress  COMPLETED: Attends follow-up appointments as directed. 08/21/19 CTN unable to reach pt this morning--LM requesting return call . Patient discharged with following appts: 
 
Dr Richard Esquivel      8/21/19 at 4:20pm 
 
Dr Melva Rodgers   Per chart notes IP team was informed by PCP office that pt would have to make follow up appt---will confirm with pt/wife this is done. Pulmonary---pt did not have pulmonary appt on AVS, will recommend pt see pulmonary ASAP and ask for repeat CT in 4-6 weeks Nephrology---no appt on AVS, will confirm that pt has a follow up appt and to see dietician regarding low albumin Per AVS pt to receive [facility] for PT/OT. ---mkrw UPDATE:  CTN was able to reach patient on cell phone. He reports he just left his PCP appt and is aware that he sees Dr Richard Esquivel today. Patient was in a parking lot when he answered call, CTN will call pt tomorrow when pt is home. ---mkrw 08/29/19 CTN notes pt recently readmitted to [facility] on 8/27-8/28 for hypoglycemia. CTN contacted pt's wife to review appointments: PCP Dr Didier Veloz              9/3/19 at  10:15 Cards Dr Richard Esquivel      9/4/19 at   3:00 
 
CTN asked about pulmonary follow up---wife states they have appt in November with Dr Garth Alvarez. CTN asked if I could move appt up since it was recommended during previous admission that pt have follow up CT scan (see notes Inocente MARTINEZ on 8/13/19) in 4-6 weeks and patient was placed back on Levaquin again this readmission for pneumonia. [place] that sooner follow up would benefit patient. Wife agreeable.  [facility], new appt made for 9/12/19 at 1:00. Wife made aware. CTN faxed notes over to Dr Dima Steele (fax [phone]) regarding follow up CT as OP. Wife reports pt seeing a podiatrist on 8/30 to have toenails clipped. [facility] has contacted patient's wife for resumption of care beginning today. CTN will follow up next week to follow up on attendance. ---mkrw 09/03/19 Pt unable to get to PCP appt today due to weakness--wife reports she doesn't have help to get pt into wheelchair and into car, feels it would be unsafe for her to do this alone. CTN notified MD office. CTN called PCP back again after [facility] evaluated pt to get another follow up appt based on [facility] evaluation of patient. Since it was decided that pt would not have to go to SNF for rehab at this time, pt next PCP appt rescheduled for 9/11/19 at 1015. Pt's wife made aware---mkrw 09/16/19 Patient currently at [facility] for SNF rehab. Pt's wife had contacted CTN to ask for assistance with moving an appt with Dr Fritz Owens to accommodate transportation, as pt is weak and wife needs assistance. Per Shannon at Dr Fritz Owens ofc, pt can be seen by FÉLIX Saavedra on 9/20 at 11:00. CTN contacted pt's wife to let her know of appt changes. Wife states this is good and will make arrangements with granddaughter to have pt seen at new date and time. ---mkrw 09/20/19 Per chart notes, pt did attend pacer appt with FÉLIX Saavedra at Dr Fritz Owens office today. Patient remains at [facility] for rehab at this time. Spouse has been making sure pt attends appts and provides transportation---mkrw  COMPLETED: Maintains daily weight. 08/23/19 CTN unable to reach pt/wife , LM on VM. Will need to monitor weight closely as pt is off [employer] and was instructed by cards to only take PRN if weight increase by 3 lbs. Current weight documented 164 lbs per cards OV note. Will follow up with pt next week for updates---xena 09/09/19 Spouse reports pt has not weighed yet today --weight 162-164lb over the weekend. Pt BP stable  --she reports on 9/6 135/55. 9/7 136/52--this is after d/cing florinef on 9/5/19. She has not taken pt's BP yet today, she states pt ate breakfast and bathed himself--this wore him out so he is currently sleeping. She reports his BS today at 178. CTN will follow up with pt later this week for weights---mkrw 09/20/19 CTN was able to see in chart notes pt weight at [facility] appt today 169 lbs. CTN called [facility]--spoke to [facility]. She states pt is on daily weights and yesterday pt was 164 lbs. She also reports that pt's highest weight recorded was 167lbs since his admission. CTN asked if they could reweigh patient when he returns to facility to make sure his weight has not increased. [facility] states she will and will notify provider if weight has gone up 3 lbs--she also mentions that there is a provider there over the weekend as well in case pt were to have sudden weight increase. CTN unable to reach spouse--LM on ---rw  COMPLETED: Prevent complications post hospitalization. 08/28/19 Per chart notes, pt readmitted to [facility] on 8/27/19 for hypoglycemia. BS was reported to be 27 per ED notes. CTN contacted IP GAL Barnard to request consult for diabetic educator to speak with pt about hypoglycemia and what he can do at home prior to calling rescue squad. She states she will ask MD for consult. CTN will follow. ---mkrw 08/29/19 CTN spoke with patient's wife today. Patient was discharged prior to having diabetic educator see patient as requested by this CTN yesterday. CTN reviewed s/sx hypoglycemia with wife and what to do at home for hypoglycemia. CTN also mailed information about the 15/15/15 rule and ideas for bedtimes snacks to patient for review. Will discuss with pt next week and answer questions. ---mkrw 09/03/19 Patient's wife called CTN to report patient almost fell this morning--reports that pt lowered himself to the ground because he thought he was going to fall. Pt /69, HR 92.  
02 2L nc sat 97%. Pt denies CP, unusual SOB (pt has COPD) from baseline, is alert and oriented X 3. Wife reports the only complaint is weakness. Wife states that she would be unable to take pt to PCP appt this morning as pt is too weak to get into the car. She does not have anyone to help her today--her son is in the hospital and \"everyone else is at [facility]". She states pt wanted to call the rescue squad however wife called this CTN instead. CTN called [address] to [place]. She reports she can get a nurse to see pt today to evaluate. CTN gave her contact number to ask nurse to call me. CTN called PCP office and left VM on  [facility] nurse to inform them that pt would not be able to come to appt today and would like to speak with her about possible admission to SNF for rehab. UPDATE: 56 CTN spoke to Dr Mary Corbett agrees with [facility] nurse reevaluating and asked the nurse to call him after she assesses pt. Will notify [place] ---mkrw 1430  Received call from pt's wife--she reports Jose Alberto Aiken from [facility] came to home, she states that nurse felt pt did not need to go to hospital or inpatient rehab yet. Nurse called Dr Anu Lugo to request increased PT and SN visits for patient. They will start with 2 SN and PT visits a week to see if this will help increase pt's endurance. CTN spoke directly with Birdie Lindsey nurse Jose Alberto Aiken to confirm this. She states that due to second rehospitalization and pt's follow up appts that PT had not had the chance to work with pt effectively. She also reports that pt lives so far out that drive-by's are not feasible--she states she asked Mrs Raymond Zurita to please answer phone so therapies could come. CTN will follow up with pt later this week for updates---xena 09/09/19 Patient's wife called stating that patient wants to go to SNF for rehab. She reports patient does not have any confidence therefore is not getting up from chair for fear of falling. She states that pt is aware that wife is unable to help him in his current states as this is too much for her. She wants to know what to do. CTN called [facility], spoke to [facility] pt's concerns. She will have PT call CTN back. CTN also called [facility], spoke with Ayse---she requested hospital H&P, med list,therapy notes---faxed to [phone]. Will ask [place] to fax their notes as well when they call. Germania Mena will call CTN back after notes received/reviewed. Will follow up later today---mkrw UPDATE: Spoke with Daisy Roe , nurse at [facility] to update her that patient is asking to be placed in SNF for rehab. She states that her office can provide notes needed for [facility]. CTN called [place] [facility] office , [facility] her contact information to fax therapy notes to [facility]. She states she will do this today. CTN called pt's wife to update her---mkrw UPDATEBrady Alba at [facility] called to report that patient has been accepted to their facility for rehab. She states he can come tomorrow. CTN notified wife who states she will transport him tomorrow. She asks about appts she has this week and 02 deliver tomorrow. CTN informed her not to go to PCP as he will be seen by facility MD, but I encouraged her to take patient to see Dr Venus Haro, pulmonology on Thursday. CTN called [facility], spoke to Luwana Dancer to cancel home 02 deliver. They will await call from pt/wife when next delivery needed. CTN called PCP office, cancelled appt on 9/11,  for MD nurse to inform them that pt would be going to SNF for rehab. CTN also called Daisy Roe at [facility] to let her know that pt accepted into [facility] and wife would be taking pt tomorrow. She states she will call wife tonight and thanked CTN for information. CTN will follow up next week at [facility] for updates---xena 09/10/19 CTN unable to reach pt's wife-- asking for return call. CTN wants to confirm patient placement into [address] today. Will follow up later this week---mkrw 09/11/19 CTN received VM from pt's wife, however no information given. CTN returned call, had to  on VM---mkrw UPDATE: Mrs Dona Rothman called back to report patient was admitted to [place] [address] yesterday without any problems. She will be taking him to his specialty appts (pulmonary and cardiology) while he is there. CTN will follow up with wife next week for updates---mkrw 09/20/19 CTN unable to reach wife on phone-- on VM. Pt remains at [facility], nurse reports pt is progressing, no problems at this time---mkrw  COMPLETED: Supportive resources in place to maintain patient in the community (ie. Home Health, DME equipment, refer to, medication assistant plan, etc.)     
  08/21/19 CTN contacted [facility] to confirm [facility] services---spoke to Audrey King. She reports that pt only has PT/OT ordered and that [facility] is 8/22/19. CTN asked about SN--she states that they were not notified pt was on CHF Bundle. CTN sent message to [address] [place] [address] manager at [facility] to ask about including SN to pt's orders. Will follow up later today---mkrw Update: Received message from [facility] that she adjusted order to include SN. CTN called [facility] Lipscomb , spoke to Audrey King to let her know. She states she has added SN to pt's services. ---mkrw 08/22/19 CTN contacted pt's wife, she could not talk at time of call, but did report pt had PT this morning. CTN will call back later this afternoon per wife request.---mkrw 08/29/19 Pt recently discharged from [facility] 8/27--8/28 for hypoglycemia. CTN spoke to wife. Nuzhat Page has been in touch with family today to resume services. Per wife Patient does have home 02 at [address] set up by Danisha Moses since last hospital discharge. ---mkrw 09/05/19 CTN met with patient and wife today after cards appt with Dr Lisa Wilks . Patient and wife wanted to meet CTN in person. We discussed starting a think about long term care needs and asked pt/wife to speak with family regarding a plan in the event that patient or wife's health were to decline. Wife reports that she did find a friend's son who lives close to them that agrees to assist family. She has spoken with him and has his contact number. We also discussed speaking with neighbors to see if they would be willing to assist in the event of emergency. Wife states that they haven't established relationships yet with neighbors but will make attempt. CTN also encouraged wife again to speak with their children regarding assistance if needed. Informed them that if patient readmits again he may become weaker and may not be able to discharge home if no support available. Pt  And wife verbalize understanding. CTN will follow up next call---xena 09/20/19 Patient currently at [facility] for rehab--wife reports pt is progressing well. MARTINEZ period completed--mkrw Pt has nurse navigator's contact information for any further questions, concerns, or needs. Patients upcoming visits:   
Future Appointments Date Time Provider Muna Delgado 10/3/2019  1:00 PM Cindy Gonzalez MD cav ANAHI SCHED  
11/8/2019  9:40 AM Cindy Gonzalez  [address]  
11/22/2019 10:20 AM Cindy Gonzalez  [address]  
2/12/2020  2:15 PM REMOTE1, [facility] SCHED  
5/18/2020  9:15 AM REMOTE1, [address]  
8/24/2020  8:45 AM REMOTE1, [address]  
10/29/2020 10:30 AM PACEMAKER3, [facility] SCHED  
10/29/2020 10:40 AM Inge Emery  [address]

## 2019-09-20 NOTE — PATIENT INSTRUCTIONS
Increase Carvedilol to 6.25 mg twice daily    You will need to follow up in clinic with Dr. Jelly Munson in 2 weeks for a blood pressure check.

## 2019-09-20 NOTE — PROGRESS NOTES
Cardiac Electrophysiology OFFICE Note     Subjective:      Chris Carlos is a 81 y.o. patient who is seen for follow up, is s/p [employer] leadless pacemaker implant on 05/09/2019 for symptomatic irreversible bradycardia from permanent atrial fibrillation with slow ventricular rate despite holding beta blocker. He missed his originally scheduled 3 month follow up. Recent remote check showed proper function, >8 yr generator longevity. RV pacing 51.9%. No HVR episodes noted. He has continued dyspnea with little exertion, but states he's improved . BP has not allowed much carvedilol use; Dr. Ho Conde stopped Florinef on 09/04/2019. He denies chest pain, palpitations, lightheadedness, or syncope. Mild intermittent lower extremity edema. [id number] 5. Not candidate for [facility] [place] due to fall risk, previous GI bleed. On ASA 81 mg po daily. Previous:  Admitted 08/27/2019-08/28/2019 for hypoglycemia & HTN, chronic hypoxemic respiratory failure. Discharged on home oxygen, went to rehab. Admitted early August for likely pneumonia. Discharged on [address] Echo (08/06/2019): LVEF 66-70%, no RWMA. LA mod dilated. RA mildly dilated. Mild MR. Mild aortic valve sclerosis. Mild TR. Mild to mod pulmonary HTN. RHC (05/02/2019): Moderate to severe elevation in right & left side pressures. CT chest (05/01/2019): Mild worsening of diffuse interstitial parenchymal change. Cholelithiasis, right medial flank cystic mass, diverticulosis stable.     Holter (4/29/14): rate  with frequent PACs, rare periods of short RP SVT up to 122      PVD, Dr. Tomasz Boone atherectomy to distal SFA and distal popliteal with angioplasty to both lesions and stent to the SFA by Dr. Tomasz Boone on 3/14/14.      CAD/CABG x 3 off pump 03/2008 . Post op anemia and renal failure     Primary cardiologist is Dr. Ho Conde.    Stage III CKD.       Problem List  Date Reviewed: 9/4/2019          Codes Class Noted Hypoglycemia ICD-10-CM: E16.2  ICD-9-CM: 251.2  8/27/2019        Pacemaker ICD-10-CM: Z95.0  ICD-9-CM: V45.01  5/9/2019    Overview Signed 5/9/2019  6:22 PM by Charmaine Kim MD     5/9/2019 leadless pacer implant             CHF (congestive heart failure) ([facility] ICD-10-CM: I50.9  ICD-9-CM: 428.0  5/1/2019        Atrial fibrillation with slow ventricular response ([facility] ICD-10-CM: I48.91  ICD-9-CM: 427.31  5/1/2019    Overview Signed 5/8/2019 11:24 PM by Charmaine Kim MD     Added automatically from request for surgery [id number]             Type 2 diabetes with nephropathy ([facility] ICD-10-CM: E11.21  ICD-9-CM: 250.40, 583.81  7/26/2018        Atrial fibrillation, chronic ([facility] ICD-10-CM: I48.2  ICD-9-CM: 427.31  10/21/2017    Overview Signed 10/21/2017  1:45 PM by Aliya Pollard MD     new onset afib with BRPR 10-19-17 admit             CKD (chronic kidney disease) stage 3, GFR 30-59 ml/min ([facility] ICD-10-CM: N18.3  ICD-9-CM: 585.3  10/21/2017    Overview Signed 10/21/2017  1:47 PM by Aliya Pollard MD     hypertension and DM nephrosclerosis             GI bleed ICD-10-CM: K92.2  ICD-9-CM: 578.9  10/20/2017        Hyperglycemia due to type 2 diabetes mellitus ([facility] ICD-10-CM: E11.65  ICD-9-CM: 250.00  10/20/2017        Hypokalemia ICD-10-CM: E87.6  ICD-9-CM: 276.8  6/25/2017        Generalized weakness ICD-10-CM: R53.1  ICD-9-CM: 780.79  6/25/2017        Elevated troponin ICD-10-CM: R74.8  ICD-9-CM: 790.6  6/25/2017        KATALINA (acute kidney injury) ([facility] ICD-10-CM: N17.9  ICD-9-CM: 584.9  6/25/2017        Acute renal failure (ARF) (HCC) ICD-10-CM: N17.9  ICD-9-CM: 584.9  3/16/2017        CKD (chronic kidney disease) ICD-10-CM: N18.9  ICD-9-CM: 585.9  1/20/2017        Type 2 diabetes mellitus with diabetic peripheral angiopathy without gangrene ([facility] ICD-10-CM: E11.51  ICD-9-CM: 250.70, 443.81  9/27/2015        Dyspnea ICD-10-CM: R06.00  ICD-9-CM: 786.09  7/15/2014        PVC's (premature ventricular contractions) ICD-10-CM: I49.3  ICD-9-CM: 427.69  5/26/2014        Carotid arterial disease (HCC) ICD-10-CM: I77.9  ICD-9-CM: 447.9  Unknown        Hypercholesteremia ICD-10-CM: E78.00  ICD-9-CM: 272.0  Unknown        PVD (peripheral vascular disease) ([facility] ICD-10-CM: I73.9  ICD-9-CM: 443.9  Unknown        Bradycardia ICD-10-CM: R00.1  ICD-9-CM: 427.89  Unknown        CAD (coronary artery disease) ICD-10-CM: I25.10  ICD-9-CM: 414.00  Unknown        Hypertension, essential, benign ICD-10-CM: I10  ICD-9-CM: 401.1  Unknown              Current Outpatient Medications   Medication Sig Dispense Refill    carvedilol (COREG) 6.25 mg tablet Take 1 Tab by mouth two (2) times daily (with meals). 60 Tab 5    insulin glargine (LANTUS U-100 INSULIN) 100 unit/mL injection 5 Units by SubCUTAneous route every morning. Prescribed 18 units daily. Reports taking 10 units in the morning, then up to 8 units in the evening depending on his blood sugars. If blood sugar is <200, he reduces his evening dose. 1 Vial 0    bumetanide (BUMEX) 1 mg tablet Take 1 Tab by mouth daily as needed (swelling). 90 Tab 1    albuterol (PROVENTIL VENTOLIN) 2.5 mg /3 mL (0.083 %) nebulizer solution 3 mL by Nebulization route every four (4) hours as needed for Wheezing. 1 Package 0    sodium bicarbonate 650 mg tablet Take 2 Tabs by mouth three (3) times daily. 90 Tab 0    tamsulosin (FLOMAX) 0.4 mg capsule Take 1 Cap by mouth daily. 30 Cap 0    aspirin 81 mg chewable tablet Take 1 Tab by mouth daily. 33 Tab 11    omega 3-dha-epa-fish oil (FISH OIL) 100-160-1,000 mg cap Take 1 Cap by mouth two (2) times a day.  cinnamon bark (CINNAMON) 500 mg cap Take 1,000 mg by mouth two (2) times a day.        Allergies   Allergen Reactions    Lisinopril Cough     Past Medical History:   Diagnosis Date    CAD (coronary artery disease)     s/p CABG 2008    Carotid arterial disease (HCC)     CKD (chronic kidney disease) stage 3, GFR 30-59 ml/min ([facility] 10/21/2017 hypertension and DM nephrosclerosis    Diabetes mellitus, type 2 ([facility]     Hypercholesteremia     Hypertension     Paroxysmal atrial fibrillation ([facility] 10/21/2017    new onset afib with BRPR 10-19-17 admit    PVC's (premature ventricular contractions) 2014    PVD (peripheral vascular disease) ([facility]      Past Surgical History:   Procedure Laterality Date    HX CORONARY ARTERY BYPASS GRAFT      HX HEART CATHETERIZATION      FL TCAT INSJ/RPL PERM LEADLESS PACEMAKER RV W/IMG N/A 2019    INSERT OR REPLACE TRANSCATH PPM LEADLESS performed by Inge Emery MD at [address] CATH LAB     No family history on file. Social History     Tobacco Use    Smoking status: Former Smoker     Packs/day: 3.00     Years: 20.00     Pack years: 60.00     Types: Cigarettes     Last attempt to quit: 1985     Years since quittin.7    Smokeless tobacco: Never Used   Substance Use Topics    Alcohol use: No        Review of Systems:   Constitutional: Negative for fever, chills, weight loss, + malaise/fatigue. HEENT: Negative for nosebleeds, vision changes. Respiratory: + intermittent cough, no hemoptysis  Cardiovascular: Negative for chest pain, palpitations, + recent orthopnea, no claudication, + leg swelling, no syncope, and + PND. + AGUILAR  Gastrointestinal: Negative for nausea, vomiting, diarrhea, blood in stool and melena. Genitourinary: Negative for dysuria, and hematuria. Musculoskeletal: Negative for myalgias, arthralgia. Skin: Negative for rash. Heme: Does not bleed or bruise easily. Neurological: Negative for speech change and focal weakness     Objective:     Visit Vitals  BP (!) 160/96 (BP 1 Location: Left arm, BP Patient Position: Sitting)   Pulse 76   Resp 14   Ht 5' 9\" (1.753 m)   Wt 169 lb (76.7 kg)   SpO2 97%   BMI 24.96 kg/m²      Physical Exam:   Constitutional: well-developed and well-nourished. No respiratory distress. Head: Normocephalic and atraumatic.    Eyes: Pupils are equal, round  ENT: hearing normal  Neck: supple. No JVD present. Cardiovascular: Normal rate, irregular rhythm. Exam reveals no gallop and no friction rub. + MAMI. Pulmonary/Chest: Effort normal at rest on supplemental oxygen. + bilateral expiratory wheeze  Abdominal: Soft, no tenderness. Musculoskeletal: Trace bilateral lower extremity edema. Wearing compression stockings. Neurological: alert,oriented. Skin: Skin is warm and dry  Psychiatric: Normal mood and affect. Behavior is normal. Judgment and thought content normal.        Assessment/Plan:       ICD-10-CM ICD-9-CM    1. Cardiac pacemaker in situ Z95.0 V45.01    2. Diastolic CHF, chronic (HCC) I50.32 428.32      428.0    3. Coronary artery disease involving native coronary artery of native heart without angina pectoris I25.10 414.01    4. Atrial fibrillation, chronic (HCC) I48.2 427.31    5. Hypertension, essential, benign I10 401.1      Remote check on 09/10/2019 showed proper function, >8 yr generator longevity. RV pacing 51.9%. No HVR episodes noted. Chronic diastolic heart failure & COPD, NYHA III at baseline. Hypertensive today, still with elevated BP on recheck. He was previously on multiple antihypertensives, higher dose of carvedilol, but had been decreased/discontinued due to orthostatic hypotension. Will increase carvedilol to 6.25 mg po bid for enhanced BP control. He should continue to have BP monitored in rehab facility. Chronic AF, rate controlled. [id number] 5, but not candidate for [facility] [address] due to fall risk, previous GI bleed. Continue ASA 81 mg po daily. Remote pacemaker checks q 3 months. Follow up with Dr. Herchel Dubin in 1 year. Follow up in approximately 2 weeks with Dr. Jelly Munson for BP recheck, any necessary titration of antihypertensive.     Future Appointments   Date Time Provider Muna Sandy   10/3/2019  1:00 PM Eligio Chávez MD cav ANAHI SCHED   11/8/2019  9:40 AM Eligio Chávez  [address] 11/22/2019 10:20 AM Aliya Pollard  [address]   2/12/2020  2:15 PM REMOTE1, AGUIAR CAVREY ANAHI SCHED   5/18/2020  9:15 AM REMOTE1, [address]   8/24/2020  8:45 AM REMOTE1, [address]   10/29/2020 10:30 AM PACEMAKER3, AGUIAR CAVREY ANAHI SCHED   10/29/2020 10:40 AM Charmaine Kim  [address]       Thank you for involving me in this patient's care and please call with further concerns or questions.       ZACHARIAH Bermudez-DAVID Dominguez  [facility] [place]  09/20/19

## 2019-10-10 ENCOUNTER — OFFICE VISIT (OUTPATIENT)
Dept: CARDIOLOGY CLINIC | Age: 84
End: 2019-10-10

## 2019-10-10 VITALS
RESPIRATION RATE: 16 BRPM | BODY MASS INDEX: 23.4 KG/M2 | OXYGEN SATURATION: 93 % | HEART RATE: 84 BPM | WEIGHT: 158 LBS | DIASTOLIC BLOOD PRESSURE: 51 MMHG | SYSTOLIC BLOOD PRESSURE: 116 MMHG | HEIGHT: 69 IN

## 2019-10-10 DIAGNOSIS — N18.30 CKD (CHRONIC KIDNEY DISEASE) STAGE 3, GFR 30-59 ML/MIN (HCC): ICD-10-CM

## 2019-10-10 DIAGNOSIS — I65.23 BILATERAL CAROTID ARTERY STENOSIS: ICD-10-CM

## 2019-10-10 DIAGNOSIS — I50.32 DIASTOLIC CHF, CHRONIC (HCC): Primary | ICD-10-CM

## 2019-10-10 DIAGNOSIS — I25.10 CORONARY ARTERY DISEASE INVOLVING NATIVE CORONARY ARTERY OF NATIVE HEART WITHOUT ANGINA PECTORIS: ICD-10-CM

## 2019-10-10 DIAGNOSIS — E78.00 HYPERCHOLESTEREMIA: ICD-10-CM

## 2019-10-10 DIAGNOSIS — I73.9 PERIPHERAL VASCULAR DISEASE (HCC): ICD-10-CM

## 2019-10-10 DIAGNOSIS — Z95.1 HX OF CABG: ICD-10-CM

## 2019-10-10 DIAGNOSIS — I10 ESSENTIAL HYPERTENSION: ICD-10-CM

## 2019-10-10 DIAGNOSIS — I48.20 ATRIAL FIBRILLATION, CHRONIC (HCC): ICD-10-CM

## 2019-10-10 NOTE — Clinical Note
10/10/19 Patient: Willie Peraza. YOB: 1935 Date of Visit: 10/10/2019 Armon Habermann, MD 
[address] [address] VIA Facsimile: [phone] Dear Armon Habermann, MD, Thank you for referring Mr. Jam June to [facility] for evaluation. My notes for this consultation are attached. If you have questions, please do not hesitate to call me. I look forward to following your patient along with you.  
 
 
Sincerely, 
 
Brittani Julien MD

## 2019-10-10 NOTE — PROGRESS NOTES
[address] [facility]     1935       David Sotelo MD, [facility]  Date of Visit-10/10/2019   PCP is Robert De León MD   [facility] [place]  [facility]  HPI:  25 [facility]. is a 81 y.o. male   F/u of CHF, CAD, and CKD. Prior hospitalization in August. EF normal, possible pneumonia. Here for 6 week f/u. He had a leadless pacer in May. He has RV pacing about 52%. He has worsening renal function leading to SOB. He is not on [employer] due to fall risk. His BP has substantially improved today. He saw pulmonary on 9/12/19 with moderate restrictive lung disease. He has pulmonary HTN with group 2 and group 3 disease and is on Trelegy inhaler. He sees Dr. Charleen Wellington for CKD 4. Lab Results   Component Value Date/Time    Creatinine 1.89 (H) 08/28/2019 01:57 AM     Pt is accompanied by his wife and is ambulating using a walker. Pt is on oxygen. Pt is still experiencing SOB but this is unchanged from the last visit. He reports that he gets dry mouth when using his inhaler. Pt is in rehab at [address] [place] [address] for one more week. Denies chest pain, edema, syncope, has no tachycardia, palpitations or sense of arrhythmia. [address] May 2, 2019 =PA 66/18 W 20 PA sat 55% CO 4.1 CI 2.12  Echo May 1 ,2019 EF 55-60% mod LAE, Mild CASSIE, Mild AS BELLE 1.6 cm2 mild MR & TR, RV fx mildly reduced  PVD-9/27/18- RLE- mid CRA 70, Pop 99-PTA on right pop,  LLE LCFA 40%         - 1-19-17 PTA RCFA 90, JAS to RSFA         -12/22/16 PTA Left Pop, PTA  Left tib-per        --3-13-14 PTA Left op PTA RSFA       ---1/25/11 stent RSFA   JOCELYN 8-21-17 Normal perfusion  08/05/19   ECHO ADULT COMPLETE 08/06/2019 8/6/2019    Narrative · Left Ventricle: Normal cavity size, systolic function (ejection fraction   normal) and diastolic function. Mild concentric hypertrophy. Estimated   left ventricular ejection fraction is 66 - 70%. Visually measured ejection   fraction. No regional wall motion abnormality noted.  Normal left   ventricular strain. · Left Atrium: Moderately dilated left atrium. · Right Atrium: Mildly dilated right atrium. · Interatrial Septum: Color flow Doppler was used. · Aortic Valve: Mild aortic valve sclerosis with no significant stenosis. · Mitral Valve: Mitral valve thickening. Mild mitral valve regurgitation. · Tricuspid Valve: Mild tricuspid valve regurgitation is present. · Pulmonary Artery: Mild to moderate pulmonary hypertension. Signed by: Robert Valle MD      Assessment/Plan:     1. Diastolic CHF, chronic ([facility])  Multifactorial, EF normal, compensated, this is the least volume overloaded I have seen him. 2. Coronary artery disease involving native coronary artery of native heart without angina pectoris  Stable. CAD/CABG off pump x3 3/2008 . =post op anemia and renal failure  CATH March 7, 2008= severe three-vessel left main coronary artery disease, 40% disease of the left main and take off of the LAD and circumflex complex, 70% stenosis of the ostial circ and 85% of the LAD. Between the first and second marginal, the circumflex had 70% stenosis and between the third marginal and PDA there was a 70% stenosis, LAD ostial lesion RCA proximal 60% tapering, EF 60%-70%, bilaterally patent renal arteries, mild atherosclerosis of the distal abdominal aorta. 3. Atrial fibrillation, chronic  Holter monitor4/29/14=showing rate of  with frequent PACs, rare periods of short RP interval SVT up to 122 and frequent PVCs. sinus rhythm with similar findings, frequent PVCs and short RP interval tachycardia. Now chronic     4. CKD (chronic kidney disease) stage 3, GFR 30-59 ml/min ([facility])  f/u with Dr. Marty Orr. Seems to have responded to steroids. 5. Peripheral vascular disease ([facility])  PVD= Dr. Tyrese Fuller atherectomy to distal SFA and distal popliteal with angioplasty to both lesions and stent to the SFA by Dr. Tyrese Fuller on 3/14/14.  Previously had AIBs of 0.67 on the left, 0.71 on the right with the right seemingly due to distal disease  6. Hx of CABG  7. Bilateral carotid artery stenosis  8. Hypercholesteremia  Where did simvastatin go????? Key Antihyperlipidemia Meds             omega 3-dha-epa-fish oil (FISH OIL) 100-160-1,000 mg cap (Taking) Take 1 Cap by mouth two (2) times a day. 9. Essential hypertension  Off hydralazine and amlodipine  BP on Coreg is well controlled  BP Readings from Last 6 Encounters:   10/10/19 116/51   09/20/19 (!) 160/96   09/04/19 130/70   08/28/19 156/79   08/21/19 110/60   08/20/19 165/85       Future Appointments   Date Time Provider Muna Sandy   11/8/2019  9:40 AM uJde Perea  [address]   11/21/2019  3:40 PM Jude Perea MD cav [facility] SCHED   11/22/2019 10:20 AM Jude Perea  [address]   2/12/2020  2:15 PM REMOTE1, COS[place] ANAHI SCHED   5/18/2020  9:15 AM REMOTE1, COSME [place] ANAHI SCHED   8/24/2020  8:45 AM REMOTE1, [address]   10/29/2020 10:30 AM PACEMAKER3, [address]   10/29/2020 10:40 AM Marleny Antoine  [address]         Key CAD CHF Meds             carvedilol (COREG) 6.25 mg tablet (Taking) Take 1 Tab by mouth two (2) times daily (with meals). bumetanide (BUMEX) 1 mg tablet (Taking) Take 1 Tab by mouth daily as needed (swelling). aspirin 81 mg chewable tablet (Taking) Take 1 Tab by mouth daily. omega 3-dha-epa-fish oil (FISH OIL) 100-160-1,000 mg cap (Taking) Take 1 Cap by mouth two (2) times a day. Impression:   1. Diastolic CHF, chronic ([facility]    2. Coronary artery disease involving native coronary artery of native heart without angina pectoris    3. Atrial fibrillation, chronic    4. CKD (chronic kidney disease) stage 3, GFR 30-59 ml/min ([facility])    5. Peripheral vascular disease ([facility]    6. Hx of CABG    7. Bilateral carotid artery stenosis    8. Hypercholesteremia    9.  Essential hypertension       Cardiac History:   Mild carotid artery disease 3-7-08 then 13 with 10-49% left, less than 10% on right    SOCIAL: Drinks no alcohol, quit smoking several years ago, worked as an . Lives with his wife and has four children. Enjoys gardening, fishing and music. FAMILY HISTORY: Mother  of cancer at 76, father  of Parkinson's at 70, one brother  of cancer of the liver at 76 and one  of an infection at 64. ROS-except as noted above. . A complete cardiac and respiratory are reviewed and negative except as above ; Resp-denies wheezing  or productive cough,. Const- No unusual weight loss or fever; Neuro-no recent seizure or CVA ; GI- No BRBPR, abdom pain, bloating ; - no  hematuria   see supplement sheet, initialed and to be scanned by staff  Past Medical History:   Diagnosis Date    CAD (coronary artery disease)     s/p CABG     Carotid arterial disease ([facility]     CKD (chronic kidney disease) stage 3, GFR 30-59 ml/min ([address] 10/21/2017    hypertension and DM nephrosclerosis    Diabetes mellitus, type 2 ([address]     Hypercholesteremia     Hypertension     Paroxysmal atrial fibrillation ([address] 10/21/2017    new onset afib with BRPR 10-19-17 admit    PVC's (premature ventricular contractions) 2014    PVD (peripheral vascular disease) ([facility]       Social Hx= reports that he quit smoking about 34 years ago. His smoking use included cigarettes. He has a 60.00 pack-year smoking history. He has never used smokeless tobacco. He reports that he does not drink alcohol or use drugs. Exam and Labs:  /51 (BP 1 Location: Left arm, BP Patient Position: Sitting)   Pulse 84   Resp 16   Ht 5' 9\" (1.753 m)   Wt 158 lb (71.7 kg)   SpO2 93% Comment: on 2 liters of oxygen  BMI 23.33 kg/m² Constitutional:  NAD, comfortable  Head: NC,AT. Eyes: No scleral icterus. Neck:  Neck supple. No JVD present. Throat: moist mucous membranes.   Chest: Effort normal & normal respiratory excursion .Neurological: alert, conversant and oriented . Skin: Skin is not cold. No obvious systemic rash noted. Not diaphoretic. No erythema. Psychiatric:  Grossly normal mood and affect. Behavior appears normal. Extremities:  no clubbing or cyanosis. Abdomen: non distended    Lungs:breath sounds normal. No stridor. distress, wheezes or  Rales. Heart: normal rate, regular rhythm, normal S1, S2, no murmurs, rubs, clicks or gallops , PMI non displaced. Edema: Edema is none, but wearing compression stockings. Lab Results   Component Value Date/Time    Sodium 143 08/28/2019 01:57 AM    Potassium 3.4 (L) 08/28/2019 01:57 AM    Chloride 107 08/28/2019 01:57 AM    CO2 29 08/28/2019 01:57 AM    Anion gap 7 08/28/2019 01:57 AM    Glucose 82 08/28/2019 01:57 AM    BUN 36 (H) 08/28/2019 01:57 AM    Creatinine 1.89 (H) 08/28/2019 01:57 AM    BUN/Creatinine ratio 19 08/28/2019 01:57 AM    GFR est AA 41 (L) 08/28/2019 01:57 AM    GFR est non-AA 34 (L) 08/28/2019 01:57 AM    Calcium 7.4 (L) 08/28/2019 01:57 AM      Wt Readings from Last 3 Encounters:   10/10/19 158 lb (71.7 kg)   09/20/19 169 lb (76.7 kg)   09/04/19 164 lb (74.4 kg)      BP Readings from Last 3 Encounters:   10/10/19 116/51   09/20/19 (!) 160/96   09/04/19 130/70      Current Outpatient Medications   Medication Sig    carvedilol (COREG) 6.25 mg tablet Take 1 Tab by mouth two (2) times daily (with meals).  insulin glargine (LANTUS U-100 INSULIN) 100 unit/mL injection 5 Units by SubCUTAneous route every morning. Prescribed 18 units daily. Reports taking 10 units in the morning, then up to 8 units in the evening depending on his blood sugars. If blood sugar is <200, he reduces his evening dose.  bumetanide (BUMEX) 1 mg tablet Take 1 Tab by mouth daily as needed (swelling).  albuterol (PROVENTIL VENTOLIN) 2.5 mg /3 mL (0.083 %) nebulizer solution 3 mL by Nebulization route every four (4) hours as needed for Wheezing.     sodium bicarbonate 650 mg tablet Take 2 Tabs by mouth three (3) times daily.  tamsulosin (FLOMAX) 0.4 mg capsule Take 1 Cap by mouth daily.  aspirin 81 mg chewable tablet Take 1 Tab by mouth daily.  omega 3-dha-epa-fish oil (FISH OIL) 100-160-1,000 mg cap Take 1 Cap by mouth two (2) times a day.  cinnamon bark (CINNAMON) 500 mg cap Take 1,000 mg by mouth two (2) times a day. No current facility-administered medications for this visit. Impression see above.       Written by Jose Alberto Mir, as dictated by Sahil Camara MD.

## 2019-11-21 ENCOUNTER — OFFICE VISIT (OUTPATIENT)
Dept: CARDIOLOGY CLINIC | Age: 84
End: 2019-11-21

## 2019-11-21 VITALS
DIASTOLIC BLOOD PRESSURE: 53 MMHG | RESPIRATION RATE: 16 BRPM | WEIGHT: 170 LBS | OXYGEN SATURATION: 92 % | BODY MASS INDEX: 25.18 KG/M2 | HEIGHT: 69 IN | SYSTOLIC BLOOD PRESSURE: 101 MMHG | HEART RATE: 83 BPM

## 2019-11-21 DIAGNOSIS — I25.10 CORONARY ARTERY DISEASE INVOLVING NATIVE CORONARY ARTERY OF NATIVE HEART WITHOUT ANGINA PECTORIS: ICD-10-CM

## 2019-11-21 DIAGNOSIS — I65.23 BILATERAL CAROTID ARTERY STENOSIS: ICD-10-CM

## 2019-11-21 DIAGNOSIS — I10 ESSENTIAL HYPERTENSION: ICD-10-CM

## 2019-11-21 DIAGNOSIS — N18.30 CKD (CHRONIC KIDNEY DISEASE) STAGE 3, GFR 30-59 ML/MIN (HCC): ICD-10-CM

## 2019-11-21 DIAGNOSIS — I73.9 PERIPHERAL VASCULAR DISEASE (HCC): ICD-10-CM

## 2019-11-21 DIAGNOSIS — E78.00 HYPERCHOLESTEREMIA: ICD-10-CM

## 2019-11-21 DIAGNOSIS — Z95.1 HX OF CABG: ICD-10-CM

## 2019-11-21 DIAGNOSIS — I48.20 ATRIAL FIBRILLATION, CHRONIC (HCC): ICD-10-CM

## 2019-11-21 DIAGNOSIS — I50.32 DIASTOLIC CHF, CHRONIC (HCC): Primary | ICD-10-CM

## 2019-11-21 RX ORDER — SIMVASTATIN 20 MG/1
20 TABLET, FILM COATED ORAL
Qty: 90 TAB | Refills: 3 | Status: SHIPPED | OUTPATIENT
Start: 2019-11-21

## 2019-11-21 RX ORDER — BUMETANIDE 1 MG/1
TABLET ORAL
Qty: 360 TAB | Refills: 3 | Status: SHIPPED | OUTPATIENT
Start: 2019-11-21 | End: 2019-12-03 | Stop reason: DRUGHIGH

## 2019-11-21 NOTE — Clinical Note
11/21/19 Patient: Althea Chandler. YOB: 1935 Date of Visit: 11/21/2019 Lane Danielle MD 
[address] [address] VIA Facsimile: [phone] Dear Lane Danielle MD, Thank you for referring Mr. Oliva Thomason to [facility] for evaluation. My notes for this consultation are attached. If you have questions, please do not hesitate to call me. I look forward to following your patient along with you.  
 
 
Sincerely, 
 
Nj Small MD

## 2019-11-21 NOTE — PROGRESS NOTES
[address] [place] [address]     1935       David Arroyo MD, [facility] Date of Visit-11/21/2019 PCP is Dinesh Vazquez MD  
[facility] HPI:  [facility] is a 81 y.o. male F/u of CHF, CAD, and CKD. Prior hospitalization in August. EF normal, possible pneumonia. Here for 6 week f/u. He had a leadless pacer in May. He has had increasing renal dysfunction, pulmonary HTN, and restrictive lung disease. See previous note. Last hospitalization 8/27/19 with hypoglycemia while weaning off steroids. We stopped Florinef on last visit. Pt is present with his wife. Pt uses a walker. Pt is on oxygen. Pt states he has been experiencing SOB when walking. Pt notes that he also has LE edema. Pt was told he did not yet need to go on dialysis because his kidney function improved. Pt notes that he has increased his Bumex to 3 pills. Pt has a home health nurse coming to see him at his house. Pt states he has been doing much better at home than he was doing at rehab. Denies chest pain, syncope, shortness of breath at rest, has no tachycardia, palpitations or sense of arrhythmia. Assessment/Plan: 1. Diastolic CHF, chronic (HCC) Still short of breath. This relates to his CKD. I would add an afternoon Bumex 1 mg for sx relief. 2. Coronary artery disease involving native coronary artery of native heart without angina pectoris Lexiscan 2017 normal perfusion. 3. Atrial fibrillation, chronic Pacer. Rate control. Anticoagulation continue only if there is no bleeding. Stay on Coreg and ASA. Off [address] 4. CKD (chronic kidney disease) stage 3, GFR 30-59 ml/min ([facility]) Saw Dr. Kenyon Randall with recent labs. 5. Peripheral vascular disease ([facility] PVD= Dr. Annette Kruse atherectomy to distal SFA and distal popliteal with angioplasty to both lesions and stent to the SFA by Dr. Annette Kruse on 3/14/14. Previously had AIBs of 0.67 on the left, 0.71 on the right with the right seemingly due to distal disease. No claudication. 6. Hx of CABG 7. Bilateral carotid artery stenosis 8. Hypercholesteremia Has been off of simvastatin. Will refill. 9. Essential hypertension On Coreg. Off Florinef. F/u in 3 months Patient Instructions Please increase your Bumex to 3mg in the morning and 1mg in the afternoon. Please restart simvastatin 20mg every bedtime. We will see you back in 3 months. Key CAD CHF Meds   
    
  
 simvastatin (ZOCOR) 20 mg tablet (Taking) Take 1 Tab by mouth nightly. bumetanide (BUMEX) 1 mg tablet (Taking/Discontinued) Please take 3mg in the AM and 1mg in the PM.  
 carvedilol (COREG) 6.25 mg tablet (Taking/Discontinued) Take 1 Tab by mouth two (2) times daily (with meals). aspirin 81 mg chewable tablet (Taking) Take 1 Tab by mouth daily. omega 3-dha-epa-fish oil (FISH OIL) 100-160-1,000 mg cap (Taking) Take 1 Cap by mouth two (2) times a day. Impression: 1. Diastolic CHF, chronic ([facility]) 2. Coronary artery disease involving native coronary artery of native heart without angina pectoris 3. Atrial fibrillation, chronic 4. CKD (chronic kidney disease) stage 3, GFR 30-59 ml/min ([facility]) 5. Peripheral vascular disease ([facility] 6. Hx of CABG 7. Bilateral carotid artery stenosis 8. Hypercholesteremia 9. Essential hypertension Cardiac History:  
CAD/CABG  
off pump x3 3/2008 . =post op anemia and renal failure CATH March 7, 2008= LM -bifurcation dz 40% ;LAD 85% ; Circ ost 70% mid 70% RCA proximal 60% tapering, EF 60%-70%, bilaterally patent renal arteries, mild atherosclerosis of the distal abdominal aorta. [address] May 2, 2019 =PA 66/18 W 20 PA sat 55% CO 4.1 CI 2.12 Echo May 1 ,2019 EF 55-60% mod LAE, Mild CASSIE, Mild AS BELLE 1.6 cm2 mild MR & TR, RV fx mildly reduced PVD-18- RLE- mid CRA 70, Pop 99-PTA on right pop, LLE LCFA 40% - 17 PTA RCFA 90, JAS to RSFA  
-16 PTA Left Pop, PTA Left tib-per 
--3-13-14 PTA Left op PTA RSFA 
---11 stent RSFA  
JOCELYN 17 Normal perfusion Mild carotid artery disease 3-7-08 then 12 with 10-49% left, less than 10% on right SOCIAL: Drinks no alcohol, quit smoking several years ago, worked as an . Lives with his wife and has four children. Enjoys gardening, fishing and music. FAMILY HISTORY: Mother  of cancer at 76, father  of Parkinson's at 70, one brother  of cancer of the liver at 76 and one  of an infection at 64. ROS-except as noted above. . A complete cardiac and respiratory are reviewed and negative except as above ; Resp-denies wheezing  or productive cough,. Const- No unusual weight loss or fever; Neuro-no recent seizure or CVA ; GI- No BRBPR, abdom pain, bloating ; - no  hematuria  
see supplement sheet, initialed and to be scanned by staff Past Medical History:  
Diagnosis Date  CAD (coronary artery disease) s/p CABG   [facility]  CKD (chronic kidney disease) stage 3, GFR 30-59 ml/min ([facility]) 10/21/2017  
 hypertension and DM nephrosclerosis  Diabetes mellitus, type 2 ([facility]  Hypercholesteremia  Hypertension  Paroxysmal atrial fibrillation ([facility] 10/21/2017  
 new onset afib with BRPR 10-19-17 admit  PVC's (premature ventricular contractions) 2014  PVD (peripheral vascular disease) ([facility] Social Hx= reports that he quit smoking about 34 years ago. His smoking use included cigarettes. He has a 60.00 pack-year smoking history. He has never used smokeless tobacco. He reports that he does not drink alcohol or use drugs.   
 
Exam and Labs:  /53 (BP 1 Location: Left arm, BP Patient Position: Sitting)   Pulse 83   Resp 16   Ht 5' 9\" (1.753 m)   Wt 170 lb (77.1 kg)   SpO2 92% Comment: on 2 liters of oxygen  BMI 25.10 kg/m² Constitutional:  NAD, comfortable Head: NC,AT. Eyes: No scleral icterus. Neck:  Neck supple. No JVD present. Throat: moist mucous membranes. Chest: Effort normal & normal respiratory excursion . Neurological: alert, conversant and oriented . Skin: Skin is not cold. No obvious systemic rash noted. Not diaphoretic. No erythema. Psychiatric:  Grossly normal mood and affect. Behavior appears normal. Extremities:  no clubbing or cyanosis. Abdomen: non distended Lungs:breath sounds normal. No stridor. distress, wheezes or  Rales. Heart:2/6 MAMI at the RUSB normal rate, regular rhythm, normal S1, S2, no murmurs, rubs, clicks or gallops , PMI non displaced. Edema: Edema is 3+ to the knees bilaterally. No results found for: CHOL, CHOLX, CHLST, CHOLV, HDL, HDLP, LDL, LDLC, DLDLP, TGLX, TRIGL, TRIGP, CHHD, CHHDX Lab Results Component Value Date/Time Sodium 143 08/28/2019 01:57 AM  
 Potassium 3.4 (L) 08/28/2019 01:57 AM  
 Chloride 107 08/28/2019 01:57 AM  
 CO2 29 08/28/2019 01:57 AM  
 Anion gap 7 08/28/2019 01:57 AM  
 Glucose 82 08/28/2019 01:57 AM  
 BUN 36 (H) 08/28/2019 01:57 AM  
 Creatinine 1.89 (H) 08/28/2019 01:57 AM  
 BUN/Creatinine ratio 19 08/28/2019 01:57 AM  
 GFR est AA 41 (L) 08/28/2019 01:57 AM  
 GFR est non-AA 34 (L) 08/28/2019 01:57 AM  
 Calcium 7.4 (L) 08/28/2019 01:57 AM  
  
Wt Readings from Last 3 Encounters:  
11/21/19 170 lb (77.1 kg) 10/10/19 158 lb (71.7 kg) 09/20/19 169 lb (76.7 kg) BP Readings from Last 3 Encounters:  
11/21/19 101/53  
10/10/19 116/51  
09/20/19 (!) 160/96 Current Outpatient Medications Medication Sig  
 simvastatin (ZOCOR) 20 mg tablet Take 1 Tab by mouth nightly.  bumetanide (BUMEX) 1 mg tablet Please take 3mg in the AM and 1mg in the PM.  
 carvedilol (COREG) 6.25 mg tablet Take 1 Tab by mouth two (2) times daily (with meals).  insulin glargine (LANTUS U-100 INSULIN) 100 unit/mL injection 5 Units by SubCUTAneous route every morning. Prescribed 18 units daily. Reports taking 10 units in the morning, then up to 8 units in the evening depending on his blood sugars. If blood sugar is <200, he reduces his evening dose.  albuterol (PROVENTIL VENTOLIN) 2.5 mg /3 mL (0.083 %) nebulizer solution 3 mL by Nebulization route every four (4) hours as needed for Wheezing.  sodium bicarbonate 650 mg tablet Take 2 Tabs by mouth three (3) times daily.  tamsulosin (FLOMAX) 0.4 mg capsule Take 1 Cap by mouth daily.  aspirin 81 mg chewable tablet Take 1 Tab by mouth daily.  omega 3-dha-epa-fish oil (FISH OIL) 100-160-1,000 mg cap Take 1 Cap by mouth two (2) times a day.  cinnamon bark (CINNAMON) 500 mg cap Take 1,000 mg by mouth two (2) times a day. No current facility-administered medications for this visit. Impression see above.   
  
Written by Serg Osei, as dictated by Kg Fleming MD.

## 2019-11-21 NOTE — PATIENT INSTRUCTIONS
Please increase your Bumex to 3mg in the morning and 1mg in the afternoon. Please restart simvastatin 20mg every bedtime. We will see you back in 3 months.

## 2019-11-21 NOTE — PROGRESS NOTES
Visit Vitals /53 (BP 1 Location: Left arm, BP Patient Position: Sitting) Pulse 83 Resp 16 Ht 5' 9\" (1.753 m) Wt 170 lb (77.1 kg) SpO2 92% Comment: on 2 liters of oxygen BMI 25.10 kg/m²

## 2019-11-29 ENCOUNTER — TELEPHONE (OUTPATIENT)
Dept: CARDIOLOGY CLINIC | Age: 84
End: 2019-11-29

## 2019-11-29 NOTE — TELEPHONE ENCOUNTER
Brooks Bo home health care nurse called stating pt RR 24 when it is normal 18 with not clear lung sounds. Pt weight is 172 and has been that all week and edema is under control with no fluid build up. Moris Bo wanted to know if anything should be done.  Will ask MD

## 2019-11-29 NOTE — TELEPHONE ENCOUNTER
Leana Nash MD  You 1 hour ago (12:55 PM)     Extra bumex (BID) for 5 days, then back to once daily. Appt with Dr. Leopoldo Gamboa in 1-2 weeks. thx    Routing comment      [facility] Dr. Caitlyn Dee recommendations.  Scheduled pt to see Dr. Anuja Glaser on 12/20/2019

## 2019-12-02 ENCOUNTER — TELEPHONE (OUTPATIENT)
Dept: CARDIOLOGY CLINIC | Age: 84
End: 2019-12-02

## 2019-12-02 NOTE — TELEPHONE ENCOUNTER
Spoke to patient's Maryjo Weaver. [phone]. States patient taking bumex bid as ordered. To complete 5 days on 12-3-19. Weight increased to 178 lbs. /62. Shortness of breath continues. Edema of 3+. RR increased at 28/min. Will discuss with Dr. Cecily Valero.      Future Appointments   Date Time Provider Muna Arredondoi   12/20/2019  2:00 PM Marion Sanchez  [address]   2/12/2020  2:15 PM REMOTE1, [address]   2/27/2020  2:20 PM Marion Sanchez MD [facility] [place] [id number]   5/18/2020  9:15 AM REMOTE1, [address]   8/24/2020  8:45 AM REMOTE1, [address]   10/29/2020 10:30 AM PACEMAKER3, [address]   10/29/2020 10:40 AM Louella Goodell,  [address]     Informed Dr. Cecily Valero of the above. Dr. Cecily Valero feels that the above related to kidneys. Per Dr. Mandi Ortez to notify nephrologist, Dr. Ariella Alberto. Informed Maryjo Weaver of the above. States patient has appt with nephrologist on 12-3-19.

## 2019-12-03 ENCOUNTER — APPOINTMENT (OUTPATIENT)
Dept: GENERAL RADIOLOGY | Age: 84
DRG: 291 | End: 2019-12-03
Attending: EMERGENCY MEDICINE
Payer: MEDICARE

## 2019-12-03 ENCOUNTER — HOSPITAL ENCOUNTER (INPATIENT)
Age: 84
LOS: 7 days | Discharge: HOME HEALTH | DRG: 291 | End: 2019-12-10
Attending: EMERGENCY MEDICINE | Admitting: INTERNAL MEDICINE
Payer: MEDICARE

## 2019-12-03 ENCOUNTER — APPOINTMENT (OUTPATIENT)
Dept: CT IMAGING | Age: 84
DRG: 291 | End: 2019-12-03
Attending: EMERGENCY MEDICINE
Payer: MEDICARE

## 2019-12-03 DIAGNOSIS — R06.02 SOB (SHORTNESS OF BREATH): Primary | ICD-10-CM

## 2019-12-03 DIAGNOSIS — I50.23 ACUTE ON CHRONIC SYSTOLIC CONGESTIVE HEART FAILURE (HCC): ICD-10-CM

## 2019-12-03 PROBLEM — I50.9 CHF EXACERBATION (HCC): Status: ACTIVE | Noted: 2019-12-03

## 2019-12-03 LAB
ALBUMIN SERPL-MCNC: 2 G/DL (ref 3.5–5)
ALBUMIN/GLOB SERPL: 0.4 {RATIO} (ref 1.1–2.2)
ALP SERPL-CCNC: 498 U/L (ref 45–117)
ALT SERPL-CCNC: 42 U/L (ref 12–78)
ANION GAP SERPL CALC-SCNC: 7 MMOL/L (ref 5–15)
AST SERPL-CCNC: 43 U/L (ref 15–37)
ATRIAL RATE: 80 BPM
BASOPHILS # BLD: 0 K/UL (ref 0–0.1)
BASOPHILS NFR BLD: 0 % (ref 0–1)
BILIRUB SERPL-MCNC: 0.6 MG/DL (ref 0.2–1)
BNP SERPL-MCNC: ABNORMAL PG/ML
BUN SERPL-MCNC: 61 MG/DL (ref 6–20)
BUN/CREAT SERPL: 24 (ref 12–20)
CALCIUM SERPL-MCNC: 8.2 MG/DL (ref 8.5–10.1)
CALCULATED R AXIS, ECG10: 24 DEGREES
CALCULATED T AXIS, ECG11: -48 DEGREES
CHLORIDE SERPL-SCNC: 95 MMOL/L (ref 97–108)
CO2 SERPL-SCNC: 33 MMOL/L (ref 21–32)
COMMENT, HOLDF: NORMAL
CREAT SERPL-MCNC: 2.58 MG/DL (ref 0.7–1.3)
DIAGNOSIS, 93000: NORMAL
DIFFERENTIAL METHOD BLD: ABNORMAL
EOSINOPHIL # BLD: 0.1 K/UL (ref 0–0.4)
EOSINOPHIL NFR BLD: 1 % (ref 0–7)
ERYTHROCYTE [DISTWIDTH] IN BLOOD BY AUTOMATED COUNT: 16.6 % (ref 11.5–14.5)
EST. AVERAGE GLUCOSE BLD GHB EST-MCNC: 174 MG/DL
FERRITIN SERPL-MCNC: 379 NG/ML (ref 26–388)
FOLATE SERPL-MCNC: 11.3 NG/ML (ref 5–21)
GLOBULIN SER CALC-MCNC: 4.6 G/DL (ref 2–4)
GLUCOSE BLD STRIP.AUTO-MCNC: 222 MG/DL (ref 65–100)
GLUCOSE BLD STRIP.AUTO-MCNC: 240 MG/DL (ref 65–100)
GLUCOSE BLD STRIP.AUTO-MCNC: 89 MG/DL (ref 65–100)
GLUCOSE SERPL-MCNC: 85 MG/DL (ref 65–100)
HBA1C MFR BLD: 7.7 % (ref 4–5.6)
HCT VFR BLD AUTO: 25.2 % (ref 36.6–50.3)
HEMOCCULT STL QL: NEGATIVE
HGB BLD-MCNC: 7.8 G/DL (ref 12.1–17)
IMM GRANULOCYTES # BLD AUTO: 0 K/UL (ref 0–0.04)
IMM GRANULOCYTES NFR BLD AUTO: 0 % (ref 0–0.5)
IRON SATN MFR SERPL: 9 % (ref 20–50)
IRON SERPL-MCNC: 14 UG/DL (ref 35–150)
LACTATE SERPL-SCNC: 0.8 MMOL/L (ref 0.4–2)
LYMPHOCYTES # BLD: 0.2 K/UL (ref 0.8–3.5)
LYMPHOCYTES NFR BLD: 4 % (ref 12–49)
MAGNESIUM SERPL-MCNC: 2.2 MG/DL (ref 1.6–2.4)
MCH RBC QN AUTO: 26.4 PG (ref 26–34)
MCHC RBC AUTO-ENTMCNC: 31 G/DL (ref 30–36.5)
MCV RBC AUTO: 85.1 FL (ref 80–99)
MONOCYTES # BLD: 0.6 K/UL (ref 0–1)
MONOCYTES NFR BLD: 11 % (ref 5–13)
NEUTS SEG # BLD: 4.9 K/UL (ref 1.8–8)
NEUTS SEG NFR BLD: 84 % (ref 32–75)
NRBC # BLD: 0 K/UL (ref 0–0.01)
NRBC BLD-RTO: 0 PER 100 WBC
PLATELET # BLD AUTO: 166 K/UL (ref 150–400)
PMV BLD AUTO: 11.5 FL (ref 8.9–12.9)
POTASSIUM SERPL-SCNC: 3 MMOL/L (ref 3.5–5.1)
PROT SERPL-MCNC: 6.6 G/DL (ref 6.4–8.2)
Q-T INTERVAL, ECG07: 520 MS
QRS DURATION, ECG06: 164 MS
QTC CALCULATION (BEZET), ECG08: 552 MS
RBC # BLD AUTO: 2.96 M/UL (ref 4.1–5.7)
RBC MORPH BLD: ABNORMAL
RBC MORPH BLD: ABNORMAL
SAMPLES BEING HELD,HOLD: NORMAL
SERVICE CMNT-IMP: ABNORMAL
SERVICE CMNT-IMP: ABNORMAL
SERVICE CMNT-IMP: NORMAL
SODIUM SERPL-SCNC: 135 MMOL/L (ref 136–145)
TIBC SERPL-MCNC: 150 UG/DL (ref 250–450)
TROPONIN I SERPL-MCNC: <0.05 NG/ML
VENTRICULAR RATE, ECG03: 68 BPM
VIT B12 SERPL-MCNC: 639 PG/ML (ref 193–986)
WBC # BLD AUTO: 5.8 K/UL (ref 4.1–11.1)

## 2019-12-03 PROCEDURE — 94640 AIRWAY INHALATION TREATMENT: CPT

## 2019-12-03 PROCEDURE — 82272 OCCULT BLD FECES 1-3 TESTS: CPT

## 2019-12-03 PROCEDURE — 93005 ELECTROCARDIOGRAM TRACING: CPT

## 2019-12-03 PROCEDURE — 71250 CT THORAX DX C-: CPT

## 2019-12-03 PROCEDURE — 82728 ASSAY OF FERRITIN: CPT

## 2019-12-03 PROCEDURE — 71046 X-RAY EXAM CHEST 2 VIEWS: CPT

## 2019-12-03 PROCEDURE — 74011000250 HC RX REV CODE- 250: Performed by: INTERNAL MEDICINE

## 2019-12-03 PROCEDURE — 83605 ASSAY OF LACTIC ACID: CPT

## 2019-12-03 PROCEDURE — 74011250636 HC RX REV CODE- 250/636: Performed by: EMERGENCY MEDICINE

## 2019-12-03 PROCEDURE — 80053 COMPREHEN METABOLIC PANEL: CPT

## 2019-12-03 PROCEDURE — 82962 GLUCOSE BLOOD TEST: CPT

## 2019-12-03 PROCEDURE — 77030029684 HC NEB SM VOL KT MONA -A

## 2019-12-03 PROCEDURE — 96374 THER/PROPH/DIAG INJ IV PUSH: CPT

## 2019-12-03 PROCEDURE — 83735 ASSAY OF MAGNESIUM: CPT

## 2019-12-03 PROCEDURE — 83880 ASSAY OF NATRIURETIC PEPTIDE: CPT

## 2019-12-03 PROCEDURE — 82746 ASSAY OF FOLIC ACID SERUM: CPT

## 2019-12-03 PROCEDURE — 85025 COMPLETE CBC W/AUTO DIFF WBC: CPT

## 2019-12-03 PROCEDURE — 74011250637 HC RX REV CODE- 250/637: Performed by: INTERNAL MEDICINE

## 2019-12-03 PROCEDURE — 99285 EMERGENCY DEPT VISIT HI MDM: CPT

## 2019-12-03 PROCEDURE — 82607 VITAMIN B-12: CPT

## 2019-12-03 PROCEDURE — 0099U RESPIRATORY PANEL,PCR,NASOPHARYNGEAL: CPT

## 2019-12-03 PROCEDURE — 83036 HEMOGLOBIN GLYCOSYLATED A1C: CPT

## 2019-12-03 PROCEDURE — 84484 ASSAY OF TROPONIN QUANT: CPT

## 2019-12-03 PROCEDURE — 74011000250 HC RX REV CODE- 250: Performed by: EMERGENCY MEDICINE

## 2019-12-03 PROCEDURE — 36415 COLL VENOUS BLD VENIPUNCTURE: CPT

## 2019-12-03 PROCEDURE — 74011250636 HC RX REV CODE- 250/636: Performed by: INTERNAL MEDICINE

## 2019-12-03 PROCEDURE — 83540 ASSAY OF IRON: CPT

## 2019-12-03 PROCEDURE — 65660000000 HC RM CCU STEPDOWN

## 2019-12-03 PROCEDURE — 74011636637 HC RX REV CODE- 636/637: Performed by: INTERNAL MEDICINE

## 2019-12-03 RX ORDER — HYDRALAZINE HYDROCHLORIDE 25 MG/1
25 TABLET, FILM COATED ORAL 3 TIMES DAILY
COMMUNITY
End: 2019-12-10

## 2019-12-03 RX ORDER — DEXTROSE MONOHYDRATE 100 MG/ML
0-250 INJECTION, SOLUTION INTRAVENOUS AS NEEDED
Status: DISCONTINUED | OUTPATIENT
Start: 2019-12-03 | End: 2019-12-10 | Stop reason: HOSPADM

## 2019-12-03 RX ORDER — MAGNESIUM SULFATE 100 %
4 CRYSTALS MISCELLANEOUS AS NEEDED
Status: DISCONTINUED | OUTPATIENT
Start: 2019-12-03 | End: 2019-12-10 | Stop reason: HOSPADM

## 2019-12-03 RX ORDER — CARVEDILOL 12.5 MG/1
12.5 TABLET ORAL 2 TIMES DAILY WITH MEALS
Status: DISCONTINUED | OUTPATIENT
Start: 2019-12-03 | End: 2019-12-10 | Stop reason: HOSPADM

## 2019-12-03 RX ORDER — SODIUM BICARBONATE 650 MG/1
1300 TABLET ORAL 3 TIMES DAILY
Status: DISCONTINUED | OUTPATIENT
Start: 2019-12-03 | End: 2019-12-04

## 2019-12-03 RX ORDER — TAMSULOSIN HYDROCHLORIDE 0.4 MG/1
0.4 CAPSULE ORAL DAILY
Status: DISCONTINUED | OUTPATIENT
Start: 2019-12-03 | End: 2019-12-10 | Stop reason: HOSPADM

## 2019-12-03 RX ORDER — CARVEDILOL 12.5 MG/1
6.25 TABLET ORAL 2 TIMES DAILY WITH MEALS
COMMUNITY

## 2019-12-03 RX ORDER — BUMETANIDE 0.25 MG/ML
3 INJECTION INTRAMUSCULAR; INTRAVENOUS EVERY 12 HOURS
Status: DISCONTINUED | OUTPATIENT
Start: 2019-12-03 | End: 2019-12-08

## 2019-12-03 RX ORDER — HEPARIN SODIUM 5000 [USP'U]/ML
5000 INJECTION, SOLUTION INTRAVENOUS; SUBCUTANEOUS EVERY 8 HOURS
Status: DISCONTINUED | OUTPATIENT
Start: 2019-12-03 | End: 2019-12-10 | Stop reason: HOSPADM

## 2019-12-03 RX ORDER — GUAIFENESIN 100 MG/5ML
81 LIQUID (ML) ORAL DAILY
Status: DISCONTINUED | OUTPATIENT
Start: 2019-12-03 | End: 2019-12-10 | Stop reason: HOSPADM

## 2019-12-03 RX ORDER — POTASSIUM CHLORIDE 750 MG/1
40 TABLET, FILM COATED, EXTENDED RELEASE ORAL 2 TIMES DAILY
Status: COMPLETED | OUTPATIENT
Start: 2019-12-03 | End: 2019-12-03

## 2019-12-03 RX ORDER — SODIUM CHLORIDE 0.9 % (FLUSH) 0.9 %
5-40 SYRINGE (ML) INJECTION AS NEEDED
Status: DISCONTINUED | OUTPATIENT
Start: 2019-12-03 | End: 2019-12-10 | Stop reason: HOSPADM

## 2019-12-03 RX ORDER — ACETAMINOPHEN 325 MG/1
650 TABLET ORAL
Status: DISCONTINUED | OUTPATIENT
Start: 2019-12-03 | End: 2019-12-10 | Stop reason: HOSPADM

## 2019-12-03 RX ORDER — INSULIN LISPRO 100 [IU]/ML
INJECTION, SOLUTION INTRAVENOUS; SUBCUTANEOUS
Status: DISCONTINUED | OUTPATIENT
Start: 2019-12-03 | End: 2019-12-10 | Stop reason: HOSPADM

## 2019-12-03 RX ORDER — ALBUTEROL SULFATE 0.83 MG/ML
2.5 SOLUTION RESPIRATORY (INHALATION)
Status: DISCONTINUED | OUTPATIENT
Start: 2019-12-03 | End: 2019-12-05

## 2019-12-03 RX ORDER — HYDRALAZINE HYDROCHLORIDE 25 MG/1
25 TABLET, FILM COATED ORAL 3 TIMES DAILY
Status: DISCONTINUED | OUTPATIENT
Start: 2019-12-03 | End: 2019-12-06

## 2019-12-03 RX ORDER — IPRATROPIUM BROMIDE AND ALBUTEROL SULFATE 2.5; .5 MG/3ML; MG/3ML
3 SOLUTION RESPIRATORY (INHALATION)
Status: COMPLETED | OUTPATIENT
Start: 2019-12-03 | End: 2019-12-03

## 2019-12-03 RX ORDER — LANOLIN ALCOHOL/MO/W.PET/CERES
325 CREAM (GRAM) TOPICAL
COMMUNITY
End: 2019-12-30

## 2019-12-03 RX ORDER — BUMETANIDE 1 MG/1
TABLET ORAL 2 TIMES DAILY
COMMUNITY
End: 2019-12-10

## 2019-12-03 RX ORDER — BUMETANIDE 0.25 MG/ML
3 INJECTION INTRAMUSCULAR; INTRAVENOUS
Status: COMPLETED | OUTPATIENT
Start: 2019-12-03 | End: 2019-12-03

## 2019-12-03 RX ORDER — LANOLIN ALCOHOL/MO/W.PET/CERES
325 CREAM (GRAM) TOPICAL
Status: DISCONTINUED | OUTPATIENT
Start: 2019-12-03 | End: 2019-12-06

## 2019-12-03 RX ORDER — SIMVASTATIN 20 MG/1
20 TABLET, FILM COATED ORAL
Status: DISCONTINUED | OUTPATIENT
Start: 2019-12-03 | End: 2019-12-10 | Stop reason: HOSPADM

## 2019-12-03 RX ORDER — SODIUM CHLORIDE 0.9 % (FLUSH) 0.9 %
5-40 SYRINGE (ML) INJECTION EVERY 8 HOURS
Status: DISCONTINUED | OUTPATIENT
Start: 2019-12-03 | End: 2019-12-10 | Stop reason: HOSPADM

## 2019-12-03 RX ORDER — POTASSIUM CHLORIDE 7.45 MG/ML
10 INJECTION INTRAVENOUS
Status: COMPLETED | OUTPATIENT
Start: 2019-12-03 | End: 2019-12-03

## 2019-12-03 RX ADMIN — HEPARIN SODIUM 5000 UNITS: 5000 INJECTION INTRAVENOUS; SUBCUTANEOUS at 14:55

## 2019-12-03 RX ADMIN — ASPIRIN 81 MG 81 MG: 81 TABLET ORAL at 14:55

## 2019-12-03 RX ADMIN — FERROUS SULFATE TAB 325 MG (65 MG ELEMENTAL FE) 325 MG: 325 (65 FE) TAB at 14:55

## 2019-12-03 RX ADMIN — TAMSULOSIN HYDROCHLORIDE 0.4 MG: 0.4 CAPSULE ORAL at 14:55

## 2019-12-03 RX ADMIN — INSULIN LISPRO 2 UNITS: 100 INJECTION, SOLUTION INTRAVENOUS; SUBCUTANEOUS at 21:59

## 2019-12-03 RX ADMIN — POTASSIUM CHLORIDE 10 MEQ: 10 INJECTION, SOLUTION INTRAVENOUS at 10:35

## 2019-12-03 RX ADMIN — SODIUM BICARBONATE 1300 MG: 650 TABLET ORAL at 15:03

## 2019-12-03 RX ADMIN — Medication 10 ML: at 22:00

## 2019-12-03 RX ADMIN — POTASSIUM CHLORIDE 40 MEQ: 750 TABLET, FILM COATED, EXTENDED RELEASE ORAL at 19:33

## 2019-12-03 RX ADMIN — BUMETANIDE 3 MG: 0.25 INJECTION INTRAMUSCULAR; INTRAVENOUS at 21:34

## 2019-12-03 RX ADMIN — CARVEDILOL 12.5 MG: 12.5 TABLET, FILM COATED ORAL at 17:17

## 2019-12-03 RX ADMIN — IPRATROPIUM BROMIDE AND ALBUTEROL SULFATE 3 ML: .5; 3 SOLUTION RESPIRATORY (INHALATION) at 11:20

## 2019-12-03 RX ADMIN — INSULIN LISPRO 3 UNITS: 100 INJECTION, SOLUTION INTRAVENOUS; SUBCUTANEOUS at 17:18

## 2019-12-03 RX ADMIN — HEPARIN SODIUM 5000 UNITS: 5000 INJECTION INTRAVENOUS; SUBCUTANEOUS at 21:06

## 2019-12-03 RX ADMIN — POTASSIUM CHLORIDE 40 MEQ: 750 TABLET, FILM COATED, EXTENDED RELEASE ORAL at 14:55

## 2019-12-03 RX ADMIN — SIMVASTATIN 20 MG: 20 TABLET, FILM COATED ORAL at 21:10

## 2019-12-03 RX ADMIN — HYDRALAZINE HYDROCHLORIDE 25 MG: 25 TABLET, FILM COATED ORAL at 21:10

## 2019-12-03 RX ADMIN — SODIUM BICARBONATE 1300 MG: 650 TABLET ORAL at 21:09

## 2019-12-03 RX ADMIN — Medication 10 ML: at 14:55

## 2019-12-03 RX ADMIN — BUMETANIDE 3 MG: 0.25 INJECTION INTRAMUSCULAR; INTRAVENOUS at 14:54

## 2019-12-03 NOTE — H&P
HISTORY AND PHYSICAL 
 
 
PCP: Shannan Frausto MD 
History source: Patient and his wife CC: SOB 
 
 
HPI: A 81year old male patient with PMH of CAD s/p CABG, Diastolic heart failure, Afib , s/p PPM, CKD stage III, HTN, HLD presented to Ed for evaluation of shortness of breath. Patient has noticed progressive sob since few days and follows with Dr. Marina Null for CHF. he noticed more than 4lbs weight gain in last few days. This morning, he woke up with sob and so he came to ED. He noticed worsening leg swelling, no chest pain. No change in cough but sputum appears to be more dark in color. No fever or chills. No sick contacts. He also mentions that his BG was low in 80;s this AM 
In ED, he was given IV bumex. Hospitalist consulted for admission. PMH/PSH: 
Past Medical History:  
Diagnosis Date  CAD (coronary artery disease) s/p CABG 2008  Carotid arterial disease ([facility]  CKD (chronic kidney disease) stage 3, GFR 30-59 ml/min ([facility]) 10/21/2017  
 hypertension and DM nephrosclerosis  Diabetes mellitus, type 2 ([facility]  Hypercholesteremia  Hypertension  Paroxysmal atrial fibrillation ([facility] 10/21/2017  
 new onset afib with BRPR 10-19-17 admit  PVC's (premature ventricular contractions) 5/26/2014  PVD (peripheral vascular disease) ([facility] Past Surgical History:  
Procedure Laterality Date  HX CORONARY ARTERY BYPASS GRAFT    
 HX HEART CATHETERIZATION    
 CT TCAT INSJ/RPL PERM LEADLESS PACEMAKER RV W/IMG N/A 5/9/2019 INSERT OR REPLACE TRANSCATH PPM LEADLESS performed by Tim Phan MD at [place] [address] CATH LAB Home meds:  
Prior to Admission medications Medication Sig Start Date End Date Taking? Authorizing Provider  
ferrous sulfate (IRON) 325 mg (65 mg iron) tablet Take 325 mg by mouth Daily (before breakfast). Yes Provider, Historical  
hydrALAZINE (APRESOLINE) 25 mg tablet Take 25 mg by mouth three (3) times daily.    Yes Provider, Historical bumetanide (BUMEX) 1 mg tablet Take  by mouth two (2) times a day. Take 3 mg in the morning and 2 mg in the evening as directed. Yes Provider, Historical  
carvedilol (COREG) 12.5 mg tablet Take 12.5 mg by mouth two (2) times daily (with meals). Yes Provider, Historical  
simvastatin (ZOCOR) 20 mg tablet Take 1 Tab by mouth nightly. 19  Yes Zoe Campos MD  
insulin glargine (LANTUS U-100 INSULIN) 100 unit/mL injection 5 Units by SubCUTAneous route every morning. Prescribed 18 units daily. Reports taking 10 units in the morning, then up to 8 units in the evening depending on his blood sugars. If blood sugar is <200, he reduces his evening dose. 19  Yes Warden Too MD  
albuterol (PROVENTIL VENTOLIN) 2.5 mg /3 mL (0.083 %) nebulizer solution 3 mL by Nebulization route every four (4) hours as needed for Wheezing. 19  Yes Natividad Leung MD  
sodium bicarbonate 650 mg tablet Take 2 Tabs by mouth three (3) times daily. 19  Yes Keya Sandoval, DO  
tamsulosin (FLOMAX) 0.4 mg capsule Take 1 Cap by mouth daily. 19  Yes Keya Sandoval, DO  
aspirin 81 mg chewable tablet Take 1 Tab by mouth daily. 18  Yes Clare Trujillo MD  
omega 0-dou-obd-fish oil (FISH OIL) 100-160-1,000 mg cap Take 1 Cap by mouth two (2) times a day. Yes Provider, Historical  
cinnamon bark (CINNAMON) 500 mg cap Take 1,000 mg by mouth two (2) times a day. Yes Provider, Historical  
 
 
Allergies: Allergies Allergen Reactions  Lisinopril Cough FH: 
History reviewed. No pertinent family history. SH: Social History Tobacco Use  Smoking status: Former Smoker Packs/day: 3.00 Years: 20.00 Pack years: 60.00 Types: Cigarettes Last attempt to quit: 1985 Years since quittin.9  Smokeless tobacco: Never Used Substance Use Topics  Alcohol use: No  
 
 
ROS: A comprehensive review of systems was negative except for that written in the HPI. PHYSICAL EXAM: 
Visit Vitals /65 Pulse 75 Temp 97.5 °F (36.4 °C) Resp 28 Ht 5' 9\" (1.753 m) Wt 78.9 kg (174 lb) SpO2 95% BMI 25.70 kg/m² Gen: mild distress due to sob. Elderly HEENT: anicteric sclerae, normal conjunctiva, oropharynx clear, MM moist 
Neck: supple, trachea midline, no adenopathy Heart: RRR, no MRG, no JVD, 2+  edema Lungs: decreased breath sounds Abd: soft, NT, ND, BS+, no organomegaly Extr: warm Skin: dry, no rash Neuro: CN II-XII grossly intact, normal speech, moves all extremities Psych: normal mood, appropriate affect Labs/Imaging: 
Recent Results (from the past 24 hour(s)) EKG, 12 LEAD, INITIAL Collection Time: 12/03/19  8:31 AM  
Result Value Ref Range Ventricular Rate 68 BPM  
 Atrial Rate 80 BPM  
 QRS Duration 164 ms Q-T Interval 520 ms QTC Calculation (Bezet) 552 ms Calculated R Axis 24 degrees Calculated T Axis -48 degrees Diagnosis Wide QRS rhythm with occasional ventricular-paced complexes and with  
occasional premature ventricular complexes Right bundle branch block T wave abnormality, consider lateral ischemia When compared with ECG of 05-AUG-2019 15:58, Electronic ventricular pacemaker has replaced Atrial fibrillation Confirmed by Gilda Puente M.D., Fox Lucas ([id number]) on 12/3/2019 12:47:39 PM 
  
CBC WITH AUTOMATED DIFF Collection Time: 12/03/19  8:44 AM  
Result Value Ref Range WBC 5.8 4.1 - 11.1 K/uL  
 RBC 2.96 (L) 4.10 - 5.70 M/uL HGB 7.8 (L) 12.1 - 17.0 g/dL HCT 25.2 (L) 36.6 - 50.3 % MCV 85.1 80.0 - 99.0 FL  
 MCH 26.4 26.0 - 34.0 PG  
 MCHC 31.0 30.0 - 36.5 g/dL  
 RDW 16.6 (H) 11.5 - 14.5 % PLATELET 383 493 - 785 K/uL MPV 11.5 8.9 - 12.9 FL  
 NRBC 0.0 0  WBC ABSOLUTE NRBC 0.00 0.00 - 0.01 K/uL NEUTROPHILS 84 (H) 32 - 75 % LYMPHOCYTES 4 (L) 12 - 49 % MONOCYTES 11 5 - 13 % EOSINOPHILS 1 0 - 7 % BASOPHILS 0 0 - 1 % IMMATURE GRANULOCYTES 0 0.0 - 0.5 % ABS. NEUTROPHILS 4.9 1.8 - 8.0 K/UL  
 ABS. LYMPHOCYTES 0.2 (L) 0.8 - 3.5 K/UL  
 ABS. MONOCYTES 0.6 0.0 - 1.0 K/UL  
 ABS. EOSINOPHILS 0.1 0.0 - 0.4 K/UL  
 ABS. BASOPHILS 0.0 0.0 - 0.1 K/UL  
 ABS. IMM. GRANS. 0.0 0.00 - 0.04 K/UL  
 DF SMEAR SCANNED    
 RBC COMMENTS ANISOCYTOSIS 1+ 
    
 RBC COMMENTS POIKILOCYTOSIS 
1+ METABOLIC PANEL, COMPREHENSIVE Collection Time: 12/03/19  8:44 AM  
Result Value Ref Range Sodium 135 (L) 136 - 145 mmol/L Potassium 3.0 (L) 3.5 - 5.1 mmol/L Chloride 95 (L) 97 - 108 mmol/L  
 CO2 33 (H) 21 - 32 mmol/L Anion gap 7 5 - 15 mmol/L Glucose 85 65 - 100 mg/dL BUN 61 (H) 6 - 20 MG/DL Creatinine 2.58 (H) 0.70 - 1.30 MG/DL  
 BUN/Creatinine ratio 24 (H) 12 - 20 GFR est AA 29 (L) >60 ml/min/1.73m2 GFR est non-AA 24 (L) >60 ml/min/1.73m2 Calcium 8.2 (L) 8.5 - 10.1 MG/DL Bilirubin, total 0.6 0.2 - 1.0 MG/DL  
 ALT (SGPT) 42 12 - 78 U/L  
 AST (SGOT) 43 (H) 15 - 37 U/L Alk. phosphatase 498 (H) 45 - 117 U/L Protein, total 6.6 6.4 - 8.2 g/dL Albumin 2.0 (L) 3.5 - 5.0 g/dL Globulin 4.6 (H) 2.0 - 4.0 g/dL A-G Ratio 0.4 (L) 1.1 - 2.2 SAMPLES BEING HELD Collection Time: 12/03/19  8:44 AM  
Result Value Ref Range SAMPLES BEING HELD 1 RED, 1 JAMES   
 COMMENT Add-on orders for these samples will be processed based on acceptable specimen integrity and analyte stability, which may vary by analyte. NT-PRO BNP Collection Time: 12/03/19  8:44 AM  
Result Value Ref Range NT pro-BNP >35,000 (H) <450 PG/ML  
TROPONIN I Collection Time: 12/03/19  8:44 AM  
Result Value Ref Range Troponin-I, Qt. <0.05 <0.05 ng/mL MAGNESIUM Collection Time: 12/03/19  8:44 AM  
Result Value Ref Range Magnesium 2.2 1.6 - 2.4 mg/dL GLUCOSE, POC Collection Time: 12/03/19 12:32 PM  
Result Value Ref Range Glucose (POC) 89 65 - 100 mg/dL Performed by Dari Gonzales   
 
 
Recent Labs 12/03/19 
0827 WBC 5.8 HGB 7.8* HCT 25.2*  Recent Labs 12/03/19 
8165 * K 3.0*  
CL 95* CO2 33* BUN 61* CREA 2.58* GLU 85  
CA 8.2* MG 2.2 Recent Labs 12/03/19 
1293 SGOT 43* ALT 42 * TBILI 0.6 TP 6.6 ALB 2.0*  
GLOB 4.6* Recent Labs 12/03/19 
3385 TROIQ <0.05 No results for input(s): INR, PTP, APTT, INREXT in the last 72 hours. No results for input(s): PH, PCO2, PO2 in the last 72 hours. Assessment & Plan:  
Shortness of breath likely due to Acute on chronic diastolic heart failure Chronic hypoxemic respiratory failure on 2l O2- baseline 
- admit to telemetry - BNP >35,000 
- CT chest:  Small bilateral pleural effusions with underlying atelectasis. - recent Echo 8/2019: EF 66 - 70%. Mild to moderate pulmonary hypertension. 
- cardiology on board 
- started on IV bumex 3mg bid - c/w aspirin, coreg, hydralazine, statin  
- cards plan for RHC with vasodilator challenge. KATALINA on CKD 
- cr 2.58 on poa, baseline 1.8-1.9 
- likely CRS 
- nephrology consulted  
- renal us ordered  
- will monitor renal function Afib - rate controlled on . On aspirin HTN - uncontrolled on poa. Restarted home meds coreg, hydralazine DM- A1c 7.7. c/w ISS Hypokalemia - replaced Chronic anemia 
- fobt negative 
- iron deficient. On iron supplements - hb low stable Advanced COPD and emphysema 
- CT chest: Diffuse interstitial opacities again noted progressive in the upper lobes bilaterally. Underlying pulmonary fibrosis is considered. - check PFT's. - normal lactic acid  
- resp panel and sputum cx ordered 
- less likely infectious Hx of CAD: s/p CAG in 2003. - no chest pain. Trop neg Hx of PVD s/p arthrectomy to distal SFA and distal popliteal with angioplasty to both lesions and stent to SFA by Dr. Juan Singh 3/2014 DVT ppx: Heparin Code status: Full. Disposition: TBD. Signed By: Elmira Yañez MD   
 December 3, 2019

## 2019-12-03 NOTE — PROGRESS NOTES
TRANSFER - IN REPORT: 
 
Verbal report received from [facility] (name) on [address].  being received from ED (unit) for routine progression of care Report consisted of patients Situation, Background, Assessment and Recommendations(SBAR). Information from the following report(s) SBAR, Kardex, ED Summary, MAR, Accordion, Recent Results, Med Rec Status and Cardiac Rhythm Paced was reviewed with the receiving nurse. Opportunity for questions and clarification was provided. Assessment completed upon patients arrival to unit and care assumed.

## 2019-12-03 NOTE — ED PROVIDER NOTES
81 y.o. male with past medical history significant for COPD, CHF, CAD, CKD, DM, HLD, HTN, A-fib, pacemaker present, and PVC's who presents from via EMS from home with chief complaint of shortness of breath. Pt reports he woke up this morning at 0430 with increased shortness of breath. He endorses shortness of breath for the past 3 mos but states it was worse today, and worsened with exertion. He states he feels ok now in ED and about the same level of shortness of breath as yesterday. He also reports feeling like his blood glucose was also low this AM, but was fine after he ate breakfast. His BG was 158 last night and he took 5 units of insulin before going to bed. He denies pain, including chest pain and abdominal pain, fever, chills, rhinorrhea, and sore throat. He endorses diarrhea for the past 36 hours with 4-5 episode in that time, and cough for the past 3 mos. He states his legs look normal and are not more swollen than usual. He is on 2L NC O2 at baseline. At baseline he ambulates with a walker. He has not yet tried ambulating today. He uses albuterol and nebulizer at home to treat his shortness of breath. There are no other acute medical concerns at this time. Social hx: former tobacco smoker (quit 1985), denies EtOH use and illicit drug use. PCP: Graham Mobley MD 
 
 
Note written by Maria Dolores Fontanez, as dictated by Bryan Godinez MD 8:39 AM 
 
 
The history is provided by the patient and the EMS personnel. No  was used. Past Medical History:  
Diagnosis Date  CAD (coronary artery disease) s/p CABG 2008  Carotid arterial disease ([facility] [address]  CKD (chronic kidney disease) stage 3, GFR 30-59 ml/min ([facility]) 10/21/2017  
 hypertension and DM nephrosclerosis  Diabetes mellitus, type 2 ([facility] [address]  Hypercholesteremia  Hypertension  Paroxysmal atrial fibrillation ([facility] [address] 10/21/2017  
 new onset afib with BRPR 10-19-17 admit  PVC's (premature ventricular contractions) 2014  PVD (peripheral vascular disease) ([facility] [address] Past Surgical History:  
Procedure Laterality Date  HX CORONARY ARTERY BYPASS GRAFT    
 HX HEART CATHETERIZATION    
 VT TCAT INSJ/RPL PERM LEADLESS PACEMAKER RV W/IMG N/A 2019 INSERT OR REPLACE TRANSCATH PPM LEADLESS performed by Sally Connolly MD at [address] CATH LAB History reviewed. No pertinent family history. Social History Socioeconomic History  Marital status:  Spouse name: Not on file  Number of children: Not on file  Years of education: Not on file  Highest education level: Not on file Occupational History  Not on file Social Needs  Financial resource strain: Not on file  Food insecurity:  
  Worry: Not on file Inability: Not on file  Transportation needs:  
  Medical: Not on file Non-medical: Not on file Tobacco Use  Smoking status: Former Smoker Packs/day: 3.00 Years: 20.00 Pack years: 60.00 Types: Cigarettes Last attempt to quit: 1985 Years since quittin.9  Smokeless tobacco: Never Used Substance and Sexual Activity  Alcohol use: No  
 Drug use: No  
 Sexual activity: Not on file Lifestyle  Physical activity:  
  Days per week: Not on file Minutes per session: Not on file  Stress: Not on file Relationships  Social connections:  
  Talks on phone: Not on file Gets together: Not on file Attends Buddhist service: Not on file Active member of club or organization: Not on file Attends meetings of clubs or organizations: Not on file Relationship status: Not on file  Intimate partner violence:  
  Fear of current or ex partner: Not on file Emotionally abused: Not on file Physically abused: Not on file Forced sexual activity: Not on file Other Topics Concern  Not on file Social History Narrative  Not on file ALLERGIES: Lisinopril Review of Systems Constitutional: Negative for chills and fever. HENT: Negative for rhinorrhea and sore throat. Respiratory: Positive for cough and shortness of breath. Cardiovascular: Positive for leg swelling. Negative for chest pain. Gastrointestinal: Positive for diarrhea. Negative for abdominal pain, nausea and vomiting. Genitourinary: Negative for dysuria and hematuria. Musculoskeletal: Negative for arthralgias and myalgias. Skin: Negative for pallor and rash. Neurological: Negative for dizziness, weakness and light-headedness. All other systems reviewed and are negative. Vitals:  
 12/03/19 0830 BP: 166/56 Pulse: 72 Resp: 18 SpO2: 97% Physical Exam  
Vital signs reviewed. Nursing notes reviewed. Const:  No acute distress, well developed, well nourished Head:  Atraumatic, normocephalic Eyes:  PERRL, conjunctiva normal, no scleral icterus Neck:  Supple, trachea midline Cardiovascular:  RRR, no murmurs, no gallops, no rubs Resp:  No resp distress, mild increased work of breathing, no wheezes, no rhonchi, rales at bases, Abd:  Soft, non-tender, non-distended, no rebound, no guarding, no CVA tenderness MSK:  Bilateral pedal edema, normal ROM Neuro:  Alert and oriented x3, no cranial nerve defect Skin:  Warm, dry, intact Psych: normal mood and affect, behavior is normal, judgement and thought content is normal 
 
Note written by Maria Dolores Dillard, as dictated by Jessica Guajardo MD 8:54 AM 
 
 
 
MDM Number of Diagnoses or Management Options Acute on chronic systolic congestive heart failure ([facility] [address]):  
SOB (shortness of breath):  
  
Amount and/or Complexity of Data Reviewed Clinical lab tests: ordered and reviewed Tests in the radiology section of CPT®: reviewed and ordered Review and summarize past medical records: yes Patient Progress Patient progress: stable Mr. Linette Cohn is an 45-year-old male who presents to the ER shortness of breath. He has a history of COPD and CHF. He has a mixed picture in the ER. His BNP is elevated, and looks like he has edema on his x-ray. He also has diffuse wheezes throughout his lung fields. He says he feels a bit better after he got a neb. However, he could not get up and walk around today due to shortness of breath. And I feel like it safe for him to go home. He is to be evaluated for admission by the hospitalist. 
 
 
Procedures

## 2019-12-03 NOTE — PROGRESS NOTES
Physical Therapy Order received and appreciate. Physical therapy will defer evaluation today as per the order start date/ time of 12/4/19 00:00. Therapy will attempt to see patient tomorrow as appropriate. Thank you.

## 2019-12-03 NOTE — PROGRESS NOTES
Admission Medication Reconciliation: 
 
Information obtained from:  patient interview, pt's home medication list 
RxQuery data available¹:  YES Comments/Recommendations: Updated PTA meds/reviewed patient's allergies. 1)  Pt and his spouse interviewed at bedside, pt brings his home medication list, reports  
     daily compliance and is a good historian. 2)  Medication changes (since last review): Added 
- iron 
- hydralazine Adjusted - bumex dose increased - coreg dose increased Removed 
- none ¹RxQuery pharmacy benefit data reflects medications filled and processed through the patient's insurance, however  
this data does NOT capture whether the medication was picked up or is currently being taken by the patient. Allergies:  Lisinopril Significant PMH/Disease States:  
Past Medical History:  
Diagnosis Date CAD (coronary artery disease) s/p CABG 2008 Carotid arterial disease ([facility] [address] CKD (chronic kidney disease) stage 3, GFR 30-59 ml/min ([facility]) 10/21/2017  
 hypertension and DM nephrosclerosis Diabetes mellitus, type 2 ([facility] [address] Hypercholesteremia Hypertension Paroxysmal atrial fibrillation ([facility] [address] 10/21/2017  
 new onset afib with BRPR 10-19-17 admit PVC's (premature ventricular contractions) 5/26/2014 PVD (peripheral vascular disease) ([facility] [address] Chief Complaint for this Admission: Chief Complaint Patient presents with Shortness of Breath Prior to Admission Medications:  
Prior to Admission Medications Prescriptions Last Dose Informant Patient Reported? Taking? albuterol (PROVENTIL VENTOLIN) 2.5 mg /3 mL (0.083 %) nebulizer solution 12/3/2019 Self No Yes Sig: 3 mL by Nebulization route every four (4) hours as needed for Wheezing. aspirin 81 mg chewable tablet 12/2/2019 Self No Yes Sig: Take 1 Tab by mouth daily. bumetanide (BUMEX) 1 mg tablet 12/2/2019  Yes Yes Sig: Take  by mouth two (2) times a day.  Take 3 mg in the morning and 2 mg in the evening as directed. carvedilol (COREG) 12.5 mg tablet 2019  Yes Yes Sig: Take 12.5 mg by mouth two (2) times daily (with meals). cinnamon bark (CINNAMON) 500 mg cap 2019 Self Yes Yes Sig: Take 1,000 mg by mouth two (2) times a day. ferrous sulfate (IRON) 325 mg (65 mg iron) tablet 2019  Yes Yes Sig: Take 325 mg by mouth Daily (before breakfast). hydrALAZINE (APRESOLINE) 25 mg tablet 2019  Yes Yes Sig: Take 25 mg by mouth three (3) times daily. insulin glargine (LANTUS U-100 INSULIN) 100 unit/mL injection 2019  No Yes Si Units by SubCUTAneous route every morning. Prescribed 18 units daily. Reports taking 10 units in the morning, then up to 8 units in the evening depending on his blood sugars. If blood sugar is <200, he reduces his evening dose. omega 3-dha-epa-fish oil (FISH OIL) 100-160-1,000 mg cap 2019 Self Yes Yes Sig: Take 1 Cap by mouth two (2) times a day. simvastatin (ZOCOR) 20 mg tablet 2019  No Yes Sig: Take 1 Tab by mouth nightly.  
sodium bicarbonate 650 mg tablet 2019 Self No Yes Sig: Take 2 Tabs by mouth three (3) times daily. tamsulosin (FLOMAX) 0.4 mg capsule 2019 Self No Yes Sig: Take 1 Cap by mouth daily. Facility-Administered Medications: None Please contact the main inpatient pharmacy with any questions or concerns at [phone] and we will direct you to the clinical pharmacist covering this patient's care while in-house.   
Milagro Hernandez, COURTNEYD

## 2019-12-03 NOTE — PROGRESS NOTES
TRANSFER - OUT REPORT: 
 
Verbal report given to Vani Heath on [address].  being transferred to [facility] for routine progression of care Report consisted of patients Situation, Background, Assessment and  
Recommendations(SBAR). Information from the following report(s) SBAR, Kardex, ED Summary, Recent Results and Cardiac Rhythm NSR;Paced was reviewed with the receiving nurse. Lines:  
Peripheral IV 12/03/19 Left Antecubital (Active) Site Assessment Clean, dry, & intact 12/3/2019  8:37 AM  
Phlebitis Assessment 0 12/3/2019  8:37 AM  
Infiltration Assessment 0 12/3/2019  8:37 AM  
Dressing Status Clean, dry, & intact 12/3/2019  8:37 AM  
  
 
Opportunity for questions and clarification was provided. Patient transported with: 
 Monitor O2 @ 2 liters Registered Nurse

## 2019-12-03 NOTE — ED TRIAGE NOTES
Pt arrived via EMS from home c/o SOB x3 months worse this am. Pt is on 2L of O2 NC at home. Pt reports he has had diarrhea x1 day and gained 4lbs in the last few days. Pt denies CP.

## 2019-12-03 NOTE — CONSULTS
Cardiovascular Consult Patient: Andrey Hess. MRN: [id number]  SSN: [id number] YOB: 1935  Age: 81 y.o. Sex: male Subjective:  
  
Date of  Admission: 12/3/2019 Primary Care Provider: Taylor Pearl MD 
Admission type: Chief Complaint Patient presents with  Shortness of Breath PRIMARY CARDIOLOGIST:  Dr. Richard Garland MD 
Consult requested by Dr. Delores Akhtar Andrey Hess is a 81 y.o.  male admitted for CHF exacerbation ([facility] [address] [I50.9]. Has PMH of CAD (CABG 2008, HTN< HFpEF, CKD, DM, HLD, HTN, Afib, s/p leadless ppm, PVD s/p arthrectomy to distal SFA (Dr. Deanna Underwood), pulmonary HTN, COPD/restrictive lung disease (on home O2 2 L). Presented with chief c/o worsening SOB. Has had SOB over past 3 months. Five days ago, home health nurse called office noting pt RR 24 and weight increase. Appears pt was on bumex 3 mg in a.m. and 1 mg in pm as of 11/21/19. Bumex was increased per office notes x 5 days (although dosing not specified). He reports that yesterday he called office with continued Edema and SOB and took bumex 6 mg po and noted increased urine output. Today, pt's home health nurse arrived and noted tachypnea, abnormal breath sounds and per pt 4 lb weight gain in 48 hours. Pt states SOB has worsened over past 48 hours. EMS was called and transported to [facility] ER. Pt also report that he has had several episodes of diarrhea over past 36 hours as well. He reports worsened BLE edema despite use of compression stockings. Denies having any chest pain, dizziness, lightheadeness, syncope. Has ocassional palpitations. Now feels a little better. Denies SOB at rest currently (although does appear SOB). States edema is a little better since arrival.   
ER evaluation noted increased creatinine (2.58), pro BNP 35,000, troponin negative. CT chest with difuse intierstitial opacities in upper lobes, small bilateral pleural effusions.   He has been started on bumex 3 mg IV BID. Past Medical History:  
Diagnosis Date  CAD (coronary artery disease) s/p CABG   Carotid arterial disease ([facility]  CKD (chronic kidney disease) stage 3, GFR 30-59 ml/min ([facility]) 10/21/2017  
 hypertension and DM nephrosclerosis  Diabetes mellitus, type 2 ([facility]  Hypercholesteremia  Hypertension  Paroxysmal atrial fibrillation ([facility] 10/21/2017  
 new onset afib with BRPR 10-19-17 admit  PVC's (premature ventricular contractions) 2014  PVD (peripheral vascular disease) ([facility] Past Surgical History:  
Procedure Laterality Date  HX CORONARY ARTERY BYPASS GRAFT    
 HX HEART CATHETERIZATION    
 NC TCAT INSJ/RPL PERM LEADLESS PACEMAKER RV W/IMG N/A 2019 INSERT OR REPLACE TRANSCATH PPM LEADLESS [place] [address] CATH LAB History reviewed. No pertinent family history. Social History Tobacco Use  Smoking status: Former Smoker Packs/day: 3.00 Years: 20.00 Pack years: 60.00 Types: Cigarettes Last attempt to quit: 1985 Years since quittin.9  Smokeless tobacco: Never Used Substance Use Topics  Alcohol use: No  
  
Current Facility-Administered Medications Medication Dose Route Frequency  sodium chloride (NS) flush 5-40 mL  5-40 mL IntraVENous Q8H  
 sodium chloride (NS) flush 5-40 mL  5-40 mL IntraVENous PRN  
 acetaminophen (TYLENOL) tablet 650 mg  650 mg Oral Q4H PRN  
 heparin (porcine) injection 5,000 Units  5,000 Units SubCUTAneous Q8H  potassium chloride SR (KLOR-CON 10) tablet 40 mEq  40 mEq Oral BID  aspirin chewable tablet 81 mg  81 mg Oral DAILY  carvedilol (COREG) tablet 12.5 mg  12.5 mg Oral BID WITH MEALS  ferrous sulfate tablet 325 mg  325 mg Oral ACB  hydrALAZINE (APRESOLINE) tablet 25 mg  25 mg Oral TID  simvastatin (ZOCOR) tablet 20 mg  20 mg Oral QHS  sodium bicarbonate tablet 1,300 mg  1,300 mg Oral TID  tamsulosin (FLOMAX) capsule 0.4 mg  0.4 mg Oral DAILY  bumetanide (BUMEX) injection 3 mg  3 mg IntraVENous Q12H  
 insulin lispro (HUMALOG) injection   SubCUTAneous AC&HS  
 glucose chewable tablet 16 g  4 Tab Oral PRN  
 glucagon (GLUCAGEN) injection 1 mg  1 mg IntraMUSCular PRN  
 dextrose 10% infusion 0-250 mL  0-250 mL IntraVENous PRN Allergies Allergen Reactions  Lisinopril Cough Review of Symptoms: 
Constitutional: No fevers or chills. HEET: No trauma. No visual changes. No dysphagia Neck: No adenopathy or swelling. Heart: No chest pain, + occasional palpitations. Lungs: + SOB. No cough. No orthopnea Abdomen: No pain. No nausea of vomiting. No BRBPR or melena. No diarrhea. Urology: No dysuria or hematuria. Ext: 2+ ankle/pedal edema bilaterally Skin: No acute rashes or ulcers. Endocrine: No heat or cold intolerance. Neuro: No transient neurologic deficits. Subjective:  
 
Visit Vitals /78 (BP 1 Location: Right arm, BP Patient Position: At rest) Pulse 78 Temp 98.1 °F (36.7 °C) Resp 18 Ht 5' 9\" (1.753 m) Wt 181 lb 10.5 oz (82.4 kg) SpO2 92% BMI 26.83 kg/m² Physical Exam: 
Head: Normocephalic, atraumatic. Eyes: Pupils equal, round, Extra ocular muscles intact. Sclera anicteric. Ears: Grossly responsive to sound, mild [place] Neck: No adenopathy. No bruits. Throat: No sores or erythema. Heart: Regular rate and rhythm. Normal S1 and S2. No murmurs, gallops, or rub. Lungs: crackles noted bilaterally Clear to auscultation bilaterally, diminished bases. Scattered faint expiratory wheeze. Abdomen: Soft, non-tender. No guarding or rebound. Bowel sounds active. Ext: 2+ BLE ankle edema no ulceration. Skin: Normal coloration. Warm and dry. No rash. Neuro: Alert, ADRIAN. [place] Plaster Affect: Appropriate. Alert and interactive. Cardiographics: 
ECG: 
EKG Results Procedure 720 Value Units Date/Time EKG 12 LEAD INITIAL [[id number]] Collected:  12/03/19 0831  Order Status: Completed Updated:  12/03/19 1247 Ventricular Rate 68 BPM   
  Atrial Rate 80 BPM   
  QRS Duration 164 ms Q-T Interval 520 ms QTC Calculation (Bezet) 552 ms Calculated R Axis 24 degrees Calculated T Axis -48 degrees Diagnosis -- Wide QRS rhythm with occasional ventricular-paced complexes and with  
occasional premature ventricular complexes Right bundle branch block T wave abnormality, consider lateral ischemia When compared with ECG of 05-AUG-2019 15:58, Electronic ventricular pacemaker has replaced Atrial fibrillation Confirmed by Sekou Elaine M.D., [facility] ([id number]) on 12/3/2019 12:47:39 PM 
  
  
 
Echocardiogram: 
08/05/19 ECHO ADULT COMPLETE 08/06/2019 8/6/2019 Narrative · Left Ventricle: Normal cavity size, systolic function (ejection fraction  
normal) and diastolic function. Mild concentric hypertrophy. Estimated  
left ventricular ejection fraction is 66 - 70%. Visually measured ejection  
fraction. No regional wall motion abnormality noted. Normal left  
ventricular strain. · Left Atrium: Moderately dilated left atrium. · Right Atrium: Mildly dilated right atrium. · Interatrial Septum: Color flow Doppler was used. · Aortic Valve: Mild aortic valve sclerosis with no significant stenosis. · Mitral Valve: Mitral valve thickening. Mild mitral valve regurgitation. · Tricuspid Valve: Mild tricuspid valve regurgitation is present. · Pulmonary Artery: Mild to moderate pulmonary hypertension. Signed by: Roberto Huynh MD  
 
 
Cath: 
05/01/19 CARDIAC PROCEDURE 05/02/2019 5/2/2019 Narrative Moderate to severe elevation in right and left heart pressures. Signed by: Ana Billings MD  
 
 
 
Data Reviewed: CMP:  
Lab Results Component Value Date/Time   (L) 12/03/2019 08:44 AM  
 K 3.0 (L) 12/03/2019 08:44 AM  
 CL 95 (L) 12/03/2019 08:44 AM  
 CO2 33 (H) 12/03/2019 08:44 AM  
 AGAP 7 12/03/2019 08:44 AM  
 GLU 85 12/03/2019 08:44 AM BUN 61 (H) 12/03/2019 08:44 AM  
 CREA 2.58 (H) 12/03/2019 08:44 AM  
 GFRAA 29 (L) 12/03/2019 08:44 AM  
 GFRNA 24 (L) 12/03/2019 08:44 AM  
 CA 8.2 (L) 12/03/2019 08:44 AM  
 MG 2.2 12/03/2019 08:44 AM  
 ALB 2.0 (L) 12/03/2019 08:44 AM  
 TP 6.6 12/03/2019 08:44 AM  
 GLOB 4.6 (H) 12/03/2019 08:44 AM  
 AGRAT 0.4 (L) 12/03/2019 08:44 AM  
 SGOT 43 (H) 12/03/2019 08:44 AM  
 ALT 42 12/03/2019 08:44 AM  
 
CBC:  
Lab Results Component Value Date/Time WBC 5.8 12/03/2019 08:44 AM  
 HGB 7.8 (L) 12/03/2019 08:44 AM  
 HCT 25.2 (L) 12/03/2019 08:44 AM  
  12/03/2019 08:44 AM  
 
All Cardiac Markers in the last 24 hours:  
Lab Results Component Value Date/Time Ammon Gong <0.05 12/03/2019 08:44 AM  
 
ABG: No results found for: PH, PHI, PCO2, PCO2I, PO2, PO2I, HCO3, HCO3I, FIO2, FIO2I 
COAGS: No results found for: APTT, PTP, INR, INREXT, INREXT Assessment:  
   
Hospital Problems  Date Reviewed: 11/3/2019 Codes Class Noted POA  
 [facility] ICD-10-CM: I50.9 ICD-9-CM: 428.0  12/3/2019 Unknown Plan:  
 
81 y.o. male with PMH of diastolic CHF, CKD, pulmonary HTN, afib  And leadless ppm now presents with weight gain, worsened SOB and edema, weight gain. Renal function worsened and suspect this is playing the major role in edema but will see creatinine response to diuretics. Suspect underlying lung disease a major contributer to SOB as well- his CT chest shows diffuse intertial opacities in uper lobes and only small bilateral pleural effusions, no pulmonary edema. Will get PFTs. Agree with current IV diuretic dosing for now. Consider renal consult in a.m. if renal function not improved. Dr. Alf Mullins to see pt tomorrow. 1. BLE edema/Fluid overload: may be due to pulmonary HTN, worsened renal function and/or possible acute on chronic DHF. Agree with bumex 3 mg IV BID 2. CKD : now stage IV. Creatinine up from 8/28/0219. Follows with Dr. Melissa Servin.  Consider renal consult in a.m. if creatinine not improved. Will see if improves with diuretics, indicating CRS. 3. HFpEF:  
 -Last echo EF 66-70%. Mild MR, mild-mod pulmonary HTN,  
 -Diuretics as above 4. Hx of CAD: s/p CAG in 2003. -Last stress test 2017 normal perfusion. 
 -no chest pain 5. Afib, chronic:  
 -has leadless ppm.  Continue coreg. -[id number]=2. Not on [facility] PTA 6. Anemia: hgb 7.8  
 -stool for OB. Reports dk stools but says he takes iron. 7. Hx of restrictive lung disease: suspect also playing significant role in SOB complaints. Will get PFTs - 
 
8. Hx of PVD s/p arthrectomy to distal SFA and distal popliteal with angioplasty to both lesions and stent to SFA by Dr. Angelo Davila 3/2014. 
 
9;. Hypokalemia: was repleted by primary team. 
10. HTN: BP labile. Rani Padron. FÉLIX Johnson 
12/3/2019, 4:22 PM 
 
[facility] [address] Office: 
[address]  [address] P: [phone] F: [phone] [address] [place] Office: 
[address] [facility] [address] [place] [address] [facility] P: [phone] F: [phone]

## 2019-12-04 ENCOUNTER — APPOINTMENT (OUTPATIENT)
Dept: ULTRASOUND IMAGING | Age: 84
DRG: 291 | End: 2019-12-04
Attending: INTERNAL MEDICINE
Payer: MEDICARE

## 2019-12-04 LAB
ANION GAP SERPL CALC-SCNC: 5 MMOL/L (ref 5–15)
ATRIAL RATE: 79 BPM
B PERT DNA SPEC QL NAA+PROBE: NOT DETECTED
BASOPHILS # BLD: 0 K/UL (ref 0–0.1)
BASOPHILS NFR BLD: 0 % (ref 0–1)
BUN SERPL-MCNC: 59 MG/DL (ref 6–20)
BUN/CREAT SERPL: 23 (ref 12–20)
C PNEUM DNA SPEC QL NAA+PROBE: NOT DETECTED
CALCIUM SERPL-MCNC: 8 MG/DL (ref 8.5–10.1)
CALCULATED R AXIS, ECG10: -40 DEGREES
CALCULATED T AXIS, ECG11: -51 DEGREES
CHLORIDE SERPL-SCNC: 97 MMOL/L (ref 97–108)
CO2 SERPL-SCNC: 32 MMOL/L (ref 21–32)
CREAT SERPL-MCNC: 2.54 MG/DL (ref 0.7–1.3)
DIAGNOSIS, 93000: NORMAL
DIFFERENTIAL METHOD BLD: ABNORMAL
EOSINOPHIL # BLD: 0.1 K/UL (ref 0–0.4)
EOSINOPHIL NFR BLD: 2 % (ref 0–7)
ERYTHROCYTE [DISTWIDTH] IN BLOOD BY AUTOMATED COUNT: 16.8 % (ref 11.5–14.5)
FLUAV H1 2009 PAND RNA SPEC QL NAA+PROBE: NOT DETECTED
FLUAV H1 RNA SPEC QL NAA+PROBE: NOT DETECTED
FLUAV H3 RNA SPEC QL NAA+PROBE: NOT DETECTED
FLUAV SUBTYP SPEC NAA+PROBE: NOT DETECTED
FLUBV RNA SPEC QL NAA+PROBE: NOT DETECTED
GLUCOSE BLD STRIP.AUTO-MCNC: 113 MG/DL (ref 65–100)
GLUCOSE BLD STRIP.AUTO-MCNC: 175 MG/DL (ref 65–100)
GLUCOSE BLD STRIP.AUTO-MCNC: 235 MG/DL (ref 65–100)
GLUCOSE BLD STRIP.AUTO-MCNC: 245 MG/DL (ref 65–100)
GLUCOSE SERPL-MCNC: 120 MG/DL (ref 65–100)
HADV DNA SPEC QL NAA+PROBE: NOT DETECTED
HCOV 229E RNA SPEC QL NAA+PROBE: NOT DETECTED
HCOV HKU1 RNA SPEC QL NAA+PROBE: NOT DETECTED
HCOV NL63 RNA SPEC QL NAA+PROBE: NOT DETECTED
HCOV OC43 RNA SPEC QL NAA+PROBE: NOT DETECTED
HCT VFR BLD AUTO: 24.4 % (ref 36.6–50.3)
HGB BLD-MCNC: 7.4 G/DL (ref 12.1–17)
HMPV RNA SPEC QL NAA+PROBE: NOT DETECTED
HPIV1 RNA SPEC QL NAA+PROBE: NOT DETECTED
HPIV2 RNA SPEC QL NAA+PROBE: NOT DETECTED
HPIV3 RNA SPEC QL NAA+PROBE: NOT DETECTED
HPIV4 RNA SPEC QL NAA+PROBE: NOT DETECTED
IMM GRANULOCYTES # BLD AUTO: 0 K/UL (ref 0–0.04)
IMM GRANULOCYTES NFR BLD AUTO: 0 % (ref 0–0.5)
IRON SATN MFR SERPL: 9 % (ref 20–50)
IRON SERPL-MCNC: 24 UG/DL (ref 35–150)
LYMPHOCYTES # BLD: 0.3 K/UL (ref 0.8–3.5)
LYMPHOCYTES NFR BLD: 5 % (ref 12–49)
M PNEUMO DNA SPEC QL NAA+PROBE: NOT DETECTED
MCH RBC QN AUTO: 26.2 PG (ref 26–34)
MCHC RBC AUTO-ENTMCNC: 30.3 G/DL (ref 30–36.5)
MCV RBC AUTO: 86.5 FL (ref 80–99)
MONOCYTES # BLD: 0.9 K/UL (ref 0–1)
MONOCYTES NFR BLD: 14 % (ref 5–13)
NEUTS SEG # BLD: 4.9 K/UL (ref 1.8–8)
NEUTS SEG NFR BLD: 77 % (ref 32–75)
NRBC # BLD: 0 K/UL (ref 0–0.01)
NRBC BLD-RTO: 0 PER 100 WBC
P-R INTERVAL, ECG05: 320 MS
PLATELET # BLD AUTO: 164 K/UL (ref 150–400)
PMV BLD AUTO: 11.8 FL (ref 8.9–12.9)
POTASSIUM SERPL-SCNC: 3.8 MMOL/L (ref 3.5–5.1)
Q-T INTERVAL, ECG07: 468 MS
QRS DURATION, ECG06: 150 MS
QTC CALCULATION (BEZET), ECG08: 536 MS
RBC # BLD AUTO: 2.82 M/UL (ref 4.1–5.7)
RSV RNA SPEC QL NAA+PROBE: NOT DETECTED
RV+EV RNA SPEC QL NAA+PROBE: NOT DETECTED
SERVICE CMNT-IMP: ABNORMAL
SODIUM SERPL-SCNC: 134 MMOL/L (ref 136–145)
TIBC SERPL-MCNC: 265 UG/DL (ref 250–450)
VENTRICULAR RATE, ECG03: 79 BPM
WBC # BLD AUTO: 6.3 K/UL (ref 4.1–11.1)

## 2019-12-04 PROCEDURE — 74011636637 HC RX REV CODE- 636/637: Performed by: INTERNAL MEDICINE

## 2019-12-04 PROCEDURE — 85025 COMPLETE CBC W/AUTO DIFF WBC: CPT

## 2019-12-04 PROCEDURE — 36415 COLL VENOUS BLD VENIPUNCTURE: CPT

## 2019-12-04 PROCEDURE — 87070 CULTURE OTHR SPECIMN AEROBIC: CPT

## 2019-12-04 PROCEDURE — 74011250636 HC RX REV CODE- 250/636: Performed by: INTERNAL MEDICINE

## 2019-12-04 PROCEDURE — 87077 CULTURE AEROBIC IDENTIFY: CPT

## 2019-12-04 PROCEDURE — 97165 OT EVAL LOW COMPLEX 30 MIN: CPT

## 2019-12-04 PROCEDURE — 74011000250 HC RX REV CODE- 250: Performed by: INTERNAL MEDICINE

## 2019-12-04 PROCEDURE — 97161 PT EVAL LOW COMPLEX 20 MIN: CPT

## 2019-12-04 PROCEDURE — 76770 US EXAM ABDO BACK WALL COMP: CPT

## 2019-12-04 PROCEDURE — 83540 ASSAY OF IRON: CPT

## 2019-12-04 PROCEDURE — 65660000000 HC RM CCU STEPDOWN

## 2019-12-04 PROCEDURE — 94640 AIRWAY INHALATION TREATMENT: CPT

## 2019-12-04 PROCEDURE — 80048 BASIC METABOLIC PNL TOTAL CA: CPT

## 2019-12-04 PROCEDURE — 87186 SC STD MICRODIL/AGAR DIL: CPT

## 2019-12-04 PROCEDURE — 74011250637 HC RX REV CODE- 250/637: Performed by: INTERNAL MEDICINE

## 2019-12-04 PROCEDURE — 97530 THERAPEUTIC ACTIVITIES: CPT

## 2019-12-04 PROCEDURE — 77010033678 HC OXYGEN DAILY

## 2019-12-04 PROCEDURE — 74011000250 HC RX REV CODE- 250: Performed by: NURSE PRACTITIONER

## 2019-12-04 PROCEDURE — 82962 GLUCOSE BLOOD TEST: CPT

## 2019-12-04 PROCEDURE — 94010 BREATHING CAPACITY TEST: CPT

## 2019-12-04 RX ORDER — SODIUM BICARBONATE 650 MG/1
650 TABLET ORAL 3 TIMES DAILY
Status: DISCONTINUED | OUTPATIENT
Start: 2019-12-04 | End: 2019-12-06

## 2019-12-04 RX ORDER — METOLAZONE 5 MG/1
5 TABLET ORAL ONCE
Status: COMPLETED | OUTPATIENT
Start: 2019-12-04 | End: 2019-12-04

## 2019-12-04 RX ADMIN — Medication 10 ML: at 06:00

## 2019-12-04 RX ADMIN — CARVEDILOL 12.5 MG: 12.5 TABLET, FILM COATED ORAL at 08:45

## 2019-12-04 RX ADMIN — HYDRALAZINE HYDROCHLORIDE 25 MG: 25 TABLET, FILM COATED ORAL at 08:45

## 2019-12-04 RX ADMIN — CARVEDILOL 12.5 MG: 12.5 TABLET, FILM COATED ORAL at 16:58

## 2019-12-04 RX ADMIN — SODIUM BICARBONATE 650 MG: 650 TABLET ORAL at 22:16

## 2019-12-04 RX ADMIN — HEPARIN SODIUM 5000 UNITS: 5000 INJECTION INTRAVENOUS; SUBCUTANEOUS at 12:09

## 2019-12-04 RX ADMIN — METOLAZONE 5 MG: 5 TABLET ORAL at 12:09

## 2019-12-04 RX ADMIN — HEPARIN SODIUM 5000 UNITS: 5000 INJECTION INTRAVENOUS; SUBCUTANEOUS at 20:18

## 2019-12-04 RX ADMIN — ASPIRIN 81 MG 81 MG: 81 TABLET ORAL at 08:45

## 2019-12-04 RX ADMIN — INSULIN LISPRO 3 UNITS: 100 INJECTION, SOLUTION INTRAVENOUS; SUBCUTANEOUS at 12:09

## 2019-12-04 RX ADMIN — HYDRALAZINE HYDROCHLORIDE 25 MG: 25 TABLET, FILM COATED ORAL at 22:16

## 2019-12-04 RX ADMIN — EPOETIN ALFA-EPBX 20000 UNITS: 10000 INJECTION, SOLUTION INTRAVENOUS; SUBCUTANEOUS at 22:16

## 2019-12-04 RX ADMIN — BUMETANIDE 3 MG: 0.25 INJECTION INTRAMUSCULAR; INTRAVENOUS at 08:44

## 2019-12-04 RX ADMIN — SIMVASTATIN 20 MG: 20 TABLET, FILM COATED ORAL at 22:16

## 2019-12-04 RX ADMIN — BUMETANIDE 3 MG: 0.25 INJECTION INTRAMUSCULAR; INTRAVENOUS at 20:18

## 2019-12-04 RX ADMIN — INSULIN LISPRO 3 UNITS: 100 INJECTION, SOLUTION INTRAVENOUS; SUBCUTANEOUS at 16:58

## 2019-12-04 RX ADMIN — SODIUM BICARBONATE 1300 MG: 650 TABLET ORAL at 16:58

## 2019-12-04 RX ADMIN — HYDRALAZINE HYDROCHLORIDE 25 MG: 25 TABLET, FILM COATED ORAL at 16:58

## 2019-12-04 RX ADMIN — ALBUTEROL SULFATE 2.5 MG: 2.5 SOLUTION RESPIRATORY (INHALATION) at 05:14

## 2019-12-04 RX ADMIN — SODIUM BICARBONATE 1300 MG: 650 TABLET ORAL at 08:45

## 2019-12-04 RX ADMIN — HEPARIN SODIUM 5000 UNITS: 5000 INJECTION INTRAVENOUS; SUBCUTANEOUS at 03:53

## 2019-12-04 RX ADMIN — TAMSULOSIN HYDROCHLORIDE 0.4 MG: 0.4 CAPSULE ORAL at 08:45

## 2019-12-04 RX ADMIN — FERROUS SULFATE TAB 325 MG (65 MG ELEMENTAL FE) 325 MG: 325 (65 FE) TAB at 06:50

## 2019-12-04 RX ADMIN — Medication 10 ML: at 22:16

## 2019-12-04 NOTE — PROGRESS NOTES
Problem: Diabetes Self-Management Goal: *Disease process and treatment process Description Define diabetes and identify own type of diabetes; list 3 options for treating diabetes. Outcome: Progressing Towards Goal 
Goal: *Monitoring blood glucose, interpreting and using results Description Identify recommended blood glucose targets  and personal targets. Outcome: Progressing Towards Goal 
  
Problem: Falls - Risk of 
Goal: *Absence of Falls Description Document Christos Rose Fall Risk and appropriate interventions in the flowsheet. Outcome: Progressing Towards Goal 
Note: Fall Risk Interventions: 
Mobility Interventions: Communicate number of staff needed for ambulation/transfer, OT consult for ADLs, Patient to call before getting OOB, PT Consult for mobility concerns, PT Consult for assist device competence, Strengthening exercises (ROM-active/passive), Utilize walker, cane, or other assistive device Medication Interventions: Teach patient to arise slowly, Patient to call before getting OOB Elimination Interventions: Call light in reach, Patient to call for help with toileting needs, Stay With Me (per policy), Toilet paper/wipes in reach, Toileting schedule/hourly rounds, Urinal in reach Bedside shift change report given to Irwin Wilson (oncoming nurse) by Dexter Her (offgoing nurse). Report included the following information SBAR, Kardex, ED Summary, Intake/Output, MAR, Accordion, Recent Results, Cardiac Rhythm NSR/BBB and Quality Measures.

## 2019-12-04 NOTE — PROGRESS NOTES
Reason for Admission:   CHF  
            
RRAT Score:     52/ high Resources/supports as identified by patient/family:   Per patient he has been workgin with [facility] and would want to continue with this [facility]. Top Challenges facing patient (as identified by patient/family and CM):  Per patient, he stated that he has had a decline in independence Finances/Medication cost?      Per patient, no concerns with medications or affording care. Patient gets medications at [facility]. Uses Home 02 with [facility]. Patient has a ramp at his home, rolling walker, toilet seat and shower chair. Transportation? Per patient, family will transport at discharge. Support system or lack thereof? Per patient, there is not lack of support Living arrangements? Lives with wife in a single story home. Self-care/ADLs/Cognition? Per patient, he is independent, but some days \"I need more help. \" Current Advanced Directive/Advance Care Plan:   
                      Full code status/ advanced care plan in on file Plan for utilizing home health:     
             Per patient, he would want to return home with [facility] [phone] Transition of Care Plan:       PT/ OT consults completed  and it was indicated that patient can return home with [facility] services. Patient is not wanting to consider SNF care at this point. CM will need to follow for needs. Care Management Interventions PCP Verified by CM: Yes 
Palliative Care Criteria Met (RRAT>21 & CHF Dx)?: Yes(FYI sent) Mode of Transport at Discharge: (TBD/ CM will need to follow) Transition of Care Consult (CM Consult): (TBD/ CM will need to follow) MyChart Signup: No 
Discharge Durable Medical Equipment: No 
Physical Therapy Consult: Yes Occupational Therapy Consult: Yes Speech Therapy Consult: No 
Current Support Network: Lives with Spouse, Own Home Confirm Follow Up Transport: (TBD/ CM will need to follow) Discharge Location Discharge Placement: (TBD/ CM will need to follow) Armando Zaldivar 1:34 PM

## 2019-12-04 NOTE — PROGRESS NOTES
Problem: Mobility Impaired (Adult and Pediatric) Goal: *Acute Goals and Plan of Care (Insert Text) Description FUNCTIONAL STATUS PRIOR TO ADMISSION: Patient was modified independent using a rolling walker for functional mobility. Required O2 @ 2 L and paced activity at baseline. Reports difficulty rising from low seating surfaces at home. HOME SUPPORT PRIOR TO ADMISSION: The patient lived alone with wife to provide assistance (assist with donning socks/ shoes at times). Physical Therapy Goals Initiated 12/4/2019 1. Patient will move from supine to sit and sit to supine  in bed with modified independence within 7 day(s). 2.  Patient will transfer from bed to chair and chair to bed with supervision/set-up using the least restrictive device within 7 day(s). 3.  Patient will perform sit to stand with supervision/set-up within 7 day(s). 4.  Patient will ambulate with supervision/set-up for 50 feet with the least restrictive device within 7 day(s). Outcome: Not Met PHYSICAL THERAPY EVALUATION Patient: Terrie Ascencio (81 y.o. male) Date: 12/4/2019 Primary Diagnosis: CHF exacerbation ([facility] [address] [I50.9] Precautions: fall ASSESSMENT Based on the objective data described below, the patient presents with general weakness, impaired balance, decreased activity tolerance due to cardiopulmonary limitations, decreased indep with mobility as compared to baseline. Pt eager to mobilize OOB to chair, sleeps in recliner at home. Noted SpO2 89-92% at rest, 88-92% with activity on 3L. Of note, pt reports recent rehab admission and follow up [facility] PT.  
 
Pt will benefit from progressive mobility training in acute setting in prep for return home with family support. Recommend resumption of  PT services. Current Level of Function Impacting Discharge (mobility/balance): bed mob min A, transfer min A Functional Outcome Measure:   The patient scored 16/28 on the Tinetti outcome measure which is indicative of moderate risk for fall. Other factors to consider for discharge: pt remains below functional baseline, will benefit from family support and [facility] at d/c Patient will benefit from skilled therapy intervention to address the above noted impairments. PLAN : 
Recommendations and Planned Interventions: bed mobility training, transfer training, gait training, therapeutic exercises, patient and family training/education, and therapeutic activities Frequency/Duration: Patient will be followed by physical therapy:  5 times a week to address goals. Recommendation for discharge: (in order for the patient to meet his/her long term goals) Physical therapy at least 2 days/week in the home AND ensure assist and/or supervision for safety with mobility This discharge recommendation: 
Has not yet been discussed the attending provider and/or case management IF patient discharges home will need the following DME: patient owns DME required for discharge SUBJECTIVE:  
Patient stated I sleep in the chair at home; I can breathe better.  OBJECTIVE DATA SUMMARY:  
HISTORY:   
Past Medical History:  
Diagnosis Date CAD (coronary artery disease) s/p CABG 2008 Carotid arterial disease ([facility] [address] CKD (chronic kidney disease) stage 3, GFR 30-59 ml/min ([facility]) 10/21/2017  
 hypertension and DM nephrosclerosis Diabetes mellitus, type 2 ([facility] [address] Hypercholesteremia Hypertension Paroxysmal atrial fibrillation ([facility] [address] 10/21/2017  
 new onset afib with BRPR 10-19-17 admit PVC's (premature ventricular contractions) 5/26/2014 PVD (peripheral vascular disease) ([facility] [address] Past Surgical History:  
Procedure Laterality Date HX CORONARY ARTERY BYPASS GRAFT    
 HX HEART CATHETERIZATION    
 [place] TCAT INSJ/RPL PERM LEADLESS PACEMAKER RV W/IMG N/A 5/9/2019 INSERT OR REPLACE TRANSCATH PPM LEADLESS performed by Juan Francisco Lowe MD at [place] [address], [facility] CATH LAB Personal factors and/or comorbidities impacting plan of care: cardiopulmonary issues Home Situation Home Environment: Private residence Wheelchair Ramp: Yes One/Two Story Residence: One story Living Alone: No 
Support Systems: Spouse/Significant Other/Partner(son lives close by) Patient Expects to be Discharged to[de-identified] Private residence Current DME Used/Available at Home: Shower chair, Walker, rolling Tub or Shower Type: (both options) EXAMINATION/PRESENTATION/DECISION MAKING:  
 
  
Hearing: Auditory Auditory Impairment: None Range Of Motion: 
AROM: Generally decreased, functional 
  
  
  
  
  
  
  
Strength:   
Strength: Generally decreased, functional 
  
  
  
  
  
  
Tone & Sensation:  
Tone: Normal 
  
  
  
  
Sensation: Intact Coordination: 
Coordination: Generally decreased, functional 
Vision:  
  
Functional Mobility: 
Bed Mobility: 
  
Supine to Sit: Minimum assistance;Bed Modified(elevated HOB, assist at trunk) Scooting: Supervision Transfers: 
Sit to Stand: Minimum assistance; Additional time(assist for lift/balance) Stand to Sit: Contact guard assistance Bed to Chair: Minimum assistance; Adaptive equipment(stand pivot with RW) Balance:  
Sitting: Intact Standing: Impaired; With support Standing - Static: Fair;Constant support Standing - Dynamic : Fair;Constant support Functional Measure: 
Tinetti test: 
 
Sitting Balance: 1 Arises: 0 Attempts to Rise: 0 Immediate Standing Balance: 1 Standing Balance: 1 Nudged: 2 Eyes Closed: 1 Turn 360 Degrees - Continuous/Discontinuous: 1 Turn 360 Degrees - Steady/Unsteady: 0 Sitting Down: 1 Balance Score: 8 Balance total score Indication of Gait: 1 
R Step Length/Height: 1 L Step Length/Height: 1 
R Foot Clearance: 1 L Foot Clearance: 1 Step Symmetry: 1 Step Continuity: 1 Path: 1 Trunk: 0 Walking Time: 0 Gait Score: 8 Gait total score Total Score: 16/28 Overall total score Tinetti Tool Score Risk of Falls <19 = High Fall Risk 19-24 = Moderate Fall Risk 25-28 = Low Fall Risk Sarah OSUNA. Performance-Oriented Assessment of Mobility Problems in Elderly Patients. Hussein 66; [id number]. (Scoring Description: PT Bulletin Feb. 10, 1993) Older adults: Zeenat Panda et al, 2009; n = [address] elderly evaluated with ABC, DEBRA, ADL, and IADL) · Mean DEBRA score for males aged 69-68 years = 26.21(3.40) · Mean DEBRA score for females age 69-68 years = 25.16(4.30) · Mean DEBRA score for males over 80 years = 23.29(6.02) · Mean DEBRA score for females over 80 years = 17.20(8.32) Physical Therapy Evaluation Charge Determination History Examination Presentation Decision-Making MEDIUM  Complexity : 1-2 comorbidities / personal factors will impact the outcome/ POC  MEDIUM Complexity : 3 Standardized tests and measures addressing body structure, function, activity limitation and / or participation in recreation  LOW Complexity : Stable, uncomplicated  LOW Complexity : FOTO score of  Based on the above components, the patient evaluation is determined to be of the following complexity level: LOW Pain Rating: 
Pt denied c/o pain Activity Tolerance:  
Fair, requires rest breaks, and observed SOB with activity Please refer to the flowsheet for vital signs taken during this treatment. After treatment patient left in no apparent distress:  
Sitting in chair and Call bell within reach COMMUNICATION/EDUCATION:  
The patients plan of care was discussed with: Occupational Therapist and Registered Nurse. Fall prevention education was provided and the patient/caregiver indicated understanding., Patient/family have participated as able in goal setting and plan of care. , and Patient/family agree to work toward stated goals and plan of care. Thank you for this referral. 
Naveen Dear, PT Time Calculation: 23 mins

## 2019-12-04 NOTE — CONSULTS
[facility] 
 [address] [address], [address] [facility] Phone: [phone] NOTE Patient: Jose Bullock. MRN: [id number]  PCP: Reva Wesley MD  
:     1935  Age:   81 y.o. Sex:  male Referring physician: Soila Porter MD 
Reason for consultation: 81 y.o. male with CHF exacerbation ([address] [Y68.3] complicated by KATALINA Admission Date: 12/3/2019  8:24 AM  LOS: 1 day ASSESSMENT and PLAN :  
CKD IV: 
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 
- baseline Cr at best : 2.7- 3 mg/dl when euvolemic - UPCR showed 11 gm of Protein on last check 
-Expect some rise in creatinine with IV diuresis 
-Daily labs Volume Overload: 
-She is secondary to chronic kidney disease and nephrosis -Likely needs intermittent IV diuresis at the ambulatory diuretic center 
-Agree with IV loop diuretics as ordered by cardiology 
-Ordered metolazone 5 mg for one-time dose Acute hypoxic respiratory failure 
-Injury to pulmonary edema 
-Has underlying COPD 
-CT chest on admission suggest possible pulmonary fibrosis airspace disease 
-History of right-sided pneumonia in August 
 
Metabolic acidosis 
-Is now alkalotic 
-Reduce his oral bicarb to 650 mg 3 times daily HTN : 
- meds being adjusted by cards Pulm HTN: 
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  
  
Bradycardia : 
- s/p PPM on  
  
Anemia of CKD: 
-Worsening anemia likely secondary to CKD 
-He has been off MICHAEL for a year 
-Check iron studies 
-may Need to resume Retacrit 
  
Chronic A. fib Chronic diastolic congestive heart failure CAD s/p CABG 
  
DM2: 
- on insulin Care Plan discussed with: Patient and cardiology Thank you for consulting [facility] in the care of your patient. Subjective: HPI: Jose Bullock. is a 81 y.o.  male who has been admitted to the hospital for worsening shortness of breath and dyspnea on exertion. He has been followed by home health nurse who has noticed weight gain in the past week. His Bumex dose was increased a week ago but is edema did not improve and neither his shortness of breath which prompted this hospitalization. On admission his renal function was noted to be at his baseline. He has been diuresed with IV Bumex with only modest improvement in his edema and shortness of breath. Nephrology has been consulted for management of his chronic kidney disease as well as his fluid overload Mr. Norma Ny has a longstanding progressive chronic kidney disease secondary to diabetes and hypertension and is followed by Dr. Marty Orr in our office. His baseline creatinine has been 2.7 to 3 mg/dL when he is euvolemic. He denies any recent change in his urination he denies any recent changes in his diet and he also denies any chronic NSAID use prior to this admission Past Medical Hx:  
Past Medical History:  
Diagnosis Date  CAD (coronary artery disease) s/p CABG 2008  Carotid arterial disease ([facility] [address]  CKD (chronic kidney disease) stage 3, GFR 30-59 ml/min ([facility]) 10/21/2017  
 hypertension and DM nephrosclerosis  Diabetes mellitus, type 2 ([address]  Hypercholesteremia  Hypertension  Paroxysmal atrial fibrillation ([facility] [address] 10/21/2017  
 new onset afib with BRPR 10-19-17 admit  PVC's (premature ventricular contractions) 5/26/2014  PVD (peripheral vascular disease) ([facility] [address] Past Surgical Hx: 
  
Past Surgical History:  
Procedure Laterality Date  HX CORONARY ARTERY BYPASS GRAFT    
 HX HEART CATHETERIZATION    
 [place] TCAT INSJ/RPL PERM LEADLESS PACEMAKER RV W/IMG N/A 5/9/2019 INSERT OR REPLACE TRANSCATH PPM LEADLESS performed by Hernan Gonsales MD at [place] [address], [facility] CATH LAB Allergies Allergen Reactions  Lisinopril Cough Social Hx:  reports that he quit smoking about 34 years ago. His smoking use included cigarettes. He has a 60.00 pack-year smoking history.  He has never used smokeless tobacco. He reports that he does not drink alcohol or use drugs. History reviewed. No pertinent family history. Review of Systems: A thorough twelve point review of system was performed today. Pertinent positives and negatives are mentioned in the HPI. The reminder of the ROS is negative and noncontributory. Objective:   
Vitals:   
Vitals:  
 12/04/19 0833 12/04/19 1135 12/04/19 1621 12/04/19 1920 BP: 176/74 136/65 172/86 164/76 Pulse: 79 76 79 79 Resp: 20 20 20 20 Temp: 97.9 °F (36.6 °C) 97.5 °F (36.4 °C) 98.2 °F (36.8 °C) 98 °F (36.7 °C) SpO2: 97% 96% 98% 99% Weight:      
Height:      
 
I&O's:  12/03 0701 - 12/04 0700 In: -  
Out: [facility] 124 Visit Vitals /76 (BP 1 Location: Left arm, BP Patient Position: At rest) Pulse 79 Temp 98 °F (36.7 °C) Resp 20 Ht 5' 9\" (1.753 m) Wt 83.2 kg (183 lb 6.8 oz) SpO2 99% BMI 27.09 kg/m² Physical Exam: 
General: Frail and ill-looking, SOB at rest 
HEENT: PERRL,  ++ Pallor , No Icterus Neck: Supple,no mass palpable Lungs : B/L rales CVS: irregular, S1 S2 normal, systolic murmur Abdomen: Soft, NT, BS + Extremities: 2+ Edema Skin: No rash or lesions. MS: No joint swelling, erythema, warmth Neurologic: non focal, AAO x 3 Psych: normal affect Laboratory Results: 
 
Recent Labs 12/04/19 
1526 12/03/19 
9030 * 135* K 3.8 3.0*  
CL 97 95* CO2 32 33* * 85 BUN 59* 61* CREA 2.54* 2.58* CA 8.0* 8.2* MG  --  2.2 ALB  --  2.0*  
SGOT  --  43* ALT  --  42 Recent Labs 12/04/19 
0904 12/03/19 
0085 WBC 6.3 5.8 HGB 7.4* 7.8* HCT 24.4* 25.2*  
 166 No results found for: SDES Lab Results Component Value Date/Time Culture result: PENDING 12/04/2019 03:27 AM  
 Culture result: NO GROWTH 5 DAYS 08/27/2019 05:55 PM  
 Culture result: NO GROWTH 5 DAYS 08/05/2019 10:46 AM  
 
Recent Results (from the past 24 hour(s)) EKG, 12 LEAD, INITIAL Collection Time: 12/03/19  8:39 PM  
Result Value Ref Range Ventricular Rate 79 BPM  
 Atrial Rate 79 BPM  
 P-R Interval 320 ms QRS Duration 150 ms  
 Q-T Interval 468 ms QTC Calculation (Bezet) 536 ms Calculated R Axis -40 degrees Calculated T Axis -51 degrees Diagnosis Sinus rhythm with 1st degree AV block Left axis deviation Right bundle branch block When compared with ECG of 03-DEC-2019 08:31, Sinus rhythm has replaced Electronic ventricular pacemaker Confirmed by Andrey Li M.D., [facility] [place] ([id number]) on 12/4/2019 9:41:56 AM 
  
GLUCOSE, POC Collection Time: 12/03/19  9:47 PM  
Result Value Ref Range Glucose (POC) 222 (H) 65 - 100 mg/dL Performed by Sara Edwards RESPIRATORY PANEL,PCR,NASOPHARYNGEAL Collection Time: 12/03/19 11:50 PM  
Result Value Ref Range Adenovirus NOT DETECTED NOTD Coronavirus 229E NOT DETECTED NOTD Coronavirus HKU1 NOT DETECTED NOTD Coronavirus CVNL63 NOT DETECTED NOTD Coronavirus OC43 NOT DETECTED NOTD Metapneumovirus NOT DETECTED NOTD Rhinovirus and Enterovirus NOT DETECTED NOTD Influenza A NOT DETECTED NOTD Influenza A, subtype H1 NOT DETECTED NOTD Influenza A, subtype H3 NOT DETECTED NOTD INFLUENZA A H1N1 PCR NOT DETECTED NOTD Influenza B NOT DETECTED NOTD Parainfluenza 1 NOT DETECTED NOTD Parainfluenza 2 NOT DETECTED NOTD Parainfluenza 3 NOT DETECTED NOTD Parainfluenza virus 4 NOT DETECTED NOTD    
 RSV by PCR NOT DETECTED NOTD Bordetella pertussis - PCR NOT DETECTED NOTD Chlamydophila pneumoniae DNA, QL, PCR NOT DETECTED NOTD Mycoplasma pneumoniae DNA, QL, PCR NOT DETECTED NOTD CULTURE, RESPIRATORY/SPUTUM/BRONCH W GRAM STAIN Collection Time: 12/04/19  3:27 AM  
Result Value Ref Range  Special Requests: NO SPECIAL REQUESTS    
 GRAM STAIN FEW WBCS SEEN    
 GRAM STAIN NO EPITHELIAL CELLS SEEN    
 GRAM STAIN 3+ GRAM POSITIVE COCCI IN CHAINS AND IN CLUSTERS    
 GRAM STAIN 3+ GRAM NEGATIVE RODS Culture result: PENDING   
METABOLIC PANEL, BASIC Collection Time: 12/04/19  3:28 AM  
Result Value Ref Range Sodium 134 (L) 136 - 145 mmol/L Potassium 3.8 3.5 - 5.1 mmol/L Chloride 97 97 - 108 mmol/L  
 CO2 32 21 - 32 mmol/L Anion gap 5 5 - 15 mmol/L Glucose 120 (H) 65 - 100 mg/dL BUN 59 (H) 6 - 20 MG/DL Creatinine 2.54 (H) 0.70 - 1.30 MG/DL  
 BUN/Creatinine ratio 23 (H) 12 - 20 GFR est AA 30 (L) >60 ml/min/1.73m2 GFR est non-AA 24 (L) >60 ml/min/1.73m2 Calcium 8.0 (L) 8.5 - 10.1 MG/DL  
CBC WITH AUTOMATED DIFF Collection Time: 12/04/19  3:28 AM  
Result Value Ref Range WBC 6.3 4.1 - 11.1 K/uL  
 RBC 2.82 (L) 4.10 - 5.70 M/uL HGB 7.4 (L) 12.1 - 17.0 g/dL HCT 24.4 (L) 36.6 - 50.3 % MCV 86.5 80.0 - 99.0 FL  
 MCH 26.2 26.0 - 34.0 PG  
 MCHC 30.3 30.0 - 36.5 g/dL  
 RDW 16.8 (H) 11.5 - 14.5 % PLATELET 680 531 - 600 K/uL MPV 11.8 8.9 - 12.9 FL  
 NRBC 0.0 0  WBC ABSOLUTE NRBC 0.00 0.00 - 0.01 K/uL NEUTROPHILS 77 (H) 32 - 75 % LYMPHOCYTES 5 (L) 12 - 49 % MONOCYTES 14 (H) 5 - 13 % EOSINOPHILS 2 0 - 7 % BASOPHILS 0 0 - 1 % IMMATURE GRANULOCYTES 0 0.0 - 0.5 % ABS. NEUTROPHILS 4.9 1.8 - 8.0 K/UL  
 ABS. LYMPHOCYTES 0.3 (L) 0.8 - 3.5 K/UL  
 ABS. MONOCYTES 0.9 0.0 - 1.0 K/UL  
 ABS. EOSINOPHILS 0.1 0.0 - 0.4 K/UL  
 ABS. BASOPHILS 0.0 0.0 - 0.1 K/UL  
 ABS. IMM. GRANS. 0.0 0.00 - 0.04 K/UL  
 DF AUTOMATED    
GLUCOSE, POC Collection Time: 12/04/19  6:49 AM  
Result Value Ref Range Glucose (POC) 113 (H) 65 - 100 mg/dL Performed by Sidra Opitz, POC Collection Time: 12/04/19 11:58 AM  
Result Value Ref Range Glucose (POC) 245 (H) 65 - 100 mg/dL Performed by Paige Madrigal GLUCOSE, POC Collection Time: 12/04/19  4:51 PM  
Result Value Ref Range Glucose (POC) 235 (H) 65 - 100 mg/dL Performed by JUDY RUFFIN(CON) Urine dipstick:  
Lab Results Component Value Date/Time Color YELLOW/STRAW 08/27/2019 12:56 PM  
 Appearance CLEAR 08/27/2019 12:56 PM  
 Specific gravity 1.023 08/27/2019 12:56 PM  
 pH (UA) 6.0 08/27/2019 12:56 PM  
 Protein >300 (A) 08/27/2019 12:56 PM  
 Glucose 500 (A) 08/27/2019 12:56 PM  
 Ketone NEGATIVE  08/27/2019 12:56 PM  
 Bilirubin NEGATIVE  08/27/2019 12:56 PM  
 Urobilinogen 0.2 08/27/2019 12:56 PM  
 Nitrites NEGATIVE  08/27/2019 12:56 PM  
 Leukocyte Esterase NEGATIVE  08/27/2019 12:56 PM  
 Epithelial cells FEW 08/27/2019 12:56 PM  
 Bacteria NEGATIVE  08/27/2019 12:56 PM  
 WBC 0-4 08/27/2019 12:56 PM  
 RBC 5-10 08/27/2019 12:56 PM  
 
 
I have reviewed the following: All pertinent labs, microbiology data, radiology imaging for my assessment Medications list Personally Reviewed   [x]      Yes     []               No    
 
Medications: 
Prior to Admission medications Medication Sig Start Date End Date Taking? Authorizing Provider  
ferrous sulfate (IRON) 325 mg (65 mg iron) tablet Take 325 mg by mouth Daily (before breakfast). Yes Provider, Historical  
hydrALAZINE (APRESOLINE) 25 mg tablet Take 25 mg by mouth three (3) times daily. Yes Provider, Historical  
bumetanide (BUMEX) 1 mg tablet Take  by mouth two (2) times a day. Take 3 mg in the morning and 2 mg in the evening as directed. Yes Provider, Historical  
carvedilol (COREG) 12.5 mg tablet Take 12.5 mg by mouth two (2) times daily (with meals). Yes Provider, Historical  
simvastatin (ZOCOR) 20 mg tablet Take 1 Tab by mouth nightly. 11/21/19  Yes Zoe Campos MD  
insulin glargine (LANTUS U-100 INSULIN) 100 unit/mL injection 5 Units by SubCUTAneous route every morning. Prescribed 18 units daily. Reports taking 10 units in the morning, then up to 8 units in the evening depending on his blood sugars. If blood sugar is <200, he reduces his evening dose.  8/28/19  Yes Warden Too MD  
albuterol (PROVENTIL VENTOLIN) 2.5 mg /3 mL (0.083 %) nebulizer solution 3 mL by Nebulization route every four (4) hours as needed for Wheezing. 5/14/19  Yes Eric Larkin MD  
sodium bicarbonate 650 mg tablet Take 2 Tabs by mouth three (3) times daily. 5/12/19  Yes Keya Sandoval, DO  
tamsulosin (FLOMAX) 0.4 mg capsule Take 1 Cap by mouth daily. 5/13/19  Yes Keya Sandoval, DO  
aspirin 81 mg chewable tablet Take 1 Tab by mouth daily. 9/27/18  Yes Farida Wong MD  
omega 3-dha-epa-fish oil (FISH OIL) 100-160-1,000 mg cap Take 1 Cap by mouth two (2) times a day. Yes Provider, Historical  
cinnamon bark (CINNAMON) 500 mg cap Take 1,000 mg by mouth two (2) times a day. Yes Provider, Historical  
  
 
Thank you for allowing us to participate in the care of this patient. We will follow patient. Please dont hesitate to call with any questions Lizett Macias, [address] [facility] [address] [facility], [facility] Phone - [phone] Fax - [phone] 
[email or web address]

## 2019-12-04 NOTE — PROGRESS NOTES
Cardiology Progress Note Admit Date: 12/3/2019 Admit Diagnosis: CHF exacerbation ([facility] [address] [I50.9] Date: 12/4/2019     Time: 4:35 PM 
 
Subjective: 
Feeling some better. No CP. Still leg edema. Looks rough. Assessment/Plan/Discussion:Cardiology Attending:  
 
Patient seen on the day of progress note and examined  and agree with Advance Practice Provider (MADDIE, NP,PA)  assessment and plans. Anjali Leo is a 81 y.o. male Hx of CAD, diastolic dysfunction CKD and cardiorenal CHF Was on bumex 3mg po bid and started to have less effect More dramatic weight gain despite increase in OP diuretics po Now admit with stable but very elevated creatinine And stable CAD with no CP Has responded to IV diuretic Discussed with Dr Joao Cruz this evening, while appears aged , he can be active and may eventually benefit from hemodialysis After this admit, I will plan on IV bolus diuretic once every 2 weeks in [facility] as OP to avoid hospital stays and improve symptoms In good spirits tonclay Ye MD   
 
  
 Assessment and Plan 1. BLE edema/Fluid overload: weight up approximately 20 + pounds 
 may be due to pulmonary HTN, worsened renal function and/or possible acute on chronic DHF. Bumex 3 mg IV BID One dose of Metolazone 5 mg given by Nephrology today 
  
2. CKD :  
 now stage IV. Creatinine up from 8/28/0219. Now 2.57 Follows with Dr. Jessy Robledo. Nephrology following 3. HFpEF:  
 Last echo EF 66-70%. Mild MR, mild-mod pulmonary HTN,  
            Diuretics as above Recheck limited echo tomorrow 
  
4. Hx of CAD: s/p CAG in 2003. Last stress test 2017 normal perfusion. no chest pain 
  
5. Afib, chronic:  
           -has leadless ppm.  Continue coreg. -[id number]=1. Not on [address] PTA 
  
6. Anemia: hgb 7.4 
           -stool for OB is Negative.  Reports dk stools but says he takes iron. 
  
7. Hx of restrictive lung disease: suspect also playing significant role in SOB complaints. Will get PFTs  - pending 
  
8. Hx of PVD s/p arthrectomy to distal SFA and distal popliteal with angioplasty to both lesions and stent to SFA by Dr. Yun Pastrana 3/2014. 
  
9;. Hypokalemia: was repleted by primary team. 
 
10. HTN: BP labile. 11. CAD 
 CAD/CABG off pump x3 3/2008 . =post op anemia and renal failure CATH 2008= severe three-vessel left main coronary artery disease, 40% disease of the left main and take off of the LAD and circumflex complex,  
 70% stenosis of the ostial circ and 85% of the LAD. Between the first and second marginal, the circumflex had 70% stenosis and between the third marginal and PDA there was a 70% stenosis, LAD ostial lesion RCA proximal 60% tapering, EF 60%-70%, bilaterally patent renal arteries, mild atherosclerosis of the distal abdominal aorta. SOB and edema some better. Continues with diuresis. Cr mostly unchanged from admission. Check limited echo for EF tomorrow. Past Medical History:  
Diagnosis Date  CAD (coronary artery disease) s/p CABG   Carotid arterial disease ([facility] [address]  CKD (chronic kidney disease) stage 3, GFR 30-59 ml/min ([facility]) 10/21/2017  
 hypertension and DM nephrosclerosis  Diabetes mellitus, type 2 ([facility] [address]  Hypercholesteremia  Hypertension  Paroxysmal atrial fibrillation ([facility] [address] 10/21/2017  
 new onset afib with BRPR 10-19-17 admit  PVC's (premature ventricular contractions) 2014  PVD (peripheral vascular disease) ([facility] [address] Social History Tobacco Use  Smoking status: Former Smoker Packs/day: 3.00 Years: 20.00 Pack years: 60.00 Types: Cigarettes Last attempt to quit: 1985 Years since quittin.9  Smokeless tobacco: Never Used Substance Use Topics  Alcohol use: No  
 Drug use: No  
  
  
 
Review of Systems:   
[] Patient unable to provide secondary to condition 
 
[x] All systems negative, except as checked below. Constitutional:   
[]Weight Change  []Fever   []Chills   []Night Sweats  []Fatigue  []Malaise  []____ 
ENT/Mouth:    
[]Hearing Changes  []Ear Pain  []Nasal Congestion   []Sinus Pain  []Hoarseness []Sore throat  []Rhinorrhea  []Swallowing Difficulty  []____ Eyes:   
[]Eye Pain  []Swelling  []Redness  []Foreign Body  []Discharge  []Vision Changes  []____ Cardiovascular:   
[]Chest Pain  [x]SOB  []PND  []AGUILAR  []Orthopnea  []Claudication  [x]Edema  
[]Palpitations  []____ Respiratory:   
[]Cough  []Sputum  []Wheezing,  []SOB  []Hemoptysis  []____ Gastrointestinal:   
[]Nausea  []Vomiting  []Diarrhea  []Constipation  []Pain  []Heartburn  []Anorexia []Dysphagia  []Hematochezia  []Melena,  []Jaundice  []____ Genitourinary:   
[]Dysuria  []Urinary Frequency  []Hematuria  []Urinary Incontinence  []Urgency []Flank Pain  []Hesitancy  []____ Musculoskeletal:   
[]Arthralgias  []Myalgias  []Joint Swelling  []Joint Stiffness  []Back Pain  []Neck Pain  []____ Skin:   
[]Skin Lesions  []Pruritis  []Hair Changes  []Skin rashes  []____ Neuro:   
[]Weakness  []Numbness  []Paresthesias  []Loss of Consciousness  []Syncope  
[]Dizziness  []Headache  []Coordination Changes  []Recent Falls  []____ Psych:   
[]Anxiety/Depression  []Insomnia  []Memory Changes  []Violence/Abuse Hx.  []____ Heme/Lymph:   
[]Bruising  []Bleeding  []Lymphadenopathy  []____ Endocrine:   
[]Polyuria  []Polydipsia  []Temperature Intolerance  []____ Objective: 
 
 Physical Exam: 
             
Visit Vitals /86 (BP 1 Location: Left arm, BP Patient Position: At rest) Pulse 79 Temp 98.2 °F (36.8 °C) Resp 20 Ht 5' 9\" (1.753 m) Wt 183 lb 6.8 oz (83.2 kg) SpO2 98% BMI 27.09 kg/m² General Appearance:   Well developed, well nourished,alert and oriented x 3, and 
 individual in no acute distress.   
Ears/Nose/Mouth/Throat:    Hearing grossly normal. 
  
    Neck:  Supple. Chest:    Lungs crackles in bases to auscultation bilaterally. Cardiovascular:   Regular rate and rhythm, S1, S2 normal, no murmur. Abdomen:    Soft, non-tender, bowel sounds are active. Extremities:  1-2+ edema bilaterally. Skin:  Warm and dry. Telemetry: normal sinus rhythm Data Review:  
 Labs:   
Recent Results (from the past 24 hour(s)) GLUCOSE, POC Collection Time: 12/03/19  4:45 PM  
Result Value Ref Range Glucose (POC) 240 (H) 65 - 100 mg/dL Performed by Nikko Diaz EKG, 12 LEAD, INITIAL Collection Time: 12/03/19  8:39 PM  
Result Value Ref Range Ventricular Rate 79 BPM  
 Atrial Rate 79 BPM  
 P-R Interval 320 ms QRS Duration 150 ms  
 Q-T Interval 468 ms QTC Calculation (Bezet) 536 ms Calculated R Axis -40 degrees Calculated T Axis -51 degrees Diagnosis Sinus rhythm with 1st degree AV block Left axis deviation Right bundle branch block When compared with ECG of 03-DEC-2019 08:31, Sinus rhythm has replaced Electronic ventricular pacemaker Confirmed by Danielle Rowan M.D., Flor Carbajal ([id number]) on 12/4/2019 9:41:56 AM 
  
GLUCOSE, POC Collection Time: 12/03/19  9:47 PM  
Result Value Ref Range Glucose (POC) 222 (H) 65 - 100 mg/dL Performed by Nikko Diaz RESPIRATORY PANEL,PCR,NASOPHARYNGEAL Collection Time: 12/03/19 11:50 PM  
Result Value Ref Range Adenovirus NOT DETECTED NOTD Coronavirus 229E NOT DETECTED NOTD Coronavirus HKU1 NOT DETECTED NOTD Coronavirus CVNL63 NOT DETECTED NOTD Coronavirus OC43 NOT DETECTED NOTD Metapneumovirus NOT DETECTED NOTD Rhinovirus and Enterovirus NOT DETECTED NOTD Influenza A NOT DETECTED NOTD Influenza A, subtype H1 NOT DETECTED NOTD Influenza A, subtype H3 NOT DETECTED NOTD INFLUENZA A H1N1 PCR NOT DETECTED NOTD Influenza B NOT DETECTED NOTD Parainfluenza 1 NOT DETECTED NOTD  Parainfluenza 2 NOT DETECTED NOTD Parainfluenza 3 NOT DETECTED NOTD Parainfluenza virus 4 NOT DETECTED NOTD    
 RSV by PCR NOT DETECTED NOTD Bordetella pertussis - PCR NOT DETECTED NOTD Chlamydophila pneumoniae DNA, QL, PCR NOT DETECTED NOTD Mycoplasma pneumoniae DNA, QL, PCR NOT DETECTED NOTD CULTURE, RESPIRATORY/SPUTUM/BRONCH W GRAM STAIN Collection Time: 12/04/19  3:27 AM  
Result Value Ref Range Special Requests: NO SPECIAL REQUESTS    
 GRAM STAIN FEW WBCS SEEN    
 GRAM STAIN NO EPITHELIAL CELLS SEEN    
 GRAM STAIN 3+ GRAM POSITIVE COCCI IN CHAINS AND IN CLUSTERS    
 GRAM STAIN 3+ GRAM NEGATIVE RODS Culture result: PENDING   
METABOLIC PANEL, BASIC Collection Time: 12/04/19  3:28 AM  
Result Value Ref Range Sodium 134 (L) 136 - 145 mmol/L Potassium 3.8 3.5 - 5.1 mmol/L Chloride 97 97 - 108 mmol/L  
 CO2 32 21 - 32 mmol/L Anion gap 5 5 - 15 mmol/L Glucose 120 (H) 65 - 100 mg/dL BUN 59 (H) 6 - 20 MG/DL Creatinine 2.54 (H) 0.70 - 1.30 MG/DL  
 BUN/Creatinine ratio 23 (H) 12 - 20 GFR est AA 30 (L) >60 ml/min/1.73m2 GFR est non-AA 24 (L) >60 ml/min/1.73m2 Calcium 8.0 (L) 8.5 - 10.1 MG/DL  
CBC WITH AUTOMATED DIFF Collection Time: 12/04/19  3:28 AM  
Result Value Ref Range WBC 6.3 4.1 - 11.1 K/uL  
 RBC 2.82 (L) 4.10 - 5.70 M/uL HGB 7.4 (L) 12.1 - 17.0 g/dL HCT 24.4 (L) 36.6 - 50.3 % MCV 86.5 80.0 - 99.0 FL  
 MCH 26.2 26.0 - 34.0 PG  
 MCHC 30.3 30.0 - 36.5 g/dL  
 RDW 16.8 (H) 11.5 - 14.5 % PLATELET 785 562 - 992 K/uL MPV 11.8 8.9 - 12.9 FL  
 NRBC 0.0 0  WBC ABSOLUTE NRBC 0.00 0.00 - 0.01 K/uL NEUTROPHILS 77 (H) 32 - 75 % LYMPHOCYTES 5 (L) 12 - 49 % MONOCYTES 14 (H) 5 - 13 % EOSINOPHILS 2 0 - 7 % BASOPHILS 0 0 - 1 % IMMATURE GRANULOCYTES 0 0.0 - 0.5 % ABS. NEUTROPHILS 4.9 1.8 - 8.0 K/UL  
 ABS. LYMPHOCYTES 0.3 (L) 0.8 - 3.5 K/UL  
 ABS. MONOCYTES 0.9 0.0 - 1.0 K/UL  
 ABS.  EOSINOPHILS 0.1 0.0 - 0.4 K/UL ABS. BASOPHILS 0.0 0.0 - 0.1 K/UL  
 ABS. IMM. GRANS. 0.0 0.00 - 0.04 K/UL  
 DF AUTOMATED    
GLUCOSE, POC Collection Time: 12/04/19  6:49 AM  
Result Value Ref Range Glucose (POC) 113 (H) 65 - 100 mg/dL Performed by Sidra Opitz, POC Collection Time: 12/04/19 11:58 AM  
Result Value Ref Range Glucose (POC) 245 (H) 65 - 100 mg/dL Performed by Paige Madrigal Radiology:  
 
  
Current Facility-Administered Medications Medication Dose Route Frequency  sodium chloride (NS) flush 5-40 mL  5-40 mL IntraVENous Q8H  
 sodium chloride (NS) flush 5-40 mL  5-40 mL IntraVENous PRN  
 acetaminophen (TYLENOL) tablet 650 mg  650 mg Oral Q4H PRN  
 heparin (porcine) injection 5,000 Units  5,000 Units SubCUTAneous Q8H  
 aspirin chewable tablet 81 mg  81 mg Oral DAILY  carvedilol (COREG) tablet 12.5 mg  12.5 mg Oral BID WITH MEALS  ferrous sulfate tablet 325 mg  325 mg Oral ACB  hydrALAZINE (APRESOLINE) tablet 25 mg  25 mg Oral TID  simvastatin (ZOCOR) tablet 20 mg  20 mg Oral QHS  sodium bicarbonate tablet 1,300 mg  1,300 mg Oral TID  tamsulosin (FLOMAX) capsule 0.4 mg  0.4 mg Oral DAILY  bumetanide (BUMEX) injection 3 mg  3 mg IntraVENous Q12H  
 insulin lispro (HUMALOG) injection   SubCUTAneous AC&HS  
 glucose chewable tablet 16 g  4 Tab Oral PRN  
 glucagon (GLUCAGEN) injection 1 mg  1 mg IntraMUSCular PRN  
 dextrose 10% infusion 0-250 mL  0-250 mL IntraVENous PRN  
 albuterol (PROVENTIL VENTOLIN) nebulizer solution 2.5 mg  2.5 mg Nebulization Q4H PRN Benja Meyer. MARGARITA Mccann MD 
 
 [facility] [address] [place] [address], [address] Citlalli Jones 
 [phone]

## 2019-12-04 NOTE — NURSE NAVIGATOR
Chart reviewed by Heart Failure Nurse Navigator. Heart Failure database completed. EF:  66-70%; Echo dated 8/6/19; No new echo ordered ACEi/ARB/ARNi: currently not indicated. Patient intolerant to Lisinopril due to cough BB: Coreg 12.5mg BID Aldosterone Antagonist: currently not indicated Obstructive Sleep Apnea Screening: STOP-BANG score: 
 Referred to Sleep Medicine: CRT currently not indicated. NYHA Functional Class not assigned. Documentation requested via Provider message on [employer] Heart Failure Teach Back in Patient Education. Heart Failure Avoiding Triggers on Discharge Instructions. Cardiologist: Dr. Shawn Cruz MEDICARE HEART FAILURE BUNDLE Post discharge follow up phone call to be made within 48-72 hours of discharge.

## 2019-12-04 NOTE — PROGRESS NOTES
Problem: Self Care Deficits Care Plan (Adult) Goal: *Acute Goals and Plan of Care (Insert Text) Description FUNCTIONAL STATUS PRIOR TO ADMISSION: Patient was modified independent using a rolling walker for functional mobility. Limited activity level during the day and frequently sits up in chair for 8-10 hours per his report. HOME SUPPORT: The patient lived with wife but did not require assist for basic ADLs, wife takes care of all iADLs. Occupational Therapy Goals Initiated 12/4/2019 1. Patient will perform grooming with modified independence within 7 day(s). 2.  Patient will perform upper body dressing with supervision/set-up within 7 day(s). 3.  Patient will perform lower body dressing with minimal assistance/contact guard assist within 7 day(s). 4.  Patient will perform toilet transfers with supervision/set-up within 7 day(s). 5.  Patient will perform all aspects of toileting with minimal assistance/contact guard assist within 7 day(s). 6.  Patient will utilize energy conservation techniques during functional activities with verbal cues within 7 day(s). Outcome: Progressing Towards Goal 
 
OCCUPATIONAL THERAPY EVALUATION Patient: Janneth Painting (81 y.o. male) Date: 12/4/2019 Primary Diagnosis: CHF exacerbation ([facility] [address] [I50.9] Precautions: fall, low O2     
 
ASSESSMENT Based on the objective data described below, the patient presents with decreased activity tolerance/endurance, SOB, generalized weakness and decreased ability to perform ADLs s/p CHF exacerbation. Pt agreeable to evaluation and participated in bed mobility and transfer to chair to eat breakfast. On 3L O2, pt supine in bed O2 sats 89-90%, Sitting EOB and in chair improved to 93% on 3L. Pt appears below baseline level of function.  Believe he will benefit from OT services while in the hospital to facilitate energy conservation and activity modification/AE training/education to optimize safety and independence during Adls in the home. Anticipate he will be able to d/c home with [facility] services when medically appropriate. Current Level of Function Impacting Discharge (ADLs/self-care): mod to set-up assistance, significant SOB at baseline Functional Outcome Measure: The patient scored 35/100 on the Barthel Index outcome measure. Other factors to consider for discharge: reports he was MOD I prior to admission, was working with HHOT/PT after recent SNF stay. Wife is home with pt to assist as needed Patient will benefit from skilled therapy intervention to address the above noted impairments. PLAN : 
Recommendations and Planned Interventions: self care training, functional mobility training, therapeutic exercise, therapeutic activities, endurance activities, patient education, home safety training, and family training/education Frequency/Duration: Patient will be followed by occupational therapy 5 times a week to address goals. Recommendation for discharge: (in order for the patient to meet his/her long term goals) Occupational therapy at least 2 days/week in the home This discharge recommendation: 
Has not yet been discussed the attending provider and/or case management IF patient discharges home will need the following DME: patient owns DME required for discharge except long handled dressing DME (may benefit from these tools to decrease task demand with LB self car SUBJECTIVE:  
Patient stated I feel better sitting up.  OBJECTIVE DATA SUMMARY:  
HISTORY:  
Past Medical History:  
Diagnosis Date CAD (coronary artery disease) s/p CABG 2008 Carotid arterial disease ([facility] [address] CKD (chronic kidney disease) stage 3, GFR 30-59 ml/min ([facility]) 10/21/2017  
 hypertension and DM nephrosclerosis Diabetes mellitus, type 2 ([facility] [address] Hypercholesteremia Hypertension Paroxysmal atrial fibrillation ([facility] [address] 10/21/2017  
 new onset afib with BRPR 10-19-17 admit  PVC's (premature ventricular contractions) 5/26/2014 PVD (peripheral vascular disease) ([facility] [address] Past Surgical History:  
Procedure Laterality Date HX CORONARY ARTERY BYPASS GRAFT    
 HX HEART CATHETERIZATION    
 [place] TCAT INSJ/RPL PERM LEADLESS PACEMAKER RV W/IMG N/A 5/9/2019 INSERT OR REPLACE TRANSCATH PPM LEADLESS performed by Jocelyne Dominguez MD at [facility] CATH LAB Expanded or extensive additional review of patient history:  
 
Home Situation Home Environment: Private residence Wheelchair Ramp: Yes One/Two Story Residence: One story Living Alone: No 
Support Systems: Spouse/Significant Other/Partner(son lives close by) Patient Expects to be Discharged to[de-identified] Private residence Current DME Used/Available at Home: Shower chair, Walker, rolling Tub or Shower Type: (both options) Hand dominance: Right EXAMINATION OF PERFORMANCE DEFICITS: 
Cognitive/Behavioral Status: Pleasant, engaged, able to follow commands Skin: see nursing notes for details Edema: none observed Hearing: Auditory Auditory Impairment: None Vision/Perceptual:   
    
    
    
  
    
    
  
 
Range of Motion: 
 
AROM: Generally decreased, functional 
  
  
  
  
  
  
  
 
Strength: 
 
Strength: Generally decreased, functional 
  
  
  
  
 
Coordination: 
Coordination: Generally decreased, functional 
Fine Motor Skills-Upper: Left Intact; Right Intact(reports occasional difficulty with certain containers (i.e. ) Gross Motor Skills-Upper: (limited 2/2 limited shoulder ROM) Tone & Sensation: 
 
Tone: Normal 
Sensation: Intact Balance: 
Sitting: Intact Standing: Impaired; With support Standing - Static: Fair;Constant support Standing - Dynamic : Fair;Constant support Functional Mobility and Transfers for ADLs: 
Bed Mobility: 
Supine to Sit: Minimum assistance;Bed Modified(elevated HOB, assist at trunk) Scooting: Supervision Transfers: 
Sit to Stand: Minimum assistance(Simultaneous filing. User may not have seen previous data.) Stand to Sit: Minimum assistance(Simultaneous filing. User may not have seen previous data.) Bed to Chair: Minimum assistance(Simultaneous filing. User may not have seen previous data.) ADL Assessment: 
Feeding: Setup Oral Facial Hygiene/Grooming: Minimum assistance Bathing: Moderate assistance(infer based on BUE ROM and SOB/decreased LE access) Upper Body Dressing: Minimum assistance(infer from limited B shoulder ROm) Lower Body Dressing: Moderate assistance(infer from SOB at this time) Toileting: Moderate assistance(infer from decreased shoulder ROM and SOB) ADL Intervention and task modifications: 
Feeding Feeding Assistance: Set-up Food to Mouth: Modified independent Drink to Mouth: Modified independent(limited shoulder ROM, straw use observed) Functional Measure: 
Barthel Index: 
 
Bathin Bladder: 10 Bowels: 5 Groomin Dressin Feedin Mobility: 0 Stairs: 0 Toilet Use: 0 Transfer (Bed to Chair and Back): 10 Total: 35/100 The Barthel ADL Index: Guidelines 1. The index should be used as a record of what a patient does, not as a record of what a patient could do. 2. The main aim is to establish degree of independence from any help, physical or verbal, however minor and for whatever reason. 3. The need for supervision renders the patient not independent. 4. A patient's performance should be established using the best available evidence. Asking the patient, friends/relatives and nurses are the usual sources, but direct observation and common sense are also important. However direct testing is not needed. 5. Usually the patient's performance over the preceding 24-48 hours is important, but occasionally longer periods will be relevant. 6. Middle categories imply that the patient supplies over 50 per cent of the effort.  
7. Use of aids to be independent is allowed. Vadim Morrison., Barthel, D.W. ([id number]). Functional evaluation: the Barthel Index. [address] (14)2. Laurent Hodgkins der [place], J.J.M.F, Tereso Johnson., Aletha Zambrano., [place], [address] (1999). Measuring the change indisability after inpatient rehabilitation; comparison of the responsiveness of the Barthel Index and Functional [place] Measure. Journal of Neurology, Neurosurgery, and Psychiatry, 66(4), 017-337. CORIN Gutiérrez, TAVARES Snyder, & Dennis Vyas M.A. (2004.) Assessment of post-stroke quality of life in cost-effectiveness studies: The usefulness of the Barthel Index and the EuroQoL-5D. [facility], [phone], [phone] Occupational Therapy Evaluation Charge Determination History Examination Decision-Making LOW Complexity : Brief history review  LOW Complexity : 1-3 performance deficits relating to physical, cognitive , or psychosocial skils that result in activity limitations and / or participation restrictions  MEDIUM Complexity : Patient may present with comorbidities that affect occupational performnce. Miniml to moderate modification of tasks or assistance (eg, physical or verbal ) with assesment(s) is necessary to enable patient to complete evaluation Based on the above components, the patient evaluation is determined to be of the following complexity level: LOW Pain Rating: 
None reported Activity Tolerance:  
Fair, desaturates with exertion and requires oxygen, requires frequent rest breaks, and observed SOB with activity Please refer to the flowsheet for vital signs taken during this treatment. After treatment patient left in no apparent distress:   
Sitting in chair and Call bell within reach COMMUNICATION/EDUCATION:  
The patients plan of care was discussed with: Physical Therapist and Registered Nurse. Home safety education was provided and the patient/caregiver indicated understanding.  and Patient/family have participated as able in goal setting and plan of care. This patients plan of care is appropriate for delegation to BRITTANY. Thank you for this referral. 
Nicko Morse, OT Time Calculation: 23 mins

## 2019-12-04 NOTE — PROGRESS NOTES
[address] [facility] Adult  Hospitalist Group Hospitalist Progress Note Creston Cushing, MD 
Answering service: [phone] OR [phone] from in house phone Date of Service:  2019 NAME:  Roberto Miranda Sr. 
:  1935 MRN:  [id number] Admission Summary: A 81year old male patient with PMH of CAD s/p CABG, Diastolic heart failure, Afib , s/p PPM, CKD stage III, HTN, HLD presented to Ed for evaluation of shortness of breath. Patient has noticed progressive sob since few days and follows with Dr. Panfilo Fabian for CHF. he noticed more than 4lbs weight gain in last few days. This morning, he woke up with sob and so he came to ED. He noticed worsening leg swelling, no chest pain. No change in cough but sputum appears to be more dark in color. No fever or chills. No sick contacts. He also mentions that his BG was low in 80;s this AM 
In ED, he was given IV bumex. Hospitalist consulted for admission. Interval history / Subjective:  
  Pt is feeling better. Breathing and leg swelling better but still far from baseline. No overnight events. No other concerns. Assessment & Plan:  
 
Shortness of breath likely due to Acute on chronic diastolic heart failure Chronic hypoxemic respiratory failure on 2LPM O2 NC - baseline - Continue telemetry for now. - BNP >35,000 on admission 
- CT chest:  Small bilateral pleural effusions with underlying atelectasis. - recent Echo 2019: EF 66 - 70%. Mild to moderate pulmonary hypertension. 
- cardiology on board. Appreciate. - Cont iV bumex 3mg bid. Also, received Metolazone 5mg once today. - c/w aspirin, coreg, hydralazine, statin  
- cards plan for [facility] with vasodilator challenge. 
  
KATALINA on CKD 
- cr 2.58 on poa, baseline 1.8-1.9 
- likely CRS 
- nephrology on board. Appreciate. Close monitoring. 
  
Afib - rate controlled on .  On aspirin  
  
HTN - uncontrolled on poa. Restarted home meds coreg, hydralazine DM- A1c 7.7. c/w ISS 
  
Hypokalemia - replete and monitor as needed 
  
Chronic anemia 
- fobt negative 
- iron deficient. On iron supplements - hb low stable  
  
Advanced COPD and emphysema 
- CT chest: Diffuse interstitial opacities again noted progressive in the upper lobes bilaterally. Underlying pulmonary fibrosis is considered. - check PFT's. - normal lactic acid  
- resp panel and sputum cx ordered 
- less likely infectious  
  
Hx of CAD: s/p CAG in 2003. - no chest pain. Trop neg Hx of PVD s/p arthrectomy to distal SFA and distal popliteal with angioplasty to both lesions and stent to SFA by Dr. Tiffany Pollard 3/2014 
  
DVT ppx: Heparin Code status: Full. Disposition: TBD. Care Plan discussed with: Patient/Family and Nurse Hospital Problems  Date Reviewed: 11/3/2019 Codes Class Noted POA  
 CHF exacerbation ([facility] [address] ICD-10-CM: I50.9 ICD-9-CM: 428.0  12/3/2019 Unknown Review of Systems: A comprehensive review of systems was negative except for that written in the HPI. Vital Signs:  
 Last 24hrs VS reviewed since prior progress note. Most recent are: 
Visit Vitals /86 (BP 1 Location: Left arm, BP Patient Position: At rest) Pulse 79 Temp 98.2 °F (36.8 °C) Resp 20 Ht 5' 9\" (1.753 m) Wt 83.2 kg (183 lb 6.8 oz) SpO2 98% BMI 27.09 kg/m² Intake/Output Summary (Last 24 hours) at 12/4/2019 1842 Last data filed at 12/4/2019 1530 Gross per 24 hour Intake 720 ml Output 1505 ml Net -785 ml Physical Examination:  
 
Gen: mild distress due to sob. Elderly HEENT: anicteric sclerae, normal conjunctiva, oropharynx clear, MM moist 
Neck: supple, trachea midline, no adenopathy Heart: RRR, no MRG, no JVD, 2+  edema Lungs: decreased breath sounds Abd: soft, NT, ND, BS+, no organomegaly Extr: warm Skin: dry, no rash Neuro: CN II-XII grossly intact, normal speech, moves all extremities Psych: normal mood, appropriate affect Data Review:  
 Review and/or order of clinical lab test 
Review and/or order of tests in the radiology section of CPT Review and/or order of tests in the medicine section of CPT Labs:  
 
Recent Labs 12/04/19 
3101 12/03/19 
6239 WBC 6.3 5.8 HGB 7.4* 7.8* HCT 24.4* 25.2*  
 166 Recent Labs 12/04/19 
4508 12/03/19 
6775 * 135* K 3.8 3.0*  
CL 97 95* CO2 32 33* BUN 59* 61* CREA 2.54* 2.58* * 85  
CA 8.0* 8.2* MG  --  2.2 Recent Labs 12/03/19 
2822 SGOT 43* ALT 42 * TBILI 0.6 TP 6.6 ALB 2.0*  
GLOB 4.6* No results for input(s): INR, PTP, APTT, INREXT in the last 72 hours. Recent Labs 12/03/19 
4034 TIBC 150* PSAT 9*  
FERR 379 Lab Results Component Value Date/Time Folate 11.3 12/03/2019 08:44 AM  
  
No results for input(s): PH, PCO2, PO2 in the last 72 hours. Recent Labs 12/03/19 
9907 TROIQ <0.05 No results found for: CHOL, CHOLX, CHLST, CHOLV, HDL, HDLP, LDL, LDLC, DLDLP, TGLX, TRIGL, TRIGP, CHHD, CHHDX Lab Results Component Value Date/Time Glucose (POC) 235 (H) 12/04/2019 04:51 PM  
 Glucose (POC) 245 (H) 12/04/2019 11:58 AM  
 Glucose (POC) 113 (H) 12/04/2019 06:49 AM  
 Glucose (POC) 222 (H) 12/03/2019 09:47 PM  
 Glucose (POC) 240 (H) 12/03/2019 04:45 PM  
 
Lab Results Component Value Date/Time  Color YELLOW/STRAW 08/27/2019 12:56 PM  
 Appearance CLEAR 08/27/2019 12:56 PM  
 Specific gravity 1.023 08/27/2019 12:56 PM  
 pH (UA) 6.0 08/27/2019 12:56 PM  
 Protein >300 (A) 08/27/2019 12:56 PM  
 Glucose 500 (A) 08/27/2019 12:56 PM  
 Ketone NEGATIVE  08/27/2019 12:56 PM  
 Bilirubin NEGATIVE  08/27/2019 12:56 PM  
 Urobilinogen 0.2 08/27/2019 12:56 PM  
 Nitrites NEGATIVE  08/27/2019 12:56 PM  
 Leukocyte Esterase NEGATIVE  08/27/2019 12:56 PM  
 Epithelial cells FEW 08/27/2019 12:56 PM  
 Bacteria NEGATIVE  08/27/2019 12:56 PM  
 WBC 0-4 08/27/2019 12:56 PM  
 RBC 5-10 08/27/2019 12:56 PM  
 
 
Xr Chest Pa Lat Result Date: 12/3/2019 IMPRESSION: Diffuse interstitial opacities are overall unchanged since 8/27/2019 and may represent chronic lung parenchymal change versus pulmonary edema. Superimposed patchy airspace opacities are increased in the right upper lung and left lower lung and may represent edema, infection, or atelectasis. Ct Chest Wo Cont Result Date: 12/3/2019 IMPRESSION: Diffuse interstitial opacities again noted progressive in the upper lobes bilaterally. Underlying pulmonary fibrosis is considered. Small bilateral pleural effusions with underlying atelectasis. Cardiomegaly. Cholelithiasis. Us Retroperitoneum Comp Result Date: 12/4/2019 IMPRESSION: Increased bilateral renal cortical echogenicity is compatible with medical renal disease. Bilateral renal cysts. No hydronephrosis. Medications Reviewed:  
 
Current Facility-Administered Medications Medication Dose Route Frequency  sodium chloride (NS) flush 5-40 mL  5-40 mL IntraVENous Q8H  
 sodium chloride (NS) flush 5-40 mL  5-40 mL IntraVENous PRN  
 acetaminophen (TYLENOL) tablet 650 mg  650 mg Oral Q4H PRN  
 heparin (porcine) injection 5,000 Units  5,000 Units SubCUTAneous Q8H  
 aspirin chewable tablet 81 mg  81 mg Oral DAILY  carvedilol (COREG) tablet 12.5 mg  12.5 mg Oral BID WITH MEALS  ferrous sulfate tablet 325 mg  325 mg Oral ACB  hydrALAZINE (APRESOLINE) tablet 25 mg  25 mg Oral TID  simvastatin (ZOCOR) tablet 20 mg  20 mg Oral QHS  sodium bicarbonate tablet 1,300 mg  1,300 mg Oral TID  tamsulosin (FLOMAX) capsule 0.4 mg  0.4 mg Oral DAILY  bumetanide (BUMEX) injection 3 mg  3 mg IntraVENous Q12H  
 insulin lispro (HUMALOG) injection   SubCUTAneous AC&HS  
 glucose chewable tablet 16 g  4 Tab Oral PRN  
 glucagon (GLUCAGEN) injection 1 mg  1 mg IntraMUSCular PRN  
 dextrose 10% infusion 0-250 mL  0-250 mL IntraVENous PRN  
 albuterol (PROVENTIL VENTOLIN) nebulizer solution 2.5 mg  2.5 mg Nebulization Q4H PRN  
 
______________________________________________________________________ EXPECTED LENGTH OF STAY: - - - 
ACTUAL LENGTH OF STAY:          1 Rubio Pickering MD

## 2019-12-05 LAB
ANION GAP SERPL CALC-SCNC: 4 MMOL/L (ref 5–15)
BASOPHILS # BLD: 0 K/UL (ref 0–0.1)
BASOPHILS NFR BLD: 0 % (ref 0–1)
BUN SERPL-MCNC: 62 MG/DL (ref 6–20)
BUN/CREAT SERPL: 24 (ref 12–20)
CALCIUM SERPL-MCNC: 7.9 MG/DL (ref 8.5–10.1)
CHLORIDE SERPL-SCNC: 92 MMOL/L (ref 97–108)
CO2 SERPL-SCNC: 36 MMOL/L (ref 21–32)
CREAT SERPL-MCNC: 2.61 MG/DL (ref 0.7–1.3)
DIFFERENTIAL METHOD BLD: ABNORMAL
EOSINOPHIL # BLD: 0.2 K/UL (ref 0–0.4)
EOSINOPHIL NFR BLD: 3 % (ref 0–7)
ERYTHROCYTE [DISTWIDTH] IN BLOOD BY AUTOMATED COUNT: 16.7 % (ref 11.5–14.5)
GLUCOSE BLD STRIP.AUTO-MCNC: 145 MG/DL (ref 65–100)
GLUCOSE BLD STRIP.AUTO-MCNC: 183 MG/DL (ref 65–100)
GLUCOSE BLD STRIP.AUTO-MCNC: 207 MG/DL (ref 65–100)
GLUCOSE BLD STRIP.AUTO-MCNC: 218 MG/DL (ref 65–100)
GLUCOSE BLD STRIP.AUTO-MCNC: 234 MG/DL (ref 65–100)
GLUCOSE SERPL-MCNC: 218 MG/DL (ref 65–100)
HCT VFR BLD AUTO: 23.2 % (ref 36.6–50.3)
HGB BLD-MCNC: 7.1 G/DL (ref 12.1–17)
IMM GRANULOCYTES # BLD AUTO: 0 K/UL (ref 0–0.04)
IMM GRANULOCYTES NFR BLD AUTO: 1 % (ref 0–0.5)
LYMPHOCYTES # BLD: 0.4 K/UL (ref 0.8–3.5)
LYMPHOCYTES NFR BLD: 6 % (ref 12–49)
MAGNESIUM SERPL-MCNC: 2.1 MG/DL (ref 1.6–2.4)
MCH RBC QN AUTO: 25.9 PG (ref 26–34)
MCHC RBC AUTO-ENTMCNC: 30.6 G/DL (ref 30–36.5)
MCV RBC AUTO: 84.7 FL (ref 80–99)
MONOCYTES # BLD: 0.8 K/UL (ref 0–1)
MONOCYTES NFR BLD: 12 % (ref 5–13)
NEUTS SEG # BLD: 4.9 K/UL (ref 1.8–8)
NEUTS SEG NFR BLD: 78 % (ref 32–75)
NRBC # BLD: 0 K/UL (ref 0–0.01)
NRBC BLD-RTO: 0 PER 100 WBC
PLATELET # BLD AUTO: 162 K/UL (ref 150–400)
PMV BLD AUTO: 11.2 FL (ref 8.9–12.9)
POTASSIUM SERPL-SCNC: 3.5 MMOL/L (ref 3.5–5.1)
RBC # BLD AUTO: 2.74 M/UL (ref 4.1–5.7)
SERVICE CMNT-IMP: ABNORMAL
SODIUM SERPL-SCNC: 132 MMOL/L (ref 136–145)
WBC # BLD AUTO: 6.3 K/UL (ref 4.1–11.1)

## 2019-12-05 PROCEDURE — 74011250637 HC RX REV CODE- 250/637: Performed by: INTERNAL MEDICINE

## 2019-12-05 PROCEDURE — 85025 COMPLETE CBC W/AUTO DIFF WBC: CPT

## 2019-12-05 PROCEDURE — 94640 AIRWAY INHALATION TREATMENT: CPT

## 2019-12-05 PROCEDURE — 80048 BASIC METABOLIC PNL TOTAL CA: CPT

## 2019-12-05 PROCEDURE — 82962 GLUCOSE BLOOD TEST: CPT

## 2019-12-05 PROCEDURE — 74011636637 HC RX REV CODE- 636/637: Performed by: INTERNAL MEDICINE

## 2019-12-05 PROCEDURE — 74011000250 HC RX REV CODE- 250: Performed by: INTERNAL MEDICINE

## 2019-12-05 PROCEDURE — 74011250636 HC RX REV CODE- 250/636: Performed by: INTERNAL MEDICINE

## 2019-12-05 PROCEDURE — 74011000250 HC RX REV CODE- 250: Performed by: PHYSICIAN ASSISTANT

## 2019-12-05 PROCEDURE — 83735 ASSAY OF MAGNESIUM: CPT

## 2019-12-05 PROCEDURE — 36415 COLL VENOUS BLD VENIPUNCTURE: CPT

## 2019-12-05 PROCEDURE — 74011250637 HC RX REV CODE- 250/637: Performed by: PHYSICIAN ASSISTANT

## 2019-12-05 PROCEDURE — 65660000000 HC RM CCU STEPDOWN

## 2019-12-05 PROCEDURE — 77010033678 HC OXYGEN DAILY

## 2019-12-05 PROCEDURE — 94664 DEMO&/EVAL PT USE INHALER: CPT

## 2019-12-05 RX ORDER — GUAIFENESIN 100 MG/5ML
400 SOLUTION ORAL EVERY 6 HOURS
Status: DISCONTINUED | OUTPATIENT
Start: 2019-12-05 | End: 2019-12-10 | Stop reason: HOSPADM

## 2019-12-05 RX ORDER — GUAIFENESIN 100 MG/5ML
100 SOLUTION ORAL
Status: DISCONTINUED | OUTPATIENT
Start: 2019-12-05 | End: 2019-12-10 | Stop reason: HOSPADM

## 2019-12-05 RX ORDER — ALBUTEROL SULFATE 0.83 MG/ML
2.5 SOLUTION RESPIRATORY (INHALATION)
Status: DISCONTINUED | OUTPATIENT
Start: 2019-12-05 | End: 2019-12-06

## 2019-12-05 RX ADMIN — INSULIN LISPRO 2 UNITS: 100 INJECTION, SOLUTION INTRAVENOUS; SUBCUTANEOUS at 17:33

## 2019-12-05 RX ADMIN — GUAIFENESIN 400 MG: 200 SOLUTION ORAL at 14:08

## 2019-12-05 RX ADMIN — Medication 10 ML: at 23:07

## 2019-12-05 RX ADMIN — TAMSULOSIN HYDROCHLORIDE 0.4 MG: 0.4 CAPSULE ORAL at 08:57

## 2019-12-05 RX ADMIN — HEPARIN SODIUM 5000 UNITS: 5000 INJECTION INTRAVENOUS; SUBCUTANEOUS at 04:24

## 2019-12-05 RX ADMIN — INSULIN LISPRO 3 UNITS: 100 INJECTION, SOLUTION INTRAVENOUS; SUBCUTANEOUS at 07:25

## 2019-12-05 RX ADMIN — HEPARIN SODIUM 5000 UNITS: 5000 INJECTION INTRAVENOUS; SUBCUTANEOUS at 11:59

## 2019-12-05 RX ADMIN — SODIUM BICARBONATE 650 MG: 650 TABLET ORAL at 16:20

## 2019-12-05 RX ADMIN — BUMETANIDE 3 MG: 0.25 INJECTION INTRAMUSCULAR; INTRAVENOUS at 08:57

## 2019-12-05 RX ADMIN — SODIUM BICARBONATE 650 MG: 650 TABLET ORAL at 22:55

## 2019-12-05 RX ADMIN — CARVEDILOL 12.5 MG: 12.5 TABLET, FILM COATED ORAL at 08:57

## 2019-12-05 RX ADMIN — BUMETANIDE 3 MG: 0.25 INJECTION INTRAMUSCULAR; INTRAVENOUS at 20:47

## 2019-12-05 RX ADMIN — IRON SUCROSE 200 MG: 20 INJECTION, SOLUTION INTRAVENOUS at 16:20

## 2019-12-05 RX ADMIN — INSULIN LISPRO 2 UNITS: 100 INJECTION, SOLUTION INTRAVENOUS; SUBCUTANEOUS at 22:56

## 2019-12-05 RX ADMIN — FERROUS SULFATE TAB 325 MG (65 MG ELEMENTAL FE) 325 MG: 325 (65 FE) TAB at 07:14

## 2019-12-05 RX ADMIN — CARVEDILOL 12.5 MG: 12.5 TABLET, FILM COATED ORAL at 17:34

## 2019-12-05 RX ADMIN — ALBUTEROL SULFATE 2.5 MG: 2.5 SOLUTION RESPIRATORY (INHALATION) at 18:12

## 2019-12-05 RX ADMIN — HYDRALAZINE HYDROCHLORIDE 25 MG: 25 TABLET, FILM COATED ORAL at 08:57

## 2019-12-05 RX ADMIN — HEPARIN SODIUM 5000 UNITS: 5000 INJECTION INTRAVENOUS; SUBCUTANEOUS at 20:47

## 2019-12-05 RX ADMIN — Medication 10 ML: at 14:08

## 2019-12-05 RX ADMIN — SIMVASTATIN 20 MG: 20 TABLET, FILM COATED ORAL at 22:55

## 2019-12-05 RX ADMIN — HYDRALAZINE HYDROCHLORIDE 25 MG: 25 TABLET, FILM COATED ORAL at 23:07

## 2019-12-05 RX ADMIN — GUAIFENESIN 400 MG: 200 SOLUTION ORAL at 19:39

## 2019-12-05 RX ADMIN — INSULIN LISPRO 3 UNITS: 100 INJECTION, SOLUTION INTRAVENOUS; SUBCUTANEOUS at 11:59

## 2019-12-05 RX ADMIN — HYDRALAZINE HYDROCHLORIDE 25 MG: 25 TABLET, FILM COATED ORAL at 16:20

## 2019-12-05 RX ADMIN — Medication 10 ML: at 07:15

## 2019-12-05 RX ADMIN — SODIUM BICARBONATE 650 MG: 650 TABLET ORAL at 08:57

## 2019-12-05 RX ADMIN — ASPIRIN 81 MG 81 MG: 81 TABLET ORAL at 08:58

## 2019-12-05 NOTE — ROUTINE PROCESS
Bedside and Verbal shift change report given to Rajiv Dhillon (oncoming nurse) by Toy Sarabia (offgoing nurse). Report included the following information SBAR.

## 2019-12-05 NOTE — PROGRESS NOTES
Transitional Care Team: Initial HUG Note Date of Assessment: 12/05/19 Time of Assessment:  11:29 AM 
 
Nazario Reynolds. is a 81 y.o. male inpatient at [address] Nazario Reynolds is resting in bed as this writer enter the room. Writer introduces self and Sravani Renteria, allowed to proceed. Pt is open to [facility] for SN/PT/OT and would like to resume at discharge. At baseline Mr. Bessy Olson is on 2L NC (3L today); + audible wheezing. Wife transports pt to appointments. Pt reports he weighs daily and knows when to report to cardiology/Dr. Fatimah Larkin. Per H&P pt reports a recent 4 pound gain. He also tries to avoid sodium and monitors carb intake. Shortness of breath likely due to Acute on chronic diastolic heart failure Chronic hypoxemic respiratory failure on 2LPM O2 NC - baseline - Continue telemetry for now. - BNP >35,000 on admission 
- CT chest:  Small bilateral pleural effusions with underlying atelectasis. - recent Echo 8/2019: JACOB 44 - 70%.  Mild to moderate pulmonary hypertension. 
- cardiology on board. Appreciate. - Cont iV bumex 3mg bid. Also, received Metolazone 5mg once today. - c/w aspirin, coreg, hydralazine, statin  
- cards plan [facility] with vasodilator challenge. 
  
KATALINA on CKD 
- cr 2.58 on poa, baseline 1.8-1.9 
- likely CRS 
 
  
Advanced COPD and emphysema 
- CT chest: Diffuse interstitial opacities again noted progressive in the upper lobes bilaterally. Underlying pulmonary fibrosis is considered. - check PFT's. - normal lactic acid  
- resp panel and sputum cx ordered 
- less likely infectious  
 
 
  12/3/2019 08:44 12/4/2019 03:28 12/5/2019 10:02  
RBC  2.96 (L) 2.82 (L) 2.74 (L) HGB  7.8 (L) 7.4 (L) 7.1 (L) HCT  25.2 (L) 24.4 (L) 23.2 (L)  
 
 
  12/3/2019 08:44 12/4/2019 03:28 12/5/2019 10:02 BUN  61 (H) 59 (H) 62 (H) Creatinine  2.58 (H) 2.54 (H) 2.61 (H) Iron  14 (L) 24 (L) Iron % saturation  9 (L) 9 (L) Primary Diagnosis: CHF Exacerbation Advance Directive:  reviewed and current. See ACP note from 8/10/19. Current Code Status:  Full Code Referral to Hospice/ Palliative Care Appropriate: no. 
 
Awareness of Medical Conditions: (Trajectory of illness and pts expectations). With review of pt's medical history and current admission diagnosis, Mr. Karen Gibbs is asked his perception of current health state  and if she has any concern about her future. Mr. Karen Gibbs informs this writer that his cardiologist has plans to try one more thing, if it doesn't work then dialysis and from there I reckon I will go down hill. This writer reminds pt that many patients survive for years on dialysis. Discharge Needs: (to include safety issues) None identified w/ pt Barriers Identified: None Patient is willing to go to SNF/Inpatient Rehab if recommended: no 
 
Medication Review:  was not performed, awaiting final med list on AVS. 
 
Can patient afford medications:  yes. Patient is Compliant with Medication regimen: Who manages medications at home: Self Best Patient Contact Number: [phone] HUG (Healthy Understanding of Goals) program introduced to patient/family. The Transitional Care Team bridges the gaps in care and education surrounding discharge from the acute care facility. The objective is to empower the patient and family in taking a proactive role in preventing readmission within the first thirty days after discharge. The team is also involved in the efforts to reduce readmission to the acute care setting after stabilization and discharge from the acute care environment either to skilled nursing facilities or community. [facility] RN/NP will return with [facility] Calendar/ follow up appointments/ Ambulatory Nurse Navigator name and contact information when the patient is ready for discharge. Future Appointments Date Time Provider Muna Delgado 12/20/2019  2:00 PM Brain Scale, MD [phone]  
2/12/2020  2:15 PM REMOTE1, [address]  
2/27/2020  2:20 PM Bubba Lopez MD cav ANAHI SCHED  
5/18/2020  9:15 AM REMOTE1, [address]  
8/24/2020  8:45 AM REMOTE1, [address]  
10/29/2020 10:30 AM PACEMAKER3, COS[address] YOON ANAHI SCHED  
10/29/2020 10:40 AM Casie Napoles  [address] [facility] follow up appointment(s): TBD [facility]: call information given to pt Patient education focused on readmission zones as described as: The Red Zone: High risk for readmission, days 1-21 The Yellow Zone: Moderate  risk for readmission, days 22-29 The Green Zone: Lower risk for readmission, days 30 and after The HUG Team will attempt to follow the patient from a distance while inpatient as well as be available for further transition/disposition needs. Past Medical History:  
Diagnosis Date  CAD (coronary artery disease) s/p CABG 2008  Carotid arterial disease ([address]  CKD (chronic kidney disease) stage 3, GFR 30-59 ml/min ([facility]) 10/21/2017  
 hypertension and DM nephrosclerosis  Diabetes mellitus, type 2 ([address]  Hypercholesteremia  Hypertension  Paroxysmal atrial fibrillation ([address] 10/21/2017  
 new onset afib with BRPR 10-19-17 admit  PVC's (premature ventricular contractions) 5/26/2014  PVD (peripheral vascular disease) ([address]

## 2019-12-05 NOTE — DIABETES MGMT
[facility] Progress Note Recommendations/ Comments: If appropriate, please consider adding 16 units of Lantus (wgt based 0.2 units/kg) for hyperglycemia. Also may benefit from NCS added to diet order. Current hospital DM medication: lispro insulin correction scale Chart reviewed on [address] Patient is a 81 y.o. male with known  Type 2 Diabetes on insulin injections: Lantus : 18 units prescribed but pt has split the dose and take 10 units am; 8 units pm and may reduce this dose if BS <200 mg/dl at home. A1c:  
Lab Results Component Value Date/Time Hemoglobin A1c 7.7 (H) 12/03/2019 08:44 AM  
 Hemoglobin A1c 8.4 (H) 08/27/2019 05:55 PM  
 Hemoglobin A1c, External 8.4 08/15/2017 Recent Glucose Results:  
Lab Results Component Value Date/Time  (H) 12/05/2019 10:02 AM  
 GLUCPOC 234 (H) 12/05/2019 11:26 AM  
 GLUCPOC 218 (H) 12/05/2019 07:18 AM  
 GLUCPOC 183 (H) 12/05/2019 12:02 AM  
  
 
Lab Results Component Value Date/Time Creatinine 2.61 (H) 12/05/2019 10:02 AM  
 
Estimated Creatinine Clearance: 21.4 mL/min (A) (based on SCr of 2.61 mg/dL (H)). Active Orders Diet DIET CARDIAC Regular; FR 2000ML  
  
 
PO intake:  
Patient Vitals for the past 72 hrs: 
 % Diet Eaten 12/04/19 1530 80 % 12/04/19 1200 90 % 12/04/19 0833 80 % Will continue to follow as needed. Thank you Time spent: 3 min

## 2019-12-05 NOTE — PROGRESS NOTES
Cardiology Progress Note Admit Date: 12/3/2019 Admit Diagnosis: CHF exacerbation ([facility] [address] [I50.9] Date: 12/5/2019     Time: 4:35 PM 
Assessment/Plan/Discussion:Cardiology Attending:  
 
Patient seen on the day of progress note and examined  and agree with Advance Practice Provider (MADDIE, NP,PA)  assessment and plans. Tiarra Dotson is a 81 y.o. male Looks better in evening, mornings here looks more tired, short of breath Denies chest pain Does not want hemodialysis at this time Weight is up from baseline despite on Bumex 3 mg PO BID at home PTA Wt Readings from Last 3 Encounters:  
12/05/19 179 lb 3.7 oz (81.3 kg)  
11/21/19 170 lb (77.1 kg) 10/10/19 158 lb (71.7 kg) One option is trying [facility]IC once every 2 weeks a bolus IV loop diuretic to see if we can prevent readmission Lab Results Component Value Date/Time HGB 7.1 (L) 12/05/2019 10:02 AM  
  
Lab Results Component Value Date/Time Creatinine 2.61 (H) 12/05/2019 10:02 AM  
 
CKD is worsening and had associated severe anemia Noted pallor Nunu Lee MD   
 
 
 
Subjective: 
Feeling a bit better each day. Still edematous legs. Noted bruising on skin. Looks as if  
He is working a little bit more to breath Assessment and Plan 1. BLE edema/Fluid overload: weight up approximately 20 + pounds 
 may be due to pulmonary HTN, worsened renal function and/or possible acute on chronic DHF. Bumex 3 mg IV BID Suspect I/O not accurate. Weight down about 3 pounds 
  
2. CKD :  
 now stage IV. Creatinine up from 8/28/0219. Now 2.61 Follows with Dr. Pamela Zheng. Nephrology following 3. HFpEF:  
 Last echo EF 66-70%. Mild MR, mild-mod pulmonary HTN,  
            Diuretics as above Recheck limited echo tomorrow 
  
4. Hx of CAD: s/p CAG in 2003. Last stress test 2017 normal perfusion. no chest pain 
  
5.  Afib, chronic:  
           -has leadless ppm.  Continue coreg. -[id number]. Not on [address] PTA 
  
6. Anemia: hgb 7.1 
           -stool for OB is Negative. Reports dk stools but says he takes iron. 
  
7. Hx of restrictive lung disease: suspect also playing significant role in SOB complaints. Will get PFTs  - pending More wheezes in bases today. 
  
8. Hx of PVD s/p arthrectomy to distal SFA and distal popliteal with angioplasty to both lesions and stent to SFA by Dr. Hailee Foreman 3/2014. 
  
9;. Hypokalemia: was repleted by primary team. 
 
10. HTN: BP labile. 11. CAD 
 CAD/CABG off pump x3 3/2008 . =post op anemia and renal failure CATH March 7, 2008= severe three-vessel left main coronary artery disease, 40% disease of the left main and take off of the LAD and circumflex complex,  
 70% stenosis of the ostial circ and 85% of the LAD. Between the first and second marginal, the circumflex had 70% stenosis and between the third marginal and PDA there was a 70% stenosis, LAD ostial lesion RCA proximal 60% tapering, EF 60%-70%, bilaterally patent renal arteries, mild atherosclerosis of the distal abdominal aorta. Less SOB. Still edema of legs. Discussed HD, he would like to avoid dialysis. Plan for OPIC q 2 weeks for IV diuresis, if this is unsuccessful, he will have to make a decision about dialysis. Appreciate Nephrology following. Past Medical History:  
Diagnosis Date  CAD (coronary artery disease) s/p CABG 2008  Carotid arterial disease ([address]  CKD (chronic kidney disease) stage 3, GFR 30-59 ml/min ([facility]) 10/21/2017  
 hypertension and DM nephrosclerosis  Diabetes mellitus, type 2 ([address]  Hypercholesteremia  Hypertension  Paroxysmal atrial fibrillation ([address] 10/21/2017  
 new onset afib with BRPR 10-19-17 admit  PVC's (premature ventricular contractions) 5/26/2014  PVD (peripheral vascular disease) ([address] Social History Tobacco Use  Smoking status: Former Smoker Packs/day: 3.00 Years: 20.00 Pack years: 60.00 Types: Cigarettes Last attempt to quit: 1985 Years since quittin.9  Smokeless tobacco: Never Used Substance Use Topics  Alcohol use: No  
 Drug use: No  
  
  
 
Review of Systems:   
[] Patient unable to provide secondary to condition 
 
[x] All systems negative, except as checked below. Constitutional:   
[]Weight Change  []Fever   []Chills   []Night Sweats  []Fatigue  []Malaise  []____ 
ENT/Mouth:    
[]Hearing Changes  []Ear Pain  []Nasal Congestion   []Sinus Pain  []Hoarseness []Sore throat  []Rhinorrhea  []Swallowing Difficulty  []____ Eyes:   
[]Eye Pain  []Swelling  []Redness  []Foreign Body  []Discharge  []Vision Changes  []____ Cardiovascular:   
[]Chest Pain  [x]SOB  []PND  []AGUILAR  []Orthopnea  []Claudication  [x]Edema  
[]Palpitations  []____ Respiratory:   
[]Cough  []Sputum  []Wheezing,  []SOB  []Hemoptysis  []____ Gastrointestinal:   
[]Nausea  []Vomiting  []Diarrhea  []Constipation  []Pain  []Heartburn  []Anorexia []Dysphagia  []Hematochezia  []Melena,  []Jaundice  []____ Genitourinary:   
[]Dysuria  []Urinary Frequency  []Hematuria  []Urinary Incontinence  []Urgency []Flank Pain  []Hesitancy  []____ Musculoskeletal:   
[]Arthralgias  []Myalgias  []Joint Swelling  []Joint Stiffness  []Back Pain  []Neck Pain  []____ Skin:   
[]Skin Lesions  []Pruritis  []Hair Changes  []Skin rashes  []____ Neuro:   
[]Weakness  []Numbness  []Paresthesias  []Loss of Consciousness  []Syncope  
[]Dizziness  []Headache  []Coordination Changes  []Recent Falls  []____ Psych:   
[]Anxiety/Depression  []Insomnia  []Memory Changes  []Violence/Abuse Hx.  []____ Heme/Lymph:   
[]Bruising  []Bleeding  []Lymphadenopathy  []____ Endocrine:   
[]Polyuria  []Polydipsia  []Temperature Intolerance  []____ Objective: 
 
 Physical Exam: 
             
Visit Vitals /69 (BP 1 Location: Right arm, BP Patient Position: Sitting) Pulse 79 Temp 97.6 °F (36.4 °C) Resp 18 Ht 5' 9\" (1.753 m) Wt 179 lb 3.7 oz (81.3 kg) SpO2 98% BMI 26.47 kg/m² General Appearance:   Well developed, well nourished,alert and oriented x 3, and 
 individual in no acute distress. Ears/Nose/Mouth/Throat:    Hearing grossly normal. 
  
    Neck:  Supple. Chest:    Lungs less crackles in bases to auscultation bilaterally. Mid to end exp wheezes Cardiovascular:   Regular rate and rhythm, S1, S2 normal, no murmur. Abdomen:    Soft, non-tender, bowel sounds are active. Extremities:  1-2+ edema bilaterally. Skin:  Warm and dry. Telemetry: normal sinus rhythm Data Review:  
 Labs:   
Recent Results (from the past 24 hour(s)) GLUCOSE, POC Collection Time: 12/04/19 11:58 AM  
Result Value Ref Range Glucose (POC) 245 (H) 65 - 100 mg/dL Performed by Tristen Garcia GLUCOSE, POC Collection Time: 12/04/19  4:51 PM  
Result Value Ref Range Glucose (POC) 235 (H) 65 - 100 mg/dL Performed by [id number] RUFFIN(CON) GLUCOSE, POC Collection Time: 12/04/19  9:57 PM  
Result Value Ref Range Glucose (POC) 175 (H) 65 - 100 mg/dL Performed by Deneice Soumya PCT   
GLUCOSE, POC Collection Time: 12/05/19 12:02 AM  
Result Value Ref Range Glucose (POC) 183 (H) 65 - 100 mg/dL Performed by [employer] Soumya PCT   
GLUCOSE, POC Collection Time: 12/05/19  7:18 AM  
Result Value Ref Range Glucose (POC) 218 (H) 65 - 100 mg/dL Performed by [employer] [facility]   
CBC WITH AUTOMATED DIFF Collection Time: 12/05/19 10:02 AM  
Result Value Ref Range WBC 6.3 4.1 - 11.1 K/uL  
 RBC 2.74 (L) 4.10 - 5.70 M/uL HGB 7.1 (L) 12.1 - 17.0 g/dL HCT 23.2 (L) 36.6 - 50.3 % MCV 84.7 80.0 - 99.0 FL  
 MCH 25.9 (L) 26.0 - 34.0 PG  
 MCHC 30.6 30.0 - 36.5 g/dL  
 RDW 16.7 (H) 11.5 - 14.5 % PLATELET 955 721 - 631 K/uL MPV 11.2 8.9 - 12.9 FL  
 NRBC 0.0 0  WBC  ABSOLUTE NRBC 0.00 0.00 - 0.01 K/uL NEUTROPHILS 78 (H) 32 - 75 % LYMPHOCYTES 6 (L) 12 - 49 % MONOCYTES 12 5 - 13 % EOSINOPHILS 3 0 - 7 % BASOPHILS 0 0 - 1 % IMMATURE GRANULOCYTES 1 (H) 0.0 - 0.5 % ABS. NEUTROPHILS 4.9 1.8 - 8.0 K/UL  
 ABS. LYMPHOCYTES 0.4 (L) 0.8 - 3.5 K/UL  
 ABS. MONOCYTES 0.8 0.0 - 1.0 K/UL  
 ABS. EOSINOPHILS 0.2 0.0 - 0.4 K/UL  
 ABS. BASOPHILS 0.0 0.0 - 0.1 K/UL  
 ABS. IMM. GRANS. 0.0 0.00 - 0.04 K/UL  
 DF AUTOMATED MAGNESIUM Collection Time: 12/05/19 10:02 AM  
Result Value Ref Range Magnesium 2.1 1.6 - 2.4 mg/dL METABOLIC PANEL, BASIC Collection Time: 12/05/19 10:02 AM  
Result Value Ref Range Sodium 132 (L) 136 - 145 mmol/L Potassium 3.5 3.5 - 5.1 mmol/L Chloride 92 (L) 97 - 108 mmol/L  
 CO2 36 (H) 21 - 32 mmol/L Anion gap 4 (L) 5 - 15 mmol/L Glucose 218 (H) 65 - 100 mg/dL BUN 62 (H) 6 - 20 MG/DL Creatinine 2.61 (H) 0.70 - 1.30 MG/DL  
 BUN/Creatinine ratio 24 (H) 12 - 20 GFR est AA 29 (L) >60 ml/min/1.73m2 GFR est non-AA 24 (L) >60 ml/min/1.73m2 Calcium 7.9 (L) 8.5 - 10.1 MG/DL  
GLUCOSE, POC Collection Time: 12/05/19 11:26 AM  
Result Value Ref Range Glucose (POC) 234 (H) 65 - 100 mg/dL Performed by Jasmyne Baca Radiology:  
 
  
Current Facility-Administered Medications Medication Dose Route Frequency  sodium bicarbonate tablet 650 mg  650 mg Oral TID  epoetin dm-epbx (RETACRIT) injection 20,000 Units  20,000 Units SubCUTAneous Q7D  
 sodium chloride (NS) flush 5-40 mL  5-40 mL IntraVENous Q8H  
 sodium chloride (NS) flush 5-40 mL  5-40 mL IntraVENous PRN  
 acetaminophen (TYLENOL) tablet 650 mg  650 mg Oral Q4H PRN  
 heparin (porcine) injection 5,000 Units  5,000 Units SubCUTAneous Q8H  
 aspirin chewable tablet 81 mg  81 mg Oral DAILY  carvedilol (COREG) tablet 12.5 mg  12.5 mg Oral BID WITH MEALS  ferrous sulfate tablet 325 mg  325 mg Oral ACB  hydrALAZINE (APRESOLINE) tablet 25 mg  25 mg Oral TID  simvastatin (ZOCOR) tablet 20 mg  20 mg Oral QHS  tamsulosin (FLOMAX) capsule 0.4 mg  0.4 mg Oral DAILY  bumetanide (BUMEX) injection 3 mg  3 mg IntraVENous Q12H  
 insulin lispro (HUMALOG) injection   SubCUTAneous AC&HS  
 glucose chewable tablet 16 g  4 Tab Oral PRN  
 glucagon (GLUCAGEN) injection 1 mg  1 mg IntraMUSCular PRN  
 dextrose 10% infusion 0-250 mL  0-250 mL IntraVENous PRN  
 albuterol (PROVENTIL VENTOLIN) nebulizer solution 2.5 mg  2.5 mg Nebulization Q4H PRN Pj Riddle. MARGARITA Zapata MD 
 
 [facility], [address] [facility] 
 [phone]

## 2019-12-05 NOTE — PROGRESS NOTES
Bedside and Verbal shift change report given to Gisselle (oncoming nurse) by Shay Ospina (offgoing nurse). Report included the following information SBAR, MAR and Med Rec Status.

## 2019-12-05 NOTE — PROGRESS NOTES
[facility] 
 [address] [facility], [address] [place] [address] Phone: [phone] NOTE Patient: Galen Grover. MRN: [id number]  PCP: Marina Simpson MD  
:     1935  Age:   81 y.o. Sex:  male Referring physician: Ekta eLe MD 
Reason for consultation: 81 y.o. male with CHF exacerbation ([address] [M67.8] complicated by KATALINA Admission Date: 12/3/2019  8:24 AM  LOS: 2 days ASSESSMENT and PLAN :  
CKD IV: 
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 
- baseline Cr at best : 2.7- 3 mg/dl when euvolemic - UPCR showed 11 gm of Protein on last check - Bun/creat not greatly changed with IV diuresis:  62/2.6 
- pt needs stricter restrictions of Na and fluids: ordered. 
-Daily labs Volume Overload: 
-She is secondary to chronic kidney disease and nephrosis -Likely needs intermittent IV diuresis at the ambulatory diuretic center 
-continue loop diuretics 
-metolazone 5 mg given once on . Would try to avoid this: Na down to 132. Hyponatremia 
-due to volume overload and metolazone Acute hypoxic respiratory failure 
-Injury to pulmonary edema 
-Has underlying COPD 
-CT chest on admission suggest possible pulmonary fibrosis airspace disease 
-History of right-sided pneumonia in August 
Pt feeling much better on  Metabolic acidosis 
-Is now alkalotic 
-Reduced his oral bicarb to 650 mg 3 times daily HTN : 
- meds being adjusted by cards Pulm HTN: 
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  
  
Bradycardia : 
- s/p PPM on  
  
Anemia of CKD: 
-Worsening anemia likely secondary to CKD 
-He has been off MICHAEL for a year 
-EPO reordered, but iron stores very low. - IV iron ordered. 
  
Chronic A. fib Chronic diastolic congestive heart failure CAD s/p CABG 
  
DM2: 
- on insulin Care Plan discussed with: Patient Thank you for consulting [address] [place] [address] [facility] in the care of your patient. Subjective: HPI: Darrick Rolon is a 81 y.o.  male who has been admitted to the hospital for worsening shortness of breath and dyspnea on exertion. He has been followed by home health nurse who has noticed weight gain in the past week. His Bumex dose was increased a week ago but is edema did not improve and neither his shortness of breath which prompted this hospitalization. On admission his renal function was noted to be at his baseline. He has been diuresed with IV Bumex with only modest improvement in his edema and shortness of breath. Nephrology has been consulted for management of his chronic kidney disease as well as his fluid overload Mr. Kait Cavanaugh has a longstanding progressive chronic kidney disease secondary to diabetes and hypertension and is followed by Dr. William Raymond in our office. His baseline creatinine has been 2.7 to 3 mg/dL when he is euvolemic. He denies any recent change in his urination he denies any recent changes in his diet and he also denies any chronic NSAID use prior to this admission On 12/5: pt feeling much better. Says his SOB and LE edema are much improved. Denies chest pain or any other complaints. Past Medical Hx:  
Past Medical History:  
Diagnosis Date  CAD (coronary artery disease) s/p CABG 2008  Carotid arterial disease ([facility] [address]  CKD (chronic kidney disease) stage 3, GFR 30-59 ml/min ([facility]) 10/21/2017  
 hypertension and DM nephrosclerosis  Diabetes mellitus, type 2 ([address]  Hypercholesteremia  Hypertension  Paroxysmal atrial fibrillation ([facility] [address] 10/21/2017  
 new onset afib with BRPR 10-19-17 admit  PVC's (premature ventricular contractions) 5/26/2014  PVD (peripheral vascular disease) ([facility] [address] Past Surgical Hx: 
  
Past Surgical History:  
Procedure Laterality Date  HX CORONARY ARTERY BYPASS GRAFT    
 HX HEART CATHETERIZATION    
 TX TCAT INSJ/RPL PERM LEADLESS PACEMAKER RV W/IMG N/A 5/9/2019  INSERT OR REPLACE TRANSCATH PPM LEADLESS performed by Paula Purdy MD at [address], [facility] Allergies Allergen Reactions  Lisinopril Cough Social Hx:  reports that he quit smoking about 34 years ago. His smoking use included cigarettes. He has a 60.00 pack-year smoking history. He has never used smokeless tobacco. He reports that he does not drink alcohol or use drugs. History reviewed. No pertinent family history. Review of Systems: A thorough twelve point review of system was performed today. Pertinent positives and negatives are mentioned in the HPI. The reminder of the ROS is negative and noncontributory. Objective:   
Vitals:   
Vitals:  
 12/05/19 0030 12/05/19 2431 12/05/19 4179 12/05/19 1223 BP: 156/68 158/68 159/69 153/70 Pulse: 78 77 79 78 Resp: 20 20 18 18 Temp: 97.5 °F (36.4 °C) 98.1 °F (36.7 °C) 97.6 °F (36.4 °C) 98.4 °F (36.9 °C) SpO2: 94% 97% 98% 96% Weight:  81.3 kg (179 lb 3.7 oz) Height:      
 
I&O's:  12/04 0701 - 12/05 0700 In: 1080 [P.O.:1080] Out: 2150 [Urine:2150] Visit Vitals /70 (BP 1 Location: Right arm, BP Patient Position: Sitting) Pulse 78 Temp 98.4 °F (36.9 °C) Resp 18 Ht 5' 9\" (1.753 m) Wt 81.3 kg (179 lb 3.7 oz) SpO2 96% BMI 26.47 kg/m² Physical Exam: 
General: Frail but in no acute distress HEENT: EOMI,  ++ Pallor , No Icterus Neck: Supple,no mass palpable Lungs :   Diffuse rales; no resp distress CVS: irregular, S1 S2 normal, systolic murmur Abdomen: Soft, NT, BS + Extremities: 1-2+ Edema Skin: No rash or lesions. MS: No joint swelling, erythema, warmth Neurologic: non focal, AAO x 3 Psych: normal affect Laboratory Results: 
 
Recent Labs 12/05/19 
1002 12/04/19 
0328 12/03/19 
1674 * 134* 135* K 3.5 3.8 3.0*  
CL 92* 97 95* CO2 36* 32 33* * 120* 85 BUN 62* 59* 61* CREA 2.61* 2.54* 2.58* CA 7.9* 8.0* 8.2* MG 2.1  --  2.2 ALB  --   --  2.0*  
SGOT  --   --  43* ALT  --   --  42 Recent Labs 12/05/19 
1002 12/04/19 
0328 12/03/19 
7300 WBC 6.3 6.3 5.8 HGB 7.1* 7.4* 7.8* HCT 23.2* 24.4* 25.2*  
 164 166 No results found for: SDES Lab Results Component Value Date/Time Culture result: NORMAL RESPIRATORY JUDE SO FAR 12/04/2019 03:27 AM  
 Culture result: NO GROWTH 5 DAYS 08/27/2019 05:55 PM  
 Culture result: NO GROWTH 5 DAYS 08/05/2019 10:46 AM  
 
Recent Results (from the past 24 hour(s)) GLUCOSE, POC Collection Time: 12/04/19  4:51 PM  
Result Value Ref Range Glucose (POC) 235 (H) 65 - 100 mg/dL Performed by JOANN RUFFIN([facility]) GLUCOSE, POC Collection Time: 12/04/19  9:57 PM  
Result Value Ref Range Glucose (POC) 175 (H) 65 - 100 mg/dL [facility]   
GLUCOSE, POC Collection Time: 12/05/19 12:02 AM  
Result Value Ref Range Glucose (POC) 183 (H) 65 - 100 mg/dL Performed by [facility]   
GLUCOSE, POC Collection Time: 12/05/19  7:18 AM  
Result Value Ref Range Glucose (POC) 218 (H) 65 - 100 mg/dL Performed by Angelina Mariah PCT   
CBC WITH AUTOMATED DIFF Collection Time: 12/05/19 10:02 AM  
Result Value Ref Range WBC 6.3 4.1 - 11.1 K/uL  
 RBC 2.74 (L) 4.10 - 5.70 M/uL HGB 7.1 (L) 12.1 - 17.0 g/dL HCT 23.2 (L) 36.6 - 50.3 % MCV 84.7 80.0 - 99.0 FL  
 MCH 25.9 (L) 26.0 - 34.0 PG  
 MCHC 30.6 30.0 - 36.5 g/dL  
 RDW 16.7 (H) 11.5 - 14.5 % PLATELET 262 351 - 057 K/uL MPV 11.2 8.9 - 12.9 FL  
 NRBC 0.0 0  WBC ABSOLUTE NRBC 0.00 0.00 - 0.01 K/uL NEUTROPHILS 78 (H) 32 - 75 % LYMPHOCYTES 6 (L) 12 - 49 % MONOCYTES 12 5 - 13 % EOSINOPHILS 3 0 - 7 % BASOPHILS 0 0 - 1 % IMMATURE GRANULOCYTES 1 (H) 0.0 - 0.5 % ABS. NEUTROPHILS 4.9 1.8 - 8.0 K/UL  
 ABS. LYMPHOCYTES 0.4 (L) 0.8 - 3.5 K/UL  
 ABS. MONOCYTES 0.8 0.0 - 1.0 K/UL  
 ABS. EOSINOPHILS 0.2 0.0 - 0.4 K/UL  
 ABS. BASOPHILS 0.0 0.0 - 0.1 K/UL  
 ABS. IMM.  GRANS. 0.0 0.00 - 0.04 K/UL  
 DF AUTOMATED MAGNESIUM Collection Time: 12/05/19 10:02 AM  
Result Value Ref Range Magnesium 2.1 1.6 - 2.4 mg/dL METABOLIC PANEL, BASIC Collection Time: 12/05/19 10:02 AM  
Result Value Ref Range Sodium 132 (L) 136 - 145 mmol/L Potassium 3.5 3.5 - 5.1 mmol/L Chloride 92 (L) 97 - 108 mmol/L  
 CO2 36 (H) 21 - 32 mmol/L Anion gap 4 (L) 5 - 15 mmol/L Glucose 218 (H) 65 - 100 mg/dL BUN 62 (H) 6 - 20 MG/DL Creatinine 2.61 (H) 0.70 - 1.30 MG/DL  
 BUN/Creatinine ratio 24 (H) 12 - 20 GFR est AA 29 (L) >60 ml/min/1.73m2 GFR est non-AA 24 (L) >60 ml/min/1.73m2 Calcium 7.9 (L) 8.5 - 10.1 MG/DL  
GLUCOSE, POC Collection Time: 12/05/19 11:26 AM  
Result Value Ref Range Glucose (POC) 234 (H) 65 - 100 mg/dL Performed by Yane Malagon Urine dipstick:  
Lab Results Component Value Date/Time Color YELLOW/STRAW 08/27/2019 12:56 PM  
 Appearance CLEAR 08/27/2019 12:56 PM  
 Specific gravity 1.023 08/27/2019 12:56 PM  
 pH (UA) 6.0 08/27/2019 12:56 PM  
 Protein >300 (A) 08/27/2019 12:56 PM  
 Glucose 500 (A) 08/27/2019 12:56 PM  
 Ketone NEGATIVE  08/27/2019 12:56 PM  
 Bilirubin NEGATIVE  08/27/2019 12:56 PM  
 Urobilinogen 0.2 08/27/2019 12:56 PM  
 Nitrites NEGATIVE  08/27/2019 12:56 PM  
 Leukocyte Esterase NEGATIVE  08/27/2019 12:56 PM  
 Epithelial cells FEW 08/27/2019 12:56 PM  
 Bacteria NEGATIVE  08/27/2019 12:56 PM  
 WBC 0-4 08/27/2019 12:56 PM  
 RBC 5-10 08/27/2019 12:56 PM  
 
 
I have reviewed the following: All pertinent labs, microbiology data, radiology imaging for my assessment Medications list Personally Reviewed   [x]      Yes     []               No    
 
Medications: 
Prior to Admission medications Medication Sig Start Date End Date Taking? Authorizing Provider  
ferrous sulfate (IRON) 325 mg (65 mg iron) tablet Take 325 mg by mouth Daily (before breakfast).    Yes Provider, Historical  
hydrALAZINE (APRESOLINE) 25 mg tablet Take 25 mg by mouth three (3) times daily. Yes Provider, Historical  
bumetanide (BUMEX) 1 mg tablet Take  by mouth two (2) times a day. Take 3 mg in the morning and 2 mg in the evening as directed. Yes Provider, Historical  
carvedilol (COREG) 12.5 mg tablet Take 12.5 mg by mouth two (2) times daily (with meals). Yes Provider, Historical  
simvastatin (ZOCOR) 20 mg tablet Take 1 Tab by mouth nightly. 11/21/19  Yes Tina Gregory MD  
insulin glargine (LANTUS U-100 INSULIN) 100 unit/mL injection 5 Units by SubCUTAneous route every morning. Prescribed 18 units daily. Reports taking 10 units in the morning, then up to 8 units in the evening depending on his blood sugars. If blood sugar is <200, he reduces his evening dose. 8/28/19  Yes Martinez Ferrer MD  
albuterol (PROVENTIL VENTOLIN) 2.5 mg /3 mL (0.083 %) nebulizer solution 3 mL by Nebulization route every four (4) hours as needed for Wheezing. 5/14/19  Yes Roxanna Thorpe MD  
sodium bicarbonate 650 mg tablet Take 2 Tabs by mouth three (3) times daily. 5/12/19  Yes Keya Sandoval, DO  
tamsulosin (FLOMAX) 0.4 mg capsule Take 1 Cap by mouth daily. 5/13/19  Yes Keya Sandoval, DO  
aspirin 81 mg chewable tablet Take 1 Tab by mouth daily. 9/27/18  Yes Francy Stevens MD  
omega 1-gxl-dhu-fish oil (FISH OIL) 100-160-1,000 mg cap Take 1 Cap by mouth two (2) times a day. Yes Provider, Historical  
cinnamon bark (CINNAMON) 500 mg cap Take 1,000 mg by mouth two (2) times a day. Yes Provider, Historical  
  
 
Thank you for allowing us to participate in the care of this patient. We will follow patient. Please dont hesitate to call with any questions [address] [facility] [address] [facility], [address] [facility] Phone - [phone] Fax - [phone] 
[email or web address]

## 2019-12-05 NOTE — PROGRESS NOTES
Problem: Diabetes Self-Management Goal: *Disease process and treatment process Description Define diabetes and identify own type of diabetes; list 3 options for treating diabetes. Outcome: Progressing Towards Goal 
Goal: *Incorporating nutritional management into lifestyle Description Describe effect of type, amount and timing of food on blood glucose; list 3 methods for planning meals. Outcome: Progressing Towards Goal 
Goal: *Incorporating physical activity into lifestyle Description State effect of exercise on blood glucose levels. Outcome: Progressing Towards Goal 
Goal: *Developing strategies to promote health/change behavior Description Define the ABC's of diabetes; identify appropriate screenings, schedule and personal plan for screenings. Outcome: Progressing Towards Goal 
Goal: *Using medications safely Description State effect of diabetes medications on diabetes; name diabetes medication taking, action and side effects. Outcome: Progressing Towards Goal 
Goal: *Monitoring blood glucose, interpreting and using results Description Identify recommended blood glucose targets  and personal targets. Outcome: Progressing Towards Goal 
Goal: *Prevention, detection, treatment of acute complications Description List symptoms of hyper- and hypoglycemia; describe how to treat low blood sugar and actions for lowering  high blood glucose level. Outcome: Progressing Towards Goal 
Goal: *Prevention, detection and treatment of chronic complications Description Define the natural course of diabetes and describe the relationship of blood glucose levels to long term complications of diabetes. Outcome: Progressing Towards Goal 
Goal: *Developing strategies to address psychosocial issues Description Describe feelings about living with diabetes; identify support needed and support network Outcome: Progressing Towards Goal 
  
Problem: Falls - Risk of 
Goal: *Absence of Falls Description Document Tricia Fall Risk and appropriate interventions in the flowsheet. Outcome: Progressing Towards Goal 
Note: Fall Risk Interventions: 
Mobility Interventions: OT consult for ADLs, Patient to call before getting OOB, PT Consult for mobility concerns, PT Consult for assist device competence, Communicate number of staff needed for ambulation/transfer Medication Interventions: Patient to call before getting OOB, Teach patient to arise slowly, Evaluate medications/consider consulting pharmacy Elimination Interventions: Call light in reach, Patient to call for help with toileting needs, Stay With Me (per policy), Urinal in reach Problem: Pressure Injury - Risk of 
Goal: *Prevention of pressure injury Description Document Fabio Scale and appropriate interventions in the flowsheet. Outcome: Progressing Towards Goal 
Note: Pressure Injury Interventions: 
Sensory Interventions: Assess changes in LOC Moisture Interventions: Absorbent underpads, Apply protective barrier, creams and emollients, Maintain skin hydration (lotion/cream) Activity Interventions: Increase time out of bed, PT/OT evaluation Mobility Interventions: HOB 30 degrees or less, PT/OT evaluation Nutrition Interventions: Document food/fluid/supplement intake Friction and Shear Interventions: Apply protective barrier, creams and emollients, HOB 30 degrees or less, Lift sheet Problem: Heart Failure: Day 2 Goal: Activity/Safety Outcome: Progressing Towards Goal 
Goal: Consults, if ordered Outcome: Progressing Towards Goal 
Goal: Diagnostic Test/Procedures Outcome: Progressing Towards Goal 
Goal: Nutrition/Diet Outcome: Progressing Towards Goal 
Goal: Discharge Planning Outcome: Progressing Towards Goal 
Goal: Medications Outcome: Progressing Towards Goal 
Goal: Respiratory Outcome: Progressing Towards Goal 
Goal: Treatments/Interventions/Procedures Outcome: Progressing Towards Goal 
Goal: Psychosocial Outcome: Progressing Towards Goal 
Goal: *Oxygen saturation within defined limits Outcome: Progressing Towards Goal 
Goal: *Hemodynamically stable Outcome: Progressing Towards Goal 
Goal: *Optimal pain control at patient's stated goal 
Outcome: Progressing Towards Goal 
Goal: *Anxiety reduced or absent Outcome: Progressing Towards Goal 
Goal: *Demonstrates progressive activity Outcome: Progressing Towards Goal

## 2019-12-06 ENCOUNTER — APPOINTMENT (OUTPATIENT)
Dept: NON INVASIVE DIAGNOSTICS | Age: 84
DRG: 291 | End: 2019-12-06
Attending: PHYSICIAN ASSISTANT
Payer: MEDICARE

## 2019-12-06 LAB
ALBUMIN SERPL-MCNC: 1.8 G/DL (ref 3.5–5)
ANION GAP SERPL CALC-SCNC: 4 MMOL/L (ref 5–15)
BUN SERPL-MCNC: 60 MG/DL (ref 6–20)
BUN/CREAT SERPL: 24 (ref 12–20)
CALCIUM SERPL-MCNC: 7.9 MG/DL (ref 8.5–10.1)
CHLORIDE SERPL-SCNC: 91 MMOL/L (ref 97–108)
CO2 SERPL-SCNC: 38 MMOL/L (ref 21–32)
CREAT SERPL-MCNC: 2.46 MG/DL (ref 0.7–1.3)
ECHO LV EDV A2C: 87.9 ML
ECHO LV EDV A4C: 80.6 ML
ECHO LV EDV BP: 83.9 ML (ref 67–155)
ECHO LV EDV INDEX A4C: 41.2 ML/M2
ECHO LV EDV INDEX BP: 42.9 ML/M2
ECHO LV EDV NDEX A2C: 44.9 ML/M2
ECHO LV EJECTION FRACTION A2C: 53 %
ECHO LV EJECTION FRACTION A4C: 43 %
ECHO LV EJECTION FRACTION BIPLANE: 46.7 % (ref 55–100)
ECHO LV ESV A2C: 41.6 ML
ECHO LV ESV A4C: 46.3 ML
ECHO LV ESV BP: 44.7 ML (ref 22–58)
ECHO LV ESV INDEX A2C: 21.3 ML/M2
ECHO LV ESV INDEX A4C: 23.7 ML/M2
ECHO LV ESV INDEX BP: 22.8 ML/M2
ECHO LV INTERNAL DIMENSION DIASTOLIC: 4.7 CM (ref 4.2–5.9)
ECHO LV INTERNAL DIMENSION SYSTOLIC: 3.22 CM
ECHO LV IVSD: 1.09 CM (ref 0.6–1)
ECHO LV MASS 2D: 216.1 G (ref 88–224)
ECHO LV MASS INDEX 2D: 110.4 G/M2 (ref 49–115)
ECHO LV POSTERIOR WALL DIASTOLIC: 1.08 CM (ref 0.6–1)
ECHO RV INTERNAL DIMENSION: 6.23 CM
ERYTHROCYTE [DISTWIDTH] IN BLOOD BY AUTOMATED COUNT: 16.8 % (ref 11.5–14.5)
GLUCOSE BLD STRIP.AUTO-MCNC: 166 MG/DL (ref 65–100)
GLUCOSE BLD STRIP.AUTO-MCNC: 167 MG/DL (ref 65–100)
GLUCOSE BLD STRIP.AUTO-MCNC: 244 MG/DL (ref 65–100)
GLUCOSE BLD STRIP.AUTO-MCNC: 279 MG/DL (ref 65–100)
GLUCOSE SERPL-MCNC: 171 MG/DL (ref 65–100)
HCT VFR BLD AUTO: 23.8 % (ref 36.6–50.3)
HGB BLD-MCNC: 7.4 G/DL (ref 12.1–17)
LVFS 2D: 31.54 %
MCH RBC QN AUTO: 26.1 PG (ref 26–34)
MCHC RBC AUTO-ENTMCNC: 31.1 G/DL (ref 30–36.5)
MCV RBC AUTO: 84.1 FL (ref 80–99)
NRBC # BLD: 0 K/UL (ref 0–0.01)
NRBC BLD-RTO: 0 PER 100 WBC
PHOSPHATE SERPL-MCNC: 2.9 MG/DL (ref 2.6–4.7)
PLATELET # BLD AUTO: 171 K/UL (ref 150–400)
PMV BLD AUTO: 12 FL (ref 8.9–12.9)
POTASSIUM SERPL-SCNC: 3.4 MMOL/L (ref 3.5–5.1)
RBC # BLD AUTO: 2.83 M/UL (ref 4.1–5.7)
SERVICE CMNT-IMP: ABNORMAL
SODIUM SERPL-SCNC: 133 MMOL/L (ref 136–145)
WBC # BLD AUTO: 6.7 K/UL (ref 4.1–11.1)

## 2019-12-06 PROCEDURE — 74011250636 HC RX REV CODE- 250/636: Performed by: INTERNAL MEDICINE

## 2019-12-06 PROCEDURE — 94760 N-INVAS EAR/PLS OXIMETRY 1: CPT

## 2019-12-06 PROCEDURE — 65660000000 HC RM CCU STEPDOWN

## 2019-12-06 PROCEDURE — 94640 AIRWAY INHALATION TREATMENT: CPT

## 2019-12-06 PROCEDURE — 74011250637 HC RX REV CODE- 250/637: Performed by: PHYSICIAN ASSISTANT

## 2019-12-06 PROCEDURE — 97535 SELF CARE MNGMENT TRAINING: CPT

## 2019-12-06 PROCEDURE — P9016 RBC LEUKOCYTES REDUCED: HCPCS

## 2019-12-06 PROCEDURE — 82962 GLUCOSE BLOOD TEST: CPT

## 2019-12-06 PROCEDURE — 85027 COMPLETE CBC AUTOMATED: CPT

## 2019-12-06 PROCEDURE — 74011000250 HC RX REV CODE- 250: Performed by: PHYSICIAN ASSISTANT

## 2019-12-06 PROCEDURE — 74011250637 HC RX REV CODE- 250/637: Performed by: INTERNAL MEDICINE

## 2019-12-06 PROCEDURE — 36430 TRANSFUSION BLD/BLD COMPNT: CPT

## 2019-12-06 PROCEDURE — 30233N1 TRANSFUSION OF NONAUTOLOGOUS RED BLOOD CELLS INTO PERIPHERAL VEIN, PERCUTANEOUS APPROACH: ICD-10-PCS | Performed by: HOSPITALIST

## 2019-12-06 PROCEDURE — 74011000250 HC RX REV CODE- 250: Performed by: INTERNAL MEDICINE

## 2019-12-06 PROCEDURE — 74011636637 HC RX REV CODE- 636/637: Performed by: INTERNAL MEDICINE

## 2019-12-06 PROCEDURE — 86923 COMPATIBILITY TEST ELECTRIC: CPT

## 2019-12-06 PROCEDURE — 74011250637 HC RX REV CODE- 250/637: Performed by: SPECIALIST

## 2019-12-06 PROCEDURE — 36415 COLL VENOUS BLD VENIPUNCTURE: CPT

## 2019-12-06 PROCEDURE — 77010033678 HC OXYGEN DAILY

## 2019-12-06 PROCEDURE — 86900 BLOOD TYPING SEROLOGIC ABO: CPT

## 2019-12-06 PROCEDURE — 93308 TTE F-UP OR LMTD: CPT

## 2019-12-06 PROCEDURE — 97116 GAIT TRAINING THERAPY: CPT

## 2019-12-06 PROCEDURE — 80069 RENAL FUNCTION PANEL: CPT

## 2019-12-06 PROCEDURE — 97530 THERAPEUTIC ACTIVITIES: CPT

## 2019-12-06 RX ORDER — ALBUTEROL SULFATE 0.83 MG/ML
2.5 SOLUTION RESPIRATORY (INHALATION)
Status: DISCONTINUED | OUTPATIENT
Start: 2019-12-07 | End: 2019-12-08

## 2019-12-06 RX ORDER — LANOLIN ALCOHOL/MO/W.PET/CERES
1 CREAM (GRAM) TOPICAL 2 TIMES DAILY WITH MEALS
Status: DISCONTINUED | OUTPATIENT
Start: 2019-12-07 | End: 2019-12-07

## 2019-12-06 RX ORDER — HYDRALAZINE HYDROCHLORIDE 50 MG/1
50 TABLET, FILM COATED ORAL 3 TIMES DAILY
Status: DISCONTINUED | OUTPATIENT
Start: 2019-12-06 | End: 2019-12-10 | Stop reason: HOSPADM

## 2019-12-06 RX ORDER — ISOSORBIDE DINITRATE 10 MG/1
20 TABLET ORAL 3 TIMES DAILY
Status: DISCONTINUED | OUTPATIENT
Start: 2019-12-06 | End: 2019-12-10 | Stop reason: HOSPADM

## 2019-12-06 RX ORDER — SODIUM CHLORIDE 9 MG/ML
250 INJECTION, SOLUTION INTRAVENOUS AS NEEDED
Status: DISCONTINUED | OUTPATIENT
Start: 2019-12-06 | End: 2019-12-10 | Stop reason: HOSPADM

## 2019-12-06 RX ORDER — POTASSIUM CHLORIDE 750 MG/1
40 TABLET, FILM COATED, EXTENDED RELEASE ORAL EVERY 4 HOURS
Status: COMPLETED | OUTPATIENT
Start: 2019-12-06 | End: 2019-12-06

## 2019-12-06 RX ORDER — HYDRALAZINE HYDROCHLORIDE 20 MG/ML
10 INJECTION INTRAMUSCULAR; INTRAVENOUS
Status: DISCONTINUED | OUTPATIENT
Start: 2019-12-06 | End: 2019-12-10 | Stop reason: HOSPADM

## 2019-12-06 RX ORDER — BUMETANIDE 0.25 MG/ML
1 INJECTION INTRAMUSCULAR; INTRAVENOUS AS NEEDED
Status: DISCONTINUED | OUTPATIENT
Start: 2019-12-06 | End: 2019-12-10 | Stop reason: HOSPADM

## 2019-12-06 RX ADMIN — ALBUTEROL SULFATE 2.5 MG: 2.5 SOLUTION RESPIRATORY (INHALATION) at 07:01

## 2019-12-06 RX ADMIN — GUAIFENESIN 400 MG: 200 SOLUTION ORAL at 23:14

## 2019-12-06 RX ADMIN — GUAIFENESIN 400 MG: 200 SOLUTION ORAL at 06:19

## 2019-12-06 RX ADMIN — ALBUTEROL SULFATE 2.5 MG: 2.5 SOLUTION RESPIRATORY (INHALATION) at 00:08

## 2019-12-06 RX ADMIN — GUAIFENESIN 400 MG: 200 SOLUTION ORAL at 11:27

## 2019-12-06 RX ADMIN — TAMSULOSIN HYDROCHLORIDE 0.4 MG: 0.4 CAPSULE ORAL at 09:50

## 2019-12-06 RX ADMIN — HYDRALAZINE HYDROCHLORIDE 50 MG: 50 TABLET ORAL at 21:02

## 2019-12-06 RX ADMIN — GUAIFENESIN 400 MG: 200 SOLUTION ORAL at 00:23

## 2019-12-06 RX ADMIN — ASPIRIN 81 MG 81 MG: 81 TABLET ORAL at 09:50

## 2019-12-06 RX ADMIN — EPOETIN ALFA-EPBX 20000 UNITS: 10000 INJECTION, SOLUTION INTRAVENOUS; SUBCUTANEOUS at 16:26

## 2019-12-06 RX ADMIN — HYDRALAZINE HYDROCHLORIDE 50 MG: 50 TABLET ORAL at 09:50

## 2019-12-06 RX ADMIN — POTASSIUM CHLORIDE 40 MEQ: 750 TABLET, FILM COATED, EXTENDED RELEASE ORAL at 09:53

## 2019-12-06 RX ADMIN — IRON SUCROSE 200 MG: 20 INJECTION, SOLUTION INTRAVENOUS at 11:27

## 2019-12-06 RX ADMIN — BUMETANIDE 3 MG: 0.25 INJECTION INTRAMUSCULAR; INTRAVENOUS at 09:50

## 2019-12-06 RX ADMIN — ALBUTEROL SULFATE 2.5 MG: 2.5 SOLUTION RESPIRATORY (INHALATION) at 16:08

## 2019-12-06 RX ADMIN — ALBUTEROL SULFATE 2.5 MG: 2.5 SOLUTION RESPIRATORY (INHALATION) at 03:29

## 2019-12-06 RX ADMIN — INSULIN LISPRO 2 UNITS: 100 INJECTION, SOLUTION INTRAVENOUS; SUBCUTANEOUS at 17:22

## 2019-12-06 RX ADMIN — INSULIN LISPRO 5 UNITS: 100 INJECTION, SOLUTION INTRAVENOUS; SUBCUTANEOUS at 12:34

## 2019-12-06 RX ADMIN — FERROUS SULFATE TAB 325 MG (65 MG ELEMENTAL FE) 325 MG: 325 (65 FE) TAB at 07:55

## 2019-12-06 RX ADMIN — ALBUTEROL SULFATE 2.5 MG: 2.5 SOLUTION RESPIRATORY (INHALATION) at 20:52

## 2019-12-06 RX ADMIN — CARVEDILOL 12.5 MG: 12.5 TABLET, FILM COATED ORAL at 16:25

## 2019-12-06 RX ADMIN — Medication 10 ML: at 06:19

## 2019-12-06 RX ADMIN — INSULIN LISPRO 2 UNITS: 100 INJECTION, SOLUTION INTRAVENOUS; SUBCUTANEOUS at 07:54

## 2019-12-06 RX ADMIN — SODIUM BICARBONATE 650 MG: 650 TABLET ORAL at 09:50

## 2019-12-06 RX ADMIN — POTASSIUM CHLORIDE 40 MEQ: 750 TABLET, FILM COATED, EXTENDED RELEASE ORAL at 14:15

## 2019-12-06 RX ADMIN — HEPARIN SODIUM 5000 UNITS: 5000 INJECTION INTRAVENOUS; SUBCUTANEOUS at 03:47

## 2019-12-06 RX ADMIN — Medication 10 ML: at 14:15

## 2019-12-06 RX ADMIN — ISOSORBIDE DINITRATE 20 MG: 20 TABLET ORAL at 21:02

## 2019-12-06 RX ADMIN — HEPARIN SODIUM 5000 UNITS: 5000 INJECTION INTRAVENOUS; SUBCUTANEOUS at 12:33

## 2019-12-06 RX ADMIN — Medication 10 ML: at 23:15

## 2019-12-06 RX ADMIN — HYDRALAZINE HYDROCHLORIDE 50 MG: 50 TABLET ORAL at 16:26

## 2019-12-06 RX ADMIN — HEPARIN SODIUM 5000 UNITS: 5000 INJECTION INTRAVENOUS; SUBCUTANEOUS at 20:52

## 2019-12-06 RX ADMIN — BUMETANIDE 3 MG: 0.25 INJECTION INTRAMUSCULAR; INTRAVENOUS at 20:53

## 2019-12-06 RX ADMIN — INSULIN LISPRO 2 UNITS: 100 INJECTION, SOLUTION INTRAVENOUS; SUBCUTANEOUS at 23:15

## 2019-12-06 RX ADMIN — CARVEDILOL 12.5 MG: 12.5 TABLET, FILM COATED ORAL at 09:50

## 2019-12-06 RX ADMIN — GUAIFENESIN 400 MG: 200 SOLUTION ORAL at 17:22

## 2019-12-06 RX ADMIN — SIMVASTATIN 20 MG: 20 TABLET, FILM COATED ORAL at 21:02

## 2019-12-06 RX ADMIN — ALBUTEROL SULFATE 2.5 MG: 2.5 SOLUTION RESPIRATORY (INHALATION) at 11:56

## 2019-12-06 NOTE — PROGRESS NOTES
Bedside and Verbal shift change report given to [address] (oncoming nurse) by Merced Bryant RN (offgoing nurse). Report included the following information SBAR, Intake/Output and Cardiac Rhythm NSR w/ 1st AVB, BBB.

## 2019-12-06 NOTE — PROGRESS NOTES
A Spiritual Care Partner Volunteer visited patient in Rm [id number] on 12/06/2019. Documented by: 
Chaplain Campoverde MDiv, MS, [place] [address] PRAY ([id number])

## 2019-12-06 NOTE — PROGRESS NOTES
Problem: Self Care Deficits Care Plan (Adult) Goal: *Acute Goals and Plan of Care (Insert Text) Description FUNCTIONAL STATUS PRIOR TO ADMISSION: Patient was modified independent using a rolling walker for functional mobility. Limited activity level during the day and frequently sits up in chair for 8-10 hours per his report. HOME SUPPORT: The patient lived with wife but did not require assist for basic ADLs, wife takes care of all iADLs. Occupational Therapy Goals Initiated 12/4/2019 1. Patient will perform grooming with modified independence within 7 day(s). 2.  Patient will perform upper body dressing with supervision/set-up within 7 day(s). 3.  Patient will perform lower body dressing with minimal assistance/contact guard assist within 7 day(s). 4.  Patient will perform toilet transfers with supervision/set-up within 7 day(s). 5.  Patient will perform all aspects of toileting with minimal assistance/contact guard assist within 7 day(s). 6.  Patient will utilize energy conservation techniques during functional activities with verbal cues within 7 day(s). Outcome: Progressing Towards Goal 
 OCCUPATIONAL THERAPY TREATMENT Patient: Nithya Anderson (81 y.o. male) Date: 12/6/2019 Diagnosis: CHF exacerbation ([facility] [address] [I50.9] <principal problem not specified> Precautions:   
Chart, occupational therapy assessment, plan of care, and goals were reviewed. ASSESSMENT Patient continues with skilled OT services and is progressing towards goals. Patient seen in co treat with PT to maximize mobility and endurance as well as for timing as nurse reporting plan for transfusion of 2 units this afternoon. Participation impacted by edema bilateral LEs, impaired strength and endurance, low Hgb of 7.4, impaired reach to LEs. Wife present for session.    
 
Current Level of Function Impacting Discharge (ADLs): LE self care with set up to total assist, UE self care with set up, functional transfer with RW and CGA Other factors to consider for [facility] PLAN : 
Patient continues to benefit from skilled intervention to address the above impairments. Continue treatment per established plan of care. to address goals. Recommend with staff: Kristin Walton in chair for meals and self care, CGA for functional transfers with RW in bathroom, add BSC over toilet for increased height PRN Recommend next OT session: LE self care Recommendation for discharge: (in order for the patient to meet his/her long term goals) Occupational therapy at least 2 days/week in the home AND ensure assist and/or supervision for safety with mobility with RW This discharge recommendation: 
Has been made in collaboration with the attending provider and/or case management IF patient discharges home will need the following DME: none SUBJECTIVE:  
Patient stated I want to get up.  OBJECTIVE DATA SUMMARY:  
Cognitive/Behavioral Status: 
Neurologic State: Alert Orientation Level: Oriented X4 Cognition: Appropriate decision making; Appropriate for age attention/concentration; Follows commands Perception: Appears intact Perseveration: No perseveration noted Safety/Judgement: Awareness of environment Functional Mobility and Transfers for ADLs: 
Bed Mobility: 
Supine to Sit: Supervision;Bed Modified Transfers: 
Sit to Stand: Contact guard assistance;Assist x1 Balance: 
Sitting: Intact; Without support Standing: With support Standing - Static: Good Standing - Dynamic : Fair ADL Intervention: 
  
 
Grooming Position Performed: Standing Washing Face: Contact guard assistance Washing Hands: Contact guard assistance Lower Body Dressing Assistance Socks: Total assistance (dependent) Shoes with Cloth Laces: Total assistance (dependent) Leg Crossed Method Used: No 
Position Performed: Bending forward method;Seated edge of bed;Standing Adaptive Equipment Used: Kailash Bee Cognitive Retraining Safety/Judgement: Awareness of environment Activity Tolerance:  
Fair Please refer to the flowsheet for vital signs taken during this treatment. After treatment patient left in no apparent distress:  
Sitting in chair, Call bell within reach, and Caregiver / family present COMMUNICATION/COLLABORATION:  
The patients plan of care was discussed with: Physical Therapist and Registered Nurse Mars Constantino Time Calculation: 24 mins

## 2019-12-06 NOTE — PROGRESS NOTES
Will transfuse 2 units this am 
Not much all we can modify for palliation of his shortness of breath 
symptomatic Not ready for HD and not really getting better in long term way with diuretics No plans for RHC or inotropes at this time Will see in evening rounds

## 2019-12-06 NOTE — PROGRESS NOTES
Problem: Mobility Impaired (Adult and Pediatric) Goal: *Acute Goals and Plan of Care (Insert Text) Description FUNCTIONAL STATUS PRIOR TO ADMISSION: Patient was modified independent using a rolling walker for functional mobility. HOME SUPPORT PRIOR TO ADMISSION: The patient lived alone with wife to provide assistance (assist with donning socks/ shoes at times). Physical Therapy Goals Initiated 12/4/2019 1. Patient will move from supine to sit and sit to supine  in bed with modified independence within 7 day(s). 2.  Patient will transfer from bed to chair and chair to bed with supervision/set-up using the least restrictive device within 7 day(s). 3.  Patient will perform sit to stand with supervision/set-up within 7 day(s). 4.  Patient will ambulate with supervision/set-up for 50 feet with the least restrictive device within 7 day(s). Outcome: Progressing Towards Goal 
 PHYSICAL THERAPY TREATMENT Patient: Celso Patton (81 y.o. male) Date: 12/6/2019 Diagnosis: CHF exacerbation ([facility] [address] [I50.9] <principal problem not specified> Precautions:  fall Chart, physical therapy assessment, plan of care and goals were reviewed. ASSESSMENT Patient continues with skilled PT services and is progressing towards goals. Pt cleared for mobilization; RN noted general decline over last couple of days. Pt received in bed with NCO2 @ 2 L, SpO2 95%, p77; wife present for session. Pt tolerated amb on unit with RW and standing activity at sink with intermittent UE support, CGA overall for balance. Pt agreed to sit in chair at end of session. Noted SOB with activity, SpO2 83%, p77; recovered to 92% after approx 30 sec. Pt demo increased tolerance to activity this date. Reports use of paced activity/ energy conservation in home environment; will require continued use of these strategies at d/c. Wife supportive and able to manage IADLs.  [facility] 
 
Current Level of Function Impacting Discharge (mobility/balance): bed mob supervision, transfer CGA, amb with RW CGA Other factors to consider for discharge:  supportive wife, one level home environment PLAN : 
Patient continues to benefit from skilled intervention to address the above impairments. Continue treatment per established plan of care. to address goals. Recommendation for discharge: (in order for the patient to meet his/her long term goals) Physical therapy at least 2 days/week in the home This discharge recommendation: 
Has not yet been discussed the attending provider and/or case management IF patient discharges home will need the following DME: patient owns DME required for discharge SUBJECTIVE:  
Patient stated Let's try to get down the anthony.  OBJECTIVE DATA SUMMARY:  
  
Functional Mobility Training: 
Bed Mobility: 
  
Supine to Sit: Supervision;Bed Modified Transfers: 
Sit to Stand: Contact guard assistance Stand to Sit: Contact guard assistance;Stand-by assistance Balance: 
Sitting: Intact Standing: Impaired Standing - Static: Good(at least single UE support ) Standing - Dynamic : Fair(intermittent UE support) Ambulation/Gait Training: 
Distance (ft): 50 Feet (ft) Assistive Device: Gait belt;Walker, rolling(O2 @ 2 L) Ambulation - Level of Assistance: Contact guard assistance Gait Abnormalities: Decreased step clearance(stooped posture) Speed/Karla: Pace decreased (<100 feet/min) Step Length: Left shortened;Right shortened Pain Rating: 
Pt denies c/o pain Activity Tolerance:  
Fair Please refer to the flowsheet for vital signs taken during this treatment. After treatment patient left in no apparent distress:  
Sitting in chair, Call bell within reach, and Caregiver / family present COMMUNICATION/COLLABORATION:  
The patients plan of care was discussed with: Certified Occupational Therapy Assistant and Registered Nurse Carol Verduzco PT Time Calculation: 23 mins

## 2019-12-06 NOTE — PROGRESS NOTES
Bedside shift change report given to Tea Bird (oncoming nurse) by Savannah Scott RN (offgoing nurse). Report included the following information SBAR, Kardex, Intake/Output, Recent Results and Med Rec Status.

## 2019-12-06 NOTE — PROGRESS NOTES
[address] [facility] Adult  Hospitalist Group Hospitalist Progress Note Rishi Jones MD 
Answering service: [phone] OR [phone] from in house phone Date of Service:  2019 NAME:  Luis Alfredo Corley Sr. 
:  1935 MRN:  [id number] Admission Summary: A 81year old male patient with PMH of CAD s/p CABG, Diastolic heart failure, Afib , s/p PPM, CKD stage III, HTN, HLD presented to Ed for evaluation of shortness of breath. Patient has noticed progressive sob since few days and follows with Dr. Seamus Barton for CHF. he noticed more than 4lbs weight gain in last few days. This morning, he woke up with sob and so he came to ED. He noticed worsening leg swelling, no chest pain. No change in cough but sputum appears to be more dark in color. No fever or chills. No sick contacts. He also mentions that his BG was low in 80;s this AM 
In ED, he was given IV bumex. Hospitalist consulted for admission. Interval history / Subjective:  
  Pt is feeling better. Breathing and leg swelling slowly improving. No overnight events. No other concerns. Discussed with nurse on the floor. Assessment & Plan:  
 
Shortness of breath likely due to Acute on chronic diastolic heart failure Chronic hypoxemic respiratory failure on 2LPM O2 NC - baseline - Continue telemetry for now. - BNP >35,000 on admission 
- CT chest:  Small bilateral pleural effusions with underlying atelectasis. - recent Echo 2019: EF 66 - 70%. Mild to moderate pulmonary hypertension. 
- cardiology on board. Appreciate. - Cont iV bumex 3mg bid. Also, received Metolazone 5mg once today. - c/w aspirin, coreg, hydralazine, statin  
- cards likely plan for RHC with vasodilator challenge. 
  
KATALINA on CKD 
- cr 2.58 on poa, baseline 1.8-1.9 
- likely CRS 
- nephrology on board. Appreciate. Close monitoring. 
  
Afib - rate controlled on .  On aspirin  
  HTN - uncontrolled on poa. Restarted home meds coreg, hydralazine DM- A1c 7.7. c/w ISS 
  
Hypokalemia - replete and monitor as needed 
  
Chronic anemia 
- fobt negative 
- iron deficient. On iron supplements - hb low stable  
  
Advanced COPD and emphysema 
- CT chest: Diffuse interstitial opacities again noted progressive in the upper lobes bilaterally. Underlying pulmonary fibrosis is considered. - check PFT's. - normal lactic acid  
- resp panel and sputum cx ordered 
- less likely infectious  
  
Hx of CAD: s/p CAG in 2003. - no chest pain. Trop neg Hx of PVD s/p arthrectomy to distal SFA and distal popliteal with angioplasty to both lesions and stent to SFA by Dr. Brandi Terrazas 3/2014 
  
DVT ppx: Heparin Code status: Full. Disposition: TBD. [facility] with SN[facility]PT [place] Care Plan discussed with: Patient/Family and Nurse Hospital Problems  Date Reviewed: 11/3/2019 Codes Class Noted POA  
 CHF exacerbation ([facility] [address] ICD-10-CM: I50.9 ICD-9-CM: 428.0  12/3/2019 Unknown Review of Systems: A comprehensive review of systems was negative except for that written in the HPI. Vital Signs:  
 Last 24hrs VS reviewed since prior progress note. Most recent are: 
Visit Vitals /78 (BP 1 Location: Right arm, BP Patient Position: At rest) Pulse 76 Temp 97.9 °F (36.6 °C) Resp 18 Ht 5' 9\" (1.753 m) Wt 81.3 kg (179 lb 3.7 oz) SpO2 98% BMI 26.47 kg/m² Intake/Output Summary (Last 24 hours) at 12/5/2019 1903 Last data filed at 12/5/2019 1624 Gross per 24 hour Intake 360 ml Output 1650 ml Net -1290 ml Physical Examination:  
 
Gen: mild distress due to sob. Elderly HEENT: anicteric sclerae, normal conjunctiva, oropharynx clear, MM moist 
Neck: supple, trachea midline, no adenopathy Heart: RRR, no MRG, no JVD, 2+  edema Lungs: decreased breath sounds Abd: soft, NT, ND, BS+, no organomegaly Extr: warm Skin: dry, no rash Neuro: CN II-XII grossly intact, normal speech, moves all extremities Psych: normal mood, appropriate affect Data Review:  
 Review and/or order of clinical lab test 
Review and/or order of tests in the radiology section of CPT Review and/or order of tests in the medicine section of CPT Labs:  
 
Recent Labs 12/05/19 
1002 12/04/19 
0328 WBC 6.3 6.3 HGB 7.1* 7.4* HCT 23.2* 24.4*  
 164 Recent Labs 12/05/19 
1002 12/04/19 
0328 12/03/19 
5217 * 134* 135* K 3.5 3.8 3.0*  
CL 92* 97 95* CO2 36* 32 33* BUN 62* 59* 61* CREA 2.61* 2.54* 2.58* * 120* 85  
CA 7.9* 8.0* 8.2* MG 2.1  --  2.2 Recent Labs 12/03/19 
3061 SGOT 43* ALT 42 * TBILI 0.6 TP 6.6 ALB 2.0*  
GLOB 4.6* No results for input(s): INR, PTP, APTT, INREXT, INREXT in the last 72 hours. Recent Labs 12/04/19 
0582 12/03/19 
9773 TIBC 265 150* PSAT 9* 9*  
FERR  --  379 Lab Results Component Value Date/Time Folate 11.3 12/03/2019 08:44 AM  
  
No results for input(s): PH, PCO2, PO2 in the last 72 hours. Recent Labs 12/03/19 
5135 TROIQ <0.05 No results found for: CHOL, CHOLX, CHLST, CHOLV, HDL, HDLP, LDL, LDLC, DLDLP, TGLX, TRIGL, TRIGP, CHHD, CHHDX Lab Results Component Value Date/Time Glucose (POC) 145 (H) 12/05/2019 04:45 PM  
 Glucose (POC) 234 (H) 12/05/2019 11:26 AM  
 Glucose (POC) 218 (H) 12/05/2019 07:18 AM  
 Glucose (POC) 183 (H) 12/05/2019 12:02 AM  
 Glucose (POC) 175 (H) 12/04/2019 09:57 PM  
 
Lab Results Component Value Date/Time  Color YELLOW/STRAW 08/27/2019 12:56 PM  
 Appearance CLEAR 08/27/2019 12:56 PM  
 Specific gravity 1.023 08/27/2019 12:56 PM  
 pH (UA) 6.0 08/27/2019 12:56 PM  
 Protein >300 (A) 08/27/2019 12:56 PM  
 Glucose 500 (A) 08/27/2019 12:56 PM  
 Ketone NEGATIVE  08/27/2019 12:56 PM  
 Bilirubin NEGATIVE  08/27/2019 12:56 PM  
 Urobilinogen 0.2 08/27/2019 12:56 PM  
 Nitrites NEGATIVE  08/27/2019 12:56 PM  
 Leukocyte Esterase NEGATIVE  08/27/2019 12:56 PM  
 Epithelial cells FEW 08/27/2019 12:56 PM  
 Bacteria NEGATIVE  08/27/2019 12:56 PM  
 WBC 0-4 08/27/2019 12:56 PM  
 RBC 5-10 08/27/2019 12:56 PM  
 
 
Xr Chest Pa Lat Result Date: 12/3/2019 IMPRESSION: Diffuse interstitial opacities are overall unchanged since 8/27/2019 and may represent chronic lung parenchymal change versus pulmonary edema. Superimposed patchy airspace opacities are increased in the right upper lung and left lower lung and may represent edema, infection, or atelectasis. Ct Chest Wo Cont Result Date: 12/3/2019 IMPRESSION: Diffuse interstitial opacities again noted progressive in the upper lobes bilaterally. Underlying pulmonary fibrosis is considered. Small bilateral pleural effusions with underlying atelectasis. Cardiomegaly. Cholelithiasis. Us Retroperitoneum Comp Result Date: 12/4/2019 IMPRESSION: Increased bilateral renal cortical echogenicity is compatible with medical renal disease. Bilateral renal cysts. No hydronephrosis. Medications Reviewed:  
 
Current Facility-Administered Medications Medication Dose Route Frequency  albuterol (PROVENTIL VENTOLIN) nebulizer solution 2.5 mg  2.5 mg Nebulization Q4H RT  
 guaiFENesin (ROBITUSSIN) 100 mg/5 mL oral liquid 100 mg  100 mg Oral Q4H PRN  
 guaiFENesin (ROBITUSSIN) 100 mg/5 mL oral liquid 400 mg  400 mg Oral Q6H  
 iron sucrose (VENOFER) injection 200 mg  200 mg IntraVENous DAILY  sodium bicarbonate tablet 650 mg  650 mg Oral TID  epoetin dm-epbx (RETACRIT) injection 20,000 Units  20,000 Units SubCUTAneous Q7D  
 sodium chloride (NS) flush 5-40 mL  5-40 mL IntraVENous Q8H  
 sodium chloride (NS) flush 5-40 mL  5-40 mL IntraVENous PRN  
 acetaminophen (TYLENOL) tablet 650 mg  650 mg Oral Q4H PRN  
 heparin (porcine) injection 5,000 Units  5,000 Units SubCUTAneous Q8H  
 aspirin chewable tablet 81 mg  81 mg Oral DAILY  carvedilol (COREG) tablet 12.5 mg  12.5 mg Oral BID WITH MEALS  ferrous sulfate tablet 325 mg  325 mg Oral ACB  hydrALAZINE (APRESOLINE) tablet 25 mg  25 mg Oral TID  simvastatin (ZOCOR) tablet 20 mg  20 mg Oral QHS  tamsulosin (FLOMAX) capsule 0.4 mg  0.4 mg Oral DAILY  bumetanide (BUMEX) injection 3 mg  3 mg IntraVENous Q12H  
 insulin lispro (HUMALOG) injection   SubCUTAneous AC&HS  
 glucose chewable tablet 16 g  4 Tab Oral PRN  
 glucagon (GLUCAGEN) injection 1 mg  1 mg IntraMUSCular PRN  
 dextrose 10% infusion 0-250 mL  0-250 mL IntraVENous PRN  
 
______________________________________________________________________ EXPECTED LENGTH OF STAY: - - - 
ACTUAL LENGTH OF STAY:          2 Jona Tran MD

## 2019-12-06 NOTE — PROGRESS NOTES
[facility]  Hospitalist Group Hospitalist Progress Note George Link MD 
Answering service: [phone] OR [phone] from in house phone Date of Service:  2019 NAME:  Blas Vasquez Sr. 
:  1935 MRN:  [id number] Admission Summary: A 81year old male patient with PMH of CAD s/p CABG, Diastolic heart failure, Afib , s/p PPM, CKD stage III, HTN, HLD presented to Ed for evaluation of shortness of breath. Patient has noticed progressive sob since few days and follows with Dr. Paz Sotelo for CHF. he noticed more than 4lbs weight gain in last few days. This morning, he woke up with sob and so he came to ED. He noticed worsening leg swelling, no chest pain. No change in cough but sputum appears to be more dark in color. No fever or chills. No sick contacts. He also mentions that his BG was low in 80;s this AM 
In ED, he was given IV bumex. Hospitalist consulted for admission. Interval history / Subjective:  
  Pt is feeling better. Breathing and leg swelling slowly improving. No overnight events. No other concerns. Discussed with nurse on the floor. Assessment & Plan:  
 
Shortness of breath likely due to Acute on chronic diastolic heart failure Chronic hypoxemic respiratory failure on 2LPM O2 NC - baseline - Continue telemetry for now. - BNP >35,000 on admission 
- CT chest:  Small bilateral pleural effusions with underlying atelectasis. - recent Echo 2019: EF 66 - 70%. Mild to moderate pulmonary hypertension. 
- cardiology on board. Appreciate. - Cont iV bumex 3mg bid. Also, received Metolazone 5mg once today. - c/w aspirin, coreg, hydralazine, statin  
- cards likely plan for RHC with vasodilator challenge. - Would require watchful monitoring for volume status after 2PRBC  
  
KATALINA on CKD - Cr 2.58 on poa, baseline 1.8-1.9 
- likely CRS 
- nephrology on board. Appreciate. Close monitoring. 
  
Afib - rate controlled on . On aspirin  
  
HTN - uncontrolled on poa. Uptitrated home meds coreg, hydralazine DM- A1c 7.7. c/w ISS 
  
Hypokalemia - replete and monitor as needed 
  
Chronic anemia 
- fobt negative 
- iron deficient. On iron supplements - hb low stable - 2 PRBC per cardiology team. Will monitor closely 
  
Advanced COPD and emphysema 
- CT chest: Diffuse interstitial opacities again noted progressive in the upper lobes bilaterally. Underlying pulmonary fibrosis is considered. - check PFT's. - normal lactic acid  
- resp panel and sputum cx ordered 
- less likely infectious  
  
Hx of CAD: s/p CAG in 2003. - no chest pain. Trop neg Hx of PVD s/p arthrectomy to distal SFA and distal popliteal with angioplasty to both lesions and stent to SFA by Dr. Bard Foote 3/2014 
  
DVT ppx: Heparin Code status: Full. Disposition: TBD. [facility] with SN/PT [facility] Care Plan discussed with: Patient/Family and Nurse Hospital Problems  Date Reviewed: 11/3/2019 Codes Class Noted POA  
 CHF exacerbation ([facility] [address] ICD-10-CM: I50.9 ICD-9-CM: 428.0  12/3/2019 Unknown Review of Systems: A comprehensive review of systems was negative except for that written in the HPI. Vital Signs:  
 Last 24hrs VS reviewed since prior progress note. Most recent are: 
Visit Vitals /85 (BP 1 Location: Left arm, BP Patient Position: At rest;Supine) Pulse 78 Temp 97.7 °F (36.5 °C) Resp 18 Ht 5' 9\" (1.753 m) Wt 79.9 kg (176 lb 2.4 oz) SpO2 98% BMI 26.01 kg/m² Intake/Output Summary (Last 24 hours) at 12/6/2019 1641 Last data filed at 12/6/2019 1530 Gross per 24 hour Intake 1850 ml Output 2640 ml Net -790 ml Physical Examination:  
 
Gen: mild distress due to sob. Elderly HEENT: anicteric sclerae, normal conjunctiva, oropharynx clear, MM moist 
Neck: supple, trachea midline, no adenopathy Heart: RRR, no MRG, no JVD, 2+  edema Lungs: Improved air entry, some basal crackle. No wheezing Abd: soft, NT, ND, BS+, no organomegaly Extr: warm Skin: dry, no rash Neuro: CN II-XII grossly intact, normal speech, moves all extremities Psych: normal mood, appropriate affect Data Review:  
 Review and/or order of clinical lab test 
Review and/or order of tests in the radiology section of CPT Review and/or order of tests in the medicine section of CPT Labs:  
 
Recent Labs 12/06/19 
0351 12/05/19 
1002 WBC 6.7 6.3 HGB 7.4* 7.1*  
HCT 23.8* 23.2*  
 162 Recent Labs 12/06/19 
0351 12/05/19 
1002 12/04/19 
0328 * 132* 134* K 3.4* 3.5 3.8 CL 91* 92* 97  
CO2 38* 36* 32 BUN 60* 62* 59* CREA 2.46* 2.61* 2.54* * 218* 120* CA 7.9* 7.9* 8.0*  
MG  --  2.1  --   
PHOS 2.9  --   --   
 
Recent Labs 12/06/19 
7905 ALB 1.8* No results for input(s): INR, PTP, APTT, INREXT, INREXT in the last 72 hours. Recent Labs 12/04/19 
3376 TIBC 265 PSAT 9* Lab Results Component Value Date/Time Folate 11.3 12/03/2019 08:44 AM  
  
No results for input(s): PH, PCO2, PO2 in the last 72 hours. No results for input(s): CPK, CKNDX, TROIQ in the last 72 hours. No lab exists for component: CPKMB No results found for: CHOL, CHOLX, CHLST, CHOLV, HDL, HDLP, LDL, LDLC, DLDLP, TGLX, TRIGL, TRIGP, CHHD, CHHDX Lab Results Component Value Date/Time Glucose (POC) 166 (H) 12/06/2019 04:40 PM  
 Glucose (POC) 279 (H) 12/06/2019 11:54 AM  
 Glucose (POC) 167 (H) 12/06/2019 06:53 AM  
 Glucose (POC) 207 (H) 12/05/2019 10:23 PM  
 Glucose (POC) 145 (H) 12/05/2019 04:45 PM  
 
Lab Results Component Value Date/Time  Color YELLOW/STRAW 08/27/2019 12:56 PM  
 Appearance CLEAR 08/27/2019 12:56 PM  
 Specific gravity 1.023 08/27/2019 12:56 PM  
 pH (UA) 6.0 08/27/2019 12:56 PM  
 Protein >300 (A) 08/27/2019 12:56 PM  
 Glucose 500 (A) 08/27/2019 12:56 PM  
 Ketone NEGATIVE  08/27/2019 12:56 PM  
 Bilirubin NEGATIVE  08/27/2019 12:56 PM  
 Urobilinogen 0.2 08/27/2019 12:56 PM  
 Nitrites NEGATIVE  08/27/2019 12:56 PM  
 Leukocyte Esterase NEGATIVE  08/27/2019 12:56 PM  
 Epithelial cells FEW 08/27/2019 12:56 PM  
 Bacteria NEGATIVE  08/27/2019 12:56 PM  
 WBC 0-4 08/27/2019 12:56 PM  
 RBC 5-10 08/27/2019 12:56 PM  
 
 
Xr Chest Pa Lat Result Date: 12/3/2019 IMPRESSION: Diffuse interstitial opacities are overall unchanged since 8/27/2019 and may represent chronic lung parenchymal change versus pulmonary edema. Superimposed patchy airspace opacities are increased in the right upper lung and left lower lung and may represent edema, infection, or atelectasis. Ct Chest Wo Cont Result Date: 12/3/2019 IMPRESSION: Diffuse interstitial opacities again noted progressive in the upper lobes bilaterally. Underlying pulmonary fibrosis is considered. Small bilateral pleural effusions with underlying atelectasis. Cardiomegaly. Cholelithiasis. Us Retroperitoneum Comp Result Date: 12/4/2019 IMPRESSION: Increased bilateral renal cortical echogenicity is compatible with medical renal disease. Bilateral renal cysts. No hydronephrosis. Medications Reviewed:  
 
Current Facility-Administered Medications Medication Dose Route Frequency  hydrALAZINE (APRESOLINE) tablet 50 mg  50 mg Oral TID  
 0.9% sodium chloride infusion 250 mL  250 mL IntraVENous PRN  
 [START ON 12/7/2019] iron sucrose (VENOFER) injection 200 mg  200 mg IntraVENous DAILY  epoetin dm-epbx (RETACRIT) injection 20,000 Units  20,000 Units SubCUTAneous Q MON, WED & FRI  albuterol (PROVENTIL VENTOLIN) nebulizer solution 2.5 mg  2.5 mg Nebulization Q4H RT  
 guaiFENesin (ROBITUSSIN) 100 mg/5 mL oral liquid 100 mg  100 mg Oral Q4H PRN  
 guaiFENesin (ROBITUSSIN) 100 mg/5 mL oral liquid 400 mg  400 mg Oral Q6H  
 sodium chloride (NS) flush 5-40 mL  5-40 mL IntraVENous Q8H  
 sodium chloride (NS) flush 5-40 mL  5-40 mL IntraVENous PRN  
 acetaminophen (TYLENOL) tablet 650 mg  650 mg Oral Q4H PRN  
 heparin (porcine) injection 5,000 Units  5,000 Units SubCUTAneous Q8H  
 aspirin chewable tablet 81 mg  81 mg Oral DAILY  carvedilol (COREG) tablet 12.5 mg  12.5 mg Oral BID WITH MEALS  ferrous sulfate tablet 325 mg  325 mg Oral ACB  simvastatin (ZOCOR) tablet 20 mg  20 mg Oral QHS  tamsulosin (FLOMAX) capsule 0.4 mg  0.4 mg Oral DAILY  bumetanide (BUMEX) injection 3 mg  3 mg IntraVENous Q12H  
 insulin lispro (HUMALOG) injection   SubCUTAneous AC&HS  
 glucose chewable tablet 16 g  4 Tab Oral PRN  
 glucagon (GLUCAGEN) injection 1 mg  1 mg IntraMUSCular PRN  
 dextrose 10% infusion 0-250 mL  0-250 mL IntraVENous PRN  
 
______________________________________________________________________ EXPECTED LENGTH OF STAY: - - - 
ACTUAL LENGTH OF STAY:          3 Marybeth Grimm MD

## 2019-12-06 NOTE — PROGRESS NOTES
[facility] 
 [address] [address], [address] [facility] Phone: [phone]   [phone]   
  
Nephrology Progress Note Yarely Saeed.     1935     [id number] Date of Admission : 12/3/2019 
12/06/19 CC:  Follow up for CKD 4 Assessment and Plan CKD IV: 
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 
- baseline Cr at best : 2.7- 3 mg/dl when euvolemic - UPCR - 11 GM in 5/19  
  
Volume Overload: 
-She is secondary to chronic kidney disease and nephrosis -Likely needs intermittent IV diuresis at the ambulatory diuretic center 
-continue loop diuretics 
  
Hyponatremia 
-due to volume overload and metolazone 
  
Acute hypoxic respiratory failure 
-Injury to pulmonary edema 
-Has underlying COPD 
-CT chest on admission suggest possible pulmonary fibrosis airspace disease 
-History of right-sided pneumonia in August 
Pt feeling much better on 52/1 
  
Metabolic acidosis 
-stopped oral Bicarb  
  
HTN : 
- meds being adjusted by cards  
  
Pulm HTN: 
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  
  
Bradycardia : 
- s/p PPM on 5/9 
  
Anemia of CKD: 
-Worsening anemia likely secondary to CKD 
-He has been off MICHAEL for a year - EPO reordered, but iron stores very low. - IV iron ordered. - agree w/ transfusion  
  
Chronic A. fib Chronic diastolic congestive heart failure CAD s/p CABG 
  
DM2: 
- on insulin 
  
Care Plan discussed with: Patient Interval History: 
Seen and examined Remains SOB but better since admission Noted plans for transfusion today Review of Systems: Pertinent items are noted in HPI. Current Medications:  
Current Facility-Administered Medications Medication Dose Route Frequency  hydrALAZINE (APRESOLINE) tablet 50 mg  50 mg Oral TID  potassium chloride SR (KLOR-CON 10) tablet 40 mEq  40 mEq Oral Q4H  
 0.9% sodium chloride infusion 250 mL  250 mL IntraVENous PRN  
 [START ON 12/7/2019] iron sucrose (VENOFER) injection 200 mg 200 mg IntraVENous DAILY  epoetin dm-epbx (RETACRIT) injection 20,000 Units  20,000 Units SubCUTAneous Q MON, WED & FRI  albuterol (PROVENTIL VENTOLIN) nebulizer solution 2.5 mg  2.5 mg Nebulization Q4H RT  
 guaiFENesin (ROBITUSSIN) 100 mg/5 mL oral liquid 100 mg  100 mg Oral Q4H PRN  
 guaiFENesin (ROBITUSSIN) 100 mg/5 mL oral liquid 400 mg  400 mg Oral Q6H  
 sodium chloride (NS) flush 5-40 mL  5-40 mL IntraVENous Q8H  
 sodium chloride (NS) flush 5-40 mL  5-40 mL IntraVENous PRN  
 acetaminophen (TYLENOL) tablet 650 mg  650 mg Oral Q4H PRN  
 heparin (porcine) injection 5,000 Units  5,000 Units SubCUTAneous Q8H  
 aspirin chewable tablet 81 mg  81 mg Oral DAILY  carvedilol (COREG) tablet 12.5 mg  12.5 mg Oral BID WITH MEALS  ferrous sulfate tablet 325 mg  325 mg Oral ACB  simvastatin (ZOCOR) tablet 20 mg  20 mg Oral QHS  tamsulosin (FLOMAX) capsule 0.4 mg  0.4 mg Oral DAILY  bumetanide (BUMEX) injection 3 mg  3 mg IntraVENous Q12H  
 insulin lispro (HUMALOG) injection   SubCUTAneous AC&HS  
 glucose chewable tablet 16 g  4 Tab Oral PRN  
 glucagon (GLUCAGEN) injection 1 mg  1 mg IntraMUSCular PRN  
 dextrose 10% infusion 0-250 mL  0-250 mL IntraVENous PRN Allergies Allergen Reactions  Lisinopril Cough Objective: 
Vitals:   
Vitals:  
 12/06/19 0425 12/06/19 1516 12/06/19 0247 12/06/19 1254 BP: 174/83  112/57 163/73 Pulse: 76  78 77 Resp: 18  20 20 Temp: 98 °F (36.7 °C)  97.4 °F (36.3 °C) 97.3 °F (36.3 °C) SpO2: 97% 95% 93% 95% Weight:      
Height:      
 
Intake and Output: 
12/06 0701 - 12/06 1900 In: 600 [P.O.:600] Out: 450 [Urine:450] 12/04 1901 - 12/06 0700 In: 1210 [P.O.:1210] Out: 6759 [YQEGB:7120] Physical Examination: 
General: Frail and ill-looking, SOB at rest 
HEENT: PERRL,  ++ Pallor , No Icterus Neck: Supple,no mass palpable Lungs : B/L rales CVS: irregular, S1 S2 normal, systolic murmur Abdomen: Soft, NT, BS + Extremities: 2+ Edema Skin: No rash or lesions. MS: No joint swelling, erythema, warmth Neurologic: non focal, AAO x 3 Psych: normal affect 
 
[]    High complexity decision making was performed 
[]    Patient is at high-risk of decompensation with multiple organ involvement Lab Data Personally Reviewed: I have reviewed all the pertinent labs, microbiology data and radiology studies during assessment. Recent Labs 12/06/19 
0351 12/05/19 
1002 12/04/19 
0328 * 132* 134* K 3.4* 3.5 3.8 CL 91* 92* 97  
CO2 38* 36* 32 * 218* 120* BUN 60* 62* 59* CREA 2.46* 2.61* 2.54* CA 7.9* 7.9* 8.0*  
MG  --  2.1  --   
PHOS 2.9  --   --   
ALB 1.8*  --   --   
 
Recent Labs 12/06/19 
0351 12/05/19 
1002 12/04/19 
0328 WBC 6.7 6.3 6.3 HGB 7.4* 7.1* 7.4* HCT 23.8* 23.2* 24.4*  
 162 164 No results found for: SDES Lab Results Component Value Date/Time Culture result: POSSIBLE FEW STAPHYLOCOCCUS AUREUS (A) 12/04/2019 03:27 AM  
 Culture result: CHECKING FOR POSSIBLE LIGHT GRAM NEGATIVE RODS (A) 12/04/2019 03:27 AM  
 Culture result: HEAVY NORMAL RESPIRATORY JUDE 12/04/2019 03:27 AM  
 Culture result: NO GROWTH 5 DAYS 08/27/2019 05:55 PM  
 Culture result: NO GROWTH 5 DAYS 08/05/2019 10:46 AM  
 
Recent Results (from the past 24 hour(s)) GLUCOSE, POC Collection Time: 12/05/19  4:45 PM  
Result Value Ref Range Glucose (POC) 145 (H) 65 - 100 mg/dL Performed by Maria Teresa Larry (CON) GLUCOSE, POC Collection Time: 12/05/19 10:23 PM  
Result Value Ref Range Glucose (POC) 207 (H) 65 - 100 mg/dL Performed by Salma Gonzalez RENAL FUNCTION PANEL Collection Time: 12/06/19  3:51 AM  
Result Value Ref Range Sodium 133 (L) 136 - 145 mmol/L Potassium 3.4 (L) 3.5 - 5.1 mmol/L Chloride 91 (L) 97 - 108 mmol/L  
 CO2 38 (H) 21 - 32 mmol/L Anion gap 4 (L) 5 - 15 mmol/L Glucose 171 (H) 65 - 100 mg/dL BUN 60 (H) 6 - 20 MG/DL  Creatinine 2.46 (H) 0.70 - 1.30 MG/DL  
 BUN/Creatinine ratio 24 (H) 12 - 20 GFR est AA 31 (L) >60 ml/min/1.73m2 GFR est non-AA 25 (L) >60 ml/min/1.73m2 Calcium 7.9 (L) 8.5 - 10.1 MG/DL Phosphorus 2.9 2.6 - 4.7 MG/DL Albumin 1.8 (L) 3.5 - 5.0 g/dL CBC W/O DIFF Collection Time: 12/06/19  3:51 AM  
Result Value Ref Range WBC 6.7 4.1 - 11.1 K/uL  
 RBC 2.83 (L) 4.10 - 5.70 M/uL HGB 7.4 (L) 12.1 - 17.0 g/dL HCT 23.8 (L) 36.6 - 50.3 % MCV 84.1 80.0 - 99.0 FL  
 MCH 26.1 26.0 - 34.0 PG  
 MCHC 31.1 30.0 - 36.5 g/dL  
 RDW 16.8 (H) 11.5 - 14.5 % PLATELET 007 822 - 429 K/uL MPV 12.0 8.9 - 12.9 FL  
 NRBC 0.0 0  WBC ABSOLUTE NRBC 0.00 0.00 - 0.01 K/uL GLUCOSE, POC Collection Time: 12/06/19  6:53 AM  
Result Value Ref Range Glucose (POC) 167 (H) 65 - 100 mg/dL Performed by Estrella Garza PCT   
GLUCOSE, POC Collection Time: 12/06/19 11:54 AM  
Result Value Ref Range Glucose (POC) 279 (H) 65 - 100 mg/dL Performed by CHARISSE RUFFIN(CON) I have reviewed the flowsheets. Chart and Pertinent Notes have been reviewed. No change in PMH ,family and social history from Consult note.  
 
 
Denny Burks MD

## 2019-12-06 NOTE — PROGRESS NOTES
1529 Patient noted to have elevated BP call placed to Dr Amando Redmond to update. awating return call  
 
1600 - BP remains high patient receiving a unit of blood paged Dr. Amando Redmond again awaiting return call 1630 - Patient /85  Scheduled BP medications given, decreased Blood rate down pamela 75 Dr. Amando Redmond paged again. - awaiting return call  
 
1700 - SBP down to 171/85 with Blood decreased to 75ml an hour no return call from Dr. Amando Redmond paged again to report elevated BP spoke with Nadya Medeiros in call center reported this is the 3rd page states she will call MD. - awaiting return call 1715 - Call received from Dr. Amando Redmond new orders received for PRN hydralazine, bumex and to hold second unit of blood until after 2100 dose of Bumex.

## 2019-12-07 LAB
ALBUMIN SERPL-MCNC: 1.7 G/DL (ref 3.5–5)
ANION GAP SERPL CALC-SCNC: 5 MMOL/L (ref 5–15)
BACTERIA SPEC CULT: ABNORMAL
BNP SERPL-MCNC: ABNORMAL PG/ML
BUN SERPL-MCNC: 62 MG/DL (ref 6–20)
BUN/CREAT SERPL: 25 (ref 12–20)
CALCIUM SERPL-MCNC: 7.8 MG/DL (ref 8.5–10.1)
CHLORIDE SERPL-SCNC: 91 MMOL/L (ref 97–108)
CO2 SERPL-SCNC: 38 MMOL/L (ref 21–32)
CREAT SERPL-MCNC: 2.47 MG/DL (ref 0.7–1.3)
GLUCOSE BLD STRIP.AUTO-MCNC: 180 MG/DL (ref 65–100)
GLUCOSE BLD STRIP.AUTO-MCNC: 236 MG/DL (ref 65–100)
GLUCOSE BLD STRIP.AUTO-MCNC: 236 MG/DL (ref 65–100)
GLUCOSE SERPL-MCNC: 185 MG/DL (ref 65–100)
GRAM STN SPEC: ABNORMAL
IRON SATN MFR SERPL: ABNORMAL % (ref 20–50)
IRON SERPL-MCNC: 330 UG/DL (ref 35–150)
PHOSPHATE SERPL-MCNC: 2.1 MG/DL (ref 2.6–4.7)
POTASSIUM SERPL-SCNC: 3.6 MMOL/L (ref 3.5–5.1)
SERVICE CMNT-IMP: ABNORMAL
SODIUM SERPL-SCNC: 134 MMOL/L (ref 136–145)
TIBC SERPL-MCNC: 302 UG/DL (ref 250–450)

## 2019-12-07 PROCEDURE — 74011250637 HC RX REV CODE- 250/637: Performed by: INTERNAL MEDICINE

## 2019-12-07 PROCEDURE — 74011250637 HC RX REV CODE- 250/637: Performed by: PHYSICIAN ASSISTANT

## 2019-12-07 PROCEDURE — 77010033678 HC OXYGEN DAILY

## 2019-12-07 PROCEDURE — 74011636637 HC RX REV CODE- 636/637: Performed by: INTERNAL MEDICINE

## 2019-12-07 PROCEDURE — 74011250636 HC RX REV CODE- 250/636: Performed by: INTERNAL MEDICINE

## 2019-12-07 PROCEDURE — 83540 ASSAY OF IRON: CPT

## 2019-12-07 PROCEDURE — 94640 AIRWAY INHALATION TREATMENT: CPT

## 2019-12-07 PROCEDURE — 36415 COLL VENOUS BLD VENIPUNCTURE: CPT

## 2019-12-07 PROCEDURE — 74011250637 HC RX REV CODE- 250/637: Performed by: SPECIALIST

## 2019-12-07 PROCEDURE — 82962 GLUCOSE BLOOD TEST: CPT

## 2019-12-07 PROCEDURE — 36430 TRANSFUSION BLD/BLD COMPNT: CPT

## 2019-12-07 PROCEDURE — 65660000000 HC RM CCU STEPDOWN

## 2019-12-07 PROCEDURE — 83880 ASSAY OF NATRIURETIC PEPTIDE: CPT

## 2019-12-07 PROCEDURE — 94760 N-INVAS EAR/PLS OXIMETRY 1: CPT

## 2019-12-07 PROCEDURE — 74011000250 HC RX REV CODE- 250: Performed by: INTERNAL MEDICINE

## 2019-12-07 PROCEDURE — 80069 RENAL FUNCTION PANEL: CPT

## 2019-12-07 PROCEDURE — P9016 RBC LEUKOCYTES REDUCED: HCPCS

## 2019-12-07 RX ADMIN — TAMSULOSIN HYDROCHLORIDE 0.4 MG: 0.4 CAPSULE ORAL at 08:28

## 2019-12-07 RX ADMIN — FERRIC CITRATE 630 MG: 210 TABLET, COATED ORAL at 12:58

## 2019-12-07 RX ADMIN — BUMETANIDE 3 MG: 0.25 INJECTION INTRAMUSCULAR; INTRAVENOUS at 21:16

## 2019-12-07 RX ADMIN — INSULIN LISPRO 2 UNITS: 100 INJECTION, SOLUTION INTRAVENOUS; SUBCUTANEOUS at 23:10

## 2019-12-07 RX ADMIN — ASPIRIN 81 MG 81 MG: 81 TABLET ORAL at 08:28

## 2019-12-07 RX ADMIN — FERROUS SULFATE TAB 325 MG (65 MG ELEMENTAL FE) 325 MG: 325 (65 FE) TAB at 08:28

## 2019-12-07 RX ADMIN — ALBUTEROL SULFATE 2.5 MG: 2.5 SOLUTION RESPIRATORY (INHALATION) at 07:14

## 2019-12-07 RX ADMIN — SIMVASTATIN 20 MG: 20 TABLET, FILM COATED ORAL at 21:16

## 2019-12-07 RX ADMIN — HYDRALAZINE HYDROCHLORIDE 50 MG: 50 TABLET ORAL at 17:08

## 2019-12-07 RX ADMIN — CARVEDILOL 12.5 MG: 12.5 TABLET, FILM COATED ORAL at 17:08

## 2019-12-07 RX ADMIN — HYDRALAZINE HYDROCHLORIDE 50 MG: 50 TABLET ORAL at 08:28

## 2019-12-07 RX ADMIN — BUMETANIDE 3 MG: 0.25 INJECTION INTRAMUSCULAR; INTRAVENOUS at 10:18

## 2019-12-07 RX ADMIN — INSULIN LISPRO 2 UNITS: 100 INJECTION, SOLUTION INTRAVENOUS; SUBCUTANEOUS at 16:30

## 2019-12-07 RX ADMIN — Medication 5 ML: at 06:00

## 2019-12-07 RX ADMIN — ISOSORBIDE DINITRATE 20 MG: 20 TABLET ORAL at 21:16

## 2019-12-07 RX ADMIN — HEPARIN SODIUM 5000 UNITS: 5000 INJECTION INTRAVENOUS; SUBCUTANEOUS at 21:15

## 2019-12-07 RX ADMIN — ISOSORBIDE DINITRATE 20 MG: 20 TABLET ORAL at 17:08

## 2019-12-07 RX ADMIN — GUAIFENESIN 400 MG: 200 SOLUTION ORAL at 12:58

## 2019-12-07 RX ADMIN — HEPARIN SODIUM 5000 UNITS: 5000 INJECTION INTRAVENOUS; SUBCUTANEOUS at 04:37

## 2019-12-07 RX ADMIN — HYDRALAZINE HYDROCHLORIDE 10 MG: 20 INJECTION INTRAMUSCULAR; INTRAVENOUS at 03:21

## 2019-12-07 RX ADMIN — GUAIFENESIN 400 MG: 200 SOLUTION ORAL at 23:10

## 2019-12-07 RX ADMIN — ALBUTEROL SULFATE 2.5 MG: 2.5 SOLUTION RESPIRATORY (INHALATION) at 19:26

## 2019-12-07 RX ADMIN — CARVEDILOL 12.5 MG: 12.5 TABLET, FILM COATED ORAL at 08:28

## 2019-12-07 RX ADMIN — INSULIN LISPRO 2 UNITS: 100 INJECTION, SOLUTION INTRAVENOUS; SUBCUTANEOUS at 11:30

## 2019-12-07 RX ADMIN — GUAIFENESIN 400 MG: 200 SOLUTION ORAL at 17:08

## 2019-12-07 RX ADMIN — Medication 10 ML: at 17:09

## 2019-12-07 RX ADMIN — HEPARIN SODIUM 5000 UNITS: 5000 INJECTION INTRAVENOUS; SUBCUTANEOUS at 12:58

## 2019-12-07 RX ADMIN — GUAIFENESIN 400 MG: 200 SOLUTION ORAL at 07:19

## 2019-12-07 RX ADMIN — Medication 10 ML: at 21:16

## 2019-12-07 RX ADMIN — INSULIN LISPRO 3 UNITS: 100 INJECTION, SOLUTION INTRAVENOUS; SUBCUTANEOUS at 07:13

## 2019-12-07 RX ADMIN — HYDRALAZINE HYDROCHLORIDE 50 MG: 50 TABLET ORAL at 21:15

## 2019-12-07 RX ADMIN — ALBUTEROL SULFATE 2.5 MG: 2.5 SOLUTION RESPIRATORY (INHALATION) at 02:11

## 2019-12-07 RX ADMIN — IRON SUCROSE 200 MG: 20 INJECTION, SOLUTION INTRAVENOUS at 10:18

## 2019-12-07 RX ADMIN — ISOSORBIDE DINITRATE 20 MG: 20 TABLET ORAL at 08:28

## 2019-12-07 NOTE — PROGRESS NOTES
12/7/2019   Christiano Zarate MD  [facility] 
 
. Nicolette Ask Nicolette Ask Nicolette Ask Janneth Villela. is a 81 y.o. male  
shortness of breath is worse Got 2 units of prbcs Able to lie flat , is urinating with jug and it makes his tachypneic Has started IV iron with Dr Sims Friendly orders 
 
no cp Still with edema and mod AGUILAR Note sternal subluxation, incomplete fusion post CABG Echo done shows hyperdynamic LV function, the most prominent finding is RV dilation, a cor pulmonale picture Discussion/Plans/Recs ---coreg, bumex IV, hydralazine, add isordil, check Fe panel, ProBNP in am 
Increase Fe sufl to TID,  iron infusion! 1. Acute on chronic diastolic CHF   
RV enlargement , cor pulmonale 
---BLE edema/Fluid overload: weight up approximately 20 + pounds at admit 
            due to pulmonary HTN, worsened renal function and acute on chronic DHF Plan for [facility] q 2 weeks for IV diuresis, if this is unsuccessful, he will have to make a decision about dialysis] .   
2. HFpEF- EF 65-70%/ moderate to severe Pulmonary HTN : 
RHC with PA 66 in May Last echo EF 66-70%. Migdalia 1765 MR, mild-mod pulmonary HTN,  
            Diuretics as above Echo pending for today 3. CAD/XOL : 3/2008 s/p CABG x 3 off pump  
           Last stress test 2017 normal perfusion. 
           no chest pain, no cath due to CKD and risk for complete renal failure 4. Afib, chronic:  
           -has leadless ppm.  Continue coreg. 
           -[id number]=2.  Not on [address] PTA 5. HTN -labile-very high yesterday added isordil 6. PVD no current claudication see hx below Nothing normal on  BMP Non CV co morbids: 
Anemia severe from chronic dz Few options so giving 2 units prbcs CKD 4 DM2 Lab Results Component Value Date/Time Creatinine 2.46 (H) 12/06/2019 03:51 AM  
   
 
Cardiac Studies/Hx: 
12/03/19 ECHO ADULT FOLLOW-UP OR LIMITED 12/06/2019 12/6/2019  Narrative · Right Ventricle: Severely dilated right ventricle. · Left Ventricle: Normal cavity size and systolic function (ejection  
fraction normal). Mild concentric hypertrophy. Estimated left ventricular  
ejection fraction is 66 - 70%. Visually measured ejection fraction. No  
regional wall motion abnormality noted. · Left Atrium: Moderately dilated left atrium. · Aortic Valve: Mild aortic valve sclerosis with no significant stenosis. · Mitral Valve: Mitral valve thickening. · Interatrial Septum: Color flow Doppler was used. Signed by: Audra Johns MD  
  
CAD/CABG  
off pump x3 3/2008 . =post op anemia and renal failure CATH 2008= LM -bifurcation dz 40% ;LAD 85% ; Circ ost 70% mid 70% RCA proximal 60% tapering, EF 60%-70%, bilaterally patent renal arteries, mild atherosclerosis of the distal abdominal aorta. [address] May 2, 2019 =PA 66/18 W 20 PA sat 55% CO 4.1 CI 2.12 Echo May 1 ,2019 EF 55-60% mod LAE, Mild CASSIE, Mild AS BELLE 1.6 cm2 mild MR & TR, RV fx mildly reduced PVD-18- RLE- mid CRA 70, Pop 99-PTA on right pop, LLE LCFA 40% - 17 PTA RCFA 90, JAS to RSFA  
-16 PTA Left Pop, PTA Left tib-per 
--3-13-14 PTA Left op PTA RSFA 
---11 stent RSFA  
JOCELYN 17 Normal perfusion Mild carotid artery disease 3-7-08 then 12 with 10-49% left, less than 10% on right SOCIAL: Drinks no alcohol, quit smoking several years ago, worked as an . Lives with his wife and has four children. Enjoys gardening, fishing and music. FAMILY HISTORY: Mother  of cancer at 76, father  of Parkinson's at 70, one brother  of cancer of the liver at 76 and one  of an infection at 64. Patient Vitals for the past 12 hrs: 
 Temp Pulse Resp BP SpO2  
19 0803 98 °F (36.7 °C) 81 18 168/76 93 % 19 0715     97 % 19 0632 98 °F (36.7 °C) 79 18 161/78 94 % 19 0533 98 °F (36.7 °C) 77 18 130/69 94 % 19 0430 97.9 °F (36.6 °C) 77 18 113/64 93 % 19 0402 98.1 °F (36.7 °C) 78 18 130/66 94 % 19 0346 98.1 °F (36.7 °C) 82 18 139/72 95 % 19 0313 98 °F (36.7 °C) 92 18 166/77 95 % 19 0006 98.7 °F (37.1 °C) 79 18 137/62 95 % Past Medical History:  
Diagnosis Date  CAD (coronary artery disease) s/p CABG   [facility]  CKD (chronic kidney disease) stage 3, GFR 30-59 ml/min ([facility]) 10/21/2017  
 hypertension and DM nephrosclerosis  Diabetes mellitus, type 2 ([facility]  Hypercholesteremia  Hypertension  Paroxysmal atrial fibrillation ([facility] 10/21/2017  
 new onset afib with BRPR 10-19-17 admit  PVC's (premature ventricular contractions) 2014  PVD (peripheral vascular disease) ([facility] Social History Tobacco Use  Smoking status: Former Smoker Packs/day: 3.00 Years: 20.00 Pack years: 60.00 Types: Cigarettes Last attempt to quit: 1985 Years since quittin.9  Smokeless tobacco: Never Used Substance Use Topics  Alcohol use: No  
  
ROS-pertinents  negative except as above  The pertinent portions of the medical history,physician and nursing notes, meds,vitals , labs and Ins/Outs,are reviewed in the electronic record. Results for orders placed or performed during the hospital encounter of 19 EKG, 12 LEAD, INITIAL Result Value Ref Range Ventricular Rate 79 BPM  
 Atrial Rate 79 BPM  
 P-R Interval 320 ms QRS Duration 150 ms  
 Q-T Interval 468 ms QTC Calculation (Bezet) 536 ms Calculated R Axis -40 degrees Calculated T Axis -51 degrees Diagnosis Sinus rhythm with 1st degree AV block Left axis deviation Right bundle branch block When compared with ECG of 03-DEC-2019 08:31, Sinus rhythm has replaced Electronic ventricular pacemaker Confirmed by Thais De Leon M.D., Lukas Shah ([id number]) on 2019 9:41:56 AM 
  
Results for orders placed or performed in visit on 11 AMB POC EKG ROUTINE W/ 12 LEADS, INTER & REP Narrative See scanned results 08/05/19 ECHO ADULT COMPLETE 08/06/2019 8/6/2019 Narrative · Left Ventricle: Normal cavity size, systolic function (ejection fraction  
normal) and diastolic function. Mild concentric hypertrophy. Estimated  
left ventricular ejection fraction is 66 - 70%. Visually measured ejection  
fraction. No regional wall motion abnormality noted. Normal left  
ventricular strain. · Left Atrium: Moderately dilated left atrium. · Right Atrium: Mildly dilated right atrium. · Interatrial Septum: Color flow Doppler was used. · Aortic Valve: Mild aortic valve sclerosis with no significant stenosis. · Mitral Valve: Mitral valve thickening. Mild mitral valve regurgitation. · Tricuspid Valve: Mild tricuspid valve regurgitation is present. · Pulmonary Artery: Mild to moderate pulmonary hypertension. Signed by: Kuldip Orona MD  
    
Vitals:  
 12/07/19 5839 12/07/19 6423 12/07/19 0715 12/07/19 1391 BP: 130/69 161/78  168/76 BP 1 Location:    Right arm BP Patient Position:    At rest  
Pulse: 77 79  81 Resp: 18 18  18 Temp: 98 °F (36.7 °C) 98 °F (36.7 °C)  98 °F (36.7 °C) SpO2: 94% 94% 97% 93% Weight:      
Height:      
 
 
Objective:  
 Physical Exam:  
Patient Vitals for the past 12 hrs: 
 Temp Pulse Resp BP SpO2  
12/07/19 0803 98 °F (36.7 °C) 81 18 168/76 93 % 12/07/19 0715     97 % 12/07/19 0632 98 °F (36.7 °C) 79 18 161/78 94 % 12/07/19 0533 98 °F (36.7 °C) 77 18 130/69 94 % 12/07/19 0430 97.9 °F (36.6 °C) 77 18 113/64 93 % 12/07/19 0402 98.1 °F (36.7 °C) 78 18 130/66 94 % 12/07/19 0346 98.1 °F (36.7 °C) 82 18 139/72 95 % 12/07/19 0313 98 °F (36.7 °C) 92 18 166/77 95 % 12/07/19 0006 98.7 °F (37.1 °C) 79 18 137/62 95 % General:  alert, cooperative, mild distress, appears stated age ENT, Neck:  no jvd Chest Wall: inspection normal - no chest wall deformities or tenderness, tachypnea Has subluxation of left rib/sternal margin,   
Lung: clear to auscultation bilaterally, diminished breath sounds R anterior, L anterior Heart:  A 1/6 soft systolic murmur is heard throughout the central precordium. , no gallops noted, irregularly irregular rhythm with rate mod, no JVD Abdomen: nondistended Extremities: extremities normal, atraumatic, no cyanosis, venous stasis dermatitis noted, edema 2-3+ to knees Last 24hr Input/Output: 
 
Intake/Output Summary (Last 24 hours) at 12/7/2019 1039 Last data filed at 12/7/2019 5264 Gross per 24 hour Intake 1677.6 ml Output 3325 ml Net -1647.4 ml Data Review:  
Recent Results (from the past 24 hour(s)) TYPE + CROSSMATCH Collection Time: 12/06/19 11:45 AM  
Result Value Ref Range Crossmatch Expiration 12/09/2019 ABO/Rh(D) A NEGATIVE Antibody screen NEG Unit number [id number] Blood component type  LR Unit division 00 Status of unit TRANSFUSED Crossmatch result Compatible Unit number [id number] Blood component type [facility] Unit division 00 Status of unit ISSUED Crossmatch result Compatible GLUCOSE, POC Collection Time: 12/06/19 11:54 AM  
Result Value Ref Range Glucose (POC) 279 (H) 65 - 100 mg/dL Performed by GARCIA PERALTA) ECHO ADULT FOLLOW-UP OR LIMITED Collection Time: 12/06/19  2:02 PM  
Result Value Ref Range LVIDd 4.70 4.2 - 5.9 cm  
 LVPWd 1.08 (A) 0.6 - 1.0 cm LVIDs 3.22 cm IVSd 1.09 (A) 0.6 - 1.0 cm  
 LV ED Vol A2C 87.9 mL  
 LV ES Vol A4C 46.3 mL  
 LV ES Vol BP 44.7 22 - 58 mL RVIDd 6.23 cm  
 BP EF 46.7 (A) 55 - 100 % LV Ejection Fraction MOD 4C 43 % LV Ejection Fraction MOD 2C 53 % LV Mass .1 88 - 224 g LV Mass AL Index 110.4 49 - 115 g/m2 LV ES Vol A2C 41.6 mL  
 LVES Vol Index BP 22.8 mL/m2 LV ED Vol A4C 80.6 mL  
 LVED Vol Index BP 42.9 mL/m2 LV ED Vol BP 83.9 67 - 155 ml  
 LVED Vol Index A4C 41.2 mL/m2  LVED Vol Index A2C 44.9 mL/m2 LVES Vol Index A4C 23.7 mL/m2 LVES Vol Index A2C 21.3 mL/m2 Left Ventricular Fractional Shortening by 2D [id number] % GLUCOSE, POC Collection Time: 12/06/19  4:40 PM  
Result Value Ref Range Glucose (POC) 166 (H) 65 - 100 mg/dL Performed by JESSE RUFFIN(CON) GLUCOSE, POC Collection Time: 12/06/19 10:50 PM  
Result Value Ref Range Glucose (POC) 244 (H) 65 - 100 mg/dL Performed by Marcie Keating GLUCOSE, POC Collection Time: 12/07/19  7:10 AM  
Result Value Ref Range Glucose (POC) 236 (H) 65 - 100 mg/dL Performed by Sarah Cortez Lab Results Component Value Date/Time Sodium 133 (L) 12/06/2019 03:51 AM  
 Potassium 3.4 (L) 12/06/2019 03:51 AM  
 Chloride 91 (L) 12/06/2019 03:51 AM  
 CO2 38 (H) 12/06/2019 03:51 AM  
 Anion gap 4 (L) 12/06/2019 03:51 AM  
 Glucose 171 (H) 12/06/2019 03:51 AM  
 BUN 60 (H) 12/06/2019 03:51 AM  
 Creatinine 2.46 (H) 12/06/2019 03:51 AM  
 BUN/Creatinine ratio 24 (H) 12/06/2019 03:51 AM  
 GFR est AA 31 (L) 12/06/2019 03:51 AM  
 GFR est non-AA 25 (L) 12/06/2019 03:51 AM  
 Calcium 7.9 (L) 12/06/2019 03:51 AM  
   
Medications Prior to Admission Medication Sig  
 ferrous sulfate (IRON) 325 mg (65 mg iron) tablet Take 325 mg by mouth Daily (before breakfast).  hydrALAZINE (APRESOLINE) 25 mg tablet Take 25 mg by mouth three (3) times daily.  bumetanide (BUMEX) 1 mg tablet Take  by mouth two (2) times a day. Take 3 mg in the morning and 2 mg in the evening as directed.  carvedilol (COREG) 12.5 mg tablet Take 12.5 mg by mouth two (2) times daily (with meals).  simvastatin (ZOCOR) 20 mg tablet Take 1 Tab by mouth nightly.  insulin glargine (LANTUS U-100 INSULIN) 100 unit/mL injection 5 Units by SubCUTAneous route every morning. Prescribed 18 units daily. Reports taking 10 units in the morning, then up to 8 units in the evening depending on his blood sugars. If blood sugar is <200, he reduces his evening dose.  albuterol (PROVENTIL VENTOLIN) 2.5 mg /3 mL (0.083 %) nebulizer solution 3 mL by Nebulization route every four (4) hours as needed for Wheezing.  sodium bicarbonate 650 mg tablet Take 2 Tabs by mouth three (3) times daily.  tamsulosin (FLOMAX) 0.4 mg capsule Take 1 Cap by mouth daily.  aspirin 81 mg chewable tablet Take 1 Tab by mouth daily.  omega 3-dha-epa-fish oil (FISH OIL) 100-160-1,000 mg cap Take 1 Cap by mouth two (2) times a day.  cinnamon bark (CINNAMON) 500 mg cap Take 1,000 mg by mouth two (2) times a day. Nunu Lee MD 12/7/2019

## 2019-12-07 NOTE — PROGRESS NOTES
Bedside and Verbal shift change report given to Edmund Hsu (oncoming nurse) by Walker Dsouza (offgoing nurse). Report included the following information SBAR, Kardex, Procedure Summary, Intake/Output, MAR, Recent Results, Med Rec Status and Cardiac Rhythm sinus rhythm.

## 2019-12-07 NOTE — PROGRESS NOTES
12/6/2019   Bryan Rosario MD  [facility] 
 
. Marc Ace Marc Miguel DraperSanta Maria Miguel Garrison is a 81 y.o. male No sob at rest, no cp Still with edema and mod AGUILAR 
hgb remains low so ordered 2 units, now tonight getting second unit Note sternal subluxation, incomplete fusion post CABG Discussion/Plans/Recs ---coreg, bumex IV, hydralazine, add isordil, check Fe panel, PrnoBNP in am 
Increase Fe sufl to TID, does he need iron infusion?? 
 
1. Acute on chronic diastolic CHF   
---BLE edema/Fluid overload: weight up approximately 20 + pounds at admit 
            due to pulmonary HTN, worsened renal function and acute on chronic DHF Plan for [facility] q 2 weeks for IV diuresis, if this is unsuccessful, he will have to make a decision about dialysis] .   
2. HFpEF/Pulmonary HTN : 
RHC with PA 77 in May Last echo EF 66-70%. Migdalia 1765 MR, mild-mod pulmonary HTN,  
            Diuretics as above Echo pending for today 3. CAD/XOL : 3/2008 s/p CABG x 3 off pump  
           Last stress test 2017 normal perfusion. 
           no chest pain, no cath due to CKD and risk for complete renal failure 4. Afib, chronic:  
           -has leadless ppm.  Continue coreg. 
           -[id number].  Not on [address] PTA 5. HTN -labile-very high today 6. PVD no current claudication see hx below Nothing normal on today's BMP Non CV co morbids: 
Anemia severe from chronic dz Few options so giving 2 units prbcs CKD 4 DM2 Lab Results Component Value Date/Time Creatinine 2.46 (H) 12/06/2019 03:51 AM  
   
 
Cardiac Studies/Hx: 
CAD/CABG  
off pump x3 3/2008 . =post op anemia and renal failure CATH March 7, 2008= LM -bifurcation dz 40% ;LAD 85% ; Circ ost 70% mid 70% RCA proximal 60% tapering, EF 60%-70%, bilaterally patent renal arteries, mild atherosclerosis of the distal abdominal aorta. [address] May 2, 2019 =PA 66/18 W 20 PA sat 55% CO 4.1 CI 2.12 Echo May 1 ,2019 EF 55-60% mod LAE, Mild CASSIE, Mild AS BELLE 1.6 cm2 mild MR & TR, RV fx mildly reduced PVD-18- RLE- mid CRA 70, Pop 99-PTA on right pop, LLE LCFA 40% - 17 PTA RCFA 90, JAS to RSFA  
-16 PTA Left Pop, PTA Left tib-per 
--3-13-14 PTA Left op PTA RSFA 
---11 stent RSFA  
JOCELYN 17 Normal perfusion Mild carotid artery disease 3-7-08 then 12 with 10-49% left, less than 10% on right SOCIAL: Drinks no alcohol, quit smoking several years ago, worked as an . Lives with his wife and has four children. Enjoys gardening, fishing and music. FAMILY HISTORY: Mother  of cancer at 76, father  of Parkinson's at 70, one brother  of cancer of the liver at 76 and one  of an infection at 64. Patient Vitals for the past 12 hrs: 
 Temp Pulse Resp BP SpO2  
19 1750 99.1 °F (37.3 °C) 76 18 144/63 96 % 19 1657 98.2 °F (36.8 °C) 77 18 171/85   
19 1630 97.7 °F (36.5 °C) 78 18 181/85 98 % 19 1608     97 % 19 1550 99.1 °F (37.3 °C) 78 18 173/81 97 % 19 1527 98.6 °F (37 °C) 79 20 164/70 95 % 19 1254 97.3 °F (36.3 °C) 77 20 163/73 95 % 19 0812 97.4 °F (36.3 °C) 78 20 112/57 93 % Past Medical History:  
Diagnosis Date  CAD (coronary artery disease) s/p CABG   [facility]  CKD (chronic kidney disease) stage 3, GFR 30-59 ml/min ([facility]) 10/21/2017  
 hypertension and DM nephrosclerosis  Diabetes mellitus, type 2 ([facility]  Hypercholesteremia  Hypertension  Paroxysmal atrial fibrillation ([facility] 10/21/2017  
 new onset afib with BRPR 10-19-17 admit  PVC's (premature ventricular contractions) 2014  PVD (peripheral vascular disease) ([facility] Social History Tobacco Use  Smoking status: Former Smoker Packs/day: 3.00 Years: 20.00 Pack years: 60.00 Types: Cigarettes Last attempt to quit: 1985 Years since quittin.9  Smokeless tobacco: Never Used Substance Use Topics  Alcohol use: No  
  
ROS-pertinents  negative except as above  The pertinent portions of the medical history,physician and nursing notes, meds,vitals , labs and Ins/Outs,are reviewed in the electronic record. Results for orders placed or performed during the hospital encounter of 12/03/19 EKG, 12 LEAD, INITIAL Result Value Ref Range Ventricular Rate 79 BPM  
 Atrial Rate 79 BPM  
 P-R Interval 320 ms QRS Duration 150 ms  
 Q-T Interval 468 ms QTC Calculation (Bezet) 536 ms Calculated R Axis -40 degrees Calculated T Axis -51 degrees Diagnosis Sinus rhythm with 1st degree AV block Left axis deviation Right bundle branch block When compared with ECG of 03-DEC-2019 08:31, Sinus rhythm has replaced Electronic ventricular pacemaker Confirmed by Bob Cross M.D., [facility] ([id number]) on 12/4/2019 9:41:56 AM 
  
Results for orders placed or performed in visit on 11/28/11 AMB POC EKG ROUTINE W/ 12 LEADS, INTER & REP Narrative See scanned results 08/05/19 ECHO ADULT COMPLETE 08/06/2019 8/6/2019 Narrative · Left Ventricle: Normal cavity size, systolic function (ejection fraction  
normal) and diastolic function. Mild concentric hypertrophy. Estimated  
left ventricular ejection fraction is 66 - 70%. Visually measured ejection  
fraction. No regional wall motion abnormality noted. Normal left  
ventricular strain. · Left Atrium: Moderately dilated left atrium. · Right Atrium: Mildly dilated right atrium. · Interatrial Septum: Color flow Doppler was used. · Aortic Valve: Mild aortic valve sclerosis with no significant stenosis. · Mitral Valve: Mitral valve thickening. Mild mitral valve regurgitation. · Tricuspid Valve: Mild tricuspid valve regurgitation is present. · Pulmonary Artery: Mild to moderate pulmonary hypertension.  
   
  Signed by: Isaac Nelson MD  
    
Vitals:  
 12/06/19 1608 12/06/19 1630 12/06/19 1657 12/06/19 1750 BP:  181/85 171/85 144/63 BP 1 Location:  Left arm  Left arm BP Patient Position:  At rest;Supine  At rest  
Pulse:  78 77 76 Resp:  18 18 18 Temp:  97.7 °F (36.5 °C) 98.2 °F (36.8 °C) 99.1 °F (37.3 °C) SpO2: 97% 98%  96% Weight:      
Height:      
 
 
Objective:  
 Physical Exam:  
Patient Vitals for the past 12 hrs: 
 Temp Pulse Resp BP SpO2  
12/06/19 1750 99.1 °F (37.3 °C) 76 18 144/63 96 % 12/06/19 1657 98.2 °F (36.8 °C) 77 18 171/85   
12/06/19 1630 97.7 °F (36.5 °C) 78 18 181/85 98 % 12/06/19 1608     97 % 12/06/19 1550 99.1 °F (37.3 °C) 78 18 173/81 97 % 12/06/19 1527 98.6 °F (37 °C) 79 20 164/70 95 % 12/06/19 1254 97.3 °F (36.3 °C) 77 20 163/73 95 % 12/06/19 0812 97.4 °F (36.3 °C) 78 20 112/57 93 % General:  alert, cooperative, mild distress, appears stated age ENT, Neck:  no jvd Chest Wall: inspection normal - no chest wall deformities or tenderness, tachypnea Has subluxation of left rib/sternal margin,   
Lung: clear to auscultation bilaterally, diminished breath sounds R anterior, L anterior Heart:  no murmurs noted, no gallops noted, irregularly irregular rhythm with rate mod, no JVD Abdomen: nondistended Extremities: extremities normal, atraumatic, no cyanosis or edema, venous stasis dermatitis noted, edema 2-3+ to knees Last 24hr Input/Output: 
 
Intake/Output Summary (Last 24 hours) at 12/6/2019 1906 Last data filed at 12/6/2019 1659 Gross per 24 hour Intake 1850 ml Output 2865 ml Net -1015 ml Data Review:  
Recent Results (from the past 24 hour(s)) GLUCOSE, POC Collection Time: 12/05/19 10:23 PM  
Result Value Ref Range Glucose (POC) 207 (H) 65 - 100 mg/dL Performed by Brandon Reyes RENAL FUNCTION PANEL Collection Time: 12/06/19  3:51 AM  
Result Value Ref Range Sodium 133 (L) 136 - 145 mmol/L Potassium 3.4 (L) 3.5 - 5.1 mmol/L Chloride 91 (L) 97 - 108 mmol/L  
 CO2 38 (H) 21 - 32 mmol/L  Anion gap 4 (L) 5 - 15 mmol/L Glucose 171 (H) 65 - 100 mg/dL BUN 60 (H) 6 - 20 MG/DL Creatinine 2.46 (H) 0.70 - 1.30 MG/DL  
 BUN/Creatinine ratio 24 (H) 12 - 20 GFR est AA 31 (L) >60 ml/min/1.73m2 GFR est non-AA 25 (L) >60 ml/min/1.73m2 Calcium 7.9 (L) 8.5 - 10.1 MG/DL Phosphorus 2.9 2.6 - 4.7 MG/DL Albumin 1.8 (L) 3.5 - 5.0 g/dL CBC W/O DIFF Collection Time: 12/06/19  3:51 AM  
Result Value Ref Range WBC 6.7 4.1 - 11.1 K/uL  
 RBC 2.83 (L) 4.10 - 5.70 M/uL HGB 7.4 (L) 12.1 - 17.0 g/dL HCT 23.8 (L) 36.6 - 50.3 % MCV 84.1 80.0 - 99.0 FL  
 MCH 26.1 26.0 - 34.0 PG  
 MCHC 31.1 30.0 - 36.5 g/dL  
 RDW 16.8 (H) 11.5 - 14.5 % PLATELET 960 874 - 931 K/uL MPV 12.0 8.9 - 12.9 FL  
 NRBC 0.0 0  WBC ABSOLUTE NRBC 0.00 0.00 - 0.01 K/uL GLUCOSE, POC Collection Time: 12/06/19  6:53 AM  
Result Value Ref Range Glucose (POC) 167 (H) 65 - 100 mg/dL Performed by Cyrilla Ahumada PCT   
TYPE + CROSSMATCH Collection Time: 12/06/19 11:45 AM  
Result Value Ref Range Crossmatch Expiration 12/09/2019 ABO/Rh(D) A NEGATIVE Antibody screen NEG Unit number [id number] Blood component type [facility] Unit division 00 Status of unit ISSUED Crossmatch result Compatible Unit number [id number] Blood component type [facility] Unit division 00 Status of unit ALLOCATED Crossmatch result Compatible GLUCOSE, POC Collection Time: 12/06/19 11:54 AM  
Result Value Ref Range Glucose (POC) 279 (H) 65 - 100 mg/dL Performed by GARETT PERALTA) ECHO ADULT FOLLOW-UP OR LIMITED Collection Time: 12/06/19  2:02 PM  
Result Value Ref Range LVIDd 4.70 4.2 - 5.9 cm  
 LVPWd 1.08 (A) 0.6 - 1.0 cm LVIDs 3.22 cm IVSd 1.09 (A) 0.6 - 1.0 cm  
 LV ED Vol A2C 87.9 mL  
 LV ES Vol A4C 46.3 mL  
 LV ES Vol BP 44.7 22 - 58 mL RVIDd 6.23 cm  
 BP EF 46.7 (A) 55 - 100 % LV Ejection Fraction MOD 4C 43 %  LV Ejection Fraction MOD 2C 53 % LV Mass .1 88 - 224 g LV Mass AL Index 110.4 49 - 115 g/m2 LV ES Vol A2C 41.6 mL  
 LVES Vol Index BP 22.8 mL/m2 LV ED Vol A4C 80.6 mL  
 LVED Vol Index BP 42.9 mL/m2 LV ED Vol BP 83.9 67 - 155 ml  
 LVED Vol Index A4C 41.2 mL/m2 LVED Vol Index A2C 44.9 mL/m2 LVES Vol Index A4C 23.7 mL/m2 LVES Vol Index A2C 21.3 mL/m2 Left Ventricular Fractional Shortening by 2D [id number] % GLUCOSE, POC Collection Time: 12/06/19  4:40 PM  
Result Value Ref Range Glucose (POC) 166 (H) 65 - 100 mg/dL Performed by KEON PERALTA) Lab Results Component Value Date/Time Sodium 133 (L) 12/06/2019 03:51 AM  
 Potassium 3.4 (L) 12/06/2019 03:51 AM  
 Chloride 91 (L) 12/06/2019 03:51 AM  
 CO2 38 (H) 12/06/2019 03:51 AM  
 Anion gap 4 (L) 12/06/2019 03:51 AM  
 Glucose 171 (H) 12/06/2019 03:51 AM  
 BUN 60 (H) 12/06/2019 03:51 AM  
 Creatinine 2.46 (H) 12/06/2019 03:51 AM  
 BUN/Creatinine ratio 24 (H) 12/06/2019 03:51 AM  
 GFR est AA 31 (L) 12/06/2019 03:51 AM  
 GFR est non-AA 25 (L) 12/06/2019 03:51 AM  
 Calcium 7.9 (L) 12/06/2019 03:51 AM  
   
Medications Prior to Admission Medication Sig  
 ferrous sulfate (IRON) 325 mg (65 mg iron) tablet Take 325 mg by mouth Daily (before breakfast).  hydrALAZINE (APRESOLINE) 25 mg tablet Take 25 mg by mouth three (3) times daily.  bumetanide (BUMEX) 1 mg tablet Take  by mouth two (2) times a day. Take 3 mg in the morning and 2 mg in the evening as directed.  carvedilol (COREG) 12.5 mg tablet Take 12.5 mg by mouth two (2) times daily (with meals).  simvastatin (ZOCOR) 20 mg tablet Take 1 Tab by mouth nightly.  insulin glargine (LANTUS U-100 INSULIN) 100 unit/mL injection 5 Units by SubCUTAneous route every morning. Prescribed 18 units daily. Reports taking 10 units in the morning, then up to 8 units in the evening depending on his blood sugars. If blood sugar is <200, he reduces his evening dose.   
 albuterol (PROVENTIL VENTOLIN) 2.5 mg /3 mL (0.083 %) nebulizer solution 3 mL by Nebulization route every four (4) hours as needed for Wheezing.  sodium bicarbonate 650 mg tablet Take 2 Tabs by mouth three (3) times daily.  tamsulosin (FLOMAX) 0.4 mg capsule Take 1 Cap by mouth daily.  aspirin 81 mg chewable tablet Take 1 Tab by mouth daily.  omega 3-dha-epa-fish oil (FISH OIL) 100-160-1,000 mg cap Take 1 Cap by mouth two (2) times a day.  cinnamon bark (CINNAMON) 500 mg cap Take 1,000 mg by mouth two (2) times a day. Erik Garcia MD 12/6/2019

## 2019-12-07 NOTE — PROGRESS NOTES
[facility] 
 [address] [address], [address] [facility] Phone: [phone]   [phone]   
  
Nephrology Progress Note Darrick Sunshine.     1935     [id number] Date of Admission : 12/3/2019 
12/07/19 CC:  Follow up for CKD 4 Assessment and Plan CKD IV: 
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 
- baseline Cr at best : 2.7- 3 mg/dl when euvolemic - UPCR - 11 GM in 5/19  
- labs pending for 2 days  
  
Volume Overload: 
-continue Bumex 3 mg BID 
- worse after transfusions  
- one dose metolazone 5 mg today if Na stable  
  
Hyponatremia 
-due to volume overload and metolazone 
  
Acute hypoxic respiratory failure 
-Injury to pulmonary edema 
-Has underlying COPD 
-CT chest on admission suggest possible pulmonary fibrosis airspace disease 
-History of right-sided pneumonia in August 
  
Metabolic acidosis 
-stopped oral Bicarb  
  
HTN : 
- meds being adjusted by cards  
  
Pulm HTN: 
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  
  
Bradycardia : 
- s/p PPM on 5/9 
  
Anemia of CKD: 
-Worsening anemia likely secondary to CKD 
-He has been off MICHAEL for a year - EPO reordered, but iron stores very low. - IV iron ordered. - s/p 2 units RBC in last 24 hrs  
  
Chronic A. fib Chronic diastolic congestive heart failure CAD s/p CABG 
  
DM2: 
- on insulin 
  
Care Plan discussed with: Patient Interval History: 
Transfused one unit yesterday and obne overnight No change in SOB Review of Systems: Pertinent items are noted in HPI. Current Medications:  
Current Facility-Administered Medications Medication Dose Route Frequency  hydrALAZINE (APRESOLINE) tablet 50 mg  50 mg Oral TID  
 0.9% sodium chloride infusion 250 mL  250 mL IntraVENous PRN  
 iron sucrose (VENOFER) injection 200 mg  200 mg IntraVENous DAILY  epoetin dm-epbx (RETACRIT) injection 20,000 Units  20,000 Units SubCUTAneous Q MON, WED & FRI  bumetanide (BUMEX) injection 1 mg  1 mg IntraVENous PRN  
 hydrALAZINE (APRESOLINE) 20 mg/mL injection 10 mg  10 mg IntraVENous Q6H PRN  
 isosorbide dinitrate (ISORDIL) tablet 20 mg  20 mg Oral TID  ferrous sulfate tablet 325 mg  1 Tab Oral BID WITH MEALS  
 albuterol (PROVENTIL VENTOLIN) nebulizer solution 2.5 mg  2.5 mg Nebulization Q6H RT  
 guaiFENesin (ROBITUSSIN) 100 mg/5 mL oral liquid 100 mg  100 mg Oral Q4H PRN  
 guaiFENesin (ROBITUSSIN) 100 mg/5 mL oral liquid 400 mg  400 mg Oral Q6H  
 sodium chloride (NS) flush 5-40 mL  5-40 mL IntraVENous Q8H  
 sodium chloride (NS) flush 5-40 mL  5-40 mL IntraVENous PRN  
 acetaminophen (TYLENOL) tablet 650 mg  650 mg Oral Q4H PRN  
 heparin (porcine) injection 5,000 Units  5,000 Units SubCUTAneous Q8H  
 aspirin chewable tablet 81 mg  81 mg Oral DAILY  carvedilol (COREG) tablet 12.5 mg  12.5 mg Oral BID WITH MEALS  simvastatin (ZOCOR) tablet 20 mg  20 mg Oral QHS  tamsulosin (FLOMAX) capsule 0.4 mg  0.4 mg Oral DAILY  bumetanide (BUMEX) injection 3 mg  3 mg IntraVENous Q12H  
 insulin lispro (HUMALOG) injection   SubCUTAneous AC&HS  
 glucose chewable tablet 16 g  4 Tab Oral PRN  
 glucagon (GLUCAGEN) injection 1 mg  1 mg IntraMUSCular PRN  
 dextrose 10% infusion 0-250 mL  0-250 mL IntraVENous PRN Allergies Allergen Reactions  Lisinopril Cough Objective: 
Vitals:   
Vitals:  
 12/07/19 4675 12/07/19 5472 12/07/19 0715 12/07/19 3707 BP: 130/69 161/78  168/76 Pulse: 77 79  81 Resp: 18 18  18 Temp: 98 °F (36.7 °C) 98 °F (36.7 °C)  98 °F (36.7 °C) SpO2: 94% 94% 97% 93% Weight:      
Height:      
 
Intake and Output: 
12/07 0701 - 12/07 1900 In: 400 [P.O.:400] Out: 425 [Urine:425] 12/05 1901 - 12/07 0700 In: 2227.6 [P.O.:1900] Out: 4840 [QCIHJ:7402] Physical Examination: 
General: Frail and ill-looking, SOB at rest 
HEENT: PERRL,  ++ Pallor , No Icterus Neck: Supple,no mass palpable Lungs : B/L rales CVS: irregular, S1 S2 normal, systolic murmur Abdomen: Soft, NT, BS + Extremities: 2+ Edema Skin: No rash or lesions. MS: No joint swelling, erythema, warmth Neurologic: non focal, AAO x 3 Psych: normal affect 
 
[]    High complexity decision making was performed 
[]    Patient is at high-risk of decompensation with multiple organ involvement Lab Data Personally Reviewed: I have reviewed all the pertinent labs, microbiology data and radiology studies during assessment. Recent Labs 12/06/19 
0351 12/05/19 
1002 * 132* K 3.4* 3.5 CL 91* 92* CO2 38* 36* * 218* BUN 60* 62* CREA 2.46* 2.61* CA 7.9* 7.9*  
MG  --  2.1 PHOS 2.9  --   
ALB 1.8*  --   
 
Recent Labs 12/06/19 
0351 12/05/19 
1002 WBC 6.7 6.3 HGB 7.4* 7.1*  
HCT 23.8* 23.2*  
 162 No results found for: SDES Lab Results Component Value Date/Time Culture result: (A) 12/04/2019 03:27 AM  
  FEW * METHICILLIN RESISTANT STAPHYLOCOCCUS AUREUS * Culture result: CHECKING FOR POSSIBLE LIGHT GRAM NEGATIVE RODS (A) 12/04/2019 03:27 AM  
 Culture result: HEAVY NORMAL RESPIRATORY JUDE 12/04/2019 03:27 AM  
 Culture result: NO GROWTH 5 DAYS 08/27/2019 05:55 PM  
 Culture result: NO GROWTH 5 DAYS 08/05/2019 10:46 AM  
 
Recent Results (from the past 24 hour(s)) TYPE + CROSSMATCH Collection Time: 12/06/19 11:45 AM  
Result Value Ref Range Crossmatch Expiration 12/09/2019 ABO/Rh(D) A NEGATIVE Antibody screen NEG Unit number [id number] Blood component type  LR Unit division 00 Status of unit TRANSFUSED Crossmatch result Compatible Unit number [id number] Blood component type  LR Unit division 00 Status of unit ISSUED Crossmatch result Compatible GLUCOSE, POC Collection Time: 12/06/19 11:54 AM  
Result Value Ref Range Glucose (POC) 279 (H) 65 - 100 mg/dL Performed by ANGY RUFFIN([place]) ECHO ADULT FOLLOW-UP OR LIMITED  Collection Time: 12/06/19  2:02 PM Result Value Ref Range LVIDd 4.70 4.2 - 5.9 cm  
 LVPWd 1.08 (A) 0.6 - 1.0 cm LVIDs 3.22 cm IVSd 1.09 (A) 0.6 - 1.0 cm  
 LV ED Vol A2C 87.9 mL  
 LV ES Vol A4C 46.3 mL  
 LV ES Vol BP 44.7 22 - 58 mL RVIDd 6.23 cm  
 BP EF 46.7 (A) 55 - 100 % LV Ejection Fraction MOD 4C 43 % LV Ejection Fraction MOD 2C 53 % LV Mass .1 88 - 224 g LV Mass AL Index 110.4 49 - 115 g/m2 LV ES Vol A2C 41.6 mL  
 LVES Vol Index BP 22.8 mL/m2 LV ED Vol A4C 80.6 mL  
 LVED Vol Index BP 42.9 mL/m2 LV ED Vol BP 83.9 67 - 155 ml  
 LVED Vol Index A4C 41.2 mL/m2 LVED Vol Index A2C 44.9 mL/m2 LVES Vol Index A4C 23.7 mL/m2 LVES Vol Index A2C 21.3 mL/m2 Left Ventricular Fractional Shortening by 2D [id number] % GLUCOSE, POC Collection Time: 12/06/19  4:40 PM  
Result Value Ref Range Glucose (POC) 166 (H) 65 - 100 mg/dL Performed by [facility]) GLUCOSE, POC Collection Time: 12/06/19 10:50 PM  
Result Value Ref Range Glucose (POC) 244 (H) 65 - 100 mg/dL Performed by Eunice [facility] GLUCOSE, POC Collection Time: 12/07/19  7:10 AM  
Result Value Ref Range Glucose (POC) 236 (H) 65 - 100 mg/dL Performed by Uli Rubalcava I have reviewed the flowsheets. Chart and Pertinent Notes have been reviewed. No change in PMH ,family and social history from Consult note.  
 
 
Johnna Ayon MD

## 2019-12-07 NOTE — PROGRESS NOTES
ADULT PROTOCOL: JET AEROSOL  REASSESSMENT Patient  Sandra Ramos.     81 y.o.   male     12/6/2019  9:16 PM 
 
Breath Sounds Pre Procedure: Right Breath Sounds: Diminished Left Breath Sounds: Diminished Breath Sounds Post Procedure: Right Breath Sounds: Diminished Left Breath Sounds: Diminished Breathing pattern: Pre procedure Breathing Pattern: Regular Post procedure Breathing Pattern: Dyspnea at rest 
 
Heart Rate: Pre procedure Pulse: 78 Post procedure Pulse: 80 Resp Rate: Pre procedure Respirations: 20 
         Post procedure Respirations: 20 Peak Flow: Pre bronchodilator Post bronchodilator FVC/FEV1:  . Incentive Spirometry:    
     
 
Cough: Pre procedure Cough: Dry, Non-productive Post procedure Suctioned: NO Sputum: Pre procedure Post procedure Oxygen: O2 Device: Nasal cannula   Flow rate (L/min) 2 lpm 
   Changed: NO SpO2: Pre procedure SpO2: 95 %   with oxygen Post procedure SpO2: 96 %  with oxygen Nebulizer Therapy: Current medications Aerosolized Medications: Albuterol Changed: NO Smoking History: Yes 
yes Problem List:  
Patient Active Problem List  
Diagnosis Code  Bradycardia R00.1  CAD (coronary artery disease) I25.10  Hypertension, essential, benign I10  
 Carotid arterial disease ([facility]) I73.9  Hypercholesteremia E78.00  
 PVD (peripheral vascular disease) ([facility]) I73.9  PVC's (premature ventricular contractions) I49.3  Dyspnea R06.00  Type 2 diabetes mellitus with diabetic peripheral angiopathy without gangrene ([facility]) E11.51  
 CKD (chronic kidney disease) N18.9  Acute renal failure (ARF) ([facility]) N17.9  Hypokalemia E87.6  Generalized weakness R53.1  Elevated troponin R79.89  
 KATALINA (acute kidney injury) ([facility] N17.9  GI bleed K92.2  Hyperglycemia due to type 2 diabetes mellitus ([facility] E11.65  Atrial fibrillation, chronic I48.20  CKD (chronic kidney disease) stage 3, GFR 30-59 ml/min ([facility]) N18.3  Type 2 diabetes with nephropathy ([facility]) E11.21  
 CHF (congestive heart failure) ([facility]) I50.9  Atrial fibrillation with slow ventricular response ([facility]) I48.91  
 Pacemaker Z95.0  Hypoglycemia E16.2  CHF exacerbation ([facility]) I50.9 Respiratory Therapist: April Segun Sen RT

## 2019-12-07 NOTE — PROGRESS NOTES
[address] [facility] Adult  Hospitalist Group Hospitalist Progress Note Lala Barron MD 
Answering service: [phone] OR [phone] from in house phone Date of Service:  2019 NAME:  Amparo Mccoy Sr. 
:  1935 MRN:  [id number] Admission Summary: A 81year old male patient with PMH of CAD s/p CABG, Diastolic heart failure, Afib , s/p PPM, CKD stage III, HTN, HLD presented to Ed for evaluation of shortness of breath. Patient has noticed progressive sob since few days and follows with Dr. Conrad Thorpe for CHF. he noticed more than 4lbs weight gain in last few days. This morning, he woke up with sob and so he came to ED. He noticed worsening leg swelling, no chest pain. No change in cough but sputum appears to be more dark in color. No fever or chills. No sick contacts. He also mentions that his BG was low in 80;s this AM 
In ED, he was given IV bumex. Hospitalist consulted for admission. Interval history / Subjective:  
Pt is feeling better. Breathing and leg swelling continue to improve. Sleeping in the recliner chair. No overnight events. No other concerns. Discussed with cardiology and nurse on the floor. Assessment & Plan:  
 
Shortness of breath likely due to Acute on chronic diastolic heart failure Chronic hypoxemic respiratory failure on 2LPM O2 NC - baseline - Continue telemetry for now. - BNP >35,000 on admission 
- CT chest:  Small bilateral pleural effusions with underlying atelectasis. - recent Echo 2019: EF 66 - 70%. Mild to moderate pulmonary hypertension.  
- cardiology on board. Appreciate. - Cont iV bumex 3mg bid. Also, received Metolazone 5mg once today. - c/w aspirin, coreg, hydralazine, statin  
- cards likely plan for RHC with vasodilator challenge. - Would require watchful monitoring for volume status after 2PRBC  
  
KATALINA on CKD - Cr 2.58 on poa, baseline 1.8-1.9 
- likely CRS 
- nephrology on board. Appreciate. Close monitoring. 
  
Afib - rate controlled on . On aspirin  
  
HTN - uncontrolled on poa.  
-Uptitrated home meds coreg, hydralazine 
-BP better controlled. Caution with approach to euvolemia DM- A1c 7.7. c/w ISS 
  
Hypokalemia - replete and monitor as needed 
  
Chronic anemia, s/p PRBCx2 12/6/19 
- fobt negative. Iron deficient. On iron supplements - Hb low stable - 2 PRBC per cardiology team 12/6/19. Will monitor closely 
  
Advanced COPD and emphysema 
- CT chest: Diffuse interstitial opacities again noted progressive in the upper lobes bilaterally. Underlying pulmonary fibrosis is considered. - check PFT's. - normal lactic acid  
- resp panel and sputum cx ordered 
- less likely infectious  
  
Hx of CAD: s/p CAG in 2003. - no chest pain. Trop neg Hx of PVD s/p arthrectomy to distal SFA and distal popliteal with angioplasty to both lesions and stent to SFA by Dr. Sam Horvath 3/2014 
  
DVT ppx: Heparin Code status: Full. Disposition: TBD. [facility] with SN/PT [place] Care Plan discussed with: Patient/Family and Nurse Hospital Problems  Date Reviewed: 11/3/2019 Codes Class Noted POA  
 CHF exacerbation ([facility] ICD-10-CM: I50.9 ICD-9-CM: 428.0  12/3/2019 Unknown Review of Systems: A comprehensive review of systems was negative except for that written in the HPI. Vital Signs:  
 Last 24hrs VS reviewed since prior progress note. Most recent are: 
Visit Vitals /74 (BP 1 Location: Left arm, BP Patient Position: At rest) Pulse 77 Temp 98.3 °F (36.8 °C) Resp 17 Ht 5' 9\" (1.753 m) Wt 77.4 kg (170 lb 10.2 oz) SpO2 93% BMI 25.20 kg/m² Intake/Output Summary (Last 24 hours) at 12/7/2019 6489 Last data filed at 12/7/2019 1519 Gross per 24 hour Intake 1077.6 ml Output 2850 ml Net -1772.4 ml Physical Examination:  
 
Gen: mild distress due to sob. Elderly HEENT: anicteric sclerae, normal conjunctiva, oropharynx clear, MM moist 
Neck: supple, trachea midline, no adenopathy Heart: RRR, no MRG, no JVD, 2+  edema Lungs: Improved air entry, minimal to no basal crackle. No wheezing Abd: soft, NT, ND, BS+, no organomegaly Extr: warm Skin: dry, no rash Neuro: CN II-XII grossly intact, normal speech, moves all extremities Psych: normal mood, appropriate affect Data Review:  
 Review and/or order of clinical lab test 
Review and/or order of tests in the radiology section of CPT Review and/or order of tests in the medicine section of CPT Labs:  
 
Recent Labs 12/06/19 
0351 12/05/19 
1002 WBC 6.7 6.3 HGB 7.4* 7.1*  
HCT 23.8* 23.2*  
 162 Recent Labs 12/07/19 
1027 12/06/19 
0351 12/05/19 
1002 * 133* 132* K 3.6 3.4* 3.5 CL 91* 91* 92* CO2 38* 38* 36*  
BUN 62* 60* 62* CREA 2.47* 2.46* 2.61* * 171* 218* CA 7.8* 7.9* 7.9*  
MG  --   --  2.1 PHOS 2.1* 2.9  --   
 
Recent Labs 12/07/19 
1027 12/06/19 
0351 ALB 1.7* 1.8* No results for input(s): INR, PTP, APTT, INREXT, INREXT in the last 72 hours. Recent Labs 12/07/19 
1027 TIBC 302 PSAT INDETERMINATE - SENT TO REFERENCE LAB FOR ADDITIONAL TESTING. Lab Results Component Value Date/Time Folate 11.3 12/03/2019 08:44 AM  
  
No results for input(s): PH, PCO2, PO2 in the last 72 hours. No results for input(s): CPK, CKNDX, TROIQ in the last 72 hours. No lab exists for component: CPKMB No results found for: CHOL, CHOLX, CHLST, CHOLV, HDL, HDLP, LDL, LDLC, DLDLP, TGLX, TRIGL, TRIGP, CHHD, CHHDX Lab Results Component Value Date/Time Glucose (POC) 180 (H) 12/07/2019 11:44 AM  
 Glucose (POC) 236 (H) 12/07/2019 07:10 AM  
 Glucose (POC) 244 (H) 12/06/2019 10:50 PM  
 Glucose (POC) 166 (H) 12/06/2019 04:40 PM  
 Glucose (POC) 279 (H) 12/06/2019 11:54 AM  
 
Lab Results Component Value Date/Time  Color YELLOW/STRAW 08/27/2019 12:56 PM Appearance CLEAR 08/27/2019 12:56 PM  
 Specific gravity 1.023 08/27/2019 12:56 PM  
 pH (UA) 6.0 08/27/2019 12:56 PM  
 Protein >300 (A) 08/27/2019 12:56 PM  
 Glucose 500 (A) 08/27/2019 12:56 PM  
 Ketone NEGATIVE  08/27/2019 12:56 PM  
 Bilirubin NEGATIVE  08/27/2019 12:56 PM  
 Urobilinogen 0.2 08/27/2019 12:56 PM  
 Nitrites NEGATIVE  08/27/2019 12:56 PM  
 Leukocyte Esterase NEGATIVE  08/27/2019 12:56 PM  
 Epithelial cells FEW 08/27/2019 12:56 PM  
 Bacteria NEGATIVE  08/27/2019 12:56 PM  
 WBC 0-4 08/27/2019 12:56 PM  
 RBC 5-10 08/27/2019 12:56 PM  
 
 
Xr Chest Pa Lat Result Date: 12/3/2019 IMPRESSION: Diffuse interstitial opacities are overall unchanged since 8/27/2019 and may represent chronic lung parenchymal change versus pulmonary edema. Superimposed patchy airspace opacities are increased in the right upper lung and left lower lung and may represent edema, infection, or atelectasis. Ct Chest Wo Cont Result Date: 12/3/2019 IMPRESSION: Diffuse interstitial opacities again noted progressive in the upper lobes bilaterally. Underlying pulmonary fibrosis is considered. Small bilateral pleural effusions with underlying atelectasis. Cardiomegaly. Cholelithiasis. Us Retroperitoneum Comp Result Date: 12/4/2019 IMPRESSION: Increased bilateral renal cortical echogenicity is compatible with medical renal disease. Bilateral renal cysts. No hydronephrosis. Medications Reviewed:  
 
Current Facility-Administered Medications Medication Dose Route Frequency  ferric citrate (AURYXIA) tablet 630 mg  630 mg Oral DAILY WITH LUNCH  
 hydrALAZINE (APRESOLINE) tablet 50 mg  50 mg Oral TID  
 0.9% sodium chloride infusion 250 mL  250 mL IntraVENous PRN  
 iron sucrose (VENOFER) injection 200 mg  200 mg IntraVENous DAILY  epoetin dm-epbx (RETACRIT) injection 20,000 Units  20,000 Units SubCUTAneous Q MON, WED & FRI  bumetanide (BUMEX) injection 1 mg  1 mg IntraVENous PRN  
 hydrALAZINE (APRESOLINE) 20 mg/mL injection 10 mg  10 mg IntraVENous Q6H PRN  
 isosorbide dinitrate (ISORDIL) tablet 20 mg  20 mg Oral TID  albuterol (PROVENTIL VENTOLIN) nebulizer solution 2.5 mg  2.5 mg Nebulization Q6H RT  
 guaiFENesin (ROBITUSSIN) 100 mg/5 mL oral liquid 100 mg  100 mg Oral Q4H PRN  
 guaiFENesin (ROBITUSSIN) 100 mg/5 mL oral liquid 400 mg  400 mg Oral Q6H  
 sodium chloride (NS) flush 5-40 mL  5-40 mL IntraVENous Q8H  
 sodium chloride (NS) flush 5-40 mL  5-40 mL IntraVENous PRN  
 acetaminophen (TYLENOL) tablet 650 mg  650 mg Oral Q4H PRN  
 heparin (porcine) injection 5,000 Units  5,000 Units SubCUTAneous Q8H  
 aspirin chewable tablet 81 mg  81 mg Oral DAILY  carvedilol (COREG) tablet 12.5 mg  12.5 mg Oral BID WITH MEALS  simvastatin (ZOCOR) tablet 20 mg  20 mg Oral QHS  tamsulosin (FLOMAX) capsule 0.4 mg  0.4 mg Oral DAILY  bumetanide (BUMEX) injection 3 mg  3 mg IntraVENous Q12H  
 insulin lispro (HUMALOG) injection   SubCUTAneous AC&HS  
 glucose chewable tablet 16 g  4 Tab Oral PRN  
 glucagon (GLUCAGEN) injection 1 mg  1 mg IntraMUSCular PRN  
 dextrose 10% infusion 0-250 mL  0-250 mL IntraVENous PRN  
 
______________________________________________________________________ EXPECTED LENGTH OF STAY: - - - 
ACTUAL LENGTH OF STAY:          4 Brandon Tan MD

## 2019-12-07 NOTE — PROGRESS NOTES
Bedside shift change report given to [address] [place] [address] Avenue (oncoming nurse) by Cori Ferrara (offgoing nurse). Report included the following information SBAR, Kardex, Intake/Output, Recent Results, Med Rec Status and Cardiac Rhythm NSR, BBB.

## 2019-12-08 LAB
ABO + RH BLD: NORMAL
ALBUMIN SERPL-MCNC: 1.8 G/DL (ref 3.5–5)
ANION GAP SERPL CALC-SCNC: 4 MMOL/L (ref 5–15)
ANION GAP SERPL CALC-SCNC: 5 MMOL/L (ref 5–15)
BLD PROD TYP BPU: NORMAL
BLD PROD TYP BPU: NORMAL
BLOOD GROUP ANTIBODIES SERPL: NORMAL
BPU ID: NORMAL
BPU ID: NORMAL
BUN SERPL-MCNC: 60 MG/DL (ref 6–20)
BUN SERPL-MCNC: 61 MG/DL (ref 6–20)
BUN/CREAT SERPL: 22 (ref 12–20)
BUN/CREAT SERPL: 23 (ref 12–20)
CALCIUM SERPL-MCNC: 8 MG/DL (ref 8.5–10.1)
CALCIUM SERPL-MCNC: 8 MG/DL (ref 8.5–10.1)
CHLORIDE SERPL-SCNC: 89 MMOL/L (ref 97–108)
CHLORIDE SERPL-SCNC: 89 MMOL/L (ref 97–108)
CO2 SERPL-SCNC: 40 MMOL/L (ref 21–32)
CO2 SERPL-SCNC: 40 MMOL/L (ref 21–32)
CREAT SERPL-MCNC: 2.61 MG/DL (ref 0.7–1.3)
CREAT SERPL-MCNC: 2.72 MG/DL (ref 0.7–1.3)
CROSSMATCH RESULT,%XM: NORMAL
CROSSMATCH RESULT,%XM: NORMAL
ERYTHROCYTE [DISTWIDTH] IN BLOOD BY AUTOMATED COUNT: 16.6 % (ref 11.5–14.5)
GLUCOSE BLD STRIP.AUTO-MCNC: 166 MG/DL (ref 65–100)
GLUCOSE BLD STRIP.AUTO-MCNC: 181 MG/DL (ref 65–100)
GLUCOSE BLD STRIP.AUTO-MCNC: 196 MG/DL (ref 65–100)
GLUCOSE BLD STRIP.AUTO-MCNC: 204 MG/DL (ref 65–100)
GLUCOSE BLD STRIP.AUTO-MCNC: 208 MG/DL (ref 65–100)
GLUCOSE SERPL-MCNC: 201 MG/DL (ref 65–100)
GLUCOSE SERPL-MCNC: 210 MG/DL (ref 65–100)
HCT VFR BLD AUTO: 27.9 % (ref 36.6–50.3)
HGB BLD-MCNC: 8.9 G/DL (ref 12.1–17)
MAGNESIUM SERPL-MCNC: 1.7 MG/DL (ref 1.6–2.4)
MCH RBC QN AUTO: 27.1 PG (ref 26–34)
MCHC RBC AUTO-ENTMCNC: 31.9 G/DL (ref 30–36.5)
MCV RBC AUTO: 85.1 FL (ref 80–99)
NRBC # BLD: 0 K/UL (ref 0–0.01)
NRBC BLD-RTO: 0 PER 100 WBC
PHOSPHATE SERPL-MCNC: 2.3 MG/DL (ref 2.6–4.7)
PLATELET # BLD AUTO: 190 K/UL (ref 150–400)
PMV BLD AUTO: 10.8 FL (ref 8.9–12.9)
POTASSIUM SERPL-SCNC: 3.3 MMOL/L (ref 3.5–5.1)
POTASSIUM SERPL-SCNC: 3.3 MMOL/L (ref 3.5–5.1)
RBC # BLD AUTO: 3.28 M/UL (ref 4.1–5.7)
SERVICE CMNT-IMP: ABNORMAL
SODIUM SERPL-SCNC: 133 MMOL/L (ref 136–145)
SODIUM SERPL-SCNC: 134 MMOL/L (ref 136–145)
SPECIMEN EXP DATE BLD: NORMAL
STATUS OF UNIT,%ST: NORMAL
STATUS OF UNIT,%ST: NORMAL
UNIT DIVISION, %UDIV: 0
UNIT DIVISION, %UDIV: 0
WBC # BLD AUTO: 7.7 K/UL (ref 4.1–11.1)

## 2019-12-08 PROCEDURE — 74011250637 HC RX REV CODE- 250/637: Performed by: PHYSICIAN ASSISTANT

## 2019-12-08 PROCEDURE — 74011250637 HC RX REV CODE- 250/637: Performed by: SPECIALIST

## 2019-12-08 PROCEDURE — 74011000250 HC RX REV CODE- 250: Performed by: INTERNAL MEDICINE

## 2019-12-08 PROCEDURE — 82962 GLUCOSE BLOOD TEST: CPT

## 2019-12-08 PROCEDURE — 74011250636 HC RX REV CODE- 250/636: Performed by: INTERNAL MEDICINE

## 2019-12-08 PROCEDURE — 74011250637 HC RX REV CODE- 250/637: Performed by: INTERNAL MEDICINE

## 2019-12-08 PROCEDURE — 94640 AIRWAY INHALATION TREATMENT: CPT

## 2019-12-08 PROCEDURE — 80069 RENAL FUNCTION PANEL: CPT

## 2019-12-08 PROCEDURE — 74011636637 HC RX REV CODE- 636/637: Performed by: INTERNAL MEDICINE

## 2019-12-08 PROCEDURE — 83735 ASSAY OF MAGNESIUM: CPT

## 2019-12-08 PROCEDURE — 65660000000 HC RM CCU STEPDOWN

## 2019-12-08 PROCEDURE — 36415 COLL VENOUS BLD VENIPUNCTURE: CPT

## 2019-12-08 PROCEDURE — 85027 COMPLETE CBC AUTOMATED: CPT

## 2019-12-08 PROCEDURE — 80048 BASIC METABOLIC PNL TOTAL CA: CPT

## 2019-12-08 RX ORDER — POTASSIUM CHLORIDE 750 MG/1
30 TABLET, FILM COATED, EXTENDED RELEASE ORAL
Status: COMPLETED | OUTPATIENT
Start: 2019-12-08 | End: 2019-12-08

## 2019-12-08 RX ORDER — DILTIAZEM HYDROCHLORIDE 30 MG/1
30 TABLET, FILM COATED ORAL
Status: DISCONTINUED | OUTPATIENT
Start: 2019-12-08 | End: 2019-12-10

## 2019-12-08 RX ORDER — BUMETANIDE 1 MG/1
3 TABLET ORAL 2 TIMES DAILY
Status: DISCONTINUED | OUTPATIENT
Start: 2019-12-09 | End: 2019-12-10 | Stop reason: HOSPADM

## 2019-12-08 RX ORDER — SPIRONOLACTONE 25 MG/1
25 TABLET ORAL DAILY
Status: DISCONTINUED | OUTPATIENT
Start: 2019-12-09 | End: 2019-12-10 | Stop reason: HOSPADM

## 2019-12-08 RX ORDER — LANOLIN ALCOHOL/MO/W.PET/CERES
400 CREAM (GRAM) TOPICAL DAILY
Status: DISCONTINUED | OUTPATIENT
Start: 2019-12-09 | End: 2019-12-10 | Stop reason: HOSPADM

## 2019-12-08 RX ORDER — POTASSIUM CHLORIDE 750 MG/1
40 TABLET, FILM COATED, EXTENDED RELEASE ORAL EVERY 4 HOURS
Status: DISCONTINUED | OUTPATIENT
Start: 2019-12-08 | End: 2019-12-08

## 2019-12-08 RX ORDER — ALBUTEROL SULFATE 0.83 MG/ML
2.5 SOLUTION RESPIRATORY (INHALATION)
Status: DISCONTINUED | OUTPATIENT
Start: 2019-12-09 | End: 2019-12-10

## 2019-12-08 RX ADMIN — Medication 10 ML: at 14:00

## 2019-12-08 RX ADMIN — GUAIFENESIN 400 MG: 200 SOLUTION ORAL at 12:45

## 2019-12-08 RX ADMIN — HYDRALAZINE HYDROCHLORIDE 50 MG: 50 TABLET ORAL at 17:13

## 2019-12-08 RX ADMIN — BUMETANIDE 3 MG: 0.25 INJECTION INTRAMUSCULAR; INTRAVENOUS at 09:40

## 2019-12-08 RX ADMIN — CARVEDILOL 12.5 MG: 12.5 TABLET, FILM COATED ORAL at 09:40

## 2019-12-08 RX ADMIN — ISOSORBIDE DINITRATE 20 MG: 20 TABLET ORAL at 09:40

## 2019-12-08 RX ADMIN — ASPIRIN 81 MG 81 MG: 81 TABLET ORAL at 09:40

## 2019-12-08 RX ADMIN — TAMSULOSIN HYDROCHLORIDE 0.4 MG: 0.4 CAPSULE ORAL at 09:41

## 2019-12-08 RX ADMIN — HEPARIN SODIUM 5000 UNITS: 5000 INJECTION INTRAVENOUS; SUBCUTANEOUS at 04:42

## 2019-12-08 RX ADMIN — INSULIN LISPRO 2 UNITS: 100 INJECTION, SOLUTION INTRAVENOUS; SUBCUTANEOUS at 22:18

## 2019-12-08 RX ADMIN — POTASSIUM CHLORIDE 40 MEQ: 750 TABLET, FILM COATED, EXTENDED RELEASE ORAL at 09:46

## 2019-12-08 RX ADMIN — SIMVASTATIN 20 MG: 20 TABLET, FILM COATED ORAL at 22:19

## 2019-12-08 RX ADMIN — FERRIC CITRATE 630 MG: 210 TABLET, COATED ORAL at 12:39

## 2019-12-08 RX ADMIN — ALBUTEROL SULFATE 2.5 MG: 2.5 SOLUTION RESPIRATORY (INHALATION) at 19:40

## 2019-12-08 RX ADMIN — INSULIN LISPRO 2 UNITS: 100 INJECTION, SOLUTION INTRAVENOUS; SUBCUTANEOUS at 12:39

## 2019-12-08 RX ADMIN — HEPARIN SODIUM 5000 UNITS: 5000 INJECTION INTRAVENOUS; SUBCUTANEOUS at 20:43

## 2019-12-08 RX ADMIN — ISOSORBIDE DINITRATE 20 MG: 20 TABLET ORAL at 17:13

## 2019-12-08 RX ADMIN — ISOSORBIDE DINITRATE 20 MG: 20 TABLET ORAL at 22:18

## 2019-12-08 RX ADMIN — ALBUTEROL SULFATE 2.5 MG: 2.5 SOLUTION RESPIRATORY (INHALATION) at 08:29

## 2019-12-08 RX ADMIN — CARVEDILOL 12.5 MG: 12.5 TABLET, FILM COATED ORAL at 17:13

## 2019-12-08 RX ADMIN — ALBUTEROL SULFATE 2.5 MG: 2.5 SOLUTION RESPIRATORY (INHALATION) at 15:22

## 2019-12-08 RX ADMIN — GUAIFENESIN 400 MG: 200 SOLUTION ORAL at 17:13

## 2019-12-08 RX ADMIN — Medication 10 ML: at 22:18

## 2019-12-08 RX ADMIN — HEPARIN SODIUM 5000 UNITS: 5000 INJECTION INTRAVENOUS; SUBCUTANEOUS at 12:45

## 2019-12-08 RX ADMIN — GUAIFENESIN 400 MG: 200 SOLUTION ORAL at 07:32

## 2019-12-08 RX ADMIN — HYDRALAZINE HYDROCHLORIDE 50 MG: 50 TABLET ORAL at 09:40

## 2019-12-08 RX ADMIN — POTASSIUM CHLORIDE 30 MEQ: 750 TABLET, FILM COATED, EXTENDED RELEASE ORAL at 12:45

## 2019-12-08 RX ADMIN — INSULIN LISPRO 2 UNITS: 100 INJECTION, SOLUTION INTRAVENOUS; SUBCUTANEOUS at 17:13

## 2019-12-08 RX ADMIN — IRON SUCROSE 200 MG: 20 INJECTION, SOLUTION INTRAVENOUS at 09:41

## 2019-12-08 RX ADMIN — INSULIN LISPRO 3 UNITS: 100 INJECTION, SOLUTION INTRAVENOUS; SUBCUTANEOUS at 07:32

## 2019-12-08 RX ADMIN — DILTIAZEM HYDROCHLORIDE 30 MG: 30 TABLET, FILM COATED ORAL at 17:13

## 2019-12-08 RX ADMIN — HYDRALAZINE HYDROCHLORIDE 50 MG: 50 TABLET ORAL at 22:19

## 2019-12-08 NOTE — PROGRESS NOTES
Cardiology Progress Note 
    
 
12/8/2019 1:00 PM 
 
Admit Date: 12/3/2019 Admit Diagnosis: CHF exacerbation ([facility] [I50.9] Subjective:  
 
[address] is seen for Dr Mina Degree today He and his wife denies chest pain, dizziness. He has good appetite, hungry Problem List  Date Reviewed: 11/3/2019 Codes Class Noted CHF exacerbation (HCC) ICD-10-CM: I50.9 ICD-9-CM: 428.0  12/3/2019 Hypoglycemia ICD-10-CM: E16.2 ICD-9-CM: 251.2  8/27/2019 Pacemaker ICD-10-CM: Z95.0 ICD-9-CM: V45.01  5/9/2019 Overview Signed 5/9/2019  6:22 PM by Brittani Castillo MD  
  5/9/2019 leadless pacer implant CHF (congestive heart failure) (HCC) ICD-10-CM: I50.9 ICD-9-CM: 428.0  5/1/2019 Atrial fibrillation with slow ventricular response ([facility] ICD-10-CM: I48.91 
ICD-9-CM: 427.31  5/1/2019 Overview Signed 5/8/2019 11:24 PM by Brittani Castillo MD  
  Added automatically from request for surgery [id number] Type 2 diabetes with nephropathy ([facility] ICD-10-CM: E11.21 
ICD-9-CM: 250.40, 583.81  7/26/2018 Atrial fibrillation, chronic ICD-10-CM: I48.20 ICD-9-CM: 427.31  10/21/2017 Overview Signed 10/21/2017  1:45 PM by Alayna Wilkins MD  
  new onset afib with BRPR 10-19-17 admit CKD (chronic kidney disease) stage 3, GFR 30-59 ml/min ([facility]) ICD-10-CM: N18.3 ICD-9-CM: 585.3  10/21/2017 Overview Signed 10/21/2017  1:47 PM by Alayna Wilkins MD  
  hypertension and DM nephrosclerosis GI bleed ICD-10-CM: K92.2 ICD-9-CM: 578.9  10/20/2017 Hyperglycemia due to type 2 diabetes mellitus ([facility] ICD-10-CM: E11.65 ICD-9-CM: 250.00  10/20/2017 Hypokalemia ICD-10-CM: E87.6 ICD-9-CM: 276.8  6/25/2017 Generalized weakness ICD-10-CM: R53.1 ICD-9-CM: 780.79  6/25/2017 Elevated troponin ICD-10-CM: R79.89 ICD-9-CM: 790.6  6/25/2017 KATALINA (acute kidney injury) ([facility] ICD-10-CM: N17.9 ICD-9-CM: 584.9  6/25/2017 Acute renal failure (ARF) ([facility]) ICD-10-CM: N17.9 ICD-9-CM: 584.9  3/16/2017 CKD (chronic kidney disease) ICD-10-CM: N18.9 ICD-9-CM: 585.9  1/20/2017 Type 2 diabetes mellitus with diabetic peripheral angiopathy without gangrene [facility]) ICD-10-CM: E11.51 
ICD-9-CM: 250.70, 443.81  9/27/2015 Dyspnea ICD-10-CM: R06.00 
ICD-9-CM: 786.09  7/15/2014 PVC's (premature ventricular contractions) ICD-10-CM: I49.3 ICD-9-CM: 427.69  5/26/2014 Carotid arterial disease ([facility]) ICD-10-CM: I73.9 ICD-9-CM: 447.9  Unknown Hypercholesteremia ICD-10-CM: E78.00 ICD-9-CM: 272.0  Unknown PVD (peripheral vascular disease) ([facility] ICD-10-CM: I73.9 ICD-9-CM: 443.9  Unknown Bradycardia ICD-10-CM: R00.1 ICD-9-CM: 427.89  Unknown CAD (coronary artery disease) ICD-10-CM: I25.10 ICD-9-CM: 414.00  Unknown Hypertension, essential, benign ICD-10-CM: I10 
ICD-9-CM: 401.1  Unknown Past Medical History:  
Diagnosis Date  CAD (coronary artery disease) s/p CABG 2008  Carotid arterial disease ([facility]  CKD (chronic kidney disease) stage 3, GFR 30-59 ml/min ([facility]) 10/21/2017  
 hypertension and DM nephrosclerosis  Diabetes mellitus, type 2 ([facility]  Hypercholesteremia  Hypertension  Paroxysmal atrial fibrillation ([facility] 10/21/2017  
 new onset afib with BRPR 10-19-17 admit  PVC's (premature ventricular contractions) 5/26/2014  PVD (peripheral vascular disease) ([facility] Past Surgical History:  
Procedure Laterality Date  HX CORONARY ARTERY BYPASS GRAFT    
 HX HEART CATHETERIZATION    
 AL TCAT INSJ/RPL PERM LEADLESS PACEMAKER RV W/IMG N/A 5/9/2019 INSERT OR REPLACE TRANSCATH PPM LEADLESS performed by Octavio Hammans, MD at [place] [address], [facility]/s Dr CATH LAB Current Facility-Administered Medications Medication Dose Route Frequency  [START ON 12/9/2019] bumetanide (BUMEX) tablet 3 mg  3 mg Oral BID  [START ON 12/9/2019] spironolactone (ALDACTONE) tablet 25 mg  25 mg Oral DAILY  [START ON 12/9/2019] magnesium oxide (MAG-OX) tablet 400 mg  400 mg Oral DAILY  ferric citrate (AURYXIA) tablet 630 mg  630 mg Oral DAILY WITH LUNCH  
 hydrALAZINE (APRESOLINE) tablet 50 mg  50 mg Oral TID  
 0.9% sodium chloride infusion 250 mL  250 mL IntraVENous PRN  
 iron sucrose (VENOFER) injection 200 mg  200 mg IntraVENous DAILY  epoetin dm-epbx (RETACRIT) injection 20,000 Units  20,000 Units SubCUTAneous Q MON, WED & FRI  bumetanide (BUMEX) injection 1 mg  1 mg IntraVENous PRN  
 hydrALAZINE (APRESOLINE) 20 mg/mL injection 10 mg  10 mg IntraVENous Q6H PRN  
 isosorbide dinitrate (ISORDIL) tablet 20 mg  20 mg Oral TID  albuterol (PROVENTIL VENTOLIN) nebulizer solution 2.5 mg  2.5 mg Nebulization Q6H RT  
 guaiFENesin (ROBITUSSIN) 100 mg/5 mL oral liquid 100 mg  100 mg Oral Q4H PRN  
 guaiFENesin (ROBITUSSIN) 100 mg/5 mL oral liquid 400 mg  400 mg Oral Q6H  
 sodium chloride (NS) flush 5-40 mL  5-40 mL IntraVENous Q8H  
 sodium chloride (NS) flush 5-40 mL  5-40 mL IntraVENous PRN  
 acetaminophen (TYLENOL) tablet 650 mg  650 mg Oral Q4H PRN  
 heparin (porcine) injection 5,000 Units  5,000 Units SubCUTAneous Q8H  
 aspirin chewable tablet 81 mg  81 mg Oral DAILY  carvedilol (COREG) tablet 12.5 mg  12.5 mg Oral BID WITH MEALS  simvastatin (ZOCOR) tablet 20 mg  20 mg Oral QHS  tamsulosin (FLOMAX) capsule 0.4 mg  0.4 mg Oral DAILY  insulin lispro (HUMALOG) injection   SubCUTAneous AC&HS  
 glucose chewable tablet 16 g  4 Tab Oral PRN  
 glucagon (GLUCAGEN) injection 1 mg  1 mg IntraMUSCular PRN  
 dextrose 10% infusion 0-250 mL  0-250 mL IntraVENous PRN Objective:  
  
Physical Exam: 
Visit Vitals /72 (BP 1 Location: Right arm, BP Patient Position: Sitting) Pulse 74 Temp 98.5 °F (36.9 °C) Resp 18 Ht 5' 9\" (1.753 m) Wt 169 lb 15.6 oz (77.1 kg) SpO2 95% BMI 25.10 kg/m² General Appearance:  Well developed, well nourished,alert and oriented x 3, and individual in no acute distress. Ears/Nose/Mouth/Throat:   Hearing grossly normal. 
  
    Neck: Supple. Chest:   Lungs with distant breath sounds to auscultation bilaterally. Cardiovascular:  Regular rhythm, no murmur. Abdomen:   Soft, non-tender Extremities: 3+ pitting lower leg edema bilaterally. Skin: Warm and dry. Data Review:  
Labs:   
Recent Results (from the past 24 hour(s)) GLUCOSE, POC Collection Time: 12/07/19 10:27 PM  
Result Value Ref Range Glucose (POC) 236 (H) 65 - 100 mg/dL Performed by Luis Rivas (TAVON) CBC W/O DIFF Collection Time: 12/08/19  4:41 AM  
Result Value Ref Range WBC 7.7 4.1 - 11.1 K/uL  
 RBC 3.28 (L) 4.10 - 5.70 M/uL HGB 8.9 (L) 12.1 - 17.0 g/dL HCT 27.9 (L) 36.6 - 50.3 % MCV 85.1 80.0 - 99.0 FL  
 MCH 27.1 26.0 - 34.0 PG  
 MCHC 31.9 30.0 - 36.5 g/dL  
 RDW 16.6 (H) 11.5 - 14.5 % PLATELET 470 903 - 759 K/uL MPV 10.8 8.9 - 12.9 FL  
 NRBC 0.0 0  WBC ABSOLUTE NRBC 0.00 0.00 - 0.01 K/uL METABOLIC PANEL, BASIC Collection Time: 12/08/19  4:41 AM  
Result Value Ref Range Sodium 133 (L) 136 - 145 mmol/L Potassium 3.3 (L) 3.5 - 5.1 mmol/L Chloride 89 (L) 97 - 108 mmol/L  
 CO2 40 (H) 21 - 32 mmol/L Anion gap 4 (L) 5 - 15 mmol/L Glucose 201 (H) 65 - 100 mg/dL BUN 61 (H) 6 - 20 MG/DL Creatinine 2.61 (H) 0.70 - 1.30 MG/DL  
 BUN/Creatinine ratio 23 (H) 12 - 20 GFR est AA 29 (L) >60 ml/min/1.73m2 GFR est non-AA 24 (L) >60 ml/min/1.73m2 Calcium 8.0 (L) 8.5 - 10.1 MG/DL RENAL FUNCTION PANEL Collection Time: 12/08/19  4:42 AM  
Result Value Ref Range Sodium 134 (L) 136 - 145 mmol/L Potassium 3.3 (L) 3.5 - 5.1 mmol/L Chloride 89 (L) 97 - 108 mmol/L  
 CO2 40 (H) 21 - 32 mmol/L Anion gap 5 5 - 15 mmol/L Glucose 210 (H) 65 - 100 mg/dL BUN 60 (H) 6 - 20 MG/DL  Creatinine 2.72 (H) 0.70 - 1.30 MG/DL  
 BUN/Creatinine ratio 22 (H) 12 - 20 GFR est AA 27 (L) >60 ml/min/1.73m2 GFR est non-AA 22 (L) >60 ml/min/1.73m2 Calcium 8.0 (L) 8.5 - 10.1 MG/DL Phosphorus 2.3 (L) 2.6 - 4.7 MG/DL Albumin 1.8 (L) 3.5 - 5.0 g/dL MAGNESIUM Collection Time: 12/08/19  4:42 AM  
Result Value Ref Range Magnesium 1.7 1.6 - 2.4 mg/dL GLUCOSE, POC Collection Time: 12/08/19  7:15 AM  
Result Value Ref Range Glucose (POC) 208 (H) 65 - 100 mg/dL Performed by Matteo Espinosa (CON) GLUCOSE, POC Collection Time: 12/08/19 11:59 AM  
Result Value Ref Range Glucose (POC) 166 (H) 65 - 100 mg/dL Performed by Balbir Red Telemetry: atrial flutter Assessment:  
 
Active Problems: CHF exacerbation ([facility] (12/3/2019) 
 
anemia s/p PRBC transfusion and on iron CAD CABG Renal failure, diabetic nephropathy COPD Cor pulmonale 
 
hypokalemia Plan:  
 
Continue with coreg 12.5 mg bid, bumex 3 mg bid, hydralazine, isordil for hFpEF, severe pulmonary hypertension, right heart failure Urine output -2100 cc and net -900 cc, weight is down by 1 lb Creatinine 2.7 from 2.6 yesterday BP still high, consider to add short acting cardizem for pulmonary hypertension as well Hx of atrial fib and slow ventricular rate s/p leadless pacer in may 2019 Currently in atrial flutter Not on [address] [place] [address] Road, anemia is being treated, Hb has improved to above 8 Dr Fatimah Larkin will resume care tomorrow Melvern Apley, M.D. Juanita Luke Electrophysiology/Cardiology [address] [place] [address] [facility] [place] [address]                             
[phone]

## 2019-12-08 NOTE — PROGRESS NOTES
[facility] 
 [address] [facility], [address] [facility], [facility] Phone: [phone]   [phone]   
  
Nephrology Progress Note Heriberto Alfaro.     1935     [id number] Date of Admission : 12/3/2019 
12/08/19 CC:  Follow up for CKD 4 Assessment and Plan CKD IV: 
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 
- baseline Cr at best : 2.7- 3 mg/dl when euvolemic - UPCR - 11 GM in 5/19  
  
Volume Overload: 
- changed Bumex to p.o and added aldactone   
  
Hyponatremia 
-due to volume overload and metolazone 
- stable  
  
Acute hypoxic respiratory failure 
-Injury to pulmonary edema 
-Has underlying COPD 
-CT chest on admission suggest possible pulmonary fibrosis airspace disease 
-History of right-sided pneumonia in August 
  
Metabolic acidosis 
-off  oral Bicarb  
- hold of on diamox  
  
HTN : 
- meds being adjusted by cards  
  
Pulm HTN: 
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  
  
Bradycardia : 
- s/p PPM on 5/9 
  
Anemia of CKD: 
-Worsening anemia likely secondary to CKD 
-He has been off MICHAEL for a year - EPO reordered, but iron stores very low. - IV iron ordered. - s/p 2 units RBC in last 24 hrs  
  
Chronic A. fib Chronic diastolic congestive heart failure CAD s/p CABG 
  
DM2: 
- on insulin 
  
Care Plan discussed with: Patient Interval History: No complaints Cr stable K low No new sx Review of Systems: Pertinent items are noted in HPI. Current Medications:  
Current Facility-Administered Medications Medication Dose Route Frequency  [START ON 12/9/2019] bumetanide (BUMEX) tablet 3 mg  3 mg Oral BID  [START ON 12/9/2019] spironolactone (ALDACTONE) tablet 25 mg  25 mg Oral DAILY  potassium chloride SR (KLOR-CON 10) tablet 30 mEq  30 mEq Oral NOW  ferric citrate (AURYXIA) tablet 630 mg  630 mg Oral DAILY WITH LUNCH  
 hydrALAZINE (APRESOLINE) tablet 50 mg  50 mg Oral TID  
 0.9% sodium chloride infusion 250 mL  250 mL IntraVENous PRN  
 iron sucrose (VENOFER) injection 200 mg  200 mg IntraVENous DAILY  epoetin dm-epbx (RETACRIT) injection 20,000 Units  20,000 Units SubCUTAneous Q MON, WED & FRI  bumetanide (BUMEX) injection 1 mg  1 mg IntraVENous PRN  
 hydrALAZINE (APRESOLINE) 20 mg/mL injection 10 mg  10 mg IntraVENous Q6H PRN  
 isosorbide dinitrate (ISORDIL) tablet 20 mg  20 mg Oral TID  albuterol (PROVENTIL VENTOLIN) nebulizer solution 2.5 mg  2.5 mg Nebulization Q6H RT  
 guaiFENesin (ROBITUSSIN) 100 mg/5 mL oral liquid 100 mg  100 mg Oral Q4H PRN  
 guaiFENesin (ROBITUSSIN) 100 mg/5 mL oral liquid 400 mg  400 mg Oral Q6H  
 sodium chloride (NS) flush 5-40 mL  5-40 mL IntraVENous Q8H  
 sodium chloride (NS) flush 5-40 mL  5-40 mL IntraVENous PRN  
 acetaminophen (TYLENOL) tablet 650 mg  650 mg Oral Q4H PRN  
 heparin (porcine) injection 5,000 Units  5,000 Units SubCUTAneous Q8H  
 aspirin chewable tablet 81 mg  81 mg Oral DAILY  carvedilol (COREG) tablet 12.5 mg  12.5 mg Oral BID WITH MEALS  simvastatin (ZOCOR) tablet 20 mg  20 mg Oral QHS  tamsulosin (FLOMAX) capsule 0.4 mg  0.4 mg Oral DAILY  insulin lispro (HUMALOG) injection   SubCUTAneous AC&HS  
 glucose chewable tablet 16 g  4 Tab Oral PRN  
 glucagon (GLUCAGEN) injection 1 mg  1 mg IntraMUSCular PRN  
 dextrose 10% infusion 0-250 mL  0-250 mL IntraVENous PRN Allergies Allergen Reactions  Lisinopril Cough Objective: 
Vitals:   
Vitals:  
 12/08/19 0440 12/08/19 0820 12/08/19 0829 12/08/19 1157 BP: 151/72 146/73  152/72 Pulse: 82 81  74 Resp: 18 18  18 Temp: 98 °F (36.7 °C) 99.5 °F (37.5 °C)  98.5 °F (36.9 °C) SpO2: 98% 100% 98% 95% Weight: 77.1 kg (169 lb 15.6 oz) Height:      
 
Intake and Output: 
12/08 0701 - 12/08 1900 In: 300 [P.O.:300] Out: -  
12/06 1901 - 12/08 0700 In: 1377.6 [P.O.:1050] Out: 4276 [[facility]:4710] Physical Examination: 
General: Frail and ill-looking, SOB at rest 
HEENT: PERRL,  ++ Pallor , No Icterus Neck: Supple,no mass palpable Lungs : B/L rales CVS: irregular, S1 S2 normal, systolic murmur Abdomen: Soft, NT, BS + Extremities: 2+ Edema Skin: No rash or lesions. MS: No joint swelling, erythema, warmth Neurologic: non focal, AAO x 3 Psych: normal affect 
 
[]    High complexity decision making was performed 
[]    Patient is at high-risk of decompensation with multiple organ involvement Lab Data Personally Reviewed: I have reviewed all the pertinent labs, microbiology data and radiology studies during assessment. Recent Labs 12/08/19 
4814 12/08/19 
0441 12/07/19 
1027 12/06/19 
0351 * 133* 134* 133* K 3.3* 3.3* 3.6 3.4*  
CL 89* 89* 91* 91* CO2 40* 40* 38* 38* * 201* 185* 171* BUN 60* 61* 62* 60* CREA 2.72* 2.61* 2.47* 2.46* CA 8.0* 8.0* 7.8* 7.9*  
MG 1.7  --   --   --   
PHOS 2.3*  --  2.1* 2.9 ALB 1.8*  --  1.7* 1.8* Recent Labs 12/08/19 
0441 12/06/19 
3265 WBC 7.7 6.7 HGB 8.9* 7.4* HCT 27.9* 23.8*  
 171 No results found for: SDES Lab Results Component Value Date/Time Culture result: (A) 12/04/2019 03:27 AM  
  FEW * METHICILLIN RESISTANT STAPHYLOCOCCUS AUREUS * Culture result: (A) 12/04/2019 03:27 AM  
  L[facility]AS [facility] MALTOPHILIA CLSI GUIDELINES DO NOT RECOMMEND REPORTING SUSCEPTIBILITIES FOR S. MALTOPHILIA. TRIMETHOPRIM/SULFAMETHOXAZOLE IS THE DRUG OF CHOICE. Culture result: HEAVY NORMAL RESPIRATORY JUDE 12/04/2019 03:27 AM  
 Culture result: NO GROWTH 5 DAYS 08/27/2019 05:55 PM  
 Culture result: NO GROWTH 5 DAYS 08/05/2019 10:46 AM  
 
Recent Results (from the past 24 hour(s)) GLUCOSE, POC Collection Time: 12/07/19 10:27 PM  
Result Value Ref Range Glucose (POC) 236 (H) 65 - 100 mg/dL Performed by Yuval Medrano (CON) CBC W/O DIFF Collection Time: 12/08/19  4:41 AM  
Result Value Ref Range WBC 7.7 4.1 - 11.1 K/uL  
 RBC 3.28 (L) 4.10 - 5.70 M/uL  HGB 8.9 (L) 12.1 - 17.0 g/dL HCT 27.9 (L) 36.6 - 50.3 % MCV 85.1 80.0 - 99.0 FL  
 MCH 27.1 26.0 - 34.0 PG  
 MCHC 31.9 30.0 - 36.5 g/dL  
 RDW 16.6 (H) 11.5 - 14.5 % PLATELET 453 651 - 906 K/uL MPV 10.8 8.9 - 12.9 FL  
 NRBC 0.0 0  WBC ABSOLUTE NRBC 0.00 0.00 - 0.01 K/uL METABOLIC PANEL, BASIC Collection Time: 12/08/19  4:41 AM  
Result Value Ref Range Sodium 133 (L) 136 - 145 mmol/L Potassium 3.3 (L) 3.5 - 5.1 mmol/L Chloride 89 (L) 97 - 108 mmol/L  
 CO2 40 (H) 21 - 32 mmol/L Anion gap 4 (L) 5 - 15 mmol/L Glucose 201 (H) 65 - 100 mg/dL BUN 61 (H) 6 - 20 MG/DL Creatinine 2.61 (H) 0.70 - 1.30 MG/DL  
 BUN/Creatinine ratio 23 (H) 12 - 20 GFR est AA 29 (L) >60 ml/min/1.73m2 GFR est non-AA 24 (L) >60 ml/min/1.73m2 Calcium 8.0 (L) 8.5 - 10.1 MG/DL RENAL FUNCTION PANEL Collection Time: 12/08/19  4:42 AM  
Result Value Ref Range Sodium 134 (L) 136 - 145 mmol/L Potassium 3.3 (L) 3.5 - 5.1 mmol/L Chloride 89 (L) 97 - 108 mmol/L  
 CO2 40 (H) 21 - 32 mmol/L Anion gap 5 5 - 15 mmol/L Glucose 210 (H) 65 - 100 mg/dL BUN 60 (H) 6 - 20 MG/DL Creatinine 2.72 (H) 0.70 - 1.30 MG/DL  
 BUN/Creatinine ratio 22 (H) 12 - 20 GFR est AA 27 (L) >60 ml/min/1.73m2 GFR est non-AA 22 (L) >60 ml/min/1.73m2 Calcium 8.0 (L) 8.5 - 10.1 MG/DL Phosphorus 2.3 (L) 2.6 - 4.7 MG/DL Albumin 1.8 (L) 3.5 - 5.0 g/dL MAGNESIUM Collection Time: 12/08/19  4:42 AM  
Result Value Ref Range Magnesium 1.7 1.6 - 2.4 mg/dL GLUCOSE, POC Collection Time: 12/08/19  7:15 AM  
Result Value Ref Range Glucose (POC) 208 (H) 65 - 100 mg/dL Performed by Ophelia Boxer (CON) GLUCOSE, POC Collection Time: 12/08/19 11:59 AM  
Result Value Ref Range Glucose (POC) 166 (H) 65 - 100 mg/dL Performed by Arsalan Hernandez I have reviewed the flowsheets. Chart and Pertinent Notes have been reviewed.   
No change in PMH ,family and social history from Consult note. 
 
 
Tigist Bolden MD

## 2019-12-08 NOTE — PROGRESS NOTES
[address] [facility] Adult  Hospitalist Group Hospitalist Progress Note Lala Barron MD 
Answering service: [phone] OR [phone] from in house phone Date of Service:  2019 NAME:  Amparo Mccoy Sr. 
:  1935 MRN:  [id number] Admission Summary: A 81year old male patient with PMH of CAD s/p CABG, Diastolic heart failure, Afib , s/p PPM, CKD stage III, HTN, HLD presented to Ed for evaluation of shortness of breath. Patient has noticed progressive sob since few days and follows with Dr. Conrad Thorpe for CHF. he noticed more than 4lbs weight gain in last few days. This morning, he woke up with sob and so he came to ED. He noticed worsening leg swelling, no chest pain. No change in cough but sputum appears to be more dark in color. No fever or chills. No sick contacts. He also mentions that his BG was low in 80;s this AM 
In ED, he was given IV bumex. Hospitalist consulted for admission. Interval history / Subjective:  
Patient is feeling better. Breathing is stable and better. Some swelling in both legs but stable. Slept okay. No other concerns. On IV Bumex currently Discussed with cardiology and nurse on the floor. Assessment & Plan:  
 
Shortness of breath likely due to Acute on chronic diastolic heart failure Chronic hypoxemic respiratory failure on 2LPM O2 NC - baseline - Continue telemetry monitoring - BNP >35,000 on admission 
- CT chest:  Small bilateral pleural effusions with underlying atelectasis. - recent Echo 2019: EF 66 - 70%. Mild to moderate pulmonary hypertension.  
- cardiology on board. Appreciate. - Cont iV bumex 3mg bid. Also received metolazone 5 mg once during the hospital stay. - c/w aspirin, coreg, hydralazine, statin - Cardiology couldlikely plan for RHC with vasodilator challenge.  
- Would require watchful monitoring for volume status after [facility] 12/6/2019 
  
KATALINA on CKD - Cr 2.58 on poa, baseline 1.8-1.9 
- likely CRS 
- Nephrology on board. Appreciate. Close monitoring. 
  
Afib - rate controlled on . On aspirin  
  
HTN - uncontrolled on poa.  
-Uptitrated home meds coreg, hydralazine 
-BP better controlled. Caution with approach to euvolemia DM- A1c 7.7. c/w ISS 
  
Hypokalemia - replete and monitor as needed 
  
Chronic anemia, s/p PRBCx2 12/6/19 
- fobt negative. Iron deficient. On iron supplementsIV Venofer - Hb low stable - 2 PRBC per cardiology team 12/6/19. Will monitor CBC periodically Advanced COPD and emphysema 
- CT chest: Diffuse interstitial opacities again noted progressive in the upper lobes bilaterally. Underlying pulmonary fibrosis is considered. - check PFT's. - normal lactic acid  
- resp panel and sputum cx ordered 
- less likely infectious  
  
Hx of CAD: s/p CAG in 2003. - no chest pain. Trop neg Hx of PVD s/p arthrectomy to distal SFA and distal popliteal with angioplasty to both lesions and stent to SFA by Dr. Tran Zimmerman 3/2014 
  
DVT ppx: Heparin Code status: Full. Disposition: TBD. [facility] with SN/PT [facility] Care Plan discussed with: Patient/Family and Nurse Hospital Problems  Date Reviewed: 11/3/2019 Codes Class Noted POA  
 CHF exacerbation ([facility] [address] ICD-10-CM: I50.9 ICD-9-CM: 428.0  12/3/2019 Unknown Review of Systems: A comprehensive review of systems was negative except for that written in the HPI. Vital Signs:  
 Last 24hrs VS reviewed since prior progress note. Most recent are: 
Visit Vitals /73 (BP 1 Location: Left arm, BP Patient Position: At rest) Pulse 81 Temp 99.5 °F (37.5 °C) Resp 18 Ht 5' 9\" (1.753 m) Wt 77.1 kg (169 lb 15.6 oz) SpO2 98% BMI 25.10 kg/m² Intake/Output Summary (Last 24 hours) at 12/8/2019 1111 Last data filed at 12/8/2019 6391 Gross per 24 hour Intake 900 ml Output 1675 ml Net -775 ml Physical Examination:  
 
Gen: mild distress due to sob. Elderly HEENT: anicteric sclerae, normal conjunctiva, oropharynx clear, MM moist 
Neck: supple, trachea midline, no adenopathy Heart: RRR, no MRG, no JVD, 2+  edema Lungs: Improved air entry, minimal to no basal crackle. No wheezing Abd: soft, NT, ND, BS+, no organomegaly Extr: warm, RLE +2 edema, LLE +1+2 edema, right>left Skin: dry, no rash Neuro: CN II-XII grossly intact, normal speech, moves all extremities Psych: normal mood, appropriate affect Data Review:  
 Review and/or order of clinical lab test 
Review and/or order of tests in the radiology section of CPT Review and/or order of tests in the medicine section of CPT Labs:  
 
Recent Labs 12/08/19 0441 12/06/19 
4559 WBC 7.7 6.7 HGB 8.9* 7.4* HCT 27.9* 23.8*  
 171 Recent Labs 12/08/19 
9911 12/08/19 
0441 12/07/19 
1027 12/06/19 
0351 * 133* 134* 133* K 3.3* 3.3* 3.6 3.4*  
CL 89* 89* 91* 91* CO2 40* 40* 38* 38* BUN 60* 61* 62* 60* CREA 2.72* 2.61* 2.47* 2.46* * 201* 185* 171* CA 8.0* 8.0* 7.8* 7.9*  
MG 1.7  --   --   --   
PHOS 2.3*  --  2.1* 2.9 Recent Labs 12/08/19 
0442 12/07/19 
1027 12/06/19 
0351 ALB 1.8* 1.7* 1.8* No results for input(s): INR, PTP, APTT, INREXT, INREXT in the last 72 hours. Recent Labs 12/07/19 
1027 TIBC 302 PSAT INDETERMINATE - SENT TO REFERENCE LAB FOR ADDITIONAL TESTING. Lab Results Component Value Date/Time Folate 11.3 12/03/2019 08:44 AM  
  
No results for input(s): PH, PCO2, PO2 in the last 72 hours. No results for input(s): CPK, CKNDX, TROIQ in the last 72 hours. No lab exists for component: CPKMB No results found for: CHOL, CHOLX, CHLST, CHOLV, HDL, HDLP, LDL, LDLC, DLDLP, TGLX, TRIGL, TRIGP, CHHD, CHHDX Lab Results Component Value Date/Time  Glucose (POC) 208 (H) 12/08/2019 07:15 AM  
 Glucose (POC) 236 (H) 12/07/2019 10:27 PM  
 Glucose (POC) 180 (H) 12/07/2019 11:44 AM  
 Glucose (POC) 236 (H) 12/07/2019 07:10 AM  
 Glucose (POC) 244 (H) 12/06/2019 10:50 PM  
 
Lab Results Component Value Date/Time Color YELLOW/STRAW 08/27/2019 12:56 PM  
 Appearance CLEAR 08/27/2019 12:56 PM  
 Specific gravity 1.023 08/27/2019 12:56 PM  
 pH (UA) 6.0 08/27/2019 12:56 PM  
 Protein >300 (A) 08/27/2019 12:56 PM  
 Glucose 500 (A) 08/27/2019 12:56 PM  
 Ketone NEGATIVE  08/27/2019 12:56 PM  
 Bilirubin NEGATIVE  08/27/2019 12:56 PM  
 Urobilinogen 0.2 08/27/2019 12:56 PM  
 Nitrites NEGATIVE  08/27/2019 12:56 PM  
 Leukocyte Esterase NEGATIVE  08/27/2019 12:56 PM  
 Epithelial cells FEW 08/27/2019 12:56 PM  
 Bacteria NEGATIVE  08/27/2019 12:56 PM  
 WBC 0-4 08/27/2019 12:56 PM  
 RBC 5-10 08/27/2019 12:56 PM  
 
 
Xr Chest Pa Lat Result Date: 12/3/2019 IMPRESSION: Diffuse interstitial opacities are overall unchanged since 8/27/2019 and may represent chronic lung parenchymal change versus pulmonary edema. Superimposed patchy airspace opacities are increased in the right upper lung and left lower lung and may represent edema, infection, or atelectasis. Ct Chest Wo Cont Result Date: 12/3/2019 IMPRESSION: Diffuse interstitial opacities again noted progressive in the upper lobes bilaterally. Underlying pulmonary fibrosis is considered. Small bilateral pleural effusions with underlying atelectasis. Cardiomegaly. Cholelithiasis. Us Retroperitoneum Comp Result Date: 12/4/2019 IMPRESSION: Increased bilateral renal cortical echogenicity is compatible with medical renal disease. Bilateral renal cysts. No hydronephrosis. Medications Reviewed:  
 
Current Facility-Administered Medications Medication Dose Route Frequency  potassium chloride SR (KLOR-CON 10) tablet 40 mEq  40 mEq Oral Q4H  
 ferric citrate (AURYXIA) tablet 630 mg  630 mg Oral DAILY WITH LUNCH  
 hydrALAZINE (APRESOLINE) tablet 50 mg  50 mg Oral TID  
 0.9% sodium chloride infusion 250 mL 250 mL IntraVENous PRN  
 iron sucrose (VENOFER) injection 200 mg  200 mg IntraVENous DAILY  epoetin dm-epbx (RETACRIT) injection 20,000 Units  20,000 Units SubCUTAneous Q MON, WED & FRI  bumetanide (BUMEX) injection 1 mg  1 mg IntraVENous PRN  
 hydrALAZINE (APRESOLINE) 20 mg/mL injection 10 mg  10 mg IntraVENous Q6H PRN  
 isosorbide dinitrate (ISORDIL) tablet 20 mg  20 mg Oral TID  albuterol (PROVENTIL VENTOLIN) nebulizer solution 2.5 mg  2.5 mg Nebulization Q6H RT  
 guaiFENesin (ROBITUSSIN) 100 mg/5 mL oral liquid 100 mg  100 mg Oral Q4H PRN  
 guaiFENesin (ROBITUSSIN) 100 mg/5 mL oral liquid 400 mg  400 mg Oral Q6H  
 sodium chloride (NS) flush 5-40 mL  5-40 mL IntraVENous Q8H  
 sodium chloride (NS) flush 5-40 mL  5-40 mL IntraVENous PRN  
 acetaminophen (TYLENOL) tablet 650 mg  650 mg Oral Q4H PRN  
 heparin (porcine) injection 5,000 Units  5,000 Units SubCUTAneous Q8H  
 aspirin chewable tablet 81 mg  81 mg Oral DAILY  carvedilol (COREG) tablet 12.5 mg  12.5 mg Oral BID WITH MEALS  simvastatin (ZOCOR) tablet 20 mg  20 mg Oral QHS  tamsulosin (FLOMAX) capsule 0.4 mg  0.4 mg Oral DAILY  bumetanide (BUMEX) injection 3 mg  3 mg IntraVENous Q12H  
 insulin lispro (HUMALOG) injection   SubCUTAneous AC&HS  
 glucose chewable tablet 16 g  4 Tab Oral PRN  
 glucagon (GLUCAGEN) injection 1 mg  1 mg IntraMUSCular PRN  
 dextrose 10% infusion 0-250 mL  0-250 mL IntraVENous PRN  
 
______________________________________________________________________ EXPECTED LENGTH OF STAY: - - - 
ACTUAL LENGTH OF STAY:          5 Lala Barron MD

## 2019-12-09 LAB
ALBUMIN SERPL-MCNC: 1.9 G/DL (ref 3.5–5)
ANION GAP SERPL CALC-SCNC: 4 MMOL/L (ref 5–15)
BUN SERPL-MCNC: 61 MG/DL (ref 6–20)
BUN/CREAT SERPL: 22 (ref 12–20)
CALCIUM SERPL-MCNC: 7.9 MG/DL (ref 8.5–10.1)
CHLORIDE SERPL-SCNC: 91 MMOL/L (ref 97–108)
CO2 SERPL-SCNC: 39 MMOL/L (ref 21–32)
CREAT SERPL-MCNC: 2.78 MG/DL (ref 0.7–1.3)
GLUCOSE BLD STRIP.AUTO-MCNC: 166 MG/DL (ref 65–100)
GLUCOSE BLD STRIP.AUTO-MCNC: 195 MG/DL (ref 65–100)
GLUCOSE BLD STRIP.AUTO-MCNC: 222 MG/DL (ref 65–100)
GLUCOSE BLD STRIP.AUTO-MCNC: 228 MG/DL (ref 65–100)
GLUCOSE SERPL-MCNC: 183 MG/DL (ref 65–100)
PHOSPHATE SERPL-MCNC: 1.7 MG/DL (ref 2.6–4.7)
POTASSIUM SERPL-SCNC: 4.2 MMOL/L (ref 3.5–5.1)
SERVICE CMNT-IMP: ABNORMAL
SODIUM SERPL-SCNC: 134 MMOL/L (ref 136–145)

## 2019-12-09 PROCEDURE — 74011250637 HC RX REV CODE- 250/637: Performed by: INTERNAL MEDICINE

## 2019-12-09 PROCEDURE — 82962 GLUCOSE BLOOD TEST: CPT

## 2019-12-09 PROCEDURE — 77010033678 HC OXYGEN DAILY

## 2019-12-09 PROCEDURE — 94760 N-INVAS EAR/PLS OXIMETRY 1: CPT

## 2019-12-09 PROCEDURE — 36415 COLL VENOUS BLD VENIPUNCTURE: CPT

## 2019-12-09 PROCEDURE — 94640 AIRWAY INHALATION TREATMENT: CPT

## 2019-12-09 PROCEDURE — 74011000250 HC RX REV CODE- 250: Performed by: INTERNAL MEDICINE

## 2019-12-09 PROCEDURE — 65660000000 HC RM CCU STEPDOWN

## 2019-12-09 PROCEDURE — 74011250636 HC RX REV CODE- 250/636: Performed by: INTERNAL MEDICINE

## 2019-12-09 PROCEDURE — 74011250637 HC RX REV CODE- 250/637: Performed by: SPECIALIST

## 2019-12-09 PROCEDURE — 80069 RENAL FUNCTION PANEL: CPT

## 2019-12-09 PROCEDURE — 74011636637 HC RX REV CODE- 636/637: Performed by: INTERNAL MEDICINE

## 2019-12-09 PROCEDURE — 74011250637 HC RX REV CODE- 250/637: Performed by: PHYSICIAN ASSISTANT

## 2019-12-09 RX ADMIN — DILTIAZEM HYDROCHLORIDE 30 MG: 30 TABLET, FILM COATED ORAL at 11:46

## 2019-12-09 RX ADMIN — SPIRONOLACTONE 25 MG: 25 TABLET ORAL at 08:48

## 2019-12-09 RX ADMIN — Medication 5 ML: at 22:00

## 2019-12-09 RX ADMIN — IRON SUCROSE 200 MG: 20 INJECTION, SOLUTION INTRAVENOUS at 09:00

## 2019-12-09 RX ADMIN — DILTIAZEM HYDROCHLORIDE 30 MG: 30 TABLET, FILM COATED ORAL at 07:06

## 2019-12-09 RX ADMIN — EPOETIN ALFA-EPBX 20000 UNITS: 10000 INJECTION, SOLUTION INTRAVENOUS; SUBCUTANEOUS at 21:44

## 2019-12-09 RX ADMIN — ASPIRIN 81 MG 81 MG: 81 TABLET ORAL at 08:49

## 2019-12-09 RX ADMIN — GUAIFENESIN 400 MG: 200 SOLUTION ORAL at 00:49

## 2019-12-09 RX ADMIN — ISOSORBIDE DINITRATE 20 MG: 20 TABLET ORAL at 21:43

## 2019-12-09 RX ADMIN — Medication 400 MG: at 08:49

## 2019-12-09 RX ADMIN — INSULIN LISPRO 2 UNITS: 100 INJECTION, SOLUTION INTRAVENOUS; SUBCUTANEOUS at 21:56

## 2019-12-09 RX ADMIN — HEPARIN SODIUM 5000 UNITS: 5000 INJECTION INTRAVENOUS; SUBCUTANEOUS at 04:48

## 2019-12-09 RX ADMIN — BUMETANIDE 3 MG: 1 TABLET ORAL at 17:38

## 2019-12-09 RX ADMIN — GUAIFENESIN 400 MG: 200 SOLUTION ORAL at 07:07

## 2019-12-09 RX ADMIN — FERRIC CITRATE 630 MG: 210 TABLET, COATED ORAL at 11:47

## 2019-12-09 RX ADMIN — HYDRALAZINE HYDROCHLORIDE 50 MG: 50 TABLET ORAL at 17:38

## 2019-12-09 RX ADMIN — GUAIFENESIN 400 MG: 200 SOLUTION ORAL at 11:46

## 2019-12-09 RX ADMIN — DILTIAZEM HYDROCHLORIDE 30 MG: 30 TABLET, FILM COATED ORAL at 17:38

## 2019-12-09 RX ADMIN — HEPARIN SODIUM 5000 UNITS: 5000 INJECTION INTRAVENOUS; SUBCUTANEOUS at 21:43

## 2019-12-09 RX ADMIN — HEPARIN SODIUM 5000 UNITS: 5000 INJECTION INTRAVENOUS; SUBCUTANEOUS at 11:46

## 2019-12-09 RX ADMIN — TAMSULOSIN HYDROCHLORIDE 0.4 MG: 0.4 CAPSULE ORAL at 08:49

## 2019-12-09 RX ADMIN — INSULIN LISPRO 2 UNITS: 100 INJECTION, SOLUTION INTRAVENOUS; SUBCUTANEOUS at 07:15

## 2019-12-09 RX ADMIN — INSULIN LISPRO 2 UNITS: 100 INJECTION, SOLUTION INTRAVENOUS; SUBCUTANEOUS at 17:37

## 2019-12-09 RX ADMIN — CARVEDILOL 12.5 MG: 12.5 TABLET, FILM COATED ORAL at 17:38

## 2019-12-09 RX ADMIN — CARVEDILOL 12.5 MG: 12.5 TABLET, FILM COATED ORAL at 08:49

## 2019-12-09 RX ADMIN — BUMETANIDE 3 MG: 1 TABLET ORAL at 08:49

## 2019-12-09 RX ADMIN — Medication 10 ML: at 07:07

## 2019-12-09 RX ADMIN — ALBUTEROL SULFATE 2.5 MG: 2.5 SOLUTION RESPIRATORY (INHALATION) at 19:57

## 2019-12-09 RX ADMIN — INSULIN LISPRO 3 UNITS: 100 INJECTION, SOLUTION INTRAVENOUS; SUBCUTANEOUS at 11:30

## 2019-12-09 RX ADMIN — SIMVASTATIN 20 MG: 20 TABLET, FILM COATED ORAL at 21:43

## 2019-12-09 RX ADMIN — ISOSORBIDE DINITRATE 20 MG: 20 TABLET ORAL at 08:49

## 2019-12-09 RX ADMIN — HYDRALAZINE HYDROCHLORIDE 50 MG: 50 TABLET ORAL at 21:43

## 2019-12-09 RX ADMIN — HYDRALAZINE HYDROCHLORIDE 50 MG: 50 TABLET ORAL at 08:48

## 2019-12-09 RX ADMIN — GUAIFENESIN 400 MG: 200 SOLUTION ORAL at 17:38

## 2019-12-09 RX ADMIN — ISOSORBIDE DINITRATE 20 MG: 20 TABLET ORAL at 17:38

## 2019-12-09 RX ADMIN — ALBUTEROL SULFATE 2.5 MG: 2.5 SOLUTION RESPIRATORY (INHALATION) at 08:53

## 2019-12-09 NOTE — DIABETES MGMT
[facility] Progress Note Recommendations/ Comments: If appropriate, please consider adding 16 units of Lantus (wgt based 0.2 units/kg) for hyperglycemia. Also may benefit from NCS added to diet order.  
  
Current hospital DM medication: lispro insulin correction scale 
  
Chart reviewed on Ho Garcia Sr. . 
  
Patient is a 81 y.o. male with known  Type 2 Diabetes on insulin injections: Lantus : 18 units prescribed but pt has split the dose and take 10 units am; 8 units pm and may reduce this dose if BS <200 mg/dl at home. 
 A1c:  
Lab Results Component Value Date/Time Hemoglobin A1c 7.7 (H) 12/03/2019 08:44 AM  
 Hemoglobin A1c 8.4 (H) 08/27/2019 05:55 PM  
 Hemoglobin A1c, External 8.4 08/15/2017 Recent Glucose Results:  
Lab Results Component Value Date/Time  (H) 12/09/2019 12:56 AM  
 GLUCPOC 222 (H) 12/09/2019 11:27 AM  
 GLUCPOC 166 (H) 12/09/2019 07:06 AM  
 GLUCPOC 196 (H) 12/08/2019 10:49 PM  
  
 
Lab Results Component Value Date/Time Creatinine 2.78 (H) 12/09/2019 12:56 AM  
 
Estimated Creatinine Clearance: 20.1 mL/min (A) (based on SCr of 2.78 mg/dL (H)). Active Orders Diet DIET CARDIAC Regular; 2 GM NA (House Low NA); FR 1200ML  
  
 
PO intake: No data found. Will continue to follow as needed. Thank you

## 2019-12-09 NOTE — PROGRESS NOTES
12/9/2019   Juan A Don PA-C  [facility] 
 
. Ofelia Alarcon Sample is a 81 y.o. male  
 
 
no cp Still with some edema and mod AGUILAR Note sternal subluxation, incomplete fusion post CABG Echo done shows hyperdynamic LV function, the most prominent finding is RV dilation, a cor pulmonale picture Assessment/Plan/Discussion:Cardiology Attending:  
 
Patient seen on the day of progress note and examined  and agree with Advance Practice Provider (MADDIE, NP,PA)  assessment and plans. Dorian Sample is a 81 y.o. male Sleeping flat , lying comfortably AGUILAR but no SOB at rest 
Edema present No change in heart lung sounds On Iron and got blood Can change to po diuretic and see if he still keeps stable weight fo 24-48 hours At dc we will set up OPIC OP diuretic once every 2-3 weeks Future Appointments Date Time Provider Muna Delgado 12/20/2019  2:00 PM Rosezetta Leyden,  [address] Vivian Watts MD   
 
 
 
Discussion/Plans/Recs 1. Acute on chronic diastolic CHF   
RV enlargement , cor pulmonale 
---BLE edema/Fluid overload: weight up approximately 20 + pounds at admit - now down 10# 
            due to pulmonary HTN, worsened renal function and acute on chronic DHF Plan for OPIC q 2 weeks for IV diuresis, if this is unsuccessful, he will have to make a decision about dialysis] .   
2. HFpEF- EF 65-70%/ moderate to severe Pulmonary HTN : 
RHC with PA 66 in May Last echo EF 66-70%.  Mild MR, mild-mod pulmonary HTN,  
             
12/03/19 ECHO ADULT FOLLOW-UP OR LIMITED 12/06/2019 12/6/2019 Narrative · Right Ventricle: Severely dilated right ventricle. · Left Ventricle: Normal cavity size and systolic function (ejection  
fraction normal). Mild concentric hypertrophy. Estimated left ventricular  
ejection fraction is 66 - 70%. Visually measured ejection fraction. No  
regional wall motion abnormality noted.  
· Left Atrium: Moderately dilated left atrium. · Aortic Valve: Mild aortic valve sclerosis with no significant stenosis. · Mitral Valve: Mitral valve thickening. · Interatrial Septum: Color flow Doppler was used. Signed by: Arya Felipe MD  
3. CAD/XOL : 3/2008 s/p CABG x 3 off pump  
           Last stress test 2017 normal perfusion. 
           no chest pain, no cath due to CKD and risk for complete renal failure 4. Afib, chronic:  
           -has leadless ppm.  Continue coreg. 
           -[id number]=1.  Not on [address] PTA 5. HTN -labile-Coreg, Dilt IR, Hydralazine, Isordil - BP more appropriate. 6. PVD no current claudication see hx below 7. CKD IV 
            - Nephrology following - PO bumex and Aldactone Lab Results Component Value Date/Time Creatinine 2.78 (H) 12/09/2019 12:56 AM  
  
Weight down 10# since admission. BP improved. Bumex to PO. Cardiac Studies/Hx: 
12/03/19 ECHO ADULT FOLLOW-UP OR LIMITED 12/06/2019 12/6/2019 Narrative · Right Ventricle: Severely dilated right ventricle. · Left Ventricle: Normal cavity size and systolic function (ejection  
fraction normal). Mild concentric hypertrophy. Estimated left ventricular  
ejection fraction is 66 - 70%. Visually measured ejection fraction. No  
regional wall motion abnormality noted. · Left Atrium: Moderately dilated left atrium. · Aortic Valve: Mild aortic valve sclerosis with no significant stenosis. · Mitral Valve: Mitral valve thickening. · Interatrial Septum: Color flow Doppler was used. Signed by: Arya Felipe MD  
  
CAD/CABG  
off pump x3 3/2008 . =post op anemia and renal failure CATH March 7, 2008= LM -bifurcation dz 40% ;LAD 85% ; Circ ost 70% mid 70% RCA proximal 60% tapering, EF 60%-70%, bilaterally patent renal arteries, mild atherosclerosis of the distal abdominal aorta. [address] May 2, 2019 =PA 66/18 W 20 PA sat 55% CO 4.1 CI 2.12 Echo May 1 ,2019 EF 55-60% mod LAE, Mild CASSIE, Mild AS BELLE 1.6 cm2 mild MR & TR, RV fx mildly reduced PVD-18- RLE- mid CRA 70, Pop 99-PTA on right pop, LLE LCFA 40% - 17 PTA RCFA 90, JAS to RSFA  
-16 PTA Left Pop, PTA Left tib-per 
--3-13-14 PTA Left op PTA RSFA 
---11 stent RSFA  
JOCELYN 17 Normal perfusion Mild carotid artery disease 3-7-08 then 12 with 10-49% left, less than 10% on right SOCIAL: Drinks no alcohol, quit smoking several years ago, worked as an . Lives with his wife and has four children. Enjoys gardening, fishing and music. FAMILY HISTORY: Mother  of cancer at 76, father  of Parkinson's at 70, one brother  of cancer of the liver at 76 and one  of an infection at 64. Patient Vitals for the past 12 hrs: 
 Temp Pulse Resp BP SpO2  
19 0854     94 % 19 0720 99.8 °F (37.7 °C) 80 18 120/60 94 % 19 0432 97.9 °F (36.6 °C) 79 18 119/64 95 % 19 0026 98 °F (36.7 °C) 76 18 145/68 97 % Past Medical History:  
Diagnosis Date  CAD (coronary artery disease) s/p CABG   Carotid arterial disease ([facility]  CKD (chronic kidney disease) stage 3, GFR 30-59 ml/min ([facility]) 10/21/2017  
 hypertension and DM nephrosclerosis  Diabetes mellitus, type 2 ([facility]  Hypercholesteremia  Hypertension  Paroxysmal atrial fibrillation ([facility] 10/21/2017  
 new onset afib with BRPR 10-19-17 admit  PVC's (premature ventricular contractions) 2014  PVD (peripheral vascular disease) ([facility] Social History Tobacco Use  Smoking status: Former Smoker Packs/day: 3.00 Years: 20.00 Pack years: 60.00 Types: Cigarettes Last attempt to quit: 1985 Years since quittin.9  Smokeless tobacco: Never Used Substance Use Topics  Alcohol use: No  
  
ROS-pertinents  negative except as above  The pertinent portions of the medical history,physician and nursing notes, meds,vitals , labs and Ins/Outs,are reviewed in the electronic record. Results for orders placed or performed during the hospital encounter of 12/03/19 EKG, 12 LEAD, INITIAL Result Value Ref Range Ventricular Rate 79 BPM  
 Atrial Rate 79 BPM  
 P-R Interval 320 ms QRS Duration 150 ms  
 Q-T Interval 468 ms QTC Calculation (Bezet) 536 ms Calculated R Axis -40 degrees Calculated T Axis -51 degrees Diagnosis Sinus rhythm with 1st degree AV block Left axis deviation Right bundle branch block When compared with ECG of 03-DEC-2019 08:31, Sinus rhythm has replaced Electronic ventricular pacemaker Confirmed by Aimee Walls M.D., Cooper Graves ([id number]) on 12/4/2019 9:41:56 AM 
  
Results for orders placed or performed in visit on 11/28/11 AMB POC EKG ROUTINE W/ 12 LEADS, INTER & REP Narrative See scanned results 08/05/19 ECHO ADULT COMPLETE 08/06/2019 8/6/2019 Narrative · Left Ventricle: Normal cavity size, systolic function (ejection fraction  
normal) and diastolic function. Mild concentric hypertrophy. Estimated  
left ventricular ejection fraction is 66 - 70%. Visually measured ejection  
fraction. No regional wall motion abnormality noted. Normal left  
ventricular strain. · Left Atrium: Moderately dilated left atrium. · Right Atrium: Mildly dilated right atrium. · Interatrial Septum: Color flow Doppler was used. · Aortic Valve: Mild aortic valve sclerosis with no significant stenosis. · Mitral Valve: Mitral valve thickening. Mild mitral valve regurgitation. · Tricuspid Valve: Mild tricuspid valve regurgitation is present. · Pulmonary Artery: Mild to moderate pulmonary hypertension. Signed by: Kwaku Gar MD  
    
Vitals:  
 12/09/19 9336 12/09/19 7198 12/09/19 0720 12/09/19 3600 BP:  119/64 120/60 BP 1 Location:  Left arm Left arm BP Patient Position:  Sitting At rest   
Pulse:  79 80 Resp:  18 18 Temp:  97.9 °F (36.6 °C) 99.8 °F (37.7 °C) SpO2:  95% 94% 94%  
Weight: 164 lb 14.5 oz (74.8 kg) Height:      
 
 
Objective:  
 Physical Exam:  
Patient Vitals for the past 12 hrs: 
 Temp Pulse Resp BP SpO2  
12/09/19 0854     94 % 12/09/19 0720 99.8 °F (37.7 °C) 80 18 120/60 94 % 12/09/19 0432 97.9 °F (36.6 °C) 79 18 119/64 95 % 12/09/19 0026 98 °F (36.7 °C) 76 18 145/68 97 % General:  alert, cooperative, mild distress, appears stated age ENT, Neck:  no jvd Chest Wall: inspection normal - no chest wall deformities or tenderness, tachypnea Has subluxation of left rib/sternal margin,   
Lung: clear to auscultation bilaterally, diminished breath sounds R anterior, L anterior Heart:  A 1/6 soft systolic murmur is heard throughout the central precordium. , no gallops noted, irregularly irregular rhythm with rate mod, no JVD Abdomen: nondistended Extremities: extremities normal, atraumatic, no cyanosis, venous stasis dermatitis noted, edema 2-3+ to knees Last 24hr Input/Output: 
 
Intake/Output Summary (Last 24 hours) at 12/9/2019 1040 Last data filed at 12/9/2019 9478 Gross per 24 hour Intake 1280 ml Output 1400 ml Net -120 ml Data Review:  
Recent Results (from the past 24 hour(s)) GLUCOSE, POC Collection Time: 12/08/19 11:59 AM  
Result Value Ref Range Glucose (POC) 166 (H) 65 - 100 mg/dL Performed by Jame Trailburning GLUCOSE, POC Collection Time: 12/08/19  4:48 PM  
Result Value Ref Range Glucose (POC) 181 (H) 65 - 100 mg/dL Performed by Jame Poles GLUCOSE, POC Collection Time: 12/08/19 10:05 PM  
Result Value Ref Range Glucose (POC) 204 (H) 65 - 100 mg/dL Performed by Darío Hoover GLUCOSE, POC Collection Time: 12/08/19 10:49 PM  
Result Value Ref Range Glucose (POC) 196 (H) 65 - 100 mg/dL Performed by Abdirahman Yepez (CON) RENAL FUNCTION PANEL Collection Time: 12/09/19 12:56 AM  
Result Value Ref Range Sodium 134 (L) 136 - 145 mmol/L  Potassium 4.2 3.5 - 5.1 mmol/L  
 Chloride 91 (L) 97 - 108 mmol/L  
 CO2 39 (H) 21 - 32 mmol/L Anion gap 4 (L) 5 - 15 mmol/L Glucose 183 (H) 65 - 100 mg/dL BUN 61 (H) 6 - 20 MG/DL Creatinine 2.78 (H) 0.70 - 1.30 MG/DL  
 BUN/Creatinine ratio 22 (H) 12 - 20 GFR est AA 27 (L) >60 ml/min/1.73m2 GFR est non-AA 22 (L) >60 ml/min/1.73m2 Calcium 7.9 (L) 8.5 - 10.1 MG/DL Phosphorus 1.7 (L) 2.6 - 4.7 MG/DL Albumin 1.9 (L) 3.5 - 5.0 g/dL GLUCOSE, POC Collection Time: 12/09/19  7:06 AM  
Result Value Ref Range Glucose (POC) 166 (H) 65 - 100 mg/dL Performed by Jana Magallanes Lab Results Component Value Date/Time Sodium 134 (L) 12/09/2019 12:56 AM  
 Potassium 4.2 12/09/2019 12:56 AM  
 Chloride 91 (L) 12/09/2019 12:56 AM  
 CO2 39 (H) 12/09/2019 12:56 AM  
 Anion gap 4 (L) 12/09/2019 12:56 AM  
 Glucose 183 (H) 12/09/2019 12:56 AM  
 BUN 61 (H) 12/09/2019 12:56 AM  
 Creatinine 2.78 (H) 12/09/2019 12:56 AM  
 BUN/Creatinine ratio 22 (H) 12/09/2019 12:56 AM  
 GFR est AA 27 (L) 12/09/2019 12:56 AM  
 GFR est non-AA 22 (L) 12/09/2019 12:56 AM  
 Calcium 7.9 (L) 12/09/2019 12:56 AM  
   
Medications Prior to Admission Medication Sig  
 ferrous sulfate (IRON) 325 mg (65 mg iron) tablet Take 325 mg by mouth Daily (before breakfast).  hydrALAZINE (APRESOLINE) 25 mg tablet Take 25 mg by mouth three (3) times daily.  bumetanide (BUMEX) 1 mg tablet Take  by mouth two (2) times a day. Take 3 mg in the morning and 2 mg in the evening as directed.  carvedilol (COREG) 12.5 mg tablet Take 12.5 mg by mouth two (2) times daily (with meals).  simvastatin (ZOCOR) 20 mg tablet Take 1 Tab by mouth nightly.  insulin glargine (LANTUS U-100 INSULIN) 100 unit/mL injection 5 Units by SubCUTAneous route every morning. Prescribed 18 units daily. Reports taking 10 units in the morning, then up to 8 units in the evening depending on his blood sugars. If blood sugar is <200, he reduces his evening dose.   
 albuterol (PROVENTIL VENTOLIN) 2.5 mg /3 mL (0.083 %) nebulizer solution 3 mL by Nebulization route every four (4) hours as needed for Wheezing.  sodium bicarbonate 650 mg tablet Take 2 Tabs by mouth three (3) times daily.  tamsulosin (FLOMAX) 0.4 mg capsule Take 1 Cap by mouth daily.  aspirin 81 mg chewable tablet Take 1 Tab by mouth daily.  omega 3-dha-epa-fish oil (FISH OIL) 100-160-1,000 mg cap Take 1 Cap by mouth two (2) times a day.  cinnamon bark (CINNAMON) 500 mg cap Take 1,000 mg by mouth two (2) times a day. Lety Maria PA-C 12/9/2019 Nila Lin MD

## 2019-12-09 NOTE — PROGRESS NOTES
Bedside and Verbal shift change report given to Radha Levine 1313 (oncoming nurse) by Chris Yeepz RN (offgoing nurse). Report included the following information SBAR, Kardex, Intake/Output, Recent Results and Cardiac Rhythm NSR w/ 1st AVB, BBB.

## 2019-12-09 NOTE — PROGRESS NOTES
[facility] 
 [address] [address], [address] [facility] Phone: [phone]   [phone]   
  
Nephrology Progress Note Terrie Rossi.     1935     [id number] Date of Admission : 12/3/2019 
12/09/19 CC:  Follow up for CKD 4 Assessment and Plan CKD IV: 
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 
- baseline Cr at best : 2.7- 3 mg/dl when euvolemic - UPCR - 11 GM in 5/19  
- cont present diuretics 
- no urgent need for RRT at this time 
  
Volume Overload: 
- improving 
- cont current diuretics Hyponatremia 
-due to volume overload and metolazone 
- stable  
- cont FR 
  
Acute hypoxic respiratory failure 
-Injury to pulmonary edema 
-Has underlying COPD 
-CT chest on admission suggest possible pulmonary fibrosis airspace disease 
-History of right-sided pneumonia in August 
  
Metabolic acidosis 
-off  oral Bicarb  
- hold of on diamox  
  
HTN : 
- meds being adjusted by cards  
  
Pulm HTN: 
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  
  
Bradycardia : 
- s/p PPM on 5/9 
  
Anemia of CKD: 
-Worsening anemia likely secondary to CKD 
-He has been off MICHAEL for a year - EPO reordered, but iron stores very low. - IV iron ordered. - s/p 2 units RBC in last 24 hrs  
  
Chronic A. fib Chronic diastolic congestive heart failure CAD s/p CABG 
  
DM2: 
- on insulin Interval History: 
Seen and examined. Feeling weak. Cr stable, wt down. No cp or sob reported at this time. Review of Systems: Pertinent items are noted in HPI. Current Medications:  
Current Facility-Administered Medications Medication Dose Route Frequency  bumetanide (BUMEX) tablet 3 mg  3 mg Oral BID  spironolactone (ALDACTONE) tablet 25 mg  25 mg Oral DAILY  magnesium oxide (MAG-OX) tablet 400 mg  400 mg Oral DAILY  dilTIAZem (CARDIZEM) IR tablet 30 mg  30 mg Oral TIDAC  albuterol (PROVENTIL VENTOLIN) nebulizer solution 2.5 mg  2.5 mg Nebulization BID RT  
 ferric citrate (AURYXIA) tablet 630 mg  630 mg Oral DAILY WITH LUNCH  
 hydrALAZINE (APRESOLINE) tablet 50 mg  50 mg Oral TID  
 0.9% sodium chloride infusion 250 mL  250 mL IntraVENous PRN  
 epoetin dm-epbx (RETACRIT) injection 20,000 Units  20,000 Units SubCUTAneous Q MON, WED & FRI  bumetanide (BUMEX) injection 1 mg  1 mg IntraVENous PRN  
 hydrALAZINE (APRESOLINE) 20 mg/mL injection 10 mg  10 mg IntraVENous Q6H PRN  
 isosorbide dinitrate (ISORDIL) tablet 20 mg  20 mg Oral TID  guaiFENesin (ROBITUSSIN) 100 mg/5 mL oral liquid 100 mg  100 mg Oral Q4H PRN  
 guaiFENesin (ROBITUSSIN) 100 mg/5 mL oral liquid 400 mg  400 mg Oral Q6H  
 sodium chloride (NS) flush 5-40 mL  5-40 mL IntraVENous Q8H  
 sodium chloride (NS) flush 5-40 mL  5-40 mL IntraVENous PRN  
 acetaminophen (TYLENOL) tablet 650 mg  650 mg Oral Q4H PRN  
 heparin (porcine) injection 5,000 Units  5,000 Units SubCUTAneous Q8H  
 aspirin chewable tablet 81 mg  81 mg Oral DAILY  carvedilol (COREG) tablet 12.5 mg  12.5 mg Oral BID WITH MEALS  simvastatin (ZOCOR) tablet 20 mg  20 mg Oral QHS  tamsulosin (FLOMAX) capsule 0.4 mg  0.4 mg Oral DAILY  insulin lispro (HUMALOG) injection   SubCUTAneous AC&HS  
 glucose chewable tablet 16 g  4 Tab Oral PRN  
 glucagon (GLUCAGEN) injection 1 mg  1 mg IntraMUSCular PRN  
 dextrose 10% infusion 0-250 mL  0-250 mL IntraVENous PRN Allergies Allergen Reactions  Lisinopril Cough Objective: 
Vitals:   
Vitals:  
 12/09/19 7015 12/09/19 7781 12/09/19 0720 12/09/19 4091 BP:  119/64 120/60 Pulse:  79 80 Resp:  18 18 Temp:  97.9 °F (36.6 °C) 99.8 °F (37.7 °C) SpO2:  95% 94% 94% Weight: 74.8 kg (164 lb 14.5 oz) Height:      
 
Intake and Output: 
No intake/output data recorded. 12/07 1901 - 12/09 0700 In: Tori Camarillo [P.O.:1880] Out: 5504 [ALOCJ:[id number]] Physical Examination: 
General: Frail and ill-looking HEENT: PERRL,  + conjunctival pallor Neck: Supple,no mass palpable Lungs : B/L rales CVS: irregular, S1 S2 normal, systolic murmur Abdomen: Soft, NT, BS + Extremities: 2+ b/l Edema Skin: No rash or lesions. MS: No joint swelling, erythema, warmth Neurologic: non focal, AAO x 3 Psych: normal affect 
 
[]    High complexity decision making was performed 
[]    Patient is at high-risk of decompensation with multiple organ involvement Lab Data Personally Reviewed: I have reviewed all the pertinent labs, microbiology data and radiology studies during assessment. Recent Labs 12/09/19 
0056 12/08/19 
0442 12/08/19 
0441 12/07/19 
1027 * 134* 133* 134* K 4.2 3.3* 3.3* 3.6 CL 91* 89* 89* 91* CO2 39* 40* 40* 38* * 210* 201* 185* BUN 61* 60* 61* 62* CREA 2.78* 2.72* 2.61* 2.47* CA 7.9* 8.0* 8.0* 7.8*  
MG  --  1.7  --   --   
PHOS 1.7* 2.3*  --  2.1* ALB 1.9* 1.8*  --  1.7* Recent Labs 12/08/19 
0441 WBC 7.7 HGB 8.9* HCT 27.9*  
 No results found for: SDES Lab Results Component Value Date/Time Culture result: (A) 12/04/2019 03:27 AM  
  FEW * METHICILLIN RESISTANT STAPHYLOCOCCUS AUREUS * Culture result: (A) 12/04/2019 03:27 AM  
  LIGHT STENOTROPHOMONAS Idelia Roper.) MALTOPHILIA CLSI GUIDELINES DO NOT RECOMMEND REPORTING SUSCEPTIBILITIES FOR S. MALTOPHILIA. TRIMETHOPRIM/SULFAMETHOXAZOLE IS THE DRUG OF CHOICE. Culture result: HEAVY NORMAL RESPIRATORY JUDE 12/04/2019 03:27 AM  
 Culture result: NO GROWTH 5 DAYS 08/27/2019 05:55 PM  
 Culture result: NO GROWTH 5 DAYS 08/05/2019 10:46 AM  
 
Recent Results (from the past 24 hour(s)) GLUCOSE, POC Collection Time: 12/08/19 11:59 AM  
Result Value Ref Range Glucose (POC) 166 (H) 65 - 100 mg/dL Performed by Tammy Childs GLUCOSE, POC Collection Time: 12/08/19  4:48 PM  
Result Value Ref Range Glucose (POC) 181 (H) 65 - 100 mg/dL Performed by Tammy Childs GLUCOSE, POC Collection Time: 12/08/19 10:05 PM  
Result Value Ref Range  Glucose (POC) 204 (H) 65 - 100 mg/dL Performed by Anupam Westfall GLUCOSE, POC Collection Time: 12/08/19 10:49 PM  
Result Value Ref Range Glucose (POC) 196 (H) 65 - 100 mg/dL Performed by Lily Duncan (CON) RENAL FUNCTION PANEL Collection Time: 12/09/19 12:56 AM  
Result Value Ref Range Sodium 134 (L) 136 - 145 mmol/L Potassium 4.2 3.5 - 5.1 mmol/L Chloride 91 (L) 97 - 108 mmol/L  
 CO2 39 (H) 21 - 32 mmol/L Anion gap 4 (L) 5 - 15 mmol/L Glucose 183 (H) 65 - 100 mg/dL BUN 61 (H) 6 - 20 MG/DL Creatinine 2.78 (H) 0.70 - 1.30 MG/DL  
 BUN/Creatinine ratio 22 (H) 12 - 20 GFR est AA 27 (L) >60 ml/min/1.73m2 GFR est non-AA 22 (L) >60 ml/min/1.73m2 Calcium 7.9 (L) 8.5 - 10.1 MG/DL Phosphorus 1.7 (L) 2.6 - 4.7 MG/DL Albumin 1.9 (L) 3.5 - 5.0 g/dL GLUCOSE, POC Collection Time: 12/09/19  7:06 AM  
Result Value Ref Range Glucose (POC) 166 (H) 65 - 100 mg/dL Performed by Anupam Westfall I have reviewed the flowsheets. Chart and Pertinent Notes have been reviewed. No change in PMH ,family and social history from Consult note.  
 
 
Chong Borden MD

## 2019-12-09 NOTE — PROGRESS NOTES
[address] [facility] Adult  Hospitalist Group Hospitalist Progress Note Ila Parsons MD 
Answering service: [phone] OR [phone] from in house phone Date of Service:  2019 NAME:  Ho Garcia Sr. 
:  1935 MRN:  [id number] Admission Summary: A 81year old male patient with PMH of CAD s/p CABG, Diastolic heart failure, Afib , s/p PPM, CKD stage III, HTN, HLD presented to Ed for evaluation of shortness of breath. Patient has noticed progressive sob since few days and follows with Dr. Radha Leon for CHF. he noticed more than 4lbs weight gain in last few days. This morning, he woke up with sob and so he came to ED. He noticed worsening leg swelling, no chest pain. No change in cough but sputum appears to be more dark in color. No fever or chills. No sick contacts. He also mentions that his BG was low in 80;s this AM 
In ED, he was given IV bumex. Hospitalist consulted for admission. Interval history / Subjective:  
Patient is feeling better. Breathing is stable and better. Not back to his baseline. Still get too easily SOB on minimal exertion. Some swelling present but better. Slept okay. No other concerns. Changed to PO Bumex. Discussed with nurse on the floor. Assessment & Plan:  
 
Shortness of breath likely due to Acute on chronic diastolic heart failure Chronic hypoxemic respiratory failure on 2LPM O2 NC - baseline - Continue telemetry monitoring - BNP >35,000 on admission 
- CT chest:  Small bilateral pleural effusions with underlying atelectasis. - recent Echo 2019: EF 66 - 70%. Mild to moderate pulmonary hypertension.  
- cardiology on board. Appreciate. - IV bumex 3mg bid transitioned to PO. Also received metolazone 5 mg once during the hospital stay. Monitor closely for continued Diuresis. Still not Euvolvemic. 
- c/w aspirin, coreg, hydralazine, statin - Cardiology [facility] plan for RHC with vasodilator challenge. - s/p [facility] 12/6/2019 
  
KATALINA on CKD - Cr 2.58 on poa, baseline 1.8-1.9 
- likely CRS 
- Nephrology on board. Appreciate. Close monitoring. PO Bumex now. 
  
Afib - rate controlled on . On aspirin  
  
HTN - uncontrolled on poa. Better controlled now. -Uptitrated home meds coreg, hydralazine 
-BP better controlled. Caution with approach to euvolemia DM- A1c 7.7. c/w ISS 
  
Hypokalemia - replete and monitor as needed Hyponatremia: Stable. Likely related CHF and Metolazone. 
  
Chronic anemia, s/p PRBCx2 12/6/19 
- fobt negative. Iron deficient. On iron supplementsIV Venofer - Hb low stable - 2 PRBC per cardiology team 12/6/19. Monitor CBC periodically Advanced COPD and emphysema 
- CT chest: Diffuse interstitial opacities again noted progressive in the upper lobes bilaterally. Underlying pulmonary fibrosis is considered. - check PFT's. - normal lactic acid  
- resp panel and sputum cx ordered 
- less likely infectious  
  
Hx of CAD: s/p CAG in 2003. - no chest pain. Trop neg Hx of PVD s/p arthrectomy to distal SFA and distal popliteal with angioplasty to both lesions and stent to SFA by Dr. Brandi Terrazas 3/2014 
  
DVT ppx: Heparin Code status: Full. Disposition: TBD. [facility] with SN/PT [facility] Care Plan discussed with: Patient/Family and Nurse Hopefully DC in next 48hrs if ok from cardiology and renal standpoint. Hospital Problems  Date Reviewed: 12/9/2019 Codes Class Noted POA  
 CHF exacerbation ([facility] [address] ICD-10-CM: I50.9 ICD-9-CM: 428.0  12/3/2019 Unknown Review of Systems: A comprehensive review of systems was negative except for that written in the HPI. Vital Signs:  
 Last 24hrs VS reviewed since prior progress note. Most recent are: 
Visit Vitals /71 (BP 1 Location: Left arm, BP Patient Position: At rest) Pulse 74 Temp 98.1 °F (36.7 °C) Resp 16 Ht 5' 9\" (1.753 m) Wt 74.8 kg (164 lb 14.5 oz) SpO2 95% BMI 24.35 kg/m² Intake/Output Summary (Last 24 hours) at 12/9/2019 1840 Last data filed at 12/9/2019 0809 Gross per 24 hour Intake 700 ml Output 375 ml Net 325 ml Physical Examination:  
 
Gen: mild distress due to sob. Elderly HEENT: anicteric sclerae, normal conjunctiva, oropharynx clear, MM moist 
Neck: supple, trachea midline, no adenopathy Heart: RRR, no MRG, no JVD, 2+  edema Lungs: Improved air entry, minimal to no basal crackle. No wheezing Abd: soft, NT, ND, BS+, no organomegaly Extr: warm, RLE +2 edema, LLE +1+2 edema, right>left Skin: dry, no rash Neuro: CN II-XII grossly intact, normal speech, moves all extremities Psych: normal mood, appropriate affect Data Review:  
 Review and/or order of clinical lab test 
Review and/or order of tests in the radiology section of CPT Review and/or order of tests in the medicine section of CPT Labs:  
 
Recent Labs 12/08/19 0441 WBC 7.7 HGB 8.9* HCT 27.9*  
 Recent Labs 12/09/19 0056 12/08/19 0442 12/08/19 0441 12/07/19 
1027 * 134* 133* 134* K 4.2 3.3* 3.3* 3.6 CL 91* 89* 89* 91* CO2 39* 40* 40* 38* BUN 61* 60* 61* 62* CREA 2.78* 2.72* 2.61* 2.47* * 210* 201* 185* CA 7.9* 8.0* 8.0* 7.8*  
MG  --  1.7  --   --   
PHOS 1.7* 2.3*  --  2.1* Recent Labs 12/09/19 0056 12/08/19 0442 12/07/19 
1027 ALB 1.9* 1.8* 1.7* No results for input(s): INR, PTP, APTT, INREXT, INREXT in the last 72 hours. Recent Labs 12/07/19 
1027 TIBC 302 PSAT INDETERMINATE - SENT TO REFERENCE LAB FOR ADDITIONAL TESTING. Lab Results Component Value Date/Time Folate 11.3 12/03/2019 08:44 AM  
  
No results for input(s): PH, PCO2, PO2 in the last 72 hours. No results for input(s): CPK, CKNDX, TROIQ in the last 72 hours. No lab exists for component: CPKMB No results found for: CHOL, CHOLX, CHLST, CHOLV, HDL, HDLP, LDL, LDLC, DLDLP, TGLX, TRIGL, TRIGP, [address], [facility]X Lab Results Component Value Date/Time Glucose (POC) 195 (H) 12/09/2019 05:26 PM  
 Glucose (POC) 222 (H) 12/09/2019 11:27 AM  
 Glucose (POC) 166 (H) 12/09/2019 07:06 AM  
 Glucose (POC) 196 (H) 12/08/2019 10:49 PM  
 Glucose (POC) 204 (H) 12/08/2019 10:05 PM  
 
Lab Results Component Value Date/Time Color YELLOW/STRAW 08/27/2019 12:56 PM  
 Appearance CLEAR 08/27/2019 12:56 PM  
 Specific gravity 1.023 08/27/2019 12:56 PM  
 pH (UA) 6.0 08/27/2019 12:56 PM  
 Protein >300 (A) 08/27/2019 12:56 PM  
 Glucose 500 (A) 08/27/2019 12:56 PM  
 Ketone NEGATIVE  08/27/2019 12:56 PM  
 Bilirubin NEGATIVE  08/27/2019 12:56 PM  
 Urobilinogen 0.2 08/27/2019 12:56 PM  
 Nitrites NEGATIVE  08/27/2019 12:56 PM  
 Leukocyte Esterase NEGATIVE  08/27/2019 12:56 PM  
 Epithelial cells FEW 08/27/2019 12:56 PM  
 Bacteria NEGATIVE  08/27/2019 12:56 PM  
 WBC 0-4 08/27/2019 12:56 PM  
 RBC 5-10 08/27/2019 12:56 PM  
 
 
Xr Chest Pa Lat Result Date: 12/3/2019 IMPRESSION: Diffuse interstitial opacities are overall unchanged since 8/27/2019 and may represent chronic lung parenchymal change versus pulmonary edema. Superimposed patchy airspace opacities are increased in the right upper lung and left lower lung and may represent edema, infection, or atelectasis. Ct Chest Wo Cont Result Date: 12/3/2019 IMPRESSION: Diffuse interstitial opacities again noted progressive in the upper lobes bilaterally. Underlying pulmonary fibrosis is considered. Small bilateral pleural effusions with underlying atelectasis. Cardiomegaly. Cholelithiasis. Us Retroperitoneum Comp Result Date: 12/4/2019 IMPRESSION: Increased bilateral renal cortical echogenicity is compatible with medical renal disease. Bilateral renal cysts. No hydronephrosis. Medications Reviewed:  
 
Current Facility-Administered Medications Medication Dose Route Frequency  bumetanide (BUMEX) tablet 3 mg  3 mg Oral BID  spironolactone (ALDACTONE) tablet 25 mg  25 mg Oral DAILY  magnesium oxide (MAG-OX) tablet 400 mg  400 mg Oral DAILY  dilTIAZem (CARDIZEM) IR tablet 30 mg  30 mg Oral TIDAC  albuterol (PROVENTIL VENTOLIN) nebulizer solution 2.5 mg  2.5 mg Nebulization BID RT  
 ferric citrate (AURYXIA) tablet 630 mg  630 mg Oral DAILY WITH LUNCH  
 hydrALAZINE (APRESOLINE) tablet 50 mg  50 mg Oral TID  
 0.9% sodium chloride infusion 250 mL  250 mL IntraVENous PRN  
 epoetin dm-epbx (RETACRIT) injection 20,000 Units  20,000 Units SubCUTAneous Q MON, WED & FRI  bumetanide (BUMEX) injection 1 mg  1 mg IntraVENous PRN  
 hydrALAZINE (APRESOLINE) 20 mg/mL injection 10 mg  10 mg IntraVENous Q6H PRN  
 isosorbide dinitrate (ISORDIL) tablet 20 mg  20 mg Oral TID  guaiFENesin (ROBITUSSIN) 100 mg/5 mL oral liquid 100 mg  100 mg Oral Q4H PRN  
 guaiFENesin (ROBITUSSIN) 100 mg/5 mL oral liquid 400 mg  400 mg Oral Q6H  
 sodium chloride (NS) flush 5-40 mL  5-40 mL IntraVENous Q8H  
 sodium chloride (NS) flush 5-40 mL  5-40 mL IntraVENous PRN  
 acetaminophen (TYLENOL) tablet 650 mg  650 mg Oral Q4H PRN  
 heparin (porcine) injection 5,000 Units  5,000 Units SubCUTAneous Q8H  
 aspirin chewable tablet 81 mg  81 mg Oral DAILY  carvedilol (COREG) tablet 12.5 mg  12.5 mg Oral BID WITH MEALS  simvastatin (ZOCOR) tablet 20 mg  20 mg Oral QHS  tamsulosin (FLOMAX) capsule 0.4 mg  0.4 mg Oral DAILY  insulin lispro (HUMALOG) injection   SubCUTAneous AC&HS  
 glucose chewable tablet 16 g  4 Tab Oral PRN  
 glucagon (GLUCAGEN) injection 1 mg  1 mg IntraMUSCular PRN  
 dextrose 10% infusion 0-250 mL  0-250 mL IntraVENous PRN  
 
______________________________________________________________________ EXPECTED LENGTH OF STAY: - - - 
ACTUAL LENGTH OF STAY:          6 Kavya Santiago MD

## 2019-12-10 ENCOUNTER — APPOINTMENT (OUTPATIENT)
Dept: GENERAL RADIOLOGY | Age: 84
DRG: 291 | End: 2019-12-10
Attending: HOSPITALIST
Payer: MEDICARE

## 2019-12-10 VITALS
TEMPERATURE: 98.7 F | BODY MASS INDEX: 24.29 KG/M2 | HEIGHT: 69 IN | DIASTOLIC BLOOD PRESSURE: 67 MMHG | OXYGEN SATURATION: 97 % | WEIGHT: 164 LBS | RESPIRATION RATE: 16 BRPM | HEART RATE: 69 BPM | SYSTOLIC BLOOD PRESSURE: 149 MMHG

## 2019-12-10 LAB
ALBUMIN SERPL-MCNC: 1.8 G/DL (ref 3.5–5)
ANION GAP SERPL CALC-SCNC: 3 MMOL/L (ref 5–15)
BUN SERPL-MCNC: 60 MG/DL (ref 6–20)
BUN/CREAT SERPL: 20 (ref 12–20)
CALCIUM SERPL-MCNC: 8.2 MG/DL (ref 8.5–10.1)
CHLORIDE SERPL-SCNC: 89 MMOL/L (ref 97–108)
CO2 SERPL-SCNC: 39 MMOL/L (ref 21–32)
CREAT SERPL-MCNC: 2.98 MG/DL (ref 0.7–1.3)
GLUCOSE BLD STRIP.AUTO-MCNC: 140 MG/DL (ref 65–100)
GLUCOSE BLD STRIP.AUTO-MCNC: 236 MG/DL (ref 65–100)
GLUCOSE BLD STRIP.AUTO-MCNC: 252 MG/DL (ref 65–100)
GLUCOSE BLD STRIP.AUTO-MCNC: 263 MG/DL (ref 65–100)
GLUCOSE SERPL-MCNC: 190 MG/DL (ref 65–100)
Lab: NORMAL
PHOSPHATE SERPL-MCNC: 1.8 MG/DL (ref 2.6–4.7)
POTASSIUM SERPL-SCNC: 3.8 MMOL/L (ref 3.5–5.1)
REFERENCE LAB,REFLB: NORMAL
SERVICE CMNT-IMP: ABNORMAL
SODIUM SERPL-SCNC: 131 MMOL/L (ref 136–145)
TEST DESCRIPTION:,ATST: NORMAL

## 2019-12-10 PROCEDURE — 74011250637 HC RX REV CODE- 250/637: Performed by: SPECIALIST

## 2019-12-10 PROCEDURE — 74011250637 HC RX REV CODE- 250/637: Performed by: PHYSICIAN ASSISTANT

## 2019-12-10 PROCEDURE — 74011250637 HC RX REV CODE- 250/637: Performed by: INTERNAL MEDICINE

## 2019-12-10 PROCEDURE — 74011636637 HC RX REV CODE- 636/637: Performed by: INTERNAL MEDICINE

## 2019-12-10 PROCEDURE — 97116 GAIT TRAINING THERAPY: CPT

## 2019-12-10 PROCEDURE — 80069 RENAL FUNCTION PANEL: CPT

## 2019-12-10 PROCEDURE — 74011250636 HC RX REV CODE- 250/636: Performed by: INTERNAL MEDICINE

## 2019-12-10 PROCEDURE — 36415 COLL VENOUS BLD VENIPUNCTURE: CPT

## 2019-12-10 PROCEDURE — 71045 X-RAY EXAM CHEST 1 VIEW: CPT

## 2019-12-10 PROCEDURE — 97530 THERAPEUTIC ACTIVITIES: CPT

## 2019-12-10 PROCEDURE — 82962 GLUCOSE BLOOD TEST: CPT

## 2019-12-10 PROCEDURE — 94621 CARDIOPULM EXERCISE TESTING: CPT

## 2019-12-10 RX ORDER — ALBUTEROL SULFATE 0.83 MG/ML
2.5 SOLUTION RESPIRATORY (INHALATION)
Status: DISCONTINUED | OUTPATIENT
Start: 2019-12-10 | End: 2019-12-10 | Stop reason: HOSPADM

## 2019-12-10 RX ORDER — ISOSORBIDE DINITRATE 20 MG/1
20 TABLET ORAL 3 TIMES DAILY
Qty: 60 TAB | Refills: 0 | Status: SHIPPED | OUTPATIENT
Start: 2019-12-10 | End: 2020-01-01

## 2019-12-10 RX ORDER — SPIRONOLACTONE 25 MG/1
25 TABLET ORAL DAILY
Qty: 20 TAB | Refills: 0 | Status: SHIPPED | OUTPATIENT
Start: 2019-12-11 | End: 2021-01-01

## 2019-12-10 RX ORDER — BUMETANIDE 1 MG/1
3 TABLET ORAL 2 TIMES DAILY
Qty: 120 TAB | Refills: 0 | Status: SHIPPED | OUTPATIENT
Start: 2019-12-10 | End: 2020-02-27

## 2019-12-10 RX ORDER — HYDRALAZINE HYDROCHLORIDE 50 MG/1
50 TABLET, FILM COATED ORAL 3 TIMES DAILY
Qty: 60 TAB | Refills: 0 | Status: SHIPPED | OUTPATIENT
Start: 2019-12-10 | End: 2020-01-30

## 2019-12-10 RX ORDER — INSULIN GLARGINE 100 [IU]/ML
7 INJECTION, SOLUTION SUBCUTANEOUS
Qty: 1 VIAL | Refills: 0 | Status: ON HOLD
Start: 2019-12-10 | End: 2019-12-17 | Stop reason: DRUGHIGH

## 2019-12-10 RX ORDER — LANOLIN ALCOHOL/MO/W.PET/CERES
400 CREAM (GRAM) TOPICAL DAILY
Qty: 15 TAB | Refills: 0 | Status: SHIPPED | OUTPATIENT
Start: 2019-12-11

## 2019-12-10 RX ADMIN — HEPARIN SODIUM 5000 UNITS: 5000 INJECTION INTRAVENOUS; SUBCUTANEOUS at 12:34

## 2019-12-10 RX ADMIN — TAMSULOSIN HYDROCHLORIDE 0.4 MG: 0.4 CAPSULE ORAL at 12:34

## 2019-12-10 RX ADMIN — GUAIFENESIN 400 MG: 200 SOLUTION ORAL at 05:05

## 2019-12-10 RX ADMIN — INSULIN LISPRO 5 UNITS: 100 INJECTION, SOLUTION INTRAVENOUS; SUBCUTANEOUS at 12:34

## 2019-12-10 RX ADMIN — HYDRALAZINE HYDROCHLORIDE 50 MG: 50 TABLET ORAL at 12:34

## 2019-12-10 RX ADMIN — INSULIN LISPRO 5 UNITS: 100 INJECTION, SOLUTION INTRAVENOUS; SUBCUTANEOUS at 17:56

## 2019-12-10 RX ADMIN — HEPARIN SODIUM 5000 UNITS: 5000 INJECTION INTRAVENOUS; SUBCUTANEOUS at 05:06

## 2019-12-10 RX ADMIN — GUAIFENESIN 400 MG: 200 SOLUTION ORAL at 00:10

## 2019-12-10 RX ADMIN — ISOSORBIDE DINITRATE 20 MG: 20 TABLET ORAL at 12:34

## 2019-12-10 RX ADMIN — CARVEDILOL 12.5 MG: 12.5 TABLET, FILM COATED ORAL at 07:20

## 2019-12-10 RX ADMIN — HYDRALAZINE HYDROCHLORIDE 50 MG: 50 TABLET ORAL at 17:57

## 2019-12-10 RX ADMIN — GUAIFENESIN 400 MG: 200 SOLUTION ORAL at 12:33

## 2019-12-10 RX ADMIN — GUAIFENESIN 400 MG: 200 SOLUTION ORAL at 17:57

## 2019-12-10 RX ADMIN — ISOSORBIDE DINITRATE 20 MG: 20 TABLET ORAL at 17:58

## 2019-12-10 RX ADMIN — SPIRONOLACTONE 25 MG: 25 TABLET ORAL at 12:34

## 2019-12-10 RX ADMIN — Medication 10 ML: at 05:07

## 2019-12-10 RX ADMIN — ASPIRIN 81 MG 81 MG: 81 TABLET ORAL at 12:34

## 2019-12-10 RX ADMIN — CARVEDILOL 12.5 MG: 12.5 TABLET, FILM COATED ORAL at 17:57

## 2019-12-10 RX ADMIN — BUMETANIDE 3 MG: 1 TABLET ORAL at 17:58

## 2019-12-10 RX ADMIN — DILTIAZEM HYDROCHLORIDE 30 MG: 30 TABLET, FILM COATED ORAL at 12:34

## 2019-12-10 RX ADMIN — BUMETANIDE 3 MG: 1 TABLET ORAL at 12:34

## 2019-12-10 RX ADMIN — DILTIAZEM HYDROCHLORIDE 30 MG: 30 TABLET, FILM COATED ORAL at 07:20

## 2019-12-10 RX ADMIN — Medication 400 MG: at 12:34

## 2019-12-10 NOTE — PROGRESS NOTES
Hospital follow-up PCP transitional care appointment has been scheduled with Dr. Jerman Castellanos for Tuesday, 12/17/19 at 10:45 a.m. Pending patient discharge.   Quiana Bourgeois, Care Management Specialist.

## 2019-12-10 NOTE — DIABETES MGMT
[facility] Progress Note Recommendations/ Comments: Chart review for hyperglycemia - FBG in range but most daytime BG's > 200 mg/dL Per PTA pt takes Lantus 10 units each AM and 8 units each PM - he reduces the PM dose if BG is < 200 mg/dL Carb consistent added to diet today If appropriate, please consider Addition of Lantus 10 units each AM 
 
  
Current hospital DM medication: lispro insulin correction scale 
  
Chart reviewed on [facility] . . 
  
Patient is a 81 y.o. male with known  Type 2 Diabetes on insulin injections: Lantus : 18 units prescribed but pt has split the dose and take 10 units am; 8 units pm and may reduce this dose if BS <200 mg/dl at home. 
 
 
 A1c:  
Lab Results Component Value Date/Time Hemoglobin A1c 7.7 (H) 12/03/2019 08:44 AM  
 Hemoglobin A1c 8.4 (H) 08/27/2019 05:55 PM  
 Hemoglobin A1c, External 8.4 08/15/2017 Recent Glucose Results:  
Lab Results Component Value Date/Time  (H) 12/10/2019 12:20 AM  
 GLUCPOC 252 (H) 12/10/2019 11:29 AM  
 GLUCPOC 140 (H) 12/10/2019 06:47 AM  
 GLUCPOC 228 (H) 12/09/2019 09:41 PM  
  
 
Lab Results Component Value Date/Time Creatinine 2.98 (H) 12/10/2019 12:20 AM  
 
Estimated Creatinine Clearance: 18.8 mL/min (A) (based on SCr of 2.98 mg/dL (H)). Active Orders Diet DIET CARDIAC Regular; 2 GM NA (House Low NA); FR 1200ML  
  
 
PO intake: No data found. Will continue to follow as needed. Thank you Tigist Green RN, CDE

## 2019-12-10 NOTE — PROGRESS NOTES
[facility] 
 [address] [address], [place], [address] [facility] [address] [facility] [address] [facility] Phone: [phone]   [phone]   
  
Nephrology Progress Note Sandra Ramos.     1935     [id number] Date of Admission : 12/3/2019 
12/10/19 CC:  Follow up for CKD 4 Assessment and Plan CKD IV: 
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 
- baseline Cr at best : 2.7- 3 mg/dl when euvolemic - UPCR - 11 GM in 5/19  
- cont present diuretics 
- no urgent need for RRT at this time 
- ok for d/c from renal perspective if OPIC diuretics arranged by cardiology 
- will see in the office for f/u in 1 week post d/c 
  
Volume Overload: 
- improving 
- cont current diuretics Hyponatremia 
-due to volume overload and metolazone 
- stable  
- cont FR 
  
Acute hypoxic respiratory failure 
-Injury to pulmonary edema 
-Has underlying COPD 
-CT chest on admission suggest possible pulmonary fibrosis airspace disease 
-History of right-sided pneumonia in August 
  
Metabolic acidosis 
-off  oral Bicarb  
- hold of on diamox  
  
HTN : 
- meds being adjusted by cards  
  
Pulm HTN: 
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  
  
Bradycardia : 
- s/p PPM on 5/9 
  
Anemia of CKD: 
-Worsening anemia likely secondary to CKD 
-He has been off MICHAEL for a year - EPO reordered, but iron stores very low. - IV iron ordered. - s/p 2 units RBC in last 24 hrs  
  
Chronic A. fib Chronic diastolic congestive heart failure CAD s/p CABG 
  
DM2: 
- on insulin Interval History: 
Seen and examined. Feeling weak. Cr up, but within his baseline. No cp or sob reported at this time. Review of Systems: Pertinent items are noted in HPI. Current Medications:  
Current Facility-Administered Medications Medication Dose Route Frequency  albuterol (PROVENTIL VENTOLIN) nebulizer solution 2.5 mg  2.5 mg Nebulization Q4H PRN  
 bumetanide (BUMEX) tablet 3 mg  3 mg Oral BID  spironolactone (ALDACTONE) tablet 25 mg  25 mg Oral DAILY  magnesium oxide (MAG-OX) tablet 400 mg  400 mg Oral DAILY  dilTIAZem (CARDIZEM) IR tablet 30 mg  30 mg Oral TIDAC  ferric citrate (AURYXIA) tablet 630 mg  630 mg Oral DAILY WITH LUNCH  
 hydrALAZINE (APRESOLINE) tablet 50 mg  50 mg Oral TID  
 0.9% sodium chloride infusion 250 mL  250 mL IntraVENous PRN  
 epoetin dm-epbx (RETACRIT) injection 20,000 Units  20,000 Units SubCUTAneous Q MON, WED & FRI  bumetanide (BUMEX) injection 1 mg  1 mg IntraVENous PRN  
 hydrALAZINE (APRESOLINE) 20 mg/mL injection 10 mg  10 mg IntraVENous Q6H PRN  
 isosorbide dinitrate (ISORDIL) tablet 20 mg  20 mg Oral TID  guaiFENesin (ROBITUSSIN) 100 mg/5 mL oral liquid 100 mg  100 mg Oral Q4H PRN  
 guaiFENesin (ROBITUSSIN) 100 mg/5 mL oral liquid 400 mg  400 mg Oral Q6H  
 sodium chloride (NS) flush 5-40 mL  5-40 mL IntraVENous Q8H  
 sodium chloride (NS) flush 5-40 mL  5-40 mL IntraVENous PRN  
 acetaminophen (TYLENOL) tablet 650 mg  650 mg Oral Q4H PRN  
 heparin (porcine) injection 5,000 Units  5,000 Units SubCUTAneous Q8H  
 aspirin chewable tablet 81 mg  81 mg Oral DAILY  carvedilol (COREG) tablet 12.5 mg  12.5 mg Oral BID WITH MEALS  simvastatin (ZOCOR) tablet 20 mg  20 mg Oral QHS  tamsulosin (FLOMAX) capsule 0.4 mg  0.4 mg Oral DAILY  insulin lispro (HUMALOG) injection   SubCUTAneous AC&HS  
 glucose chewable tablet 16 g  4 Tab Oral PRN  
 glucagon (GLUCAGEN) injection 1 mg  1 mg IntraMUSCular PRN  
 dextrose 10% infusion 0-250 mL  0-250 mL IntraVENous PRN Allergies Allergen Reactions  Lisinopril Cough Objective: 
Vitals:   
Vitals:  
 12/09/19 2309 12/10/19 0500 12/10/19 0601 12/10/19 8859 BP: 131/53 171/67  168/78 Pulse: 78 78  72 Resp: 18 18  18 Temp: 97.5 °F (36.4 °C) 98.5 °F (36.9 °C)  98 °F (36.7 °C) SpO2: 92% 97%  95% Weight:   74.4 kg (164 lb) Height:      
 
Intake and Output: 
12/10 0701 - 12/10 1900 In: -  
Out: 200 [Urine:200] 12/08 1901 - 12/10 0700 In: 750 [P.O.:750] Out: 675 Inderjit Cool Physical Examination: 
General: Frail and ill-looking HEENT: PERRL,  + conjunctival pallor Neck: Supple,no mass palpable Lungs : B/L rales CVS: irregular, S1 S2 normal, systolic murmur Abdomen: Soft, NT, BS + Extremities: 2+ b/l Edema Skin: No rash or lesions. MS: No joint swelling, erythema, warmth Neurologic: non focal, AAO x 3 Psych: normal affect 
 
[]    High complexity decision making was performed 
[]    Patient is at high-risk of decompensation with multiple organ involvement Lab Data Personally Reviewed: I have reviewed all the pertinent labs, microbiology data and radiology studies during assessment. Recent Labs 12/10/19 
0020 12/09/19 
0056 12/08/19 
8991 12/08/19 
0441 * 134* 134* 133* K 3.8 4.2 3.3* 3.3*  
CL 89* 91* 89* 89* CO2 39* 39* 40* 40* * 183* 210* 201* BUN 60* 61* 60* 61* CREA 2.98* 2.78* 2.72* 2.61* CA 8.2* 7.9* 8.0* 8.0*  
MG  --   --  1.7  --   
PHOS 1.8* 1.7* 2.3*  --   
ALB 1.8* 1.9* 1.8*  --   
 
Recent Labs 12/08/19 
0441 WBC 7.7 HGB 8.9* HCT 27.9*  
 No results found for: SDES Lab Results Component Value Date/Time Culture result: (A) 12/04/2019 03:27 AM  
  FEW * METHICILLIN RESISTANT STAPHYLOCOCCUS AUREUS * Culture result: (A) 12/04/2019 03:27 AM  
  LIGHT STENOTROPHOMONAS Celi Conception.) MALTOPHILIA CLSI GUIDELINES DO NOT RECOMMEND REPORTING SUSCEPTIBILITIES FOR S. MALTOPHILIA. TRIMETHOPRIM/SULFAMETHOXAZOLE IS THE DRUG OF CHOICE. Culture result: HEAVY NORMAL RESPIRATORY JUDE 12/04/2019 03:27 AM  
 Culture result: NO GROWTH 5 DAYS 08/27/2019 05:55 PM  
 Culture result: NO GROWTH 5 DAYS 08/05/2019 10:46 AM  
 
Recent Results (from the past 24 hour(s)) GLUCOSE, POC Collection Time: 12/09/19 11:27 AM  
Result Value Ref Range Glucose (POC) 222 (H) 65 - 100 mg/dL Performed by Jose Juan Bennett GLUCOSE, POC  Collection Time: 12/09/19  5:26 PM Result Value Ref Range Glucose (POC) 195 (H) 65 - 100 mg/dL Performed by Chevy Early GLUCOSE, POC Collection Time: 12/09/19  9:41 PM  
Result Value Ref Range Glucose (POC) 228 (H) 65 - 100 mg/dL Performed by Senia Rawls (CON) RENAL FUNCTION PANEL Collection Time: 12/10/19 12:20 AM  
Result Value Ref Range Sodium 131 (L) 136 - 145 mmol/L Potassium 3.8 3.5 - 5.1 mmol/L Chloride 89 (L) 97 - 108 mmol/L  
 CO2 39 (H) 21 - 32 mmol/L Anion gap 3 (L) 5 - 15 mmol/L Glucose 190 (H) 65 - 100 mg/dL BUN 60 (H) 6 - 20 MG/DL Creatinine 2.98 (H) 0.70 - 1.30 MG/DL  
 BUN/Creatinine ratio 20 12 - 20 GFR est AA 25 (L) >60 ml/min/1.73m2 GFR est non-AA 20 (L) >60 ml/min/1.73m2 Calcium 8.2 (L) 8.5 - 10.1 MG/DL Phosphorus 1.8 (L) 2.6 - 4.7 MG/DL Albumin 1.8 (L) 3.5 - 5.0 g/dL GLUCOSE, POC Collection Time: 12/10/19  6:47 AM  
Result Value Ref Range Glucose (POC) 140 (H) 65 - 100 mg/dL Performed by Henny Begum I have reviewed the flowsheets. Chart and Pertinent Notes have been reviewed. No change in PMH ,family and social history from Consult note.  
 
 
Ev Calle MD

## 2019-12-10 NOTE — PROGRESS NOTES
12/10/2019   Edwin Hashimoto, PA-C  [facility] 
 
. Destiny Guptas Destiny Guptas Destiny Candys Néstor Duff is a 81 y.o. male  
 
no cp or SOB. Ready to go home. Note sternal subluxation, incomplete fusion post CABG. Echo done shows hyperdynamic LV function, the most prominent finding is RV dilation, a cor pulmonale picture Assessment/Plan/Discussion:Cardiology Attending:  
 
Patient seen on the day of progress note and examined  and agree with Advance Practice Provider (MADDIE, NP,PA)  assessment and plans. Néstor Duff is a 81 y.o. male RV dilated , cor pulmonale?, ex smoker Has diastolic dysfunction , cardiorenal syndrome and leadless pacer Plans for OP fu in [facility] 
blood pressure up today Home CV meds are: 
Key CAD CHF Meds   
    
  
 hydrALAZINE (APRESOLINE) 25 mg tablet (Taking) Take 25 mg by mouth three (3) times daily. bumetanide (BUMEX) 1 mg tablet (Taking) Take  by mouth two (2) times a day. Take 3 mg in the morning and 2 mg in the evening as directed. carvedilol (COREG) 12.5 mg tablet (Taking) Take 12.5 mg by mouth two (2) times daily (with meals). simvastatin (ZOCOR) 20 mg tablet (Taking) Take 1 Tab by mouth nightly. aspirin 81 mg chewable tablet (Taking) Take 1 Tab by mouth daily. omega 3-dha-epa-fish oil (FISH OIL) 100-160-1,000 mg cap (Taking) Take 1 Cap by mouth two (2) times a day. Likely home on Higher hydralazine at 50 mg TID, same Coreg, simva Bumex is at 3 mg BID am and afternoon Will stop IP diltiazem Dee Dee Mccollum MD   
 
 
 
Discussion/Plans/Recs 1. Acute on chronic diastolic CHF   
RV enlargement , cor pulmonale 
---BLE edema/Fluid overload: weight up approximately 20 + pounds at admit - now down 10# 
            due to pulmonary HTN, worsened renal function and acute on chronic DHF Plan for [facility] q 2 weeks for IV diuresis, if this is unsuccessful, he will have to make a decision about dialysis] .   
2. HFpEF- EF 65-70%/ moderate to severe Pulmonary HTN : 
RHC with PA 66 in May Last echo EF 66-70%.  Mild MR, mild-mod pulmonary HTN,  
             
12/03/19 ECHO ADULT FOLLOW-UP OR LIMITED 12/06/2019 12/6/2019 Narrative · Right Ventricle: Severely dilated right ventricle. · Left Ventricle: Normal cavity size and systolic function (ejection  
fraction normal). Mild concentric hypertrophy. Estimated left ventricular  
ejection fraction is 66 - 70%. Visually measured ejection fraction. No  
regional wall motion abnormality noted. · Left Atrium: Moderately dilated left atrium. · Aortic Valve: Mild aortic valve sclerosis with no significant stenosis. · Mitral Valve: Mitral valve thickening. · Interatrial Septum: Color flow Doppler was used. Signed by: Alayna Wilkins MD  
3. CAD/XOL : 3/2008 s/p CABG x 3 off pump  
           Last stress test 2017 normal perfusion. 
           no chest pain, no cath due to CKD and risk for complete renal failure 4. Afib, chronic:  
           -has leadless ppm.  Continue coreg. 
           -[id number]=3.  Not on [address] PTA 5. HTN -labile-Coreg, Dilt IR, Hydralazine, Isordil - BP more appropriate. 6. PVD no current claudication see hx below 7. CKD IV 
            - Nephrology following - PO bumex and Aldactone Lab Results Component Value Date/Time Creatinine 2.98 (H) 12/10/2019 12:20 AM  
  
Feeling well. SOB baseline. NO CP. Plans for [facility] as outpatient. Follow up in 2 weeks with Dr. Mina Degree.  
 
Cardiac Studies/Hx: 
12/03/19 ECHO ADULT FOLLOW-UP OR LIMITED 12/06/2019 12/6/2019 Narrative · Right Ventricle: Severely dilated right ventricle. · Left Ventricle: Normal cavity size and systolic function (ejection  
fraction normal). Mild concentric hypertrophy. Estimated left ventricular  
ejection fraction is 66 - 70%. Visually measured ejection fraction. No  
regional wall motion abnormality noted. · Left Atrium: Moderately dilated left atrium.  
· Aortic Valve: Mild aortic valve sclerosis with no significant stenosis. · Mitral Valve: Mitral valve thickening. · Interatrial Septum: Color flow Doppler was used. Signed by: South Garcia MD  
  
CAD/CABG  
off pump x3 3/2008 . =post op anemia and renal failure CATH 2008= LM -bifurcation dz 40% ;LAD 85% ; Circ ost 70% mid 70% RCA proximal 60% tapering, EF 60%-70%, bilaterally patent renal arteries, mild atherosclerosis of the distal abdominal aorta. [address] May 2, 2019 =PA 66/18 W 20 PA sat 55% CO 4.1 CI 2.12 Echo May 1 ,2019 EF 55-60% mod LAE, Mild CASSIE, Mild AS BELLE 1.6 cm2 mild MR & TR, RV fx mildly reduced PVD-18- RLE- mid CRA 70, Pop 99-PTA on right pop, LLE LCFA 40% - 17 PTA RCFA 90, JAS to RSFA  
-16 PTA Left Pop, PTA Left tib-per 
--3-13-14 PTA Left op PTA RSFA 
---11 stent RSFA  
JOCELYN 17 Normal perfusion Mild carotid artery disease 3-7-08 then 12 with 10-49% left, less than 10% on right SOCIAL: Drinks no alcohol, quit smoking several years ago, worked as an . Lives with his wife and has four children. Enjoys gardening, fishing and music. FAMILY HISTORY: Mother  of cancer at 76, father  of Parkinson's at 70, one brother  of cancer of the liver at 76 and one  of an infection at 64. Patient Vitals for the past 12 hrs: 
 Temp Pulse Resp BP SpO2  
12/10/19 0907 98 °F (36.7 °C) 72 18 168/78 95 % 12/10/19 0500 98.5 °F (36.9 °C) 78 18 171/67 97 % Past Medical History:  
Diagnosis Date  CAD (coronary artery disease) s/p CABG   [facility]  CKD (chronic kidney disease) stage 3, GFR 30-59 ml/min ([facility]) 10/21/2017  
 hypertension and DM nephrosclerosis  Diabetes mellitus, type 2 ([facility]  Hypercholesteremia  Hypertension  Paroxysmal atrial fibrillation ([facility] 10/21/2017  
 new onset afib with BRPR 10-19-17 admit  PVC's (premature ventricular contractions) 2014  PVD (peripheral vascular disease) ([facility] Social History Tobacco Use  Smoking status: Former Smoker Packs/day: 3.00 Years: 20.00 Pack years: 60.00 Types: Cigarettes Last attempt to quit: 1985 Years since quittin.9  Smokeless tobacco: Never Used Substance Use Topics  Alcohol use: No  
  
ROS-pertinents  negative except as above  The pertinent portions of the medical history,physician and nursing notes, meds,vitals , labs and Ins/Outs,are reviewed in the electronic record. Results for orders placed or performed during the hospital encounter of 19 EKG, 12 LEAD, INITIAL Result Value Ref Range Ventricular Rate 79 BPM  
 Atrial Rate 79 BPM  
 P-R Interval 320 ms QRS Duration 150 ms  
 Q-T Interval 468 ms QTC Calculation (Bezet) 536 ms Calculated R Axis -40 degrees Calculated T Axis -51 degrees Diagnosis Sinus rhythm with 1st degree AV block Left axis deviation Right bundle branch block When compared with ECG of 03-DEC-2019 08:31, Sinus rhythm has replaced Electronic ventricular pacemaker Confirmed by Riri Hannon M.D., Norris Teague ([id number]) on 2019 9:41:56 AM 
  
Results for orders placed or performed in visit on 11 AMB POC EKG ROUTINE W/ 12 LEADS, INTER & REP Narrative See scanned results 19 ECHO ADULT COMPLETE 2019 Narrative · Left Ventricle: Normal cavity size, systolic function (ejection fraction  
normal) and diastolic function. Mild concentric hypertrophy. Estimated  
left ventricular ejection fraction is 66 - 70%. Visually measured ejection  
fraction. No regional wall motion abnormality noted. Normal left  
ventricular strain. · Left Atrium: Moderately dilated left atrium. · Right Atrium: Mildly dilated right atrium. · Interatrial Septum: Color flow Doppler was used. · Aortic Valve: Mild aortic valve sclerosis with no significant stenosis. · Mitral Valve: Mitral valve thickening. Mild mitral valve regurgitation. · Tricuspid Valve: Mild tricuspid valve regurgitation is present. · Pulmonary Artery: Mild to moderate pulmonary hypertension. Signed by: Tanesha Maria MD  
    
Vitals:  
 12/09/19 7721 12/10/19 0500 12/10/19 6866 12/10/19 5501 BP: 131/53 171/67  168/78 BP 1 Location: Right arm Right arm  Right arm BP Patient Position: At rest     
Pulse: 78 78  72 Resp: 18 18  18 Temp: 97.5 °F (36.4 °C) 98.5 °F (36.9 °C)  98 °F (36.7 °C) SpO2: 92% 97%  95% Weight:   164 lb (74.4 kg) Height:      
 
 
Objective:  
 Physical Exam:  
Patient Vitals for the past 12 hrs: 
 Temp Pulse Resp BP SpO2  
12/10/19 0907 98 °F (36.7 °C) 72 18 168/78 95 % 12/10/19 0500 98.5 °F (36.9 °C) 78 18 171/67 97 % General:  alert, cooperative, mild distress, appears stated age ENT, Neck:  no jvd Chest Wall: inspection normal - no chest wall deformities or tenderness, tachypnea Has subluxation of left rib/sternal margin,   
Lung: clear to auscultation bilaterally, Heart:  A 1/6 soft systolic murmur is heard throughout the central precordium. , no gallops noted, irregularly irregular rhythm with rate mod, no JVD Abdomen: nondistended Extremities: extremities normal, atraumatic, no cyanosis, venous stasis dermatitis noted, edema 2-3+ to knees Last 24hr Input/Output: 
 
Intake/Output Summary (Last 24 hours) at 12/10/2019 1200 Last data filed at 12/10/2019 0830 Gross per 24 hour Intake 50 ml Output 500 ml Net -450 ml Data Review:  
Recent Results (from the past 24 hour(s)) GLUCOSE, POC Collection Time: 12/09/19  5:26 PM  
Result Value Ref Range Glucose (POC) 195 (H) 65 - 100 mg/dL Performed by [facility] GLUCOSE, POC Collection Time: 12/09/19  9:41 PM  
Result Value Ref Range Glucose (POC) 228 (H) 65 - 100 mg/dL Performed by David Gonzales (CON) RENAL FUNCTION PANEL  Collection Time: 12/10/19 12:20 AM Result Value Ref Range Sodium 131 (L) 136 - 145 mmol/L Potassium 3.8 3.5 - 5.1 mmol/L Chloride 89 (L) 97 - 108 mmol/L  
 CO2 39 (H) 21 - 32 mmol/L Anion gap 3 (L) 5 - 15 mmol/L Glucose 190 (H) 65 - 100 mg/dL BUN 60 (H) 6 - 20 MG/DL Creatinine 2.98 (H) 0.70 - 1.30 MG/DL  
 BUN/Creatinine ratio 20 12 - 20 GFR est AA 25 (L) >60 ml/min/1.73m2 GFR est non-AA 20 (L) >60 ml/min/1.73m2 Calcium 8.2 (L) 8.5 - 10.1 MG/DL Phosphorus 1.8 (L) 2.6 - 4.7 MG/DL Albumin 1.8 (L) 3.5 - 5.0 g/dL GLUCOSE, POC Collection Time: 12/10/19  6:47 AM  
Result Value Ref Range Glucose (POC) 140 (H) 65 - 100 mg/dL Performed by Tessa Alvarado GLUCOSE, POC Collection Time: 12/10/19 11:29 AM  
Result Value Ref Range Glucose (POC) 252 (H) 65 - 100 mg/dL Performed by Efraín Simms Lab Results Component Value Date/Time Sodium 131 (L) 12/10/2019 12:20 AM  
 Potassium 3.8 12/10/2019 12:20 AM  
 Chloride 89 (L) 12/10/2019 12:20 AM  
 CO2 39 (H) 12/10/2019 12:20 AM  
 Anion gap 3 (L) 12/10/2019 12:20 AM  
 Glucose 190 (H) 12/10/2019 12:20 AM  
 BUN 60 (H) 12/10/2019 12:20 AM  
 Creatinine 2.98 (H) 12/10/2019 12:20 AM  
 BUN/Creatinine ratio 20 12/10/2019 12:20 AM  
 GFR est AA 25 (L) 12/10/2019 12:20 AM  
 GFR est non-AA 20 (L) 12/10/2019 12:20 AM  
 Calcium 8.2 (L) 12/10/2019 12:20 AM  
   
Medications Prior to Admission Medication Sig  
 ferrous sulfate (IRON) 325 mg (65 mg iron) tablet Take 325 mg by mouth Daily (before breakfast).  hydrALAZINE (APRESOLINE) 25 mg tablet Take 25 mg by mouth three (3) times daily.  bumetanide (BUMEX) 1 mg tablet Take  by mouth two (2) times a day. Take 3 mg in the morning and 2 mg in the evening as directed.  carvedilol (COREG) 12.5 mg tablet Take 12.5 mg by mouth two (2) times daily (with meals).  simvastatin (ZOCOR) 20 mg tablet Take 1 Tab by mouth nightly.   
 insulin glargine (LANTUS U-100 INSULIN) 100 unit/mL injection 5 Units by SubCUTAneous route every morning. Prescribed 18 units daily. Reports taking 10 units in the morning, then up to 8 units in the evening depending on his blood sugars. If blood sugar is <200, he reduces his evening dose.  albuterol (PROVENTIL VENTOLIN) 2.5 mg /3 mL (0.083 %) nebulizer solution 3 mL by Nebulization route every four (4) hours as needed for Wheezing.  sodium bicarbonate 650 mg tablet Take 2 Tabs by mouth three (3) times daily.  tamsulosin (FLOMAX) 0.4 mg capsule Take 1 Cap by mouth daily.  aspirin 81 mg chewable tablet Take 1 Tab by mouth daily.  omega 3-dha-epa-fish oil (FISH OIL) 100-160-1,000 mg cap Take 1 Cap by mouth two (2) times a day.  cinnamon bark (CINNAMON) 500 mg cap Take 1,000 mg by mouth two (2) times a day. Yovany Singh PA-C 12/10/2019 Richard Garland MD

## 2019-12-10 NOTE — PROGRESS NOTES
Patient all set to go home today at 6 pm via [facility]. [facility] packet at the bedside chart. CRM spoke with pt's wife Kapil Felipe [phone], agreeable. RN made aware. Sada Quiñones,BSW,MSW,[facility] Clinical  [phone] 
3:55 PM

## 2019-12-10 NOTE — PROGRESS NOTES
TRANSFER - IN REPORT: 
 
Verbal report received from KEITH [facility]) on Trishn Herman Sr.  being received from ^S(unit) for routine progression of care Report consisted of patients Situation, Background, Assessment and  
Recommendations(SBAR). Information from the following report(s) SBAR was reviewed with the receiving nurse. Opportunity for questions and clarification was provided. Assessment completed upon patients arrival to unit and care assumed.

## 2019-12-10 NOTE — PROGRESS NOTES
Primary Nurse Samantha Johns, BUCK and Ena Yoder RN performed a dual skin assessment on this patient Impairment noted- see wound doc flow sheet Fabio score is 18 Numerous scabs present on mike legs. Small red stage 2 to sacrum and between mike buttocks

## 2019-12-10 NOTE — PROGRESS NOTES
Transitional Care Team: Follow-Up HUG Note Assessment Date:12/10/2019 Assessment Time:11:28 AM 
 
This writer in to see Hood Castro Sr. who is resting in bed w/o any major complaints or concerns. Per chart review and pt's confirmation he will f/u with Dr. Mikaela Lopez on 12/20 and pt will have additional diuretic therapy after discharge. Pt feels he can have his [facility] nurse give this to him. Writer advises per notes this is to occur at [facility] and he may want to discuss w/ provider, pt understanding verbalized. CM also advised of this conversation. Wound care met with pt for consult today and notes: Sacrum has two raised red areas that are 1 x 0.8 x 0 cm. Patient reports that they aren't painful or itchy. Left open to air. HUG will continue to follow pt's progress.

## 2019-12-10 NOTE — PROGRESS NOTES
Problem: Mobility Impaired (Adult and Pediatric) Goal: *Acute Goals and Plan of Care (Insert Text) Description FUNCTIONAL STATUS PRIOR TO ADMISSION: Patient was modified independent using a rolling walker for functional mobility. HOME SUPPORT PRIOR TO ADMISSION: The patient lived alone with wife to provide assistance (assist with donning socks/ shoes at times). Physical Therapy Goals Initiated 12/4/2019 1. Patient will move from supine to sit and sit to supine  in bed with modified independence within 7 day(s). 2.  Patient will transfer from bed to chair and chair to bed with supervision/set-up using the least restrictive device within 7 day(s). 3.  Patient will perform sit to stand with supervision/set-up within 7 day(s). 4.  Patient will ambulate with supervision/set-up for 50 feet with the least restrictive device within 7 day(s). Outcome: Progressing Towards Goal 
 PHYSICAL THERAPY TREATMENT Patient: Bakari Rodgers (81 y.o. male) Date: 12/10/2019 Diagnosis: [facility] [I50.9] <principal problem not specified> Precautions:  fall Chart, physical therapy assessment, plan of care and goals were reviewed. ASSESSMENT Patient continues with skilled PT services and is progressing towards goals. Pt utilizes oxygen at home - 2 - 3 liters. Pt tolerating ambulating in hallway - c/o back pain (chronic). Current Level of Function Impacting Discharge (mobility/balance): CGA to min assist, endurance limited by SOB, pt aware of deep breathing techniques and self-pacing Other factors to consider for discharge: PLAN : 
Patient continues to benefit from skilled intervention to address the above impairments. Continue treatment per established plan of care. to address goals. Recommendation for discharge: (in order for the patient to meet his/her long term goals) Physical therapy at least 2 days/week in the home This discharge recommendation: 
Has been made in collaboration with the attending provider and/or case management IF patient discharges home will need the following DME: patient owns DME required for discharge SUBJECTIVE:  
Patient stated i'm just so tired.  OBJECTIVE DATA SUMMARY:  
Critical Behavior: 
Neurologic State: Alert Orientation Level: Oriented to situation, Oriented to place, Oriented to person Cognition: Follows commands Safety/Judgement: Awareness of environment Functional Mobility Training: 
  
Transfers: 
Sit to Stand: Minimum assistance Stand to Sit: Contact guard assistance;Assist x1 Bed to Chair: Minimum assistance Balance: 
Sitting: Intact; Without support Standing: Impaired; With support Standing - Static: Good;Constant support Standing - Dynamic : Fair;Constant support Ambulation/Gait Training: 
Distance (ft): 125 Feet (ft) Assistive Device: Walker, rolling;Gait belt Ambulation - Level of Assistance: Contact guard assistance Required one standing rest - resting forearms on walker secondary to back pain Gait Abnormalities: Decreased step clearance(stooped posture) Speed/Karla: Slow Step Length: Right shortened;Left shortened Activity Tolerance:  
Fair, requires frequent rest breaks, and observed SOB with activity Please refer to the flowsheet for vital signs taken during this treatment. After treatment patient left in no apparent distress:  
Sitting in chair and Call bell within reach COMMUNICATION/COLLABORATION:  
The patients plan of care was discussed with: Registered Nurse Michaelle Renner, PT Time Calculation: 24 mins

## 2019-12-10 NOTE — PROGRESS NOTES
12/10/19 1440 12/10/19 1441 12/10/19 1442 RT Walking Oximetry Stage Resting (on O2) During Walk (on O2) During Walk (on O2) SpO2 89 % 96 % 92 % HR 67 bpm 70 bpm 72 bpm  
O2 Flow Rate (L/min) 2 l/min 3 l/min 3 l/min FIO2 (%)  --  32 %  --   
Distance Walked in Feet (ft)  --   --   --   
 
 12/10/19 1443 RT Walking Oximetry Stage After Walk SpO2 89 % HR 78 bpm  
O2 Flow Rate (L/min) 3 l/min FIO2 (%)  --   
Distance Walked in Feet (ft) 124 ft Pt complained hip pain, had to stop six minute walk test.

## 2019-12-10 NOTE — PROGRESS NOTES
Transition of Care Plan  Home with Home Health; care to resumed at discharge- [facility] has accepted the patient  Patient has home oxygen managed via [address]  Transport: TBD [address]  Follow up with PCP/Specialist  
 
 
Sdaa Quiñones,BSW,MSW,[facility] Clinical  [phone]

## 2019-12-10 NOTE — NURSE NAVIGATOR
Heart Failure Nurse Navigator Note:  Met with patient's wife and daughter at bedside. Patient is sleeping. Provided Living with Heart Failure Book. Reviewed daily weights and weight calendar. Mrs. Linette Cohn states they are currently doing daily weights. Reviewed low salt diet. Mrs. Linette Cohn does the cooking and uses Mrs. Dash instead of salt. They do not eat out. Reviewed signs and symptoms of heart failure and magnet with this information provided. Follow up appointments have been scheduled with primary care and cardiology.

## 2019-12-10 NOTE — PROGRESS NOTES
Bedside shift change report given to Marina Last RN (oncoming nurse) by Samantha Johns RN (offgoing nurse). Report included the following information SBAR, MAR and Recent Results.

## 2019-12-11 ENCOUNTER — PATIENT OUTREACH (OUTPATIENT)
Dept: CARDIOLOGY CLINIC | Age: 84
End: 2019-12-11

## 2019-12-11 NOTE — PROGRESS NOTES
Hospital Discharge Follow-Up Date/Time:  2019 10:14 AM 
 
Patient was admitted to [facility] on 12/3/19 and discharged on 12/10/19 for CHF. The physician discharge summary was available at the time of outreach. Patient was contacted within 1 business days of discharge. Top Challenges reviewed with the provider Patient needs repeat BMP and CBC at PCP follow up per discharge instructions Patient to follow up in [facility] for IV diurectics--CTN will follow up on this plan as no appt made prior to discharge Home PO diuretics increased at discharge Advance Care Planning:  
Does patient have an Advance Directive:  reviewed and current Method of communication with provider :chart routing Inpatient RRAT score: 52 Was this a readmission? no  
 
 
Care Transition Nurse (CTN) contacted the family by telephone to perform post hospital discharge assessment. Verified name and  with family as identifiers. Provided introduction to self, and explanation of the CTN role. Family received hospital discharge instructions. CTN reviewed discharge instructions and red flags with family who verbalized understanding. Family given an opportunity to ask questions and does not have any further questions or concerns at this time. The family agrees to contact the PCP office for questions related to their healthcare. CTN provided contact information for future reference. Disease Specific:   CHF Heart Failure Note Do you have a Scale:    yes How often do you weigh:  daily Daily Weight (document daily weights in flowsheets):   weight 164 lbs--last hospital weight Zone:(Pt Reported)  yellow EF: 66-70%(result) on 19 (date) Type of HF:   HFpEF Cardiac Device present: pacemaker Heart Failure Medications: Betablocker, Diuretic Patients top risk factors for readmission:  functional physical ability, medical condition Home Health orders at discharge: PT, OT, SN, resumption of care Home Health company: [facility] Date of initial visit: 12/12/19 per [facility]y Abreu at [facility] Durable Medical Equipment ordered at discharge: none Medication(s):  
New Medications at Discharge: Isodil, Mag Ox, Aldactone Changed Medications at Discharge: Bumex, Hydralazine, Lantus Discontinued Medications at Discharge: Sodium Bicarb Medication reconciliation was performed with family, who verbalizes understanding of administration of home medications. There were no barriers to obtaining medications identified at this time. Referral to Pharm D needed: no  
 
Current Outpatient Medications Medication Sig  bumetanide (BUMEX) 1 mg tablet Take 3 Tabs by mouth two (2) times a day.  hydrALAZINE (APRESOLINE) 50 mg tablet Take 1 Tab by mouth three (3) times daily.  spironolactone (ALDACTONE) 25 mg tablet Take 1 Tab by mouth daily.  magnesium oxide (MAG-OX) 400 mg tablet Take 1 Tab by mouth daily.  isosorbide dinitrate (ISORDIL) 20 mg tablet Take 1 Tab by mouth three (3) times daily.  insulin glargine (LANTUS U-100 INSULIN) 100 unit/mL injection 7 Units by SubCUTAneous route every morning.  ferrous sulfate (IRON) 325 mg (65 mg iron) tablet Take 325 mg by mouth Daily (before breakfast).  carvedilol (COREG) 12.5 mg tablet Take 12.5 mg by mouth two (2) times daily (with meals).  simvastatin (ZOCOR) 20 mg tablet Take 1 Tab by mouth nightly.  albuterol (PROVENTIL VENTOLIN) 2.5 mg /3 mL (0.083 %) nebulizer solution 3 mL by Nebulization route every four (4) hours as needed for Wheezing.  tamsulosin (FLOMAX) 0.4 mg capsule Take 1 Cap by mouth daily.  aspirin 81 mg chewable tablet Take 1 Tab by mouth daily.  omega 3-dha-epa-fish oil (FISH OIL) 100-160-1,000 mg cap Take 1 Cap by mouth two (2) times a day.  cinnamon bark (CINNAMON) 500 mg cap Take 1,000 mg by mouth two (2) times a day. No current facility-administered medications for this visit. There are no discontinued medications. [facility] follow up appointment(s):  
Future Appointments Date Time Provider Muna Delgado 12/20/2019  2:00 PM Bubba Lopez  [address]  
2/12/2020  2:15 PM REMOTE1, [address]  
2/27/2020  2:20 PM Bubba Lopez MD cav ANAHI SCHED  
5/18/2020  9:15 AM REMOTE1, [address]  
8/24/2020  8:45 AM REMOTE1, [address]  
10/29/2020 10:30 AM PACEMAKER3, AGUIAR KARRIE ANAHI SCHED  
10/29/2020 10:40 AM Casie Napoles  [address] [facility] follow up appointment(s): Dr Pamela Zheng pending 1 week follow up [facility]:  out of service area Goals  Attends follow-up appointments as directed. 12/11/19 CTN unable to reach patient on phone, LM on  requesting return call Patient has following appts: PCP Dr Rip Duncan   12/18/19 at 11:00 this was changed by wife due to conflicting appts. Cards Dr Gabino Costello  12/20/19 at 2:00 Nephrology Dr Pamela Zheng   Pending---recommended 1 week follow up. Per spouse, she has contacted MD office and is waiting for Dr Angella Jacobson nurse to call her back. [facility]---no appt in [facility] yet. CTN notes that both cards and nephrology IP notes state patient is to have OP IV diruetic therapy. CTN sent SM to Philip Barrett, Dr Nisa Schmitt nurse to inquire about orders for this. ---mkrw  Maintains daily weight. 12/11/19 CTN notes patient weight on 12/3/19 (admit date) 181 lbs. Weight at discharge 164 lbs. CTN will need to get home scale weight when able to reach patient and review daily weight parameters with patient/spouse. ---mkrw UPDATE: Per spouse, patient weight today on home scale 158 lbs. CTN reviewed when to call MD for weights greater than 2-3 lbs overnight or 5 lbs in a week.  Per wife, she reports that [facility] nurse has been contacting MD's (both cards and nephrology) for weight increases, would receive instructions but patient still gained weight. Recommended that patient monitor sodium intake more closely to kep to 1500--2000 mg. Wife verbalizes understanding ---xena  Supportive resources in place to maintain patient in the community (ie. Home Health, DME equipment, refer to, medication assistant plan, etc.)   
  12/11/19 CTN contacted [facility], spoke to [employer] to inquire about which services were ordered by IP CM and when to expect resumption of care. [employer] reports that patient will received PT/OT/SN and will set up to start tomorrow 12/12/19. CTN will follow up with patient, was unable to reach on phone today---tosinw

## 2019-12-13 ENCOUNTER — PATIENT OUTREACH (OUTPATIENT)
Dept: CARDIOLOGY CLINIC | Age: 84
End: 2019-12-13

## 2019-12-13 NOTE — PROGRESS NOTES
Goals  Attends follow-up appointments as directed. 12/11/19 CTN unable to reach patient on phone, LM on  requesting return call Patient has following appts: PCP Dr Coni Solis   12/18/19 at 11:00 this was changed by wife due to conflicting appts. Cards Dr Fatimah Larkin  12/20/19 at 2:00 Nephrology Dr Simin Emmanuel   Pending---recommended 1 week follow up. Per spouse, she has contacted MD office and is waiting for Dr Victoria Arce nurse to call her back. [facility]---no appt in [facility] yet. CTN notes that both cards and nephrology IP notes state patient is to have OP IV diruetic therapy. CTN sent SM to Oral Dejesus, Dr Pawel Cardoso nurse to inquire about orders for this. ---mkrw 12/13/19 Per Mrs Bessy Olson, no one has called her back yet about a follow up appt with Dr Naresh Jiménez. CTN called MD office, spoke to [place]. She states that they are waiting for MD to review records and will call Mrs Bessy Olson back no later than Monday 12/16 with follow up appt. CTN notified Mrs Bessy Olson of this. CTN will follow up. CTN had not received response yet regarding [facility] IV diruetics yet---resent SM to Dr Fatimah Larkin and nurse [place]. Will follow up on Monday if no response received---mkrw  Maintains daily weight. 12/11/19 CTN notes patient weight on 12/3/19 (admit date) 181 lbs. Weight at discharge 164 lbs. CTN will need to get home scale weight when able to reach patient and review daily weight parameters with patient/spouse. ---mkrw UPDATE: Per spouse, patient weight today on home scale 158 lbs. CTN reviewed when to call MD for weights greater than 2-3 lbs overnight or 5 lbs in a week. Per wife, she reports that [place] nurse has been contacting MD's (both cards and nephrology) for weight increases, would receive instructions but patient still gained weight. Recommended that patient monitor sodium intake more closely to kep to 1500--2000 mg. Wife verbalizes understanding ---mkrw 12/13/19' Per Mrs Mirta Ball, patient's weight yesterday and today was 153 lbs, decrease of 5 lbs from Wednesday. ---mkrw  Supportive resources in place to maintain patient in the community (ie. Home Health, DME equipment, refer to, medication assistant plan, etc.)   
  12/11/19 CTN contacted [facility], spoke to Auramistjamin [employer] to inquire about which services were ordered by IP CM and when to expect resumption of care. Rapelje reports that patient will received PT/OT/SN and will set up to start tomorrow 12/12/19. CTN will follow up with patient, was unable to reach on phone today---mkrw

## 2019-12-13 NOTE — DISCHARGE SUMMARY
Discharge Summary PATIENT ID: Gini Dennis Sr. MRN: [id number] YOB: 1935 DATE OF ADMISSION: 12/3/2019  8:24 AM   
DATE OF DISCHARGE: 12/10/2019 PRIMARY CARE PROVIDER: Jossy Gonzales MD  
 
ATTENDING PHYSICIAN: Zaira Yen MD 
 
DISCHARGING PROVIDER: Jennifer Mackay MD   
To contact this individual call [phone] and ask the  to page. If unavailable ask to be transferred the Adult Hospitalist Department. CONSULTATIONS: IP CONSULT TO CARDIOLOGY 
IP CONSULT TO NEPHROLOGY PROCEDURES/SURGERIES: * No surgery found * ADMITTING DIAGNOSES & HOSPITAL COURSE:  
 
DISCHARGE DIAGNOSES / PLAN:   
 
81year old male patient with PMH of CAD s/p CABG, Diastolic heart failure, Afib , s/p PPM, CKD stage III, HTN, HLD presented to Ed for evaluation of shortness of breath. Patient has noticed progressive sob since few days and follows with Dr. Fatimah Larkin for CHF. he noticed more than 4lbs weight gain in last few days. This morning, he woke up with sob and so he came to ED. He noticed worsening leg swelling, no chest pain. No change in cough but sputum appears to be more dark in color. No fever or chills. No sick contacts. He also mentions that his BG was low in 80;s this AM 
In ED, he was given IV bumex. Hospitalist consulted for admission  (copied from H and P) Acute on chronic diastolic heart failure- NYHA difficult to assess as patient is on baseline oxygen at home- probably NYHA class IV at admission and NYHA 3 at discharge Chronic hypoxemic respiratory failure on 2LPM O2 NC - baseline - BNP >35,000 on admission 
- CT chest:  Small bilateral pleural effusions with underlying atelectasis. - recent Echo 12/2019: SS 27 - 70%.  Mild to moderate pulmonary hypertension.  
- cardiology on board. Appreciate. - Patient received IV diuresis while in hospital and transitioned to oral bumex at discharge.  Cardiology to arranged OP IV diuresis as needed. 
-Discussed with patient's wife about medications changes. Echo: 
Result status: Final result · Right Ventricle: Severely dilated right ventricle. · Left Ventricle: Normal cavity size and systolic function (ejection fraction normal). Mild concentric hypertrophy. Estimated left ventricular ejection fraction is 66 - 70%. Visually measured ejection fraction. No regional wall motion abnormality noted. · Left Atrium: Moderately dilated left atrium. · Aortic Valve: Mild aortic valve sclerosis with no significant stenosis. · Mitral Valve: Mitral valve thickening. · Interatrial Septum: Color flow Doppler was used. 
   
 
 
 
  
KATALINA on CKD - Cr 2.58 on poa, baseline 1.8-1.9 
- likely CRS 
- Nephrology following 
-Cr stable at discharge. OP follow up 
  
Afib - rate controlled on . On aspirin. Not a candidate for anticogaultion. 
  
HTN -  
BP stable on current home regimen. 
  
DM- A1c 7.7. c/w ISS 
  
Hypokalemia - repleted 
  
Hyponatremia: Stable. Likely related CHF and Metolazone. 
  
Chronic anemia, s/p PRBCx2 12/6/19 
- fobt negative. Iron deficient. On iron supplementsIV Venofer - Hb low stable  
- 2 PRBC per cardiology team 12/6/19. Monitor CBC periodically 
  
Advanced COPD and emphysema 
- CT chest: Diffuse interstitial opacities again noted progressive in the upper lobes bilaterally. Underlying pulmonary fibrosis is considered. - patient stable with baseline oxygen levels at discharge 
  
Hx of CAD: s/p CAG in 2003. - no chest pain. Trop neg 
  
Hx of PVD s/p arthrectomy to distal SFA and distal popliteal with angioplasty to both lesions and stent to SFA by Dr. Abhay Brar 3/2014 PENDING TEST RESULTS:  
At the time of discharge the following test results are still pending: none Xr Chest Pa Lat Result Date: 12/3/2019 EXAM:  XR CHEST PA LAT INDICATION: Shortness of breath COMPARISON: 8/27/2019 TECHNIQUE: Frontal and lateral chest views FINDINGS: Sternal wires are present. Cardiac monitoring wires overlie the thorax. There is stable cardiac silhouette enlargement. Diffuse interstitial opacities are overall unchanged. Superimposed patchy airspace opacities in the right upper lung and left lower lung are increased. There is no pleural effusion or pneumothorax. The bones and upper abdomen are stable. IMPRESSION: Diffuse interstitial opacities are overall unchanged since 8/27/2019 and may represent chronic lung parenchymal change versus pulmonary edema. Superimposed patchy airspace opacities are increased in the right upper lung and left lower lung and may represent edema, infection, or atelectasis. Ct Chest Wo Cont Result Date: 12/3/2019 INDICATION: Shortness of breath COMPARISON: 8/7/2019 CONTRAST: None. TECHNIQUE:  5 mm axial images were obtained through the chest. Coronal and sagittal reconstructions were generated. CT dose reduction was achieved through use of a standardized protocol tailored for this examination and automatic exposure control for dose modulation. The absence of intravenous contrast reduces the sensitivity for evaluation of the mediastinum and upper abdominal organs. FINDINGS: THYROID: No nodule. MEDIASTINUM: Mediastinal lymph nodes are stable compared to the prior exam, with a reference unchanged 18 mm right paratracheal lymph node. Several of the mediastinal lymph nodes are calcified. LUCAS: Bilateral calcified lymph nodes. THORACIC AORTA: Atherosclerotic without evidence of aneurysm. MAIN PULMONARY ARTERY: Enlarged compatible with pulmonary arterial hypertension. TRACHEA/BRONCHI: Patent. ESOPHAGUS: No wall thickening or dilatation. HEART: Four-chamber enlargement. PLEURA: Small bilateral pleural effusions. LUNGS: Bilateral lower lobe atelectasis. Diffuse interstitial opacities are again noted, progressive in the upper lobes bilaterally right greater than left. INCIDENTALLY IMAGED UPPER ABDOMEN: Mild free fluid. Small gallstones. BONES: Degenerative changes are seen in the thoracic spine. IMPRESSION: Diffuse interstitial opacities again noted progressive in the upper lobes bilaterally. Underlying pulmonary fibrosis is considered. Small bilateral pleural effusions with underlying atelectasis. Cardiomegaly. Cholelithiasis. Us Retroperitoneum Comp Result Date: 12/4/2019 RENAL ULTRASOUND INDICATION: KATALINA on CKD COMPARISON: None. TECHNIQUE: Sonography of the kidneys, retroperitoneum, and bladder was performed. FINDINGS: RIGHT KIDNEY: 12.1 cm in length. Increased cortical echogenicity. No hydronephrosis, shadowing calculus, or contour-deforming renal mass. Multiple cysts, measuring up to 4.2 cm. LEFT KIDNEY: 11.8 cm in length. Increased cortical echogenicity. No hydronephrosis, shadowing calculus, or contour-deforming renal mass. Multiple cysts, measuring up to 1.3 cm. AORTA: Normal caliber in its visualized portions. Distal abdominal aorta is obscured by overlying bowel gas. COMMON ILIAC ARTERIES: Obscured by overlying bowel gas. IVC: Normal caliber in its visualized portions. BLADDER: Decompressed. IMPRESSION: Increased bilateral renal cortical echogenicity is compatible with medical renal disease. Bilateral renal cysts. No hydronephrosis. Xr Chest [facility] Result Date: 12/10/2019 Portable chest 2 views dated 12/10/2019 Comparison chest dated 12/3/2019 History is heart failure 2 portable AP upright views of the chest were obtained. The patient is slightly rotated towards the right. The cardiac silhouette continues to be enlarged. There continues to be diffuse abnormal alveolar opacity involving both lungs. This is compatible with congestive change/pulmonary edema. Superimposed infiltration cannot be excluded. The costophrenic angles are relatively sharp in appearance. There is evidence of a previous thoracotomy/CABG. IMPRESSION: Evidence of congestive change/pulmonary edema. FOLLOW UP APPOINTMENTS:   
Follow-up Information Follow up With Specialties Details Why Contact Info Limmie Favre, MD Cardiology   [facility] [address] [facility] 
[phone] Tamiko Macedo MD [facility] On 12/17/2019 Hospital f/u PCP appointment Tuesday, 12/17/19 @ 10:45 a.m.  [address] [facility] [address] 
[phone] Mia Merida MD Nephrology In 1 week  [facility] [address] [facility] [address] 
[phone] ADDITIONAL CARE RECOMMENDATIONS:  
-We increased the dose of Bumex to 3 mg two times daily, hydralazine dose is increased to 50 mg three times daily, 
-We added new medications-spironolactone, magnesium and Isordil. , 
-Take 7 units of Lantus in the morning and check blood sugars 3 times daily. Check if blood sugars greater than 200, call your PCP for further insulin dose adjustment. 
-Follow-up with Dr. Vaughn Amaya while in 1 week 
-Follow-up with Dr. Marty Orr in 1 week 
-You will need repeat basic metabolic panel and CBC in 3 to 5 days at your PCP office. Follow-up with your PCP.  
- Your new medications prescriptions were sent to [address] [place] [address]. Please  the prescriptions. DIET: Cardiac Diet ACTIVITY: Activity as tolerated WOUND CARE: na 
 
EQUIPMENT needed: na 
 
 
DISCHARGE MEDICATIONS: 
Discharge Medication List as of 12/10/2019  5:44 PM  
  
START taking these medications Details  
spironolactone (ALDACTONE) 25 mg tablet Take 1 Tab by mouth daily. , Normal, Disp-20 Tab, R-0  
  
magnesium oxide (MAG-OX) 400 mg tablet Take 1 Tab by mouth daily. , Normal, Disp-15 Tab, R-0  
  
isosorbide dinitrate (ISORDIL) 20 mg tablet Take 1 Tab by mouth three (3) times daily. , Normal, Disp-60 Tab, R-0  
  
  
CONTINUE these medications which have CHANGED Details  
bumetanide (BUMEX) 1 mg tablet Take 3 Tabs by mouth two (2) times a day., Normal, Disp-120 Tab, R-0  
  
hydrALAZINE (APRESOLINE) 50 mg tablet Take 1 Tab by mouth three (3) times daily. , Normal, Disp-60 Tab, R-0  
  
insulin glargine (LANTUS U-100 INSULIN) 100 unit/mL injection 7 Units by SubCUTAneous route every morning., No Print, Disp-1 Vial, R-0  
  
  
CONTINUE these medications which have NOT CHANGED Details  
ferrous sulfate (IRON) 325 mg (65 mg iron) tablet Take 325 mg by mouth Daily (before breakfast). , Historical Med  
  
carvedilol (COREG) 12.5 mg tablet Take 12.5 mg by mouth two (2) times daily (with meals). , Historical Med  
  
simvastatin (ZOCOR) 20 mg tablet Take 1 Tab by mouth nightly., Normal, Disp-90 Tab, R-3  
  
albuterol (PROVENTIL VENTOLIN) 2.5 mg /3 mL (0.083 %) nebulizer solution 3 mL by Nebulization route every four (4) hours as needed for Wheezing., Normal, Disp-1 Package, R-0  
  
tamsulosin (FLOMAX) 0.4 mg capsule Take 1 Cap by mouth daily. , Print, Disp-30 Cap, R-0  
  
aspirin 81 mg chewable tablet Take 1 Tab by mouth daily. , Normal, Disp-33 Tab, R-11  
  
omega 3-dha-epa-fish oil (FISH OIL) 100-160-1,000 mg cap Take 1 Cap by mouth two (2) times a day., Historical Med  
  
cinnamon bark (CINNAMON) 500 mg cap Take 1,000 mg by mouth two (2) times a day., Historical Med  
  
  
STOP taking these medications  
  
 sodium bicarbonate 650 mg tablet Comments:  
Reason for Stopping:   
   
  
 
 
 
NOTIFY YOUR PHYSICIAN FOR ANY OF THE FOLLOWING:  
Fever over 101 degrees for 24 hours. Chest pain, shortness of breath, fever, chills, nausea, vomiting, diarrhea, change in mentation, falling, weakness, bleeding. Severe pain or pain not relieved by medications. Or, any other signs or symptoms that you may have questions about. DISPOSITION: 
 X Home With: 
 OT  PT  HH  RN  
  
 Long term SNF/Inpatient Rehab Independent/assisted living Hospice Other:  
 
 
PATIENT CONDITION AT DISCHARGE:  
 
Functional status Poor X Deconditioned Independent Cognition Angella Argueta X Forgetful Dementia Catheters/lines (plus indication) Myers PICC   
 PEG   
X None Code status Full code X DNR   
 
PHYSICAL EXAMINATION AT DISCHARGE: 
General:          elderly patient, no distress HEENT:           Atraumatic, anicteric sclerae, pink conjunctivae Lungs:             decreased breath sounds at bases Heart:              Regular  rhythm,  No  murmur   Minimal LE edema Abdomen:        Soft, non-tender. Not distended. Bowel sounds normal 
Neurologic:      Alert, moves all extremities, CHRONIC MEDICAL DIAGNOSES: 
Problem List as of 12/10/2019 Date Reviewed: 12/9/2019 Codes Class Noted - Resolved CHF exacerbation (HCC) ICD-10-CM: I50.9 ICD-9-CM: 428.0  12/3/2019 - Present Hypoglycemia ICD-10-CM: E16.2 ICD-9-CM: 251.2  8/27/2019 - Present Pacemaker ICD-10-CM: Z95.0 ICD-9-CM: V45.01  5/9/2019 - Present Overview Signed 5/9/2019  6:22 PM by Quin Fuentes MD  
  5/9/2019 leadless pacer implant CHF (congestive heart failure) (HCC) ICD-10-CM: I50.9 ICD-9-CM: 428.0  5/1/2019 - Present Atrial fibrillation with slow ventricular response (HCC) ICD-10-CM: I48.91 
ICD-9-CM: 427.31  5/1/2019 - Present Overview Signed 5/8/2019 11:24 PM by Quin Fuentes MD  
  Added automatically from request for surgery [id number] Type 2 diabetes with nephropathy ([facility] ICD-10-CM: E11.21 
ICD-9-CM: 250.40, 583.81  7/26/2018 - Present Atrial fibrillation, chronic ICD-10-CM: I48.20 ICD-9-CM: 427.31  10/21/2017 - Present Overview Signed 10/21/2017  1:45 PM by Chelsy Gray MD  
  new onset afib with BRPR 10-19-17 admit CKD (chronic kidney disease) stage 3, GFR 30-59 ml/min (HCC) ICD-10-CM: N18.3 ICD-9-CM: 585.3  10/21/2017 - Present Overview Signed 10/21/2017  1:47 PM by Chelsy Gray MD  
  hypertension and DM nephrosclerosis GI bleed ICD-10-CM: K92.2 ICD-9-CM: 578.9  10/20/2017 - Present Hyperglycemia due to type 2 diabetes mellitus ([facility] ICD-10-CM: E11.65 ICD-9-CM: 250.00  10/20/2017 - Present Hypokalemia ICD-10-CM: E87.6 ICD-9-CM: 276.8  6/25/2017 - Present Generalized weakness ICD-10-CM: R53.1 ICD-9-CM: 780.79  6/25/2017 - Present Elevated troponin ICD-10-CM: R79.89 ICD-9-CM: 790.6  6/25/2017 - Present KATALINA (acute kidney injury) ([facility].) ICD-10-CM: N17.9 ICD-9-CM: 584.9  6/25/2017 - Present Acute renal failure (ARF) (HCC) ICD-10-CM: N17.9 ICD-9-CM: 584.9  3/16/2017 - Present CKD (chronic kidney disease) ICD-10-CM: N18.9 ICD-9-CM: 585.9  1/20/2017 - Present Type 2 diabetes mellitus with diabetic peripheral angiopathy without gangrene [facility]) ICD-10-CM: E11.51 
ICD-9-CM: 250.70, 443.81  9/27/2015 - Present Dyspnea ICD-10-CM: R06.00 
ICD-9-CM: 786.09  7/15/2014 - Present PVC's (premature ventricular contractions) ICD-10-CM: I49.3 ICD-9-CM: 427.69  5/26/2014 - Present Carotid arterial disease (HCC) ICD-10-CM: I73.9 ICD-9-CM: 447.9  Unknown - Present Hypercholesteremia ICD-10-CM: E78.00 ICD-9-CM: 272.0  Unknown - Present PVD (peripheral vascular disease) ([facility] ICD-10-CM: I73.9 ICD-9-CM: 443.9  Unknown - Present Bradycardia ICD-10-CM: R00.1 ICD-9-CM: 427.89  Unknown - Present CAD (coronary artery disease) ICD-10-CM: I25.10 ICD-9-CM: 414.00  Unknown - Present Hypertension, essential, benign ICD-10-CM: I10 
ICD-9-CM: 401.1  Unknown - Present RESOLVED: CHF exacerbation ([facility] ICD-10-CM: I50.9 ICD-9-CM: 428.0  8/5/2019 - 8/20/2019 RESOLVED: Acute hypoxemic respiratory failure ([facility] ICD-10-CM: J96.01 
ICD-9-CM: 518.81  8/5/2019 - 8/20/2019 RESOLVED: Acute bronchospasm ICD-10-CM: J98.01 
ICD-9-CM: 519.11  5/18/2019 - 5/19/2019 RESOLVED: CHF exacerbation ([facility] ICD-10-CM: I50.9 ICD-9-CM: 428.0  5/18/2019 - 5/19/2019 RESOLVED: New onset a-fib [facility]) ICD-10-CM: I48.91 
ICD-9-CM: 427.31  10/20/2017 - 11/30/2017 RESOLVED: Fever ICD-10-CM: R50.9 ICD-9-CM: 780.60  3/16/2017 - 3/18/2017 RESOLVED: Hyponatremia ICD-10-CM: E87.1 ICD-9-CM: 276.1  3/16/2017 - 3/18/2017 RESOLVED: Urinary retention ICD-10-CM: R33.9 ICD-9-CM: 788.20  1/19/2017 - 1/20/2017 RESOLVED: Heart palpitations ICD-10-CM: R00.2 ICD-9-CM: 785.1  7/15/2014 - 9/20/2016 RESOLVED: Atherosclerosis of native artery of extremity with intermittent claudication ([facility] ICD-10-CM: D39.006 ICD-9-CM: 440.21  2/19/2014 - 9/20/2016 RESOLVED: Other dyspnea and respiratory abnormality ICD-10-CM: R06.09, R09.89 ICD-9-CM: 786.09  Unknown - 9/20/2016 RESOLVED: Diabetes mellitus, type 2 (HCC) ICD-10-CM: E11.9 ICD-9-CM: 250.00  Unknown - 9/20/2016 Greater than 40 minutes were spent with the patient on counseling and coordination of care Signed: Shae Herron MD 
12/13/2019 
12:00 AM

## 2019-12-14 NOTE — DISCHARGE INSTRUCTIONS
Discharge Instructions       PATIENT ID: Cuauhtemoc Miramontes MRN: [id number]   YOB: 1935    DATE OF ADMISSION: 12/3/2019  8:24 AM    DATE OF DISCHARGE: 12/10/2019    PRIMARY CARE PROVIDER: Kreg Najjar, MD     ATTENDING PHYSICIAN: Livia Field MD  DISCHARGING PROVIDER: Sriram Menard MD    To contact this individual call [phone] and ask the  to page. If unavailable ask to be transferred the Adult Hospitalist Department. DISCHARGE DIAGNOSES - Acute on chronic heart failure, chronic renal failure,chronic resp failure. CONSULTATIONS: IP CONSULT TO CARDIOLOGY  IP CONSULT TO NEPHROLOGY    PROCEDURES/SURGERIES: * No surgery found *    PENDING TEST RESULTS:   At the time of discharge the following test results are still pending: none    FOLLOW UP APPOINTMENTS:   Follow-up Information     Follow up With Specialties Details Why Contact Lilibeth Kendall MD Cardiology   [place] [address]  [address]      Kreg Najjar, MD Family Practice On 12/17/2019 Hospital f/u PCP appointment Tuesday, 12/17/19 @ 10:45 a.m.  [address] [facility]  [address]  [facility] [facility]      Jennifer Craven MD Nephrology In 1 week  [facility]  [phone]             ADDITIONAL CARE RECOMMENDATIONS:   -We increased the dose of Bumex to 3 mg two times daily, hydralazine dose is increased to 50 mg three times daily,  -We added new medications-spironolactone, magnesium and Isordil. ,  -Take 7 units of Lantus in the morning and check blood sugars 3 times daily. Check if blood sugars greater than 200, call your PCP for further insulin dose adjustment.  -Follow-up with Dr. Kasandra Rodney while in 1 week  -Follow-up with Dr. Colletta Juniper in 1 week  -You will need repeat basic metabolic panel and CBC in 3 to 5 days at your PCP office. Follow-up with your PCP.   - Your new medications prescriptions were sent to [address] [place] [address] Please  the prescriptions. DIET: Cardiac Diet    ACTIVITY: Activity as tolerated    WOUND CARE: na    EQUIPMENT needed: na      DISCHARGE MEDICATIONS:   See Medication Reconciliation Form    · It is important that you take the medication exactly as they are prescribed. · Keep your medication in the bottles provided by the pharmacist and keep a list of the medication names, dosages, and times to be taken in your wallet. · Do not take other medications without consulting your doctor. NOTIFY YOUR PHYSICIAN FOR ANY OF THE FOLLOWING:   Fever over 101 degrees for 24 hours. Chest pain, shortness of breath, fever, chills, nausea, vomiting, diarrhea, change in mentation, falling, weakness, bleeding. Severe pain or pain not relieved by medications. Or, any other signs or symptoms that you may have questions about.       DISPOSITION:   X Home With:   OT  PT  NACHO  RN       SNF/Inpatient Rehab/LTAC    Independent/assisted living    Hospice    Other:         Signed:   Jana Gracia MD  12/10/2019  4:48 PM

## 2019-12-16 ENCOUNTER — TELEPHONE (OUTPATIENT)
Dept: CARDIOLOGY CLINIC | Age: 84
End: 2019-12-16

## 2019-12-16 ENCOUNTER — HOSPITAL ENCOUNTER (INPATIENT)
Age: 84
LOS: 1 days | Discharge: HOME HEALTH | DRG: 378 | End: 2019-12-17
Attending: EMERGENCY MEDICINE | Admitting: HOSPITALIST
Payer: MEDICARE

## 2019-12-16 DIAGNOSIS — K92.2 GASTROINTESTINAL HEMORRHAGE, UNSPECIFIED GASTROINTESTINAL HEMORRHAGE TYPE: Primary | ICD-10-CM

## 2019-12-16 LAB
ALBUMIN SERPL-MCNC: 2.1 G/DL (ref 3.5–5)
ALBUMIN/GLOB SERPL: 0.5 {RATIO} (ref 1.1–2.2)
ALP SERPL-CCNC: 316 U/L (ref 45–117)
ALT SERPL-CCNC: 25 U/L (ref 12–78)
ANION GAP SERPL CALC-SCNC: 3 MMOL/L (ref 5–15)
AST SERPL-CCNC: 21 U/L (ref 15–37)
ATRIAL RATE: 73 BPM
BASOPHILS # BLD: 0.1 K/UL (ref 0–0.1)
BASOPHILS # BLD: 0.1 K/UL (ref 0–0.1)
BASOPHILS NFR BLD: 1 % (ref 0–1)
BASOPHILS NFR BLD: 1 % (ref 0–1)
BILIRUB SERPL-MCNC: 0.6 MG/DL (ref 0.2–1)
BUN SERPL-MCNC: 48 MG/DL (ref 6–20)
BUN/CREAT SERPL: 20 (ref 12–20)
CALCIUM SERPL-MCNC: 8.3 MG/DL (ref 8.5–10.1)
CALCULATED R AXIS, ECG10: 33 DEGREES
CALCULATED T AXIS, ECG11: -58 DEGREES
CHLORIDE SERPL-SCNC: 91 MMOL/L (ref 97–108)
CO2 SERPL-SCNC: 38 MMOL/L (ref 21–32)
COMMENT, HOLDF: NORMAL
COMMENT, HOLDF: NORMAL
CREAT SERPL-MCNC: 2.45 MG/DL (ref 0.7–1.3)
DIAGNOSIS, 93000: NORMAL
DIFFERENTIAL METHOD BLD: ABNORMAL
DIFFERENTIAL METHOD BLD: ABNORMAL
EOSINOPHIL # BLD: 0.3 K/UL (ref 0–0.4)
EOSINOPHIL # BLD: 0.3 K/UL (ref 0–0.4)
EOSINOPHIL NFR BLD: 4 % (ref 0–7)
EOSINOPHIL NFR BLD: 4 % (ref 0–7)
ERYTHROCYTE [DISTWIDTH] IN BLOOD BY AUTOMATED COUNT: 17.2 % (ref 11.5–14.5)
ERYTHROCYTE [DISTWIDTH] IN BLOOD BY AUTOMATED COUNT: 17.9 % (ref 11.5–14.5)
GLOBULIN SER CALC-MCNC: 4.5 G/DL (ref 2–4)
GLUCOSE BLD STRIP.AUTO-MCNC: 115 MG/DL (ref 65–100)
GLUCOSE BLD STRIP.AUTO-MCNC: 266 MG/DL (ref 65–100)
GLUCOSE SERPL-MCNC: 237 MG/DL (ref 65–100)
HCT VFR BLD AUTO: 30.5 % (ref 36.6–50.3)
HCT VFR BLD AUTO: 34.5 % (ref 36.6–50.3)
HCT VFR BLD AUTO: 34.7 % (ref 36.6–50.3)
HGB BLD-MCNC: 11 G/DL (ref 12.1–17)
HGB BLD-MCNC: 11.1 G/DL (ref 12.1–17)
HGB BLD-MCNC: 9.3 G/DL (ref 12.1–17)
IMM GRANULOCYTES # BLD AUTO: 0 K/UL (ref 0–0.04)
IMM GRANULOCYTES # BLD AUTO: 0.1 K/UL (ref 0–0.04)
IMM GRANULOCYTES NFR BLD AUTO: 0 % (ref 0–0.5)
IMM GRANULOCYTES NFR BLD AUTO: 1 % (ref 0–0.5)
INR PPP: 1.2 (ref 0.9–1.1)
LYMPHOCYTES # BLD: 0.6 K/UL (ref 0.8–3.5)
LYMPHOCYTES # BLD: 0.6 K/UL (ref 0.8–3.5)
LYMPHOCYTES NFR BLD: 10 % (ref 12–49)
LYMPHOCYTES NFR BLD: 8 % (ref 12–49)
MCH RBC QN AUTO: 26.7 PG (ref 26–34)
MCH RBC QN AUTO: 27.9 PG (ref 26–34)
MCHC RBC AUTO-ENTMCNC: 30.5 G/DL (ref 30–36.5)
MCHC RBC AUTO-ENTMCNC: 32.2 G/DL (ref 30–36.5)
MCV RBC AUTO: 86.7 FL (ref 80–99)
MCV RBC AUTO: 87.6 FL (ref 80–99)
MONOCYTES # BLD: 0.8 K/UL (ref 0–1)
MONOCYTES # BLD: 0.8 K/UL (ref 0–1)
MONOCYTES NFR BLD: 11 % (ref 5–13)
MONOCYTES NFR BLD: 12 % (ref 5–13)
NEUTS SEG # BLD: 4.4 K/UL (ref 1.8–8)
NEUTS SEG # BLD: 5.7 K/UL (ref 1.8–8)
NEUTS SEG NFR BLD: 72 % (ref 32–75)
NEUTS SEG NFR BLD: 76 % (ref 32–75)
NRBC # BLD: 0 K/UL (ref 0–0.01)
NRBC # BLD: 0 K/UL (ref 0–0.01)
NRBC BLD-RTO: 0 PER 100 WBC
NRBC BLD-RTO: 0 PER 100 WBC
PLATELET # BLD AUTO: 292 K/UL (ref 150–400)
PLATELET # BLD AUTO: 300 K/UL (ref 150–400)
PMV BLD AUTO: 10.6 FL (ref 8.9–12.9)
PMV BLD AUTO: 11 FL (ref 8.9–12.9)
POTASSIUM SERPL-SCNC: 3.9 MMOL/L (ref 3.5–5.1)
PROT SERPL-MCNC: 6.6 G/DL (ref 6.4–8.2)
PROTHROMBIN TIME: 11.9 SEC (ref 9–11.1)
Q-T INTERVAL, ECG07: 514 MS
QRS DURATION, ECG06: 154 MS
QTC CALCULATION (BEZET), ECG08: 546 MS
RBC # BLD AUTO: 3.48 M/UL (ref 4.1–5.7)
RBC # BLD AUTO: 3.98 M/UL (ref 4.1–5.7)
RBC MORPH BLD: ABNORMAL
SAMPLES BEING HELD,HOLD: NORMAL
SAMPLES BEING HELD,HOLD: NORMAL
SERVICE CMNT-IMP: ABNORMAL
SERVICE CMNT-IMP: ABNORMAL
SODIUM SERPL-SCNC: 132 MMOL/L (ref 136–145)
VENTRICULAR RATE, ECG03: 68 BPM
WBC # BLD AUTO: 6.3 K/UL (ref 4.1–11.1)
WBC # BLD AUTO: 7.5 K/UL (ref 4.1–11.1)
WBC MORPH BLD: ABNORMAL

## 2019-12-16 PROCEDURE — 74011250636 HC RX REV CODE- 250/636: Performed by: EMERGENCY MEDICINE

## 2019-12-16 PROCEDURE — 86900 BLOOD TYPING SEROLOGIC ABO: CPT

## 2019-12-16 PROCEDURE — 80053 COMPREHEN METABOLIC PANEL: CPT

## 2019-12-16 PROCEDURE — 86923 COMPATIBILITY TEST ELECTRIC: CPT

## 2019-12-16 PROCEDURE — P9016 RBC LEUKOCYTES REDUCED: HCPCS

## 2019-12-16 PROCEDURE — 74011250637 HC RX REV CODE- 250/637: Performed by: INTERNAL MEDICINE

## 2019-12-16 PROCEDURE — 74011250636 HC RX REV CODE- 250/636: Performed by: INTERNAL MEDICINE

## 2019-12-16 PROCEDURE — 30233N1 TRANSFUSION OF NONAUTOLOGOUS RED BLOOD CELLS INTO PERIPHERAL VEIN, PERCUTANEOUS APPROACH: ICD-10-PCS | Performed by: HOSPITALIST

## 2019-12-16 PROCEDURE — 96374 THER/PROPH/DIAG INJ IV PUSH: CPT

## 2019-12-16 PROCEDURE — 82962 GLUCOSE BLOOD TEST: CPT

## 2019-12-16 PROCEDURE — 85018 HEMOGLOBIN: CPT

## 2019-12-16 PROCEDURE — 36415 COLL VENOUS BLD VENIPUNCTURE: CPT

## 2019-12-16 PROCEDURE — 85025 COMPLETE CBC W/AUTO DIFF WBC: CPT

## 2019-12-16 PROCEDURE — 99285 EMERGENCY DEPT VISIT HI MDM: CPT

## 2019-12-16 PROCEDURE — C9113 INJ PANTOPRAZOLE SODIUM, VIA: HCPCS | Performed by: INTERNAL MEDICINE

## 2019-12-16 PROCEDURE — 74011000250 HC RX REV CODE- 250: Performed by: EMERGENCY MEDICINE

## 2019-12-16 PROCEDURE — 36430 TRANSFUSION BLD/BLD COMPNT: CPT

## 2019-12-16 PROCEDURE — 74011000250 HC RX REV CODE- 250: Performed by: INTERNAL MEDICINE

## 2019-12-16 PROCEDURE — 85610 PROTHROMBIN TIME: CPT

## 2019-12-16 PROCEDURE — 93005 ELECTROCARDIOGRAM TRACING: CPT

## 2019-12-16 PROCEDURE — C9113 INJ PANTOPRAZOLE SODIUM, VIA: HCPCS | Performed by: EMERGENCY MEDICINE

## 2019-12-16 PROCEDURE — 65270000029 HC RM PRIVATE

## 2019-12-16 PROCEDURE — 74011636637 HC RX REV CODE- 636/637: Performed by: INTERNAL MEDICINE

## 2019-12-16 RX ORDER — CLONIDINE HYDROCHLORIDE 0.1 MG/1
0.1 TABLET ORAL 2 TIMES DAILY
COMMUNITY
End: 2020-01-14 | Stop reason: SINTOL

## 2019-12-16 RX ORDER — DEXTROSE MONOHYDRATE 100 MG/ML
0-250 INJECTION, SOLUTION INTRAVENOUS AS NEEDED
Status: DISCONTINUED | OUTPATIENT
Start: 2019-12-16 | End: 2019-12-17 | Stop reason: HOSPADM

## 2019-12-16 RX ORDER — HYDRALAZINE HYDROCHLORIDE 20 MG/ML
10 INJECTION INTRAMUSCULAR; INTRAVENOUS
Status: DISCONTINUED | OUTPATIENT
Start: 2019-12-16 | End: 2019-12-17 | Stop reason: HOSPADM

## 2019-12-16 RX ORDER — CARVEDILOL 12.5 MG/1
12.5 TABLET ORAL 2 TIMES DAILY WITH MEALS
Status: DISCONTINUED | OUTPATIENT
Start: 2019-12-16 | End: 2019-12-17 | Stop reason: HOSPADM

## 2019-12-16 RX ORDER — SODIUM CHLORIDE 9 MG/ML
250 INJECTION, SOLUTION INTRAVENOUS AS NEEDED
Status: DISCONTINUED | OUTPATIENT
Start: 2019-12-16 | End: 2019-12-17 | Stop reason: HOSPADM

## 2019-12-16 RX ORDER — INSULIN GLARGINE 100 [IU]/ML
7 INJECTION, SOLUTION SUBCUTANEOUS
Status: DISCONTINUED | OUTPATIENT
Start: 2019-12-17 | End: 2019-12-17 | Stop reason: HOSPADM

## 2019-12-16 RX ORDER — TAMSULOSIN HYDROCHLORIDE 0.4 MG/1
0.4 CAPSULE ORAL DAILY
Status: DISCONTINUED | OUTPATIENT
Start: 2019-12-17 | End: 2019-12-17 | Stop reason: HOSPADM

## 2019-12-16 RX ORDER — INSULIN LISPRO 100 [IU]/ML
INJECTION, SOLUTION INTRAVENOUS; SUBCUTANEOUS
Status: DISCONTINUED | OUTPATIENT
Start: 2019-12-16 | End: 2019-12-17 | Stop reason: HOSPADM

## 2019-12-16 RX ORDER — SIMVASTATIN 20 MG/1
20 TABLET, FILM COATED ORAL
Status: DISCONTINUED | OUTPATIENT
Start: 2019-12-16 | End: 2019-12-17 | Stop reason: HOSPADM

## 2019-12-16 RX ORDER — MAGNESIUM SULFATE 100 %
4 CRYSTALS MISCELLANEOUS AS NEEDED
Status: DISCONTINUED | OUTPATIENT
Start: 2019-12-16 | End: 2019-12-17 | Stop reason: HOSPADM

## 2019-12-16 RX ORDER — ALBUTEROL SULFATE 0.83 MG/ML
2.5 SOLUTION RESPIRATORY (INHALATION)
Status: DISCONTINUED | OUTPATIENT
Start: 2019-12-16 | End: 2019-12-17 | Stop reason: HOSPADM

## 2019-12-16 RX ORDER — SODIUM CHLORIDE 0.9 % (FLUSH) 0.9 %
5-40 SYRINGE (ML) INJECTION AS NEEDED
Status: DISCONTINUED | OUTPATIENT
Start: 2019-12-16 | End: 2019-12-17 | Stop reason: HOSPADM

## 2019-12-16 RX ORDER — SODIUM CHLORIDE 0.9 % (FLUSH) 0.9 %
5-40 SYRINGE (ML) INJECTION EVERY 8 HOURS
Status: DISCONTINUED | OUTPATIENT
Start: 2019-12-16 | End: 2019-12-17 | Stop reason: HOSPADM

## 2019-12-16 RX ORDER — CLONIDINE HYDROCHLORIDE 0.1 MG/1
0.1 TABLET ORAL 2 TIMES DAILY
Status: DISCONTINUED | OUTPATIENT
Start: 2019-12-16 | End: 2019-12-17 | Stop reason: HOSPADM

## 2019-12-16 RX ADMIN — INSULIN LISPRO 5 UNITS: 100 INJECTION, SOLUTION INTRAVENOUS; SUBCUTANEOUS at 14:39

## 2019-12-16 RX ADMIN — CLONIDINE HYDROCHLORIDE 0.1 MG: 0.1 TABLET ORAL at 17:15

## 2019-12-16 RX ADMIN — SODIUM CHLORIDE 40 MG: 9 INJECTION INTRAMUSCULAR; INTRAVENOUS; SUBCUTANEOUS at 00:54

## 2019-12-16 RX ADMIN — SODIUM CHLORIDE 40 MG: 9 INJECTION INTRAMUSCULAR; INTRAVENOUS; SUBCUTANEOUS at 08:59

## 2019-12-16 RX ADMIN — Medication 10 ML: at 22:37

## 2019-12-16 RX ADMIN — Medication 20 ML: at 14:00

## 2019-12-16 RX ADMIN — SIMVASTATIN 20 MG: 20 TABLET, FILM COATED ORAL at 22:34

## 2019-12-16 RX ADMIN — CARVEDILOL 12.5 MG: 12.5 TABLET, FILM COATED ORAL at 17:15

## 2019-12-16 NOTE — ED NOTES
SBAR report received from Rafaela Oliveira. This RN assuming care of patient at this time. 1505: Patient resting in position of comfort on lateral right with eyes closed. Equal chest rise noted. VSS. This RN to assess patient once he wakes. No signs of distress noted. 1600: Patient resting in stretcher in position of comfort, bed locked and lowered, call bell within reach. VSS.

## 2019-12-16 NOTE — ED NOTES
Patient tolerated meal without difficulty, 300 ml of urine emptied from urinal ,call bell in reach will continue to monitor patient.

## 2019-12-16 NOTE — ED NOTES
Patient changed into gown. Katerin care performed by this RN using baby wipes. Chux changed, new brief changed. Patient able to assist this RN by log rolling and lifting hips. Patient repositioned in bed. Patient given fresh water in water bottle and diet gingerale. Patient denies current pain. RT paged for PRN neb treatment per patient request. 
 
5552: Patient transported to [address] via stretcher with 2L supplemental O2 viz NC by Nursing Staff during Code Black status. No signs of distress noted at time of transport. 1820: Bedside, Verbal and Written shift change report given to Tito Fish RN (oncoming nurse) by Duane Graham RN (offgoing nurse). Report included the following information SBAR, ED Summary, Procedure Summary, Intake/Output, MAR and Recent Results. No signs of distress noted at time of transport.

## 2019-12-16 NOTE — PROGRESS NOTES
Reason for Readmission:     Gastrointestinal hemorrhage, unspecified gastrointestinal hemorrhage type RRAT Score and Risk Level:     52/High Risk Level Level of Readmission:    Level 2/ 4 IP admissions in the past 12 months. Care Conference scheduled:  Not needed at this time. Did you attend your follow up appointment (s): If not, why not:  Not at this time. Has an upcoming hospital follow-up appointment scheduled with PCP for Wednesday 12/18/19. Resources/supports as identified by patient/family: Family and [address] [place] [address]) Top Challenges facing patient (as identified by patient/family and CM):     Health challenges Finances/Medication cost?    [employer] as source of income. No significant financial stressors. Insurance verified. [address] located in [facility] for prescriptions. Transportation      Family Support system or lack thereof? Spouse: Andre Babinski [phone]/ [phone] and Grandchild: Niya Shell [phone] Living arrangements? Resides with spouse in 1 story home with ramp to enter. Self-care/ADLs/Cognition? AOx4, independent with ADL's and IADL's. DME: Oxygen (Alanis Andrew), walker, and shower chair Current Advanced Directive/Advance Care Plan:  Full code with advance care plan on file at this time. Plan for utilizing home health:   Open to [facility] PT/OT and RN  Will require resumption orders at discharge. [phone] Transition of Care Plan:    Based on readmission, the patient's previous Plan of Care 
 has been evaluated and/or modified. The current Transition of Care Plan is:       Anticipated MARTINEZ plan is for patient to return home with home health [facility]) and family assistance. MARTINEZ TBD/subject to change pending recommendations. CM will continue to follow and assist with MARTINEZ needs as they arise. Care Management Interventions PCP Verified by CM: Yes 
Palliative Care Criteria Met (RRAT>21 & CHF Dx)?: Yes 
Palliative Consult Recommended?: No 
Mode of Transport at Discharge: Other (see comment)(Family) Transition of Care Consult (CM Consult): (No current CM consults or needs at this time) Discharge Durable Medical Equipment: No 
Physical Therapy Consult: No 
Occupational Therapy Consult: No 
Speech Therapy Consult: No 
Current Support Network: Lives with Spouse, Own Home Confirm Follow Up Transport: Family Discharge Location Discharge Placement: Home with home health(TBD/subject to change pending recommendations) CLINTON Tamayo/CHARLI Care Management 11:06 AM

## 2019-12-16 NOTE — CONSULTS
[facility] 
[address] [facility] [address] 1400 [facility], [address] 
[phone] GI CONSULTATION NOTE Rl Garcia, [facility] Work Cell: [phone] NAME:  Yamileth Haney Sr.  
:   1935 MRN:   [id number] Referring Provider: Dr. Annetta Paz Date: 2019 Chief Complaint: Rectal bleeding History of Present Illness:  Patient is a 81 y.o. who is seen in consultation at the request of Dr. Kole Hsieh for GI bleed. Mr. Momo Hutton has a PMH as detailed below. He presented to the hospital with rectal bleeding. Onset of this was yesterday evening around 8 pm. He felt urge to defecate and upon doing so passed loose, red bloody stool. Had 2 episodes total at home. Denies any associated fever, chills, n/v or abdominal pain. Felt a little constipated prior, therefore strained \"a little\" prior to BM last night. Had small bloody stool here in the ER several hours ago, but none since. Hgb 11.1. Cannot recall when last colonoscopy was. No known FH of colon cancer. He was seen for the same in 2017 at which point in time bleeding scan showed active bleeding in descending/sigmoid colon; however subsequent angiogram was negative. Bleeding resolved w/o intervention. PMH: 
Past Medical History:  
Diagnosis Date  CAD (coronary artery disease) s/p CABG   Carotid arterial disease ([facility]  CKD (chronic kidney disease) stage 3, GFR 30-59 ml/min ([facility]) 10/21/2017  
 hypertension and DM nephrosclerosis  Diabetes mellitus, type 2 ([facility]  Hypercholesteremia  Hypertension  Paroxysmal atrial fibrillation ([address] 10/21/2017  
 new onset afib with BRPR 10-19-17 admit  PVC's (premature ventricular contractions) 2014  PVD (peripheral vascular disease) ([facility] [address] PSH: 
Past Surgical History:  
Procedure Laterality Date  HX CORONARY ARTERY BYPASS GRAFT    
 HX HEART CATHETERIZATION    
  [place] TCAT INSJ/RPL PERM LEADLESS PACEMAKER RV W/IMG N/A 2019 INSERT OR REPLACE TRANSCATH PPM LEADLESS performed by Brittani Castillo MD at [address], [facility] Allergies: Allergies Allergen Reactions  Lisinopril Cough Home Medications: 
Prior to Admission Medications Prescriptions Last Dose Informant Patient Reported? Taking? albuterol (PROVENTIL VENTOLIN) 2.5 mg /3 mL (0.083 %) nebulizer solution  Self No No  
Sig: 3 mL by Nebulization route every four (4) hours as needed for Wheezing. aspirin 81 mg chewable tablet  Self No No  
Sig: Take 1 Tab by mouth daily. bumetanide (BUMEX) 1 mg tablet   No No  
Sig: Take 3 Tabs by mouth two (2) times a day. carvedilol (COREG) 12.5 mg tablet   Yes No  
Sig: Take 12.5 mg by mouth two (2) times daily (with meals). cinnamon bark (CINNAMON) 500 mg cap  Self Yes No  
Sig: Take 1,000 mg by mouth two (2) times a day. cloNIDine HCl (CATAPRES) 0.1 mg tablet   Yes Yes Sig: Take  by mouth two (2) times a day. ferrous sulfate (IRON) 325 mg (65 mg iron) tablet   Yes No  
Sig: Take 325 mg by mouth Daily (before breakfast). hydrALAZINE (APRESOLINE) 50 mg tablet   No No  
Sig: Take 1 Tab by mouth three (3) times daily. insulin glargine (LANTUS U-100 INSULIN) 100 unit/mL injection   No No  
Si Units by SubCUTAneous route every morning. isosorbide dinitrate (ISORDIL) 20 mg tablet   No No  
Sig: Take 1 Tab by mouth three (3) times daily. magnesium oxide (MAG-OX) 400 mg tablet   No No  
Sig: Take 1 Tab by mouth daily. omega 3-dha-epa-fish oil (FISH OIL) 100-160-1,000 mg cap  Self Yes No  
Sig: Take 1 Cap by mouth two (2) times a day. simvastatin (ZOCOR) 20 mg tablet   No No  
Sig: Take 1 Tab by mouth nightly. spironolactone (ALDACTONE) 25 mg tablet   No No  
Sig: Take 1 Tab by mouth daily. tamsulosin (FLOMAX) 0.4 mg capsule  Self No No  
Sig: Take 1 Cap by mouth daily. Facility-Administered Medications: None Hospital Medications: 
Current Facility-Administered Medications Medication Dose Route Frequency  sodium chloride (NS) flush 5-40 mL  5-40 mL IntraVENous Q8H  
 sodium chloride (NS) flush 5-40 mL  5-40 mL IntraVENous PRN  pantoprazole (PROTONIX) 40 mg in 0.9% sodium chloride 10 mL injection  40 mg IntraVENous DAILY  hydrALAZINE (APRESOLINE) 20 mg/mL injection 10 mg  10 mg IntraVENous Q6H PRN  
 0.9% sodium chloride infusion 250 mL  250 mL IntraVENous PRN  
 glucose chewable tablet 16 g  4 Tab Oral PRN  
 glucagon (GLUCAGEN) injection 1 mg  1 mg IntraMUSCular PRN  
 dextrose 10% infusion 0-250 mL  0-250 mL IntraVENous PRN  
 insulin lispro (HUMALOG) injection   SubCUTAneous AC&HS Current Outpatient Medications Medication Sig  cloNIDine HCl (CATAPRES) 0.1 mg tablet Take  by mouth two (2) times a day.  bumetanide (BUMEX) 1 mg tablet Take 3 Tabs by mouth two (2) times a day.  hydrALAZINE (APRESOLINE) 50 mg tablet Take 1 Tab by mouth three (3) times daily.  spironolactone (ALDACTONE) 25 mg tablet Take 1 Tab by mouth daily.  magnesium oxide (MAG-OX) 400 mg tablet Take 1 Tab by mouth daily.  isosorbide dinitrate (ISORDIL) 20 mg tablet Take 1 Tab by mouth three (3) times daily.  insulin glargine (LANTUS U-100 INSULIN) 100 unit/mL injection 7 Units by SubCUTAneous route every morning.  ferrous sulfate (IRON) 325 mg (65 mg iron) tablet Take 325 mg by mouth Daily (before breakfast).  carvedilol (COREG) 12.5 mg tablet Take 12.5 mg by mouth two (2) times daily (with meals).  simvastatin (ZOCOR) 20 mg tablet Take 1 Tab by mouth nightly.  albuterol (PROVENTIL VENTOLIN) 2.5 mg /3 mL (0.083 %) nebulizer solution 3 mL by Nebulization route every four (4) hours as needed for Wheezing.  tamsulosin (FLOMAX) 0.4 mg capsule Take 1 Cap by mouth daily.  aspirin 81 mg chewable tablet Take 1 Tab by mouth daily.  omega 3-dha-epa-fish oil (FISH OIL) 100-160-1,000 mg cap Take 1 Cap by mouth two (2) times a day.  cinnamon bark (CINNAMON) 500 mg cap Take 1,000 mg by mouth two (2) times a day. Social History: 
Social History Tobacco Use  Smoking status: Former Smoker Packs/day: 3.00 Years: 20.00 Pack years: 60.00 Types: Cigarettes Last attempt to quit: 1985 Years since quittin.9  Smokeless tobacco: Never Used Substance Use Topics  Alcohol use: No  
 
 
Family History: 
History reviewed. No pertinent family history. Review of Systems: 
 
Constitutional: negative fever, negative chills, negative weight loss Eyes:   negative visual changes ENT:   negative sore throat, tongue or lip swelling Respiratory:  negative cough, negative dyspnea Cards:  negative for chest pain, palpitations, lower extremity edema GI:   See HPI 
:  negative for frequency, dysuria Integument:  negative for rash and pruritus Heme:  negative for easy bruising and gum/nose bleeding Musculoskel: negative for myalgias, back pain and muscle weakness Neuro: negative for headaches, dizziness, vertigo Psych:  negative for feelings of anxiety, depression Objective:  
 
Patient Vitals for the past 8 hrs: 
 BP Temp Pulse Resp SpO2  
19 1000 159/74  63 19 100 % 19 0930 (!) 152/91  63 20 99 % 19 0900 139/71  65 18 98 % 19 0855 169/61  62 18 98 % 19 0850 156/65  66 27 97 % 19 0845 164/62 98.4 °F (36.9 °C) 64 24 99 % 19 0815 152/61  63 27 97 % 19 0745 162/63 97.6 °F (36.4 °C) 66 24 98 % 19 0740 144/60  62 24 98 % 19 0735 163/60  62 20 99 % 19 0730 158/60  63 25 98 % 19 0725 164/70  62 19 99 % 19 0720 154/61  63 22 98 % 19 0715 152/56 97.9 °F (36.6 °C) 63 21 99 % 19 0710 145/61  62 17 99 % 19 0700 146/63 97.8 °F (36.6 °C) 64 22 98 % 12/16/19 0645 145/48 97.6 °F (36.4 °C) 62 22 100 % 12/16/19 0642 145/48 97.6 °F (36.4 °C) 64 16 99 % 12/16/19 0627 (!) 114/39 98 °F (36.7 °C) 60 17 99 % 12/16/19 0615 134/45 98 °F (36.7 °C) 64 20 97 % 12/16/19 0600 137/46  64 14 96 % 12/16/19 0545 157/63 97.9 °F (36.6 °C) 64 20 99 % 12/16/19 0530 159/57 98 °F (36.7 °C) 63 23 100 % 12/16/19 0515 135/61 97.9 °F (36.6 °C) 63 21 95 % 12/16/19 0500 141/64  63 23 99 % 12/16/19 0445 140/55 97.9 °F (36.6 °C) 60 18 100 % 12/16/19 0430 137/61 97.8 °F (36.6 °C) 63 23 99 % 12/16/19 0415 140/62 97.9 °F (36.6 °C) 62 22 100 % 12/16/19 0400 129/57 97.9 °F (36.6 °C) 62 19 99 % 12/16/19 0355 137/56 97.8 °F (36.6 °C) 64 24 99 % 12/16/19 0350 124/58  64 18 98 % 12/16/19 0345 137/64  62 23 97 % 12/16/19 0340 135/58  66 25 100 % 12/16/19 0339 137/64 97.8 °F (36.6 °C) 62 18 100 % 12/16/19 0330 134/60 97.9 °F (36.6 °C) 61 19 100 % No intake/output data recorded. 12/14 1901 - 12/16 0700 In: -  
Out: 368 [[id number]] PHYSICAL EXAM: 
General: WD, Elderly. Alert, cooperative, no acute distress.   
HEENT: NC, Atraumatic. PERRLA, EOMI. Anicteric sclerae. Lungs:  CTA Bilaterally. No Wheezing/Rhonchi/Rales. Heart:  Regular rate and rhythm, No murmur, No Rubs, No Gallops Abdomen: Soft, non-distended, non-tender.  +Bowel sounds, no HSM Extremities: No c/c/e Neurologic:  Alert and oriented X 3. No acute neurological distress. Psych:   Good insight. Not anxious nor agitated. Data Review Recent Labs 12/16/19 
1005 12/16/19 0049 WBC 7.5 6.3 HGB 11.1* 9.3* HCT 34.5* 30.5*  300 Recent Labs 12/16/19 0049 * K 3.9 CL 91* CO2 38* BUN 48* CREA 2.45* * CA 8.3* Recent Labs 12/16/19 0049 SGOT 21 * TP 6.6 ALB 2.1*  
GLOB 4.5* Recent Labs 12/16/19 0049 INR 1.2* PTP 11.9* Imaging studies reviewed Assessment: 1. GI bleed - presumed recurrent diverticular bleed; other considerations include hemorrhoids, cannot completely r/o polyps, neoplasia, etc.  
2. Acute blood loss anemia 3. COPD 4. CHF 5. CKD 6. Chronic afib - not on anticoagulation Patient Active Problem List  
Diagnosis Code  Bradycardia R00.1  CAD (coronary artery disease) I25.10  Hypertension, essential, benign I10  
 Carotid arterial disease ([facility] I73.9  Hypercholesteremia E78.00  
 PVD (peripheral vascular disease) ([facility]) I73.9  PVC's (premature ventricular contractions) I49.3  Dyspnea R06.00  Type 2 diabetes mellitus with diabetic peripheral angiopathy without gangrene ([facility]) E11.51  
 CKD (chronic kidney disease) N18.9  Acute renal failure (ARF) ([facility]) N17.9  Hypokalemia E87.6  Generalized weakness R53.1  Elevated troponin R79.89  
 KATALINA (acute kidney injury) ([facility] N17.9  GI bleed K92.2  Hyperglycemia due to type 2 diabetes mellitus ([facility] [address] E11.65  Atrial fibrillation, chronic I48.20  CKD (chronic kidney disease) stage 3, GFR 30-59 ml/min ([facility]) N18.3  Type 2 diabetes with nephropathy ([facility]) E11.21  
 CHF (congestive heart failure) ([facility]) I50.9  Atrial fibrillation with slow ventricular response ([facility]) I48.91  
 Pacemaker Z95.0  Hypoglycemia E16.2  CHF exacerbation ([facility]) I50.9 Plan:  
-Clear liquids as tolerated 
-Supportive management per primary team 
-NM bleeding scan for any recurrent bleeding 
-Consider colonoscopy depending upon clinical course 
-Monitor H&H and follow clinical course for evidence of recurrent bleeding  
-Transfuse as necessary  
-Discussed with Dr. Alayna Arriola  
-Will follow  
-Thank you kindly for allowing us to participate in the care of this patient

## 2019-12-16 NOTE — ED NOTES
Bedside shift change report given to [address] rn  (oncoming nurse) by Caryle Aus  (offgoing nurse). Report included the following information SBAR, ED Summary and Recent Results.

## 2019-12-16 NOTE — H&P
[address] [facility] Adult  Hospitalist Group History and Physical 
 
Primary Care Provider: Dinesh Vazquez MD 
 
Subjective:  
 
Terrie Ascencio is a 81 y.o. male CAD s/p CABG, A. fib not on anticoagulation due to previous GI bleed, diabetes, hypertension, CKD 4 among others listed below came to the ED after he had an episode of rectal bleeding. Patient reported that he had painless large bloody bowel movement around 8 PM last night. He denied any dizziness lightheadedness chest pain or shortness of breath. He had a similar episode of GI bleed back in 2017 and a bleeding scan showed an active diverticular bleed but failed interventional embolization. Patient takes 1 tablet of baby aspirin a day. At the time of my evaluation patient had another painless large bloody bowel movement. He denied abdominal pain. He was discharged 2 days ago after he was admitted for decompensated diastolic CHF and KATALINA on CKD. and transfused PRBC for low H&H Review of Systems: 
12 point review of systems was done and found to be negative except the 1 mentioned in the HPI Past Medical History:  
Diagnosis Date  CAD (coronary artery disease) s/p CABG 2008  Carotid arterial disease ([facility] [address]  CKD (chronic kidney disease) stage 3, GFR 30-59 ml/min ([facility]) 10/21/2017  
 hypertension and DM nephrosclerosis  Diabetes mellitus, type 2 ([address]  Hypercholesteremia  Hypertension  Paroxysmal atrial fibrillation ([facility] [address] 10/21/2017  
 new onset afib with BRPR 10-19-17 admit  PVC's (premature ventricular contractions) 5/26/2014  PVD (peripheral vascular disease) ([facility] [address] Past Surgical History:  
Procedure Laterality Date  HX CORONARY ARTERY BYPASS GRAFT    
 HX HEART CATHETERIZATION    
 [place] TCAT INSJ/RPL PERM LEADLESS PACEMAKER RV W/IMG N/A 5/9/2019 INSERT OR REPLACE TRANSCATH PPM LEADLESS [place] [address] [address] [facility] Prior to Admission medications Medication Sig Start Date End Date Taking? Authorizing Provider  
cloNIDine HCl (CATAPRES) 0.1 mg tablet Take  by mouth two (2) times a day. Yes Other, MD Gilbert  
bumetanide (BUMEX) 1 mg tablet Take 3 Tabs by mouth two (2) times a day. 12/10/19   Cherry Montenegro MD  
hydrALAZINE (APRESOLINE) 50 mg tablet Take 1 Tab by mouth three (3) times daily. 12/10/19   Cherry Montenegro MD  
spironolactone (ALDACTONE) 25 mg tablet Take 1 Tab by mouth daily. 12/11/19   Cherry Montenegro MD  
magnesium oxide (MAG-OX) 400 mg tablet Take 1 Tab by mouth daily. 12/11/19   Cherry Montenegro MD  
isosorbide dinitrate (ISORDIL) 20 mg tablet Take 1 Tab by mouth three (3) times daily. 12/10/19   Cherry Montenegro MD  
insulin glargine (LANTUS U-100 INSULIN) 100 unit/mL injection 7 Units by SubCUTAneous route every morning. 12/10/19   Cherry Montenegro MD  
ferrous sulfate (IRON) 325 mg (65 mg iron) tablet Take 325 mg by mouth Daily (before breakfast). Provider, Historical  
carvedilol (COREG) 12.5 mg tablet Take 12.5 mg by mouth two (2) times daily (with meals). Provider, Historical  
simvastatin (ZOCOR) 20 mg tablet Take 1 Tab by mouth nightly. 11/21/19   David Robertson MD  
albuterol (PROVENTIL VENTOLIN) 2.5 mg /3 mL (0.083 %) nebulizer solution 3 mL by Nebulization route every four (4) hours as needed for Wheezing. 5/14/19   Mary Mendez MD  
tamsulosin (FLOMAX) 0.4 mg capsule Take 1 Cap by mouth daily. 5/13/19   Tiffanie HERMOSILLO DO  
aspirin 81 mg chewable tablet Take 1 Tab by mouth daily. 9/27/18   Pablito Cooper MD  
omega 3-dha-epa-fish oil (FISH OIL) 100-160-1,000 mg cap Take 1 Cap by mouth two (2) times a day. Provider, Historical  
cinnamon bark (CINNAMON) 500 mg cap Take 1,000 mg by mouth two (2) times a day. Provider, Historical  
 
Allergies Allergen Reactions  Lisinopril Cough History reviewed. No pertinent family history. SOCIAL HISTORY: 
Patient resides at home Patient ambulates with  cane Smoking history: Former Alcohol history: none Objective:  
 
 
Physical Exam:  
Physical Exam 
Constitutional:   
   General: He is not in acute distress. Appearance: Normal appearance. He is not ill-appearing, toxic-appearing or diaphoretic. HENT:  
   Head: Normocephalic and atraumatic. Right Ear: There is no impacted cerumen. Left Ear: There is no impacted cerumen. Nose: No congestion or rhinorrhea. Mouth/Throat:  
   Pharynx: No oropharyngeal exudate or posterior oropharyngeal erythema. Eyes:  
   Extraocular Movements: Extraocular movements intact. Pupils: Pupils are equal, round, and reactive to light. Neck: Musculoskeletal: No neck rigidity or muscular tenderness. Vascular: No carotid bruit. Cardiovascular:  
   Rate and Rhythm: Normal rate. Heart sounds: No murmur. No friction rub. No gallop. Pulmonary:  
   Effort: Pulmonary effort is normal. No respiratory distress. Breath sounds: Normal breath sounds. No stridor. No rhonchi. Abdominal:  
   General: There is no distension. Palpations: There is no mass. Tenderness: There is no tenderness. Hernia: No hernia is present. Musculoskeletal:     
   General: No swelling, tenderness, deformity or signs of injury. Lymphadenopathy:  
   Cervical: No cervical adenopathy. Skin: 
   General: Skin is warm. Coloration: Skin is not jaundiced or pale. Findings: No erythema. Neurological:  
   General: No focal deficit present. Mental Status: He is alert and oriented to person, place, and time. Cranial Nerves: No cranial nerve deficit. Sensory: No sensory deficit. Motor: No weakness. Coordination: Coordination normal.  
Psychiatric:     
   Mood and Affect: Mood normal.     
   Behavior: Behavior normal.  
 
 
 
 
 
Assessment:  
GI bleed 
-Patient had 2 large bloody bowel movements since yesterday evening -H/H at baseline for now and vital signs stable -Type and crossmatch. Transfuse PRBC as needed 
-We will keep him n.p.o. 
-IV fluids 
-GI consult in place Chronic diastolic CHF 
-Continue home medications 
-No evidence of decompensation Diabetes 
-Monitor fingersticks closely Chronic respiratory failure on home oxygen 
-Secondary to advanced COPD 
- Continue supplemental O2 and monitor O2 sat 
-Continue home inhalers CKD 4 
-Creatinine improving - Avoid nephrotoxic medications A. fib not on anticoagulation 
-Telemetry monitoring Hypertension 
-IV hydralazine PRN 
 
CAD s/p CABG 
-Hold ASA 
-Resume the rest of her medications when appropriate - Telemetry monitoring Advanced COPD 
PVD FUNCTIONAL STATUS PRIOR TO HOSPITALIZATION (including history of recent falls): 
 
Signed By: Jin Cavanaugh MD   
 December 16, 2019

## 2019-12-16 NOTE — ED NOTES
Patient cleaned up, new brief placed on patient. Patinet voiding clear yellow urine into urinal.  Will continue to monitor, call bell in reach, liquid diet ordered .

## 2019-12-16 NOTE — ED NOTES
Assumed care of patient. Verbal and bedside report received from [facility]. Patient resting quietly on stretcher, visitor at bedside. Pt appears in no acute distress, respirations equal and unlabored. VS WNL. Call bell within reach.

## 2019-12-16 NOTE — PROGRESS NOTES
Hospitalist Progress Note Nelli Thibodeaux MD 
Answering service: [phone] OR [phone] from in house phone Date of Service:  2019 NAME:  Rk Massey Sr. 
:  1935 MRN:  [id number] Admission Summary:  
Annette Montgomery is a 81 y.o. male CAD s/p CABG, A. fib not on anticoagulation due to previous GI bleed, diabetes, hypertension, CKD 4 among others listed below came to the ED after he had an episode of rectal bleeding. Patient reported that he had painless large bloody bowel movement around 8 PM last night. He denied any dizziness lightheadedness chest pain or shortness of breath. He had a similar episode of GI bleed back in 2017 and a bleeding scan showed an active diverticular bleed but failed interventional embolization. Patient takes 1 tablet of baby aspirin a day. At the time of my evaluation patient had another painless large bloody bowel movement. He denied abdominal pain. He was discharged 2 days ago after he was admitted for decompensated diastolic CHF and KATALINA on CKD. and transfused PRBC for low H&H Interval history / Subjective:  
  
Patient seen and examined this morning. He had one BM since this morning which was still bloody. He denied abdominal pain, N/V. He didn't eat anything since 4PM yesterday, he is okay to eat. Clear liquid provided by GI. Assessment & Plan: # GI bleed- possible lower He has hx of recurrent diverticular bleed in the past  
- hold Aspirin for now  
- on IV PPI  
- H/H at baseline for now and vital signs stable 
- monitor H/H q8hrly and transfuse if <7  
- s/p IV fluids- gentle hydration due to hx of heart failure  
- start on clear liquid  
- NM bleeding scan for recurrent bleed - Plan for possible Colonoscopy - GI following  
  
# Chronic diastolic CHF 
- Continue home medications - No evidence of decompensation 
  
# Diabetes type 2: A1c: 7.7 
- SSI, accucheck  
  
 # Chronic respiratory failure on home oxygen - Secondary to advanced COPD 
- Continue supplemental O2 and monitor O2 sat 
- Continue home inhalers 
  
# CKD 4 
- Cr at baseline - Avoid nephrotoxic medications and renally dose others  
  
# A. fib not on anticoagulation 
- rate controlled  
- not on [address] due to recurrent gi bleed  
  
# Hypertension - IV hydralazine PRN 
  
# CAD s/p CABG 
- Hold ASA - Resume the rest of her medications when appropriate - Telemetry monitoring 
  
# Advanced COPD: stable- PRN nebs # PVD  
  
DVT prop: SCDs Code: Full Dispo: TBD Hospital Problems  Date Reviewed: 12/9/2019 Codes Class Noted POA  
 GI bleed ICD-10-CM: K92.2 ICD-9-CM: 578.9  10/20/2017 Unknown Review of Systems:  
Pertinent positive mentioned in interval history/HPI. Other systems reviewed and negative. Vital Signs:  
 Last 24hrs VS reviewed since prior progress note. Most recent are: 
Visit Vitals /71 Pulse 65 Temp 98.4 °F (36.9 °C) Resp 18 Wt 74.4 kg (164 lb) SpO2 98% BMI 24.22 kg/m² Intake/Output Summary (Last 24 hours) at 12/16/2019 1007 Last data filed at 12/16/2019 0225 Gross per 24 hour Intake  Output 350 ml Net -350 ml Physical Examination:  
 
 
     
Constitutional:  No acute distress ENT:  Oral mucous moist, oropharynx benign. Neck supple, Resp:  CTA bilaterally. No wheezing/rhonchi/rales. No accessory muscle use CV:  Regular rhythm, normal rate, no murmurs, gallops, rubs GI:  Soft, non distended, non tender. normoactive bowel sounds, no hepatosplenomegaly Musculoskeletal:  No edema, warm, 2+ pulses throughout Neurologic:  Moves all extremities. AAOx3, CN II-XII reviewed Data Review:  
I personally reviewed labs and imaging Labs:  
 
Recent Labs 12/16/19 0049 WBC 6.3 HGB 9.3* HCT 30.5*  Recent Labs 12/16/19 0049 * K 3.9 CL 91* CO2 38* BUN 48* CREA 2.45* * CA 8.3* Recent Labs 12/16/19 0049 SGOT 21 ALT 25 * TBILI 0.6 TP 6.6 ALB 2.1*  
GLOB 4.5* Recent Labs 12/16/19 0049 INR 1.2* PTP 11.9* No results for input(s): FE, TIBC, PSAT, FERR in the last 72 hours. Lab Results Component Value Date/Time Folate 11.3 12/03/2019 08:44 AM  
  
No results for input(s): PH, PCO2, PO2 in the last 72 hours. No results for input(s): CPK, CKNDX, TROIQ in the last 72 hours. No lab exists for component: CPKMB No results found for: CHOL, CHOLX, CHLST, CHOLV, HDL, HDLP, LDL, LDLC, DLDLP, TGLX, TRIGL, TRIGP, CHHD, CHHDX Lab Results Component Value Date/Time Glucose (POC) 263 (H) 12/10/2019 04:50 PM  
 Glucose (POC) 252 (H) 12/10/2019 11:29 AM  
 Glucose (POC) 140 (H) 12/10/2019 06:47 AM  
 Glucose (POC) 228 (H) 12/09/2019 09:41 PM  
 Glucose (POC) 195 (H) 12/09/2019 05:26 PM  
 
Lab Results Component Value Date/Time Color YELLOW/STRAW 08/27/2019 12:56 PM  
 Appearance CLEAR 08/27/2019 12:56 PM  
 Specific gravity 1.023 08/27/2019 12:56 PM  
 pH (UA) 6.0 08/27/2019 12:56 PM  
 Protein >300 (A) 08/27/2019 12:56 PM  
 Glucose 500 (A) 08/27/2019 12:56 PM  
 Ketone NEGATIVE  08/27/2019 12:56 PM  
 Bilirubin NEGATIVE  08/27/2019 12:56 PM  
 Urobilinogen 0.2 08/27/2019 12:56 PM  
 Nitrites NEGATIVE  08/27/2019 12:56 PM  
 Leukocyte Esterase NEGATIVE  08/27/2019 12:56 PM  
 Epithelial cells FEW 08/27/2019 12:56 PM  
 Bacteria NEGATIVE  08/27/2019 12:56 PM  
 WBC 0-4 08/27/2019 12:56 PM  
 RBC 5-10 08/27/2019 12:56 PM  
 
 
 
Medications Reviewed:  
 
Current Facility-Administered Medications Medication Dose Route Frequency  sodium chloride (NS) flush 5-40 mL  5-40 mL IntraVENous Q8H  
 sodium chloride (NS) flush 5-40 mL  5-40 mL IntraVENous PRN  pantoprazole (PROTONIX) 40 mg in 0.9% sodium chloride 10 mL injection  40 mg IntraVENous DAILY  hydrALAZINE (APRESOLINE) 20 mg/mL injection 10 mg  10 mg IntraVENous Q6H PRN  
 0.9% sodium chloride infusion 250 mL  250 mL IntraVENous PRN Current Outpatient Medications Medication Sig  cloNIDine HCl (CATAPRES) 0.1 mg tablet Take  by mouth two (2) times a day.  bumetanide (BUMEX) 1 mg tablet Take 3 Tabs by mouth two (2) times a day.  hydrALAZINE (APRESOLINE) 50 mg tablet Take 1 Tab by mouth three (3) times daily.  spironolactone (ALDACTONE) 25 mg tablet Take 1 Tab by mouth daily.  magnesium oxide (MAG-OX) 400 mg tablet Take 1 Tab by mouth daily.  isosorbide dinitrate (ISORDIL) 20 mg tablet Take 1 Tab by mouth three (3) times daily.  insulin glargine (LANTUS U-100 INSULIN) 100 unit/mL injection 7 Units by SubCUTAneous route every morning.  ferrous sulfate (IRON) 325 mg (65 mg iron) tablet Take 325 mg by mouth Daily (before breakfast).  carvedilol (COREG) 12.5 mg tablet Take 12.5 mg by mouth two (2) times daily (with meals).  simvastatin (ZOCOR) 20 mg tablet Take 1 Tab by mouth nightly.  albuterol (PROVENTIL VENTOLIN) 2.5 mg /3 mL (0.083 %) nebulizer solution 3 mL by Nebulization route every four (4) hours as needed for Wheezing.  tamsulosin (FLOMAX) 0.4 mg capsule Take 1 Cap by mouth daily.  aspirin 81 mg chewable tablet Take 1 Tab by mouth daily.  omega 3-dha-epa-fish oil (FISH OIL) 100-160-1,000 mg cap Take 1 Cap by mouth two (2) times a day.  cinnamon bark (CINNAMON) 500 mg cap Take 1,000 mg by mouth two (2) times a day.  
 
______________________________________________________________________ EXPECTED LENGTH OF STAY: - - - 
ACTUAL LENGTH OF STAY:          0 Gokul [place], MD  
Patient has given Verbal permission to discuss medical care with  
persons present in the room and and also with contact as listed on face sheet.

## 2019-12-16 NOTE — TELEPHONE ENCOUNTER
Faxed order to [facility].  Received confirmation.    ----- Message from Vivian Watts MD sent at 12/13/2019  3:01 PM EST -----  Regarding: RE: Following up on [facility] orders for patient recently discharged from hospital  Set up for [facility] every 2 weeks with Bumex 3 mg IV   If wt up 5 pounds can take an extra 2 mg of bumex in am  Ultimately its cardiorenal failure

## 2019-12-16 NOTE — ED NOTES
changed patients brief was filled with blood similar amount to when he arrived  . Pt reports no pain . VSS

## 2019-12-16 NOTE — ED TRIAGE NOTES
Triage note : pt arrives via ems from home with rectal bleeding that started at 2000  . Pt has had this problem before . Pt is not on a blood thinner . Pt is a&o x4 . VSS. Vanesa Prost pt arrives on 2l nc -home amount

## 2019-12-16 NOTE — ED PROVIDER NOTES
HPI  
81-year-old male with a history of coronary artery disease, chronic kidney disease, diabetes, cholesterol, hypertension, A. fib, presents to the emergency department with rectal bleeding. Patient starts states symptoms started at 8 PM tonight. He has not changed his brief since his symptoms started. He has had a similar episode in the past, most recently in 2017. At that time a bleeding scan showed an active diverticular bleed. Unfortunately, he had a failed interventional embolization. He received a unit of packed red cells and his bleeding did stop on its own. Patient denies any abdominal pain. He denies any nausea or vomiting. Denies a fever. He reports baseline shortness of breath. Denies chest pain. Reports normal urination. He is not on any anticoagulation other than one baby aspirin a day. He states his appetite has been normal.   denies feeling weak or dizzy. Past Medical History:  
Diagnosis Date  CAD (coronary artery disease) s/p CABG 2008  Carotid arterial disease ([facility]  CKD (chronic kidney disease) stage 3, GFR 30-59 ml/min ([facility]) 10/21/2017  
 hypertension and DM nephrosclerosis  Diabetes mellitus, type 2 ([facility]  Hypercholesteremia  Hypertension  Paroxysmal atrial fibrillation ([facility] [address] 10/21/2017  
 new onset afib with BRPR 10-19-17 admit  PVC's (premature ventricular contractions) 5/26/2014  PVD (peripheral vascular disease) ([facility] [address] Past Surgical History:  
Procedure Laterality Date  HX CORONARY ARTERY BYPASS GRAFT    
 HX HEART CATHETERIZATION    
 MO TCAT INSJ/RPL PERM LEADLESS PACEMAKER RV W/IMG N/A 5/9/2019 INSERT OR REPLACE TRANSCATH PPM LEADLESS performed by Paula Purdy MD at [facility] CATH LAB History reviewed. No pertinent family history. Social History Socioeconomic History  Marital status:  Spouse name: Not on file  Number of children: Not on file  Years of education: Not on file  Highest education level: Not on file Occupational History  Not on file Social Needs  Financial resource strain: Not on file  Food insecurity:  
  Worry: Not on file Inability: Not on file  Transportation needs:  
  Medical: Not on file Non-medical: Not on file Tobacco Use  Smoking status: Former Smoker Packs/day: 3.00 Years: 20.00 Pack years: 60.00 Types: Cigarettes Last attempt to quit: 1985 Years since quittin.9  Smokeless tobacco: Never Used Substance and Sexual Activity  Alcohol use: No  
 Drug use: No  
 Sexual activity: Not on file Lifestyle  Physical activity:  
  Days per week: Not on file Minutes per session: Not on file  Stress: Not on file Relationships  Social connections:  
  Talks on phone: Not on file Gets together: Not on file Attends [employer] service: Not on file Active member of club or organization: Not on file Attends meetings of clubs or organizations: Not on file Relationship status: Not on file  Intimate partner violence:  
  Fear of current or ex partner: Not on file Emotionally abused: Not on file Physically abused: Not on file Forced sexual activity: Not on file Other Topics Concern  Not on file Social History Narrative  Not on file ALLERGIES: Lisinopril Review of Systems Constitutional: Negative for fever. HENT: Negative for congestion. Eyes: Negative for visual disturbance. Respiratory: Positive for shortness of breath (baseline). Negative for cough. Cardiovascular: Negative for chest pain. Gastrointestinal: Positive for blood in stool. Negative for abdominal pain, nausea and vomiting. Genitourinary: Negative for dysuria. Musculoskeletal: Negative for gait problem. Skin: Negative for rash. Neurological: Negative for dizziness and light-headedness. Psychiatric/Behavioral: Negative for dysphoric mood. There were no vitals filed for this visit. Physical Exam 
Constitutional:   
   General: He is not in acute distress. Appearance: He is well-developed. HENT:  
   Head: Normocephalic and atraumatic. Mouth/Throat:  
   Pharynx: No oropharyngeal exudate. Eyes:  
   General: No scleral icterus. Right eye: No discharge. Left eye: No discharge. Pupils: Pupils are equal, round, and reactive to light. Neck: Musculoskeletal: Normal range of motion and neck supple. Vascular: No JVD. Cardiovascular:  
   Rate and Rhythm: Normal rate and regular rhythm. Heart sounds: Normal heart sounds. No murmur. Pulmonary:  
   Effort: Pulmonary effort is normal. No respiratory distress. Breath sounds: Normal breath sounds. No stridor. No wheezing or rales. Chest:  
   Chest wall: No tenderness. Abdominal:  
   General: Bowel sounds are normal. There is no distension. Palpations: Abdomen is soft. There is no mass. Tenderness: There is no tenderness. There is no guarding or rebound. Genitourinary: 
   Comments: Grossly bloody stool in diaper Musculoskeletal: Normal range of motion. Skin: 
   General: Skin is warm and dry. Capillary Refill: Capillary refill takes less than 2 seconds. Findings: No rash. Neurological:  
   Mental Status: He is oriented to person, place, and time. Psychiatric:     
   Behavior: Behavior normal.     
   Thought Content: Thought content normal.     
   Judgment: Judgment normal.  
 
  
 
MDM Number of Diagnoses or Management Options Gastrointestinal hemorrhage, unspecified gastrointestinal hemorrhage type:  
 
  
 
Procedures ED EKG interpretation: 
Rhythm: normal sinus rhythm; and regular . Rate (approx.): 68; Axis: normal; P wave: variable; QRS interval: prolonged; ST/T wave: non-specific changes;. RBBB pattern, unchanged from prior This EKG was interpreted by Delia Wright MD,ED Provider. Hemodynamically stable, Hb 9.3 (8.9 at discharge 12/8) not anticoagulated. Will admit. Hospitalist Toño Serve for Admission 1:30 AM 
 
ED Room Number: BS20/18 Patient Name and age:  Maryam Bautista. 81 y.o.  male Working Diagnosis:  
1. Gastrointestinal hemorrhage, unspecified gastrointestinal hemorrhage type Readmission: yes Isolation Requirements:  no 
Recommended Level of Care:  telemetry Code Status:  Full Code Department:[facility] Adult ED - [phone] Other:  81year old male with h/o diverticular bleed in 2017 (failed embolectomy, stopped on it's own) presents with BRBPR since 8pm. Hemodynamically stable, Hb 9.3. discharged 12/8 at 8.9 (admitted for chf)

## 2019-12-17 VITALS
WEIGHT: 146.83 LBS | BODY MASS INDEX: 21.68 KG/M2 | HEART RATE: 65 BPM | TEMPERATURE: 99 F | RESPIRATION RATE: 16 BRPM | DIASTOLIC BLOOD PRESSURE: 74 MMHG | OXYGEN SATURATION: 99 % | SYSTOLIC BLOOD PRESSURE: 148 MMHG

## 2019-12-17 PROBLEM — K62.5 RECTAL BLEEDING: Status: ACTIVE | Noted: 2019-12-17

## 2019-12-17 LAB
ABO + RH BLD: NORMAL
ANION GAP SERPL CALC-SCNC: 6 MMOL/L (ref 5–15)
BLD PROD TYP BPU: NORMAL
BLD PROD TYP BPU: NORMAL
BLOOD GROUP ANTIBODIES SERPL: NORMAL
BPU ID: NORMAL
BPU ID: NORMAL
BUN SERPL-MCNC: 51 MG/DL (ref 6–20)
BUN/CREAT SERPL: 26 (ref 12–20)
CALCIUM SERPL-MCNC: 7.4 MG/DL (ref 8.5–10.1)
CHLORIDE SERPL-SCNC: 100 MMOL/L (ref 97–108)
CO2 SERPL-SCNC: 29 MMOL/L (ref 21–32)
CREAT SERPL-MCNC: 1.97 MG/DL (ref 0.7–1.3)
CROSSMATCH RESULT,%XM: NORMAL
CROSSMATCH RESULT,%XM: NORMAL
ERYTHROCYTE [DISTWIDTH] IN BLOOD BY AUTOMATED COUNT: 17.3 % (ref 11.5–14.5)
GLUCOSE BLD STRIP.AUTO-MCNC: 120 MG/DL (ref 65–100)
GLUCOSE BLD STRIP.AUTO-MCNC: 138 MG/DL (ref 65–100)
GLUCOSE BLD STRIP.AUTO-MCNC: 197 MG/DL (ref 65–100)
GLUCOSE BLD STRIP.AUTO-MCNC: 77 MG/DL (ref 65–100)
GLUCOSE SERPL-MCNC: 122 MG/DL (ref 65–100)
HCT VFR BLD AUTO: 32.3 % (ref 36.6–50.3)
HCT VFR BLD AUTO: 37.8 % (ref 36.6–50.3)
HGB BLD-MCNC: 10.2 G/DL (ref 12.1–17)
HGB BLD-MCNC: 11.9 G/DL (ref 12.1–17)
MCH RBC QN AUTO: 27.8 PG (ref 26–34)
MCHC RBC AUTO-ENTMCNC: 31.5 G/DL (ref 30–36.5)
MCV RBC AUTO: 88.3 FL (ref 80–99)
NRBC # BLD: 0 K/UL (ref 0–0.01)
NRBC BLD-RTO: 0 PER 100 WBC
PLATELET # BLD AUTO: 274 K/UL (ref 150–400)
PMV BLD AUTO: 10.3 FL (ref 8.9–12.9)
POTASSIUM SERPL-SCNC: 3.5 MMOL/L (ref 3.5–5.1)
RBC # BLD AUTO: 4.28 M/UL (ref 4.1–5.7)
SERVICE CMNT-IMP: ABNORMAL
SERVICE CMNT-IMP: NORMAL
SODIUM SERPL-SCNC: 135 MMOL/L (ref 136–145)
SPECIMEN EXP DATE BLD: NORMAL
STATUS OF UNIT,%ST: NORMAL
STATUS OF UNIT,%ST: NORMAL
UNIT DIVISION, %UDIV: 0
UNIT DIVISION, %UDIV: 0
WBC # BLD AUTO: 9.9 K/UL (ref 4.1–11.1)

## 2019-12-17 PROCEDURE — 74011250636 HC RX REV CODE- 250/636: Performed by: INTERNAL MEDICINE

## 2019-12-17 PROCEDURE — 82962 GLUCOSE BLOOD TEST: CPT

## 2019-12-17 PROCEDURE — 74011250637 HC RX REV CODE- 250/637: Performed by: INTERNAL MEDICINE

## 2019-12-17 PROCEDURE — 97116 GAIT TRAINING THERAPY: CPT

## 2019-12-17 PROCEDURE — 74011636637 HC RX REV CODE- 636/637: Performed by: INTERNAL MEDICINE

## 2019-12-17 PROCEDURE — 80048 BASIC METABOLIC PNL TOTAL CA: CPT

## 2019-12-17 PROCEDURE — 74011000250 HC RX REV CODE- 250: Performed by: INTERNAL MEDICINE

## 2019-12-17 PROCEDURE — 97161 PT EVAL LOW COMPLEX 20 MIN: CPT

## 2019-12-17 PROCEDURE — 36415 COLL VENOUS BLD VENIPUNCTURE: CPT

## 2019-12-17 PROCEDURE — 85027 COMPLETE CBC AUTOMATED: CPT

## 2019-12-17 PROCEDURE — 99218 HC RM OBSERVATION: CPT

## 2019-12-17 PROCEDURE — C9113 INJ PANTOPRAZOLE SODIUM, VIA: HCPCS | Performed by: INTERNAL MEDICINE

## 2019-12-17 RX ORDER — PANTOPRAZOLE SODIUM 40 MG/1
40 TABLET, DELAYED RELEASE ORAL DAILY
Qty: 30 TAB | Refills: 0 | Status: ON HOLD | OUTPATIENT
Start: 2019-12-17 | End: 2019-12-27

## 2019-12-17 RX ORDER — PANTOPRAZOLE SODIUM 40 MG/1
40 TABLET, DELAYED RELEASE ORAL DAILY
Qty: 30 TAB | Refills: 0 | Status: SHIPPED | OUTPATIENT
Start: 2019-12-17 | End: 2019-12-17 | Stop reason: SDUPTHER

## 2019-12-17 RX ORDER — INSULIN GLARGINE 100 [IU]/ML
3-7 INJECTION, SOLUTION SUBCUTANEOUS
COMMUNITY

## 2019-12-17 RX ADMIN — CARVEDILOL 12.5 MG: 12.5 TABLET, FILM COATED ORAL at 17:59

## 2019-12-17 RX ADMIN — SODIUM CHLORIDE 40 MG: 9 INJECTION INTRAMUSCULAR; INTRAVENOUS; SUBCUTANEOUS at 10:19

## 2019-12-17 RX ADMIN — TAMSULOSIN HYDROCHLORIDE 0.4 MG: 0.4 CAPSULE ORAL at 10:20

## 2019-12-17 RX ADMIN — INSULIN LISPRO 2 UNITS: 100 INJECTION, SOLUTION INTRAVENOUS; SUBCUTANEOUS at 12:56

## 2019-12-17 RX ADMIN — Medication 10 ML: at 15:29

## 2019-12-17 RX ADMIN — Medication 10 ML: at 06:00

## 2019-12-17 RX ADMIN — CARVEDILOL 12.5 MG: 12.5 TABLET, FILM COATED ORAL at 06:55

## 2019-12-17 RX ADMIN — CLONIDINE HYDROCHLORIDE 0.1 MG: 0.1 TABLET ORAL at 10:20

## 2019-12-17 RX ADMIN — INSULIN GLARGINE 7 UNITS: 100 INJECTION, SOLUTION SUBCUTANEOUS at 10:17

## 2019-12-17 RX ADMIN — Medication 10 ML: at 10:21

## 2019-12-17 RX ADMIN — CLONIDINE HYDROCHLORIDE 0.1 MG: 0.1 TABLET ORAL at 17:59

## 2019-12-17 NOTE — PROGRESS NOTES
Primary Nurse Alee Hills RN and Haim Donis RN performed a dual skin assessment on this patient Impairment noted- see wound doc flow sheet Fabio score is 18 Patient has a reddened nonblanchable area with some skin peeling on his sacrum. Also has a scab on his lower back and a scab on his right knee. He is also has multiple small scabs on his bilateral shins.

## 2019-12-17 NOTE — PROGRESS NOTES
CM spoke with Dr. Juana Montano re patient d/c today and need or  health orders for PT and skilled nursing for wound care. The patient is known to [facility]. Patient had a recent hospital stay 12/2-12/10 and was d/c'd home with home health. CM called [facility] and left message with intake to anticipate orders. Referral sent vai [employer]. [facility] confirms 7pm transport. CM spoke with [facility] and received response from [employer] and patient is accepted. CM communicated updates to RN coordinator and staff nurse.  
Marissa Terrazas, DOLORESW, CRM

## 2019-12-17 NOTE — PROGRESS NOTES
Problem: Diabetes Self-Management Goal: *Disease process and treatment process Description Define diabetes and identify own type of diabetes; list 3 options for treating diabetes. Outcome: Progressing Towards Goal 
Goal: *Incorporating nutritional management into lifestyle Description Describe effect of type, amount and timing of food on blood glucose; list 3 methods for planning meals. Outcome: Progressing Towards Goal 
Goal: *Incorporating physical activity into lifestyle Description State effect of exercise on blood glucose levels. Outcome: Progressing Towards Goal 
Goal: *Developing strategies to promote health/change behavior Description Define the ABC's of diabetes; identify appropriate screenings, schedule and personal plan for screenings. Outcome: Progressing Towards Goal 
Goal: *Using medications safely Description State effect of diabetes medications on diabetes; name diabetes medication taking, action and side effects. Outcome: Progressing Towards Goal 
Goal: *Monitoring blood glucose, interpreting and using results Description Identify recommended blood glucose targets  and personal targets. Outcome: Progressing Towards Goal 
Goal: *Prevention, detection, treatment of acute complications Description List symptoms of hyper- and hypoglycemia; describe how to treat low blood sugar and actions for lowering  high blood glucose level. Outcome: Progressing Towards Goal 
Goal: *Prevention, detection and treatment of chronic complications Description Define the natural course of diabetes and describe the relationship of blood glucose levels to long term complications of diabetes. Outcome: Progressing Towards Goal 
Goal: *Developing strategies to address psychosocial issues Description Describe feelings about living with diabetes; identify support needed and support network Outcome: Progressing Towards Goal 
Goal: *Insulin pump training Outcome: Progressing Towards Goal 
 Goal: *Sick day guidelines Outcome: Progressing Towards Goal 
Goal: *Patient Specific Goal (EDIT GOAL, INSERT TEXT) Outcome: Progressing Towards Goal 
  
Problem: Patient Education: Go to Patient Education Activity Goal: Patient/Family Education Outcome: Progressing Towards Goal 
  
Problem: Discharge Planning Goal: *Discharge to safe environment Description See CM Note Outcome: Progressing Towards Goal

## 2019-12-17 NOTE — PROGRESS NOTES
Problem: Mobility Impaired (Adult and Pediatric) Goal: *Acute Goals and Plan of Care (Insert Text) Description FUNCTIONAL STATUS PRIOR TO ADMISSION: Patient was modified independent using a rolling walker for functional mobility. Pt on 2 L O2 at baseline, increases to 3 L for activity. HOME SUPPORT PRIOR TO ADMISSION: The patient lived with wife, both performed IADLs. Pt did not require physical assistance. Pt has 2 sons in area 5-15 minutes away. Physical Therapy Goals Initiated 12/17/2019 1. Patient will move from supine to sit and sit to supine , scoot up and down and roll side to side in bed with modified independence within 7 day(s). 2.  Patient will transfer from bed to chair and chair to bed with modified independence using the least restrictive device within 7 day(s). 3.  Patient will perform sit to stand with modified independence within 7 day(s). 4.  Patient will ambulate with modified independence for 300 feet with the least restrictive device within 7 day(s). Outcome: Progressing Towards Goal 
 PHYSICAL THERAPY EVALUATION Patient: Galen Fields (81 y.o. male) Date: 12/17/2019 Primary Diagnosis: GI bleed [K92.2] Precautions:     
 
 
ASSESSMENT Based on the objective data described below, the patient presents with decreased activity tolerance and gait deviations. Pt states he walks everyday, but does not have to walk far within the house. Pt demonstrated good transfer techniques and hand placement. Pt ambulated with fair jahaira, slight lateral trunk sway, and no significant LOB or buckling of knees noted. Anticipate pt to progress with continued mobility to improve activity tolerance. Current Level of Function Impacting Discharge (mobility/balance): supervision for transfers and ambulation Functional Outcome Measure: The patient scored Total Score: 20/28 on the Tinetti outcome measure which is indicative of moderate fall risk. Other factors to consider for discharge: no stairs at home, lives with wife who can assist as needed, sons live within 5-15 minutes Patient will benefit from skilled therapy intervention to address the above noted impairments. PLAN : 
Recommendations and Planned Interventions: bed mobility training, transfer training, gait training, therapeutic exercises, neuromuscular re-education, patient and family training/education, and therapeutic activities Frequency/Duration: Patient will be followed by physical therapy:  3 times a week to address goals. Recommendation for discharge: (in order for the patient to meet his/her long term goals) Physical therapy at least 2 days/week in the home This discharge recommendation: 
Has not yet been discussed the attending provider and/or case management IF patient discharges home will need the following DME: patient owns DME required for discharge (pt states he has home O2) SUBJECTIVE:  
Patient stated I was told I can go home today.  OBJECTIVE DATA SUMMARY:  
HISTORY:   
Past Medical History:  
Diagnosis Date CAD (coronary artery disease) s/p CABG 2008 Carotid arterial disease ([facility] [address] CKD (chronic kidney disease) stage 3, GFR 30-59 ml/min ([facility]) 10/21/2017  
 hypertension and DM nephrosclerosis Diabetes mellitus, type 2 ([facility] [address] [facility] Hypercholesteremia Hypertension Paroxysmal atrial fibrillation ([facility] [address] [facility] 10/21/2017  
 new onset afib with BRPR 10-19-17 admit PVC's (premature ventricular contractions) 5/26/2014 PVD (peripheral vascular disease) ([facility] [address] Past Surgical History:  
Procedure Laterality Date HX CORONARY ARTERY BYPASS GRAFT    
 HX HEART CATHETERIZATION    
 NV TCAT INSJ/RPL PERM LEADLESS PACEMAKER RV W/IMG N/A 5/9/2019 INSERT OR REPLACE TRANSCATH PPM LEADLESS performed by Crow Pickard MD at [place] [address], [facility]  CATH LAB Personal factors and/or comorbidities impacting plan of care: PMH Home Situation Home Environment: Private residence # Steps to Enter: 0 One/Two Story Residence: One story Living Alone: No 
Support Systems: Spouse/Significant Other/Partner Patient Expects to be Discharged to[de-identified] Private residence Current DME Used/Available at Home: Love Pouch, straight, Walker, rolling, Shower chair Tub or Shower Type: Shower EXAMINATION/PRESENTATION/DECISION MAKING:  
Critical Behavior: 
  
  
  
  
Hearing: Auditory Auditory Impairment: None Skin:  intact Edema: none noted Range Of Motion: 
AROM: Within functional limits PROM: Within functional limits Strength:   
Strength: Within functional limits Tone & Sensation:  
Tone: Normal 
 
  
Sensation: Intact Coordination: 
Coordination: Within functional limits Vision:  
  
Functional Mobility: 
Bed Mobility: 
  
Supine to Sit: Modified independent Sit to Supine: Modified independent Transfers: 
Sit to Stand: Supervision Stand to Sit: Modified independent Balance:  
Sitting: Intact Standing: Intact; With support Ambulation/Gait Training: 
Distance (ft): 100 Feet (ft)(+50) Assistive Device: Gait belt;Walker, rolling Ambulation - Level of Assistance: Supervision Gait Abnormalities: Trunk sway increased;Decreased step clearance Base of Support: Widened Stance: Weight shift Speed/Karla: Fluctuations Functional Measure: 
Tinetti test: 
 
Sitting Balance: 1 Arises: 1 Attempts to Rise: 2 Immediate Standing Balance: 1 Standing Balance: 1 Nudged: 2 Eyes Closed: 1 Turn 360 Degrees - Continuous/Discontinuous: 1 Turn 360 Degrees - Steady/Unsteady: 1 Sitting Down: 1 Balance Score: 12 Balance total score Indication of Gait: 1 
R Step Length/Height: 1 L Step Length/Height: 1 
R Foot Clearance: 1 L Foot Clearance: 1 Step Symmetry: 0 Step Continuity: 1 Path: 1 Trunk: 1 Walking Time: 0 Gait Score: 8 Gait total score Total Score: 20/28 Overall total score Tinetti Tool Score Risk of Falls 
<19 = High Fall Risk 19-24 = Moderate Fall Risk 25-28 = Low Fall Risk Tinetti ME. Performance-Oriented Assessment of Mobility Problems in Elderly Patients. Hussein 66; [id number]. (Scoring Description: PT Bulletin Feb. 10, 1993) Older adults: Sofie Funes et al, 2009; n = [address] elderly evaluated with ABC, DEBRA, ADL, and IADL) · Mean DEBRA score for males aged 69-68 years = 26.21(3.40) · Mean DEBRA score for females age 69-68 years = 25.16(4.30) · Mean DEBRA score for males over 80 years = 23.29(6.02) · Mean DEBRA score for females over 80 years = 17.20(8.32) Physical Therapy Evaluation Charge Determination History Examination Presentation Decision-Making MEDIUM  Complexity : 1-2 comorbidities / personal factors will impact the outcome/ POC  LOW Complexity : 1-2 Standardized tests and measures addressing body structure, function, activity limitation and / or participation in recreation  LOW Complexity : Stable, uncomplicated  LOW Complexity : FOTO score of  Based on the above components, the patient evaluation is determined to be of the following complexity level: LOW Activity Tolerance:  
Good and SpO2 93% on 3 L O2 (pt states he increases O2 from 2 to 3 L with ambulation) Please refer to the flowsheet for vital signs taken during this treatment. After treatment patient left in no apparent distress:  
Supine in bed, Call bell within reach, and Side rails x 3 
 
COMMUNICATION/EDUCATION:  
The patients plan of care was discussed with: Registered Nurse. Fall prevention education was provided and the patient/caregiver indicated understanding., Patient/family have participated as able in goal setting and plan of care. , and Patient/family agree to work toward stated goals and plan of care. Thank you for this referral. 
Andrey Stock, PT, DPT Time Calculation: 24 mins

## 2019-12-17 NOTE — DISCHARGE INSTRUCTIONS
Discharge Instructions       PATIENT ID: Catrina Meade. MRN: [id number]   YOB: 1935    DATE OF ADMISSION: 12/16/2019 12:31 AM    DATE OF DISCHARGE: 12/17/2019    PRIMARY CARE PROVIDER: Robert De León MD     ATTENDING PHYSICIAN: Davy Gray MD  DISCHARGING PROVIDER: Aby Ochoa MD    To contact this individual call [phone] and ask the  to page. If unavailable ask to be transferred the Adult Hospitalist Department. DISCHARGE DIAGNOSES and CARE RECOMMENDATIONS:   Rectal bleeding. the bleeding did not recur since admission. Your hemoglobin(red cell count) remained stable. You were seen by gastroenterologist who recommended colonoscopy. You preferred to have it done as out patient. I have included the contact for the gastroenterologist below,call and schedule appointment. do not take the aspirin for at least 2 weeks,we may resume it after 2 weeks if you still don't have bleeding. If you develop rectal bleeding again, go to the ER or call 911. DIET: Regular Diet and Cardiac Diet      ACTIVITY: Activity as tolerated, PT/OT Eval and Treat and PT/OT per Home Health    WOUND CARE:     1. POA Sacrum, Pressure Injury Stage 2 : 0.5 x 0.5 x 0.1 cm  100% pink; no exudate. Periwound with blanching pink erythema. Recommendations:    Sacrum:  Weekly and as needed cleanse and apply Duoderm.     SERVICES and EQUIPMENT needed: ***    PENDING TEST RESULTS:   At the time of discharge the following test results are still pending: ***    FOLLOW UP APPOINTMENTS:   Follow-up Information     Follow up With Specialties Details Why Contact Info    Robert De León, [address]      Masha Pleitez MD Gastroenterology   [address] [id number] [facility] BY THE FOLLOWING CONSULTATIONS:   IP CONSULT TO GASTROENTEROLOGY    YOU HAD THE FOLLOWING PROCEDURES/SURGERIES :   * No surgery found *              DISCHARGE MEDICATIONS:own   See Medication Reconciliation Form    · It is important that you take the medication exactly as they are prescribed. · Keep your medication in the bottles provided by the pharmacist and keep a list of the medication names, dosages, and times to be taken in your wallet. · Do not take other medications without consulting your doctor. NOTIFY YOUR PHYSICIAN FOR ANY OF THE FOLLOWING:   Fever over 101 degrees for 24 hours. Chest pain, shortness of breath, fever, chills, nausea, vomiting, diarrhea, change in mentation, falling, weakness, bleeding. Severe pain or pain not relieved by medications. Or, any other signs or symptoms that you may have questions about. Services you need on discharge     [address] [place] [address]... PHYSICAL THERAPY x   OCCUPATIONAL THERAPY x   HOSPITAL TO HOME VISIT     DISPATCH HEALTH          Signed:    Verna Cho MD  12/17/2019  3:20 PM

## 2019-12-17 NOTE — PROGRESS NOTES
[address] 
[address] [facility] [address] [place], [address] 
[phone] GI PROGRESS NOTE Oliva Palacios, AGACN-BC Work Cell: [phone] NAME:   Carly Marlow Sr.  
:    1935 MRN:    [id number] Assessment/Plan 1. GI bleed - presumed recurrent diverticular bleed; other considerations include hemorrhoids, cannot completely r/o polyps, neoplasia, etc. Bleeding resolved. - Advance diet as tolerated - Supportive management per primary team 
- Consider colonoscopy as outpatient 2. Acute blood loss anemia - hgb stable - Monitor H&H, transfuse as needed 3. COPD 4. CHF 5. CKD 6. Chronic afib - not on anticoagulation Okay to discharge from GI standpoint to follow-up outpatient in 2-4 weeks Patient Active Problem List  
Diagnosis Code  Bradycardia R00.1  CAD (coronary artery disease) I25.10  Hypertension, essential, benign I10  
 Carotid arterial disease ([facility]) I73.9  Hypercholesteremia E78.00  
 PVD (peripheral vascular disease) ([facility]) I73.9  PVC's (premature ventricular contractions) I49.3  Dyspnea R06.00  Type 2 diabetes mellitus with diabetic peripheral angiopathy without gangrene ([facility]) E11.51  
 CKD (chronic kidney disease) N18.9  Acute renal failure (ARF) ([facility]) N17.9  Hypokalemia E87.6  Generalized weakness R53.1  Elevated troponin R79.89  
 KATALINA (acute kidney injury) ([facility] [address] N17.9  GI bleed K92.2  Hyperglycemia due to type 2 diabetes mellitus ([facility] [address] E11.65  Atrial fibrillation, chronic I48.20  CKD (chronic kidney disease) stage 3, GFR 30-59 ml/min ([facility]) N18.3  Type 2 diabetes with nephropathy ([facility]) E11.21  
 CHF (congestive heart failure) ([facility]) I50.9  Atrial fibrillation with slow ventricular response ([facility]) I48.91  
 Pacemaker Z95.0  Hypoglycemia E16.2  CHF exacerbation ([facility]) I50.9 Subjective:  
 
Denies any complaints. Hgb stable. No further bleeding. Objective: VITALS:  
Last 24hrs VS reviewed since prior hospitalist progress note. Most recent are: 
Visit Vitals /63 Pulse 76 Temp 98.3 °F (36.8 °C) Resp 18 Wt 62.3 kg (137 lb 5.6 oz) SpO2 96% BMI 20.28 kg/m² No intake or output data in the 24 hours ending 12/17/19 0956 PHYSICAL EXAM: 
General:          WD, Elderly. Alert, cooperative, no acute distress.   
HEENT:           NC, Atraumatic. PERRLA, EOMI. Anicteric sclerae. Lungs:             CTA Bilaterally. No Wheezing/Rhonchi/Rales. Heart:              Regular rate and rhythm, No murmur, No Rubs, No Gallops Abdomen:        Soft, non-distended, non-tender.  +Bowel sounds, no HSM Extremities:     No c/c/e Neurologic:      Alert and oriented X 3. No acute neurological distress. Psych:             Good insight. Not anxious nor agitated. Lab Data Recent Results (from the past 12 hour(s)) GLUCOSE, POC Collection Time: 12/16/19 10:32 PM  
Result Value Ref Range Glucose (POC) 115 (H) 65 - 100 mg/dL Performed by Technical Machine (Agency) METABOLIC PANEL, BASIC Collection Time: 12/17/19  3:52 AM  
Result Value Ref Range Sodium 135 (L) 136 - 145 mmol/L Potassium 3.5 3.5 - 5.1 mmol/L Chloride 100 97 - 108 mmol/L  
 CO2 29 21 - 32 mmol/L Anion gap 6 5 - 15 mmol/L Glucose 122 (H) 65 - 100 mg/dL BUN 51 (H) 6 - 20 MG/DL Creatinine 1.97 (H) 0.70 - 1.30 MG/DL  
 BUN/Creatinine ratio 26 (H) 12 - 20 GFR est AA 40 (L) >60 ml/min/1.73m2 GFR est non-AA 33 (L) >60 ml/min/1.73m2 Calcium 7.4 (L) 8.5 - 10.1 MG/DL  
GLUCOSE, POC Collection Time: 12/17/19  5:59 AM  
Result Value Ref Range Glucose (POC) 120 (H) 65 - 100 mg/dL Performed by Technical Machine (Agency) GLUCOSE, POC Collection Time: 12/17/19  6:34 AM  
Result Value Ref Range Glucose (POC) 138 (H) 65 - 100 mg/dL Performed by Lauro Ballard Medications: Reviewed PMH/SH reviewed - no change compared to H&P 
 Mid-Level Provider: Jannette Baumann NP Date/Time:  12/17/2019

## 2019-12-17 NOTE — WOUND CARE
WOCN Note:  
 
New consult placed for sacrum. Chart reviewed. Admitted DX:  GI bleed Assessment:  
Patient is A&O x 3, communicative and requires assist with repositioning. Patient wearing briefs for incontinence. Patient reports no pain. Heels intact with pink blanching erythema and offloaded on pillows. 1. POA Sacrum, Pressure Injury Stage 2 : 0.5 x 0.5 x 0.1 cm  100% pink; no exudate. Periwound with blanching pink erythema. Recommendations:   
Sacrum:  Weekly and as needed cleanse and apply Duoderm. Skin Care & Pressure Prevention: 
Minimize layers of linen/pads under patient to optimize support surface. Turn/reposition approximately every 2 hours and offload heels. Manage incontinence / promote continence Discussed above plan with patient and Rissa JANE. Transition of Care: Plan to follow as needed while admitted to hospital. 
 
Corinne Girt, BSN RN [facility] Wound Care Office [phone] Pager [phone]

## 2019-12-17 NOTE — DISCHARGE SUMMARY
Discharge Summary PATIENT ID: Nila Issa Sr. MRN: [id number] YOB: 1935 DATE OF ADMISSION: 12/16/2019 12:31 AM   
DATE OF DISCHARGE: 12/17/2019 PRIMARY CARE PROVIDER: Jackie Barnes MD  
 
ATTENDING PHYSICIAN: Alfredito Manzo MD 
DISCHARGING PROVIDER: Alfredito Manzo MD   
To contact this individual call [phone] and ask the  to page. If unavailable ask to be transferred the Adult Hospitalist Department. CONSULTATIONS: IP CONSULT TO GASTROENTEROLOGY PROCEDURES/SURGERIES: * No surgery found * [id number] [facility] COURSE:  
 
Admission Summary:  
Antonio Navarro is a 81 y.o. male CAD s/p CABG, A. fib not on anticoagulation due to previous GI bleed, diabetes, hypertension, CKD 4 among others listed below came to the ED after he had an episode of rectal bleeding. Patient reported that he had painless large bloody bowel movement around 8 PM last night. He denied any dizziness lightheadedness chest pain or shortness of breath. He had a similar episode of GI bleed back in 2017 and a bleeding scan showed an active diverticular bleed but failed interventional embolization. Patient takes 1 tablet of baby aspirin a day. At the time of my evaluation patient had another painless large bloody bowel movement. He denied abdominal pain. He was discharged 2 days ago after he was admitted for decompensated diastolic CHF and KATALINA on CKD. and transfused PRBC for low H&H # GI bleed-likely diverticular most likely secondary to diverticular bleed. No recurrence of bleeding since admission. Hemoglobin remained stable. GI were consulted in the insert and colonoscopy but patient declined, wanting to go home and have it done as outpatient and GI was in agreement. Patient was discharged home in stable condition.   I have I discussed with GI with her to hold or continue aspirin, GI recommended holding aspirin for at least 2 weeks. Patient instructed to go to the ER or call 911 if he develops recurrence of bleeding #Mild hyponatremia: Improved, 135 on the day of discharge # Chronic diastolic CHF 
- Continue home medications - No evidence of decompensation # Diabetes type 2 with hyperglycemia: A1c: 7.7 
- SSI, accucheck. Lantus # Chronic respiratory failure on home oxygen - Secondary to advanced COPD 
- Continue supplemental O2 and monitor O2 sat 
- Continue home inhalers # CKD 4 
- Cr at baseline - Avoid nephrotoxic medications and renally dose others # A. fib not on anticoagulation 
- rate controlled  
-GI recommended to continue to hold aspirin for at least 2 weeks # Hypertension, stable # CAD s/p CABG 
- Hold ASA - Resume the rest of her medications when appropriate # Advanced COPD: stable- PRN nebs # PVD 1. POA Sacrum, Pressure Injury Stage 2 : 0.5 x 0.5 x 0.1 cm  100% pink; no exudate. Periwound with blanching pink erythema. Recommendations:   
Sacrum:  Weekly and as needed cleanse and apply Duoderm. DISCHARGE MEDICATIONS: 
Current Discharge Medication List  
  
START taking these medications Details  
pantoprazole (PROTONIX) 40 mg tablet Take 1 Tab by mouth daily for 30 days. Qty: 30 Tab, Refills: 0 CONTINUE these medications which have NOT CHANGED Details  
insulin glargine (LANTUS U-100 INSULIN) 100 unit/mL injection 3-7 Units by SubCUTAneous route nightly. Pt reports he uses 3-7 units depending on BG  
  
cloNIDine HCl (CATAPRES) 0.1 mg tablet Take 0.1 mg by mouth two (2) times a day. bumetanide (BUMEX) 1 mg tablet Take 3 Tabs by mouth two (2) times a day. Qty: 120 Tab, Refills: 0  
  
hydrALAZINE (APRESOLINE) 50 mg tablet Take 1 Tab by mouth three (3) times daily. Qty: 60 Tab, Refills: 0  
  
spironolactone (ALDACTONE) 25 mg tablet Take 1 Tab by mouth daily. Qty: 20 Tab, Refills: 0 magnesium oxide (MAG-OX) 400 mg tablet Take 1 Tab by mouth daily. Qty: 15 Tab, Refills: 0  
  
isosorbide dinitrate (ISORDIL) 20 mg tablet Take 1 Tab by mouth three (3) times daily. Qty: 60 Tab, Refills: 0  
  
ferrous sulfate (IRON) 325 mg (65 mg iron) tablet Take 325 mg by mouth Daily (before breakfast). carvedilol (COREG) 12.5 mg tablet Take 12.5 mg by mouth two (2) times daily (with meals). simvastatin (ZOCOR) 20 mg tablet Take 1 Tab by mouth nightly. Qty: 90 Tab, Refills: 3 Associated Diagnoses: Coronary artery disease involving native coronary artery of native heart without angina pectoris; Hypercholesteremia  
  
albuterol (PROVENTIL VENTOLIN) 2.5 mg /3 mL (0.083 %) nebulizer solution 3 mL by Nebulization route every four (4) hours as needed for Wheezing. Qty: 1 Package, Refills: 0  
  
tamsulosin (FLOMAX) 0.4 mg capsule Take 1 Cap by mouth daily. Qty: 30 Cap, Refills: 0  
  
aspirin 81 mg chewable tablet Take 1 Tab by mouth daily. Qty: 33 Tab, Refills: 11  
  
omega 3-dha-epa-fish oil (FISH OIL) 100-160-1,000 mg cap Take 1 Cap by mouth two (2) times a day. cinnamon bark (CINNAMON) 500 mg cap Take 1,000 mg by mouth two (2) times a day. PENDING TEST RESULTS:  
At the time of discharge the following test results are still pending: None FOLLOW UP APPOINTMENTS:   
Follow-up Information Follow up With Specialties Details Why Contact Info Anthony Davila MD [address] [place] [address] 
[phone] Awais Aragon MD Gastroenterology   [address] [facility] [address] 
[phone] ADDITIONAL CARE RECOMMENDATIONS:  
 
DIET: Regular Diet and Cardiac Diet ACTIVITY: Activity as tolerated and PT/OT Eval and Treat WOUND CARE: Sacrum:  Weekly and as needed cleanse and apply Duoderm.  
 
EQUIPMENT needed: He owns DME 
 
 
 
 
NOTIFY YOUR PHYSICIAN FOR ANY OF THE FOLLOWING:  
 Fever over 101 degrees for 24 hours. Chest pain, shortness of breath, fever, chills, nausea, vomiting, diarrhea, change in mentation, falling, weakness, bleeding. Severe pain or pain not relieved by medications. Or, any other signs or symptoms that you may have questions about. DISPOSITION: Home health services received Home With: 
 OT  PT  [facility]  RN  
  
 SNF(Name) Inpatient Rehab(name) Independent/assisted living(name) Hospice Other:  
 
 
PATIENT CONDITION AT DISCHARGE:  
 
Functional status Poor   
x Deconditioned Independent Cognition  
   
x Lucid Forgetful Dementia Catheter/Lines(indications) Myers PICC  
 PEG  
x None Code status    
x Full DNR PHYSICAL EXAMINATION AT DISCHARGE: 
  
Visit Vitals /74 (BP 1 Location: Right arm, BP Patient Position: At rest) Pulse 65 Temp 99 °F (37.2 °C) Resp 16 Wt 66.6 kg (146 lb 13.2 oz) SpO2 99% BMI 21.68 kg/m² O2 Flow Rate (L/min): 2 l/min O2 Device: Room air Temp (24hrs), Av.4 °F (36.9 °C), Min:98 °F (36.7 °C), Max:99 °F (37.2 °C) No intake/output data recorded. 12/15 1901 -  0700 In: -  
Out: 374 [[id number]] GENERAL:  Patient is alert oriented x3. He did not exhibit any signs or symptoms of distress HEENT:  Normocephalic, atraumatic, non icteric sclerae, mildly pallor pallor conjuctivae, EOMs intact, PERRLA. NECK: Supple, trachea midline, no adenopathy, no thyromegally or tenderness, no carotid bruit and no JVD. LUNGS:   Vesicular breath sounds bilaterally, no added sounds. HEART:   S1 and S2 well heard,RRR,  no murmur, click, rub or gallop. ABDOMEB:   Soft, non-tender. Normoactive bowel sounds. No masses,  No organomegaly. EXTREMETIES:  Atraumatic, acyanotic, no edema PULSES: 2+ and symmetric all extremities. SKIN:  No rashes or lesions NEUROLOGY: Alert and oriented to PPT, CNII-XII intact. Motor and sensory exam grossly intact.  
 
 
CHRONIC MEDICAL DIAGNOSES: 
 Problem List as of 12/17/2019 Date Reviewed: 12/9/2019 Codes Class Noted - Resolved Rectal bleeding ICD-10-CM: K62.5 ICD-9-CM: 569.3  12/17/2019 - Present CHF exacerbation (HCC) ICD-10-CM: I50.9 ICD-9-CM: 428.0  12/3/2019 - Present Hypoglycemia ICD-10-CM: E16.2 ICD-9-CM: 251.2  8/27/2019 - Present Pacemaker ICD-10-CM: Z95.0 ICD-9-CM: V45.01  5/9/2019 - Present Overview Signed 5/9/2019  6:22 PM by Marleny Antoine MD  
  5/9/2019 leadless pacer implant CHF (congestive heart failure) (HCC) ICD-10-CM: I50.9 ICD-9-CM: 428.0  5/1/2019 - Present Atrial fibrillation with slow ventricular response (HCC) ICD-10-CM: I48.91 
ICD-9-CM: 427.31  5/1/2019 - Present Overview Signed 5/8/2019 11:24 PM by Marleny Antoine MD  
  Added automatically from request for surgery [id number] Type 2 diabetes with nephropathy ([facility] ICD-10-CM: E11.21 
ICD-9-CM: 250.40, 583.81  7/26/2018 - Present Atrial fibrillation, chronic ICD-10-CM: I48.20 ICD-9-CM: 427.31  10/21/2017 - Present Overview Signed 10/21/2017  1:45 PM by Jude Perea MD  
  new onset afib with BRPR 10-19-17 admit CKD (chronic kidney disease) stage 3, GFR 30-59 ml/min ([facility]) ICD-10-CM: N18.3 ICD-9-CM: 585.3  10/21/2017 - Present Overview Signed 10/21/2017  1:47 PM by Jude Perea MD  
  hypertension and DM nephrosclerosis GI bleed ICD-10-CM: K92.2 ICD-9-CM: 578.9  10/20/2017 - Present Hyperglycemia due to type 2 diabetes mellitus ([facility] ICD-10-CM: E11.65 ICD-9-CM: 250.00  10/20/2017 - Present Hypokalemia ICD-10-CM: E87.6 ICD-9-CM: 276.8  6/25/2017 - Present Generalized weakness ICD-10-CM: R53.1 ICD-9-CM: 780.79  6/25/2017 - Present Elevated troponin ICD-10-CM: R79.89 ICD-9-CM: 790.6  6/25/2017 - Present KATALINA (acute kidney injury) ([facility] ICD-10-CM: N17.9 ICD-9-CM: 584.9  6/25/2017 - Present Acute renal failure (ARF) (HCC) ICD-10-CM: N17.9 ICD-9-CM: 584.9  3/16/2017 - Present CKD (chronic kidney disease) ICD-10-CM: N18.9 ICD-9-CM: 585.9  1/20/2017 - Present Type 2 diabetes mellitus with diabetic peripheral angiopathy without gangrene [facility]) ICD-10-CM: E11.51 
ICD-9-CM: 250.70, 443.81  9/27/2015 - Present Dyspnea ICD-10-CM: R06.00 
ICD-9-CM: 786.09  7/15/2014 - Present PVC's (premature ventricular contractions) ICD-10-CM: I49.3 ICD-9-CM: 427.69  5/26/2014 - Present Carotid arterial disease (HCC) ICD-10-CM: I73.9 ICD-9-CM: 447.9  Unknown - Present Hypercholesteremia ICD-10-CM: E78.00 ICD-9-CM: 272.0  Unknown - Present PVD (peripheral vascular disease) ([facility] ICD-10-CM: I73.9 ICD-9-CM: 443.9  Unknown - Present Bradycardia ICD-10-CM: R00.1 ICD-9-CM: 427.89  Unknown - Present CAD (coronary artery disease) ICD-10-CM: I25.10 ICD-9-CM: 414.00  Unknown - Present Hypertension, essential, benign ICD-10-CM: I10 
ICD-9-CM: 401.1  Unknown - Present RESOLVED: CHF exacerbation ([facility] ICD-10-CM: I50.9 ICD-9-CM: 428.0  8/5/2019 - 8/20/2019 RESOLVED: Acute hypoxemic respiratory failure ([facility] ICD-10-CM: J96.01 
ICD-9-CM: 518.81  8/5/2019 - 8/20/2019 RESOLVED: Acute bronchospasm ICD-10-CM: J98.01 
ICD-9-CM: 519.11  5/18/2019 - 5/19/2019 RESOLVED: CHF exacerbation ([facility] ICD-10-CM: I50.9 ICD-9-CM: 428.0  5/18/2019 - 5/19/2019 RESOLVED: New onset a-fib [facility]) ICD-10-CM: I48.91 
ICD-9-CM: 427.31  10/20/2017 - 11/30/2017 RESOLVED: Fever ICD-10-CM: R50.9 ICD-9-CM: 780.60  3/16/2017 - 3/18/2017 RESOLVED: Hyponatremia ICD-10-CM: E87.1 ICD-9-CM: 276.1  3/16/2017 - 3/18/2017 RESOLVED: Urinary retention ICD-10-CM: R33.9 ICD-9-CM: 788.20  1/19/2017 - 1/20/2017 RESOLVED: Heart palpitations ICD-10-CM: R00.2 ICD-9-CM: 785.1  7/15/2014 - 9/20/2016 RESOLVED: Atherosclerosis of native artery of extremity with intermittent claudication ([facility] ICD-10-CM: T54.172 ICD-9-CM: 440.21  2/19/2014 - 9/20/2016 RESOLVED: Other dyspnea and respiratory abnormality ICD-10-CM: R06.09, R09.89 ICD-9-CM: 786.09  Unknown - 9/20/2016 RESOLVED: Diabetes mellitus, type 2 (HCC) ICD-10-CM: E11.9 ICD-9-CM: 250.00  Unknown - 9/20/2016 Signed:   
Rafael Noe MD 
12/17/2019 
3:36 PM

## 2019-12-17 NOTE — PROGRESS NOTES
Hospitalist Progress Note Milena Hodge MD 
Answering service: [phone] OR [phone] from in house phone Date of Service:  2019 NAME:  Jackie Aceves Sr. 
:  1935 MRN:  [id number] Admission Summary:  
Willie Ayers is a 81 y.o. male CAD s/p CABG, A. fib not on anticoagulation due to previous GI bleed, diabetes, hypertension, CKD 4 among others listed below came to the ED after he had an episode of rectal bleeding. Patient reported that he had painless large bloody bowel movement around 8 PM last night. He denied any dizziness lightheadedness chest pain or shortness of breath. He had a similar episode of GI bleed back in 2017 and a bleeding scan showed an active diverticular bleed but failed interventional embolization. Patient takes 1 tablet of baby aspirin a day. At the time of my evaluation patient had another painless large bloody bowel movement. He denied abdominal pain. He was discharged 2 days ago after he was admitted for decompensated diastolic CHF and KATALINA on CKD. and transfused PRBC for low H&H Interval history / Subjective:  
  
Patient denies rectal bleeding since 8 AM yesterday. He said he had a normal bowel month last night, no blood. He wants to leave, he would not like colonoscopy done here this admission, he says he will follow-up as outpatient. Assessment & Plan: # GI bleed-likely diverticular He has hx of recurrent diverticular bleed in the past  
- hold Aspirin for now  
-Patient, no bleeding since 8 AM yesterday. Had a normal improvement last night, no bleed. No other GI symptoms. Hemoglobin stable from yesterday, no blood work this morning. Ordered H&H. 
-GI consulted recommended CTA for recurrent bleeding, if not for elective colonoscopy. Patient does not have recurrent bleeding to necessitate CTA or nuclear scan. #Mild hyponatremia: Improving. # Chronic diastolic CHF - Continue home medications - No evidence of decompensation 
  
# Diabetes type 2 with hyperglycemia: A1c: 7.7 
- SSI, accucheck. Lantus 
  
# Chronic respiratory failure on home oxygen - Secondary to advanced COPD 
- Continue supplemental O2 and monitor O2 sat 
- Continue home inhalers 
  
# CKD 4 
- Cr at baseline - Avoid nephrotoxic medications and renally dose others  
  
# A. fib not on anticoagulation 
- rate controlled  
- not on [address] due to recurrent gi bleed  
-Aspirin on hold due to GI bleed 
  
# Hypertension, stable - IV hydralazine PRN 
  
# CAD s/p CABG 
- Hold ASA - Resume the rest of her medications when appropriate - Telemetry monitoring 
  
# Advanced COPD: stable- PRN nebs # PVD  
  
DVT prop: SCDs Code: Full Dispo: He lives at home with his wife, is independent, uses rolling walker around the house. He would like to go home if possible today. Hospital Problems  Date Reviewed: 12/9/2019 Codes Class Noted POA  
 GI bleed ICD-10-CM: K92.2 ICD-9-CM: 578.9  10/20/2017 Unknown Review of Systems:  
Pertinent positive mentioned in interval history/HPI. Other systems reviewed and negative. Vital Signs:  
 Last 24hrs VS reviewed since prior progress note. Most recent are: 
Visit Vitals /63 Pulse 76 Temp 98.3 °F (36.8 °C) Resp 18 Wt 62.3 kg (137 lb 5.6 oz) SpO2 96% BMI 20.28 kg/m² No intake or output data in the 24 hours ending 12/17/19 0909 Physical Examination:  
 
 
     
Constitutional:  Alert oriented x4, not in distress ENT:  Oral mucous moist, oropharynx benign. Neck supple, Resp:  On oxygen via nasal cannula. Scattered mild expiratory wheezing otherwise CTA bilaterally. No wheezing/rhonchi/rales. No accessory muscle use CV:  Regular rhythm, normal rate, no murmurs, gallops, rubs GI:  Soft, non distended, non tender. normoactive bowel sounds, no hepatosplenomegaly Musculoskeletal:  No edema, warm, 2+ pulses throughout Neurologic:  Moves all extremities. AAOx3, CN II-XII reviewed Data Review:  
I personally reviewed labs and imaging Labs:  
 
Recent Labs 12/16/19 
1723 12/16/19 
1005  12/16/19 
0049 WBC  --  7.5  --  6.3 HGB 11.0* 11.1*   < > 9.3* HCT 34.7* 34.5*   < > 30.5* PLT  --  292  --  300  
 < > = values in this interval not displayed. Recent Labs 12/17/19 
6930 12/16/19 
6543 * 132* K 3.5 3.9  91* CO2 29 38* BUN 51* 48* CREA 1.97* 2.45* * 237* CA 7.4* 8.3* Recent Labs 12/16/19 
0049 SGOT 21 ALT 25 * TBILI 0.6 TP 6.6 ALB 2.1*  
GLOB 4.5* Recent Labs 12/16/19 0049 INR 1.2* PTP 11.9* No results for input(s): FE, TIBC, PSAT, FERR in the last 72 hours. Lab Results Component Value Date/Time Folate 11.3 12/03/2019 08:44 AM  
  
No results for input(s): PH, PCO2, PO2 in the last 72 hours. No results for input(s): CPK, CKNDX, TROIQ in the last 72 hours. No lab exists for component: CPKMB No results found for: CHOL, CHOLX, CHLST, CHOLV, HDL, HDLP, LDL, LDLC, DLDLP, TGLX, TRIGL, TRIGP, CHHD, CHHDX Lab Results Component Value Date/Time Glucose (POC) 138 (H) 12/17/2019 06:34 AM  
 Glucose (POC) 120 (H) 12/17/2019 05:59 AM  
 Glucose (POC) 115 (H) 12/16/2019 10:32 PM  
 Glucose (POC) 266 (H) 12/16/2019 02:32 PM  
 Glucose (POC) 263 (H) 12/10/2019 04:50 PM  
 
Lab Results Component Value Date/Time  Color YELLOW/STRAW 08/27/2019 12:56 PM  
 Appearance CLEAR 08/27/2019 12:56 PM  
 Specific gravity 1.023 08/27/2019 12:56 PM  
 pH (UA) 6.0 08/27/2019 12:56 PM  
 Protein >300 (A) 08/27/2019 12:56 PM  
 Glucose 500 (A) 08/27/2019 12:56 PM  
 Ketone NEGATIVE  08/27/2019 12:56 PM  
 Bilirubin NEGATIVE  08/27/2019 12:56 PM  
 Urobilinogen 0.2 08/27/2019 12:56 PM  
 Nitrites NEGATIVE  08/27/2019 12:56 PM  
 Leukocyte Esterase NEGATIVE  08/27/2019 12:56 PM  
 Epithelial cells FEW 08/27/2019 12:56 PM  
 Bacteria NEGATIVE  08/27/2019 12:56 PM  
 WBC 0-4 08/27/2019 12:56 PM  
 RBC 5-10 08/27/2019 12:56 PM  
 
 
 
Medications Reviewed:  
 
Current Facility-Administered Medications Medication Dose Route Frequency  sodium chloride (NS) flush 5-40 mL  5-40 mL IntraVENous Q8H  
 sodium chloride (NS) flush 5-40 mL  5-40 mL IntraVENous PRN  pantoprazole (PROTONIX) 40 mg in 0.9% sodium chloride 10 mL injection  40 mg IntraVENous DAILY  hydrALAZINE (APRESOLINE) 20 mg/mL injection 10 mg  10 mg IntraVENous Q6H PRN  
 0.9% sodium chloride infusion 250 mL  250 mL IntraVENous PRN  
 glucose chewable tablet 16 g  4 Tab Oral PRN  
 glucagon (GLUCAGEN) injection 1 mg  1 mg IntraMUSCular PRN  
 dextrose 10% infusion 0-250 mL  0-250 mL IntraVENous PRN  
 insulin lispro (HUMALOG) injection   SubCUTAneous AC&HS  
 albuterol (PROVENTIL VENTOLIN) nebulizer solution 2.5 mg  2.5 mg Nebulization Q4H PRN  
 carvedilol (COREG) tablet 12.5 mg  12.5 mg Oral BID WITH MEALS  cloNIDine HCl (CATAPRES) tablet 0.1 mg  0.1 mg Oral BID  insulin glargine (LANTUS) injection 7 Units  7 Units SubCUTAneous 7am  
 simvastatin (ZOCOR) tablet 20 mg  20 mg Oral QHS  tamsulosin (FLOMAX) capsule 0.4 mg  0.4 mg Oral DAILY  
 
______________________________________________________________________ EXPECTED LENGTH OF STAY: - - - 
ACTUAL LENGTH OF STAY:          1 Venus Merchant MD  
Patient has given Verbal permission to discuss medical care with  
persons present in the room and and also with contact as listed on face sheet.

## 2019-12-17 NOTE — PROGRESS NOTES
Hospital follow-up PCP transitional care appointment has been scheduled with Dr. Maico Edwards for Monday, 12/23/19 at 2:00 p.m. Pending patient discharge.   Emma Diamond, Care Management Specialist.

## 2019-12-17 NOTE — PROGRESS NOTES
Admission Medication Reconciliation: 
 
Information obtained from:  PatientRosanne Comments/Recommendations: Reviewed PTA medications and patient's allergies. Changed Lantus 7 units to 3-7 units per patient. Reports dose depends on glucose level. ¹[employer] pharmacy benefit data reflects medications filled and processed through the patient's insurance, however  
this data does NOT capture whether the medication was picked up or is currently being taken by the patient. Allergies:  Lisinopril Significant PMH/Disease States:  
Past Medical History:  
Diagnosis Date CAD (coronary artery disease) s/p CABG 2008 Carotid arterial disease ([facility] CKD (chronic kidney disease) stage 3, GFR 30-59 ml/min ([facility]) 10/21/2017  
 hypertension and DM nephrosclerosis Diabetes mellitus, type 2 ([facility] Hypercholesteremia Hypertension Paroxysmal atrial fibrillation ([facility] 10/21/2017  
 new onset afib with BRPR 10-19-17 admit PVC's (premature ventricular contractions) 5/26/2014 PVD (peripheral vascular disease) ([facility] Chief Complaint for this Admission: Chief Complaint Patient presents with Rectal Bleeding Prior to Admission Medications:  
Prior to Admission Medications Prescriptions Last Dose Informant Patient Reported? Taking? albuterol (PROVENTIL VENTOLIN) 2.5 mg /3 mL (0.083 %) nebulizer solution 12/16/2019 Self No Yes Sig: 3 mL by Nebulization route every four (4) hours as needed for Wheezing. aspirin 81 mg chewable tablet 12/16/2019 Self No Yes Sig: Take 1 Tab by mouth daily. bumetanide (BUMEX) 1 mg tablet 12/16/2019  No Yes Sig: Take 3 Tabs by mouth two (2) times a day. carvedilol (COREG) 12.5 mg tablet 12/16/2019  Yes Yes Sig: Take 12.5 mg by mouth two (2) times daily (with meals). cinnamon bark (CINNAMON) 500 mg cap 12/16/2019 Self Yes Yes Sig: Take 1,000 mg by mouth two (2) times a day. cloNIDine HCl (CATAPRES) 0.1 mg tablet 12/16/2019  Yes Yes Sig: Take 0.1 mg by mouth two (2) times a day. ferrous sulfate (IRON) 325 mg (65 mg iron) tablet 12/16/2019  Yes Yes Sig: Take 325 mg by mouth Daily (before breakfast). hydrALAZINE (APRESOLINE) 50 mg tablet 12/16/2019  No Yes Sig: Take 1 Tab by mouth three (3) times daily. insulin glargine (LANTUS U-100 INSULIN) 100 unit/mL injection   Yes Yes Sig: 3-7 Units by SubCUTAneous route nightly. Pt reports he uses 3-7 units depending on BG  
isosorbide dinitrate (ISORDIL) 20 mg tablet 12/16/2019  No Yes Sig: Take 1 Tab by mouth three (3) times daily. magnesium oxide (MAG-OX) 400 mg tablet 12/16/2019  No Yes Sig: Take 1 Tab by mouth daily. omega 3-dha-epa-fish oil (FISH OIL) 100-160-1,000 mg cap 12/16/2019 Self Yes Yes Sig: Take 1 Cap by mouth two (2) times a day. simvastatin (ZOCOR) 20 mg tablet 12/16/2019  No Yes Sig: Take 1 Tab by mouth nightly. spironolactone (ALDACTONE) 25 mg tablet 12/16/2019  No Yes Sig: Take 1 Tab by mouth daily. tamsulosin (FLOMAX) 0.4 mg capsule 12/16/2019 Self No Yes Sig: Take 1 Cap by mouth daily. Facility-Administered Medications: None

## 2019-12-17 NOTE — PROGRESS NOTES
CM made aware patient was changed to observation. Per UR note: 
am waiting on Dr. Chidi Ruiz recommendation. If he recommends Observation the code 40 will be placed. He has not come back with a determination yet. Magdi Zaldivar 4:01 PM

## 2019-12-18 ENCOUNTER — PATIENT OUTREACH (OUTPATIENT)
Dept: CARDIOLOGY CLINIC | Age: 84
End: 2019-12-18

## 2019-12-18 NOTE — PROGRESS NOTES
Goals Addressed This Visit's Progress  Attends follow-up appointments as directed. 12/11/19 CTN unable to reach patient on phone, LM on  requesting return call Patient has following appts: PCP Dr Jarad Marroquin   12/18/19 at 11:00 this was changed by wife due to conflicting appts. Cards Dr Anthony Antonio  12/20/19 at 2:00 Nephrology Dr Cristin Llamas   Pending---recommended 1 week follow up. Per spouse, she has contacted MD office and is waiting for Dr Ruthie Holloway nurse to call her back. [facility]---no appt in [facility] yet. CTN notes that both cards and nephrology IP notes state patient is to have OP IV diruetic therapy. CTN sent SM to Tadeo Myers, Dr Gilmer Graf nurse to inquire about orders for this. ---[facility] 12/13/19 Per Mrs Ramona Mccallum, no one has called her back yet about a follow up appt with Dr Rogerio Paz. CTN called MD office, spoke to [place]. She states that they are waiting for MD to review records and will call Mrs Ramona Mccallum back no later than Monday 12/16 with follow up appt. CTN notified Mrs Ramona Mccallum of this. CTN will follow up. CTN had not received response yet regarding [facility] IV diruetics yet---resent SM to Dr Anthony Antonio and nurse [place]. Will follow up on Monday if no response received---rw 12/18/19 CTN received a SM from both MD and [place] on Monday to state that order was going to be put in to [address] [place] [address] for Bumex. Patient however admitted back into hospital for GI Bleed. Patient discharged yesterday back to home. CTN sent SM to [place] to let her know patient is back home. CTN spoke to Mrs Ramona Mccallum to check on patient and review discharge instructions. She reports she picked up medication today and is stopping ASA. [facility] nurse has already been to pt's home today to resume services. Wife reports patient will be getting a [facility] aide to assist pt with bathing. Per wife, Patient will see Dr Cristin Llamas tomorrow. Patient has follow up with PCP Dr Jarad Marroquin 12/23/19. CTN made follow up with Dr Joanna Cedillo for 1/20/20 at 1:15pm. Wife informed. CTN will follow up next week for updates---mkrw  Maintains daily weight. 12/11/19 CTN notes patient weight on 12/3/19 (admit date) 181 lbs. Weight at discharge 164 lbs. CTN will need to get home scale weight when able to reach patient and review daily weight parameters with patient/spouse. ---mkrw UPDATE: Per spouse, patient weight today on home scale 158 lbs. CTN reviewed when to call MD for weights greater than 2-3 lbs overnight or 5 lbs in a week. Per wife, she reports that [facility] nurse has been contacting MD's (both cards and nephrology) for weight increases, would receive instructions but patient still gained weight. Recommended that patient monitor sodium intake more closely to kep to 1500--2000 mg. Wife verbalizes understanding ---mkrw 12/13/19' Per Mrs Christine Fermin, patient's weight yesterday and today was 153 lbs, decrease of 5 lbs from Wednesday. ---mkrw 12/18/19 CTN notes last hospital weight 12/17/19 146lbs. ---mkrw

## 2019-12-18 NOTE — PHYSICIAN ADVISORY
Letter of Status Determination:  
Recommend hospitalization status upgraded from OBSERVATION  to INPATIENT  Status Pt Name:  Esa Ramirez  
MR#  
[facility] # [id number] / 
[id number] Payor: Meena Ocampo / Plan: [address] / Product Type: Medicare /   
YANG#  [id number] Room [facility] Hospital  610/01  @ [address] hospital  
Hospitalization date  12/16/2019 12:31 AM  
Current Attending Physician  Rani Trimble MD  
Principal diagnosis  GI bleed [K92.2] Rectal bleeding [K62.5] Clinicals  81 y.o. y.o  male hospitalized with above diagnosis This pt remains in acute care setting receiving close monitored care. It is noted that his GI bleed has stopped and hgb stabilized. Na+ improving. His care is complex On the basis of above clinical data, this patient's care in acute hospital care setting is expected to exceed two midnights. [place] (MCG) criteria Does  NOT apply STATUS DETERMINATION  This patient is at high risk of adverse events and deterioration based on documented clinical data, comorbid conditions and current acute care course. Mr. Esa Ramirez. is expected to meet Inpatient Admission status criteria in accordance with CMS regulation Section 43 .3. Specifically, due to medical necessity the patient's stay is expected to exceed Two Midnights. It is our recommendation that this patient's hospitalization status should be upgraded from  OBSERVATION to INPATIENT status. The final decision of the patient's hospitalization status depends on the attending physician's judgment. Additional comments Payor: [employer] MEDICARE / Plan: [address] / Product Type: Medicare / The information in this document is a recommendation to be used for utilization review and utilization management purposes only. This recommendation is not an order.   
The recommendation is made based on the information reviewed at the time of the referral, is pursuant to the [facility] Conditions of Participation (42 CFR Part 482), and is neither a judgment nor an assessment with regard to the appropriateness or quality of clinical care. For all Managed Care patients: The Criteria are intended solely for use as screening guidelines with respect to the medical appropriateness of healthcare services and not for final clinical or payment determinations concerning the type or level of medical care provided, or proposed to be provided, to a patient. They help the reviewers determine whether a patient is appropriate for observation or inpatient admission at the time a decision to admit the patient is being made. All efforts are made to apply the pertinent payor criteria (MCG or [employer]) as well as the clinical judgements based on the information reviewed at the time of the referral.\" Nothing in this document may be used to limit, alter, or affect clinical services provided to the patient named below. Norma Newell MD MPH FACP Cell: [phone] Physician Advisor [address]  
   
Cell  [phone]   
 
 
[id number] Russ Zamora

## 2019-12-18 NOTE — PROGRESS NOTES
Pharmacist Discharge Medication Reconciliation Discharging Provider: Tosin Monroy Significant PMH:  
Past Medical History:  
Diagnosis Date CAD (coronary artery disease) s/p CABG 2008 Carotid arterial disease ([facility] CKD (chronic kidney disease) stage 3, GFR 30-59 ml/min ([facility]) 10/21/2017  
 hypertension and DM nephrosclerosis Diabetes mellitus, type 2 ([facility] Hypercholesteremia Hypertension Paroxysmal atrial fibrillation ([facility] 10/21/2017  
 new onset afib with BRPR 10-19-17 admit PVC's (premature ventricular contractions) 5/26/2014 PVD (peripheral vascular disease) ([facility] Chief Complaint for this Admission: Chief Complaint Patient presents with Rectal Bleeding Allergies: Lisinopril Discharge Medications:  
Discharge Medication List as of 12/17/2019  4:52 PM  
  
 
START taking these medications Details  
pantoprazole (PROTONIX) 40 mg tablet Take 1 Tab by mouth daily for 30 days. , Print, Disp-30 Tab, R-0  
  
  
 
CONTINUE these medications which have NOT CHANGED Details  
insulin glargine (LANTUS U-100 INSULIN) 100 unit/mL injection 3-7 Units by SubCUTAneous route nightly. Pt reports he uses 3-7 units depending on BG, Historical Med  
  
cloNIDine HCl (CATAPRES) 0.1 mg tablet Take 0.1 mg by mouth two (2) times a day., Historical Med  
  
bumetanide (BUMEX) 1 mg tablet Take 3 Tabs by mouth two (2) times a day., Normal, Disp-120 Tab, R-0  
  
hydrALAZINE (APRESOLINE) 50 mg tablet Take 1 Tab by mouth three (3) times daily. , Normal, Disp-60 Tab, R-0  
  
spironolactone (ALDACTONE) 25 mg tablet Take 1 Tab by mouth daily. , Normal, Disp-20 Tab, R-0  
  
magnesium oxide (MAG-OX) 400 mg tablet Take 1 Tab by mouth daily. , Normal, Disp-15 Tab, R-0  
  
isosorbide dinitrate (ISORDIL) 20 mg tablet Take 1 Tab by mouth three (3) times daily. , Normal, Disp-60 Tab, R-0  
  
ferrous sulfate (IRON) 325 mg (65 mg iron) tablet Take 325 mg by mouth Daily (before breakfast). , Historical Med  
  
carvedilol (COREG) 12.5 mg tablet Take 12.5 mg by mouth two (2) times daily (with meals). , Historical Med  
  
simvastatin (ZOCOR) 20 mg tablet Take 1 Tab by mouth nightly., Normal, Disp-90 Tab, R-3  
  
albuterol (PROVENTIL VENTOLIN) 2.5 mg /3 mL (0.083 %) nebulizer solution 3 mL by Nebulization route every four (4) hours as needed for Wheezing., Normal, Disp-1 Package, R-0  
  
tamsulosin (FLOMAX) 0.4 mg capsule Take 1 Cap by mouth daily. , Print, Disp-30 Cap, R-0  
  
omega 3-dha-epa-fish oil (FISH OIL) 100-160-1,000 mg cap Take 1 Cap by mouth two (2) times a day., Historical Med  
  
cinnamon bark (CINNAMON) 500 mg cap Take 1,000 mg by mouth two (2) times a day., Historical Med  
  
  
 
STOP taking these medications  
  
 aspirin 81 mg chewable tablet Comments:  
Reason for Stopping:   
   
  
 
 
The patient's chart, MAR and AVS were reviewed by Edda Benitez, PHARMD.

## 2019-12-20 ENCOUNTER — OFFICE VISIT (OUTPATIENT)
Dept: CARDIOLOGY CLINIC | Age: 84
End: 2019-12-20

## 2019-12-20 VITALS
HEART RATE: 60 BPM | WEIGHT: 151 LBS | DIASTOLIC BLOOD PRESSURE: 70 MMHG | BODY MASS INDEX: 22.36 KG/M2 | HEIGHT: 69 IN | OXYGEN SATURATION: 97 % | RESPIRATION RATE: 16 BRPM | SYSTOLIC BLOOD PRESSURE: 140 MMHG

## 2019-12-20 DIAGNOSIS — E78.00 HYPERCHOLESTEREMIA: ICD-10-CM

## 2019-12-20 DIAGNOSIS — I50.32 DIASTOLIC CHF, CHRONIC (HCC): Primary | ICD-10-CM

## 2019-12-20 DIAGNOSIS — I10 ESSENTIAL HYPERTENSION: ICD-10-CM

## 2019-12-20 DIAGNOSIS — Z95.1 HX OF CABG: ICD-10-CM

## 2019-12-20 DIAGNOSIS — I25.10 CORONARY ARTERY DISEASE INVOLVING NATIVE CORONARY ARTERY OF NATIVE HEART WITHOUT ANGINA PECTORIS: ICD-10-CM

## 2019-12-20 DIAGNOSIS — I73.9 PERIPHERAL VASCULAR DISEASE (HCC): ICD-10-CM

## 2019-12-20 DIAGNOSIS — I48.20 ATRIAL FIBRILLATION, CHRONIC (HCC): ICD-10-CM

## 2019-12-20 DIAGNOSIS — N18.30 CKD (CHRONIC KIDNEY DISEASE) STAGE 3, GFR 30-59 ML/MIN (HCC): ICD-10-CM

## 2019-12-20 DIAGNOSIS — I65.23 BILATERAL CAROTID ARTERY STENOSIS: ICD-10-CM

## 2019-12-20 NOTE — Clinical Note
12/20/19 Patient: Janneth Villela. YOB: 1935 Date of Visit: 12/20/2019 Yessica Lynn MD 
[address] [place] [address] VIA Facsimile: [phone] Dear Yessica Lynn MD, Thank you for referring Mr. Caitlyn Vaughan to [facility] for evaluation. My notes for this consultation are attached. If you have questions, please do not hesitate to call me. I look forward to following your patient along with you.  
 
 
Sincerely, 
 
Christiano Zarate MD

## 2019-12-20 NOTE — PROGRESS NOTES
Visit Vitals  /70 (BP 1 Location: Left arm, BP Patient Position: Sitting)   Pulse 60   Resp 16   Ht 5' 9\" (1.753 m)   Wt 151 lb (68.5 kg)   SpO2 97%   BMI 22.30 kg/m²

## 2019-12-20 NOTE — PROGRESS NOTES
[facility]     1935       David Robertson MD, [facility]  Date of Visit-12/20/2019   PCP is Taylor Pearl MD   [facility]  [facility]  HPI:  [facility]. is a 81 y.o. male   F/u of   · CHF systolic chronic   · RHC May 2, 2019 =PA 66/18 W 20 PA sat 55% CO 4.1 CI 2.12  · Echo May 1 ,2019 EF 55-60% mod LAE, Mild CASSIE, Mild AS BELLE 1.6 cm2 mild MR & TR, RV fx mildly reduced  · Leadless pacer 5-9-2019  · CAD with CABG off pump x3 3/2008   · Chronic atrial fibrillation   · CKD 4- Dr Sreekanth Pederson. · August 2019 pneumonia, renal edema  · Pulmonary HTN, restrictive lung disease  · PVD- PTA 2014  · Carotid artery disease   Pt is present with his wife, ambulating with a walker, and on oxygen. Pt was recently in the hospital due to diverticula and his wife notes this is the 3rd time this has happened. Pt reports that the Bumex is working well in decreasing his swelling. He claims that overall he is feeling fairly well. Denies chest pain, syncope or shortness of breath at rest, has no tachycardia, palpitations or sense of arrhythmia. Assessment/Plan:   Despite his multiple hospitalizations his EF has seemed reasonably stable the issue relates to volume overload with his worsening renal and cardiorenal dysfunction    1. Diastolic CHF, chronic (HCC)  Has no edema today. Seems to respond in the hospital to getting blood. The iron and ASA have paige stopped. While he appears fatigued I am surprised by how little edema he has. Continue spironolactone, Bumex, coreg, and bidil. This is cardiorenal syndrome. At some point he may go on dialysis. EF is normal he remains on blood pressure control  2. Coronary artery disease involving native coronary artery of native heart without angina pectoris  Prior CABG bypass surgery has had no recent heart catheterization and no recent angina. Given his renal dysfunction we have not chosen to reimage him.   If he does go on dialysis permanently I would then consider a cath. On Isordil and hydralazine  3. Atrial fibrillation, chronic  Has rate control and a leadless pacemaker  With his anemia no oral anticoagulation  4. CKD (chronic kidney disease) stage 3, GFR 30-59 ml/min ([facility])  GFR may have dropped further and is followed by renal  5. Peripheral vascular disease ([address]  Multiple stents in the common femoral arteries. If he is limited in activity then we should consider reimaging but therapy is limited due to the poor renal function  6. Hx of CABG  2008  7. Bilateral carotid artery stenosis  Mild disease medical management  8. Hypercholesteremia  Risk factors include diabetes is on simvastatin  9. Essential hypertension  On clonidine hydralazine carvedilol Spironolactone     Follow up appointment scheduled for Feb 20, 2020. Future Appointments   Date Time Provider Muna Delgado   2/12/2020  2:15 PM Soumya [facility]   2/27/2020  2:20 PM Oj Kendall MD cav ANAHI SCHED   5/18/2020  9:15 AM REMOTE1, [address]   8/24/2020  8:45 AM REMOTE1, [address]   10/29/2020 10:30 AM PACEMAKER3, AGUIAR CAVREY ANAHI SCHED   10/29/2020 10:40 AM Jocelyne Dominguez  [address]      There are no Patient Instructions on file for this visit. Key CAD CHF Meds             cloNIDine HCl (CATAPRES) 0.1 mg tablet (Taking) Take 0.1 mg by mouth two (2) times a day. bumetanide (BUMEX) 1 mg tablet (Taking) Take 3 Tabs by mouth two (2) times a day. hydrALAZINE (APRESOLINE) 50 mg tablet (Taking) Take 1 Tab by mouth three (3) times daily. isosorbide dinitrate (ISORDIL) 20 mg tablet (Taking) Take 1 Tab by mouth three (3) times daily. carvedilol (COREG) 12.5 mg tablet (Taking) Take 12.5 mg by mouth two (2) times daily (with meals). simvastatin (ZOCOR) 20 mg tablet (Taking) Take 1 Tab by mouth nightly.     omega 3-dha-epa-fish oil (FISH OIL) 100-160-1,000 mg cap (Taking) Take 1 Cap by mouth two (2) times a day. spironolactone (ALDACTONE) 25 mg tablet Take 1 Tab by mouth daily. Impression:   1. Diastolic CHF, chronic ([address]    2. Coronary artery disease involving native coronary artery of native heart without angina pectoris    3. Atrial fibrillation, chronic    4. CKD (chronic kidney disease) stage 3, GFR 30-59 ml/min ([facility])    5. Peripheral vascular disease ([address]    6. Hx of CABG    7. Bilateral carotid artery stenosis    8. Hypercholesteremia    9. Essential hypertension       Cardiac History:   CAD/CABG   off pump x3 3/2008 . =post op anemia and renal failure  CATH 2008=   LM -bifurcation dz 40% ;LAD 85% ; Circ ost 70% mid 70%   RCA proximal 60% tapering, EF 60%-70%, bilaterally patent renal arteries, mild atherosclerosis of the distal abdominal aorta. [address] May 2, 2019 =PA 66/18 W 20 PA sat 55% CO 4.1 CI 2.12  Echo May 1 ,2019 EF 55-60% mod LAE, Mild CASSIE, Mild AS BELLE 1.6 cm2 mild MR & TR, RV fx mildly reduced  PVD-18- RLE- mid CRA 70, Pop 99-PTA on right pop, LLE LCFA 40%  - 17 PTA RCFA 90, JAS to RSFA   -16 PTA Left Pop, PTA Left tib-per  --3-13-14 PTA Left op PTA RSFA  ---11 stent RSFA   JOCELYN 17 Normal perfusion    Mild carotid artery disease 3-7-08 then 12 with 10-49% left, less than 10% on right    SOCIAL: Drinks no alcohol, quit smoking several years ago, worked as an . Lives with his wife and has four children. Enjoys gardening, fishing and music. FAMILY HISTORY: Mother  of cancer at 76, father  of Parkinson's at 70, one brother  of cancer of the liver at 76 and one  of an infection at 64. ROS-except as noted above. . A complete cardiac and respiratory are reviewed and negative except as above ; Resp-denies wheezing  or productive cough,.  Const- No unusual weight loss or fever; Neuro-no recent seizure or CVA ; GI- No BRBPR, abdom pain, bloating ; - no  hematuria   see supplement sheet, initialed and to be scanned by staff  Past Medical History:   Diagnosis Date    CAD (coronary artery disease)     s/p CABG 2008    Carot[facility] ([facility]     CKD (chronic kidney disease) stage 3, GFR 30-59 ml/min ([facility] 10/21/2017    hypertension and DM nephrosclerosis    Diabetes mellitus, type 2 ([facility]     Hypercholesteremia     Hypertension     Paroxysmal atrial fibrillation ([facility] 10/21/2017    new onset afib with BRPR 10-19-17 admit    PVC's (premature ventricular contractions) 5/26/2014    PVD (peripheral vascular disease) ([facility]       Social Hx= reports that he quit smoking about 34 years ago. His smoking use included cigarettes. He has a 60.00 pack-year smoking history. He has never used smokeless tobacco. He reports that he does not drink alcohol or use drugs. Exam and Labs:  /70 (BP 1 Location: Left arm, BP Patient Position: Sitting)   Pulse 60   Resp 16   Ht 5' 9\" (1.753 m)   Wt 151 lb (68.5 kg)   SpO2 97%   BMI 22.30 kg/m² Constitutional:  NAD, comfortable  Head: NC,AT. Eyes: No scleral icterus. Neck:  Neck supple. No JVD present. Throat: moist mucous membranes. Chest: Effort normal & normal respiratory excursion . Neurological: alert, conversant and oriented . Skin: Skin is not cold. No obvious systemic rash noted. Not diaphoretic. No erythema. Psychiatric:  Grossly normal mood and affect. Behavior appears normal. Extremities:  no clubbing or cyanosis. Abdomen: non distended    Lungs:breath sounds normal. No stridor. distress, wheezes or  Rales. Heart: 1/6 MAMI, normal rate, regular rhythm, normal S1, S2, no rubs, clicks or gallops , PMI non displaced. Edema: Edema is none.   No results found for: CHOL, CHOLX, CHLST, CHOLV, HDL, HDLP, LDL, LDLC, DLDLP, TGLX, TRIGL, TRIGP, CHHD, CHHDX  Lab Results   Component Value Date/Time    Sodium 135 (L) 12/17/2019 03:52 AM    Potassium 3.5 12/17/2019 03:52 AM    Chloride 100 12/17/2019 03:52 AM    CO2 29 12/17/2019 03:52 AM Anion gap 6 12/17/2019 03:52 AM    Glucose 122 (H) 12/17/2019 03:52 AM    BUN 51 (H) 12/17/2019 03:52 AM    Creatinine 1.97 (H) 12/17/2019 03:52 AM    BUN/Creatinine ratio 26 (H) 12/17/2019 03:52 AM    GFR est AA 40 (L) 12/17/2019 03:52 AM    GFR est non-AA 33 (L) 12/17/2019 03:52 AM    Calcium 7.4 (L) 12/17/2019 03:52 AM      Wt Readings from Last 3 Encounters:   12/20/19 151 lb (68.5 kg)   12/17/19 146 lb 13.2 oz (66.6 kg)   12/10/19 164 lb (74.4 kg)      BP Readings from Last 3 Encounters:   12/20/19 140/70   12/17/19 148/74   12/10/19 149/67      Current Outpatient Medications   Medication Sig    insulin glargine (LANTUS U-100 INSULIN) 100 unit/mL injection 3-7 Units by SubCUTAneous route nightly. Pt reports he uses 3-7 units depending on BG    pantoprazole (PROTONIX) 40 mg tablet Take 1 Tab by mouth daily for 30 days.  cloNIDine HCl (CATAPRES) 0.1 mg tablet Take 0.1 mg by mouth two (2) times a day.  bumetanide (BUMEX) 1 mg tablet Take 3 Tabs by mouth two (2) times a day.  hydrALAZINE (APRESOLINE) 50 mg tablet Take 1 Tab by mouth three (3) times daily.  magnesium oxide (MAG-OX) 400 mg tablet Take 1 Tab by mouth daily.  isosorbide dinitrate (ISORDIL) 20 mg tablet Take 1 Tab by mouth three (3) times daily.  carvedilol (COREG) 12.5 mg tablet Take 12.5 mg by mouth two (2) times daily (with meals).  simvastatin (ZOCOR) 20 mg tablet Take 1 Tab by mouth nightly.  albuterol (PROVENTIL VENTOLIN) 2.5 mg /3 mL (0.083 %) nebulizer solution 3 mL by Nebulization route every four (4) hours as needed for Wheezing.  tamsulosin (FLOMAX) 0.4 mg capsule Take 1 Cap by mouth daily.  omega 3-dha-epa-fish oil (FISH OIL) 100-160-1,000 mg cap Take 1 Cap by mouth two (2) times a day.  cinnamon bark (CINNAMON) 500 mg cap Take 1,000 mg by mouth two (2) times a day.  spironolactone (ALDACTONE) 25 mg tablet Take 1 Tab by mouth daily.     ferrous sulfate (IRON) 325 mg (65 mg iron) tablet Take 325 mg by mouth Daily (before breakfast). No current facility-administered medications for this visit. Impression see above.       Written by Jose Granados, as dictated by Danii Harvey MD.

## 2019-12-26 ENCOUNTER — APPOINTMENT (OUTPATIENT)
Dept: NUCLEAR MEDICINE | Age: 84
DRG: 378 | End: 2019-12-26
Attending: NURSE PRACTITIONER
Payer: MEDICARE

## 2019-12-26 ENCOUNTER — HOSPITAL ENCOUNTER (INPATIENT)
Age: 84
LOS: 2 days | Discharge: HOME HEALTH | DRG: 378 | End: 2019-12-28
Attending: STUDENT IN AN ORGANIZED HEALTH CARE EDUCATION/TRAINING PROGRAM | Admitting: STUDENT IN AN ORGANIZED HEALTH CARE EDUCATION/TRAINING PROGRAM
Payer: MEDICARE

## 2019-12-26 DIAGNOSIS — N18.9 ACUTE ON CHRONIC RENAL INSUFFICIENCY: ICD-10-CM

## 2019-12-26 DIAGNOSIS — N28.9 ACUTE ON CHRONIC RENAL INSUFFICIENCY: ICD-10-CM

## 2019-12-26 DIAGNOSIS — K92.2 GASTROINTESTINAL HEMORRHAGE, UNSPECIFIED GASTROINTESTINAL HEMORRHAGE TYPE: Primary | ICD-10-CM

## 2019-12-26 LAB
ALBUMIN SERPL-MCNC: 2.3 G/DL (ref 3.5–5)
ALBUMIN/GLOB SERPL: 0.5 {RATIO} (ref 1.1–2.2)
ALP SERPL-CCNC: 302 U/L (ref 45–117)
ALT SERPL-CCNC: 62 U/L (ref 12–78)
ANION GAP SERPL CALC-SCNC: 5 MMOL/L (ref 5–15)
AST SERPL-CCNC: 66 U/L (ref 15–37)
BASOPHILS # BLD: 0.1 K/UL (ref 0–0.1)
BASOPHILS NFR BLD: 1 % (ref 0–1)
BILIRUB SERPL-MCNC: 0.5 MG/DL (ref 0.2–1)
BUN SERPL-MCNC: 58 MG/DL (ref 6–20)
BUN/CREAT SERPL: 23 (ref 12–20)
CALCIUM SERPL-MCNC: 8.6 MG/DL (ref 8.5–10.1)
CHLORIDE SERPL-SCNC: 97 MMOL/L (ref 97–108)
CO2 SERPL-SCNC: 32 MMOL/L (ref 21–32)
COMMENT, HOLDF: NORMAL
CREAT SERPL-MCNC: 2.5 MG/DL (ref 0.7–1.3)
DIFFERENTIAL METHOD BLD: ABNORMAL
EOSINOPHIL # BLD: 0.3 K/UL (ref 0–0.4)
EOSINOPHIL NFR BLD: 4 % (ref 0–7)
ERYTHROCYTE [DISTWIDTH] IN BLOOD BY AUTOMATED COUNT: 15.9 % (ref 11.5–14.5)
GLOBULIN SER CALC-MCNC: 5 G/DL (ref 2–4)
GLUCOSE BLD STRIP.AUTO-MCNC: 117 MG/DL (ref 65–100)
GLUCOSE BLD STRIP.AUTO-MCNC: 192 MG/DL (ref 65–100)
GLUCOSE BLD STRIP.AUTO-MCNC: 223 MG/DL (ref 65–100)
GLUCOSE BLD STRIP.AUTO-MCNC: 229 MG/DL (ref 65–100)
GLUCOSE SERPL-MCNC: 237 MG/DL (ref 65–100)
HCT VFR BLD AUTO: 27.7 % (ref 36.6–50.3)
HCT VFR BLD AUTO: 35.4 % (ref 36.6–50.3)
HEMOCCULT STL QL: POSITIVE
HGB BLD-MCNC: 11.1 G/DL (ref 12.1–17)
HGB BLD-MCNC: 8.9 G/DL (ref 12.1–17)
IMM GRANULOCYTES # BLD AUTO: 0.1 K/UL (ref 0–0.04)
IMM GRANULOCYTES NFR BLD AUTO: 1 % (ref 0–0.5)
INR PPP: 1.1 (ref 0.9–1.1)
LYMPHOCYTES # BLD: 0.7 K/UL (ref 0.8–3.5)
LYMPHOCYTES NFR BLD: 11 % (ref 12–49)
MAGNESIUM SERPL-MCNC: 2.1 MG/DL (ref 1.6–2.4)
MCH RBC QN AUTO: 27.3 PG (ref 26–34)
MCHC RBC AUTO-ENTMCNC: 31.4 G/DL (ref 30–36.5)
MCV RBC AUTO: 87.2 FL (ref 80–99)
MONOCYTES # BLD: 0.6 K/UL (ref 0–1)
MONOCYTES NFR BLD: 9 % (ref 5–13)
NEUTS SEG # BLD: 4.7 K/UL (ref 1.8–8)
NEUTS SEG NFR BLD: 74 % (ref 32–75)
NRBC # BLD: 0 K/UL (ref 0–0.01)
NRBC BLD-RTO: 0 PER 100 WBC
PLATELET # BLD AUTO: 311 K/UL (ref 150–400)
PMV BLD AUTO: 10.6 FL (ref 8.9–12.9)
POTASSIUM SERPL-SCNC: 3.2 MMOL/L (ref 3.5–5.1)
PROT SERPL-MCNC: 7.3 G/DL (ref 6.4–8.2)
PROTHROMBIN TIME: 11 SEC (ref 9–11.1)
RBC # BLD AUTO: 4.06 M/UL (ref 4.1–5.7)
RBC MORPH BLD: ABNORMAL
SAMPLES BEING HELD,HOLD: NORMAL
SERVICE CMNT-IMP: ABNORMAL
SODIUM SERPL-SCNC: 134 MMOL/L (ref 136–145)
WBC # BLD AUTO: 6.5 K/UL (ref 4.1–11.1)

## 2019-12-26 PROCEDURE — 94640 AIRWAY INHALATION TREATMENT: CPT

## 2019-12-26 PROCEDURE — 94664 DEMO&/EVAL PT USE INHALER: CPT

## 2019-12-26 PROCEDURE — 74011250636 HC RX REV CODE- 250/636: Performed by: INTERNAL MEDICINE

## 2019-12-26 PROCEDURE — 77010033678 HC OXYGEN DAILY

## 2019-12-26 PROCEDURE — 85025 COMPLETE CBC W/AUTO DIFF WBC: CPT

## 2019-12-26 PROCEDURE — 86923 COMPATIBILITY TEST ELECTRIC: CPT

## 2019-12-26 PROCEDURE — 82962 GLUCOSE BLOOD TEST: CPT

## 2019-12-26 PROCEDURE — C9113 INJ PANTOPRAZOLE SODIUM, VIA: HCPCS | Performed by: STUDENT IN AN ORGANIZED HEALTH CARE EDUCATION/TRAINING PROGRAM

## 2019-12-26 PROCEDURE — 99285 EMERGENCY DEPT VISIT HI MDM: CPT

## 2019-12-26 PROCEDURE — 85018 HEMOGLOBIN: CPT

## 2019-12-26 PROCEDURE — 74011250636 HC RX REV CODE- 250/636: Performed by: STUDENT IN AN ORGANIZED HEALTH CARE EDUCATION/TRAINING PROGRAM

## 2019-12-26 PROCEDURE — 83735 ASSAY OF MAGNESIUM: CPT

## 2019-12-26 PROCEDURE — 74011000250 HC RX REV CODE- 250: Performed by: NURSE PRACTITIONER

## 2019-12-26 PROCEDURE — 86900 BLOOD TYPING SEROLOGIC ABO: CPT

## 2019-12-26 PROCEDURE — A9560 TC99M LABELED RBC: HCPCS

## 2019-12-26 PROCEDURE — 74011000250 HC RX REV CODE- 250: Performed by: STUDENT IN AN ORGANIZED HEALTH CARE EDUCATION/TRAINING PROGRAM

## 2019-12-26 PROCEDURE — 74011636637 HC RX REV CODE- 636/637: Performed by: STUDENT IN AN ORGANIZED HEALTH CARE EDUCATION/TRAINING PROGRAM

## 2019-12-26 PROCEDURE — 80053 COMPREHEN METABOLIC PANEL: CPT

## 2019-12-26 PROCEDURE — 82272 OCCULT BLD FECES 1-3 TESTS: CPT

## 2019-12-26 PROCEDURE — 74011250637 HC RX REV CODE- 250/637: Performed by: INTERNAL MEDICINE

## 2019-12-26 PROCEDURE — 85610 PROTHROMBIN TIME: CPT

## 2019-12-26 PROCEDURE — 36415 COLL VENOUS BLD VENIPUNCTURE: CPT

## 2019-12-26 PROCEDURE — 74011250637 HC RX REV CODE- 250/637: Performed by: STUDENT IN AN ORGANIZED HEALTH CARE EDUCATION/TRAINING PROGRAM

## 2019-12-26 PROCEDURE — 65660000000 HC RM CCU STEPDOWN

## 2019-12-26 RX ORDER — POTASSIUM CHLORIDE 7.45 MG/ML
10 INJECTION INTRAVENOUS
Status: COMPLETED | OUTPATIENT
Start: 2019-12-26 | End: 2019-12-26

## 2019-12-26 RX ORDER — SODIUM CHLORIDE 9 MG/ML
1000 INJECTION, SOLUTION INTRAVENOUS ONCE
Status: COMPLETED | OUTPATIENT
Start: 2019-12-26 | End: 2019-12-26

## 2019-12-26 RX ORDER — SIMVASTATIN 20 MG/1
20 TABLET, FILM COATED ORAL
Status: DISCONTINUED | OUTPATIENT
Start: 2019-12-26 | End: 2019-12-28 | Stop reason: HOSPADM

## 2019-12-26 RX ORDER — SODIUM CHLORIDE, SODIUM LACTATE, POTASSIUM CHLORIDE, CALCIUM CHLORIDE 600; 310; 30; 20 MG/100ML; MG/100ML; MG/100ML; MG/100ML
50 INJECTION, SOLUTION INTRAVENOUS CONTINUOUS
Status: DISCONTINUED | OUTPATIENT
Start: 2019-12-26 | End: 2019-12-26

## 2019-12-26 RX ORDER — HYDRALAZINE HYDROCHLORIDE 50 MG/1
50 TABLET, FILM COATED ORAL 3 TIMES DAILY
Status: DISCONTINUED | OUTPATIENT
Start: 2019-12-26 | End: 2019-12-28 | Stop reason: HOSPADM

## 2019-12-26 RX ORDER — MAGNESIUM SULFATE 100 %
4 CRYSTALS MISCELLANEOUS AS NEEDED
Status: DISCONTINUED | OUTPATIENT
Start: 2019-12-26 | End: 2019-12-28 | Stop reason: HOSPADM

## 2019-12-26 RX ORDER — SODIUM CHLORIDE 0.9 % (FLUSH) 0.9 %
5-40 SYRINGE (ML) INJECTION AS NEEDED
Status: DISCONTINUED | OUTPATIENT
Start: 2019-12-26 | End: 2019-12-28 | Stop reason: HOSPADM

## 2019-12-26 RX ORDER — BALSAM PERU/CASTOR OIL
OINTMENT (GRAM) TOPICAL EVERY 8 HOURS
Status: DISCONTINUED | OUTPATIENT
Start: 2019-12-26 | End: 2019-12-28 | Stop reason: HOSPADM

## 2019-12-26 RX ORDER — ISOSORBIDE DINITRATE 10 MG/1
20 TABLET ORAL 3 TIMES DAILY
Status: DISCONTINUED | OUTPATIENT
Start: 2019-12-26 | End: 2019-12-28 | Stop reason: HOSPADM

## 2019-12-26 RX ORDER — IPRATROPIUM BROMIDE AND ALBUTEROL SULFATE 2.5; .5 MG/3ML; MG/3ML
3 SOLUTION RESPIRATORY (INHALATION)
Status: DISCONTINUED | OUTPATIENT
Start: 2019-12-26 | End: 2019-12-28 | Stop reason: HOSPADM

## 2019-12-26 RX ORDER — BUMETANIDE 1 MG/1
3 TABLET ORAL 2 TIMES DAILY
Status: DISCONTINUED | OUTPATIENT
Start: 2019-12-26 | End: 2019-12-27

## 2019-12-26 RX ORDER — SPIRONOLACTONE 25 MG/1
25 TABLET ORAL DAILY
Status: DISCONTINUED | OUTPATIENT
Start: 2019-12-26 | End: 2019-12-28 | Stop reason: HOSPADM

## 2019-12-26 RX ORDER — SODIUM CHLORIDE 0.9 % (FLUSH) 0.9 %
5-40 SYRINGE (ML) INJECTION EVERY 8 HOURS
Status: DISCONTINUED | OUTPATIENT
Start: 2019-12-26 | End: 2019-12-28 | Stop reason: HOSPADM

## 2019-12-26 RX ORDER — CLONIDINE HYDROCHLORIDE 0.1 MG/1
0.1 TABLET ORAL 2 TIMES DAILY
Status: DISCONTINUED | OUTPATIENT
Start: 2019-12-26 | End: 2019-12-28 | Stop reason: HOSPADM

## 2019-12-26 RX ORDER — TAMSULOSIN HYDROCHLORIDE 0.4 MG/1
0.4 CAPSULE ORAL DAILY
Status: DISCONTINUED | OUTPATIENT
Start: 2019-12-26 | End: 2019-12-28 | Stop reason: HOSPADM

## 2019-12-26 RX ORDER — DEXTROSE MONOHYDRATE 100 MG/ML
0-250 INJECTION, SOLUTION INTRAVENOUS AS NEEDED
Status: DISCONTINUED | OUTPATIENT
Start: 2019-12-26 | End: 2019-12-28 | Stop reason: HOSPADM

## 2019-12-26 RX ORDER — HYDRALAZINE HYDROCHLORIDE 20 MG/ML
20 INJECTION INTRAMUSCULAR; INTRAVENOUS
Status: DISCONTINUED | OUTPATIENT
Start: 2019-12-26 | End: 2019-12-28 | Stop reason: HOSPADM

## 2019-12-26 RX ORDER — ONDANSETRON 2 MG/ML
4 INJECTION INTRAMUSCULAR; INTRAVENOUS
Status: DISCONTINUED | OUTPATIENT
Start: 2019-12-26 | End: 2019-12-28 | Stop reason: HOSPADM

## 2019-12-26 RX ORDER — MORPHINE SULFATE 2 MG/ML
1 INJECTION, SOLUTION INTRAMUSCULAR; INTRAVENOUS
Status: DISCONTINUED | OUTPATIENT
Start: 2019-12-26 | End: 2019-12-28 | Stop reason: HOSPADM

## 2019-12-26 RX ORDER — LANOLIN ALCOHOL/MO/W.PET/CERES
325 CREAM (GRAM) TOPICAL
Status: DISCONTINUED | OUTPATIENT
Start: 2019-12-26 | End: 2019-12-28 | Stop reason: HOSPADM

## 2019-12-26 RX ORDER — CARVEDILOL 12.5 MG/1
12.5 TABLET ORAL 2 TIMES DAILY WITH MEALS
Status: DISCONTINUED | OUTPATIENT
Start: 2019-12-26 | End: 2019-12-28 | Stop reason: HOSPADM

## 2019-12-26 RX ORDER — INSULIN GLARGINE 100 [IU]/ML
3-7 INJECTION, SOLUTION SUBCUTANEOUS
Status: CANCELLED | OUTPATIENT
Start: 2019-12-26

## 2019-12-26 RX ORDER — LANOLIN ALCOHOL/MO/W.PET/CERES
400 CREAM (GRAM) TOPICAL DAILY
Status: DISCONTINUED | OUTPATIENT
Start: 2019-12-26 | End: 2019-12-28 | Stop reason: HOSPADM

## 2019-12-26 RX ADMIN — ISOSORBIDE DINITRATE 20 MG: 20 TABLET ORAL at 15:56

## 2019-12-26 RX ADMIN — CARVEDILOL 12.5 MG: 12.5 TABLET, FILM COATED ORAL at 08:55

## 2019-12-26 RX ADMIN — ISOSORBIDE DINITRATE 20 MG: 20 TABLET ORAL at 08:54

## 2019-12-26 RX ADMIN — SIMVASTATIN 20 MG: 20 TABLET, FILM COATED ORAL at 23:13

## 2019-12-26 RX ADMIN — POTASSIUM CHLORIDE 10 MEQ: 10 INJECTION, SOLUTION INTRAVENOUS at 06:22

## 2019-12-26 RX ADMIN — POLYETHYLENE GLYCOL-3350 AND ELECTROLYTES 2000 ML: 236; 6.74; 5.86; 2.97; 22.74 POWDER, FOR SOLUTION ORAL at 18:06

## 2019-12-26 RX ADMIN — Medication 1 CAPSULE: at 18:06

## 2019-12-26 RX ADMIN — ISOSORBIDE DINITRATE 20 MG: 20 TABLET ORAL at 23:13

## 2019-12-26 RX ADMIN — SODIUM CHLORIDE, SODIUM LACTATE, POTASSIUM CHLORIDE, AND CALCIUM CHLORIDE 50 ML/HR: 600; 310; 30; 20 INJECTION, SOLUTION INTRAVENOUS at 11:21

## 2019-12-26 RX ADMIN — CASTOR OIL AND BALSAM, PERU: 788; 87 OINTMENT TOPICAL at 15:54

## 2019-12-26 RX ADMIN — HUMAN INSULIN 3 UNITS: 100 INJECTION, SOLUTION SUBCUTANEOUS at 18:06

## 2019-12-26 RX ADMIN — POTASSIUM CHLORIDE 10 MEQ: 10 INJECTION, SOLUTION INTRAVENOUS at 05:24

## 2019-12-26 RX ADMIN — TAMSULOSIN HYDROCHLORIDE 0.4 MG: 0.4 CAPSULE ORAL at 08:55

## 2019-12-26 RX ADMIN — HYDRALAZINE HYDROCHLORIDE 50 MG: 50 TABLET, FILM COATED ORAL at 15:56

## 2019-12-26 RX ADMIN — HUMAN INSULIN 2 UNITS: 100 INJECTION, SOLUTION SUBCUTANEOUS at 07:35

## 2019-12-26 RX ADMIN — Medication 1 CAPSULE: at 08:55

## 2019-12-26 RX ADMIN — SODIUM CHLORIDE 40 MG: 9 INJECTION INTRAMUSCULAR; INTRAVENOUS; SUBCUTANEOUS at 05:24

## 2019-12-26 RX ADMIN — IPRATROPIUM BROMIDE AND ALBUTEROL SULFATE 3 ML: .5; 3 SOLUTION RESPIRATORY (INHALATION) at 18:40

## 2019-12-26 RX ADMIN — SODIUM CHLORIDE, SODIUM LACTATE, POTASSIUM CHLORIDE, AND CALCIUM CHLORIDE 75 ML/HR: 600; 310; 30; 20 INJECTION, SOLUTION INTRAVENOUS at 07:35

## 2019-12-26 RX ADMIN — CLONIDINE HYDROCHLORIDE 0.1 MG: 0.1 TABLET ORAL at 18:06

## 2019-12-26 RX ADMIN — HYDRALAZINE HYDROCHLORIDE 50 MG: 50 TABLET, FILM COATED ORAL at 23:13

## 2019-12-26 RX ADMIN — SODIUM CHLORIDE 40 MG: 9 INJECTION INTRAMUSCULAR; INTRAVENOUS; SUBCUTANEOUS at 23:15

## 2019-12-26 RX ADMIN — MAGNESIUM OXIDE TAB 400 MG (241.3 MG ELEMENTAL MG) 400 MG: 400 (241.3 MG) TAB at 08:55

## 2019-12-26 RX ADMIN — HYDRALAZINE HYDROCHLORIDE 50 MG: 50 TABLET, FILM COATED ORAL at 08:55

## 2019-12-26 RX ADMIN — CASTOR OIL AND BALSAM, PERU: 788; 87 OINTMENT TOPICAL at 22:00

## 2019-12-26 RX ADMIN — CLONIDINE HYDROCHLORIDE 0.1 MG: 0.1 TABLET ORAL at 08:55

## 2019-12-26 RX ADMIN — SODIUM CHLORIDE 1000 ML: 900 INJECTION, SOLUTION INTRAVENOUS at 05:22

## 2019-12-26 RX ADMIN — SPIRONOLACTONE 25 MG: 25 TABLET ORAL at 08:54

## 2019-12-26 RX ADMIN — HUMAN INSULIN 3 UNITS: 100 INJECTION, SOLUTION SUBCUTANEOUS at 11:21

## 2019-12-26 RX ADMIN — FERROUS SULFATE TAB 325 MG (65 MG ELEMENTAL FE) 325 MG: 325 (65 FE) TAB at 07:35

## 2019-12-26 RX ADMIN — CARVEDILOL 12.5 MG: 12.5 TABLET, FILM COATED ORAL at 16:01

## 2019-12-26 RX ADMIN — Medication 10 ML: at 15:54

## 2019-12-26 NOTE — PROGRESS NOTES
Hospitalist Progress Note Bhavesh Thomas MD 
Answering service: [phone] OR [phone] from in house phone Date of Service:  2019 NAME:  Luis Alfredo Corley Sr. 
:  1935 MRN:  [id number] PCP: Micheal Huston MD 
 
Chief Complaint:  
Chief Complaint Patient presents with  Rectal Bleeding Admission Summary:  
 
Joye Cheadle. is a 81 y.o. male who presented with rectal bleeding Interval history / Subjective:  
Patient seen for Follow up of CC: rectal bleeding First encounter Patient seen and examined by the bedside Labs, images and notes reviewed Patient is comfortable, Denies any chest pain, No fevers or chills, No nausea or vomiting. No on going bleeding in the hospital. 
Given recurrent bleeding, it would be prudent to stop ASA completely. Discussed with nursing staff, no acute issues overnight, orders reviewed. Assessment & Plan:  
 
GI bleed Likely lower GI  2/2 diverticular bleed IV PPI 
DW GI, bleeding scan is neg, plans for C-scopy in am 
Hemoglobin trending down, HH dropped 3 grams, repeat HH q12, transfuse PRN Transfuse if hemoglobin less than 7 Admitted 2 weeks ago with similar presentation, colonoscopy planned but patient declined NPO @ [facility] 
  
Hypokalemia Replaced, repeat labs in am 
Normal magnesium Level 
  
Mild hyponatremia, chronic, POA Due to CHF/CKD Repeat labs in am 
  
Type 2 diabetes, uncontrolled with hyperglycemia Recent A1c 7.7 SSI Accu-Checks 
  
Advanced COPD with chronic Hypoxemic respiratory failure on home oxygen Continue supplemental O2 and monitor O2 sat Continue BT as needed Hx of Pulm Fibrosis and Pulm HTN 
  
CKD 4 Cr at baseline Hold diuretics for now Avoid nephrotoxic medications and renally dose meds 
  
Chronic A. fib  
rate controlled  
not on anticoagulation 
  
CAD s/p CABG Continue holding ASA Continue other home meds 
  
Hypertension Stable Hydralazine IV as needed 
  
Hx of PVD 
stable s/p arthrectomy to distal SFA and distal popliteal with angioplasty to both lesions and stent to SFA 
 
POA sacrum with pale white area measuring 0.5 cm x 0.5 cm, no drainage, cathie-wound with blanchable erythema, wound edges appear closed. This is the same measurements noted on 19 for sacral stage 2; appears to be resolving stage 2 pressure injury. Venelex ointment and P-500 bed to be ordered. 
  
 
Code status: Full Code DVT prophylaxis: SCDs Care Plan discussed with: Patient/Family and Nurse Disposition: TBD Hospital Problems  Date Reviewed: 2019 Codes Class Noted POA  
 GI bleed ICD-10-CM: K92.2 ICD-9-CM: 578.9  10/20/2017 Unknown Review of Systems: A comprehensive review of systems was negative. Physical Examination:  
 
General appearance: alert, cooperative, no distress, appears stated age Head: Normocephalic, without obvious abnormality, atraumatic Throat: oral mucosa moist 
Lungs: decreased AE throughout, no wheezing, crackles or labored breathing Heart: irregularly irregular rhythm, no murmurs Abdomen: soft, non-tender. Bowel sounds normal. No masses,  no organomegaly Neurologic: Grossly normal 
  
 
Vital Signs:  
 Last 24hrs VS reviewed since prior progress note. Most recent are: 
 
Visit Vitals /73 (BP 1 Location: Left arm, BP Patient Position: At rest) Pulse 61 Temp 97.6 °F (36.4 °C) Resp 18 Wt 68.5 kg (151 lb) SpO2 95% BMI 22.30 kg/m² No intake or output data in the 24 hours ending 19 1638 Tmax:  Temp (24hrs), Av.7 °F (36.5 °C), Min:97.4 °F (36.3 °C), Max:98.1 °F (36.7 °C) Data Review:  
Data reviewed by myself: 
Nm Acute Gi Bleed Scan Result Date: 2019 EXAM: NM ACUTE GI BLEED SCAN INDICATION: Acute GI bleed. COMPARISON: 10/20/2017 CORRELATIVE IMAGING STUDIES: None TRACER: 25.2 mCi Tc 99m tagged red blood cells.  TECHNIQUE: Imaging of the abdomen was performed in the anterior projection following the uneventful intravenous administration of technetium 99m tagged red blood cells. FINDINGS: There is no abnormal tracer activity within bowel to suggest acute GI bleed. IMPRESSION: No evidence of active GI bleed. Xr Chest Pa Lat Result Date: 12/3/2019 EXAM:  XR CHEST PA LAT INDICATION: Shortness of breath COMPARISON: 8/27/2019 TECHNIQUE: Frontal and lateral chest views FINDINGS: Sternal wires are present. Cardiac monitoring wires overlie the thorax. There is stable cardiac silhouette enlargement. Diffuse interstitial opacities are overall unchanged. Superimposed patchy airspace opacities in the right upper lung and left lower lung are increased. There is no pleural effusion or pneumothorax. The bones and upper abdomen are stable. IMPRESSION: Diffuse interstitial opacities are overall unchanged since 8/27/2019 and may represent chronic lung parenchymal change versus pulmonary edema. Superimposed patchy airspace opacities are increased in the right upper lung and left lower lung and may represent edema, infection, or atelectasis. Ct Chest Wo Cont Result Date: 12/3/2019 INDICATION: Shortness of breath COMPARISON: 8/7/2019 CONTRAST: None. TECHNIQUE:  5 mm axial images were obtained through the chest. Coronal and sagittal reconstructions were generated. CT dose reduction was achieved through use of a standardized protocol tailored for this examination and automatic exposure control for dose modulation. The absence of intravenous contrast reduces the sensitivity for evaluation of the mediastinum and upper abdominal organs. FINDINGS: THYROID: No nodule. MEDIASTINUM: Mediastinal lymph nodes are stable compared to the prior exam, with a reference unchanged 18 mm right paratracheal lymph node. Several of the mediastinal lymph nodes are calcified. LUCAS: Bilateral calcified lymph nodes. THORACIC AORTA: Atherosclerotic without evidence of aneurysm.  MAIN PULMONARY ARTERY: Enlarged compatible with pulmonary arterial hypertension. TRACHEA/BRONCHI: Patent. ESOPHAGUS: No wall thickening or dilatation. HEART: Four-chamber enlargement. PLEURA: Small bilateral pleural effusions. LUNGS: Bilateral lower lobe atelectasis. Diffuse interstitial opacities are again noted, progressive in the upper lobes bilaterally right greater than left. INCIDENTALLY IMAGED UPPER ABDOMEN: Mild free fluid. Small gallstones. BONES: Degenerative changes are seen in the thoracic spine. IMPRESSION: Diffuse interstitial opacities again noted progressive in the upper lobes bilaterally. Underlying pulmonary fibrosis is considered. Small bilateral pleural effusions with underlying atelectasis. Cardiomegaly. Cholelithiasis. Us Retroperitoneum Comp Result Date: 12/4/2019 RENAL ULTRASOUND INDICATION: KATALINA on CKD COMPARISON: None. TECHNIQUE: Sonography of the kidneys, retroperitoneum, and bladder was performed. FINDINGS: RIGHT KIDNEY: 12.1 cm in length. Increased cortical echogenicity. No hydronephrosis, shadowing calculus, or contour-deforming renal mass. Multiple cysts, measuring up to 4.2 cm. LEFT KIDNEY: 11.8 cm in length. Increased cortical echogenicity. No hydronephrosis, shadowing calculus, or contour-deforming renal mass. Multiple cysts, measuring up to 1.3 cm. AORTA: Normal caliber in its visualized portions. Distal abdominal aorta is obscured by overlying bowel gas. COMMON ILIAC ARTERIES: Obscured by overlying bowel gas. IVC: Normal caliber in its visualized portions. BLADDER: Decompressed. IMPRESSION: Increased bilateral renal cortical echogenicity is compatible with medical renal disease. Bilateral renal cysts. No hydronephrosis. Xr Chest [facility] Result Date: 12/10/2019 Portable chest 2 views dated 12/10/2019 Comparison chest dated 12/3/2019 History is heart failure 2 portable AP upright views of the chest were obtained. The patient is slightly rotated towards the right. The cardiac silhouette continues to be enlarged. There continues to be diffuse abnormal alveolar opacity involving both lungs. This is compatible with congestive change/pulmonary edema. Superimposed infiltration cannot be excluded. The costophrenic angles are relatively sharp in appearance. There is evidence of a previous thoracotomy/CABG. IMPRESSION: Evidence of congestive change/pulmonary edema. No results found for: SDES Lab Results Component Value Date/Time Culture result: (A) 12/04/2019 03:27 AM  
  FEW * METHICILLIN RESISTANT STAPHYLOCOCCUS AUREUS * Culture result: (A) 12/04/2019 03:27 AM  
  LIGHT STENOTROPHOMONAS Marcio Hopper.) MALTOPHILIA CLSI GUIDELINES DO NOT RECOMMEND REPORTING SUSCEPTIBILITIES FOR S. MALTOPHILIA. TRIMETHOPRIM/SULFAMETHOXAZOLE IS THE DRUG OF CHOICE. Culture result: HEAVY NORMAL RESPIRATORY JUDE 12/04/2019 03:27 AM  
 
All Micro Results Procedure Component Value Units Date/Time MRSA CULTURE [[id number]] Collected:  12/26/19 1787 Order Status:  Completed Specimen:  Nares Updated:  12/26/19 1102 Labs: reviewed by myself. Recent Labs  
  12/26/19 
1418 12/26/19 
0351 WBC  --  6.5 HGB 8.9* 11.1*  
HCT 27.7* 35.4* PLT  --  311 Recent Labs  
  12/26/19 
6337 * K 3.2*  
CL 97  
CO2 32 BUN 58* CREA 2.50* * CA 8.6 MG 2.1 Recent Labs  
  12/26/19 
7789 SGOT 66* ALT 62 * TBILI 0.5 TP 7.3 ALB 2.3*  
GLOB 5.0* Recent Labs  
  12/26/19 
0371 INR 1.1 PTP 11.0 No results for input(s): FE, TIBC, PSAT, FERR in the last 72 hours. Lab Results Component Value Date/Time Folate 11.3 12/03/2019 08:44 AM  
  
No results for input(s): PH, PCO2, PO2 in the last 72 hours. No results for input(s): CPK, CKNDX, TROIQ in the last 72 hours. No lab exists for component: CPKMB No results found for: CHOL, CHOLX, CHLST, CHOLV, HDL, HDLP, LDL, LDLC, DLDLP, TGLX, TRIGL, TRIGP, CHHD, CHHDX Lab Results Component Value Date/Time Glucose (POC) 223 (H) 12/26/2019 11:13 AM  
 Glucose (POC) 192 (H) 12/26/2019 07:12 AM  
 Glucose (POC) 77 12/17/2019 05:24 PM  
 Glucose (POC) 197 (H) 12/17/2019 11:04 AM  
 Glucose (POC) 138 (H) 12/17/2019 06:34 AM  
 
Lab Results Component Value Date/Time Color YELLOW/STRAW 08/27/2019 12:56 PM  
 Appearance CLEAR 08/27/2019 12:56 PM  
 Specific gravity 1.023 08/27/2019 12:56 PM  
 pH (UA) 6.0 08/27/2019 12:56 PM  
 Protein >300 (A) 08/27/2019 12:56 PM  
 Glucose 500 (A) 08/27/2019 12:56 PM  
 Ketone NEGATIVE  08/27/2019 12:56 PM  
 Bilirubin NEGATIVE  08/27/2019 12:56 PM  
 Urobilinogen 0.2 08/27/2019 12:56 PM  
 Nitrites NEGATIVE  08/27/2019 12:56 PM  
 Leukocyte Esterase NEGATIVE  08/27/2019 12:56 PM  
 Epithelial cells FEW 08/27/2019 12:56 PM  
 Bacteria NEGATIVE  08/27/2019 12:56 PM  
 WBC 0-4 08/27/2019 12:56 PM  
 RBC 5-10 08/27/2019 12:56 PM  
 
 
 
Medications Reviewed:  
 
Current Facility-Administered Medications Medication Dose Route Frequency  [Held by provider] bumetanide (BUMEX) tablet 3 mg  3 mg Oral BID  carvedilol (COREG) tablet 12.5 mg  12.5 mg Oral BID WITH MEALS  cloNIDine HCl (CATAPRES) tablet 0.1 mg  0.1 mg Oral BID  ferrous sulfate tablet 325 mg  325 mg Oral ACB  hydrALAZINE (APRESOLINE) tablet 50 mg  50 mg Oral TID  isosorbide dinitrate (ISORDIL) tablet 20 mg  20 mg Oral TID  magnesium oxide (MAG-OX) tablet 400 mg  400 mg Oral DAILY  fish oil-omega-3 fatty acids 340-1,000 mg capsule 1 Cap  1 Cap Oral BID  simvastatin (ZOCOR) tablet 20 mg  20 mg Oral QHS  spironolactone (ALDACTONE) tablet 25 mg  25 mg Oral DAILY  tamsulosin (FLOMAX) capsule 0.4 mg  0.4 mg Oral DAILY  sodium chloride (NS) flush 5-40 mL  5-40 mL IntraVENous Q8H  
 sodium chloride (NS) flush 5-40 mL  5-40 mL IntraVENous PRN  
  morphine injection 1 mg  1 mg IntraVENous Q4H PRN  
 ondansetron (ZOFRAN) injection 4 mg  4 mg IntraVENous Q4H PRN  pantoprazole (PROTONIX) 40 mg in 0.9% sodium chloride 10 mL injection  40 mg IntraVENous Q12H  hydrALAZINE (APRESOLINE) 20 mg/mL injection 20 mg  20 mg IntraVENous Q6H PRN  
 insulin regular (NOVOLIN R, HUMULIN R) injection   SubCUTAneous AC&HS  
 glucose chewable tablet 16 g  4 Tab Oral PRN  
 glucagon (GLUCAGEN) injection 1 mg  1 mg IntraMUSCular PRN  
 dextrose 10% infusion 0-250 mL  0-250 mL IntraVENous PRN  
 albuterol-ipratropium (DUO-NEB) 2.5 MG-0.5 MG/3 ML  3 mL Nebulization Q4H PRN  
 lactated Ringers infusion  50 mL/hr IntraVENous CONTINUOUS  
 balsam peru-castor oil (VENELEX) ointment   Topical Q8H  peg 3350-electrolytes (COLYTE) 4000 mL  2,000 mL Oral BID  
 
______________________________________________________________________ EXPECTED LENGTH OF STAY: 3d 0h 
ACTUAL LENGTH OF STAY:          0 Vinay Ennis MD  
 
Patient's emergency contacts: 
Extended Emergency Contact Information Primary Emergency Contact: Thelma Montes, [address] [place] Home Phone: [phone] Mobile Phone: [phone] Relation: Spouse  needed? No 
Secondary Emergency Contact: Eduardo Sommer Home Phone: [phone] Mobile Phone: [phone] Relation: Grandchild  needed?  No

## 2019-12-26 NOTE — H&P
HISTORY AND PHYSICAL Abby Davidson MD 
 
 
 
PCP: Faye Espinoza MD 
 
Please note that this dictation was completed with [employer], the computer voice recognition software. Quite often unanticipated grammatical, syntax, homophones, and other interpretive errors are inadvertently transcribed by the computer software. Please disregard these errors. Please excuse any errors that have escaped final proofreading. Chief Complaint:  
GI bleed History of present illness:  
Patient is 81-year-old male with medical history significant for CAD s/p CABG, A. fib, type 2 diabetes, hypertension and CKD stage IV who presents to the emergency department with chief complaint of rectal bleeding. Patient reports, when he woke up this morning he noted BRBRP. He denies any nausea, vomiting, abdominal pain, shortness of breath, dizziness, chest pain, bleeding from any other site, urinary or any other bowel complaints. Patient is not currently on anticoagulation for A. fib (discontinued due to  GI bleed). Patient was recently admitted for similar complaints , colonoscopy was planned but patient declined. In the emergency department V/S were unremarkable. Lab work-ups: Hemoglobin 11.1 (at baseline), creatinine 2.5,  and K3.2. PMH/PSH: 
Past Medical History:  
Diagnosis Date  CAD (coronary artery disease) s/p CABG 2008  Carotid arterial disease ([address]  CKD (chronic kidney disease) stage 3, GFR 30-59 ml/min ([facility]) 10/21/2017  
 hypertension and DM nephrosclerosis  Diabetes mellitus, type 2 ([address]  Diverticulitis  Hypercholesteremia  Hypertension  Paroxysmal atrial fibrillation ([address] 10/21/2017  
 new onset afib with BRPR 10-19-17 admit  PVC's (premature ventricular contractions) 5/26/2014  PVD (peripheral vascular disease) ([address] Past Surgical History:  
Procedure Laterality Date  HX CORONARY ARTERY BYPASS GRAFT    
 HX HEART CATHETERIZATION    
  OK TCAT INSJ/RPL PERM LEADLESS PACEMAKER RV W/IMG N/A 5/9/2019 INSERT OR REPLACE TRANSCATH PPM LEADLESS [place] [address], [facility]/s [facility] Home meds:  
Prior to Admission medications Medication Sig Start Date End Date Taking? Authorizing Provider  
insulin glargine (LANTUS U-100 INSULIN) 100 unit/mL injection 3-7 Units by SubCUTAneous route nightly. Pt reports he uses 3-7 units depending on BG    Provider, Historical  
pantoprazole (PROTONIX) 40 mg tablet Take 1 Tab by mouth daily for 30 days. 12/17/19 1/16/20  Desirae rFitz MD  
cloNIDine HCl (CATAPRES) 0.1 mg tablet Take 0.1 mg by mouth two (2) times a day. Other, MD Gilbert  
bumetanide (BUMEX) 1 mg tablet Take 3 Tabs by mouth two (2) times a day. 12/10/19   Matilde Adhikari MD  
hydrALAZINE (APRESOLINE) 50 mg tablet Take 1 Tab by mouth three (3) times daily. 12/10/19   Matilde Adhikari MD  
spironolactone (ALDACTONE) 25 mg tablet Take 1 Tab by mouth daily. 12/11/19   Matilde Adhikari MD  
magnesium oxide (MAG-OX) 400 mg tablet Take 1 Tab by mouth daily. 12/11/19   Matilde Adhikari MD  
isosorbide dinitrate (ISORDIL) 20 mg tablet Take 1 Tab by mouth three (3) times daily. 12/10/19   Matilde Adhikari MD  
ferrous sulfate (IRON) 325 mg (65 mg iron) tablet Take 325 mg by mouth Daily (before breakfast). Provider, Historical  
carvedilol (COREG) 12.5 mg tablet Take 12.5 mg by mouth two (2) times daily (with meals). Provider, Historical  
simvastatin (ZOCOR) 20 mg tablet Take 1 Tab by mouth nightly. 11/21/19   David Robertson MD  
albuterol (PROVENTIL VENTOLIN) 2.5 mg /3 mL (0.083 %) nebulizer solution 3 mL by Nebulization route every four (4) hours as needed for Wheezing. 5/14/19   Patricia Law MD  
tamsulosin (FLOMAX) 0.4 mg capsule Take 1 Cap by mouth daily. 5/13/19   Keya Howell,   
omega 9-sfj-hdu-fish oil (FISH OIL) 100-160-1,000 mg cap Take 1 Cap by mouth two (2) times a day.     Provider, Historical  
 cinnamon bark (CINNAMON) 500 mg cap Take 1,000 mg by mouth two (2) times a day. Provider, Historical  
 
 
Allergies: Allergies Allergen Reactions  Lisinopril Cough FH: No family history on file. SH: Social History Tobacco Use  Smoking status: Former Smoker Packs/day: 3.00 Years: 20.00 Pack years: 60.00 Types: Cigarettes Last attempt to quit: 1985 Years since quittin.9  Smokeless tobacco: Never Used Substance Use Topics  Alcohol use: No  
 
 
ROS:  
Constitutional: Negative for chills and fever. HENT: Negative for congestion and sore throat. Eyes: Negative for pain and redness. Respiratory: Negative for cough and shortness of breath. Cardiovascular: Negative for chest pain and palpitations. Gastrointestinal:Negative for abdominal pain, diarrhea, nausea and vomiting. Genitourinary: Negative for frequency and hematuria. Musculoskeletal: Negative for back pain and neck pain. Skin: Negative for rash and wound. Neurological: Negative for dizziness and headaches. Hematological: Does not bruise/bleed easily. All other systems were negative PHYSICAL EXAM: 
Visit Vitals /63 (BP 1 Location: Left arm, BP Patient Position: At rest) Pulse 74 Temp 98 °F (36.7 °C) Resp 16 Wt 68.5 kg (151 lb) SpO2 99% BMI 22.30 kg/m² General:          Alert, cooperative, no distress, appears stated age. HEENT:           Atraumatic, anicteric sclerae, pink conjunctivae No oral ulcers, mucosa moist, throat clear, dentition fair Neck:               Supple, symmetrical,  thyroid: non tender Lungs:             Clear to auscultation bilaterally. No Wheezing or Rhonchi. No rales. Chest wall:      No tenderness  No Accessory muscle use. Heart:              Regular  rhythm,  No  murmur   No edema Abdomen:        Soft, non-tender. Not distended.   Bowel sounds normal 
 Extremities:     No cyanosis. No clubbing,   
                        Skin turgor normal, Capillary refill normal, Radial dial pulse 2+ Skin:                Not pale. Not Jaundiced  No rashes Psych:             Not anxious or agitated. Neurologic:     Alert and oriented to PPT, CNII-XII intact. Motor and sensory exam grossly intact. Labs/Imaging: 
Recent Results (from the past 24 hour(s)) SAMPLES BEING HELD Collection Time: 12/26/19  3:45 AM  
Result Value Ref Range SAMPLES BEING HELD 1RED COMMENT Add-on orders for these samples will be processed based on acceptable specimen integrity and analyte stability, which may vary by analyte. CBC WITH AUTOMATED DIFF Collection Time: 12/26/19  3:51 AM  
Result Value Ref Range WBC 6.5 4.1 - 11.1 K/uL  
 RBC 4.06 (L) 4.10 - 5.70 M/uL  
 HGB 11.1 (L) 12.1 - 17.0 g/dL HCT 35.4 (L) 36.6 - 50.3 % MCV 87.2 80.0 - 99.0 FL  
 MCH 27.3 26.0 - 34.0 PG  
 MCHC 31.4 30.0 - 36.5 g/dL  
 RDW 15.9 (H) 11.5 - 14.5 % PLATELET 828 275 - 821 K/uL MPV 10.6 8.9 - 12.9 FL  
 NRBC 0.0 0  WBC ABSOLUTE NRBC 0.00 0.00 - 0.01 K/uL NEUTROPHILS 74 32 - 75 % LYMPHOCYTES 11 (L) 12 - 49 % MONOCYTES 9 5 - 13 % EOSINOPHILS 4 0 - 7 % BASOPHILS 1 0 - 1 % IMMATURE GRANULOCYTES 1 (H) 0.0 - 0.5 % ABS. NEUTROPHILS 4.7 1.8 - 8.0 K/UL  
 ABS. LYMPHOCYTES 0.7 (L) 0.8 - 3.5 K/UL  
 ABS. MONOCYTES 0.6 0.0 - 1.0 K/UL  
 ABS. EOSINOPHILS 0.3 0.0 - 0.4 K/UL  
 ABS. BASOPHILS 0.1 0.0 - 0.1 K/UL  
 ABS. IMM. GRANS. 0.1 (H) 0.00 - 0.04 K/UL  
 DF SMEAR SCANNED    
 RBC COMMENTS HYPOCHROMIA 2+ 
    
 RBC COMMENTS OVALOCYTES PRESENT 
    
 RBC COMMENTS ANISOCYTOSIS 
1+ METABOLIC PANEL, COMPREHENSIVE Collection Time: 12/26/19  3:51 AM  
Result Value Ref Range Sodium 134 (L) 136 - 145 mmol/L Potassium 3.2 (L) 3.5 - 5.1 mmol/L Chloride 97 97 - 108 mmol/L  
 CO2 32 21 - 32 mmol/L  Anion gap 5 5 - 15 mmol/L  
 Glucose 237 (H) 65 - 100 mg/dL BUN 58 (H) 6 - 20 MG/DL Creatinine 2.50 (H) 0.70 - 1.30 MG/DL  
 BUN/Creatinine ratio 23 (H) 12 - 20 GFR est AA 30 (L) >60 ml/min/1.73m2 GFR est non-AA 25 (L) >60 ml/min/1.73m2 Calcium 8.6 8.5 - 10.1 MG/DL Bilirubin, total 0.5 0.2 - 1.0 MG/DL  
 ALT (SGPT) 62 12 - 78 U/L  
 AST (SGOT) 66 (H) 15 - 37 U/L Alk. phosphatase 302 (H) 45 - 117 U/L Protein, total 7.3 6.4 - 8.2 g/dL Albumin 2.3 (L) 3.5 - 5.0 g/dL Globulin 5.0 (H) 2.0 - 4.0 g/dL A-G Ratio 0.5 (L) 1.1 - 2.2 TYPE & SCREEN Collection Time: 12/26/19  3:51 AM  
Result Value Ref Range Crossmatch Expiration 12/29/2019 ABO/Rh(D) A NEGATIVE Antibody screen NEG PROTHROMBIN TIME + INR Collection Time: 12/26/19  3:51 AM  
Result Value Ref Range INR 1.1 0.9 - 1.1 Prothrombin time 11.0 9.0 - 11.1 sec OCCULT BLOOD, STOOL Collection Time: 12/26/19  3:51 AM  
Result Value Ref Range Occult blood, stool POSITIVE (A) NEG Recent Labs  
  12/26/19 
1836 WBC 6.5 HGB 11.1*  
HCT 35.4*  
 Recent Labs  
  12/26/19 
6304 * K 3.2*  
CL 97  
CO2 32 BUN 58* CREA 2.50* * CA 8.6 Recent Labs  
  12/26/19 
5645 SGOT 66* ALT 62 * TBILI 0.5 TP 7.3 ALB 2.3*  
GLOB 5.0* No results for input(s): CPK, CKNDX, TROIQ in the last 72 hours. No lab exists for component: CPKMB Recent Labs  
  12/26/19 
3134 INR 1.1 PTP 11.0 No results for input(s): PH, PCO2, PO2 in the last 72 hours. XR CHEST PORT Narrative: Portable chest 2 views dated 12/10/2019 Comparison chest dated 12/3/2019 History is heart failure 2 portable AP upright views of the chest were obtained. The patient is slightly 
rotated towards the right. The cardiac silhouette continues to be enlarged. There continues to be diffuse abnormal alveolar opacity involving both lungs. This is compatible with congestive change/pulmonary edema. Superimposed 
infiltration cannot be excluded. The costophrenic angles are relatively sharp in 
appearance. There is evidence of a previous thoracotomy/CABG. Impression: IMPRESSION: Evidence of congestive change/pulmonary edema. Assessment & Plan: 
==================== 
GI bleed Likely lower GI  2/2 diverticular bleed IV PPI IV fluid Hemoglobin at baseline (11.1) , trend every 6 hours Transfuse if hemoglobin less than 7 Admitted 2 weeks ago with similar presentation, colonoscopy planned but patient declined N.p.o. 
GI consult IV fluid Hypokalemia Replace Check magnesium Mild hyponatremia Likely hypovolemic Expect correction with IV fluid Type 2 diabetes, uncontrolled with hyperglycemia Recent A1c 7.7 SSI Accu-Checks Advanced COPD with chronic respiratory failure on home oxygen Continue supplemental O2 and monitor O2 sat Continue BT as needed CKD 4 Cr at baseline Avoid nephrotoxic medications and renally dose meds A. fib  
rate controlled  
not on anticoagulation CAD s/p CABG Continue holding ASA Continue other home meds Hypertension Stable Hydralazine IV as needed Hx of PVD 
stable 
s/p arthrectomy to distal SFA and distal popliteal with angioplasty to both lesions and stent to SFA Other comorbidity continue home meds, adjust as needed Patient's Baseline: Ambulates with walking DVT ppx: SCD Code status: Full Disposition: TBD Care plan discussed with patient and nurse. Signed By: Murray Paulino MD   
 December 26, 2019

## 2019-12-26 NOTE — CONSULTS
[facility] 
 [address] [facility], [facility] [address] [facility] Phone: [phone] NOTE Patient: Annette Montgomery MRN: [id number]  PCP: Yvette Layton MD  
:     1935  Age:   81 y.o. Sex:  male Referring physician: Edith Silva MD 
Reason for consultation: 81 y.o. male with GI bleed [Q56.1] complicated by KATALINA Admission Date: 2019  3:33 AM  LOS: 0 days ASSESSMENT and PLAN :  
CKD IV: 
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 
- baseline Cr at best : 2.5- 3 mg/dl when euvolemic 
-He looks euvolemic on exam 
-Agree with holding diuretics given his ongoing GI bleeding 
-I have reduced the rate of IV fluids to 50 cc an hour and stop it this evening if hemoglobin remained stable 
-He is considered high risk for contrast nephropathy given his advanced CKD. However if he has no other alternatives to control his GI bleed I suggested that he proceed with CTA and embolization. He wants to wait until the bleeding scan to decide the next steps 
  
Chronic hyponatremia  
-This is secondary to CKD and CHF 
-Sodium stable 
-Avoid thiazide diuretics 
  
COPD Pulmonary fibrosis Pulm HTN: 
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   
-Stable from respiratory standpoint Hypertension: Stable 
  
Bradycardia : 
- s/p PPM on  
  
Recurrent lower GI bleed 
-Presumed diverticular bleed 
-Management per GI 
  
Chronic A. fib Chronic diastolic congestive heart failure CAD s/p CABG 
  
DM2: 
- on insulin Care Plan discussed with: Patient and RN Thank you for consulting [facility] in the care of your patient. Subjective: HPI: Annette Montgomery is a 81 y.o.   male who is well-known to the renal service due to chronic kidney disease stage IV with a baseline creatinine of 2.5 to 3 mg/dL and followed closely by Dr. Logan Orta as an outpatient presented to the emergency room last night with complaints of rectal bleeding. His hemoglobin is in the 11 range and his prior hemoglobin was close to 11 when he was last discharged from the hospital 2 weeks ago. He was evaluated by GI and felt to have recurrence of diverticular bleed. He is currently hemodynamically stable. He has been ordered to undergo nuclear bleeding scan and if positive GI recommended angioembolization. Patient was not thrilled to hear about angioembolization given the risk for contrast nephropathy and risk for needing dialysis post procedure. Nephrology has been asked to see the patient for evaluation and management of CKD stage IV. Past Medical Hx:  
Past Medical History:  
Diagnosis Date  CAD (coronary artery disease) s/p CABG 2008  Carotid arterial disease ([facility]  CKD (chronic kidney disease) stage 3, GFR 30-59 ml/min ([facility]) 10/21/2017  
 hypertension and DM nephrosclerosis  Diabetes mellitus, type 2 ([address]  Diverticulitis  Hypercholesteremia  Hypertension  Paroxysmal atrial fibrillation ([facility] 10/21/2017  
 new onset afib with BRPR 10-19-17 admit  PVC's (premature ventricular contractions) 5/26/2014  PVD (peripheral vascular disease) ([facility] Past Surgical Hx: 
  
Past Surgical History:  
Procedure Laterality Date  HX CORONARY ARTERY BYPASS GRAFT    
 HX HEART CATHETERIZATION    
 NC TCAT INSJ/RPL PERM LEADLESS PACEMAKER RV W/IMG N/A 5/9/2019 INSERT OR REPLACE TRANSCATH PPM LEADLESS performed by Gilberto Yanez MD at [address] CATH LAB Allergies Allergen Reactions  Lisinopril Cough Social Hx:  reports that he quit smoking about 34 years ago. His smoking use included cigarettes. He has a 60.00 pack-year smoking history. He has never used smokeless tobacco. He reports that he does not drink alcohol or use drugs. No family history on file. Review of Systems: A thorough twelve point review of system was performed today. Pertinent positives and negatives are mentioned in the HPI. The reminder of the ROS is negative and noncontributory. Objective:   
Vitals:   
Vitals:  
 12/26/19 1693 12/26/19 0515 12/26/19 2106 12/26/19 7078 BP: 164/73 164/63 154/82 169/73 Pulse: 83 74 64 69 Resp: 16 16 16 18 Temp:  98 °F (36.7 °C) 97.4 °F (36.3 °C) 97.7 °F (36.5 °C) SpO2: 96% 99% 90% 90% Weight: 68.5 kg (151 lb) I&O's:  No intake/output data recorded. Visit Vitals /73 (BP 1 Location: Left arm, BP Patient Position: At rest) Pulse 69 Temp 97.7 °F (36.5 °C) Resp 18 Wt 68.5 kg (151 lb) SpO2 90% BMI 22.30 kg/m² Physical Exam: 
General:  No apparent Distress HEENT: PERRL,  No Pallor , No Icterus Neck: Supple,no mass palpable Lungs : CTA 
CVS: RRR, S1 S2 normal, No murmur Abdomen: Soft, NT, BS + Extremities: Edema Skin: No rash or lesions. MS: No joint swelling, erythema, warmth Neurologic: non focal, AAO x 3 Psych: normal affect Laboratory Results: 
 
Recent Labs  
  12/26/19 
0351 * K 3.2*  
CL 97  
CO2 32 * BUN 58* CREA 2.50* CA 8.6 MG 2.1 ALB 2.3*  
SGOT 66* ALT 62 INR 1.1 Recent Labs  
  12/26/19 
5843 WBC 6.5 HGB 11.1*  
HCT 35.4*  
 No results found for: SDES Lab Results Component Value Date/Time Culture result: (A) 12/04/2019 03:27 AM  
  FEW * METHICILLIN RESISTANT STAPHYLOCOCCUS AUREUS * Culture result: (A) 12/04/2019 03:27 AM  
  LIGHT STENOTROPHOMONAS Ana Rosa Noon.) MALTOPHILIA CLSI GUIDELINES DO NOT RECOMMEND REPORTING SUSCEPTIBILITIES FOR S. MALTOPHILIA. TRIMETHOPRIM/SULFAMETHOXAZOLE IS THE DRUG OF CHOICE. Culture result: HEAVY NORMAL RESPIRATORY JUDE 12/04/2019 03:27 AM  
 
Recent Results (from the past 24 hour(s)) SAMPLES BEING HELD Collection Time: 12/26/19  3:45 AM  
Result Value Ref Range SAMPLES BEING HELD 1RED COMMENT Add-on orders for these samples will be processed based on acceptable specimen integrity and analyte stability, which may vary by analyte. CBC WITH AUTOMATED DIFF Collection Time: 12/26/19  3:51 AM  
Result Value Ref Range WBC 6.5 4.1 - 11.1 K/uL  
 RBC 4.06 (L) 4.10 - 5.70 M/uL  
 HGB 11.1 (L) 12.1 - 17.0 g/dL HCT 35.4 (L) 36.6 - 50.3 % MCV 87.2 80.0 - 99.0 FL  
 MCH 27.3 26.0 - 34.0 PG  
 MCHC 31.4 30.0 - 36.5 g/dL  
 RDW 15.9 (H) 11.5 - 14.5 % PLATELET 094 149 - 444 K/uL MPV 10.6 8.9 - 12.9 FL  
 NRBC 0.0 0  WBC ABSOLUTE NRBC 0.00 0.00 - 0.01 K/uL NEUTROPHILS 74 32 - 75 % LYMPHOCYTES 11 (L) 12 - 49 % MONOCYTES 9 5 - 13 % EOSINOPHILS 4 0 - 7 % BASOPHILS 1 0 - 1 % IMMATURE GRANULOCYTES 1 (H) 0.0 - 0.5 % ABS. NEUTROPHILS 4.7 1.8 - 8.0 K/UL  
 ABS. LYMPHOCYTES 0.7 (L) 0.8 - 3.5 K/UL  
 ABS. MONOCYTES 0.6 0.0 - 1.0 K/UL  
 ABS. EOSINOPHILS 0.3 0.0 - 0.4 K/UL  
 ABS. BASOPHILS 0.1 0.0 - 0.1 K/UL  
 ABS. IMM. GRANS. 0.1 (H) 0.00 - 0.04 K/UL  
 DF SMEAR SCANNED    
 RBC COMMENTS HYPOCHROMIA 2+ 
    
 RBC COMMENTS OVALOCYTES PRESENT 
    
 RBC COMMENTS ANISOCYTOSIS 
1+ METABOLIC PANEL, COMPREHENSIVE Collection Time: 12/26/19  3:51 AM  
Result Value Ref Range Sodium 134 (L) 136 - 145 mmol/L Potassium 3.2 (L) 3.5 - 5.1 mmol/L Chloride 97 97 - 108 mmol/L  
 CO2 32 21 - 32 mmol/L Anion gap 5 5 - 15 mmol/L Glucose 237 (H) 65 - 100 mg/dL BUN 58 (H) 6 - 20 MG/DL Creatinine 2.50 (H) 0.70 - 1.30 MG/DL  
 BUN/Creatinine ratio 23 (H) 12 - 20 GFR est AA 30 (L) >60 ml/min/1.73m2 GFR est non-AA 25 (L) >60 ml/min/1.73m2 Calcium 8.6 8.5 - 10.1 MG/DL Bilirubin, total 0.5 0.2 - 1.0 MG/DL  
 ALT (SGPT) 62 12 - 78 U/L  
 AST (SGOT) 66 (H) 15 - 37 U/L Alk. phosphatase 302 (H) 45 - 117 U/L Protein, total 7.3 6.4 - 8.2 g/dL Albumin 2.3 (L) 3.5 - 5.0 g/dL Globulin 5.0 (H) 2.0 - 4.0 g/dL A-G Ratio 0.5 (L) 1.1 - 2.2 TYPE & SCREEN  
 Collection Time: 12/26/19  3:51 AM  
Result Value Ref Range Crossmatch Expiration 12/29/2019 ABO/Rh(D) A NEGATIVE Antibody screen NEG PROTHROMBIN TIME + INR Collection Time: 12/26/19  3:51 AM  
Result Value Ref Range INR 1.1 0.9 - 1.1 Prothrombin time 11.0 9.0 - 11.1 sec OCCULT BLOOD, STOOL Collection Time: 12/26/19  3:51 AM  
Result Value Ref Range Occult blood, stool POSITIVE (A) NEG MAGNESIUM Collection Time: 12/26/19  3:51 AM  
Result Value Ref Range Magnesium 2.1 1.6 - 2.4 mg/dL GLUCOSE, POC Collection Time: 12/26/19  7:12 AM  
Result Value Ref Range Glucose (POC) 192 (H) 65 - 100 mg/dL Performed by Winston Ramirez Urine dipstick:  
Lab Results Component Value Date/Time Color YELLOW/STRAW 08/27/2019 12:56 PM  
 Appearance CLEAR 08/27/2019 12:56 PM  
 Specific gravity 1.023 08/27/2019 12:56 PM  
 pH (UA) 6.0 08/27/2019 12:56 PM  
 Protein >300 (A) 08/27/2019 12:56 PM  
 Glucose 500 (A) 08/27/2019 12:56 PM  
 Ketone NEGATIVE  08/27/2019 12:56 PM  
 Bilirubin NEGATIVE  08/27/2019 12:56 PM  
 Urobilinogen 0.2 08/27/2019 12:56 PM  
 Nitrites NEGATIVE  08/27/2019 12:56 PM  
 Leukocyte Esterase NEGATIVE  08/27/2019 12:56 PM  
 Epithelial cells FEW 08/27/2019 12:56 PM  
 Bacteria NEGATIVE  08/27/2019 12:56 PM  
 WBC 0-4 08/27/2019 12:56 PM  
 RBC 5-10 08/27/2019 12:56 PM  
 
 
I have reviewed the following: All pertinent labs, microbiology data, radiology imaging for my assessment Medications list Personally Reviewed   [x]      Yes     []               No    
 
Medications: 
Prior to Admission medications Medication Sig Start Date End Date Taking? Authorizing Provider  
insulin glargine (LANTUS U-100 INSULIN) 100 unit/mL injection 3-7 Units by SubCUTAneous route nightly.  Pt reports he uses 3-7 units depending on BG    Provider, Historical  
pantoprazole (PROTONIX) 40 mg tablet Take 1 Tab by mouth daily for 30 days. 12/17/19 1/16/20  Lam, Artnik Infante MD  
cloNIDine HCl (CATAPRES) 0.1 mg tablet Take 0.1 mg by mouth two (2) times a day. Gilbert Patel MD  
bumetanide (BUMEX) 1 mg tablet Take 3 Tabs by mouth two (2) times a day. 12/10/19   Bobby Beckford MD  
hydrALAZINE (APRESOLINE) 50 mg tablet Take 1 Tab by mouth three (3) times daily. 12/10/19   Bobby Beckford MD  
spironolactone (ALDACTONE) 25 mg tablet Take 1 Tab by mouth daily. 12/11/19   Bobby Beckford MD  
magnesium oxide (MAG-OX) 400 mg tablet Take 1 Tab by mouth daily. 12/11/19   Bobby Beckford MD  
isosorbide dinitrate (ISORDIL) 20 mg tablet Take 1 Tab by mouth three (3) times daily. 12/10/19   Bobby Beckford MD  
ferrous sulfate (IRON) 325 mg (65 mg iron) tablet Take 325 mg by mouth Daily (before breakfast). Provider, Historical  
carvedilol (COREG) 12.5 mg tablet Take 12.5 mg by mouth two (2) times daily (with meals). Provider, Historical  
simvastatin (ZOCOR) 20 mg tablet Take 1 Tab by mouth nightly. 11/21/19   David Robertson MD  
albuterol (PROVENTIL VENTOLIN) 2.5 mg /3 mL (0.083 %) nebulizer solution 3 mL by Nebulization route every four (4) hours as needed for Wheezing. 5/14/19   Wilder Duckworth MD  
tamsulosin (FLOMAX) 0.4 mg capsule Take 1 Cap by mouth daily. 5/13/19   Keya Radford,   
omega 2-ndk-xzn-fish oil (FISH OIL) 100-160-1,000 mg cap Take 1 Cap by mouth two (2) times a day. Provider, Historical  
cinnamon bark (CINNAMON) 500 mg cap Take 1,000 mg by mouth two (2) times a day. Provider, Historical  
  
 
Thank you for allowing us to participate in the care of this patient. We will follow patient. Please dont hesitate to call with any questions Rubio Tompkins, [address] [facility] [address] [facility], [address] [place] [address] Phone - [phone] Fax - [phone] 
[email or web address]

## 2019-12-26 NOTE — PROGRESS NOTES
Problem: Falls - Risk of 
Goal: *Absence of Falls Description Document Saloni Patches Fall Risk and appropriate interventions in the flowsheet. Outcome: Progressing Towards Goal 
Note: Fall Risk Interventions: 
Mobility Interventions: Patient to call before getting OOB Medication Interventions: Assess postural VS orthostatic hypotension Elimination Interventions: Call light in reach

## 2019-12-26 NOTE — CONSULTS
[facility] 
[address] [facility] [address] [place], [address] 
[phone] GI CONSULTATION NOTE Shawn Stephens AGACNP-BC Work Cell: [phone] NAME:  Roberto Miranda Sr.  
:   1935 MRN:   [id number] Referring Provider: Dr. Aleksey Mayberry Consult Date: 2019 Chief Complaint: Rectal bleeding History of Present Illness:  Patient is a 81 y.o. who is seen in consultation at the request of Dr. Aleksey Mayberry for GI bleed. Mr. Pavan Payton has a complex PMH as detailed below. He presented to the hospital with rectal bleeding (was recently seen for the same and discharged ). Onset of this was early this morning around 1 am. He felt urge to defecate and upon doing so passed moderate amount of red blood and clots w/o stool. Had a couple of episodes at home, in ER and again this morning. Denies any fever, chills, n/v or abdominal pain. Hgb 11.1. Cannot recall when last colonoscopy was. No known FH of colon cancer.  
  
He was seen for the same in  at which point in time bleeding scan showed active bleeding in descending/sigmoid colon; however subsequent angiogram was negative. Bleeding resolved w/o intervention. PMH: 
Past Medical History:  
Diagnosis Date  CAD (coronary artery disease) s/p CABG   [facility]ot[facility] arterial [facility] ([facility]  CKD (chronic kidney disease) stage 3, GFR 30-59 ml/min ([facility] 10/21/2017  
 hypertension and DM nephrosclerosis  Diabetes mellitus, type 2 ([address]  Diverticulitis  Hypercholesteremia  Hypertension  Paroxysmal atrial fibrillation ([address] 10/21/2017  
 new onset afib with BRPR 10-19-17 admit  PVC's (premature ventricular contractions) 2014  PVD (peripheral vascular disease) ([address] PSH: 
Past Surgical History:  
Procedure Laterality Date  HX CORONARY ARTERY BYPASS GRAFT    
 HX HEART CATHETERIZATION    
 [place] TCAT INSJ/RPL PERM LEADLESS PACEMAKER RV W/IMG N/A 2019 INSERT OR REPLACE TRANSCATH PPM LEADLESS performed by Octavio Hammans, MD at [address] [facility] [address] [facility] [facility] Allergies: Allergies Allergen Reactions  Lisinopril Cough Home Medications: 
Prior to Admission Medications Prescriptions Last Dose Informant Patient Reported? Taking? albuterol (PROVENTIL VENTOLIN) 2.5 mg /3 mL (0.083 %) nebulizer solution  Self No No  
Sig: 3 mL by Nebulization route every four (4) hours as needed for Wheezing. bumetanide (BUMEX) 1 mg tablet   No No  
Sig: Take 3 Tabs by mouth two (2) times a day. carvedilol (COREG) 12.5 mg tablet   Yes No  
Sig: Take 12.5 mg by mouth two (2) times daily (with meals). cinnamon bark (CINNAMON) 500 mg cap  Self Yes No  
Sig: Take 1,000 mg by mouth two (2) times a day. cloNIDine HCl (CATAPRES) 0.1 mg tablet   Yes No  
Sig: Take 0.1 mg by mouth two (2) times a day. ferrous sulfate (IRON) 325 mg (65 mg iron) tablet   Yes No  
Sig: Take 325 mg by mouth Daily (before breakfast). hydrALAZINE (APRESOLINE) 50 mg tablet   No No  
Sig: Take 1 Tab by mouth three (3) times daily. insulin glargine (LANTUS U-100 INSULIN) 100 unit/mL injection   Yes No  
Sig: 3-7 Units by SubCUTAneous route nightly. Pt reports he uses 3-7 units depending on BG  
isosorbide dinitrate (ISORDIL) 20 mg tablet   No No  
Sig: Take 1 Tab by mouth three (3) times daily. magnesium oxide (MAG-OX) 400 mg tablet   No No  
Sig: Take 1 Tab by mouth daily. omega 3-dha-epa-fish oil (FISH OIL) 100-160-1,000 mg cap  Self Yes No  
Sig: Take 1 Cap by mouth two (2) times a day. pantoprazole (PROTONIX) 40 mg tablet   No No  
Sig: Take 1 Tab by mouth daily for 30 days. simvastatin (ZOCOR) 20 mg tablet   No No  
Sig: Take 1 Tab by mouth nightly. spironolactone (ALDACTONE) 25 mg tablet   No No  
Sig: Take 1 Tab by mouth daily. tamsulosin (FLOMAX) 0.4 mg capsule  Self No No  
Sig: Take 1 Cap by mouth daily. Facility-Administered Medications: None Hospital Medications: Current Facility-Administered Medications Medication Dose Route Frequency  [Held by provider] bumetanide (BUMEX) tablet 3 mg  3 mg Oral BID  carvedilol (COREG) tablet 12.5 mg  12.5 mg Oral BID WITH MEALS  cloNIDine HCl (CATAPRES) tablet 0.1 mg  0.1 mg Oral BID  ferrous sulfate tablet 325 mg  325 mg Oral ACB  hydrALAZINE (APRESOLINE) tablet 50 mg  50 mg Oral TID  isosorbide dinitrate (ISORDIL) tablet 20 mg  20 mg Oral TID  magnesium oxide (MAG-OX) tablet 400 mg  400 mg Oral DAILY  fish oil-omega-3 fatty acids 340-1,000 mg capsule 1 Cap  1 Cap Oral BID  simvastatin (ZOCOR) tablet 20 mg  20 mg Oral QHS  spironolactone (ALDACTONE) tablet 25 mg  25 mg Oral DAILY  tamsulosin (FLOMAX) capsule 0.4 mg  0.4 mg Oral DAILY  sodium chloride (NS) flush 5-40 mL  5-40 mL IntraVENous Q8H  
 sodium chloride (NS) flush 5-40 mL  5-40 mL IntraVENous PRN  
 morphine injection 1 mg  1 mg IntraVENous Q4H PRN  
 ondansetron (ZOFRAN) injection 4 mg  4 mg IntraVENous Q4H PRN  pantoprazole (PROTONIX) 40 mg in 0.9% sodium chloride 10 mL injection  40 mg IntraVENous Q12H  hydrALAZINE (APRESOLINE) 20 mg/mL injection 20 mg  20 mg IntraVENous Q6H PRN  
 insulin regular (NOVOLIN R, HUMULIN R) injection   SubCUTAneous AC&HS  
 glucose chewable tablet 16 g  4 Tab Oral PRN  
 glucagon (GLUCAGEN) injection 1 mg  1 mg IntraMUSCular PRN  
 dextrose 10% infusion 0-250 mL  0-250 mL IntraVENous PRN  
 lactated Ringers infusion  75 mL/hr IntraVENous CONTINUOUS  
 albuterol-ipratropium (DUO-NEB) 2.5 MG-0.5 MG/3 ML  3 mL Nebulization Q4H PRN Social History: 
Social History Tobacco Use  Smoking status: Former Smoker Packs/day: 3.00 Years: 20.00 Pack years: 60.00 Types: Cigarettes Last attempt to quit: 1985 Years since quittin.9  Smokeless tobacco: Never Used Substance Use Topics  Alcohol use: No  
 
 
Family History: No family history on file. Review of Systems: 
 
Constitutional: negative fever, negative chills, negative weight loss Eyes:   negative visual changes ENT:   negative sore throat, tongue or lip swelling Respiratory:  negative cough, negative dyspnea Cards:  negative for chest pain, palpitations, lower extremity edema GI:   See HPI 
:  negative for frequency, dysuria Integument:  negative for rash and pruritus Heme:  negative for easy bruising and gum/nose bleeding Musculoskel: negative for myalgias, back pain and muscle weakness Neuro: negative for headaches, dizziness, vertigo Psych:  negative for feelings of anxiety, depression Objective:  
 
Patient Vitals for the past 8 hrs: 
 BP Temp Pulse Resp SpO2 Weight 12/26/19 0733 169/73 97.7 °F (36.5 °C) 69 18 90 %   
12/26/19 0641 154/82 97.4 °F (36.3 °C) 64 16 90 %   
12/26/19 0515 164/63 98 °F (36.7 °C) 74 16 99 %   
12/26/19 0357 164/73  83 16 96 % 68.5 kg (151 lb) 12/26/19 0339 140/73 98.1 °F (36.7 °C) 82 16 94 %  No intake/output data recorded. No intake/output data recorded. PHYSICAL EXAM: 
General: WD, Elderly. Frail appearing. Alert, cooperative, no acute distress.   
HEENT: NC, Atraumatic. PERRLA, EOMI. Anicteric sclerae. Lungs:  CTA Bilaterally. No Wheezing/Rhonchi/Rales. Heart:  Regular rate and rhythm, No murmur, No Rubs, No Gallops Abdomen: Soft, non-distended, non-tender.  +Bowel sounds, no HSM Extremities: No c/c/e Neurologic:  Alert and oriented X 3. No acute neurological distress. Psych:   Good insight. Not anxious nor agitated. Data Review Recent Labs  
  12/26/19 
2480 WBC 6.5 HGB 11.1*  
HCT 35.4*  
 Recent Labs  
  12/26/19 
5814 * K 3.2*  
CL 97  
CO2 32 BUN 58* CREA 2.50* * CA 8.6 Recent Labs  
  12/26/19 
9541 SGOT 66* * TP 7.3 ALB 2.3*  
GLOB 5.0* Recent Labs  
  12/26/19 
4656 INR 1.1 PTP 11.0 Imaging studies reviewed Assessment: 1. GI bleed - presumed recurrent diverticular bleed; other considerations include hemorrhoids, cannot completely r/o polyps, AVMs, neoplasia, etc.  
 2. Acute blood loss anemia - currently w/o significant drop in H&H 3. COPD 4. CHF 5. CKD 6. Chronic afib - not on anticoagulation Patient Active Problem List  
Diagnosis Code  Bradycardia R00.1  CAD (coronary artery disease) I25.10  Hypertension, essential, benign I10  
 Carotid arterial disease ([facility]) I73.9  Hypercholesteremia E78.00  
 PVD (peripheral vascular disease) ([facility]) I73.9  PVC's (premature ventricular contractions) I49.3  Dyspnea R06.00  Type 2 diabetes mellitus with diabetic peripheral angiopathy without gangrene ([facility]) E11.51  
 CKD (chronic kidney disease) N18.9  Acute renal failure (ARF) ([facility]) N17.9  Hypokalemia E87.6  Generalized weakness R53.1  Elevated troponin R79.89  
 KATALINA (acute kidney injury) ([facility] N17.9  GI bleed K92.2  Hyperglycemia due to type 2 diabetes mellitus ([facility] E11.65  Atrial fibrillation, chronic I48.20  CKD (chronic kidney disease) stage 3, GFR 30-59 ml/min ([facility]) N18.3  Type 2 diabetes with nephropathy ([facility]) E11.21  
 CHF (congestive heart failure) ([facility]) I50.9  Atrial fibrillation with slow ventricular response ([facility]) I48.91  
 Pacemaker Z95.0  Hypoglycemia E16.2  CHF exacerbation ([facility]) I50.9  Rectal bleeding K62.5 Plan:  
-Keep NPO for now 
-Supportive management per primary team 
-NM bleed scan - if positive, recommend CTA w/ IR embolization if possible  
-Nephrology consult  
-Consider colonoscopy depending upon clinical course; however significant comorbid conditions make it more risky procedure  
-Monitor H&H  
-Transfuse as necessary  
-Discussed with Dr. Juan Francisco Patrick  
-Will follow 
-Thank you kindly for allowing us to participate in the care of this patient

## 2019-12-26 NOTE — ROUTINE PROCESS
Bedside shift change report given to Chuck De La Rosa RN (oncoming nurse) by Iain Christine RN (offgoing nurse). Report included the following information SBAR, Kardex, Intake/Output, MAR, Recent Results and Cardiac Rhythm NSR.

## 2019-12-26 NOTE — ED PROVIDER NOTES
81 y.o. male recent admit for GIB here today with rectal bleeding. Went to bed feeling fine. This morning woke up and noted large volume of bright red blood which he tracked into the bathroom. States he had over a cup worth in volume. Denies abd pain, dizziness, sob, or chest pain. Is not on any anticoagulation. Per chart review patient admitted for same 2 weeks ago. Refused colonoscopy. In past had diverticular bleed seen on CT bleeding study. Past Medical History:  
Diagnosis Date  CAD (coronary artery disease) s/p CABG 2008  Carotid arterial disease ([facility]  CKD (chronic kidney disease) stage 3, GFR 30-59 ml/min ([facility]) 10/21/2017  
 hypertension and DM nephrosclerosis  Diabetes mellitus, type 2 ([facility]  Hypercholesteremia  Hypertension  Paroxysmal atrial fibrillation ([facility] 10/21/2017  
 new onset afib with BRPR 10-19-17 admit  PVC's (premature ventricular contractions) 5/26/2014  [facility]VD [facility]peripheral vascular [facility]) [facility] Past Surgical History:  
Procedure Laterality Date  HX CORONARY ARTERY BYPASS GRAFT    
 HX HEART CATHETERIZATION    
 [place] TCAT INSJ/RPL PERM LEADLESS PACEMAKER RV W/IMG N/A 5/9/2019 INSERT OR REPLACE TRANSCATH PPM LEADLESS performed by Hyacinth Leal MD at [address] CATH LAB No family history on file. Social History Socioeconomic History  Marital status:  Spouse name: Not on file  Number of children: Not on file  Years of education: Not on file  Highest education level: Not on file Occupational History  Not on file Social Needs  Financial resource strain: Not on file  Food insecurity:  
  Worry: Not on file Inability: Not on file  Transportation needs:  
  Medical: Not on file Non-medical: Not on file Tobacco Use  Smoking status: Former Smoker Packs/day: 3.00 Years: 20.00 Pack years: 60.00 Types: Cigarettes Last attempt to quit: 1/14/1985 Years since quittin.9  Smokeless tobacco: Never Used Substance and Sexual Activity  Alcohol use: No  
 Drug use: No  
 Sexual activity: Not on file Lifestyle  Physical activity:  
  Days per week: Not on file Minutes per session: Not on file  Stress: Not on file Relationships  Social connections:  
  Talks on phone: Not on file Gets together: Not on file Attends Judaism service: Not on file Active member of club or organization: Not on file Attends meetings of clubs or organizations: Not on file Relationship status: Not on file  Intimate partner violence:  
  Fear of current or ex partner: Not on file Emotionally abused: Not on file Physically abused: Not on file Forced sexual activity: Not on file Other Topics Concern  Not on file Social History Narrative  Not on file ALLERGIES: Lisinopril Review of Systems Constitutional: Negative for chills and fever. HENT: Negative for congestion and sore throat. Eyes: Negative for pain and redness. Respiratory: Negative for cough and shortness of breath. Cardiovascular: Negative for chest pain and palpitations. Gastrointestinal: Positive for blood in stool. Negative for abdominal pain, diarrhea, nausea and vomiting. Genitourinary: Negative for frequency and hematuria. Musculoskeletal: Negative for back pain and neck pain. Skin: Negative for rash and wound. Neurological: Negative for dizziness and headaches. Hematological: Does not bruise/bleed easily. Vitals:  
 19 6445 BP: 140/73 Pulse: 82 Resp: 16 Temp: 98.1 °F (36.7 °C) SpO2: 94% Physical Exam 
Vitals signs and nursing note reviewed. Constitutional:   
   General: He is not in acute distress. Appearance: He is well-developed. HENT:  
   Head: Normocephalic and atraumatic.   
Eyes:  
   Conjunctiva/sclera: Conjunctivae normal.  
 Pupils: Pupils are equal, round, and reactive to light. Neck: Musculoskeletal: Normal range of motion and neck supple. Cardiovascular:  
   Rate and Rhythm: Normal rate and regular rhythm. Heart sounds: Normal heart sounds. No murmur. No friction rub. No gallop. Pulmonary:  
   Effort: Pulmonary effort is normal. No respiratory distress. Breath sounds: Normal breath sounds. No wheezing or rales. Comments: On 2L NC baseline Abdominal:  
   General: Bowel sounds are normal. There is no distension. Palpations: Abdomen is soft. Tenderness: There is no tenderness. There is no guarding or rebound. Genitourinary: 
   Comments: Gross red blood externally Musculoskeletal: Normal range of motion. Skin: 
   General: Skin is warm and dry. Capillary Refill: Capillary refill takes less than 2 seconds. Findings: No rash. Neurological:  
   Mental Status: He is alert and oriented to person, place, and time. Labs Reviewed:  
hgb stable at 11 
hypoK at 3.2 Acute on chronic renal injury +hemoccult Course: 
 
 
Hospitalist Perfect Serve for Admission 5:05 AM 
 
ED Room Number: EX31/69 Patient Name and age:  Joey Thao. 81 y.o.  male Working Diagnosis:  
1. Gastrointestinal hemorrhage, unspecified gastrointestinal hemorrhage type Readmission: no 
Isolation Requirements:  no 
Recommended Level of Care:  telemetry Code Status:  Full Code Department:[facility] Adult ED - [phone] Other:  81 y.o. male here with GI bleed. No blood thinners. Recent admit for the same. H/H VS stable however large amount of blood. Held on CTA at this time due to renal function 5:11 AM re-evaluated the patient. VS stable. No acute distress. Did have second episode of small volume blood this time. 1L IVF ordered MDM:84 y.o. male here with suspected lower GI bleed that started tonight.  Hemodynamically stable and H/H stable therefore didn't require blood products. Not on any blood thinners. Had one large episode at home and a small here. Given IVF for acute on chronic renal insufficiency. Did not get CTA given renal function. Recently admitted for same. Will admit for further workup/monitoring. Clinical Impression: ICD-10-CM ICD-9-CM 1. Gastrointestinal hemorrhage, unspecified gastrointestinal hemorrhage type K92.2 578.9 2. Acute on chronic renal insufficiency N28.9 593.9 N18.9 585.9 Disposition: Admit.  
Dmitriy Mckeon DO

## 2019-12-26 NOTE — PROGRESS NOTES
12/26/19; 11:20 -  
CM attempted to meet with patient at bedside, but patient is currently with transport to go to nuclear medicine. With chart review, CM notes that patient is to have bleed scan today, 12/26. Patient may need a CTA with embolization. Patient is a READMIT patient, with admissions 12/3-12/10: CHF and 12/16-12/17 for GI Bleed. Patient uses O2 at baseline, which is serviced by [facility], and patient is currently open to [facility]. CM to follow up with patient later today, 12/26. CRM: Timothy Purdy, MPH, [facility]; Z: [phone] 
 
15:15 -  
TRANSITIONS OF CARE PLAN:  
1. DESTINATION: Likely own home 2. TRANSPORT: Spouse 3. ADDITIONAL SUPPORT: Spouse, 2 local sons, grandchildren, [facility] 4. DME: walker, shower stool, raised toilet seat, nebulizer, Home O2 at 2L via NC, glucometer, BP cuff 5. HOME HEALTH: Currently open to [facility] - will need LLOYD Orders 6. CODE STATUS/AMD STATUS: Full Code; on file 7. FOLLOW UP APPOINTMENTS:  
8. STILL NEEDS: Colonoscopy on 12/27, Possible CTA Reason for Readmission:     GI Bleed RRAT Score and Risk Level:     54; high Level of Readmission:    2: admissions 12/3 - 12/10 for CHF, 12/16-12/17 for GI Bleed Care Conference scheduled:  Not at this time Did you attend your follow up appointment (s): If not, why not: Yes - see by Dr. Timmy Tom last week Resources/supports as identified by patient/family:  Granddaughter is a critical care nurse at [employer]; extensive medical equipment at home; 2 local sons Top Challenges facing patient (as identified by patient/family and CM):     Limited driving ability for the last month; spouse does not drive after dark Finances/Medication cost?     Has Medicare and BCBS Supplement; uses [employer] in [place] Transportation      Spouse; self at baseline but hasn't driven in a month Support system or lack thereof? Spouse, 2 local sons, granddaughter Living arrangements? With spouse in a single story home Self-care/ADLs/Cognition? Independent; alert and oriented x 4 Current Advanced Directive/Advance Care Plan:  FULL CODE; on file Plan for utilizing home health:   Currently open to [facility]; will need LLOYD Orders Transition of Care Plan:    Based on readmission, the patient's previous Plan of Care 
 has been evaluated and/or modified. The current Transition of Care Plan is:       Patient has had 2 recent admissions for CHF and GI Bleed. With last admission, patient opted to have colonoscopy done OP. Patient's bleeding continued. Patient has a nuclear medicine based bleed scan today, 12/26. Per GI, patient to remain NPO and have bowel prep for colonoscopy tomorrow, 12/27. Patient identified that he had 4 children, but a son and a daughter have passed. Patient and spouse raised 3 grandchildren. Two sons are local, and patient's granddaughter, who is a critical care nurse at [employer], is local.  Patient has hx of SNF at [facility], is currently open to [facility], and has no hx of IPR. Pharmacy preference is [facility] in [place]. Care Management Interventions PCP Verified by CM: Yes(last seen by Dr. Tricia Caraballo last week) Palliative Care Criteria Met (RRAT>21 & CHF Dx)?: No 
Mode of Transport at Discharge: Other (see comment)(spouse to transport) Transition of Care Consult (CM Consult): Discharge Planning MyChart Signup: No 
Discharge Durable Medical Equipment: No(has dme of walker, shower stool, raised toilet seat, nebulizer, Home O2, glucometer, BP cuff) Health Maintenance Reviewed: Yes(CM met with patient, with patient alert and oriented x 4) Physical Therapy Consult: No 
Occupational Therapy Consult: No 
Speech Therapy Consult: No 
Current Support Network: Own Home, Family Lives [place], Lives with Spouse(2 sons are local) Confirm Follow Up Transport: Self(independent in adls, hasn't driven in a month, spouse transports) [employer] Information Provided?: No 
Discharge Location Discharge Placement: Home with family assistance(lives with spouse in a single story home with ramp access) CRM: Ulysses Marlow MPH, [facility]; Z: [phone]

## 2019-12-26 NOTE — ED TRIAGE NOTES
Patient arrived via EMS from home reporting rectal bleeding. Patient states he began having bleeding a couple of hours ago. Patient states he has a history of diverticulitis, and was admitted for the same ten days ago.

## 2019-12-26 NOTE — WOUND CARE
Wound Care Note:  
 
New consult placed by nurse request for redness to sacrum Chart shows: 
Admitted for GI bleed Past Medical History:  
Diagnosis Date  CAD (coronary artery disease) s/p CABG 2008  Carotid arterial disease ([facility]  CKD (chronic kidney disease) stage 3, GFR 30-59 ml/min ([facility]) 10/21/2017  
 hypertension and DM nephrosclerosis  Diabetes mellitus, type 2 ([facility]  Diverticulitis  Hypercholesteremia  Hypertension  Paroxysmal atrial fibrillation ([facility] 10/21/2017  
 new onset afib with BRPR 10-19-17 admit  PVC's (premature ventricular contractions) 5/26/2014  PVD (peripheral vascular disease) ([facility] WBC = 6.5 on 12/26/19 Admitted from home. Assessment:  
Patient is alert and talking, incontinent with some assistance needed in repositioning. Bed: Total Care Patient wearing briefs for incontinence Diet: NPO Patient reports no pain. Bilateral heels and buttocks skin intact and without erythema. 1. POA sacrum with pale white area measuring 0.5 cm x 0.5 cm, no drainage, cathie-wound with blanchable erythema, wound edges appear closed. This is the same measurements noted on 12/17/19 for sacral stage 2; appears to be resolving stage 2 pressure injury. Venelex ointment and P-500 bed to be ordered. Spoke with Dr. Milagro Parsons, notified of POA pressure injury; wound care orders obtained. Patient repositioned on right side. Heels offloaded on pillows. Recommendations:   
Sacrum and bilateral heels- Every 8 hours liberally apply Venelex ointment. Specialty bed: P-500 bed ordered via [facility]. Use only flat sheet and one incontinence pad. Please call [facility] at [phone] if not delivered and when patient discharged. Confirmation #[id number] Skin Care & Pressure Prevention: 
Minimize layers of linen/pads under patient to optimize support surface. Turn/reposition approximately every 2 hours and offload heels. Manage incontinence / promote continence Nourishing Skin Cream to dry skin, minimize use of briefs when able Discussed above plan with patient & Addie Robertson RN Transition of Care: Plan to follow as needed while admitted to hospital. 
 
DOLORES CheryN, RN, [facility], [facility]. 
office [phone] 
pager [phone] or call  to page

## 2019-12-26 NOTE — CONSULTS
[address], [facility] Diandra Avba 
[phone] Thank you for requesting this consultation but this patient has been seen by [address] in the past.  We will inform them of this request. 
 
 
Sincerely, MICHELLE Joel

## 2019-12-27 ENCOUNTER — ANESTHESIA (OUTPATIENT)
Dept: ENDOSCOPY | Age: 84
DRG: 378 | End: 2019-12-27
Payer: MEDICARE

## 2019-12-27 ENCOUNTER — ANESTHESIA EVENT (OUTPATIENT)
Dept: ENDOSCOPY | Age: 84
DRG: 378 | End: 2019-12-27
Payer: MEDICARE

## 2019-12-27 LAB
ANION GAP SERPL CALC-SCNC: 7 MMOL/L (ref 5–15)
BACTERIA SPEC CULT: ABNORMAL
BACTERIA SPEC CULT: ABNORMAL
BUN SERPL-MCNC: 51 MG/DL (ref 6–20)
BUN/CREAT SERPL: 25 (ref 12–20)
CALCIUM SERPL-MCNC: 7.7 MG/DL (ref 8.5–10.1)
CHLORIDE SERPL-SCNC: 103 MMOL/L (ref 97–108)
CO2 SERPL-SCNC: 25 MMOL/L (ref 21–32)
CREAT SERPL-MCNC: 2.05 MG/DL (ref 0.7–1.3)
GLUCOSE BLD STRIP.AUTO-MCNC: 121 MG/DL (ref 65–100)
GLUCOSE BLD STRIP.AUTO-MCNC: 128 MG/DL (ref 65–100)
GLUCOSE BLD STRIP.AUTO-MCNC: 177 MG/DL (ref 65–100)
GLUCOSE BLD STRIP.AUTO-MCNC: 263 MG/DL (ref 65–100)
GLUCOSE SERPL-MCNC: 98 MG/DL (ref 65–100)
HCT VFR BLD AUTO: 25.7 % (ref 36.6–50.3)
HCT VFR BLD AUTO: 27.3 % (ref 36.6–50.3)
HGB BLD-MCNC: 8 G/DL (ref 12.1–17)
HGB BLD-MCNC: 8.5 G/DL (ref 12.1–17)
POTASSIUM SERPL-SCNC: 3.4 MMOL/L (ref 3.5–5.1)
SERVICE CMNT-IMP: ABNORMAL
SODIUM SERPL-SCNC: 135 MMOL/L (ref 136–145)

## 2019-12-27 PROCEDURE — 74011636637 HC RX REV CODE- 636/637: Performed by: STUDENT IN AN ORGANIZED HEALTH CARE EDUCATION/TRAINING PROGRAM

## 2019-12-27 PROCEDURE — 0DBN8ZX EXCISION OF SIGMOID COLON, VIA NATURAL OR ARTIFICIAL OPENING ENDOSCOPIC, DIAGNOSTIC: ICD-10-PCS | Performed by: SPECIALIST

## 2019-12-27 PROCEDURE — 76060000032 HC ANESTHESIA 0.5 TO 1 HR: Performed by: SPECIALIST

## 2019-12-27 PROCEDURE — 80048 BASIC METABOLIC PNL TOTAL CA: CPT

## 2019-12-27 PROCEDURE — 77030021593 HC FCPS BIOP ENDOSC BSC -A: Performed by: SPECIALIST

## 2019-12-27 PROCEDURE — 74011250636 HC RX REV CODE- 250/636: Performed by: STUDENT IN AN ORGANIZED HEALTH CARE EDUCATION/TRAINING PROGRAM

## 2019-12-27 PROCEDURE — 74011250637 HC RX REV CODE- 250/637: Performed by: INTERNAL MEDICINE

## 2019-12-27 PROCEDURE — 77030029384 HC SNR POLYP CAPTVR II BSC -B: Performed by: SPECIALIST

## 2019-12-27 PROCEDURE — 74011250636 HC RX REV CODE- 250/636: Performed by: NURSE ANESTHETIST, CERTIFIED REGISTERED

## 2019-12-27 PROCEDURE — 76040000007: Performed by: SPECIALIST

## 2019-12-27 PROCEDURE — 0DBL8ZZ EXCISION OF TRANSVERSE COLON, VIA NATURAL OR ARTIFICIAL OPENING ENDOSCOPIC: ICD-10-PCS | Performed by: SPECIALIST

## 2019-12-27 PROCEDURE — 88305 TISSUE EXAM BY PATHOLOGIST: CPT

## 2019-12-27 PROCEDURE — 85018 HEMOGLOBIN: CPT

## 2019-12-27 PROCEDURE — 74011000250 HC RX REV CODE- 250: Performed by: STUDENT IN AN ORGANIZED HEALTH CARE EDUCATION/TRAINING PROGRAM

## 2019-12-27 PROCEDURE — 0DBK8ZZ EXCISION OF ASCENDING COLON, VIA NATURAL OR ARTIFICIAL OPENING ENDOSCOPIC: ICD-10-PCS | Performed by: SPECIALIST

## 2019-12-27 PROCEDURE — 74011250636 HC RX REV CODE- 250/636: Performed by: SPECIALIST

## 2019-12-27 PROCEDURE — C9113 INJ PANTOPRAZOLE SODIUM, VIA: HCPCS | Performed by: STUDENT IN AN ORGANIZED HEALTH CARE EDUCATION/TRAINING PROGRAM

## 2019-12-27 PROCEDURE — 36415 COLL VENOUS BLD VENIPUNCTURE: CPT

## 2019-12-27 PROCEDURE — 82962 GLUCOSE BLOOD TEST: CPT

## 2019-12-27 PROCEDURE — 65270000029 HC RM PRIVATE

## 2019-12-27 PROCEDURE — 74011250637 HC RX REV CODE- 250/637: Performed by: STUDENT IN AN ORGANIZED HEALTH CARE EDUCATION/TRAINING PROGRAM

## 2019-12-27 RX ORDER — BUMETANIDE 1 MG/1
2 TABLET ORAL DAILY
Status: DISCONTINUED | OUTPATIENT
Start: 2019-12-28 | End: 2019-12-28 | Stop reason: HOSPADM

## 2019-12-27 RX ORDER — ATROPINE SULFATE 0.1 MG/ML
0.5 INJECTION INTRAVENOUS
Status: DISCONTINUED | OUTPATIENT
Start: 2019-12-27 | End: 2019-12-27 | Stop reason: HOSPADM

## 2019-12-27 RX ORDER — SODIUM CHLORIDE 9 MG/ML
INJECTION, SOLUTION INTRAVENOUS
Status: DISCONTINUED | OUTPATIENT
Start: 2019-12-27 | End: 2019-12-27 | Stop reason: HOSPADM

## 2019-12-27 RX ORDER — DEXTROMETHORPHAN/PSEUDOEPHED 2.5-7.5/.8
1.2 DROPS ORAL
Status: DISCONTINUED | OUTPATIENT
Start: 2019-12-27 | End: 2019-12-27 | Stop reason: HOSPADM

## 2019-12-27 RX ORDER — SODIUM CHLORIDE 0.9 % (FLUSH) 0.9 %
5-40 SYRINGE (ML) INJECTION EVERY 8 HOURS
Status: DISCONTINUED | OUTPATIENT
Start: 2019-12-27 | End: 2019-12-28 | Stop reason: HOSPADM

## 2019-12-27 RX ORDER — FLUMAZENIL 0.1 MG/ML
0.2 INJECTION INTRAVENOUS
Status: DISCONTINUED | OUTPATIENT
Start: 2019-12-27 | End: 2019-12-27 | Stop reason: HOSPADM

## 2019-12-27 RX ORDER — NALOXONE HYDROCHLORIDE 0.4 MG/ML
0.4 INJECTION, SOLUTION INTRAMUSCULAR; INTRAVENOUS; SUBCUTANEOUS
Status: DISCONTINUED | OUTPATIENT
Start: 2019-12-27 | End: 2019-12-27 | Stop reason: HOSPADM

## 2019-12-27 RX ORDER — POTASSIUM CHLORIDE 750 MG/1
40 TABLET, FILM COATED, EXTENDED RELEASE ORAL
Status: COMPLETED | OUTPATIENT
Start: 2019-12-27 | End: 2019-12-27

## 2019-12-27 RX ORDER — SODIUM CHLORIDE 9 MG/ML
50 INJECTION, SOLUTION INTRAVENOUS CONTINUOUS
Status: DISPENSED | OUTPATIENT
Start: 2019-12-27 | End: 2019-12-27

## 2019-12-27 RX ORDER — SODIUM CHLORIDE 0.9 % (FLUSH) 0.9 %
5-40 SYRINGE (ML) INJECTION AS NEEDED
Status: DISCONTINUED | OUTPATIENT
Start: 2019-12-27 | End: 2019-12-28 | Stop reason: HOSPADM

## 2019-12-27 RX ORDER — MIDAZOLAM HYDROCHLORIDE 1 MG/ML
.25-5 INJECTION, SOLUTION INTRAMUSCULAR; INTRAVENOUS
Status: DISCONTINUED | OUTPATIENT
Start: 2019-12-27 | End: 2019-12-27 | Stop reason: HOSPADM

## 2019-12-27 RX ORDER — BUMETANIDE 1 MG/1
2 TABLET ORAL 2 TIMES DAILY
Status: DISCONTINUED | OUTPATIENT
Start: 2019-12-27 | End: 2019-12-27

## 2019-12-27 RX ORDER — PROPOFOL 10 MG/ML
INJECTION, EMULSION INTRAVENOUS AS NEEDED
Status: DISCONTINUED | OUTPATIENT
Start: 2019-12-27 | End: 2019-12-27 | Stop reason: HOSPADM

## 2019-12-27 RX ORDER — EPINEPHRINE 0.1 MG/ML
1 INJECTION INTRACARDIAC; INTRAVENOUS
Status: DISCONTINUED | OUTPATIENT
Start: 2019-12-27 | End: 2019-12-27 | Stop reason: HOSPADM

## 2019-12-27 RX ORDER — PHENYLEPHRINE HCL IN 0.9% NACL 0.4MG/10ML
SYRINGE (ML) INTRAVENOUS AS NEEDED
Status: DISCONTINUED | OUTPATIENT
Start: 2019-12-27 | End: 2019-12-27 | Stop reason: HOSPADM

## 2019-12-27 RX ORDER — FENTANYL CITRATE 50 UG/ML
12.5-5 INJECTION, SOLUTION INTRAMUSCULAR; INTRAVENOUS
Status: DISCONTINUED | OUTPATIENT
Start: 2019-12-27 | End: 2019-12-27 | Stop reason: HOSPADM

## 2019-12-27 RX ADMIN — POLYETHYLENE GLYCOL-3350 AND ELECTROLYTES 2000 ML: 236; 6.74; 5.86; 2.97; 22.74 POWDER, FOR SOLUTION ORAL at 08:16

## 2019-12-27 RX ADMIN — ISOSORBIDE DINITRATE 20 MG: 20 TABLET ORAL at 22:23

## 2019-12-27 RX ADMIN — HYDRALAZINE HYDROCHLORIDE 50 MG: 50 TABLET, FILM COATED ORAL at 09:26

## 2019-12-27 RX ADMIN — HYDRALAZINE HYDROCHLORIDE 50 MG: 50 TABLET, FILM COATED ORAL at 18:30

## 2019-12-27 RX ADMIN — SODIUM CHLORIDE: 900 INJECTION, SOLUTION INTRAVENOUS at 16:07

## 2019-12-27 RX ADMIN — SODIUM CHLORIDE 40 MG: 9 INJECTION INTRAMUSCULAR; INTRAVENOUS; SUBCUTANEOUS at 09:26

## 2019-12-27 RX ADMIN — HUMAN INSULIN 3 UNITS: 100 INJECTION, SOLUTION SUBCUTANEOUS at 23:08

## 2019-12-27 RX ADMIN — PHENYLEPHRINE HYDROCHLORIDE 80 MCG: 10 INJECTION INTRAVENOUS at 16:49

## 2019-12-27 RX ADMIN — POTASSIUM CHLORIDE 40 MEQ: 750 TABLET, FILM COATED, EXTENDED RELEASE ORAL at 09:57

## 2019-12-27 RX ADMIN — Medication 10 ML: at 18:30

## 2019-12-27 RX ADMIN — PROPOFOL 25 MG: 10 INJECTION, EMULSION INTRAVENOUS at 16:45

## 2019-12-27 RX ADMIN — CLONIDINE HYDROCHLORIDE 0.1 MG: 0.1 TABLET ORAL at 18:22

## 2019-12-27 RX ADMIN — HUMAN INSULIN 2 UNITS: 100 INJECTION, SOLUTION SUBCUTANEOUS at 12:56

## 2019-12-27 RX ADMIN — CLONIDINE HYDROCHLORIDE 0.1 MG: 0.1 TABLET ORAL at 09:26

## 2019-12-27 RX ADMIN — SODIUM CHLORIDE 40 MG: 9 INJECTION INTRAMUSCULAR; INTRAVENOUS; SUBCUTANEOUS at 22:23

## 2019-12-27 RX ADMIN — Medication 10 ML: at 08:15

## 2019-12-27 RX ADMIN — Medication 10 ML: at 22:24

## 2019-12-27 RX ADMIN — PROPOFOL 20 MG: 10 INJECTION, EMULSION INTRAVENOUS at 16:41

## 2019-12-27 RX ADMIN — PROPOFOL 25 MG: 10 INJECTION, EMULSION INTRAVENOUS at 16:35

## 2019-12-27 RX ADMIN — Medication 1 CAPSULE: at 09:25

## 2019-12-27 RX ADMIN — SPIRONOLACTONE 25 MG: 25 TABLET ORAL at 09:26

## 2019-12-27 RX ADMIN — CARVEDILOL 12.5 MG: 12.5 TABLET, FILM COATED ORAL at 18:23

## 2019-12-27 RX ADMIN — ISOSORBIDE DINITRATE 20 MG: 20 TABLET ORAL at 18:29

## 2019-12-27 RX ADMIN — CASTOR OIL AND BALSAM, PERU: 788; 87 OINTMENT TOPICAL at 22:23

## 2019-12-27 RX ADMIN — Medication 10 ML: at 22:23

## 2019-12-27 RX ADMIN — BUMETANIDE 2 MG: 1 TABLET ORAL at 09:25

## 2019-12-27 RX ADMIN — HYDRALAZINE HYDROCHLORIDE 50 MG: 50 TABLET, FILM COATED ORAL at 22:38

## 2019-12-27 RX ADMIN — Medication 10 ML: at 14:00

## 2019-12-27 RX ADMIN — ISOSORBIDE DINITRATE 20 MG: 20 TABLET ORAL at 09:25

## 2019-12-27 RX ADMIN — CARVEDILOL 12.5 MG: 12.5 TABLET, FILM COATED ORAL at 09:26

## 2019-12-27 RX ADMIN — TAMSULOSIN HYDROCHLORIDE 0.4 MG: 0.4 CAPSULE ORAL at 09:26

## 2019-12-27 RX ADMIN — MAGNESIUM OXIDE TAB 400 MG (241.3 MG ELEMENTAL MG) 400 MG: 400 (241.3 MG) TAB at 09:26

## 2019-12-27 RX ADMIN — PROPOFOL 25 MG: 10 INJECTION, EMULSION INTRAVENOUS at 16:30

## 2019-12-27 RX ADMIN — PROPOFOL 20 MG: 10 INJECTION, EMULSION INTRAVENOUS at 16:26

## 2019-12-27 RX ADMIN — Medication 1 CAPSULE: at 18:22

## 2019-12-27 RX ADMIN — PROPOFOL 20 MG: 10 INJECTION, EMULSION INTRAVENOUS at 16:38

## 2019-12-27 RX ADMIN — SIMVASTATIN 20 MG: 20 TABLET, FILM COATED ORAL at 22:23

## 2019-12-27 RX ADMIN — IPRATROPIUM BROMIDE AND ALBUTEROL SULFATE 3 ML: .5; 3 SOLUTION RESPIRATORY (INHALATION) at 07:33

## 2019-12-27 RX ADMIN — CASTOR OIL AND BALSAM, PERU: 788; 87 OINTMENT TOPICAL at 13:05

## 2019-12-27 RX ADMIN — CASTOR OIL AND BALSAM, PERU: 788; 87 OINTMENT TOPICAL at 08:15

## 2019-12-27 RX ADMIN — PROPOFOL 30 MG: 10 INJECTION, EMULSION INTRAVENOUS at 16:23

## 2019-12-27 NOTE — ANESTHESIA PREPROCEDURE EVALUATION
Relevant Problems   No relevant active problems       Anesthetic History   No history of anesthetic complications            Review of Systems / Medical History  Patient summary reviewed, nursing notes reviewed and pertinent labs reviewed    Pulmonary                Comments: Home o2   Neuro/Psych   Within defined limits           Cardiovascular  Within defined limits  Hypertension        Dysrhythmias   CAD and CABG         GI/Hepatic/Renal  Within defined limits       Renal disease       Endo/Other  Within defined limits  Diabetes         Other Findings            Physical Exam    Airway  Mallampati: II  TM Distance: > 6 cm  Neck ROM: normal range of motion   Mouth opening: Normal     Cardiovascular  Regular rate and rhythm,  S1 and S2 normal,  no murmur, click, rub, or gallop             Dental  No notable dental hx       Pulmonary  Breath sounds clear to auscultation               Abdominal  GI exam deferred       Other Findings            Anesthetic Plan    ASA: 4  Anesthesia type: MAC            Anesthetic plan and risks discussed with: Patient

## 2019-12-27 NOTE — PROGRESS NOTES
NUTRITION       Nutrition screening referral was triggered based on results obtained during nursing admission assessment. The patient's chart was reviewed and nutrition assessment is not indicated at this time. Pt admitted for GI bleed, to have colonoscopy today. Was admitted for the same from 12/3-12/10. No change in appetite reported, no obvious nutritional risk factors at this time. RD will follow on the periphery. Weights have fluctuated greatly over the past 3+ months, suggest following weights for trends. Difficult to assess true weight loss/gain when weights have fluctuated +/- 20 pounds in just 3 months. Plan to see patient for rescreen as indicated. Thank you.      Wt Readings from Last 8 Encounters:   12/27/19 65.9 kg (145 lb 4.5 oz)   12/20/19 68.5 kg (151 lb)   12/17/19 66.6 kg (146 lb 13.2 oz)   12/10/19 74.4 kg (164 lb)   11/21/19 77.1 kg (170 lb)   10/10/19 71.7 kg (158 lb)   09/20/19 76.7 kg (169 lb)   09/04/19 74.4 kg (164 lb)       Socorro Mueller, [address] [place] [address], [facility]

## 2019-12-27 NOTE — PROGRESS NOTES
AA&OX4. Cooperative. Calls for assistance. Compliant with drinking golytely prep for colonoscopy. NPO after MN. IV discontinued per [email or web address] mod dk maroon stool X3. Skin care provided. Wearing briefs. Tele: MALGORZATA pacedHR 80. //98. Resp: O2 2L NC. Sats 94-95%. . Skin: Venelex oint applied to heels and sacrum. Placed on specialty bed @ 0015.

## 2019-12-27 NOTE — ROUTINE PROCESS
Yamileth Haney .  1935  [id number]    Situation:  Verbal report received from: Elizabeth Morris RN  Procedure: Procedure(s):  COLONOSCOPY  ENDOSCOPIC POLYPECTOMY  COLON BIOPSY    Background:    Preoperative diagnosis: GI Bleed  Postoperative diagnosis: 1. Diverticulosis  2. AVM in cecum  3. Ascending colon polyp  4. Transverse colon polyp  5.  Questionable Sigmoid Mass with biopsy    :  Dr. Ga Garcia  Assistant(s): Endoscopy Technician-1: Nya Medel  Endoscopy RN-1: Kolton Toussaint    Specimens:   ID Type Source Tests Collected by Time Destination   1 : Ascending Colon Polyp Preservative Colon, Ascending  Camilo Boss MD 12/27/2019 1641 Pathology   2 : Transverse Colon Polyp Preservative Colon, Transverse  Camilo Boss MD 12/27/2019 1645 Pathology   3 : Sigmoid Biopsy Preservative Sigmoid  Camilo Boss MD 12/27/2019 1649 Pathology     H. Pylori  no    Assessment:  Intra-procedure medications     Anesthesia gave intra-procedure sedation and medications, see anesthesia flow sheet yes    Intravenous fluids: NS@ KVO     Vital signs stable     Abdominal assessment: round and soft     Recommendation:    Return to floor  Family or Friend   Permission to share finding with family or friend yes

## 2019-12-27 NOTE — PROGRESS NOTES
Orders received, chart reviewed and patient currently on hold from therapy due to active bleeding, Hgb dropped from 8.9 to 8 in 12 hour period. Currently, he is also prepping for colonoscopy scheduled 4 p.m. Recommend with nursing patient to complete as able in order to maintain strength, endurance and independence: ADLs with supervision/setup, once Egress Test complete- OOB to chair 3x/day and mobilizing to the bathroom for toileting with assist. Thank you for your assistance. 12/4/19 OT evaluation:   FUNCTIONAL STATUS PRIOR TO ADMISSION: Patient was modified independent using a rolling walker for functional mobility. Limited activity level during the day and frequently sits up in chair for 8-10 hours per his report. HOME SUPPORT: The patient lived with wife but did not require assist for basic ADLs, wife takes care of all iADLs.      Home Environment: Private residence  Wheelchair Ramp: Yes  One/Two Story Residence: One story  Living Alone: No  Support Systems: Spouse/Significant Other/Partner(son lives close by)  Patient Expects to be Discharged to[de-identified] Private residence  Current DME Used/Available at Home: Shower chair, Walker, rolling  Tub or Shower Type: (both options)

## 2019-12-27 NOTE — PROGRESS NOTES
12/27/19 -  
MARTINEZ: 
- Patient has continued with bloody stools and passing clots - Patient's hemoglobin is trending down - Plan includes colonoscopy this afternoon, 12/27 
- Patient has PT and OT consults pending 12/27 
- NOTE: patient is currently open to [facility] and will require LLOYD Orders CRM: Reddy Cotton, MPH, [address] [place] [address]; Z: [phone]

## 2019-12-27 NOTE — PROGRESS NOTES
Additional to dual skin note    Patient also have scattered bruising and skin tear in upper and lower extremities.

## 2019-12-27 NOTE — PROGRESS NOTES
Report to Endo for procedure later today at 4pm . Pt completed prep. Mostly clear stool coming out and endo aware trace formed brown stool still at times. Endo aware of pt being transferred to [address]

## 2019-12-27 NOTE — PERIOP NOTES
TRANSFER - IN REPORT:    Verbal report received from [facility], RN(name) on [address]  being received from Critical access hospital([facility]) for ordered procedure      Report consisted of patients Situation, Background, Assessment and   Recommendations(SBAR). Information from the following report(s) SBAR was reviewed with the receiving nurse. Opportunity for questions and clarification was provided. Assessment completed upon patients arrival to unit and care assumed. TRANSFER - OUT REPORT:    Verbal report given to [facility], RN(name) on [address]  being transferred to Critical access hospital(unit) for routine progression of care       Report consisted of patients Situation, Background, Assessment and   Recommendations(SBAR). Information from the following report(s) Procedure Summary was reviewed with the receiving nurse. Lines:   Peripheral IV 12/26/19 Right Arm (Active)   Site Assessment Clean, dry, & intact 12/27/2019  2:45 PM   Phlebitis Assessment 0 12/27/2019  2:45 PM   Infiltration Assessment 0 12/27/2019  2:45 PM   Dressing Status Clean, dry, & intact 12/27/2019  2:45 PM   Dressing Type Other (comments) 12/27/2019  2:45 PM   Hub Color/Line Status Pink;Capped 12/27/2019  2:45 PM   Action Taken Open ports on tubing capped 12/27/2019  2:45 PM   Alcohol Cap Used Yes 12/27/2019  2:45 PM        Opportunity for questions and clarification was provided. Patient transported with:   Tech     Initial RN admission and assessment performed and documented in Endoscopy navigator. Patient evaluated by anesthesia in pre-procedure holding. All procedural vital signs, airway assessment, and level of consciousness information monitored and recorded by anesthesia staff on the anesthesia record. Report received from CRNA post procedure. Patient transported to recovery area by RN. Endoscope was pre-cleaned at bedside immediately following procedure by Collin Shine.

## 2019-12-27 NOTE — PROGRESS NOTES
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 fib   Chronic diastolic congestive heart failure  CAD s/p CABG     DM2:  - on insulin     Interval History:  Seen and examined the patient  He is currently getting colonoscopy prep  He had several bowel movements without any blood  Hemoglobin has trended down  Creatinine stable    Review of Systems: A comprehensive review of systems was negative.     Current Medications:   Current Facility-Administered Medications   Medication Dose Route Frequency    [START ON 12/28/2019] bumetanide (BUMEX) tablet 2 mg  2 mg Oral DAILY    carvedilol (COREG) tablet 12.5 mg  12.5 mg Oral BID WITH MEALS    cloNIDine HCl (CATAPRES) tablet 0.1 mg  0.1 mg Oral BID    ferrous sulfate tablet 325 mg  325 mg Oral ACB    hydrALAZINE (APRESOLINE) tablet 50 mg  50 mg Oral TID    isosorbide dinitrate (ISORDIL) tablet 20 mg  20 mg Oral TID    magnesium oxide (MAG-OX) tablet 400 mg  400 mg Oral DAILY    fish oil-omega-3 fatty acids 340-1,000 mg capsule 1 Cap  1 Cap Oral BID    simvastatin (ZOCOR) tablet 20 mg  20 mg Oral QHS    spironolactone (ALDACTONE) tablet 25 mg  25 mg Oral DAILY    tamsulosin (FLOMAX) capsule 0.4 mg  0.4 mg Oral DAILY    sodium chloride (NS) flush 5-40 mL  5-40 mL IntraVENous Q8H    sodium chloride (NS) flush 5-40 mL  5-40 mL IntraVENous PRN    morphine injection 1 mg  1 mg IntraVENous Q4H PRN    ondansetron (ZOFRAN) injection 4 mg  4 mg IntraVENous Q4H PRN    pantoprazole (PROTONIX) 40 mg in 0.9% sodium chloride 10 mL injection  40 mg IntraVENous Q12H    hydrALAZINE (APRESOLINE) 20 mg/mL injection 20 mg  20 mg IntraVENous Q6H PRN    insulin regular (NOVOLIN R, HUMULIN R) injection   SubCUTAneous AC&HS    glucose chewable tablet 16 g  4 Tab Oral PRN    glucagon (GLUCAGEN) injection 1 mg  1 mg IntraMUSCular PRN    dextrose 10% infusion 0-250 mL  0-250 mL IntraVENous PRN    albuterol-ipratropium (DUO-NEB) 2.5 MG-0.5 MG/3 ML  3 mL Nebulization Q4H PRN    balsam peru-castor oil (VENELEX) ointment   Topical Q8H      Allergies   Allergen Reactions    Lisinopril Cough       Objective:  Vitals:    Vitals:    12/26/19 2000 12/27/19 0030 12/27/19 0530 12/27/19 0801   BP: 132/63 149/67 158/62 161/72   Pulse: 60 77 78 80   Resp: 20 20 20 28   Temp: 98 °F (36.7 °C) 97.9 °F (36.6 °C) 97.9 °F (36.6 °C) 97.4 °F (36.3 °C)   SpO2: 95% 94% 96% 93%   Weight:   65.9 kg (145 lb 4.5 oz)      Intake and Output:  12/27 0701 - 12/27 1900  In: 240 [P.O.:240]  Out: -   12/25 1901 - 12/27 0700  In: 120 [P.O.:120]  Out: 700 [Urine:700]    Physical Examination:  Pt intubated    Yes / No  General: NAD,Conversant  Neck:  Supple, no mass  Resp:  Lungs CTA B/L, no wheezing , normal respiratory effort  CV:  RRR,  no murmur or rub, no LE edema  GI:  Soft, NT, + Bowel sounds, no hepatosplenomegaly  Neurologic:  Non focal  Psych:             AAO x 3 appropriate affect  Skin:  No Rash  :  No Myers    []    High complexity decision making was performed  []    Patient is at high-risk of decompensation with multiple organ involvement    Lab Data Personally Reviewed: I have reviewed all the pertinent labs, microbiology data and radiology studies during assessment. Recent Labs     12/27/19 0226 12/26/19  0351   * 134*   K 3.4* 3.2*    97   CO2 25 32   GLU 98 237*   BUN 51* 58*   CREA 2.05* 2.50*   CA 7.7* 8.6   MG  --  2.1   ALB  --  2.3*   SGOT  --  66*   ALT  --  62   INR  --  1.1     Recent Labs     12/27/19 0226 12/26/19  1418 12/26/19 0351   WBC  --   --  6.5   HGB 8.0* 8.9* 11.1*   HCT 25.7* 27.7* 35.4*   PLT  --   --  311     No results found for: SDES  Lab Results   Component Value Date/Time    Culture result: MRSA PRESENT (A) 12/26/2019 09:05 AM    Culture result:  12/26/2019 09:05 AM         Screening of patient nares for MRSA is for surveillance purposes and, if positive, to facilitate isolation considerations in high risk settings. It is not intended for automatic decolonization interventions per se as regimens are not sufficiently effective to warrant routine use. Culture result: (A) 12/04/2019 03:27 AM     FEW * METHICILLIN RESISTANT STAPHYLOCOCCUS AUREUS *    Culture result: (A) 12/04/2019 03:27 AM     LIGHT STENOTROPHOMONAS (XANTHO.) MALTOPHILIA CLSI GUIDELINES DO NOT RECOMMEND REPORTING SUSCEPTIBILITIES FOR S. MALTOPHILIA. TRIMETHOPRIM/SULFAMETHOXAZOLE IS THE DRUG OF CHOICE.     Culture result: HEAVY NORMAL RESPIRATORY JUDE 12/04/2019 03:27 AM     Recent Results (from the past 24 hour(s))   GLUCOSE, POC    Collection Time: 12/26/19 11:13 AM   Result Value Ref Range    Glucose (POC) 223 (H) 65 - 100 mg/dL    Performed by Iban Huntley    HGB & HCT    Collection Time: 12/26/19  2:18 PM   Result Value Ref Range    HGB 8.9 (L) 12.1 - 17.0 g/dL    HCT 27.7 (L) 36.6 - 50.3 %   GLUCOSE, POC    Collection Time: 12/26/19  5:20 PM   Result Value Ref Range    Glucose (POC) 229 (H) 65 - 100 mg/dL    Performed by Maryse POC    Collection Time: 12/26/19 10:48 PM   Result Value Ref Range    Glucose (POC) 117 (H) 65 - 100 mg/dL    Performed by Amador Fabian    HGB & HCT    Collection Time: 12/27/19  2:26 AM   Result Value Ref Range    HGB 8.0 (L) 12.1 - 17.0 g/dL    HCT 25.7 (L) 36.6 - 19.1 %   METABOLIC PANEL, BASIC    Collection Time: 12/27/19  2:26 AM   Result Value Ref Range    Sodium 135 (L) 136 - 145 mmol/L    Potassium 3.4 (L) 3.5 - 5.1 mmol/L    Chloride 103 97 - 108 mmol/L    CO2 25 21 - 32 mmol/L    Anion gap 7 5 - 15 mmol/L    Glucose 98 65 - 100 mg/dL    BUN 51 (H) 6 - 20 MG/DL    Creatinine 2.05 (H) 0.70 - 1.30 MG/DL    BUN/Creatinine ratio 25 (H) 12 - 20      GFR est AA 38 (L) >60 ml/min/1.73m2    GFR est non-AA 31 (L) >60 ml/min/1.73m2    Calcium 7.7 (L) 8.5 - 10.1 MG/DL   GLUCOSE, POC    Collection Time: 12/27/19  8:00 AM   Result Value Ref Range    Glucose (POC) 128 (H) 65 - 100 mg/dL    Performed by Iban Huntley            Total time spent with patient:  xxx   min. Care Plan discussed with:  Patient     Family      RN      Consulting Physician [facility],         I have reviewed the flowsheets. Chart and Pertinent Notes have been reviewed. No change in PMH ,family and social history from Consult note.       Ita Rodney MD

## 2019-12-27 NOTE — PHYSICIAN ADVISORY
Letter of Status Determination: Current Status   INPATIENT is Appropriate         Pt Name:  Larissa Armstrong. MR#  [id number]   [facility]#   [id number]   Room and Hospital  [facility]  @ [address] [place] [address]   Hospitalization date  12/26/2019  3:33 AM   Current Attending Physician  Dominic Li MD   Principal diagnosis  <principal problem not specified>    Clinicals  H&P  Patient is 25-year-old male with medical history significant for CAD s/p CABG, A. fib, type 2 diabetes, hypertension and CKD stage IV who presents to the emergency department with chief complaint of rectal bleeding. Patient reports, when he woke up this morning he noted BRBRP. He denies any nausea, vomiting, abdominal pain, shortness of breath, dizziness, chest pain, bleeding from any other site, urinary or any other bowel complaints.     Patient is not currently on anticoagulation for A. fib (discontinued due to  GI bleed). Patient was recently admitted for similar complaints , colonoscopy was planned but patient declined.      In the emergency department V/S were unremarkable. Lab work-ups: Hemoglobin 11.1 (at baseline), creatinine 2.5,  and K3.2. Progress note today today:  Assessment & Plan:      Acute blood loss anemia due to lower GI bleed  IV PPI  DW GI, bleeding scan is neg, plans for C-scopy today  HH dropped down, no acute need for transfusion, repeat HH q12 and transfuse PRN hemoglobin less than 7  Admitted 2 weeks ago with similar presentation, colonoscopy planned but patient declined  Pt is tolerating Golytely     Hypokalemia.  recurrent  Replace, repeat labs in am  Normal magnesium Level     Mild hyponatremia, chronic, POA  Due to CHF/CKD  Repeat labs in am     Type 2 diabetes, uncontrolled with hyperglycemia  Recent A1c 7.7  SSI  Accu-Checks     Advanced COPD with chronic Hypoxemic respiratory failure on home oxygen  Continue supplemental O2 and monitor O2 sat  Continue BT as needed  Hx of Pulm Fibrosis and Pulm HTN     CKD 4  Cr at baseline   Resume diuretics, off IVF, Nephro consult noted. Avoid nephrotoxic medications and renally dose meds     Chronic A. fib   rate controlled   not on anticoagulation     CAD s/p CABG  Continue holding ASA- may need to stop asa if recurrent bleeding  Continue other home meds     Hypertension  Stable  Hydralazine IV as needed     Hx of PVD  stable  s/p arthrectomy to distal SFA and distal popliteal with angioplasty to both lesions and stent to SFA         Milliman MCG criteria   Does  NOT apply    STATUS DETERMINATION  On the basis of clinical data, available documentation, we believe that the current status of this patient as INPATIENT is Appropriate   On the basis of above clinical data, this patient's care in acute hospital care setting is expected to exceed two midnights.     Additional comments     Insurance  Payor: Kayli Salas / Plan: [address] / Product Type: Medicare /    Insurance Information                [employer] A [employer] B Phone:     Subscriber: Shara People Subscriber#: [id number]    Group#:  Precert#:         [facility] Phone:     Subscriber: Shara People Subscriber#: [id number]    Group#: [id number] Precert#:                  Cheikh Shine MD, [facility]   Physician Vish Yi Head, [address] [place] [address]

## 2019-12-27 NOTE — PROGRESS NOTES
GI PROGRESS NOTE 
 
NAME:             Yamileth Haney Sr.  
:              1935 MRN:              [id number] Admit Date:     2019 Todays Date:  2019 Subjective:  
 
    
Patient being prepped for colonoscopy. With prep he is passing some blood. Hemoglobin down almost a gram since yesterday. Certainly continue prep plan colonoscopy late this afternoon. Review of Systems - Respiratory ROS: no cough, shortness of breath, or wheezing Cardiovascular ROS: no chest pain or dyspnea on exertion Medications-reviewed Current Facility-Administered Medications Medication Dose Route Frequency  bumetanide (BUMEX) tablet 2 mg  2 mg Oral BID  carvedilol (COREG) tablet 12.5 mg  12.5 mg Oral BID WITH MEALS  cloNIDine HCl (CATAPRES) tablet 0.1 mg  0.1 mg Oral BID  ferrous sulfate tablet 325 mg  325 mg Oral ACB  hydrALAZINE (APRESOLINE) tablet 50 mg  50 mg Oral TID  isosorbide dinitrate (ISORDIL) tablet 20 mg  20 mg Oral TID  magnesium oxide (MAG-OX) tablet 400 mg  400 mg Oral DAILY  fish oil-omega-3 fatty acids 340-1,000 mg capsule 1 Cap  1 Cap Oral BID  simvastatin (ZOCOR) tablet 20 mg  20 mg Oral QHS  spironolactone (ALDACTONE) tablet 25 mg  25 mg Oral DAILY  tamsulosin (FLOMAX) capsule 0.4 mg  0.4 mg Oral DAILY  sodium chloride (NS) flush 5-40 mL  5-40 mL IntraVENous Q8H  
 sodium chloride (NS) flush 5-40 mL  5-40 mL IntraVENous PRN  
 morphine injection 1 mg  1 mg IntraVENous Q4H PRN  
 ondansetron (ZOFRAN) injection 4 mg  4 mg IntraVENous Q4H PRN  pantoprazole (PROTONIX) 40 mg in 0.9% sodium chloride 10 mL injection  40 mg IntraVENous Q12H  hydrALAZINE (APRESOLINE) 20 mg/mL injection 20 mg  20 mg IntraVENous Q6H PRN  
 insulin regular (NOVOLIN R, HUMULIN R) injection   SubCUTAneous AC&HS  
 glucose chewable tablet 16 g  4 Tab Oral PRN  
 glucagon (GLUCAGEN) injection 1 mg  1 mg IntraMUSCular PRN  
  dextrose 10% infusion 0-250 mL  0-250 mL IntraVENous PRN  
 albuterol-ipratropium (DUO-NEB) 2.5 MG-0.5 MG/3 ML  3 mL Nebulization Q4H PRN  
 balsam peru-castor oil (VENELEX) ointment   Topical Q8H  peg 3350-electrolytes (COLYTE) 4000 mL  2,000 mL Oral BID Objective:  
 
Patient Vitals for the past 8 hrs: 
 BP Temp Pulse Resp SpO2 Weight 12/27/19 0801 161/72 97.4 °F (36.3 °C) 80 28 93 %   
12/27/19 0530 158/62 97.9 °F (36.6 °C) 78 20 96 % 65.9 kg (145 lb 4.5 oz) 12/27/19 0030 149/67 97.9 °F (36.6 °C) 77 20 94 %  No intake/output data recorded. 12/25 1901 - 12/27 0700 In: -  
Out: 700 [Urine:700] EXAM:   
Visit Vitals /72 (BP 1 Location: Left arm, BP Patient Position: At rest) Pulse 80 Temp 97.4 °F (36.3 °C) Resp 28 Wt 65.9 kg (145 lb 4.5 oz) SpO2 93% BMI 21.45 kg/m² General:          WD, Elderly. Frail appearing. Alert, cooperative, no acute distress.   
HEENT:           NC, Atraumatic. PERRLA, EOMI. Anicteric sclerae. Lungs:             CTA Bilaterally. No Wheezing/Rhonchi/Rales. Heart:              Regular rate and rhythm, No murmur, No Rubs, No Gallops Abdomen:        Soft, non-distended, non-tender.  +Bowel sounds, no HSM Extremities:     No c/c/e Neurologic:      Alert and oriented X 3. No acute neurological distress. Psych:             Good insight. Not anxious nor agitated. Data Review Recent Labs  
  12/27/19 0226 12/26/19 
1418 12/26/19 
0351 WBC  --   --  6.5 HGB 8.0* 8.9* 11.1*  
HCT 25.7* 27.7* 35.4* PLT  --   --  311 Recent Labs  
  12/27/19 0226 12/26/19 0351 * 134* K 3.4* 3.2*  
 97 CO2 25 32 BUN 51* 58* CREA 2.05* 2.50* GLU 98 237* CA 7.7* 8.6 Recent Labs  
  12/26/19 
8797 SGOT 66* * TP 7.3 ALB 2.3*  
GLOB 5.0* Assessment: 1. Recurrent GI bleeding most likely diverticular. Negative bleeding scan today. Current blood with prep probably old 2. COPD 3. CHF 4. CKD 5. Chronic atrial fib-not on anticoagulation Active Problems: 
  GI bleed (10/20/2017) Plan: 1. Colonoscopy today. Can have prep and clear liquids up to noon time then n.p.oAlbina Garcia MD

## 2019-12-27 NOTE — PROCEDURES
Colonoscopy Procedure Note    Indications:   Lower GI bleeding, presumed diverticular  Referring Physician: Lakeisha Marie MD   Anesthesia/Sedation:MAC  Endoscopist:  Dr. Cephas Gowers  Assistant:  Endoscopy Technician-1: Landon Concepcion  Endoscopy RN-1: Xiang Shine  Surgical Assistant: None  Implants: None    Preoperative diagnosis: GI Bleed    Postoperative diagnosis: 1. Diverticulosis  2. AVM in cecum  3. Ascending colon polyp  4. Transverse colon polyp  5. Questionable Sigmoid Mass with biopsy - suspect subclinical focal diverticulitis       Procedure in Detail:  Informed consent was obtained for the procedure, including sedation. Risks of perforation, hemorrhage, adverse drug reaction, and aspiration were discussed. The patient was placed in the left lateral decubitus position. Based on the pre-procedure assessment, including review of the patient's medical history, medications, allergies, and review of systems, he had been deemed to be an appropriate candidate for moderate sedation; he was therefore sedated with the medications listed above. The patient was monitored continuously with ECG tracing, pulse oximetry, blood pressure monitoring, and direct observations. A rectal examination was performed. The [id number] was inserted into the rectum and advanced under direct vision to the terminal ileum. The quality of the colonic preparation was good. A careful inspection was made as the colonoscope was withdrawn, including a retroflexed view of the rectum; findings and interventions are described below.       Findings:   Rectum: normal  Sigmoid: severe diverticulosis with short segment of erythema edema no exudate Bx obtained - r/o focal diverticulitis  Descending Colon: moderate diverticulosis;  Transverse Colon: moderate diverticulosis; 4 mm polyp removed with cold snare  Ascending Colon: moderate diverticulosis; 5 mm polyp removed with cold snare  Cecum: AVM nonbleeding  Terminal Ileum: normal    Specimens:     see above    EBL: None    Complications: None; patient tolerated the procedure well.       Recommendations:     - Await pathology.     - Suspect bleeding due to diverticulosis, start antibiotics for any fever or abdominal pain    - GI Lite diet home in 24 -48 hours if no further bleeding    Signed By: Garwin Opitz, MD                        December 27, 2019

## 2019-12-27 NOTE — PROGRESS NOTES
TRANSFER - OUT REPORT:    Verbal report given to Day(name) on Rk Massey Sr.  being transferred to (unit) for routine progression of care       Report consisted of patients Situation, Background, Assessment and   Recommendations(SBAR). Information from the following report(s) SBAR and Kardex was reviewed with the receiving nurse. Lines:   Peripheral IV 12/26/19 Right Arm (Active)   Site Assessment Clean, dry, & intact 12/27/2019 10:03 AM   Phlebitis Assessment 0 12/27/2019 10:03 AM   Infiltration Assessment 0 12/27/2019 10:03 AM   Dressing Status Clean, dry, & intact 12/27/2019 10:03 AM   Dressing Type Transparent 12/27/2019 10:03 AM   Hub Color/Line Status Pink;Capped 12/27/2019  5:30 AM   Action Taken Open ports on tubing capped 12/27/2019  5:30 AM   Alcohol Cap Used Yes 12/27/2019 10:03 AM        Opportunity for questions and clarification was provided. Patient transported with transport. Pt stated no personal belongings. Shirt covered in stool so pt threw it in trash.

## 2019-12-27 NOTE — PROGRESS NOTES
PT Note:    Orders received and acknowledged. Chart reviewed and spoke with nursing. RN requesting hold PT evaluation 2* patient continues with active bleeding and prepping for colonscopy today at 4pm.  Will follow up tomorrow for PT evaluation. Thank you. The pharmacy is correct.  He should not be taking the omeprazole.  That should have been discontinued when we started him on the esomeprazole

## 2019-12-27 NOTE — PROGRESS NOTES
Primary Nurse Laila Key and KELTON Marrero performed a dual skin assessment on this patient Impairment noted- see wound doc flow sheet  Fabio score is 15      Patient has a skin tear on his sacrum and sacrum is red and nonblanchable. Patient groin is also red.

## 2019-12-27 NOTE — PROGRESS NOTES
Discussed POC with Mds throughout day. Plan for colonoscopy tomorrow, prep begun this evening. Pt agreeable. Q12H  H&H, GI and Hospitalist aware of trending down H&H. Two large bowel movements of large clots today, GI aware. Bleeding scan completed. Clear liquid diet for dinner then pt NPO at midnight. Wound care consulted, new bed ordered. Turned q2h through day. venelex placed to heels and sacrum, heels floated on pillows offloaded. Hourly rounding done. Pt calls appropriately. Very weak and actively bleeding so pt in bed throughout day. Call bell and personal items within reach throughout day.

## 2019-12-27 NOTE — PROGRESS NOTES
Problem: Diabetes Self-Management  Goal: *Disease process and treatment process  Description  Define diabetes and identify own type of diabetes; list 3 options for treating diabetes. Outcome: Progressing Towards Goal  Goal: *Incorporating nutritional management into lifestyle  Description  Describe effect of type, amount and timing of food on blood glucose; list 3 methods for planning meals. Outcome: Progressing Towards Goal  Goal: *Incorporating physical activity into lifestyle  Description  State effect of exercise on blood glucose levels. Outcome: Progressing Towards Goal  Goal: *Developing strategies to promote health/change behavior  Description  Define the ABC's of diabetes; identify appropriate screenings, schedule and personal plan for screenings. Outcome: Progressing Towards Goal  Goal: *Using medications safely  Description  State effect of diabetes medications on diabetes; name diabetes medication taking, action and side effects. Outcome: Progressing Towards Goal  Goal: *Monitoring blood glucose, interpreting and using results  Description  Identify recommended blood glucose targets  and personal targets. Outcome: Progressing Towards Goal  Goal: *Prevention, detection, treatment of acute complications  Description  List symptoms of hyper- and hypoglycemia; describe how to treat low blood sugar and actions for lowering  high blood glucose level. Outcome: Progressing Towards Goal  Goal: *Prevention, detection and treatment of chronic complications  Description  Define the natural course of diabetes and describe the relationship of blood glucose levels to long term complications of diabetes.   Outcome: Progressing Towards Goal  Goal: *Developing strategies to address psychosocial issues  Description  Describe feelings about living with diabetes; identify support needed and support network  Outcome: Progressing Towards Goal  Goal: *Insulin pump training  Outcome: Progressing Towards Goal  Goal: *Sick day guidelines  Outcome: Progressing Towards Goal  Goal: *Patient Specific Goal (EDIT GOAL, INSERT TEXT)  Outcome: Progressing Towards Goal     Problem: Patient Education: Go to Patient Education Activity  Goal: Patient/Family Education  Outcome: Progressing Towards Goal     Problem: Falls - Risk of  Goal: *Absence of Falls  Description  Document Farheen Benoit Fall Risk and appropriate interventions in the flowsheet.   Outcome: Progressing Towards Goal  Note: Fall Risk Interventions:  Mobility Interventions: Patient to call before getting OOB         Medication Interventions: Assess postural VS orthostatic hypotension, Evaluate medications/consider consulting pharmacy, Patient to call before getting OOB    Elimination Interventions: Call light in reach, Patient to call for help with toileting needs              Problem: Patient Education: Go to Patient Education Activity  Goal: Patient/Family Education  Outcome: Progressing Towards Goal     Problem: Patient Education: Go to Patient Education Activity  Goal: Patient/Family Education  Outcome: Progressing Towards Goal     Problem: Upper and Lower GI Bleed: Day 1  Goal: Off Pathway (Use only if patient is Off Pathway)  Outcome: Progressing Towards Goal  Goal: Activity/Safety  Outcome: Progressing Towards Goal  Goal: Consults, if ordered  Outcome: Progressing Towards Goal  Goal: Diagnostic Test/Procedures  Outcome: Progressing Towards Goal  Goal: Nutrition/Diet  Outcome: Progressing Towards Goal  Goal: Discharge Planning  Outcome: Progressing Towards Goal  Goal: Medications  Outcome: Progressing Towards Goal  Goal: Respiratory  Outcome: Progressing Towards Goal  Goal: Treatments/Interventions/Procedures  Outcome: Progressing Towards Goal  Goal: Psychosocial  Outcome: Progressing Towards Goal  Goal: *Optimal pain control at patient's stated goal  Outcome: Progressing Towards Goal  Goal: *Hemodynamically stable  Outcome: Progressing Towards Goal  Goal: *Demonstrates progressive activity  Outcome: Progressing Towards Goal     Problem: Upper and Lower GI Bleed: Day 2  Goal: Off Pathway (Use only if patient is Off Pathway)  Outcome: Progressing Towards Goal  Goal: Activity/Safety  Outcome: Progressing Towards Goal  Goal: Consults, if ordered  Outcome: Progressing Towards Goal  Goal: Diagnostic Test/Procedures  Outcome: Progressing Towards Goal  Goal: Nutrition/Diet  Outcome: Progressing Towards Goal  Goal: Discharge Planning  Outcome: Progressing Towards Goal  Goal: Medications  Outcome: Progressing Towards Goal  Goal: Respiratory  Outcome: Progressing Towards Goal  Goal: Treatments/Interventions/Procedures  Outcome: Progressing Towards Goal  Goal: Psychosocial  Outcome: Progressing Towards Goal  Goal: *Optimal pain control at patient's stated goal  Outcome: Progressing Towards Goal  Goal: *Hemodynamically stable  Outcome: Progressing Towards Goal  Goal: *Tolerating diet  Outcome: Progressing Towards Goal  Goal: *Demonstrates progressive activity  Outcome: Progressing Towards Goal     Problem: Upper and Lower GI Bleed: Day 3  Goal: Off Pathway (Use only if patient is Off Pathway)  Outcome: Progressing Towards Goal  Goal: Activity/Safety  Outcome: Progressing Towards Goal  Goal: Diagnostic Test/Procedures  Outcome: Progressing Towards Goal  Goal: Nutrition/Diet  Outcome: Progressing Towards Goal  Goal: Discharge Planning  Outcome: Progressing Towards Goal  Goal: Medications  Outcome: Progressing Towards Goal  Goal: Treatments/Interventions/Procedures  Outcome: Progressing Towards Goal  Goal: Psychosocial  Outcome: Progressing Towards Goal     Problem: Upper and Lower GI Bleed:  Discharge Outcomes  Goal: *Hemodynamically stable  Outcome: Progressing Towards Goal  Goal: *Lungs clear or at baseline  Outcome: Progressing Towards Goal  Goal: *Demonstrates independent activity or return to baseline  Outcome: Progressing Towards Goal  Goal: *Pain is controlled to three or less  Outcome: Progressing Towards Goal  Goal: *Verbalizes understanding and describes prescribed diet  Outcome: Progressing Towards Goal  Goal: *Tolerating diet  Outcome: Progressing Towards Goal  Goal: *Verbalizes name, dosage, time, side effects, and number of days to continue medications  Outcome: Progressing Towards Goal  Goal: *Anxiety reduced or absent  Outcome: Progressing Towards Goal  Goal: *Understands and describes signs and symptoms to report to providers(Stroke Metric)  Outcome: Progressing Towards Goal  Goal: *Describes follow-up/return visits to physicians  Outcome: Progressing Towards Goal  Goal: *Describes available resources and support systems  Outcome: Progressing Towards Goal     Problem: Risk for Spread of Infection  Goal: Prevent transmission of infectious organism to others  Description  Prevent the transmission of infectious organisms to other patients, staff members, and visitors.   Outcome: Progressing Towards Goal     Problem: Patient Education:  Go to Education Activity  Goal: Patient/Family Education  Outcome: Progressing Towards Goal

## 2019-12-27 NOTE — PROGRESS NOTES
Hospitalist Progress Note  Zoë Martin MD  Answering service: [phone] OR 36 from in house phone        Date of Service:  2019  NAME:  Marcella Varghese  :  1935  MRN:  [id number]  PCP: Faye Espinoza MD    Chief Complaint:   Chief Complaint   Patient presents with    Rectal Bleeding         Admission Summary:     Marcella Kennedy is a 81 y.o. male who presented with rectal bleeding    Interval history / Subjective:   Patient seen for Follow up of CC: rectal bleeding  Patient seen and examined by the bedside, Labs, images and notes reviewed  comfortable, Denies any chest pain, abdominal pain, fevers, chills. No N/V. Had 2 BM, both were bloody  Discussed with nursing staff, no acute issues overnight, orders reviewed. Assessment & Plan:     Acute blood loss anemia due to lower GI bleed  IV PPI  DW GI, bleeding scan is neg, plans for C-scopy today  HH dropped down, no acute need for transfusion, repeat HH q12 and transfuse PRN hemoglobin less than 7  Admitted 2 weeks ago with similar presentation, colonoscopy planned but patient declined  Pt is tolerating Golytely     Hypokalemia. recurrent  Replace, repeat labs in am  Normal magnesium Level     Mild hyponatremia, chronic, POA  Due to CHF/CKD  Repeat labs in am     Type 2 diabetes, uncontrolled with hyperglycemia  Recent A1c 7.7  SSI  Accu-Checks     Advanced COPD with chronic Hypoxemic respiratory failure on home oxygen  Continue supplemental O2 and monitor O2 sat  Continue BT as needed  Hx of Pulm Fibrosis and Pulm HTN     CKD 4  Cr at baseline   Resume diuretics, off IVF, Nephro consult noted.   Avoid nephrotoxic medications and renally dose meds     Chronic A. fib   rate controlled   not on anticoagulation     CAD s/p CABG  Continue holding ASA- may need to stop asa if recurrent bleeding  Continue other home meds     Hypertension  Stable  Hydralazine IV as needed     Hx of PVD  stable  s/p arthrectomy to distal SFA and distal popliteal with angioplasty to both lesions and stent to SFA    POA sacrum with pale white area measuring 0.5 cm x 0.5 cm, no drainage, cathie-wound with blanchable erythema, wound edges appear closed. This is the same measurements noted on 19 for sacral stage 2; appears to be resolving stage 2 pressure injury. Venelex ointment and P-500 bed to be ordered.       Code status: Full Code    DVT prophylaxis: SCDs    Care Plan discussed with: Patient/Family and Nurse    Disposition: TBD    Hospital Problems  Date Reviewed: 2019          Codes Class Noted POA    GI bleed ICD-10-CM: K92.2  ICD-9-CM: 578.9  10/20/2017 Unknown                Review of Systems:   A comprehensive review of systems was negative. Physical Examination:     General appearance: alert, cooperative, no distress, appears stated age  Head: Normocephalic, without obvious abnormality, atraumatic  Lungs: decreased AE throughout, no wheezing, crackles or labored breathing  Heart: irregularly irregular rhythm, no murmurs  Abdomen: soft, non-tender. Bowel sounds normal. No masses,  no organomegaly  Neurologic: Grossly normal       Vital Signs:    Last 24hrs VS reviewed since prior progress note. Most recent are:    Visit Vitals  /72 (BP 1 Location: Left arm, BP Patient Position: At rest)   Pulse 80   Temp 97.4 °F (36.3 °C)   Resp 28   Wt 65.9 kg (145 lb 4.5 oz)   SpO2 93%   BMI 21.45 kg/m²         Intake/Output Summary (Last 24 hours) at 2019 0955  Last data filed at 2019 0801  Gross per 24 hour   Intake 360 ml   Output 700 ml   Net -340 ml        Tmax:  Temp (24hrs), Av.7 °F (36.5 °C), Min:97.4 °F (36.3 °C), Max:98 °F (36.7 °C)      Data Review:   Data reviewed by myself:  Nm Acute Gi Bleed Scan    Result Date: 2019  EXAM: NM ACUTE GI BLEED SCAN INDICATION: Acute GI bleed. COMPARISON: 10/20/2017 CORRELATIVE IMAGING STUDIES: None TRACER: 25.2 mCi Tc 99m tagged red blood cells.  TECHNIQUE: Imaging of the abdomen was performed in the anterior projection following the uneventful intravenous administration of technetium 99m tagged red blood cells. FINDINGS: There is no abnormal tracer activity within bowel to suggest acute GI bleed. IMPRESSION: No evidence of active GI bleed. Xr Chest Pa Lat    Result Date: 12/3/2019  EXAM:  XR CHEST PA LAT INDICATION: Shortness of breath COMPARISON: 8/27/2019 TECHNIQUE: Frontal and lateral chest views FINDINGS: Sternal wires are present. Cardiac monitoring wires overlie the thorax. There is stable cardiac silhouette enlargement. Diffuse interstitial opacities are overall unchanged. Superimposed patchy airspace opacities in the right upper lung and left lower lung are increased. There is no pleural effusion or pneumothorax. The bones and upper abdomen are stable. IMPRESSION: Diffuse interstitial opacities are overall unchanged since 8/27/2019 and may represent chronic lung parenchymal change versus pulmonary edema. Superimposed patchy airspace opacities are increased in the right upper lung and left lower lung and may represent edema, infection, or atelectasis. Ct Chest Wo Cont    Result Date: 12/3/2019  INDICATION: Shortness of breath COMPARISON: 8/7/2019 CONTRAST: None. TECHNIQUE:  5 mm axial images were obtained through the chest. Coronal and sagittal reconstructions were generated. CT dose reduction was achieved through use of a standardized protocol tailored for this examination and automatic exposure control for dose modulation. The absence of intravenous contrast reduces the sensitivity for evaluation of the mediastinum and upper abdominal organs. FINDINGS: THYROID: No nodule. MEDIASTINUM: Mediastinal lymph nodes are stable compared to the prior exam, with a reference unchanged 18 mm right paratracheal lymph node. Several of the mediastinal lymph nodes are calcified. LUCAS: Bilateral calcified lymph nodes. THORACIC AORTA: Atherosclerotic without evidence of aneurysm. MAIN PULMONARY ARTERY: Enlarged compatible with pulmonary arterial hypertension. TRACHEA/BRONCHI: Patent. ESOPHAGUS: No wall thickening or dilatation. HEART: Four-chamber enlargement. PLEURA: Small bilateral pleural effusions. LUNGS: Bilateral lower lobe atelectasis. Diffuse interstitial opacities are again noted, progressive in the upper lobes bilaterally right greater than left. INCIDENTALLY IMAGED UPPER ABDOMEN: Mild free fluid. Small gallstones. BONES: Degenerative changes are seen in the thoracic spine. IMPRESSION: Diffuse interstitial opacities again noted progressive in the upper lobes bilaterally. Underlying pulmonary fibrosis is considered. Small bilateral pleural effusions with underlying atelectasis. Cardiomegaly. Cholelithiasis. Us Retroperitoneum Comp    Result Date: 12/4/2019  RENAL ULTRASOUND INDICATION: KATALINA on CKD COMPARISON: None. TECHNIQUE: Sonography of the kidneys, retroperitoneum, and bladder was performed. FINDINGS: RIGHT KIDNEY: 12.1 cm in length. Increased cortical echogenicity. No hydronephrosis, shadowing calculus, or contour-deforming renal mass. Multiple cysts, measuring up to 4.2 cm. LEFT KIDNEY: 11.8 cm in length. Increased cortical echogenicity. No hydronephrosis, shadowing calculus, or contour-deforming renal mass. Multiple cysts, measuring up to 1.3 cm. AORTA: Normal caliber in its visualized portions. Distal abdominal aorta is obscured by overlying bowel gas. COMMON ILIAC ARTERIES: Obscured by overlying bowel gas. IVC: Normal caliber in its visualized portions. BLADDER: Decompressed. IMPRESSION: Increased bilateral renal cortical echogenicity is compatible with medical renal disease. Bilateral renal cysts. No hydronephrosis. Xr Chest Port    Result Date: 12/10/2019  Portable chest 2 views dated 12/10/2019 Comparison chest dated 12/3/2019 History is heart failure 2 portable AP upright views of the chest were obtained. The patient is slightly rotated towards the right. The cardiac silhouette continues to be enlarged. There continues to be diffuse abnormal alveolar opacity involving both lungs. This is compatible with congestive change/pulmonary edema. Superimposed infiltration cannot be excluded. The costophrenic angles are relatively sharp in appearance. There is evidence of a previous thoracotomy/CABG. IMPRESSION: Evidence of congestive change/pulmonary edema. No results found for: SDES  Lab Results   Component Value Date/Time    Culture result: MRSA PRESENT (A) 12/26/2019 09:05 AM    Culture result:  12/26/2019 09:05 AM         Screening of patient nares for MRSA is for surveillance purposes and, if positive, to facilitate isolation considerations in high risk settings. It is not intended for automatic decolonization interventions per se as regimens are not sufficiently effective to warrant routine use. Culture result: (A) 12/04/2019 03:27 AM     FEW * METHICILLIN RESISTANT STAPHYLOCOCCUS AUREUS *    Culture result: (A) 12/04/2019 03:27 AM     LIGHT STENOTROPHOMONAS (XANTHO.) MALTOPHILIA CLSI GUIDELINES DO NOT RECOMMEND REPORTING SUSCEPTIBILITIES FOR S. MALTOPHILIA. TRIMETHOPRIM/SULFAMETHOXAZOLE IS THE DRUG OF CHOICE. Culture result: HEAVY NORMAL RESPIRATORY JUDE 12/04/2019 03:27 AM     All Micro Results     Procedure Component Value Units Date/Time    MRSA CULTURE [[id number]]  (Abnormal) Collected:  12/26/19 0905    Order Status:  Completed Specimen:  Nares Updated:  12/27/19 0848     Special Requests: NO SPECIAL REQUESTS        Culture result: MRSA PRESENT                   Screening of patient nares for MRSA is for surveillance purposes and, if positive, to facilitate isolation considerations in high risk settings. It is not intended for automatic decolonization interventions per se as regimens are not sufficiently effective to warrant routine use. Labs: reviewed by myself.      Recent Labs     12/27/19  0226 12/26/19  1418 12/26/19  0351   WBC --   --  6.5   HGB 8.0* 8.9* 11.1*   HCT 25.7* 27.7* 35.4*   PLT  --   --  311     Recent Labs     12/27/19 0226 12/26/19 0351   * 134*   K 3.4* 3.2*    97   CO2 25 32   BUN 51* 58*   CREA 2.05* 2.50*   GLU 98 237*   CA 7.7* 8.6   MG  --  2.1     Recent Labs     12/26/19 0351   SGOT 66*   ALT 62   *   TBILI 0.5   TP 7.3   ALB 2.3*   GLOB 5.0*     Recent Labs     12/26/19 0351   INR 1.1   PTP 11.0      No results for input(s): FE, TIBC, PSAT, FERR in the last 72 hours. Lab Results   Component Value Date/Time    Folate 11.3 12/03/2019 08:44 AM      No results for input(s): PH, PCO2, PO2 in the last 72 hours. No results for input(s): CPK, CKNDX, TROIQ in the last 72 hours.     No lab exists for component: CPKMB  No results found for: CHOL, CHOLX, CHLST, CHOLV, HDL, HDLP, LDL, LDLC, DLDLP, TGLX, TRIGL, TRIGP, CHHD, CHHDX  Lab Results   Component Value Date/Time    Glucose (POC) 128 (H) 12/27/2019 08:00 AM    Glucose (POC) 117 (H) 12/26/2019 10:48 PM    Glucose (POC) 229 (H) 12/26/2019 05:20 PM    Glucose (POC) 223 (H) 12/26/2019 11:13 AM    Glucose (POC) 192 (H) 12/26/2019 07:12 AM     Lab Results   Component Value Date/Time    Color YELLOW/STRAW 08/27/2019 12:56 PM    Appearance CLEAR 08/27/2019 12:56 PM    Specific gravity 1.023 08/27/2019 12:56 PM    pH (UA) 6.0 08/27/2019 12:56 PM    Protein >300 (A) 08/27/2019 12:56 PM    Glucose 500 (A) 08/27/2019 12:56 PM    Ketone NEGATIVE  08/27/2019 12:56 PM    Bilirubin NEGATIVE  08/27/2019 12:56 PM    Urobilinogen 0.2 08/27/2019 12:56 PM    Nitrites NEGATIVE  08/27/2019 12:56 PM    Leukocyte Esterase NEGATIVE  08/27/2019 12:56 PM    Epithelial cells FEW 08/27/2019 12:56 PM    Bacteria NEGATIVE  08/27/2019 12:56 PM    WBC 0-4 08/27/2019 12:56 PM    RBC 5-10 08/27/2019 12:56 PM         Medications Reviewed:     Current Facility-Administered Medications   Medication Dose Route Frequency    bumetanide (BUMEX) tablet 2 mg  2 mg Oral BID    potassium chloride SR (KLOR-CON 10) tablet 40 mEq  40 mEq Oral NOW    carvedilol (COREG) tablet 12.5 mg  12.5 mg Oral BID WITH MEALS    cloNIDine HCl (CATAPRES) tablet 0.1 mg  0.1 mg Oral BID    ferrous sulfate tablet 325 mg  325 mg Oral ACB    hydrALAZINE (APRESOLINE) tablet 50 mg  50 mg Oral TID    isosorbide dinitrate (ISORDIL) tablet 20 mg  20 mg Oral TID    magnesium oxide (MAG-OX) tablet 400 mg  400 mg Oral DAILY    fish oil-omega-3 fatty acids 340-1,000 mg capsule 1 Cap  1 Cap Oral BID    simvastatin (ZOCOR) tablet 20 mg  20 mg Oral QHS    spironolactone (ALDACTONE) tablet 25 mg  25 mg Oral DAILY    tamsulosin (FLOMAX) capsule 0.4 mg  0.4 mg Oral DAILY    sodium chloride (NS) flush 5-40 mL  5-40 mL IntraVENous Q8H    sodium chloride (NS) flush 5-40 mL  5-40 mL IntraVENous PRN    morphine injection 1 mg  1 mg IntraVENous Q4H PRN    ondansetron (ZOFRAN) injection 4 mg  4 mg IntraVENous Q4H PRN    pantoprazole (PROTONIX) 40 mg in 0.9% sodium chloride 10 mL injection  40 mg IntraVENous Q12H    hydrALAZINE (APRESOLINE) 20 mg/mL injection 20 mg  20 mg IntraVENous Q6H PRN    insulin regular (NOVOLIN R, HUMULIN R) injection   SubCUTAneous AC&HS    glucose chewable tablet 16 g  4 Tab Oral PRN    glucagon (GLUCAGEN) injection 1 mg  1 mg IntraMUSCular PRN    dextrose 10% infusion 0-250 mL  0-250 mL IntraVENous PRN    albuterol-ipratropium (DUO-NEB) 2.5 MG-0.5 MG/3 ML  3 mL Nebulization Q4H PRN    balsam peru-castor oil (VENELEX) ointment   Topical Q8H     ______________________________________________________________________  EXPECTED LENGTH OF STAY: 3d 0h  ACTUAL LENGTH OF STAY:          1                 Zahra Sun MD     Patient's emergency contacts:  Extended Emergency Contact Information  Primary Emergency Contact: Ha [place] Phone: [phone]  Mobile Phone: [phone]  Relation: Spouse   needed?  No  Secondary Emergency Contact: Hali Guerrero  Home Phone: [phone]  Mobile Phone: [phone]  Relation: Leonor Tay 19 needed?  No

## 2019-12-28 VITALS
DIASTOLIC BLOOD PRESSURE: 71 MMHG | TEMPERATURE: 98.6 F | SYSTOLIC BLOOD PRESSURE: 153 MMHG | WEIGHT: 145.06 LBS | OXYGEN SATURATION: 97 % | HEIGHT: 69 IN | BODY MASS INDEX: 21.49 KG/M2 | RESPIRATION RATE: 18 BRPM | HEART RATE: 65 BPM

## 2019-12-28 PROBLEM — K92.2 GI BLEED: Status: RESOLVED | Noted: 2017-10-20 | Resolved: 2019-12-28

## 2019-12-28 LAB
ALBUMIN SERPL-MCNC: 1.9 G/DL (ref 3.5–5)
ALBUMIN/GLOB SERPL: 0.6 {RATIO} (ref 1.1–2.2)
ALP SERPL-CCNC: 216 U/L (ref 45–117)
ALT SERPL-CCNC: 46 U/L (ref 12–78)
ANION GAP SERPL CALC-SCNC: 5 MMOL/L (ref 5–15)
AST SERPL-CCNC: 52 U/L (ref 15–37)
BASOPHILS # BLD: 0.1 K/UL (ref 0–0.1)
BASOPHILS NFR BLD: 1 % (ref 0–1)
BILIRUB DIRECT SERPL-MCNC: 0.2 MG/DL (ref 0–0.2)
BILIRUB SERPL-MCNC: 0.3 MG/DL (ref 0.2–1)
BUN SERPL-MCNC: 47 MG/DL (ref 6–20)
BUN/CREAT SERPL: 22 (ref 12–20)
CALCIUM SERPL-MCNC: 7.6 MG/DL (ref 8.5–10.1)
CHLORIDE SERPL-SCNC: 105 MMOL/L (ref 97–108)
CO2 SERPL-SCNC: 27 MMOL/L (ref 21–32)
CREAT SERPL-MCNC: 2.16 MG/DL (ref 0.7–1.3)
DIFFERENTIAL METHOD BLD: ABNORMAL
EOSINOPHIL # BLD: 0.3 K/UL (ref 0–0.4)
EOSINOPHIL NFR BLD: 4 % (ref 0–7)
ERYTHROCYTE [DISTWIDTH] IN BLOOD BY AUTOMATED COUNT: 16.3 % (ref 11.5–14.5)
GGT SERPL-CCNC: 158 U/L (ref 15–85)
GLOBULIN SER CALC-MCNC: 3.1 G/DL (ref 2–4)
GLUCOSE BLD STRIP.AUTO-MCNC: 144 MG/DL (ref 65–100)
GLUCOSE BLD STRIP.AUTO-MCNC: 242 MG/DL (ref 65–100)
GLUCOSE BLD STRIP.AUTO-MCNC: 307 MG/DL (ref 65–100)
GLUCOSE SERPL-MCNC: 186 MG/DL (ref 65–100)
HCT VFR BLD AUTO: 23.6 % (ref 36.6–50.3)
HGB BLD-MCNC: 7.5 G/DL (ref 12.1–17)
IMM GRANULOCYTES # BLD AUTO: 0 K/UL (ref 0–0.04)
IMM GRANULOCYTES NFR BLD AUTO: 1 % (ref 0–0.5)
LYMPHOCYTES # BLD: 0.7 K/UL (ref 0.8–3.5)
LYMPHOCYTES NFR BLD: 8 % (ref 12–49)
MCH RBC QN AUTO: 27.5 PG (ref 26–34)
MCHC RBC AUTO-ENTMCNC: 31.8 G/DL (ref 30–36.5)
MCV RBC AUTO: 86.4 FL (ref 80–99)
MONOCYTES # BLD: 0.6 K/UL (ref 0–1)
MONOCYTES NFR BLD: 6 % (ref 5–13)
NEUTS SEG # BLD: 7.2 K/UL (ref 1.8–8)
NEUTS SEG NFR BLD: 81 % (ref 32–75)
NRBC # BLD: 0 K/UL (ref 0–0.01)
NRBC BLD-RTO: 0 PER 100 WBC
PLATELET # BLD AUTO: 236 K/UL (ref 150–400)
PMV BLD AUTO: 10.2 FL (ref 8.9–12.9)
POTASSIUM SERPL-SCNC: 4.2 MMOL/L (ref 3.5–5.1)
PROT SERPL-MCNC: 5 G/DL (ref 6.4–8.2)
RBC # BLD AUTO: 2.73 M/UL (ref 4.1–5.7)
SERVICE CMNT-IMP: ABNORMAL
SODIUM SERPL-SCNC: 137 MMOL/L (ref 136–145)
WBC # BLD AUTO: 8.9 K/UL (ref 4.1–11.1)

## 2019-12-28 PROCEDURE — 94640 AIRWAY INHALATION TREATMENT: CPT

## 2019-12-28 PROCEDURE — 77010033678 HC OXYGEN DAILY

## 2019-12-28 PROCEDURE — 80048 BASIC METABOLIC PNL TOTAL CA: CPT

## 2019-12-28 PROCEDURE — 74011636637 HC RX REV CODE- 636/637: Performed by: STUDENT IN AN ORGANIZED HEALTH CARE EDUCATION/TRAINING PROGRAM

## 2019-12-28 PROCEDURE — 82962 GLUCOSE BLOOD TEST: CPT

## 2019-12-28 PROCEDURE — C9113 INJ PANTOPRAZOLE SODIUM, VIA: HCPCS | Performed by: STUDENT IN AN ORGANIZED HEALTH CARE EDUCATION/TRAINING PROGRAM

## 2019-12-28 PROCEDURE — 97161 PT EVAL LOW COMPLEX 20 MIN: CPT

## 2019-12-28 PROCEDURE — 74011250636 HC RX REV CODE- 250/636: Performed by: STUDENT IN AN ORGANIZED HEALTH CARE EDUCATION/TRAINING PROGRAM

## 2019-12-28 PROCEDURE — 97116 GAIT TRAINING THERAPY: CPT

## 2019-12-28 PROCEDURE — 74011250636 HC RX REV CODE- 250/636: Performed by: INTERNAL MEDICINE

## 2019-12-28 PROCEDURE — 82977 ASSAY OF GGT: CPT

## 2019-12-28 PROCEDURE — P9016 RBC LEUKOCYTES REDUCED: HCPCS

## 2019-12-28 PROCEDURE — 74011250637 HC RX REV CODE- 250/637: Performed by: INTERNAL MEDICINE

## 2019-12-28 PROCEDURE — 36415 COLL VENOUS BLD VENIPUNCTURE: CPT

## 2019-12-28 PROCEDURE — 74011000250 HC RX REV CODE- 250: Performed by: STUDENT IN AN ORGANIZED HEALTH CARE EDUCATION/TRAINING PROGRAM

## 2019-12-28 PROCEDURE — 85025 COMPLETE CBC W/AUTO DIFF WBC: CPT

## 2019-12-28 PROCEDURE — 80076 HEPATIC FUNCTION PANEL: CPT

## 2019-12-28 PROCEDURE — P9040 RBC LEUKOREDUCED IRRADIATED: HCPCS

## 2019-12-28 PROCEDURE — 36430 TRANSFUSION BLD/BLD COMPNT: CPT

## 2019-12-28 PROCEDURE — 30233P1 TRANSFUSION OF NONAUTOLOGOUS FROZEN RED CELLS INTO PERIPHERAL VEIN, PERCUTANEOUS APPROACH: ICD-10-PCS | Performed by: INTERNAL MEDICINE

## 2019-12-28 PROCEDURE — 74011250637 HC RX REV CODE- 250/637: Performed by: STUDENT IN AN ORGANIZED HEALTH CARE EDUCATION/TRAINING PROGRAM

## 2019-12-28 RX ORDER — SODIUM CHLORIDE 9 MG/ML
250 INJECTION, SOLUTION INTRAVENOUS AS NEEDED
Status: DISCONTINUED | OUTPATIENT
Start: 2019-12-28 | End: 2019-12-28 | Stop reason: HOSPADM

## 2019-12-28 RX ORDER — FUROSEMIDE 10 MG/ML
20 INJECTION INTRAMUSCULAR; INTRAVENOUS ONCE
Status: COMPLETED | OUTPATIENT
Start: 2019-12-28 | End: 2019-12-28

## 2019-12-28 RX ADMIN — CARVEDILOL 12.5 MG: 12.5 TABLET, FILM COATED ORAL at 08:30

## 2019-12-28 RX ADMIN — FUROSEMIDE 20 MG: 10 INJECTION, SOLUTION INTRAMUSCULAR; INTRAVENOUS at 16:21

## 2019-12-28 RX ADMIN — ISOSORBIDE DINITRATE 20 MG: 20 TABLET ORAL at 16:18

## 2019-12-28 RX ADMIN — HYDRALAZINE HYDROCHLORIDE 50 MG: 50 TABLET, FILM COATED ORAL at 08:30

## 2019-12-28 RX ADMIN — Medication 1 CAPSULE: at 08:30

## 2019-12-28 RX ADMIN — Medication 10 ML: at 06:57

## 2019-12-28 RX ADMIN — HYDRALAZINE HYDROCHLORIDE 50 MG: 50 TABLET, FILM COATED ORAL at 16:18

## 2019-12-28 RX ADMIN — Medication 10 ML: at 15:36

## 2019-12-28 RX ADMIN — SODIUM CHLORIDE 40 MG: 9 INJECTION INTRAMUSCULAR; INTRAVENOUS; SUBCUTANEOUS at 08:30

## 2019-12-28 RX ADMIN — IPRATROPIUM BROMIDE AND ALBUTEROL SULFATE 3 ML: .5; 3 SOLUTION RESPIRATORY (INHALATION) at 10:40

## 2019-12-28 RX ADMIN — CASTOR OIL AND BALSAM, PERU: 788; 87 OINTMENT TOPICAL at 06:58

## 2019-12-28 RX ADMIN — BUMETANIDE 2 MG: 1 TABLET ORAL at 08:30

## 2019-12-28 RX ADMIN — TAMSULOSIN HYDROCHLORIDE 0.4 MG: 0.4 CAPSULE ORAL at 08:30

## 2019-12-28 RX ADMIN — HUMAN INSULIN 7 UNITS: 100 INJECTION, SOLUTION SUBCUTANEOUS at 16:20

## 2019-12-28 RX ADMIN — MAGNESIUM OXIDE TAB 400 MG (241.3 MG ELEMENTAL MG) 400 MG: 400 (241.3 MG) TAB at 08:30

## 2019-12-28 RX ADMIN — CLONIDINE HYDROCHLORIDE 0.1 MG: 0.1 TABLET ORAL at 08:30

## 2019-12-28 RX ADMIN — ISOSORBIDE DINITRATE 20 MG: 20 TABLET ORAL at 08:30

## 2019-12-28 RX ADMIN — CASTOR OIL AND BALSAM, PERU: 788; 87 OINTMENT TOPICAL at 15:35

## 2019-12-28 RX ADMIN — HUMAN INSULIN 2 UNITS: 100 INJECTION, SOLUTION SUBCUTANEOUS at 06:57

## 2019-12-28 RX ADMIN — SPIRONOLACTONE 25 MG: 25 TABLET ORAL at 08:30

## 2019-12-28 RX ADMIN — EPOETIN ALFA-EPBX 20000 UNITS: 10000 INJECTION, SOLUTION INTRAVENOUS; SUBCUTANEOUS at 15:35

## 2019-12-28 NOTE — DISCHARGE SUMMARY
Inpatient hospitalist discharge summary                Brief Overview    PATIENT ID: Lisette Osman Sr. MRN: [id number]     YOB: 1935    Admitting Provider: Sydney Gonzalez MD    Discharging Provider: Chauncey Peralta MD   To contact this individual call [phone] and ask the  to page. If unavailable ask to be transferred the Adult Hospitalist Department.       PCP at discharge: Joe Jenkins [phone]   [address] [facility] [address] / [address] [facility]    Admission date: 12/26/2019  Date of Discharge: 12/28/19    Chief complaint:   Chief Complaint   Patient presents with    Rectal Bleeding     Patient Active Problem List   Diagnosis Code    Bradycardia R00.1    CAD (coronary artery disease) I25.10    Hypertension, essential, benign I10    Carotid arterial disease ([address] I73.9    Hypercholesteremia E78.00    PVD (peripheral vascular disease) ([address] I73.9    PVC's (premature ventricular contractions) I49.3    Dyspnea R06.00    Type 2 diabetes mellitus with diabetic peripheral angiopathy without gangrene ([address] E11.51    CKD (chronic kidney disease) N18.9    Acute renal failure (ARF) ([facility]) N17.9    Hypokalemia E87.6    Generalized weakness R53.1    Elevated troponin R79.89    KATALINA (acute kidney injury) ([address] N17.9    Hyperglycemia due to type 2 diabetes mellitus ([address] [address] [facility] E11.65    Atrial fibrillation, chronic I48.20    CKD (chronic kidney disease) stage 3, GFR 30-59 ml/min ([facility]) N18.3    Type 2 diabetes with nephropathy ([facility]) E11.21    CHF (congestive heart failure) ([facility]) I50.9    Atrial fibrillation with slow ventricular response ([facility]) I48.91    Pacemaker Z95.0    Hypoglycemia E16.2    CHF exacerbation ([facility]) I50.9    Rectal bleeding K62.5         Discharge diagnosis, hospital course/plan:  Acute blood loss anemia due to lower GI bleed/diverticular bleed  DW GI, DC home today after 1 unit PRBC given underlying CAD and significant blood loss  C-scopy done 12/27 shows  1. Diverticulosis  2. AVM in cecum  3. Ascending colon polyp  4. Transverse colon polyp  5. Questionable Sigmoid Mass with biopsy - suspect subclinical focal diverticulitis   Patient will need RUQ US as outpatient for elevated liver enzymes, POA- GI aware and they will order as outpatient. Pt has no ongoing symptoms and wants to go home today.     Hypokalemia. recurrent  Replaced     Mild hyponatremia, chronic, POA  Due to CHF/CKD     Type 2 diabetes, uncontrolled with hyperglycemia  Recent A1c 7.7  Cont home Insulin dose     Advanced COPD with chronic Hypoxemic respiratory failure on home oxygen  Continue supplemental O2 and monitor O2 sat  Continue BT as needed  Hx of Pulm Fibrosis and Pulm HTN     CKD 4  Cr at baseline   Resume diuretics at home dose. bumex 3mg po BID     Chronic A. fib   rate controlled   not on anticoagulation     CAD s/p CABG  Will HOLD asa for now given recurrent bleeding, please follow up with cards to discuss further care. Continue other home meds     Hypertension  Stable     Hx of PVD  stable  s/p arthrectomy to distal SFA and distal popliteal with angioplasty to both lesions and stent to SFA     POA sacrum with pale white area measuring 0.5 cm x 0.5 cm, no drainage, cathie-wound with blanchable erythema, wound edges appear closed.  This is the same measurements noted on 12/17/19 for sacral stage 2; appears to be resolving stage 2 pressure injury.  Venelex ointment and P-500 bed. On the date of discharge, diagnostic face to face encounter was performed. Patient was hemodynamically stable, offering no new complaints. Denies any shortness of breath at rest, no fevers or chills, no diarrhea or constipation. Patient is agreeable for discharge. Patient understood and verbalized the understanding of the discharge plan. Patient was advised to seek medical help/ care or return to ED, if symptoms recur, worsen or new symptoms develop.       Discharge Disposition:  Home Health Care c    Discharge activity:  Ambulate in house    Code status at discharge:  Full Code     Active issues requiring follow up:  GI bleed  Biopsy during C-scopy  RUQ US for elevated liver enzymes  Need for Aspirin? Outpatient follow up:        Future appointments-  Future Appointments   Date Time Provider Muna Delgado   2/12/2020  2:15 PM REMOTE1, [address]   2/27/2020  2:20 PM Easton Bhatia MD cav ANAHI SCHED   5/18/2020  9:15 AM REMOTE1, [address]   8/24/2020  8:45 AM REMOTE1, [address]   10/29/2020 10:30 AM PACEMAKER3, AGUIAR CAVREY ANAHI SCHED   10/29/2020 10:40 AM Bob Veronica  [address]     Follow-up Information     Follow up With Specialties Details Why Contact Info    Reva Wesley MD [facility] Schedule an appointment as soon as possible for a visit in 1 week for post-hospitalization follow up, with in 7 days [address] [facility]  [address]  [facility] [address] [facility]      Leandro Ricketts MD Nephrology Schedule an appointment as soon as possible for a visit as recommended [address] [id number] [phone]      Erika Lance MD Gastroenterology Schedule an appointment as soon as possible for a visit as recommended [address] [facility]  [address] [facility]  [phone]            Test results pending upon discharge:  Biopsy reports    Operative procedures performed:  Procedure(s):  COLONOSCOPY  ENDOSCOPIC POLYPECTOMY  COLON BIOPSY    Treatments: PRBC transfusion    Consults:  IP CONSULT TO GASTROENTEROLOGY  IP CONSULT TO NEPHROLOGY    Procedures:    Procedure(s):  COLONOSCOPY  ENDOSCOPIC POLYPECTOMY  COLON BIOPSY    Diet:  DIET GI LITE (POST SURGICAL)  DIET ONE TIME MESSAGE    Pertinent test results:  Nm Acute Gi Bleed Scan    Result Date: 12/26/2019  EXAM: NM ACUTE GI BLEED SCAN INDICATION: Acute GI bleed.  COMPARISON: 10/20/2017 CORRELATIVE IMAGING STUDIES: None TRACER: 25.2 mCi Tc 99m tagged red blood cells. TECHNIQUE: Imaging of the abdomen was performed in the anterior projection following the uneventful intravenous administration of technetium 99m tagged red blood cells. FINDINGS: There is no abnormal tracer activity within bowel to suggest acute GI bleed. IMPRESSION: No evidence of active GI bleed. Xr Chest Pa Lat    Result Date: 12/3/2019  EXAM:  XR CHEST PA LAT INDICATION: Shortness of breath COMPARISON: 8/27/2019 TECHNIQUE: Frontal and lateral chest views FINDINGS: Sternal wires are present. Cardiac monitoring wires overlie the thorax. There is stable cardiac silhouette enlargement. Diffuse interstitial opacities are overall unchanged. Superimposed patchy airspace opacities in the right upper lung and left lower lung are increased. There is no pleural effusion or pneumothorax. The bones and upper abdomen are stable. IMPRESSION: Diffuse interstitial opacities are overall unchanged since 8/27/2019 and may represent chronic lung parenchymal change versus pulmonary edema. Superimposed patchy airspace opacities are increased in the right upper lung and left lower lung and may represent edema, infection, or atelectasis. Ct Chest Wo Cont    Result Date: 12/3/2019  INDICATION: Shortness of breath COMPARISON: 8/7/2019 CONTRAST: None. TECHNIQUE:  5 mm axial images were obtained through the chest. Coronal and sagittal reconstructions were generated. CT dose reduction was achieved through use of a standardized protocol tailored for this examination and automatic exposure control for dose modulation. The absence of intravenous contrast reduces the sensitivity for evaluation of the mediastinum and upper abdominal organs. FINDINGS: THYROID: No nodule. MEDIASTINUM: Mediastinal lymph nodes are stable compared to the prior exam, with a reference unchanged 18 mm right paratracheal lymph node. Several of the mediastinal lymph nodes are calcified. LUCAS: Bilateral calcified lymph nodes. THORACIC AORTA: Atherosclerotic without evidence of aneurysm. MAIN PULMONARY ARTERY: Enlarged compatible with pulmonary arterial hypertension. TRACHEA/BRONCHI: Patent. ESOPHAGUS: No wall thickening or dilatation. HEART: Four-chamber enlargement. PLEURA: Small bilateral pleural effusions. LUNGS: Bilateral lower lobe atelectasis. Diffuse interstitial opacities are again noted, progressive in the upper lobes bilaterally right greater than left. INCIDENTALLY IMAGED UPPER ABDOMEN: Mild free fluid. Small gallstones. BONES: Degenerative changes are seen in the thoracic spine. IMPRESSION: Diffuse interstitial opacities again noted progressive in the upper lobes bilaterally. Underlying pulmonary fibrosis is considered. Small bilateral pleural effusions with underlying atelectasis. Cardiomegaly. Cholelithiasis. Us Retroperitoneum Comp    Result Date: 12/4/2019  RENAL ULTRASOUND INDICATION: KATALINA on CKD COMPARISON: None. TECHNIQUE: Sonography of the kidneys, retroperitoneum, and bladder was performed. FINDINGS: RIGHT KIDNEY: 12.1 cm in length. Increased cortical echogenicity. No hydronephrosis, shadowing calculus, or contour-deforming renal mass. Multiple cysts, measuring up to 4.2 cm. LEFT KIDNEY: 11.8 cm in length. Increased cortical echogenicity. No hydronephrosis, shadowing calculus, or contour-deforming renal mass. Multiple cysts, measuring up to 1.3 cm. AORTA: Normal caliber in its visualized portions. Distal abdominal aorta is obscured by overlying bowel gas. COMMON ILIAC ARTERIES: Obscured by overlying bowel gas. IVC: Normal caliber in its visualized portions. BLADDER: Decompressed. IMPRESSION: Increased bilateral renal cortical echogenicity is compatible with medical renal disease. Bilateral renal cysts. No hydronephrosis.      Xr Chest Port    Result Date: 12/10/2019  Portable chest 2 views dated 12/10/2019 Comparison chest dated 12/3/2019 History is heart failure 2 portable AP upright views of the chest were obtained. The patient is slightly rotated towards the right. The cardiac silhouette continues to be enlarged. There continues to be diffuse abnormal alveolar opacity involving both lungs. This is compatible with congestive change/pulmonary edema. Superimposed infiltration cannot be excluded. The costophrenic angles are relatively sharp in appearance. There is evidence of a previous thoracotomy/CABG. IMPRESSION: Evidence of congestive change/pulmonary edema. Recent Results (from the past 336 hour(s))   CBC WITH AUTOMATED DIFF    Collection Time: 12/16/19 12:49 AM   Result Value Ref Range    WBC 6.3 4.1 - 11.1 K/uL    RBC 3.48 (L) 4.10 - 5.70 M/uL    HGB 9.3 (L) 12.1 - 17.0 g/dL    HCT 30.5 (L) 36.6 - 50.3 %    MCV 87.6 80.0 - 99.0 FL    MCH 26.7 26.0 - 34.0 PG    MCHC 30.5 30.0 - 36.5 g/dL    RDW 17.9 (H) 11.5 - 14.5 %    PLATELET 123 876 - 942 K/uL    MPV 10.6 8.9 - 12.9 FL    NRBC 0.0 0  WBC    ABSOLUTE NRBC 0.00 0.00 - 0.01 K/uL    NEUTROPHILS 72 32 - 75 %    LYMPHOCYTES 10 (L) 12 - 49 %    MONOCYTES 12 5 - 13 %    EOSINOPHILS 4 0 - 7 %    BASOPHILS 1 0 - 1 %    IMMATURE GRANULOCYTES 1 (H) 0.0 - 0.5 %    ABS. NEUTROPHILS 4.4 1.8 - 8.0 K/UL    ABS. LYMPHOCYTES 0.6 (L) 0.8 - 3.5 K/UL    ABS. MONOCYTES 0.8 0.0 - 1.0 K/UL    ABS. EOSINOPHILS 0.3 0.0 - 0.4 K/UL    ABS. BASOPHILS 0.1 0.0 - 0.1 K/UL    ABS. IMM.  GRANS. 0.1 (H) 0.00 - 0.04 K/UL    DF SMEAR SCANNED      RBC COMMENTS ANISOCYTOSIS  1+       METABOLIC PANEL, COMPREHENSIVE    Collection Time: 12/16/19 12:49 AM   Result Value Ref Range    Sodium 132 (L) 136 - 145 mmol/L    Potassium 3.9 3.5 - 5.1 mmol/L    Chloride 91 (L) 97 - 108 mmol/L    CO2 38 (H) 21 - 32 mmol/L    Anion gap 3 (L) 5 - 15 mmol/L    Glucose 237 (H) 65 - 100 mg/dL    BUN 48 (H) 6 - 20 MG/DL    Creatinine 2.45 (H) 0.70 - 1.30 MG/DL    BUN/Creatinine ratio 20 12 - 20      GFR est AA 31 (L) >60 ml/min/1.73m2    GFR est non-AA 25 (L) >60 ml/min/1.73m2    Calcium 8.3 (L) 8.5 - 10.1 MG/DL    Bilirubin, total 0.6 0.2 - 1.0 MG/DL    ALT (SGPT) 25 12 - 78 U/L    AST (SGOT) 21 15 - 37 U/L    Alk. phosphatase 316 (H) 45 - 117 U/L    Protein, total 6.6 6.4 - 8.2 g/dL    Albumin 2.1 (L) 3.5 - 5.0 g/dL    Globulin 4.5 (H) 2.0 - 4.0 g/dL    A-G Ratio 0.5 (L) 1.1 - 2.2     TYPE & SCREEN    Collection Time: 12/16/19 12:49 AM   Result Value Ref Range    Crossmatch Expiration 12/19/2019     ABO/Rh(D) A NEGATIVE     Antibody screen NEG     Unit number [id number]     Blood component type [facility]     Unit division 00     Status of unit TRANSFUSED     Crossmatch result Compatible     Unit number [id number]     Blood component type [facility]     Unit division 00     Status of unit TRANSFUSED     Crossmatch result Compatible    PROTHROMBIN TIME + INR    Collection Time: 12/16/19 12:49 AM   Result Value Ref Range    INR 1.2 (H) 0.9 - 1.1      Prothrombin time 11.9 (H) 9.0 - 11.1 sec   SAMPLES BEING HELD    Collection Time: 12/16/19 12:52 AM   Result Value Ref Range    SAMPLES BEING HELD RD     COMMENT        Add-on orders for these samples will be processed based on acceptable specimen integrity and analyte stability, which may vary by analyte.    EKG, 12 LEAD, INITIAL    Collection Time: 12/16/19 12:53 AM   Result Value Ref Range    Ventricular Rate 68 BPM    Atrial Rate 73 BPM    QRS Duration 154 ms    Q-T Interval 514 ms    QTC Calculation (Bezet) 546 ms    Calculated R Axis 33 degrees    Calculated T Axis -58 degrees    Diagnosis       Normal sinus rhythm  Right bundle branch block  T wave abnormality, consider inferolateral ischemia  When compared with ECG of 03-DEC-2019 20:39,  Wide QRS rhythm has replaced Sinus rhythm  Confirmed by Philly Mendoza ([id number]) on 12/16/2019 10:26:12 AM     HGB & HCT    Collection Time: 12/16/19  3:52 AM   Result Value Ref Range    HGB 10.2 (L) 12.1 - 17.0 g/dL    HCT 32.3 (L) 36.6 - 50.3 %   CBC WITH AUTOMATED DIFF    Collection Time: 12/16/19 10:05 AM   Result Value Ref Range    WBC 7.5 4.1 - 11.1 K/uL    RBC 3.98 (L) 4.10 - 5.70 M/uL    HGB 11.1 (L) 12.1 - 17.0 g/dL    HCT 34.5 (L) 36.6 - 50.3 %    MCV 86.7 80.0 - 99.0 FL    MCH 27.9 26.0 - 34.0 PG    MCHC 32.2 30.0 - 36.5 g/dL    RDW 17.2 (H) 11.5 - 14.5 %    PLATELET 774 252 - 018 K/uL    MPV 11.0 8.9 - 12.9 FL    NRBC 0.0 0  WBC    ABSOLUTE NRBC 0.00 0.00 - 0.01 K/uL    NEUTROPHILS 76 (H) 32 - 75 %    LYMPHOCYTES 8 (L) 12 - 49 %    MONOCYTES 11 5 - 13 %    EOSINOPHILS 4 0 - 7 %    BASOPHILS 1 0 - 1 %    IMMATURE GRANULOCYTES 0 0.0 - 0.5 %    ABS. NEUTROPHILS 5.7 1.8 - 8.0 K/UL    ABS. LYMPHOCYTES 0.6 (L) 0.8 - 3.5 K/UL    ABS. MONOCYTES 0.8 0.0 - 1.0 K/UL    ABS. EOSINOPHILS 0.3 0.0 - 0.4 K/UL    ABS. BASOPHILS 0.1 0.0 - 0.1 K/UL    ABS. IMM. GRANS. 0.0 0.00 - 0.04 K/UL    DF SMEAR SCANNED      RBC COMMENTS ANISOCYTOSIS  1+        RBC COMMENTS TEARDROP CELLS  1+        WBC COMMENTS RBC FRAGMENTS     GLUCOSE, POC    Collection Time: 12/16/19  2:32 PM   Result Value Ref Range    Glucose (POC) 266 (H) 65 - 100 mg/dL    Performed by [address] [place] [address]    Collection Time: 12/16/19  5:15 PM   Result Value Ref Range    SAMPLES BEING HELD  1 pst     COMMENT        Add-on orders for these samples will be processed based on acceptable specimen integrity and analyte stability, which may vary by analyte.    HGB & HCT    Collection Time: 12/16/19  5:23 PM   Result Value Ref Range    HGB 11.0 (L) 12.1 - 17.0 g/dL    HCT 34.7 (L) 36.6 - 50.3 %   GLUCOSE, POC    Collection Time: 12/16/19 10:32 PM   Result Value Ref Range    Glucose (POC) 115 (H) 65 - 100 mg/dL    Performed by Regina Rutledge ([place])    METABOLIC PANEL, BASIC    Collection Time: 12/17/19  3:52 AM   Result Value Ref Range    Sodium 135 (L) 136 - 145 mmol/L    Potassium 3.5 3.5 - 5.1 mmol/L    Chloride 100 97 - 108 mmol/L    CO2 29 21 - 32 mmol/L    Anion gap 6 5 - 15 mmol/L    Glucose 122 (H) 65 - 100 mg/dL    BUN 51 (H) 6 - 20 MG/DL    Creatinine 1.97 (H) 0.70 - 1.30 MG/DL    BUN/Creatinine ratio 26 (H) 12 - 20      GFR est AA 40 (L) >60 ml/min/1.73m2    GFR est non-AA 33 (L) >60 ml/min/1.73m2    Calcium 7.4 (L) 8.5 - 10.1 MG/DL   GLUCOSE, POC    Collection Time: 12/17/19  5:59 AM   Result Value Ref Range    Glucose (POC) 120 (H) 65 - 100 mg/dL    Performed by Leyla Cain ([facility])    GLUCOSE, POC    Collection Time: 12/17/19  6:34 AM   Result Value Ref Range    Glucose (POC) 138 (H) 65 - 100 mg/dL    Performed by Sumit Penaloza    CBC W/O DIFF    Collection Time: 12/17/19 10:31 AM   Result Value Ref Range    WBC 9.9 4.1 - 11.1 K/uL    RBC 4.28 4.10 - 5.70 M/uL    HGB 11.9 (L) 12.1 - 17.0 g/dL    HCT 37.8 36.6 - 50.3 %    MCV 88.3 80.0 - 99.0 FL    MCH 27.8 26.0 - 34.0 PG    MCHC 31.5 30.0 - 36.5 g/dL    RDW 17.3 (H) 11.5 - 14.5 %    PLATELET 853 719 - 140 K/uL    MPV 10.3 8.9 - 12.9 FL    NRBC 0.0 0  WBC    ABSOLUTE NRBC 0.00 0.00 - 0.01 K/uL   GLUCOSE, POC    Collection Time: 12/17/19 11:04 AM   Result Value Ref Range    Glucose (POC) 197 (H) 65 - 100 mg/dL    Performed by Dustin Weeks    GLUCOSE, POC    Collection Time: 12/17/19  5:24 PM   Result Value Ref Range    Glucose (POC) 77 65 - 100 mg/dL    Performed by [place]    Collection Time: 12/26/19  3:45 AM   Result Value Ref Range    SAMPLES BEING HELD 1RED     COMMENT        Add-on orders for these samples will be processed based on acceptable specimen integrity and analyte stability, which may vary by analyte.    CBC WITH AUTOMATED DIFF    Collection Time: 12/26/19  3:51 AM   Result Value Ref Range    WBC 6.5 4.1 - 11.1 K/uL    RBC 4.06 (L) 4.10 - 5.70 M/uL    HGB 11.1 (L) 12.1 - 17.0 g/dL    HCT 35.4 (L) 36.6 - 50.3 %    MCV 87.2 80.0 - 99.0 FL    MCH 27.3 26.0 - 34.0 PG    MCHC 31.4 30.0 - 36.5 g/dL    RDW 15.9 (H) 11.5 - 14.5 %    PLATELET 575 716 - 851 K/uL    MPV 10.6 8.9 - 12.9 FL    NRBC 0.0 0  WBC    ABSOLUTE NRBC 0. 00 0.00 - 0.01 K/uL    NEUTROPHILS 74 32 - 75 %    LYMPHOCYTES 11 (L) 12 - 49 %    MONOCYTES 9 5 - 13 %    EOSINOPHILS 4 0 - 7 %    BASOPHILS 1 0 - 1 %    IMMATURE GRANULOCYTES 1 (H) 0.0 - 0.5 %    ABS. NEUTROPHILS 4.7 1.8 - 8.0 K/UL    ABS. LYMPHOCYTES 0.7 (L) 0.8 - 3.5 K/UL    ABS. MONOCYTES 0.6 0.0 - 1.0 K/UL    ABS. EOSINOPHILS 0.3 0.0 - 0.4 K/UL    ABS. BASOPHILS 0.1 0.0 - 0.1 K/UL    ABS. IMM. GRANS. 0.1 (H) 0.00 - 0.04 K/UL    DF SMEAR SCANNED      RBC COMMENTS HYPOCHROMIA  2+        RBC COMMENTS OVALOCYTES  PRESENT        RBC COMMENTS ANISOCYTOSIS  1+       METABOLIC PANEL, COMPREHENSIVE    Collection Time: 12/26/19  3:51 AM   Result Value Ref Range    Sodium 134 (L) 136 - 145 mmol/L    Potassium 3.2 (L) 3.5 - 5.1 mmol/L    Chloride 97 97 - 108 mmol/L    CO2 32 21 - 32 mmol/L    Anion gap 5 5 - 15 mmol/L    Glucose 237 (H) 65 - 100 mg/dL    BUN 58 (H) 6 - 20 MG/DL    Creatinine 2.50 (H) 0.70 - 1.30 MG/DL    BUN/Creatinine ratio 23 (H) 12 - 20      GFR est AA 30 (L) >60 ml/min/1.73m2    GFR est non-AA 25 (L) >60 ml/min/1.73m2    Calcium 8.6 8.5 - 10.1 MG/DL    Bilirubin, total 0.5 0.2 - 1.0 MG/DL    ALT (SGPT) 62 12 - 78 U/L    AST (SGOT) 66 (H) 15 - 37 U/L    Alk.  phosphatase 302 (H) 45 - 117 U/L    Protein, total 7.3 6.4 - 8.2 g/dL    Albumin 2.3 (L) 3.5 - 5.0 g/dL    Globulin 5.0 (H) 2.0 - 4.0 g/dL    A-G Ratio 0.5 (L) 1.1 - 2.2     TYPE & SCREEN    Collection Time: 12/26/19  3:51 AM   Result Value Ref Range    Crossmatch Expiration 12/29/2019     ABO/Rh(D) A NEGATIVE     Antibody screen NEG     Unit number [id number]     Blood component type RC LRIR     Unit division 00     Status of unit ALLOCATED     Crossmatch result Compatible    PROTHROMBIN TIME + INR    Collection Time: 12/26/19  3:51 AM   Result Value Ref Range    INR 1.1 0.9 - 1.1      Prothrombin time 11.0 9.0 - 11.1 sec   OCCULT BLOOD, STOOL    Collection Time: 12/26/19  3:51 AM   Result Value Ref Range    Occult blood, stool POSITIVE (A) NEG     MAGNESIUM    Collection Time: 12/26/19  3:51 AM   Result Value Ref Range    Magnesium 2.1 1.6 - 2.4 mg/dL   GLUCOSE, POC    Collection Time: 12/26/19  7:12 AM   Result Value Ref Range    Glucose (POC) 192 (H) 65 - 100 mg/dL    Performed by Spencer Nair MRSA    Collection Time: 12/26/19  9:05 AM   Result Value Ref Range    Special Requests: NO SPECIAL REQUESTS      Culture result: MRSA PRESENT (A)      Culture result:            Screening of patient nares for MRSA is for surveillance purposes and, if positive, to facilitate isolation considerations in high risk settings. It is not intended for automatic decolonization interventions per se as regimens are not sufficiently effective to warrant routine use.    GLUCOSE, POC    Collection Time: 12/26/19 11:13 AM   Result Value Ref Range    Glucose (POC) 223 (H) 65 - 100 mg/dL    Performed by Iban Huntley    HGB & HCT    Collection Time: 12/26/19  2:18 PM   Result Value Ref Range    HGB 8.9 (L) 12.1 - 17.0 g/dL    HCT 27.7 (L) 36.6 - 50.3 %   GLUCOSE, POC    Collection Time: 12/26/19  5:20 PM   Result Value Ref Range    Glucose (POC) 229 (H) 65 - 100 mg/dL    Performed by Maryse POC    Collection Time: 12/26/19 10:48 PM   Result Value Ref Range    Glucose (POC) 117 (H) 65 - 100 mg/dL    Performed by Marlon Veronica    HGB & HCT    Collection Time: 12/27/19  2:26 AM   Result Value Ref Range    HGB 8.0 (L) 12.1 - 17.0 g/dL    HCT 25.7 (L) 36.6 - 93.5 %   METABOLIC PANEL, BASIC    Collection Time: 12/27/19  2:26 AM   Result Value Ref Range    Sodium 135 (L) 136 - 145 mmol/L    Potassium 3.4 (L) 3.5 - 5.1 mmol/L    Chloride 103 97 - 108 mmol/L    CO2 25 21 - 32 mmol/L    Anion gap 7 5 - 15 mmol/L    Glucose 98 65 - 100 mg/dL    BUN 51 (H) 6 - 20 MG/DL    Creatinine 2.05 (H) 0.70 - 1.30 MG/DL    BUN/Creatinine ratio 25 (H) 12 - 20      GFR est AA 38 (L) >60 ml/min/1.73m2    GFR est non-AA 31 (L) >60 ml/min/1.73m2    Calcium 7.7 (L) 8.5 - 10.1 MG/DL   GLUCOSE, POC    Collection Time: 12/27/19  8:00 AM   Result Value Ref Range    Glucose (POC) 128 (H) 65 - 100 mg/dL    Performed by Maryse, POC    Collection Time: 12/27/19 11:34 AM   Result Value Ref Range    Glucose (POC) 177 (H) 65 - 100 mg/dL    Performed by Iban Huntley    HGB & HCT    Collection Time: 12/27/19 12:59 PM   Result Value Ref Range    HGB 8.5 (L) 12.1 - 17.0 g/dL    HCT 27.3 (L) 36.6 - 50.3 %   GLUCOSE, POC    Collection Time: 12/27/19  6:10 PM   Result Value Ref Range    Glucose (POC) 121 (H) 65 - 100 mg/dL    Performed by Serg Roach    GLUCOSE, POC    Collection Time: 12/27/19 10:53 PM   Result Value Ref Range    Glucose (POC) 263 (H) 65 - 100 mg/dL    Performed by Jana Davila    CBC WITH AUTOMATED DIFF    Collection Time: 12/28/19  1:44 AM   Result Value Ref Range    WBC 8.9 4.1 - 11.1 K/uL    RBC 2.73 (L) 4.10 - 5.70 M/uL    HGB 7.5 (L) 12.1 - 17.0 g/dL    HCT 23.6 (L) 36.6 - 50.3 %    MCV 86.4 80.0 - 99.0 FL    MCH 27.5 26.0 - 34.0 PG    MCHC 31.8 30.0 - 36.5 g/dL    RDW 16.3 (H) 11.5 - 14.5 %    PLATELET 941 966 - 644 K/uL    MPV 10.2 8.9 - 12.9 FL    NRBC 0.0 0  WBC    ABSOLUTE NRBC 0.00 0.00 - 0.01 K/uL    NEUTROPHILS 81 (H) 32 - 75 %    LYMPHOCYTES 8 (L) 12 - 49 %    MONOCYTES 6 5 - 13 %    EOSINOPHILS 4 0 - 7 %    BASOPHILS 1 0 - 1 %    IMMATURE GRANULOCYTES 1 (H) 0.0 - 0.5 %    ABS. NEUTROPHILS 7.2 1.8 - 8.0 K/UL    ABS. LYMPHOCYTES 0.7 (L) 0.8 - 3.5 K/UL    ABS. MONOCYTES 0.6 0.0 - 1.0 K/UL    ABS. EOSINOPHILS 0.3 0.0 - 0.4 K/UL    ABS. BASOPHILS 0.1 0.0 - 0.1 K/UL    ABS. IMM.  GRANS. 0.0 0.00 - 0.04 K/UL    DF AUTOMATED     METABOLIC PANEL, BASIC    Collection Time: 12/28/19  1:44 AM   Result Value Ref Range    Sodium 137 136 - 145 mmol/L    Potassium 4.2 3.5 - 5.1 mmol/L    Chloride 105 97 - 108 mmol/L    CO2 27 21 - 32 mmol/L    Anion gap 5 5 - 15 mmol/L    Glucose 186 (H) 65 - 100 mg/dL    BUN 47 (H) 6 - 20 MG/DL    Creatinine 2.16 (H) 0.70 - 1.30 MG/DL    BUN/Creatinine ratio 22 (H) 12 - 20      GFR est AA 36 (L) >60 ml/min/1.73m2    GFR est non-AA 29 (L) >60 ml/min/1.73m2    Calcium 7.6 (L) 8.5 - 10.1 MG/DL   HEPATIC FUNCTION PANEL    Collection Time: 12/28/19  1:44 AM   Result Value Ref Range    Protein, total 5.0 (L) 6.4 - 8.2 g/dL    Albumin 1.9 (L) 3.5 - 5.0 g/dL    Globulin 3.1 2.0 - 4.0 g/dL    A-G Ratio 0.6 (L) 1.1 - 2.2      Bilirubin, total 0.3 0.2 - 1.0 MG/DL    Bilirubin, direct 0.2 0.0 - 0.2 MG/DL    Alk. phosphatase 216 (H) 45 - 117 U/L    AST (SGOT) 52 (H) 15 - 37 U/L    ALT (SGPT) 46 12 - 78 U/L   GGT    Collection Time: 12/28/19  1:44 AM   Result Value Ref Range     (H) 15 - 85 U/L   GLUCOSE, POC    Collection Time: 12/28/19  6:25 AM   Result Value Ref Range    Glucose (POC) 144 (H) 65 - 100 mg/dL    Performed by [employer], POC    Collection Time: 12/28/19 11:34 AM   Result Value Ref Range    Glucose (POC) 242 (H) 65 - 100 mg/dL    Performed by Ruba Thomason            Physical Exam on Discharge:    Discharge condition: fair    Vital signs:   Patient Vitals for the past 12 hrs:   Temp Pulse Resp BP SpO2   12/28/19 1041     97 %   12/28/19 0750 98.1 °F (36.7 °C) 62 17 137/77 97 %   12/28/19 0401    122/52    12/28/19 0246 98.7 °F (37.1 °C) 62 18 134/45 99 %       Lungs: clear to auscultation bilaterally    Current Discharge Medication List      CONTINUE these medications which have NOT CHANGED    Details   cloNIDine HCl (CATAPRES) 0.1 mg tablet Take 0.1 mg by mouth two (2) times a day. bumetanide (BUMEX) 1 mg tablet Take 3 Tabs by mouth two (2) times a day. Qty: 120 Tab, Refills: 0      hydrALAZINE (APRESOLINE) 50 mg tablet Take 1 Tab by mouth three (3) times daily. Qty: 60 Tab, Refills: 0      spironolactone (ALDACTONE) 25 mg tablet Take 1 Tab by mouth daily. Qty: 20 Tab, Refills: 0      magnesium oxide (MAG-OX) 400 mg tablet Take 1 Tab by mouth daily.   Qty: 15 Tab, Refills: 0      isosorbide dinitrate (ISORDIL) 20 mg tablet Take 1 Tab by mouth three (3) times daily. Qty: 60 Tab, Refills: 0      ferrous sulfate (IRON) 325 mg (65 mg iron) tablet Take 325 mg by mouth Daily (before breakfast). carvedilol (COREG) 12.5 mg tablet Take 12.5 mg by mouth two (2) times daily (with meals). simvastatin (ZOCOR) 20 mg tablet Take 1 Tab by mouth nightly. Qty: 90 Tab, Refills: 3    Associated Diagnoses: Coronary artery disease involving native coronary artery of native heart without angina pectoris; Hypercholesteremia      tamsulosin (FLOMAX) 0.4 mg capsule Take 1 Cap by mouth daily. Qty: 30 Cap, Refills: 0      insulin glargine (LANTUS U-100 INSULIN) 100 unit/mL injection 3-7 Units by SubCUTAneous route nightly. Pt reports he uses 3-7 units depending on BG      albuterol (PROVENTIL VENTOLIN) 2.5 mg /3 mL (0.083 %) nebulizer solution 3 mL by Nebulization route every four (4) hours as needed for Wheezing. Qty: 1 Package, Refills: 0      omega 3-dha-epa-fish oil (FISH OIL) 100-160-1,000 mg cap Take 1 Cap by mouth two (2) times a day. cinnamon bark (CINNAMON) 500 mg cap Take 1,000 mg by mouth two (2) times a day.                Total time spent on discharge planning, counseling and co-ordination of care:   35 minutes    Ute Rankin MD  12/28/19  12:45 PM

## 2019-12-28 NOTE — DISCHARGE INSTRUCTIONS
Discharge Instructions       PATIENT ID: Cuauhtemoc Miramontes MRN: [id number]   YOB: 1935    DATE OF ADMISSION: 12/26/2019  3:33 AM    DATE OF DISCHARGE: 12/28/2019    PRIMARY CARE PROVIDER: Kreg Najjar, MD     ATTENDING PHYSICIAN: Navin Willis MD  DISCHARGING PROVIDER: Ellyn Garland MD    To contact this individual call [phone] and ask the  to page. If unavailable ask to be transferred the Adult Hospitalist Department. DISCHARGE DIAGNOSES     Acute blood loss anemia due to lower GI bleed/diverticular bleed  DW GI, DC home today after 1 unit PRBC given underlying CAD and significant blood loss  C-scopy done 12/27 shows  1. Diverticulosis  2. AVM in cecum  3. Ascending colon polyp  4. Transverse colon polyp  5. Questionable Sigmoid Mass with biopsy - suspect subclinical focal diverticulitis   Patient will need RUQ US as outpatient for elevated liver enzymes, POA- GI aware and they will order as outpatient. Pt has no ongoing symptoms and wants to go home today.     Hypokalemia. recurrent  Replaced     Mild hyponatremia, chronic, POA  Due to CHF/CKD     Type 2 diabetes, uncontrolled with hyperglycemia  Recent A1c 7.7  Cont home Insulin dose     Advanced COPD with chronic Hypoxemic respiratory failure on home oxygen  Continue supplemental O2 and monitor O2 sat  Continue BT as needed  Hx of Pulm Fibrosis and Pulm HTN     CKD 4  Cr at baseline   Resume diuretics at home dose. bumex 3mg po BID     Chronic A. fib   rate controlled   not on anticoagulation     CAD s/p CABG  Will HOLD asa for now given recurrent bleeding, please follow up with cards to discuss further care.   Continue other home meds     Hypertension  Stable     Hx of PVD  stable  s/p arthrectomy to distal SFA and distal popliteal with angioplasty to both lesions and stent to SFA     POA sacrum with pale white area measuring 0.5 cm x 0.5 cm, no drainage, cathie-wound with blanchable erythema, wound edges appear closed.  This is the same measurements noted on 12/17/19 for sacral stage 2; appears to be resolving stage 2 pressure injury.  Venelex ointment and P-500 bed. CONSULTATIONS: IP CONSULT TO GASTROENTEROLOGY  IP CONSULT TO NEPHROLOGY    PROCEDURES/SURGERIES: Procedure(s):  COLONOSCOPY  ENDOSCOPIC POLYPECTOMY  COLON BIOPSY    PENDING TEST RESULTS:   At the time of discharge the following test results are still pending: n/a    FOLLOW UP APPOINTMENTS:   Follow-up Information     Follow up With Specialties Details Why Contact Info    Mustapha Dave MD [facility] Schedule an appointment as soon as possible for a visit in 1 week for post-hospitalization follow up, with in 7 days [address] [facility]  [address]  [facility] [address] [facility]      Casimiro Andrade MD Nephrology Schedule an appointment as soon as possible for a visit as recommended [address] [facility]      Christopher Galdamez MD Gastroenterology Schedule an appointment as soon as possible for a visit as recommended [address]  [address]:   · It is important that you take the medication exactly as they are prescribed. · Keep your medication in the bottles provided by the pharmacist and keep a list of the medication names, dosages, and times to be taken in your wallet. · Do not take other medications without consulting your doctor. · No drinking alcohol or driving car or operating machinery if you are on narcotic pain medications. Donot take sedating mediations if you are sleepy or confused. · Fall Precautions  · Keep Well Hydrated  · Report to your medical provider if you feel you have  developed allergies to medications  · Follow up with your PCP or Consultant for medication adjustments and refills  · Monitor for signs of fevers,chills,bleeding,chest pain and seek medical attention if you do so.      · Tell your doctor all the prescription, herbal, or over-the-counter medicines you take. Do not take any new ones unless you talk to your doctor first.  · Do not take anti-inflammatory medicines. These include ibuprofen (Advil, Motrin) and naproxen (Aleve). You can use acetaminophen (Tylenol) for pain. · Do not take two or more pain medicines at the same time unless the doctor told you to. Many pain medicines have acetaminophen, which is Tylenol. Too much acetaminophen (Tylenol) can be harmful. · Tell all doctors and others who work with your health care that you have kidney disease. · Wear medical alert jewelry that lists your health problem. You can buy this at most drugstores. DIET: DIET GI LITE (POST SURGICAL)  DIET ONE TIME MESSAGE    ACTIVITY: Ambulate in house and fall precautions    WOUND CARE: as per wound care team    EQUIPMENT needed: n/a      DISCHARGE MEDICATIONS:   See Medication Reconciliation Form    · It is important that you take the medication exactly as they are prescribed. · Keep your medication in the bottles provided by the pharmacist and keep a list of the medication names, dosages, and times to be taken in your wallet. · Do not take other medications without consulting your doctor. NOTIFY YOUR PHYSICIAN FOR ANY OF THE FOLLOWING:   Fever over 101 degrees for 24 hours. Chest pain, shortness of breath, fever, chills, nausea, vomiting, diarrhea, change in mentation, falling, weakness, bleeding. Severe pain or pain not relieved by medications. Or, any other signs or symptoms that you may have questions about. DISPOSITION:   x Home With:   OT  PT  HH  RN       SNF/Inpatient Rehab/LTAC    Independent/assisted living    Hospice    Other:         Information obtained by :   I understand that if any problems occur once I am at home I am to contact my physician. I understand and acknowledge receipt of the instructions indicated above. [de-identified] or R.N.'s Signature                                                                  Date/Time                                                                                                                                              Patient or Representative Signature                                                          Date/Time          Signed:   Anette Palafox MD  12/28/2019  12:40 PM

## 2019-12-28 NOTE — PROGRESS NOTES
Problem: Mobility Impaired (Adult and Pediatric)  Goal: *Acute Goals and Plan of Care (Insert Text)  Description  FUNCTIONAL STATUS PRIOR TO ADMISSION: Patient was modified independent using a rolling walker for functional mobility. HOME SUPPORT PRIOR TO ADMISSION: The patient lived with wife who manages IADLs. Pt was receiving  PT/OT and aide assist 2x/wk. Reports that he spends significant time in his lift chair including sleeping at night. Physical Therapy Goals  Initiated 12/28/2019  1. Patient will move from supine to sit and sit to supine  in bed with modified independence within 7 day(s). 2.  Patient will transfer from bed to chair and chair to bed with modified independence using the least restrictive device within 7 day(s). 3.  Patient will perform sit to stand with modified independence within 7 day(s). 4.  Patient will ambulate with modified independence for 75 feet with the least restrictive device within 7 day(s). Outcome: Not Met   PHYSICAL THERAPY EVALUATION  Patient: Rocio Alves Sr. (81 y.o. male)  Date: 12/28/2019  Primary Diagnosis: GI bleed [K92.2]  Procedure(s) (LRB):  COLONOSCOPY (Left)  ENDOSCOPIC POLYPECTOMY (Left)  COLON BIOPSY (Left) 1 Day Post-Op   Precautions: fall, contact         ASSESSMENT  Based on the objective data described below, the patient presents with general weakness, decreased tolerance to activity, decreased indep with mobility as compared to baseline s/p admit for GI bleed. Pt s/p endoscopy with removal of polyps and biopsy on 12/27/19. Noted Hgb 7.5 this date. Pt eager to mobilize. Tolerated household distance amb with RW, 2L O2 in place; up to recliner at end of session.  Pt encouraged to mobilize as tolerated with RW and staff assist. Will benefit from acute PT to enable increased safety and indep with mobility in prep for return home with wife and [place] PT.    Current Level of Function Impacting Discharge (mobility/balance): bed mob min A, transfer SBA, amb with RW SBA    Functional Outcome Measure: The patient scored 20/28 on the Tinetti outcome measure which is indicative of moderate risk for fall. Other factors to consider for discharge: pt presents near baseline LOF; one level home set up; wife and [place] support available     Patient will benefit from skilled therapy intervention to address the above noted impairments. PLAN :  Recommendations and Planned Interventions: bed mobility training, transfer training, gait training, therapeutic exercises, patient and family training/education, and therapeutic activities      Frequency/Duration: Patient will be followed by physical therapy:  5 times a week to address goals.     Recommendation for discharge: (in order for the patient to meet his/her long term goals)  Physical therapy at least 2 days/week in the home AND ensure assist and/or supervision for safety with mobility     This discharge recommendation:  Has not yet been discussed the attending provider and/or case management    IF patient discharges home will need the following DME: patient owns DME required for discharge         SUBJECTIVE:   Patient stated I was supposed to have another month of the home therapy    OBJECTIVE DATA SUMMARY:   HISTORY:    Past Medical History:   Diagnosis Date    CAD (coronary artery disease)     s/p CABG 2008    Carotid arterial disease ([address]     Chronic obstructive pulmonary disease ([facility]     states he is on 2L at home day and night    CKD (chronic kidney disease) stage 3, GFR 30-59 ml/min ([address] 10/21/2017    hypertension and DM nephrosclerosis    Diabetes mellitus, type 2 ([address]     Diverticulitis     Hypercholesteremia     Hypertension     Pacemaker     Paroxysmal atrial fibrillation ([address] 10/21/2017    new onset afib with BRPR 10-19-17 admit    PVC's (premature ventricular contractions) 5/26/2014    PVD (peripheral vascular disease) ([facility]      Past Surgical History:   Procedure Laterality Date COLONOSCOPY Left 12/27/2019    COLONOSCOPY performed by Franklyn Lee MD at [facility] ENDOSCOPY    HX CORONARY ARTERY BYPASS GRAFT      HX HEART CATHETERIZATION      NC TCAT INSJ/RPL PERM LEADLESS PACEMAKER RV W/IMG N/A 5/9/2019    INSERT OR REPLACE TRANSCATH PPM LEADLESS performed by Denny Jim MD at [place] [address] CATH LAB       Personal factors and/or comorbidities impacting plan of care: good family support    Home Situation  Home Environment: Private residence  Wheelchair Ramp: Yes  One/Two Story Residence: One story  Living Alone: No  Support Systems: Child(shena), Spouse/Significant Other/Partner  Patient Expects to be Discharged to[de-identified] Private residence  Current DME Used/Available at Home: Walker, rolling, [address] [employer] chair  Tub or Shower Type: Shower    EXAMINATION/PRESENTATION/DECISION MAKING:      Hearing: Auditory  Auditory Impairment: None    Range Of Motion:  AROM: Generally decreased, functional                       Strength:    Strength: Generally decreased, functional                    Tone & Sensation:   Tone: Normal              Sensation: Intact               Coordination:  Coordination: Within functional limits  Vision:      Functional Mobility:  Bed Mobility:     Supine to Sit: Minimum assistance(assist at trunk)        Transfers:  Sit to Stand: Supervision  Stand to Sit: Supervision                       Balance:   Sitting: Intact  Standing: Intact; With support  Ambulation/Gait Training:  Distance (ft): 40 Feet (ft)  Assistive Device: Gait belt;Walker, rolling  Ambulation - Level of Assistance: Stand-by assistance        Gait Abnormalities: Decreased step clearance(stooped posture)              Speed/Karla: Pace decreased (<100 feet/min)  Step Length: Left shortened;Right shortened       Functional Measure:  Tinetti test:    Sitting Balance: 1  Arises: 1  Attempts to Rise: 2  Immediate Standing Balance: 1  Standing Balance: 1  Nudged: 2  Eyes Closed: 1  Turn 360 Degrees - Continuous/Discontinuous: 1  Turn 360 Degrees - Steady/Unsteady: 1  Sitting Down: 1  Balance Score: 12 Balance total score  Indication of Gait: 1  R Step Length/Height: 1  L Step Length/Height: 1  R Foot Clearance: 1  L Foot Clearance: 1  Step Symmetry: 1  Step Continuity: 1  Path: 1  Trunk: 0  Walking Time: 0  Gait Score: 8 Gait total score  Total Score: 20/28 Overall total score         Tinetti Tool Score Risk of Falls  <19 = High Fall Risk  19-24 = Moderate Fall Risk  25-28 = Low Fall Risk  Tinetti ME. Performance-Oriented Assessment of Mobility Problems in Elderly Patients. [facility] 66; [id number]. (Scoring Description: PT Bulletin Feb. 10, 1993)    Older adults: Hector Oates et al, 2009; n = 1000 [facility] elderly evaluated with ABC, DEBRA, ADL, and IADL)  · Mean DEBRA score for males aged 69-68 years = 26.21(3.40)  · Mean DEBRA score for females age 69-68 years = 25.16(4.30)  · Mean DEBRA score for males over 80 years = 23.29(6.02)  · Mean DEBRA score for females over 80 years = 17.20(8.32)           Physical Therapy Evaluation Charge Determination   History Examination Presentation Decision-Making   MEDIUM  Complexity : 1-2 comorbidities / personal factors will impact the outcome/ POC  LOW Complexity : 1-2 Standardized tests and measures addressing body structure, function, activity limitation and / or participation in recreation  LOW Complexity : Stable, uncomplicated  LOW Complexity : FOTO score of       Based on the above components, the patient evaluation is determined to be of the following complexity level: LOW     Pain Rating:  Pt denied c/o pain    Activity Tolerance:   Fair, observed SOB with activity, and SpO2 stable on 2L   Please refer to the flowsheet for vital signs taken during this treatment. After treatment patient left in no apparent distress:   Sitting in chair, Heels elevated for pressure relief, and Call bell within reach    COMMUNICATION/EDUCATION:   The patients plan of care was discussed with: Registered Nurse.     Ana prevention education was provided and the patient/caregiver indicated understanding., Patient/family have participated as able in goal setting and plan of care. , and Patient/family agree to work toward stated goals and plan of care.     Thank you for this referral.  Taurus Toscano, PT   Time Calculation: 24 mins

## 2019-12-28 NOTE — PROGRESS NOTES
[facility]   [address] [facility], [address] [place] [address], [facility]  Phone: [phone]   [phone]       Nephrology Progress Note  Gini Dennis Sr.     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 Stephenson as outpatient     Chronic hyponatremia   -Table     COPD  Pulmonary fibrosis  Pulm HTN:  - Echo showing severe Pulm HTN w/ PASP ~ 72    -Stable from respiratory standpoint     Hypertension: Stable     Bradycardia :  - s/p PPM on 5/9     Recurrent lower GI bleed  -Recurrent diverticular bleed  -Hemoglobin down to 7.5     Chronic A. fib   Chronic diastolic congestive heart failure  CAD s/p CABG     DM2:  - on insulin     Interval History:  Colonoscopy noted  He is anxious to go home  Denies any further lower GI bleed      Review of Systems: A comprehensive review of systems was negative.     Current Medications:   Current Facility-Administered Medications   Medication Dose Route Frequency    sodium chloride (NS) flush 5-40 mL  5-40 mL IntraVENous Q8H    sodium chloride (NS) flush 5-40 mL  5-40 mL IntraVENous PRN    bumetanide (BUMEX) tablet 2 mg  2 mg Oral DAILY    carvedilol (COREG) tablet 12.5 mg  12.5 mg Oral BID WITH MEALS    cloNIDine HCl (CATAPRES) tablet 0.1 mg  0.1 mg Oral BID    ferrous sulfate tablet 325 mg  325 mg Oral ACB    hydrALAZINE (APRESOLINE) tablet 50 mg  50 mg Oral TID    isosorbide dinitrate (ISORDIL) tablet 20 mg  20 mg Oral TID    magnesium oxide (MAG-OX) tablet 400 mg  400 mg Oral DAILY    fish oil-omega-3 fatty acids 340-1,000 mg capsule 1 Cap  1 Cap Oral BID    simvastatin (ZOCOR) tablet 20 mg  20 mg Oral QHS    spironolactone (ALDACTONE) tablet 25 mg  25 mg Oral DAILY    tamsulosin (FLOMAX) capsule 0.4 mg  0.4 mg Oral DAILY    sodium chloride (NS) flush 5-40 mL  5-40 mL IntraVENous Q8H    sodium chloride (NS) flush 5-40 mL  5-40 mL IntraVENous PRN    morphine injection 1 mg  1 mg IntraVENous Q4H PRN    ondansetron (ZOFRAN) injection 4 mg  4 mg IntraVENous Q4H PRN    pantoprazole (PROTONIX) 40 mg in 0.9% sodium chloride 10 mL injection  40 mg IntraVENous Q12H    hydrALAZINE (APRESOLINE) 20 mg/mL injection 20 mg  20 mg IntraVENous Q6H PRN    insulin regular (NOVOLIN R, HUMULIN R) injection   SubCUTAneous AC&HS    glucose chewable tablet 16 g  4 Tab Oral PRN    glucagon (GLUCAGEN) injection 1 mg  1 mg IntraMUSCular PRN    dextrose 10% infusion 0-250 mL  0-250 mL IntraVENous PRN    albuterol-ipratropium (DUO-NEB) 2.5 MG-0.5 MG/3 ML  3 mL Nebulization Q4H PRN    balsam peru-castor oil (VENELEX) ointment   Topical Q8H      Allergies   Allergen Reactions    Lisinopril Cough       Objective:  Vitals:    Vitals:    12/27/19 2114 12/28/19 0246 12/28/19 0401 12/28/19 0750   BP: 124/47 134/45 122/52 137/77   Pulse: 72 62  62   Resp: 18 18  17   Temp: 98.9 °F (37.2 °C) 98.7 °F (37.1 °C)  98.1 °F (36.7 °C)   SpO2: 98% 99%  97%   Weight:       Height:         Intake and Output:  No intake/output data recorded. 12/26 1901 - 12/28 0700  In: 560 [P.O.:360; I.V.:200]  Out: 800 [Urine:800]    Physical Examination:    General: NAD,Conversant  Neck:  Supple, no mass  Resp:  Lungs CTA B/L, no wheezing , normal respiratory effort  CV:  RRR,  no murmur or rub, no LE edema  GI:  Soft, NT, + Bowel sounds, no hepatosplenomegaly  Neurologic:  Non focal  Psych:             AAO x 3 appropriate affect  Skin:  No Rash  :  No Myers    []    High complexity decision making was performed  []    Patient is at high-risk of decompensation with multiple organ involvement    Lab Data Personally Reviewed: I have reviewed all the pertinent labs, microbiology data and radiology studies during assessment.     Recent Labs     12/28/19  0144 12/27/19  0226 12/26/19  0357  135* 134*   K 4.2 3.4* 3.2*    103 97   CO2 27 25 32   * 98 237*   BUN 47* 51* 58*   CREA 2.16* 2.05* 2.50*   CA 7.6* 7.7* 8.6   MG  --   --  2.1   ALB 1.9*  --  2.3*   SGOT 52*  --  66*   ALT 46  --  62   INR  --   --  1.1     Recent Labs     12/28/19  0144 12/27/19  1259 12/27/19  0226 12/26/19  1418 12/26/19  0351   WBC 8.9  --   --   --  6.5   HGB 7.5* 8.5* 8.0* 8.9* 11.1*   HCT 23.6* 27.3* 25.7* 27.7* 35.4*     --   --   --  311     No results found for: SDES  Lab Results   Component Value Date/Time    Culture result: MRSA PRESENT (A) 12/26/2019 09:05 AM    Culture result:  12/26/2019 09:05 AM         Screening of patient nares for MRSA is for surveillance purposes and, if positive, to facilitate isolation considerations in high risk settings. It is not intended for automatic decolonization interventions per se as regimens are not sufficiently effective to warrant routine use. Culture result: (A) 12/04/2019 03:27 AM     FEW * METHICILLIN RESISTANT STAPHYLOCOCCUS AUREUS *    Culture result: (A) 12/04/2019 03:27 AM     LIGHT STENOTROPHOMONAS (XANTHO.) MALTOPHILIA CLSI GUIDELINES DO NOT RECOMMEND REPORTING SUSCEPTIBILITIES FOR S. MALTOPHILIA. TRIMETHOPRIM/SULFAMETHOXAZOLE IS THE DRUG OF CHOICE.     Culture result: HEAVY NORMAL RESPIRATORY JUDE 12/04/2019 03:27 AM     Recent Results (from the past 24 hour(s))   GLUCOSE, POC    Collection Time: 12/27/19 11:34 AM   Result Value Ref Range    Glucose (POC) 177 (H) 65 - 100 mg/dL    Performed by Iban Huntley    HGB & HCT    Collection Time: 12/27/19 12:59 PM   Result Value Ref Range    HGB 8.5 (L) 12.1 - 17.0 g/dL    HCT 27.3 (L) 36.6 - 50.3 %   GLUCOSE, POC    Collection Time: 12/27/19  6:10 PM   Result Value Ref Range    Glucose (POC) 121 (H) 65 - 100 mg/dL    Performed by Dorian Kruse    GLUCOSE, POC    Collection Time: 12/27/19 10:53 PM   Result Value Ref Range    Glucose (POC) 263 (H) 65 - 100 mg/dL    Performed by Juan Ramon Valero CBC WITH AUTOMATED DIFF    Collection Time: 12/28/19  1:44 AM   Result Value Ref Range    WBC 8.9 4.1 - 11.1 K/uL    RBC 2.73 (L) 4.10 - 5.70 M/uL    HGB 7.5 (L) 12.1 - 17.0 g/dL    HCT 23.6 (L) 36.6 - 50.3 %    MCV 86.4 80.0 - 99.0 FL    MCH 27.5 26.0 - 34.0 PG    MCHC 31.8 30.0 - 36.5 g/dL    RDW 16.3 (H) 11.5 - 14.5 %    PLATELET 273 004 - 434 K/uL    MPV 10.2 8.9 - 12.9 FL    NRBC 0.0 0  WBC    ABSOLUTE NRBC 0.00 0.00 - 0.01 K/uL    NEUTROPHILS 81 (H) 32 - 75 %    LYMPHOCYTES 8 (L) 12 - 49 %    MONOCYTES 6 5 - 13 %    EOSINOPHILS 4 0 - 7 %    BASOPHILS 1 0 - 1 %    IMMATURE GRANULOCYTES 1 (H) 0.0 - 0.5 %    ABS. NEUTROPHILS 7.2 1.8 - 8.0 K/UL    ABS. LYMPHOCYTES 0.7 (L) 0.8 - 3.5 K/UL    ABS. MONOCYTES 0.6 0.0 - 1.0 K/UL    ABS. EOSINOPHILS 0.3 0.0 - 0.4 K/UL    ABS. BASOPHILS 0.1 0.0 - 0.1 K/UL    ABS. IMM. GRANS. 0.0 0.00 - 0.04 K/UL    DF AUTOMATED     METABOLIC PANEL, BASIC    Collection Time: 12/28/19  1:44 AM   Result Value Ref Range    Sodium 137 136 - 145 mmol/L    Potassium 4.2 3.5 - 5.1 mmol/L    Chloride 105 97 - 108 mmol/L    CO2 27 21 - 32 mmol/L    Anion gap 5 5 - 15 mmol/L    Glucose 186 (H) 65 - 100 mg/dL    BUN 47 (H) 6 - 20 MG/DL    Creatinine 2.16 (H) 0.70 - 1.30 MG/DL    BUN/Creatinine ratio 22 (H) 12 - 20      GFR est AA 36 (L) >60 ml/min/1.73m2    GFR est non-AA 29 (L) >60 ml/min/1.73m2    Calcium 7.6 (L) 8.5 - 10.1 MG/DL   HEPATIC FUNCTION PANEL    Collection Time: 12/28/19  1:44 AM   Result Value Ref Range    Protein, total 5.0 (L) 6.4 - 8.2 g/dL    Albumin 1.9 (L) 3.5 - 5.0 g/dL    Globulin 3.1 2.0 - 4.0 g/dL    A-G Ratio 0.6 (L) 1.1 - 2.2      Bilirubin, total 0.3 0.2 - 1.0 MG/DL    Bilirubin, direct 0.2 0.0 - 0.2 MG/DL    Alk.  phosphatase 216 (H) 45 - 117 U/L    AST (SGOT) 52 (H) 15 - 37 U/L    ALT (SGPT) 46 12 - 78 U/L   GGT    Collection Time: 12/28/19  1:44 AM   Result Value Ref Range     (H) 15 - 85 U/L   GLUCOSE, POC    Collection Time: 12/28/19  6:25 AM   Result Value Ref Range    Glucose (POC) 144 (H) 65 - 100 mg/dL    Performed by KATELIN MATAMOROS            Total time spent with patient:  xxx   min. Care Plan discussed with:  Patient     Family      RN      Consulting Physician [address],         I have reviewed the flowsheets. Chart and Pertinent Notes have been reviewed. No change in PMH ,family and social history from Consult note.       Jorje Suarez MD

## 2019-12-28 NOTE — PROGRESS NOTES
GI PROGRESS NOTE    NAME:             Boo Torrez.   :              1935   MRN:              [id number]   Admit Date:     2019  Todays Date:  2019      Subjective:          Patient denies any abdominal pain. Denies any stools since yesterday. Hemoglobin continues to fall with re-equilibration. Currently is 7.5. Colonoscopy demonstrated universal diverticulosis and one questionable area of segmental subclinical diverticulitis.   Biopsy is pending       Review of Systems - Respiratory ROS: no cough, shortness of breath, or wheezing  Cardiovascular ROS: no chest pain or dyspnea on exertion  Medications-reviewed     Current Facility-Administered Medications   Medication Dose Route Frequency    epoetin dm-epbx (RETACRIT) injection 20,000 Units  20,000 Units SubCUTAneous Q7D    sodium chloride (NS) flush 5-40 mL  5-40 mL IntraVENous Q8H    sodium chloride (NS) flush 5-40 mL  5-40 mL IntraVENous PRN    bumetanide (BUMEX) tablet 2 mg  2 mg Oral DAILY    carvedilol (COREG) tablet 12.5 mg  12.5 mg Oral BID WITH MEALS    cloNIDine HCl (CATAPRES) tablet 0.1 mg  0.1 mg Oral BID    ferrous sulfate tablet 325 mg  325 mg Oral ACB    hydrALAZINE (APRESOLINE) tablet 50 mg  50 mg Oral TID    isosorbide dinitrate (ISORDIL) tablet 20 mg  20 mg Oral TID    magnesium oxide (MAG-OX) tablet 400 mg  400 mg Oral DAILY    fish oil-omega-3 fatty acids 340-1,000 mg capsule 1 Cap  1 Cap Oral BID    simvastatin (ZOCOR) tablet 20 mg  20 mg Oral QHS    spironolactone (ALDACTONE) tablet 25 mg  25 mg Oral DAILY    tamsulosin (FLOMAX) capsule 0.4 mg  0.4 mg Oral DAILY    sodium chloride (NS) flush 5-40 mL  5-40 mL IntraVENous Q8H    sodium chloride (NS) flush 5-40 mL  5-40 mL IntraVENous PRN    morphine injection 1 mg  1 mg IntraVENous Q4H PRN    ondansetron (ZOFRAN) injection 4 mg  4 mg IntraVENous Q4H PRN    pantoprazole (PROTONIX) 40 mg in 0.9% sodium chloride 10 mL injection  40 mg IntraVENous Q12H    hydrALAZINE (APRESOLINE) 20 mg/mL injection 20 mg  20 mg IntraVENous Q6H PRN    insulin regular (NOVOLIN R, HUMULIN R) injection   SubCUTAneous AC&HS    glucose chewable tablet 16 g  4 Tab Oral PRN    glucagon (GLUCAGEN) injection 1 mg  1 mg IntraMUSCular PRN    dextrose 10% infusion 0-250 mL  0-250 mL IntraVENous PRN    albuterol-ipratropium (DUO-NEB) 2.5 MG-0.5 MG/3 ML  3 mL Nebulization Q4H PRN    balsam peru-castor oil (VENELEX) ointment   Topical Q8H        Objective:     Patient Vitals for the past 8 hrs:   BP Temp Pulse Resp SpO2   12/28/19 0750 137/77 98.1 °F (36.7 °C) 62 17 97 %   12/28/19 0401 122/52       12/28/19 0246 134/45 98.7 °F (37.1 °C) 62 18 99 %     No intake/output data recorded. 12/26 1901 - 12/28 0700  In: 560 [P.O.:360; I.V.:200]  Out: 800 [Urine:800]    EXAM:    Visit Vitals  /77 (BP 1 Location: Left arm, BP Patient Position: At rest)   Pulse 62   Temp 98.1 °F (36.7 °C)   Resp 17   Ht 5' 9\" (1.753 m)   Wt 65.9 kg (145 lb 4.5 oz)   SpO2 97%   BMI 21.45 kg/m²     General:          WD, Elderly. Frail appearing. Alert, cooperative, no acute distress.    HEENT:           NC, Atraumatic. PERRLA, EOMI. Anicteric sclerae. Lungs:             CTA Bilaterally. No Wheezing/Rhonchi/Rales. Heart:              Regular rate and rhythm, No murmur, No Rubs, No Gallops  Abdomen:        Soft, non-distended, non-tender.  +Bowel sounds, no HSM  Extremities:     No c/c/e  Neurologic:      Alert and oriented X 3.  No acute neurological distress. Psych:             Good insight. Not anxious nor agitated. Data Review     Recent Labs     12/28/19  0144 12/27/19  1259  12/26/19  0351   WBC 8.9  --   --  6.5   HGB 7.5* 8.5*   < > 11.1*   HCT 23.6* 27.3*   < > 35.4*     --   --  311    < > = values in this interval not displayed.      Recent Labs     12/28/19  0144 12/27/19 0226    135*   K 4.2 3.4*    103   CO2 27 25   BUN 47* 51*   CREA 2.16* 2.05* * 98   CA 7.6* 7.7*     Recent Labs     12/28/19  0144 12/26/19  0351   SGOT 52* 66*   * 302*   TP 5.0* 7.3   ALB 1.9* 2.3*   GLOB 3.1 5.0*   *  --                               Assessment:   1. Recurrent GI bleeding most likely diverticular. Negative bleeding scan today. Colonoscopy demonstrated universal diverticulosis, severe sigmoid with questionable area of focal sigmoid diverticulitis. Biopsy obtained of edematous area  2. COPD  3. CHF  4. CKD  5. Chronic atrial fib-not on anticoagulation  6. GI blood loss anemia worse  7. Elevated alkaline phosphatase along with GGT suggests intrahepatic or biliary disease process. Last ultrasound several months ago demonstrated a dilated gallbladder but no common bile duct dilation and no intrahepatic disease. Active Problems:    GI bleed (10/20/2017)        Plan:   1. As discussed with hospitalist service agreed to transfuse 1 unit of packed RBCs due to his history of coronary artery disease  2.  Recommend ultrasound of the abdomen either as inpatient or outpatient follow-up in my office for abnormal liver function tests         Vandana Freire MD

## 2019-12-28 NOTE — PROGRESS NOTES
TRANSITION OF CARE  Resume home health services with [facility]. BLS stretcher transport to residence at discharge. CM received order to resume home healthservices with [facility]. CM sent referal via [employer], and referral was accepted. CM updated AVS and informed staff nurse. CM received request to arrange BLS transport for transport to residence at discharge today. CM sent referral via [employer] to  [employer] [facility]/ [phone]), and referral was accepted for [place] [facility] for today. CM completed PCS and placed it on chart. CM gave verbal update to staff nurse with plan made for staff nurse to arrange  time with [facility] after patient receives blood transfusion this afternoon.

## 2019-12-29 LAB
ABO + RH BLD: NORMAL
BLD PROD TYP BPU: NORMAL
BLOOD GROUP ANTIBODIES SERPL: NORMAL
BPU ID: NORMAL
CROSSMATCH RESULT,%XM: NORMAL
SPECIMEN EXP DATE BLD: NORMAL
STATUS OF UNIT,%ST: NORMAL
UNIT DIVISION, %UDIV: 0

## 2019-12-30 ENCOUNTER — PATIENT OUTREACH (OUTPATIENT)
Dept: PRIMARY CARE CLINIC | Age: 84
End: 2019-12-30

## 2019-12-30 NOTE — PROGRESS NOTES
Hospital Discharge Follow-Up      Date/Time:  2019 11:53 AM    Patient was admitted to [facility] on 19 and discharged on 19 for GI bleed. The physician discharge summary was available at the time of outreach. Patient was contacted within 2 business days of discharge. Top Challenges reviewed with the provider       - Calling to notify PCP that day of d/c HGB was trending down and was 7.5 - Per patient he received 1 unit transfusion on day of discharge- HGB was not rechecked post transfusion - recommend recheck of HGB at PCP follow up to ensure trending up post transfusion- patients wife denies any dizziness/ weakness/ or rectal bleeding since d/c     - Also, to make PCP aware that per D/c instructions patient is to have GI order RUQ US as an outpatient for follow up on elevated LFT's however, patient is not seeing GI until 20 in case he feels this should be ordered sooner at PCP follow up on        Advance Care Planning:   Does patient have an Advance Directive:  reviewed and needs to be updated - wifes phone number is out of date and encouraged to update this and provide updated copy to [facility] to scan into EMR. Method of communication with provider :phone- Spoke with Dr Darlene Linares and notified of the above information. Inpatient RRAT score: 47  Was this a readmission? yes   Patient stated reason for the readmission: GI bleeding    Care Transition Nurse (CTN) contacted the family by telephone to perform post hospital discharge assessment. Verified name and  with family as identifiers. Provided introduction to self, and explanation of the CTN role. Family received hospital discharge instructions. CTN reviewed discharge instructions and red flags with family who verbalized understanding. Family given an opportunity to ask questions and does not have any further questions or concerns at this time.  The family agrees to contact the PCP office for questions related to their healthcare. CTN provided contact information for future reference. Disease Specific:   N/A    Patients top risk factors for readmission:  none    Home Health orders at discharge: [address], resumption of care  [address]: [facility] home health   Date of initial visit: 12/29/19    Durable Medical Equipment ordered at discharge: none  [address] [facility] [address]: none  Durable Medical Equipment received: none    Medication(s):   New Medications at Discharge: none  Changed Medications at Discharge: none  Discontinued Medications at Discharge: none    Currently holding ASA / Patients wife reports Dr Kenny Mccann told him not to take IRON and this was removed from Med rec. Medication reconciliation was performed with family, who verbalizes understanding of administration of home medications. There were no barriers to obtaining medications identified at this time. Referral to Pharm D needed: no     Current Outpatient Medications   Medication Sig    insulin glargine (LANTUS U-100 INSULIN) 100 unit/mL injection 3-7 Units by SubCUTAneous route nightly. Pt reports he uses 3-7 units depending on BG    cloNIDine HCl (CATAPRES) 0.1 mg tablet Take 0.1 mg by mouth two (2) times a day.  bumetanide (BUMEX) 1 mg tablet Take 3 Tabs by mouth two (2) times a day.  hydrALAZINE (APRESOLINE) 50 mg tablet Take 1 Tab by mouth three (3) times daily.  spironolactone (ALDACTONE) 25 mg tablet Take 1 Tab by mouth daily.  magnesium oxide (MAG-OX) 400 mg tablet Take 1 Tab by mouth daily.  isosorbide dinitrate (ISORDIL) 20 mg tablet Take 1 Tab by mouth three (3) times daily.  carvedilol (COREG) 12.5 mg tablet Take 12.5 mg by mouth two (2) times daily (with meals).  simvastatin (ZOCOR) 20 mg tablet Take 1 Tab by mouth nightly.  albuterol (PROVENTIL VENTOLIN) 2.5 mg /3 mL (0.083 %) nebulizer solution 3 mL by Nebulization route every four (4) hours as needed for Wheezing.     tamsulosin (FLOMAX) 0.4 mg capsule Take 1 Cap by mouth daily.  omega 3-dha-epa-fish oil (FISH OIL) 100-160-1,000 mg cap Take 1 Cap by mouth two (2) times a day.  cinnamon bark (CINNAMON) 500 mg cap Take 1,000 mg by mouth two (2) times a day. No current facility-administered medications for this visit. Medications Discontinued During This Encounter   Medication Reason    ferrous sulfate (IRON) 325 mg (65 mg iron) tablet Not A Current Medication       [facility] follow up appointment(s):   Future Appointments   Date Time Provider Muna Sandy   2/12/2020  2:15 PM REMOTE1, [address]   2/27/2020  2:20 PM Negin Lopez MD cav ANAHI KAREN   5/18/2020  9:15 AM REMOTE1, [address]   8/24/2020  8:45 AM REMOTE1, [address]   10/29/2020 10:30 AM PACEMAKER3, [address]   10/29/2020 10:40 AM Thanh Gandara  [address]      Non-[facility] follow up appointment(s): Dr Cindy Meyer - awaiting call back to schedule, Dr Candido Kirkland 1/20/20, Dr Willie Park PCP 1/6/20. [facility]:  n/a       Goals      Attends follow-up appointments as directed. 12/11/19  CTN unable to reach patient on phone, LM on  requesting return call    Patient has following appts:    PCP Dr Willie Park   12/18/19 at 11:00 this was changed by wife due to conflicting appts. Cards Dr Tawana Kuhn  12/20/19 at 2:00  Nephrology Dr Cindy Meyer   Pending---recommended 1 week follow up. Per spouse, she has contacted MD office and is waiting for Dr Alexey Dukes nurse to call her back. [facility]---no appt in [facility] yet. CTN notes that both cards and nephrology IP notes state patient is to have OP IV diruetic therapy. CTN sent SM to Breanna Hansen, Dr Katarina Duffy nurse to inquire about orders for this. ---mkrw    12/13/19  Per Mrs Joanna Smiley, no one has called her back yet about a follow up appt with Dr Eldon Martinez. CTN called MD office, spoke to Reji.  She states that they are waiting for MD to review records and will call Mrs Bessy Olson back no later than Monday 12/16 with follow up appt. CTN notified Mrs Bessy Olson of this. CTN will follow up. CTN had not received response yet regarding [facility] IV diruetics yet---resent SM to Dr Fatimah Larkin and nurse [place]. Will follow up on Monday if no response received---[facility]    12/18/19  CTN received a SM from both MD and [place] on Monday to state that order was going to be put in to [facility] for Bumex. Patient however admitted back into hospital for GI Bleed. Patient discharged yesterday back to home. CTN sent SM to [place] to let her know patient is back home. CTN spoke to Mrs Bessy Olson to check on patient and review discharge instructions. She reports she picked up medication today and is stopping ASA. [facility] nurse has already been to pt's home today to resume services. Wife reports patient will be getting a [facility] aide to assist pt with bathing. Per wife, Patient will see Dr Simin Emmanuel tomorrow. Patient has follow up with PCP Dr Coni Solis 12/23/19. CTN made follow up with Dr Bhupinder Gomes for 1/20/20 at 1:15pm. Wife informed. CTN will follow up next week for updates---r    12/30/19-   Patients wife reports [facility] nurse was already out yesterday to resume services and the therapist is in today to work with the patient. She will also receive a call from them this week to hear when the [facility] aide will be coming in to assist him with bathing. Patient has follow up with PCP Dr Arvind Rodriguez on 1/6/20. Follow up with Dr Toma Phalen on 1/20/20 and wife called the office to inquire as to if this needed to be moved up post hospital discharge and was told to keep this appt. - Discussed with wife to revisit with GI at this appt per d/c instructions having an O/P RUQ US scheduled for elevated liver enzymes    Patients wife is awaiting return call from Dr Victoria Arce office for an appt. - LN           Maintains daily weight. 12/11/19     CTN notes patient weight on 12/3/19 (admit date) 181 lbs.  Weight at discharge 164 lbs. CTN will need to get home scale weight when able to reach patient and review daily weight parameters with patient/spouse. ---mkrw    UPDATE: Per spouse, patient weight today on home scale 158 lbs. CTN reviewed when to call MD for weights greater than 2-3 lbs overnight or 5 lbs in a week. Per wife, she reports that [facility] nurse has been contacting MD's (both cards and nephrology) for weight increases, would receive instructions but patient still gained weight. Recommended that patient monitor sodium intake more closely to kep to 1500--2000 mg. Wife verbalizes understanding ---mkrw      12/13/19'  Per Mrs Elsy Valenzuela, patient's weight yesterday and today was 153 lbs, decrease of 5 lbs from Wednesday. ---mkrw    12/18/19  CTN notes last hospital weight 12/17/19 146lbs. ---mkrw    12/30/19- Patients wife reports patient is weighing daily. Yesterdays weight was 140 and today's weight was also 140 lbs. BP is also being checked daily and was 164/64 yesterday and today 152/72. LN         Supportive resources in place to maintain patient in the community (ie. Home Health, DME equipment, refer to, medication assistant plan, etc.)      12/11/19  CTN contacted [facility], spoke to [employer] to inquire about which services were ordered by IP CM and when to expect resumption of care. [employer] reports that patient will received PT/OT/SN and will set up to start tomorrow 12/12/19.  CTN will follow up with patient, was unable to reach on phone today---mkrw

## 2019-12-31 NOTE — PROCEDURES
[address]   PULMONARY FUNCTION TEST    Name:  Benja Castro  MR#:  [id number]  :  1935  ACCOUNT #:  [de-identified]  DATE OF SERVICE:  2019      CLINICAL INDICATION:  Shortness of breath. Spirometry is performed. Spirometry reveals a severe reduction in vital capacity, which appears secondary to a restrictive ventilatory defect. Lung volumes are recommended if clinically indicated. Flow volume loop is consistent with a restrictive pattern.       MD CINDY Manuel/[email or web address]  D:  2019 13:16  T:  2019 23:41  JOB #:  [id number]

## 2020-01-01 ENCOUNTER — TELEPHONE (OUTPATIENT)
Dept: CARDIOLOGY CLINIC | Age: 85
End: 2020-01-01

## 2020-01-01 ENCOUNTER — CLINICAL SUPPORT (OUTPATIENT)
Dept: CARDIOLOGY CLINIC | Age: 85
End: 2020-01-01
Payer: MEDICARE

## 2020-01-01 ENCOUNTER — VIRTUAL VISIT (OUTPATIENT)
Dept: CARDIOLOGY CLINIC | Age: 85
End: 2020-01-01
Payer: MEDICARE

## 2020-01-01 ENCOUNTER — PATIENT OUTREACH (OUTPATIENT)
Dept: CARDIOLOGY CLINIC | Age: 85
End: 2020-01-01

## 2020-01-01 ENCOUNTER — VIRTUAL VISIT (OUTPATIENT)
Dept: CARDIOLOGY CLINIC | Age: 85
End: 2020-01-01

## 2020-01-01 ENCOUNTER — ANCILLARY PROCEDURE (OUTPATIENT)
Dept: CARDIOLOGY CLINIC | Age: 85
End: 2020-01-01
Payer: MEDICARE

## 2020-01-01 ENCOUNTER — OFFICE VISIT (OUTPATIENT)
Dept: CARDIOLOGY CLINIC | Age: 85
End: 2020-01-01
Payer: MEDICARE

## 2020-01-01 VITALS
HEART RATE: 82 BPM | DIASTOLIC BLOOD PRESSURE: 68 MMHG | SYSTOLIC BLOOD PRESSURE: 112 MMHG | HEIGHT: 69 IN | RESPIRATION RATE: 16 BRPM | WEIGHT: 139 LBS | BODY MASS INDEX: 20.59 KG/M2

## 2020-01-01 VITALS
DIASTOLIC BLOOD PRESSURE: 68 MMHG | WEIGHT: 139 LBS | SYSTOLIC BLOOD PRESSURE: 112 MMHG | BODY MASS INDEX: 20.59 KG/M2 | HEIGHT: 69 IN

## 2020-01-01 VITALS
HEART RATE: 67 BPM | WEIGHT: 142 LBS | BODY MASS INDEX: 21.03 KG/M2 | SYSTOLIC BLOOD PRESSURE: 152 MMHG | HEIGHT: 69 IN | DIASTOLIC BLOOD PRESSURE: 69 MMHG

## 2020-01-01 DIAGNOSIS — I10 HYPERTENSION, ESSENTIAL, BENIGN: ICD-10-CM

## 2020-01-01 DIAGNOSIS — I65.23 BILATERAL CAROTID ARTERY STENOSIS: ICD-10-CM

## 2020-01-01 DIAGNOSIS — E78.00 HYPERCHOLESTEREMIA: ICD-10-CM

## 2020-01-01 DIAGNOSIS — I10 ESSENTIAL HYPERTENSION: ICD-10-CM

## 2020-01-01 DIAGNOSIS — I48.20 ATRIAL FIBRILLATION, CHRONIC (HCC): ICD-10-CM

## 2020-01-01 DIAGNOSIS — Z95.1 HX OF CABG: ICD-10-CM

## 2020-01-01 DIAGNOSIS — I50.32 DIASTOLIC CHF, CHRONIC (HCC): Primary | ICD-10-CM

## 2020-01-01 DIAGNOSIS — I50.32 DIASTOLIC CHF, CHRONIC (HCC): ICD-10-CM

## 2020-01-01 DIAGNOSIS — I25.10 CORONARY ARTERY DISEASE INVOLVING NATIVE CORONARY ARTERY OF NATIVE HEART WITHOUT ANGINA PECTORIS: Primary | ICD-10-CM

## 2020-01-01 DIAGNOSIS — Z78.9 ON SUPPLEMENTAL OXYGEN BY NASAL CANNULA: ICD-10-CM

## 2020-01-01 DIAGNOSIS — Z95.0 CARDIAC PACEMAKER IN SITU: ICD-10-CM

## 2020-01-01 DIAGNOSIS — I25.10 CORONARY ARTERY DISEASE INVOLVING NATIVE CORONARY ARTERY OF NATIVE HEART WITHOUT ANGINA PECTORIS: ICD-10-CM

## 2020-01-01 DIAGNOSIS — I73.9 PERIPHERAL VASCULAR DISEASE (HCC): ICD-10-CM

## 2020-01-01 DIAGNOSIS — J44.9 CHRONIC OBSTRUCTIVE PULMONARY DISEASE, UNSPECIFIED COPD TYPE (HCC): ICD-10-CM

## 2020-01-01 DIAGNOSIS — N18.4 CKD (CHRONIC KIDNEY DISEASE) STAGE 4, GFR 15-29 ML/MIN (HCC): ICD-10-CM

## 2020-01-01 DIAGNOSIS — Z95.0 CARDIAC PACEMAKER IN SITU: Primary | ICD-10-CM

## 2020-01-01 LAB
ECHO AO ROOT DIAM: 2.92 CM
ECHO AV AREA PEAK VELOCITY: 1.31 CM2
ECHO AV AREA VTI: 1.57 CM2
ECHO AV AREA/BSA PEAK VELOCITY: 0.7 CM2/M2
ECHO AV AREA/BSA VTI: 0.9 CM2/M2
ECHO AV MEAN GRADIENT: 6.93 MMHG
ECHO AV PEAK GRADIENT: 15.27 MMHG
ECHO AV PEAK VELOCITY: 195.41 CM/S
ECHO AV VTI: 32.07 CM
ECHO LA AREA 4C: 26.44 CM2
ECHO LA MAJOR AXIS: 4.15 CM
ECHO LA MINOR AXIS: 2.35 CM
ECHO LA VOL 2C: 86.2 ML (ref 18–58)
ECHO LA VOL 4C: 79.6 ML (ref 18–58)
ECHO LA VOL BP: 87.87 ML (ref 18–58)
ECHO LA VOL/BSA BIPLANE: 49.65 ML/M2 (ref 16–28)
ECHO LA VOLUME INDEX A2C: 48.71 ML/M2 (ref 16–28)
ECHO LA VOLUME INDEX A4C: 44.98 ML/M2 (ref 16–28)
ECHO LV E' LATERAL VELOCITY: 6.97 CM/S
ECHO LV E' SEPTAL VELOCITY: 4.83 CM/S
ECHO LV EDV A2C: 82.6 ML
ECHO LV EDV A4C: 87.85 ML
ECHO LV EDV BP: 85.24 ML (ref 67–155)
ECHO LV EDV INDEX A4C: 49.6 ML/M2
ECHO LV EDV INDEX BP: 48.2 ML/M2
ECHO LV EDV NDEX A2C: 46.7 ML/M2
ECHO LV EJECTION FRACTION 3D: 58.1 PERCENT
ECHO LV EJECTION FRACTION A2C: 64 PERCENT
ECHO LV EJECTION FRACTION A4C: 66 PERCENT
ECHO LV EJECTION FRACTION BIPLANE: 65.5 PERCENT (ref 55–100)
ECHO LV ESV A2C: 29.39 ML
ECHO LV ESV A4C: 29.54 ML
ECHO LV ESV BP: 29.45 ML (ref 22–58)
ECHO LV ESV INDEX A2C: 16.6 ML/M2
ECHO LV ESV INDEX A4C: 16.7 ML/M2
ECHO LV ESV INDEX BP: 16.6 ML/M2
ECHO LV INTERNAL DIMENSION DIASTOLIC: 3.26 CM (ref 4.2–5.9)
ECHO LV INTERNAL DIMENSION SYSTOLIC: 1.78 CM
ECHO LV IVSD: 1.73 CM (ref 0.6–1)
ECHO LV MASS 2D: 226.5 G (ref 88–224)
ECHO LV MASS INDEX 2D: 128 G/M2 (ref 49–115)
ECHO LV POSTERIOR WALL DIASTOLIC: 1.75 CM (ref 0.6–1)
ECHO LVOT DIAM: 2.04 CM
ECHO LVOT PEAK GRADIENT: 2.47 MMHG
ECHO LVOT PEAK VELOCITY: 78.54 CM/S
ECHO LVOT SV: 50.2 ML
ECHO LVOT VTI: 15.37 CM
ECHO MV AREA VTI: 2.77 CM2
ECHO MV MAX VELOCITY: 106.5 CM/S
ECHO MV MEAN GRADIENT: 2.06 MMHG
ECHO MV PEAK GRADIENT: 4.54 MMHG
ECHO MV VTI: 18.16 CM
ECHO RA AREA 4C: 19.83 CM2
ECHO RV INTERNAL DIMENSION: 3.5 CM
ECHO TV REGURGITANT MAX VELOCITY: 338.97 CM/S
ECHO TV REGURGITANT PEAK GRADIENT: 45.96 MMHG
LA VOL DISK BP: 82.72 ML (ref 18–58)

## 2020-01-01 PROCEDURE — 93279 PRGRMG DEV EVAL PM/LDLS PM: CPT | Performed by: INTERNAL MEDICINE

## 2020-01-01 PROCEDURE — 99214 OFFICE O/P EST MOD 30 MIN: CPT | Performed by: INTERNAL MEDICINE

## 2020-01-01 PROCEDURE — 99442 PR PHYS/QHP TELEPHONE EVALUATION 11-20 MIN: CPT | Performed by: SPECIALIST

## 2020-01-01 PROCEDURE — 93306 TTE W/DOPPLER COMPLETE: CPT | Performed by: SPECIALIST

## 2020-01-01 PROCEDURE — G8752 SYS BP LESS 140: HCPCS | Performed by: INTERNAL MEDICINE

## 2020-01-01 PROCEDURE — G8427 DOCREV CUR MEDS BY ELIG CLIN: HCPCS | Performed by: INTERNAL MEDICINE

## 2020-01-01 PROCEDURE — G8432 DEP SCR NOT DOC, RNG: HCPCS | Performed by: INTERNAL MEDICINE

## 2020-01-01 PROCEDURE — G8754 DIAS BP LESS 90: HCPCS | Performed by: INTERNAL MEDICINE

## 2020-01-01 PROCEDURE — 1101F PT FALLS ASSESS-DOCD LE1/YR: CPT | Performed by: INTERNAL MEDICINE

## 2020-01-01 PROCEDURE — G8420 CALC BMI NORM PARAMETERS: HCPCS | Performed by: INTERNAL MEDICINE

## 2020-01-01 PROCEDURE — G0463 HOSPITAL OUTPT CLINIC VISIT: HCPCS | Performed by: INTERNAL MEDICINE

## 2020-01-01 PROCEDURE — 93293 PM PHONE R-STRIP DEVICE EVAL: CPT

## 2020-01-01 PROCEDURE — G8536 NO DOC ELDER MAL SCRN: HCPCS | Performed by: INTERNAL MEDICINE

## 2020-01-01 RX ORDER — ISOSORBIDE DINITRATE 20 MG/1
20 TABLET ORAL 3 TIMES DAILY
Qty: 90 TAB | Refills: 5 | Status: SHIPPED | OUTPATIENT
Start: 2020-01-01 | End: 2021-01-01

## 2020-01-01 RX ORDER — BUMETANIDE 1 MG/1
2 TABLET ORAL DAILY
Qty: 180 TAB | Refills: 2
Start: 2020-01-01 | End: 2021-01-01

## 2020-01-09 ENCOUNTER — PATIENT OUTREACH (OUTPATIENT)
Dept: CARDIOLOGY CLINIC | Age: 85
End: 2020-01-09

## 2020-01-09 NOTE — PROGRESS NOTES
Goals      Attends follow-up appointments as directed. 12/11/19  CTN unable to reach patient on phone, LM on  requesting return call    Patient has following appts:    PCP Dr Nat Betts   12/18/19 at 11:00 this was changed by wife due to conflicting appts. Cards Dr Leeanne Ly  12/20/19 at 2:00  Nephrology Dr Violetta Casas   Pending---recommended 1 week follow up. Per spouse, she has contacted MD office and is waiting for Dr Krystle Shaikh nurse to call her back. [facility]---no appt in [facility] yet. CTN notes that both cards and nephrology IP notes state patient is to have OP IV diruetic therapy. CTN sent SM to Shefali Sheikh, Dr Augustine Quiroz nurse to inquire about orders for this. ---[facility]    12/13/19  Per Mrs Jayde Wilson, no one has called her back yet about a follow up appt with Dr Leana Hidalgo. CTN called MD office, spoke to Leanne Torres. She states that they are waiting for MD to review records and will call Mrs Jayde Wilson back no later than Monday 12/16 with follow up appt. CTN notified Mrs Jayde Wilson of this. CTN will follow up. CTN had not received response yet regarding [facility] IV diruetics yet---resent SM to Dr Leeanne Ly and nurse Adela Nieto. Will follow up on Monday if no response received---rw    12/18/19  CTN received a SM from both MD and Adela Nieto on Monday to state that order was going to be put in to [facility] for Bumex. Patient however admitted back into hospital for GI Bleed. Patient discharged yesterday back to home. CTN sent SM to Adela Nieto to let her know patient is back home. CTN spoke to Mrs Jayde Wilson to check on patient and review discharge instructions. She reports she picked up medication today and is stopping ASA. [facility] nurse has already been to pt's home today to resume services. Wife reports patient will be getting a [employer] aide to assist pt with bathing. Per wife, Patient will see Dr Violetta Casas tomorrow. Patient has follow up with PCP Dr Nat Betts 12/23/19. CTN made follow up with Dr Tito Moscoso for 1/20/20 at 1:15pm. Wife informed.     CTN will follow up next week for updates---mkrw    12/30/19-   Patients wife reports [facility] nurse was already out yesterday to resume services and the therapist is in today to work with the patient. She will also receive a call from them this week to hear when the [employer] aide will be coming in to assist him with bathing. Patient has follow up with PCP Dr Daniel Godinez on 1/6/20. Follow up with Dr Ree Sanchez on 1/20/20 and wife called the office to inquire as to if this needed to be moved up post hospital discharge and was told to keep this appt. - Discussed with wife to revisit with GI at this appt per d/c instructions having an O/P RUQ US scheduled for elevated liver enzymes    Patients wife is awaiting return call from Dr Eb Beckford office for an appt. - LN    01/09/20  Wife reports they saw PCP on 1/6/20. She states they have appt with Dr Rachel Alvarado, pulmonary on 1/14/20. GI Dr Eri Aguilar  appt is 1/20/20. Dr Meggan June appt is on 2/4/20. CTN called [facility] at [facility] to find out why patient had not been scheduled yet for OP bumex. Per [facility], she reports they attempted to contact patient but was unable to reach or LM due to VM being full. She states at this time she needs a new order before they can schedule patient. CTN contacted [place] at Dr Marysol Witt will get new order for Bumex and labs. CTN notified spouse and gave her [employer] [address] [employer] phone numer [phone] to call if she does not hear from Tracour Webvanta by Tuesday. Wife verbalizes understanding.----mkrw           Maintains daily weight. 12/11/19     CTN notes patient weight on 12/3/19 (admit date) 181 lbs. Weight at discharge 164 lbs. CTN will need to get home scale weight when able to reach patient and review daily weight parameters with patient/spouse. ---mkrw    UPDATE: Per spouse, patient weight today on home scale 158 lbs. CTN reviewed when to call MD for weights greater than 2-3 lbs overnight or 5 lbs in a week.  Per wife, she reports that [facility] nurse has been contacting MD's (both cards and nephrology) for weight increases, would receive instructions but patient still gained weight. Recommended that patient monitor sodium intake more closely to kep to 1500--2000 mg. Wife verbalizes understanding ---rw      12/13/19'  Per Mrs Roxanne Kumar, patient's weight yesterday and today was 153 lbs, decrease of 5 lbs from Wednesday. ---mkrw    12/18/19  CTN notes last hospital weight 12/17/19 146lbs. ---mkrw    12/30/19- Patients wife reports patient is weighing daily. Yesterdays weight was 140 and today's weight was also 140 lbs. BP is also being checked daily and was 164/64 yesterday and today 152/72. LN    01/09/20  Patient weight 138lb today. Wife states she took patient to see PCP last Monday and he did not mention anything about it. CTN called Dr Gerda Tillman office,  for his nurse Georgiana to ask if they have a dietician that could call patient/wife to discuss maintaining weight. Will follow up and offer other resources if needed. Per patient's wife , patient has no edema and states [facility] nurse checks his lungs at every visit--told her yesterday that he was clear. ---Kan Mcwilliams Supportive resources in place to maintain patient in the community (ie. Home Health, DME equipment, refer to, medication assistant plan, etc.)      12/11/19  CTN contacted [facility], spoke to Sharyle Cal to inquire about which services were ordered by IP CM and when to expect resumption of care. Sharyle Cal reports that patient will received PT/OT/SN and will set up to start tomorrow 12/12/19. CTN will follow up with patient, was unable to reach on phone today---mkrw    01/09/20  Per spouse, [facility] has [facility] for SN, PT, OT and aide to assist with bathing. CTN spoke to patient's wife about palliative medicine and goals of palliative care to see if patient wanted to consider this. Per wife, patient and herself still want to attend appointments for active care and participate in [facility] therapies.  CTN asked her if patient still wanted to pursue dialysis if needed---she states that patient really doesn't want to however at the last renal appointment Dr Alyssa Ko told them pt's kidney's were stable. CTN discussed with wife that patient still needs to go to [facility] for bumex because kidneys were not working well enough to keep patient from going into CHF and it would be only a matter of time that they would have to make a decision. Wife states that they will \"cross that bridge when they get there\". CTN asked wife if they had thought about what they would do if she got ill and couldn't take care of him---she replied that she refused to have a knee replacement performed yesterday at the ortho MD since she had to care for [facility]. She states that at this time she is trying to encourage patient to do more for himself, patient is stubborn, and for now she wanted to continue current care path. CTN informed wife that if patient decides he is tired of going into the hospital or going ot all of these appointments to call. Wife verbalizes understanding. ---xena

## 2020-01-10 ENCOUNTER — PATIENT OUTREACH (OUTPATIENT)
Dept: CARDIOLOGY CLINIC | Age: 85
End: 2020-01-10

## 2020-01-10 NOTE — PROGRESS NOTES
Goals      Attends follow-up appointments as directed. 12/11/19  CTN unable to reach patient on phone, LM on  requesting return call    Patient has following appts:    PCP Dr Dom Torres   12/18/19 at 11:00 this was changed by wife due to conflicting appts. Cards Dr Manuel Jorgensen  12/20/19 at 2:00  Nephrology Dr Vashti Reynaga   Pending---recommended 1 week follow up. Per spouse, she has contacted MD office and is waiting for Dr Shonna Norwood nurse to call her back. [facility]---no appt in [facility] yet. CTN notes that both cards and nephrology IP notes state patient is to have OP IV diruetic therapy. CTN sent SM to Gely Keller, Dr Valeria Vaughn nurse to inquire about orders for this. ---[facility]    12/13/19  Per Mrs Romana Doyne, no one has called her back yet about a follow up appt with Dr Teetee Fragoso. CTN called MD office, spoke to Theo Arellano. She states that they are waiting for MD to review records and will call Mrs Romana Doyne back no later than Monday 12/16 with follow up appt. CTN notified Mrs Romana Doyne of this. CTN will follow up. CTN had not received response yet regarding [facility] IV diruetics yet---resent SM to Dr Manuel Jorgensen and nurse [place]. Will follow up on Monday if no response received---mkrw    12/18/19  CTN received a SM from both MD and [place] on Monday to state that order was going to be put in to [facility] for Bumex. Patient however admitted back into hospital for GI Bleed. Patient discharged yesterday back to home. CTN sent SM to [place] to let her know patient is back home. CTN spoke to Mrs Romana Doyne to check on patient and review discharge instructions. She reports she picked up medication today and is stopping ASA. [facility] nurse has already been to pt's home today to resume services. Wife reports patient will be getting a [facility] aide to assist pt with bathing. Per wife, Patient will see Dr Vashti Reynaga tomorrow. Patient has follow up with PCP Dr Dom Torers 12/23/19. CTN made follow up with Dr Kyle Treviño for 1/20/20 at 1:15pm. Wife informed.     CTN will follow up next week for updates---mkrw    12/30/19-   Patients wife reports [facility] nurse was already out yesterday to resume services and the therapist is in today to work with the patient. She will also receive a call from them this week to hear when the [facility] aide will be coming in to assist him with bathing. Patient has follow up with PCP Dr Chacha Lombardo on 1/6/20. Follow up with Dr Nick Gabriel on 1/20/20 and wife called the office to inquire as to if this needed to be moved up post hospital discharge and was told to keep this appt. - Discussed with wife to revisit with GI at this appt per d/c instructions having an O/P RUQ US scheduled for elevated liver enzymes    Patients wife is awaiting return call from Dr Eric Damico office for an appt. - LN    01/09/20  Wife reports they saw PCP on 1/6/20. She states they have appt with Dr Walter Jean, pulmonary on 1/14/20. GI Dr Burak Lundberg  appt is 1/20/20. Dr Tommy Adams appt is on 2/4/20. CTN called [facility] at [facility] to find out why patient had not been scheduled yet for OP bumex. Per [facility], she reports they attempted to contact patient but was unable to reach or LM due to VM being full. She states at this time she needs a new order before they can schedule patient. CTN contacted [place] at Dr Andrés Turner will get new order for Bumex and labs. CTN notified spouse and gave her [facility] [facility] [address] [employer] phone number [phone] to call if she does not hear from [facility] [place] [address] by Tuesday. Wife verbalizes understanding.----mkrw           Maintains daily weight. 12/11/19     CTN notes patient weight on 12/3/19 (admit date) 181 lbs. Weight at discharge 164 lbs. CTN will need to get home scale weight when able to reach patient and review daily weight parameters with patient/spouse. ---mkrw    UPDATE: Per spouse, patient weight today on home scale 158 lbs. CTN reviewed when to call MD for weights greater than 2-3 lbs overnight or 5 lbs in a week.  Per wife, she reports that [facility] has been contacting MD's (both cards and nephrology) for weight increases, would receive instructions but patient still gained weight. Recommended that patient monitor sodium intake more closely to kep to 1500--2000 mg. Wife verbalizes understanding ---mkrw      12/13/19'  Per Mrs Nick Marc, patient's weight yesterday and today was 153 lbs, decrease of 5 lbs from Wednesday. ---mkrw    12/18/19  CTN notes last hospital weight 12/17/19 146lbs. ---mkrw    12/30/19- Patients wife reports patient is weighing daily. Yesterdays weight was 140 and today's weight was also 140 lbs. BP is also being checked daily and was 164/64 yesterday and today 152/72. LN    01/09/20  Patient weight 138lb today. Wife states she took patient to see PCP last Monday and he did not mention anything about it. CTN called Dr Ann-Marie Olsen office,  for his nurse Georgiana to ask if they have a dietician that could call patient/wife to discuss maintaining weight. Will follow up and offer other resources if needed. Per patient's wife , patient has no edema and states [facility] nurse checks his lungs at every visit--told her yesterday that he was clear. ---Yenni Bo Supportive resources in place to maintain patient in the community (ie. Home Health, DME equipment, refer to, medication assistant plan, etc.)      12/11/19  CTN contacted [facility], spoke to [facility] to inquire about which services were ordered by IP CM and when to expect resumption of care. [facility] reports that patient will received PT/OT/SN and will set up to start tomorrow 12/12/19. CTN will follow up with patient, was unable to reach on phone today---mkrw    01/09/20  Per spouse, [facility] has [facility] for SN, PT, OT and aide to assist with bathing. Palliative Care Discussion:  CTN spoke to patient's wife about palliative medicine and goals of palliative care to see if patient wanted to consider this. Per wife, patient and herself still want to attend appointments for active care and participate in [facility] therapies.  CTN asked her if patient still wanted to pursue dialysis if needed---she states that patient really doesn't want to however at the last renal appointment Dr Tr Power told them pt's kidney's were stable. CTN discussed with wife that patient still needs to go to [address] [place] [address] for bumex because kidneys were not working well enough to keep patient from going into CHF and it would be only a matter of time that they would have to make a decision. Wife states that they will \"cross that bridge when they get there\". CTN asked wife if they had thought about what they would do if she got ill and couldn't take care of him---she replied that she refused to have a knee replacement performed yesterday at the ortho MD since she had to care for [facility]. She states that at this time she is trying to encourage patient to do more for himself, patient is stubborn, and for now she wanted to continue current care path. CTN informed wife that if patient decides he is tired of going into the hospital or going ot all of these appointments to call. Wife verbalizes understanding. ---xena    01/10/20  CTN concerned about patient losing weight---per spouse patient had had a dietary educator come to home back in Oct/Nov last year from nephrology. CTN contacted [place] at Dr lambert's office. She is unaware of who may have made a home visit but states she will put in a nutritional consult for patient to see someone in the office. CTN notified patient wife of this---gave her another resource to try: [employer] [phone] Ext [phone] Dial a Dietician. CTN will follow up at next call. ---xena

## 2020-01-14 ENCOUNTER — PATIENT OUTREACH (OUTPATIENT)
Dept: CARDIOLOGY CLINIC | Age: 85
End: 2020-01-14

## 2020-01-14 NOTE — ANESTHESIA POSTPROCEDURE EVALUATION
Procedure(s):  COLONOSCOPY  ENDOSCOPIC POLYPECTOMY  COLON BIOPSY.     MAC    <BSHSIANPOST>    Vitals Value Taken Time   /71 12/28/2019  4:33 PM   Temp 37 °C (98.6 °F) 12/28/2019  4:33 PM   Pulse 65 12/28/2019  4:33 PM   Resp 18 12/28/2019  4:33 PM   SpO2 97 % 12/28/2019  4:33 PM

## 2020-01-14 NOTE — PROGRESS NOTES
Goals  Attends follow-up appointments as directed. 12/11/19 CTN unable to reach patient on phone, LM on  requesting return call Patient has following appts: PCP Dr Mustapha Patel   12/18/19 at 11:00 this was changed by wife due to conflicting appts. Snehal Reinoso  12/20/19 at 2:00 Nephrology Dr Lane Terrell   Pending---recommended 1 week follow up. Per spouse, she has contacted MD office and is waiting for Dr Arvind Golden nurse to call her back. OPIC---no appt in [facility] yet. CTN notes that both cards and nephrology IP notes state patient is to have OP IV diruetic therapy. CTN sent SM to Dr Love Harper An nurse to inquire about orders for this. ---[facility] 12/13/19 Per Mrs Maximo Power, no one has called her back yet about a follow up appt with Dr Megan Stephens. CTN called MD office, spoke to Reji. She states that they are waiting for MD to review records and will call Mrs Maximo Power back no later than Monday 12/16 with follow up appt. CTN notified Mrs Maximo Power of this. CTN will follow up. CTN had not received response yet regarding OPIC IV diruetics yet---resent SM to Dr Karsten Reinoso and nurse [place]. Will follow up on Monday if no response received---mkrw 12/18/19 CTN received a SM from both MD and [place] on Monday to state that order was going to be put in to [facility] for Bumex. Patient however admitted back into hospital for GI Bleed. Patient discharged yesterday back to home. CTN sent SM to [place] to let her know patient is back home. CTN spoke to Mrs Maximo Power to check on patient and review discharge instructions. She reports she picked up medication today and is stopping ASA. [facility] nurse has already been to pt's home today to resume services. Wife reports patient will be getting a [facility] aide to assist pt with bathing. Per wife, Patient will see Dr Lane Terrell tomorrow. Patient has follow up with PCP Dr Mustapha Patel 12/23/19. CTN made follow up with Dr Bolton Shown for 1/20/20 at 1:15pm. Wife informed. CTN will follow up next week for updates---mkrw 12/30/19- Patients wife reports [facility] nurse was already out yesterday to resume services and the therapist is in today to work with the patient. She will also receive a call from them this week to hear when the [facility] aide will be coming in to assist him with bathing. Patient has follow up with PCP Dr Grzegorz Donis on 1/6/20. Follow up with Dr Delbert Nieves on 1/20/20 and wife called the office to inquire as to if this needed to be moved up post hospital discharge and was told to keep this appt. - Discussed with wife to revisit with GI at this appt per d/c instructions having an O/P RUQ US scheduled for elevated liver enzymes Patients wife is awaiting return call from Dr Domenic Lopez office for an appt. - LN 
 
01/09/20 Wife reports they saw PCP on 1/6/20. She states they have appt with Dr Dania Jefferson, pulmonary on 1/14/20. GI Dr Krysta Garcia  appt is 1/20/20. Dr Josemanuel Myles appt is on 2/4/20. CTN called OPIC at [facility] to find out why patient had not been scheduled yet for OP bumex. Per Vipul Shell, she reports they attempted to contact patient but was unable to reach or LM due to VM being full. She states at this time she needs a new order before they can schedule patient. [facility] Ira Blizzard will get new order for Bumex and labs. CTN notified spouse and gave her [employer] phone number [phone] to call if she does not hear from Venmojose m by Tuesday. Wife verbalizes understanding.----mkrw  Knowledge and adherence medication (ie. action, side effects, missed dose, etc.)   
  01/14/20 Mrs Sabiha Muniz reached out to CTN to inform CTN that pt went to pulmonology office today--his BP was 82/40 HR 68 while at office. Dr Dania Jefferson instructed pt/wife to hold next 2 doses of BP meds and to consult with cardiology for advice. BP has improved since pt came home--is 120/84 and 122/61---pt is working with [facility] therapist at time of wife's call.  CTN attempted to contact nurse on phone, no answer. CTN sent SM to Dr King Acosta and Carl Davison to inform them of pt concern. CTN will follow up later today. ---mkrw UPDATE: 
CTN spoke to Carl Davison at Dr Daphney Armijo office. She instructed CTN to tell wife to hold clonidine and hydralazine tonight and to give all other BP meds as normal. Check BP in the morning, if low call MD office for advice. If normal, give medication as usual. Wife verbalizes understanding---mkrw  Maintains daily weight. 12/11/19 CTN notes patient weight on 12/3/19 (admit date) 181 lbs. Weight at discharge 164 lbs. CTN will need to get home scale weight when able to reach patient and review daily weight parameters with patient/spouse. ---mkrw UPDATE: Per spouse, patient weight today on home scale 158 lbs. CTN reviewed when to call MD for weights greater than 2-3 lbs overnight or 5 lbs in a week. Per wife, she reports that [facility] nurse has been contacting MD's (both cards and nephrology) for weight increases, would receive instructions but patient still gained weight. Recommended that patient monitor sodium intake more closely to kep to 1500--2000 mg. Wife verbalizes understanding ---mkrw 12/13/19' Per Mrs Helio Garcia, patient's weight yesterday and today was 153 lbs, decrease of 5 lbs from Wednesday. ---mkrw 12/18/19 CTN notes last hospital weight 12/17/19 146lbs. ---mkrw 12/30/19- Patients wife reports patient is weighing daily. Yesterdays weight was 140 and today's weight was also 140 lbs. BP is also being checked daily and was 164/64 yesterday and today 152/72. LN 
 
01/09/20 Patient weight 138lb today. Wife states she took patient to see PCP last Monday and he did not mention anything about it. CTN called Dr Jose Perez office,  for his nurse Georgiana to ask if they have a dietician that could call patient/wife to discuss maintaining weight. Will follow up and offer other resources if needed. Per patient's wife , patient has no edema and states [facility] nurse checks his lungs at every visit--told her yesterday that he was clear. ---mkrw  Supportive resources in place to maintain patient in the community (ie. Home Health, DME equipment, refer to, medication assistant plan, etc.)   
  12/11/19 CTN contacted [facility], spoke to Ruthann Cervantes to inquire about which services were ordered by IP CM and when to expect resumption of care. Ruthann Cervantes reports that patient will received PT/OT/SN and will set up to start tomorrow 12/12/19. CTN will follow up with patient, was unable to reach on phone today---mkrw 01/09/20 Per spouse, [facility] has [facility] for SN, PT, OT and aide to assist with bathing. Palliative Care Discussion: CTN spoke to patient's wife about palliative medicine and goals of palliative care to see if patient wanted to consider this. Per wife, patient and herself still want to attend appointments for active care and participate in [facility] therapies. CTN asked her if patient still wanted to pursue dialysis if needed---she states that patient really doesn't want to however at the last renal appointment Dr Domingo Spencer told them pt's kidney's were stable. CTN discussed with wife that patient still needs to go to [facility] for bumex because kidneys were not working well enough to keep patient from going into CHF and it would be only a matter of time that they would have to make a decision. Wife states that they will \"cross that bridge when they get there\". CTN asked wife if they had thought about what they would do if she got ill and couldn't take care of him---she replied that she refused to have a knee replacement performed yesterday at the ortho MD since she had to care for Radha Guamankari. She states that at this time she is trying to encourage patient to do more for himself, patient is stubborn, and for now she wanted to continue current care path.  
CTN informed wife that if patient decides he is tired of going into the hospital or going ot all of these appointments to call. Wife verbalizes understanding. ---xena 01/10/20 CTN concerned about patient losing weight---per spouse patient had had a dietary educator come to home back in Oct/Nov last year from nephrology. CTN contacted Georgiana at Dr lambert's office. She is unaware of who may have made a home visit but states she will put in a nutritional consult for patient to see someone in the office. CTN notified patient wife of this---gave her another resource to try: [employer] [phone] Ext [phone] Dial a Dietician. CTN will follow up at next call. ---xena

## 2020-01-22 ENCOUNTER — PATIENT OUTREACH (OUTPATIENT)
Dept: CARDIOLOGY CLINIC | Age: 85
End: 2020-01-22

## 2020-01-27 ENCOUNTER — PATIENT OUTREACH (OUTPATIENT)
Dept: CARDIOLOGY CLINIC | Age: 85
End: 2020-01-27

## 2020-01-30 RX ORDER — HYDRALAZINE HYDROCHLORIDE 25 MG/1
25 TABLET, FILM COATED ORAL 2 TIMES DAILY
Qty: 60 TAB | Refills: 0 | COMMUNITY
Start: 2020-01-30 | End: 2021-01-01

## 2020-02-13 ENCOUNTER — TELEPHONE (OUTPATIENT)
Dept: CARDIOLOGY CLINIC | Age: 85
End: 2020-02-13

## 2020-02-13 RX ORDER — BUMETANIDE 0.25 MG/ML
3 INJECTION INTRAMUSCULAR; INTRAVENOUS ONCE
Status: COMPLETED | OUTPATIENT
Start: 2020-02-19 | End: 2020-02-19

## 2020-02-13 NOTE — TELEPHONE ENCOUNTER
Verified patient with two types of identifiers. [facility] information about [facility] and bumex.

## 2020-02-13 NOTE — TELEPHONE ENCOUNTER
Ying Garcia calling from [facility] would like information regarding an appointment with Kassie Márquez.     Phone:   [phone]

## 2020-02-17 ENCOUNTER — TELEPHONE (OUTPATIENT)
Dept: CARDIOLOGY CLINIC | Age: 85
End: 2020-02-17

## 2020-02-17 NOTE — TELEPHONE ENCOUNTER
Patients wife is requesting call from [facility] to explain the purpose of Bumex injections.      Phone: [phone]

## 2020-02-17 NOTE — TELEPHONE ENCOUNTER
Verified patient with two types of identifiers. Went over purpose of Bumex and blood work. Patient's wife is concerned about patient getting dried out. She wants to know what Dr. Erickson Bennett thinks and if he should still take both doses of his PO Bumex on infusion days.  Will check with MD.

## 2020-02-18 NOTE — TELEPHONE ENCOUNTER
On infusion days can hold one dose of the diuretic po , if he goes before 12 Noon then hold AM dose and vice versa

## 2020-02-19 ENCOUNTER — TELEPHONE (OUTPATIENT)
Dept: CARDIOLOGY CLINIC | Age: 85
End: 2020-02-19

## 2020-02-19 ENCOUNTER — OFFICE VISIT (OUTPATIENT)
Dept: CARDIOLOGY CLINIC | Age: 85
End: 2020-02-19

## 2020-02-19 ENCOUNTER — HOSPITAL ENCOUNTER (OUTPATIENT)
Dept: INFUSION THERAPY | Age: 85
Discharge: HOME | End: 2020-02-19
Payer: MEDICARE

## 2020-02-19 VITALS
RESPIRATION RATE: 18 BRPM | DIASTOLIC BLOOD PRESSURE: 66 MMHG | SYSTOLIC BLOOD PRESSURE: 152 MMHG | TEMPERATURE: 98 F | HEART RATE: 60 BPM | OXYGEN SATURATION: 96 %

## 2020-02-19 DIAGNOSIS — Z95.0 CARDIAC PACEMAKER IN SITU: Primary | ICD-10-CM

## 2020-02-19 LAB
ANION GAP SERPL CALC-SCNC: 5 MMOL/L (ref 5–15)
BUN SERPL-MCNC: 53 MG/DL (ref 6–20)
BUN/CREAT SERPL: 21 (ref 12–20)
CALCIUM SERPL-MCNC: 8.4 MG/DL (ref 8.5–10.1)
CHLORIDE SERPL-SCNC: 97 MMOL/L (ref 97–108)
CO2 SERPL-SCNC: 28 MMOL/L (ref 21–32)
CREAT SERPL-MCNC: 2.57 MG/DL (ref 0.7–1.3)
GLUCOSE SERPL-MCNC: 212 MG/DL (ref 65–100)
POTASSIUM SERPL-SCNC: 3.8 MMOL/L (ref 3.5–5.1)
SODIUM SERPL-SCNC: 130 MMOL/L (ref 136–145)

## 2020-02-19 PROCEDURE — 80048 BASIC METABOLIC PNL TOTAL CA: CPT

## 2020-02-19 PROCEDURE — 74011000250 HC RX REV CODE- 250: Performed by: SPECIALIST

## 2020-02-19 PROCEDURE — 36415 COLL VENOUS BLD VENIPUNCTURE: CPT

## 2020-02-19 PROCEDURE — 96374 THER/PROPH/DIAG INJ IV PUSH: CPT

## 2020-02-19 RX ADMIN — BUMETANIDE 3 MG: 0.25 INJECTION INTRAMUSCULAR; INTRAVENOUS at 13:25

## 2020-02-19 NOTE — TELEPHONE ENCOUNTER
Yamilet with outpatient infusion @ [facility] requesting to speak with Alexey Carrasquillo. Patient is concerned about the bumex procedure today; they're concerned about the patient getting dry.  Please advise      MYDPP:[phone]

## 2020-02-19 NOTE — TELEPHONE ENCOUNTER
Verified patient with two types of identifiers. Spoke to [facility] about plan to hold one dose of PO Bumex on infusion days.

## 2020-02-19 NOTE — PROGRESS NOTES
Outpatient Infusion Center Progress Note 1140 Pt admit to [facility] for Bumex IV push ambulatory in stable condition with oxygen tank and walker. Supportive wife with the patient. Both patient and wife expressed concerns about \"getting dry from too much Bumex\"  They requested to get the BMP done first before given the medication since he takes 6 mg PO at home. IV established in the left arm and lab drawn and sent for processing. Called Dr Restrepo Has nurse to express their concern and she stated she would call Mr Christine Fermin with guidance. Visit Vitals /66 Pulse 60 Temp 98 °F (36.7 °C) Resp 18 SpO2 96% Medications: 
Medications Administered   
 bumetanide (BUMEX) injection 3 mg Admin Date 
02/19/2020 Action Given Dose 3 mg Route IntraVENous Administered By Fernanda Guerrier, RN  
  
  
  
 
1340 Pt tolerated treatment well. IV removed. D/c home ambulatory in no distress. Pt aware of next appointment scheduled for 3/4/20 { Recent Results (from the past 24 hour(s)) METABOLIC PANEL, BASIC Collection Time: 02/19/20 11:27 AM  
Result Value Ref Range Sodium 130 (L) 136 - 145 mmol/L Potassium 3.8 3.5 - 5.1 mmol/L Chloride 97 97 - 108 mmol/L  
 CO2 28 21 - 32 mmol/L Anion gap 5 5 - 15 mmol/L Glucose 212 (H) 65 - 100 mg/dL BUN 53 (H) 6 - 20 MG/DL Creatinine 2.57 (H) 0.70 - 1.30 MG/DL  
 BUN/Creatinine ratio 21 (H) 12 - 20 GFR est AA 29 (L) >60 ml/min/1.73m2 GFR est non-AA 24 (L) >60 ml/min/1.73m2  Calcium 8.4 (L) 8.5 - 10.1 MG/DL

## 2020-02-19 NOTE — TELEPHONE ENCOUNTER
Patients wife would like to let Roxanne Yoder and Roque Noe know that she received the letter and called [employer] and got everything corrected.      Phone: [phone]

## 2020-02-20 NOTE — PROGRESS NOTES
Labs reviewed As other note, plan to reduce diuretic to once a day and continue OPIC Recheck labs at next [facility] Forward labs to Pio Cannon MD and Dr Pritesh Spencer Future Appointments 2/27/2020  2:20 PM    MD juliette Viveros            ANAHI SCHED 
3/4/2020   11:00 AM   [facility]                 [facility]         [facility] 
6/1/2020   3:45 PM    REMOTE1, COSME CHRISTIAN SCHED 
8/24/2020  8:45 AM    REMOTE1, [address] [place] 
10/29/2020 10:30 AM   PACEMAKER3, COSME CHRISTIAN SCHED 
10/29/2020 10:40 AM   Marleny Antoine MD           [place] [id number] Patient Care Team: 
Pio Cannon MD as PCP - [facility]) Jude Perea MD (Cardiology) Ana Billings MD (Cardiology) Fern Swfit MD (Pulmonary Disease) Claudia Love MD as Physician (Ophthalmology) Marleny Antoine MD (Cardiology) Faye Wilson MD (Nephrology) Ahmet El RN as Care Transitions Nurse Rose Styles MD (Gastroenterology) Tharon Mohs, MD (Pulmonary Disease)

## 2020-02-20 NOTE — TELEPHONE ENCOUNTER
Verified patient with two types of identifiers. Spoke to patient about holding a dose of Bumex on OPIC days. He expressed frustration and unwillingness to go back to [facility] stating it is unnecessary. He reports he urinated less after the IV infusion than he usually does. He states he does not have extra fluid or swelling. He does not wish to continue this treatment.  Will notify MD.

## 2020-02-24 NOTE — TELEPHONE ENCOUNTER
[id number] Juana Liz we can discuss at his next fu  Future Appointments   Date Time Provider Muna Arredondoi   2/27/2020  2:20 PM Qian Moore MD [facility]   3/4/2020 11:00 AM [facility]   6/1/2020  3:45 PM REMOTE1, [address]   8/24/2020  8:45 AM REMOTE1, [address]   10/29/2020 10:30 AM PACEMAKER3, AGUIAR Replaced by [facility]   10/29/2020 10:40 AM Winston Burger  [address]      Just hold off on OPIC for now  Same meds for now

## 2020-02-27 ENCOUNTER — OFFICE VISIT (OUTPATIENT)
Dept: CARDIOLOGY CLINIC | Age: 85
End: 2020-02-27

## 2020-02-27 VITALS
OXYGEN SATURATION: 95 % | HEART RATE: 81 BPM | SYSTOLIC BLOOD PRESSURE: 134 MMHG | WEIGHT: 141 LBS | DIASTOLIC BLOOD PRESSURE: 65 MMHG | BODY MASS INDEX: 20.88 KG/M2 | RESPIRATION RATE: 20 BRPM | HEIGHT: 69 IN

## 2020-02-27 DIAGNOSIS — E78.00 HYPERCHOLESTEREMIA: ICD-10-CM

## 2020-02-27 DIAGNOSIS — Z95.1 HX OF CABG: ICD-10-CM

## 2020-02-27 DIAGNOSIS — N18.30 CKD (CHRONIC KIDNEY DISEASE) STAGE 3, GFR 30-59 ML/MIN (HCC): ICD-10-CM

## 2020-02-27 DIAGNOSIS — I65.23 BILATERAL CAROTID ARTERY STENOSIS: ICD-10-CM

## 2020-02-27 DIAGNOSIS — I10 ESSENTIAL HYPERTENSION: ICD-10-CM

## 2020-02-27 DIAGNOSIS — I25.10 CORONARY ARTERY DISEASE INVOLVING NATIVE CORONARY ARTERY OF NATIVE HEART WITHOUT ANGINA PECTORIS: ICD-10-CM

## 2020-02-27 DIAGNOSIS — I48.20 ATRIAL FIBRILLATION, CHRONIC (HCC): ICD-10-CM

## 2020-02-27 DIAGNOSIS — I73.9 PERIPHERAL VASCULAR DISEASE (HCC): ICD-10-CM

## 2020-02-27 DIAGNOSIS — I50.32 DIASTOLIC CHF, CHRONIC (HCC): Primary | ICD-10-CM

## 2020-02-27 RX ORDER — LANOLIN ALCOHOL/MO/W.PET/CERES
1000 CREAM (GRAM) TOPICAL DAILY
COMMUNITY

## 2020-02-27 RX ORDER — BUMETANIDE 1 MG/1
2 TABLET ORAL 2 TIMES DAILY
Qty: 120 TAB | Refills: 0
Start: 2020-02-27 | End: 2020-01-01

## 2020-02-27 NOTE — PROGRESS NOTES
[facility]     1935       David Robertson MD, [facility]  Date of Visit-2/27/2020   PCP is Kaylan Artis MD   [facility] [place]  [facility]  HPI:  [facility]. is a 81 y.o. male   F/u of CHF,CAD, PA HTN, CKD 3/4 , chronic atrial fibrillation   Only cardiac complaint is shortness of breath they are here in part today to discuss the use of the outpatient infusion center [facility])  We referred pt after his 12/20 visit to the [facility] for IV diuretics. This was done on 2/19/20. His sodium went down to 130. Creatinine 2.7. He did not feel that it benefited him. We had suggested on infusion days that he hold one dose of the diuretic. His most recent hospitalization was 12/26/19 with GI bleeding. 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 Pacer check 2/19 showed 51% RV pacing. Pt ambulates with a walker. Pt is on oxygen. His sats are 95% on 2 L of oxygen . pt is present with his wife. Pt states he does not have any fluid, so he asks why he should have the infusion if he is not having swelling. Pt's wife reports that he lost the weight at the beginning of December when he went to the hospital for fluid retention and has maintained the same weight since that time. Pt also states he has a rash, which he believes started when he began to take magnesium. Pt's wife says they are watching his salt intake. Pt has home health nurses coming regularly and they check his legs for edema. Home SBP's run from 123 to 161. Average home SBP is in the high 140's. Pt's PCP cut his Hydralazine in half earlier this February. Denies chest pain, edema, syncope or shortness of breath at rest, has no tachycardia, palpitations or sense of arrhythmia.       · CHF systolic chronic   · RHC May 2, 2019 =PA 66/18 W 20 PA sat 55% CO 4.1 CI 2.12  · Echo May 1 ,2019 EF 55-60% mod LAE, Mild CASSIE, Mild AS BELLE 1.6 cm2 mild MR & TR, RV fx mildly Video Education Report - ICR/CR      Name:  Jack Varner     Date:  10/23/2017  MRN: [id number]     Session #:    Session Length: 50 min    Recommended Videos    Additional Videos   []01 [employer] - Program Overview  []17 Hypertension & Heart Disease  [x]02 Overview of [employer] Eating Plan   []18 Cooking Breakfasts and Snacks  []03 Becoming a [employer]in    []19 Planning Your Eating Strategy  []04 Diseases of Our Time - Part 1   []20 Label Reading - Part 2  []05 Calorie Density     []21 Cooking Soups and Desserts  []06 Label Reading - Part 1    []22 How Our Thoughts Can Heal Our Hearts  []07 Move it      []23 Targeting Your Nutrition Priorities  []08 Healthy Minds, Bodies, Hearts   []24 Healthy Salads & Dressings  []09 Dining Out - Part 1    []25 Dining Out - Part 2  []10 Heart Disease Risk Reduction   []26 Cooking Dinner and Sides  []15 Metabolic Syndrome and Belly Fat  []27 Sleep Disorders  []12 Facts on Fat     []28 Menu Workshop  []13 Diseases of Our Time - Part 2   []29 Decoding Lab Results  []14 Biology of Weight Control   []30 Vitamins and Minerals  []15 Biomechanical Limitations   []31 Exercise Action Plan  []16 Nutrition Action Plan    []32 Body Composition         []33 Improving Performance         []34 Fueling a Healthy Body         []35 Introduction to Yoga         []36 Aging- Enhancing the Quality of Your Life         []37 Smoking Cessation      Comments:  Video completed, completed Rate Your Plate      Electronically signed by Wan Chen on 10/23/2017 at 1:15 PM  Staff Signature/Date/Time reduced  · Leadless pacer 5-9-2019  · CAD with CABG off pump x3 3/2008   · Chronic atrial fibrillation   · CKD 4- Dr Betty Molina. · August 2019 pneumonia, renal edema  · Pulmonary HTN, restrictive lung disease  · PVD- PTA 2014  · Carotid artery disease   Assessment/Plan:     Patient Instructions   Please reduce your Bumex to 2 tabs twice daily. Please stop your Magnesium for one week to see if your rash resolves, if it does not restart Magnesium. We will see you back in 2 weeks. 1. Diastolic CHF, chronic ([address]  He was 170 lbs on November 21st. He was 164 on December 10th but since December 17th when he left the hospital he has run 146 and now 141. I don't think we will do OPIC anymore. He seems to have zero edema now and we reduce the Bumex to 2 tablets BID instead of 3 tablets BID. He will continue Aldactone. He will continue Coreg for CHF and because of his CKD he is not on an ACE. Instead he takes Hydralazine/Isosorbide. He will continue with home health and we will recheck labs after his next visit. He has some hyponatremia and I think that will improve with the change in Bumex. 2. Coronary artery disease involving native coronary artery of native heart without angina pectoris  CABG 2008. No current angina. No plans for cath due to severe CKD. 3. Atrial fibrillation, chronic  Has rate control and a leadless pacemaker  With his anemia he is at risk for decompensation and therefore we have stopped and there is no more  oral anticoagulation  Beta-blocker for rate control    4. CKD (chronic kidney disease) stage 3, GFR 30-59 ml/min ([facility])  GFR may have dropped further and is followed by renal. We'd like to see improvement in CKD as we lower the Bumex. He might be a bit dry right now. 5. Peripheral vascular disease (HCC)  Multiple stents in the common femoral arteries. If he is limited in activity then we should consider reimaging but therapy is limited due to the poor renal function    6.  Hx of CABG  2008 healed midline incision    7. Bilateral carotid artery stenosis  Mild disease -medical management    8. Hypercholesteremia  Risk factors include diabetes is on simvastatin and omega-3 fish oil  Follow-up labs with PCP    9. Essential hypertension  Home BP's are up to 150 but here are normal. I will review Dr. Zak Banks notes about the change in Hydralazine. BP Readings from Last 6 Encounters:   02/27/20 134/65   02/19/20 152/66   12/28/19 153/71   12/20/19 140/70   12/17/19 148/74   12/10/19 149/67      Key CAD CHF Meds             bumetanide (BUMEX) 1 mg tablet (Taking) Take 2 Tabs by mouth two (2) times a day. hydrALAZINE (APRESOLINE) 50 mg tablet (Taking) Take 25 mg by mouth two (2) times a day. spironolactone (ALDACTONE) 25 mg tablet (Taking) Take 1 Tab by mouth daily. isosorbide dinitrate (ISORDIL) 20 mg tablet (Taking) Take 1 Tab by mouth three (3) times daily. carvedilol (COREG) 12.5 mg tablet (Taking) Take 6.25 mg by mouth two (2) times daily (with meals). simvastatin (ZOCOR) 20 mg tablet (Taking) Take 1 Tab by mouth nightly. omega 3-dha-epa-fish oil (FISH OIL) 100-160-1,000 mg cap (Taking) Take 1 Cap by mouth two (2) times a day. Future Appointments   Date Time Provider Muna Delgado   3/12/2020  2:20 PM Laurie Hdz MD cav ANAHI KAREN   6/1/2020  3:45 PM REMOTE1, [address]   8/24/2020  8:45 AM REMOTE1, [address]   10/29/2020 10:30 AM PACEMAKER3, [address]   10/29/2020 10:40 AM Sunni Colindres  [address]      Orders Placed This Encounter    cyanocobalamin 1,000 mcg tablet    bumetanide (BUMEX) 1 mg tablet      F/u in 2 weeks     Impression:   1. Diastolic CHF, chronic ([address]    2. Coronary artery disease involving native coronary artery of native heart without angina pectoris    3. Atrial fibrillation, chronic    4. CKD (chronic kidney disease) stage 3, GFR 30-59 ml/min ([facility])    5.  Peripheral vascular disease ([facility]    6. Hx of CABG    7. Bilateral carotid artery stenosis    8. Hypercholesteremia    9. Essential hypertension       Cardiac History:   CAD/CABG   off pump x3 3/2008 . =post op anemia and renal failure  CATH 2008=   LM -bifurcation dz 40% ;LAD 85% ; Circ ost 70% mid 70%   RCA proximal 60% tapering, EF 60%-70%, bilaterally patent renal arteries, mild atherosclerosis of the distal abdominal aorta. [address] May 2, 2019 =PA 66/18 W 20 PA sat 55% CO 4.1 CI 2.12  Echo May 1 ,2019 EF 55-60% mod LAE, Mild CASSIE, Mild AS BELLE 1.6 cm2 mild MR & TR, RV fx mildly reduced  PVD-18- RLE- mid CRA 70, Pop 99-PTA on right pop, LLE LCFA 40%  - 17 PTA RCFA 90, JAS to RSFA   -16 PTA Left Pop, PTA Left tib-per  --3-13-14 PTA Left op PTA RSFA  ---11 stent RSFA   JOCELYN 17 Normal perfusion    Mild carotid artery disease 3-7-08 then 12 with 10-49% left, less than 10% on right    SOCIAL: Drinks no alcohol, quit smoking several years ago, worked as an . Lives with his wife and has four children. Enjoys gardening, fishing and music. FAMILY HISTORY: Mother  of cancer at 76, father  of Parkinson's at 70, one brother  of cancer of the liver at 76 and one  of an infection at 64. ROS-except as noted above. . A complete cardiac and respiratory are reviewed and negative except as above ; Resp-denies wheezing  or productive cough,.  Const- No unusual weight loss or fever; Neuro-no recent seizure or CVA ; GI- No BRBPR, abdom pain, bloating ; - no  hematuria  ROS- complete multisystem 11 ROS done and reviewed as pertinent   see supplement sheet, initialed and to be scanned by staff  Past Medical History:   Diagnosis Date    CAD (coronary artery disease)     s/p CABG     Carotid arterial disease ([facility]     Chronic obstructive pulmonary disease ([facility]     states he is on 2L at home day and night    CKD (chronic kidney disease) stage 3, GFR 30-59 ml/min ([facility] 10/21/2017    hypertension and DM nephrosclerosis    Diabetes mellitus, type 2 ([facility]     Diverticulitis     Hypercholesteremia     Hypertension     Pacemaker     Paroxysmal atrial fibrillation ([facility] 10/21/2017    new onset afib with BRPR 10-19-17 admit    PVC's (premature ventricular contractions) 5/26/2014    PVD (peripheral vascular disease) ([facility]       Social Hx= reports that he quit smoking about 35 years ago. His smoking use included cigarettes. He has a 60.00 pack-year smoking history. He has never used smokeless tobacco. He reports that he does not drink alcohol or use drugs. Exam and Labs:  /65 (BP 1 Location: Left arm, BP Patient Position: Sitting)   Pulse 81   Resp 20   Ht 5' 9\" (1.753 m)   Wt 141 lb (64 kg)   SpO2 95% Comment: on 2 liters of oxygen  BMI 20.82 kg/m² Constitutional:  NAD, comfortable  Head: NC,AT. Eyes: No scleral icterus. Neck:  Neck supple. No JVD present. Throat: moist mucous membranes. Chest: Effort normal & normal respiratory excursion . Neurological: alert, conversant and oriented . Skin: Skin is not cold. No obvious systemic rash noted. Not diaphoretic. No erythema. Psychiatric:  Grossly normal mood and affect. Behavior appears normal. Extremities:  no clubbing or cyanosis. Abdomen: non distended    Lungs:breath sounds normal. No stridor. distress, wheezes or  Rales. Heart:normal rate, regular rhythm, normal S1, S2, no murmurs, rubs, clicks or gallops , PMI non displaced. Edema: Edema is none.     Lab Results   Component Value Date/Time    Sodium 130 (L) 02/19/2020 11:27 AM    Potassium 3.8 02/19/2020 11:27 AM    Chloride 97 02/19/2020 11:27 AM    CO2 28 02/19/2020 11:27 AM    Anion gap 5 02/19/2020 11:27 AM    Glucose 212 (H) 02/19/2020 11:27 AM    BUN 53 (H) 02/19/2020 11:27 AM    Creatinine 2.57 (H) 02/19/2020 11:27 AM    BUN/Creatinine ratio 21 (H) 02/19/2020 11:27 AM    GFR est AA 29 (L) 02/19/2020 11:27 AM    GFR est non-AA 24 (L) 02/19/2020 11:27 AM    Calcium 8.4 (L) 02/19/2020 11:27 AM      Wt Readings from Last 3 Encounters:   02/27/20 141 lb (64 kg)   12/28/19 145 lb 1 oz (65.8 kg)   12/20/19 151 lb (68.5 kg)      BP Readings from Last 3 Encounters:   02/27/20 134/65   02/19/20 152/66   12/28/19 153/71      Current Outpatient Medications   Medication Sig    cyanocobalamin 1,000 mcg tablet Take 1,000 mcg by mouth daily.  bumetanide (BUMEX) 1 mg tablet Take 2 Tabs by mouth two (2) times a day.  hydrALAZINE (APRESOLINE) 50 mg tablet Take 25 mg by mouth two (2) times a day.  insulin glargine (LANTUS U-100 INSULIN) 100 unit/mL injection 3-7 Units by SubCUTAneous route nightly. Pt reports he uses 3-7 units depending on BG    spironolactone (ALDACTONE) 25 mg tablet Take 1 Tab by mouth daily.  magnesium oxide (MAG-OX) 400 mg tablet Take 1 Tab by mouth daily.  isosorbide dinitrate (ISORDIL) 20 mg tablet Take 1 Tab by mouth three (3) times daily.  carvedilol (COREG) 12.5 mg tablet Take 6.25 mg by mouth two (2) times daily (with meals).  simvastatin (ZOCOR) 20 mg tablet Take 1 Tab by mouth nightly.  albuterol (PROVENTIL VENTOLIN) 2.5 mg /3 mL (0.083 %) nebulizer solution 3 mL by Nebulization route every four (4) hours as needed for Wheezing.  tamsulosin (FLOMAX) 0.4 mg capsule Take 1 Cap by mouth daily.  omega 3-dha-epa-fish oil (FISH OIL) 100-160-1,000 mg cap Take 1 Cap by mouth two (2) times a day. No current facility-administered medications for this visit. Impression see above.       Written by Jen Ruth, as dictated by Alexander Payne MD.

## 2020-02-27 NOTE — Clinical Note
2/27/20 Patient: Oswaldo Perdomo YOB: 1935 Date of Visit: 2/27/2020 Dominic Hedrick MD 
[address] [address] VIA Facsimile: [phone] Dear Dominic Hedrick MD, Thank you for referring Mr. Domi Ford to [facility] for evaluation. My notes for this consultation are attached. If you have questions, please do not hesitate to call me. I look forward to following your patient along with you.  
 
 
Sincerely, 
 
Chapo Blue MD

## 2020-03-04 ENCOUNTER — APPOINTMENT (OUTPATIENT)
Dept: INFUSION THERAPY | Age: 85
End: 2020-03-04

## 2020-03-12 ENCOUNTER — OFFICE VISIT (OUTPATIENT)
Dept: CARDIOLOGY CLINIC | Age: 85
End: 2020-03-12

## 2020-03-12 VITALS
HEIGHT: 69 IN | SYSTOLIC BLOOD PRESSURE: 125 MMHG | OXYGEN SATURATION: 94 % | RESPIRATION RATE: 16 BRPM | WEIGHT: 141 LBS | HEART RATE: 88 BPM | BODY MASS INDEX: 20.88 KG/M2 | DIASTOLIC BLOOD PRESSURE: 61 MMHG

## 2020-03-12 DIAGNOSIS — N18.4 CKD (CHRONIC KIDNEY DISEASE) STAGE 4, GFR 15-29 ML/MIN (HCC): ICD-10-CM

## 2020-03-12 DIAGNOSIS — I48.20 ATRIAL FIBRILLATION, CHRONIC (HCC): ICD-10-CM

## 2020-03-12 DIAGNOSIS — I25.10 CORONARY ARTERY DISEASE INVOLVING NATIVE CORONARY ARTERY OF NATIVE HEART WITHOUT ANGINA PECTORIS: ICD-10-CM

## 2020-03-12 DIAGNOSIS — I65.23 BILATERAL CAROTID ARTERY STENOSIS: ICD-10-CM

## 2020-03-12 DIAGNOSIS — E78.00 HYPERCHOLESTEREMIA: ICD-10-CM

## 2020-03-12 DIAGNOSIS — I50.32 DIASTOLIC CHF, CHRONIC (HCC): Primary | ICD-10-CM

## 2020-03-12 DIAGNOSIS — I50.32 CHRONIC DIASTOLIC CONGESTIVE HEART FAILURE (HCC): ICD-10-CM

## 2020-03-12 DIAGNOSIS — Z95.1 HX OF CABG: ICD-10-CM

## 2020-03-12 DIAGNOSIS — I10 ESSENTIAL HYPERTENSION: ICD-10-CM

## 2020-03-12 DIAGNOSIS — I73.9 PERIPHERAL VASCULAR DISEASE (HCC): ICD-10-CM

## 2020-03-12 RX ORDER — LEVOFLOXACIN 500 MG/1
TABLET, FILM COATED ORAL
COMMUNITY
Start: 2020-03-04 | End: 2020-01-01 | Stop reason: ALTCHOICE

## 2020-03-12 RX ORDER — TRIAMCINOLONE ACETONIDE 1 MG/G
CREAM TOPICAL
COMMUNITY
Start: 2020-03-10

## 2020-03-12 NOTE — PROGRESS NOTES
25 [facility]     1935       David Hills MD, [facility]  Date of Visit-3/12/2020   PCP is Chastity Carrillo MD   [facility] [place]  [facility] [address] [place] [address]  HPI:  25 [facility]. is a 81 y.o. male   · CHF systolic chronic   · RHC May 2, 2019 =PA 66/18 W 20 PA sat 55% CO 4.1 CI 2.12  · Echo May 1 ,2019 EF 55-60% mod LAE, Mild CASSIE, Mild AS BELLE 1.6 cm2 mild MR & TR, RV fx mildly reduced  · Leadless pacer 5-9-2019  · CAD with CABG off pump x3 3/2008   · Chronic atrial fibrillation   · CKD 4- Dr Yandel Duncan  · August 2019 pneumonia, renal edema  · Pulmonary HTN, restrictive lung disease  · PVD- PTA 2014  · Carotid artery disease     Seen 2/27. He seems to have peaked his weight at 170 in November but he is now in the 140's and therefore does not need OPIC anymore and I felt he could reduce his diuretic. At the last visit we cut his Bumex to 2 tablets BID. His weight has remained 141. His blood work prior to last visit on 2/19 showed a creatinine of 2.57 and a sodium of 130. His baseline creatinine runs around 2. Pt ambulates with a walker. Pt is on oxygen. Pt is present with his wife. Overall the pt states he is doing well. Pt went his PCP for wheezing recently and he was prescribed antibiotics. Pt's SOB has remains unchanged. He also mentioned his rash at his PCP's office and was given cream for it, which has helped the rash. Pt is going to physical therapy. Now on triamcinolone  Home bp run 591-079 systolic usually 577I    Denies chest pain, edema, syncope, has no tachycardia, palpitations or sense of arrhythmia. Assessment/Plan:    Patient Instructions   We will see you back in one month. 1. Diastolic CHF, chronic (HCC)  Continue Aldactone for CHF. Caution because of CKD but this dose of Bumex at 2 mg BID seems to be working well. He has made improvement with home health. Continue Coreg for CHF. Not on ACE due to CKD.    Wt Readings from Last 8 Encounters:   03/12/20 141 lb (64 kg)   02/27/20 141 lb (64 kg)   12/28/19 145 lb 1 oz (65.8 kg)   12/20/19 151 lb (68.5 kg)   12/17/19 146 lb 13.2 oz (66.6 kg)   12/10/19 164 lb (74.4 kg)   11/21/19 170 lb (77.1 kg)   10/10/19 158 lb (71.7 kg)     Key CAD CHF Meds             bumetanide (BUMEX) 1 mg tablet (Taking) Take 2 Tabs by mouth two (2) times a day. hydrALAZINE (APRESOLINE) 50 mg tablet (Taking) Take 25 mg by mouth two (2) times a day. spironolactone (ALDACTONE) 25 mg tablet (Taking) Take 1 Tab by mouth daily. isosorbide dinitrate (ISORDIL) 20 mg tablet (Taking) Take 1 Tab by mouth three (3) times daily. carvedilol (COREG) 12.5 mg tablet (Taking) Take 6.25 mg by mouth two (2) times daily (with meals). simvastatin (ZOCOR) 20 mg tablet (Taking) Take 1 Tab by mouth nightly. omega 3-dha-epa-fish oil (FISH OIL) 100-160-1,000 mg cap (Taking) Take 1 Cap by mouth two (2) times a day. 2. Coronary artery disease involving native coronary artery of native heart without angina pectoris  CABG 2008. No current angina. No plans for cath due to severe CKD and stable with no angina. 3. Atrial fibrillation, chronic  Has rate control and a leadless pacemaker  With his anemia he is at risk for decompensation and therefore we have stopped and there is no more  oral anticoagulation  Beta-blocker for rate control    4. CKD (chronic kidney disease) stage 4 (HCC)  Lab Results   Component Value Date/Time    Creatinine 2.57 (H) 02/19/2020 11:27 AM    now to stage 4    5. Peripheral vascular disease (HCC)  Multiple stents in the common femoral arteries.  If he is limited in activity then we should consider reimaging but therapy is limited due to the poor renal function    6. Hx of CABG  2008 healed midline incision    7. Bilateral carotid artery stenosis  Stable ,medical management    8.  Hypercholesteremia  Key Antihyperlipidemia Meds             simvastatin (ZOCOR) 20 mg tablet (Taking) Take 1 Tab by mouth nightly. omega 3-dha-epa-fish oil (FISH OIL) 100-160-1,000 mg cap (Taking) Take 1 Cap by mouth two (2) times a day. At goal , denies excess muscle aches or new liver issues    9. Essential hypertension  At goal , meds and possible side effects reviewed and patient denies     BP Readings from Last 6 Encounters:   03/12/20 125/61   02/27/20 134/65   02/19/20 152/66   12/28/19 153/71   12/20/19 140/70   12/17/19 148/74           Future Appointments   Date Time Provider Muna Delgado   4/30/2020  2:20 PM Lacy Jc MD cav ANAHI SCHED   6/1/2020  3:45 PM REMOTE1, [address]   8/24/2020  8:45 AM REMOTE1, [address]   10/29/2020 10:30 AM PACEMAKER3, [address]   10/29/2020 10:40 AM Yolis Fontaine  [address]      F/u in 1 month     Impression:   1. Diastolic CHF, chronic ([address]    2. Coronary artery disease involving native coronary artery of native heart without angina pectoris    3. Atrial fibrillation, chronic    4. CKD (chronic kidney disease) stage 3, GFR 30-59 ml/min ([facility])    5. Peripheral vascular disease ([address]    6. Hx of CABG    7. Bilateral carotid artery stenosis    8. Hypercholesteremia    9. Essential hypertension       Cardiac History:   CAD/CABG   off pump x3 3/2008 . =post op anemia and renal failure  CATH March 7, 2008=   LM -bifurcation dz 40% ;LAD 85% ; Circ ost 70% mid 70%   RCA proximal 60% tapering, EF 60%-70%, bilaterally patent renal arteries, mild atherosclerosis of the distal abdominal aorta.     [address] May 2, 2019 =PA 66/18 W 20 PA sat 55% CO 4.1 CI 2.12  Echo May 1 ,2019 EF 55-60% mod LAE, Mild CASSIE, Mild AS BELLE 1.6 cm2 mild MR & TR, RV fx mildly reduced  PVD-9/27/18- RLE- mid CRA 70, Pop 99-PTA on right pop, LLE LCFA 40%  - 1-19-17 PTA RCFA 90, JAS to RSFA   -12/22/16 PTA Left Pop, PTA Left tib-per  --3-13-14 PTA Left op PTA FA  ---1/25/11 stent [facility]   JOCELYN 8-21-17 Normal perfusion    Mild carotid artery disease 3-7-08 then 12 with 10-49% left, less than 10% on right    SOCIAL: Drinks no alcohol, quit smoking several years ago, worked as an . Lives with his wife and has four children. Enjoys gardening, fishing and music. FAMILY HISTORY: Mother  of cancer at 76, father  of Parkinson's at 70, one brother  of cancer of the liver at 76 and one  of an infection at 64. ROS-except as noted above. . A complete cardiac and respiratory are reviewed and negative except as above ; Resp-denies wheezing  or productive cough,. Const- No unusual weight loss or fever; Neuro-no recent seizure or CVA ; GI- No BRBPR, abdom pain, bloating ; - no  hematuria   see supplement sheet, initialed and to be scanned by staff  Past Medical History:   Diagnosis Date    CAD (coronary artery disease)     s/p CABG     [facility]     Chronic obstructive pulmonary disease ([facility]     states he is on 2L at home day and night    CKD (chronic kidney disease) stage 3, GFR 30-59 ml/min ([address] 10/21/2017    hypertension and DM nephrosclerosis    Diabetes mellitus, type 2 ([address]     Diverticulitis     Hypercholesteremia     Hypertension     Pacemaker     Paroxysmal atrial fibrillation ([address] 10/21/2017    new onset afib with BRPR 10-19-17 admit    PVC's (premature ventricular contractions) 2014    PVD (peripheral vascular disease) ([facility]       Social Hx= reports that he quit smoking about 35 years ago. His smoking use included cigarettes. He has a 60.00 pack-year smoking history. He has never used smokeless tobacco. He reports that he does not drink alcohol or use drugs. Exam and Labs:  /61 (BP 1 Location: Left arm, BP Patient Position: Sitting)   Pulse 88   Resp 16   Ht 5' 9\" (1.753 m)   Wt 141 lb (64 kg)   SpO2 94%   BMI 20.82 kg/m² Constitutional:  NAD, comfortable  Head: NC,AT. Eyes: No scleral icterus. Neck:  Neck supple. No JVD present. Throat: moist mucous membranes. Chest: Effort normal & normal respiratory excursion . Neurological: alert, conversant and oriented . Skin: Skin is not cold. No obvious systemic rash noted. Not diaphoretic. No erythema. Psychiatric:  Grossly normal mood and affect. Behavior appears normal. Extremities:  no clubbing or cyanosis. Abdomen: non distended    Lungs:breath sounds normal. No stridor. distress, wheezes or  Rales. Heart: normal rate, regular rhythm, normal S1, S2, no murmurs, rubs, clicks or gallops , PMI non displaced. Edema: Edema is none. No results found for: CHOL, CHOLX, CHLST, CHOLV, HDL, HDLP, LDL, LDLC, DLDLP, TGLX, TRIGL, TRIGP, CHHD, CHHDX  Lab Results   Component Value Date/Time    Sodium 130 (L) 02/19/2020 11:27 AM    Potassium 3.8 02/19/2020 11:27 AM    Chloride 97 02/19/2020 11:27 AM    CO2 28 02/19/2020 11:27 AM    Anion gap 5 02/19/2020 11:27 AM    Glucose 212 (H) 02/19/2020 11:27 AM    BUN 53 (H) 02/19/2020 11:27 AM    Creatinine 2.57 (H) 02/19/2020 11:27 AM    BUN/Creatinine ratio 21 (H) 02/19/2020 11:27 AM    GFR est AA 29 (L) 02/19/2020 11:27 AM    GFR est non-AA 24 (L) 02/19/2020 11:27 AM    Calcium 8.4 (L) 02/19/2020 11:27 AM      Wt Readings from Last 3 Encounters:   03/12/20 141 lb (64 kg)   02/27/20 141 lb (64 kg)   12/28/19 145 lb 1 oz (65.8 kg)      BP Readings from Last 3 Encounters:   03/12/20 125/61   02/27/20 134/65   02/19/20 152/66      Current Outpatient Medications   Medication Sig    triamcinolone acetonide (KENALOG) 0.1 % topical cream     levoFLOXacin (LEVAQUIN) 500 mg tablet TAKE 1 TABLET BY MOUTH ONCE DAILY FOR 10 DAYS    cyanocobalamin 1,000 mcg tablet Take 1,000 mcg by mouth daily.  bumetanide (BUMEX) 1 mg tablet Take 2 Tabs by mouth two (2) times a day.  hydrALAZINE (APRESOLINE) 50 mg tablet Take 25 mg by mouth two (2) times a day.  insulin glargine (LANTUS U-100 INSULIN) 100 unit/mL injection 3-7 Units by SubCUTAneous route nightly.  Pt reports he uses 3-7 units depending on BG    spironolactone (ALDACTONE) 25 mg tablet Take 1 Tab by mouth daily.  magnesium oxide (MAG-OX) 400 mg tablet Take 1 Tab by mouth daily.  isosorbide dinitrate (ISORDIL) 20 mg tablet Take 1 Tab by mouth three (3) times daily.  carvedilol (COREG) 12.5 mg tablet Take 6.25 mg by mouth two (2) times daily (with meals).  simvastatin (ZOCOR) 20 mg tablet Take 1 Tab by mouth nightly.  albuterol (PROVENTIL VENTOLIN) 2.5 mg /3 mL (0.083 %) nebulizer solution 3 mL by Nebulization route every four (4) hours as needed for Wheezing.  tamsulosin (FLOMAX) 0.4 mg capsule Take 1 Cap by mouth daily.  omega 3-dha-epa-fish oil (FISH OIL) 100-160-1,000 mg cap Take 1 Cap by mouth two (2) times a day. No current facility-administered medications for this visit. Impression see above.       Written by Karina Loomis, as dictated by Afsaneh Verduzco MD.

## 2020-03-12 NOTE — Clinical Note
3/12/20 Patient: Gilberto Mullen. YOB: 1935 Date of Visit: 3/12/2020 Leonora Ross MD 
[address] [address] VIA Facsimile: [phone] Dear Leonora Ross MD, Thank you for referring Mr. Ashish Chang to [facility] for evaluation. My notes for this consultation are attached. If you have questions, please do not hesitate to call me. I look forward to following your patient along with you.  
 
 
Sincerely, 
 
Parminder Krause MD

## 2020-03-15 PROBLEM — I50.42 CHRONIC COMBINED SYSTOLIC AND DIASTOLIC CONGESTIVE HEART FAILURE (HCC): Status: ACTIVE | Noted: 2019-05-01

## 2020-03-15 PROBLEM — N18.4 CKD (CHRONIC KIDNEY DISEASE) STAGE 4, GFR 15-29 ML/MIN (HCC): Status: ACTIVE | Noted: 2017-10-21

## 2020-03-15 PROBLEM — I50.9 CHF EXACERBATION (HCC): Status: RESOLVED | Noted: 2019-12-03 | Resolved: 2020-03-15

## 2020-03-15 PROBLEM — I50.22 CHRONIC SYSTOLIC CONGESTIVE HEART FAILURE (HCC): Status: ACTIVE | Noted: 2019-05-01

## 2020-03-15 PROBLEM — N17.9 AKI (ACUTE KIDNEY INJURY) (HCC): Status: RESOLVED | Noted: 2017-06-25 | Resolved: 2020-03-15

## 2020-03-15 PROBLEM — I50.32 CHRONIC DIASTOLIC CONGESTIVE HEART FAILURE (HCC): Status: ACTIVE | Noted: 2019-05-01

## 2020-03-15 PROBLEM — K62.5 RECTAL BLEEDING: Status: RESOLVED | Noted: 2019-12-17 | Resolved: 2020-03-15

## 2020-03-15 PROBLEM — E11.65 HYPERGLYCEMIA DUE TO TYPE 2 DIABETES MELLITUS (HCC): Status: RESOLVED | Noted: 2017-10-20 | Resolved: 2020-03-15

## 2020-03-15 PROBLEM — R77.8 ELEVATED TROPONIN: Status: RESOLVED | Noted: 2017-06-25 | Resolved: 2020-03-15

## 2020-04-30 NOTE — PROGRESS NOTES
Visit Vitals /69 (BP 1 Location: Left arm, BP Patient Position: Sitting) Pulse 67 Ht 5' 9\" (1.753 m) Wt 142 lb (64.4 kg) BMI 20.97 kg/m²

## 2020-04-30 NOTE — PROGRESS NOTES
TELEPHONE VISIT DOCUMENTATION 4/30/2020 Enma Morales. 1935  84 y.o.male evaluated via telephone on 4/30/2020 due to COVID 19 restrictions. Patient unable to participate in Virtual Visit with synchronous audio/visual technology. Tele fu of CHF systolic, seen in early March in office Denies chest pain, edema, syncope or shortness of breath at rest  
Has no tachycardia , palpitations or sense of arrythmia Review of Systems Constitutional: Negative for chills, fever and weight loss. Respiratory: Negative for cough, shortness of breath and wheezing. Cardiovascular: Negative for chest pain and leg swelling. Gastrointestinal: Negative for blood in stool. Genitourinary: Positive for dysuria. Negative for hematuria. Musculoskeletal: Negative for back pain and joint pain. Endo/Heme/Allergies: Does not bruise/bleed easily. in house therapy helpful Before meds vitals 4-17 152/72 73  139# 
4-21 130/60 62    #141 
[id number] 80     #138 
4-28 128/62 66   #141 
12/03/19 ECHO ADULT FOLLOW-UP OR LIMITED 12/06/2019 12/6/2019 Narrative · Right Ventricle: Severely dilated right ventricle. · Left Ventricle: Normal cavity size and systolic function (ejection  
fraction normal). Mild concentric hypertrophy. Estimated left ventricular  
ejection fraction is 66 - 70%. Visually measured ejection fraction. No  
regional wall motion abnormality noted. · Left Atrium: Moderately dilated left atrium. · Aortic Valve: Mild aortic valve sclerosis with no significant stenosis. · Mitral Valve: Mitral valve thickening. · Interatrial Septum: Color flow Doppler was used. Signed by: Jamal Saunders MD  
  
· CHF systolic chronic  
· RHC May 2, 2019 =PA 66/18 W 20 PA sat 55% CO 4.1 CI 2.12 
· Echo May 1 ,2019 EF 55-60% mod LAE, Mild CASSIE, Mild AS BELLE 1.6 cm2 mild MR & TR, RV fx mildly reduced · Leadless pacer 5-9-2019 · CAD with CABG off pump x3 3/2008  
· Chronic atrial fibrillation  
 · CKD 4- Dr Ryder Urena 
· 2019 pneumonia, renal edema · Pulmonary HTN, restrictive lung disease · PVD- PTA  · Carotid artery disease  
doingwell with CHF compensated, taking meds as outlined Stable weights and bp No reported edema Not requiring OPIC anymore Angina free Continue current meds telep visit in 6 weeks, face to face later in summer Labs with Renal and us are followed and reviewed 1. Diastolic CHF, chronic (HCC) 2. Coronary artery disease involving native coronary artery of native heart without angina pectoris 3. Atrial fibrillation, chronic 4. CKD (chronic kidney disease) stage 4, GFR 15-29 ml/min ([facility]) 5. Hypercholesteremia 6. Essential hypertension CAD/CABG  
off pump x3 3/2008 . =post op anemia and renal failure CATH 2008= LM -bifurcation dz 40% ;LAD 85% ; Circ ost 70% mid 70% RCA proximal 60% tapering, EF 60%-70%, bilaterally patent renal arteries, mild atherosclerosis of the distal abdominal aorta. [address] May 2, 2019 =PA 66/18 W 20 PA sat 55% CO 4.1 CI 2.12 Echo May 1 ,2019 EF 55-60% mod LAE, Mild CASSIE, Mild AS BELLE 1.6 cm2 mild MR & TR, RV fx mildly reduced PVD-18- RLE- mid CRA 70, Pop 99-PTA on right pop, LLE LCFA 40% - 17 PTA RCFA 90, JAS to RSFA  
-16 PTA Left Pop, PTA Left tib-per 
--3-13-14 PTA Left op PTA RSFA 
---11 stent RSFA  
JOCELYN 17 Normal perfusion Mild carotid artery disease 3-7-08 then 12 with 10-49% left, less than 10% on right SOCIAL: Drinks no alcohol, quit smoking several years ago, worked as an . Lives with his wife and has four children. Enjoys gardening, fishing and music. FAMILY HISTORY: Mother  of cancer at 76, father  of Parkinson's at 70, one brother  of cancer of the liver at 76 and one  of an infection at 64. Past Medical History:  
Diagnosis Date  KATALINA (acute kidney injury) ([address] 2017  CAD (coronary artery disease) s/p CABG 2008  Carotid arterial disease ([facility]  Chronic obstructive pulmonary disease ([facility] states he is on 2L at home day and night  CKD (chronic kidney disease) stage 3, GFR 30-59 ml/min ([facility]) 10/21/2017  
 hypertension and DM nephrosclerosis  Diabetes mellitus, type 2 ([facility]  Diverticulitis  Hypercholesteremia  Hypertension  Pacemaker  Paroxysmal atrial fibrillation ([facility] 10/21/2017  
 new onset afib with BRPR 10-19-17 admit  PVC's (premature ventricular contractions) 5/26/2014  PVD (peripheral vascular disease) ([facility]  Rectal bleeding 12/17/2019  
  reports that he quit smoking about 35 years ago. His smoking use included cigarettes. He has a 60.00 pack-year smoking history. He has never used smokeless tobacco. He reports that he does not drink alcohol or use drugs. family history is not on file. Current Outpatient Medications Medication Sig  
 triamcinolone acetonide (KENALOG) 0.1 % topical cream   
 cyanocobalamin 1,000 mcg tablet Take 1,000 mcg by mouth daily.  bumetanide (BUMEX) 1 mg tablet Take 2 Tabs by mouth two (2) times a day.  hydrALAZINE (APRESOLINE) 50 mg tablet Take 25 mg by mouth two (2) times a day.  insulin glargine (LANTUS U-100 INSULIN) 100 unit/mL injection 3-7 Units by SubCUTAneous route nightly. Pt reports he uses 3-7 units depending on BG  
 spironolactone (ALDACTONE) 25 mg tablet Take 1 Tab by mouth daily.  magnesium oxide (MAG-OX) 400 mg tablet Take 1 Tab by mouth daily.  isosorbide dinitrate (ISORDIL) 20 mg tablet Take 1 Tab by mouth three (3) times daily.  carvedilol (COREG) 12.5 mg tablet Take 6.25 mg by mouth two (2) times daily (with meals).  simvastatin (ZOCOR) 20 mg tablet Take 1 Tab by mouth nightly.  albuterol (PROVENTIL VENTOLIN) 2.5 mg /3 mL (0.083 %) nebulizer solution 3 mL by Nebulization route every four (4) hours as needed for Wheezing.  tamsulosin (FLOMAX) 0.4 mg capsule Take 1 Cap by mouth daily.  omega 3-dha-epa-fish oil (FISH OIL) 100-160-1,000 mg cap Take 1 Cap by mouth two (2) times a day. No current facility-administered medications for this visit. Allergies Allergen Reactions  Lisinopril Cough Consent: 
 Yessi Lut and/or health care decision maker is aware that that  may receive a bill for this telephone service, depending on 's insurance coverage, and has provided verbal consent to proceed: Yes Documentation: I communicated with the patient and/or health care decision maker about as above Details of this discussion including any medical advice provided: as above I affirm this is a Patient Initiated Episode with an Established Patient who has not had a related appointment within my department in the past 7 days or scheduled within the next 24 hours. Patient was made aware and verbalized understanding that an appointment will be scheduled for them for a virtual visit and/or office visit within the above time frame. Patient understanding his/her responsibility to call and change time/date if he/she so chooses. Note: not billable if this call serves to triage the patient into an appointment for the relevant concern Total Time: minutes: 11-20 minutes

## 2020-05-12 NOTE — PROGRESS NOTES
Patient has graduated from the Bundle program on 5/10/20. Patient/family has the ability to self-manage at this time Care management goals have been completed. Patient was not referred to the [place] team for further management. Goals Addressed This Visit's Progress Patient Stated  COMPLETED: Complete new ACP to update (pt-stated) 01/27/20 CTN received call from patient's wife asking about updating ACP since the phone numbers are outdated on the one on file. CTN will mail blank forms to have wife fill out and return when she brings patient in to see Dr Srinivasa Naylor. Deisy Bruce will be available to assist if needed---mkrw Other  COMPLETED: Attends follow-up appointments as directed. 12/11/19 CTN unable to reach patient on phone, LM on  requesting return call Patient has following appts: PCP Dr Anika Sommer   12/18/19 at 11:00 this was changed by wife due to conflicting appts. Cards Dr Srinivasa Naylor  12/20/19 at 2:00 Nephrology Dr Corrina Merlin   Pending---recommended 1 week follow up. Per spouse, she has contacted MD office and is waiting for Dr Hilda Azul nurse to call her back. [facility]---no appt in [facility] yet. CTN notes that both cards and nephrology IP notes state patient is to have OP IV diruetic therapy. CTN sent SM to Laya Figueroa, Dr Raymon Redmond nurse to inquire about orders for this. ---mkrrj 12/13/19 Per Mrs Mckenna Beltran, no one has called her back yet about a follow up appt with Dr Vanessa Terrell. CTN called MD office, spoke to Linda West. She states that they are waiting for MD to review records and will call Mrs Mckenna Beltran back no later than Monday 12/16 with follow up appt. CTN notified Mrs Mckenna Beltran of this. CTN will follow up. CTN had not received response yet regarding [facility] IV diruetics yet---resent SM to Dr Srinivasa Naylor and nurse [place]. Will follow up on Monday if no response received---mkrw 12/18/19 CTN received a SM from both MD and Quinton on Monday to state that order was going to be put in to [facility] for Bumex. Patient however admitted back into hospital for GI Bleed. Patient discharged yesterday back to home. CTN sent SM to [facility] to let her know patient is back home. CTN spoke to Mrs Rambo Davila to check on patient and review discharge instructions. She reports she picked up medication today and is stopping ASA. [facility] nurse has already been to pt's home today to resume services. Wife reports patient will be getting a [facility] aide to assist pt with bathing. Per wife, Patient will see Dr Luzma May tomorrow. Patient has follow up with PCP Dr Chey Sesay 12/23/19. CTN made follow up with Dr Carlos Medel for 1/20/20 at 1:15pm. Wife informed. CTN will follow up next week for updates---mkrw 12/30/19- Patients wife reports [facility] nurse was already out yesterday to resume services and the therapist is in today to work with the patient. She will also receive a call from them this week to hear when the [facility] aide will be coming in to assist him with bathing. Patient has follow up with PCP Dr Kathy Mayorga on 1/6/20. Follow up with Dr Shravan Brice on 1/20/20 and wife called the office to inquire as to if this needed to be moved up post hospital discharge and was told to keep this appt. - Discussed with wife to revisit with GI at this appt per d/c instructions having an O/P RUQ US scheduled for elevated liver enzymes Patients wife is awaiting return call from Dr Reba Hall office for an appt. - LN 
 
01/09/20 Wife reports they saw PCP on 1/6/20. She states they have appt with Dr Celso Hong, pulmonary on 1/14/20. GI Dr Lauro Lazcano  appt is 1/20/20. Dr Luzma May appt is on 2/4/20. CTN called [facility] at [facility] to find out why patient had not been scheduled yet for OP bumex. Per . rafael, she reports they attempted to contact patient but was unable to reach or LM due to VM being full. She states at this time she needs a new order before they can schedule patient.  CTN contacted Quinton at Dr José Miguel Coburn will get new order for Bumex and labs. CTN notified spouse and gave her [facility] [address] phone number [phone] to call if she does not hear from [facility] [address] by Tuesday. Wife verbalizes understanding.----mkrw 01/22/20 CTN reviewed patient's chart to see if [facility] had reached out to pt yet for IV Bumex, CTN does not see patient called or scheduled. Sent SM to Dr Winnie Nash nurse to let her know. Received response back that order had been faxed again on 1/21/20 to [facility] [address]. CTN will follow up. --- mkrw  COMPLETED: Knowledge and adherence medication (ie. action, side effects, missed dose, etc.)     
  01/14/20 Mrs Gibson Boswell reached out to CTN to inform CTN that pt went to pulmonology office today--his BP was 82/40 HR 68 while at office. Dr Vinny Hayes instructed pt/wife to hold next 2 doses of BP meds and to consult with cardiology for advice. BP has improved since pt came home--is 120/84 and 122/61---pt is working with [facility] therapist at time of wife's call. CTN attempted to contact nurse on phone, no answer. CTN sent SM to Dr Blaine Alvarenga and Quinton to inform them of pt concern. CTN will follow up later today. ---mkrw UPDATE: 
CTN spoke to Quinton at Dr Donny Kirkpatrick office. She instructed CTN to tell wife to hold clonidine and hydralazine tonight and to give all other BP meds as normal. Check BP in the morning, if low call MD office for advice. If normal, give medication as usual. Wife verbalizes understanding---mkrw  COMPLETED: Maintains daily weight. 12/11/19 CTN notes patient weight on 12/3/19 (admit date) 181 lbs. Weight at discharge 164 lbs. CTN will need to get home scale weight when able to reach patient and review daily weight parameters with patient/spouse. ---mkrw UPDATE: Per spouse, patient weight today on home scale 158 lbs. CTN reviewed when to call MD for weights greater than 2-3 lbs overnight or 5 lbs in a week.  Per wife, she reports that [facility] nurse has been contacting MD's (both cards and nephrology) for weight increases, would receive instructions but patient still gained weight. Recommended that patient monitor sodium intake more closely to kep to 1500--2000 mg. Wife verbalizes understanding ---mkrw 12/13/19' Per Mrs Lelo Neely, patient's weight yesterday and today was 153 lbs, decrease of 5 lbs from Wednesday. ---mkrw 12/18/19 CTN notes last hospital weight 12/17/19 146lbs. ---mkrw 12/30/19- Patients wife reports patient is weighing daily. Yesterdays weight was 140 and today's weight was also 140 lbs. BP is also being checked daily and was 164/64 yesterday and today 152/72. LN 
 
01/09/20 Patient weight 138lb today. Wife states she took patient to see PCP last Monday and he did not mention anything about it. CTN called Dr Tiago Bazzi office,  for his nurse Georgiana to ask if they have a dietician that could call patient/wife to discuss maintaining weight. Will follow up and offer other resources if needed. Per patient's wife , patient has no edema and states [place] nurse checks his lungs at every visit--told her yesterday that he was clear. ---mkrw 01/27/20 Per spouse, pt continues to weigh daily. Today's weight 136lbs--stable. Denies SOB, edema, coughing. [facility] nurse to come tomorrow to listen to lungs per wife. CTN will follow up next week---mkrw  COMPLETED: Supportive resources in place to maintain patient in the community (ie. Home Health, DME equipment, refer to, medication assistant plan, etc.)     
  12/11/19 CTN contacted [facility], spoke to Jarod [facility] to inquire about which services were ordered by  CM and when to expect resumption of care. Jarod [facility] reports that patient will received PT/OT/SN and will set up to start tomorrow 12/12/19. CTN will follow up with patient, was unable to reach on phone today---mkrw 01/09/20 Per spouse, [facility] has [facility] in for SN, PT, OT and aide to assist with bathing. Palliative Care Discussion: CTN spoke to patient's wife about palliative medicine and goals of palliative care to see if patient wanted to consider this. Per wife, patient and herself still want to attend appointments for active care and participate in [facility] therapies. CTN asked her if patient still wanted to pursue dialysis if needed---she states that patient really doesn't want to however at the last renal appointment Dr Doreen Tompkins told them pt's kidney's were stable. CTN discussed with wife that patient still needs to go to [facility] for bumex because kidneys were not working well enough to keep patient from going into CHF and it would be only a matter of time that they would have to make a decision. Wife states that they will \"cross that bridge when they get there\". CTN asked wife if they had thought about what they would do if she got ill and couldn't take care of him---she replied that she refused to have a knee replacement performed yesterday at the ortho MD since she had to care for Samuel Delgado. She states that at this time she is trying to encourage patient to do more for himself, patient is stubborn, and for now she wanted to continue current care path. CTN informed wife that if patient decides he is tired of going into the hospital or going ot all of these appointments to call. Wife verbalizes understanding. ---xena 01/10/20 NAEL concerned about patient losing weight---per spouse patient had had a dietary educator come to home back in Oct/Nov last year from nephrology. CTN contacted Georgiana at Dr lambert's office. She is unaware of who may have made a home visit but states she will put in a nutritional consult for patient to see someone in the office. CTN notified patient wife of this---gave her another resource to try: [employer] [phone] Ext [phone] Dial a Dietician. CTN will follow up at next call. ---xena Patient has Care Transition Nurse's contact information for any further questions, concerns, or needs. Patients upcoming visits:   
Future Appointments Date Time Provider Muna Delgado 6/1/2020  3:45 PM REMOTE1, [facility] SCHED  
6/18/2020  9:00 AM Cindy Garcia  [address]  
8/24/2020  8:45 AM REMOTE1, [address]  
10/29/2020 10:30 AM [email or web address], [facility] SCHED  
10/29/2020 10:40 AM Daljit Awad  [address]

## 2020-06-18 NOTE — PROGRESS NOTES
TELEPHONE VISIT DOCUMENTATION 6/18/2020 Karsten Cedeño. 1935  84 y.o.male evaluated via telephone on 6/18/2020 due to COVID 19 restrictions. Patient unable to participate in Virtual Visit with synchronous audio/visual technology. Fu CAD, diastolic CHF, RV failure, chronic afib Doing well No interim changes Had some \"toe\" edema now gone, bumex reduced to 2 mg once a day from BID by Dr Shyann Blanton Has no chest pain, SOB at rest, syncope or palps/tachy They are very pleased with [facility], therapy very helpful to him , able to walk to mailbox , about a block now with less AGUILAR Vitals last 4 days 143/74  74  139# 
145/65  65  138# 
117/66  75  139.2# 
123/71   61   139.4# Temp 96 Pacer check lat in Feb- no download  , tried calling the number , have Jose Antonio Gregg call him tomorrow No fevers, staying in mostly, doing proper social distancing. 13 minutes Compensated CHF NYHA 2 now, reduced bumex working fine Continue aldactone, Bidil generics, Coreg Stable CAD, 12 years out from CABG 
simva for secondary prevention XOL No OAC due to GI bleeds, rate is fine, pacer check needed No claudication of late, has prior PTA/stents 
bp excellent Fu mid August with tele again, anticipate October echo and OV Have pacer nurse call 1. Diastolic CHF, chronic ([facility]) 2. Coronary artery disease involving native coronary artery of native heart without angina pectoris 3. Atrial fibrillation, chronic ([facility] 4. CKD (chronic kidney disease) stage 4, GFR 15-29 ml/min ([facility]) 5. Hypercholesteremia 6. Essential hypertension 7. Peripheral vascular disease ([facility] 8. Hx of CABG 9. Bilateral carotid artery stenosis 10. Cardiac pacemaker in situ CAD/CABG  
off pump x3 3/2008 . =post op anemia and renal failure CATH March 7, 2008= LM -bifurcation dz 40% ;LAD 85% ; Circ ost 70% mid 70% RCA proximal 60% tapering, EF 60%-70%, bilaterally patent renal arteries, mild atherosclerosis of the distal abdominal aorta. [address] May 2, 2019 =PA 66/18 W 20 PA sat 55% CO 4.1 CI 2.12 Echo May 1 ,2019 EF 55-60% mod LAE, Mild CASSIE, Mild AS BELLE 1.6 cm2 mild MR & TR, RV fx mildly reduced PVD-18- RLE- mid CRA 70, Pop 99-PTA on right pop, LLE LCFA 40% - 17 PTA RCFA 90, JAS to RSFA  
-16 PTA Left Pop, PTA Left tib-per 
--3-13-14 PTA Left op PTA RSFA 
---11 stent RSFA  
JOCELYN 17 Normal perfusion Mild carotid artery disease 3-7-08 then 12 with 10-49% left, less than 10% on right SOCIAL: Drinks no alcohol, quit smoking several years ago, worked as an . Lives with his wife and has four children. Enjoys gardening, fishing and music. FAMILY HISTORY: Mother  of cancer at 76, father  of Parkinson's at 70, one brother  of cancer of the liver at 76 and one  of an infection at 64. Past Medical History:  
Diagnosis Date  KATALINA (acute kidney injury) ([facility] [address] 2017  CAD (coronary artery disease) s/p CABG   Carotid arterial disease ([facility] [address] [facility]  Chronic obstructive pulmonary disease ([facility] [address] [facility] states he is on 2L at home day and night  CKD (chronic kidney disease) stage 3, GFR 30-59 ml/min ([facility]) 10/21/2017  
 hypertension and DM nephrosclerosis  Diabetes mellitus, type 2 ([address]  Diverticulitis  Hypercholesteremia  Hypertension  Pacemaker  Paroxysmal atrial fibrillation ([address] 10/21/2017  
 new onset afib with BRPR 10-19-17 admit  PVC's (premature ventricular contractions) 2014  PVD (peripheral vascular disease) ([facility] [address]  Rectal bleeding 2019  
 
 reports that he quit smoking about 35 years ago. His smoking use included cigarettes. He has a 60.00 pack-year smoking history. He has never used smokeless tobacco. He reports that he does not drink alcohol or use drugs. family history is not on file. Current Outpatient Medications Medication Sig  
  triamcinolone acetonide (KENALOG) 0.1 % topical cream   
 cyanocobalamin 1,000 mcg tablet Take 1,000 mcg by mouth daily.  bumetanide (BUMEX) 1 mg tablet Take 2 Tabs by mouth two (2) times a day.  hydrALAZINE (APRESOLINE) 50 mg tablet Take 25 mg by mouth two (2) times a day.  insulin glargine (LANTUS U-100 INSULIN) 100 unit/mL injection 3-7 Units by SubCUTAneous route nightly. Pt reports he uses 3-7 units depending on BG  
 spironolactone (ALDACTONE) 25 mg tablet Take 1 Tab by mouth daily.  magnesium oxide (MAG-OX) 400 mg tablet Take 1 Tab by mouth daily.  isosorbide dinitrate (ISORDIL) 20 mg tablet Take 1 Tab by mouth three (3) times daily.  carvedilol (COREG) 12.5 mg tablet Take 6.25 mg by mouth two (2) times daily (with meals).  simvastatin (ZOCOR) 20 mg tablet Take 1 Tab by mouth nightly.  albuterol (PROVENTIL VENTOLIN) 2.5 mg /3 mL (0.083 %) nebulizer solution 3 mL by Nebulization route every four (4) hours as needed for Wheezing.  tamsulosin (FLOMAX) 0.4 mg capsule Take 1 Cap by mouth daily.  omega 3-dha-epa-fish oil (FISH OIL) 100-160-1,000 mg cap Take 1 Cap by mouth two (2) times a day. No current facility-administered medications for this visit. Allergies Allergen Reactions  Lisinopril Cough Consent: 
 Jame Baumaner and/or health care decision maker is aware that that  may receive a bill for this telephone service, depending on 's insurance coverage, and has provided verbal consent to proceed: Yes Documentation: I communicated with the patient and/or health care decision maker about as above Details of this discussion including any medical advice provided: as above I affirm this is a Patient Initiated Episode with an Established Patient who has not had a related appointment within my department in the past 7 days or scheduled within the next 24 hours.  
Patient was made aware and verbalized understanding that an appointment will be scheduled for them for a virtual visit and/or office visit within the above time frame. Patient understanding his/her responsibility to call and change time/date if he/she so chooses. Note: not billable if this call serves to triage the patient into an appointment for the relevant concern Total Time: minutes: 11-20 minutes

## 2020-07-01 NOTE — TELEPHONE ENCOUNTER
Called and spoke with pt's wife. She said she is having problems sending a remote transmission. I said he has an in clinic appt on 10/13 and it was fine to wait until then if the pt was feeling OK. She said he was and she wants to just wait and have his pacemaker checked in October. I told her I would mail her the appt so she has it. She verbalized understanding and had no further questions.

## 2020-07-04 NOTE — PROGRESS NOTES
7/3/2020 record review notes from [facility] seen 6/10/2020 Telephone encounter creatinine elevated weight 138 creatinine was 3.27 on 6/3/2020 hemoglobin 10.5 on 6/3/2020 creatinine 3.27 potassium 4.3 Lipids 11/15/2019 triglycerides 57 LDL 41 HDL 47 total cholesterol 99 records from Dr. Crispin Garcia

## 2020-08-25 NOTE — PROGRESS NOTES
TELEPHONE VISIT DOCUMENTATION 8/25/2020 Rayshawn Duong. 1935  84 y.o.male evaluated via telephone on 8/25/2020 due to COVID 19 restrictions. Patient unable to participate in Virtual Visit with synchronous audio/visual technology. CAD, diastolic CHF, RV failure, chronic afib 3 pills he takes a night give him reflux 
hydralzine , isorbide and simvastatin- takes them about 10 PM , feels reflux 30 minutes  
tstart taking 90 minutes No cp Mild sob less  
bumex 2 mg once a day, controlling and has no lower extremity edema   
blood pressure high when off pills Coreg is half tab bid 
bumex is confirm 2 mg once a day as 1 mg tabs 
bp 
155/71 78 
144/68 70 
154/77 67 
142/69 68 This is with him off the pills No dizzy when standing Seeing Dr Carmen nAne next week Had bloodwork , creatinine For pacer has appt with October OV Wants to get more OT- for walking , upper resp infection, -used Medefy 12/03/19 ECHO ADULT FOLLOW-UP OR LIMITED 12/06/2019 12/6/2019 Narrative · Right Ventricle: Severely dilated right ventricle. · Left Ventricle: Normal cavity size and systolic function (ejection  
fraction normal). Mild concentric hypertrophy. Estimated left ventricular  
ejection fraction is 66 - 70%. Visually measured ejection fraction. No  
regional wall motion abnormality noted. · Left Atrium: Moderately dilated left atrium. · Aortic Valve: Mild aortic valve sclerosis with no significant stenosis. · Mitral Valve: Mitral valve thickening. · Interatrial Septum: Color flow Doppler was used. Signed by: Tae Garcia MD  
·  CHF systolic chronic  
· RHC May 2, 2019 =PA 66/18 W 20 PA sat 55% CO 4.1 CI 2.12 
· Echo May 1 ,2019 EF 55-60% mod LAE, Mild CASSIE, Mild AS BELLE 1.6 cm2 mild MR & TR, RV fx mildly reduced · Leadless pacer 5-9-2019 · CAD with CABG off pump x3 3/2008  
· Chronic atrial fibrillation  
· CKD 4- Dr Donal Renteria 
· August 2019 pneumonia, renal edema · Pulmonary HTN, restrictive lung disease · PVD- PTA 2014 Carotid artery disease  
 
CAD, CABG 1. Diastolic CHF, chronic (HCC) *Compensated F NYHA II CHF NYHA II 
 
2. Coronary artery disease involving native coronary artery of native heart without angina pectoris CABG 12 years ago 3. Atrial fibrillation, chronic ([address] No active GI bleed rate is fine pacer check follow-up 4. CKD (chronic kidney disease) stage 4, GFR 15-29 ml/min ([facility]) Lab Results Component Value Date/Time Creatinine 2.57 (H) 02/19/2020 11:27 AM  
  
 
5. Hypercholesteremia Simvastatin for secondary prevention 6. Essential hypertension Stable 7. Bilateral carotid artery stenosis See prior studies 8. Hx of CABG Add Echo when comes in for OV with Dr Esperanza Tavares on 10- for chronic diastolic CHF Telephone visit in 3 months Change bumex in chart to 1 mg 2 tabs once a day No diagnosis found. Future Appointments Date Time Provider Muna Delgado 10/13/2020 10:30 AM PACEMAKER3, COSME BERNAL AMB  
10/13/2020 10:40 AM MD KARRIE Almeida  AMB  
   
  
 
 
 
CAD/CABG  
off pump x3 3/2008 . =post op anemia and renal failure CATH March 7, 2008= LM -bifurcation dz 40% ;LAD 85% ; Circ ost 70% mid 70% RCA proximal 60% tapering, EF 60%-70%, bilaterally patent renal arteries, mild atherosclerosis of the distal abdominal aorta. [address] May 2, 2019 =PA 66/18 W 20 PA sat 55% CO 4.1 CI 2.12 Echo May 1 ,2019 EF 55-60% mod LAE, Mild CASSIE, Mild AS BELLE 1.6 cm2 mild MR & TR, RV fx mildly reduced PVD-9/27/18- RLE- mid CRA 70, Pop 99-PTA on right pop, LLE LCFA 40% - 1-19-17 PTA RCFA 90, JAS to RSFA  
-12/22/16 PTA Left Pop, PTA Left tib-per 
--3-13-14 PTA Left op PTA RSFA 
---1/25/11 stent RSFA  
JOCELYN 8-21-17 Normal perfusion Mild carotid artery disease 3-7-08 then 1-4-12 with 10-49% left, less than 10% on right SOCIAL: Drinks no alcohol, quit smoking several years ago, worked as an . Lives with his wife and has four children. Enjoys gardening, fishing and music. FAMILY HISTORY: Mother  of cancer at 76, father  of Parkinson's at 70, one brother  of cancer of the liver at 76 and one  of an infection at 64. Past Medical History:  
Diagnosis Date  KATALINA (acute kidney injury) ([facility] 2017  CAD (coronary artery disease) s/p CABG 2008  Carotid arterial disease ([facility]  Chronic obstructive pulmonary disease ([facility] states he is on 2L at home day and night  CKD (chronic kidney disease) stage 4, GFR 15-29 ml/min ([facility]) 10/21/2017  
 hypertension and DM nephrosclerosis cr 3.2020 Dr Martha Rojas  Diabetes mellitus, type 2 ([facility]  Diverticulitis  Hypercholesteremia  Hypertension  Pacemaker  Paroxysmal atrial fibrillation ([facility] 10/21/2017  
 new onset afib with BRPR 10-19-17 admit  PVC's (premature ventricular contractions) 2014  PVD (peripheral vascular disease) ([facility]  Rectal bleeding 2019  
  reports that he quit smoking about 35 years ago. His smoking use included cigarettes. He has a 60.00 pack-year smoking history. He has never used smokeless tobacco. He reports that he does not drink alcohol or use drugs. family history is not on file. Current Outpatient Medications Medication Sig  
 triamcinolone acetonide (KENALOG) 0.1 % topical cream   
 cyanocobalamin 1,000 mcg tablet Take 1,000 mcg by mouth daily.  bumetanide (BUMEX) 1 mg tablet Take 2 Tabs by mouth two (2) times a day. (Patient taking differently: Take 2 mg by mouth daily.)  hydrALAZINE (APRESOLINE) 25 mg tablet Take 25 mg by mouth two (2) times a day.  insulin glargine (LANTUS U-100 INSULIN) 100 unit/mL injection 3-7 Units by SubCUTAneous route nightly. Pt reports he uses 3-7 units depending on BG  
 spironolactone (ALDACTONE) 25 mg tablet Take 1 Tab by mouth daily.  magnesium oxide (MAG-OX) 400 mg tablet Take 1 Tab by mouth daily.  isosorbide dinitrate (ISORDIL) 20 mg tablet Take 1 Tab by mouth three (3) times daily.  carvedilol (COREG) 12.5 mg tablet Take 6.25 mg by mouth two (2) times daily (with meals).  simvastatin (ZOCOR) 20 mg tablet Take 1 Tab by mouth nightly.  albuterol (PROVENTIL VENTOLIN) 2.5 mg /3 mL (0.083 %) nebulizer solution 3 mL by Nebulization route every four (4) hours as needed for Wheezing.  tamsulosin (FLOMAX) 0.4 mg capsule Take 1 Cap by mouth daily.  omega 3-dha-epa-fish oil (FISH OIL) 100-160-1,000 mg cap Take 1 Cap by mouth two (2) times a day. No current facility-administered medications for this visit. Allergies Allergen Reactions  Lisinopril Cough Consent: 
 Yamilex Casas and/or health care decision maker is aware that that  may receive a bill for this telephone service, depending on 's insurance coverage, and has provided verbal consent to proceed: Yes Documentation: I communicated with the patient and/or health care decision maker about as above Details of this discussion including any medical advice provided: as above I affirm this is a Patient Initiated Episode with an Established Patient who has not had a related appointment within my department in the past 7 days or scheduled within the next 24 hours. Patient was made aware and verbalized understanding that an appointment will be scheduled for them for a virtual visit and/or office visit within the above time frame. Patient understanding his/her responsibility to call and change time/date if he/she so chooses. Note: not billable if this call serves to triage the patient into an appointment for the relevant concern Total Time: minutes: 11-20 minutes 11 minutes

## 2020-08-27 NOTE — PATIENT INSTRUCTIONS
You will get an echocardigorma when you see Dr. Melissa De La Cruz on October the 13th. We will see you back in 3 months with a telephone visit.

## 2020-10-13 NOTE — PROGRESS NOTES
Cardiac Electrophysiology OFFICE Note Subjective:  
  
Jones Arthur is a 81 y.o. patient who is seen for follow up, is s/p Medtronic leadless pacemaker implant on 05/09/2019 for symptomatic irreversible bradycardia from permanent atrial fibrillation with slow ventricular rate despite holding beta blocker. Device Medtronic micra check today shows proper lead & generator function. Generator longevity estimated >8 yrs. RV 73%. Normal LVEF noted 08/2019. Scheduled for repeat echo today. Chronic SOB, states has been stable, uses supplemental oxygen (2 lpm). BP well controlled per home log. HR typically 60s-70s on home log. He denies chest pain, palpitations, lightheadedness, syncope, or edema. [id number] 5. Not candidate for [facility] due to [place] risk, previous GI bleed. On ASA 81 mg po daily. Previous: 
Admitted 08/27/2019-08/28/2019 for hypoglycemia & HTN, chronic hypoxemic respiratory failure. Discharged on home oxygen, went to rehab. Admitted early August for likely pneumonia. Discharged on [address] Echo (08/06/2019): LVEF 66-70%, no RWMA. LA mod dilated. RA mildly dilated. Mild MR. Mild aortic valve sclerosis. Mild TR. Mild to mod pulmonary HTN. RHC (05/02/2019): Moderate to severe elevation in right & left side pressures. CT chest (05/01/2019): Mild worsening of diffuse interstitial parenchymal change. Cholelithiasis, right medial flank cystic mass, diverticulosis stable. 
  
Holter (4/29/14): rate  with frequent PACs, rare periods of short RP SVT up to 122  
  
PVD, Dr. Gilmer Walls atherectomy to distal SFA and distal popliteal with angioplasty to both lesions and stent to the SFA by Dr. Gilmer Walls on 3/14/14.  
  
CAD/CABG x 3 off pump 03/2008 . Post op anemia and renal failure 
  
Primary cardiologist is Dr. Nivia Blandon. 
 
Stage III CKD. Problem List  Date Reviewed: 6/18/2020 Codes Class Noted Hypoglycemia ICD-10-CM: E16.2 ICD-9-CM: 251.2  8/27/2019 Pacemaker ICD-10-CM: Z95.0 ICD-9-CM: V45.01  5/9/2019 Overview Signed 5/9/2019  6:22 PM by Abril Kirk MD  
  5/9/2019 leadless pacer implant Chronic diastolic congestive heart failure (HCC) ICD-10-CM: I50.32 
ICD-9-CM: 428.32, 428.0  5/1/2019 Atrial fibrillation with slow ventricular response ([facility] ICD-10-CM: I48.91 
ICD-9-CM: 427.31  5/1/2019 Overview Signed 5/8/2019 11:24 PM by Abril Kirk MD  
  Added automatically from request for surgery [id number] Type 2 diabetes with nephropathy ([facility] ICD-10-CM: E11.21 
ICD-9-CM: 250.40, 583.81  7/26/2018 Atrial fibrillation, chronic ICD-10-CM: I48.20 ICD-9-CM: 427.31  10/21/2017 Overview Signed 10/21/2017  1:45 PM by Ramiro Santoyo MD  
  new onset afib with BRPR 10-19-17 admit CKD (chronic kidney disease) stage 4, GFR 15-29 ml/min ([facility]) ICD-10-CM: N18.4 ICD-9-CM: 585.4  10/21/2017 Overview Signed 10/21/2017  1:47 PM by Raimro Santoyo MD  
  hypertension and DM nephrosclerosis Hypokalemia ICD-10-CM: E87.6 ICD-9-CM: 276.8  6/25/2017 Generalized weakness ICD-10-CM: R53.1 ICD-9-CM: 780.79  6/25/2017 Acute renal failure (ARF) (HCC) ICD-10-CM: N17.9 ICD-9-CM: 584.9  3/16/2017 CKD (chronic kidney disease) ICD-10-CM: N18.9 ICD-9-CM: 585.9  1/20/2017 Type 2 diabetes mellitus with diabetic peripheral angiopathy without gangrene [facility]) ICD-10-CM: E11.51 
ICD-9-CM: 250.70, 443.81  9/27/2015 PVC's (premature ventricular contractions) ICD-10-CM: I49.3 ICD-9-CM: 427.69  5/26/2014 Carotid arterial disease (HCC) ICD-10-CM: I77.9 ICD-9-CM: 447.9  Unknown Hypercholesteremia ICD-10-CM: E78.00 ICD-9-CM: 272.0  Unknown PVD (peripheral vascular disease) ([facility] ICD-10-CM: I73.9 ICD-9-CM: 443.9  Unknown Bradycardia ICD-10-CM: R00.1 ICD-9-CM: 427.89  Unknown CAD (coronary artery disease) ICD-10-CM: I25.10 ICD-9-CM: 414.00  Unknown Hypertension, essential, benign ICD-10-CM: I10 
ICD-9-CM: 401.1  Unknown Current Outpatient Medications Medication Sig Dispense Refill  bumetanide (BUMEX) 1 mg tablet Take 2 Tabs by mouth daily. 180 Tab 2  
 triamcinolone acetonide (KENALOG) 0.1 % topical cream     
 cyanocobalamin 1,000 mcg tablet Take 1,000 mcg by mouth daily.  hydrALAZINE (APRESOLINE) 25 mg tablet Take 25 mg by mouth two (2) times a day. 60 Tab 0  
 insulin glargine (LANTUS U-100 INSULIN) 100 unit/mL injection 3-7 Units by SubCUTAneous route nightly. Pt reports he uses 3-7 units depending on BG    
 spironolactone (ALDACTONE) 25 mg tablet Take 1 Tab by mouth daily. 20 Tab 0  
 magnesium oxide (MAG-OX) 400 mg tablet Take 1 Tab by mouth daily. 15 Tab 0  
 isosorbide dinitrate (ISORDIL) 20 mg tablet Take 1 Tab by mouth three (3) times daily. 60 Tab 0  carvedilol (COREG) 12.5 mg tablet Take 6.25 mg by mouth two (2) times daily (with meals).  simvastatin (ZOCOR) 20 mg tablet Take 1 Tab by mouth nightly. 90 Tab 3  
 albuterol (PROVENTIL VENTOLIN) 2.5 mg /3 mL (0.083 %) nebulizer solution 3 mL by Nebulization route every four (4) hours as needed for Wheezing. 1 Package 0  
 tamsulosin (FLOMAX) 0.4 mg capsule Take 1 Cap by mouth daily. 30 Cap 0  
 omega 3-dha-epa-fish oil (FISH OIL) 100-160-1,000 mg cap Take 1 Cap by mouth two (2) times a day. Allergies Allergen Reactions  Lisinopril Cough Past Medical History:  
Diagnosis Date  KATALINA (acute kidney injury) ([facility] 6/25/2017  CAD (coronary artery disease) s/p CABG 2008  Carotid arterial disease ([facility]  Chronic obstructive pulmonary disease ([facility] states he is on 2L at home day and night  CKD (chronic kidney disease) stage 4, GFR 15-29 ml/min ([facility]) 10/21/2017  
 hypertension and DM nephrosclerosis cr 3.27 June 2020 Dr Tilda Spine  Diabetes mellitus, type 2 ([facility]  Diverticulitis  Hypercholesteremia  Hypertension  Pacemaker  Paroxysmal atrial fibrillation ([facility] 10/21/2017  
 new onset afib with BRPR 10-19-17 admit  Pulmonary hypertension ([facility] decline in TLC and diffusion capacity- Dr Rogerio Garcia  PVC's (premature ventricular contractions) 2014  PVD (peripheral vascular disease) ([facility]  Rectal bleeding 2019 Past Surgical History:  
Procedure Laterality Date  COLONOSCOPY Left 2019 COLONOSCOPY performed by Peng Martin MD at [facility] ENDOSCOPY  
 HX CORONARY ARTERY BYPASS GRAFT    
 HX HEART CATHETERIZATION    
 ME TCAT INSJ/RPL PERM LEADLESS PACEMAKER RV W/IMG N/A 2019 INSERT OR REPLACE TRANSCATH PPM LEADLESS performed by Abril Kirk MD at [place] [address], [facility] [address] CATH LAB No family history on file. Social History Tobacco Use  Smoking status: Former Smoker Packs/day: 3.00 Years: 20.00 Pack years: 60.00 Types: Cigarettes Last attempt to quit: 1985 Years since quittin.7  Smokeless tobacco: Never Used Substance Use Topics  Alcohol use: No  
  
 
Review of Systems: All other review of systems otherwise negative. Constitutional: Negative for fever, chills, weight loss, + malaise/fatigue, frailty. HEENT: Negative for nosebleeds, vision changes. Respiratory: + intermittent cough, no hemoptysis Cardiovascular: Negative for chest pain, palpitations, no claudication, + leg swelling, no syncope. + AGUILAR Gastrointestinal: Negative for nausea, vomiting, diarrhea, blood in stool and melena. Genitourinary: Negative for dysuria, and hematuria. Musculoskeletal: Negative for myalgias, arthralgia. Skin: Negative for rash. Heme: Does not bleed or bruise easily. Neurological: Negative for speech change and focal weakness He sits in wheel chair with NC O2 tank Objective:  
 
Visit Vitals /68 (BP 1 Location: Left arm, BP Patient Position: Sitting) Pulse 82 Resp 16  
 Ht 5' 9\" (1.753 m) Wt 139 lb (63 kg) BMI 20.53 kg/m² Physical Exam:  
Constitutional: Thin, appears frail. Head: temporal wasting Eyes: Pupils are equal, round. ENT: Hearing grossly normal. Nasal cannula O2 Neck: supple. No JVD present. Cardiovascular: irregular rhythm. Exam reveals no gallop and no friction rub. 2/6 systolic murmur Pulmonary/Chest: Effort normal at rest on supplemental oxygen, distant breath sounds Abdominal: Soft, no tenderness. Musculoskeletal: sits In wheelchair. Vasc/lymphatic: No edema. Neurological: Alert,oriented. Skin: warm Psychiatric: Normal mood and affect. Behavior is normal. Judgment and thought content normal.   
 
 
Assessment/Plan: ICD-10-CM ICD-9-CM 1. Cardiac pacemaker in situ  Z95.0 V45.01   
2. Diastolic CHF, chronic (HCC)  I50.32 428.32   
  428.0 3. Atrial fibrillation, chronic (HCC)  I48.20 427.31   
4. Essential hypertension  I10 401.9 5. Hx of CABG  Z95.1 V45.81   
6. Hypertension, essential, benign  I10 401.1 7. Chronic obstructive pulmonary disease, unspecified COPD type ([facility]  J44.9 496   
8. On supplemental oxygen by nasal cannula  Z78.9 V49.89 Micra leadless pacer check today shows proper lead & generator function. Generator longevity estimated >8 yrs. RV 73%. Chronic diastolic heart failure & COPD, NYHA III at baseline, now on O2 supplement. Repeat echo pending today given RV pacing >40% Chronic SOB, states has been stable, continues supplemental oxygen (2 lpm). BP well controlled per home log that his wife gave me. HR typically 60s-70s on home log. Continue medications as previously prescribed. Chronic AF, rate controlled. [id number] 5, but not candidate for [facility] due to fall risk, frailty, previous GI bleed. Remote pacemaker checks q 3 months. EP clinic follow up in 1 year. Follow up with Dr. Itzel Goff Date Time Provider Muna Delgado 1/25/2021  1:45 PM REMOTE1, COSME YOON BS AMB  
4/28/2021 11:30 AM REMOTE1, COSME BERNAL AMB  
7/28/2021  2:00 PM REMOTE1, COSME YOON BS AMB  
10/19/2021 10:20 AM PACEMAKER3, COSME BERNAL AMB  
10/19/2021 10:40 AM MD KARRIE Bacon BS AMB Thank you for involving me in this patient's care and please call with further concerns or questions. Sarmad Brar M.D. Electrophysiology/Cardiology [facility] [place] [address], [address]                              
[phone]

## 2020-10-17 NOTE — PROGRESS NOTES
Echo looks ok Let him know Needs VV in 4 months, check plans from last note Future Appointments 1/25/2021  1:45 PM    REMOTE1, COSME BERNAL AMB 
4/28/2021  11:30 AM   REMOTE1, COSME BERNAL AMB 
7/28/2021  2:00 PM    REMOTE1, COSME BERNAL AMB 
10/19/2021 10:20 AM   PACEMAKER3, COSME BERNAL AMB 
10/19/2021 10:40 AM   MD KARRIE Holliday AMB

## 2020-10-20 NOTE — PROGRESS NOTES
Two patient identifiers verified. Spoke to patient's wife (on HIPAA) and went over echo results as well as scheduled 4 month VV follow up. Patient's wife verbalized understanding and denies any further questions or concerns at this time.

## 2020-11-23 NOTE — TELEPHONE ENCOUNTER
Patient's wife, Prieto Wagoner, is calling to see if Dr. Mike Augustine will prescribe an alternative medication to isosorbide because his insurance will not cover it. Please advise.     Phone #: [phone]  Thanks

## 2020-11-24 NOTE — TELEPHONE ENCOUNTER
Verified patient with two types of identifiers. Patient's wife reports the patient will be out of his Isordil this afternoon and it is non-formulary therefore insurance will no longer cover it. Will touch base with pharmacy to see what medication they prefer. Verified patient with two types of identifiers. Spoke to pharmacist who states a one month supply will be $14 as opposed to $3. Let the patient know and they state this is affordable for them.  Refill sent per VO by MD.

## 2020-12-15 NOTE — PROGRESS NOTES
TELEPHONE VISIT DOCUMENTATION 12/15/2020 Eri Tirado. 1935  85 y.o.male evaluated via telephone on 12/15/2020 due to COVID 19 restrictions. Patient unable to participate in Virtual Visit with synchronous audio/visual technology. NA at 12/15/2020 11:29 AM  
Patient's wife, Ronnell Martino, is calling to see if Dr. Joana Lizama will prescribe an alternative medication to isosorbide because his insurance will not cover it. Please advise Spoke to pt Denies edema, cp or palps Has some AGUILAR Wife thinks he is deconditioned They are interested in [facility] home care again , he needs to avoid falls 10/13/20 ECHO ADULT COMPLETE 10/16/2020 10/16/2020 Narrative · LV: Calculated LVEF is 65%. Normal cavity size and systolic function  
(ejection fraction normal). Moderate concentric hypertrophy. Abnormal left  
ventricular septal motion consistent with interventricular conduction  
delay (IVCD). Left ventricular diastolic dysfunction. · RV: Mildly reduced systolic function. · AV: Probably trileaflet aortic valve. Aortic valve leaflet calcification  
present. No significant stenosis. Mean gradient is 7 mmHg and  
dimensionless index measures 0.479. · MV: Mild mitral annular calcification. Mild mitral valve regurgitation  
is present. · TV: Mild tricuspid valve regurgitation is present. · LA: Moderately dilated left atrium. Left Atrium volume index is 49  
mL/m2. · RA: Mildly dilated right atrium. · PA: Mild to moderate pulmonary hypertension. Pulmonary arterial systolic  
pressure is 51 mmHg.  
   
  Signed by: Shahid Walls MD  
  
 
 Follow-up of CAD diastolic CHF RV failure and chronic atrial fibrillation. He was complaining of before appears better he seems to feel better with his shortness of breath steadily improving now on Bumex at 2 mg once a day. His blood pressures at home are fairly stable and with a pulse is in the 60s and 70s and blood pressure in the 130s to 140s. They see Dr. Shweta Mccoy regularly for kidney dysfunction. Their last echocardiogram in October 2020 showed the ejection fraction was normal at 65% and there was limitation in RV function. There is no significant stenosis of the aortic valve and overall valvular function was fairly good there were pulmonary pressures at 51 which is consistent with prior known mild to moderate pulmonary hypertension. Therefore we have stable diastolic chronic heart failure that is compensated NYHA II. The CAD shows no angina he is now 12 years out from CABG and his atrial fibrillation shows no active bleeding with the use of current anticoagulation therapy and pacemaker. Renal function appears to be continuing a continued issue but at this time were able to compensate this with the use of diuretics and is on appropriate treatment for dyslipidemia and hypertension along with medical therapy for his carotid artery stenosis. I am pleased with the results of the echo and the fact that he is clinically seeming to be stable. Up as below Fu VV in 6weeks 
 no change in meds plans x home care and he wants portable oxygen tank Future Appointments Date Time Provider Muna Delgado 1/25/2021  1:45 PM REMOTE1, COSME BERNAL AMB  
4/28/2021 11:30 AM REMOTE1, COSME BERNAL AMB  
7/28/2021  2:00 PM REMOTE1, COSME BERNAL AMB  
10/19/2021 10:20 AM PACEMAKER3, COSME BERNAL AMB  
10/19/2021 10:40 AM MD KARRIE Chandra BS AMB No diagnosis found. CAD/CABG  
off pump x3 3/2008 . =post op anemia and renal failure CATH March 7, 2008= LM -bifurcation dz 40% ;LAD 85% ; Circ ost 70% mid 70% RCA proximal 60% tapering, EF 60%-70%, bilaterally patent renal arteries, mild atherosclerosis of the distal abdominal aorta. [address] May 2, 2019 =PA 66/18 W 20 PA sat 55% CO 4.1 CI 2.12 Echo May 1 ,2019 EF 55-60% mod LAE, Mild CASSIE, Mild AS BELLE 1.6 cm2 mild MR & TR, RV fx mildly reduced PVD-18- RLE- mid CRA 70, Pop 99-PTA on right pop, LLE LCFA 40% - 17 PTA RCFA 90, JAS to RSFA  
-16 PTA Left Pop, PTA Left tib-per 
--3-13-14 PTA Left op PTA RSFA 
---11 stent RSFA  
JOCELYN 17 Normal perfusion Mild carotid artery disease 3-7-08 then 12 with 10-49% left, less than 10% on right SOCIAL: Drinks no alcohol, quit smoking several years ago, worked as an . Lives with his wife and has four children. Enjoys gardening, fishing and music. FAMILY HISTORY: Mother  of cancer at 76, father  of Parkinson's at 70, one brother  of cancer of the liver at 76 and one  of an infection at 64. Past Medical History:  
Diagnosis Date  KATALINA (acute kidney injury) ([facility] [address] 2017  CAD (coronary artery disease) s/p CABG   Carotid arterial disease ([address]  Chronic obstructive pulmonary disease ([address] states he is on 2L at home day and night  CKD (chronic kidney disease) stage 4, GFR 15-29 ml/min ([facility]) 10/21/2017  
 hypertension and DM nephrosclerosis cr 3.2020 Dr Branden Angeles  Diabetes mellitus, type 2 ([address]  Diverticulitis  Hypercholesteremia  Hypertension  Pacemaker  Paroxysmal atrial fibrillation ([address] 10/21/2017  
 new onset afib with BRPR 10-19-17 admit  Pulmonary hypertension ([address] decline in TLC and diffusion capacity- Dr Story   PVC's (premature ventricular contractions) 2014  PVD (peripheral vascular disease) ([facility] [address]  Rectal bleeding 2019 reports that he quit smoking about 35 years ago. His smoking use included cigarettes. He has a 60.00 pack-year smoking history. He has never used smokeless tobacco. He reports that he does not drink alcohol or use drugs. family history is not on file. Current Outpatient Medications Medication Sig  
 isosorbide dinitrate (ISORDIL) 20 mg tablet Take 1 Tab by mouth three (3) times daily.  bumetanide (BUMEX) 1 mg tablet Take 2 Tabs by mouth daily.  cyanocobalamin 1,000 mcg tablet Take 1,000 mcg by mouth daily.  hydrALAZINE (APRESOLINE) 25 mg tablet Take 25 mg by mouth two (2) times a day.  insulin glargine (LANTUS U-100 INSULIN) 100 unit/mL injection 3-7 Units by SubCUTAneous route nightly. Pt reports he uses 3-7 units depending on BG  
 spironolactone (ALDACTONE) 25 mg tablet Take 1 Tab by mouth daily.  magnesium oxide (MAG-OX) 400 mg tablet Take 1 Tab by mouth daily.  carvedilol (COREG) 12.5 mg tablet Take 6.25 mg by mouth two (2) times daily (with meals).  simvastatin (ZOCOR) 20 mg tablet Take 1 Tab by mouth nightly.  albuterol (PROVENTIL VENTOLIN) 2.5 mg /3 mL (0.083 %) nebulizer solution 3 mL by Nebulization route every four (4) hours as needed for Wheezing.  tamsulosin (FLOMAX) 0.4 mg capsule Take 1 Cap by mouth daily.  omega 3-dha-epa-fish oil (FISH OIL) 100-160-1,000 mg cap Take 1 Cap by mouth daily.  triamcinolone acetonide (KENALOG) 0.1 % topical cream   
 
No current facility-administered medications for this visit. Allergies Allergen Reactions  Lisinopril Cough Consent: 
 Navi Minor and/or health care decision maker is aware that that  may receive a bill for this telephone service, depending on 's insurance coverage, and has provided verbal consent to proceed: Yes Documentation: I communicated with the patient and/or health care decision maker about as above Details of this discussion including any medical advice provided: as above I affirm this is a Patient Initiated Episode with an Established Patient who has not had a related appointment within my department in the past 7 days or scheduled within the next 24 hours. Patient was made aware and verbalized understanding that an appointment will be scheduled for them for a virtual visit and/or office visit within the above time frame. Patient understanding his/her responsibility to call and change time/date if he/she so chooses. Note: not billable if this call serves to triage the patient into an appointment for the relevant concern Total Time: minutes: 11-20 minutes

## 2020-12-30 NOTE — TELEPHONE ENCOUNTER
Patients wife would like to speak with Quinton regarding the conversation that the patient had with Dr. London Maldonado recently. Please advise.     Phone: [phone]

## 2020-12-31 NOTE — TELEPHONE ENCOUNTER
Verified patient with two types of identifiers. Confirmed VV f/u as well as let them know [facility] and received [facility] two weeks ago. Patient and wife verbalized understanding and will call with any other questions.

## 2021-01-01 ENCOUNTER — PATIENT OUTREACH (OUTPATIENT)
Dept: CASE MANAGEMENT | Age: 86
End: 2021-01-01

## 2021-01-01 ENCOUNTER — HOSPITAL ENCOUNTER (INPATIENT)
Age: 86
LOS: 1 days | DRG: 178 | End: 2021-04-12
Attending: EMERGENCY MEDICINE | Admitting: INTERNAL MEDICINE
Payer: MEDICARE

## 2021-01-01 ENCOUNTER — APPOINTMENT (OUTPATIENT)
Dept: GENERAL RADIOLOGY | Age: 86
DRG: 196 | End: 2021-01-01
Attending: STUDENT IN AN ORGANIZED HEALTH CARE EDUCATION/TRAINING PROGRAM
Payer: MEDICARE

## 2021-01-01 ENCOUNTER — TELEPHONE (OUTPATIENT)
Dept: CARDIOLOGY CLINIC | Age: 86
End: 2021-01-01

## 2021-01-01 ENCOUNTER — APPOINTMENT (OUTPATIENT)
Dept: GENERAL RADIOLOGY | Age: 86
DRG: 291 | End: 2021-01-01
Attending: EMERGENCY MEDICINE
Payer: MEDICARE

## 2021-01-01 ENCOUNTER — VIRTUAL VISIT (OUTPATIENT)
Dept: CARDIOLOGY CLINIC | Age: 86
End: 2021-01-01

## 2021-01-01 ENCOUNTER — HOSPITAL ENCOUNTER (EMERGENCY)
Age: 86
Discharge: HOME | End: 2021-03-29
Attending: EMERGENCY MEDICINE | Admitting: EMERGENCY MEDICINE
Payer: MEDICARE

## 2021-01-01 ENCOUNTER — HOSPITAL ENCOUNTER (INPATIENT)
Age: 86
LOS: 4 days | Discharge: HOME | DRG: 291 | End: 2021-03-22
Attending: EMERGENCY MEDICINE | Admitting: FAMILY MEDICINE
Payer: MEDICARE

## 2021-01-01 ENCOUNTER — APPOINTMENT (OUTPATIENT)
Dept: GENERAL RADIOLOGY | Age: 86
DRG: 178 | End: 2021-01-01
Attending: EMERGENCY MEDICINE
Payer: MEDICARE

## 2021-01-01 ENCOUNTER — APPOINTMENT (OUTPATIENT)
Dept: CT IMAGING | Age: 86
DRG: 291 | End: 2021-01-01
Attending: INTERNAL MEDICINE
Payer: MEDICARE

## 2021-01-01 ENCOUNTER — APPOINTMENT (OUTPATIENT)
Dept: VASCULAR SURGERY | Age: 86
DRG: 291 | End: 2021-01-01
Attending: INTERNAL MEDICINE
Payer: MEDICARE

## 2021-01-01 ENCOUNTER — HOSPITAL ENCOUNTER (INPATIENT)
Age: 86
LOS: 2 days | Discharge: HOME HEALTH | DRG: 196 | End: 2021-03-10
Attending: STUDENT IN AN ORGANIZED HEALTH CARE EDUCATION/TRAINING PROGRAM | Admitting: FAMILY MEDICINE
Payer: MEDICARE

## 2021-01-01 ENCOUNTER — APPOINTMENT (OUTPATIENT)
Dept: CT IMAGING | Age: 86
DRG: 196 | End: 2021-01-01
Attending: INTERNAL MEDICINE
Payer: MEDICARE

## 2021-01-01 ENCOUNTER — OFFICE VISIT (OUTPATIENT)
Dept: CARDIOLOGY CLINIC | Age: 86
End: 2021-01-01
Payer: MEDICARE

## 2021-01-01 ENCOUNTER — HOSPITAL ENCOUNTER (OUTPATIENT)
Dept: GENERAL RADIOLOGY | Age: 86
Discharge: HOME | End: 2021-03-01
Attending: SPECIALIST
Payer: MEDICARE

## 2021-01-01 ENCOUNTER — APPOINTMENT (OUTPATIENT)
Dept: NON INVASIVE DIAGNOSTICS | Age: 86
DRG: 291 | End: 2021-01-01
Attending: NURSE PRACTITIONER
Payer: MEDICARE

## 2021-01-01 ENCOUNTER — APPOINTMENT (OUTPATIENT)
Dept: GENERAL RADIOLOGY | Age: 86
End: 2021-01-01
Attending: EMERGENCY MEDICINE
Payer: MEDICARE

## 2021-01-01 VITALS
WEIGHT: 133.8 LBS | HEIGHT: 69 IN | OXYGEN SATURATION: 96 % | SYSTOLIC BLOOD PRESSURE: 110 MMHG | BODY MASS INDEX: 19.82 KG/M2 | HEART RATE: 73 BPM | RESPIRATION RATE: 18 BRPM | DIASTOLIC BLOOD PRESSURE: 60 MMHG

## 2021-01-01 VITALS
BODY MASS INDEX: 22.53 KG/M2 | RESPIRATION RATE: 18 BRPM | DIASTOLIC BLOOD PRESSURE: 62 MMHG | HEIGHT: 69 IN | TEMPERATURE: 98.9 F | OXYGEN SATURATION: 100 % | WEIGHT: 152.12 LBS | HEART RATE: 61 BPM | SYSTOLIC BLOOD PRESSURE: 164 MMHG

## 2021-01-01 VITALS
DIASTOLIC BLOOD PRESSURE: 62 MMHG | OXYGEN SATURATION: 98 % | HEIGHT: 69 IN | WEIGHT: 148.15 LBS | TEMPERATURE: 97.8 F | SYSTOLIC BLOOD PRESSURE: 149 MMHG | BODY MASS INDEX: 21.94 KG/M2 | HEART RATE: 61 BPM | RESPIRATION RATE: 20 BRPM

## 2021-01-01 VITALS
WEIGHT: 143.8 LBS | SYSTOLIC BLOOD PRESSURE: 139 MMHG | OXYGEN SATURATION: 94 % | BODY MASS INDEX: 21.3 KG/M2 | HEIGHT: 69 IN | DIASTOLIC BLOOD PRESSURE: 62 MMHG | RESPIRATION RATE: 16 BRPM | HEART RATE: 60 BPM | TEMPERATURE: 98 F

## 2021-01-01 VITALS
HEART RATE: 69 BPM | DIASTOLIC BLOOD PRESSURE: 80 MMHG | SYSTOLIC BLOOD PRESSURE: 154 MMHG | RESPIRATION RATE: 18 BRPM | TEMPERATURE: 98.2 F | OXYGEN SATURATION: 100 %

## 2021-01-01 DIAGNOSIS — N17.9 AKI (ACUTE KIDNEY INJURY) (HCC): ICD-10-CM

## 2021-01-01 DIAGNOSIS — Z09 HOSPITAL DISCHARGE FOLLOW-UP: ICD-10-CM

## 2021-01-01 DIAGNOSIS — I50.32 DIASTOLIC CHF, CHRONIC (HCC): Primary | ICD-10-CM

## 2021-01-01 DIAGNOSIS — R77.8 ELEVATED TROPONIN LEVEL NOT DUE MYOCARDIAL INFARCTION: ICD-10-CM

## 2021-01-01 DIAGNOSIS — I10 HYPERTENSION, ESSENTIAL, BENIGN: ICD-10-CM

## 2021-01-01 DIAGNOSIS — D64.9 CHRONIC ANEMIA: ICD-10-CM

## 2021-01-01 DIAGNOSIS — E78.00 HYPERCHOLESTEREMIA: ICD-10-CM

## 2021-01-01 DIAGNOSIS — I21.4 NSTEMI (NON-ST ELEVATED MYOCARDIAL INFARCTION) (HCC): Primary | ICD-10-CM

## 2021-01-01 DIAGNOSIS — M54.50 LOW BACK PAIN WITHOUT SCIATICA, UNSPECIFIED BACK PAIN LATERALITY, UNSPECIFIED CHRONICITY: ICD-10-CM

## 2021-01-01 DIAGNOSIS — R06.09 DYSPNEA ON EXERTION: ICD-10-CM

## 2021-01-01 DIAGNOSIS — U07.1 COVID-19: Primary | ICD-10-CM

## 2021-01-01 DIAGNOSIS — I65.23 BILATERAL CAROTID ARTERY STENOSIS: ICD-10-CM

## 2021-01-01 DIAGNOSIS — I50.33 ACUTE ON CHRONIC DIASTOLIC CONGESTIVE HEART FAILURE (HCC): Primary | ICD-10-CM

## 2021-01-01 DIAGNOSIS — N18.9 CHRONIC KIDNEY DISEASE, UNSPECIFIED CKD STAGE: ICD-10-CM

## 2021-01-01 DIAGNOSIS — E16.2 HYPOGLYCEMIA: Primary | ICD-10-CM

## 2021-01-01 DIAGNOSIS — Z95.1 HX OF CABG: ICD-10-CM

## 2021-01-01 DIAGNOSIS — I73.9 PERIPHERAL VASCULAR DISEASE (HCC): ICD-10-CM

## 2021-01-01 DIAGNOSIS — I48.20 ATRIAL FIBRILLATION, CHRONIC (HCC): ICD-10-CM

## 2021-01-01 DIAGNOSIS — J18.9 PNEUMONIA DUE TO INFECTIOUS ORGANISM, UNSPECIFIED LATERALITY, UNSPECIFIED PART OF LUNG: ICD-10-CM

## 2021-01-01 DIAGNOSIS — I50.9 CHRONIC CONGESTIVE HEART FAILURE, UNSPECIFIED HEART FAILURE TYPE (HCC): ICD-10-CM

## 2021-01-01 DIAGNOSIS — I25.10 CORONARY ARTERY DISEASE INVOLVING NATIVE CORONARY ARTERY OF NATIVE HEART WITHOUT ANGINA PECTORIS: ICD-10-CM

## 2021-01-01 DIAGNOSIS — N18.4 CKD (CHRONIC KIDNEY DISEASE) STAGE 4, GFR 15-29 ML/MIN (HCC): ICD-10-CM

## 2021-01-01 DIAGNOSIS — I50.32 DIASTOLIC CHF, CHRONIC (HCC): ICD-10-CM

## 2021-01-01 DIAGNOSIS — D64.9 ANEMIA, UNSPECIFIED TYPE: ICD-10-CM

## 2021-01-01 DIAGNOSIS — M54.6 ACUTE LEFT-SIDED THORACIC BACK PAIN: ICD-10-CM

## 2021-01-01 DIAGNOSIS — R06.02 SOB (SHORTNESS OF BREATH): ICD-10-CM

## 2021-01-01 LAB
ABO + RH BLD: NORMAL
ALBUMIN SERPL-MCNC: 2.1 G/DL (ref 3.5–5)
ALBUMIN SERPL-MCNC: 2.6 G/DL (ref 3.5–5)
ALBUMIN SERPL-MCNC: 3.1 G/DL (ref 3.5–5)
ALBUMIN SERPL-MCNC: 3.2 G/DL (ref 3.5–5)
ALBUMIN SERPL-MCNC: 3.5 G/DL (ref 3.5–5)
ALBUMIN/GLOB SERPL: 0.5 {RATIO} (ref 1.1–2.2)
ALBUMIN/GLOB SERPL: 0.9 {RATIO} (ref 1.1–2.2)
ALBUMIN/GLOB SERPL: 1 {RATIO} (ref 1.1–2.2)
ALP SERPL-CCNC: 109 U/L (ref 45–117)
ALP SERPL-CCNC: 120 U/L (ref 45–117)
ALP SERPL-CCNC: 182 U/L (ref 45–117)
ALP SERPL-CCNC: 56 U/L (ref 45–117)
ALP SERPL-CCNC: 60 U/L (ref 45–117)
ALT SERPL-CCNC: 19 U/L (ref 12–78)
ALT SERPL-CCNC: 21 U/L (ref 12–78)
ALT SERPL-CCNC: 25 U/L (ref 12–78)
ALT SERPL-CCNC: 47 U/L (ref 12–78)
ALT SERPL-CCNC: 65 U/L (ref 12–78)
ANION GAP SERPL CALC-SCNC: 10 MMOL/L (ref 5–15)
ANION GAP SERPL CALC-SCNC: 11 MMOL/L (ref 5–15)
ANION GAP SERPL CALC-SCNC: 6 MMOL/L (ref 5–15)
ANION GAP SERPL CALC-SCNC: 7 MMOL/L (ref 5–15)
ANION GAP SERPL CALC-SCNC: 8 MMOL/L (ref 5–15)
ANION GAP SERPL CALC-SCNC: 9 MMOL/L (ref 5–15)
ANION GAP SERPL CALC-SCNC: 9 MMOL/L (ref 5–15)
APPEARANCE UR: CLEAR
APTT PPP: 28.1 SEC (ref 22.1–31)
AST SERPL-CCNC: 14 U/L (ref 15–37)
AST SERPL-CCNC: 17 U/L (ref 15–37)
AST SERPL-CCNC: 19 U/L (ref 15–37)
AST SERPL-CCNC: 20 U/L (ref 15–37)
AST SERPL-CCNC: 23 U/L (ref 15–37)
ATRIAL RATE: 65 BPM
ATRIAL RATE: 71 BPM
ATRIAL RATE: 73 BPM
ATRIAL RATE: 81 BPM
BACTERIA SPEC CULT: NORMAL
BACTERIA URNS QL MICRO: NEGATIVE /HPF
BASOPHILS # BLD: 0 K/UL (ref 0–0.1)
BASOPHILS NFR BLD: 0 % (ref 0–1)
BILIRUB SERPL-MCNC: 0.2 MG/DL (ref 0.2–1)
BILIRUB SERPL-MCNC: 0.3 MG/DL (ref 0.2–1)
BILIRUB SERPL-MCNC: 0.3 MG/DL (ref 0.2–1)
BILIRUB SERPL-MCNC: 0.4 MG/DL (ref 0.2–1)
BILIRUB SERPL-MCNC: 0.4 MG/DL (ref 0.2–1)
BILIRUB UR QL: NEGATIVE
BLD PROD TYP BPU: NORMAL
BLD PROD TYP BPU: NORMAL
BLOOD GROUP ANTIBODIES SERPL: NORMAL
BNP SERPL-MCNC: ABNORMAL PG/ML
BNP SERPL-MCNC: ABNORMAL PG/ML (ref 0–450)
BPU ID: NORMAL
BPU ID: NORMAL
BUN SERPL-MCNC: 104 MG/DL (ref 6–20)
BUN SERPL-MCNC: 105 MG/DL (ref 6–20)
BUN SERPL-MCNC: 109 MG/DL (ref 6–20)
BUN SERPL-MCNC: 129 MG/DL (ref 6–20)
BUN SERPL-MCNC: 152 MG/DL (ref 6–20)
BUN SERPL-MCNC: 153 MG/DL (ref 6–20)
BUN SERPL-MCNC: 158 MG/DL (ref 6–20)
BUN SERPL-MCNC: 158 MG/DL (ref 6–20)
BUN SERPL-MCNC: 165 MG/DL (ref 6–20)
BUN SERPL-MCNC: 172 MG/DL (ref 6–20)
BUN SERPL-MCNC: 86 MG/DL (ref 6–20)
BUN/CREAT SERPL: 26 (ref 12–20)
BUN/CREAT SERPL: 41 (ref 12–20)
BUN/CREAT SERPL: 41 (ref 12–20)
BUN/CREAT SERPL: 42 (ref 12–20)
BUN/CREAT SERPL: 49 (ref 12–20)
BUN/CREAT SERPL: 50 (ref 12–20)
BUN/CREAT SERPL: 52 (ref 12–20)
BUN/CREAT SERPL: 53 (ref 12–20)
BUN/CREAT SERPL: 55 (ref 12–20)
BUN/CREAT SERPL: 55 (ref 12–20)
BUN/CREAT SERPL: 58 (ref 12–20)
CALCIUM SERPL-MCNC: 7.9 MG/DL (ref 8.5–10.1)
CALCIUM SERPL-MCNC: 8 MG/DL (ref 8.5–10.1)
CALCIUM SERPL-MCNC: 8.1 MG/DL (ref 8.5–10.1)
CALCIUM SERPL-MCNC: 8.2 MG/DL (ref 8.5–10.1)
CALCIUM SERPL-MCNC: 8.5 MG/DL (ref 8.5–10.1)
CALCIUM SERPL-MCNC: 8.6 MG/DL (ref 8.5–10.1)
CALCIUM SERPL-MCNC: 8.7 MG/DL (ref 8.5–10.1)
CALCIUM SERPL-MCNC: 8.8 MG/DL (ref 8.5–10.1)
CALCIUM SERPL-MCNC: 9 MG/DL (ref 8.5–10.1)
CALCULATED R AXIS, ECG10: -128 DEGREES
CALCULATED R AXIS, ECG10: -135 DEGREES
CALCULATED R AXIS, ECG10: -145 DEGREES
CALCULATED R AXIS, ECG10: -148 DEGREES
CALCULATED T AXIS, ECG11: -120 DEGREES
CALCULATED T AXIS, ECG11: -59 DEGREES
CALCULATED T AXIS, ECG11: 19 DEGREES
CALCULATED T AXIS, ECG11: 2 DEGREES
CHLORIDE SERPL-SCNC: 100 MMOL/L (ref 97–108)
CHLORIDE SERPL-SCNC: 103 MMOL/L (ref 97–108)
CHLORIDE SERPL-SCNC: 94 MMOL/L (ref 97–108)
CHLORIDE SERPL-SCNC: 96 MMOL/L (ref 97–108)
CHLORIDE SERPL-SCNC: 98 MMOL/L (ref 97–108)
CHLORIDE SERPL-SCNC: 99 MMOL/L (ref 97–108)
CO2 SERPL-SCNC: 22 MMOL/L (ref 21–32)
CO2 SERPL-SCNC: 23 MMOL/L (ref 21–32)
CO2 SERPL-SCNC: 24 MMOL/L (ref 21–32)
CO2 SERPL-SCNC: 25 MMOL/L (ref 21–32)
CO2 SERPL-SCNC: 26 MMOL/L (ref 21–32)
CO2 SERPL-SCNC: 27 MMOL/L (ref 21–32)
CO2 SERPL-SCNC: 27 MMOL/L (ref 21–32)
CO2 SERPL-SCNC: 28 MMOL/L (ref 21–32)
CO2 SERPL-SCNC: 29 MMOL/L (ref 21–32)
COLOR UR: ABNORMAL
COMMENT, HOLDF: NORMAL
COVID-19 RAPID TEST, COVR: DETECTED
COVID-19 RAPID TEST, COVR: NOT DETECTED
CREAT SERPL-MCNC: 2.48 MG/DL (ref 0.7–1.3)
CREAT SERPL-MCNC: 2.52 MG/DL (ref 0.7–1.3)
CREAT SERPL-MCNC: 2.52 MG/DL (ref 0.7–1.3)
CREAT SERPL-MCNC: 2.63 MG/DL (ref 0.7–1.3)
CREAT SERPL-MCNC: 2.65 MG/DL (ref 0.7–1.3)
CREAT SERPL-MCNC: 2.89 MG/DL (ref 0.7–1.3)
CREAT SERPL-MCNC: 3.09 MG/DL (ref 0.7–1.3)
CREAT SERPL-MCNC: 3.15 MG/DL (ref 0.7–1.3)
CREAT SERPL-MCNC: 3.15 MG/DL (ref 0.7–1.3)
CREAT SERPL-MCNC: 3.19 MG/DL (ref 0.7–1.3)
CREAT SERPL-MCNC: 3.32 MG/DL (ref 0.7–1.3)
CROSSMATCH RESULT,%XM: NORMAL
CROSSMATCH RESULT,%XM: NORMAL
D DIMER PPP FEU-MCNC: 0.51 MG/L FEU (ref 0–0.65)
DIAGNOSIS, 93000: NORMAL
DIFFERENTIAL METHOD BLD: ABNORMAL
ECHO AO ROOT DIAM: 3.16 CM
ECHO AV AREA PEAK VELOCITY: 1.04 CM2
ECHO AV AREA/BSA PEAK VELOCITY: 0.6 CM2/M2
ECHO AV PEAK GRADIENT: 19.18 MMHG
ECHO AV PEAK VELOCITY: 218.97 CM/S
ECHO LA AREA 4C: 31.1 CM2
ECHO LA MAJOR AXIS: 6.04 CM
ECHO LA MINOR AXIS: 3.37 CM
ECHO LA VOL 2C: 126 ML (ref 18–58)
ECHO LA VOL 4C: 113.61 ML (ref 18–58)
ECHO LA VOL BP: 130.77 ML (ref 18–58)
ECHO LA VOL/BSA BIPLANE: 73.01 ML/M2 (ref 16–28)
ECHO LA VOLUME INDEX A2C: 70.35 ML/M2 (ref 16–28)
ECHO LA VOLUME INDEX A4C: 63.43 ML/M2 (ref 16–28)
ECHO LV E' LATERAL VELOCITY: 5.04 CM/S
ECHO LV E' SEPTAL VELOCITY: 4.35 CM/S
ECHO LV INTERNAL DIMENSION DIASTOLIC: 5.01 CM (ref 4.2–5.9)
ECHO LV INTERNAL DIMENSION SYSTOLIC: 3.16 CM
ECHO LV IVSD: 1.19 CM (ref 0.6–1)
ECHO LV MASS 2D: 233.1 G (ref 88–224)
ECHO LV MASS INDEX 2D: 130.2 G/M2 (ref 49–115)
ECHO LV POSTERIOR WALL DIASTOLIC: 1.2 CM (ref 0.6–1)
ECHO LVOT DIAM: 1.89 CM
ECHO LVOT PEAK GRADIENT: 2.61 MMHG
ECHO LVOT PEAK VELOCITY: 80.8 CM/S
ECHO MV A VELOCITY: 47.7 CM/S
ECHO MV AREA PHT: 4.11 CM2
ECHO MV E DECELERATION TIME (DT): 184.68 MS
ECHO MV E VELOCITY: 112.54 CM/S
ECHO MV E/A RATIO: 2.36
ECHO MV E/E' LATERAL: 22.33
ECHO MV E/E' RATIO (AVERAGED): 24.1
ECHO MV E/E' SEPTAL: 25.87
ECHO MV PRESSURE HALF TIME (PHT): 53.56 MS
ECHO PV PEAK INSTANTANEOUS GRADIENT SYSTOLIC: 1.94 MMHG
ECHO PV REGURGITANT MAX VELOCITY: 69.57 CM/S
ECHO RV TAPSE: 1.14 CM (ref 1.5–2)
ECHO TV REGURGITANT MAX VELOCITY: 273.35 CM/S
ECHO TV REGURGITANT PEAK GRADIENT: 29.89 MMHG
EOSINOPHIL # BLD: 0 K/UL (ref 0–0.4)
EOSINOPHIL # BLD: 0.1 K/UL (ref 0–0.4)
EOSINOPHIL # BLD: 0.3 K/UL (ref 0–0.4)
EOSINOPHIL NFR BLD: 0 % (ref 0–7)
EOSINOPHIL NFR BLD: 1 % (ref 0–7)
EOSINOPHIL NFR BLD: 1 % (ref 0–7)
EOSINOPHIL NFR BLD: 2 % (ref 0–7)
EOSINOPHIL NFR BLD: 3 % (ref 0–7)
EPITH CASTS URNS QL MICRO: ABNORMAL /LPF
ERYTHROCYTE [DISTWIDTH] IN BLOOD BY AUTOMATED COUNT: 13.3 % (ref 11.5–14.5)
ERYTHROCYTE [DISTWIDTH] IN BLOOD BY AUTOMATED COUNT: 13.8 % (ref 11.5–14.5)
ERYTHROCYTE [DISTWIDTH] IN BLOOD BY AUTOMATED COUNT: 14 % (ref 11.5–14.5)
ERYTHROCYTE [DISTWIDTH] IN BLOOD BY AUTOMATED COUNT: 14.4 % (ref 11.5–14.5)
ERYTHROCYTE [DISTWIDTH] IN BLOOD BY AUTOMATED COUNT: 14.4 % (ref 11.5–14.5)
ERYTHROCYTE [DISTWIDTH] IN BLOOD BY AUTOMATED COUNT: 14.5 % (ref 11.5–14.5)
ERYTHROCYTE [DISTWIDTH] IN BLOOD BY AUTOMATED COUNT: 14.7 % (ref 11.5–14.5)
ERYTHROCYTE [DISTWIDTH] IN BLOOD BY AUTOMATED COUNT: 14.7 % (ref 11.5–14.5)
ERYTHROCYTE [DISTWIDTH] IN BLOOD BY AUTOMATED COUNT: 15.2 % (ref 11.5–14.5)
ERYTHROCYTE [DISTWIDTH] IN BLOOD BY AUTOMATED COUNT: 15.2 % (ref 11.5–14.5)
EST. AVERAGE GLUCOSE BLD GHB EST-MCNC: 163 MG/DL
FERRITIN SERPL-MCNC: 468 NG/ML (ref 26–388)
FERRITIN SERPL-MCNC: 471 NG/ML (ref 26–388)
FOLATE SERPL-MCNC: 30 NG/ML (ref 5–21)
FOLATE SERPL-MCNC: 31.9 NG/ML (ref 5–21)
GLOBULIN SER CALC-MCNC: 2.7 G/DL (ref 2–4)
GLOBULIN SER CALC-MCNC: 3.3 G/DL (ref 2–4)
GLOBULIN SER CALC-MCNC: 3.4 G/DL (ref 2–4)
GLOBULIN SER CALC-MCNC: 4.1 G/DL (ref 2–4)
GLOBULIN SER CALC-MCNC: 4.2 G/DL (ref 2–4)
GLUCOSE BLD STRIP.AUTO-MCNC: 124 MG/DL (ref 65–100)
GLUCOSE BLD STRIP.AUTO-MCNC: 140 MG/DL (ref 65–100)
GLUCOSE BLD STRIP.AUTO-MCNC: 153 MG/DL (ref 65–100)
GLUCOSE BLD STRIP.AUTO-MCNC: 173 MG/DL (ref 65–100)
GLUCOSE BLD STRIP.AUTO-MCNC: 179 MG/DL (ref 65–100)
GLUCOSE BLD STRIP.AUTO-MCNC: 188 MG/DL (ref 65–100)
GLUCOSE BLD STRIP.AUTO-MCNC: 191 MG/DL (ref 65–100)
GLUCOSE BLD STRIP.AUTO-MCNC: 197 MG/DL (ref 65–100)
GLUCOSE BLD STRIP.AUTO-MCNC: 198 MG/DL (ref 65–100)
GLUCOSE BLD STRIP.AUTO-MCNC: 201 MG/DL (ref 65–100)
GLUCOSE BLD STRIP.AUTO-MCNC: 205 MG/DL (ref 65–100)
GLUCOSE BLD STRIP.AUTO-MCNC: 214 MG/DL (ref 65–100)
GLUCOSE BLD STRIP.AUTO-MCNC: 230 MG/DL (ref 65–100)
GLUCOSE BLD STRIP.AUTO-MCNC: 244 MG/DL (ref 65–100)
GLUCOSE BLD STRIP.AUTO-MCNC: 268 MG/DL (ref 65–100)
GLUCOSE BLD STRIP.AUTO-MCNC: 271 MG/DL (ref 65–100)
GLUCOSE BLD STRIP.AUTO-MCNC: 313 MG/DL (ref 65–100)
GLUCOSE BLD STRIP.AUTO-MCNC: 313 MG/DL (ref 65–100)
GLUCOSE BLD STRIP.AUTO-MCNC: 332 MG/DL (ref 65–100)
GLUCOSE BLD STRIP.AUTO-MCNC: 353 MG/DL (ref 65–100)
GLUCOSE BLD STRIP.AUTO-MCNC: 370 MG/DL (ref 65–100)
GLUCOSE BLD STRIP.AUTO-MCNC: 394 MG/DL (ref 65–100)
GLUCOSE BLD STRIP.AUTO-MCNC: 411 MG/DL (ref 65–100)
GLUCOSE BLD STRIP.AUTO-MCNC: 75 MG/DL (ref 65–100)
GLUCOSE SERPL-MCNC: 119 MG/DL (ref 65–100)
GLUCOSE SERPL-MCNC: 132 MG/DL (ref 65–100)
GLUCOSE SERPL-MCNC: 140 MG/DL (ref 65–100)
GLUCOSE SERPL-MCNC: 181 MG/DL (ref 65–100)
GLUCOSE SERPL-MCNC: 194 MG/DL (ref 65–100)
GLUCOSE SERPL-MCNC: 196 MG/DL (ref 65–100)
GLUCOSE SERPL-MCNC: 211 MG/DL (ref 65–100)
GLUCOSE SERPL-MCNC: 275 MG/DL (ref 65–100)
GLUCOSE SERPL-MCNC: 286 MG/DL (ref 65–100)
GLUCOSE SERPL-MCNC: 293 MG/DL (ref 65–100)
GLUCOSE SERPL-MCNC: 68 MG/DL (ref 65–100)
GLUCOSE UR STRIP.AUTO-MCNC: NEGATIVE MG/DL
HBA1C MFR BLD: 7.3 % (ref 4–5.6)
HCT VFR BLD AUTO: 19.6 % (ref 36.6–50.3)
HCT VFR BLD AUTO: 20 % (ref 36.6–50.3)
HCT VFR BLD AUTO: 21.6 % (ref 36.6–50.3)
HCT VFR BLD AUTO: 22 % (ref 36.6–50.3)
HCT VFR BLD AUTO: 22.4 % (ref 36.6–50.3)
HCT VFR BLD AUTO: 24.6 % (ref 36.6–50.3)
HCT VFR BLD AUTO: 26.7 % (ref 36.6–50.3)
HCT VFR BLD AUTO: 26.7 % (ref 36.6–50.3)
HCT VFR BLD AUTO: 29 % (ref 36.6–50.3)
HCT VFR BLD AUTO: 29.7 % (ref 36.6–50.3)
HGB BLD-MCNC: 6.4 G/DL (ref 12.1–17)
HGB BLD-MCNC: 6.5 G/DL (ref 12.1–17)
HGB BLD-MCNC: 6.7 G/DL (ref 12.1–17)
HGB BLD-MCNC: 7.2 G/DL (ref 12.1–17)
HGB BLD-MCNC: 7.2 G/DL (ref 12.1–17)
HGB BLD-MCNC: 7.3 G/DL (ref 12.1–17)
HGB BLD-MCNC: 7.3 G/DL (ref 12.1–17)
HGB BLD-MCNC: 8.2 G/DL (ref 12.1–17)
HGB BLD-MCNC: 8.6 G/DL (ref 12.1–17)
HGB BLD-MCNC: 8.7 G/DL (ref 12.1–17)
HGB BLD-MCNC: 9.3 G/DL (ref 12.1–17)
HGB BLD-MCNC: 9.5 G/DL (ref 12.1–17)
HGB UR QL STRIP: NEGATIVE
HISTORY CHECKED?,CKHIST: NORMAL
HISTORY CHECKED?,CKHIST: NORMAL
IMM GRANULOCYTES # BLD AUTO: 0 K/UL (ref 0–0.04)
IMM GRANULOCYTES # BLD AUTO: 0 K/UL (ref 0–0.04)
IMM GRANULOCYTES # BLD AUTO: 0.1 K/UL (ref 0–0.04)
IMM GRANULOCYTES NFR BLD AUTO: 0 % (ref 0–0.5)
IMM GRANULOCYTES NFR BLD AUTO: 0 % (ref 0–0.5)
IMM GRANULOCYTES NFR BLD AUTO: 1 % (ref 0–0.5)
IRON SERPL-MCNC: 24 UG/DL (ref 35–150)
IRON SERPL-MCNC: 36 UG/DL (ref 35–150)
KETONES UR QL STRIP.AUTO: NEGATIVE MG/DL
LACTATE SERPL-SCNC: 0.6 MMOL/L (ref 0.4–2)
LACTATE SERPL-SCNC: 0.8 MMOL/L (ref 0.4–2)
LEUKOCYTE ESTERASE UR QL STRIP.AUTO: NEGATIVE
LYMPHOCYTES # BLD: 0.2 K/UL (ref 0.8–3.5)
LYMPHOCYTES # BLD: 0.3 K/UL (ref 0.8–3.5)
LYMPHOCYTES # BLD: 0.4 K/UL (ref 0.8–3.5)
LYMPHOCYTES # BLD: 0.4 K/UL (ref 0.8–3.5)
LYMPHOCYTES # BLD: 0.6 K/UL (ref 0.8–3.5)
LYMPHOCYTES # BLD: 0.7 K/UL (ref 0.8–3.5)
LYMPHOCYTES # BLD: 0.7 K/UL (ref 0.8–3.5)
LYMPHOCYTES # BLD: 1 K/UL (ref 0.8–3.5)
LYMPHOCYTES NFR BLD: 13 % (ref 12–49)
LYMPHOCYTES NFR BLD: 3 % (ref 12–49)
LYMPHOCYTES NFR BLD: 5 % (ref 12–49)
LYMPHOCYTES NFR BLD: 6 % (ref 12–49)
LYMPHOCYTES NFR BLD: 7 % (ref 12–49)
LYMPHOCYTES NFR BLD: 9 % (ref 12–49)
MAGNESIUM SERPL-MCNC: 2 MG/DL (ref 1.6–2.4)
MAGNESIUM SERPL-MCNC: 2.2 MG/DL (ref 1.6–2.4)
MCH RBC QN AUTO: 29.8 PG (ref 26–34)
MCH RBC QN AUTO: 29.8 PG (ref 26–34)
MCH RBC QN AUTO: 30 PG (ref 26–34)
MCH RBC QN AUTO: 30.1 PG (ref 26–34)
MCH RBC QN AUTO: 30.1 PG (ref 26–34)
MCH RBC QN AUTO: 30.2 PG (ref 26–34)
MCH RBC QN AUTO: 30.6 PG (ref 26–34)
MCH RBC QN AUTO: 30.9 PG (ref 26–34)
MCH RBC QN AUTO: 31.1 PG (ref 26–34)
MCH RBC QN AUTO: 31.1 PG (ref 26–34)
MCHC RBC AUTO-ENTMCNC: 31 G/DL (ref 30–36.5)
MCHC RBC AUTO-ENTMCNC: 31.3 G/DL (ref 30–36.5)
MCHC RBC AUTO-ENTMCNC: 32.2 G/DL (ref 30–36.5)
MCHC RBC AUTO-ENTMCNC: 32.6 G/DL (ref 30–36.5)
MCHC RBC AUTO-ENTMCNC: 32.7 G/DL (ref 30–36.5)
MCHC RBC AUTO-ENTMCNC: 32.7 G/DL (ref 30–36.5)
MCHC RBC AUTO-ENTMCNC: 32.8 G/DL (ref 30–36.5)
MCHC RBC AUTO-ENTMCNC: 33.3 G/DL (ref 30–36.5)
MCV RBC AUTO: 90.9 FL (ref 80–99)
MCV RBC AUTO: 91.8 FL (ref 80–99)
MCV RBC AUTO: 92.2 FL (ref 80–99)
MCV RBC AUTO: 93.2 FL (ref 80–99)
MCV RBC AUTO: 93.4 FL (ref 80–99)
MCV RBC AUTO: 93.8 FL (ref 80–99)
MCV RBC AUTO: 94.7 FL (ref 80–99)
MCV RBC AUTO: 95.3 FL (ref 80–99)
MCV RBC AUTO: 96 FL (ref 80–99)
MCV RBC AUTO: 96.4 FL (ref 80–99)
MONOCYTES # BLD: 0.2 K/UL (ref 0–1)
MONOCYTES # BLD: 0.3 K/UL (ref 0–1)
MONOCYTES # BLD: 0.5 K/UL (ref 0–1)
MONOCYTES # BLD: 0.6 K/UL (ref 0–1)
MONOCYTES # BLD: 0.7 K/UL (ref 0–1)
MONOCYTES # BLD: 0.7 K/UL (ref 0–1)
MONOCYTES # BLD: 0.8 K/UL (ref 0–1)
MONOCYTES # BLD: 0.9 K/UL (ref 0–1)
MONOCYTES NFR BLD: 12 % (ref 5–13)
MONOCYTES NFR BLD: 3 % (ref 5–13)
MONOCYTES NFR BLD: 3 % (ref 5–13)
MONOCYTES NFR BLD: 6 % (ref 5–13)
MONOCYTES NFR BLD: 6 % (ref 5–13)
MONOCYTES NFR BLD: 7 % (ref 5–13)
MONOCYTES NFR BLD: 7 % (ref 5–13)
MONOCYTES NFR BLD: 8 % (ref 5–13)
MONOCYTES NFR BLD: 8 % (ref 5–13)
MONOCYTES NFR BLD: 9 % (ref 5–13)
NEUTS SEG # BLD: 11.3 K/UL (ref 1.8–8)
NEUTS SEG # BLD: 13.2 K/UL (ref 1.8–8)
NEUTS SEG # BLD: 5.6 K/UL (ref 1.8–8)
NEUTS SEG # BLD: 6.2 K/UL (ref 1.8–8)
NEUTS SEG # BLD: 6.5 K/UL (ref 1.8–8)
NEUTS SEG # BLD: 7.2 K/UL (ref 1.8–8)
NEUTS SEG # BLD: 7.4 K/UL (ref 1.8–8)
NEUTS SEG # BLD: 7.8 K/UL (ref 1.8–8)
NEUTS SEG # BLD: 8.2 K/UL (ref 1.8–8)
NEUTS SEG # BLD: 9.9 K/UL (ref 1.8–8)
NEUTS SEG NFR BLD: 74 % (ref 32–75)
NEUTS SEG NFR BLD: 80 % (ref 32–75)
NEUTS SEG NFR BLD: 81 % (ref 32–75)
NEUTS SEG NFR BLD: 83 % (ref 32–75)
NEUTS SEG NFR BLD: 85 % (ref 32–75)
NEUTS SEG NFR BLD: 88 % (ref 32–75)
NEUTS SEG NFR BLD: 90 % (ref 32–75)
NEUTS SEG NFR BLD: 90 % (ref 32–75)
NEUTS SEG NFR BLD: 93 % (ref 32–75)
NEUTS SEG NFR BLD: 93 % (ref 32–75)
NITRITE UR QL STRIP.AUTO: NEGATIVE
NRBC # BLD: 0 K/UL (ref 0–0.01)
NRBC BLD-RTO: 0 PER 100 WBC
PH UR STRIP: 5.5 [PH] (ref 5–8)
PHOSPHATE SERPL-MCNC: 2.7 MG/DL (ref 2.6–4.7)
PHOSPHATE SERPL-MCNC: 2.7 MG/DL (ref 2.6–4.7)
PLATELET # BLD AUTO: 142 K/UL (ref 150–400)
PLATELET # BLD AUTO: 151 K/UL (ref 150–400)
PLATELET # BLD AUTO: 154 K/UL (ref 150–400)
PLATELET # BLD AUTO: 156 K/UL (ref 150–400)
PLATELET # BLD AUTO: 171 K/UL (ref 150–400)
PLATELET # BLD AUTO: 185 K/UL (ref 150–400)
PLATELET # BLD AUTO: 209 K/UL (ref 150–400)
PLATELET # BLD AUTO: 214 K/UL (ref 150–400)
PLATELET # BLD AUTO: 220 K/UL (ref 150–400)
PLATELET # BLD AUTO: 225 K/UL (ref 150–400)
PMV BLD AUTO: 10.2 FL (ref 8.9–12.9)
PMV BLD AUTO: 10.4 FL (ref 8.9–12.9)
PMV BLD AUTO: 11.2 FL (ref 8.9–12.9)
PMV BLD AUTO: 11.3 FL (ref 8.9–12.9)
PMV BLD AUTO: 11.4 FL (ref 8.9–12.9)
PMV BLD AUTO: 11.5 FL (ref 8.9–12.9)
PMV BLD AUTO: 11.6 FL (ref 8.9–12.9)
PMV BLD AUTO: 11.9 FL (ref 8.9–12.9)
POTASSIUM SERPL-SCNC: 3.9 MMOL/L (ref 3.5–5.1)
POTASSIUM SERPL-SCNC: 4.1 MMOL/L (ref 3.5–5.1)
POTASSIUM SERPL-SCNC: 4.4 MMOL/L (ref 3.5–5.1)
POTASSIUM SERPL-SCNC: 4.4 MMOL/L (ref 3.5–5.1)
POTASSIUM SERPL-SCNC: 4.8 MMOL/L (ref 3.5–5.1)
POTASSIUM SERPL-SCNC: 4.9 MMOL/L (ref 3.5–5.1)
POTASSIUM SERPL-SCNC: 5.1 MMOL/L (ref 3.5–5.1)
POTASSIUM SERPL-SCNC: 5.2 MMOL/L (ref 3.5–5.1)
POTASSIUM SERPL-SCNC: 5.2 MMOL/L (ref 3.5–5.1)
POTASSIUM SERPL-SCNC: 5.4 MMOL/L (ref 3.5–5.1)
POTASSIUM SERPL-SCNC: 6 MMOL/L (ref 3.5–5.1)
PROT SERPL-MCNC: 5.3 G/DL (ref 6.4–8.2)
PROT SERPL-MCNC: 6.3 G/DL (ref 6.4–8.2)
PROT SERPL-MCNC: 6.4 G/DL (ref 6.4–8.2)
PROT SERPL-MCNC: 6.6 G/DL (ref 6.4–8.2)
PROT SERPL-MCNC: 7.6 G/DL (ref 6.4–8.2)
PROT UR STRIP-MCNC: 100 MG/DL
Q-T INTERVAL, ECG07: 418 MS
Q-T INTERVAL, ECG07: 426 MS
Q-T INTERVAL, ECG07: 500 MS
Q-T INTERVAL, ECG07: 532 MS
QRS DURATION, ECG06: 138 MS
QRS DURATION, ECG06: 166 MS
QRS DURATION, ECG06: 166 MS
QRS DURATION, ECG06: 176 MS
QTC CALCULATION (BEZET), ECG08: 476 MS
QTC CALCULATION (BEZET), ECG08: 511 MS
QTC CALCULATION (BEZET), ECG08: 538 MS
QTC CALCULATION (BEZET), ECG08: 539 MS
RBC # BLD AUTO: 2.09 M/UL (ref 4.1–5.7)
RBC # BLD AUTO: 2.25 M/UL (ref 4.1–5.7)
RBC # BLD AUTO: 2.35 M/UL (ref 4.1–5.7)
RBC # BLD AUTO: 2.42 M/UL (ref 4.1–5.7)
RBC # BLD AUTO: 2.43 M/UL (ref 4.1–5.7)
RBC # BLD AUTO: 2.64 M/UL (ref 4.1–5.7)
RBC # BLD AUTO: 2.82 M/UL (ref 4.1–5.7)
RBC # BLD AUTO: 2.86 M/UL (ref 4.1–5.7)
RBC # BLD AUTO: 3.08 M/UL (ref 4.1–5.7)
RBC # BLD AUTO: 3.16 M/UL (ref 4.1–5.7)
RBC #/AREA URNS HPF: ABNORMAL /HPF (ref 0–5)
RBC MORPH BLD: ABNORMAL
SAMPLES BEING HELD,HOLD: NORMAL
SERVICE CMNT-IMP: ABNORMAL
SERVICE CMNT-IMP: NORMAL
SERVICE CMNT-IMP: NORMAL
SODIUM SERPL-SCNC: 130 MMOL/L (ref 136–145)
SODIUM SERPL-SCNC: 131 MMOL/L (ref 136–145)
SODIUM SERPL-SCNC: 131 MMOL/L (ref 136–145)
SODIUM SERPL-SCNC: 132 MMOL/L (ref 136–145)
SODIUM SERPL-SCNC: 132 MMOL/L (ref 136–145)
SODIUM SERPL-SCNC: 133 MMOL/L (ref 136–145)
SODIUM SERPL-SCNC: 134 MMOL/L (ref 136–145)
SODIUM SERPL-SCNC: 136 MMOL/L (ref 136–145)
SODIUM SERPL-SCNC: 138 MMOL/L (ref 136–145)
SOURCE, COVRS: ABNORMAL
SOURCE, COVRS: NORMAL
SP GR UR REFRACTOMETRY: 1.02 (ref 1–1.03)
SPECIMEN EXP DATE BLD: NORMAL
STATUS OF UNIT,%ST: NORMAL
STATUS OF UNIT,%ST: NORMAL
THERAPEUTIC RANGE,PTTT: NORMAL SECS (ref 58–77)
TROPONIN I SERPL-MCNC: 0.05 NG/ML
TROPONIN I SERPL-MCNC: 0.05 NG/ML
TROPONIN I SERPL-MCNC: 0.08 NG/ML
TROPONIN I SERPL-MCNC: 0.09 NG/ML
TROPONIN I SERPL-MCNC: 0.17 NG/ML
TROPONIN I SERPL-MCNC: 0.33 NG/ML
TROPONIN I SERPL-MCNC: 0.48 NG/ML
TROPONIN I SERPL-MCNC: <0.05 NG/ML
TSH SERPL DL<=0.05 MIU/L-ACNC: 2.36 UIU/ML (ref 0.36–3.74)
TSH SERPL DL<=0.05 MIU/L-ACNC: 3.13 UIU/ML (ref 0.36–3.74)
UNIT DIVISION, %UDIV: 0
UNIT DIVISION, %UDIV: 0
UR CULT HOLD, URHOLD: NORMAL
UROBILINOGEN UR QL STRIP.AUTO: 0.2 EU/DL (ref 0.2–1)
VENTRICULAR RATE, ECG03: 62 BPM
VENTRICULAR RATE, ECG03: 63 BPM
VENTRICULAR RATE, ECG03: 78 BPM
VENTRICULAR RATE, ECG03: 96 BPM
VIT B12 SERPL-MCNC: 1627 PG/ML (ref 193–986)
VIT B12 SERPL-MCNC: 953 PG/ML (ref 193–986)
WBC # BLD AUTO: 11 K/UL (ref 4.1–11.1)
WBC # BLD AUTO: 13.3 K/UL (ref 4.1–11.1)
WBC # BLD AUTO: 14.9 K/UL (ref 4.1–11.1)
WBC # BLD AUTO: 7.4 K/UL (ref 4.1–11.1)
WBC # BLD AUTO: 7.5 K/UL (ref 4.1–11.1)
WBC # BLD AUTO: 7.9 K/UL (ref 4.1–11.1)
WBC # BLD AUTO: 8.1 K/UL (ref 4.1–11.1)
WBC # BLD AUTO: 8.7 K/UL (ref 4.1–11.1)
WBC # BLD AUTO: 8.9 K/UL (ref 4.1–11.1)
WBC # BLD AUTO: 8.9 K/UL (ref 4.1–11.1)
WBC URNS QL MICRO: ABNORMAL /HPF (ref 0–4)

## 2021-01-01 PROCEDURE — 99285 EMERGENCY DEPT VISIT HI MDM: CPT

## 2021-01-01 PROCEDURE — 65660000001 HC RM ICU INTERMED STEPDOWN

## 2021-01-01 PROCEDURE — 96376 TX/PRO/DX INJ SAME DRUG ADON: CPT

## 2021-01-01 PROCEDURE — 82607 VITAMIN B-12: CPT

## 2021-01-01 PROCEDURE — 74011250637 HC RX REV CODE- 250/637: Performed by: NURSE PRACTITIONER

## 2021-01-01 PROCEDURE — 74011000250 HC RX REV CODE- 250: Performed by: EMERGENCY MEDICINE

## 2021-01-01 PROCEDURE — 87635 SARS-COV-2 COVID-19 AMP PRB: CPT

## 2021-01-01 PROCEDURE — 94664 DEMO&/EVAL PT USE INHALER: CPT

## 2021-01-01 PROCEDURE — 74011250636 HC RX REV CODE- 250/636: Performed by: NURSE PRACTITIONER

## 2021-01-01 PROCEDURE — 82962 GLUCOSE BLOOD TEST: CPT

## 2021-01-01 PROCEDURE — G8510 SCR DEP NEG, NO PLAN REQD: HCPCS | Performed by: SPECIALIST

## 2021-01-01 PROCEDURE — 74011250637 HC RX REV CODE- 250/637: Performed by: EMERGENCY MEDICINE

## 2021-01-01 PROCEDURE — 84484 ASSAY OF TROPONIN QUANT: CPT

## 2021-01-01 PROCEDURE — 74011250636 HC RX REV CODE- 250/636: Performed by: INTERNAL MEDICINE

## 2021-01-01 PROCEDURE — 74011250636 HC RX REV CODE- 250/636: Performed by: EMERGENCY MEDICINE

## 2021-01-01 PROCEDURE — 85730 THROMBOPLASTIN TIME PARTIAL: CPT

## 2021-01-01 PROCEDURE — 36415 COLL VENOUS BLD VENIPUNCTURE: CPT

## 2021-01-01 PROCEDURE — 80048 BASIC METABOLIC PNL TOTAL CA: CPT

## 2021-01-01 PROCEDURE — 84443 ASSAY THYROID STIM HORMONE: CPT

## 2021-01-01 PROCEDURE — 74011636637 HC RX REV CODE- 636/637: Performed by: NURSE PRACTITIONER

## 2021-01-01 PROCEDURE — 97161 PT EVAL LOW COMPLEX 20 MIN: CPT

## 2021-01-01 PROCEDURE — 99223 1ST HOSP IP/OBS HIGH 75: CPT | Performed by: SPECIALIST

## 2021-01-01 PROCEDURE — 99233 SBSQ HOSP IP/OBS HIGH 50: CPT | Performed by: INTERNAL MEDICINE

## 2021-01-01 PROCEDURE — P9016 RBC LEUKOCYTES REDUCED: HCPCS

## 2021-01-01 PROCEDURE — 1111F DSCHRG MED/CURRENT MED MERGE: CPT | Performed by: SPECIALIST

## 2021-01-01 PROCEDURE — 74011000250 HC RX REV CODE- 250: Performed by: NURSE PRACTITIONER

## 2021-01-01 PROCEDURE — G0463 HOSPITAL OUTPT CLINIC VISIT: HCPCS | Performed by: SPECIALIST

## 2021-01-01 PROCEDURE — 85025 COMPLETE CBC W/AUTO DIFF WBC: CPT

## 2021-01-01 PROCEDURE — 74011636637 HC RX REV CODE- 636/637: Performed by: INTERNAL MEDICINE

## 2021-01-01 PROCEDURE — 74011000258 HC RX REV CODE- 258: Performed by: EMERGENCY MEDICINE

## 2021-01-01 PROCEDURE — 74011000258 HC RX REV CODE- 258: Performed by: INTERNAL MEDICINE

## 2021-01-01 PROCEDURE — 74011250637 HC RX REV CODE- 250/637: Performed by: INTERNAL MEDICINE

## 2021-01-01 PROCEDURE — 94760 N-INVAS EAR/PLS OXIMETRY 1: CPT

## 2021-01-01 PROCEDURE — 36430 TRANSFUSION BLD/BLD COMPNT: CPT

## 2021-01-01 PROCEDURE — 96374 THER/PROPH/DIAG INJ IV PUSH: CPT

## 2021-01-01 PROCEDURE — 74011250637 HC RX REV CODE- 250/637: Performed by: HOSPITALIST

## 2021-01-01 PROCEDURE — 77030029684 HC NEB SM VOL KT MONA -A

## 2021-01-01 PROCEDURE — 85018 HEMOGLOBIN: CPT

## 2021-01-01 PROCEDURE — 74011000250 HC RX REV CODE- 250: Performed by: INTERNAL MEDICINE

## 2021-01-01 PROCEDURE — 94640 AIRWAY INHALATION TREATMENT: CPT

## 2021-01-01 PROCEDURE — 71045 X-RAY EXAM CHEST 1 VIEW: CPT

## 2021-01-01 PROCEDURE — 83605 ASSAY OF LACTIC ACID: CPT

## 2021-01-01 PROCEDURE — G8420 CALC BMI NORM PARAMETERS: HCPCS | Performed by: SPECIALIST

## 2021-01-01 PROCEDURE — 99215 OFFICE O/P EST HI 40 MIN: CPT | Performed by: SPECIALIST

## 2021-01-01 PROCEDURE — 86923 COMPATIBILITY TEST ELECTRIC: CPT

## 2021-01-01 PROCEDURE — 72100 X-RAY EXAM L-S SPINE 2/3 VWS: CPT

## 2021-01-01 PROCEDURE — 97530 THERAPEUTIC ACTIVITIES: CPT

## 2021-01-01 PROCEDURE — 83880 ASSAY OF NATRIURETIC PEPTIDE: CPT

## 2021-01-01 PROCEDURE — 82728 ASSAY OF FERRITIN: CPT

## 2021-01-01 PROCEDURE — 99233 SBSQ HOSP IP/OBS HIGH 50: CPT | Performed by: SPECIALIST

## 2021-01-01 PROCEDURE — 82746 ASSAY OF FOLIC ACID SERUM: CPT

## 2021-01-01 PROCEDURE — 83036 HEMOGLOBIN GLYCOSYLATED A1C: CPT

## 2021-01-01 PROCEDURE — 74176 CT ABD & PELVIS W/O CONTRAST: CPT

## 2021-01-01 PROCEDURE — 86901 BLOOD TYPING SEROLOGIC RH(D): CPT

## 2021-01-01 PROCEDURE — G8752 SYS BP LESS 140: HCPCS | Performed by: SPECIALIST

## 2021-01-01 PROCEDURE — 30233N1 TRANSFUSION OF NONAUTOLOGOUS RED BLOOD CELLS INTO PERIPHERAL VEIN, PERCUTANEOUS APPROACH: ICD-10-PCS | Performed by: HOSPITALIST

## 2021-01-01 PROCEDURE — G8754 DIAS BP LESS 90: HCPCS | Performed by: SPECIALIST

## 2021-01-01 PROCEDURE — 80053 COMPREHEN METABOLIC PANEL: CPT

## 2021-01-01 PROCEDURE — 96361 HYDRATE IV INFUSION ADD-ON: CPT

## 2021-01-01 PROCEDURE — 74011250636 HC RX REV CODE- 250/636: Performed by: STUDENT IN AN ORGANIZED HEALTH CARE EDUCATION/TRAINING PROGRAM

## 2021-01-01 PROCEDURE — 93005 ELECTROCARDIOGRAM TRACING: CPT

## 2021-01-01 PROCEDURE — 93306 TTE W/DOPPLER COMPLETE: CPT | Performed by: INTERNAL MEDICINE

## 2021-01-01 PROCEDURE — 99232 SBSQ HOSP IP/OBS MODERATE 35: CPT | Performed by: SPECIALIST

## 2021-01-01 PROCEDURE — 83735 ASSAY OF MAGNESIUM: CPT

## 2021-01-01 PROCEDURE — 83540 ASSAY OF IRON: CPT

## 2021-01-01 PROCEDURE — 74011250637 HC RX REV CODE- 250/637: Performed by: STUDENT IN AN ORGANIZED HEALTH CARE EDUCATION/TRAINING PROGRAM

## 2021-01-01 PROCEDURE — 84100 ASSAY OF PHOSPHORUS: CPT

## 2021-01-01 PROCEDURE — 93970 EXTREMITY STUDY: CPT

## 2021-01-01 PROCEDURE — 71250 CT THORAX DX C-: CPT

## 2021-01-01 PROCEDURE — G8427 DOCREV CUR MEDS BY ELIG CLIN: HCPCS | Performed by: SPECIALIST

## 2021-01-01 PROCEDURE — 81001 URINALYSIS AUTO W/SCOPE: CPT

## 2021-01-01 PROCEDURE — 65660000000 HC RM CCU STEPDOWN

## 2021-01-01 PROCEDURE — 71046 X-RAY EXAM CHEST 2 VIEWS: CPT

## 2021-01-01 PROCEDURE — 87040 BLOOD CULTURE FOR BACTERIA: CPT

## 2021-01-01 PROCEDURE — G8536 NO DOC ELDER MAL SCRN: HCPCS | Performed by: SPECIALIST

## 2021-01-01 PROCEDURE — 99223 1ST HOSP IP/OBS HIGH 75: CPT | Performed by: INTERNAL MEDICINE

## 2021-01-01 PROCEDURE — 93306 TTE W/DOPPLER COMPLETE: CPT

## 2021-01-01 PROCEDURE — 77010033678 HC OXYGEN DAILY

## 2021-01-01 PROCEDURE — 1101F PT FALLS ASSESS-DOCD LE1/YR: CPT | Performed by: SPECIALIST

## 2021-01-01 PROCEDURE — 65270000029 HC RM PRIVATE

## 2021-01-01 PROCEDURE — 85379 FIBRIN DEGRADATION QUANT: CPT

## 2021-01-01 RX ORDER — LANOLIN ALCOHOL/MO/W.PET/CERES
1000 CREAM (GRAM) TOPICAL DAILY
Status: DISCONTINUED | OUTPATIENT
Start: 2021-01-01 | End: 2021-01-01 | Stop reason: HOSPADM

## 2021-01-01 RX ORDER — MORPHINE SULFATE 2 MG/ML
2 INJECTION, SOLUTION INTRAMUSCULAR; INTRAVENOUS ONCE
Status: COMPLETED | OUTPATIENT
Start: 2021-01-01 | End: 2021-01-01

## 2021-01-01 RX ORDER — SODIUM CHLORIDE 0.9 % (FLUSH) 0.9 %
5-40 SYRINGE (ML) INJECTION AS NEEDED
Status: DISCONTINUED | OUTPATIENT
Start: 2021-01-01 | End: 2021-01-01 | Stop reason: HOSPADM

## 2021-01-01 RX ORDER — SODIUM CHLORIDE 9 MG/ML
250 INJECTION, SOLUTION INTRAVENOUS AS NEEDED
Status: DISCONTINUED | OUTPATIENT
Start: 2021-01-01 | End: 2021-01-01 | Stop reason: HOSPADM

## 2021-01-01 RX ORDER — ACETAMINOPHEN 650 MG/1
650 SUPPOSITORY RECTAL
Status: DISCONTINUED | OUTPATIENT
Start: 2021-01-01 | End: 2021-01-01 | Stop reason: HOSPADM

## 2021-01-01 RX ORDER — PROMETHAZINE HYDROCHLORIDE 25 MG/1
12.5 TABLET ORAL
Status: DISCONTINUED | OUTPATIENT
Start: 2021-01-01 | End: 2021-01-01 | Stop reason: HOSPADM

## 2021-01-01 RX ORDER — HYDROCODONE BITARTRATE AND ACETAMINOPHEN 5; 325 MG/1; MG/1
1 TABLET ORAL ONCE
Status: COMPLETED | OUTPATIENT
Start: 2021-01-01 | End: 2021-01-01

## 2021-01-01 RX ORDER — ISOSORBIDE DINITRATE 20 MG/1
20 TABLET ORAL 3 TIMES DAILY
Status: DISCONTINUED | OUTPATIENT
Start: 2021-01-01 | End: 2021-01-01 | Stop reason: HOSPADM

## 2021-01-01 RX ORDER — BUMETANIDE 1 MG/1
1 TABLET ORAL DAILY
Qty: 180 TAB | Refills: 2 | Status: SHIPPED | OUTPATIENT
Start: 2021-01-01 | End: 2021-01-01

## 2021-01-01 RX ORDER — BUMETANIDE 2 MG/1
2 TABLET ORAL 2 TIMES DAILY
Qty: 60 TAB | Refills: 1 | Status: SHIPPED | OUTPATIENT
Start: 2021-01-01 | End: 2021-01-01

## 2021-01-01 RX ORDER — AMLODIPINE BESYLATE 5 MG/1
5 TABLET ORAL DAILY
Status: DISCONTINUED | OUTPATIENT
Start: 2021-01-01 | End: 2021-01-01 | Stop reason: HOSPADM

## 2021-01-01 RX ORDER — DOXYCYCLINE 100 MG/1
100 CAPSULE ORAL 2 TIMES DAILY
Qty: 10 CAP | Refills: 0 | Status: SHIPPED | OUTPATIENT
Start: 2021-01-01

## 2021-01-01 RX ORDER — PREDNISONE 10 MG/1
TABLET ORAL
Status: ON HOLD | COMMUNITY
Start: 2021-01-01 | End: 2021-01-01

## 2021-01-01 RX ORDER — SODIUM POLYSTYRENE SULFONATE 15 G/60ML
15 SUSPENSION ORAL; RECTAL
Status: COMPLETED | OUTPATIENT
Start: 2021-01-01 | End: 2021-01-01

## 2021-01-01 RX ORDER — LEVOFLOXACIN 250 MG/1
250 TABLET ORAL DAILY
COMMUNITY

## 2021-01-01 RX ORDER — CYCLOBENZAPRINE HCL 10 MG
5 TABLET ORAL
Status: COMPLETED | OUTPATIENT
Start: 2021-01-01 | End: 2021-01-01

## 2021-01-01 RX ORDER — INSULIN LISPRO 100 [IU]/ML
6 INJECTION, SOLUTION INTRAVENOUS; SUBCUTANEOUS ONCE
Status: COMPLETED | OUTPATIENT
Start: 2021-01-01 | End: 2021-01-01

## 2021-01-01 RX ORDER — MORPHINE SULFATE 4 MG/ML
4 INJECTION INTRAVENOUS ONCE
Status: COMPLETED | OUTPATIENT
Start: 2021-01-01 | End: 2021-01-01

## 2021-01-01 RX ORDER — MAGNESIUM SULFATE 100 %
4 CRYSTALS MISCELLANEOUS AS NEEDED
Status: DISCONTINUED | OUTPATIENT
Start: 2021-01-01 | End: 2021-01-01 | Stop reason: HOSPADM

## 2021-01-01 RX ORDER — PREDNISONE 20 MG/1
40 TABLET ORAL
Qty: 10 TAB | Refills: 0 | Status: SHIPPED | OUTPATIENT
Start: 2021-01-01

## 2021-01-01 RX ORDER — SODIUM CHLORIDE 0.9 % (FLUSH) 0.9 %
5-40 SYRINGE (ML) INJECTION EVERY 8 HOURS
Status: DISCONTINUED | OUTPATIENT
Start: 2021-01-01 | End: 2021-01-01 | Stop reason: HOSPADM

## 2021-01-01 RX ORDER — ONDANSETRON 2 MG/ML
4 INJECTION INTRAMUSCULAR; INTRAVENOUS
Status: DISCONTINUED | OUTPATIENT
Start: 2021-01-01 | End: 2021-01-01 | Stop reason: HOSPADM

## 2021-01-01 RX ORDER — INSULIN LISPRO 100 [IU]/ML
INJECTION, SOLUTION INTRAVENOUS; SUBCUTANEOUS
Status: DISCONTINUED | OUTPATIENT
Start: 2021-01-01 | End: 2021-01-01 | Stop reason: HOSPADM

## 2021-01-01 RX ORDER — TAMSULOSIN HYDROCHLORIDE 0.4 MG/1
0.4 CAPSULE ORAL DAILY
Status: DISCONTINUED | OUTPATIENT
Start: 2021-01-01 | End: 2021-01-01 | Stop reason: HOSPADM

## 2021-01-01 RX ORDER — FUROSEMIDE 10 MG/ML
40 INJECTION INTRAMUSCULAR; INTRAVENOUS ONCE
Status: COMPLETED | OUTPATIENT
Start: 2021-01-01 | End: 2021-01-01

## 2021-01-01 RX ORDER — BUMETANIDE 1 MG/1
2 TABLET ORAL 2 TIMES DAILY
Status: DISCONTINUED | OUTPATIENT
Start: 2021-01-01 | End: 2021-01-01 | Stop reason: HOSPADM

## 2021-01-01 RX ORDER — HYDROCODONE BITARTRATE AND ACETAMINOPHEN 5; 325 MG/1; MG/1
1 TABLET ORAL
Qty: 10 TAB | Refills: 0 | Status: SHIPPED | OUTPATIENT
Start: 2021-01-01 | End: 2021-01-01 | Stop reason: SDUPTHER

## 2021-01-01 RX ORDER — ACETAMINOPHEN 325 MG/1
650 TABLET ORAL
Status: DISCONTINUED | OUTPATIENT
Start: 2021-01-01 | End: 2021-01-01 | Stop reason: HOSPADM

## 2021-01-01 RX ORDER — DEXTROSE 50 % IN WATER (D50W) INTRAVENOUS SYRINGE
12.5-25 AS NEEDED
Status: DISCONTINUED | OUTPATIENT
Start: 2021-01-01 | End: 2021-01-01 | Stop reason: HOSPADM

## 2021-01-01 RX ORDER — ENOXAPARIN SODIUM 100 MG/ML
30 INJECTION SUBCUTANEOUS DAILY
Status: DISCONTINUED | OUTPATIENT
Start: 2021-01-01 | End: 2021-01-01 | Stop reason: HOSPADM

## 2021-01-01 RX ORDER — PREDNISONE 20 MG/1
40 TABLET ORAL
Status: DISCONTINUED | OUTPATIENT
Start: 2021-01-01 | End: 2021-01-01 | Stop reason: HOSPADM

## 2021-01-01 RX ORDER — GUAIFENESIN 100 MG/5ML
324 LIQUID (ML) ORAL
Status: COMPLETED | OUTPATIENT
Start: 2021-01-01 | End: 2021-01-01

## 2021-01-01 RX ORDER — HYDROMORPHONE HYDROCHLORIDE 1 MG/ML
0.5 INJECTION, SOLUTION INTRAMUSCULAR; INTRAVENOUS; SUBCUTANEOUS
Status: DISCONTINUED | OUTPATIENT
Start: 2021-01-01 | End: 2021-01-01 | Stop reason: HOSPADM

## 2021-01-01 RX ORDER — FLUTICASONE FUROATE, UMECLIDINIUM BROMIDE AND VILANTEROL TRIFENATATE 100; 62.5; 25 UG/1; UG/1; UG/1
1 POWDER RESPIRATORY (INHALATION) DAILY
COMMUNITY

## 2021-01-01 RX ORDER — BUMETANIDE 0.25 MG/ML
1 INJECTION INTRAMUSCULAR; INTRAVENOUS 2 TIMES DAILY
Status: DISCONTINUED | OUTPATIENT
Start: 2021-01-01 | End: 2021-01-01

## 2021-01-01 RX ORDER — HEPARIN SODIUM 5000 [USP'U]/ML
60 INJECTION, SOLUTION INTRAVENOUS; SUBCUTANEOUS ONCE
Status: COMPLETED | OUTPATIENT
Start: 2021-01-01 | End: 2021-01-01

## 2021-01-01 RX ORDER — EPINEPHRINE 0.1 MG/ML
INJECTION INTRACARDIAC; INTRAVENOUS
Status: COMPLETED | OUTPATIENT
Start: 2021-01-01 | End: 2021-01-01

## 2021-01-01 RX ORDER — INSULIN LISPRO 100 [IU]/ML
5 INJECTION, SOLUTION INTRAVENOUS; SUBCUTANEOUS ONCE
Status: COMPLETED | OUTPATIENT
Start: 2021-01-01 | End: 2021-01-01

## 2021-01-01 RX ORDER — INSULIN ASPART 100 [IU]/ML
INJECTION, SUSPENSION SUBCUTANEOUS 2 TIMES DAILY
COMMUNITY
End: 2021-01-01

## 2021-01-01 RX ORDER — ACETAMINOPHEN 325 MG/1
650 TABLET ORAL
Status: COMPLETED | OUTPATIENT
Start: 2021-01-01 | End: 2021-01-01

## 2021-01-01 RX ORDER — LANOLIN ALCOHOL/MO/W.PET/CERES
400 CREAM (GRAM) TOPICAL DAILY
Status: DISCONTINUED | OUTPATIENT
Start: 2021-01-01 | End: 2021-01-01 | Stop reason: HOSPADM

## 2021-01-01 RX ORDER — POLYETHYLENE GLYCOL 3350 17 G/17G
17 POWDER, FOR SOLUTION ORAL DAILY PRN
Status: DISCONTINUED | OUTPATIENT
Start: 2021-01-01 | End: 2021-01-01 | Stop reason: HOSPADM

## 2021-01-01 RX ORDER — FAMOTIDINE 20 MG/1
20 TABLET, FILM COATED ORAL
Status: COMPLETED | OUTPATIENT
Start: 2021-01-01 | End: 2021-01-01

## 2021-01-01 RX ORDER — HEPARIN SODIUM 10000 [USP'U]/100ML
12-25 INJECTION, SOLUTION INTRAVENOUS
Status: DISCONTINUED | OUTPATIENT
Start: 2021-01-01 | End: 2021-01-01

## 2021-01-01 RX ORDER — ISOSORBIDE DINITRATE 10 MG/1
20 TABLET ORAL 3 TIMES DAILY
Status: DISCONTINUED | OUTPATIENT
Start: 2021-01-01 | End: 2021-01-01 | Stop reason: HOSPADM

## 2021-01-01 RX ORDER — IPRATROPIUM BROMIDE AND ALBUTEROL SULFATE 2.5; .5 MG/3ML; MG/3ML
3 SOLUTION RESPIRATORY (INHALATION)
Status: DISCONTINUED | OUTPATIENT
Start: 2021-01-01 | End: 2021-01-01

## 2021-01-01 RX ORDER — CARVEDILOL 6.25 MG/1
6.25 TABLET ORAL 2 TIMES DAILY WITH MEALS
Status: DISCONTINUED | OUTPATIENT
Start: 2021-01-01 | End: 2021-01-01 | Stop reason: HOSPADM

## 2021-01-01 RX ORDER — ATORVASTATIN CALCIUM 10 MG/1
10 TABLET, FILM COATED ORAL
Status: DISCONTINUED | OUTPATIENT
Start: 2021-01-01 | End: 2021-01-01 | Stop reason: HOSPADM

## 2021-01-01 RX ORDER — LIDOCAINE 4 G/100G
1 PATCH TOPICAL EVERY 24 HOURS
Status: DISCONTINUED | OUTPATIENT
Start: 2021-01-01 | End: 2021-01-01 | Stop reason: HOSPADM

## 2021-01-01 RX ORDER — INSULIN GLARGINE 100 [IU]/ML
INJECTION, SOLUTION SUBCUTANEOUS
COMMUNITY
Start: 2021-01-01 | End: 2021-01-01

## 2021-01-01 RX ORDER — IPRATROPIUM BROMIDE AND ALBUTEROL SULFATE 2.5; .5 MG/3ML; MG/3ML
3 SOLUTION RESPIRATORY (INHALATION)
Status: DISCONTINUED | OUTPATIENT
Start: 2021-01-01 | End: 2021-01-01 | Stop reason: HOSPADM

## 2021-01-01 RX ORDER — BUMETANIDE 2 MG/1
2 TABLET ORAL DAILY
Qty: 90 TAB | Refills: 3 | Status: SHIPPED | OUTPATIENT
Start: 2021-01-01

## 2021-01-01 RX ORDER — LEVOFLOXACIN 5 MG/ML
750 INJECTION, SOLUTION INTRAVENOUS
Status: COMPLETED | OUTPATIENT
Start: 2021-01-01 | End: 2021-01-01

## 2021-01-01 RX ORDER — HYDROCODONE BITARTRATE AND ACETAMINOPHEN 5; 325 MG/1; MG/1
1 TABLET ORAL
Qty: 30 TAB | Refills: 0 | Status: SHIPPED | OUTPATIENT
Start: 2021-01-01 | End: 2021-04-25

## 2021-01-01 RX ORDER — CARVEDILOL 6.25 MG/1
12.5 TABLET ORAL
Status: COMPLETED | OUTPATIENT
Start: 2021-01-01 | End: 2021-01-01

## 2021-01-01 RX ORDER — AMLODIPINE BESYLATE 5 MG/1
5 TABLET ORAL DAILY
Qty: 30 TAB | Refills: 1 | Status: SHIPPED | OUTPATIENT
Start: 2021-01-01

## 2021-01-01 RX ORDER — IPRATROPIUM BROMIDE AND ALBUTEROL SULFATE 2.5; .5 MG/3ML; MG/3ML
3 SOLUTION RESPIRATORY (INHALATION)
Status: COMPLETED | OUTPATIENT
Start: 2021-01-01 | End: 2021-01-01

## 2021-01-01 RX ORDER — PREDNISONE 20 MG/1
60 TABLET ORAL
Status: DISCONTINUED | OUTPATIENT
Start: 2021-01-01 | End: 2021-01-01

## 2021-01-01 RX ORDER — FACIAL-BODY WIPES
10 EACH TOPICAL DAILY PRN
Status: DISCONTINUED | OUTPATIENT
Start: 2021-01-01 | End: 2021-01-01 | Stop reason: HOSPADM

## 2021-01-01 RX ADMIN — IPRATROPIUM BROMIDE AND ALBUTEROL SULFATE 3 ML: .5; 3 SOLUTION RESPIRATORY (INHALATION) at 00:02

## 2021-01-01 RX ADMIN — Medication 10 ML: at 06:45

## 2021-01-01 RX ADMIN — CEFTRIAXONE SODIUM 1 G: 1 INJECTION, POWDER, FOR SOLUTION INTRAMUSCULAR; INTRAVENOUS at 01:48

## 2021-01-01 RX ADMIN — Medication 1 CAPSULE: at 08:57

## 2021-01-01 RX ADMIN — Medication 10 ML: at 13:33

## 2021-01-01 RX ADMIN — DOXYCYCLINE 100 MG: 100 INJECTION, POWDER, LYOPHILIZED, FOR SOLUTION INTRAVENOUS at 21:10

## 2021-01-01 RX ADMIN — CARVEDILOL 6.25 MG: 6.25 TABLET, FILM COATED ORAL at 09:53

## 2021-01-01 RX ADMIN — CYANOCOBALAMIN TAB 500 MCG 1000 MCG: 500 TAB at 08:48

## 2021-01-01 RX ADMIN — AMLODIPINE BESYLATE 5 MG: 5 TABLET ORAL at 13:33

## 2021-01-01 RX ADMIN — Medication 1 CAPSULE: at 08:48

## 2021-01-01 RX ADMIN — CYANOCOBALAMIN TAB 500 MCG 1000 MCG: 500 TAB at 08:13

## 2021-01-01 RX ADMIN — HYDROMORPHONE HYDROCHLORIDE 0.5 MG: 1 INJECTION, SOLUTION INTRAMUSCULAR; INTRAVENOUS; SUBCUTANEOUS at 16:08

## 2021-01-01 RX ADMIN — HEPARIN SODIUM 3800 UNITS: 5000 INJECTION INTRAVENOUS; SUBCUTANEOUS at 08:25

## 2021-01-01 RX ADMIN — CARVEDILOL 6.25 MG: 6.25 TABLET, FILM COATED ORAL at 09:02

## 2021-01-01 RX ADMIN — INSULIN LISPRO 2 UNITS: 100 INJECTION, SOLUTION INTRAVENOUS; SUBCUTANEOUS at 13:21

## 2021-01-01 RX ADMIN — ISOSORBIDE DINITRATE 20 MG: 10 TABLET ORAL at 09:15

## 2021-01-01 RX ADMIN — ACETAMINOPHEN 650 MG: 325 TABLET ORAL at 08:57

## 2021-01-01 RX ADMIN — ACETAMINOPHEN 650 MG: 325 TABLET ORAL at 18:49

## 2021-01-01 RX ADMIN — DOXYCYCLINE 100 MG: 100 INJECTION, POWDER, LYOPHILIZED, FOR SOLUTION INTRAVENOUS at 13:53

## 2021-01-01 RX ADMIN — CARVEDILOL 6.25 MG: 6.25 TABLET, FILM COATED ORAL at 08:49

## 2021-01-01 RX ADMIN — ISOSORBIDE DINITRATE 20 MG: 20 TABLET ORAL at 09:53

## 2021-01-01 RX ADMIN — DOXYCYCLINE 100 MG: 100 INJECTION, POWDER, LYOPHILIZED, FOR SOLUTION INTRAVENOUS at 13:19

## 2021-01-01 RX ADMIN — FUROSEMIDE 40 MG: 10 INJECTION, SOLUTION INTRAMUSCULAR; INTRAVENOUS at 17:17

## 2021-01-01 RX ADMIN — CYANOCOBALAMIN TAB 500 MCG 1000 MCG: 500 TAB at 09:53

## 2021-01-01 RX ADMIN — BUMETANIDE 1 MG: 0.25 INJECTION, SOLUTION INTRAMUSCULAR; INTRAVENOUS at 11:01

## 2021-01-01 RX ADMIN — PREDNISONE 40 MG: 20 TABLET ORAL at 08:49

## 2021-01-01 RX ADMIN — AMLODIPINE BESYLATE 5 MG: 5 TABLET ORAL at 09:53

## 2021-01-01 RX ADMIN — BUMETANIDE 1 MG: 0.25 INJECTION, SOLUTION INTRAMUSCULAR; INTRAVENOUS at 17:20

## 2021-01-01 RX ADMIN — BUMETANIDE 1 MG: 0.25 INJECTION INTRAMUSCULAR; INTRAVENOUS at 16:18

## 2021-01-01 RX ADMIN — AMLODIPINE BESYLATE 5 MG: 5 TABLET ORAL at 08:49

## 2021-01-01 RX ADMIN — MORPHINE SULFATE 2 MG: 2 INJECTION, SOLUTION INTRAMUSCULAR; INTRAVENOUS at 21:27

## 2021-01-01 RX ADMIN — ATORVASTATIN CALCIUM 10 MG: 10 TABLET, FILM COATED ORAL at 21:52

## 2021-01-01 RX ADMIN — IPRATROPIUM BROMIDE AND ALBUTEROL SULFATE 3 ML: .5; 3 SOLUTION RESPIRATORY (INHALATION) at 09:27

## 2021-01-01 RX ADMIN — INSULIN LISPRO 3 UNITS: 100 INJECTION, SOLUTION INTRAVENOUS; SUBCUTANEOUS at 12:17

## 2021-01-01 RX ADMIN — AMLODIPINE BESYLATE 5 MG: 5 TABLET ORAL at 08:57

## 2021-01-01 RX ADMIN — MORPHINE SULFATE 4 MG: 4 INJECTION INTRAVENOUS at 04:59

## 2021-01-01 RX ADMIN — IPRATROPIUM BROMIDE AND ALBUTEROL SULFATE 3 ML: .5; 3 SOLUTION RESPIRATORY (INHALATION) at 22:46

## 2021-01-01 RX ADMIN — INSULIN LISPRO 3 UNITS: 100 INJECTION, SOLUTION INTRAVENOUS; SUBCUTANEOUS at 22:12

## 2021-01-01 RX ADMIN — TAMSULOSIN HYDROCHLORIDE 0.4 MG: 0.4 CAPSULE ORAL at 09:53

## 2021-01-01 RX ADMIN — HYDROMORPHONE HYDROCHLORIDE 0.5 MG: 1 INJECTION, SOLUTION INTRAMUSCULAR; INTRAVENOUS; SUBCUTANEOUS at 09:02

## 2021-01-01 RX ADMIN — CEFTRIAXONE SODIUM 1 G: 1 INJECTION, POWDER, FOR SOLUTION INTRAMUSCULAR; INTRAVENOUS at 02:06

## 2021-01-01 RX ADMIN — IPRATROPIUM BROMIDE AND ALBUTEROL SULFATE 3 ML: .5; 3 SOLUTION RESPIRATORY (INHALATION) at 13:40

## 2021-01-01 RX ADMIN — BUMETANIDE 1 MG: 0.25 INJECTION INTRAMUSCULAR; INTRAVENOUS at 09:01

## 2021-01-01 RX ADMIN — INSULIN LISPRO 3 UNITS: 100 INJECTION, SOLUTION INTRAVENOUS; SUBCUTANEOUS at 13:10

## 2021-01-01 RX ADMIN — INSULIN LISPRO 2 UNITS: 100 INJECTION, SOLUTION INTRAVENOUS; SUBCUTANEOUS at 09:18

## 2021-01-01 RX ADMIN — EPINEPHRINE 1 MG: 0.1 INJECTION INTRACARDIAC; INTRAVENOUS at 09:24

## 2021-01-01 RX ADMIN — INSULIN LISPRO 6 UNITS: 100 INJECTION, SOLUTION INTRAVENOUS; SUBCUTANEOUS at 22:34

## 2021-01-01 RX ADMIN — BUMETANIDE 1 MG: 0.25 INJECTION INTRAMUSCULAR; INTRAVENOUS at 09:54

## 2021-01-01 RX ADMIN — ISOSORBIDE DINITRATE 20 MG: 20 TABLET ORAL at 17:17

## 2021-01-01 RX ADMIN — CYANOCOBALAMIN TAB 500 MCG 1000 MCG: 500 TAB at 09:14

## 2021-01-01 RX ADMIN — HYDROMORPHONE HYDROCHLORIDE 0.5 MG: 1 INJECTION, SOLUTION INTRAMUSCULAR; INTRAVENOUS; SUBCUTANEOUS at 16:18

## 2021-01-01 RX ADMIN — Medication 400 MG: at 09:53

## 2021-01-01 RX ADMIN — TAMSULOSIN HYDROCHLORIDE 0.4 MG: 0.4 CAPSULE ORAL at 08:48

## 2021-01-01 RX ADMIN — AMLODIPINE BESYLATE 5 MG: 5 TABLET ORAL at 09:01

## 2021-01-01 RX ADMIN — CARVEDILOL 6.25 MG: 6.25 TABLET, FILM COATED ORAL at 16:18

## 2021-01-01 RX ADMIN — ONDANSETRON 4 MG: 2 INJECTION INTRAMUSCULAR; INTRAVENOUS at 18:20

## 2021-01-01 RX ADMIN — CARVEDILOL 6.25 MG: 6.25 TABLET, FILM COATED ORAL at 16:11

## 2021-01-01 RX ADMIN — IPRATROPIUM BROMIDE AND ALBUTEROL SULFATE 3 ML: .5; 3 SOLUTION RESPIRATORY (INHALATION) at 04:29

## 2021-01-01 RX ADMIN — ISOSORBIDE DINITRATE 20 MG: 20 TABLET ORAL at 09:01

## 2021-01-01 RX ADMIN — ISOSORBIDE DINITRATE 20 MG: 20 TABLET ORAL at 08:48

## 2021-01-01 RX ADMIN — ACETAMINOPHEN 650 MG: 325 TABLET ORAL at 19:11

## 2021-01-01 RX ADMIN — INSULIN LISPRO 3 UNITS: 100 INJECTION, SOLUTION INTRAVENOUS; SUBCUTANEOUS at 17:20

## 2021-01-01 RX ADMIN — INSULIN LISPRO 5 UNITS: 100 INJECTION, SOLUTION INTRAVENOUS; SUBCUTANEOUS at 17:29

## 2021-01-01 RX ADMIN — Medication 10 ML: at 14:26

## 2021-01-01 RX ADMIN — BUMETANIDE 1 MG: 0.25 INJECTION INTRAMUSCULAR; INTRAVENOUS at 16:08

## 2021-01-01 RX ADMIN — HYDROMORPHONE HYDROCHLORIDE 0.5 MG: 1 INJECTION, SOLUTION INTRAMUSCULAR; INTRAVENOUS; SUBCUTANEOUS at 02:16

## 2021-01-01 RX ADMIN — INSULIN LISPRO 7 UNITS: 100 INJECTION, SOLUTION INTRAVENOUS; SUBCUTANEOUS at 19:09

## 2021-01-01 RX ADMIN — Medication 1 CAPSULE: at 09:01

## 2021-01-01 RX ADMIN — Medication 400 MG: at 09:16

## 2021-01-01 RX ADMIN — MORPHINE SULFATE 4 MG: 4 INJECTION INTRAVENOUS at 23:05

## 2021-01-01 RX ADMIN — DOXYCYCLINE 100 MG: 100 INJECTION, POWDER, LYOPHILIZED, FOR SOLUTION INTRAVENOUS at 01:40

## 2021-01-01 RX ADMIN — Medication 10 ML: at 22:00

## 2021-01-01 RX ADMIN — BUMETANIDE 1 MG: 0.25 INJECTION INTRAMUSCULAR; INTRAVENOUS at 08:48

## 2021-01-01 RX ADMIN — Medication 10 ML: at 21:52

## 2021-01-01 RX ADMIN — BUMETANIDE 1 MG: 0.25 INJECTION INTRAMUSCULAR; INTRAVENOUS at 08:57

## 2021-01-01 RX ADMIN — SODIUM CHLORIDE 1000 ML: 9 INJECTION, SOLUTION INTRAVENOUS at 21:59

## 2021-01-01 RX ADMIN — Medication 10 ML: at 22:15

## 2021-01-01 RX ADMIN — INSULIN LISPRO 5 UNITS: 100 INJECTION, SOLUTION INTRAVENOUS; SUBCUTANEOUS at 22:14

## 2021-01-01 RX ADMIN — DOXYCYCLINE 100 MG: 100 INJECTION, POWDER, LYOPHILIZED, FOR SOLUTION INTRAVENOUS at 14:26

## 2021-01-01 RX ADMIN — PREDNISONE 40 MG: 20 TABLET ORAL at 09:53

## 2021-01-01 RX ADMIN — ISOSORBIDE DINITRATE 20 MG: 10 TABLET ORAL at 16:19

## 2021-01-01 RX ADMIN — ISOSORBIDE DINITRATE 20 MG: 20 TABLET ORAL at 16:18

## 2021-01-01 RX ADMIN — IPRATROPIUM BROMIDE AND ALBUTEROL SULFATE 3 ML: .5; 3 SOLUTION RESPIRATORY (INHALATION) at 10:18

## 2021-01-01 RX ADMIN — TAMSULOSIN HYDROCHLORIDE 0.4 MG: 0.4 CAPSULE ORAL at 08:57

## 2021-01-01 RX ADMIN — Medication 10 ML: at 14:00

## 2021-01-01 RX ADMIN — Medication 10 ML: at 02:40

## 2021-01-01 RX ADMIN — Medication 10 ML: at 06:07

## 2021-01-01 RX ADMIN — BUMETANIDE 1 MG: 0.25 INJECTION INTRAMUSCULAR; INTRAVENOUS at 17:17

## 2021-01-01 RX ADMIN — ISOSORBIDE DINITRATE 20 MG: 20 TABLET ORAL at 08:57

## 2021-01-01 RX ADMIN — INSULIN LISPRO 2 UNITS: 100 INJECTION, SOLUTION INTRAVENOUS; SUBCUTANEOUS at 11:58

## 2021-01-01 RX ADMIN — HYDROCODONE BITARTRATE AND ACETAMINOPHEN 1 TABLET: 5; 325 TABLET ORAL at 00:58

## 2021-01-01 RX ADMIN — CYANOCOBALAMIN TAB 500 MCG 1000 MCG: 500 TAB at 08:57

## 2021-01-01 RX ADMIN — CARVEDILOL 12.5 MG: 6.25 TABLET, FILM COATED ORAL at 19:55

## 2021-01-01 RX ADMIN — Medication 10 ML: at 07:15

## 2021-01-01 RX ADMIN — ISOSORBIDE DINITRATE 20 MG: 10 TABLET ORAL at 08:12

## 2021-01-01 RX ADMIN — HYDROMORPHONE HYDROCHLORIDE 0.5 MG: 1 INJECTION, SOLUTION INTRAMUSCULAR; INTRAVENOUS; SUBCUTANEOUS at 19:12

## 2021-01-01 RX ADMIN — INSULIN LISPRO 7 UNITS: 100 INJECTION, SOLUTION INTRAVENOUS; SUBCUTANEOUS at 16:36

## 2021-01-01 RX ADMIN — SODIUM CHLORIDE 20 MG: 9 INJECTION INTRAMUSCULAR; INTRAVENOUS; SUBCUTANEOUS at 01:40

## 2021-01-01 RX ADMIN — PIPERACILLIN AND TAZOBACTAM 3.38 G: 3; .375 INJECTION, POWDER, LYOPHILIZED, FOR SOLUTION INTRAVENOUS at 18:48

## 2021-01-01 RX ADMIN — Medication 400 MG: at 08:13

## 2021-01-01 RX ADMIN — HYDROMORPHONE HYDROCHLORIDE 0.5 MG: 1 INJECTION, SOLUTION INTRAMUSCULAR; INTRAVENOUS; SUBCUTANEOUS at 02:18

## 2021-01-01 RX ADMIN — DOXYCYCLINE 100 MG: 100 INJECTION, POWDER, LYOPHILIZED, FOR SOLUTION INTRAVENOUS at 08:12

## 2021-01-01 RX ADMIN — ISOSORBIDE DINITRATE 20 MG: 20 TABLET ORAL at 22:33

## 2021-01-01 RX ADMIN — INSULIN LISPRO 3 UNITS: 100 INJECTION, SOLUTION INTRAVENOUS; SUBCUTANEOUS at 12:28

## 2021-01-01 RX ADMIN — TAMSULOSIN HYDROCHLORIDE 0.4 MG: 0.4 CAPSULE ORAL at 08:13

## 2021-01-01 RX ADMIN — AMLODIPINE BESYLATE 5 MG: 5 TABLET ORAL at 09:14

## 2021-01-01 RX ADMIN — CARVEDILOL 6.25 MG: 6.25 TABLET, FILM COATED ORAL at 08:57

## 2021-01-01 RX ADMIN — DOXYCYCLINE 100 MG: 100 INJECTION, POWDER, LYOPHILIZED, FOR SOLUTION INTRAVENOUS at 01:47

## 2021-01-01 RX ADMIN — IPRATROPIUM BROMIDE AND ALBUTEROL SULFATE 3 ML: .5; 3 SOLUTION RESPIRATORY (INHALATION) at 22:24

## 2021-01-01 RX ADMIN — ATORVASTATIN CALCIUM 10 MG: 10 TABLET, FILM COATED ORAL at 22:14

## 2021-01-01 RX ADMIN — ENOXAPARIN SODIUM 30 MG: 30 INJECTION SUBCUTANEOUS at 08:40

## 2021-01-01 RX ADMIN — ACETAMINOPHEN 650 MG: 325 TABLET ORAL at 02:39

## 2021-01-01 RX ADMIN — PREDNISONE 40 MG: 20 TABLET ORAL at 08:57

## 2021-01-01 RX ADMIN — PREDNISONE 40 MG: 20 TABLET ORAL at 09:16

## 2021-01-01 RX ADMIN — ISOSORBIDE DINITRATE 20 MG: 20 TABLET ORAL at 22:14

## 2021-01-01 RX ADMIN — Medication 10 ML: at 08:13

## 2021-01-01 RX ADMIN — INSULIN LISPRO 7 UNITS: 100 INJECTION, SOLUTION INTRAVENOUS; SUBCUTANEOUS at 09:01

## 2021-01-01 RX ADMIN — BUMETANIDE 1 MG: 0.25 INJECTION, SOLUTION INTRAMUSCULAR; INTRAVENOUS at 08:12

## 2021-01-01 RX ADMIN — ACETAMINOPHEN 650 MG: 325 TABLET ORAL at 17:17

## 2021-01-01 RX ADMIN — DOXYCYCLINE 100 MG: 100 INJECTION, POWDER, LYOPHILIZED, FOR SOLUTION INTRAVENOUS at 02:16

## 2021-01-01 RX ADMIN — CARVEDILOL 6.25 MG: 6.25 TABLET, FILM COATED ORAL at 08:13

## 2021-01-01 RX ADMIN — Medication 10 ML: at 11:01

## 2021-01-01 RX ADMIN — HEPARIN SODIUM 12 UNITS/KG/HR: 10000 INJECTION, SOLUTION INTRAVENOUS at 08:28

## 2021-01-01 RX ADMIN — Medication 1 CAPSULE: at 09:53

## 2021-01-01 RX ADMIN — TAMSULOSIN HYDROCHLORIDE 0.4 MG: 0.4 CAPSULE ORAL at 09:17

## 2021-01-01 RX ADMIN — INSULIN LISPRO 7 UNITS: 100 INJECTION, SOLUTION INTRAVENOUS; SUBCUTANEOUS at 08:45

## 2021-01-01 RX ADMIN — Medication 10 ML: at 07:01

## 2021-01-01 RX ADMIN — Medication 400 MG: at 08:48

## 2021-01-01 RX ADMIN — INSULIN LISPRO 7 UNITS: 100 INJECTION, SOLUTION INTRAVENOUS; SUBCUTANEOUS at 13:53

## 2021-01-01 RX ADMIN — Medication 10 ML: at 02:15

## 2021-01-01 RX ADMIN — PREDNISONE 40 MG: 20 TABLET ORAL at 09:01

## 2021-01-01 RX ADMIN — ATORVASTATIN CALCIUM 10 MG: 10 TABLET, FILM COATED ORAL at 22:33

## 2021-01-01 RX ADMIN — CYCLOBENZAPRINE 5 MG: 10 TABLET, FILM COATED ORAL at 18:48

## 2021-01-01 RX ADMIN — IPRATROPIUM BROMIDE AND ALBUTEROL SULFATE 3 ML: .5; 3 SOLUTION RESPIRATORY (INHALATION) at 14:36

## 2021-01-01 RX ADMIN — Medication 10 ML: at 13:59

## 2021-01-01 RX ADMIN — ISOSORBIDE DINITRATE 20 MG: 20 TABLET ORAL at 21:51

## 2021-01-01 RX ADMIN — CARVEDILOL 6.25 MG: 6.25 TABLET, FILM COATED ORAL at 16:19

## 2021-01-01 RX ADMIN — DOXYCYCLINE 100 MG: 100 INJECTION, POWDER, LYOPHILIZED, FOR SOLUTION INTRAVENOUS at 09:19

## 2021-01-01 RX ADMIN — FAMOTIDINE 20 MG: 20 TABLET, FILM COATED ORAL at 02:39

## 2021-01-01 RX ADMIN — Medication 10 ML: at 21:12

## 2021-01-01 RX ADMIN — CARVEDILOL 6.25 MG: 6.25 TABLET, FILM COATED ORAL at 17:17

## 2021-01-01 RX ADMIN — ISOSORBIDE DINITRATE 20 MG: 20 TABLET ORAL at 16:08

## 2021-01-01 RX ADMIN — HYDROMORPHONE HYDROCHLORIDE 0.5 MG: 1 INJECTION, SOLUTION INTRAMUSCULAR; INTRAVENOUS; SUBCUTANEOUS at 22:34

## 2021-01-01 RX ADMIN — HYDROMORPHONE HYDROCHLORIDE 0.5 MG: 1 INJECTION, SOLUTION INTRAMUSCULAR; INTRAVENOUS; SUBCUTANEOUS at 23:52

## 2021-01-01 RX ADMIN — IPRATROPIUM BROMIDE AND ALBUTEROL SULFATE 3 ML: .5; 3 SOLUTION RESPIRATORY (INHALATION) at 06:23

## 2021-01-01 RX ADMIN — IPRATROPIUM BROMIDE AND ALBUTEROL SULFATE 3 ML: .5; 3 SOLUTION RESPIRATORY (INHALATION) at 21:23

## 2021-01-01 RX ADMIN — ATORVASTATIN CALCIUM 10 MG: 10 TABLET, FILM COATED ORAL at 01:48

## 2021-01-01 RX ADMIN — PREDNISONE 40 MG: 20 TABLET ORAL at 08:13

## 2021-01-01 RX ADMIN — Medication 400 MG: at 08:57

## 2021-01-01 RX ADMIN — Medication 400 MG: at 09:01

## 2021-01-01 RX ADMIN — SODIUM POLYSTYRENE SULFONATE 15 G: 15 SUSPENSION ORAL; RECTAL at 10:22

## 2021-01-01 RX ADMIN — ASPIRIN 324 MG: 81 TABLET, CHEWABLE ORAL at 18:48

## 2021-01-01 RX ADMIN — POLYETHYLENE GLYCOL 3350 17 G: 17 POWDER, FOR SOLUTION ORAL at 17:20

## 2021-01-01 RX ADMIN — ATORVASTATIN CALCIUM 10 MG: 10 TABLET, FILM COATED ORAL at 21:10

## 2021-01-01 RX ADMIN — DOXYCYCLINE 100 MG: 100 INJECTION, POWDER, LYOPHILIZED, FOR SOLUTION INTRAVENOUS at 03:49

## 2021-01-01 RX ADMIN — INSULIN LISPRO 2 UNITS: 100 INJECTION, SOLUTION INTRAVENOUS; SUBCUTANEOUS at 08:58

## 2021-01-01 RX ADMIN — HYDROMORPHONE HYDROCHLORIDE 0.5 MG: 1 INJECTION, SOLUTION INTRAMUSCULAR; INTRAVENOUS; SUBCUTANEOUS at 08:47

## 2021-01-01 RX ADMIN — TAMSULOSIN HYDROCHLORIDE 0.4 MG: 0.4 CAPSULE ORAL at 09:00

## 2021-01-01 RX ADMIN — LEVOFLOXACIN 750 MG: 5 INJECTION, SOLUTION INTRAVENOUS at 19:34

## 2021-01-01 RX ADMIN — HYDROMORPHONE HYDROCHLORIDE 0.5 MG: 1 INJECTION, SOLUTION INTRAMUSCULAR; INTRAVENOUS; SUBCUTANEOUS at 07:18

## 2021-01-01 RX ADMIN — CYANOCOBALAMIN TAB 500 MCG 1000 MCG: 500 TAB at 09:01

## 2021-01-01 RX ADMIN — ISOSORBIDE DINITRATE 20 MG: 10 TABLET ORAL at 21:10

## 2021-01-01 RX ADMIN — MORPHINE SULFATE 2 MG: 2 INJECTION, SOLUTION INTRAMUSCULAR; INTRAVENOUS at 21:59

## 2021-01-01 RX ADMIN — CARVEDILOL 6.25 MG: 6.25 TABLET, FILM COATED ORAL at 09:15

## 2021-01-25 NOTE — TELEPHONE ENCOUNTER
Verified patient with two types of identifiers. Patient's BP over the last four days has been on the lower side before his morning medications 111/52, 118/53, 96/45, 111/50 with HR in the 70's. He reports he felt poorly yesterday after taking his morning medications (coreg 6.25mg, hydralazine 25mg, spironolactone 25mg, bumex 2mg and Isosorbide 20mg). His BP was 96/45 before he took his medications and dropped to 70/42 two hours later. He reports he sat down, hydrated but felt poorly all day. He was instructed by pulmonary to hold off on his Hydralazine this morning. He did and his BP while on the phone with me was 111/50 with HR 72. He will hold off on taking hydralazine until he hears back from me tomorrow and continue to monitor his BP/HR. Patient verbalized understanding and will call with any other questions.

## 2021-01-25 NOTE — TELEPHONE ENCOUNTER
Patients wife is calling regarding the patients blood pressure at it has been low. Yesterday afternoon it was 75/40.       Phone: [phone]

## 2021-01-27 NOTE — TELEPHONE ENCOUNTER
Verified patient with two types of identifiers. Updated Mrs. Negra Turner. She will update me in 5 days. Patient's wife verbalized understanding and appreciation.

## 2021-02-04 NOTE — TELEPHONE ENCOUNTER
Patients wife is calling to give an update on the patient and his recent blood pressure readings and is requesting a call back from [facility]. Please advise.     Phone:  [phone]

## 2021-02-05 NOTE — TELEPHONE ENCOUNTER
2.5 was previous baseline, can stay on same meds for now  Will consider reducing diuretic in future but for now it improved his SOB, CHF  Future Appointments   Date Time Provider Muna Delgado   2/18/2021 10:20 AM MD KARRIE Tong BS AMB   4/28/2021 11:30 AM REMOTE1, O[facility] BERNAL AMB   7/28/2021  2:00 PM REMOTE1, COSME YOON BS AMB   10/19/2021 10:20 AM PACEMAKER3, COSME YOON BS AMB   10/19/2021 10:40 AM MD KARRIE Cruz BS AMB

## 2021-02-05 NOTE — TELEPHONE ENCOUNTER
Verified patient with two types of identifiers. Patient's creatinine is 2.73 per PCP. Last BPs:    108/43 65  149/60 69  133/54 64  163/62 62  140/58 62    Patient feeling well.  Will update MD.

## 2021-02-18 NOTE — PROGRESS NOTES
Verified patient with two types of identifiers. Patient's wife reports he was feeling poorly at the beginning of the month and completed a course of antibiotics and steroids. She reports he is feeling much better now and has no more episodes of dizziness. She reports he has had no further hypotension.  Last five blood pressures and pulses:  
 
167/67 HR60 
169/57 HR62 
166/66 HR62 
165/67HR74 
159/72 HR68

## 2021-02-24 NOTE — TELEPHONE ENCOUNTER
Patients wife would like to speak with Quinton regarding a recent vv they had. Please advise.     Phon:  [phone]

## 2021-02-25 NOTE — TELEPHONE ENCOUNTER
Verified patient with two types of identifiers. Let patient's wife know I would have Dr. Amilcar Willis call her.

## 2021-02-26 NOTE — TELEPHONE ENCOUNTER
Feels sob and he turned oxygen to 2 L   Good appetite   EF in October was 65%  Mod LVH  CAD diastolic CHF and RV failure   Chronic afib  CKD with Dr Dylan Galdamez  140 #   No edema or angina  No fever  Nurse felt he had crackles  Took levaquiv since 2-03-21 to 2-9-10  pcp put on prednisone 12th for 3 day  Change bumex 1mg once day  cxr   Dr Pedro Martinez pul 3/24th  Lab Results   Component Value Date/Time    Creatinine 2.57 (H) 02/19/2020 11:27 AM      Wt Readings from Last 3 Encounters:   10/13/20 139 lb (63 kg)   10/13/20 139 lb (63 kg)   04/30/20 142 lb (64.4 kg)

## 2021-03-02 NOTE — PROGRESS NOTES
Two patient identifiers verified. Spoke to patient's wife who reports patient is very AGUILAR but is stable at rest. She is not sure the last time he saw Dr. Jorge Madrigal but I will reach out to them for notes and to move his March appointment up. Patient verbalized understanding and denies any further questions or concerns at this time.

## 2021-03-02 NOTE — PROGRESS NOTES
Xray looks very poor with lots alveolar infiltrate- but it may be ILD  Not clear if edema or pneumonia  Keep on the bumex , increase to 2mg for 3 days   When is last time she saw Dr Fer Bernstein or pulmonary-can we get last notes from them

## 2021-03-04 NOTE — TELEPHONE ENCOUNTER
Patients wife is requesting to speak with Quinton regarding an appointment. Patients wife states that she has spoken with you about this previously. Please advise.     Phone: [phone]
Verified patient with two types of identifiers. Let Mrs. Montes know I called [facility] this morning to see if they could get Mr. Divina Green seen a littler sooner than the 24th. Dr. Austin Ren team stated they would check and reach out the patient. Mrs. Divina Green reports they are being seen this afternoon.
0 = swallows foods/liquids without difficulty

## 2021-03-08 PROBLEM — R06.02 SOB (SHORTNESS OF BREATH): Status: ACTIVE | Noted: 2021-01-01

## 2021-03-08 NOTE — ED NOTES
Pt states that he is always on 2L O2. Pt got a new O2 machine 2 months ago and since about a month ago he hasnt felt like it has been working properly. Pt states that the home nurse talked to the company and they are suppose to look at the machine soon.

## 2021-03-08 NOTE — ED TRIAGE NOTES
Pt states that he has been SOB for about 15-18 days, pt states he has been to his \"heart doctor\" and his \"lung doctor\", a chest xray was done and he was placed on prednisone, pt is not done with the course but has been on the meds since last week. Pts \"lung doctor\" stated if he wasn't better in 24 hours from talking to him to go to the ER. Pt denies chest pain. Pt states that he is SOB and has a small cough, pt states the cough has improved.

## 2021-03-08 NOTE — ED PROVIDER NOTES
Patient 78-year-old male presented emergency department shortness of breath. Patient states that he has been having worsening shortness of breath for the last several days he was told by his pulmonologist that if his shortness of breath continued he would need to come the emergency department for further evaluation. Patient denies any chest pain stated his stream is difficult for him to get around secondary to shortness of breath. Past Medical History:  
Diagnosis Date  KATALINA (acute kidney injury) ([facility] [address] 6/25/2017  CAD (coronary artery disease) s/p CABG 2008  Carotid arterial disease ([address]  Chronic obstructive pulmonary disease ([facility] [address] states he is on 2L at home day and night  CKD (chronic kidney disease) stage 4, GFR 15-29 ml/min ([facility]) 10/21/2017  
 hypertension and DM nephrosclerosis cr 3.27 June 2020 Dr Manuel Cosme  Diabetes mellitus, type 2 ([facility] [address]  Diverticulitis  Hypercholesteremia  Hypertension  Pacemaker  Paroxysmal atrial fibrillation ([facility] [address] 10/21/2017  
 new onset afib with BRPR 10-19-17 admit  Pulmonary hypertension ([address] decline in TLC and diffusion capacity- Dr Iglesia Munoz  PVC's (premature ventricular contractions) 5/26/2014  PVD (peripheral vascular disease) ([facility] [address]  Rectal bleeding 12/17/2019 Past Surgical History:  
Procedure Laterality Date  COLONOSCOPY Left 12/27/2019 COLONOSCOPY performed by Bascom Kussmaul, MD at [facility] ENDOSCOPY  
 HX CORONARY ARTERY BYPASS GRAFT    
 HX HEART CATHETERIZATION    
 AL TCAT INSJ/RPL PERM LEADLESS PACEMAKER RV W/IMG N/A 5/9/2019 INSERT OR REPLACE TRANSCATH PPM LEADLESS performed by Vibha Baca MD at [place] [address], [facility] History reviewed. No pertinent family history. Social History Socioeconomic History  Marital status:  Spouse name: Not on file  Number of children: Not on file  Years of education: Not on file  Highest education level: Not on file Occupational History  Not on file Social Needs  Financial resource strain: Not on file  Food insecurity Worry: Not on file Inability: Not on file  Transportation needs Medical: Not on file Non-medical: Not on file Tobacco Use  Smoking status: Former Smoker Packs/day: 3.00 Years: 20.00 Pack years: 60.00 Types: Cigarettes Quit date: 1985 Years since quittin.1  Smokeless tobacco: Never Used Substance and Sexual Activity  Alcohol use: No  
 Drug use: No  
 Sexual activity: Not on file Lifestyle  Physical activity Days per week: Not on file Minutes per session: Not on file  Stress: Not on file Relationships  Social connections Talks on phone: Not on file Gets together: Not on file Attends [employer] service: Not on file Active member of club or organization: Not on file Attends meetings of clubs or organizations: Not on file Relationship status: Not on file  Intimate partner violence Fear of current or ex partner: Not on file Emotionally abused: Not on file Physically abused: Not on file Forced sexual activity: Not on file Other Topics Concern  Not on file Social History Narrative  Not on file ALLERGIES: Lisinopril Review of Systems Constitutional: Positive for activity change. Respiratory: Positive for shortness of breath. Cardiovascular: Positive for leg swelling. Negative for chest pain. All other systems reviewed and are negative. Vitals:  
 21 1417 21 1600 BP: (!) 188/79 (!) 178/71 Pulse: 63 61 Resp: 25 20 Temp: 97.8 °F (36.6 °C) SpO2: 98% 96% Weight: 66.6 kg (146 lb 13.2 oz) Height: 5' 9\" (1.753 m) Physical Exam 
Vitals signs and nursing note reviewed. Constitutional:   
   Appearance: He is well-developed. HENT:  
   Head: Normocephalic and atraumatic.   
Cardiovascular:  
   Rate and Rhythm: Normal rate. Pulmonary:  
   Effort: Pulmonary effort is normal.  
   Breath sounds: Examination of the right-lower field reveals decreased breath sounds and rales. Examination of the left-lower field reveals decreased breath sounds and rales. Decreased breath sounds and rales present. Abdominal:  
   General: Bowel sounds are normal.  
   Palpations: Abdomen is soft. Musculoskeletal: Normal range of motion. Skin: 
   General: Skin is warm. Neurological:  
   General: No focal deficit present. Mental Status: He is alert and oriented to person, place, and time. Psychiatric:     
   Mood and Affect: Mood normal.     
   Behavior: Behavior normal.  
 
  
 
MDM Number of Diagnoses or Management Options Acute on chronic diastolic congestive heart failure ([address] KATALINA (acute kidney injury) ([address] Dyspnea on exertion Diagnosis management comments: A/P: Acute on chronic heart failure, KATALINA, dyspnea on exertion. 20-year-old male presenting with concerns of acute on chronic CHF proBNP greater than 35,000 patient also with technically a positive troponin. Based off of these findings Patient will require admission for diuresis also patient's renal functions continuing to deteriorate with worsening creatinine. Amount and/or Complexity of Data Reviewed Clinical lab tests: ordered and reviewed Tests in the radiology section of CPT®: ordered and reviewed Procedures Perfect Serve Consult for Admission 4:28 PM 
 
ED Room Number: LEQ94/49 Patient Name and age:  Lux Sesay. 81 y.o.  male Working Diagnosis: 1. Acute on chronic diastolic congestive heart failure ([address] 2. KATALINA (acute kidney injury) ([address] 3. Dyspnea on exertion COVID-19 Suspicion:  no 
Sepsis present:  no  Reassessment needed: no 
Code Status:  Full Code Readmission: yes Isolation Requirements:  no 
Recommended Level of Care:  telemetry Department:[place] [facility] - [phone] Other:

## 2021-03-09 PROBLEM — I50.33 ACUTE ON CHRONIC DIASTOLIC (CONGESTIVE) HEART FAILURE (HCC): Status: ACTIVE | Noted: 2019-05-01

## 2021-03-09 NOTE — H&P
[address] [facility] HISTORY AND PHYSICAL Name:  Yanna Chambers 
MR#:  [id number] :  1935 ACCOUNT #:  [de-identified] ADMIT DATE:  2021 Admission order was placed by the triage hospitalist while the patient was still at [facility] emergency room at [facility] on 2021 but the patient did not arrive at [facility] to be admitted until 2021. The patient was seen, evaluated, and admitted by me on 2021. PRIMARY CARE PHYSICIAN:  Katharina Badillo MD 
 
SOURCE OF INFORMATION:  Patient and review of ED and old electronic medical records. CHIEF COMPLAINT:  Shortness of breath. HISTORY OF PRESENT ILLNESS:  This is an 67-year-old man with a past medical history significant for chronic diastolic congestive heart failure, COPD on 2 liters of oxygen at home, type 2 diabetes, hypertension, dyslipidemia, coronary artery disease, status post CABG, paroxysmal atrial fibrillation, chronic kidney disease, peripheral artery disease, who was in his usual state of health until about 2 weeks ago when the patient developed worsening of his shortness of breath. The patient has shortness of breath at baseline. Because of his COPD and congestive heart failure, he was seen virtually by his cardiologist who advised him to go to a lung doctor. The patient was seen by a lung doctor virtually and was placed on prednisone. He was also told that if there is no improvement in his shortness of breath, he should go to the emergency room for further evaluation. The shortness of breath is worse when the patient is lying down and also with activity such as walking. There was no relief with home breathing treatment. The patient has also been taking his Bumex for his congestive heart failure without any significant relief, but he stated that he has just changed his oxygen supply company with the new delivery system.   The patient does not believe that he is getting enough oxygen. The patient went to [facility] emergency room at [place] because of worsening shortness of breath. The shortness of breath was not associated with fever, rigors, or chills. The patient has a cough which is productive of yellowish sputum. When the patient arrived at the emergency room, chest x-ray was obtained. The chest x-ray did not show any new finding, but because the patient was in significant respiratory distress, he was referred to the hospitalist service for evaluation for admission. The patient received Lasix in the emergency room. He was last admitted to this hospital from 12/26/2019 to 12/28/2019. The patient was admitted with acute lower GI bleed and anemia due to the acute lower GI bleed. PAST MEDICAL HISTORY:  Chronic diastolic congestive heart failure, hypertension, type 2 diabetes, dyslipidemia, oxygen-dependent COPD, coronary artery disease, status post CABG, paroxysmal atrial fibrillation, peripheral artery disease, and chronic kidney disease. ALLERGIES:  THE PATIENT IS ALLERGIC TO LISINOPRIL. MEDICATIONS:  Albuterol nebulizer every 4 hours as needed for wheezing, Bumex 1 mg daily, Coreg 6.25 mg twice daily, Lantus insulin dosage as directed, Isordil 20 mg 3 times daily, magnesium oxide 400 mg daily, fish oil 1 capsule daily, Zocor 20 mg daily, and Flomax 0.4 mg daily. FAMILY HISTORY:  This was reviewed. His father had heart disease. PAST SURGICAL HISTORY:  This is significant for CABG and pacemaker insertion. SOCIAL HISTORY:  The patient is a former smoker. No history of alcohol abuse. REVIEW OF SYSTEMS: 
HEAD, EYES, EARS, NOSE, AND THROAT:  No headache, no dizziness, no blurring of vision, no photophobia. RESPIRATORY:  This is positive for shortness of breath and cough. No hemoptysis. CARDIOVASCULAR:  This is positive for orthopnea. No chest pain, no palpitation. GASTROINTESTINAL:  No nausea or vomiting. No diarrhea. No constipation. GENITOURINARY:  No dysuria, no urgency, and no frequency. All other systems are reviewed and they are negative. PHYSICAL EXAMINATION: 
GENERAL APPEARANCE:  The patient appeared ill, in moderate distress. VITAL SIGNS:  On arrival at the emergency room, temperature 97.8, pulse 63, respiratory rate 25, blood pressure 188/79, and oxygen saturation 98% on 2 liters of oxygen. HEAD:  Normocephalic, atraumatic. EYES:  Normal eye movement. No redness, no drainage, no discharge. EARS:  Normal external ears with no evidence of drainage. NOSE:  No deformity and no drainage. MOUTH AND THROAT:  No visible oral lesion. NECK:  Neck is supple. Mild JVD. No thyromegaly. CHEST:  Diffuse expiratory wheezing and bilateral basilar crackles. HEART:  Normal S1 and S2, regular. No clinically appreciable murmur. ABDOMEN:  Soft, nontender. Normal bowel sounds. CENTRAL NERVOUS SYSTEM:  Alert and oriented x3. No gross focal neurological deficit. EXTREMITIES:  No edema. Pulses 2+ bilaterally. MUSCULOSKELETAL:  No evidence of joint deformity or swelling. SKIN:  No active skin lesions seen in the exposed parts of the body. PSYCHIATRIC:  Normal mood and affect. LYMPHATIC:  No cervical lymphadenopathy. DIAGNOSTIC DATA:  EKG shows pacemaker rhythm and nonspecific ST and T-wave abnormalities. Chest x-ray shows no significant change in chronic upper lobe predominant interstitial lung disease, no definitive superimposed acute process. LABORATORY DATA:  Chemistry, sodium 134, potassium 4.4, chloride 98, CO2 of 96, glucose 286, , creatinine 2.65, calcium 9.0. Total bilirubin 0.4, ALT 65, AST 23, alkaline phosphatase 120, total protein 7.6, albumin level 3.5, globulin 4.1. Hematology, WBC 8.9, hemoglobin 9.3, hematocrit 29.7, and  platelets 462. Cardiac profile, troponin 0.05. Pro-BNP level greater than 35,000. COVID-19 rapid test not detected. ASSESSMENT: 
1.   Acute-on-chronic diastolic congestive heart failure. 2.  Hypertension. 3.  Type 2 diabetes with hyperglycemia. 4.  Dyslipidemia. 5.  Anemia. 6.  Chronic obstructive pulmonary disease with acute exacerbation. 7.  Suspected non-ST-elevation myocardial infarction. 8.  Chronic atrial fibrillation. 9.  Peripheral artery disease, status post angioplasty. 10.  Chronic kidney disease, stage IV. PLAN: 
1. Acute-on-chronic diastolic congestive heart failure. We will admit the patient for further evaluation and treatment. This is contributing to the patient's shortness of breath. We will continue with Bumex. The patient's Cardiology group will be consulted to assist in further evaluation and treatment of acute-on-chronic diastolic congestive heart failure. We will obtain echocardiogram. 
2.  Hypertension. We will resume preadmission medication. We will monitor the patient's blood pressure closely. 3.  Type 2 diabetes with hyperglycemia. The patient will be started on sliding scale with insulin coverage. We will check hemoglobin A1c level if one has not been checked recently. 4.  Dyslipidemia. We will continue with Lipitor and dietary therapy. 5.  Anemia. This is most likely due to chronic kidney disease, but we will also carry out anemia workup including checking a stool guaiac to rule out occult GI bleed. 6.  COPD with acute exacerbation. The patient will be started on short course of prednisone, doxycycline for any underlying chest infection. This is also contributing to the patient's shortness of breath. The patient will be placed on DuoNeb as needed. We 
will check a D-dimer to evaluate the patient for thromboembolism as another possible cause of shortness of breath. We will continue with supplemental oxygen. 7.  Suspected non-ST-elevation myocardial infarction. We will start the patient on full-dose heparin. We will continue beta-blocker. We will await further recommendation from the Cardiology.   We will continue treatment for suspected non-ST-elevation myocardial infarction until when the patient is seen by cardiologist to determine whether the patient has non-ST-elevation myocardial infarction or not. 8.  Chronic atrial fibrillation. The patient has a history of paroxysmal atrial fibrillation. We will check TSH level. We will await further recommendation from the cardiologist. 
9.  Peripheral artery disease. The patient is stable. We will continue supportive treatment. 10.  Chronic kidney disease, stage IV. The patient's Nephrology group will be consulted for continuation of care during hospitalization. 11.  Other issues:  Code status, the patient is a full code. The patient is on full-dose heparin. Because of that, there is no need for DVT prophylaxis. FUNCTIONAL STATUS PRIOR TO ADMISSION:  The patient came from home. The patient is ambulatory with a walker. COVID PRECAUTION:  The patient was wearing a face mask. I was wearing a face mask and gloves for this patient's encounter. The patient tested negative for COVID-19 virus infection in the emergency room. Chiara Ramon MD 
 
 
[id number] 
D:  03/09/2021 6:35 
T:  03/09/2021 7:48 JOB #:  [id number] CC:  Nelly Cool MD

## 2021-03-09 NOTE — ROUTINE PROCESS
TRANSFER - OUT REPORT: 
 
Verbal report given to Joao Nesbitt RN(name) on [address]  being transferred to 355(unit) for routine progression of care Report consisted of patients Situation, Background, Assessment and  
Recommendations(SBAR). Information from the following report(s) SBAR and ED Summary was reviewed with the receiving nurse. Lines:  
Peripheral IV 03/08/21 Right Wrist (Active) Site Assessment Clean, dry, & intact 03/08/21 1715 Phlebitis Assessment 0 03/08/21 1715 Infiltration Assessment 0 03/08/21 1715 Dressing Status Clean, dry, & intact 03/08/21 1715 Dressing Type Transparent 03/08/21 1715 Hub Color/Line Status Pink 03/08/21 1715 Action Taken Blood drawn 03/08/21 1715 Opportunity for questions and clarification was provided.

## 2021-03-09 NOTE — PROGRESS NOTES
Reason for Admission:  Shortness of breath RUR Score:      18% low Plan for utilizing home health:     LLOYD with [facility] for SN, PT/OT  
 
PCP: First and Last name:  Madeline Roberts MD 
 
 Name of Practice: NA Are you a current patient: Yes/No: Yes Approximate date of last visit: February 2021 Can you participate in a virtual visit with your PCP: Yes Current Advanced Directive/Advance Care Plan: Full Code Healthcare Decision Maker:  Wife Deonna Sousa [phone] is primary medical decision maker. Patient is a Full code and has ACP docs on file. Click here to complete [address] including selection of the Healthcare Decision Maker Relationship (ie \"Primary\") Transition of Care Plan:    Patient is currently off the floor. CM contacted wife to complete initial assessment. Patient lives at home with his wife and is independent. Patient uses a walker and 3 liters oxygen serviced by Sravani Renteria. Patient's preferred pharmacy is [facility] [place]. Wife to transport patient home but is requesting a early discharge as she does not drive in the dark. BCPI-A letter regarding Heart Failure has been delivered to the patient/caregiver. Medicare pt has received, reviewed, and signed 1st IM letter informing them of their right to appeal the discharge. Signed copy has been placed on pt bedside chart.  
 
 
 
Preeti Tamez MS

## 2021-03-09 NOTE — CONSULTS
Cardiology Consult Note Patient Name: Mallika Palacio  : 1935 MRN: [id number] Date: 3/9/2021  Time: 9:42 AM 
 
Admit Diagnosis: SOB (shortness of breath) [R06.02] Primary Cardiologist: Dr. Rishi Grimm MD    Consulting Cardiologist: Shankar Barclay. SKYLER Lzeama for Consult: CHF, suspected NSTEMI Requesting MD: Dr. Spring Urena MD 
 
HPI: 
Mallika Palacio is a 81 y.o. male admitted on 3/8/2021  for SOB (shortness of breath) [R06.02]. Has PMH of KATALINA, CAD s/p remote CABG, Diastolic CHF, Type II DM, COPD on chronic O2 2L, Type II DM, HLD, PPM  (leadless) 2019, PAF, PVD, GIB, CKD. Presented with chief c/o SOB. Saw Dr. Rishi Grimm virtually on 21, obtained CXR which showed alveolar infiltrates. Bumex was increased for 3 days. .  Followed up also with Pulmonary last week due to SOB and concern on xray for possible pneumonia or ILD. He reports Pulmonary placed him on steroid taper starting last week but this did not help his SOB. He reports orthopnea, SOB with ambulation. He increased his O2 to 4 L NC without relief. Denies BLE edema and chest pain. In Er, CXR showed no significiant change in chronic upper lobe ILD, no definite superimposed process. His  Pro BNP was >35,000. Troponin is 0.05 x 2. Creatinine was elevated at 2.65. His BP was elevated 188/79. Subjective:  Currently denies chest pain. SOB is improved. Assessment and Plan 1. SOB 
 -Last echo 10/2020 EF 65%, mild MR, mild TR, mild-mod pulmonary HTN 
 -CXR with no change in chronic upper lobe ILD, no superimposed acute process 
 -suspect SOB most likely related to primary pulmonary process (ILD) 
 -Currently on Bumex 1 mg IV BID 2. Elevated troponin 
 -Troponin flat at 0.05 
 -EKG v paced 
 -No chest pain 
 -suspect troponin elevation due to demand of accellerated HTN, Do not suspect that this troponin elevation represents NSTEMI. Stop IV heparin. 3. HTN: BP elevated 
 -Continue Coreg 12.5 mg BID  
 -Continue Isordil 20 mg TID 
 -Add amlodipine 5 mg daily 4. History of Diastolic CHF with RV failure 
 -Pro BNP elevated but no edema on exam, and CXR with unchanged chronic findings.  
 -Currently on Bumex 1 mg IV BID 
 -last echo with preserved EF 10/2020.- no need to repeat. 5. History of CAD/CABG x 3 off pump 3/2008 
 -continue statin, bb 6. CKD 
 -creatinine 2.52 (baseline about 2.5) -Follows with Dr. Mahsa Barorn. 7. Chronic afib 
 -[id number]. Not candidate for Imonomy Interactive due to history of falls and GIB 
 
8. ILD/COPD 
 -Per primary team. 
9. PVD 
  s/p arthrectomy to distal left SFA and distal popliteal with angioplasty 3/2014 - 
 
81 y.o. male with PMH of ILD, COPD, DHF, CAD, chronic afib and PPM who presents with chief c/o SOB. States symptoms did not improve with steroid taper. Pro BNP elevated but CXR with chronic upper lobe findings only. D/W Dr. Suzi Jamil- suspect less likely that DHF playing a role in SOB, suspect primary pulmonary etiology. Recent echo 10/2020 with preserved EF, no need to repeat. Add amlodipine for uncontrolled BP. Further plan per Dr. Suzi Jamil.  
 
Cardiac testing CAD/CABG  
off pump x3 3/2008 . =post op anemia and renal failure CATH March 7, 2008= LM -bifurcation dz 40% ;LAD 85% ; Circ ost 70% mid 70% RCA proximal 60% tapering, EF 60%-70%, bilaterally patent renal arteries, mild atherosclerosis of the distal abdominal aorta. [address] May 2, 2019 =PA 66/18 W 20 PA sat 55% CO 4.1 CI 2.12 Echo May 1 ,2019 EF 55-60% mod LAE, Mild CASSIE, Mild AS BELLE 1.6 cm2 mild MR & TR, RV fx mildly reduced PVD-9/27/18- RLE- mid CRA 70, Pop 99-PTA on right pop, LLE LCFA 40% - 1-19-17 PTA RCFA 90, JAS to RSFA  
-12/22/16 PTA Left Pop, PTA Left tib-per 
--3-13-14 PTA Left op PTA RSFA 
---1/25/11 stent RSFA  
JOCELYN 8-21-17 Normal perfusion Mild carotid artery disease 3-7-08 then 1-4-12 with 10-49% left, less than 10% on right SOCIAL: Drinks no alcohol, quit smoking several years ago, worked as an . Lives with his wife and has four children. Enjoys gardening, fishing and music. FAMILY HISTORY: Mother  of cancer at 76, father  of Parkinson's at 70, one brother  of cancer of the liver at 76 and one  of an infection at 64. Review of Symptoms: 
Constitutional: No fevers or chills. HEET: No trauma. No visual changes. No hearing loss. No sore throat. Neck: No adenopathy or swelling. Heart: No chest pain or palpitations. Lungs: +shortness of breath. No cough. Abdomen: No pain. No N&V Urology: No dysuria or hematuria. Ext: No edema. Skin: No acute rashes or ulcers. Endocrine: No heat or cold intolerance. Neuro: No transient neurologic deficit Cardiac risk factors: smoking/ tobacco exposure, dyslipidemia, sedentary life style, male gender, hypertension. Past Medical History:  
Diagnosis Date  KATALINA (acute kidney injury) ([address] 2017  CAD (coronary artery disease) s/p CABG   Carotid arterial disease ([address]  Chronic obstructive pulmonary disease ([address] states he is on 2L at home day and night  CKD (chronic kidney disease) stage 4, GFR 15-29 ml/min ([facility]) 10/21/2017  
 hypertension and DM nephrosclerosis cr 3.2020 Dr Reyes Smoke  Diabetes mellitus, type 2 ([address]  Diverticulitis  Hypercholesteremia  Hypertension  Pacemaker  Paroxysmal atrial fibrillation ([address] 10/21/2017  
 new onset afib with BRPR 10-19-17 admit  Pulmonary hypertension ([address] decline in TLC and diffusion capacity- Dr Rosales Loop  PVC's (premature ventricular contractions) 2014  PVD (peripheral vascular disease) ([address]  Rectal bleeding 2019 Past Surgical History:  
Procedure Laterality Date  COLONOSCOPY Left 2019  COLONOSCOPY performed by Norma Pedroza MD at [facility]  [place] TCAT INSJ/RPL PERM LEADLESS PACEMAKER RV W/IMG N/A 5/9/2019 INSERT OR REPLACE TRANSCATH PPM LEADLESS performed by Jessy Baker MD at [place] [address], [facility]/s Dr [facility] Current Facility-Administered Medications Medication Dose Route Frequency  albuterol-ipratropium (DUO-NEB) 2.5 MG-0.5 MG/3 ML  3 mL Nebulization Q4H PRN  
 carvediloL (COREG) tablet 6.25 mg  6.25 mg Oral BID WITH MEALS  cyanocobalamin (VITAMIN B12) tablet 1,000 mcg  1,000 mcg Oral DAILY  isosorbide dinitrate (ISORDIL) tablet 20 mg  20 mg Oral TID  magnesium oxide (MAG-OX) tablet 400 mg  400 mg Oral DAILY  atorvastatin (LIPITOR) tablet 10 mg  10 mg Oral QHS  tamsulosin (FLOMAX) capsule 0.4 mg  0.4 mg Oral DAILY  sodium chloride (NS) flush 5-40 mL  5-40 mL IntraVENous Q8H  
 sodium chloride (NS) flush 5-40 mL  5-40 mL IntraVENous PRN  
 acetaminophen (TYLENOL) tablet 650 mg  650 mg Oral Q6H PRN Or  
 acetaminophen (TYLENOL) suppository 650 mg  650 mg Rectal Q6H PRN  polyethylene glycol (MIRALAX) packet 17 g  17 g Oral DAILY PRN  promethazine (PHENERGAN) tablet 12.5 mg  12.5 mg Oral Q6H PRN Or  
 ondansetron (ZOFRAN) injection 4 mg  4 mg IntraVENous Q6H PRN  predniSONE (DELTASONE) tablet 40 mg  40 mg Oral DAILY WITH BREAKFAST  doxycycline (VIBRAMYCIN) 100 mg in 0.9% sodium chloride (MBP/ADV) 100 mL MBP  100 mg IntraVENous Q12H  
 insulin lispro (HUMALOG) injection   SubCUTAneous AC&HS  
 glucose chewable tablet 16 g  4 Tab Oral PRN  
 dextrose (D50W) injection syrg 12.5-25 g  12.5-25 g IntraVENous PRN  
 glucagon (GLUCAGEN) injection 1 mg  1 mg IntraMUSCular PRN  
 heparin 25,000 units in D5W 250 ml infusion  12-25 Units/kg/hr IntraVENous TITRATE  bumetanide (BUMEX) injection 1 mg  1 mg IntraVENous BID Allergies Allergen Reactions  Lisinopril Cough History reviewed. No pertinent family history. Social History Socioeconomic History  Marital status:    Spouse name: Not on file  Number of children: Not on file  Years of education: Not on file  Highest education level: Not on file Tobacco Use  Smoking status: Former Smoker Packs/day: 3.00 Years: 20.00 Pack years: 60.00 Types: Cigarettes Quit date: 1985 Years since quittin.1  Smokeless tobacco: Never Used Substance and Sexual Activity  Alcohol use: No  
 Drug use: No  
 
 
Objective: 
  Physical Exam 
 
Vitals:  
Vitals:  
 21 2209 21 2307 21 4150 21 0464 BP: (!) 167/74 (!) 176/79 (!) 162/66 (!) 155/75 Pulse: 67 63 61 68 Resp: 18 22 18 18 Temp:  97.6 °F (36.4 °C) 98 °F (36.7 °C) 97.6 °F (36.4 °C) SpO2: 100% 97% 98% 96% Weight:   140 lb 3.4 oz (63.6 kg) Height:      
 
 
General:    Alert, cooperative, no distress, appears stated age. Neck:   Supple,  no JVD. Back:     Symmetric,   
Lungs:     Crackles throughout bilaterally. Scattered wheeze bilaterally. Heart[de-identified]    Regular rate and rhythm, S1, S2 normal, no murmur, click, rub or gallop. Abdomen:     Soft, non-tender. Bowel sounds normal.   
Extremities:   Extremities normal, atraumatic, no cyanosis or edema. Vascular:   Pulses - 2+ Skin:   Skin color normal. No rashes or lesions Neurologic:   ADRIAN, Normal strength throughout. Telemetry: v paced ECG: v paced Data Review:  
 
Radiology: XR Results (most recent): 
Results from [facility] Encounter encounter on 21 XR CHEST PORT Narrative EXAM:  XR CHEST PORT INDICATION:   SOB 
 
COMPARISON: Chest radiograph 3/1/2021. FINDINGS: AP radiograph of the chest was obtained. There are unchanged heterogeneous interstitial and alveolar opacities noted in 
the bilateral lungs, most prominent in the bilateral upper lobes. No pleural 
effusion or pneumothorax. Unchanged cardiomegaly with postsurgical changes of 
CABG. No acute osseous abnormalities. Impression 1.  No significant change in chronic upper lobe predominant interstitial lung 
disease. No definite superimposed acute process. Recent Labs 03/08/21 
1427 TROIQ 0.05* Recent Labs 03/08/21 
1427 * K 4.4 CL 98  
CO2 26 * CREA 2.65* * CA 9.0 Recent Labs 03/09/21 
9671 03/08/21 
1427 WBC 7.5 8.9 HGB 8.7* 9.3* HCT 26.7* 29.7*  
 209 Recent Labs 03/08/21 
1427 * No results for input(s): CHOL, LDLC in the last 72 hours. No lab exists for component: TGL, HDLC,  HBA1C No results for input(s): CRP, TSH, TSHEXT in the last 72 hours. No lab exists for component: ESR Thank you very much for this referral. I appreciate the opportunity to participate in this patient's care. I will follow along with above stated plan. Chapo Garcia. FÉL[facility] [address], [address] [facility] 
   [phone] CC: Hipolito López MD

## 2021-03-09 NOTE — CONSULTS
Yessy Krishnamurthy [id number], [address] [facility] Phone: [phone]   [phone] NEPHROLOGY CONSULT NOTE Patient: Abhilash Kumar MRN: [id number]  PCP: Ning Dotson MD  
:     1935  Age:   81 y.o. Sex:  male Referring physician: Newton Danielson MD 
Reason for consultation: 81 y.o. male with SOB (shortness of breath) [F05.37] complicated by CKD 4 Admission Date: 3/8/2021  2:15 PM  LOS: 1 day ASSESSMENT and PLAN :  
CKD IV: 
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 
- Baseline Creatinine: 2.5-3 mg/dl depending on volume status  
- continue Bumex 1 mg BID  
- he no longer wishes to do dialysis if it comes to that. He has changed his mind recently about that 
  
Chronic hyponatremia  
-Na better  
  
COPD Pulmonary fibrosis Pulm HTN: 
- Echo  showing severe Pulm HTN w/ PASP ~ 72   
- suspect progression of Pulm  Fibrosis  
- ordered CT chest  
  
Hypertension:  
- BP meds being adjusted by cards  
  
Bradycardia : 
- s/p PPM on  
  
Hx lower GI bleed 
- watch Hb  
- No active bleeding  
  
Chronic A. fib Chronic diastolic congestive heart failure CAD s/p CABG 
  
DM2: 
- on insulin Care Plan discussed with:  Pt and katherine Thank you for consulting [facility] in the care of your patient. Subjective: HPI: Abhilash Kumar is a 81 y.o.  male who has been admitted to the hospital for worsening SOB. He is well known to us from advanced CKD and sees Dr Sheyla Del Toro. Last visit was in  and has an appt coming up this month. Cr was 2.9 oin Nov which is still with in his baseline range. Few weeks ago he changed his Home O2 to use a different machine He has noticed change in SOB right away. He was seen by katherine and Pulm. He was started on prednisone but did not see an improvement and presented to ER We were asked to see him for mx of CKD 4 Past Medical Hx:  
Past Medical History:  
Diagnosis Date  
 KATALINA (acute kidney injury) ([facility] 6/25/2017  CAD (coronary artery disease) s/p CABG 2008  Carotid arterial disease ([facility]  Chronic obstructive pulmonary disease ([facility] states he is on 2L at home day and night  CKD (chronic kidney disease) stage 4, GFR 15-29 ml/min ([facility]) 10/21/2017  
 hypertension and DM nephrosclerosis cr 3.27 June 2020 Dr Larry Allred  Diabetes mellitus, type 2 ([facility]  Diverticulitis  Hypercholesteremia  Hypertension  Pacemaker  Paroxysmal atrial fibrillation ([facility] 10/21/2017  
 new onset afib with BRPR 10-19-17 admit  Pulmonary hypertension ([facility] decline in TLC and diffusion capacity- Dr Raymond Martin  PVC's (premature ventricular contractions) 5/26/2014  PVD (peripheral vascular disease) ([facility]  Rectal bleeding 12/17/2019 Past Surgical Hx: 
  
Past Surgical History:  
Procedure Laterality Date  COLONOSCOPY Left 12/27/2019 COLONOSCOPY performed by Kirsten Mccormick MD at [address] [place] [address] ENDOSCOPY  
 HX CORONARY ARTERY BYPASS GRAFT    
 HX HEART CATHETERIZATION    
 AK TCAT INSJ/RPL PERM LEADLESS PACEMAKER RV W/IMG N/A 5/9/2019 INSERT OR REPLACE TRANSCATH PPM LEADLESS performed by Sid William MD at [address] [facility]  CATH LAB Allergies Allergen Reactions  Lisinopril Cough Social Hx:  reports that he quit smoking about 36 years ago. His smoking use included cigarettes. He has a 60.00 pack-year smoking history. He has never used smokeless tobacco. He reports that he does not drink alcohol or use drugs. History reviewed. No pertinent family history. Review of Systems: A thorough twelve point review of system was performed today. Pertinent positives and negatives are mentioned in the HPI. The reminder of the ROS is negative and noncontributory. Objective:   
Vitals:   
Vitals:  
 03/09/21 0353 03/09/21 0936 03/09/21 1134 03/09/21 1342 BP: (!) 162/66 (!) 155/75 (!) 173/69 Pulse: 61 68 63 Resp: 18 18 18 Temp: 98 °F (36.7 °C) 97.6 °F (36.4 °C) 97.8 °F (36.6 °C) SpO2: 98% 96% 100% 95% Weight: 63.6 kg (140 lb 3.4 oz) Height:      
 
I&O's:  03/08 0701 - 03/09 0700 In: -  
Out: 550 [Urine:550] Visit Vitals BP (!) 173/69 (BP 1 Location: Left upper arm, BP Patient Position: At rest) Pulse 63 Temp 97.8 °F (36.6 °C) Resp 18 Ht 5' 9\" (1.753 m) Wt 63.6 kg (140 lb 3.4 oz) SpO2 95% BMI 20.71 kg/m² Physical Exam: 
General:  No apparent Distress HEENT: PERRL,  No Pallor , No Icterus Neck: Supple,no mass palpable Lungs : fine crackles B/L  
CVS: RRR, S1 S2 normal, No murmur Abdomen: Soft, NT, BS + Extremities: Edema Skin: No rash or lesions. MS: No joint swelling, erythema, warmth Neurologic: non focal, AAO x 3 Laboratory Results: 
 
Recent Labs 03/09/21 
6457 03/08/21 
1427  134* K 3.9 4.4  
 98 CO2 27 26 GLU 68 286* * 109* CREA 2.52* 2.65* CA 8.6 9.0 MG 2.0  --   
PHOS 2.7  --   
ALB 3.2* 3.5 ALT 47 65 Recent Labs 03/09/21 
4042 03/08/21 
1427 WBC 7.5 8.9 HGB 8.7* 9.3* HCT 26.7* 29.7*  
 209 No results found for: SDES Lab Results Component Value Date/Time Culture result: MRSA PRESENT (A) 12/26/2019 09:05 AM  
 Culture result:  12/26/2019 09:05 AM  
      Screening of patient nares for MRSA is for surveillance purposes and, if positive, to facilitate isolation considerations in high risk settings. It is not intended for automatic decolonization interventions per se as regimens are not sufficiently effective to warrant routine use. Culture result: (A) 12/04/2019 03:27 AM  
  FEW * METHICILLIN RESISTANT STAPHYLOCOCCUS AUREUS * Culture result: (A) 12/04/2019 03:27 AM  
  LIGHT STENOTROPHOMONAS Bethany Rossana.) MALTOPHILIA CLSI GUIDELINES DO NOT RECOMMEND REPORTING SUSCEPTIBILITIES FOR S. MALTOPHILIA. TRIMETHOPRIM/SULFAMETHOXAZOLE IS THE DRUG OF CHOICE.  Culture result: HEAVY NORMAL RESPIRATORY JUDE 12/04/2019 03:27 AM Recent Results (from the past 24 hour(s)) EKG, 12 LEAD, INITIAL Collection Time: 03/08/21  2:22 PM  
Result Value Ref Range Ventricular Rate 63 BPM  
 Atrial Rate 73 BPM  
 QRS Duration 166 ms  
 Q-T Interval 500 ms QTC Calculation (Bezet) 511 ms Calculated R Axis -128 degrees Calculated T Axis -120 degrees Diagnosis Ventricular-paced rhythm Abnormal ECG When compared with ECG of 16-DEC-2019 00:53, 
Electronic ventricular pacemaker has replaced Sinus rhythm Confirmed by Prema Andujar MD. ([id number]) on 3/8/2021 8:92:56 PM 
  
METABOLIC PANEL, COMPREHENSIVE Collection Time: 03/08/21  2:27 PM  
Result Value Ref Range Sodium 134 (L) 136 - 145 mmol/L Potassium 4.4 3.5 - 5.1 mmol/L Chloride 98 97 - 108 mmol/L  
 CO2 26 21 - 32 mmol/L Anion gap 10 5 - 15 mmol/L Glucose 286 (H) 65 - 100 mg/dL  (H) 6 - 20 MG/DL Creatinine 2.65 (H) 0.70 - 1.30 MG/DL  
 BUN/Creatinine ratio 41 (H) 12 - 20 GFR est AA 28 (L) >60 ml/min/1.73m2 GFR est non-AA 23 (L) >60 ml/min/1.73m2 Calcium 9.0 8.5 - 10.1 MG/DL Bilirubin, total 0.4 0.2 - 1.0 MG/DL  
 ALT (SGPT) 65 12 - 78 U/L  
 AST (SGOT) 23 15 - 37 U/L Alk. phosphatase 120 (H) 45 - 117 U/L Protein, total 7.6 6.4 - 8.2 g/dL Albumin 3.5 3.5 - 5.0 g/dL Globulin 4.1 (H) 2.0 - 4.0 g/dL A-G Ratio 0.9 (L) 1.1 - 2.2    
CBC WITH AUTOMATED DIFF Collection Time: 03/08/21  2:27 PM  
Result Value Ref Range WBC 8.9 4.1 - 11.1 K/uL  
 RBC 3.08 (L) 4.10 - 5.70 M/uL HGB 9.3 (L) 12.1 - 17.0 g/dL HCT 29.7 (L) 36.6 - 50.3 % MCV 96.4 80.0 - 99.0 FL  
 MCH 30.2 26.0 - 34.0 PG  
 MCHC 31.3 30.0 - 36.5 g/dL  
 RDW 14.0 11.5 - 14.5 % PLATELET 983 858 - 186 K/uL MPV 10.2 8.9 - 12.9 FL  
 NRBC 0.0 0  WBC ABSOLUTE NRBC 0.00 0.00 - 0.01 K/uL NEUTROPHILS 93 (H) 32 - 75 % LYMPHOCYTES 3 (L) 12 - 49 % MONOCYTES 3 (L) 5 - 13 % EOSINOPHILS 0 0 - 7 % BASOPHILS 0 0 - 1 %  IMMATURE GRANULOCYTES 1 (H) 0.0 - 0.5 % ABS. NEUTROPHILS 8.2 (H) 1.8 - 8.0 K/UL  
 ABS. LYMPHOCYTES 0.3 (L) 0.8 - 3.5 K/UL  
 ABS. MONOCYTES 0.3 0.0 - 1.0 K/UL  
 ABS. EOSINOPHILS 0.0 0.0 - 0.4 K/UL  
 ABS. BASOPHILS 0.0 0.0 - 0.1 K/UL  
 ABS. IMM. GRANS. 0.1 (H) 0.00 - 0.04 K/UL  
 DF SMEAR SCANNED    
 RBC COMMENTS MACROCYTOSIS 1+ SAMPLES BEING HELD Collection Time: 03/08/21  2:27 PM  
Result Value Ref Range SAMPLES BEING HELD 1RED, 1BLUE, 1BC SET   
 COMMENT Add-on orders for these samples will be processed based on acceptable specimen integrity and analyte stability, which may vary by analyte. NT-PRO BNP Collection Time: 03/08/21  2:27 PM  
Result Value Ref Range NT pro-BNP >35,000 (H) 0 - 450 PG/ML  
TROPONIN I Collection Time: 03/08/21  2:27 PM  
Result Value Ref Range Troponin-I, Qt. 0.05 (H) <0.05 ng/mL COVID-19 RAPID TEST Collection Time: 03/08/21  5:29 PM  
Result Value Ref Range Specimen source Nasopharyngeal    
 COVID-19 rapid test Not detected NOTD METABOLIC PANEL, COMPREHENSIVE Collection Time: 03/09/21  7:02 AM  
Result Value Ref Range Sodium 138 136 - 145 mmol/L Potassium 3.9 3.5 - 5.1 mmol/L Chloride 103 97 - 108 mmol/L  
 CO2 27 21 - 32 mmol/L Anion gap 8 5 - 15 mmol/L Glucose 68 65 - 100 mg/dL  (H) 6 - 20 MG/DL Creatinine 2.52 (H) 0.70 - 1.30 MG/DL  
 BUN/Creatinine ratio 42 (H) 12 - 20 GFR est AA 30 (L) >60 ml/min/1.73m2 GFR est non-AA 25 (L) >60 ml/min/1.73m2 Calcium 8.6 8.5 - 10.1 MG/DL Bilirubin, total 0.4 0.2 - 1.0 MG/DL  
 ALT (SGPT) 47 12 - 78 U/L  
 AST (SGOT) 14 (L) 15 - 37 U/L Alk. phosphatase 109 45 - 117 U/L Protein, total 6.6 6.4 - 8.2 g/dL Albumin 3.2 (L) 3.5 - 5.0 g/dL Globulin 3.4 2.0 - 4.0 g/dL A-G Ratio 0.9 (L) 1.1 - 2.2    
CBC WITH AUTOMATED DIFF Collection Time: 03/09/21  7:02 AM  
Result Value Ref Range WBC 7.5 4.1 - 11.1 K/uL  
 RBC 2.82 (L) 4.10 - 5.70 M/uL  HGB 8.7 (L) 12.1 - 17.0 g/dL HCT 26.7 (L) 36.6 - 50.3 % MCV 94.7 80.0 - 99.0 FL  
 MCH 30.9 26.0 - 34.0 PG  
 MCHC 32.6 30.0 - 36.5 g/dL  
 RDW 13.8 11.5 - 14.5 % PLATELET 846 342 - 902 K/uL MPV 11.2 8.9 - 12.9 FL  
 NRBC 0.0 0  WBC ABSOLUTE NRBC 0.00 0.00 - 0.01 K/uL NEUTROPHILS 74 32 - 75 % LYMPHOCYTES 13 12 - 49 % MONOCYTES 12 5 - 13 % EOSINOPHILS 1 0 - 7 % BASOPHILS 0 0 - 1 % IMMATURE GRANULOCYTES 0 0.0 - 0.5 % ABS. NEUTROPHILS 5.6 1.8 - 8.0 K/UL  
 ABS. LYMPHOCYTES 1.0 0.8 - 3.5 K/UL  
 ABS. MONOCYTES 0.9 0.0 - 1.0 K/UL  
 ABS. EOSINOPHILS 0.1 0.0 - 0.4 K/UL  
 ABS. BASOPHILS 0.0 0.0 - 0.1 K/UL  
 ABS. IMM. GRANS. 0.0 0.00 - 0.04 K/UL  
 DF AUTOMATED    
TSH 3RD GENERATION Collection Time: 03/09/21  7:02 AM  
Result Value Ref Range TSH 2.36 0.36 - 3.74 uIU/mL MAGNESIUM Collection Time: 03/09/21  7:02 AM  
Result Value Ref Range Magnesium 2.0 1.6 - 2.4 mg/dL PHOSPHORUS Collection Time: 03/09/21  7:02 AM  
Result Value Ref Range Phosphorus 2.7 2.6 - 4.7 MG/DL  
FOLATE Collection Time: 03/09/21  7:02 AM  
Result Value Ref Range Folate 30.0 (H) 5.0 - 21.0 ng/mL VITAMIN B12 Collection Time: 03/09/21  7:02 AM  
Result Value Ref Range Vitamin B12 1,627 (H) 193 - 986 pg/mL IRON Collection Time: 03/09/21  7:02 AM  
Result Value Ref Range Iron 36 35 - 150 ug/dL FERRITIN Collection Time: 03/09/21  7:02 AM  
Result Value Ref Range Ferritin 468 (H) 26 - 388 NG/ML  
TROPONIN I Collection Time: 03/09/21  7:02 AM  
Result Value Ref Range Troponin-I, Qt. 0.05 (H) <0.05 ng/mL D DIMER Collection Time: 03/09/21  7:02 AM  
Result Value Ref Range D-dimer 0.51 0.00 - 0.65 mg/L FEU HEMOGLOBIN A1C WITH EAG Collection Time: 03/09/21  7:02 AM  
Result Value Ref Range Hemoglobin A1c 7.3 (H) 4.0 - 5.6 % Est. average glucose 163 mg/dL PTT Collection Time: 03/09/21  7:02 AM  
Result Value Ref Range  aPTT 28.1 22.1 - 31.0 sec aPTT, therapeutic range     58.0 - 77.0 SECS  
GLUCOSE, POC Collection Time: 03/09/21  8:10 AM  
Result Value Ref Range Glucose (POC) 75 65 - 100 mg/dL Performed by Renponcho Blaza GLUCOSE, POC Collection Time: 03/09/21 11:31 AM  
Result Value Ref Range Glucose (POC) 173 (H) 65 - 100 mg/dL Performed by Renell Mahamde Urine dipstick:  
Lab Results Component Value Date/Time Color YELLOW/STRAW 08/27/2019 12:56 PM  
 Appearance CLEAR 08/27/2019 12:56 PM  
 Specific gravity 1.023 08/27/2019 12:56 PM  
 pH (UA) 6.0 08/27/2019 12:56 PM  
 Protein >300 (A) 08/27/2019 12:56 PM  
 Glucose 500 (A) 08/27/2019 12:56 PM  
 Ketone NEGATIVE  08/27/2019 12:56 PM  
 Bilirubin NEGATIVE  08/27/2019 12:56 PM  
 Urobilinogen 0.2 08/27/2019 12:56 PM  
 Nitrites NEGATIVE  08/27/2019 12:56 PM  
 Leukocyte Esterase NEGATIVE  08/27/2019 12:56 PM  
 Epithelial cells FEW 08/27/2019 12:56 PM  
 Bacteria NEGATIVE  08/27/2019 12:56 PM  
 WBC 0-4 08/27/2019 12:56 PM  
 RBC 5-10 08/27/2019 12:56 PM  
 
 
I have reviewed the following: All pertinent labs, microbiology data, radiology imaging for my assessment Medications list Personally Reviewed   [x]      Yes     []               No    
 
Medications: 
Prior to Admission medications Medication Sig Start Date End Date Taking? Authorizing Provider  
fluticasone-umeclidinium-vilanterol (Trelegy Ellipta) 100-62.5-25 mcg inhaler Take 1 Puff by inhalation daily. Yes Provider, Historical  
predniSONE (DELTASONE) 10 mg tablet Take  by mouth daily (with breakfast). 40 mg once daily X 3 days, then 3 tablets once daily x 2 days, then 2 tablets once daily x 2 days, then 1 tablet once daily x 3 days. 3/4/21  Yes Provider, Historical  
bumetanide (BUMEX) 1 mg tablet Take 1 Tab by mouth daily. 3/1/21  Yes Shital Linares MD  
isosorbide dinitrate (ISORDIL) 20 mg tablet Take 1 Tab by mouth three (3) times daily.  11/24/20  Yes Shital Linares MD cyanocobalamin 1,000 mcg tablet Take 1,000 mcg by mouth daily. Yes Provider, Historical  
magnesium oxide (MAG-OX) 400 mg tablet Take 1 Tab by mouth daily. 12/11/19  Yes Simon Araya MD  
carvedilol (COREG) 12.5 mg tablet Take 6.25 mg by mouth two (2) times daily (with meals). Yes Provider, Historical  
simvastatin (ZOCOR) 20 mg tablet Take 1 Tab by mouth nightly. 11/21/19  Yes Nasim Hayden MD  
albuterol (PROVENTIL VENTOLIN) 2.5 mg /3 mL (0.083 %) nebulizer solution 3 mL by Nebulization route every four (4) hours as needed for Wheezing. 5/14/19  Yes Kira Hoyos MD  
tamsulosin [facility]) 0.4 mg capsule Take 1 Cap by mouth daily. 5/13/19  Yes Keya Sandoval, DO  
omega 2-clw-pyc-fish oil (FISH OIL) 100-160-1,000 mg cap Take 1 Cap by mouth daily. Yes Provider, Historical  
triamcinolone acetonide (KENALOG) 0.1 % topical cream  3/10/20   Provider, Historical  
insulin glargine (LANTUS U-100 INSULIN) 100 unit/mL injection 3-7 Units by SubCUTAneous route nightly. Pt reports he uses 3-7 units depending on BG    Provider, Historical  
  
 
Thank you for allowing us to participate in the care of this patient. We will follow patient. Please dont hesitate to call with any questions Devang Asher, [facility] [address] [facility], [facility] Phone - [phone] Fax - [phone] 
www. [facility]

## 2021-03-09 NOTE — PROGRESS NOTES
[address] [facility] Adult  Hospitalist Group Hospitalist Progress Note Keiko Aguilar MD 
Answering service: [phone] from in house phone Date of Service:  3/9/2021 NAME:  Julianne Quinn Sr. 
:  1935 MRN:  [id number] Admission Summary: This is an 29-year-old man with a past medical history significant for chronic diastolic congestive heart failure, COPD on 2 liters of oxygen at home, type 2 diabetes, hypertension, dyslipidemia, coronary artery disease, status post CABG, paroxysmal atrial fibrillation, chronic kidney disease, peripheral artery disease, who was in his usual state of health until about 2 weeks ago when the patient developed worsening of his shortness of breath. The patient has shortness of breath at baseline. Because of his COPD and congestive heart failure, he was seen virtually by his cardiologist who advised him to go to a lung doctor. The patient was seen by a lung doctor virtually and was placed on prednisone. He was also told that if there is no improvement in his shortness of breath, he should go to the emergency room for further evaluation. The shortness of breath is worse when the patient is lying down and also with activity such as walking. There was no relief with home breathing treatment. The patient has also been taking his Bumex for his congestive heart failure without any significant relief, but he stated that he has just changed his oxygen supply company with the new delivery system. The patient does not believe that he is getting enough oxygen. The patient went to [facility] emergency room at [address] [place] because of worsening shortness of breath. The shortness of breath was not associated with fever, rigors, or chills. The patient has a cough which is productive of yellowish sputum.   When the patient arrived at the emergency room, chest x-ray was obtained. The chest x-ray did not show any new finding, but because the patient was in significant respiratory distress, he was referred to the hospitalist service for evaluation for admission. The patient received Lasix in the emergency room. He was last admitted to this hospital from 12/26/2019 to 12/28/2019. The patient was admitted with acute lower GI bleed and anemia due to the acute lower GI bleed. 
  
 
Interval history / Subjective:  
  F/u SOB Feeling much better Back on 2l NC (baseline) BP elevated Assessment & Plan: SOB 
-sec to  Acute-on-chronic diastolic congestive heart failure vs COPD exacerbation vs worsening pulmonary fibrosis -CXR 3/8 unremarkable 
-pro BNP elevated but does not look like CHF 
-Echo 2019 showing severe Pulm HTN 
-Continue IV bumex 
-on Doxy 
-Follow CT chest 
-Outpatient being followed by Pulmonary 
-Appreciate Cardiology Elevated troponins -NSTEMI ruled out 
-EKG unremarkable 
-No chest pain 
-Heparin discontinued 
-Appreciate Cardiology Chronic A fib Rate controlled 
-not a candidate for anticoagulation CKD stage IV 
-Appreciate discussion with Nephrology 
-Outpatient follow up with Dr Shyann Blanton in 2 weeks History of CAD/CABG 
-stable, continue home meds DM type 2 
-hba1c 7.3 
-SSI Hypertension uncontrolled 
-Continue home meds Dyslipidemia 
-on Lipitor Anemia 
-of chronic disease Diabetic diet 
  
  
Code status: FULL CODE 
DVT prophylaxis: scd Plan: Follow CT chest. Likely discharge tomorrow if CT chest unremarkable and BP improved Care Plan discussed with: Patient/Family Anticipated Disposition: Home w/Family Anticipated Discharge: 24 hours to 48 hours Hospital Problems  Date Reviewed: 3/9/2021 Codes Class Noted POA * (Principal) SOB (shortness of breath) ICD-10-CM: R06.02 
ICD-9-CM: 786.05  3/8/2021 Yes Acute on chronic diastolic (congestive) heart failure (HCC) ICD-10-CM: I50.33 ICD-9-CM: 428.33, 428.0  5/1/2019 Unknown Review of Systems: A comprehensive review of systems was negative except for that written in the HPI. Vital Signs:  
 Last 24hrs VS reviewed since prior progress note. Most recent are: 
Visit Vitals BP (!) 155/75 (BP 1 Location: Right upper arm, BP Patient Position: At rest) Pulse 68 Temp 97.6 °F (36.4 °C) Resp 18 Ht 5' 9\" (1.753 m) Wt 63.6 kg (140 lb 3.4 oz) SpO2 96% BMI 20.71 kg/m² Intake/Output Summary (Last 24 hours) at 3/9/2021 1012 Last data filed at 3/8/2021 1731 Gross per 24 hour Intake  Output 550 ml Net -550 ml Physical Examination:  
 
I had a face to face encounter with this patient and independently examined them on 3/9/2021 as outlined below: 
 
     
Constitutional:  No acute distress, cooperative, pleasant ENT:  Oral mucosa moist, oropharynx benign. Resp:  CTA bilaterally. No wheezing/rhonchi/rales. No accessory muscle use CV:  Regular rhythm, normal rate, no murmurs, gallops, rubs GI:  Soft, non distended, non tender. normoactive bowel sounds, no hepatosplenomegaly Musculoskeletal:  No edema, warm, 2+ pulses throughout Neurologic:  Moves all extremities. AAOx3, CN II-XII reviewed Skin:  Good turgor, no rashes or ulcers Data Review:  
 Review and/or order of clinical lab test 
 
 
Labs:  
 
Recent Labs 03/09/21 
7934 03/08/21 
1427 WBC 7.5 8.9 HGB 8.7* 9.3* HCT 26.7* 29.7*  
 209 Recent Labs 03/09/21 
0051 03/08/21 
1427  134* K 3.9 4.4  
 98 CO2 27 26 * 109* CREA 2.52* 2.65* GLU 68 286* CA 8.6 9.0 MG 2.0  --   
PHOS 2.7  --   
 
Recent Labs 03/09/21 
0576 03/08/21 
1427 ALT 47 65  120* TBILI 0.4 0.4 TP 6.6 7.6 ALB 3.2* 3.5 GLOB 3.4 4.1* Recent Labs 03/09/21 
1329 APTT 28.1 Recent Labs 03/09/21 
9650 FERR 468* Lab Results Component Value Date/Time  Folate 11.3 12/03/2019 08:44 AM  
  
No results for input(s): PH, PCO2, PO2 in the last 72 hours. Recent Labs 03/09/21 
7079 03/08/21 
1427 TROIQ 0.05* 0.05* No results found for: CHOL, CHOLX, CHLST, CHOLV, HDL, HDLP, LDL, LDLC, DLDLP, TGLX, TRIGL, TRIGP, CHHD, CHHDX Lab Results Component Value Date/Time Glucose (POC) 75 03/09/2021 08:10 AM  
 Glucose (POC) 307 (H) 12/28/2019 04:06 PM  
 Glucose (POC) 242 (H) 12/28/2019 11:34 AM  
 Glucose (POC) 144 (H) 12/28/2019 06:25 AM  
 Glucose (POC) 263 (H) 12/27/2019 10:53 PM  
 
Lab Results Component Value Date/Time Color YELLOW/STRAW 08/27/2019 12:56 PM  
 Appearance CLEAR 08/27/2019 12:56 PM  
 Specific gravity 1.023 08/27/2019 12:56 PM  
 pH (UA) 6.0 08/27/2019 12:56 PM  
 Protein >300 (A) 08/27/2019 12:56 PM  
 Glucose 500 (A) 08/27/2019 12:56 PM  
 Ketone NEGATIVE  08/27/2019 12:56 PM  
 Bilirubin NEGATIVE  08/27/2019 12:56 PM  
 Urobilinogen 0.2 08/27/2019 12:56 PM  
 Nitrites NEGATIVE  08/27/2019 12:56 PM  
 Leukocyte Esterase NEGATIVE  08/27/2019 12:56 PM  
 Epithelial cells FEW 08/27/2019 12:56 PM  
 Bacteria NEGATIVE  08/27/2019 12:56 PM  
 WBC 0-4 08/27/2019 12:56 PM  
 RBC 5-10 08/27/2019 12:56 PM  
 
 
 
Medications Reviewed:  
 
Current Facility-Administered Medications Medication Dose Route Frequency  albuterol-ipratropium (DUO-NEB) 2.5 MG-0.5 MG/3 ML  3 mL Nebulization Q4H PRN  
 carvediloL (COREG) tablet 6.25 mg  6.25 mg Oral BID WITH MEALS  cyanocobalamin (VITAMIN B12) tablet 1,000 mcg  1,000 mcg Oral DAILY  isosorbide dinitrate (ISORDIL) tablet 20 mg  20 mg Oral TID  magnesium oxide (MAG-OX) tablet 400 mg  400 mg Oral DAILY  atorvastatin (LIPITOR) tablet 10 mg  10 mg Oral QHS  tamsulosin (FLOMAX) capsule 0.4 mg  0.4 mg Oral DAILY  sodium chloride (NS) flush 5-40 mL  5-40 mL IntraVENous Q8H  
 sodium chloride (NS) flush 5-40 mL  5-40 mL IntraVENous PRN  
 acetaminophen (TYLENOL) tablet 650 mg  650 mg Oral Q6H PRN  Or  acetaminophen (TYLENOL) suppository 650 mg  650 mg Rectal Q6H PRN  polyethylene glycol (MIRALAX) packet 17 g  17 g Oral DAILY PRN  promethazine (PHENERGAN) tablet 12.5 mg  12.5 mg Oral Q6H PRN Or  
 ondansetron (ZOFRAN) injection 4 mg  4 mg IntraVENous Q6H PRN  predniSONE (DELTASONE) tablet 40 mg  40 mg Oral DAILY WITH BREAKFAST  doxycycline (VIBRAMYCIN) 100 mg in 0.9% sodium chloride (MBP/ADV) 100 mL MBP  100 mg IntraVENous Q12H  
 insulin lispro (HUMALOG) injection   SubCUTAneous AC&HS  
 glucose chewable tablet 16 g  4 Tab Oral PRN  
 dextrose (D50W) injection syrg 12.5-25 g  12.5-25 g IntraVENous PRN  
 glucagon (GLUCAGEN) injection 1 mg  1 mg IntraMUSCular PRN  
 heparin 25,000 units in D5W 250 ml infusion  12-25 Units/kg/hr IntraVENous TITRATE  bumetanide (BUMEX) injection 1 mg  1 mg IntraVENous BID  
 
______________________________________________________________________ EXPECTED LENGTH OF STAY: - - - 
ACTUAL LENGTH OF STAY:          1 Tayler Cintron MD

## 2021-03-09 NOTE — NURSE NAVIGATOR
Chart reviewed by Heart Failure Nurse Navigator. Heart Failure database completed. EF:  65% 10/13/20 ACEi/ARB/ARNi:  Not indicated BB: Coreg Aldosterone Antagonist: Not indicated Obstructive Sleep Apnea Screening: N/1 
 STOP-BANG score: 
 Referred to Sleep Medicine: CRT not indicated NYHA Functional Class Requested via provider message on "[employer] Heart Failure Teach Back in Patient Education. Heart Failure Avoiding Triggers on Discharge Instructions. Cardiologist: 
 
 
Post discharge follow up phone call to be made within 48-72 hours of discharge.

## 2021-03-09 NOTE — PROGRESS NOTES
Bedside shift change report given to Aris Nissen, RN by [facility] RN. Report included the following information SBAR, Kardex, Intake/Output, MAR and Cardiac Rhythm NSR.

## 2021-03-10 NOTE — PROGRESS NOTES
Cardiology Progress Note Admit Date: 3/8/2021 Admit Diagnosis: SOB (shortness of breath) [R06.02] Date: 3/10/2021     Time: 12:08 PM 
 
Subjective:  Denies chest pain. Reports SOB has improved Assessment and Plan 1. SOB 
            -Last echo 10/2020 EF 65%, mild MR, mild TR, mild-mod pulmonary HTN 
            -CXR:no change in chronic upper lobe ILD, no superimposed acute process 
            -Suspect SOB most likely related to primary pulmonary process (ILD), not due to fluid overload. -On Bumex 1 mg IV BID 
  
2. Elevated troponin 
            -Troponin flat at 0.05, EKG v paced 
            -No chest pain 
            -Troponin elevation due to demand of accellerated HTN, Do not suspect that this troponin elevation represents NSTEMI.  
  
3. HTN: BP elevated but improved later AM 
            -Coreg 12.5 mg BID,     
     -Isordil 20 mg TID 
            -Amlodipine 5 mg daily (added 3/9/21) 
  
4. History of Diastolic CHF with RV dysfunction 
 -EF 65%, mildly reduced RV systolic function (echo 62/9094) -Pro BNP elevated but no edema on exam, and CXR with unchanged chronic findings.  
            -Currently on Bumex 1 mg IV BID 
            -last echo with preserved EF 10/2020.- no need to repeat.  
  
5. CAD/CABG x 3 off pump 3/2008 
            -continue statin, bb 6. CKD 
            -creatinine 2.52,unchanged. (baseline about 2.5-3) -Renal following. 
  
7. Chronic afib 
 -Vpaced. -[id number]. Not candidate for [facility] due to history of falls and GIB 
  
8. ILD 
            -Suspect ILD/primary pulmonary disease playing significant role in SOB. 
 -On prednisone 
 -Pulmonary consult 9. PVD 
             s/p arthrectomy to distal left SFA and distal popliteal with angioplasty 3/2014        - 
  
SOB better today.   Suspect that ILD/primary pulmonary disease is playing the main role in pt's SOB. Consult Pulmonary. Further plan per Dr. Jaylen Mcarthur.   
  
 
 
Cardiac testing CAD/CABG  
off pump x3 3/2008 . =post op anemia and renal failure CATH March 7, 2008= LM -bifurcation dz 40% ;LAD 85% ; Circ ost 70% mid 70% RCA proximal 60% tapering, EF 60%-70%, bilaterally patent renal arteries, mild atherosclerosis of the distal abdominal aorta. 
  
RHC May 2, 2019 =PA 66/18 W 20 PA sat 55% CO 4.1 CI 2.12 Echo May 1 ,2019 EF 55-60% mod LAE, Mild CASSIE, Mild AS BELLE 1.6 cm2 mild MR & TR, RV fx mildly reduced PVD-9/27/18- RLE- mid CRA 70, Pop 99-PTA on right pop, LLE LCFA 40% - 1-19-17 PTA RCFA 90, JAS to RSFA  
-12/22/16 PTA Left Pop, PTA Left tib-per 
--3-13-14 PTA Left op PTA RSFA 
---1/25/11 stent RSFA  
JOCELYN 8-21-17 Normal perfusion 
  
Mild carotid artery disease 3-7-08 then 1-4-12 with 10-49% left, less than 10% on right 
  
PMH Past Medical History:  
Diagnosis Date  KATALINA (acute kidney injury) ([address] 6/25/2017  CAD (coronary artery disease) s/p CABG 2008  Carotid arterial disease ([address]  Chronic obstructive pulmonary disease ([address] states he is on 2L at home day and night  CKD (chronic kidney disease) stage 4, GFR 15-29 ml/min ([facility]) 10/21/2017  
 hypertension and DM nephrosclerosis cr 3.27 June 2020 Dr Ally Snowden  Diabetes mellitus, type 2 ([address]  Diverticulitis  Hypercholesteremia  Hypertension  Pacemaker  Paroxysmal atrial fibrillation ([address] 10/21/2017  
 new onset afib with BRPR 10-19-17 admit  Pulmonary hypertension ([address] decline in TLC and diffusion capacity- Dr Natividad Haq  PVC's (premature ventricular contractions) 5/26/2014  PVD (peripheral vascular disease) ([address]  Rectal bleeding 12/17/2019 Social Hx Social History Socioeconomic History  Marital status:  Spouse name: Not on file  Number of children: Not on file  Years of education: Not on file  Highest education level: Not on file Occupational History  Not on file Social Needs  Financial resource strain: Not on file  Food insecurity Worry: Not on file Inability: Not on file  Transportation needs Medical: Not on file Non-medical: Not on file Tobacco Use  Smoking status: Former Smoker Packs/day: 3.00 Years: 20.00 Pack years: 60.00 Types: Cigarettes Quit date: 1985 Years since quittin.1  Smokeless tobacco: Never Used Substance and Sexual Activity  Alcohol use: No  
 Drug use: No  
 Sexual activity: Not on file Lifestyle  Physical activity Days per week: Not on file Minutes per session: Not on file  Stress: Not on file Relationships  Social connections Talks on phone: Not on file Gets together: Not on file Attends Adventism service: Not on file Active member of club or organization: Not on file Attends meetings of clubs or organizations: Not on file Relationship status: Not on file  Intimate partner violence Fear of current or ex partner: Not on file Emotionally abused: Not on file Physically abused: Not on file Forced sexual activity: Not on file Other Topics Concern  Not on file Social History Narrative  Not on file Objective: 
  
 Physical Exam: 
             
Visit Vitals BP (!) 149/62 (BP 1 Location: Right upper arm, BP Patient Position: At rest) Pulse 61 Temp 97.8 °F (36.6 °C) Resp 20 Ht 5' 9\" (1.753 m) Wt 148 lb 2.4 oz (67.2 kg) SpO2 98% BMI 21.88 kg/m² General Appearance:   Well developed, well nourished,alert and oriented x 3, and 
 individual in no acute distress. Ears/Nose/Mouth/Throat:    Hearing grossly normal.  
Neck: no JVD Neck:  Supple. Chest:    Lungs with Diffuse crackles noted bilaterally. Cardiovascular:   Regular rate and rhythm, S1, S2 normal, no murmur. Abdomen:    Soft, non-tender, bowel sounds are active.   
Extremities:  No edema bilaterally. Skin:  Warm and dry. Telemetry: v paced Data Review:  
 Labs:   
Recent Results (from the past 24 hour(s)) GLUCOSE, POC Collection Time: 03/09/21  4:34 PM  
Result Value Ref Range Glucose (POC) 214 (H) 65 - 100 mg/dL Performed by Linda Mccloud GLUCOSE, POC Collection Time: 03/09/21  9:36 PM  
Result Value Ref Range Glucose (POC) 268 (H) 65 - 100 mg/dL Performed by John Coronado CBC WITH AUTOMATED DIFF Collection Time: 03/10/21  3:53 AM  
Result Value Ref Range WBC 8.1 4.1 - 11.1 K/uL  
 RBC 2.86 (L) 4.10 - 5.70 M/uL HGB 8.6 (L) 12.1 - 17.0 g/dL HCT 26.7 (L) 36.6 - 50.3 % MCV 93.4 80.0 - 99.0 FL  
 MCH 30.1 26.0 - 34.0 PG  
 MCHC 32.2 30.0 - 36.5 g/dL  
 RDW 13.3 11.5 - 14.5 % PLATELET 972 086 - 981 K/uL MPV 11.3 8.9 - 12.9 FL  
 NRBC 0.0 0  WBC ABSOLUTE NRBC 0.00 0.00 - 0.01 K/uL NEUTROPHILS 80 (H) 32 - 75 % LYMPHOCYTES 9 (L) 12 - 49 % MONOCYTES 9 5 - 13 % EOSINOPHILS 1 0 - 7 % BASOPHILS 0 0 - 1 % IMMATURE GRANULOCYTES 1 (H) 0.0 - 0.5 % ABS. NEUTROPHILS 6.5 1.8 - 8.0 K/UL  
 ABS. LYMPHOCYTES 0.7 (L) 0.8 - 3.5 K/UL  
 ABS. MONOCYTES 0.7 0.0 - 1.0 K/UL  
 ABS. EOSINOPHILS 0.1 0.0 - 0.4 K/UL  
 ABS. BASOPHILS 0.0 0.0 - 0.1 K/UL  
 ABS. IMM. GRANS. 0.1 (H) 0.00 - 0.04 K/UL  
 DF SMEAR SCANNED    
 RBC COMMENTS MACROCYTOSIS 1+ 
    
 RBC COMMENTS OVALOCYTES 1+ METABOLIC PANEL, BASIC Collection Time: 03/10/21  3:53 AM  
Result Value Ref Range Sodium 133 (L) 136 - 145 mmol/L Potassium 4.1 3.5 - 5.1 mmol/L Chloride 98 97 - 108 mmol/L  
 CO2 27 21 - 32 mmol/L Anion gap 8 5 - 15 mmol/L Glucose 196 (H) 65 - 100 mg/dL  (H) 6 - 20 MG/DL Creatinine 2.52 (H) 0.70 - 1.30 MG/DL  
 BUN/Creatinine ratio 41 (H) 12 - 20 GFR est AA 30 (L) >60 ml/min/1.73m2 GFR est non-AA 25 (L) >60 ml/min/1.73m2 Calcium 7.9 (L) 8.5 - 10.1 MG/DL  
GLUCOSE, POC  Collection Time: 03/10/21 9:02 AM  
Result Value Ref Range Glucose (POC) 188 (H) 65 - 100 mg/dL Performed by [facility]  PCT   
GLUCOSE, POC Collection Time: 03/10/21 11:27 AM  
Result Value Ref Range Glucose (POC) 201 (H) 65 - 100 mg/dL Performed by [facility]  PCT Radiology:  
 
  
Current Facility-Administered Medications Medication Dose Route Frequency  albuterol-ipratropium (DUO-NEB) 2.5 MG-0.5 MG/3 ML  3 mL Nebulization Q4H PRN  
 carvediloL (COREG) tablet 6.25 mg  6.25 mg Oral BID WITH MEALS  cyanocobalamin (VITAMIN B12) tablet 1,000 mcg  1,000 mcg Oral DAILY  isosorbide dinitrate (ISORDIL) tablet 20 mg  20 mg Oral TID  magnesium oxide (MAG-OX) tablet 400 mg  400 mg Oral DAILY  atorvastatin (LIPITOR) tablet 10 mg  10 mg Oral QHS  tamsulosin (FLOMAX) capsule 0.4 mg  0.4 mg Oral DAILY  sodium chloride (NS) flush 5-40 mL  5-40 mL IntraVENous Q8H  
 sodium chloride (NS) flush 5-40 mL  5-40 mL IntraVENous PRN  
 acetaminophen (TYLENOL) tablet 650 mg  650 mg Oral Q6H PRN Or  
 acetaminophen (TYLENOL) suppository 650 mg  650 mg Rectal Q6H PRN  polyethylene glycol (MIRALAX) packet 17 g  17 g Oral DAILY PRN  promethazine (PHENERGAN) tablet 12.5 mg  12.5 mg Oral Q6H PRN Or  
 ondansetron (ZOFRAN) injection 4 mg  4 mg IntraVENous Q6H PRN  predniSONE (DELTASONE) tablet 40 mg  40 mg Oral DAILY WITH BREAKFAST  doxycycline (VIBRAMYCIN) 100 mg in 0.9% sodium chloride (MBP/ADV) 100 mL MBP  100 mg IntraVENous Q12H  
 insulin lispro (HUMALOG) injection   SubCUTAneous AC&HS  
 glucose chewable tablet 16 g  4 Tab Oral PRN  
 dextrose (D50W) injection syrg 12.5-25 g  12.5-25 g IntraVENous PRN  
 glucagon (GLUCAGEN) injection 1 mg  1 mg IntraMUSCular PRN  
 bumetanide (BUMEX) injection 1 mg  1 mg IntraVENous BID  amLODIPine (NORVASC) tablet 5 mg  5 mg Oral DAILY Dick Ruiz. [facility] [address], [address]  [address], [place] [id number] [phone]

## 2021-03-10 NOTE — PROGRESS NOTES
Pharmacist Discharge Medication Reconciliation Discharge Medications:  
Current Discharge Medication List  
  
 
START taking these medications Details  
amLODIPine (NORVASC) 5 mg tablet Take 1 Tab by mouth daily. Qty: 30 Tab, Refills: 1 CONTINUE these medications which have CHANGED Details  
bumetanide (BUMEX) 2 mg tablet Take 1 Tab by mouth two (2) times a day. Qty: 60 Tab, Refills: 1 CONTINUE these medications which have NOT CHANGED Details  
fluticasone-umeclidinium-vilanterol (Trelegy Ellipta) 100-62.5-25 mcg inhaler Take 1 Puff by inhalation daily. predniSONE (DELTASONE) 10 mg tablet Take  by mouth daily (with breakfast). 40 mg once daily X 3 days, then 3 tablets once daily x 2 days, then 2 tablets once daily x 2 days, then 1 tablet once daily x 3 days. isosorbide dinitrate (ISORDIL) 20 mg tablet Take 1 Tab by mouth three (3) times daily. Qty: 90 Tab, Refills: 5 Associated Diagnoses: Coronary artery disease involving native coronary artery of native heart without angina pectoris  
  
cyanocobalamin 1,000 mcg tablet Take 1,000 mcg by mouth daily. magnesium oxide (MAG-OX) 400 mg tablet Take 1 Tab by mouth daily. Qty: 15 Tab, Refills: 0  
  
carvedilol (COREG) 12.5 mg tablet Take 6.25 mg by mouth two (2) times daily (with meals). simvastatin (ZOCOR) 20 mg tablet Take 1 Tab by mouth nightly. Qty: 90 Tab, Refills: 3 Associated Diagnoses: Coronary artery disease involving native coronary artery of native heart without angina pectoris; Hypercholesteremia  
  
albuterol (PROVENTIL VENTOLIN) 2.5 mg /3 mL (0.083 %) nebulizer solution 3 mL by Nebulization route every four (4) hours as needed for Wheezing. Qty: 1 Package, Refills: 0  
  
tamsulosin (FLOMAX) 0.4 mg capsule Take 1 Cap by mouth daily. Qty: 30 Cap, Refills: 0  
  
omega 3-dha-epa-fish oil (FISH OIL) 100-160-1,000 mg cap Take 1 Cap by mouth daily.   
  
triamcinolone acetonide (KENALOG) 0.1 % topical cream   
  
insulin glargine (LANTUS U-100 INSULIN) 100 unit/mL injection 3-7 Units by SubCUTAneous route nightly. Pt reports he uses 3-7 units depending on BG The patient's chart, MAR and AVS were reviewed by Em Wesley.

## 2021-03-10 NOTE — PROGRESS NOTES
[facility] 
 [address] [facility] [address], [address] [facility] Phone: [phone]   [phone]   
  
Nephrology Progress Note Aicha White.     1935     [id number] Date of Admission : 3/8/2021 
03/10/21 CC: Follow up for  ckd 4 Assessment and Plan CKD IV: 
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 
- Baseline Creatinine: 2.5-3 mg/dl depending on volume status  
- d/c home on Bumex 2 mg BID  
- elevated BUN from steroids - f/u with Dr Inderjit Lewis in 2 weeks as scheduled previously  
  
Chronic hyponatremia  
-Na better  
  
COPD Pulmonary fibrosis Pulm HTN: 
- Echo 2019 showing severe Pulm HTN w/ PASP ~ 72   
- suspect progression of Pulm  Fibrosis - Chest CT : not suggestive of progression of IPF  
  
Hypertension:  
- BP meds being adjusted by cards  
  
Bradycardia : 
- s/p PPM on 5/9 
  
Hx lower GI bleed 
- watch Hb  
- No active bleeding  
  
Chronic A. fib Chronic diastolic congestive heart failure CAD s/p CABG 
  
DM2: 
- on insulin 
  
Care Plan discussed with:  Pt and cards Interval History: 
Seen and examined He feels like he is back to baseline Chest CT discussed w/ pt Review of Systems: A comprehensive review of systems was negative except for that written in the HPI. Current Medications:  
Current Facility-Administered Medications Medication Dose Route Frequency  bumetanide (BUMEX) tablet 2 mg  2 mg Oral BID  albuterol-ipratropium (DUO-NEB) 2.5 MG-0.5 MG/3 ML  3 mL Nebulization Q4H PRN  
 carvediloL (COREG) tablet 6.25 mg  6.25 mg Oral BID WITH MEALS  cyanocobalamin (VITAMIN B12) tablet 1,000 mcg  1,000 mcg Oral DAILY  isosorbide dinitrate (ISORDIL) tablet 20 mg  20 mg Oral TID  magnesium oxide (MAG-OX) tablet 400 mg  400 mg Oral DAILY  atorvastatin (LIPITOR) tablet 10 mg  10 mg Oral QHS  tamsulosin (FLOMAX) capsule 0.4 mg  0.4 mg Oral DAILY  sodium chloride (NS) flush 5-40 mL  5-40 mL IntraVENous Q8H  
 sodium chloride (NS) flush 5-40 mL  5-40 mL IntraVENous PRN  
 acetaminophen (TYLENOL) tablet 650 mg  650 mg Oral Q6H PRN Or  
 acetaminophen (TYLENOL) suppository 650 mg  650 mg Rectal Q6H PRN  polyethylene glycol (MIRALAX) packet 17 g  17 g Oral DAILY PRN  promethazine (PHENERGAN) tablet 12.5 mg  12.5 mg Oral Q6H PRN Or  
 ondansetron (ZOFRAN) injection 4 mg  4 mg IntraVENous Q6H PRN  predniSONE (DELTASONE) tablet 40 mg  40 mg Oral DAILY WITH BREAKFAST  doxycycline (VIBRAMYCIN) 100 mg in 0.9% sodium chloride (MBP/ADV) 100 mL MBP  100 mg IntraVENous Q12H  
 insulin lispro (HUMALOG) injection   SubCUTAneous AC&HS  
 glucose chewable tablet 16 g  4 Tab Oral PRN  
 dextrose (D50W) injection syrg 12.5-25 g  12.5-25 g IntraVENous PRN  
 glucagon (GLUCAGEN) injection 1 mg  1 mg IntraMUSCular PRN  
 amLODIPine (NORVASC) tablet 5 mg  5 mg Oral DAILY Allergies Allergen Reactions  Lisinopril Cough Objective: 
Vitals:   
Vitals:  
 03/10/21 0330 03/10/21 0909 03/10/21 0926 03/10/21 1119 BP: (!) 168/90 (!) 165/63  (!) 149/62 Pulse: 66 61  61 Resp: 20 21  20 Temp: 98.4 °F (36.9 °C) 97.8 °F (36.6 °C)  97.8 °F (36.6 °C) SpO2: 96% 100% 98% 98% Weight:      
Height:      
 
Intake and Output: 
03/10 0701 - 03/10 1900 In: -  
Out: 400 [Urine:400] 03/08 1901 - 03/10 0700 In: 240 [P.O.:240] Out: 1550 [[id number]] Physical Examination: 
General:  No apparent Distress HEENT: PERRL,  No Pallor , No Icterus Neck: Supple,no mass palpable Lungs : fine crackles B/L  
CVS: RRR, S1 S2 normal, No murmur Abdomen: Soft, NT, BS + Extremities: Edema Skin: No rash or lesions. MS: No joint swelling, erythema, warmth Neurologic: non focal, AAO x 3 []    High complexity decision making was performed 
[]    Patient is at high-risk of decompensation with multiple organ involvement Lab Data Personally Reviewed: I have reviewed all the pertinent labs, microbiology data and radiology studies during assessment. Recent Labs  
  03/10/21 
0353 03/09/21 
0702 03/08/21 
1427 * 138 134* K 4.1 3.9 4.4 CL 98 103 98 CO2 27 27 26 * 68 286* * 105* 109* CREA 2.52* 2.52* 2.65* CA 7.9* 8.6 9.0 MG  --  2.0  --   
PHOS  --  2.7  --   
ALB  --  3.2* 3.5 ALT  --  47 65 Recent Labs  
  03/10/21 
0353 03/09/21 
0702 03/08/21 
1427 WBC 8.1 7.5 8.9 HGB 8.6* 8.7* 9.3* HCT 26.7* 26.7* 29.7*  
 214 209 No results found for: SDES Lab Results Component Value Date/Time Culture result: MRSA PRESENT (A) 12/26/2019 09:05 AM  
 Culture result:  12/26/2019 09:05 AM  
      Screening of patient nares for MRSA is for surveillance purposes and, if positive, to facilitate isolation considerations in high risk settings. It is not intended for automatic decolonization interventions per se as regimens are not sufficiently effective to warrant routine use. Culture result: (A) 12/04/2019 03:27 AM  
  FEW * METHICILLIN RESISTANT STAPHYLOCOCCUS AUREUS * Culture result: (A) 12/04/2019 03:27 AM  
  LIGHT STENOTROPHOMONAS Longoria Hash.) MALTOPHILIA CLSI GUIDELINES DO NOT RECOMMEND REPORTING SUSCEPTIBILITIES FOR S. MALTOPHILIA. TRIMETHOPRIM/SULFAMETHOXAZOLE IS THE DRUG OF CHOICE. Culture result: HEAVY NORMAL RESPIRATORY JUDE 12/04/2019 03:27 AM  
 
Recent Results (from the past 24 hour(s)) GLUCOSE, POC Collection Time: 03/09/21  4:34 PM  
Result Value Ref Range Glucose (POC) 214 (H) 65 - 100 mg/dL Performed by Simon Dennis GLUCOSE, POC Collection Time: 03/09/21  9:36 PM  
Result Value Ref Range Glucose (POC) 268 (H) 65 - 100 mg/dL Performed by Robin CALLOWAY WITH AUTOMATED DIFF Collection Time: 03/10/21  3:53 AM  
Result Value Ref Range WBC 8.1 4.1 - 11.1 K/uL  
 RBC 2.86 (L) 4.10 - 5.70 M/uL HGB 8.6 (L) 12.1 - 17.0 g/dL HCT 26.7 (L) 36.6 - 50.3 %  MCV 93.4 80.0 - 99.0 FL  
 MCH 30.1 26.0 - 34.0 PG  
 MCHC 32.2 30.0 - 36.5 g/dL  
 RDW 13.3 11.5 - 14.5 % PLATELET 574 557 - 584 K/uL MPV 11.3 8.9 - 12.9 FL  
 NRBC 0.0 0  WBC ABSOLUTE NRBC 0.00 0.00 - 0.01 K/uL NEUTROPHILS 80 (H) 32 - 75 % LYMPHOCYTES 9 (L) 12 - 49 % MONOCYTES 9 5 - 13 % EOSINOPHILS 1 0 - 7 % BASOPHILS 0 0 - 1 % IMMATURE GRANULOCYTES 1 (H) 0.0 - 0.5 % ABS. NEUTROPHILS 6.5 1.8 - 8.0 K/UL  
 ABS. LYMPHOCYTES 0.7 (L) 0.8 - 3.5 K/UL  
 ABS. MONOCYTES 0.7 0.0 - 1.0 K/UL  
 ABS. EOSINOPHILS 0.1 0.0 - 0.4 K/UL  
 ABS. BASOPHILS 0.0 0.0 - 0.1 K/UL  
 ABS. IMM. GRANS. 0.1 (H) 0.00 - 0.04 K/UL  
 DF SMEAR SCANNED    
 RBC COMMENTS MACROCYTOSIS 1+ 
    
 RBC COMMENTS OVALOCYTES 1+ METABOLIC PANEL, BASIC Collection Time: 03/10/21  3:53 AM  
Result Value Ref Range Sodium 133 (L) 136 - 145 mmol/L Potassium 4.1 3.5 - 5.1 mmol/L Chloride 98 97 - 108 mmol/L  
 CO2 27 21 - 32 mmol/L Anion gap 8 5 - 15 mmol/L Glucose 196 (H) 65 - 100 mg/dL  (H) 6 - 20 MG/DL Creatinine 2.52 (H) 0.70 - 1.30 MG/DL  
 BUN/Creatinine ratio 41 (H) 12 - 20 GFR est AA 30 (L) >60 ml/min/1.73m2 GFR est non-AA 25 (L) >60 ml/min/1.73m2 Calcium 7.9 (L) 8.5 - 10.1 MG/DL  
GLUCOSE, POC Collection Time: 03/10/21  9:02 AM  
Result Value Ref Range Glucose (POC) 188 (H) 65 - 100 mg/dL Performed by Ronel Innocent  PCT   
GLUCOSE, POC Collection Time: 03/10/21 11:27 AM  
Result Value Ref Range Glucose (POC) 201 (H) 65 - 100 mg/dL Performed by Ronel Innocent  PCT Total time spent with patient:  xxx   min. Care Plan discussed with: 
Patient Family RN Consulting Physician [address] [facility],      
 
I have reviewed the flowsheets. Chart and Pertinent Notes have been reviewed. No change in PMH ,family and social history from Consult note.  
 
 
Enio Gregg MD

## 2021-03-10 NOTE — DISCHARGE INSTRUCTIONS
Counts include 234 beds at the [facility] Post Hospital/ED Visit Follow-Up Instructions/Information    You may have an in home follow up visit set up with [facility] or may wish to contact Counts include 234 beds at the [facility] to set-up a visit:    What are we? Counts include 234 beds at the [facility] is an in-home urgent care service staffed with emergency trained medical teams. We come to your home in a vehicle stocked with medical supplies and technology. An ER physician is always available if needed. When? As a part of your hospital follow-up, an appointment has been/ or can be set up for us to come see you. Usually, this will be 24-72 hours after you leave the hospital or as needed. [facility] is open 7am-9pm, 7 days a week, 365 days a year, including holidays. Why? We know that you cannot always get to your doctor after being in the hospital and that your doctor is not always available when you need them. Once your workup is complete, we'll call in your prescriptions, update your family doctor, and handle billing with your insurance so you can focus on feeling better, faster without leaving home. How much? We accept most major health insurance plans, including Medicaid, Medicare, and Medicare Advantage [address] [place] [address], [facility], and [employer]. We also accept: credit, debit, health savings account (HSA), health reimbursement account (HRA) and flexible spending account (FSA) payments. [facility]'s prices compare to conventional urgent care facilities, but we bring the care to you. How to reach us? Getting care is easy- use our mobile kelsi ([employer]), website [email or web address]) or call us [phone]. Discharge Instructions       PATIENT ID: Mercy Fowler.   MRN: [id number]   YOB: 1935    DATE OF ADMISSION: 3/8/2021  2:15 PM    DATE OF DISCHARGE: 3/10/2021    PRIMARY CARE PROVIDER: Hipolito López MD     ATTENDING PHYSICIAN: Enedelia Johns MD  DISCHARGING PROVIDER: Tono Rainey MD    To contact this individual call [phone] and ask the  to page. If unavailable ask to be transferred the Adult Hospitalist Department. DISCHARGE DIAGNOSES   SOB    CONSULTATIONS: IP CONSULT TO CARDIOLOGY  IP CONSULT TO NEPHROLOGY  IP CONSULT TO PULMONOLOGY    PROCEDURES/SURGERIES: * No surgery found *    PENDING TEST RESULTS:   At the time of discharge the following test results are still pending: none    FOLLOW UP APPOINTMENTS:   Follow-up Information     Follow up With Specialties Details Why Contact Info    Radha Senior [address] SN, PT/OT [address] [facility] [address] Jvai Herrera MD Family Medicine In 1 week  [address]  [address]  [address]  [phone]      Caitie Guerrero MD Pulmonary Disease In 2 weeks  [address] [facility]  [address] [facility]      Jamal Saunders MD Cardiology In 1 week  [facility]  [address] [phone]             ADDITIONAL CARE RECOMMENDATIONS:   Follow up with PMD  Follow up with your Cardiologist and Pulmonologist    DIET: Cardiac Diet     ACTIVITY: Activity as tolerated      DISCHARGE MEDICATIONS:   See Medication Reconciliation Form    · It is important that you take the medication exactly as they are prescribed. · Keep your medication in the bottles provided by the pharmacist and keep a list of the medication names, dosages, and times to be taken in your wallet. · Do not take other medications without consulting your doctor. NOTIFY YOUR PHYSICIAN FOR ANY OF THE FOLLOWING:   Fever over 101 degrees for 24 hours. Chest pain, shortness of breath, fever, chills, nausea, vomiting, diarrhea, change in mentation, falling, weakness, bleeding. Severe pain or pain not relieved by medications. Or, any other signs or symptoms that you may have questions about.       DISPOSITION:  x  Home With:   OT  PT  [facility]  RN       SNF/Inpatient Rehab/LTAC Independent/assisted living    Hospice    Other:     CDMP Checked:   Yes x     PROBLEM LIST Updated:  Yes x       Signed:   Consuelo Aranda MD  3/10/2021  1:37 PM

## 2021-03-10 NOTE — CONSULTS
3/9/2021    Cardiology Consult  Note   [facility] Gianni Milligan M.D. , F.A.C.C.   --------PCP:-Finn Blanco MD  
-----Subjective:  
Qasim Carrera is a 81 y.o. male  Consult requested from  for Patient well-known to us who is arrival to the ER March 8 shortness of breath or prior 2 weeks had chest x-ray placed on prednisone for interstitial lung disease patient denies chest pain patient reported cough. He was on oxygen. Patient was admitted from [facility] to [facility]. Carries a history of congestive heart failure, CAD, CABG 2008. He has hypertension dyslipidemia COPD PAF Patient we saw patient on February 24 obtain chest x-ray showed alveolar infiltrates increase Bumex for 3 days. He was placed on a steroid taper by pulmonary which did not change his shortness of breath and his oxygen was increased to 4 L without relief. Chest x-ray in the ER showed no some change in the chronic upper lobe ILD. Lab work revealed troponin low level of 0.05 creatinine elevated 2.65 BP was elevated and his peak proBNP was greater than 35,000 Prior cardiac history CAD/CABG  
off pump x3 3/2008 . =post op anemia and renal failure CATH March 7, 2008= LM -bifurcation dz 40% ;LAD 85% ; Circ ost 70% mid 70% RCA proximal 60% tapering, EF 60%-70%, bilaterally patent renal arteries, mild atherosclerosis of the distal abdominal aorta. [address] May 2, 2019 =PA 66/18 W 20 PA sat 55% CO 4.1 CI 2.12 Echo May 1 ,2019 EF 55-60% mod LAE, Mild CASSIE, Mild AS BELLE 1.6 cm2 mild MR & TR, RV fx mildly reduced PVD-9/27/18- RLE- mid CRA 70, Pop 99-PTA on right pop, LLE LCFA 40% - 1-19-17 PTA RCFA 90, JAS to RSFA  
-12/22/16 PTA Left Pop, PTA Left tib-per 
--3-13-14 PTA Left op PTA RSFA 
---1/25/11 stent RSFA  
JOCELYN 8-21-17 Normal perfusion Mild carotid artery disease 3-7-08 then 1-4-12 with 10-49% left, less than 10% on right SOCIAL: Drinks no alcohol, quit smoking several years ago, worked as an . Lives with his wife and has four children. Enjoys gardening, fishing and music. FAMILY HISTORY: Mother  of cancer at 76, father  of Parkinson's at 70, one brother  of cancer of the liver at 76 and one  of an infection at 64. Discussion/Plan/Impression: 1. Multifactorial dyspnea with ILD CHF CAD. 2.  Elevated troponin is minimal at 0.05 this is not an acute MI IV heparin should be stopped. 3.  Acute on chronic diastolic CHF EF 67% in October with pulmonary hypertension can continue on Bumex 1 mg IV twice daily will need diuresis but does not appear that volume overloaded I hope to get some benefit though by decreasing pulmonary pressures. 4.  Hypertension labile elevated continue Coreg Isordil we will add a calcium channel blocker. 5.  CAD with prior CABG  continue statin beta-blocker no acute MI 6. CKD creatinine is at baseline around two-point follows with Dr. Diony Kearns he is elderly and not a candidate by choice and recommendation for dialysis. 7. chronic atrial fibrillation history of falls off DOAC. 8.  ILD COPD on previous steroids. 9 PVD with previous atherectomy. E 
Lab Results Component Value Date/Time Creatinine 2.52 (H) 2021 07:02 AM  
  
Results for orders placed or performed during the hospital encounter of 21 EKG, 12 LEAD, INITIAL Result Value Ref Range Ventricular Rate 63 BPM  
 Atrial Rate 73 BPM  
 QRS Duration 166 ms  
 Q-T Interval 500 ms QTC Calculation (Bezet) 511 ms Calculated R Axis -128 degrees Calculated T Axis -120 degrees Diagnosis Ventricular-paced rhythm Abnormal ECG When compared with ECG of 16-DEC-2019 00:53, 
Electronic ventricular pacemaker has replaced Sinus rhythm Confirmed by Lisa Kumar MD. ([id number]) on 3/8/2021 7:47:00 PM 
  
Results for orders placed or performed in visit on 11 AMB POC EKG ROUTINE W/ 12 LEADS, INTER & REP Narrative See scanned results Cardiac Studies/Hx: 
CAD/CABG  
off pump x3 3/2008 . =post op anemia and renal failure CATH 2008= LM -bifurcation dz 40% ;LAD 85% ; Circ ost 70% mid 70% RCA proximal 60% tapering, EF 60%-70%, bilaterally patent renal arteries, mild atherosclerosis of the distal abdominal aorta. [address] May 2, 2019 =PA 66/18 W 20 PA sat 55% CO 4.1 CI 2.12 Echo May 1 ,2019 EF 55-60% mod LAE, Mild CASSIE, Mild AS BELLE 1.6 cm2 mild MR & TR, RV fx mildly reduced PVD-18- RLE- mid CRA 70, Pop 99-PTA on right pop, LLE LCFA 40% - 17 PTA RCFA 90, JAS to RSFA  
-16 PTA Left Pop, PTA Left tib-per 
--3-13-14 PTA Left op PTA RSFA 
---11 stent RSFA  
JOCELYN 17 Normal perfusion Mild carotid artery disease 3-7-08 then 12 with 10-49% left, less than 10% on right SOCIAL: Drinks no alcohol, quit smoking several years ago, worked as an . Lives with his wife and has four children. Enjoys gardening, fishing and music. FAMILY HISTORY: Mother  of cancer at 76, father  of Parkinson's at 70, one brother  of cancer of the liver at 76 and one  of an infection at 64. Medical history notable for  has a past medical history of KATALINA (acute kidney injury) ([facility] [address] (2017), CAD (coronary artery disease), Carotid arterial disease ([facility] [address] Chronic obstructive pulmonary disease ([address] CKD (chronic kidney disease) stage 4, GFR 15-29 ml/min ([address] (10/21/2017), Diabetes mellitus, type 2 ([address] Diverticulitis, Hypercholesteremia, Hypertension, Pacemaker, Paroxysmal atrial fibrillation ([address] (10/21/2017), Pulmonary hypertension ([address] PVC's (premature ventricular contractions) (2014), PVD (peripheral vascular disease) ([facility] [address] and Rectal bleeding (2019). Social history notable for  reports that he quit smoking about 36 years ago. His smoking use included cigarettes. He has a 60.00 pack-year smoking history.  He has never used smokeless tobacco. He reports that he does not drink alcohol or use drugs. Family history notable for family history is not on file. Past Medical History:  
Diagnosis Date  KATALINA (acute kidney injury) ([facility]) 2017  CAD (coronary artery disease) s/p CABG   Carotid arterial disease ([facility])  Chronic obstructive pulmonary disease ([facility]) states he is on 2L at home day and night  CKD (chronic kidney disease) stage 4, GFR 15-29 ml/min ([facility]) 10/21/2017  
 hypertension and DM nephrosclerosis cr 3.2020 Dr Selvin Rutledge  Diabetes mellitus, type 2 ([facility])  Diverticulitis  Hypercholesteremia  Hypertension  Pacemaker  Paroxysmal atrial fibrillation ([facility]) 10/21/2017  
 new onset afib with BRPR 10-19-17 admit  Pulmonary hypertension ([facility]) decline in TLC and diffusion capacity- Dr Yessenia Avalos  PVC's (premature ventricular contractions) 2014  PVD (peripheral vascular disease) ([facility])  Rectal bleeding 2019 ROS-pertinents  negative except as above  The pertinent portions of the medical history,physician and nursing notes, meds,vitals , labs and Ins/Outs,are reviewed in the electronic record Pt reports   Multiple c/o as in HPI and the remainder of a complete multisystem 11 ROS  Are reviewed and negative Social History Tobacco Use  Smoking status: Former Smoker Packs/day: 3.00 Years: 20.00 Pack years: 60.00 Types: Cigarettes Quit date: 1985 Years since quittin.1  Smokeless tobacco: Never Used Substance Use Topics  Alcohol use: No  
 Drug use: No  
  
History reviewed. No pertinent family history. Prior to Admission Medications Prescriptions Last Dose Informant Patient Reported? Taking? albuterol (PROVENTIL VENTOLIN) 2.5 mg /3 mL (0.083 %) nebulizer solution  Self No Yes Sig: 3 mL by Nebulization route every four (4) hours as needed for Wheezing. bumetanide (BUMEX) 1 mg tablet   No Yes Sig: Take 1 Tab by mouth daily.  
carvedilol (COREG) 12.5 mg tablet   Yes Yes Sig: Take 6.25 mg by mouth two (2) times daily (with meals). cyanocobalamin 1,000 mcg tablet   Yes Yes Sig: Take 1,000 mcg by mouth daily. fluticasone-umeclidinium-vilanterol (Trelegy Ellipta) 100-62.5-25 mcg inhaler   Yes Yes Sig: Take 1 Puff by inhalation daily. insulin glargine (LANTUS U-100 INSULIN) 100 unit/mL injection   Yes No  
Sig: 3-7 Units by SubCUTAneous route nightly. Pt reports he uses 3-7 units depending on BG  
isosorbide dinitrate (ISORDIL) 20 mg tablet   No Yes Sig: Take 1 Tab by mouth three (3) times daily. magnesium oxide (MAG-OX) 400 mg tablet   No Yes Sig: Take 1 Tab by mouth daily. omega 3-dha-epa-fish oil (FISH OIL) 100-160-1,000 mg cap  Self Yes Yes Sig: Take 1 Cap by mouth daily. predniSONE (DELTASONE) 10 mg tablet   Yes Yes Sig: Take  by mouth daily (with breakfast). 40 mg once daily X 3 days, then 3 tablets once daily x 2 days, then 2 tablets once daily x 2 days, then 1 tablet once daily x 3 days. simvastatin (ZOCOR) 20 mg tablet   No Yes Sig: Take 1 Tab by mouth nightly. tamsulosin (FLOMAX) 0.4 mg capsule  Self No Yes Sig: Take 1 Cap by mouth daily. triamcinolone acetonide (KENALOG) 0.1 % topical cream   Yes No  
  
Facility-Administered Medications: None Allergies Allergen Reactions  Lisinopril Cough Objective:  
 Physical Exam:  
Patient Vitals for the past 12 hrs: 
 Temp Pulse Resp BP SpO2  
03/09/21 1919 98.1 °F (36.7 °C) 61 18 (!) 164/68 97 % 03/09/21 1827  63  (!) 155/81   
03/09/21 1614 97.9 °F (36.6 °C) 62 18 (!) 175/72 100 % 03/09/21 1342     95 % 03/09/21 1134 97.8 °F (36.6 °C) 63 18 (!) 173/69 100 % General:  alert, cooperative, mild distress distress, fatigued appears stated age HEENT, Neck:  NC,AT- no JVD Chest Wall: inspection normal - no chest wall deformities or tenderness, respiratory effort normal  
Lung:  Crackles upper and lower with scattered wheezing to auscultation bilaterally Heart:    normal rate, regular rhythm, normal S1, S2, no murmurs, rubs, clicks or gallops Abdomen: nondistended Extremities: extremities normal, atraumatic, no cyanosis or edema Last 24hr Input/Output: 
 
Intake/Output Summary (Last 24 hours) at 3/9/2021 2312 Last data filed at 3/9/2021 2126 Gross per 24 hour Intake  Output 1150 ml Net -1150 ml Data Review:  
Recent Results (from the past 24 hour(s)) METABOLIC PANEL, COMPREHENSIVE Collection Time: 03/09/21  7:02 AM  
Result Value Ref Range Sodium 138 136 - 145 mmol/L Potassium 3.9 3.5 - 5.1 mmol/L Chloride 103 97 - 108 mmol/L  
 CO2 27 21 - 32 mmol/L Anion gap 8 5 - 15 mmol/L Glucose 68 65 - 100 mg/dL  (H) 6 - 20 MG/DL Creatinine 2.52 (H) 0.70 - 1.30 MG/DL  
 BUN/Creatinine ratio 42 (H) 12 - 20 GFR est AA 30 (L) >60 ml/min/1.73m2 GFR est non-AA 25 (L) >60 ml/min/1.73m2 Calcium 8.6 8.5 - 10.1 MG/DL Bilirubin, total 0.4 0.2 - 1.0 MG/DL  
 ALT (SGPT) 47 12 - 78 U/L  
 AST (SGOT) 14 (L) 15 - 37 U/L Alk. phosphatase 109 45 - 117 U/L Protein, total 6.6 6.4 - 8.2 g/dL Albumin 3.2 (L) 3.5 - 5.0 g/dL Globulin 3.4 2.0 - 4.0 g/dL A-G Ratio 0.9 (L) 1.1 - 2.2    
CBC WITH AUTOMATED DIFF Collection Time: 03/09/21  7:02 AM  
Result Value Ref Range WBC 7.5 4.1 - 11.1 K/uL  
 RBC 2.82 (L) 4.10 - 5.70 M/uL HGB 8.7 (L) 12.1 - 17.0 g/dL HCT 26.7 (L) 36.6 - 50.3 % MCV 94.7 80.0 - 99.0 FL  
 MCH 30.9 26.0 - 34.0 PG  
 MCHC 32.6 30.0 - 36.5 g/dL  
 RDW 13.8 11.5 - 14.5 % PLATELET 059 782 - 729 K/uL MPV 11.2 8.9 - 12.9 FL  
 NRBC 0.0 0  WBC ABSOLUTE NRBC 0.00 0.00 - 0.01 K/uL NEUTROPHILS 74 32 - 75 % LYMPHOCYTES 13 12 - 49 % MONOCYTES 12 5 - 13 % EOSINOPHILS 1 0 - 7 % BASOPHILS 0 0 - 1 % IMMATURE GRANULOCYTES 0 0.0 - 0.5 % ABS. NEUTROPHILS 5.6 1.8 - 8.0 K/UL  
 ABS. LYMPHOCYTES 1.0 0.8 - 3.5 K/UL  
 ABS.  MONOCYTES 0.9 0.0 - 1.0 K/UL  
 ABS. EOSINOPHILS 0.1 0.0 - 0.4 K/UL  
 ABS. BASOPHILS 0.0 0.0 - 0.1 K/UL  
 ABS. IMM. GRANS. 0.0 0.00 - 0.04 K/UL  
 DF AUTOMATED    
TSH 3RD GENERATION Collection Time: 03/09/21  7:02 AM  
Result Value Ref Range TSH 2.36 0.36 - 3.74 uIU/mL MAGNESIUM Collection Time: 03/09/21  7:02 AM  
Result Value Ref Range Magnesium 2.0 1.6 - 2.4 mg/dL PHOSPHORUS Collection Time: 03/09/21  7:02 AM  
Result Value Ref Range Phosphorus 2.7 2.6 - 4.7 MG/DL  
FOLATE Collection Time: 03/09/21  7:02 AM  
Result Value Ref Range Folate 30.0 (H) 5.0 - 21.0 ng/mL VITAMIN B12 Collection Time: 03/09/21  7:02 AM  
Result Value Ref Range Vitamin B12 1,627 (H) 193 - 986 pg/mL IRON Collection Time: 03/09/21  7:02 AM  
Result Value Ref Range Iron 36 35 - 150 ug/dL FERRITIN Collection Time: 03/09/21  7:02 AM  
Result Value Ref Range Ferritin 468 (H) 26 - 388 NG/ML  
TROPONIN I Collection Time: 03/09/21  7:02 AM  
Result Value Ref Range Troponin-I, Qt. 0.05 (H) <0.05 ng/mL D DIMER Collection Time: 03/09/21  7:02 AM  
Result Value Ref Range D-dimer 0.51 0.00 - 0.65 mg/L FEU HEMOGLOBIN A1C WITH EAG Collection Time: 03/09/21  7:02 AM  
Result Value Ref Range Hemoglobin A1c 7.3 (H) 4.0 - 5.6 % Est. average glucose 163 mg/dL PTT Collection Time: 03/09/21  7:02 AM  
Result Value Ref Range aPTT 28.1 22.1 - 31.0 sec  
 aPTT, therapeutic range     58.0 - 77.0 SECS  
GLUCOSE, POC Collection Time: 03/09/21  8:10 AM  
Result Value Ref Range Glucose (POC) 75 65 - 100 mg/dL Performed by Jigna Aleman GLUCOSE, POC Collection Time: 03/09/21 11:31 AM  
Result Value Ref Range Glucose (POC) 173 (H) 65 - 100 mg/dL Performed by Jigna Ash GLUCOSE, POC Collection Time: 03/09/21  4:34 PM  
Result Value Ref Range Glucose (POC) 214 (H) 65 - 100 mg/dL Performed by Jigna Ash GLUCOSE, POC  Collection Time: 03/09/21 9:36 PM  
Result Value Ref Range Glucose (POC) 268 (H) 65 - 100 mg/dL Performed by Tito Ramos No results found for: CHOL, CHOLX, CHLST, CHOLV, [id number], HDL, HDLP, LDL, LDLC, DLDLP, TGLX, TRIGL, TRIGP, CHHD, CHHDX Ceasar Mir MD 3/9/2021

## 2021-03-10 NOTE — PROGRESS NOTES
Bedside and Verbal shift change report given to Dusty Lopez (oncoming nurse) by Jessie Gregg RN(offgoing nurse). Report included the following information SBAR, Kardex, MAR, Recent Results and Cardiac Rhythm Paced A-fib PVC.

## 2021-03-10 NOTE — ROUTINE PROCESS
Attempted to schedule [facility] PCP appt with Dr. Zahra Watkins, office was unable to accept call. Left voicemail and currently awaiting return call. [facility] as been added to the AVS for patient reference.

## 2021-03-10 NOTE — PROGRESS NOTES
Problem: Falls - Risk of 
Goal: *Absence of Falls Description: Document Anthony Alatorre Fall Risk and appropriate interventions in the flowsheet. Outcome: Progressing Towards Goal 
Note: Fall Risk Interventions: 
Mobility Interventions: Communicate number of staff needed for ambulation/transfer Medication Interventions: Evaluate medications/consider consulting pharmacy, Patient to call before getting OOB, Teach patient to arise slowly Elimination Interventions: Call light in reach, Patient to call for help with toileting needs, Stay With Me (per policy), Toilet paper/wipes in reach, Urinal in reach Problem: Diabetes Self-Management Goal: *Disease process and treatment process Description: Define diabetes and identify own type of diabetes; list 3 options for treating diabetes.  
Outcome: Progressing Towards Goal

## 2021-03-10 NOTE — NURSE NAVIGATOR
[facility]NN spoke to [facility] nurse to scheduled post hospital f/u appt. States only availability was 3/18/21 @ 1240 pm with Dr. Juarez Griffith.  AVS updated.  
Carol Ledezma RN-CHFN/HFNN

## 2021-03-10 NOTE — DISCHARGE SUMMARY
Discharge Summary PATIENT ID: Irvin Hurst Sr. MRN: [id number] YOB: 1935 DATE OF ADMISSION: 3/8/2021  2:15 PM   
DATE OF DISCHARGE: 3/10/2021 PRIMARY CARE PROVIDER: Pierce Wesley MD  
 
ATTENDING PHYSICIAN: Dr Iveth Lester DISCHARGING PROVIDER: Iveth Lester MD   
To contact this individual call [phone] and ask the  to page. If unavailable ask to be transferred the Adult Hospitalist Department. CONSULTATIONS: IP CONSULT TO CARDIOLOGY 
IP CONSULT TO NEPHROLOGY 
IP CONSULT TO PULMONOLOGY PROCEDURES/SURGERIES: * No surgery found * ADMITTING DIAGNOSES & HOSPITAL COURSE:  
SOB 
-likely sec to worsening pulmonary fibrosis -CXR 3/8 unremarkable 
-pro BNP elevated but does not look like CHF 
-Echo 2019 showing severe Pulm HTN 
-Continue IV bumex 
-on Doxy 
-CT chest 3/9 no significant change 
-Outpatient being followed by Dr Emilio Koyanagi Cardiology, consulted pulmonary 
-Appreciate discussion with Pulmonary, the patient is cleared for discharge Elevated troponins -NSTEMI ruled out 
-EKG unremarkable 
-No chest pain 
-Heparin discontinued 
-Appreciate Cardiology Chronic A fib Rate controlled 
-not a candidate for anticoagulation CKD stage IV 
-Appreciate discussion with Nephrology 
-Outpatient follow up with Dr Shilo Sagastume in 2 weeks History of CAD/CABG 
-stable, continue home meds DM type 2 
-hba1c 7.3 
-SSI Hypertension uncontrolled 
-Continue home meds Dyslipidemia 
-on Lipitor Anemia 
-of chronic disease Diabetic diet DISCHARGE DIAGNOSES / PLAN:   
 
1.  SOB  
 
ADDITIONAL CARE RECOMMENDATIONS:  
Follow up with PMD 
Follow up with Cardiology/Pulmonology PENDING TEST RESULTS:  
At the time of discharge the following test results are still pending: none FOLLOW UP APPOINTMENTS:   
Follow-up Information Follow up With Specialties Details Why Contact Info  Radha Emerson 53 SN, PT/OT [address] [facility] [address] 
[phone] Paco Devine MD Family Medicine In 1 week  [address] [facility] [address] 
[phone] Anand Varela MD Pulmonary Disease In 2 weeks  [address] [facility] 
[phone] Cindy Garcia MD Cardiology In 1 week  [place] [address] [address] [place] 
[phone] DIET: Cardiac Diet ACTIVITY: Activity as tolerated DISCHARGE MEDICATIONS: 
Current Discharge Medication List  
  
START taking these medications Details  
amLODIPine (NORVASC) 5 mg tablet Take 1 Tab by mouth daily. Qty: 30 Tab, Refills: 1 CONTINUE these medications which have CHANGED Details  
bumetanide (BUMEX) 2 mg tablet Take 1 Tab by mouth two (2) times a day. Qty: 60 Tab, Refills: 1 CONTINUE these medications which have NOT CHANGED Details  
fluticasone-umeclidinium-vilanterol (Trelegy Ellipta) 100-62.5-25 mcg inhaler Take 1 Puff by inhalation daily. predniSONE (DELTASONE) 10 mg tablet Take  by mouth daily (with breakfast). 40 mg once daily X 3 days, then 3 tablets once daily x 2 days, then 2 tablets once daily x 2 days, then 1 tablet once daily x 3 days. isosorbide dinitrate (ISORDIL) 20 mg tablet Take 1 Tab by mouth three (3) times daily. Qty: 90 Tab, Refills: 5 Associated Diagnoses: Coronary artery disease involving native coronary artery of native heart without angina pectoris  
  
cyanocobalamin 1,000 mcg tablet Take 1,000 mcg by mouth daily. magnesium oxide (MAG-OX) 400 mg tablet Take 1 Tab by mouth daily. Qty: 15 Tab, Refills: 0  
  
carvedilol (COREG) 12.5 mg tablet Take 6.25 mg by mouth two (2) times daily (with meals). simvastatin (ZOCOR) 20 mg tablet Take 1 Tab by mouth nightly. Qty: 90 Tab, Refills: 3  Associated Diagnoses: Coronary artery disease involving native coronary artery of native heart without angina pectoris; Hypercholesteremia  
  
albuterol (PROVENTIL VENTOLIN) 2.5 mg /3 mL (0.083 %) nebulizer solution 3 mL by Nebulization route every four (4) hours as needed for Wheezing. Qty: 1 Package, Refills: 0  
  
tamsulosin (FLOMAX) 0.4 mg capsule Take 1 Cap by mouth daily. Qty: 30 Cap, Refills: 0  
  
omega 3-dha-epa-fish oil (FISH OIL) 100-160-1,000 mg cap Take 1 Cap by mouth daily. triamcinolone acetonide (KENALOG) 0.1 % topical cream   
  
insulin glargine (LANTUS U-100 INSULIN) 100 unit/mL injection 3-7 Units by SubCUTAneous route nightly. Pt reports he uses 3-7 units depending on BG  
  
  
 
 
 
NOTIFY YOUR PHYSICIAN FOR ANY OF THE FOLLOWING:  
Fever over 101 degrees for 24 hours. Chest pain, shortness of breath, fever, chills, nausea, vomiting, diarrhea, change in mentation, falling, weakness, bleeding. Severe pain or pain not relieved by medications. Or, any other signs or symptoms that you may have questions about. DISPOSITION: 
x  Home With: 
 OT  PT  HH  RN  
  
 Long term SNF/Inpatient Rehab Independent/assisted living Hospice Other:  
 
 
PATIENT CONDITION AT DISCHARGE:  
 
Functional status Poor   
x Deconditioned Independent Cognition  
 x Lucid Forgetful Dementia Catheters/lines (plus indication) Myers PICC   
 PEG   
x None Code status  
x  Full code DNR   
 
PHYSICAL EXAMINATION AT DISCHARGE: 
Please see progress note CHRONIC MEDICAL DIAGNOSES: 
Problem List as of 3/10/2021 Date Reviewed: 3/9/2021 Codes Class Noted - Resolved * (Principal) SOB (shortness of breath) ICD-10-CM: R06.02 
ICD-9-CM: 786.05  3/8/2021 - Present Hypoglycemia ICD-10-CM: E16.2 ICD-9-CM: 251.2  8/27/2019 - Present Pacemaker ICD-10-CM: Z95.0 ICD-9-CM: V45.01  5/9/2019 - Present Overview Signed 5/9/2019  6:22 PM by Bhavik Vasques MD  
  5/9/2019 leadless pacer implant  Acute on chronic diastolic (congestive) heart failure ([facility] [address] ICD-10-CM: I50.33 ICD-9-CM: 428.33, 428.0  5/1/2019 - Present Atrial fibrillation with slow ventricular response (HCC) ICD-10-CM: I48.91 
ICD-9-CM: 427.31  5/1/2019 - Present Overview Signed 5/8/2019 11:24 PM by Sid William MD  
  Added automatically from request for surgery [id number] Type 2 diabetes with nephropathy ([facility] [address] ICD-10-CM: E11.21 
ICD-9-CM: 250.40, 583.81  7/26/2018 - Present Atrial fibrillation, chronic ICD-10-CM: I48.20 ICD-9-CM: 427.31  10/21/2017 - Present Overview Signed 10/21/2017  1:45 PM by Jarod Sotelo MD  
  new onset afib with BRPR 10-19-17 admit CKD (chronic kidney disease) stage 4, GFR 15-29 ml/min (HCC) ICD-10-CM: N18.4 ICD-9-CM: 585.4  10/21/2017 - Present Overview Signed 10/21/2017  1:47 PM by Jarod Sotelo MD  
  hypertension and DM nephrosclerosis Hypokalemia ICD-10-CM: E87.6 ICD-9-CM: 276.8  6/25/2017 - Present Generalized weakness ICD-10-CM: R53.1 ICD-9-CM: 780.79  6/25/2017 - Present Acute renal failure (ARF) (HCC) ICD-10-CM: N17.9 ICD-9-CM: 584.9  3/16/2017 - Present CKD (chronic kidney disease) ICD-10-CM: N18.9 ICD-9-CM: 585.9  1/20/2017 - Present Type 2 diabetes mellitus with diabetic peripheral angiopathy without gangrene [facility]) ICD-10-CM: E11.51 
ICD-9-CM: 250.70, 443.81  9/27/2015 - Present PVC's (premature ventricular contractions) ICD-10-CM: I49.3 ICD-9-CM: 427.69  5/26/2014 - Present Carotid arterial disease (HCC) ICD-10-CM: I77.9 ICD-9-CM: 447.9  Unknown - Present Hypercholesteremia ICD-10-CM: E78.00 ICD-9-CM: 272.0  Unknown - Present PVD (peripheral vascular disease) [facility] ICD-10-CM: I73.9 ICD-9-CM: 443.9  Unknown - Present Bradycardia ICD-10-CM: R00.1 ICD-9-CM: 427.89  Unknown - Present CAD (coronary artery disease) ICD-10-CM: I25.10 ICD-9-CM: 414.00  Unknown - Present  Hypertension, essential, benign ICD-10-CM: I10 ICD-9-CM: 401.1  Unknown - Present RESOLVED: Rectal bleeding ICD-10-CM: K62.5 ICD-9-CM: 569.3  12/17/2019 - 3/15/2020 RESOLVED: CHF exacerbation ([facility] ICD-10-CM: I50.9 ICD-9-CM: 428.0  12/3/2019 - 3/15/2020 RESOLVED: CHF exacerbation ([facility] ICD-10-CM: I50.9 ICD-9-CM: 428.0  8/5/2019 - 8/20/2019 RESOLVED: Acute hypoxemic respiratory failure ([facility] ICD-10-CM: J96.01 
ICD-9-CM: 518.81  8/5/2019 - 8/20/2019 RESOLVED: Acute bronchospasm ICD-10-CM: J98.01 
ICD-9-CM: 519.11  5/18/2019 - 5/19/2019 RESOLVED: CHF exacerbation ([place] [address].) ICD-10-CM: I50.9 ICD-9-CM: 428.0  5/18/2019 - 5/19/2019 RESOLVED: GI bleed ICD-10-CM: K92.2 ICD-9-CM: 578.9  10/20/2017 - 12/28/2019 RESOLVED: New onset a-fib [facility]) ICD-10-CM: I48.91 
ICD-9-CM: 427.31  10/20/2017 - 11/30/2017 RESOLVED: Hyperglycemia due to type 2 diabetes mellitus ([facility] ICD-10-CM: E11.65 ICD-9-CM: 250.00  10/20/2017 - 3/15/2020 RESOLVED: Elevated troponin ICD-10-CM: R77.8 ICD-9-CM: 790.6  6/25/2017 - 3/15/2020 RESOLVED: KATALINA (acute kidney injury) ([facility] ICD-10-CM: N17.9 ICD-9-CM: 584.9  6/25/2017 - 3/15/2020 RESOLVED: Fever ICD-10-CM: R50.9 ICD-9-CM: 780.60  3/16/2017 - 3/18/2017 RESOLVED: Hyponatremia ICD-10-CM: E87.1 ICD-9-CM: 276.1  3/16/2017 - 3/18/2017 RESOLVED: Urinary retention ICD-10-CM: R33.9 ICD-9-CM: 788.20  1/19/2017 - 1/20/2017 RESOLVED: Dyspnea ICD-10-CM: R06.00 
ICD-9-CM: 786.09  7/15/2014 - 3/15/2020 RESOLVED: Heart palpitations ICD-10-CM: R00.2 ICD-9-CM: 785.1  7/15/2014 - 9/20/2016 RESOLVED: Atherosclerosis of native artery of extremity with intermittent claudication ([facility] [address] ICD-10-CM: Y30.241 ICD-9-CM: 440.21  2/19/2014 - 9/20/2016 RESOLVED: Other dyspnea and respiratory abnormality ICD-10-CM: R06.09, R09.89 ICD-9-CM: 786.09  Unknown - 9/20/2016 RESOLVED: Diabetes mellitus, type 2 (HCC) ICD-10-CM: E11.9 ICD-9-CM: 250.00 Unknown - 9/20/2016 Greater than 36 minutes were spent with the patient on counseling and coordination of care Signed:  
Don Martino MD 
3/10/2021 
1:38 PM 
 .

## 2021-03-10 NOTE — PROGRESS NOTES
[address] [facility] Adult  Hospitalist Group Hospitalist Progress Note Consuelo Aranda MD 
Answering service: [phone] from in house phone Date of Service:  3/10/2021 NAME:  Lily Sparks Sr. 
:  1935 MRN:  [id number] Admission Summary: This is an 15-year-old man with a past medical history significant for chronic diastolic congestive heart failure, COPD on 2 liters of oxygen at home, type 2 diabetes, hypertension, dyslipidemia, coronary artery disease, status post CABG, paroxysmal atrial fibrillation, chronic kidney disease, peripheral artery disease, who was in his usual state of health until about 2 weeks ago when the patient developed worsening of his shortness of breath. The patient has shortness of breath at baseline. Because of his COPD and congestive heart failure, he was seen virtually by his cardiologist who advised him to go to a lung doctor. The patient was seen by a lung doctor virtually and was placed on prednisone. He was also told that if there is no improvement in his shortness of breath, he should go to the emergency room for further evaluation. The shortness of breath is worse when the patient is lying down and also with activity such as walking. There was no relief with home breathing treatment. The patient has also been taking his Bumex for his congestive heart failure without any significant relief, but he stated that he has just changed his oxygen supply company with the new delivery system. The patient does not believe that he is getting enough oxygen. The patient went to [facility] emergency room at [place] because of worsening shortness of breath. The shortness of breath was not associated with fever, rigors, or chills. The patient has a cough which is productive of yellowish sputum.   When the patient arrived at the emergency room, chest x-ray was obtained.  The chest x-ray did not show any new finding, but because the patient was in significant respiratory distress, he was referred to the hospitalist service for evaluation for admission.  The patient received Lasix in the emergency room.  He was last admitted to this hospital from 12/26/2019 to 12/28/2019.  The patient was admitted with acute lower GI bleed and anemia due to the acute lower GI bleed. 
  
 
Interval history / Subjective:  
  F/u SOB 
Feels weak today 
Back on 2l NC (baseline) 
BP elevated  
 
Assessment & Plan:  
 
SOB 
-likely sec to worsening pulmonary fibrosis 
-CXR 3/8 unremarkable 
-pro BNP elevated but does not look like CHF 
-Echo 2019 showing severe Pulm HTN 
-Continue IV bumex 
-on Doxy 
-CT chest 3/9 no significant change 
-Outpatient being followed by Dr Humphrey 
-Appreciate Cardiology, consulted pulmonary 
-Appreciate discussion with Pulmonary, the patient is cleared for discharge 
 
Elevated troponins 
-NSTEMI ruled out 
-EKG unremarkable 
-No chest pain 
-Heparin discontinued 
-Appreciate Cardiology 
 
Chronic A fib 
Rate controlled 
-not a candidate for anticoagulation 
 
CKD stage IV 
-Appreciate discussion with Nephrology 
-Outpatient follow up with Dr Truong in 2 weeks 
 
History of CAD/CABG 
-stable, continue home meds 
 
DM type 2 
-hba1c 7.3 
-SSI 
 
Hypertension uncontrolled 
-Continue home meds 
 
Dyslipidemia 
-on Lipitor 
 
Anemia 
-of chronic disease 
 
Diabetic diet 
  
  
Code status: FULL CODE 
DVT prophylaxis: scd 
 
Plan: Discharge home  
 
Care Plan discussed with: Patient/Family 
Anticipated Disposition: Home w/Family 
Anticipated Discharge: 24 hours to 48 hours  
 
Hospital Problems  Date Reviewed: 3/9/2021  
       Codes Class Noted POA  
 * (Principal) SOB (shortness of breath) ICD-10-CM: R06.02 
ICD-9-CM: 786.05  3/8/2021 Yes  
   
 Acute on chronic diastolic (congestive) heart failure (HCC) ICD-10-CM: I50.33 
ICD-9-CM: 428.33, 428.0  5/1/2019  Unknown Review of Systems: A comprehensive review of systems was negative except for that written in the HPI. Vital Signs:  
 Last 24hrs VS reviewed since prior progress note. Most recent are: 
Visit Vitals BP (!) 149/62 (BP 1 Location: Right upper arm, BP Patient Position: At rest) Pulse 61 Temp 97.8 °F (36.6 °C) Resp 20 Ht 5' 9\" (1.753 m) Wt 67.2 kg (148 lb 2.4 oz) SpO2 98% BMI 21.88 kg/m² Intake/Output Summary (Last 24 hours) at 3/10/2021 1325 Last data filed at 3/10/2021 1219 Gross per 24 hour Intake 240 ml Output 1500 ml Net -1260 ml Physical Examination:  
 
I had a face to face encounter with this patient and independently examined them on 3/10/2021 as outlined below: 
 
     
Constitutional:  No acute distress, cooperative, pleasant ENT:  Oral mucosa moist, oropharynx benign. Resp:  CTA bilaterally. No wheezing/rhonchi/rales. No accessory muscle use CV:  Regular rhythm, normal rate, no murmurs, gallops, rubs GI:  Soft, non distended, non tender. normoactive bowel sounds, no hepatosplenomegaly Musculoskeletal:  No edema, warm, 2+ pulses throughout Neurologic:  Moves all extremities. AAOx3, CN II-XII reviewed Skin:  Good turgor, no rashes or ulcers Data Review:  
 Review and/or order of clinical lab test 
 
 
Labs:  
 
Recent Labs  
  03/10/21 
0353 03/09/21 
2314 WBC 8.1 7.5 HGB 8.6* 8.7* HCT 26.7* 26.7*  
 214 Recent Labs  
  03/10/21 
0353 03/09/21 
0702 03/08/21 
1427 * 138 134* K 4.1 3.9 4.4 CL 98 103 98 CO2 27 27 26 * 105* 109* CREA 2.52* 2.52* 2.65* * 68 286* CA 7.9* 8.6 9.0 MG  --  2.0  --   
PHOS  --  2.7  --   
 
Recent Labs 03/09/21 
7557 03/08/21 
1427 ALT 47 65  120* TBILI 0.4 0.4 TP 6.6 7.6 ALB 3.2* 3.5 GLOB 3.4 4.1* Recent Labs 03/09/21 
7263 APTT 28.1 Recent Labs 03/09/21 
9889 FERR 468* Lab Results Component Value Date/Time Folate 30.0 (H) 03/09/2021 07:02 AM  
  
No results for input(s): PH, PCO2, PO2 in the last 72 hours. Recent Labs 03/09/21 
8316 03/08/21 
1427 TROIQ 0.05* 0.05* No results found for: CHOL, CHOLX, CHLST, CHOLV, HDL, HDLP, LDL, LDLC, DLDLP, TGLX, TRIGL, TRIGP, CHHD, CHHDX Lab Results Component Value Date/Time Glucose (POC) 201 (H) 03/10/2021 11:27 AM  
 Glucose (POC) 188 (H) 03/10/2021 09:02 AM  
 Glucose (POC) 268 (H) 03/09/2021 09:36 PM  
 Glucose (POC) 214 (H) 03/09/2021 04:34 PM  
 Glucose (POC) 173 (H) 03/09/2021 11:31 AM  
 
Lab Results Component Value Date/Time Color YELLOW/STRAW 08/27/2019 12:56 PM  
 Appearance CLEAR 08/27/2019 12:56 PM  
 Specific gravity 1.023 08/27/2019 12:56 PM  
 pH (UA) 6.0 08/27/2019 12:56 PM  
 Protein >300 (A) 08/27/2019 12:56 PM  
 Glucose 500 (A) 08/27/2019 12:56 PM  
 Ketone NEGATIVE  08/27/2019 12:56 PM  
 Bilirubin NEGATIVE  08/27/2019 12:56 PM  
 Urobilinogen 0.2 08/27/2019 12:56 PM  
 Nitrites NEGATIVE  08/27/2019 12:56 PM  
 Leukocyte Esterase NEGATIVE  08/27/2019 12:56 PM  
 Epithelial cells FEW 08/27/2019 12:56 PM  
 Bacteria NEGATIVE  08/27/2019 12:56 PM  
 WBC 0-4 08/27/2019 12:56 PM  
 RBC 5-10 08/27/2019 12:56 PM  
 
 
 
Medications Reviewed:  
 
Current Facility-Administered Medications Medication Dose Route Frequency  bumetanide (BUMEX) tablet 2 mg  2 mg Oral BID  albuterol-ipratropium (DUO-NEB) 2.5 MG-0.5 MG/3 ML  3 mL Nebulization Q4H PRN  
 carvediloL (COREG) tablet 6.25 mg  6.25 mg Oral BID WITH MEALS  cyanocobalamin (VITAMIN B12) tablet 1,000 mcg  1,000 mcg Oral DAILY  isosorbide dinitrate (ISORDIL) tablet 20 mg  20 mg Oral TID  magnesium oxide (MAG-OX) tablet 400 mg  400 mg Oral DAILY  atorvastatin (LIPITOR) tablet 10 mg  10 mg Oral QHS  tamsulosin (FLOMAX) capsule 0.4 mg  0.4 mg Oral DAILY  sodium chloride (NS) flush 5-40 mL  5-40 mL IntraVENous Q8H  
 sodium chloride (NS) flush 5-40 mL  5-40 mL IntraVENous PRN  
 acetaminophen (TYLENOL) tablet 650 mg  650 mg Oral Q6H PRN Or  
 acetaminophen (TYLENOL) suppository 650 mg  650 mg Rectal Q6H PRN  polyethylene glycol (MIRALAX) packet 17 g  17 g Oral DAILY PRN  promethazine (PHENERGAN) tablet 12.5 mg  12.5 mg Oral Q6H PRN Or  
 ondansetron (ZOFRAN) injection 4 mg  4 mg IntraVENous Q6H PRN  predniSONE (DELTASONE) tablet 40 mg  40 mg Oral DAILY WITH BREAKFAST  doxycycline (VIBRAMYCIN) 100 mg in 0.9% sodium chloride (MBP/ADV) 100 mL MBP  100 mg IntraVENous Q12H  
 insulin lispro (HUMALOG) injection   SubCUTAneous AC&HS  
 glucose chewable tablet 16 g  4 Tab Oral PRN  
 dextrose (D50W) injection syrg 12.5-25 g  12.5-25 g IntraVENous PRN  
 glucagon (GLUCAGEN) injection 1 mg  1 mg IntraMUSCular PRN  
 amLODIPine (NORVASC) tablet 5 mg  5 mg Oral DAILY  
 
______________________________________________________________________ EXPECTED LENGTH OF STAY: 2d 2h 
ACTUAL LENGTH OF STAY:          2 Nadir Sommer MD

## 2021-03-10 NOTE — CONSULTS
Name: [facility]: [address]  
: 1935 Admit Date: 3/8/2021 Phone: [phone]  Room: [facility]/01 PCP: Fiona Vazquez MD  MRN: [id number] Date: 3/10/2021  Code: Full Code HPI: 
 
2:00 PM    
 
History was obtained from patient. I was asked by Frantz Mcghee MD to see [facility] in consultation for a chief complaint of shortness of breath and cough. History of Present Illness: 81year old male with past medical history as given below presented to [facility] with increasing shortness of breath for the past 3-4 weeks. He has cough with clear sputum but has been having discolored sputum for the past 2 weeks. PCP gave him Prednisone and Levaquin with some improvement. He denies fever, chills, night sweats. Recently his bumex was reduced. He was diuresed on admission and feels better. Data reviewed: WBC 8.1 Hgb 8.6 Plt 225 Cr 2.52 Pro BNP > 35,000 Trop I 0.05 
 
CT Chest: Diffuse interstitial opacities. Subpleural fibrotic changes. Past Medical History:  
Diagnosis Date  KATALINA (acute kidney injury) ([address] 2017  CAD (coronary artery disease) s/p CABG   Carotid arterial disease ([address]  Chronic obstructive pulmonary disease ([address] states he is on 2L at home day and night  CKD (chronic kidney disease) stage 4, GFR 15-29 ml/min ([facility]) 10/21/2017  
 hypertension and DM nephrosclerosis cr 3.2020 Dr Mahsa Barron  Diabetes mellitus, type 2 ([address]  Diverticulitis  Hypercholesteremia  Hypertension  Pacemaker  Paroxysmal atrial fibrillation ([address] 10/21/2017  
 new onset afib with BRPR 10-19-17 admit  Pulmonary hypertension ([address] decline in TLC and diffusion capacity- Dr Natasha Pantoja  PVC's (premature ventricular contractions) 2014  PVD (peripheral vascular disease) ([address]  Rectal bleeding 2019 Past Surgical History:  
Procedure Laterality Date  COLONOSCOPY Left 2019  COLONOSCOPY performed by Keny Contreras MD at [facility] ENDOSCOPY  
 HX CORONARY ARTERY BYPASS GRAFT    
 HX HEART CATHETERIZATION    
 VT TCAT INSJ/RPL PERM LEADLESS PACEMAKER RV W/IMG N/A 2019 INSERT OR REPLACE TRANSCATH PPM LEADLESS performed [place] [address], [facility] CATH LAB History reviewed. No pertinent family history. Social History Tobacco Use  Smoking status: Former Smoker Packs/day: 3.00 Years: 20.00 Pack years: 60.00 Types: Cigarettes Quit date: 1985 Years since quittin.1  Smokeless tobacco: Never Used Substance Use Topics  Alcohol use: No  
 
 
Allergies Allergen Reactions  Lisinopril Cough Current Facility-Administered Medications Medication Dose Route Frequency  bumetanide (BUMEX) tablet 2 mg  2 mg Oral BID  albuterol-ipratropium (DUO-NEB) 2.5 MG-0.5 MG/3 ML  3 mL Nebulization Q4H PRN  
 carvediloL (COREG) tablet 6.25 mg  6.25 mg Oral BID WITH MEALS  cyanocobalamin (VITAMIN B12) tablet 1,000 mcg  1,000 mcg Oral DAILY  isosorbide dinitrate (ISORDIL) tablet 20 mg  20 mg Oral TID  magnesium oxide (MAG-OX) tablet 400 mg  400 mg Oral DAILY  atorvastatin (LIPITOR) tablet 10 mg  10 mg Oral QHS  tamsulosin (FLOMAX) capsule 0.4 mg  0.4 mg Oral DAILY  sodium chloride (NS) flush 5-40 mL  5-40 mL IntraVENous Q8H  
 sodium chloride (NS) flush 5-40 mL  5-40 mL IntraVENous PRN  
 acetaminophen (TYLENOL) tablet 650 mg  650 mg Oral Q6H PRN Or  
 acetaminophen (TYLENOL) suppository 650 mg  650 mg Rectal Q6H PRN  polyethylene glycol (MIRALAX) packet 17 g  17 g Oral DAILY PRN  promethazine (PHENERGAN) tablet 12.5 mg  12.5 mg Oral Q6H PRN Or  
 ondansetron (ZOFRAN) injection 4 mg  4 mg IntraVENous Q6H PRN  predniSONE (DELTASONE) tablet 40 mg  40 mg Oral DAILY WITH BREAKFAST  doxycycline (VIBRAMYCIN) 100 mg in 0.9% sodium chloride (MBP/ADV) 100 mL MBP  100 mg IntraVENous Q12H  
 insulin lispro (HUMALOG) injection   SubCUTAneous AC&HS  
 glucose chewable tablet 16 g  4 Tab Oral PRN  
 dextrose (D50W) injection syrg 12.5-25 g  12.5-25 g IntraVENous PRN  
 glucagon (GLUCAGEN) injection 1 mg  1 mg IntraMUSCular PRN  
 amLODIPine (NORVASC) tablet 5 mg  5 mg Oral DAILY REVIEW OF SYSTEMS Negative except as stated in the HPI. Physical Exam:  
Visit Vitals BP (!) 149/62 (BP 1 Location: Right upper arm, BP Patient Position: At rest) Pulse 61 Temp 97.8 °F (36.6 °C) Resp 20 Ht 5' 9\" (1.753 m) Wt 67.2 kg (148 lb 2.4 oz) SpO2 98% BMI 21.88 kg/m² General:  Alert, cooperative. Head:  Normocephalic. Eyes:  Conjunctivae/corneas clear. Nose: Nares normal.  
Throat: Lips, mucosa, and tongue normal.  
Neck: Supple, symmetrical, trachea midline. Lungs:   Bilateral crackles. Heart:  Regular rate and rhythm, S1, S2 normal.  
Abdomen:   Soft, non-tender. Extremities: Pedal edema. Pulses: 2+ and symmetric all extremities. Skin: Skin color, texture, turgor normal.  
   
Neurologic: Grossly nonfocal  
 
 
Lab Results Component Value Date/Time Sodium 133 (L) 03/10/2021 03:53 AM  
 Potassium 4.1 03/10/2021 03:53 AM  
 Chloride 98 03/10/2021 03:53 AM  
 CO2 27 03/10/2021 03:53 AM  
  (H) 03/10/2021 03:53 AM  
 Creatinine 2.52 (H) 03/10/2021 03:53 AM  
 Glucose 196 (H) 03/10/2021 03:53 AM  
 Calcium 7.9 (L) 03/10/2021 03:53 AM  
 Magnesium 2.0 03/09/2021 07:02 AM  
 Phosphorus 2.7 03/09/2021 07:02 AM  
 Lactic acid 0.8 12/03/2019 12:57 PM  
 
 
Lab Results Component Value Date/Time WBC 8.1 03/10/2021 03:53 AM  
 HGB 8.6 (L) 03/10/2021 03:53 AM  
 PLATELET 947 78/91/6023 03:53 AM  
 MCV 93.4 03/10/2021 03:53 AM  
 
 
Lab Results Component Value Date/Time INR 1.1 12/26/2019 03:51 AM  
 aPTT 28.1 03/09/2021 07:02 AM  
 Alk.  phosphatase 109 03/09/2021 07:02 AM  
 Protein, total 6.6 03/09/2021 07:02 AM  
 Albumin 3.2 (L) 03/09/2021 07:02 AM  
 Globulin 3.4 03/09/2021 07:02 AM  
 
 
Lab Results Component Value Date/Time Iron 36 03/09/2021 07:02 AM  
 TIBC 302 12/07/2019 10:27 AM  
 Iron % saturation  12/07/2019 10:27 AM  
  INDETERMINATE - SENT TO REFERENCE LAB FOR ADDITIONAL TESTING. Ferritin 468 (H) 03/09/2021 07:02 AM  
 
 
Lab Results Component Value Date/Time TSH 2.36 03/09/2021 07:02 AM  
  
 
 
IMPRESSION: 
=========== 
-COPD with acute exacerbation. 
-acute exacerbation of CHF (HF-pEF). -KATALINA on CKD. -Hypoxemic respiratory failure. 
-Interstitial lung disease; hx of parakeet x 18 years. 
-Pulmonary hypertension; gp 2 and 3. PLAN: 
=====  
-Diuresis. -O2 supplementation. 
-short course of prednisone/ doxy. 
-Out patient work up for fibrotic clung disease. 
-Ok to d/c home today. Follow up with Dr Celso Hong in office.  
 
Bon Randhawa MD

## 2021-03-10 NOTE — PROGRESS NOTES
Transition of Care 
1. RUR 21% 
2. Disposition Home with family and [facility] SN, PT/OT-LLOYD orders placed and forwarded to [facility]. AVS updated. 
3. DME Uses a walker at baseline 
4. Transportation Wife 
5. Follow Up PCP, Cardiology 
6. Patient will need Smaart e discharge. 
 
Medicare pt has received, reviewed, and signed 2nd IM letter informing them of their right to appeal the discharge.  Signed copy has been placed on pt bedside chart. 
 
 
Aixa Glynn MS 
 
2:48 Smaart e discharge completed.  
 
Aixa Glynn, MS

## 2021-03-11 NOTE — PROGRESS NOTES
Care Transitions Initial Follow Up Call Call within 2 business days of discharge: Yes Verbal consent granted by patient for CTN to speak with (wife) Taryn Hutchison concerning all issues. Patient: Peg Samuel Patient : 1935 MRN: [id number] Last Discharge [address] Complaint Diagnosis Description Type Department Provider 3/8/21 Shortness of Breath Acute on chronic diastolic congestive heart failure ([address] . .. ED to Hosp-Admission (Discharged) (ADMIT) Liliya Pink MD; Ej Meade. .. Challenges to be reviewed by the provider Additional needs identified to be addressed with provider yes Component Latest Ref Rng & Units 3/8/2021 NT pro-BNP 
    0 - 450 PG/ML >35,000 (H) Component Latest Ref Rng & Units 3/9/2021 Hemoglobin A1c, (calculated) 4.0 - 5.6 % 7.3 (H) Method of communication with provider : chart routing Discussed COVID-19 related testing which was available at this time. Test results were negative. Patient informed of results, if available? yes Advance Care Planning:  
Does patient have an Advance Directive: Peggy on file Inpatient Readmission Risk score: Unplanned Readmit Risk Score: 22 Was this a readmission? no  
Patient stated reason for the admission: sob Patients top risk factors for readmission: medical condition heart failure, COPD, diabetes and chronic kidney disease Interventions to address risk factors: Obtained and reviewed discharge summary and/or continuity of care documents, Communication with home health agencies or other community services the patient is currently using-[facility] and Education of patient/family/caregiver/guardian to support self-management-HF Care Transition Nurse contacted the (wife) Taryn Hutchison by telephone to perform post hospital discharge assessment. Verified name and  with wife as identifiers.  Provided introduction to self, and explanation of the Care Transition Nurse role. Care Transition Nurse reviewed discharge instructions, medical action plan and red flags with wife who verbalized understanding. Were discharge instructions available to patient? yes. Reviewed appropriate site of care based on symptoms and resources available to patient including: PCP, Specialist,  Place [place] and When to call 911. wife given an opportunity to ask questions and does not have any further questions or concerns at this time. The wife agrees to contact the PCP office for questions related to their healthcare. Medication reconciliation was performed with wife, who verbalizes understanding of administration of home medications. Advised obtaining a 90-day supply of all daily and as-needed medications. Referral to Pharm D needed: no  
START taking: 
amLODIPine (NORVASC)5 mg tablet - take 1 tab by mouth daily Start taking on: March 11, 2021 CHANGE how you take: 
bumetanide (BUMEX) 2 mg tablet - take 1 tab by mouth 2 times a day Home Health/Outpatient orders at discharge: home health care [address] [place] [address] Way: [facility] [phone] Date of initial visit: 3/11/21 Durable Medical Equipment ordered at discharge: None Covid Risk Education Patient has following risk factors of: heart failure, diabetes and advance age. Pt.wife decline education provided regarding infection prevention, and signs and symptoms of COVID-19, agrees to contact the PCP office for questions related to COVID-19. For more information on steps you can take to protect yourself, see CDC's How to Protect Yourself Was patient discharged with a pulse oximeter? no Discussed and confirmed pulse oximeter discharge instructions and when to notify provider or seek emergency care. Patient/family/caregiver given information for [employer] and agrees to enroll no Discussed follow-up appointments.  If no appointment was previously scheduled, appointment scheduling offered: yes Is follow up appointment scheduled within 7 days of discharge? yes [facility] follow up appointment(s):  
Future Appointments Date Time Provider Muna Delgado 3/18/2021 12:40 PM David Robertson MD CAVREY BS AMB  
4/28/2021 11:30 AM REMOTE1, COSME BERNAL AMB  
7/28/2021  2:00 PM REMOTE1, COSME BERNAL AMB  
10/19/2021 10:20 AM PACEMAKER3, COSME BERNAL AMB  
10/19/2021 10:40 AM MD KARRIE Dahl AMB [facility] follow up appointment(s):  
 
Plan for follow-up call in 7-10 days based on severity of symptoms and risk factors. Plan for next call: self management-hf and follow up appointment-pcp/specialist 
Care Transition Nurse provided contact information for future needs. Goals  Establish PCP relationships and regularly scheduled appointments. 3/11 Pt.upcoming appts: 
(PCP) TROY Nation MD 3/18/21 @ 9:15 am (VV) 
(Pulmonary) Sofie Gillette MD 3/24/21 @ 1 pm 
[address] PT/OT/SN 3/11/21 Future Appointments Date Time Provider 3/18/2021 12:40 PM Dorys Stewart MD  
4/28/2021 11:30 AM REMOTE1, AGUIAR  
7/28/2021  2:00 PM REMOTE1, AGUIAR  
10/19/2021 10:20 AM PACEMAKER3, AGUIAR  
10/19/2021 10:40 AM Bhaskar Lehman MD  
CTN informed pt.of [facility] [phone])  explain briefly type of service;  contact information provided. CTN will f/u with in 7-14 days. -[address]  Understands red flags post discharge. 3/11 Pt.will weigh daily, report symptoms weight gain 2 to 3 lbs. in a day or 5 lbs. /week,  new symptoms of SOB, cough; swollen ankles, feeling more tired than usual with normal activity, pt.will notify physician. CTN contact information provided to call with questions or concerns, follow up in 7-14 days. [address]

## 2021-03-18 PROBLEM — I21.4 NSTEMI (NON-ST ELEVATED MYOCARDIAL INFARCTION) (HCC): Status: ACTIVE | Noted: 2021-01-01

## 2021-03-18 NOTE — ED TRIAGE NOTES
TRIAGE NOTE: Pt arrives for increased SOB since yesterday. Currently on 4L (He states typically wears 3L). Also, c/o lower back pain since yesterday. 1st covid vaccine last week.

## 2021-03-18 NOTE — ED PROVIDER NOTES
81 yoM with complex PMHx presenting to ED for shortness of breath and back pain. Symptoms increased yesterday, pain impacts sleep and ADLs. No falls or trauma. No chest pain or discomfort. Pain is is lower back, left side >right. He took two APAP today with little improvement. Wife applied bengay and pt took 2 ASA, still without relief. Pt wears 4L home oxygen at baseline and 2 days ago increased this to 5L. Pt received part 1 Pfizer 2 weeks ago and did well after, only a sore arm. No fevers/chills. Past Medical History:  
Diagnosis Date  KATALINA (acute kidney injury) ([address] 6/25/2017  CAD (coronary artery disease) s/p CABG 2008  Carotid arterial disease ([address]  Chronic obstructive pulmonary disease ([address] states he is on 2L at home day and night  CKD (chronic kidney disease) stage 4, GFR 15-29 ml/min ([facility]) 10/21/2017  
 hypertension and DM nephrosclerosis cr 3.27 June 2020 Dr Lady Curran  Diabetes mellitus, type 2 ([address]  Diverticulitis  Hypercholesteremia  Hypertension  Pacemaker  Paroxysmal atrial fibrillation ([address] 10/21/2017  
 new onset afib with BRPR 10-19-17 admit  Pulmonary hypertension ([address] decline in TLC and diffusion capacity- Dr Shasta Harding  PVC's (premature ventricular contractions) 5/26/2014  PVD (peripheral vascular disease) ([address]  Rectal bleeding 12/17/2019 Past Surgical History:  
Procedure Laterality Date  COLONOSCOPY Left 12/27/2019 COLONOSCOPY performed by Ina Magana MD at [facility] ENDOSCOPY  
 HX CORONARY ARTERY BYPASS GRAFT    
 HX HEART CATHETERIZATION    
 SC TCAT INSJ/RPL PERM LEADLESS PACEMAKER RV W/IMG N/A 5/9/2019 INSERT OR REPLACE TRANSCATH PPM LEADLESS performed by Bhavik Vasques MD at [place] [address], [facility]  CATH LAB History reviewed. No pertinent family history. Social History Socioeconomic History  Marital status:  Spouse name: Not on file  Number of children: Not on file  Years of education: Not on file  Highest education level: Not on file Occupational History  Not on file Social Needs  Financial resource strain: Not on file  Food insecurity Worry: Not on file Inability: Not on file  Transportation needs Medical: Not on file Non-medical: Not on file Tobacco Use  Smoking status: Former Smoker Packs/day: 3.00 Years: 20.00 Pack years: 60.00 Types: Cigarettes Quit date: 1985 Years since quittin.1  Smokeless tobacco: Never Used Substance and Sexual Activity  Alcohol use: No  
 Drug use: No  
 Sexual activity: Not on file Lifestyle  Physical activity Days per week: Not on file Minutes per session: Not on file  Stress: Not on file Relationships  Social connections Talks on phone: Not on file Gets together: Not on file Attends Yarsani service: Not on file Active member of club or organization: Not on file Attends meetings of clubs or organizations: Not on file Relationship status: Not on file  Intimate partner violence Fear of current or ex partner: Not on file Emotionally abused: Not on file Physically abused: Not on file Forced sexual activity: Not on file Other Topics Concern  Not on file Social History Narrative  Not on file ALLERGIES: Lisinopril Review of Systems Constitutional: Positive for activity change and fatigue. Negative for chills and fever. HENT: Negative for congestion and trouble swallowing. Eyes: Negative for visual disturbance. Respiratory: Positive for shortness of breath. Cardiovascular: Negative for chest pain. Gastrointestinal: Negative for abdominal pain, nausea and vomiting. Genitourinary: Negative for dysuria. Musculoskeletal: Positive for back pain. Skin: Negative for rash. Allergic/Immunologic: Negative for immunocompromised state.   
Neurological: Negative for weakness and headaches. Psychiatric/Behavioral: Negative for confusion. Vitals:  
 03/18/21 1735 BP: (!) 168/63 Pulse: 78 Resp: 28 Temp: 97.8 °F (36.6 °C) SpO2: 100% Weight: 67.7 kg (149 lb 4 oz) Physical Exam 
Vitals signs and nursing note reviewed. Constitutional:   
   General: He is not in acute distress. Appearance: He is not ill-appearing. Comments: Frail, lying LLR HENT:  
   Head: Normocephalic. Mouth/Throat:  
   Mouth: Mucous membranes are moist.  
Neck:  
   Trachea: No tracheal deviation. Cardiovascular:  
   Rate and Rhythm: Normal rate and regular rhythm. Heart sounds: No murmur. Pulmonary:  
   Effort: Tachypnea, accessory muscle usage and prolonged expiration present. No respiratory distress. Breath sounds: Normal breath sounds. Comments: On Supplemental oxygen, 5 L/min via nasal cannula Chest:  
   Chest wall: No tenderness. Abdominal:  
   General: There is no distension. Palpations: Abdomen is soft. There is no mass. Tenderness: There is no abdominal tenderness. Musculoskeletal: Normal range of motion. Right lower leg: Normal.  
   Left lower leg: Normal.  
Skin: 
   General: Skin is warm and dry. Neurological:  
   Mental Status: He is alert. Psychiatric:     
   Behavior: Behavior normal.  
 
  
 
MDM Number of Diagnoses or Management Options KATALINA (acute kidney injury) ([facility] Low back pain without sciatica, unspecified back pain laterality, unspecified chronicity NSTEMI (non-ST elevated myocardial infarction) ([address] Pneumonia due to infectious organism, unspecified laterality, unspecified part of lung 
SOB (shortness of breath) Diagnosis management comments: 55-year-old male history of CABG, diastolic heart failure, A. fib, COPD, presenting with a mixed picture of complaints. He appears tachypneic, with increased home O2 requirement concerning for possible pneumonia versus COVID-19 versus pulmonary edema. Additionally, he is complaining of significant back pain. His blood pressure is elevated however he has equal pulses in all 4 extremities, and based on course of symptoms I have a low suspicion for aortic aneurysm or dissection. Of course this could be ACS. EKG notable for a paced ventricular rhythm without significant change from prior. We will trend his troponin. He will likely require admission given increasing oxygen requirement and ongoing chest pain with his risk profile. Amount and/or Complexity of Data Reviewed Clinical lab tests: ordered Tests in the radiology section of CPT®: ordered ED EKG interpretation: 
Rhythm: paced; and regular . Rate (approx.): 78; Axis: Indeterminate; P wave: absent; QRS interval: prolonged; ST/T wave: normal; in  Lead: NA; Other findings: abnormal ekg. EKG interpreted by Marii Goodrich ED MD. 
 
ED Course as of Mar 18 1825 Thu Mar 18, 2021  
1818 Trop 0.17, ASA ordered. [SL] 1818 TROPONIN I(!):   
Troponin-I, Qt. 0.17(!) [SL] 1818 Troponin-I, Qt.(!): 0.17 [SL] ED Course User Index 
[SL] Melba Truong MD  
 
6:25 PM 
Cardiology consulted. Procedures Perfect Serve Consult for Admission 6:37 PM 
 
ED Room Number: QGM63/91 Patient Name and age:  Jackie Wiggins. 81 y.o.  male Working Diagnosis: 1. NSTEMI (non-ST elevated myocardial infarction) ([facility] 2. KATALINA (acute kidney injury) ([facility] 3. Low back pain without sciatica, unspecified back pain laterality, unspecified chronicity 4. SOB (shortness of breath) 5. Pneumonia due to infectious organism, unspecified laterality, unspecified part of lung COVID-19 Suspicion:  no 
Sepsis present:  yes  Reassessment needed: yes Code Status:  Full Code Readmission: yes Isolation Requirements:  no 
Recommended Level of Care:  telemetry Department:[facility] - [phone] Other:  Dr. Cynthia Martinez (Cards) recommends echo and they will see him at [facility] Signed By: Lelo Donis MD   
 March 19, 2021

## 2021-03-19 NOTE — PROGRESS NOTES
TRANSFER - IN REPORT: 
 
Verbal report received from April RN(name) on [address]  being received from [place] ED(unit) for routine progression of care Report consisted of patients Situation, Background, Assessment and  
Recommendations(SBAR). Information from the following report(s) SBAR, Kardex, ED Summary, Intake/Output, MAR, Recent Results, Med Rec Status and Cardiac Rhythm Paced was reviewed with the receiving nurse. Opportunity for questions and clarification was provided. 2217-Assessment completed upon patients arrival to unit and care assumed. DUAL SKIN ASSESSMENT: 
Primary Nurse Jamari Shelton RN and Quan Montoya RN performed a dual skin assessment on this patient. The following impairments are noted: tiny open spot on sacrum, scattered scabs on bilateral legs, heels red and blanchable. Fabio score is 20 
 
 
 
 
 
 
0428-Pt's HGB is 6.4, Lidia LIAO notified, will come by to sign consent with pt Hourly rounding done, pt remains in Paced rthythm, on 3L NC, O2 saturation greater than 90%, urinal at the bedside, urine yellow and clear, back pain treated with dilaudid, call bell within reach, use explained to pt Bedside shift change report given to Jose Armando Kraus (oncoming nurse) by Meera Dominguez RN (offgoing nurse). Report included the following information SBAR, Kardex, ED Summary, Intake/Output, MAR, Recent Results, Med Rec Status and Cardiac Rhythm Paced.

## 2021-03-19 NOTE — CONSULTS
Cardiology Consult/Progress Note Primary cardiologist: Dr. Fabian Scott 
3/18/2021  5:24 PM  
NSTEMI (non-ST elevated myocardial infarction) ([facility] [address] [I21.4] HPI: Wilber Baxter is a 81 y.o. male admitted on 3/8/2021  for  Increasing SOB since 3/17/21. [R06.02]. Has PMH of KATALINA, CAD s/p remote CABG, Diastolic CHF, Type II DM, COPD on chronic O2 2L, Type II DM, HLD, PPM  (leadless) 5/2019, chronic afib with bradycardia s/p leadless PPM, PVD, GIB, CKD. Presented with chief c/o progressive worsening of SOB which began to worsen to days ago. SOB worse with ambulation. O2 sats dropped with ambulation and he required increased O2. Reports that use of nebulizer does help some with SOB symptoms. He also c/o left lower back pain. Denies chest pain, arm pain, dizziness, orthopnea and BLE edema. Recently hospitalized earlier this month with SOB, likely primarily due to worsening ILD, COPD exacerbation, as well as CKD, anemia, and perhaps some mild component of HFpEF. This admission, he was found to have unchanged CXR findings with interstitial, patchy airspace opacities. His troponin is elevated and trending up, now 0.48, but his renal function is mildly worsened from earlier this month and pro bnp is >35,000. Exhibits marked anemia with hgb 6.4. He is also being treated for possible pneumonia. Cardiology is consulted to evaluate for concern for NSTEMI. Investigation Telemetry: NSR 
ECG: v paced, no significant changes from prior EKG 3/9/21 Echocardiogram: 10/2020: EF 65%, mod concentric hypertrophy, mild MR, mild-mod pulmonary HTN, mild TR[facility]9: diffuse interstitial opacities, small bilateral pleural effusions. Subpleural fibrotic change, stable. Assessment and PLAN 1. Elevated troponin: 
 -Troponin trending up, 0.48 in setting of marked anemia and renal dysfunction.   Repeat troponin pending.   
 -No chest pain. 
 -Suspect troponin leak due to demand of  Severe anemia, hypoxic respiratory failure in setting of renal dysfunction with known CAD/history of CABg. Irregardless is poor candidate for cath given comorbidities  and now severe anemia. 
 -Will check echo.   
 -No heparin or antiplatelets given severe anemia and current mild nosebleed. 2. SOB: 
 -Suspect multifactorial, including marked anemia, ILD, COPD. -Suspect HFpef playing less of a role but will check echo and continue IV bumex. 3.History of  HFpEF with RV dysfunction 
 -EF 65%, mildly reduced RV systolic function with mild-mod Pulmonary HTN 
 -NYHA score complicated by underlying lung disease 
 -Repeat echo pending 
 -Pro bnp elevated but no edema on excam and CXR with unchanged findings. 
 -Bumex 1 mg IV BID 4. CAD s/p CABG(3/2008) -Statin, BB, isordil, 
  
5. Acute on chronic Anemia, normocytic: 
 -Iron low, stool OB pending 
 -repeat hgb 6.5. Needs transfusion. 6. Chronic Afib with bradycardia s/p [employer] leadless PPM 
 
7. COPD, ILD 
 -Pulmonary consulted last admission- planned outpatient workup for fibrotic lung disease 
 -On steroids.   
 -suspect underlying lung disease, ILD main contributor to SOB. 8. Low back pain/flank pain 
 -defer to primary team.  
 
Seen by Dr. Pawel Pierce and discussed with Dr. Morrison Search.  
 
[]    High complexity decision making was performed 
[]    Patient is at high-risk of decompensation with multiple organ involvement 
 
 
: 
Cardiac testing  
10/13/20 ECHO ADULT COMPLETE 10/16/2020 10/16/2020 Narrative · LV: Calculated LVEF is 65%. Normal cavity size and systolic function  
(ejection fraction normal). Moderate concentric hypertrophy. Abnormal left  
ventricular septal motion consistent with interventricular conduction  
delay (IVCD). Left ventricular diastolic dysfunction. · RV: Mildly reduced systolic function. · AV: Probably trileaflet aortic valve. Aortic valve leaflet calcification  
present. No significant stenosis.  Mean gradient is 7 mmHg and  
dimensionless index measures 0.479. · MV: Mild mitral annular calcification. Mild mitral valve regurgitation  
is present. · TV: Mild tricuspid valve regurgitation is present. · LA: Moderately dilated left atrium. Left Atrium volume index is 49  
mL/m2. · RA: Mildly dilated right atrium. · PA: Mild to moderate pulmonary hypertension. Pulmonary arterial systolic  
pressure is 51 mmHg. Signed by: Kumar Miranda MD  
 
 
CAD/CABG  
off pump x3 3/2008 . =post op anemia and renal failure CATH March 7, 2008= LM -bifurcation dz 40% ;LAD 85% ; Circ ost 70% mid 70% RCA proximal 60% tapering, EF 60%-70%, bilaterally patent renal arteries, mild atherosclerosis of the distal abdominal aorta. 
  
RHC May 2, 2019 =PA 66/18 W 20 PA sat 55% CO 4.1 CI 2.12 Echo May 1 ,2019 EF 55-60% mod LAE, Mild CASSIE, Mild AS BELLE 1.6 cm2 mild MR & TR, RV fx mildly reduced PVD-9/27/18- RLE- mid CRA 70, Pop 99-PTA on right pop, LLE LCFA 40% - 1-19-17 PTA RCFA 90, JAS to RSFA  
-12/22/16 PTA Left Pop, PTA Left tib-per 
--3-13-14 PTA Left op PTA RSFA 
---1/25/11 stent RSFA  
JOCELYN 8-21-17 Normal perfusion 
  
Mild carotid artery disease 3-7-08 then 1-4-12 with 10-49% left, less than 10% on right 
  
 Review of Symptoms: 
Respiratory: +exertional dyspnea, no orthopnea, PND,no cough, hemoptysis, URI. Cardiovascular: No CP, palpitations, sweating, lightheadedness, dizziness, syncope, presyncope, lower extremity swelling. Back: +low back pain, left flank pain. Otherwise no other pertinent positive or negative symptoms on ROS. Patient Active Problem List  
 Diagnosis Date Noted  NSTEMI (non-ST elevated myocardial infarction) ([address] 03/18/2021  
 SOB (shortness of breath) 03/08/2021  Hypoglycemia 08/27/2019  Pacemaker 05/09/2019  Acute on chronic diastolic (congestive) heart failure ([address] 05/01/2019  Atrial fibrillation with slow ventricular response ([address] 05/01/2019  Type 2 diabetes with nephropathy ([address] 07/26/2018  Atrial fibrillation, chronic 10/21/2017  CKD (chronic kidney disease) stage 4, GFR 15-29 ml/min (HCC) 10/21/2017  Hypokalemia 06/25/2017  Generalized weakness 06/25/2017  Acute renal failure (ARF) ([address] 03/16/2017  CKD (chronic kidney disease) 01/20/2017  Type 2 diabetes mellitus with diabetic peripheral angiopathy without gangrene ([address] 09/27/2015  PVC's (premature ventricular contractions) 05/26/2014  Carotid arterial disease ([address]  Hypercholesteremia  PVD (peripheral vascular disease) ([address]  Bradycardia  CAD (coronary artery disease)  Hypertension, essential, benign Lion MD Alexis 
Past Medical History:  
Diagnosis Date  KATALINA (acute kidney injury) ([address] 6/25/2017  CAD (coronary artery disease) s/p CABG 2008  Carotid arterial disease ([address]  Chronic obstructive pulmonary disease ([address] states he is on 2L at home day and night  CKD (chronic kidney disease) stage 4, GFR 15-29 ml/min ([facility]) 10/21/2017  
 hypertension and DM nephrosclerosis cr 3.27 June 2020 Dr Shyann Blanton  Diabetes mellitus, type 2 ([address]  Diverticulitis  Hypercholesteremia  Hypertension  Pacemaker  Paroxysmal atrial fibrillation ([address] 10/21/2017  
 new onset afib with BRPR 10-19-17 admit  Pulmonary hypertension ([address] decline in TLC and diffusion capacity- Dr Samy Tamayo  PVC's (premature ventricular contractions) 5/26/2014  PVD (peripheral vascular disease) ([address]  Rectal bleeding 12/17/2019 Past Surgical History:  
Procedure Laterality Date  COLONOSCOPY Left 12/27/2019 COLONOSCOPY performed by Aida Armando MD at [facility] ENDOSCOPY  
 HX CORONARY ARTERY BYPASS GRAFT    
 HX HEART CATHETERIZATION    
 ME TCAT INSJ/RPL PERM LEADLESS PACEMAKER RV W/IMG N/A 5/9/2019 INSERT OR REPLACE TRANSCATH PPM LEADLESS performed by Parminder Roth MD at [place] [address], [facility]  CATH LAB Social History Socioeconomic History  Marital status:  Spouse name: Not on file  Number of children: Not on file  Years of education: Not on file  Highest education level: Not on file Tobacco Use  Smoking status: Former Smoker Packs/day: 3.00 Years: 20.00 Pack years: 60.00 Types: Cigarettes Quit date: 1985 Years since quittin.2  Smokeless tobacco: Never Used Substance and Sexual Activity  Alcohol use: No  
 Drug use: No  
 
History reviewed. No pertinent family history. Current Facility-Administered Medications Medication Dose Route Frequency  albuterol-ipratropium (DUO-NEB) 2.5 MG-0.5 MG/3 ML  3 mL Nebulization Q4H PRN  
 amLODIPine (NORVASC) tablet 5 mg  5 mg Oral DAILY  bumetanide (BUMEX) injection 1 mg  1 mg IntraVENous BID  carvediloL (COREG) tablet 6.25 mg  6.25 mg Oral BID WITH MEALS  cyanocobalamin (VITAMIN B12) tablet 1,000 mcg  1,000 mcg Oral DAILY  isosorbide dinitrate (ISORDIL) tablet 20 mg  20 mg Oral TID  magnesium oxide (MAG-OX) tablet 400 mg  400 mg Oral DAILY  atorvastatin (LIPITOR) tablet 10 mg  10 mg Oral QHS  tamsulosin (FLOMAX) capsule 0.4 mg  0.4 mg Oral DAILY  sodium chloride (NS) flush 5-40 mL  5-40 mL IntraVENous Q8H  
 sodium chloride (NS) flush 5-40 mL  5-40 mL IntraVENous PRN  
 acetaminophen (TYLENOL) tablet 650 mg  650 mg Oral Q6H PRN Or  
 acetaminophen (TYLENOL) suppository 650 mg  650 mg Rectal Q6H PRN  polyethylene glycol (MIRALAX) packet 17 g  17 g Oral DAILY PRN  promethazine (PHENERGAN) tablet 12.5 mg  12.5 mg Oral Q6H PRN Or  
 ondansetron (ZOFRAN) injection 4 mg  4 mg IntraVENous Q6H PRN  
 L.acidophilus-paracasei-S.thermophil-bifidobacter (RISAQUAD) 8 billion cell capsule  1 Cap Oral DAILY  cefTRIAXone (ROCEPHIN) 1 g in 0.9% sodium chloride (MBP/ADV) 50 mL MBP  1 g IntraVENous Q24H  
 doxycycline (VIBRAMYCIN) 100 mg in 0.9% sodium chloride (MBP/ADV) 100 mL MBP  100 mg IntraVENous Q12H  
 predniSONE (DELTASONE) tablet 40 mg  40 mg Oral DAILY WITH BREAKFAST  insulin lispro (HUMALOG) injection   SubCUTAneous AC&HS  
 glucose chewable tablet 16 g  4 Tab Oral PRN  
 dextrose (D50W) injection syrg 12.5-25 g  12.5-25 g IntraVENous PRN  
 glucagon (GLUCAGEN) injection 1 mg  1 mg IntraMUSCular PRN  
 0.9% sodium chloride infusion 250 mL  250 mL IntraVENous PRN  
 lidocaine 4 % patch 1 Patch  1 Patch TransDERmal Q24H  
 HYDROmorphone (PF) (DILAUDID) injection 0.5 mg  0.5 mg IntraVENous Q4H PRN Prior to Admission Medications Prescriptions Last Dose Informant Patient Reported? Taking? albuterol (PROVENTIL VENTOLIN) 2.5 mg /3 mL (0.083 %) nebulizer solution 3/18/2021 at Unknown time Self No Yes Sig: 3 mL by Nebulization route every four (4) hours as needed for Wheezing. amLODIPine (NORVASC) 5 mg tablet 3/18/2021 at Unknown time  No Yes Sig: Take 1 Tab by mouth daily. bumetanide (BUMEX) 2 mg tablet 3/18/2021 at Unknown time  No Yes Sig: Take 1 Tab by mouth two (2) times a day. carvedilol (COREG) 12.5 mg tablet 3/18/2021 at Unknown time  Yes Yes Sig: Take 6.25 mg by mouth two (2) times daily (with meals). cyanocobalamin 1,000 mcg tablet 3/18/2021 at Unknown time  Yes Yes Sig: Take 1,000 mcg by mouth daily. fluticasone-umeclidinium-vilanterol (Trelegy Ellipta) 100-62.5-25 mcg inhaler 3/18/2021 at Unknown time  Yes Yes Sig: Take 1 Puff by inhalation daily. insulin glargine (LANTUS U-100 INSULIN) 100 unit/mL injection 3/18/2021 at Unknown time  Yes Yes Sig: 3-7 Units by SubCUTAneous route nightly. Pt reports he uses 3-7 units depending on BG  
isosorbide dinitrate (ISORDIL) 20 mg tablet 3/18/2021 at Unknown time  No Yes Sig: Take 1 Tab by mouth three (3) times daily. magnesium oxide (MAG-OX) 400 mg tablet 3/18/2021 at Unknown time  No Yes Sig: Take 1 Tab by mouth daily.   
omega 3-dha-epa-fish oil (FISH OIL) 100-160-1,000 mg cap 3/18/2021 at Unknown time Self Yes Yes Sig: Take 1 Cap by mouth daily. predniSONE (DELTASONE) 10 mg tablet Not Taking at Unknown time  Yes No  
Sig: Take  by mouth daily (with breakfast). 40 mg once daily X 3 days, then 3 tablets once daily x 2 days, then 2 tablets once daily x 2 days, then 1 tablet once daily x 3 days. 3/11 pt.start with 3 tablets daily x 2 days. ..  
simvastatin (ZOCOR) 20 mg tablet 3/18/2021 at Unknown time  No Yes Sig: Take 1 Tab by mouth nightly. tamsulosin (FLOMAX) 0.4 mg capsule 3/18/2021 at Unknown time Self No Yes Sig: Take 1 Cap by mouth daily. triamcinolone acetonide (KENALOG) 0.1 % topical cream 3/18/2021 at Unknown time  Yes Yes Facility-Administered Medications: None Allergies Allergen Reactions  Lisinopril Cough Labs:  
Recent Results (from the past 24 hour(s)) EKG, 12 LEAD, INITIAL Collection Time: 03/18/21  5:39 PM  
Result Value Ref Range Ventricular Rate 78 BPM  
 Atrial Rate 81 BPM  
 QRS Duration 138 ms Q-T Interval 418 ms QTC Calculation (Bezet) 476 ms Calculated R Axis -148 degrees Calculated T Axis 2 degrees Diagnosis Ventricular-paced rhythm Abnormal ECG When compared with ECG of 08-MAR-2021 14:22, 
Vent. rate has increased BY  15 BPM 
Confirmed by Alejandro Ohara MD, Melina Richardson ([id number]) on 3/18/2021 9:13:10 PM 
  
CBC WITH AUTOMATED DIFF Collection Time: 03/18/21  5:41 PM  
Result Value Ref Range WBC 13.3 (H) 4.1 - 11.1 K/uL  
 RBC 2.35 (L) 4.10 - 5.70 M/uL HGB 7.3 (L) 12.1 - 17.0 g/dL HCT 22.4 (L) 36.6 - 50.3 % MCV 95.3 80.0 - 99.0 FL  
 MCH 31.1 26.0 - 34.0 PG  
 MCHC 32.6 30.0 - 36.5 g/dL  
 RDW 14.4 11.5 - 14.5 % PLATELET 459 878 - 348 K/uL MPV 11.5 8.9 - 12.9 FL  
 NRBC 0.0 0  WBC ABSOLUTE NRBC 0.00 0.00 - 0.01 K/uL NEUTROPHILS 85 (H) 32 - 75 % LYMPHOCYTES 5 (L) 12 - 49 % MONOCYTES 7 5 - 13 % EOSINOPHILS 2 0 - 7 % BASOPHILS 0 0 - 1 % IMMATURE GRANULOCYTES 1 (H) 0.0 - 0.5 % ABS.  NEUTROPHILS 11. 3 (H) 1.8 - 8.0 K/UL  
 ABS. LYMPHOCYTES 0.7 (L) 0.8 - 3.5 K/UL  
 ABS. MONOCYTES 0.9 0.0 - 1.0 K/UL  
 ABS. EOSINOPHILS 0.3 0.0 - 0.4 K/UL  
 ABS. BASOPHILS 0.0 0.0 - 0.1 K/UL  
 ABS. IMM. GRANS. 0.1 (H) 0.00 - 0.04 K/UL  
 DF SMEAR SCANNED    
 RBC COMMENTS HYPOCHROMIA 2+ METABOLIC PANEL, COMPREHENSIVE Collection Time: 03/18/21  5:41 PM  
Result Value Ref Range Sodium 131 (L) 136 - 145 mmol/L Potassium 4.9 3.5 - 5.1 mmol/L Chloride 96 (L) 97 - 108 mmol/L  
 CO2 28 21 - 32 mmol/L Anion gap 7 5 - 15 mmol/L Glucose 275 (H) 65 - 100 mg/dL  (H) 6 - 20 MG/DL Creatinine 2.89 (H) 0.70 - 1.30 MG/DL  
 BUN/Creatinine ratio 55 (H) 12 - 20 GFR est AA 25 (L) >60 ml/min/1.73m2 GFR est non-AA 21 (L) >60 ml/min/1.73m2 Calcium 8.7 8.5 - 10.1 MG/DL Bilirubin, total 0.3 0.2 - 1.0 MG/DL  
 ALT (SGPT) 21 12 - 78 U/L  
 AST (SGOT) 19 15 - 37 U/L Alk. phosphatase 60 45 - 117 U/L Protein, total 6.4 6.4 - 8.2 g/dL Albumin 3.1 (L) 3.5 - 5.0 g/dL Globulin 3.3 2.0 - 4.0 g/dL A-G Ratio 0.9 (L) 1.1 - 2.2    
TROPONIN I Collection Time: 03/18/21  5:41 PM  
Result Value Ref Range Troponin-I, Qt. 0.17 (H) <0.05 ng/mL NT-PRO BNP Collection Time: 03/18/21  6:00 PM  
Result Value Ref Range NT pro-BNP >35,000 (H) 0 - 450 PG/ML  
CULTURE, BLOOD, PAIRED Collection Time: 03/18/21  6:45 PM  
 Specimen: Blood Result Value Ref Range Special Requests: NO SPECIAL REQUESTS Culture result: NO GROWTH AFTER 7 HOURS    
LACTIC ACID Collection Time: 03/18/21  6:45 PM  
Result Value Ref Range Lactic acid 0.6 0.4 - 2.0 MMOL/L  
METABOLIC PANEL, COMPREHENSIVE Collection Time: 03/19/21  2:27 AM  
Result Value Ref Range Sodium 136 136 - 145 mmol/L Potassium 4.8 3.5 - 5.1 mmol/L Chloride 100 97 - 108 mmol/L  
 CO2 28 21 - 32 mmol/L Anion gap 8 5 - 15 mmol/L Glucose 119 (H) 65 - 100 mg/dL  (H) 6 - 20 MG/DL  Creatinine 2.63 (H) 0.70 - 1.30 MG/DL  
 BUN/Creatinine ratio 58 (H) 12 - 20 GFR est AA 28 (L) >60 ml/min/1.73m2 GFR est non-AA 23 (L) >60 ml/min/1.73m2 Calcium 8.1 (L) 8.5 - 10.1 MG/DL Bilirubin, total 0.3 0.2 - 1.0 MG/DL  
 ALT (SGPT) 19 12 - 78 U/L  
 AST (SGOT) 17 15 - 37 U/L Alk. phosphatase 56 45 - 117 U/L Protein, total 5.3 (L) 6.4 - 8.2 g/dL Albumin 2.6 (L) 3.5 - 5.0 g/dL Globulin 2.7 2.0 - 4.0 g/dL A-G Ratio 1.0 (L) 1.1 - 2.2    
CBC WITH AUTOMATED DIFF Collection Time: 03/19/21  2:27 AM  
Result Value Ref Range WBC 8.9 4.1 - 11.1 K/uL  
 RBC 2.09 (L) 4.10 - 5.70 M/uL HGB 6.4 (L) 12.1 - 17.0 g/dL HCT 19.6 (L) 36.6 - 50.3 % MCV 93.8 80.0 - 99.0 FL  
 MCH 30.6 26.0 - 34.0 PG  
 MCHC 32.7 30.0 - 36.5 g/dL  
 RDW 14.4 11.5 - 14.5 % PLATELET 227 170 - 427 K/uL MPV 11.9 8.9 - 12.9 FL  
 NRBC 0.0 0  WBC ABSOLUTE NRBC 0.00 0.00 - 0.01 K/uL NEUTROPHILS 81 (H) 32 - 75 % LYMPHOCYTES 7 (L) 12 - 49 % MONOCYTES 8 5 - 13 % EOSINOPHILS 3 0 - 7 % BASOPHILS 0 0 - 1 % IMMATURE GRANULOCYTES 1 (H) 0.0 - 0.5 % ABS. NEUTROPHILS 7.2 1.8 - 8.0 K/UL  
 ABS. LYMPHOCYTES 0.6 (L) 0.8 - 3.5 K/UL  
 ABS. MONOCYTES 0.7 0.0 - 1.0 K/UL  
 ABS. EOSINOPHILS 0.3 0.0 - 0.4 K/UL  
 ABS. BASOPHILS 0.0 0.0 - 0.1 K/UL  
 ABS. IMM. GRANS. 0.1 (H) 0.00 - 0.04 K/UL  
 DF SMEAR SCANNED    
 RBC COMMENTS ANISOCYTOSIS 1+ 
    
 RBC COMMENTS OVALOCYTES 1+ TSH 3RD GENERATION Collection Time: 03/19/21  2:27 AM  
Result Value Ref Range TSH 3.13 0.36 - 3.74 uIU/mL MAGNESIUM Collection Time: 03/19/21  2:27 AM  
Result Value Ref Range Magnesium 2.2 1.6 - 2.4 mg/dL PHOSPHORUS Collection Time: 03/19/21  2:27 AM  
Result Value Ref Range Phosphorus 2.7 2.6 - 4.7 MG/DL  
FOLATE Collection Time: 03/19/21  2:27 AM  
Result Value Ref Range Folate 31.9 (H) 5.0 - 21.0 ng/mL VITAMIN B12 Collection Time: 03/19/21  2:27 AM  
Result Value Ref Range  Vitamin B12 953 193 - 986 pg/mL IRON  
 Collection Time: 03/19/21  2:27 AM  
Result Value Ref Range Iron 24 (L) 35 - 150 ug/dL FERRITIN Collection Time: 03/19/21  2:27 AM  
Result Value Ref Range Ferritin 471 (H) 26 - 388 NG/ML  
TROPONIN I Collection Time: 03/19/21  2:27 AM  
Result Value Ref Range Troponin-I, Qt. 0.48 (H) <0.05 ng/mL TYPE & SCREEN Collection Time: 03/19/21  5:39 AM  
Result Value Ref Range Crossmatch Expiration 03/22/2021,2359 ABO/Rh(D) A NEGATIVE Antibody screen NEG Unit number [id number] Blood component type RC LR Unit division 00 Status of unit ALLOCATED Crossmatch result Compatible RBC, ALLOCATE Collection Time: 03/19/21  8:00 AM  
Result Value Ref Range HISTORY CHECKED? Historical check performed Intake/Output Summary (Last 24 hours) at 3/19/2021 6794 Last data filed at 3/19/2021 9895 Gross per 24 hour Intake 400 ml Output 550 ml Net -150 ml Patient Vitals for the past 24 hrs: 
 Temp Pulse Resp BP SpO2  
03/19/21 0717 97.6 °F (36.4 °C) 62 20 127/62 95 % 03/19/21 0336 97.6 °F (36.4 °C) 60 18 (!) 129/43 100 % 03/18/21 2356 97.6 °F (36.4 °C) 61 20 (!) 137/44 98 % 03/18/21 2217 97.7 °F (36.5 °C) 68 20 (!) 112/50 95 % 03/18/21 2130  63 22 (!) 148/62 98 % 03/18/21 2100 98 °F (36.7 °C) 71 25 (!) 133/49 97 % 03/18/21 2030  67 23 113/67 94 % 03/18/21 2000  61 18 (!) 141/60 100 % 03/18/21 1937 97.8 °F (36.6 °C) 63 22 (!) 130/53 99 % 03/18/21 1830  61 18 (!) 124/48 96 % 03/18/21 1745  64 27 (!) 127/49 100 % 03/18/21 1735 97.8 °F (36.6 °C) 78 28 (!) 168/63 100 % General:    Alert, cooperative, + mild conversational dyspnea Psychiatric:    Normal Mood and affect Eye/ENT:      Pupils equal, No asymmetry, Conjunctival pink. Able to hear voice at normal amplitude Lungs:      Visibly symmetric chest expansion, No palpable tenderness. Diffuse crackls bilaterally L>R. No chest wall ttp. [place] [facility][de-identified]    Regular rate and rhythm, S1, S2 normal, no murmur, click, rub or gallop. No JVD, Normal palpable peripheral pulses. No cyanosis Abdomen:     Soft, non-tender. Bowel sounds normal. No masses,  No   
  organomegaly. Extremities:   Extremities normal, atraumatic, no edema. Neurologic:   . No gross focal deficits Thurmon Nipple. Zimmer, NP Leopold Elbe, MD 
3/19/2021  
8:44 AM  
 
 
I concur with the above note, findings and assessment. Still dyspnec 
BP (!) 113/40 (BP 1 Location: Left upper arm, BP Patient Position: At rest)   Pulse 60   Temp 97.8 °F (36.6 °C)   Resp 18   Ht 5' 9\" (1.753 m)   Wt 143 lb (64.9 kg)   SpO2 98%   BMI 21.12 kg/m² General:    Alert, cooperative, no distress. Psychiatric:    Normal Mood and affect Eye/ENT:      Pupils equal, No asymmetry, Conjunctival pink. Able to hear voice at normal amplitude Lungs:      Visibly symmetric chest expansion, No palpable tenderness. Clear to auscultation bilaterally. Heart[de-identified]    Regular rate and rhythm, S1, S2 normal, no murmur, click, rub or gallop. No JVD, Normal palpable peripheral pulses. No cyanosis Abdomen:     Soft, non-tender. Bowel sounds normal. No masses,  No   
  organomegaly. Extremities:   Extremities normal, atraumatic, no edema. Neurologic:   CN II-XII grossly intact. No gross focal deficits Patient was seen with NP/PA, recent investigations were reviewed and treatment care/plan was formulated together. HE has troponin bump from hypoxemia and anemia with underlying chronic CAD. NO intervention. Medical management directed at optimzing above issues Leopold Elbe, MD  
3/19/2021 
12:46 PM 
 
 
[facility] Office:   Lucila Senior Office: 
[address]     [address] [facility] [address]     [address] [place] [address] P: [id number]    P: [phone] F: [phone]    F: [phone]

## 2021-03-19 NOTE — PROGRESS NOTES
[address] [facility] Adult  Hospitalist Group Hospitalist Progress Note Arvind Bustillo MD 
Answering service: [phone] or [phone] from in house phone Date of Service:  3/19/2021 NAME:  Donna Reyez Sr. 
:  1935 MRN:  [id number] Admission Summary: This is an 81-year-old man with past medical history significant for chronic diastolic congestive heart failure, oxygen-dependent COPD, type 2 diabetes, hypertension, chronic kidney disease stage IV, paroxysmal atrial fibrillation, and dyslipidemia, who was in his usual state of health until yesterday when the patient developed shortness of breath which is progressive and getting worse. The patient is normally on 3 liters of oxygen at home. As a result of the worsening shortness of breath, he has to increase his supplemental oxygen to 4 liters without any significant improvement in his shortness of breath. Also, the shortness of breath was not relieved with his inhaler. The patient stated that he has been taking his medication as prescribed. The shortness of breath is worse when the patient is lying down and also with activities such as walking. The patient denies fever, rigor, or chills. The patient received his first COVID-19 vaccination last week. Because of worsening symptoms, the patient was taken to [facility] Emergency Room at [facility]. When the patient arrived at the emergency room, the patient was found to have elevated troponin level. The patient was discussed with his cardiologist on-call who advised admission to the hospital.  The patient was then referred to the hospitalist service for evaluation for admission. The patient was recently admitted to this hospital and was discharged from the hospital a couple of days ago. The patient was admitted for evaluation and treatment of congestive heart failure.   During that hospitalization, the patient was also found to have elevated troponin level. He was initially started on heparin. This was later discontinued by the cardiologist and stated that the patient's elevated troponin level was due to demand ischemia. The patient denies fever, rigors, or chills. The patient has cough which according him is chronic. The patient also complained of back pain. The pain is located at the lower back. No history of injury. The low back pain started yesterday on both side of the back. No radiation to the lower extremity, no urinary or fecal incontinence. The pain is constant, dull ache with no known relieving factors, worse with movement. 
  
 
Interval history / Subjective:  
 F/u SOB \"I want my albuterol\" Assessment & Plan: Suspected non-ST elevation myocardial infarction in a patient with CAD s/p CABG 
-Elevated troponins in the setting of marked anemia and renal dysfunction 
-No chest pain 
-Echo pending 
-Appreciate discussion with Dr Lina Pierce Acute on chronic anemia 
-will transfuse 1 unit PRBC 
-h/h 
-transfuse PRBC for hb<7 Respiratory distress in a patient with chronic hypoxic respiratory failure 
-baseline 3l of oxygen through nasal cannula 
-multifactorial including anemia, COPD,ILD 
-continue home inhalers Probable acute-on-chronic diastolic congestive heart failure 
-CT chest with multiple bilateral pleural effusions 
-IV Bumex, continue 
-Echo 
-Follow Cardiology COPD with acute exacerbation 
-Prednisone, continue inhalers Suspected bacterial pneumonia 
-On Ceftriaxone/Doxycycline  
-Don't see any evidence of Pneumonia, stop antibiotics Type 2 diabetes with hyperglycemia 
-SSI Hypertension 
-Continue home meds Chronic kidney disease stage IV:  Monitor Paroxysmal atrial fibrillation:  Continue Coreg Dyslipidemia:  Continue Lipitor. Bilateral leg swelling:  Dopplers negative for DVT Acute low back pain:  No history of trauma.   CT abd pelvis unremarkable Diabetic diet Code status: FULL CODE 
DVT prophylaxis: scd Plan: Follow Echo, blood transfusion today Care Plan discussed with: Patient/Family Anticipated Disposition: TBD Anticipated Discharge: 24 hours to 48 hours Hospital Problems  Date Reviewed: 3/19/2021 Codes Class Noted POA * (Principal) NSTEMI (non-ST elevated myocardial infarction) ([facility] [address] ICD-10-CM: I21.4 ICD-9-CM: 410.70  3/18/2021 Yes Review of Systems: A comprehensive review of systems was negative except for that written in the HPI. Vital Signs:  
 Last 24hrs VS reviewed since prior progress note. Most recent are: 
Visit Vitals /62 (BP 1 Location: Left upper arm, BP Patient Position: At rest) Pulse 62 Temp 97.6 °F (36.4 °C) Resp 20 Wt 64.9 kg (143 lb 1.3 oz) SpO2 95% BMI 21.13 kg/m² Intake/Output Summary (Last 24 hours) at 3/19/2021 0395 Last data filed at 3/19/2021 8972 Gross per 24 hour Intake 400 ml Output 550 ml Net -150 ml Physical Examination:  
 
I had a face to face encounter with this patient and independently examined them on 3/19/2021 as outlined below: 
 
     
Constitutional:  No acute distress ENT:  Oral mucosa moist, oropharynx benign. Resp:  CTA bilaterally. No wheezing/rhonchi/rales. No accessory muscle use CV:  Regular rhythm, normal rate, no murmurs, gallops, rubs GI:  Soft, non distended, non tender. normoactive bowel sounds, no hepatosplenomegaly Musculoskeletal:  No edema, warm, 2+ pulses throughout Neurologic:  Moves all extremities. AAOx3, CN II-XII reviewed Skin:  Good turgor, no rashes or ulcers Data Review:  
 Review and/or order of clinical lab test 
 
 
Labs:  
 
Recent Labs  
  03/19/21 
0833 03/19/21 0227 03/18/21 
1741 WBC  --  8.9 13.3* HGB 6.5* 6.4* 7.3* HCT 20.0* 19.6* 22.4* PLT  --  151 185 Recent Labs  
  03/19/21 0227 03/18/21 
1741  131* K 4.8 4.9  96*  
CO2 28 28 * 158* CREA 2.63* 2.89* * 275* CA 8.1* 8.7 MG 2.2  --   
PHOS 2.7  --   
 
Recent Labs  
  03/19/21 0227 03/18/21 
1741 ALT 19 21 AP 56 60 TBILI 0.3 0.3 TP 5.3* 6.4 ALB 2.6* 3.1*  
GLOB 2.7 3.3 No results for input(s): INR, PTP, APTT, INREXT in the last 72 hours. Recent Labs  
  03/19/21 0227 FERR 471* Lab Results Component Value Date/Time Folate 31.9 (H) 03/19/2021 02:27 AM  
  
No results for input(s): PH, PCO2, PO2 in the last 72 hours. Recent Labs  
  03/19/21 0227 03/18/21 1741 TROIQ 0.48* 0.17* No results found for: CHOL, CHOLX, CHLST, CHOLV, HDL, HDLP, LDL, LDLC, DLDLP, TGLX, TRIGL, TRIGP, CHHD, CHHDX Lab Results Component Value Date/Time Glucose (POC) 201 (H) 03/10/2021 11:27 AM  
 Glucose (POC) 188 (H) 03/10/2021 09:02 AM  
 Glucose (POC) 268 (H) 03/09/2021 09:36 PM  
 Glucose (POC) 214 (H) 03/09/2021 04:34 PM  
 Glucose (POC) 173 (H) 03/09/2021 11:31 AM  
 
Lab Results Component Value Date/Time Color YELLOW/STRAW 08/27/2019 12:56 PM  
 Appearance CLEAR 08/27/2019 12:56 PM  
 Specific gravity 1.023 08/27/2019 12:56 PM  
 pH (UA) 6.0 08/27/2019 12:56 PM  
 Protein >300 (A) 08/27/2019 12:56 PM  
 Glucose 500 (A) 08/27/2019 12:56 PM  
 Ketone NEGATIVE  08/27/2019 12:56 PM  
 Bilirubin NEGATIVE  08/27/2019 12:56 PM  
 Urobilinogen 0.2 08/27/2019 12:56 PM  
 Nitrites NEGATIVE  08/27/2019 12:56 PM  
 Leukocyte Esterase NEGATIVE  08/27/2019 12:56 PM  
 Epithelial cells FEW 08/27/2019 12:56 PM  
 Bacteria NEGATIVE  08/27/2019 12:56 PM  
 WBC 0-4 08/27/2019 12:56 PM  
 RBC 5-10 08/27/2019 12:56 PM  
 
 
 
Medications Reviewed:  
 
Current Facility-Administered Medications Medication Dose Route Frequency  albuterol-ipratropium (DUO-NEB) 2.5 MG-0.5 MG/3 ML  3 mL Nebulization Q4H PRN  
 amLODIPine (NORVASC) tablet 5 mg  5 mg Oral DAILY  bumetanide (BUMEX) injection 1 mg  1 mg IntraVENous BID  carvediloL (COREG) tablet 6.25 mg  6.25 mg Oral BID WITH MEALS  cyanocobalamin (VITAMIN B12) tablet 1,000 mcg  1,000 mcg Oral DAILY  isosorbide dinitrate (ISORDIL) tablet 20 mg  20 mg Oral TID  magnesium oxide (MAG-OX) tablet 400 mg  400 mg Oral DAILY  atorvastatin (LIPITOR) tablet 10 mg  10 mg Oral QHS  tamsulosin (FLOMAX) capsule 0.4 mg  0.4 mg Oral DAILY  sodium chloride (NS) flush 5-40 mL  5-40 mL IntraVENous Q8H  
 sodium chloride (NS) flush 5-40 mL  5-40 mL IntraVENous PRN  
 acetaminophen (TYLENOL) tablet 650 mg  650 mg Oral Q6H PRN Or  
 acetaminophen (TYLENOL) suppository 650 mg  650 mg Rectal Q6H PRN  polyethylene glycol (MIRALAX) packet 17 g  17 g Oral DAILY PRN  promethazine (PHENERGAN) tablet 12.5 mg  12.5 mg Oral Q6H PRN Or  
 ondansetron (ZOFRAN) injection 4 mg  4 mg IntraVENous Q6H PRN  
 L.acidophilus-paracasei-S.thermophil-bifidobacter (RISAQUAD) 8 billion cell capsule  1 Cap Oral DAILY  cefTRIAXone (ROCEPHIN) 1 g in 0.9% sodium chloride (MBP/ADV) 50 mL MBP  1 g IntraVENous Q24H  
 doxycycline (VIBRAMYCIN) 100 mg in 0.9% sodium chloride (MBP/ADV) 100 mL MBP  100 mg IntraVENous Q12H  predniSONE (DELTASONE) tablet 40 mg  40 mg Oral DAILY WITH BREAKFAST  insulin lispro (HUMALOG) injection   SubCUTAneous AC&HS  
 glucose chewable tablet 16 g  4 Tab Oral PRN  
 dextrose (D50W) injection syrg 12.5-25 g  12.5-25 g IntraVENous PRN  
 glucagon (GLUCAGEN) injection 1 mg  1 mg IntraMUSCular PRN  
 0.9% sodium chloride infusion 250 mL  250 mL IntraVENous PRN  
 lidocaine 4 % patch 1 Patch  1 Patch TransDERmal Q24H  
 HYDROmorphone (PF) (DILAUDID) injection 0.5 mg  0.5 mg IntraVENous Q4H PRN  
 
______________________________________________________________________ EXPECTED LENGTH OF STAY: - - - 
ACTUAL LENGTH OF STAY:          1 Tyler Duncan MD

## 2021-03-19 NOTE — ED NOTES
TRANSFER - OUT REPORT: 
 
Verbal report given to Erica Ray RN(name) on Guido HERNANDEZ Kellerton Sr.  being transferred to [facility](unit) for routine progression of care    
 
Report consisted of patient’s Situation, Background, Assessment and  
Recommendations(SBAR).  
 
 
Lines:  
Peripheral IV 03/18/21 Left Antecubital (Active)  
  
 
Opportunity for questions and clarification was provided.   
 
Patient transported with: 
 Monitor 
O2 @ 3 liters

## 2021-03-19 NOTE — H&P
[address] HISTORY AND PHYSICAL Name:  Sreekanth Mesa 
MR#:  [id number] :  1935 ACCOUNT #:  [de-identified] ADMIT DATE:  2021 The patient was seen, evaluated and admitted by me on 2021. PRIMARY CARE PHYSICIAN:  Flavio Capellan MD 
 
SOURCE OF INFORMATION:  The patient and review of ED and old electronic medical records. CHIEF COMPLAINT:  Shortness of breath. HISTORY OF PRESENT ILLNESS:  This is an 81-year-old man with past medical history significant for chronic diastolic congestive heart failure, oxygen-dependent COPD, type 2 diabetes, hypertension, chronic kidney disease stage IV, paroxysmal atrial fibrillation, and dyslipidemia, who was in his usual state of health until yesterday when the patient developed shortness of breath which is progressive and getting worse. The patient is normally on 3 liters of oxygen at home. As a result of the worsening shortness of breath, he has to increase his supplemental oxygen to 4 liters without any significant improvement in his shortness of breath. Also, the shortness of breath was not relieved with his inhaler. The patient stated that he has been taking his medication as prescribed. The shortness of breath is worse when the patient is lying down and also with activities such as walking. The patient denies fever, rigor, or chills. The patient received his first COVID-19 vaccination last week. Because of worsening symptoms, the patient was taken to [facility] Emergency Room at [facility]. When the patient arrived at the emergency room, the patient was found to have elevated troponin level. The patient was discussed with his cardiologist on-call who advised admission to the hospital.  The patient was then referred to the hospitalist service for evaluation for admission. The patient was recently admitted to this hospital and was discharged from the hospital a couple of days ago.   The patient was admitted for evaluation and treatment of congestive heart failure. During that hospitalization, the patient was also found to have elevated troponin level. He was initially started on heparin. This was later discontinued by the cardiologist and stated that the patient's elevated troponin level was due to demand ischemia. The patient denies fever, rigors, or chills. The patient has cough which according him is chronic. The patient also complained of back pain. The pain is located at the lower back. No history of injury. The low back pain started yesterday on both side of the back. No radiation to the lower extremity, no urinary or fecal incontinence. The pain is constant, dull ache with no known relieving factors, worse with movement. PAST MEDICAL HISTORY: 
1. Chronic diastolic congestive heart failure. 2.  Oxygen-dependent COPD. 3.  Type 2 diabetes. 4.  Hypertension. 5.  Chronic kidney disease. 6.  Paroxysmal atrial fibrillation. 7.  Dyslipidemia. ALLERGIES:  THE PATIENT IS ALLERGIC TO LISINOPRIL. MEDICATIONS: 
1. Albuterol nebulizer every 4 hours as needed for wheezing. 2.  Norvasc 5 mg daily. 3.  Bumex 2 mg twice daily. 4.  Coreg 6.25 mg twice daily. 5.  Trelegy inhalation daily. 6.  Sliding scale with insulin coverage. 7.  Isordil 20 mg 3 times daily. 8.  Magnesium oxide 400 mg daily. 9.  Fish oil 1 capsule daily. 10.  Zocor 20 mg daily. 11.  Flomax 0.4 mg daily. FAMILY HISTORY:  This was reviewed. His father had heart disease. PAST SURGICAL HISTORY:  This is significant for CABG and pacemaker insertion. SOCIAL HISTORY:  The patient is a former smoker. No history of alcohol abuse. REVIEW OF SYSTEMS: 
HEAD, EYES, EARS, NOSE, AND THROAT:  No headache, no dizziness, no blurring of vision, no photophobia. RESPIRATORY SYSTEM:  This is positive for shortness of breath and cough. No hemoptysis. CARDIOVASCULAR SYSTEM:  This is positive for orthopnea.   No chest pain, no palpitations. GASTROINTESTINAL SYSTEM:  No nausea or vomiting. No diarrhea, no constipation. GENITOURINARY SYSTEM:  No dysuria, no urgency, no frequency. All other systems are reviewed and they are negative. PHYSICAL EXAMINATION: 
GENERAL APPEARANCE:  The patient appeared ill, in moderate distress. VITAL SIGNS:  On arrival at the emergency room; temperature 97.8, pulse 78, respiratory rate 28, blood pressure 168/63, oxygen saturation 100% on 4 liters of oxygen. HEENT:  Head:  Normocephalic, atraumatic. Eyes:  Normal eye movement. No redness, no drainage, no discharge. Ears:  Normal external ears with no evidence of drainage. Nose:  No deformity, no drainage. Mouth and Throat:  No visible oral lesion. NECK:  Neck is supple. Mild JVD. No thyromegaly. CHEST:  Diffuse expiratory wheezing and bilateral basilar crackles. HEART:  Normal S1 and S2, regular. No clinically appreciable murmur. ABDOMEN:  Soft, nontender. Normal bowel sounds. CNS:  Alert and oriented x3. No gross focal neurological deficit. EXTREMITIES:  Edema 2+, pulses 2+ bilaterally. MUSCULOSKELETAL SYSTEM:  No obvious joint deformity or swelling. SKIN:  No active skin lesions seen in the exposed part of the body. PSYCHIATRY:  Normal mood and affect. LYMPHATIC SYSTEM:  No cervical lymphadenopathy. DIAGNOSTIC DATA:  EKG shows pacemaker rhythm and nonspecific ST and T-waves abnormalities. Chest x-ray, no significant change in interstitial patchy airspace opacities, predominantly in the upper lobes with some involvement of the left lower lobe. LABORATORY DATA:  Hematology:  WBC 13.3, hemoglobin 7.3, hematocrit 23.4, platelets 992. Cardiac profile:  Troponin 0.17. Chemistry:  Sodium 131, potassium 4.9, chloride 96, CO2 of 28, glucose 275, , creatinine 2.89, calcium 8.7, total bilirubin 0.3, ALT 21, AST 19, alkaline phosphatase 60, total protein 6.4, albumin level 3.1, globulin 3.3.   Pro-BNP level greater than 35,000. Lactic acid level 0.6. ASSESSMENT: 
1. Suspected non-ST elevation myocardial infarction. 2.  Acute-on-chronic diastolic congestive heart failure. 3.  Chronic obstructive pulmonary disease with acute exacerbation. 4.  Anemia. 5.  Suspected bacterial pneumonia. 6.  Type 2 diabetes with hyperglycemia. 7.  Hypertension. 8.  Chronic kidney disease stage IV. 9.  Paroxysmal atrial fibrillation. 10.  Dyslipidemia. 11.  Bilateral leg swelling. 12.  Acute low back pain. 13.  Acute-on-chronic respiratory failure with hypoxia. PLAN: 
1. Suspected non-ST elevation myocardial infarction: We will admit the patient for further evaluation and treatment. We will check serial cardiac markers. The patient has significant anemia, because of that we will not start the patient on full-dose heparin. The patient was recently admitted for the same presentation. Heparin infusion started on admission was discontinued by the cardiologist.  We will await further recommendation from the patient's cardiologist.  This is a known patient with established coronary artery disease who is status post CABG. We will continue with cardiac medication. 2.  Acute-on-chronic diastolic congestive heart failure: We will start the patient on Bumex. We will monitor the patient closely. We will await further recommendation from the cardiologist. 
3.  COPD with acute exacerbation: This is contributing to the patient's shortness of breath. We will start the patient on short course of prednisone. We will also continue with DuoNeb as needed. 4.  Anemia: This is most likely due to chronic kidney disease. We will carry out anemia workup including checking a stool guaiac to rule out occult GI bleed. We will transfuse as indicated. 5.  Suspected bacterial pneumonia: We will start the patient on Rocephin and doxycycline. We will monitor the patient closely.   We will obtain noncontrast CT scan of the chest to evaluate the patient for complications of pneumonia as such as empyema. 6.  Type 2 diabetes with hyperglycemia:  The patient will be placed on sliding scale with insulin coverage. We will check hemoglobin A1c level if one has not been checked recently. 7.  Hypertension: We will continue with preadmission medication. We will monitor the patient's blood pressure closely. 8.  Chronic kidney disease stage IV: We will continue to monitor the patient's renal function. 9.  Paroxysmal atrial fibrillation: We will check a TSH level and await further recommendation from the cardiologist. 
10.  Dyslipidemia: We will continue with Lipitor. 11.  Bilateral leg swelling: This is most likely due to the patient's congestive heart failure. We will check ultrasound of the lower extremity for DVT. 12.  Acute low back pain:  No history of trauma. We will obtain CT scan of the abdomen and pelvis to evaluate the patient for acute pathology. 13.  Acute-on-chronic respiratory failure with hypoxia:  This is multifactorial.  We will identify and treat underlying etiological factors which include COPD and acute on chronic diastolic congestive heart failure as stated above. We will continue with supplemental oxygen. OTHER ISSUES:  Code status: The patient is a full code. We will request SCD for DVT prophylaxis. Because of the patient's significant anemia, there is a concern for GI bleed. FUNCTIONAL STATUS PRIOR TO ADMISSION:  The patient came from home. The patient is ambulatory with a walker. COVID PRECAUTION:  The patient was wearing a face mask. I was wearing a face mask and gloves for this patient's encounter. The patient recently tested negative during the most recent hospitalization. Kaitlin Davis MD 
 
 
[id number] 
D:  03/19/2021 5:59 T:  03/19/2021 7:45 JOB #:  [id number] CC:  Simone Marie MD

## 2021-03-19 NOTE — PROGRESS NOTES
Transition of Care Plan RUR 26% Readmission Assessment Number of days since last admission?: 1-7 days Previous disposition: Home with Home Health Who is being interviewed?: Caregiver What was the patient's/caregiver's perception as to why they think they needed to return back to the hospital?: (short of breath-and back pain) Did you visit your Primary Care Physician after you left the hospital, before you returned this time?: Yes(3/18/21   vitural visit) Why weren't you able to visit your PCP?: Other (Comment) Did you see a specialist, such as Cardiac, Pulmonary, Orthopedic Physician, etc. after you left the hospital?: No 
Who advised the patient to return to the hospital?: Self-referral 
Does the patient report anything that got in the way of taking their medications?: No(continued to take medications) In our efforts to provide the best possible care to you and others like you, can you think of anything that we could have done to help you after you left the hospital the first time, so that you might not have needed to return so soon?: Other (Comment)(no suggestions) Received 1st COVID 19 vaccine Disposition  Home with wife Transportation  Wife Home health   Open to [facility] [phone]  Will need resumption of care order  SN /PT/OT  Cm will send referral when order attached. Home 02--continue to receive 02 through [employer] Medical follow up   PCP and Cardiologist  
 
Contact Wife  Storm Shafer [phone] home  Cell [phone] Granddaughter  [place]  [phone] Reason for Admission:  SOB/   NSTEMI Medical hx  CHF, hypertension, a-fib, home 02, COPD,  Diabetes, CABG  
            
RUR Score:   26% PCP: First and Last name:  Saeid Chavarria MD 
 Name of Practice: 
 Are you a current patient: Yes/No:yes Approximate date of last visit: 3/18/21 Can you do a virtual visit with your PCP: yes Resources/supports as identified by patient/family:   [address]  Wife, and family Top Challenges facing patient (as identified by patient/family and CM): Finances/Medication cost?      No concerns  Secures medications from [facility] Transportation? Wife drives and transports patient Support system or lack thereof? Wife, Demetris Mcdonald 15 Living arrangements? Lives with wife in one level home   Uses RW for mobility Self-care/ADLs/Cognition? Self care and alert and oriented Current Advanced Directive/Advance Care Plan:  Full Code Healthcare Decision Maker:  
Click here to complete [address] including selection of the Healthcare Decision Maker Relationship (ie \"Primary\") Wife decision maker  AMD in chart Marie Mayberry [phone] Plan for utilizing home health:    Open to [facility]   Need order to resume home health services SN/PT/OT Transition of Care Plan:       Home with wife home health and medical follow up CM talked with wife on the phone as patient did not answer the phone in is room and he is on isolation. COVID 19 test pending. Wife was alert and oriented. She confirmed demographics, PCP and insurance  Medicare and Kaylah Badillo. Patient last had virtual visit with PCP 3/18/21 Patient lives in one level home with his wife. The couple had 4 children--3 are -- Son Radha Oseguera lives close by to patient and wife. Patient has family in [place] about 30 min from their home. Patient uses RW for mobility. Medicare pt has received, reviewed, and signed 1st  IM letter informing them of their right to appeal the discharge. Explained to wife on the phone. copy has been placed on pt bedside chart. Cm will follow and arrange home health resumption [facility] when resumption order received Wife will transport home Care Management Interventions PCP Verified by CM: Yes Last Visit to PCP: 03/18/21 Mode of Transport at Discharge: (car) Transition of Care Consult (CM Consult): Discharge Planning Discharge Durable Medical Equipment: No 
Physical Therapy Consult: No 
Occupational Therapy Consult: No 
Speech Therapy Consult: No 
Current Support Network: Lives with Spouse(lives with wife in one level home  AMD in chart) Confirm Follow Up Transport: Family The Plan for Transition of Care is Related to the Following Treatment Goals : home health The Patient and/or Patient Representative was Provided with a Choice of Provider and Agrees with the Discharge Plan?: Yes Freedom of Choice List was Provided with Basic Dialogue that Supports the Patient's Individualized Plan of Care/Goals, Treatment Preferences and Shares the Quality Data Associated with the Providers?: Yes Discharge Location Discharge Placement: Home with home health

## 2021-03-19 NOTE — ED NOTES
Pt c/o left sided back pain 10/10, medicated as ordered prior to leaving with [facility].  
 
Report given to FLORIAN, pt off the floor in NAD, vss.

## 2021-03-19 NOTE — NURSE NAVIGATOR
Chart reviewed by Heart Failure Nurse Navigator. Heart Failure database completed. EF:  Prior echo 10/13/20 ef 65%; repeat echo pending ACEi/ARB/ARNi: not currently indicated BB: coreg 12.5 mg twice daily Aldosterone Antagonist: not currently indicated Obstructive Sleep Apnea Screening: Screening priority 4, advanced age STOP-BANG score: 
 Referred to Sleep Medicine: CRT not currently indicated NYHA Functional Class: per Tana Valderrama, NP - nyha complicated by underlying lung disease. Heart Failure Teach Back in Patient Education. Heart Failure Avoiding Triggers on Discharge Instructions. Cardiologist: Dr. Irena Ruiz ([facility]) Post discharge follow up phone call to be made within 48-72 hours of discharge.

## 2021-03-20 NOTE — PROGRESS NOTES
Cardiology Consult/Progress Note Primary cardiologist: Dr. Franklin Draft 
3/18/2021  5:24 PM  
NSTEMI (non-ST elevated myocardial infarction) ([facility] [address] [I21.4] HPI: Qasim Carrera is a 81 y.o. male admitted on 3/8/2021  for  Increasing SOB since 3/17/21. [R06.02]. Has PMH of KATALINA, CAD s/p remote CABG, Diastolic CHF, Type II DM, COPD on chronic O2 2L, Type II DM, HLD, PPM  (leadless) 5/2019, chronic afib with bradycardia s/p leadless PPM, PVD, GIB, CKD. Presented with chief c/o progressive worsening of SOB which began to worsen to days ago. SOB worse with ambulation. O2 sats dropped with ambulation and he required increased O2. Reports that use of nebulizer does help some with SOB symptoms. He also c/o left lower back pain. Denies chest pain, arm pain, dizziness, orthopnea and BLE edema. Recently hospitalized earlier this month with SOB, likely primarily due to worsening ILD, COPD exacerbation, as well as CKD, anemia, and perhaps some mild component of HFpEF. This admission, he was found to have unchanged CXR findings with interstitial, patchy airspace opacities. His troponin is elevated and trending up, now 0.48, but his renal function is mildly worsened from earlier this month and pro bnp is >35,000. Exhibits marked anemia with hgb 6.4. He is also being treated for possible pneumonia. Cardiology is consulted to evaluate for concern for NSTEMI. Investigation Telemetry: NSR 
ECG: v paced, no significant changes from prior EKG 3/9/21 Echocardiogram: 10/2020: EF 65%, mod concentric hypertrophy, mild MR, mild-mod pulmonary HTN, mild TR. Kinga Segura CT chest 3/9: diffuse interstitial opacities, small bilateral pleural effusions. Subpleural fibrotic change, stable. He is feeling better today his breathing is improving he is lying flat in the bed in no acute distress. Hemoglobin is borderline after transfusion at 7.2 Acute renal failure with an elevation of creatinine from 2-3 today BNP pending for today Assessment and PLAN 1. Elevated troponin: 
 -Troponin trending up, 0.48 in setting of marked anemia and renal dysfunction. Repeat troponin pending.   
 -No chest pain. 
 -Suspect troponin leak due to demand of  Severe anemia, hypoxic respiratory failure in setting of renal dysfunction with known CAD/history of CABg. Irregardless is poor candidate for cath given comorbidities  and now severe anemia. -Echo with grade 2 DD EF 65% concentric LVH PASP 42 
 -No heparin or antiplatelets given severe anemia and current mild nosebleed. 2. SOB: 
 -Suspect multifactorial, including marked anemia, ILD, COPD. -Suspect HFpef playing less of a role 3. History of  HFpEF with RV dysfunction 
 -EF 65%, mildly reduced RV systolic function with mild-mod Pulmonary HTN 
 -NYHA score complicated by underlying lung disease Continue diuresis 4. CAD s/p CABG(3/2008) -Statin, BB, isordil, med management 5. Acute on chronic Anemia, normocytic: 
 -Iron low, stool OB pending 
 -repeat hgb 6.5. Needs transfusion. 6. Chronic Afib with bradycardia s/p [employer] leadless PPM 
 
7. COPD, ILD 
 -Pulmonary consulted last admission- planned outpatient workup for fibrotic lung disease 
 -On steroids.   
 -suspect underlying lung disease, ILD main contributor to SOB. 8. Low back pain/flank pain 
 -defer to primary team.  
  
 
: 
Cardiac testing  
10/13/20 ECHO ADULT COMPLETE 10/16/2020 10/16/2020 Narrative · LV: Calculated LVEF is 65%. Normal cavity size and systolic function  
(ejection fraction normal). Moderate concentric hypertrophy. Abnormal left  
ventricular septal motion consistent with interventricular conduction  
delay (IVCD). Left ventricular diastolic dysfunction. · RV: Mildly reduced systolic function. · AV: Probably trileaflet aortic valve. Aortic valve leaflet calcification  
present. No significant stenosis.  Mean gradient is 7 mmHg and  
dimensionless index measures 0.479. · MV: Mild mitral annular calcification. Mild mitral valve regurgitation  
is present. · TV: Mild tricuspid valve regurgitation is present. · LA: Moderately dilated left atrium. Left Atrium volume index is 49  
mL/m2. · RA: Mildly dilated right atrium. · PA: Mild to moderate pulmonary hypertension. Pulmonary arterial systolic  
pressure is 51 mmHg. Signed by: Iglesia Forrester MD  
 
 
CAD/CABG  
off pump x3 3/2008 . =post op anemia and renal failure CATH March 7, 2008= LM -bifurcation dz 40% ;LAD 85% ; Circ ost 70% mid 70% RCA proximal 60% tapering, EF 60%-70%, bilaterally patent renal arteries, mild atherosclerosis of the distal abdominal aorta. 
  
RHC May 2, 2019 =PA 66/18 W 20 PA sat 55% CO 4.1 CI 2.12 Echo May 1 ,2019 EF 55-60% mod LAE, Mild CASSIE, Mild AS BELLE 1.6 cm2 mild MR & TR, RV fx mildly reduced PVD-9/27/18- RLE- mid CRA 70, Pop 99-PTA on right pop, LLE LCFA 40% - 1-19-17 PTA RCFA 90, JAS to RSFA  
-12/22/16 PTA Left Pop, PTA Left tib-per 
--3-13-14 PTA Left op PTA RSFA 
---1/25/11 stent RSFA  
JOCELYN 8-21-17 Normal perfusion 
  
Mild carotid artery disease 3-7-08 then 1-4-12 with 10-49% left, less than 10% on right 
  
 Review of Symptoms: 
Respiratory: +exertional dyspnea, no orthopnea, PND,no cough, hemoptysis, URI. Cardiovascular: No CP, palpitations, sweating, lightheadedness, dizziness, syncope, presyncope, lower extremity swelling. Back: +low back pain, left flank pain. Otherwise no other pertinent positive or negative symptoms on ROS. Patient Active Problem List  
 Diagnosis Date Noted  NSTEMI (non-ST elevated myocardial infarction) ([facility] [address] 03/18/2021  
 SOB (shortness of breath) 03/08/2021  Hypoglycemia 08/27/2019  Pacemaker 05/09/2019  Acute on chronic diastolic (congestive) heart failure ([address] 05/01/2019  Atrial fibrillation with slow ventricular response ([address] 05/01/2019  Type 2 diabetes with nephropathy ([facility] 07/26/2018  Atrial fibrillation, chronic 10/21/2017  CKD (chronic kidney disease) stage 4, GFR 15-29 ml/min (HCC) 10/21/2017  Hypokalemia 06/25/2017  Generalized weakness 06/25/2017  Acute renal failure (ARF) ([address] 03/16/2017  CKD (chronic kidney disease) 01/20/2017  Type 2 diabetes mellitus with diabetic peripheral angiopathy without gangrene ([address] 09/27/2015  PVC's (premature ventricular contractions) 05/26/2014  Carotid arterial disease ([address]  Hypercholesteremia  PVD (peripheral vascular disease) ([address]  Bradycardia  CAD (coronary artery disease)  Hypertension, essential, benign Shawn Sotomayor MD 
Past Medical History:  
Diagnosis Date  KATALINA (acute kidney injury) ([address] 6/25/2017  CAD (coronary artery disease) s/p CABG 2008  Carotid arterial disease ([address]  Chronic obstructive pulmonary disease ([address] states he is on 2L at home day and night  CKD (chronic kidney disease) stage 4, GFR 15-29 ml/min (HCC) 10/21/2017  
 hypertension and DM nephrosclerosis cr 3.27 June 2020 Dr Diony Chaney  Diabetes mellitus, type 2 ([address]  Diverticulitis  Hypercholesteremia  Hypertension  Pacemaker  Paroxysmal atrial fibrillation ([address] 10/21/2017  
 new onset afib with BRPR 10-19-17 admit  Pulmonary hypertension ([address] decline in TLC and diffusion capacity- Dr Adelia Galicia  PVC's (premature ventricular contractions) 5/26/2014  PVD (peripheral vascular disease) ([address]  Rectal bleeding 12/17/2019 Past Surgical History:  
Procedure Laterality Date  COLONOSCOPY Left 12/27/2019 COLONOSCOPY performed by Hubert Quiroz MD at [facility] ENDOSCOPY  
 HX CORONARY ARTERY BYPASS GRAFT    
 HX HEART CATHETERIZATION    
 IL TCAT INSJ/RPL PERM LEADLESS PACEMAKER RV W/IMG N/A 5/9/2019 INSERT OR REPLACE TRANSCATH PPM LEADLESS performed by Neelma Hidalgo MD at [place] [id number], [facility]  CATH LAB Social History Socioeconomic History  Marital status:   
 Spouse name: Not on file  Number of children: Not on file  Years of education: Not on file  Highest education level: Not on file Tobacco Use  Smoking status: Former Smoker Packs/day: 3.00 Years: 20.00 Pack years: 60.00 Types: Cigarettes Quit date: 1985 Years since quittin.2  Smokeless tobacco: Never Used Substance and Sexual Activity  Alcohol use: No  
 Drug use: No  
 
History reviewed. No pertinent family history. Current Facility-Administered Medications Medication Dose Route Frequency  albuterol-ipratropium (DUO-NEB) 2.5 MG-0.5 MG/3 ML  3 mL Nebulization Q4H PRN  
 amLODIPine (NORVASC) tablet 5 mg  5 mg Oral DAILY  bumetanide (BUMEX) injection 1 mg  1 mg IntraVENous BID  carvediloL (COREG) tablet 6.25 mg  6.25 mg Oral BID WITH MEALS  cyanocobalamin (VITAMIN B12) tablet 1,000 mcg  1,000 mcg Oral DAILY  isosorbide dinitrate (ISORDIL) tablet 20 mg  20 mg Oral TID  magnesium oxide (MAG-OX) tablet 400 mg  400 mg Oral DAILY  atorvastatin (LIPITOR) tablet 10 mg  10 mg Oral QHS  tamsulosin (FLOMAX) capsule 0.4 mg  0.4 mg Oral DAILY  sodium chloride (NS) flush 5-40 mL  5-40 mL IntraVENous Q8H  
 sodium chloride (NS) flush 5-40 mL  5-40 mL IntraVENous PRN  
 acetaminophen (TYLENOL) tablet 650 mg  650 mg Oral Q6H PRN Or  
 acetaminophen (TYLENOL) suppository 650 mg  650 mg Rectal Q6H PRN  polyethylene glycol (MIRALAX) packet 17 g  17 g Oral DAILY PRN  promethazine (PHENERGAN) tablet 12.5 mg  12.5 mg Oral Q6H PRN Or  
 ondansetron (ZOFRAN) injection 4 mg  4 mg IntraVENous Q6H PRN  
 L.acidophilus-paracasei-S.thermophil-bifidobacter (RISAQUAD) 8 billion cell capsule  1 Cap Oral DAILY  cefTRIAXone (ROCEPHIN) 1 g in 0.9% sodium chloride (MBP/ADV) 50 mL MBP  1 g IntraVENous Q24H  
 doxycycline (VIBRAMYCIN) 100 mg in 0.9% sodium chloride (MBP/ADV) 100 mL MBP  100 mg IntraVENous Q12H  predniSONE (DELTASONE) tablet 40 mg  40 mg Oral DAILY WITH BREAKFAST  insulin lispro (HUMALOG) injection   SubCUTAneous AC&HS  
 glucose chewable tablet 16 g  4 Tab Oral PRN  
 dextrose (D50W) injection syrg 12.5-25 g  12.5-25 g IntraVENous PRN  
 glucagon (GLUCAGEN) injection 1 mg  1 mg IntraMUSCular PRN  
 0.9% sodium chloride infusion 250 mL  250 mL IntraVENous PRN  
 lidocaine 4 % patch 1 Patch  1 Patch TransDERmal Q24H  
 HYDROmorphone (PF) (DILAUDID) injection 0.5 mg  0.5 mg IntraVENous Q4H PRN Prior to Admission Medications Prescriptions Last Dose Informant Patient Reported? Taking? albuterol (PROVENTIL VENTOLIN) 2.5 mg /3 mL (0.083 %) nebulizer solution 3/18/2021 at Unknown time Self No Yes Sig: 3 mL by Nebulization route every four (4) hours as needed for Wheezing. amLODIPine (NORVASC) 5 mg tablet 3/18/2021 at Unknown time  No Yes Sig: Take 1 Tab by mouth daily. bumetanide (BUMEX) 2 mg tablet 3/18/2021 at Unknown time  No Yes Sig: Take 1 Tab by mouth two (2) times a day. carvedilol (COREG) 12.5 mg tablet 3/18/2021 at Unknown time  Yes Yes Sig: Take 6.25 mg by mouth two (2) times daily (with meals). cyanocobalamin 1,000 mcg tablet 3/18/2021 at Unknown time  Yes Yes Sig: Take 1,000 mcg by mouth daily. fluticasone-umeclidinium-vilanterol (Trelegy Ellipta) 100-62.5-25 mcg inhaler 3/18/2021 at Unknown time  Yes Yes Sig: Take 1 Puff by inhalation daily. insulin glargine (LANTUS U-100 INSULIN) 100 unit/mL injection 3/18/2021 at Unknown time  Yes Yes Sig: 3-7 Units by SubCUTAneous route nightly. Pt reports he uses 3-7 units depending on BG  
isosorbide dinitrate (ISORDIL) 20 mg tablet 3/18/2021 at Unknown time  No Yes Sig: Take 1 Tab by mouth three (3) times daily. magnesium oxide (MAG-OX) 400 mg tablet 3/18/2021 at Unknown time  No Yes Sig: Take 1 Tab by mouth daily. omega 3-dha-epa-fish oil (FISH OIL) 100-160-1,000 mg cap 3/18/2021 at Unknown time Self Yes Yes Sig: Take 1 Cap by mouth daily. predniSONE (DELTASONE) 10 mg tablet Not Taking at Unknown time  Yes No  
Sig: Take  by mouth daily (with breakfast). 40 mg once daily X 3 days, then 3 tablets once daily x 2 days, then 2 tablets once daily x 2 days, then 1 tablet once daily x 3 days. 3/11 pt.start with 3 tablets daily x 2 days. ..  
simvastatin (ZOCOR) 20 mg tablet 3/18/2021 at Unknown time  No Yes Sig: Take 1 Tab by mouth nightly. tamsulosin (FLOMAX) 0.4 mg capsule 3/18/2021 at Unknown time Self No Yes Sig: Take 1 Cap by mouth daily. triamcinolone acetonide (KENALOG) 0.1 % topical cream 3/18/2021 at Unknown time  Yes Yes Facility-Administered Medications: None Allergies Allergen Reactions  Lisinopril Cough Labs:  
Recent Results (from the past 24 hour(s)) GLUCOSE, POC Collection Time: 03/19/21  9:48 AM  
Result Value Ref Range Glucose (POC) 140 (H) 65 - 100 mg/dL Performed by Frederick CASAS   
ECHO ADULT COMPLETE Collection Time: 03/19/21 11:08 AM  
Result Value Ref Range IVSd 1.19 (A) 0.60 - 1.00 cm LVIDd 5.01 4.20 - 5.90 cm LVIDs 3.16 cm  
 LVOT d 1.89 cm  
 LVPWd 1.20 (A) 0.60 - 1.00 cm LVOT Peak Gradient 2.61 mmHg LVOT Peak Velocity 80.80 cm/s Left Atrium Major Axis 6.04 cm  
 LA Volume 130.77 18.0 - 58.0 mL  
 LA Area 4C 31.10 cm2 LA Vol 2C 126.00 (A) 18.00 - 58.00 mL LA Vol 4C 113.61 (A) 18.00 - 58.00 mL Aortic Valve Area by Continuity of Peak Velocity 1.04 cm2 AoV PG 19.18 mmHg Aortic Valve Systolic Peak Velocity 560.08 cm/s  
 MV A Steve 47.70 cm/s Mitral Valve E Wave Deceleration Time 184.68 ms MV E Steve 112.54 cm/s  
 E/E' ratio (averaged) 24.10   
 E/E' lateral 22.33   
 E/E' septal 25.87 LV E' Lateral Velocity 5.04 cm/s LV E' Septal Velocity 4.35 cm/s Mitral Valve Pressure Half-time 53.56 ms  
 MVA (PHT) 4.11 cm2 Pulmonic Valve Systolic Peak Instantaneous Gradient 1.94 mmHg Pulmonic Regurgitant End Max Velocity 69.57 cm/s  Tapse 1.14 (A) 1.50 - 2.00 cm Triscuspid Valve Regurgitation Peak Gradient 29.89 mmHg  
 TR Max Velocity 273.35 cm/s Ao Root D 3.16 cm  
 MV E/A 2.36 LV Mass .1 88.0 - 224.0 g  
 LV Mass AL Index 130.2 49.0 - 115.0 g/m2 Left Atrium Minor Axis 3.37 cm  
 LA Vol Index 73.01 16.00 - 28.00 ml/m2 LA Vol Index 70.35 16.00 - 28.00 ml/m2 LA Vol Index 63.43 16.00 - 28.00 ml/m2 BELLE/BSA Pk Steve 0.6 cm2/m2 GLUCOSE, POC Collection Time: 03/19/21 11:23 AM  
Result Value Ref Range Glucose (POC) 191 (H) 65 - 100 mg/dL Performed by Luba Valentin. GLUCOSE, POC Collection Time: 03/19/21  5:19 PM  
Result Value Ref Range Glucose (POC) 271 (H) 65 - 100 mg/dL Performed by Carmen Fraga GLUCOSE, POC Collection Time: 03/19/21  9:42 PM  
Result Value Ref Range Glucose (POC) 197 (H) 65 - 100 mg/dL Performed by Carmen Fraga CBC WITH AUTOMATED DIFF Collection Time: 03/20/21  2:31 AM  
Result Value Ref Range WBC 7.9 4.1 - 11.1 K/uL  
 RBC 2.43 (L) 4.10 - 5.70 M/uL HGB 7.3 (L) 12.1 - 17.0 g/dL HCT 22.4 (L) 36.6 - 50.3 % MCV 92.2 80.0 - 99.0 FL  
 MCH 30.0 26.0 - 34.0 PG  
 MCHC 32.6 30.0 - 36.5 g/dL  
 RDW 15.2 (H) 11.5 - 14.5 % PLATELET 694 386 - 850 K/uL MPV 11.6 8.9 - 12.9 FL  
 NRBC 0.0 0  WBC ABSOLUTE NRBC 0.00 0.00 - 0.01 K/uL NEUTROPHILS 93 (H) 32 - 75 % LYMPHOCYTES 3 (L) 12 - 49 % MONOCYTES 3 (L) 5 - 13 % EOSINOPHILS 0 0 - 7 % BASOPHILS 0 0 - 1 % IMMATURE GRANULOCYTES 1 (H) 0.0 - 0.5 % ABS. NEUTROPHILS 7.4 1.8 - 8.0 K/UL  
 ABS. LYMPHOCYTES 0.2 (L) 0.8 - 3.5 K/UL  
 ABS. MONOCYTES 0.2 0.0 - 1.0 K/UL  
 ABS. EOSINOPHILS 0.0 0.0 - 0.4 K/UL  
 ABS. BASOPHILS 0.0 0.0 - 0.1 K/UL  
 ABS. IMM. GRANS. 0.1 (H) 0.00 - 0.04 K/UL  
 DF SMEAR SCANNED    
 RBC COMMENTS ANISOCYTOSIS 1+ 
    
 RBC COMMENTS OVALOCYTES 1+ RBC COMMENTS MICROCYTOSIS 
1+ METABOLIC PANEL, BASIC  Collection Time: 03/20/21  2:31 AM  
Result Value Ref Range Sodium 132 (L) 136 - 145 mmol/L Potassium 5.2 (H) 3.5 - 5.1 mmol/L Chloride 98 97 - 108 mmol/L  
 CO2 24 21 - 32 mmol/L Anion gap 10 5 - 15 mmol/L Glucose 211 (H) 65 - 100 mg/dL  (H) 6 - 20 MG/DL Creatinine 3.15 (H) 0.70 - 1.30 MG/DL  
 BUN/Creatinine ratio 49 (H) 12 - 20 GFR est AA 23 (L) >60 ml/min/1.73m2 GFR est non-AA 19 (L) >60 ml/min/1.73m2 Calcium 8.5 8.5 - 10.1 MG/DL  
SAMPLES BEING HELD Collection Time: 03/20/21  2:31 AM  
Result Value Ref Range SAMPLES BEING HELD 1SST COMMENT Add-on orders for these samples will be processed based on acceptable specimen integrity and analyte stability, which may vary by analyte. HGB & HCT Collection Time: 03/20/21  6:33 AM  
Result Value Ref Range HGB 7.2 (L) 12.1 - 17.0 g/dL HCT 22.4 (L) 36.6 - 50.3 % GLUCOSE, POC Collection Time: 03/20/21  8:28 AM  
Result Value Ref Range Glucose (POC) 198 (H) 65 - 100 mg/dL Performed by Lito Foley (CON) Intake/Output Summary (Last 24 hours) at 3/20/2021 2771 Last data filed at 3/20/2021 3446 Gross per 24 hour Intake 352.1 ml Output  Net 352.1 ml Patient Vitals for the past 24 hrs: 
 Temp Pulse Resp BP SpO2  
03/20/21 0700 98 °F (36.7 °C) 74 21 (!) 145/70 97 % 03/20/21 0429     90 % 03/20/21 0315 97.9 °F (36.6 °C) 62 22 (!) 143/61 97 % 03/20/21 0242 97.8 °F (36.6 °C) 62 16 (!) 133/57 95 % 03/20/21 0143 97.7 °F (36.5 °C) 65 18 138/63 97 % 03/20/21 0041 97.5 °F (36.4 °C) 60 21 (!) 130/54 97 % 03/20/21 0010 98.1 °F (36.7 °C) 60 18 (!) 130/54 96 % 03/19/21 2355 97.5 °F (36.4 °C) 61 19 (!) 123/52 95 % 03/19/21 2331 97.8 °F (36.6 °C) 61 18 (!) 121/58 100 % 03/19/21 2305 97.7 °F (36.5 °C) 63 19 (!) 144/57 100 % 03/19/21 2224     94 % 03/19/21 2151  60  133/60   
03/19/21 2034 97.2 °F (36.2 °C) 67 17 (!) 109/49 98 % 03/19/21 1720 97.6 °F (36.4 °C) 61 16 (!) 118/52 98 % 03/19/21 1526 97.8 °F (36.6 °C) 71 26 (!) 122/47 99 % 03/19/21 1340     97 % 03/19/21 1121 97.8 °F (36.6 °C) 60 18 (!) 113/40 98 % 03/19/21 1056    127/62   
03/19/21 1018     94 % General:    Alert, cooperative, + mild conversational dyspnea Psychiatric:    Normal Mood and affect Eye/ENT:      Pupils equal, No asymmetry, Conjunctival pink. Able to hear voice at normal amplitude Lungs:      Visibly symmetric chest expansion, No palpable tenderness. Diffuse crackls bilaterally L>R. No chest wall ttp. Katanushaene Candicek Heart[de-identified]    Regular rate and rhythm, S1, S2 normal, no murmur, click, rub or gallop. No JVD, Normal palpable peripheral pulses. No cyanosis Abdomen:     Soft, non-tender. Bowel sounds normal. No masses,  No   
  organomegaly. Extremities:   Extremities normal, atraumatic, no edema. Neurologic:   . No gross focal deficits MD Kelsi Thompson MD 
3/20/2021  
8:44 AM  
 
 
I concur with the above note, findings and assessment. Still dyspnec 
BP (!) 145/70   Pulse 74   Temp 98 °F (36.7 °C)   Resp 21   Ht 5' 9\" (1.753 m)   Wt 143 lb (64.9 kg)   SpO2 97%   BMI 21.12 kg/m² General:    Alert, cooperative, no distress. Psychiatric:    Normal Mood and affect Eye/ENT:      Pupils equal, No asymmetry, Conjunctival pink. Able to hear voice at normal amplitude Lungs:      Visibly symmetric chest expansion, No palpable tenderness. Clear to auscultation bilaterally. Heart[de-identified]    Regular rate and rhythm, S1, S2 normal, no murmur, click, rub or gallop. No JVD, Normal palpable peripheral pulses. No cyanosis Abdomen:     Soft, non-tender. Bowel sounds normal. No masses,  No   
  organomegaly. Extremities:   Extremities normal, atraumatic, no edema. Neurologic:   CN II-XII grossly intact. No gross focal deficits Patient was seen with NP/PA, recent investigations were reviewed and treatment care/plan was formulated together.  HE has troponin bump from hypoxemia and anemia with underlying chronic CAD. NO intervention. Medical management directed at optimzing above issues Marshall Armas MD  
3/20/2021 
12:46 PM 
 
 
[facility] Office:   [address] [facility] Office: 
[address]     [address] [facility] [address]     [address] [place] [address] P: [id number]    P: [phone] F: [phone]    F: [phone]

## 2021-03-20 NOTE — ACP (ADVANCE CARE PLANNING)
[id number] [facility] Adult  Hospitalist Group Advance Care Planning Note Name: Idalia Ashford YOB: 1935 MRN: [id number] Admission Date: 3/18/2021  5:24 PM 
 
Date of discussion: 3/20/2021 Active Diagnoses: 
 
Hospital Problems  Date Reviewed: 3/19/2021 Codes Class Noted POA * (Principal) NSTEMI (non-ST elevated myocardial infarction) ([facility] [address] ICD-10-CM: I21.4 ICD-9-CM: 410.70  3/18/2021 Yes These active diagnoses are of sufficient risk that focused discussion on advance care planning is indicated in order to allow the patient to thoughtfully consider personal goals of care, and if situations arise that prevent the ability to personally give input, to ensure appropriate representation of their personal desires for different levels and aggressiveness of care. Discussion:  
 
Persons present and participating in discussion: Idalia Ashford., Vanesa Walls MD 
 
Topics Discussed: 
Patient's medical condition and diagnosis: [ x ] yes [  ] no  
Surrogate decision maker: [ x ] yes [  ] no Patient's current physical function/cognitive function/frailty: [x  ] yes [  ] no Code Status: [x  ] yes [  ] no  
Artificial Nutrition / Dialysis / Non-Invasive Ventilation / Blood Transfusion: [x ] yes [  ] no Potential Resources for home (durable medical equipment, home nursing, home O2): [x  ] yes [  ] no Overview of Discussion: Discussed about co-morbidities, quality of life, FULL CODE, DNR, mPOA and what they all mean for him. He wants to be FULL CODE. He wants to his son Lucina Casillas to be mPOA, in case needed. Time Spent:  
 
Total time spent face-to-face in education and discussion: 17 minutes. Vanesa Walls MD 
Date of Service:  3/20/2021 12:57 PM

## 2021-03-20 NOTE — PROGRESS NOTES
[address] [facility] Adult  Hospitalist Group Hospitalist Progress Note Russ Renae MD 
Answering service: [phone] from in house phone Date of Service:  3/20/2021 NAME:  Marcelle Tobias Sr. 
:  1935 MRN:  [id number] Admission Summary: This is an 60-year-old man with past medical history significant for chronic diastolic congestive heart failure, oxygen-dependent COPD, type 2 diabetes, hypertension, chronic kidney disease stage IV, paroxysmal atrial fibrillation, and dyslipidemia, who was in his usual state of health until yesterday when the patient developed shortness of breath which is progressive and getting worse. The patient is normally on 3 liters of oxygen at home. As a result of the worsening shortness of breath, he has to increase his supplemental oxygen to 4 liters without any significant improvement in his shortness of breath. Also, the shortness of breath was not relieved with his inhaler. The patient stated that he has been taking his medication as prescribed. The shortness of breath is worse when the patient is lying down and also with activities such as walking. The patient denies fever, rigor, or chills. The patient received his first COVID-19 vaccination last week. Because of worsening symptoms, the patient was taken to [facility] Emergency Room at [facility]. When the patient arrived at the emergency room, the patient was found to have elevated troponin level. The patient was discussed with his cardiologist on-call who advised admission to the hospital.  The patient was then referred to the hospitalist service for evaluation for admission. The patient was recently admitted to this hospital and was discharged from the hospital a couple of days ago. The patient was admitted for evaluation and treatment of congestive heart failure.   During that hospitalization, the patient was also found to have elevated troponin level. He was initially started on heparin. This was later discontinued by the cardiologist and stated that the patient's elevated troponin level was due to demand ischemia. The patient denies fever, rigors, or chills. The patient has cough which according him is chronic. The patient also complained of back pain. The pain is located at the lower back. No history of injury. The low back pain started yesterday on both side of the back. No radiation to the lower extremity, no urinary or fecal incontinence. The pain is constant, dull ache with no known relieving factors, worse with movement. 
  
 
Interval history / Subjective:  
 F/u SOB On 3l NC 
proBNP remains elevated Creatinine worse today No fever No leukocytosis Assessment & Plan: Suspected non-ST elevation myocardial infarction in a patient with CAD s/p CABG 
-Elevated troponins in the setting of marked anemia and renal dysfunction 
-No chest pain 
-Echo EF 65%. Moderate concentric hypertrophy 
-Appreciate cardiology Acute on chronic anemia 
-sec to ? CKD vs GI bleed 
-s/p 1 unit PRBC 3/19 
-awaiting FOBT 
-h/h 
-transfuse PRBC for hb<7 Respiratory distress in a patient with chronic hypoxic respiratory failure 
-baseline 3l of oxygen through nasal cannula 
-multifactorial including anemia, COPD, ILD 
-continue home inhalers 
-Incentive spirometry Probable acute-on-chronic diastolic congestive heart failure NYHA III 
-CT chest with multiple bilateral pleural effusions 
-On IV Bumex, with worsening renal function will ask cardiology if we need to hold diuretics -Echo as above 
-Follow Cardiology COPD with acute exacerbation-Prednisone, continue inhalers, continue doxy Suspected bacterial pneumonia 
-On Ceftriaxone/Doxycycline, will stop ceftriaxone 
-Don't see any evidence of Pneumonia Type 2 diabetes with hyperglycemia-SSI Hypertension-Continue home meds Chronic kidney disease stage IV:  Monitor Paroxysmal atrial fibrillation:  Continue Coreg. S/p Medtronic PPM 
Dyslipidemia:  Continue Lipitor. Bilateral leg swelling:  Dopplers negative for DVT Acute low back pain:  No history of trauma. CT abd pelvis unremarkable Diabetic diet Code status: FULL CODE 
DVT prophylaxis: scd Plan: Follow Cardiology Care Plan discussed with: Patient/Family Anticipated Disposition: TBD Anticipated Discharge: 24 hours to 48 hours Hospital Problems  Date Reviewed: 3/19/2021 Codes Class Noted POA * (Principal) NSTEMI (non-ST elevated myocardial infarction) ([facility] [address] ICD-10-CM: I21.4 ICD-9-CM: 410.70  3/18/2021 Yes Review of Systems: A comprehensive review of systems was negative except for that written in the HPI. Vital Signs:  
 Last 24hrs VS reviewed since prior progress note. Most recent are: 
Visit Vitals BP (!) 145/70 Pulse 74 Temp 98 °F (36.7 °C) Resp 21 Ht 5' 9\" (1.753 m) Wt 64.9 kg (143 lb) SpO2 97% BMI 21.12 kg/m² Intake/Output Summary (Last 24 hours) at 3/20/2021 1154 Last data filed at 3/20/2021 7233 Gross per 24 hour Intake 352.1 ml Output  Net 352.1 ml Physical Examination:  
 
I had a face to face encounter with this patient and independently examined them on 3/20/2021 as outlined below: 
 
     
Constitutional:  No acute distress ENT:  Oral mucosa moist, oropharynx benign. Resp:  CTA bilaterally. No wheezing/rhonchi/rales. No accessory muscle use CV:  Regular rhythm, normal rate, no murmurs, gallops, rubs GI:  Soft, non distended, non tender. normoactive bowel sounds, no hepatosplenomegaly Musculoskeletal:  No edema, warm, 2+ pulses throughout Neurologic:  Moves all extremities. AAOx3, CN II-XII reviewed Skin:  Good turgor, no rashes or ulcers Data Review:  
 Review and/or order of clinical lab test 
 
 
Labs:  
 
Recent Labs  
  03/20/21 
0633 03/20/21 2432 03/19/21 
0400 03/19/21 
0637 WBC  --  7.9  --  8.9 HGB 7.2* 7.3*   < > 6.4* HCT 22.4* 22.4*   < > 19.6* PLT  --  156  --  151  
 < > = values in this interval not displayed. Recent Labs  
  03/20/21 
0231 03/19/21 0227 03/18/21 
1741 * 136 131*  
K 5.2* 4.8 4.9 CL 98 100 96* CO2 24 28 28 * 152* 158* CREA 3.15* 2.63* 2.89* * 119* 275* CA 8.5 8.1* 8.7 MG  --  2.2  --   
PHOS  --  2.7  --   
 
Recent Labs  
  03/19/21 0227 03/18/21 
1741 ALT 19 21 AP 56 60 TBILI 0.3 0.3 TP 5.3* 6.4 ALB 2.6* 3.1*  
GLOB 2.7 3.3 No results for input(s): INR, PTP, APTT, INREXT, INREXT in the last 72 hours. Recent Labs  
  03/19/21 
0227 FERR 471* Lab Results Component Value Date/Time Folate 31.9 (H) 03/19/2021 02:27 AM  
  
No results for input(s): PH, PCO2, PO2 in the last 72 hours. Recent Labs  
  03/20/21 
1000 03/19/21 0227 03/18/21 
1741 TROIQ 0.33* 0.48* 0.17* No results found for: CHOL, CHOLX, CHLST, CHOLV, HDL, HDLP, LDL, LDLC, DLDLP, TGLX, TRIGL, TRIGP, CHHD, CHHDX Lab Results Component Value Date/Time Glucose (POC) 198 (H) 03/20/2021 08:28 AM  
 Glucose (POC) 197 (H) 03/19/2021 09:42 PM  
 Glucose (POC) 271 (H) 03/19/2021 05:19 PM  
 Glucose (POC) 191 (H) 03/19/2021 11:23 AM  
 Glucose (POC) 140 (H) 03/19/2021 09:48 AM  
 
Lab Results Component Value Date/Time  Color YELLOW/STRAW 08/27/2019 12:56 PM  
 Appearance CLEAR 08/27/2019 12:56 PM  
 Specific gravity 1.023 08/27/2019 12:56 PM  
 pH (UA) 6.0 08/27/2019 12:56 PM  
 Protein >300 (A) 08/27/2019 12:56 PM  
 Glucose 500 (A) 08/27/2019 12:56 PM  
 Ketone NEGATIVE  08/27/2019 12:56 PM  
 Bilirubin NEGATIVE  08/27/2019 12:56 PM  
 Urobilinogen 0.2 08/27/2019 12:56 PM  
 Nitrites NEGATIVE  08/27/2019 12:56 PM  
 Leukocyte Esterase NEGATIVE  08/27/2019 12:56 PM  
 Epithelial cells FEW 08/27/2019 12:56 PM  
 Bacteria NEGATIVE  08/27/2019 12:56 PM  
 WBC 0-4 08/27/2019 12:56 PM  
 RBC 5-10 08/27/2019 12:56 PM  
 
 
 
Medications Reviewed:  
 
Current Facility-Administered Medications Medication Dose Route Frequency  albuterol-ipratropium (DUO-NEB) 2.5 MG-0.5 MG/3 ML  3 mL Nebulization Q4H PRN  
 amLODIPine (NORVASC) tablet 5 mg  5 mg Oral DAILY  bumetanide (BUMEX) injection 1 mg  1 mg IntraVENous BID  carvediloL (COREG) tablet 6.25 mg  6.25 mg Oral BID WITH MEALS  cyanocobalamin (VITAMIN B12) tablet 1,000 mcg  1,000 mcg Oral DAILY  isosorbide dinitrate (ISORDIL) tablet 20 mg  20 mg Oral TID  magnesium oxide (MAG-OX) tablet 400 mg  400 mg Oral DAILY  atorvastatin (LIPITOR) tablet 10 mg  10 mg Oral QHS  tamsulosin (FLOMAX) capsule 0.4 mg  0.4 mg Oral DAILY  sodium chloride (NS) flush 5-40 mL  5-40 mL IntraVENous Q8H  
 sodium chloride (NS) flush 5-40 mL  5-40 mL IntraVENous PRN  
 acetaminophen (TYLENOL) tablet 650 mg  650 mg Oral Q6H PRN Or  
 acetaminophen (TYLENOL) suppository 650 mg  650 mg Rectal Q6H PRN  polyethylene glycol (MIRALAX) packet 17 g  17 g Oral DAILY PRN  promethazine (PHENERGAN) tablet 12.5 mg  12.5 mg Oral Q6H PRN Or  
 ondansetron (ZOFRAN) injection 4 mg  4 mg IntraVENous Q6H PRN  
 L.acidophilus-paracasei-S.thermophil-bifidobacter (RISAQUAD) 8 billion cell capsule  1 Cap Oral DAILY  cefTRIAXone (ROCEPHIN) 1 g in 0.9% sodium chloride (MBP/ADV) 50 mL MBP  1 g IntraVENous Q24H  
 doxycycline (VIBRAMYCIN) 100 mg in 0.9% sodium chloride (MBP/ADV) 100 mL MBP  100 mg IntraVENous Q12H  predniSONE (DELTASONE) tablet 40 mg  40 mg Oral DAILY WITH BREAKFAST  insulin lispro (HUMALOG) injection   SubCUTAneous AC&HS  
 glucose chewable tablet 16 g  4 Tab Oral PRN  
 dextrose (D50W) injection syrg 12.5-25 g  12.5-25 g IntraVENous PRN  
 glucagon (GLUCAGEN) injection 1 mg  1 mg IntraMUSCular PRN  
 0.9% sodium chloride infusion 250 mL  250 mL IntraVENous PRN  
 lidocaine 4 % patch 1 Patch  1 Patch TransDERmal Q24H  HYDROmorphone (PF) (DILAUDID) injection 0.5 mg  0.5 mg IntraVENous Q4H PRN  
 
______________________________________________________________________ EXPECTED LENGTH OF STAY: 4d 0h 
ACTUAL LENGTH OF STAY:          2 Don Martino MD

## 2021-03-20 NOTE — PROGRESS NOTES
Bedside and Verbal shift change report given to David Cohen RN (oncoming nurse) by Karthik Rodriguez RN (offgoing nurse). Report included the following information SBAR, Kardex, ED Summary, Intake/Output, MAR, Recent Results, Cardiac Rhythm NSR and Alarm Parameters .

## 2021-03-21 NOTE — PROGRESS NOTES
Cardiology Consult/Progress Note Primary cardiologist: Dr. Joycie Schlatter 
3/18/2021  5:24 PM  
NSTEMI (non-ST elevated myocardial infarction) ([facility] [address] [I21.4] HPI: Jackie Schmid is a 81 y.o. male admitted on 3/8/2021  for  Increasing SOB since 3/17/21. [R06.02]. Has PMH of KATALINA, CAD s/p remote CABG, Diastolic CHF, Type II DM, COPD on chronic O2 2L, Type II DM, HLD, PPM  (leadless) 5/2019, chronic afib with bradycardia s/p leadless PPM, PVD, GIB, CKD. Presented with chief c/o progressive worsening of SOB which began to worsen to days ago. SOB worse with ambulation. O2 sats dropped with ambulation and he required increased O2. Reports that use of nebulizer does help some with SOB symptoms. He also c/o left lower back pain. Denies chest pain, arm pain, dizziness, orthopnea and BLE edema. Recently hospitalized earlier this month with SOB, likely primarily due to worsening ILD, COPD exacerbation, as well as CKD, anemia, and perhaps some mild component of HFpEF. This admission, he was found to have unchanged CXR findings with interstitial, patchy airspace opacities. His troponin is elevated and trending up, now 0.48, but his renal function is mildly worsened from earlier this month and pro bnp is >35,000. Exhibits marked anemia with hgb 6.4. He is also being treated for possible pneumonia. Cardiology is consulted to evaluate for concern for NSTEMI. Investigation Telemetry: NSR 
ECG: v paced, no significant changes from prior EKG 3/9/21 Echocardiogram: 10/2020: EF 65%, mod concentric hypertrophy, mild MR, mild-mod pulmonary HTN, mild TR. [place] CT chest 3/9: diffuse interstitial opacities, small bilateral pleural effusions. Subpleural fibrotic change, stable. He is feeling better today his breathing is improving he is lying flat in the bed in no acute distress. Hemoglobin is borderline after transfusion at 7.2 Acute renal failure with an elevation of creatinine from 2-3 today BNP still >35k Cont supportive care, consult followups Nothing else this admission from cardiology Fu Renal, needs labs checked within 3 days of dc Assessment and PLAN 1. Elevated troponin: 
 -Troponin trending down 
 -No chest pain. 
 -Suspect troponin leak due to demand of  Severe anemia, hypoxic respiratory failure in setting of renal dysfunction with known CAD/history of CABg. Irregardless is poor candidate for cath given comorbidities  and now severe anemia. -Echo with grade 2 DD EF 65% concentric LVH PASP 42 
 -No heparin or antiplatelets given severe anemia and current mild nosebleed. 2. SOB: 
 -Suspect multifactorial, including marked anemia, ILD, COPD. -Suspect HFpef playing less of a role 3. History of  HFpEF with RV dysfunction 
 -EF 65%, mildly reduced RV systolic function with mild-mod Pulmonary HTN 
 -NYHA score complicated by underlying lung disease Continue diuresis 4. CAD s/p CABG(3/2008) -Statin, BB, isordil, med management 5. Acute on chronic Anemia, normocytic: 
 -Iron low, stool OB pending 
 -repeat hgb 6.5. Needs transfusion. 6. Chronic Afib with bradycardia s/p [employer] leadless PPM 
 
7. COPD, ILD 
 -Pulmonary consulted last admission- planned outpatient workup for fibrotic lung disease 
 -On steroids.   
 -suspect underlying lung disease, ILD main contributor to SOB. 8. Low back pain/flank pain 
 -defer to primary team.  
  
 
: 
Cardiac testing  
10/13/20 ECHO ADULT COMPLETE 10/16/2020 10/16/2020 Narrative · LV: Calculated LVEF is 65%. Normal cavity size and systolic function  
(ejection fraction normal). Moderate concentric hypertrophy. Abnormal left  
ventricular septal motion consistent with interventricular conduction  
delay (IVCD). Left ventricular diastolic dysfunction. · RV: Mildly reduced systolic function. · AV: Probably trileaflet aortic valve. Aortic valve leaflet calcification  
present. No significant stenosis. Mean gradient is 7 mmHg and  
dimensionless index measures 0.479. · MV: Mild mitral annular calcification. Mild mitral valve regurgitation  
is present. · TV: Mild tricuspid valve regurgitation is present. · LA: Moderately dilated left atrium. Left Atrium volume index is 49  
mL/m2. · RA: Mildly dilated right atrium. · PA: Mild to moderate pulmonary hypertension. Pulmonary arterial systolic  
pressure is 51 mmHg. Signed by: Magnolia Flores MD  
 
 
CAD/CABG  
off pump x3 3/2008 . =post op anemia and renal failure CATH March 7, 2008= LM -bifurcation dz 40% ;LAD 85% ; Circ ost 70% mid 70% RCA proximal 60% tapering, EF 60%-70%, bilaterally patent renal arteries, mild atherosclerosis of the distal abdominal aorta. 
  
RHC May 2, 2019 =PA 66/18 W 20 PA sat 55% CO 4.1 CI 2.12 Echo May 1 ,2019 EF 55-60% mod LAE, Mild CASSIE, Mild AS BELLE 1.6 cm2 mild MR & TR, RV fx mildly reduced PVD-9/27/18- RLE- mid CRA 70, Pop 99-PTA on right pop, LLE LCFA 40% - 1-19-17 PTA RCFA 90, JAS to RSFA  
-12/22/16 PTA Left Pop, PTA Left tib-per 
--3-13-14 PTA Left op PTA RSFA 
---1/25/11 stent RSFA  
JOCELYN 8-21-17 Normal perfusion 
  
Mild carotid artery disease 3-7-08 then 1-4-12 with 10-49% left, less than 10% on right 
  
 Review of Symptoms: 
Respiratory: +exertional dyspnea, no orthopnea, PND,no cough, hemoptysis, URI. Cardiovascular: No CP, palpitations, sweating, lightheadedness, dizziness, syncope, presyncope, lower extremity swelling. Back: +low back pain, left flank pain. Otherwise no other pertinent positive or negative symptoms on ROS. Patient Active Problem List  
 Diagnosis Date Noted  NSTEMI (non-ST elevated myocardial infarction) ([facility] [address] 03/18/2021  
 SOB (shortness of breath) 03/08/2021  Hypoglycemia 08/27/2019  Pacemaker 05/09/2019  Acute on chronic diastolic (congestive) heart failure ([address] 05/01/2019  Atrial fibrillation with slow ventricular response ([address] 05/01/2019  Type 2 diabetes with nephropathy ([facility] [address] 07/26/2018  Atrial fibrillation, chronic 10/21/2017  CKD (chronic kidney disease) stage 4, GFR 15-29 ml/min (HCC) 10/21/2017  Hypokalemia 06/25/2017  Generalized weakness 06/25/2017  Acute renal failure (ARF) ([address] 03/16/2017  CKD (chronic kidney disease) 01/20/2017  Type 2 diabetes mellitus with diabetic peripheral angiopathy without gangrene ([address] 09/27/2015  PVC's (premature ventricular contractions) 05/26/2014  Carotid arterial disease ([address]  Hypercholesteremia  PVD (peripheral vascular disease) ([address]  Bradycardia  CAD (coronary artery disease)  Hypertension, essential, benign Madeline Roberts MD 
Past Medical History:  
Diagnosis Date  KATALINA (acute kidney injury) ([address] 6/25/2017  CAD (coronary artery disease) s/p CABG 2008  Carotid arterial disease ([address]  Chronic obstructive pulmonary disease ([address] states he is on 2L at home day and night  CKD (chronic kidney disease) stage 4, GFR 15-29 ml/min ([facility]) 10/21/2017  
 hypertension and DM nephrosclerosis cr 3.27 June 2020 Dr Selvin Rutledge  Diabetes mellitus, type 2 ([address]  Diverticulitis  Hypercholesteremia  Hypertension  Pacemaker  Paroxysmal atrial fibrillation ([address] 10/21/2017  
 new onset afib with BRPR 10-19-17 admit  Pulmonary hypertension ([address] decline in TLC and diffusion capacity- Dr Yessenia Avalos  PVC's (premature ventricular contractions) 5/26/2014  PVD (peripheral vascular disease) ([address]  Rectal bleeding 12/17/2019 Past Surgical History:  
Procedure Laterality Date  COLONOSCOPY Left 12/27/2019 COLONOSCOPY performed by Jazlyn Rolle MD at [address] [place] [address] ENDOSCOPY  
 HX CORONARY ARTERY BYPASS GRAFT    
 HX HEART CATHETERIZATION    
 MS TCAT INSJ/RPL PERM LEADLESS PACEMAKER RV W/IMG N/A 5/9/2019 INSERT OR REPLACE TRANSCATH PPM LEADLESS performed by Sandra Mendoza MD at [place] [address], [facility]  CATH LAB Social History Socioeconomic History  Marital status:  Spouse name: Not on file  Number of children: Not on file  Years of education: Not on file  Highest education level: Not on file Tobacco Use  Smoking status: Former Smoker Packs/day: 3.00 Years: 20.00 Pack years: 60.00 Types: Cigarettes Quit date: 1985 Years since quittin.2  Smokeless tobacco: Never Used Substance and Sexual Activity  Alcohol use: No  
 Drug use: No  
 
History reviewed. No pertinent family history. Current Facility-Administered Medications Medication Dose Route Frequency  albuterol-ipratropium (DUO-NEB) 2.5 MG-0.5 MG/3 ML  3 mL Nebulization Q4H PRN  
 amLODIPine (NORVASC) tablet 5 mg  5 mg Oral DAILY  bumetanide (BUMEX) injection 1 mg  1 mg IntraVENous BID  carvediloL (COREG) tablet 6.25 mg  6.25 mg Oral BID WITH MEALS  cyanocobalamin (VITAMIN B12) tablet 1,000 mcg  1,000 mcg Oral DAILY  isosorbide dinitrate (ISORDIL) tablet 20 mg  20 mg Oral TID  magnesium oxide (MAG-OX) tablet 400 mg  400 mg Oral DAILY  atorvastatin (LIPITOR) tablet 10 mg  10 mg Oral QHS  tamsulosin (FLOMAX) capsule 0.4 mg  0.4 mg Oral DAILY  sodium chloride (NS) flush 5-40 mL  5-40 mL IntraVENous Q8H  
 sodium chloride (NS) flush 5-40 mL  5-40 mL IntraVENous PRN  
 acetaminophen (TYLENOL) tablet 650 mg  650 mg Oral Q6H PRN Or  
 acetaminophen (TYLENOL) suppository 650 mg  650 mg Rectal Q6H PRN  polyethylene glycol (MIRALAX) packet 17 g  17 g Oral DAILY PRN  promethazine (PHENERGAN) tablet 12.5 mg  12.5 mg Oral Q6H PRN Or  
 ondansetron (ZOFRAN) injection 4 mg  4 mg IntraVENous Q6H PRN  
 L.acidophilus-paracasei-S.thermophil-bifidobacter (RISAQUAD) 8 billion cell capsule  1 Cap Oral DAILY  doxycycline (VIBRAMYCIN) 100 mg in 0.9% sodium chloride (MBP/ADV) 100 mL MBP  100 mg IntraVENous Q12H  predniSONE (DELTASONE) tablet 40 mg  40 mg Oral DAILY WITH BREAKFAST  insulin lispro (HUMALOG) injection   SubCUTAneous AC&HS  
 glucose chewable tablet 16 g  4 Tab Oral PRN  
 dextrose (D50W) injection syrg 12.5-25 g  12.5-25 g IntraVENous PRN  
 glucagon (GLUCAGEN) injection 1 mg  1 mg IntraMUSCular PRN  
 0.9% sodium chloride infusion 250 mL  250 mL IntraVENous PRN  
 lidocaine 4 % patch 1 Patch  1 Patch TransDERmal Q24H  
 HYDROmorphone (PF) (DILAUDID) injection 0.5 mg  0.5 mg IntraVENous Q4H PRN Prior to Admission Medications Prescriptions Last Dose Informant Patient Reported? Taking? albuterol (PROVENTIL VENTOLIN) 2.5 mg /3 mL (0.083 %) nebulizer solution 3/18/2021 at Unknown time Self No Yes Sig: 3 mL by Nebulization route every four (4) hours as needed for Wheezing. amLODIPine (NORVASC) 5 mg tablet 3/18/2021 at Unknown time  No Yes Sig: Take 1 Tab by mouth daily. bumetanide (BUMEX) 2 mg tablet 3/18/2021 at Unknown time  No Yes Sig: Take 1 Tab by mouth two (2) times a day. carvedilol (COREG) 12.5 mg tablet 3/18/2021 at Unknown time  Yes Yes Sig: Take 6.25 mg by mouth two (2) times daily (with meals). cyanocobalamin 1,000 mcg tablet 3/18/2021 at Unknown time  Yes Yes Sig: Take 1,000 mcg by mouth daily. fluticasone-umeclidinium-vilanterol (Trelegy Ellipta) 100-62.5-25 mcg inhaler 3/18/2021 at Unknown time  Yes Yes Sig: Take 1 Puff by inhalation daily. insulin glargine (LANTUS U-100 INSULIN) 100 unit/mL injection 3/18/2021 at Unknown time  Yes Yes Sig: 3-7 Units by SubCUTAneous route nightly. Pt reports he uses 3-7 units depending on BG  
isosorbide dinitrate (ISORDIL) 20 mg tablet 3/18/2021 at Unknown time  No Yes Sig: Take 1 Tab by mouth three (3) times daily. magnesium oxide (MAG-OX) 400 mg tablet 3/18/2021 at Unknown time  No Yes Sig: Take 1 Tab by mouth daily. omega 3-dha-epa-fish oil (FISH OIL) 100-160-1,000 mg cap 3/18/2021 at Unknown time Self Yes Yes Sig: Take 1 Cap by mouth daily.   
predniSONE (DELTASONE) 10 mg tablet Not Taking at Unknown time  Yes No  
Sig: Take  by mouth daily (with breakfast). 40 mg once daily X 3 days, then 3 tablets once daily x 2 days, then 2 tablets once daily x 2 days, then 1 tablet once daily x 3 days. 3/11 pt.start with 3 tablets daily x 2 days. ..  
simvastatin (ZOCOR) 20 mg tablet 3/18/2021 at Unknown time  No Yes Sig: Take 1 Tab by mouth nightly. tamsulosin (FLOMAX) 0.4 mg capsule 3/18/2021 at Unknown time Self No Yes Sig: Take 1 Cap by mouth daily. triamcinolone acetonide (KENALOG) 0.1 % topical cream 3/18/2021 at Unknown time  Yes Yes Facility-Administered Medications: None Allergies Allergen Reactions  Lisinopril Cough Labs:  
Recent Results (from the past 24 hour(s)) TROPONIN I Collection Time: 03/20/21 10:00 AM  
Result Value Ref Range Troponin-I, Qt. 0.33 (H) <0.05 ng/mL NT-PRO BNP Collection Time: 03/20/21 10:00 AM  
Result Value Ref Range NT pro-BNP >35,000 (H) <450 PG/ML  
GLUCOSE, POC Collection Time: 03/20/21 12:45 PM  
Result Value Ref Range Glucose (POC) 370 (H) 65 - 100 mg/dL Performed by Papo Draper (CON) GLUCOSE, POC Collection Time: 03/20/21  4:56 PM  
Result Value Ref Range Glucose (POC) 353 (H) 65 - 100 mg/dL Performed by Papo Draper (CON) GLUCOSE, POC Collection Time: 03/20/21  9:14 PM  
Result Value Ref Range Glucose (POC) 394 (H) 65 - 100 mg/dL [facility] CBC WITH AUTOMATED DIFF Collection Time: 03/21/21  3:23 AM  
Result Value Ref Range WBC 8.7 4.1 - 11.1 K/uL  
 RBC 2.42 (L) 4.10 - 5.70 M/uL HGB 7.2 (L) 12.1 - 17.0 g/dL HCT 22.0 (L) 36.6 - 50.3 % MCV 90.9 80.0 - 99.0 FL  
 MCH 29.8 26.0 - 34.0 PG  
 MCHC 32.7 30.0 - 36.5 g/dL  
 RDW 15.2 (H) 11.5 - 14.5 % PLATELET 853 097 - 680 K/uL MPV 11.4 8.9 - 12.9 FL  
 NRBC 0.0 0  WBC ABSOLUTE NRBC 0.00 0.00 - 0.01 K/uL NEUTROPHILS 90 (H) 32 - 75 % LYMPHOCYTES 3 (L) 12 - 49 %  MONOCYTES 6 5 - 13 % EOSINOPHILS 0 0 - 7 % BASOPHILS 0 0 - 1 % IMMATURE GRANULOCYTES 1 (H) 0.0 - 0.5 % ABS. NEUTROPHILS 7.8 1.8 - 8.0 K/UL  
 ABS. LYMPHOCYTES 0.3 (L) 0.8 - 3.5 K/UL  
 ABS. MONOCYTES 0.5 0.0 - 1.0 K/UL  
 ABS. EOSINOPHILS 0.0 0.0 - 0.4 K/UL  
 ABS. BASOPHILS 0.0 0.0 - 0.1 K/UL  
 ABS. IMM. GRANS. 0.1 (H) 0.00 - 0.04 K/UL  
 DF SMEAR SCANNED    
 RBC COMMENTS OVALOCYTES PRESENT 
    
METABOLIC PANEL, BASIC Collection Time: 03/21/21  3:23 AM  
Result Value Ref Range Sodium 132 (L) 136 - 145 mmol/L Potassium 5.2 (H) 3.5 - 5.1 mmol/L Chloride 98 97 - 108 mmol/L  
 CO2 23 21 - 32 mmol/L Anion gap 11 5 - 15 mmol/L Glucose 194 (H) 65 - 100 mg/dL  (H) 6 - 20 MG/DL Creatinine 3.19 (H) 0.70 - 1.30 MG/DL  
 BUN/Creatinine ratio 50 (H) 12 - 20 GFR est AA 23 (L) >60 ml/min/1.73m2 GFR est non-AA 19 (L) >60 ml/min/1.73m2 Calcium 8.0 (L) 8.5 - 10.1 MG/DL  
GLUCOSE, POC Collection Time: 03/21/21  5:32 AM  
Result Value Ref Range Glucose (POC) 244 (H) 65 - 100 mg/dL Performed by Hilda Bailey GLUCOSE, POC Collection Time: 03/21/21  8:19 AM  
Result Value Ref Range Glucose (POC) 313 (H) 65 - 100 mg/dL Performed by Chaz Chang No intake or output data in the 24 hours ending 03/21/21 0909 Patient Vitals for the past 24 hrs: 
 Temp Pulse Resp BP SpO2  
03/21/21 0700 97.4 °F (36.3 °C) 60 12 (!) 158/73 98 % 03/21/21 0325 97.6 °F (36.4 °C) 61 24 (!) 148/74 98 % 03/21/21 0100     100 % 03/21/21 0004     99 % 03/20/21 2300 97.6 °F (36.4 °C) 60 14 137/61 100 % 03/20/21 2233  64  (!) 141/62   
03/20/21 2124     97 % 03/20/21 2100 97.4 °F (36.3 °C) 62 18 (!) 157/68 95 % 03/20/21 1852  60 24 (!) 126/54 99 % 03/20/21 1654 98 °F (36.7 °C) 63 22 (!) 108/53 98 % 03/20/21 1244 98.3 °F (36.8 °C) 62 24 (!) 145/63 98 % General:    Alert, cooperative, + mild conversational dyspnea Psychiatric:    Normal Mood and affect Eye/ENT:      Pupils equal, No asymmetry, Conjunctival pink. Able to hear voice at normal amplitude Lungs:      Visibly symmetric chest expansion, No palpable tenderness. Diffuse crackls bilaterally L>R. No chest wall ttp. Graylon Gian Heart[de-identified]    Regular rate and rhythm, S1, S2 normal, no murmur, click, rub or gallop. No JVD, Normal palpable peripheral pulses. No cyanosis Abdomen:     Soft, non-tender. Bowel sounds normal. No masses,  No   
  organomegaly. Extremities:   Extremities normal, atraumatic, no edema. Neurologic:   . No gross focal deficits MD Leyla Mcgraw MD 
3/21/2021  
8:44 AM  
 
 
I concur with the above note, findings and assessment. Still dyspnec 
BP (!) 158/73 (BP 1 Location: Right upper arm, BP Patient Position: At rest)   Pulse 60   Temp 97.4 °F (36.3 °C)   Resp 12   Ht 5' 9\" (1.753 m)   Wt 149 lb 1.6 oz (67.6 kg)   SpO2 98%   BMI 22.02 kg/m² General:    Alert, cooperative, no distress. Psychiatric:    Normal Mood and affect Eye/ENT:      Pupils equal, No asymmetry, Conjunctival pink. Able to hear voice at normal amplitude Lungs:      Visibly symmetric chest expansion, No palpable tenderness. Clear to auscultation bilaterally. Heart[de-identified]    Regular rate and rhythm, S1, S2 normal, no murmur, click, rub or gallop. No JVD, Normal palpable peripheral pulses. No cyanosis Abdomen:     Soft, non-tender. Bowel sounds normal. No masses,  No   
  organomegaly. Extremities:   Extremities normal, atraumatic, no edema. Neurologic:   CN II-XII grossly intact. No gross focal deficits Patient was seen with NP/PA, recent investigations were reviewed and treatment care/plan was formulated together. HE has troponin bump from hypoxemia and anemia with underlying chronic CAD. NO intervention. Medical management directed at optimzing above issues Leyla Moreno MD  
3/21/2021 
12:46 PM 
 
 
[facility] [address] Office:   [address] [place] [address] Office: 
[address] [facility]    [address] [facility] [address]     [address], [address] [facility] P: [id number]    P: [phone] F: [phone]    F: [phone]

## 2021-03-21 NOTE — PROGRESS NOTES
[facility] Adult  Hospitalist Group Hospitalist Progress Note Tyler Duncan MD 
Answering service: [phone] from in house phone Date of Service:  3/21/2021 NAME:  Francine Jones Sr. 
:  1935 MRN:  [id number] Admission Summary: This is an 71-year-old man with past medical history significant for chronic diastolic congestive heart failure, oxygen-dependent COPD, type 2 diabetes, hypertension, chronic kidney disease stage IV, paroxysmal atrial fibrillation, and dyslipidemia, who was in his usual state of health until yesterday when the patient developed shortness of breath which is progressive and getting worse. The patient is normally on 3 liters of oxygen at home. As a result of the worsening shortness of breath, he has to increase his supplemental oxygen to 4 liters without any significant improvement in his shortness of breath. Also, the shortness of breath was not relieved with his inhaler. The patient stated that he has been taking his medication as prescribed. The shortness of breath is worse when the patient is lying down and also with activities such as walking. The patient denies fever, rigor, or chills. The patient received his first COVID-19 vaccination last week. Because of worsening symptoms, the patient was taken to [facility] Emergency Room at [facility]. When the patient arrived at the emergency room, the patient was found to have elevated troponin level. The patient was discussed with his cardiologist on-call who advised admission to the hospital.  The patient was then referred to the hospitalist service for evaluation for admission. The patient was recently admitted to this hospital and was discharged from the hospital a couple of days ago. The patient was admitted for evaluation and treatment of congestive heart failure.   During that hospitalization, the patient was also found to have elevated troponin level. He was initially started on heparin. This was later discontinued by the cardiologist and stated that the patient's elevated troponin level was due to demand ischemia. The patient denies fever, rigors, or chills. The patient has cough which according him is chronic. The patient also complained of back pain. The pain is located at the lower back. No history of injury. The low back pain started yesterday on both side of the back. No radiation to the lower extremity, no urinary or fecal incontinence. The pain is constant, dull ache with no known relieving factors, worse with movement. 
  
 
Interval history / Subjective:  
 F/u SOB On 3l NC 
proBNP remains elevated Creatinine stable No fever No leukocytosis Hyperkalemia Assessment & Plan: Suspected non-ST elevation myocardial infarction in a patient with CAD s/p CABG 
-Elevated troponins in the setting of marked anemia and renal dysfunction 
-No chest pain 
-Echo EF 65%. Moderate concentric hypertrophy 
-Appreciate cardiology Acute on chronic anemia 
-sec to ? CKD vs GI bleed 
-s/p 1 unit PRBC 3/19, 3/21 
-awaiting FOBT, still not done 
-h/h 
-transfuse PRBC for hb<7 Hyperkalemia 
-Layex and repeat BMP  
-if repeat BMP stable, will discharge the patient Respiratory distress in a patient with chronic hypoxic respiratory failure 
-baseline 3l of oxygen through nasal cannula 
-multifactorial including anemia, COPD, ILD 
-continue home inhalers 
-Incentive spirometry Probable acute-on-chronic diastolic congestive heart failure NYHA III 
-CT chest with multiple bilateral pleural effusions 
-On IV Bumex, will switch to oral 3/22 
-Echo as above 
-[address] Cardiology, cleared COPD with acute exacerbation-Prednisone, continue inhalers, continue doxy Suspected bacterial pneumonia 
-On Ceftriaxone/Doxycycline, will stop ceftriaxone 
-Don't see any evidence of Pneumonia Type 2 diabetes with hyperglycemia-SSI Hypertension-Continue home meds Chronic kidney disease stage IV:  Baseline creatinine 2.5-3. Monitor Paroxysmal atrial fibrillation:  Continue Coreg. S/p [employer] PPM 
Dyslipidemia:  Continue Lipitor. Bilateral leg swelling:  Dopplers negative for DVT Acute low back pain:  No history of trauma. CT abd pelvis unremarkable Diabetic diet PT/OT Because of co-morbidities the patient is at a very high risk of clinical deterioration and readmission Code status: DNR 
DVT prophylaxis: scd Plan: PT/OT, repeat BMP at noon. If K improved, will discharge the patient home Care Plan discussed with: Patient/Family Anticipated Disposition: TBD Anticipated Discharge: 24 hours to 48 hours Hospital Problems  Date Reviewed: 3/19/2021 Codes Class Noted POA * (Principal) NSTEMI (non-ST elevated myocardial infarction) ([facility] [address] ICD-10-CM: I21.4 ICD-9-CM: 410.70  3/18/2021 Yes Review of Systems: A comprehensive review of systems was negative except for that written in the HPI. Vital Signs:  
 Last 24hrs VS reviewed since prior progress note. Most recent are: 
Visit Vitals BP (!) 158/73 (BP 1 Location: Right upper arm, BP Patient Position: At rest) Pulse 60 Temp 97.4 °F (36.3 °C) Resp 12 Ht 5' 9\" (1.753 m) Wt 67.6 kg (149 lb 1.6 oz) SpO2 98% BMI 22.02 kg/m² No intake or output data in the 24 hours ending 03/21/21 1131 Physical Examination:  
 
I had a face to face encounter with this patient and independently examined them on 3/21/2021 as outlined below: 
 
     
Constitutional:  No acute distress ENT:  Oral mucosa moist, oropharynx benign. Resp:  CTA bilaterally. No wheezing/rhonchi/rales. No accessory muscle use CV:  Regular rhythm, normal rate, no murmurs, gallops, rubs GI:  Soft, non distended, non tender. normoactive bowel sounds, no hepatosplenomegaly  Musculoskeletal:  No edema, warm, 2+ pulses throughout Neurologic:  Moves all extremities. AAOx3, CN II-XII reviewed Skin:  Good turgor, no rashes or ulcers Data Review:  
 Review and/or order of clinical lab test 
 
 
Labs:  
 
Recent Labs  
  03/21/21 0323 03/20/21 
0633 03/20/21 0231 WBC 8.7  --  7.9 HGB 7.2* 7.2* 7.3* HCT 22.0* 22.4* 22.4*  
  --  156 Recent Labs  
  03/21/21 
0323 03/20/21 
0231 03/19/21 
2118 * 132* 136  
K 5.2* 5.2* 4.8  
CL 98 98 100 CO2 23 24 28 * 153* 152* CREA 3.19* 3.15* 2.63* * 211* 119* CA 8.0* 8.5 8.1*  
MG  --   --  2.2 PHOS  --   --  2.7 Recent Labs  
  03/19/21 0227 03/18/21 
1741 ALT 19 21 AP 56 60 TBILI 0.3 0.3 TP 5.3* 6.4 ALB 2.6* 3.1*  
GLOB 2.7 3.3 No results for input(s): INR, PTP, APTT, INREXT, INREXT in the last 72 hours. Recent Labs  
  03/19/21 
0227 FERR 471* Lab Results Component Value Date/Time Folate 31.9 (H) 03/19/2021 02:27 AM  
  
No results for input(s): PH, PCO2, PO2 in the last 72 hours. Recent Labs  
  03/20/21 
1000 03/19/21 0227 03/18/21 
1741 TROIQ 0.33* 0.48* 0.17* No results found for: CHOL, CHOLX, CHLST, CHOLV, HDL, HDLP, LDL, LDLC, DLDLP, TGLX, TRIGL, TRIGP, CHHD, CHHDX Lab Results Component Value Date/Time Glucose (POC) 313 (H) 03/21/2021 08:19 AM  
 Glucose (POC) 244 (H) 03/21/2021 05:32 AM  
 Glucose (POC) 394 (H) 03/20/2021 09:14 PM  
 Glucose (POC) 353 (H) 03/20/2021 04:56 PM  
 Glucose (POC) 370 (H) 03/20/2021 12:45 PM  
 
Lab Results Component Value Date/Time  Color YELLOW/STRAW 08/27/2019 12:56 PM  
 Appearance CLEAR 08/27/2019 12:56 PM  
 Specific gravity 1.023 08/27/2019 12:56 PM  
 pH (UA) 6.0 08/27/2019 12:56 PM  
 Protein >300 (A) 08/27/2019 12:56 PM  
 Glucose 500 (A) 08/27/2019 12:56 PM  
 Ketone NEGATIVE  08/27/2019 12:56 PM  
 Bilirubin NEGATIVE  08/27/2019 12:56 PM  
 Urobilinogen 0.2 08/27/2019 12:56 PM  
 Nitrites NEGATIVE  08/27/2019 12:56 PM  
 Leukocyte Esterase NEGATIVE  08/27/2019 12:56 PM  
 Epithelial cells FEW 08/27/2019 12:56 PM  
 Bacteria NEGATIVE  08/27/2019 12:56 PM  
 WBC 0-4 08/27/2019 12:56 PM  
 RBC 5-10 08/27/2019 12:56 PM  
 
 
 
Medications Reviewed:  
 
Current Facility-Administered Medications Medication Dose Route Frequency  albuterol-ipratropium (DUO-NEB) 2.5 MG-0.5 MG/3 ML  3 mL Nebulization Q4H PRN  
 amLODIPine (NORVASC) tablet 5 mg  5 mg Oral DAILY  bumetanide (BUMEX) injection 1 mg  1 mg IntraVENous BID  carvediloL (COREG) tablet 6.25 mg  6.25 mg Oral BID WITH MEALS  cyanocobalamin (VITAMIN B12) tablet 1,000 mcg  1,000 mcg Oral DAILY  isosorbide dinitrate (ISORDIL) tablet 20 mg  20 mg Oral TID  magnesium oxide (MAG-OX) tablet 400 mg  400 mg Oral DAILY  atorvastatin (LIPITOR) tablet 10 mg  10 mg Oral QHS  tamsulosin (FLOMAX) capsule 0.4 mg  0.4 mg Oral DAILY  sodium chloride (NS) flush 5-40 mL  5-40 mL IntraVENous Q8H  
 sodium chloride (NS) flush 5-40 mL  5-40 mL IntraVENous PRN  
 acetaminophen (TYLENOL) tablet 650 mg  650 mg Oral Q6H PRN Or  
 acetaminophen (TYLENOL) suppository 650 mg  650 mg Rectal Q6H PRN  polyethylene glycol (MIRALAX) packet 17 g  17 g Oral DAILY PRN  promethazine (PHENERGAN) tablet 12.5 mg  12.5 mg Oral Q6H PRN Or  
 ondansetron (ZOFRAN) injection 4 mg  4 mg IntraVENous Q6H PRN  
 L.acidophilus-paracasei-S.thermophil-bifidobacter (RISAQUAD) 8 billion cell capsule  1 Cap Oral DAILY  doxycycline (VIBRAMYCIN) 100 mg in 0.9% sodium chloride (MBP/ADV) 100 mL MBP  100 mg IntraVENous Q12H  predniSONE (DELTASONE) tablet 40 mg  40 mg Oral DAILY WITH BREAKFAST  insulin lispro (HUMALOG) injection   SubCUTAneous AC&HS  
 glucose chewable tablet 16 g  4 Tab Oral PRN  
 dextrose (D50W) injection syrg 12.5-25 g  12.5-25 g IntraVENous PRN  
 glucagon (GLUCAGEN) injection 1 mg  1 mg IntraMUSCular PRN  
 0.9% sodium chloride infusion 250 mL  250 mL IntraVENous PRN  lidocaine 4 % patch 1 Patch  1 Patch TransDERmal Q24H  
 HYDROmorphone (PF) (DILAUDID) injection 0.5 mg  0.5 mg IntraVENous Q4H PRN  
 
______________________________________________________________________ EXPECTED LENGTH OF STAY: 4d 0h 
ACTUAL LENGTH OF STAY:          3 Luzmaria Christian MD

## 2021-03-22 NOTE — PROGRESS NOTES
Transition Plan of Care RUR 25%-Med 
Discharging today home with family. Was open to [facility] for SN/PT/OT prior to admit. Resumption sent via [employer]. Medicare pt has received, reviewed, and signed 2nd IM letter informing them of their right to appeal the discharge. Signed copy has been placed on pt bedside chart. Rocky Ferrari RN CRM Ext [phone] Angelo Estrada has accepted referral for home health. Contact information on AVS. Met with patient at the bedside. He was up in chair but looked very tired. Spoke with PT and they recommending home health PT. He is already open to [facility] for SN/PT/OT.

## 2021-03-22 NOTE — PROGRESS NOTES
Occupational Therapy 3/22/2021 Chart reviewed. Referral for OT received. Attempted to see pt for OT evaluation. Noted potential discharge home today. Pt reported just getting back to bed after sitting in chair for 1.5 hours and asked OT for a suppository (hasn't had BM in 6 days per his report). Notified RN of above.  Jorge Barboza, OT

## 2021-03-22 NOTE — PROGRESS NOTES
Attempted to schedule hospital follow up PCP appointment. Awaiting call back from the office with appointment information. Pending patient discharge.   Immanuel Mendieta, Care Management Specialist.

## 2021-03-22 NOTE — PROGRESS NOTES
Problem: Mobility Impaired (Adult and Pediatric) Goal: *Acute Goals and Plan of Care (Insert Text) Description: FUNCTIONAL STATUS PRIOR TO ADMISSION: Patient was modified independent using a rolling walker for functional mobility. And used supplemental 02 at 3 liters. HOME SUPPORT PRIOR TO ADMISSION: The patient lived with his elderly wife but did not require assist for basic mobility, amb short distances within the home and has a wheelchair for community use. Physical Therapy Goals Initiated 3/22/2021 1. Patient will move from supine to sit and sit to supine , scoot up and down, and roll side to side in bed with independence within 7 day(s). 2.  Patient will transfer from bed to chair and chair to bed with modified independence using the least restrictive device within 7 day(s). 3.  Patient will perform sit to stand with modified independence within 7 day(s). 4.  Patient will ambulate with modified independence for 50 feet with the least restrictive device within 7 day(s). Has a ramp Outcome: Progressing Towards Goal 
 PHYSICAL THERAPY EVALUATION Patient: Red Betancourt (81 y.o. male) Date: 3/22/2021 Primary Diagnosis: NSTEMI (non-ST elevated myocardial infarction) ([facility] [address] [I21.4] Precautions:   Bed Alarm, Fall, Skin ASSESSMENT Based on the objective data described below, the patient presents with generalized weakness, impaired activity tolerance as noted by desat with activity of bed to chair, and decline from his reported baseline of mod I. See chart below. Expect that above is related to hospitalization related significant amount of time in the bed induced activity intolerance. Expect that with increased mobility pt will progress quickly for return to home and resumption of [facility] services. Rommel Rubin Current Level of Function Impacting Discharge (mobility/balance): min assist at most. 
 
Functional Outcome Measure:   The patient scored 30 (extrapolated) on the TUG outcome measure which is indicative of increased fall risk. Estella Dempsey Other factors to consider for discharge: readmit, heart failure, see medical history, lives with elderly wife Vitals:  
  03/22/21 1120 03/22/21 1127 03/22/21 1133 03/22/21 1135 BP: 131/75 (!) 155/75 BP 1 Location: Left upper arm Left upper arm BP Patient Position: Supine Sitting Transferring bed to chair Seated up to chair Pulse: 77 84 Resp:          
Temp:          
SpO2: on 3 liters of 02 96% 91% (!) 85% 91% Patient will benefit from skilled therapy intervention to address the above noted impairments. PLAN : 
Recommendations and Planned Interventions: bed mobility training, transfer training, gait training, therapeutic exercises, patient and family training/education, and therapeutic activities Frequency/Duration: Patient will be followed by physical therapy:  5 times a week to address goals. Recommendation for discharge: (in order for the patient to meet his/her long term goals) Physical therapy at least 2 days/week in the home This discharge recommendation: A follow-up discussion with the attending provider and/or case management is planned IF patient discharges home will need the following DME: patient owns DME required for discharge SUBJECTIVE:  
Patient stated that he thinks he's weak because he has been in the bed most of the time during this hospitalization (was admitted 4 days ago). OBJECTIVE DATA SUMMARY:  
Consult received, chart reviewed, pt cleared by nursing HISTORY:   
Past Medical History:  
Diagnosis Date KATALINA (acute kidney injury) ([facility] [address] 6/25/2017 CAD (coronary artery disease) s/p CABG 2008 Carotid arterial disease ([facility] [address] Chronic obstructive pulmonary disease (HCC) states he is on 2L at home day and night  
 CKD (chronic kidney disease) stage 4, GFR 15-29 ml/min ([facility]) 10/21/2017  
 hypertension and DM nephrosclerosis cr 3.27 June 2020 Dr Karlos Tinoco  Diabetes mellitus, type 2 ([facility] [address] Diverticulitis Hypercholesteremia Hypertension Pacemaker Paroxysmal atrial fibrillation ([facility] [address] 10/21/2017  
 new onset afib with BRPR 10-19-17 admit Pulmonary [facility] decline in TLC and diffusion capacity- Dr Yessenai Avalos PVC's (premature ventricular contractions) 5/26/2014 PVD (peripheral vascular disease) ([facility] Rectal bleeding 12/17/2019 Past Surgical History:  
Procedure Laterality Date COLONOSCOPY Left 12/27/2019 COLONOSCOPY performed by Jazlyn Rolle MD at [facility] ENDOSCOPY  
 HX CORONARY ARTERY BYPASS GRAFT    
 HX HEART CATHETERIZATION    
 DC TCAT INSJ/RPL PERM LEADLESS PACEMAKER RV W/IMG N/A 5/9/2019 INSERT OR REPLACE TRANSCATH PPM LEADLESS performed by Sandra Mendoza MD at [place] [address], [address] CATH LAB Personal factors and/or comorbidities impacting plan of care: readmit, heart failure, see medical history Home Situation Home Environment: Private residence Wheelchair Ramp: Yes One/Two Story Residence: One story Living Alone: No 
Support Systems: Spouse/Significant Other/Partner, Family member(s) Current DME Used/Available at Home: Walker, rolling, Wheelchair(pulse ox) EXAMINATION/PRESENTATION/DECISION MAKING:  
Critical Behavior: 
Neurologic State: Alert Orientation Level: Oriented X4 Cognition: Follows commands Hearing: Auditory Auditory Impairment: None Skin:  fragile, see MD and nursing notes Edema: none noted Range Of Motion: 
AROM: Generally decreased, functional 
  
  
  
  
  
  
  
Strength:   
Strength: Generally decreased, functional 
  
  
  
  
  
  
Tone & Sensation:  
  
  
  
  
  
Sensation: Intact Coordination: 
  
Vision:  
  
Functional Mobility: 
Bed Mobility: 
  
Supine to Sit: Minimum assistance Transfers: 
Sit to Stand: Contact guard assistance(took three attempts to stand) Stand to Sit: Contact guard assistance Balance:  
Sitting: Intact; Without support Standing: Impaired Standing - Static: Constant support;Good Standing - Dynamic : Constant support;Good Ambulation/Gait Training: 
  
  
  
  
  
  
  
  
  
  
  
  
  
  
  
  
  
 None beyond bed to chair Stairs: Therapeutic Exercises:  
Standing ex that he does at home, some hip abd Functional Measure: 
Timed up and go: 
 
Timed Get Up And Go Test: 30(extrapolated) < than 10 seconds=Normal  Greater then 13.5 seconds (in elderly)=Increased fall risk Jorge Alberto CASAS Predicting the probability for falls in community dwelling older adults using the Timed Up and Go Test. Phys Ther. 2000;80:[id number]. Physical Therapy Evaluation Charge Determination History Examination Presentation Decision-Making HIGH Complexity :3+ comorbidities / personal factors will impact the outcome/ POC  MEDIUM Complexity : 3 Standardized tests and measures addressing body structure, function, activity limitation and / or participation in recreation  MEDIUM Complexity : Evolving with changing characteristics  LOW Complexity : FOTO score of  Based on the above components, the patient evaluation is determined to be of the following complexity level: LOW Pain Rating: 
None rated Activity Tolerance:  
Fair and see assessment After treatment patient left in no apparent distress:  
Sitting in chair, Call bell within reach, and Bed / chair alarm activated COMMUNICATION/EDUCATION:  
The patients plan of care was discussed with: Occupational therapist, Registered nurse, and Case management. Fall prevention education was provided and the patient/caregiver indicated understanding., Patient/family have participated as able in goal setting and plan of care. , and Patient/family agree to work toward stated goals and plan of care. Thank you for this referral. 
Anabel Calabrese Time Calculation: 45 mins

## 2021-03-22 NOTE — PROGRESS NOTES
Patient alert  And oriented x 4, for discharge home per MD order after potasium level checked and resulted,  denied any pain, c/o constipation, stated last bowel movement 6 days ago, follow up serum potassium level 5.4 collected @ 1310 today, attending MD notified of patient's potassium level and patient complain of constipation  via [employer], attending MD okay for the patient to be discharged home today, and ordered dulcolax  Suppository for the patient.

## 2021-03-22 NOTE — DISCHARGE INSTRUCTIONS
Discharge Instructions       PATIENT ID: Bev Reese MRN: [id number]   YOB: 1935    DATE OF ADMISSION: 3/18/2021  5:24 PM    DATE OF DISCHARGE: 3/22/2021    PRIMARY CARE PROVIDER: Lisset Lujan MD     ATTENDING PHYSICIAN: Monica Reina MD  DISCHARGING PROVIDER: Yves Gomez MD    To contact this individual call [phone] and ask the  to page. If unavailable ask to be transferred the Adult Hospitalist Department. DISCHARGE DIAGNOSES   SOB    CONSULTATIONS: IP CONSULT TO CARDIOLOGY    PROCEDURES/SURGERIES: * No surgery found *    PENDING TEST RESULTS:   At the time of discharge the following test results are still pending: none    FOLLOW UP APPOINTMENTS:   Follow-up Information     Follow up With Specialties Details Why Contact Info    Lisset Lujan MD Family Medicine In 1 week  [address]  [address] [place] [phone]      Pamella Calderon MD Cardiology In 1 week  [address]  [address] [facility] [phone]             ADDITIONAL CARE RECOMMENDATIONS:   Follow up with PMD  Follow up with Cardiology  Please expect a blood work (BMP) when you see your PMD/Cardiologist  Daily weight, if weight gain >2lbs in 3-4 days please call your cardiologist    DIET: Cardiac Diet    ACTIVITY: Activity as tolerated    DISCHARGE MEDICATIONS:   See Medication Reconciliation Form    · It is important that you take the medication exactly as they are prescribed. · Keep your medication in the bottles provided by the pharmacist and keep a list of the medication names, dosages, and times to be taken in your wallet. · Do not take other medications without consulting your doctor. NOTIFY YOUR PHYSICIAN FOR ANY OF THE FOLLOWING:   Fever over 101 degrees for 24 hours. Chest pain, shortness of breath, fever, chills, nausea, vomiting, diarrhea, change in mentation, falling, weakness, bleeding. Severe pain or pain not relieved by medications.   Or, any other signs or symptoms that you may have questions about.       DISPOSITION:  x  Home With:   OT  PT  HH  RN       SNF/Inpatient Rehab/LTAC    Independent/assisted living    Hospice    Other:     CDMP Checked:   Yes x     PROBLEM LIST Updated:  Yes x       Signed:   Alejandra Tamez MD  3/22/2021  9:42 AM

## 2021-03-22 NOTE — DISCHARGE SUMMARY
Discharge Summary PATIENT ID: Lily Sparks Sr. MRN: [id number] YOB: 1935 DATE OF ADMISSION: 3/18/2021  5:24 PM   
DATE OF DISCHARGE: 3/22/2021 PRIMARY CARE PROVIDER: Fiona Vazquez MD  
 
ATTENDING PHYSICIAN: Dr Consuelo Aranda DISCHARGING PROVIDER: Consuelo Aranda MD   
To contact this individual call [phone] and ask the  to page. If unavailable ask to be transferred the Adult Hospitalist Department. CONSULTATIONS: IP CONSULT TO CARDIOLOGY PROCEDURES/SURGERIES: * No surgery found * [address] COURSE:  
Suspected non-ST elevation myocardial infarction in a patient with CAD s/p CABG 
-Elevated troponins in the setting of marked anemia and renal dysfunction 
-No chest pain 
-Echo EF 65%. Moderate concentric hypertrophy 
-Appreciate cardiology Acute on chronic anemia 
-sec to ? CKD vs GI bleed 
-s/p 1 unit PRBC 3/19, 3/21 
-awaiting FOBT, still not done 
-h/h 
-transfuse PRBC for hb<7 Hyperkalemia 
-Layex and repeat BMP  
-if repeat BMP stable, will discharge the patient Respiratory distress in a patient with chronic hypoxic respiratory failure 
-baseline 3l of oxygen through nasal cannula 
-multifactorial including anemia, COPD, ILD 
-continue home inhalers 
-Incentive spirometry Probable acute-on-chronic diastolic congestive heart failure NYHA III 
-CT chest with multiple bilateral pleural effusions 
-On IV Bumex, will switch to oral 3/22 
-Echo as above 
-[address] Cardiology, cleared COPD with acute exacerbation-Prednisone, continue inhalers, continue doxy Suspected bacterial pneumonia 
-On Ceftriaxone/Doxycycline, will stop ceftriaxone 
-Don't see any evidence of Pneumonia Type 2 diabetes with hyperglycemia-SSI Hypertension-Continue home meds Chronic kidney disease stage IV:  Baseline creatinine 2.5-3. Monitor Paroxysmal atrial fibrillation:  Continue Coreg. S/p Medtronic PPM 
Dyslipidemia:  Continue Lipitor.  
Bilateral leg swelling:  Dopplers negative for DVT Acute low back pain:  No history of trauma. CT abd pelvis unremarkable Diabetic diet PT/OT Because of co-morbidities the patient is at a very high risk of clinical deterioration and readmission DISCHARGE DIAGNOSES / PLAN:   
 
1. Elevated troponins 2. Anemia ADDITIONAL CARE RECOMMENDATIONS:  
Follow up with PMD 
Follow up with Cardiology PENDING TEST RESULTS:  
At the time of discharge the following test results are still pending: none FOLLOW UP APPOINTMENTS:   
Follow-up Information Follow up With Specialties Details Why Contact Info Archana Jackson MD Family Medicine In 1 week  [address] [facility] [address] [facility] [address] 
[phone] Dwight Willis MD Cardiology In 1 week  [address] [facility] [address] [place] [address] 
[phone] DIET: Cardiac Diet ACTIVITY: Activity as tolerated DISCHARGE MEDICATIONS: 
Current Discharge Medication List  
  
START taking these medications Details  
predniSONE (DELTASONE) 20 mg tablet Take 40 mg by mouth daily (with breakfast). Qty: 10 Tab, Refills: 0  
  
doxycycline (MONODOX) 100 mg capsule Take 1 Cap by mouth two (2) times a day. Qty: 10 Cap, Refills: 0 HYDROcodone-acetaminophen (Norco) 5-325 mg per tablet Take 1 Tab by mouth every six (6) hours as needed for Pain for up to 3 days. Max Daily Amount: 4 Tabs. Qty: 10 Tab, Refills: 0 Associated Diagnoses: Low back pain without sciatica, unspecified back pain laterality, unspecified chronicity CONTINUE these medications which have NOT CHANGED Details  
bumetanide (BUMEX) 2 mg tablet Take 1 Tab by mouth two (2) times a day. Qty: 60 Tab, Refills: 1  
  
amLODIPine (NORVASC) 5 mg tablet Take 1 Tab by mouth daily. Qty: 30 Tab, Refills: 1  
  
fluticasone-umeclidinium-vilanterol (Trelegy Ellipta) 100-62.5-25 mcg inhaler Take 1 Puff by inhalation daily.   
  
isosorbide dinitrate (ISORDIL) 20 mg tablet Take 1 Tab by mouth three (3) times daily. Qty: 90 Tab, Refills: 5 Associated Diagnoses: Coronary artery disease involving native coronary artery of native heart without angina pectoris  
  
triamcinolone acetonide (KENALOG) 0.1 % topical cream   
  
cyanocobalamin 1,000 mcg tablet Take 1,000 mcg by mouth daily. insulin glargine (LANTUS U-100 INSULIN) 100 unit/mL injection 3-7 Units by SubCUTAneous route nightly. Pt reports he uses 3-7 units depending on BG  
  
magnesium oxide (MAG-OX) 400 mg tablet Take 1 Tab by mouth daily. Qty: 15 Tab, Refills: 0  
  
carvedilol (COREG) 12.5 mg tablet Take 6.25 mg by mouth two (2) times daily (with meals). simvastatin (ZOCOR) 20 mg tablet Take 1 Tab by mouth nightly. Qty: 90 Tab, Refills: 3 Associated Diagnoses: Coronary artery disease involving native coronary artery of native heart without angina pectoris; Hypercholesteremia  
  
albuterol (PROVENTIL VENTOLIN) 2.5 mg /3 mL (0.083 %) nebulizer solution 3 mL by Nebulization route every four (4) hours as needed for Wheezing. Qty: 1 Package, Refills: 0  
  
tamsulosin (FLOMAX) 0.4 mg capsule Take 1 Cap by mouth daily. Qty: 30 Cap, Refills: 0  
  
omega 3-dha-epa-fish oil (FISH OIL) 100-160-1,000 mg cap Take 1 Cap by mouth daily. NOTIFY YOUR PHYSICIAN FOR ANY OF THE FOLLOWING:  
Fever over 101 degrees for 24 hours. Chest pain, shortness of breath, fever, chills, nausea, vomiting, diarrhea, change in mentation, falling, weakness, bleeding. Severe pain or pain not relieved by medications. Or, any other signs or symptoms that you may have questions about. DISPOSITION: 
 x Home With: 
 OT  PT  HH  RN  
  
 Long term SNF/Inpatient Rehab Independent/assisted living Hospice Other:  
 
 
PATIENT CONDITION AT DISCHARGE:  
 
Functional status Poor Deconditioned   
x Independent Cognition  
x  Lucid Forgetful Dementia Catheters/lines (plus indication)  Lucia PICC   
 PEG   
x None Code status Full code   
x DNR PHYSICAL EXAMINATION AT DISCHARGE: 
Please see progress note CHRONIC MEDICAL DIAGNOSES: 
Problem List as of 3/22/2021 Date Reviewed: 3/19/2021 Codes Class Noted - Resolved * (Principal) NSTEMI (non-ST elevated myocardial infarction) ([facility] ICD-10-CM: I21.4 ICD-9-CM: 410.70  3/18/2021 - Present SOB (shortness of breath) ICD-10-CM: R06.02 
ICD-9-CM: 786.05  3/8/2021 - Present Hypoglycemia ICD-10-CM: E16.2 ICD-9-CM: 251.2  8/27/2019 - Present Pacemaker ICD-10-CM: Z95.0 ICD-9-CM: V45.01  5/9/2019 - Present Overview Signed 5/9/2019  6:22 PM by Erika Sharif MD  
  5/9/2019 leadless pacer implant Acute on chronic diastolic (congestive) heart failure (HCC) ICD-10-CM: I50.33 ICD-9-CM: 428.33, 428.0  5/1/2019 - Present Atrial fibrillation with slow ventricular response (HCC) ICD-10-CM: I48.91 
ICD-9-CM: 427.31  5/1/2019 - Present Overview Signed 5/8/2019 11:24 PM by Erika Sharif MD  
  Added automatically from request for surgery [id number] Type 2 diabetes with nephropathy ([facility] ICD-10-CM: E11.21 
ICD-9-CM: 250.40, 583.81  7/26/2018 - Present Atrial fibrillation, chronic ICD-10-CM: I48.20 ICD-9-CM: 427.31  10/21/2017 - Present Overview Signed 10/21/2017  1:45 PM by Prema Andujar MD  
  new onset afib with BRPR 10-19-17 admit CKD (chronic kidney disease) stage 4, GFR 15-29 ml/min ([facility]) ICD-10-CM: N18.4 ICD-9-CM: 585.4  10/21/2017 - Present Overview Signed 10/21/2017  1:47 PM by Prema Andujar MD  
  hypertension and DM nephrosclerosis Hypokalemia ICD-10-CM: E87.6 ICD-9-CM: 276.8  6/25/2017 - Present Generalized weakness ICD-10-CM: R53.1 ICD-9-CM: 780.79  6/25/2017 - Present Acute renal failure (ARF) (HCC) ICD-10-CM: N17.9 ICD-9-CM: 584.9  3/16/2017 - Present CKD (chronic kidney disease) ICD-10-CM: N18.9 ICD-9-CM: 585.9  1/20/2017 - Present Type 2 diabetes mellitus with diabetic peripheral angiopathy without gangrene [facility]) ICD-10-CM: E11.51 
ICD-9-CM: 250.70, 443.81  9/27/2015 - Present PVC's (premature ventricular contractions) ICD-10-CM: I49.3 ICD-9-CM: 427.69  5/26/2014 - Present Carotid arterial disease (HCC) ICD-10-CM: I77.9 ICD-9-CM: 447.9  Unknown - Present Hypercholesteremia ICD-10-CM: E78.00 ICD-9-CM: 272.0  Unknown - Present PVD (peripheral vascular disease) ([facility] ICD-10-CM: I73.9 ICD-9-CM: 443.9  Unknown - Present Bradycardia ICD-10-CM: R00.1 ICD-9-CM: 427.89  Unknown - Present CAD (coronary artery disease) ICD-10-CM: I25.10 ICD-9-CM: 414.00  Unknown - Present Hypertension, essential, benign ICD-10-CM: I10 
ICD-9-CM: 401.1  Unknown - Present RESOLVED: Rectal bleeding ICD-10-CM: K62.5 ICD-9-CM: 569.3  12/17/2019 - 3/15/2020 RESOLVED: CHF exacerbation ([facility] ICD-10-CM: I50.9 ICD-9-CM: 428.0  12/3/2019 - 3/15/2020 RESOLVED: CHF exacerbation ([facility] ICD-10-CM: I50.9 ICD-9-CM: 428.0  8/5/2019 - 8/20/2019 RESOLVED: Acute hypoxemic respiratory failure ([facility] ICD-10-CM: J96.01 
ICD-9-CM: 518.81  8/5/2019 - 8/20/2019 RESOLVED: Acute bronchospasm ICD-10-CM: J98.01 
ICD-9-CM: 519.11  5/18/2019 - 5/19/2019 RESOLVED: CHF exacerbation ([facility] ICD-10-CM: I50.9 ICD-9-CM: 428.0  5/18/2019 - 5/19/2019 RESOLVED: GI bleed ICD-10-CM: K92.2 ICD-9-CM: 578.9  10/20/2017 - 12/28/2019 RESOLVED: New onset a-fib [facility]) ICD-10-CM: I48.91 
ICD-9-CM: 427.31  10/20/2017 - 11/30/2017 RESOLVED: Hyperglycemia due to type 2 diabetes mellitus ([facility] ICD-10-CM: E11.65 ICD-9-CM: 250.00  10/20/2017 - 3/15/2020 RESOLVED: Elevated troponin ICD-10-CM: R77.8 ICD-9-CM: 790.6  6/25/2017 - 3/15/2020 RESOLVED: KATALINA (acute kidney injury) ([facility] ICD-10-CM: N17.9 ICD-9-CM: 584.9  6/25/2017 - 3/15/2020  RESOLVED: Fever ICD-10-CM: R50.9 ICD-9-CM: 780.60  3/16/2017 - 3/18/2017 RESOLVED: Hyponatremia ICD-10-CM: E87.1 ICD-9-CM: 276.1  3/16/2017 - 3/18/2017 RESOLVED: Urinary retention ICD-10-CM: R33.9 ICD-9-CM: 788.20  1/19/2017 - 1/20/2017 RESOLVED: Dyspnea ICD-10-CM: R06.00 
ICD-9-CM: 786.09  7/15/2014 - 3/15/2020 RESOLVED: Heart palpitations ICD-10-CM: R00.2 ICD-9-CM: 785.1  7/15/2014 - 9/20/2016 RESOLVED: Atherosclerosis of native artery of extremity with intermittent claudication ([facility] ICD-10-CM: A19.238 ICD-9-CM: 440.21  2/19/2014 - 9/20/2016 RESOLVED: Other dyspnea and respiratory abnormality ICD-10-CM: R06.09, R09.89 ICD-9-CM: 786.09  Unknown - 9/20/2016 RESOLVED: Diabetes mellitus, type 2 (HCC) ICD-10-CM: E11.9 ICD-9-CM: 250.00  Unknown - 9/20/2016 Greater than 39 minutes were spent with the patient on counseling and coordination of care Signed:  
Alex Berry MD 
3/22/2021 
9:44 AM 
 .

## 2021-03-22 NOTE — PROGRESS NOTES
Orders received, chart reviewed and patient evaluated by physical therapy. Pending progression with skilled acute physical therapy, recommend: 
Physical therapy at least 2 days/week in the home AND ensure assist and/or supervision for safety with mobility, if not rehab at SNF level, not cleared today for discharge Recommend with nursing OOB to chair 3x/day and walking daily with  assist and walker and assist X 1. Thank you for completing as able in order to maintain patient strength, endurance and independence. Full evaluation to follow. Nelli Bray, PT Vitals:  
 03/22/21 1120 03/22/21 1127 03/22/21 1133 03/22/21 1135 BP: 131/75 (!) 155/75 BP 1 Location: Left upper arm Left upper arm BP Patient Position: Supine Sitting Transferrin bed to chair Seated up to chair Pulse: 77 84 Resp:      
Temp:      
SpO2: on 3 liters of 02 96% 91% (!) 85% 91%

## 2021-03-22 NOTE — PROGRESS NOTES
Hospital follow-up Telehealth PCP transitional care appointment has been scheduled with Dr. Devin Carroll for Wednesday, 3/24/21 at 10:00 a.m. Pending patient discharge.   Sixto Sharma, Care Management Specialist.

## 2021-03-22 NOTE — PROGRESS NOTES
Patient alert and oriented x 4, denied any pain or discomfort, on 3 LPM of oxygen via NC, tolerating well, no s/s of acute distress noted, for discharge home per MD order, discharge instructions provided, verbalized understanding, written copy of discharge instruction and 3 Rx given to the patient. Patient's spouse here to pick the patient up. Patient left the unit via wheelchair on 3 LPM of oxygen in a stable condition.

## 2021-03-23 NOTE — PROGRESS NOTES
Care Transitions Initial Follow Up Call Call within 2 business days of discharge: Yes Verbal consent granted by patient for CTN to speak with spouse (Mr. Lavinia Manriquezs all issues. Patient: Hyacinth Sosa Patient : 1935 MRN: [id number] Last Discharge [address] Complaint Diagnosis Description Type Department Provider 3/18/21 Shortness of Breath NSTEMI (non-ST elevated myocardial infarction) ([facility] . .. ED to Hosp-Admission (Discharged) (ADMIT) Ct Ruiz MD; Shahnaz Morales. .. Was this an external facility discharge? No Discharge Facility: [facility] Challenges to be reviewed by the provider Additional needs identified to be addressed with provider yes Component Latest Ref Rng & Units 3/22/2021 BUN 
    6 - 20 MG/ (H) Creatinine 
    0.70 - 1.30 MG/DL 3.15 (H) BUN/Creatinine ratio 12 - 20   55 (H) GFR est AA 
    >60 ml/min/1.73m2 23 (L)  
GFR est non-AA 
    >60 ml/min/1.73m2 19 (L) Component Latest Ref Rng & Units 3/20/2021  
 
     10:00 AM  
Troponin-I, Qt. 
    <0.05 ng/mL 0.33 (H) Component Latest Ref Rng & Units 3/20/2021 NT pro-BNP 
    <450 PG/ML >35,000 (H) Method of communication with provider : chart routing Discussed COVID-19 related testing which was not done at this time. Test results were not done. Patient informed of results, if available? N/A Advance Care Planning:  
Does patient have an Advance Directive: yes, reviewed and needs to be updated Inpatient Readmission Risk score: Unplanned Readmit Risk Score: 23 Was this a readmission? yes Patient stated reason for the admission: Sob, back pain Patients top risk factors for readmission: medical condition heart failure, COPD, diabetes, chronic kidney disease and advance age Interventions to address risk factors: Obtained and reviewed discharge summary and/or continuity of care documents and Education of patient/family/caregiver/guardian to support self-management-STEMI / CHF Care Transition Nurse (CTN) contacted the patient spouse by telephone to perform post hospital discharge assessment. Verified name and  with spouse as identifiers. Provided introduction to self, and explanation of the CTN role. CTN reviewed discharge instructions, medical action plan and red flags with spouse who verbalized understanding. Were discharge instructions available to patient? yes. Reviewed appropriate site of care based on symptoms and resources available to patient including: PCP, Specialist, [address] [place] [address] Kyle Wang, When to call 911 and [employer]. spouse given an opportunity to ask questions and does not have any further questions or concerns at this time. The spouse agrees to contact the PCP office for questions related to their healthcare. Medication reconciliation was performed with spouse, who verbalizes understanding of administration of home medications. Advised obtaining a 90-day supply of all daily and as-needed medications. Referral to Pharm D needed: no  
START taking: 
doxycycline (MONODOX) 100 mg - 1 cap by mouth two (2) times a day HYDROcodone-acetaminophen (Norco) 5-325 mg 1 tab by mouth every 6 hrs. As needed for pain CHANGE how you take: 
predniSONE (DELTASONE) 20 mg  -40 mg  tablet by mouth daily (with breakfast). Home Health/Outpatient orders at discharge: home health care, PT, OT and SN Home Health company: [facility] Date of initial visit: [facility] 3/23/21 Durable Medical Equipment ordered at discharge: None Covid Risk Education Patient has following risk factors of: heart failure, COPD, diabetes and chronic kidney disease. Pt.spouse decline education provided regarding infection prevention, and signs and symptoms of COVID-19, agrees to contact the PCP office for questions related to COVID-19.   
 
For more information on steps you can take to protect yourself, see CDC's How to Protect Yourself Was patient discharged with a pulse oximeter? no Discussed and confirmed pulse oximeter discharge instructions and when to notify provider or seek emergency care. Patient/family/caregiver given information for [employer] and agrees to enroll no Discussed follow-up appointments: yes Is follow up appointment scheduled within 7 days of discharge? yes [facility] follow up appointment(s):  
Future Appointments Date Time Provider Muna Delgado 3/26/2021  2:20 PM David Robertson MD CAVREY BS AMB  
4/28/2021 11:30 AM REMOTE1, COSME YOON BS AMB  
7/28/2021  2:00 PM REMOTE1, COSME BERNAL AMB  
10/19/2021 10:20 AM PACEMAKER3, COSME BERNAL AMB  
10/19/2021 10:40 AM MD KARRIE Green BS AMB [facility] follow up appointment(s): Velma Persaud MD 3/23/21 (VV) Pt.spouse reports discussed pt.discharge with PCP on 3/22/21 Plan for follow-up call in 10-14 days based on severity of symptoms and risk factors. Plan for next call: symptom management-CHF and follow up appointment-pcp, and specialist 
CTN provided contact information for future needs. Goals  Establish PCP relationships and regularly scheduled appointments. 3/11 Pt.upcoming appts: 
(PCP) TROY Bui MD 3/18/21 @ 9:15 am (VV) 
(Pulmonary) Kobi Rich MD 3/24/21 @ 1 pm 
[address] PT/OT/SN 3/11/21 Future Appointments Date Time Provider 3/18/2021 12:40 PM Stanislav Azul MD  
4/28/2021 11:30 AM REMOTE1, COSME  
7/28/2021  2:00 PM REMOTE1, AGUIAR  
10/19/2021 10:20 AM PACEMAKER3, AGUIAR  
10/19/2021 10:40 AM Kuldeep Paz MD  
CTN informed pt.of [place] [phone])  explain briefly type of service;  contact information provided. CTN will f/u with in 7-10 days. -[address]   
 
3/23 Pt.upcoming appt: PCP, Dr. Vahe Bui MD 3/23/21 (VV) Future Appointments Date Time Provider 3/26/2021  2:20 PM Stanislav Azul MD  
4/28/2021 11:30 AM REMOTE1, COSME  
7/28/2021  2:00 PM REMOTE1, COSME  
10/19/2021 10:20 AM PACEMAKER3, COSME  
10/19/2021 10:40 AM Moo Perez MD  
 
CTN provided [facility]dtr.information [facility] [phone][facility] explain briefly type of service; contact information provided, will f/u with pt.in 7-10 days. -[facility]  Understands red flags post discharge. 3/11 Pt.will weigh daily, report symptoms weight gain 2 to 3 lbs. in a day or 5 lbs. /week,  new symptoms of SOB, cough; swollen ankles, feeling more tired than usual with normal activity, pt.will notify physician. CTN contact information provided to call with questions or concerns, follow up in 7-14 days. -[facility] 
 
3/23 Pt.spouse reports pt.weighs daily, today weight 138 lbs, no reports s/s listed above, will contact CTN with questions or concerns, follow up in 7-10 days. -[facility]

## 2021-03-26 NOTE — PROGRESS NOTES
Visit Vitals /60 (BP 1 Location: Left upper arm, BP Patient Position: Sitting, BP Cuff Size: Adult) Pulse 73 Resp 18 Ht 5' 9\" (1.753 m) Wt 133 lb 12.8 oz (60.7 kg) SpO2 96% Comment: on 3 liters of oxygen BMI 19.76 kg/m²

## 2021-03-26 NOTE — Clinical Note
3/26/2021 Patient: Bakari Phipps. YOB: 1935 Date of Visit: 3/26/2021 Jerman Castellanos MD 
[address] [place] [place] [id number] Via Fax: [phone] Dear Jerman Castellanos MD, Thank you for referring Mr. Brenda Bray to [facility] for evaluation. My notes for this consultation are attached. If you have questions, please do not hesitate to call me. I look forward to following your patient along with you.  
 
 
Sincerely, 
 
Kristal Wharton MD

## 2021-03-26 NOTE — PROGRESS NOTES
[facility]     1935       David Singh MD, [facility] Date of Visit-3/26/2021 PCP is David Thomas MD  
[facility] HPI:  [facility]. is a 81 y.o. male F/u of of VV on 12/15/20 of · CHF combined diastolic and systolic chronic  
· RHC May 2, 2019 =PA 66/18 W 20 PA sat 55% CO 4.1 CI 2.12 
· Echo May 1 ,2019 EF 55-60% mod LAE, Mild CASSIE, Mild AS BELLE 1.6 cm2 mild MR & TR, RV fx mildly reduced · Leadless pacer 5-9-2019 · CAD with CABG off pump x3 3/2008  
· Chronic atrial fibrillation  
· CKD 4- Dr Deshaun Marlow 
· August 2019 pneumonia, renal edema · Pulmonary HTN, restrictive lung disease · PVD- PTA 2014 · Carotid artery disease  
03/18/21 ECHO ADULT COMPLETE 03/19/2021 3/19/2021 Narrative · LV: Calculated LVEF is 65%. Normal cavity size and systolic function  
(ejection fraction normal). Moderate concentric hypertrophy. Abnormal left  
ventricular septal motion consistent with interventricular conduction  
delay (IVCD). Moderate (grade 2) left ventricular diastolic dysfunction. · RV: Mildly reduced systolic function. · AV: Probably trileaflet aortic valve. Aortic valve leaflet calcification  
present. Mild stenosis · MV: Mild mitral annular calcification. Trace mitral valve regurgitation  
is present. · Pulmonary arterial systolic pressure is 42 mmHg. Signed by: Vani Bullard MD  
  
Was admitted to the hospital with recurrent heart failure and SOB. He has significant ILD. He has HFpEF. At discharge pt was placed on Prednisone and continued on Bumex. He also had severe anemia with a HGB as low as 6.4. He was discharged at 8.2. Lab Results Component Value Date/Time Creatinine 2.48 (H) 03/29/2021 01:01 AM  
  
He is losing weight out of proportion to his diuresis with anorexia and fatigue. Wt Readings from Last 12 Encounters:  
03/26/21 133 lb 12.8 oz (60.7 kg) 03/22/21 143 lb 12.8 oz (65.2 kg) 03/09/21 148 lb 2.4 oz (67.2 kg) 10/13/20 139 lb (63 kg) 10/13/20 139 lb (63 kg) 04/30/20 142 lb (64.4 kg) 03/12/20 141 lb (64 kg) 02/27/20 141 lb (64 kg) 12/28/19 145 lb 1 oz (65.8 kg) 12/20/19 151 lb (68.5 kg) 12/17/19 146 lb 13.2 oz (66.6 kg) 12/10/19 164 lb (74.4 kg) Pt is on oxygen. Pt is in a wheelchair. Pt is accompanied by his wife. He states he still has SOB. Pt states he is having a lot of left-sided back pain. He describes the pain as \"dull\". He notes he went to the hospital because of the back pain. Pt saw his pulmonologist on Wednesday. Darliss Card notes mainly shortness of breath. Denies chest pain, edema, syncope or shortness of breath at rest, has no tachycardia, palpitations or sense of arrhythmia. Assessment/Plan:   
High complexity, consider readmit, severe AGUILAR with Acute on chronic diastolic CHF  And multiple other co-morbids Patient Instructions Please stop your Isordil and your PM Bumex. We will see you back in 4 months. Future Appointments Date Time Provider Muna Delgado 4/28/2021 11:30 AM REMOTE1, COSME BERNAL AMB  
7/23/2021 11:00 AM David Robertson MD CAVREY BS AMB  
7/28/2021  2:00 PM REMOTE1, COSME BERNAL AMB  
10/19/2021 10:20 AM [facility]3COSME AMB  
10/19/2021 10:40 AM MD KARRIE Armas BS AMB 1. Diastolic CHF, chronic ([address] Multifactorial SOB with AOC HFpEF, CKD,  anemia and ILD. He has no edema today. I will reduce the Bumex to once a day and stop the Isordil. [place] is poor, he is miserable with the BID bumex but has been hospitalized several times, the weight loss seems worrisome and he has very poor po intake. His wife is very helpful and understands how sick he has become. They are aware of end-of-life scenarios. 2. Coronary artery disease involving native coronary artery of native heart without angina pectoris CABG 2008. Recent elevated troponin at 0.48, not really an MI.  Certainly he has disease but I don't think that is the source of his current dyspnea. He is a poor candidate for cath given Creatinine and age, consider cardiac hospice. 3. Atrial fibrillation, chronic ([address] Chronic with rate control and leadless pacemaker. He has had severe anemia and is no longer on an anticoagulant. 4. CKD (chronic kidney disease) stage 4, GFR 15-29 ml/min ([facility]) Now stage 4. They will talk to Dr. Cayla Berman about whether his kidneys would tolerate pulmonary HTN drugs. 5. Peripheral vascular disease ([address] Multiple stents in the common femoral arteries.  If he is limited in activity then we should consider reimaging but therapy is limited due to the poor renal function 6. Hx of CABG 
2008 healed midline incision 7. Bilateral carotid artery stenosis Stable, medical management 8. Hypercholesteremia At goal , denies excess muscle aches or new liver issues At goal , denies excess muscle aches or new liver issues Key Antihyperlipidemia Meds   
    
  
 simvastatin (ZOCOR) 20 mg tablet (Taking) Take 1 Tab by mouth nightly. omega 3-dha-epa-fish oil (FISH OIL) 100-160-1,000 mg cap (Taking) Take 1 Cap by mouth daily. No results found for: LDL, LDLC, DLDLP   
9. Hypertension, essential, benign At goal , meds and possible side effects reviewed and patient denies NOT At goal , meds and possible side effects reviewed and patient denies Have room with amlodipine See how home bp goes with above med changes Key CAD CHF Meds   
    
  
 bumetanide (BUMEX) 2 mg tablet (Taking) Take 1 Tab by mouth daily. amLODIPine (NORVASC) 5 mg tablet (Taking) Take 1 Tab by mouth daily. carvedilol (COREG) 12.5 mg tablet (Taking) Take 6.25 mg by mouth two (2) times daily (with meals). simvastatin (ZOCOR) 20 mg tablet (Taking) Take 1 Tab by mouth nightly. omega 3-dha-epa-fish oil (FISH OIL) 100-160-1,000 mg cap (Taking) Take 1 Cap by mouth daily.   
  
  
BP Readings from Last 6 Encounters: 21 (!) 154/80  
21 110/60  
21 139/62  
03/10/21 (!) 149/62  
10/13/20 112/68  
10/13/20 112 10. Hospital discharge follow-up - MT DISCHARGE MEDS RECONCILED W/ CURRENT OUTPATIENT MED LIST 
  
F/u in 4 months Impression: 1. Diastolic CHF, chronic ([facility]) 2. Coronary artery disease involving native coronary artery of native heart without angina pectoris 3. Atrial fibrillation, chronic ([facility] 4. CKD (chronic kidney disease) stage 4, GFR 15-29 ml/min ([facility]) 5. Peripheral vascular disease ([facility] 6. Hx of CABG 7. Bilateral carotid artery stenosis 8. Hypercholesteremia 9. Hypertension, essential, benign 10. Hospital discharge follow-up Cardiac History:  
CAD/CABG  
off pump x3 3/2008 . =post op anemia and renal failure CATH 2008= LM -bifurcation dz 40% ;LAD 85% ; Circ ost 70% mid 70% RCA proximal 60% tapering, EF 60%-70%, bilaterally patent renal arteries, mild atherosclerosis of the distal abdominal aorta. [address] May 2, 2019 =PA 66/18 W 20 PA sat 55% CO 4.1 CI 2.12 Echo May 1 ,2019 EF 55-60% mod LAE, Mild CASSIE, Mild AS BELLE 1.6 cm2 mild MR & TR, RV fx mildly reduced PVD-18- RLE- mid CRA 70, Pop 99-PTA on right pop, LLE LCFA 40% - 17 PTA RCFA 90, JAS to RSFA  
-16 PTA Left Pop, PTA Left tib-per 
--3-13-14 PTA Left op PTA RSFA 
---11 stent RSFA  
JOCELYN 17 Normal perfusion Mild carotid artery disease 3-7-08 then 12 with 10-49% left, less than 10% on right SOCIAL: Drinks no alcohol, quit smoking several years ago, worked as an . Lives with his wife and has four children. Enjoys gardening, fishing and music. FAMILY HISTORY: Mother  of cancer at 76, father  of Parkinson's at 70, one brother  of cancer of the liver at 76 and one  of an infection at 64. ROS-except as noted above. . A complete cardiac and respiratory are reviewed and negative except as above ; Resp-denies wheezing  or productive cough,. Const- No unusual weight loss or fever; Neuro-no recent seizure or CVA ; GI- No BRBPR, abdom pain, bloating ; - no  hematuria  
see supplement sheet, initialed and to be scanned by staff Past Medical History:  
Diagnosis Date  KATALINA (acute kidney injury) ([facility] 6/25/2017  CAD (coronary artery disease) s/p CABG 2008  Carot[facility] arterial disease [facility]  Chronic obstructive pulmonary disease ([facility] states he is on 2L at home day and night  CKD (chronic kidney disease) stage 4, GFR 15-29 ml/min ([facility] 10/21/2017  
 hypertension and DM nephrosclerosis cr 3.27 June 2020 Dr Everton Blair  Diabetes mellitus, type 2 ([facility]  Diverticulitis  Hypercholesteremia  Hypertension  Pacemaker  Paroxysmal atrial fibrillation ([facility] 10/21/2017  
 new onset afib with BRPR 10-19-17 admit  Pulmonary hypertension ([facility] decline in TLC and diffusion capacity- Dr Toshia Quijano  PVC's (premature ventricular contractions) 5/26/2014  PVD (peripheral vascular disease) ([facility]  Rectal bleeding 12/17/2019 Social Hx= reports that he quit smoking about 36 years ago. His smoking use included cigarettes. He has a 60.00 pack-year smoking history. He has never used smokeless tobacco. He reports that he does not drink alcohol or use drugs. Exam and Labs:  /60 (BP 1 Location: Left upper arm, BP Patient Position: Sitting, BP Cuff Size: Adult)   Pulse 73   Resp 18   Ht 5' 9\" (1.753 m)   Wt 133 lb 12.8 oz (60.7 kg)   SpO2 96% Comment: on 3 liters of oxygen  BMI 19.76 kg/m² Constitutional:  NAD, looks UNcomfortable, wears oxygen, thin almost cachectic Head: NC,AT. Eyes: No scleral icterus. Neck:  Neck supple. No JVD present. Throat: dry mucous membranes. Chest: Effort INCREASED & ABnormal respiratory excursion . Neurological: alert, conversant and oriented . Skin: Skin is not cold. No obvious systemic rash noted. Not diaphoretic. No erythema.  Psychiatric:  Grossly normal mood and affect. Behavior appears normal.for situation, little anxious about his health Extremities:  no clubbing or cyanosis. Abdomen: non distended Lungs:breath sounds normal. No stridor. distress, wheezes or  Rales. Heart: normal rate, regular rhythm, normal S1, S2, no murmurs, rubs, clicks or gallops , PMI non displaced. Edema: Edema is none. Lab Results Component Value Date/Time Sodium 133 (L) 03/22/2021 01:10 PM  
 Potassium 5.4 (H) 03/22/2021 01:10 PM  
 Chloride 98 03/22/2021 01:10 PM  
 CO2 25 03/22/2021 01:10 PM  
 Anion gap 10 03/22/2021 01:10 PM  
 Glucose 181 (H) 03/22/2021 01:10 PM  
  (H) 03/22/2021 01:10 PM  
 Creatinine 3.15 (H) 03/22/2021 01:10 PM  
 BUN/Creatinine ratio 55 (H) 03/22/2021 01:10 PM  
 GFR est AA 23 (L) 03/22/2021 01:10 PM  
 GFR est non-AA 19 (L) 03/22/2021 01:10 PM  
 Calcium 8.8 03/22/2021 01:10 PM  
  
Wt Readings from Last 20 Encounters:  
03/26/21 133 lb 12.8 oz (60.7 kg) 03/22/21 143 lb 12.8 oz (65.2 kg) 03/09/21 148 lb 2.4 oz (67.2 kg) 10/13/20 139 lb (63 kg) 10/13/20 139 lb (63 kg) 04/30/20 142 lb (64.4 kg) 03/12/20 141 lb (64 kg) 02/27/20 141 lb (64 kg) 12/28/19 145 lb 1 oz (65.8 kg) 12/20/19 151 lb (68.5 kg) 12/17/19 146 lb 13.2 oz (66.6 kg) 12/10/19 164 lb (74.4 kg)  
11/21/19 170 lb (77.1 kg) 10/10/19 158 lb (71.7 kg) 09/20/19 169 lb (76.7 kg) 09/04/19 164 lb (74.4 kg) 08/27/19 166 lb (75.3 kg) 08/21/19 164 lb (74.4 kg) 08/20/19 159 lb (72.1 kg) 07/24/19 170 lb (77.1 kg) BP Readings from Last 3 Encounters:  
03/26/21 110/60  
03/22/21 139/62  
03/10/21 (!) 149/62 Current Outpatient Medications Medication Sig  bumetanide (BUMEX) 2 mg tablet Take 1 Tab by mouth daily.  predniSONE (DELTASONE) 20 mg tablet Take 40 mg by mouth daily (with breakfast).  doxycycline (MONODOX) 100 mg capsule Take 1 Cap by mouth two (2) times a day.  amLODIPine (NORVASC) 5 mg tablet Take 1 Tab by mouth daily.  fluticasone-umeclidinium-vilanterol (Trelegy Ellipta) 100-62.5-25 mcg inhaler Take 1 Puff by inhalation daily.  triamcinolone acetonide (KENALOG) 0.1 % topical cream   
 cyanocobalamin 1,000 mcg tablet Take 1,000 mcg by mouth daily.  insulin glargine (LANTUS U-100 INSULIN) 100 unit/mL injection 3-7 Units by SubCUTAneous route nightly. Pt reports he uses 3-7 units depending on BG  
 magnesium oxide (MAG-OX) 400 mg tablet Take 1 Tab by mouth daily.  carvedilol (COREG) 12.5 mg tablet Take 6.25 mg by mouth two (2) times daily (with meals).  simvastatin (ZOCOR) 20 mg tablet Take 1 Tab by mouth nightly.  albuterol (PROVENTIL VENTOLIN) 2.5 mg /3 mL (0.083 %) nebulizer solution 3 mL by Nebulization route every four (4) hours as needed for Wheezing.  tamsulosin (FLOMAX) 0.4 mg capsule Take 1 Cap by mouth daily.  omega 3-dha-epa-fish oil (FISH OIL) 100-160-1,000 mg cap Take 1 Cap by mouth daily. No current facility-administered medications for this visit. Impression see above.   
  
Written by Tony Reed, as dictated by Jacinta Medel MD.

## 2021-03-29 NOTE — TELEPHONE ENCOUNTER
Patient is requesting to speak with Quinton regarding an address that she requested at the patients last visit. Please advise.     Phone:  [phone]

## 2021-03-29 NOTE — ED NOTES
Bedside and Verbal shift change report given to Kavon Damon RN (oncoming nurse) by Kelli Russo RN (offgoing nurse). Report included the following information SBAR, ED Summary, Procedure Summary, Intake/Output and MAR.

## 2021-03-29 NOTE — TELEPHONE ENCOUNTER
Verified patient with two types of identifiers. Will get patient advocacy address and contact info for patient's wife.

## 2021-03-29 NOTE — ED PROVIDER NOTES
81-year-old male with a history of coronary artery disease, carotid arterial disease, COPD on 2 L oxygen, chronic kidney disease, diabetes, hypercholesterolemia, hypertension, paroxysmal atrial fibrillation with pacemaker, pulmonary hypertension, and recent hospitalization for elevated troponins is here with back pain for the last 2 weeks to his low back that he says is severe. EMS says they were called because his blood sugar was low, 50 mg/dL. Apparently he has been taking prednisone and this has spiked his blood sugars, so he was given extra insulin at home before EMS got there. He thinks that is why his blood sugar was low. He says he has not had any headaches, fever, cough, chest pain, or abdominal pain. No nausea or vomiting. No shortness of breath. He says he is urinating normally, but is slightly constipated for the last 2 days. He said that is unusual for him. No numbness or tingling or saddle anesthesia. No leg weakness. He said he rides his bicycle frequently and demonstrated his technique by moving his legs vigorously as if he were pedaling a bicycle while lying on the stretcher. Past Medical History:  
Diagnosis Date  KATALINA (acute kidney injury) ([address] 6/25/2017  CAD (coronary artery disease) s/p CABG 2008  Carotid arterial disease ([address]  Chronic obstructive pulmonary disease ([address] states he is on 2L at home day and night  CKD (chronic kidney disease) stage 4, GFR 15-29 ml/min ([facility]) 10/21/2017  
 hypertension and DM nephrosclerosis cr 3.27 June 2020 Dr Laureano Santos  Diabetes mellitus, type 2 ([address]  Diverticulitis  Hypercholesteremia  Hypertension  Pacemaker  Paroxysmal atrial fibrillation ([address] 10/21/2017  
 new onset afib with BRPR 10-19-17 admit  Pulmonary hypertension ([address] decline in TLC and diffusion capacity- Dr El Saenz  PVC's (premature ventricular contractions) 5/26/2014  PVD (peripheral vascular disease) ([address]  Rectal bleeding 2019 Past Surgical History:  
Procedure Laterality Date  COLONOSCOPY Left 2019 COLONOSCOPY performed by Dinesh Diaz MD at [facility] ENDOSCOPY  
 HX CORONARY ARTERY BYPASS GRAFT    
 HX HEART CATHETERIZATION    
 MD TCAT INSJ/RPL PERM LEADLESS PACEMAKER RV W/IMG N/A 2019 INSERT OR REPLACE TRANSCATH PPM LEADLESS performed by Jodie Stubbs MD at [address] CATH LAB History reviewed. No pertinent family history. Social History Socioeconomic History  Marital status:  Spouse name: Not on file  Number of children: Not on file  Years of education: Not on file  Highest education level: Not on file Occupational History  Not on file Social Needs  Financial resource strain: Not on file  Food insecurity Worry: Not on file Inability: Not on file  Transportation needs Medical: Not on file Non-medical: Not on file Tobacco Use  Smoking status: Former Smoker Packs/day: 3.00 Years: 20.00 Pack years: 60.00 Types: Cigarettes Quit date: 1985 Years since quittin.2  Smokeless tobacco: Never Used Substance and Sexual Activity  Alcohol use: No  
 Drug use: No  
 Sexual activity: Not on file Lifestyle  Physical activity Days per week: Not on file Minutes per session: Not on file  Stress: Not on file Relationships  Social connections Talks on phone: Not on file Gets together: Not on file Attends Christianity service: Not on file Active member of club or organization: Not on file Attends meetings of clubs or organizations: Not on file Relationship status: Not on file  Intimate partner violence Fear of current or ex partner: Not on file Emotionally abused: Not on file Physically abused: Not on file Forced sexual activity: Not on file Other Topics Concern  Not on file Social History Narrative  Not on file ALLERGIES: Lisinopril Review of Systems Constitutional: Negative for fever. HENT: Negative for trouble swallowing. Eyes: Negative for visual disturbance. Respiratory: Negative for cough. Cardiovascular: Negative for chest pain. Gastrointestinal: Positive for constipation. Negative for abdominal pain. Genitourinary: Negative for difficulty urinating. Musculoskeletal: Positive for back pain. Negative for gait problem. Skin: Negative for rash. Neurological: Negative for headaches. Hematological: Bruises/bleeds easily. Psychiatric/Behavioral: Negative for sleep disturbance. Vitals:  
 03/29/21 0000 BP: 137/65 Pulse: 69 Resp: 18 Temp: 98.2 °F (36.8 °C) SpO2: 99% Physical Exam 
Constitutional:   
   Appearance: Normal appearance. HENT:  
   Head: Normocephalic. Nose: Nose normal.  
   Mouth/Throat:  
   Mouth: Mucous membranes are moist.  
Eyes:  
   Extraocular Movements: Extraocular movements intact. Conjunctiva/sclera: Conjunctivae normal.  
Cardiovascular:  
   Rate and Rhythm: Normal rate. Pulmonary:  
   Effort: Pulmonary effort is normal. No respiratory distress. Abdominal:  
   General: Abdomen is flat. Musculoskeletal: Normal range of motion. Skin: 
   Findings: No rash. Neurological:  
   General: No focal deficit present. Mental Status: He is alert. Psychiatric:     
   Behavior: Behavior normal.  
 
  
 
MDM Number of Diagnoses or Management Options Chronic anemia Chronic kidney disease, unspecified CKD stage Elevated troponin level not due myocardial infarction Hypoglycemia Low back pain without sciatica, unspecified back pain laterality, unspecified chronicity Diagnosis management comments: EKG 0 111 Ventricular paced rhythm with extreme right axis deviation at a rate of 62 QRS and QTc are both prolonged No STEMI by [facility] Aside from the rate, EKG is relatively unchanged from previous EKG on the 18th of this month when the heart rate was 78 Patient has no chest pain and no upper back pain, but does have some low back pain that he reports is new. I suspect this is a little bit more chronic than what he is saying. Regardless, I got imaging that shows no acute issues. I reviewed the CT scan from when he was recently here and that showed no new acute bony findings. He has good perfusion to both feet, so I think acute aortic dissection would be rather unlikely. I also do not think this is acute coronary syndrome, but he recently had elevated troponins and, with back pain, I think it is reasonable to perform a cardiac work-up. No new EKG changes. Initial troponin was mildly elevated, but was less than when he was here recently. Repeat troponin was also mildly elevated, but less than the initial.  I suspect he has chronic troponin leak. He has had many mildly elevated troponins over the last several that were checked here. He feels better after getting some pain medication and was asking if I could give him some to go home with. I told him I did not feel comfortable doing that and that he would have to talk to his primary care doctor about that. He understood. The main reason he was here was for hypoglycemia, but that seems to have corrected after EMS addressed it. Since then he has had no further hypoglycemia and he had something to eat while here. I suspect it was related to the increased insulin they gave him. I do not see any other cause for it and, with multiple normal blood glucoses and eating, I think he safe to go home. Return if any chest pain or trouble breathing or worsening back pain that seems concerning. Continue to keep an eye on blood sugar and return if any evidence of hypoglycemia. I made it clear to him I had rather he run a little high over the short-term than low. Procedures

## 2021-04-01 NOTE — PROGRESS NOTES
Goals  Establish PCP relationships and regularly scheduled appointments. 3/11 Pt.upcoming appts: 
(PCP) TROY Egan MD 3/18/21 @ 9:15 am (VV) 
(Pulmonary) Meagan Singh MD 3/24/21 @ 1 pm 
[address] PT/OT/SN 3/11/21 Future Appointments Date Time Provider 3/18/2021 12:40 PM Cholo More MD  
4/28/2021 11:30 AM REMOTE1, AGUIAR  
7/28/2021  2:00 PM REMOTE1, AGUIAR  
10/19/2021 10:20 AM PACEMAKER3, AGUIAR  
10/19/2021 10:40 AM Mariola Ariza MD  
CTN informed pt.of [facility] ([phone])  explain briefly type of service;  contact information provided. CTN will f/u with in 7-10 days. -[address]   
 
3/23 Pt.upcoming appt: PCP, Dr. Osmar Egan MD 3/23/21 (VV) Future Appointments Date Time Provider 3/26/2021  2:20 PM Cholo More MD  
4/28/2021 11:30 AM REMOTE1, AGUIAR  
7/28/2021  2:00 PM REMOTE1, AGUIAR  
10/19/2021 10:20 AM PACEMAKER3, AGUIAR  
10/19/2021 10:40 AM Mariola Ariza MD  
CTN provided pt.dtr.information [employer] [facility] [phone]) explain briefly type of service; contact information provided, will f/u with pt.in 7-10 days. -[address]    
 
4/1 Verbal consent granted by patient for CTN to speak with spouse (), concerning all issues. Pt.spouse reports pt.attend appts. as scheduled: -(Pulmonary) Meagan Singh MD 3/24/21, upcoming appt. 4/6/21 
-([facility]). Dr. Delfina Madrigal 3/26/21 -([facility] Remote- 4/28/21 
-Pt. continue with [address]  
CTN will follow up with pt.in 1-2 weeks. -[address]  Understands red flags post discharge. 3/11 Pt.will weigh daily, report symptoms weight gain 2 to 3 lbs. in a day or 5 lbs. /week,  new symptoms of SOB, cough; swollen ankles, feeling more tired than usual with normal activity, pt.will notify physician. CTN contact information provided to call with questions or concerns, follow up in 7-14 days. -[address] 
 
3/23 Pt.spouse reports pt.weighs daily, today weight 138 lbs, no reports s/s listed above, will contact CTN with questions or concerns, follow up in 7-10 days. - 
 
4/1  Pt.spouse reports pt.weight today weight 136 lbs, no reports s/s listed above,  reports ED visit 3/28 low FBS, adjustments to medications, no reports high/low readings. CTN contact information provided, call with questions or concerns, follow up in 7-14 days. -[address]

## 2021-04-02 NOTE — TELEPHONE ENCOUNTER
Verified patient with two types of identifiers. Updated patient's wife. She verbalized understanding and will call with any other questions.

## 2021-04-11 NOTE — Clinical Note
Status[de-identified] INPATIENT [101]   Type of Bed: Remote Telemetry [29]   Inpatient Hospitalization Certified Necessary for the Following Reasons: 3.  Patient receiving treatment that can only be provided in an inpatient setting (further clarification in H&P documentation)   Admitting Diagnosis: COVID-19 virus infection [[id number]]   Admitting Physician: Sarah Simon [[id number]]   Attending Physician: Sarah Simon [[id number]]   Estimated Length of Stay: 3-4 Midnights   Discharge Plan[de-identified] Home with Office Follow-up

## 2021-04-12 PROBLEM — U07.1 COVID-19 VIRUS INFECTION: Status: ACTIVE | Noted: 2021-01-01

## 2021-04-12 NOTE — ED NOTES
Verbal shift change report given to Thao Martin (oncoming nurse) by Tito Wei RN (offgoing nurse). Report included the following information SBAR, Kardex, ED Summary, [facility] and Recent Results.

## 2021-04-12 NOTE — ED TRIAGE NOTES
Patient BIBA from short pump ED with CC of back pain and SOB. Patient tested positive for Covid. History of CAD, chronic renal disease, COPD and wears 2L NC home O2. He has had to increase his O2 to 4lmp

## 2021-04-12 NOTE — ED NOTES
41-year-old male admitted to the medicine service as a transfer from short-term  for Covid. Patient was boarding in the emergency department pending evaluation by internal medicine. I was called to the room by nursing staff for I found the patient to be apneic and pulseless. CPR was started and the patient was ventilated with a bag valve mask. Critical care did present to the bedside and was able to speak with the patient's wife via telephone who confirms his DNR/DNI status. CPR was discontinued. Patient was placed on oxygen for comfort. A bedside echocardiogram shows no cardiac activity. The patient does have a pacemaker and is paced on the monitor. Patient's pupils are mid dilated and fixed. Time of death called 9:33 AM.  Patient's wife is in route to the hospital and I will speak with her when she arrives. Ysabel Quintero MD 
 
Total critical care time spent exclusive of procedures:  35 minutes

## 2021-04-12 NOTE — ED NOTES
7998: RN entered room to find patient unresponsive. Patient would not respond to pain and no pulse noted. CPR started as DNR status verified. Code Blue called 
 
9358: Dr. Ligia Zheng on phone with wife Raford Cheadle) at this time confirming DNR status. Per wife stop CPR at this time. 4031: Time of death called by Dr. Radha Vargas at 4399

## 2021-04-12 NOTE — PROGRESS NOTES
Spiritual Care Assessment/Progress Note [address] 
 
 
NAME: [address] [place] [address] MRN: [id number] AGE: 81 y.o. SEX: male Congregational Affiliation: Ghost  
Language: [place] 4/12/2021     Total Time (in minutes): 26 Spiritual Assessment begun in [address] [facility] [address] DEP through conversation with: 
  
    []Patient        [] Family    [] Friend(s) Reason for Consult: Death, ER Spiritual beliefs: (Please include comment if needed) 
   [] Identifies with a sampson tradition:     
   [] Supported by a sampson community:        
   [] Claims no spiritual orientation:       
   [] Seeking spiritual identity:            
   [] Adheres to an individual form of spirituality:       
   [x] Not able to assess:                   
 
    
Identified resources for coping:  
   [] Prayer                           
   [] Music                  [] Guided Imagery 
   [] Family/friends                 [] Pet visits [] Devotional reading                         [x] Unknown 
   [] Other:                                      
 
 
Interventions offered during this visit: (See comments for more details) Patient Interventions: Other (comment)(Death) Plan of Care: 
 
 [] Support spiritual and/or cultural needs  
 [] Support AMD and/or advance care planning process    
 [] Support grieving process 
 [] Coordinate Rites and/or Rituals  
 [] Coordination with community clergy [] No spiritual needs identified at this time 
 [] Detailed Plan of Care below (See Comments)  [] Make referral to Music Therapy 
[] Make referral to Pet Therapy    
[] Make referral to Addiction services 
[] Make referral to [facility] 
[] Make referral to Spiritual Care Partner 
[] No future visits requested       
[x] Follow up visits as needed Responded initially to a Code Blue which culminated in pt's death. No family present. Staff was reaching out to pt's wife. [facility]. Chaplain Mccormack MDiv, MS, [facility] 
[phone] PRAY ([phone])

## 2021-04-12 NOTE — ED PROVIDER NOTES
60-year-old male with past medical history significant for coronary artery disease status post CABG, chronic renal insufficiency, COPD on 2 L nasal cannula oxygen chronically, diabetes, hypertension presents with complaints of 2 days increasing left-sided flank/mid back pain associated with shortness of breath and productive cough. Patient reports he has increased his home oxygen from 2 to 4 L which has helped his shortness of breath. Patient denies fevers. Reports cough productive of green sputum. Patient reports he received his second dose of his Pfizer Covid vaccine on March 27, 2021. Denies diaphoresis, nausea, vomiting, diarrhea, constipation, chest pain, abdominal pain. Patient reports he has been using his albuterol nebulizer frequently today which improves his shortness of breath for 3 minutes after completion and then he becomes short of breath again. Recent admission March 18 through March 22, 2021 for suspected MI with coronary artery disease and anemia. Cardiology[facility] 
pulm-pulmonary assoicates 
pmd-[place] Former smoker Denies drug and alcohol use Past Medical History:  
Diagnosis Date  KATALINA (acute kidney injury) ([address] 6/25/2017  CAD (coronary artery disease) s/p CABG 2008  Carotid arterial disease ([address]  Chronic obstructive pulmonary disease ([address] states he is on 2L at home day and night  CKD (chronic kidney disease) stage 4, GFR 15-29 ml/min ([facility]) 10/21/2017  
 hypertension and DM nephrosclerosis cr 3.27 June 2020 Dr Romilda Ganser  Diabetes mellitus, type 2 ([address]  Diverticulitis  Hypercholesteremia  Hypertension  Pacemaker  Paroxysmal atrial fibrillation ([address] 10/21/2017  
 new onset afib with BRPR 10-19-17 admit  Pulmonary hypertension ([address] decline in TLC and diffusion capacity- Dr Jorge Alberto Dutton  PVC's (premature ventricular contractions) 5/26/2014  PVD (peripheral vascular disease) ([address]  Rectal bleeding 12/17/2019 Past Surgical History:  
Procedure Laterality Date  COLONOSCOPY Left 2019 COLONOSCOPY performed by Guera Peterson MD at [facility] ENDOSCOPY  
 HX CORONARY ARTERY BYPASS GRAFT    
 HX HEART CATHETERIZATION    
 MS TCAT INSJ/RPL PERM LEADLESS PACEMAKER RV W/IMG N/A 2019 INSERT OR REPLACE TRANSCATH PPM LEADLESS performed by Ti Szymanski MD at [facility] CATH LAB History reviewed. No pertinent family history. Social History Socioeconomic History  Marital status:  Spouse name: Not on file  Number of children: Not on file  Years of education: Not on file  Highest education level: Not on file Occupational History  Not on file Social Needs  Financial resource strain: Not on file  Food insecurity Worry: Not on file Inability: Not on file  Transportation needs Medical: Not on file Non-medical: Not on file Tobacco Use  Smoking status: Former Smoker Packs/day: 3.00 Years: 20.00 Pack years: 60.00 Types: Cigarettes Quit date: 1985 Years since quittin.2  Smokeless tobacco: Never Used Substance and Sexual Activity  Alcohol use: No  
 Drug use: No  
 Sexual activity: Not on file Lifestyle  Physical activity Days per week: Not on file Minutes per session: Not on file  Stress: Not on file Relationships  Social connections Talks on phone: Not on file Gets together: Not on file Attends Evangelical service: Not on file Active member of club or organization: Not on file Attends meetings of clubs or organizations: Not on file Relationship status: Not on file  Intimate partner violence Fear of current or ex partner: Not on file Emotionally abused: Not on file Physically abused: Not on file Forced sexual activity: Not on file Other Topics Concern  Not on file Social History Narrative  Not on file ALLERGIES: Lisinopril Review of Systems Constitutional: Negative for chills and fever. HENT: Negative for congestion, nosebleeds and sore throat. Eyes: Negative for pain and discharge. Respiratory: Positive for cough and shortness of breath. Cardiovascular: Negative for chest pain and palpitations. Gastrointestinal: Negative for abdominal pain, constipation, nausea and vomiting. Genitourinary: Negative for decreased urine volume, dysuria, flank pain and urgency. Musculoskeletal: Positive for back pain. Negative for gait problem and myalgias. Skin: Negative for rash and wound. Neurological: Negative for seizures and syncope. Hematological: Does not bruise/bleed easily. Psychiatric/Behavioral: Negative for confusion, self-injury and suicidal ideas. Vitals:  
 04/11/21 2130 04/11/21 2145 04/11/21 2200 04/11/21 2310 BP: (!) 149/72 138/73 (!) 109/55 Pulse: 61 93 65 62 Resp:  30 23 23 Temp:      
SpO2: 100% 95% 93% 100% Weight:      
Height:      
      
 
Physical Exam 
Vitals signs and nursing note reviewed. Constitutional:   
   Appearance: He is well-developed. HENT:  
   Head: Normocephalic and atraumatic. Eyes:  
   Pupils: Pupils are equal, round, and reactive to light. Neck: Musculoskeletal: Normal range of motion and neck supple. Cardiovascular:  
   Rate and Rhythm: Normal rate and regular rhythm. Heart sounds: Normal heart sounds. Pulmonary:  
   Effort: Tachypnea, accessory muscle usage and respiratory distress present. Breath sounds: Normal breath sounds. No wheezing. Comments: Intermittent crackles Abdominal:  
   General: Bowel sounds are normal.  
   Palpations: Abdomen is soft. Tenderness: There is no abdominal tenderness. There is no guarding or rebound. Musculoskeletal: Normal range of motion. Skin: 
   General: Skin is warm and dry. Neurological:  
   Mental Status: He is alert and oriented to person, place, and time.   
Psychiatric: Behavior: Behavior normal.  
 
  
 
MDM Number of Diagnoses or Management Options Acute left-sided thoracic back pain Anemia, unspecified type Chronic congestive heart failure, unspecified heart failure type ([address] COVID-19 Diagnosis management comments: 52-year-old male with past medical history significant for coronary disease, CHF, COPD, anemia presents with complaints of 2 days increasing shortness of breath with left-sided back pain and requiring increasing oxygen use at home. Patient afebrile, increased work of breathing on arrival, tachypneic, afebrile, appears uncomfortable, hemodynamically stable, 100% on 4 L nasal cannula, no wheezing, good air movement, intermittent crackles bilaterally, no leg swelling or asymmetry. PlanEKG, cardiac monitor, chest x-ray, CBC/CMP/cardiac enzymes/BNP, Covid, pain control. Amount and/or Complexity of Data Reviewed Clinical lab tests: ordered and reviewed Tests in the radiology section of CPT®: ordered and reviewed Independent visualization of images, tracings, or specimens: yes Patient Progress Patient progress: improved Procedures Perfect Serve Consult for Admission 11:35 PM 
 
ED Room Number: SSU22/38 Patient Name and age:  Jak Gonzales. 81 y.o.  male Working Diagnosis:  
1. COVID-19   
2. Acute left-sided thoracic back pain 3. Anemia, unspecified type 4. Chronic congestive heart failure, unspecified heart failure type ([facility] COVID-19 Suspicion:  yes Sepsis present:  no  Reassessment needed: no 
Code Status:  Do Not Resuscitate Readmission: yes Isolation Requirements:  yes Recommended Level of Care:  telemetry Department:[place] [facility] - [phone] 
 
 
 
11:53 PM 
Patient reports pain improved. ED EKG interpretation: 
Rhythm: paced; and regular . Rate (approx.): 96; Axis: right axis deviation; QRS interval: prolonged; ST/T wave: normal; Other findings: unchanged from previous ekg.  This EKG was interpreted by Saad Martinez MD,ED Provider. 1:19 AM 
Saad Martinez MD spoke with Dr. Junie Martinez, Consult for Hospitalist. Discussed available diagnostic tests and clinical findings. He/She is in agreement with care plans as outlined. He/she accepts for admission. 3AM 
Sleeping flat in bed in no distress. 6A Pt c/o increasing SOB, scattered exp rales, no wheezing. Laying flat, sating well on 4L NC. Pt requesting neb treatment. Discussed bipap with patient. At [facility] time he does not want to try. 
 
625am 
Pt reports feeling better with neb started.   
 
627am 
Transport team in ER to go to [facility] ER.

## 2021-04-12 NOTE — ED NOTES
Pt states that his breathing is \"much better\" since getting the neb treatment and pt is much less anxious.

## 2021-04-12 NOTE — PROGRESS NOTES
Spiritual Care Assessment/Progress Note [address] 
 
 
NAME: [address] [facility] MRN: [id number] AGE: 81 y.o. SEX: male Synagogue Affiliation: [facility]  
Language: [place] 4/12/2021     Total Time (in minutes): 46  
 
Spiritual Assessment begun in [address] through conversation with: 
  
    []Patient        [x] Family    [] Friend(s) Reason for Consult: Death, ER Spiritual beliefs: (Please include comment if needed) [x] Identifies with a sampson tradition:     
   [] Supported by a sampson community:        
   [] Claims no spiritual orientation:       
   [] Seeking spiritual identity:            
   [] Adheres to an individual form of spirituality:       
   [] Not able to assess:                   
 
    
Identified resources for coping:  
   [] Prayer                           
   [] Music                  [] Guided Imagery [x] Family/friends                 [] Pet visits [] Devotional reading                         [] Unknown 
   [] Other:                                        
 
 
Interventions offered during this visit: (See comments for more details) Patient Interventions: Other (comment)(Death) Family/Friend(s): Affirmation of emotions/emotional suffering, Affirmation of sampson, Catharsis/review of pertinent events in supportive environment, Coping skills reviewed/reinforced, End of life issues discussed, Iconic (affirming the presence of God/Higher Power), Normalization of emotional/spiritual concerns Plan of Care: 
 
 [] Support spiritual and/or cultural needs  
 [] Support AMD and/or advance care planning process    
 [] Support grieving process 
 [] Coordinate Rites and/or Rituals  
 [] Coordination with community clergy  [] No spiritual needs identified at this time 
 [] Detailed Plan of Care below (See Comments)  [] Make referral to Music Therapy 
[] Make referral to Pet Therapy    
[] Make referral to Addiction services 
[] Make referral to [facility] 
[] Make referral to Spiritual Care Partner 
[] No future visits requested       
[x] Follow up visits as needed Offered support to pt's wife Jameson Quinteros and granddaughter Sherry Morse in ED conference room. They did not wish to view the body. Jameson Quinteros spoke of their long life together. The family has sustained several losses, three of their four children have  over the years. Offered presence and listened to her narrative. Chaplain Derek, MDiv, MS, [facility] 
[phone] PRAY ([phone])

## 2021-04-12 NOTE — ED TRIAGE NOTES
Pt c/o 2-3 days, SOB, and being told by his PCP to increase his oxygen at home from 2L to 4L. Pt denies fevers at home.

## 2021-04-16 LAB
BACTERIA SPEC CULT: NORMAL
SERVICE CMNT-IMP: NORMAL

## 2021-08-10 NOTE — ED NOTES
Pt sat's 90 % on RA. Pt placed on O2 @ 2L/NC with sat's increasing to 95%. Pt awake alert and oriented x 4. Will cont to assess and monitor closely. DISPLAY PLAN FREE TEXT

## 2021-11-09 NOTE — ROUTINE PROCESS
Anesthesia Pre Eval Note    Anesthesia ROS/Med Hx    Overall Review:  EKG was reviewed and Echo was reviewed       Pulmonary Review:    Positive for COPD History of: asthma -     Neuro/Psych Review:    Positive for psychiatric history    Cardiovascular Review:    Positive for CHF  Positive for cardiomyopathy (EF ~30%, normal RV function)  Positive for pulmonary hypertension (~50mmhg)  AICD (BiV DDD pacing) /Pacemaker implanted. Device Type:  Positive for hypertension  Positive for hyperlipidemia    GI/HEPATIC/RENAL Review:    Positive for GERD    End/Other Review:  Positive for diabetes  Positive for obesity class I - 30.00 - 34.99  Positive for chronic pain      Relevant Problems   No relevant active problems       Physical Exam     Airway   Mallampati: II  TM Distance: >3 FB  Neck ROM: Full  TMJ Mobility: Good    Cardiovascular  Cardiovascular exam normal  Cardio Rhythm: Regular  Cardio Rate: Normal    Head Assessment  Head assessment: Normocephalic    General Assessment  General Assessment: Alert and oriented and No acute distress    Dental Exam    Patient has:  Upper dentures    Legend: C=Chipped  M=Missing  L=Loose B=Bridge Cr=Edroy I=Implant    Pulmonary Exam  Pulmonary exam normal  Breath sounds clear to auscultation:  Yes    Abdominal Exam  Abdominal exam normal      Anesthesia Plan:    ASA Status: 3  Anesthesia Type: General    Induction: Intravenous  Preferred Airway Type: ETT  Maintenance: Inhalational    Post-op Pain Management: Per Surgeon      Checklist  Reviewed: Lab Results, EKG, Chest X-Rays, Consultations, Pregnancy Test Results, Patient Summary, Beta Blocker Status, Outside Records, NPO Status, DNR Status, Care Everywhere, Allergies, Medications, Problem list and Past Med History  Monitors  Discussed risks of the following Invasive monitors with patient: Arterial Line    Consent/Risks Discussed Statement:  The proposed anesthetic plan, including its risks and benefits, have been discussed with the  TRANSFER - OUT REPORT:    Verbal report given to [facility] RN(name) on [address]  being transferred to [facility](unit) for routine progression of care       Report consisted of patients Situation, Background, Assessment and   Recommendations(SBAR). Information from the following report(s) SBAR and ED Summary was reviewed with the receiving nurse. Lines:   Peripheral IV 06/25/17 Right Antecubital (Active)   Site Assessment Clean, dry, & intact 6/25/2017  9:53 AM   Phlebitis Assessment 0 6/25/2017  9:53 AM   Infiltration Assessment 0 6/25/2017  9:53 AM   Dressing Status Clean, dry, & intact 6/25/2017  9:53 AM   Dressing Type Transparent 6/25/2017  9:53 AM   Hub Color/Line Status Pink;Flushed;Patent 6/25/2017  9:53 AM   Action Taken Blood drawn 6/25/2017  9:53 AM   Alcohol Cap Used No 6/25/2017  9:53 AM        Opportunity for questions and clarification was provided.       Patient transported with:   Monitor  Tech Patient along with the risks and benefits of alternatives. Questions were encouraged and answered and the patient and/or representative understands and agrees to proceed.    I have discussed elements of the patient's history or examination, as noted above and/or as follows, that place the patient at higher risk of complications; heart disease.    I discussed with the patient (and/or patient's legal representative) the risks and benefits of the proposed anesthesia plan, General, which may include services performed by other anesthesia providers.    Alternative anesthesia plans, if available, were reviewed with the patient (and/or patient's legal representative). Discussion has been held with the patient (and/or patient's legal representative) regarding risks of anesthesia, which include Nausea, Vomiting, Headache, Hypotension, Dental Injury, Sore Throat, Allergic Reaction, Need for Blood Transfusion, Post-op Intubation, Aspiration and Intra-operative Awareness and emergent situations that may require change in anesthesia plan.    The patient (and/or patient's legal representative) has indicated understanding, his/her questions have been answered, and he/she wishes to proceed with the planned anesthetic.    Blood Products: Not Anticipated

## 2022-07-05 NOTE — PROGRESS NOTES
0730- assumed care of pt this am. Pt is alert and mostly oriented ( time being the issue) resting in bed at this time. Follow up in 6 months

## 2022-11-24 NOTE — WOUND CARE
WOCN Note:  
 
New consult placed for assessment of sacrum. Dr Teresa Sarmiento at the bedside for assessment. Chart reviewed. Admitted DX:  CHF exacerbation Assessment:  
Patient is alert, communicative and repositions independently. Bed:  Hub[place] Patient wearing briefs for incontinence. Patient reports no pain. Heels intact without erythema. Perianal blanching red erythema from incontinence. Cleansed and applied Zinc cream. 
 
1. Sacrum has two raised red areas that are 1 x 0.8 x 0 cm. Patient reports that they aren't painful or itchy. Left open to air. Skin Care & Pressure Prevention: 
Minimize layers of linen/pads under patient to optimize support surface. Turn/reposition approximately every 2 hours and offload heels. Manage incontinence / promote continence. Continue Zinc cream. 
 
Transition of Care: Plan to follow as needed while admitted to hospital. 
 
DOLORES HillN RN [facility] Wound Care Office [phone] Pager [phone] show

## 2023-04-24 NOTE — PROGRESS NOTES
Pharmacist Discharge Medication Reconciliation    Discharging Provider: [facility]    Significant PMH:   Past Medical History:   Diagnosis Date    CAD (coronary artery disease)     s/p CABG 2008    Carotid arterial disease ([facility]     CKD (chronic kidney disease) stage 3, GFR 30-59 ml/min ([facility] 10/21/2017    hypertension and DM nephrosclerosis    Diabetes mellitus, type 2 ([facility]     Hypercholesteremia     Hypertension     Paroxysmal atrial fibrillation ([facility] 10/21/2017    new onset afib with BRPR 10-19-17 admit    PVC's (premature ventricular contractions) 5/26/2014    PVD (peripheral vascular disease) [facility])      Chief Complaint for this Admission:   Chief Complaint   Patient presents with    Shortness of Breath     Allergies: Lisinopril    Discharge Medications:   Discharge Medication List as of 8/20/2019 11:05 AM        START taking these medications    Details   fludrocortisone (FLORINEF) 0.1 mg tablet Take 1 Tab by mouth daily for 30 days. , Print, Disp-30 Tab, R-0      predniSONE (DELTASONE) 10 mg tablet Take 30 mg by mouth daily (with breakfast) for 5 days. , Print, Disp-15 Tab, R-0           CONTINUE these medications which have CHANGED    Details   carvedilol (COREG) 3.125 mg tablet Take 1 Tab by mouth two (2) times daily (with meals) for 30 days. , Print, Disp-60 Tab, R-0           CONTINUE these medications which have NOT CHANGED    Details   albuterol (PROVENTIL VENTOLIN) 2.5 mg /3 mL (0.083 %) nebulizer solution 3 mL by Nebulization route every four (4) hours as needed for Wheezing., Normal, Disp-1 Package, R-0      sodium bicarbonate 650 mg tablet Take 2 Tabs by mouth three (3) times daily. , Print, Disp-90 Tab, R-0      tamsulosin (FLOMAX) 0.4 mg capsule Take 1 Cap by mouth daily. , Print, Disp-30 Cap, R-0      insulin glargine U-300 conc 300 unit/mL (1.5 mL) inpn 18 Units by SubCUTAneous route every morning., Historical Med      aspirin 81 mg chewable tablet Take 1 Tab by mouth daily. , Normal, Disp-33 Tab, R-11 simvastatin (ZOCOR) 20 mg tablet Take 20 mg by mouth nightly., Historical Med      omega 3-dha-epa-fish oil (FISH OIL) 100-160-1,000 mg cap Take 1 Cap by mouth two (2) times a day., Historical Med      cinnamon bark (CINNAMON) 500 mg cap Take 1,000 mg by mouth two (2) times a day., Historical Med           STOP taking these medications       bumetanide (BUMEX) 1 mg tablet Comments:   Reason for Stopping:         hydrALAZINE (APRESOLINE) 50 mg tablet Comments:   Reason for Stopping:               The patient's chart, MAR and AVS were reviewed by Autumn Turner, PHARMD. Self

## 2023-05-17 NOTE — CONSULTS
Cardiology Consult Note      Patient Name: Karan Stark  : 1935 MRN: [id number]  Date: 10/20/2017  Time: 10:03 AM    Admit Diagnosis: GI bleed  GI bleed    Primary/Consulting Cardiologist: Sunitha Mays. Dorian Jolley M.D.    Shanique Abernathy for Consult: Afib    Requesting MD: Isabel Barragan MD    HPI:  Karan Stark. is a 81 y.o. male admitted on 10/20/2017  for GI bleed. Past medical history of CAD (coronary artery disease); Carotid arterial disease ([facility] Diabetes mellitus, type 2 ([facility] Hypercholesteremia; Hypertension; PVC's (premature ventricular contractions) (2014); and PVD (peripheral vascular disease) ([facility] Presented to the ED with c/o BRBPR and bloody BMs. No other complaints, except as such, he was admitted for further evaluation and treatment. EKG on admission suggested AFib. He has no history for this. Subjective:  Denies CP, SOB or palpitations. Assessment and Plan     1. ? AFib   - Review of EKG on admission finds HR 74, regular rhythm and rate. Noticeable P waves    - Artifact noted. - Will repeat EKG    - Telemetry reviewed, NSR    - Follow up echo  2. BRBPR   - GI following   - Tagged RBC scan today. 3. H/o CAD/PVD   - Coreg 25 mg BID   - Zocor 20 mg   - ASA held 2/2 bleeding  4. HTN   - Coreg, HCTZ, Catapres, Norvasc  5. DM   SS insulin    Unsure why he was not restarted on home meds. Will restart Coreg, Zocor, Catapres and Norvasc. ASA held with bleeding. Follow up echo and repeat EKG. Assessment/Plan/Discussion:Cardiology Attending:     Patient seen earlier today and examined  and agree with Advance Practice Provider (MADDIE, NP,PA)  assessment and plans.   Karan Stark is a 81 y.o. male   Seen for CAD & CABG in office for years  Here with bleeding GI  Has had SOB for months, echo and stress test ok  Sent to pulmonary who felt it was not COPD  Started on diuretics with improvement  Following CKD closely  Stable  Lab Results   Component Value Date/Time    Creatinine 2.07 10/20/2017 08:11 AM      Now with one EKG not clear if afib or SR, likely afib  P wave in V2 unclear but now on tele in SR and this is not an old problem  So we will watch CAD, rhythm and hemodynamics during this \"stress test\" of ischemia  following      Teagan Richardson MD 10/20/2017           Holter monitor14=showing rate of  with frequent PACs, rare periods of short RP interval SVT up to 122 and frequent PVCs. sinus rhythm with similar findings, frequent PVCs and short RP interval tachycardia. NUKE  14,  2 minutes, 20 seconds,  EF of 55% with no ischemia. PVD= Dr. Francy Trejo atherectomy to distal SFA and distal popliteal with angioplasty to both lesions and stent to the SFA by Dr. Francy Trejo on 3/14/14. Previously had AIBs of 0.67 on the left, 0.71 on the right with the right seemingly due to distal disease. CAD/CABG off pump x3 3/2008 . =post op anemia and renal failure  CATH 2008= severe three-vessel left main coronary artery disease, 40% disease of the left main and take off of the LAD and circumflex complex, 70% stenosis of the ostial circ and 85% of the LAD. Between the first and second marginal, the circumflex had 70% stenosis and between the third marginal and PDA there was a 70% stenosis, LAD ostial lesion RCA proximal 60% tapering, EF 60%-70%, bilaterally patent renal arteries, mild atherosclerosis of the distal abdominal aorta. Mild carotid artery disease 3-7-08 then 12 with 10-49% left, less than 10% on right  Dyslipidemia 10-25-10  TG 91 HDL 40 LDL 48  SOCIAL: Drinks no alcohol, quit smoking several years ago, works as an . Lives with his wife and has four children. Enjoys gardening, fishing and music. FAMILY HISTORY: Mother  of cancer at 76, father  of Parkinson's at 70, one brother  of cancer of the liver at 76 and one  of an infection at 64. Review of Symptoms:  A comprehensive review of systems was negative except for that written in the HPI. Previous treatment/evaluation includes echocardiogram and cardiac catheterization . Cardiac risk factors: dyslipidemia, male gender, hypertension. Past Medical History:   Diagnosis Date    CAD (coronary artery disease)     Carotid arterial disease ([facility]     Diabetes mellitus, type 2 ([facility]     Hypercholesteremia     Hypertension     PVC's (premature ventricular contractions) 5/26/2014    PVD (peripheral vascular disease) ([facility]      Past Surgical History:   Procedure Laterality Date    HX CORONARY ARTERY BYPASS GRAFT      HX HEART CATHETERIZATION       Current Facility-Administered Medications   Medication Dose Route Frequency    sodium chloride (NS) flush 5-10 mL  5-10 mL IntraVENous Q8H    sodium chloride (NS) flush 5-10 mL  5-10 mL IntraVENous PRN    0.9% sodium chloride infusion  75 mL/hr IntraVENous CONTINUOUS    0.9% sodium chloride infusion 250 mL  250 mL IntraVENous PRN    glucose chewable tablet 16 g  4 Tab Oral PRN    dextrose (D50W) injection syrg 12.5-25 g  12.5-25 g IntraVENous PRN    glucagon (GLUCAGEN) injection 1 mg  1 mg IntraMUSCular PRN    insulin lispro (HUMALOG) injection   SubCUTAneous Q6H    heparin (porcine) pf 40 Units  40 Units Other RAD ONCE       Allergies   Allergen Reactions    Lisinopril Cough      History reviewed. No pertinent family history.    Social History     Social History    Marital status:      Spouse name: N/A    Number of children: N/A    Years of education: N/A     Social History Main Topics    Smoking status: Former Smoker     Packs/day: 3.00     Years: 20.00     Types: Cigarettes     Quit date: 1/14/1985    Smokeless tobacco: Never Used    Alcohol use No    Drug use: No    Sexual activity: Not Asked     Other Topics Concern    None     Social History Narrative       Objective:    Physical Exam    Vitals:   Vitals:    10/20/17 0515 10/20/17 0602 10/20/17 0706 10/20/17 0715   BP: 143/58 145/84 137/47 141/75   Pulse: 76 82 82 82   Resp: 22 27 20 21   Temp:   97.6 °F (36.4 °C)    SpO2: 98% 98% 99% 97%   Weight:       Height:           General:    Alert, cooperative, no distress, appears stated age. Neck:   Supple, no carotid bruit and no JVD. Back:     Symmetric, normal curvature. Lungs:     Clear to auscultation bilaterally. Heart[de-identified]    Regular rate and rhythm, S1, S2 normal, no murmur, click, rub or gallop. Abdomen:     Soft, non-tender. Bowel sounds normal.    Extremities:   Extremities normal, atraumatic, no cyanosis or edema. Vascular:   Pulses - 2+ radials   Skin:   Skin color normal. No rashes or lesions   Neurologic:   CN II-XII grossly intact. Telemetry: normal sinus rhythm    ECG: normal EKG, normal sinus rhythm    Data Review:     Radiology: No admitting CXR    No results for input(s): CPK, TROIQ in the last 72 hours. No lab exists for component: CKQMB, CPKMB, BMPP  Recent Labs      10/20/17   0811  10/20/17   0409   NA  136  137   K  4.4  4.0   CL  102  100   CO2  28  30   BUN  39*  36*   CREA  2.07*  2.15*   GLU  302*  309*   CA  8.2*  8.2*     Recent Labs      10/20/17   0811  10/20/17   0409   WBC  5.7  6.6   HGB  8.9*  10.8*   HCT  27.2*  33.1*   PLT  216  228     Recent Labs      10/20/17   0811  10/20/17   0409   PTP  10.8  10.3   INR  1.1  1.0   SGOT   --   16   AP   --   78     No results for input(s): CHOL, LDLC in the last 72 hours.     No lab exists for component: TGL, HDLC,  HBA1C  Recent Labs      10/20/17   0811   TSH  2.38       MARGARITA Greenwood MD           [facility] [address] [place] [address], [address] [place] [address] [address]     [facility]     [phone]    Kyree Whalen MD only to save her life

## 2024-01-01 NOTE — PROGRESS NOTES
[facility] Critical Care, [facility] Sleep Medicine Progress Note Name: Ortiz Chappell MRN: [id number] : 1935 Hospital: [address] [address]  Date: 2019 IMPRESSION:  
· Acute hypoxic resp failure- with diffuse GGO- in setting of fish pond cleaning-  concerns for water borne atypicals like legionella. MAC also possible. · KATALINA on CKD · DM2 
· CAD/CABG 
· HTN 
· PPM  · PAF  
  
RECOMMENDATIONS:  
· Continue rocephin and azthro- can consolidate to levaquin PO in next 2-3 days · If no improvement will BAL · Check CRAG, Mycoplasma/chlamydia serologies · Urine legionella Ag - negative · nasal viral PCR - negative · Exacerbation of an occult baseline fibrotic ILD is lower in DDX given purulent sputum - suspect PNA with mild ARDS ( legionella vs other) · Will see as needed over the weekend, please call with any changes/questions Pt is acutely ill and at risk for decline due to resp failure. Subjective:  
 
 - Resting in bed. States he feels much better today. Cough decreased in frequency and amt of green sputum. Dyspnea improved. On 3LPM. Afebrile. Initial HPI This patient has been seen and evaluated at the request of Dr. Edgar Vázquez for SOB. Patient is a 81 y.o. male Who has a fish pond at home and routinely cleans it. Pt over last 6 weeks with coughj - green sputum and worsening SOB. Admitted with CT chest showing b/l GGO no fever or WBC. Pt started on rocephin/azithro for atypical pna ? Legionella. Pt states cough a little bit better. No n/v/d. No CP  Or abd pain. Past Medical History:  
Diagnosis Date  CAD (coronary artery disease) s/p CABG   Carotid arterial disease ([facility]  CKD (chronic kidney disease) stage 3, GFR 30-59 ml/min ([facility]) 10/21/2017  
 hypertension and DM nephrosclerosis  Diabetes mellitus, type 2 ([facility]  Hypercholesteremia  Hypertension  Paroxysmal atrial fibrillation ([facility] [address] 10/21/2017 new onset afib with BRPR 10-19-17 admit  PVC's (premature ventricular contractions) 5/26/2014  PVD (peripheral vascular disease) ([facility] Past Surgical History:  
Procedure Laterality Date  HX CORONARY ARTERY BYPASS GRAFT    
 HX HEART CATHETERIZATION    
 MS TCAT INSJ/RPL PERM LEADLESS PACEMAKER RV W/IMG N/A 5/9/2019 INSERT OR REPLACE TRANSCATH PPM LEADLESS performed by Wili Tucker MD at [place] [address], [facility]/s Dr CATH LAB Prior to Admission medications Medication Sig Start Date End Date Taking? Authorizing Provider  
bumetanide (BUMEX) 1 mg tablet Take 3 mg by mouth two (2) times a day. Yes Provider, Historical  
albuterol (PROVENTIL VENTOLIN) 2.5 mg /3 mL (0.083 %) nebulizer solution 3 mL by Nebulization route every four (4) hours as needed for Wheezing. 5/14/19  Yes Lito Prasad MD  
carvedilol (COREG) 12.5 mg tablet Take 1 Tab by mouth two (2) times daily (with meals). Patient taking differently: Take 12.5 mg by mouth daily. 5/12/19  Yes Keya Sandoval, DO  
hydrALAZINE (APRESOLINE) 50 mg tablet Take 3 Tabs by mouth three (3) times daily. 5/12/19  Yes Keya Sandoval, DO  
sodium bicarbonate 650 mg tablet Take 2 Tabs by mouth three (3) times daily. 5/12/19  Yes Keya Sandoval, DO  
tamsulosin (FLOMAX) 0.4 mg capsule Take 1 Cap by mouth daily. 5/13/19  Yes Keya Sandoval, DO  
insulin glargine U-300 conc 300 unit/mL (1.5 mL) inpn 18 Units by SubCUTAneous route every morning. Yes Provider, Historical  
aspirin 81 mg chewable tablet Take 1 Tab by mouth daily. 9/27/18  Yes José Dominguez MD  
simvastatin (ZOCOR) 20 mg tablet Take 20 mg by mouth nightly. Yes Provider, Historical  
omega 3-dha-epa-fish oil (FISH OIL) 100-160-1,000 mg cap Take 1 Cap by mouth two (2) times a day. Yes Provider, Historical  
cinnamon bark (CINNAMON) 500 mg cap Take 1,000 mg by mouth two (2) times a day. Yes Provider, Historical  
 
Allergies Allergen Reactions  Lisinopril Cough Social History Tobacco Use  Smoking status: Former Smoker Packs/day: 3.00 Years: 20.00 Pack years: 60.00 Types: Cigarettes Last attempt to quit: 1985 Years since quittin.5  Smokeless tobacco: Never Used Substance Use Topics  Alcohol use: No  
  
History reviewed. No pertinent family history. Current Facility-Administered Medications Medication Dose Route Frequency  bumetanide (BUMEX) injection 2 mg  2 mg IntraVENous TID  insulin glargine (LANTUS) injection 22 Units  22 Units SubCUTAneous QHS  methylPREDNISolone (PF) (SOLU-MEDROL) injection 40 mg  40 mg IntraVENous Q12H  
 azithromycin (ZITHROMAX) 500 mg in 0.9% sodium chloride (MBP/ADV) 250 mL  500 mg IntraVENous Q24H  
 albuterol-ipratropium (DUO-NEB) 2.5 MG-0.5 MG/3 ML  3 mL Nebulization QID RT  
 insulin lispro (HUMALOG) injection   SubCUTAneous AC&HS  cefTRIAXone (ROCEPHIN) 1 g in 0.9% sodium chloride (MBP/ADV) 50 mL  1 g IntraVENous Q24H  
 balsam peru-castor oil (VENELEX) ointment   Topical TID  sodium chloride (NS) flush 5-40 mL  5-40 mL IntraVENous Q8H  
 sodium chloride (NS) flush 5-40 mL  5-40 mL IntraVENous Q8H  
 heparin (porcine) injection 5,000 Units  5,000 Units SubCUTAneous Q12H  aspirin chewable tablet 81 mg  81 mg Oral DAILY  carvedilol (COREG) tablet 12.5 mg  12.5 mg Oral BID WITH MEALS  simvastatin (ZOCOR) tablet 20 mg  20 mg Oral QHS  sodium bicarbonate tablet 1,300 mg  1,300 mg Oral TID  tamsulosin (FLOMAX) capsule 0.4 mg  0.4 mg Oral DAILY  epoetin dm-epbx (RETACRIT) injection 10,000 Units  10,000 Units SubCUTAneous Q7D Review of Systems: 
Pertinent items are noted in HPI. Objective:  
Vital Signs:   
Visit Vitals /76 (BP 1 Location: Left arm, BP Patient Position: At rest) Pulse 70 Temp 97.9 °F (36.6 °C) Resp 21 Ht 5' 9\" (1.753 m) Wt 70.5 kg (155 lb 6.8 oz) SpO2 96% BMI 22.95 kg/m² O2 Device: Nasal cannula O2 Flow Rate (L/min): 3 l/min Temp (24hrs), Av.1 °F (36.7 °C), Min:97.6 °F (36.4 °C), Max:98.7 °F (37.1 °C) Intake/Output:  
Last shift:      701 - 1900 In: 400 [I.V.:400] Out: - Last 3 shifts: 1901 - 700 In: 360 [P.O.:360] Out: 1398 [[facility]:0550] Intake/Output Summary (Last 24 hours) at 2019 1003 Last data filed at 2019 4304 Gross per 24 hour Intake 520 ml Output 2525 ml Net -2005 ml Physical Exam:  
General:  Alert, cooperative, no distress, appears stated age. Head:  Normocephalic, without obvious abnormality, atraumatic. Eyes:  Conjunctivae/corneas clear. Nose: Nares normal. Septum midline. Neck: Supple, symmetrical, trachea midline,  
   
Lungs:   Coarse rales b/l Heart:  Regular rate and rhythm, S1, S2 normal, no murmur, click, rub or gallop. Abdomen:   Soft, non-tender. Bowel sounds normal. No masses,  No organomegaly. Extremities: Extremities normal, atraumatic, no cyanosis or edema. Pulses: 2+ and symmetric all extremities. Skin: Skin color, texture, turgor normal. No rashes or lesions Data review:  
 
Recent Results (from the past 24 hour(s)) GLUCOSE, POC Collection Time: 19 12:20 PM  
Result Value Ref Range Glucose (POC) 347 (H) 65 - 100 mg/dL Performed by [employer] GLUCOSE, POC Collection Time: 19  4:12 PM  
Result Value Ref Range Glucose (POC) 382 (H) 65 - 100 mg/dL Performed by [employer] GLUCOSE, POC Collection Time: 19  9:16 PM  
Result Value Ref Range Glucose (POC) 315 (H) 65 - 100 mg/dL Performed by [employer] METABOLIC PANEL, BASIC Collection Time: 19  4:00 AM  
Result Value Ref Range Sodium 133 (L) 136 - 145 mmol/L Potassium 3.5 3.5 - 5.1 mmol/L Chloride 89 (L) 97 - 108 mmol/L  
 CO2 34 (H) 21 - 32 mmol/L Anion gap 10 5 - 15 mmol/L Glucose 242 (H) 65 - 100 mg/dL  BUN 59 (H) 6 - 20 MG/DL  
 Creatinine 2.28 (H) 0.70 - 1.30 MG/DL  
 BUN/Creatinine ratio 26 (H) 12 - 20 GFR est AA 33 (L) >60 ml/min/1.73m2 GFR est non-AA 28 (L) >60 ml/min/1.73m2 Calcium 8.6 8.5 - 10.1 MG/DL  
GLUCOSE, POC Collection Time: 08/09/19  6:32 AM  
Result Value Ref Range Glucose (POC) 271 (H) 65 - 100 mg/dL Performed by Kenan Rodriguez Imaging: 
I have personally reviewed the patients radiographs and have reviewed the reports: 
CT chest with diffuse GGO and med LAD no masses no effusions Tracie Castillo NP 
 Screen#: [id number]  Screen Date: 2024  Screen Comment: 12:17

## 2024-08-31 NOTE — PROGRESS NOTES
Problem: Falls - Risk of  Goal: *Absence of Falls  Description  Document Esaw Hemlock Fall Risk and appropriate interventions in the flowsheet. Outcome: Progressing Towards Goal  Note:   Fall Risk Interventions:  Mobility Interventions: Communicate number of staff needed for ambulation/transfer         Medication Interventions: Evaluate medications/consider consulting pharmacy    Elimination Interventions: Patient to call for help with toileting needs    History of Falls Interventions:  Investigate reason for fall, Room close to nurse's station, Door open when patient unattended, Evaluate medications/consider consulting pharmacy abrasions, lacerations

## 2025-01-15 NOTE — ED TRIAGE NOTES
Pt presents after seeing pulmonologist for shortness of breath. Pt had \"a heart procedure\" recently and was discharged from [facility] on Sunday. Pt has extensive cardiac history of stents, HA, and bypass. Pt unsure of the procedure he had recently. Pt states he was doing well but had sudden onset of shortness of breath. Pulmonologist referred him to information on sleep apnea but pt states \"the appt isnt until July and I could be dead by then because I cant breath\"
What Type Of Note Output Would You Prefer (Optional)?: Standard Output
How Severe Is Your Rash?: moderate
Is This A New Presentation, Or A Follow-Up?: Rash
Additional History: Since starting Lotrimin, the rash is about 30% better. The spots on his calves are not improving.

## 2025-04-03 NOTE — PROGRESS NOTES
Hospitalist Progress Note [address] [facility],  Answering service: [phone] OR [phone] from in house phone Date of Service:  2019 NAME:  Roselia Young Sr. 
:  1935 MRN:  [id number] Admission Summary:  
 
 This is an 81-year-old gentleman with a significant cardiac history of coronary artery disease, status post coronary artery bypass grafting; peripheral arterial disease, status post angioplasty and stenting; type 2 diabetes; and hyperlipidemia, presented with weeks of dyspnea. Interval history / Subjective:  
   
Patient seen and examined. Plans for pacemaker today. Creatinine stable on bumex BID. Will reevaluate breathing post-maker. Consider rate control medication post pacemaker Assessment & Plan:  
 
 
Bradycardia, HR as low as 30s in the ED. -pacemaker planned , EP consulted  
-Medications  vs SSS. Antihypertensives held Acute on chronic kidney failure (CKD IV) with cardiorenal syndrome 
-secondary to diabetic nephropathy  
-bumex BID, decreased , will continue to monitor creatinine 
-possible need dialysis in future  
-RHC showed PCWP of 28 Exertional Dyspnea in the setting of Severe Pulmonary Hypertension: 
-ECHO with severe PHTN - PAS 72 mmHg 
-Chest CT significant for mild worsening of diffuse interstitial parenchymal change 
-RHC. PCW 28, RV 75. Metabolic Acidosis:  
-oral bicarbonate supplements  
-nephrology managing Type 2 diabetes with hyperglycemia 
-Resume Lantus 16 units 
-continue to Hold oral hypoglycemics -Accucheks and Humalog correction AC&HS 
  
Chronic atrial fibrillation: 
-no anticoagulation due to history of GI bleed Code status: Full DVT prophylaxis: Heparin Care Plan discussed with: Patient/Family and Nurse Disposition: Home w/Family Hospital Problems  Date Reviewed: 2018 Codes Class Noted POA CHF (congestive heart failure) (HCC) ICD-10-CM: I50.9 ICD-9-CM: 428.0  5/1/2019 Unknown Atrial fibrillation with slow ventricular response ([facility] ICD-10-CM: I48.91 
ICD-9-CM: 427.31  5/1/2019 Overview Signed 5/8/2019 11:24 PM by Kishan Srinivasan MD  
  Added automatically from request for surgery [id number] KATALINA (acute kidney injury) ([facility] ICD-10-CM: N17.9 ICD-9-CM: 584.9  6/25/2017 Unknown * (Principal) Dyspnea ICD-10-CM: R06.00 
ICD-9-CM: 786.09  7/15/2014 Unknown Review of Systems:  
Negative unless stated above Vital Signs:  
 Last 24hrs VS reviewed since prior progress note. Most recent are: 
Visit Vitals /68 (BP 1 Location: Right arm, BP Patient Position: At rest) Pulse (!) 43 Temp 97.3 °F (36.3 °C) Resp 20 Ht 5' 9\" (1.753 m) Wt 82.4 kg (181 lb 9.6 oz) SpO2 99% BMI 26.82 kg/m² Intake/Output Summary (Last 24 hours) at 5/9/2019 1450 Last data filed at 5/8/2019 1614 Gross per 24 hour Intake  Output 500 ml Net -500 ml Physical Examination:  
 
 
     
Constitutional:  No acute distress, cooperative, pleasant   
ENT:  Oral mucous moist, oropharynx benign. Neck supple, Resp:  Lung sounds diminished throughout. No wheezing/rhonchi/rales. No accessory muscle use CV:  irregular, no murmurs, gallops, rubs GI:  Soft, non distended, non tender. normoactive bowel sounds, no hepatosplenomegaly Musculoskeletal:  No edema, warm, 2+ pulses throughout Neurologic:  Moves all extremities Psych:  Good insight, Not anxious nor agitated. Data Review:  
 Review and/or order of clinical lab test 
Review and/or order of tests in the radiology section of CPT Review and/or order of tests in the medicine section of CPT Labs:  
 
Recent Labs 05/07/19 
0805 WBC 6.0 HGB 9.4* HCT 28.7*  
 Recent Labs 05/09/19 
4377 05/08/19 
0444 05/07/19 
2346  138 138  
K 4.2 4.2 4.0  
 104 108 CO2 23 20* 18* BUN 81* 84* 82* CREA 3.88* 3.80* 3.58* * 151* 115* CA 8.7 8.7 8.5 PHOS  --   --  4.6 Recent Labs 05/07/19 
0805 ALB 2.8* No results for input(s): INR, PTP, APTT in the last 72 hours. No lab exists for component: INREXT, INREXT No results for input(s): FE, TIBC, PSAT, FERR in the last 72 hours. No results found for: FOL, RBCF No results for input(s): PH, PCO2, PO2 in the last 72 hours. No results for input(s): CPK, CKNDX, TROIQ in the last 72 hours. No lab exists for component: CPKMB No results found for: CHOL, CHOLX, CHLST, CHOLV, HDL, LDL, LDLC, DLDLP, TGLX, TRIGL, TRIGP, CHHD, CHHDX Lab Results Component Value Date/Time Glucose (POC) 240 (H) 05/09/2019 11:08 AM  
 Glucose (POC) 117 (H) 05/09/2019 06:54 AM  
 Glucose (POC) 137 (H) 05/09/2019 12:53 AM  
 Glucose (POC) 138 (H) 05/08/2019 10:02 PM  
 Glucose (POC) 180 (H) 05/08/2019 04:13 PM  
 
Lab Results Component Value Date/Time Color YELLOW/STRAW 06/25/2017 11:40 AM  
 Appearance CLEAR 06/25/2017 11:40 AM  
 Specific gravity 1.018 06/25/2017 11:40 AM  
 pH (UA) 5.0 06/25/2017 11:40 AM  
 Protein 300 (A) 06/25/2017 11:40 AM  
 Glucose NEGATIVE  06/25/2017 11:40 AM  
 Ketone NEGATIVE  06/25/2017 11:40 AM  
 Bilirubin NEGATIVE  06/25/2017 11:40 AM  
 Urobilinogen 0.2 06/25/2017 11:40 AM  
 Nitrites NEGATIVE  06/25/2017 11:40 AM  
 Leukocyte Esterase NEGATIVE  06/25/2017 11:40 AM  
 Epithelial cells FEW 06/25/2017 11:40 AM  
 Bacteria NEGATIVE  06/25/2017 11:40 AM  
 WBC 0-4 06/25/2017 11:40 AM  
 RBC 0-5 06/25/2017 11:40 AM  
 
 
 
Medications Reviewed:  
 
Current Facility-Administered Medications Medication Dose Route Frequency  sodium chloride (NS) flush 5-40 mL  5-40 mL IntraVENous Q8H  
 sodium chloride (NS) flush 5-40 mL  5-40 mL IntraVENous PRN  
 ceFAZolin (ANCEF) 2 g/20 mL in sterile water IV syringe  2 g IntraVENous ONCE  
 amLODIPine (NORVASC) tablet 10 mg  10 mg Oral DAILY  bumetanide (BUMEX) tablet 1 mg  1 mg Oral BID  insulin lispro (HUMALOG) injection   SubCUTAneous AC&HS  sodium bicarbonate tablet 1,300 mg  1,300 mg Oral TID  hydrALAZINE (APRESOLINE) tablet 150 mg  150 mg Oral TID  albuterol-ipratropium (DUO-NEB) 2.5 MG-0.5 MG/3 ML  3 mL Nebulization Q6H PRN  
 insulin glargine (LANTUS) injection 16 Units  16 Units SubCUTAneous DAILY  famotidine (PEPCID) tablet 20 mg  20 mg Oral Q24H  
 ondansetron (ZOFRAN ODT) tablet 4 mg  4 mg Oral Q8H PRN  
 sodium chloride (NS) flush 5-40 mL  5-40 mL IntraVENous Q8H  
 sodium chloride (NS) flush 5-40 mL  5-40 mL IntraVENous PRN  
 sodium chloride (NS) flush 5-40 mL  5-40 mL IntraVENous Q8H  
 sodium chloride (NS) flush 5-40 mL  5-40 mL IntraVENous PRN  
 acetaminophen (TYLENOL) tablet 650 mg  650 mg Oral Q4H PRN  
 aspirin chewable tablet 81 mg  81 mg Oral DAILY  glucose chewable tablet 16 g  4 Tab Oral PRN  
 dextrose (D50W) injection syrg 12.5-25 g  12.5-25 g IntraVENous PRN  
 glucagon (GLUCAGEN) injection 1 mg  1 mg IntraMUSCular PRN  
 hydrALAZINE (APRESOLINE) 20 mg/mL injection 20 mg  20 mg IntraVENous Q6H PRN  
 
______________________________________________________________________ EXPECTED LENGTH OF STAY: 2d 9h 
ACTUAL LENGTH OF STAY:          [id number] [facility]  
 84.8

## (undated) DEVICE — CATHETER PACE 5FR L110CM 1CM ELECTRD SPACE R HRT CARD VENT

## (undated) DEVICE — PRESSURE MONITORING SET: Brand: TRUWAVE

## (undated) DEVICE — TRAP SURG QUAD PARABOLA SLOT DSGN SFTY SCRN TRAPEASE

## (undated) DEVICE — PINNACLE INTRODUCER SHEATH: Brand: PINNACLE

## (undated) DEVICE — FORCEPS BX L240CM JAW DIA2.8MM L CAP W/ NDL MIC MESH TOOTH

## (undated) DEVICE — Device

## (undated) DEVICE — FASCIAL DILATOR SET: Brand: COOK

## (undated) DEVICE — 3M™ TEGADERM™ TRANSPARENT FILM DRESSING FRAME STYLE, 1626W, 4 IN X 4-3/4 IN (10 CM X 12 CM), 50/CT 4CT/CASE: Brand: 3M™ TEGADERM™

## (undated) DEVICE — SNARE VASC L240CM LOOP W10MM SHTH DIA2.4MM RND STIFF CLD

## (undated) DEVICE — DRAPE PRB US TRNSDCR 6X96IN --

## (undated) DEVICE — NEEDLE ANGIO 18GAX7CM SECURELOC

## (undated) DEVICE — PACK PROCEDURE SURG HRT CATH

## (undated) DEVICE — CATHETER DIAG 6FR L110CM INTRO 6FR BLLN DIA9MM 1CC PULM ART

## (undated) DEVICE — KIT HND CTRL 3 W STPCOCK ROT END 54IN PREM HI PRSS TBNG AT

## (undated) DEVICE — SUT PROL 0 30IN CT1 BLU --

## (undated) DEVICE — INTRO SHTH CATH 23F 55.7CM --

## (undated) DEVICE — AMPLATZ EXTRA STIFF WIRE GUIDE: Brand: AMPLATZ

## (undated) DEVICE — 6 FOOT DISPOSABLE EXTENSION CABLE WITH SAFE CONNECT / SCREW-DOWN

## (undated) DEVICE — KIT MFLD ISOLATN NACL CNTRST PRT TBNG SPIK W/ PRSS TRNSDUC

## (undated) DEVICE — ANGIOGRAPHY KIT

## (undated) DEVICE — CONTAINER,SPECIMEN,4OZ,OR STRL: Brand: MEDLINE

## (undated) DEVICE — SYR 50ML LR LCK 1ML GRAD NSAF --